# Patient Record
Sex: MALE | Race: WHITE | NOT HISPANIC OR LATINO
[De-identification: names, ages, dates, MRNs, and addresses within clinical notes are randomized per-mention and may not be internally consistent; named-entity substitution may affect disease eponyms.]

---

## 2022-05-19 ENCOUNTER — TRANSCRIPTION ENCOUNTER (OUTPATIENT)
Age: 52
End: 2022-05-19

## 2022-05-19 ENCOUNTER — INPATIENT (INPATIENT)
Facility: HOSPITAL | Age: 52
LOS: 8 days | Discharge: HOME HEALTH | DRG: 221 | End: 2022-05-28
Attending: COLON & RECTAL SURGERY | Admitting: COLON & RECTAL SURGERY
Payer: MEDICAID

## 2022-05-19 ENCOUNTER — RESULT REVIEW (OUTPATIENT)
Age: 52
End: 2022-05-19

## 2022-05-19 VITALS — WEIGHT: 160.06 LBS

## 2022-05-19 DIAGNOSIS — K56.609 UNSPECIFIED INTESTINAL OBSTRUCTION, UNSPECIFIED AS TO PARTIAL VERSUS COMPLETE OBSTRUCTION: ICD-10-CM

## 2022-05-19 LAB
ABO RH CONFIRMATION: SIGNIFICANT CHANGE UP
ALBUMIN SERPL ELPH-MCNC: 3.1 G/DL — LOW (ref 3.3–5)
ALP SERPL-CCNC: 86 U/L — SIGNIFICANT CHANGE UP (ref 40–120)
ALT FLD-CCNC: 9 U/L — LOW (ref 12–78)
ANION GAP SERPL CALC-SCNC: 8 MMOL/L — SIGNIFICANT CHANGE UP (ref 5–17)
APTT BLD: 27.1 SEC — LOW (ref 27.5–35.5)
AST SERPL-CCNC: 13 U/L — LOW (ref 15–37)
BASOPHILS # BLD AUTO: 0.02 K/UL — SIGNIFICANT CHANGE UP (ref 0–0.2)
BASOPHILS NFR BLD AUTO: 0.1 % — SIGNIFICANT CHANGE UP (ref 0–2)
BILIRUB SERPL-MCNC: 0.8 MG/DL — SIGNIFICANT CHANGE UP (ref 0.2–1.2)
BUN SERPL-MCNC: 25 MG/DL — HIGH (ref 7–23)
CALCIUM SERPL-MCNC: 9.6 MG/DL — SIGNIFICANT CHANGE UP (ref 8.5–10.1)
CHLORIDE SERPL-SCNC: 97 MMOL/L — SIGNIFICANT CHANGE UP (ref 96–108)
CO2 SERPL-SCNC: 26 MMOL/L — SIGNIFICANT CHANGE UP (ref 22–31)
CREAT SERPL-MCNC: 0.9 MG/DL — SIGNIFICANT CHANGE UP (ref 0.5–1.3)
EGFR: 105 ML/MIN/1.73M2 — SIGNIFICANT CHANGE UP
EOSINOPHIL # BLD AUTO: 0 K/UL — SIGNIFICANT CHANGE UP (ref 0–0.5)
EOSINOPHIL NFR BLD AUTO: 0 % — SIGNIFICANT CHANGE UP (ref 0–6)
GLUCOSE BLDC GLUCOMTR-MCNC: 146 MG/DL — HIGH (ref 70–99)
GLUCOSE BLDC GLUCOMTR-MCNC: 150 MG/DL — HIGH (ref 70–99)
GLUCOSE SERPL-MCNC: 139 MG/DL — HIGH (ref 70–99)
HCT VFR BLD CALC: 30 % — LOW (ref 39–50)
HGB BLD-MCNC: 8.4 G/DL — LOW (ref 13–17)
IMM GRANULOCYTES NFR BLD AUTO: 1 % — SIGNIFICANT CHANGE UP (ref 0–1.5)
INR BLD: 1.31 RATIO — HIGH (ref 0.88–1.16)
LACTATE SERPL-SCNC: 2.1 MMOL/L — HIGH (ref 0.7–2)
LYMPHOCYTES # BLD AUTO: 0.38 K/UL — LOW (ref 1–3.3)
LYMPHOCYTES # BLD AUTO: 1.7 % — LOW (ref 13–44)
MCHC RBC-ENTMCNC: 16.7 PG — LOW (ref 27–34)
MCHC RBC-ENTMCNC: 28 GM/DL — LOW (ref 32–36)
MCV RBC AUTO: 59.8 FL — LOW (ref 80–100)
MONOCYTES # BLD AUTO: 1.43 K/UL — HIGH (ref 0–0.9)
MONOCYTES NFR BLD AUTO: 6.4 % — SIGNIFICANT CHANGE UP (ref 2–14)
NEUTROPHILS # BLD AUTO: 20.17 K/UL — HIGH (ref 1.8–7.4)
NEUTROPHILS NFR BLD AUTO: 90.8 % — HIGH (ref 43–77)
PLATELET # BLD AUTO: 536 K/UL — HIGH (ref 150–400)
POTASSIUM SERPL-MCNC: 3.9 MMOL/L — SIGNIFICANT CHANGE UP (ref 3.5–5.3)
POTASSIUM SERPL-SCNC: 3.9 MMOL/L — SIGNIFICANT CHANGE UP (ref 3.5–5.3)
PROT SERPL-MCNC: 8.5 GM/DL — HIGH (ref 6–8.3)
PROTHROM AB SERPL-ACNC: 15.2 SEC — HIGH (ref 10.5–13.4)
RAPID RVP RESULT: SIGNIFICANT CHANGE UP
RBC # BLD: 5.02 M/UL — SIGNIFICANT CHANGE UP (ref 4.2–5.8)
RBC # FLD: 18.5 % — HIGH (ref 10.3–14.5)
SARS-COV-2 RNA SPEC QL NAA+PROBE: SIGNIFICANT CHANGE UP
SODIUM SERPL-SCNC: 131 MMOL/L — LOW (ref 135–145)
WBC # BLD: 22.22 K/UL — HIGH (ref 3.8–10.5)
WBC # FLD AUTO: 22.22 K/UL — HIGH (ref 3.8–10.5)

## 2022-05-19 PROCEDURE — 88342 IMHCHEM/IMCYTCHM 1ST ANTB: CPT

## 2022-05-19 PROCEDURE — 85018 HEMOGLOBIN: CPT

## 2022-05-19 PROCEDURE — G1004: CPT

## 2022-05-19 PROCEDURE — 97162 PT EVAL MOD COMPLEX 30 MIN: CPT | Mod: GP

## 2022-05-19 PROCEDURE — C9113: CPT

## 2022-05-19 PROCEDURE — C9399: CPT

## 2022-05-19 PROCEDURE — 52332 CYSTOSCOPY AND TREATMENT: CPT | Mod: 50

## 2022-05-19 PROCEDURE — 84478 ASSAY OF TRIGLYCERIDES: CPT

## 2022-05-19 PROCEDURE — 74177 CT ABD & PELVIS W/CONTRAST: CPT

## 2022-05-19 PROCEDURE — 88305 TISSUE EXAM BY PATHOLOGIST: CPT

## 2022-05-19 PROCEDURE — 80053 COMPREHEN METABOLIC PANEL: CPT

## 2022-05-19 PROCEDURE — 88309 TISSUE EXAM BY PATHOLOGIST: CPT | Mod: 26

## 2022-05-19 PROCEDURE — 86920 COMPATIBILITY TEST SPIN: CPT

## 2022-05-19 PROCEDURE — 88305 TISSUE EXAM BY PATHOLOGIST: CPT | Mod: 26

## 2022-05-19 PROCEDURE — 88307 TISSUE EXAM BY PATHOLOGIST: CPT

## 2022-05-19 PROCEDURE — 99253 IP/OBS CNSLTJ NEW/EST LOW 45: CPT | Mod: 57

## 2022-05-19 PROCEDURE — 88342 IMHCHEM/IMCYTCHM 1ST ANTB: CPT | Mod: 26

## 2022-05-19 PROCEDURE — 71045 X-RAY EXAM CHEST 1 VIEW: CPT | Mod: 26

## 2022-05-19 PROCEDURE — 83735 ASSAY OF MAGNESIUM: CPT

## 2022-05-19 PROCEDURE — 88309 TISSUE EXAM BY PATHOLOGIST: CPT

## 2022-05-19 PROCEDURE — 85014 HEMATOCRIT: CPT

## 2022-05-19 PROCEDURE — 84100 ASSAY OF PHOSPHORUS: CPT

## 2022-05-19 PROCEDURE — 36415 COLL VENOUS BLD VENIPUNCTURE: CPT

## 2022-05-19 PROCEDURE — 80202 ASSAY OF VANCOMYCIN: CPT

## 2022-05-19 PROCEDURE — 81001 URINALYSIS AUTO W/SCOPE: CPT

## 2022-05-19 PROCEDURE — 82378 CARCINOEMBRYONIC ANTIGEN: CPT

## 2022-05-19 PROCEDURE — 83605 ASSAY OF LACTIC ACID: CPT

## 2022-05-19 PROCEDURE — 44150 REMOVAL OF COLON: CPT

## 2022-05-19 PROCEDURE — 88307 TISSUE EXAM BY PATHOLOGIST: CPT | Mod: 26

## 2022-05-19 PROCEDURE — C1769: CPT

## 2022-05-19 PROCEDURE — 88341 IMHCHEM/IMCYTCHM EA ADD ANTB: CPT

## 2022-05-19 PROCEDURE — 71045 X-RAY EXAM CHEST 1 VIEW: CPT

## 2022-05-19 PROCEDURE — 86850 RBC ANTIBODY SCREEN: CPT

## 2022-05-19 PROCEDURE — P9016: CPT

## 2022-05-19 PROCEDURE — 87635 SARS-COV-2 COVID-19 AMP PRB: CPT

## 2022-05-19 PROCEDURE — 80048 BASIC METABOLIC PNL TOTAL CA: CPT

## 2022-05-19 PROCEDURE — 99285 EMERGENCY DEPT VISIT HI MDM: CPT

## 2022-05-19 PROCEDURE — C1758: CPT

## 2022-05-19 PROCEDURE — 85027 COMPLETE CBC AUTOMATED: CPT

## 2022-05-19 PROCEDURE — 82962 GLUCOSE BLOOD TEST: CPT

## 2022-05-19 PROCEDURE — 74176 CT ABD & PELVIS W/O CONTRAST: CPT | Mod: 26,ME

## 2022-05-19 PROCEDURE — 88341 IMHCHEM/IMCYTCHM EA ADD ANTB: CPT | Mod: 26

## 2022-05-19 PROCEDURE — 86901 BLOOD TYPING SEROLOGIC RH(D): CPT

## 2022-05-19 PROCEDURE — 36430 TRANSFUSION BLD/BLD COMPNT: CPT

## 2022-05-19 PROCEDURE — 97116 GAIT TRAINING THERAPY: CPT | Mod: GP

## 2022-05-19 PROCEDURE — 86900 BLOOD TYPING SEROLOGIC ABO: CPT

## 2022-05-19 RX ORDER — HYDROMORPHONE HYDROCHLORIDE 2 MG/ML
0.5 INJECTION INTRAMUSCULAR; INTRAVENOUS; SUBCUTANEOUS
Refills: 0 | Status: DISCONTINUED | OUTPATIENT
Start: 2022-05-19 | End: 2022-05-24

## 2022-05-19 RX ORDER — ENOXAPARIN SODIUM 100 MG/ML
40 INJECTION SUBCUTANEOUS EVERY 24 HOURS
Refills: 0 | Status: DISCONTINUED | OUTPATIENT
Start: 2022-05-19 | End: 2022-05-20

## 2022-05-19 RX ORDER — HYDROMORPHONE HYDROCHLORIDE 2 MG/ML
30 INJECTION INTRAMUSCULAR; INTRAVENOUS; SUBCUTANEOUS
Refills: 0 | Status: DISCONTINUED | OUTPATIENT
Start: 2022-05-19 | End: 2022-05-24

## 2022-05-19 RX ORDER — PROCHLORPERAZINE MALEATE 5 MG
10 TABLET ORAL EVERY 6 HOURS
Refills: 0 | Status: DISCONTINUED | OUTPATIENT
Start: 2022-05-19 | End: 2022-05-28

## 2022-05-19 RX ORDER — SODIUM CHLORIDE 9 MG/ML
1000 INJECTION, SOLUTION INTRAVENOUS
Refills: 0 | Status: DISCONTINUED | OUTPATIENT
Start: 2022-05-19 | End: 2022-05-20

## 2022-05-19 RX ORDER — HYDROMORPHONE HYDROCHLORIDE 2 MG/ML
0.5 INJECTION INTRAMUSCULAR; INTRAVENOUS; SUBCUTANEOUS
Refills: 0 | Status: DISCONTINUED | OUTPATIENT
Start: 2022-05-19 | End: 2022-05-20

## 2022-05-19 RX ORDER — FENTANYL CITRATE 50 UG/ML
50 INJECTION INTRAVENOUS
Refills: 0 | Status: DISCONTINUED | OUTPATIENT
Start: 2022-05-19 | End: 2022-05-20

## 2022-05-19 RX ORDER — ACETAMINOPHEN 500 MG
1000 TABLET ORAL ONCE
Refills: 0 | Status: DISCONTINUED | OUTPATIENT
Start: 2022-05-19 | End: 2022-05-28

## 2022-05-19 RX ORDER — ONDANSETRON 8 MG/1
4 TABLET, FILM COATED ORAL EVERY 6 HOURS
Refills: 0 | Status: DISCONTINUED | OUTPATIENT
Start: 2022-05-19 | End: 2022-05-28

## 2022-05-19 RX ORDER — NALOXONE HYDROCHLORIDE 4 MG/.1ML
0.1 SPRAY NASAL
Refills: 0 | Status: DISCONTINUED | OUTPATIENT
Start: 2022-05-19 | End: 2022-05-28

## 2022-05-19 RX ORDER — PIPERACILLIN AND TAZOBACTAM 4; .5 G/20ML; G/20ML
3.38 INJECTION, POWDER, LYOPHILIZED, FOR SOLUTION INTRAVENOUS EVERY 8 HOURS
Refills: 0 | Status: DISCONTINUED | OUTPATIENT
Start: 2022-05-19 | End: 2022-05-26

## 2022-05-19 RX ORDER — HALOPERIDOL DECANOATE 100 MG/ML
2.5 INJECTION INTRAMUSCULAR ONCE
Refills: 0 | Status: COMPLETED | OUTPATIENT
Start: 2022-05-19 | End: 2022-05-19

## 2022-05-19 RX ORDER — DIPHENHYDRAMINE HCL 50 MG
25 CAPSULE ORAL EVERY 4 HOURS
Refills: 0 | Status: DISCONTINUED | OUTPATIENT
Start: 2022-05-19 | End: 2022-05-28

## 2022-05-19 RX ORDER — SODIUM CHLORIDE 9 MG/ML
2000 INJECTION INTRAMUSCULAR; INTRAVENOUS; SUBCUTANEOUS ONCE
Refills: 0 | Status: COMPLETED | OUTPATIENT
Start: 2022-05-19 | End: 2022-05-19

## 2022-05-19 RX ORDER — PIPERACILLIN AND TAZOBACTAM 4; .5 G/20ML; G/20ML
3.38 INJECTION, POWDER, LYOPHILIZED, FOR SOLUTION INTRAVENOUS ONCE
Refills: 0 | Status: COMPLETED | OUTPATIENT
Start: 2022-05-19 | End: 2022-05-19

## 2022-05-19 RX ORDER — MEPERIDINE HYDROCHLORIDE 50 MG/ML
12.5 INJECTION INTRAMUSCULAR; INTRAVENOUS; SUBCUTANEOUS
Refills: 0 | Status: DISCONTINUED | OUTPATIENT
Start: 2022-05-19 | End: 2022-05-20

## 2022-05-19 RX ORDER — ONDANSETRON 8 MG/1
4 TABLET, FILM COATED ORAL ONCE
Refills: 0 | Status: DISCONTINUED | OUTPATIENT
Start: 2022-05-19 | End: 2022-05-20

## 2022-05-19 RX ORDER — FAMOTIDINE 10 MG/ML
20 INJECTION INTRAVENOUS ONCE
Refills: 0 | Status: COMPLETED | OUTPATIENT
Start: 2022-05-19 | End: 2022-05-19

## 2022-05-19 RX ORDER — MORPHINE SULFATE 50 MG/1
2 CAPSULE, EXTENDED RELEASE ORAL EVERY 4 HOURS
Refills: 0 | Status: DISCONTINUED | OUTPATIENT
Start: 2022-05-19 | End: 2022-05-26

## 2022-05-19 RX ORDER — ACETAMINOPHEN 500 MG
1000 TABLET ORAL ONCE
Refills: 0 | Status: COMPLETED | OUTPATIENT
Start: 2022-05-19 | End: 2022-05-19

## 2022-05-19 RX ORDER — OXYCODONE HYDROCHLORIDE 5 MG/1
10 TABLET ORAL ONCE
Refills: 0 | Status: DISCONTINUED | OUTPATIENT
Start: 2022-05-19 | End: 2022-05-20

## 2022-05-19 RX ADMIN — HYDROMORPHONE HYDROCHLORIDE 30 MILLILITER(S): 2 INJECTION INTRAMUSCULAR; INTRAVENOUS; SUBCUTANEOUS at 23:16

## 2022-05-19 RX ADMIN — HALOPERIDOL DECANOATE 2.5 MILLIGRAM(S): 100 INJECTION INTRAMUSCULAR at 13:30

## 2022-05-19 RX ADMIN — Medication 400 MILLIGRAM(S): at 13:31

## 2022-05-19 RX ADMIN — SODIUM CHLORIDE 2000 MILLILITER(S): 9 INJECTION INTRAMUSCULAR; INTRAVENOUS; SUBCUTANEOUS at 13:31

## 2022-05-19 RX ADMIN — FAMOTIDINE 20 MILLIGRAM(S): 10 INJECTION INTRAVENOUS at 13:31

## 2022-05-19 NOTE — ED ADULT NURSE NOTE - CHIEF COMPLAINT QUOTE
pt presents to ED due to complaints of abdominal pain x 4 years pt states hes never seen any medical professional for any tests no labs done or physical pt appears gray in color and difficulty ambulating due to weakness pt denies alcohol use will send to [facility] for further eval

## 2022-05-19 NOTE — H&P ADULT - NSHPPHYSICALEXAM_GEN_ALL_CORE
PHYSICAL EXAM:  Constitutional: NAD, GCS: 15/15  AOX3  Eyes:  WNL  ENMT:  WNL  Neck:  WNL, non tender  Back: Non tender  Respiratory: CTABL  Cardiovascular:  S1+S2+0  Gastrointestinal: Rigid, tender to palpation in left and right lower quadrant, + guarding   Genitourinary:  WNL  Extremities: NV intact  Vascular:  Intact  Neurological: No focal neurological deficit,  CN, motor and sensory system grossly intact.  Skin: WNL  Musculoskeletal: WNL  Psychiatric: Grossly WNL                          8.4    22.22 )-----------( 536      ( 19 May 2022 12:48 )             30.0     05-19    131<L>  |  97  |  25<H>  ----------------------------<  139<H>  3.9   |  26  |  0.90    Ca    9.6      19 May 2022 12:48    TPro  8.5<H>  /  Alb  3.1<L>  /  TBili  0.8  /  DBili  x   /  AST  13<L>  /  ALT  9<L>  /  AlkPhos  86  05-19

## 2022-05-19 NOTE — ED ADULT NURSE NOTE - OBJECTIVE STATEMENT
pt presents to ED due to complaints of abdominal pain x 4 days pt states he has  never seen any medical professional for any tests, no labs done or physical. Pt appears gray in color and difficulty ambulating due to weakness and pain pt denies alcohol use but states that he smokes cigarettes daily.

## 2022-05-19 NOTE — ED PROVIDER NOTE - NS ED ROS FT
Gen: No fever, normal appetite  Eyes: No eye irritation or discharge  ENT: No ear pain, congestion, sore throat  Resp: No cough or trouble breathing  Cardiovascular: No chest pain or palpitation  Gastroenteric: + abdominal pain, nausea, vomiting   :  No change in urine output; no dysuria  MS: No joint or muscle pain  Skin: No rashes  Neuro: No headache; no abnormal movements  Remainder negative, except as per the HPI

## 2022-05-19 NOTE — CHART NOTE - NSCHARTNOTEFT_GEN_A_CORE
Full Colorectal Surgery consult note to follow.  This is a 51yoM who presents with a 4-day history of abdominal pain, nausea/vomiting, inability to tolerate PO, a prolonged period of weight loss (50 lbs, all unintentional).  He also describes several months of abdominal discomfort but has not seen a physician regarding these complaints.  He has not had a colonoscopy.  On presentation he was noted to be tachycardic to the 140s, with marked leukocytosis to 22 on CBC.  On my exam, despite fluid bolus, he remains tachycardic to 117, abdomen mildly distended, guarding with palpation of the RLQ, and diffusely tender.  CT (non-contrast due to contrast shortage) read as below:    "Apparent focal mural thickening at the cecum/proximal ascending colon may represent colon cancer. The sigmoid colon appears to be tethered to the cecum and it is focally thick-walled; focal tumor invasion/extension from the cecum to the sigmoid colon cannot be excluded. Dilatation of the small bowel with an apparent transition point in the right lower quadrant abdomen, suggestive of small bowel obstruction. Apparent mild mural thickening of the a dilated right lower quadrant small bowel loop with adjacent mesenteric edema for which associated small bowel ischemia cannot be excluded.    Multiple hypodense lesions with calcifications in both lobes of the liver with the largest measuring 5.8 x 6.0 cm in hepatic segment 8. These are suspicious for metastases.    Vital Signs Last 24 Hrs  T(C): 37.7 (19 May 2022 12:06), Max: 37.7 (19 May 2022 12:06)  T(F): 99.8 (19 May 2022 12:06), Max: 99.8 (19 May 2022 12:06)  HR: 97 (19 May 2022 16:52) (97 - 140)  BP: 121/100 (19 May 2022 16:52) (121/100 - 155/101)  BP(mean): 85 (19 May 2022 16:52) (85 - 118)  RR: 20 (19 May 2022 16:52) (20 - 31)  SpO2: 97% (19 May 2022 16:52) (97% - 99%)                          8.4    22.22 )-----------( 536      ( 19 May 2022 12:48 )             30.0         05-19    131<L>  |  97  |  25<H>  ----------------------------<  139<H>  3.9   |  26  |  0.90    Ca    9.6      19 May 2022 12:48    TPro  8.5<H>  /  Alb  3.1<L>  /  TBili  0.8  /  DBili  x   /  AST  13<L>  /  ALT  9<L>  /  AlkPhos  86  05-19      A/P: 51yoM with abdominal pain, sepsis, and peritonitis, with CT findings concerning for metastatic R-sided colon cancer potentially invading the sigmoid colon.      -- Patient requires operative exploration given his sepsis and peritonitis  -- Given concerns for sigmoid invasion, the patient's young age and potential for genetic basis for his cancer, likely malnutrition (reflected by his 50-lb unintentional weight loss and hypoalbuminemia), plan for likely subtotal colectomy with end ileostomy.    -- Stoma nurse was contacted but not available.  Will plan to shelbi patient in preop area - a location just superolateral to the umbilicus on the R side appears appropriate, and patient notes that this is easily visible.  -- Urology consulted for stents given location of mass very close to R ureter  -- EKG stable since previous, CXR normal, COVID negative  -- Risks of bleeding, infection, need for further surgery, staple line leak, possibly permanent stoma, need for further treatment (known metastatic disease), cardiopulmonary risks, DVT/PE/stroke/MI/death all discussed with the patient and he is agreeable to proceed.  -- Plan for admission to stepdown following procedure

## 2022-05-19 NOTE — ED PROVIDER NOTE - PHYSICAL EXAMINATION
GEN - NAD; well appearing; A+O x3   HEAD - NC/AT     EYES - EOMI, no conjunctival pallor, no scleral icterus  ENT -   mucous membranes  moist , no discharge      NECK - Neck supple  PULM - CTA b/l,  symmetric breath sounds  COR -  RRR, S1 S2, no murmurs  ABD - , ND, soft, no guarding, no rebound, no masses ,  diffusely tender   BACK - no CVA tenderness, nontender spine     EXTREMS - no edema, no deformity, warm and well perfused    SKIN - no rash or bruising      NEUROLOGIC - alert, sensation nl, motor 5/5 RUE/LUE/RLE/LLE

## 2022-05-19 NOTE — H&P ADULT - HISTORY OF PRESENT ILLNESS
Pt is a 51 year old male with no pmed hx who presents to  ED with complaint of abd pain x4 days. Pt reports he has had intermittant mild abd pain for past 4 years however now pain is sharp 8/10 and in the left and right lower abdomen. Pt reports he has never had pain of this nature in the past. Passing small amount of gas no stool since sunday. No blood in stool. +50 lb unintential weight loss in last 6-7 months. No colonoscopy. Denies fever/cp/sob/n/v/d.c VSS denies family hx of Ca    Pmed: none  Psurg: None  Allergies: NKDA

## 2022-05-19 NOTE — ED PROVIDER NOTE - CLINICAL SUMMARY MEDICAL DECISION MAKING FREE TEXT BOX
pt complaining of  abdominal pain and vomiting. pt with daily THC use. most like hyperemesis.  however very tachycardic will hydrate. rule out infection. pt diffusely tender on exam. will obtain CT, symptom control and reassess. pt complaining of  abdominal pain and vomiting. pt with daily THC use. most like cannabis induced hyperemesis.  however pt very tachycardic, will hydrate and will rule out infection. pt diffusely tender on exam. will obtain CT, symptom control and reassess. pt complaining of  abdominal pain and vomiting. pt with daily THC use. most likely cannabis induced hyperemesis,  however pt very tachycardic, will hydrate and will rule out infection. pt diffusely tender on exam, will obtain CT, symptom control and reassess.

## 2022-05-19 NOTE — H&P ADULT - TIME BILLING
Patient seen and examined, chart including vitals, labs, and imaging all reviewed.  Please see my chart event note for full details.  Patient with metastatic colon cancer likely in cecum, possibly invading sigmoid.  Plan for OR today, exploratory laparotomy, likely subtotal colectomy, end ileostomy.  Informed consent reviewed and form to be signed.  Admit to stepdown following case.

## 2022-05-19 NOTE — ED ADULT TRIAGE NOTE - CHIEF COMPLAINT QUOTE
pt presents to ED due to complaints of abdominal pain x 4 years pt states hes never seen any medical professional for any tests no labs done or physical pt appears gray in color and difficulty ambulating due to weakness pt denies alcohol use will send to [facility] for further eval

## 2022-05-19 NOTE — ED ADULT NURSE NOTE - NSIMPLEMENTINTERV_GEN_ALL_ED
Implemented All Fall Risk Interventions:  [place] to call system. Call bell, personal items and telephone within reach. Instruct patient to call for assistance. Room bathroom lighting operational. Non-slip footwear when patient is off stretcher. Physically safe environment: no spills, clutter or unnecessary equipment. Stretcher in lowest position, wheels locked, appropriate side rails in place. Provide visual cue, wrist band, yellow gown, etc. Monitor gait and stability. Monitor for mental status changes and reorient to person, place, and time. Review medications for side effects contributing to fall risk. Reinforce activity limits and safety measures with patient and family.

## 2022-05-19 NOTE — H&P ADULT - ASSESSMENT
Pt is a 51 year old male with findings concerning for perforated right sided colon Ca with possible involvement of sigmoid colon      -Plan for OR for exploration, subtotal colectomy with ostomy formation  -Pre operative lab/imaging obtain  -Monitor vitals  -NPO  -IVF  -IV ABX - zosyn  -Anti emetic prn  -Pain control prn  -Urology consulted for pre operative ureteral stent placement   -Plan for surgical step down post op  -Covid negative    Pt was seen and examined with Dr. Mckeon, colorectal surgery attending  KENJI [id number]

## 2022-05-19 NOTE — ED PROVIDER NOTE - OBJECTIVE STATEMENT
51 M no reported past medical history here complaining abdominal pain  starting on Sunday 4 days ago. pt states the abdominal pain has been ongoing for past 4 years and has not been evaluated by a doctor yet for theses symptoms.  reports associated symptoms of nausea and vomiting.  states that after eating he will subsequently vomit.  pt endorses he smokes THC daily.

## 2022-05-20 LAB
ALBUMIN SERPL ELPH-MCNC: 1.7 G/DL — LOW (ref 3.3–5)
ALP SERPL-CCNC: 52 U/L — SIGNIFICANT CHANGE UP (ref 40–120)
ALT FLD-CCNC: 7 U/L — LOW (ref 12–78)
ANION GAP SERPL CALC-SCNC: 4 MMOL/L — LOW (ref 5–17)
ANION GAP SERPL CALC-SCNC: 5 MMOL/L — SIGNIFICANT CHANGE UP (ref 5–17)
AST SERPL-CCNC: 15 U/L — SIGNIFICANT CHANGE UP (ref 15–37)
BILIRUB SERPL-MCNC: 3.2 MG/DL — HIGH (ref 0.2–1.2)
BUN SERPL-MCNC: 14 MG/DL — SIGNIFICANT CHANGE UP (ref 7–23)
BUN SERPL-MCNC: 20 MG/DL — SIGNIFICANT CHANGE UP (ref 7–23)
CALCIUM SERPL-MCNC: 7.6 MG/DL — LOW (ref 8.5–10.1)
CALCIUM SERPL-MCNC: 7.7 MG/DL — LOW (ref 8.5–10.1)
CEA SERPL-MCNC: 731 NG/ML — HIGH (ref 0–3.8)
CHLORIDE SERPL-SCNC: 103 MMOL/L — SIGNIFICANT CHANGE UP (ref 96–108)
CHLORIDE SERPL-SCNC: 104 MMOL/L — SIGNIFICANT CHANGE UP (ref 96–108)
CO2 SERPL-SCNC: 25 MMOL/L — SIGNIFICANT CHANGE UP (ref 22–31)
CO2 SERPL-SCNC: 26 MMOL/L — SIGNIFICANT CHANGE UP (ref 22–31)
CREAT SERPL-MCNC: 0.58 MG/DL — SIGNIFICANT CHANGE UP (ref 0.5–1.3)
CREAT SERPL-MCNC: 0.66 MG/DL — SIGNIFICANT CHANGE UP (ref 0.5–1.3)
EGFR: 115 ML/MIN/1.73M2 — SIGNIFICANT CHANGE UP
EGFR: 120 ML/MIN/1.73M2 — SIGNIFICANT CHANGE UP
GLUCOSE BLDC GLUCOMTR-MCNC: 162 MG/DL — HIGH (ref 70–99)
GLUCOSE SERPL-MCNC: 138 MG/DL — HIGH (ref 70–99)
GLUCOSE SERPL-MCNC: 151 MG/DL — HIGH (ref 70–99)
HCT VFR BLD CALC: 29.5 % — LOW (ref 39–50)
HCT VFR BLD CALC: 31.2 % — LOW (ref 39–50)
HGB BLD-MCNC: 8.6 G/DL — LOW (ref 13–17)
HGB BLD-MCNC: 9 G/DL — LOW (ref 13–17)
LACTATE SERPL-SCNC: 1.3 MMOL/L — SIGNIFICANT CHANGE UP (ref 0.7–2)
LACTATE SERPL-SCNC: 1.5 MMOL/L — SIGNIFICANT CHANGE UP (ref 0.7–2)
MAGNESIUM SERPL-MCNC: 1.8 MG/DL — SIGNIFICANT CHANGE UP (ref 1.6–2.6)
MAGNESIUM SERPL-MCNC: 1.9 MG/DL — SIGNIFICANT CHANGE UP (ref 1.6–2.6)
MCHC RBC-ENTMCNC: 18.6 PG — LOW (ref 27–34)
MCHC RBC-ENTMCNC: 18.8 PG — LOW (ref 27–34)
MCHC RBC-ENTMCNC: 28.8 GM/DL — LOW (ref 32–36)
MCHC RBC-ENTMCNC: 29.2 GM/DL — LOW (ref 32–36)
MCV RBC AUTO: 64.4 FL — LOW (ref 80–100)
MCV RBC AUTO: 64.5 FL — LOW (ref 80–100)
PHOSPHATE SERPL-MCNC: 3.1 MG/DL — SIGNIFICANT CHANGE UP (ref 2.5–4.5)
PHOSPHATE SERPL-MCNC: 3.2 MG/DL — SIGNIFICANT CHANGE UP (ref 2.5–4.5)
PLATELET # BLD AUTO: 413 K/UL — HIGH (ref 150–400)
PLATELET # BLD AUTO: 420 K/UL — HIGH (ref 150–400)
POTASSIUM SERPL-MCNC: 4.3 MMOL/L — SIGNIFICANT CHANGE UP (ref 3.5–5.3)
POTASSIUM SERPL-MCNC: 4.4 MMOL/L — SIGNIFICANT CHANGE UP (ref 3.5–5.3)
POTASSIUM SERPL-SCNC: 4.3 MMOL/L — SIGNIFICANT CHANGE UP (ref 3.5–5.3)
POTASSIUM SERPL-SCNC: 4.4 MMOL/L — SIGNIFICANT CHANGE UP (ref 3.5–5.3)
PROT SERPL-MCNC: 5.1 GM/DL — LOW (ref 6–8.3)
RBC # BLD: 4.58 M/UL — SIGNIFICANT CHANGE UP (ref 4.2–5.8)
RBC # BLD: 4.84 M/UL — SIGNIFICANT CHANGE UP (ref 4.2–5.8)
RBC # FLD: 22.8 % — HIGH (ref 10.3–14.5)
RBC # FLD: 23.5 % — HIGH (ref 10.3–14.5)
SODIUM SERPL-SCNC: 133 MMOL/L — LOW (ref 135–145)
SODIUM SERPL-SCNC: 134 MMOL/L — LOW (ref 135–145)
WBC # BLD: 16.03 K/UL — HIGH (ref 3.8–10.5)
WBC # BLD: 16.54 K/UL — HIGH (ref 3.8–10.5)
WBC # FLD AUTO: 16.03 K/UL — HIGH (ref 3.8–10.5)
WBC # FLD AUTO: 16.54 K/UL — HIGH (ref 3.8–10.5)

## 2022-05-20 RX ORDER — SODIUM CHLORIDE 9 MG/ML
1000 INJECTION, SOLUTION INTRAVENOUS ONCE
Refills: 0 | Status: COMPLETED | OUTPATIENT
Start: 2022-05-20 | End: 2022-05-20

## 2022-05-20 RX ORDER — HEPARIN SODIUM 5000 [USP'U]/ML
5000 INJECTION INTRAVENOUS; SUBCUTANEOUS EVERY 8 HOURS
Refills: 0 | Status: DISCONTINUED | OUTPATIENT
Start: 2022-05-20 | End: 2022-05-28

## 2022-05-20 RX ORDER — SODIUM CHLORIDE 9 MG/ML
1000 INJECTION, SOLUTION INTRAVENOUS
Refills: 0 | Status: DISCONTINUED | OUTPATIENT
Start: 2022-05-20 | End: 2022-05-24

## 2022-05-20 RX ORDER — HEPARIN SODIUM 5000 [USP'U]/ML
5000 INJECTION INTRAVENOUS; SUBCUTANEOUS EVERY 8 HOURS
Refills: 0 | Status: DISCONTINUED | OUTPATIENT
Start: 2022-05-20 | End: 2022-05-20

## 2022-05-20 RX ORDER — ELECTROLYTE SOLUTION,INJ
1 VIAL (ML) INTRAVENOUS
Refills: 0 | Status: DISCONTINUED | OUTPATIENT
Start: 2022-05-20 | End: 2022-05-20

## 2022-05-20 RX ORDER — SODIUM CHLORIDE 9 MG/ML
1000 INJECTION, SOLUTION INTRAVENOUS
Refills: 0 | Status: DISCONTINUED | OUTPATIENT
Start: 2022-05-20 | End: 2022-05-20

## 2022-05-20 RX ORDER — PANTOPRAZOLE SODIUM 20 MG/1
40 TABLET, DELAYED RELEASE ORAL DAILY
Refills: 0 | Status: DISCONTINUED | OUTPATIENT
Start: 2022-05-20 | End: 2022-05-28

## 2022-05-20 RX ADMIN — PANTOPRAZOLE SODIUM 40 MILLIGRAM(S): 20 TABLET, DELAYED RELEASE ORAL at 14:30

## 2022-05-20 RX ADMIN — HEPARIN SODIUM 5000 UNIT(S): 5000 INJECTION INTRAVENOUS; SUBCUTANEOUS at 21:26

## 2022-05-20 RX ADMIN — ONDANSETRON 4 MILLIGRAM(S): 8 TABLET, FILM COATED ORAL at 09:21

## 2022-05-20 RX ADMIN — HEPARIN SODIUM 5000 UNIT(S): 5000 INJECTION INTRAVENOUS; SUBCUTANEOUS at 05:04

## 2022-05-20 RX ADMIN — SODIUM CHLORIDE 120 MILLILITER(S): 9 INJECTION, SOLUTION INTRAVENOUS at 15:15

## 2022-05-20 RX ADMIN — SODIUM CHLORIDE 1000 MILLILITER(S): 9 INJECTION, SOLUTION INTRAVENOUS at 01:41

## 2022-05-20 RX ADMIN — PIPERACILLIN AND TAZOBACTAM 25 GRAM(S): 4; .5 INJECTION, POWDER, LYOPHILIZED, FOR SOLUTION INTRAVENOUS at 00:49

## 2022-05-20 RX ADMIN — PIPERACILLIN AND TAZOBACTAM 25 GRAM(S): 4; .5 INJECTION, POWDER, LYOPHILIZED, FOR SOLUTION INTRAVENOUS at 15:15

## 2022-05-20 RX ADMIN — PIPERACILLIN AND TAZOBACTAM 25 GRAM(S): 4; .5 INJECTION, POWDER, LYOPHILIZED, FOR SOLUTION INTRAVENOUS at 09:53

## 2022-05-20 RX ADMIN — PIPERACILLIN AND TAZOBACTAM 25 GRAM(S): 4; .5 INJECTION, POWDER, LYOPHILIZED, FOR SOLUTION INTRAVENOUS at 21:26

## 2022-05-20 RX ADMIN — HEPARIN SODIUM 5000 UNIT(S): 5000 INJECTION INTRAVENOUS; SUBCUTANEOUS at 14:30

## 2022-05-20 RX ADMIN — Medication 1 EACH: at 22:14

## 2022-05-20 NOTE — PHYSICAL THERAPY INITIAL EVALUATION ADULT - SOCIAL CONCERNS
pt smokes cigarettes (stopped 5 days ago) and smokes marijuana ,quit drinking ETOH 4 years ago ,had drank beer daily before that

## 2022-05-20 NOTE — PATIENT PROFILE ADULT - FALL HARM RISK - RISK INTERVENTIONS
Assistance OOB with selected safe patient handling equipment/Assistance with ambulation/Communicate Fall Risk and Risk Factors to all staff, patient, and family/Monitor gait and stability/Reinforce activity limits and safety measures with patient and family/Sit up slowly, dangle for a short time, stand at bedside before walking/Use of alarms - bed, chair and/or voice tab/Visual Cue: Yellow wristband/Bed in lowest position, wheels locked, appropriate side rails in place/Call bell, personal items and telephone in reach/Instruct patient to call for assistance before getting out of bed or chair/Non-slip footwear when patient is out of bed/[place] to call system/Physically safe environment - no spills, clutter or unnecessary equipment/Purposeful Proactive Rounding/Room/bathroom lighting operational, light cord in reach

## 2022-05-20 NOTE — DIETITIAN INITIAL EVALUATION ADULT - NSFNSGIIOFT_GEN_A_CORE
05-19-22 @ 07:01  -  05-20-22 @ 07:00  --------------------------------------------------------  OUT:    Colostomy (mL): 0 mL    Nasogastric/Oral tube (mL): 0 mL    Rectal Tube (mL): 0 mL  Total OUT: 0 mL    Total NET: 0 mL

## 2022-05-20 NOTE — PHYSICAL THERAPY INITIAL EVALUATION ADULT - GENERAL OBSERVATIONS, REHAB EVAL
supine in bed awake, alert, pleasant & receptive,multiple lines,c/o crampy abdominal discomfort rated 3-4/10 supine in bed awake, alert, pleasant & receptive, multiple lines,c/o crampy abdominal discomfort rated 3-4/10

## 2022-05-20 NOTE — DIETITIAN INITIAL EVALUATION ADULT - ALTERNATE MEANS OF NUTRITION
Recommendations: via peripheral venous line  Total Volume: 1800 mL  70 g  Amino Acids  100 g Dextrose  0 g Lipids 20%  (pending triglyceride level)  106 mEq Sodium Chloride  6 mEq Sodium Acetate  20 mmol Sodium Phosphate  33 mEq Potassium Chloride  27 mEq Potassium Acetate  0 mmol Potassium Phosphate  0 mEq Calcium Gluconate (can replete 10 mEq outside of PN bag - CAPS out of solution)  8 mEq Magnesium Sulfate  100 mg Thiamine  0 ml Trace   10 ml MVI  5 mg zinc  10 mcg chromium  60 mcg selenium    Total Calories:   620   (Meets   31%  of  Estimated Energy needs  and   63%  Protein needs)  (osmolarity <900)/PPN

## 2022-05-20 NOTE — PHYSICAL THERAPY INITIAL EVALUATION ADULT - PHYSICAL ASSIST/NONPHYSICAL ASSIST, REHAB EVAL
much time to corrale lines,remove suction /clamp NGT ,/set-up required/verbal cues/nonverbal cues (demo/gestures)/1 person assist

## 2022-05-20 NOTE — PHYSICAL THERAPY INITIAL EVALUATION ADULT - PRECAUTIONS/LIMITATIONS, REHAB EVAL
has rectal tube to fecal collection bag,+gamez catheter ,NGT to LCWS, new ileostomy ,PCA/IV LUE/surgical precautions has rectal tube to fecal collection bag, +gamez catheter ,NGT to LCWS, new ileostomy ,PCA/IV LUE/surgical precautions

## 2022-05-20 NOTE — PHYSICAL THERAPY INITIAL EVALUATION ADULT - DIAGNOSIS, PT EVAL
colon CA ,+perforated cecal tumor with purulent fecal peritonitis ,s/p subtotal colectomy & end ileostomy

## 2022-05-20 NOTE — BRIEF OPERATIVE NOTE - NSICDXBRIEFPREOP_GEN_ALL_CORE_FT
PRE-OP DIAGNOSIS:  Colon cancer 19-May-2022 23:34:10  Mihai Bryant  
PRE-OP DIAGNOSIS:  Colon cancer 20-May-2022 00:06:22  CARMELLA PHILLIP

## 2022-05-20 NOTE — PHYSICAL THERAPY INITIAL EVALUATION ADULT - PERTINENT HX OF CURRENT PROBLEM, REHAB EVAL
perforated cecal tumor with fecal/purulent peritonitis ntermittant mild abd pain for past 4 years however now pain is sharp 8/10 and in the left and right lower abdomen. Pt reports he has never had pain of this nature in the past. Passing small amount of gas no stool since Sunday. No blood in stool. +50 lb unintentional weight loss in last 6-7 months.

## 2022-05-20 NOTE — DIETITIAN INITIAL EVALUATION ADULT - ADD RECOMMEND
1) Daily weights  2) Strict I & O's  3) Daily lyte checks  4) Weekly triglycerides/LFT checks  5) POCT q6 hours - maintain between 140- 180 mg/dL  RD will monitor/ adjust PPN prn.

## 2022-05-20 NOTE — DIETITIAN INITIAL EVALUATION ADULT - PERTINENT LABORATORY DATA
05-20    134<L>  |  103  |  14  ----------------------------<  138<H>  4.3   |  26  |  0.58    Ca    7.7<L>      20 May 2022 05:45  Phos  3.1     05-20  Mg     1.9     05-20    TPro  5.1<L>  /  Alb  1.7<L>  /  TBili  3.2<H>  /  DBili  x   /  AST  15  /  ALT  7<L>  /  AlkPhos  52  05-20  POCT Blood Glucose.: 146 mg/dL (05-19-22 @ 22:37)

## 2022-05-20 NOTE — BRIEF OPERATIVE NOTE - OPERATION/FINDINGS
ureteral stents placed without resistence
Purulent fluid encountered upon entry to abdomen   Large perforated colon mass in right lower abdomen involving cecum, ascending colon and terminal ileum  Subtotal colectomy performed with end ileostomy and rectal stump  5L warm irrigation   Brooked end ileostomy created

## 2022-05-20 NOTE — BRIEF OPERATIVE NOTE - NSICDXBRIEFPOSTOP_GEN_ALL_CORE_FT
POST-OP DIAGNOSIS:  Colon cancer 19-May-2022 23:34:22  Mihai Bryant  
POST-OP DIAGNOSIS:  Colon cancer 19-May-2022 23:34:22  Mihai Bryant  Colon cancer 20-May-2022 00:06:54  CARMELLA PHILLIP

## 2022-05-20 NOTE — DIETITIAN INITIAL EVALUATION ADULT - PERTINENT MEDS FT
MEDICATIONS  (STANDING):  acetaminophen   IVPB .. 1000 milliGRAM(s) IV Intermittent once  heparin SubCutaneous Injection - Peds 5000 Unit(s) SubCutaneous every 8 hours  HYDROmorphone PCA (1 mG/mL) 30 milliLiter(s) PCA Continuous PCA Continuous  lactated ringers. 1000 milliLiter(s) (120 mL/Hr) IV Continuous <Continuous>  pantoprazole  Injectable 40 milliGRAM(s) IV Push daily  piperacillin/tazobactam IVPB.. 3.375 Gram(s) IV Intermittent every 8 hours    MEDICATIONS  (PRN):  diphenhydrAMINE Injectable 25 milliGRAM(s) IV Push every 4 hours PRN Pruritus  HYDROmorphone PCA (1 mG/mL) Rescue Clinician Bolus 0.5 milliGRAM(s) IV Push every 15 minutes PRN for Pain Scale GREATER THAN 6  morphine  - Injectable 2 milliGRAM(s) IV Push every 4 hours PRN Severe Pain (7 - 10)  naloxone Injectable 0.1 milliGRAM(s) IV Push every 3 minutes PRN For ANY of the following changes in patient status:  A. RR LESS THAN 10 breaths per minute, B. Oxygen saturation LESS THAN 90%, C. Sedation score of 6  ondansetron Injectable 4 milliGRAM(s) IV Push every 6 hours PRN Nausea  prochlorperazine   Injectable 10 milliGRAM(s) IV Push every 6 hours PRN Nausea

## 2022-05-20 NOTE — DIETITIAN INITIAL EVALUATION ADULT - OTHER INFO
51 year old male with no pmhx who presents to  ED with complaint of abd pain x4 days. Pt reports he has had intermittent mild abd pain for past 4 years however now pain is sharp 8/10 and in the left and right lower abdomen. Pt reports he has never had pain of this nature in the past. Passing small amount of gas no stool since Sunday. Found to have colon cancer - Large perforated colon mass in right lower abdomen involving cecum, ascending colon and terminal ileum  Subtotal colectomy performed with end ileostomy and rectal stump on 5/20/22.    Pt states he has lost ~50# over the last 4 years. Admits to being a very drinker but says he lost the wt when he quit. Reports #; current admit wt 160# - unintentional wt loss of 20#/ 11% x unknown time frame. Has been experiencing abd pain/ hyperactive bowel movements with poor PO intake for the last 4 years. Still endorsing N/V. Pt appears thin with moderate muscle/ fat wasting. Meets criteria for PCM. Currently NPO with NGT to LWS in place, will initiate PPN as per surgery. See recommendations below.

## 2022-05-20 NOTE — PATIENT PROFILE ADULT - OVER THE PAST TWO WEEKS, HAVE YOU FELT LITTLE INTEREST OR PLEASURE IN DOING THINGS?
7yo with IBD-U dx in 1/2021 on  Avsola here with diarrhea, vomiting and fever. Symptoms are most likely due to acute viral gastroenteritis.  She is due for Avsola and she has been doing well since last infusion per mom which is at higher dose than prior.  This is not typical of IBD flare which presents with more mucous/blood stool.  She has had minimal blood in stool.  She is currently hemodynamically stable. continues to have abdominal tenderness and watery diarrhea but is not vomiting and is tolerating PO.  PE as above  - strict I/Os  -monitor stool output for blood  - continue IVF while having watery diarrhea  - will reschedule outpatient avsola infusion  -continue prednisolone  - follow up cdiff  -monitor fever curve  -supportive care per primary team  - trend labs no

## 2022-05-20 NOTE — BRIEF OPERATIVE NOTE - NSICDXBRIEFPROCEDURE_GEN_ALL_CORE_FT
PROCEDURES:  Cystoscopy with stent placement 19-May-2022 23:33:46  Mihai Bryant  
PROCEDURES:  Open liver biopsy 20-May-2022 00:07:08  CARMELLA PHILLIP

## 2022-05-20 NOTE — PHYSICAL THERAPY INITIAL EVALUATION ADULT - PERSONAL SAFETY AND JUDGMENT, REHAB EVAL
delay in seeking medical attention despite abdominal distress/bloating & gas ,1 epsiode of bloody stool after taking GAS-X over past few years/at risk behaviors demonstrated

## 2022-05-20 NOTE — DIETITIAN INITIAL EVALUATION ADULT - ETIOLOGY
r/t decreased ability to meet increased nutrient needs 2/2 altered GI function (intestinal obstruction/ new colon cancer)

## 2022-05-20 NOTE — PHYSICAL THERAPY INITIAL EVALUATION ADULT - ACTIVE RANGE OF MOTION EXAMINATION, REHAB EVAL
olena. upper extremity Active ROM was WNL (within normal limits)/bilateral  lower extremity Active ROM was WFL (within functional limits)

## 2022-05-21 LAB
ALBUMIN SERPL ELPH-MCNC: 1.7 G/DL — LOW (ref 3.3–5)
ALP SERPL-CCNC: 47 U/L — SIGNIFICANT CHANGE UP (ref 40–120)
ALT FLD-CCNC: 10 U/L — LOW (ref 12–78)
ANION GAP SERPL CALC-SCNC: 4 MMOL/L — LOW (ref 5–17)
AST SERPL-CCNC: 25 U/L — SIGNIFICANT CHANGE UP (ref 15–37)
BILIRUB SERPL-MCNC: 1.2 MG/DL — SIGNIFICANT CHANGE UP (ref 0.2–1.2)
BLD GP AB SCN SERPL QL: SIGNIFICANT CHANGE UP
BUN SERPL-MCNC: 17 MG/DL — SIGNIFICANT CHANGE UP (ref 7–23)
CALCIUM SERPL-MCNC: 8.1 MG/DL — LOW (ref 8.5–10.1)
CHLORIDE SERPL-SCNC: 104 MMOL/L — SIGNIFICANT CHANGE UP (ref 96–108)
CO2 SERPL-SCNC: 30 MMOL/L — SIGNIFICANT CHANGE UP (ref 22–31)
CREAT SERPL-MCNC: 0.44 MG/DL — LOW (ref 0.5–1.3)
EGFR: 130 ML/MIN/1.73M2 — SIGNIFICANT CHANGE UP
GLUCOSE BLDC GLUCOMTR-MCNC: 111 MG/DL — HIGH (ref 70–99)
GLUCOSE BLDC GLUCOMTR-MCNC: 115 MG/DL — HIGH (ref 70–99)
GLUCOSE BLDC GLUCOMTR-MCNC: 118 MG/DL — HIGH (ref 70–99)
GLUCOSE BLDC GLUCOMTR-MCNC: 139 MG/DL — HIGH (ref 70–99)
GLUCOSE SERPL-MCNC: 128 MG/DL — HIGH (ref 70–99)
HCT VFR BLD CALC: 25.5 % — LOW (ref 39–50)
HCT VFR BLD CALC: 26.8 % — LOW (ref 39–50)
HGB BLD-MCNC: 7.4 G/DL — LOW (ref 13–17)
HGB BLD-MCNC: 7.5 G/DL — LOW (ref 13–17)
MAGNESIUM SERPL-MCNC: 2.3 MG/DL — SIGNIFICANT CHANGE UP (ref 1.6–2.6)
MCHC RBC-ENTMCNC: 18.4 PG — LOW (ref 27–34)
MCHC RBC-ENTMCNC: 18.7 PG — LOW (ref 27–34)
MCHC RBC-ENTMCNC: 28 GM/DL — LOW (ref 32–36)
MCHC RBC-ENTMCNC: 29 GM/DL — LOW (ref 32–36)
MCV RBC AUTO: 64.4 FL — LOW (ref 80–100)
MCV RBC AUTO: 65.7 FL — LOW (ref 80–100)
PHOSPHATE SERPL-MCNC: 2.4 MG/DL — LOW (ref 2.5–4.5)
PLATELET # BLD AUTO: 392 K/UL — SIGNIFICANT CHANGE UP (ref 150–400)
PLATELET # BLD AUTO: 441 K/UL — HIGH (ref 150–400)
POTASSIUM SERPL-MCNC: 4.1 MMOL/L — SIGNIFICANT CHANGE UP (ref 3.5–5.3)
POTASSIUM SERPL-SCNC: 4.1 MMOL/L — SIGNIFICANT CHANGE UP (ref 3.5–5.3)
PROT SERPL-MCNC: 5.5 GM/DL — LOW (ref 6–8.3)
RBC # BLD: 3.96 M/UL — LOW (ref 4.2–5.8)
RBC # BLD: 4.08 M/UL — LOW (ref 4.2–5.8)
RBC # FLD: 22.9 % — HIGH (ref 10.3–14.5)
RBC # FLD: 23.4 % — HIGH (ref 10.3–14.5)
SODIUM SERPL-SCNC: 138 MMOL/L — SIGNIFICANT CHANGE UP (ref 135–145)
TRIGL SERPL-MCNC: 97 MG/DL — SIGNIFICANT CHANGE UP
WBC # BLD: 9 K/UL — SIGNIFICANT CHANGE UP (ref 3.8–10.5)
WBC # BLD: 9.04 K/UL — SIGNIFICANT CHANGE UP (ref 3.8–10.5)
WBC # FLD AUTO: 9 K/UL — SIGNIFICANT CHANGE UP (ref 3.8–10.5)
WBC # FLD AUTO: 9.04 K/UL — SIGNIFICANT CHANGE UP (ref 3.8–10.5)

## 2022-05-21 RX ORDER — ELECTROLYTE SOLUTION,INJ
1 VIAL (ML) INTRAVENOUS
Refills: 0 | Status: DISCONTINUED | OUTPATIENT
Start: 2022-05-21 | End: 2022-05-21

## 2022-05-21 RX ORDER — I.V. FAT EMULSION 20 G/100ML
0.69 EMULSION INTRAVENOUS
Qty: 50 | Refills: 0 | Status: DISCONTINUED | OUTPATIENT
Start: 2022-05-21 | End: 2022-05-21

## 2022-05-21 RX ADMIN — PANTOPRAZOLE SODIUM 40 MILLIGRAM(S): 20 TABLET, DELAYED RELEASE ORAL at 09:51

## 2022-05-21 RX ADMIN — HEPARIN SODIUM 5000 UNIT(S): 5000 INJECTION INTRAVENOUS; SUBCUTANEOUS at 21:14

## 2022-05-21 RX ADMIN — HEPARIN SODIUM 5000 UNIT(S): 5000 INJECTION INTRAVENOUS; SUBCUTANEOUS at 14:17

## 2022-05-21 RX ADMIN — PIPERACILLIN AND TAZOBACTAM 25 GRAM(S): 4; .5 INJECTION, POWDER, LYOPHILIZED, FOR SOLUTION INTRAVENOUS at 05:10

## 2022-05-21 RX ADMIN — HEPARIN SODIUM 5000 UNIT(S): 5000 INJECTION INTRAVENOUS; SUBCUTANEOUS at 05:10

## 2022-05-21 RX ADMIN — PIPERACILLIN AND TAZOBACTAM 25 GRAM(S): 4; .5 INJECTION, POWDER, LYOPHILIZED, FOR SOLUTION INTRAVENOUS at 21:14

## 2022-05-21 RX ADMIN — I.V. FAT EMULSION 20.83 GM/KG/DAY: 20 EMULSION INTRAVENOUS at 22:01

## 2022-05-21 RX ADMIN — PIPERACILLIN AND TAZOBACTAM 25 GRAM(S): 4; .5 INJECTION, POWDER, LYOPHILIZED, FOR SOLUTION INTRAVENOUS at 14:16

## 2022-05-21 RX ADMIN — Medication 1 EACH: at 22:01

## 2022-05-21 NOTE — CONSULT NOTE ADULT - ASSESSMENT
51 year old male with no pmed hx who presents to  ED with complaint of significant weight loss and abd pain x4 days, found to have CT showing mural thickening in cecum/prox ascending colon and lesions in liver suspicious for metastatic disease found to have perforated cecum with surgical procedure showing Purulent fluid encountered upon entry to abdomen, Large perforated colon mass in right lower abdomen involving cecum, ascending colon and terminal ileum now s/p Subtotal colectomy performed with end ileostomy and rectal stump w/ Brooked end ileostomy created.     # metastatic colon ca   - CT a/p - Apparent focal mural thickening at the cecum/proximal ascending colon may represent colon cancer. The sigmoid colon appears to be tethered to the cecum and it is focally thick-walled; focal tumor invasion/extension from the cecum to the sigmoid colon cannot be excluded. Dilatation of the small bowel with an apparent transition point in the right lower quadrant   abdomen, suggestive of small bowel obstruction. Apparent mild mural thickening of the a dilated right lower quadrant small bowel loop with adjacent mesenteric edema for which associated small bowel ischemia cannot be excluded.  - Multiple hypodense lesions with calcifications in both lobes of the liver with the largest measuring 5.8 x 6.0 cm in hepatic segment 8. These are suspicious for metastases.  - cea 751  - pt had never had colonoscopy in the past   - s/p subtotal colectomy with end ileostomy 5/20- showing Purulent fluid encountered upon entry to abdomen, Large perforated colon mass in right lower abdomen involving cecum, ascending colon and terminal ileum now s/p Subtotal colectomy performed with end ileostomy and rectal stump w/ Brooked end ileostomy created  - will need dedicated CT chest with contrast  - will need to send NGS panel- Kras, Nras, determination of tumor gene status including HEr2, MSI/MMR, BRAF, NTRK to determine options of treatment  - pt will need to follow up outpatient in clinic for intensive therapy due to age and PS when recovers from surgery   - will need outpatient PET scan   - dedicated MRI liver to liver mets that could be potentially resectable    Dr. Justin Navas  cell: [phone]  Weekends and nights please call [phone] for MD on call  [facility]  Hematology/Oncology  51 year old male with no pmed hx who presents to  ED with complaint of significant weight loss and abd pain x4 days, found to have CT showing mural thickening in cecum/prox ascending colon and lesions in liver suspicious for metastatic disease found to have perforated cecum with surgical procedure showing Purulent fluid encountered upon entry to abdomen, Large perforated colon mass in right lower abdomen involving cecum, ascending colon and terminal ileum now s/p Subtotal colectomy performed with end ileostomy and rectal stump w/ Brooked end ileostomy created.     # metastatic colon ca   - CT a/p - Apparent focal mural thickening at the cecum/proximal ascending colon may represent colon cancer. The sigmoid colon appears to be tethered to the cecum and it is focally thick-walled; focal tumor invasion/extension from the cecum to the sigmoid colon cannot be excluded. Dilatation of the small bowel with an apparent transition point in the right lower quadrant   abdomen, suggestive of small bowel obstruction. Apparent mild mural thickening of the a dilated right lower quadrant small bowel loop with adjacent mesenteric edema for which associated small bowel ischemia cannot be excluded.  - Multiple hypodense lesions with calcifications in both lobes of the liver with the largest measuring 5.8 x 6.0 cm in hepatic segment 8. These are suspicious for metastases.  - cea 751  - pt had never had colonoscopy in the past   - s/p subtotal colectomy with end ileostomy 5/20- showing Purulent fluid encountered upon entry to abdomen, Large perforated colon mass in right lower abdomen involving cecum, ascending colon and terminal ileum now s/p Subtotal colectomy performed with end ileostomy and rectal stump w/ Brooked end ileostomy created  - will need dedicated CT chest with contrast  - will need to send NGS panel- Kras, Nras, determination of tumor gene status including HEr2, MSI/MMR, BRAF, NTRK to determine options of treatment  - pt will need to follow up outpatient in clinic for intensive therapy due to age and PS when recovers from surgery   - will need outpatient PET scan   - reviewed imaging myself, pt with multiple liver lesions large in size - unresectable and will need systemic therapy  - will discuss with patient and family and give information for f/u in clinic    Dr. Justin Navas  cell: [phone]  Weekends and nights please call [phone] for MD on call  [facility]  Hematology/Oncology  51 year old male with no pmed hx who presents to  ED with complaint of significant weight loss and abd pain x4 days, found to have CT showing mural thickening in cecum/prox ascending colon and lesions in liver suspicious for metastatic disease found to have perforated cecum with surgical procedure showing Purulent fluid encountered upon entry to abdomen, Large perforated colon mass in right lower abdomen involving cecum, ascending colon and terminal ileum now s/p Subtotal colectomy performed with end ileostomy and rectal stump w/ Brooked end ileostomy created.     # metastatic colon ca   - CT a/p - Apparent focal mural thickening at the cecum/proximal ascending colon may represent colon cancer. The sigmoid colon appears to be tethered to the cecum and it is focally thick-walled; focal tumor invasion/extension from the cecum to the sigmoid colon cannot be excluded. Dilatation of the small bowel with an apparent transition point in the right lower quadrant   abdomen, suggestive of small bowel obstruction. Apparent mild mural thickening of the a dilated right lower quadrant small bowel loop with adjacent mesenteric edema for which associated small bowel ischemia cannot be excluded.  - Multiple hypodense lesions with calcifications in both lobes of the liver with the largest measuring 5.8 x 6.0 cm in hepatic segment 8. These are suspicious for metastases.  - cea 731  - pt had never had colonoscopy in the past   - s/p subtotal colectomy with end ileostomy 5/20- showing Purulent fluid encountered upon entry to abdomen, Large perforated colon mass in right lower abdomen involving cecum, ascending colon and terminal ileum now s/p Subtotal colectomy performed with end ileostomy and rectal stump w/ Brooked end ileostomy created  - will need dedicated CT chest with contrast  - will need to send NGS panel- Kras, Nras, determination of tumor gene status including HEr2, MSI/MMR, BRAF, NTRK to determine options of treatment  - pt will need to follow up outpatient in clinic for intensive therapy due to age and PS when recovers from surgery   - will need outpatient PET scan   - reviewed imaging myself, pt with multiple liver lesions large in size - unresectable and will need systemic therapy  - will discuss with patient and family and give information for f/u in clinic    Dr. Justin Navas  cell: [phone]  Weekends and nights please call [phone] for MD on call  [facility]  Hematology/Oncology  51 year old male with no pmed hx who presents to  ED with complaint of significant weight loss and abd pain x4 days, found to have CT showing mural thickening in cecum/prox ascending colon and lesions in liver suspicious for metastatic disease found to have perforated cecum with surgical procedure showing Purulent fluid encountered upon entry to abdomen, Large perforated colon mass in right lower abdomen involving cecum, ascending colon and terminal ileum now s/p Subtotal colectomy performed with end ileostomy and rectal stump w/ Brooked end ileostomy created.     # metastatic colon ca   - CT a/p - Apparent focal mural thickening at the cecum/proximal ascending colon may represent colon cancer. The sigmoid colon appears to be tethered to the cecum and it is focally thick-walled; focal tumor invasion/extension from the cecum to the sigmoid colon cannot be excluded. Dilatation of the small bowel with an apparent transition point in the right lower quadrant   abdomen, suggestive of small bowel obstruction. Apparent mild mural thickening of the a dilated right lower quadrant small bowel loop with adjacent mesenteric edema for which associated small bowel ischemia cannot be excluded.  - Multiple hypodense lesions with calcifications in both lobes of the liver with the largest measuring 5.8 x 6.0 cm in hepatic segment 8. These are suspicious for metastases.  - cea 731  - pt had never had colonoscopy in the past   - s/p subtotal colectomy with end ileostomy 5/20- showing Purulent fluid encountered upon entry to abdomen, Large perforated colon mass in right lower abdomen involving cecum, ascending colon and terminal ileum now s/p Subtotal colectomy performed with end ileostomy and rectal stump w/ Brooked end ileostomy created  - will need dedicated CT chest with contrast  - will need to send NGS panel- Kras, Nras, determination of tumor gene status including HEr2, MSI/MMR, BRAF, NTRK to determine options of treatment  - pt will need to follow up outpatient in clinic for intensive therapy due to age and PS when recovers from surgery   - will need outpatient PET scan   - reviewed imaging myself, pt with multiple liver lesions large in size - unresectable and will need systemic therapy  - will discuss with patient and family and give information for f/u in clinic    # anemia  - Hb 7.4 today likely from surgery/myelosuppression and inflammation   - transfuse 1u to keep Hb >8      Dr. Justin Navas  cell: [phone]  Weekends and nights please call [phone] for MD on call  [facility]  Hematology/Oncology  51 year old male with no pmed hx who presents to  ED with complaint of significant weight loss and abd pain x4 days, found to have CT showing mural thickening in cecum/prox ascending colon and lesions in liver suspicious for metastatic disease found to have perforated cecum with surgical procedure showing Purulent fluid encountered upon entry to abdomen, Large perforated colon mass in right lower abdomen involving cecum, ascending colon and terminal ileum now s/p Subtotal colectomy performed with end ileostomy and rectal stump w/ Brooked end ileostomy created.     # metastatic colon ca   - CT a/p - Apparent focal mural thickening at the cecum/proximal ascending colon may represent colon cancer. The sigmoid colon appears to be tethered to the cecum and it is focally thick-walled; focal tumor invasion/extension from the cecum to the sigmoid colon cannot be excluded. Dilatation of the small bowel with an apparent transition point in the right lower quadrant   abdomen, suggestive of small bowel obstruction. Apparent mild mural thickening of the a dilated right lower quadrant small bowel loop with adjacent mesenteric edema for which associated small bowel ischemia cannot be excluded.  - Multiple hypodense lesions with calcifications in both lobes of the liver with the largest measuring 5.8 x 6.0 cm in hepatic segment 8. These are suspicious for metastases.  - cea 731  - pt had never had colonoscopy in the past   - s/p subtotal colectomy with end ileostomy 5/20- showing Purulent fluid encountered upon entry to abdomen, Large perforated colon mass in right lower abdomen involving cecum, ascending colon and terminal ileum now s/p Subtotal colectomy performed with end ileostomy and rectal stump w/ Brooked end ileostomy created  - will need dedicated CT chest with contrast  - will need to send NGS panel- Kras, Nras, determination of tumor gene status including HEr2, MSI/MMR, BRAF, NTRK to determine options of treatment  - pt will need to follow up outpatient in clinic for intensive therapy due to age and PS when recovers from surgery   - will need outpatient PET scan   - reviewed imaging myself, pt with multiple liver lesions large in size - unresectable and will need systemic therapy  - will discuss with patient and family and give information for f/u in clinic- patient does not have insurance, lives with mother and brother, will need [employer] schafer to help with assistance     # anemia  - Hb 7.4 today likely from surgery/myelosuppression and inflammation   - transfuse 1u to keep Hb >8      Dr. Justin Navas  cell: [phone]  Weekends and nights please call [phone] for MD on call  [facility]  Hematology/Oncology

## 2022-05-21 NOTE — CONSULT NOTE ADULT - SUBJECTIVE AND OBJECTIVE BOX
HPI:  51 year old male with no pmed hx who presents to  ED with complaint of significant weight loss and abd pain x4 days, found to have CT showing mural thickening in cecum/prox ascending colon and lesions in liver suspicious for metastatic disease found to have perforated cecum with surgical procedure showing Purulent fluid encountered upon entry to abdomen, Large perforated colon mass in right lower abdomen involving cecum, ascending colon and terminal ileum now s/p Subtotal colectomy performed with end ileostomy and rectal stump w/ Brooked end ileostomy created.     Pt reports he has had intermittant mild abd pain for past 4 years however now pain is sharp 8/10 and in the left and right lower abdomen. Pt reports he has never had pain of this nature in the past. Passing small amount of gas no stool since sunday. No blood in stool. +50 lb unintential weight loss in last 6-7 months. No colonoscopy. Denies fever/cp/sob/n/v/d.c VSS denies family hx of Ca    CT a/p Apparent focal mural thickening at the cecum/proximal ascending colon may   represent colon cancer. The sigmoid colon appears to be tethered to the   cecum and it is focally thick-walled; focal tumor invasion/extension from   the cecum to the sigmoid colon cannot be excluded. Dilatation of the   small bowel with an apparent transition point in the right lower quadrant   abdomen, suggestive of small bowel obstruction. Apparent mild mural   thickening of the a dilated right lower quadrant small bowel loop with   adjacent mesenteric edema for which associated small bowel ischemia   cannot be excluded.    Multiple hypodense lesions with calcifications in both lobes of the liver   with the largest measuring 5.8 x 6.0 cm in hepatic segment 8. These are   suspicious for metastases.    Mild ascites.    Pmed: none  Psurg: None  Allergies: NKDA  (19 May 2022 17:29)      PAST MEDICAL & SURGICAL HISTORY:  No pertinent past medical history          Allergies    No Known Allergies    Intolerances        MEDICATIONS  (STANDING):  acetaminophen   IVPB .. 1000 milliGRAM(s) IV Intermittent once  heparin   Injectable 5000 Unit(s) SubCutaneous every 8 hours  HYDROmorphone PCA (1 mG/mL) 30 milliLiter(s) PCA Continuous PCA Continuous  lactated ringers. 1000 milliLiter(s) (120 mL/Hr) IV Continuous <Continuous>  pantoprazole  Injectable 40 milliGRAM(s) IV Push daily  Parenteral Nutrition - Adult 1 Each (75 mL/Hr) TPN Continuous <Continuous>  piperacillin/tazobactam IVPB.. 3.375 Gram(s) IV Intermittent every 8 hours    MEDICATIONS  (PRN):  diphenhydrAMINE Injectable 25 milliGRAM(s) IV Push every 4 hours PRN Pruritus  HYDROmorphone PCA (1 mG/mL) Rescue Clinician Bolus 0.5 milliGRAM(s) IV Push every 15 minutes PRN for Pain Scale GREATER THAN 6  morphine  - Injectable 2 milliGRAM(s) IV Push every 4 hours PRN Severe Pain (7 - 10)  naloxone Injectable 0.1 milliGRAM(s) IV Push every 3 minutes PRN For ANY of the following changes in patient status:  A. RR LESS THAN 10 breaths per minute, B. Oxygen saturation LESS THAN 90%, C. Sedation score of 6  ondansetron Injectable 4 milliGRAM(s) IV Push every 6 hours PRN Nausea  prochlorperazine   Injectable 10 milliGRAM(s) IV Push every 6 hours PRN Nausea      FAMILY HISTORY:      SOCIAL HISTORY: No EtOH, no tobacco    REVIEW OF SYSTEMS:    CONSTITUTIONAL: No weakness, fevers or chills  EYES/ENT: No visual changes;  No vertigo or throat pain   NECK: No pain or stiffness  RESPIRATORY: No cough, wheezing, hemoptysis; No shortness of breath  CARDIOVASCULAR: No chest pain or palpitations  GASTROINTESTINAL: No abdominal or epigastric pain. No nausea, vomiting, or hematemesis; No diarrhea or constipation. No melena or hematochezia.  GENITOURINARY: No dysuria, frequency or hematuria  NEUROLOGICAL: No numbness or weakness  SKIN: No itching, burning, rashes, or lesions   All other review of systems is negative unless indicated above.        T(F): 98.4 (05-21-22 @ 08:52), Max: 98.7 (05-21-22 @ 06:15)  HR: 83 (05-21-22 @ 06:00)  BP: 110/75 (05-21-22 @ 08:00)  RR: 14 (05-21-22 @ 06:00)  SpO2: 95% (05-21-22 @ 06:00)  Wt(kg): --    GENERAL: NAD, well-developed  HEAD:  Atraumatic, Normocephalic  EYES: EOMI, PERRLA, conjunctiva and sclera clear  NECK: Supple, No JVD  CHEST/LUNG: Clear to auscultation bilaterally; No wheeze  HEART: Regular rate and rhythm; No murmurs, rubs, or gallops  ABDOMEN: Soft, Nontender, Nondistended; Bowel sounds present  EXTREMITIES:  2+ Peripheral Pulses, No clubbing, cyanosis, or edema  NEUROLOGY: non-focal  SKIN: No rashes or lesions                          7.4    9.04  )-----------( 392      ( 21 May 2022 05:38 )             25.5       05-21    138  |  104  |  17  ----------------------------<  128<H>  4.1   |  30  |  0.44<L>    Ca    8.1<L>      21 May 2022 05:38  Phos  2.4     05-21  Mg     2.3     05-21    TPro  5.5<L>  /  Alb  1.7<L>  /  TBili  1.2  /  DBili  x   /  AST  25  /  ALT  10<L>  /  AlkPhos  47  05-21      Magnesium, Serum: 2.3 mg/dL (05-21 @ 05:38)  Phosphorus Level, Serum: 2.4 mg/dL (05-21 @ 05:38)      PT/INR - ( 19 May 2022 12:48 )   PT: 15.2 sec;   INR: 1.31 ratio         PTT - ( 19 May 2022 12:48 )  PTT:27.1 sec    .Blood Blood-Peripheral  05-19 @ 12:48   No growth to date.  --  --       HPI:  51 year old male with no pmed hx who presents to  ED with complaint of significant weight loss and abd pain x4 days, found to have CT showing mural thickening in cecum/prox ascending colon and lesions in liver suspicious for metastatic disease found to have perforated cecum with surgical procedure showing Purulent fluid encountered upon entry to abdomen, Large perforated colon mass in right lower abdomen involving cecum, ascending colon and terminal ileum now s/p Subtotal colectomy performed with end ileostomy and rectal stump w/ Brooked end ileostomy created.     Pt reports he has had intermittant mild abd pain for past 4 years however now pain is sharp 8/10 and in the left and right lower abdomen. Pt reports he has never had pain of this nature in the past. Passing small amount of gas no stool since sunday. No blood in stool. +50 lb unintential weight loss in last 6-7 months. No colonoscopy. Denies fever/cp/sob/n/v/d.c VSS denies family hx of Ca    CT a/p Apparent focal mural thickening at the cecum/proximal ascending colon may  represent colon cancer. The sigmoid colon appears to be tethered to the  cecum and it is focally thick-walled; focal tumor invasion/extension from the cecum to the sigmoid colon cannot be excluded. Dilatation of the small bowel with an apparent transition point in the right lower quadrant   abdomen, suggestive of small bowel obstruction. Apparent mild mural thickening of the a dilated right lower quadrant small bowel loop with adjacent mesenteric edema for which associated small bowel ischemia cannot be excluded. Multiple hypodense lesions with calcifications in both lobes of the liver with the largest measuring 5.8 x 6.0 cm in hepatic segment 8. These are suspicious for metastases.Mild ascites.        Pmed: none  Psurg: None  Allergies: NKDA  (19 May 2022 17:29)      PAST MEDICAL & SURGICAL HISTORY:  No pertinent past medical history          Allergies    No Known Allergies    Intolerances        MEDICATIONS  (STANDING):  acetaminophen   IVPB .. 1000 milliGRAM(s) IV Intermittent once  heparin   Injectable 5000 Unit(s) SubCutaneous every 8 hours  HYDROmorphone PCA (1 mG/mL) 30 milliLiter(s) PCA Continuous PCA Continuous  lactated ringers. 1000 milliLiter(s) (120 mL/Hr) IV Continuous <Continuous>  pantoprazole  Injectable 40 milliGRAM(s) IV Push daily  Parenteral Nutrition - Adult 1 Each (75 mL/Hr) TPN Continuous <Continuous>  piperacillin/tazobactam IVPB.. 3.375 Gram(s) IV Intermittent every 8 hours    MEDICATIONS  (PRN):  diphenhydrAMINE Injectable 25 milliGRAM(s) IV Push every 4 hours PRN Pruritus  HYDROmorphone PCA (1 mG/mL) Rescue Clinician Bolus 0.5 milliGRAM(s) IV Push every 15 minutes PRN for Pain Scale GREATER THAN 6  morphine  - Injectable 2 milliGRAM(s) IV Push every 4 hours PRN Severe Pain (7 - 10)  naloxone Injectable 0.1 milliGRAM(s) IV Push every 3 minutes PRN For ANY of the following changes in patient status:  A. RR LESS THAN 10 breaths per minute, B. Oxygen saturation LESS THAN 90%, C. Sedation score of 6  ondansetron Injectable 4 milliGRAM(s) IV Push every 6 hours PRN Nausea  prochlorperazine   Injectable 10 milliGRAM(s) IV Push every 6 hours PRN Nausea      FAMILY HISTORY:      SOCIAL HISTORY: No EtOH, no tobacco    REVIEW OF SYSTEMS:    CONSTITUTIONAL: No weakness, fevers or chills  EYES/ENT: No visual changes;  No vertigo or throat pain   NECK: No pain or stiffness  RESPIRATORY: No cough, wheezing, hemoptysis; No shortness of breath  CARDIOVASCULAR: No chest pain or palpitations  GASTROINTESTINAL: No abdominal or epigastric pain. No nausea, vomiting, or hematemesis; No diarrhea or constipation. No melena or hematochezia.  GENITOURINARY: No dysuria, frequency or hematuria  NEUROLOGICAL: No numbness or weakness  SKIN: No itching, burning, rashes, or lesions   All other review of systems is negative unless indicated above.        T(F): 98.4 (05-21-22 @ 08:52), Max: 98.7 (05-21-22 @ 06:15)  HR: 83 (05-21-22 @ 06:00)  BP: 110/75 (05-21-22 @ 08:00)  RR: 14 (05-21-22 @ 06:00)  SpO2: 95% (05-21-22 @ 06:00)  Wt(kg): --    GENERAL: NAD, well-developed  HEAD:  Atraumatic, Normocephalic  EYES: EOMI, PERRLA, conjunctiva and sclera clear  NECK: Supple, No JVD  CHEST/LUNG: Clear to auscultation bilaterally; No wheeze  HEART: Regular rate and rhythm; No murmurs, rubs, or gallops  ABDOMEN:   EXTREMITIES:  2+ Peripheral Pulses, No clubbing, cyanosis, or edema  NEUROLOGY: non-focal  SKIN: No rashes or lesions                          7.4    9.04  )-----------( 392      ( 21 May 2022 05:38 )             25.5       05-21    138  |  104  |  17  ----------------------------<  128<H>  4.1   |  30  |  0.44<L>    Ca    8.1<L>      21 May 2022 05:38  Phos  2.4     05-21  Mg     2.3     05-21    TPro  5.5<L>  /  Alb  1.7<L>  /  TBili  1.2  /  DBili  x   /  AST  25  /  ALT  10<L>  /  AlkPhos  47  05-21      Magnesium, Serum: 2.3 mg/dL (05-21 @ 05:38)  Phosphorus Level, Serum: 2.4 mg/dL (05-21 @ 05:38)      PT/INR - ( 19 May 2022 12:48 )   PT: 15.2 sec;   INR: 1.31 ratio         PTT - ( 19 May 2022 12:48 )  PTT:27.1 sec    .Blood Blood-Peripheral  05-19 @ 12:48   No growth to date.  --  --       HPI:  51 year old male with no pmed hx who presents to  ED with complaint of significant weight loss and abd pain x4 days, found to have CT showing mural thickening in cecum/prox ascending colon and lesions in liver suspicious for metastatic disease found to have perforated cecum with surgical procedure showing Purulent fluid encountered upon entry to abdomen, Large perforated colon mass in right lower abdomen involving cecum, ascending colon and terminal ileum now s/p Subtotal colectomy performed with end ileostomy and rectal stump w/ Brooked end ileostomy created.     Pt reports he has had intermittant mild abd pain for past 4 years however now pain is sharp 8/10 and in the left and right lower abdomen. Pt reports he has never had pain of this nature in the past. Passing small amount of gas no stool since sunday. No blood in stool. +50 lb unintential weight loss in last 6-7 months. No colonoscopy. Denies fever/cp/sob/n/v/d.c VSS denies family hx of Ca    CT a/p Apparent focal mural thickening at the cecum/proximal ascending colon may  represent colon cancer. The sigmoid colon appears to be tethered to the  cecum and it is focally thick-walled; focal tumor invasion/extension from the cecum to the sigmoid colon cannot be excluded. Dilatation of the small bowel with an apparent transition point in the right lower quadrant   abdomen, suggestive of small bowel obstruction. Apparent mild mural thickening of the a dilated right lower quadrant small bowel loop with adjacent mesenteric edema for which associated small bowel ischemia cannot be excluded. Multiple hypodense lesions with calcifications in both lobes of the liver with the largest measuring 5.8 x 6.0 cm in hepatic segment 8. These are suspicious for metastases.Mild ascites.        Pmed: none  Psurg: None  Allergies: NKDA  (19 May 2022 17:29)      PAST MEDICAL & SURGICAL HISTORY:  No pertinent past medical history          Allergies    No Known Allergies    Intolerances        MEDICATIONS  (STANDING):  acetaminophen   IVPB .. 1000 milliGRAM(s) IV Intermittent once  heparin   Injectable 5000 Unit(s) SubCutaneous every 8 hours  HYDROmorphone PCA (1 mG/mL) 30 milliLiter(s) PCA Continuous PCA Continuous  lactated ringers. 1000 milliLiter(s) (120 mL/Hr) IV Continuous <Continuous>  pantoprazole  Injectable 40 milliGRAM(s) IV Push daily  Parenteral Nutrition - Adult 1 Each (75 mL/Hr) TPN Continuous <Continuous>  piperacillin/tazobactam IVPB.. 3.375 Gram(s) IV Intermittent every 8 hours    MEDICATIONS  (PRN):  diphenhydrAMINE Injectable 25 milliGRAM(s) IV Push every 4 hours PRN Pruritus  HYDROmorphone PCA (1 mG/mL) Rescue Clinician Bolus 0.5 milliGRAM(s) IV Push every 15 minutes PRN for Pain Scale GREATER THAN 6  morphine  - Injectable 2 milliGRAM(s) IV Push every 4 hours PRN Severe Pain (7 - 10)  naloxone Injectable 0.1 milliGRAM(s) IV Push every 3 minutes PRN For ANY of the following changes in patient status:  A. RR LESS THAN 10 breaths per minute, B. Oxygen saturation LESS THAN 90%, C. Sedation score of 6  ondansetron Injectable 4 milliGRAM(s) IV Push every 6 hours PRN Nausea  prochlorperazine   Injectable 10 milliGRAM(s) IV Push every 6 hours PRN Nausea      FAMILY HISTORY:      SOCIAL HISTORY: No EtOH, no tobacco    REVIEW OF SYSTEMS:    CONSTITUTIONAL: No weakness, fevers or chills  EYES/ENT: No visual changes;  No vertigo or throat pain   NECK: No pain or stiffness, NGT in place  RESPIRATORY: No cough, wheezing, hemoptysis; No shortness of breath  CARDIOVASCULAR: No chest pain or palpitations  GASTROINTESTINAL: mild abdominal pain, colostomy draining  GENITOURINARY: No dysuria, frequency or hematuria  NEUROLOGICAL: No numbness or weakness  SKIN: No itching, burning, rashes, or lesions   All other review of systems is negative unless indicated above.        T(F): 98.4 (05-21-22 @ 08:52), Max: 98.7 (05-21-22 @ 06:15)  HR: 83 (05-21-22 @ 06:00)  BP: 110/75 (05-21-22 @ 08:00)  RR: 14 (05-21-22 @ 06:00)  SpO2: 95% (05-21-22 @ 06:00)  Wt(kg): --    GENERAL: NAD,   HEAD:  Atraumatic, Normocephalic  ENT: eomi, NGT in place draining  CHEST/LUNG: Clear to auscultation bilaterally; No wheeze  HEART: Regular rate and rhythm; No murmurs, rubs, or gallops  ABDOMEN: soft, ostomy site draining, no pain, clean  EXTREMITIES:  2+ Peripheral Pulses, No clubbing, cyanosis, or edema  NEUROLOGY: non-focal  SKIN: No rashes or lesions                          7.4    9.04  )-----------( 392      ( 21 May 2022 05:38 )             25.5       05-21    138  |  104  |  17  ----------------------------<  128<H>  4.1   |  30  |  0.44<L>    Ca    8.1<L>      21 May 2022 05:38  Phos  2.4     05-21  Mg     2.3     05-21    TPro  5.5<L>  /  Alb  1.7<L>  /  TBili  1.2  /  DBili  x   /  AST  25  /  ALT  10<L>  /  AlkPhos  47  05-21      Magnesium, Serum: 2.3 mg/dL (05-21 @ 05:38)  Phosphorus Level, Serum: 2.4 mg/dL (05-21 @ 05:38)      PT/INR - ( 19 May 2022 12:48 )   PT: 15.2 sec;   INR: 1.31 ratio         PTT - ( 19 May 2022 12:48 )  PTT:27.1 sec    .Blood Blood-Peripheral  05-19 @ 12:48   No growth to date.  --  --

## 2022-05-21 NOTE — CHART NOTE - NSCHARTNOTEFT_GEN_A_CORE
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    *current status: remains with NGT to LWS.  c/w PPN until able to tolerate full liquid diet.    *labs reviewed; hypophosphatemia.  other lytes WNL.  corrected Ca upper level norm, do not supplement outside of PN bag.  bilirubin total WNL.  triglycerides WNL for lipids.  POCT WNL.  05-21    138  |  104  |  17  ----------------------------<  128<H>  4.1   |  30  |  0.44<L>    Ca    8.1<L>      21 May 2022 05:38  Phos  2.4     05-21  Mg     2.3     05-21    TPro  5.5<L>  /  Alb  1.7<L>  /  TBili  1.2  /  DBili  x   /  AST  25  /  ALT  10<L>  /  AlkPhos  47  05-21    Lipid Panel: Date/Time: 05-21-22 @ 05:38  Triglycerides, Serum: 97    POCT Blood Glucose.: 139 mg/dL (21 May 2022 02:25)  POCT Blood Glucose.: 162 mg/dL (20 May 2022 19:02)      *pertinent meds: heparin, LR, protonix, tylneol, PCA, morphine, zofran    *I&O's Detail    20 May 2022 07:01  -  21 May 2022 07:00  --------------------------------------------------------  IN:    IV PiggyBack: 346 mL    Lactated Ringers: 2120 mL    PPN (Peripheral Parenteral Nutrition): 528 mL  Total IN: 2994 mL    OUT:    Colostomy (mL): 35 mL    Indwelling Catheter - Urethral (mL): 1475 mL    Nasogastric/Oral tube (mL): 550 mL    Rectal Tube (mL): 0 mL  Total OUT: 2060 mL    Total NET: 934 mL    *positive fluid status: hypoactive bowel sounds with small output from ostomy.  *Stewart Score of 17 ;no PU documented.  no edema documented.    *Malnutrition: severe malnutrition in acute illness r/t decreased ability to meet increased nutr needs 2/2 altered GI function AEB mod muscle/fat wasting; wt loss; poor PO intake    Estimated Needs: based on 73Kg (wt from 5/20 )  Calories: 1746-5391 Kcal (30-35Kcal/Kg)  Protein: 109-145 g protein (1.5-2g/Kg)  Fluids:  3835-9465 mL (30-35 mL/Kg)    Diet, NPO (05-19-22 @ 16:01) [Active]      Weight History:    Weight (kg): 72.6 (05-19-22 @ 11:40)    *will continue to monitor and adjust PN prn*    PPN Recommendations: via peripheral line  Total Volume: 1800mL  70 g  Amino Acids  100 g Dextrose  50 g Lipids 20%  106 mEq Sodium Chloride  20 mEq Sodium Acetate  10 mmol Sodium Phosphate  29 mEq Potassium Chloride  9 mEq Potassium Acetate  15 mmol Potassium Phosphate  0 mEq Calcium Gluconate  8 mEq Magnesium Sulfate  100 mg Thiamine  1 ml Trace Elements  10 ml MVI    Total Calories 1120Kcal   (   Meets 51 %  of  Estimated Energy needs  and   64   %  Protein needs)(osmolarity<900)    Additional Recommendations:    1) Daily weights  2) Strict I & O's  3) Daily lyte checks including magnesium and phos  4) Weekly triglycerides/LFT checks  5) POCT q6hrs; maintain 140-180mg/dL    *will monitor and adjust treatement plan prn*  Denise Wise MA, RDN, CDN, CNSC [phone] Clinical Nutrition PPN Follow Up Note    * 50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    *current status: remains with NGT to LWS.  c/w PPN until able to tolerate full liquid diet.    *labs reviewed; hypophosphatemia.  other lytes WNL.  corrected Ca upper level norm, do not supplement outside of PN bag.  bilirubin total WNL.  triglycerides WNL for lipids.  POCT WNL.  05-21    138  |  104  |  17  ----------------------------<  128<H>  4.1   |  30  |  0.44<L>    Ca    8.1<L>      21 May 2022 05:38  Phos  2.4     05-21  Mg     2.3     05-21    TPro  5.5<L>  /  Alb  1.7<L>  /  TBili  1.2  /  DBili  x   /  AST  25  /  ALT  10<L>  /  AlkPhos  47  05-21    Lipid Panel: Date/Time: 05-21-22 @ 05:38  Triglycerides, Serum: 97    POCT Blood Glucose.: 139 mg/dL (21 May 2022 02:25)  POCT Blood Glucose.: 162 mg/dL (20 May 2022 19:02)      *pertinent meds: heparin, LR, protonix, tylneol, PCA, morphine, zofran    *I&O's Detail    20 May 2022 07:01  -  21 May 2022 07:00  --------------------------------------------------------  IN:    IV PiggyBack: 346 mL    Lactated Ringers: 2120 mL    PPN (Peripheral Parenteral Nutrition): 528 mL  Total IN: 2994 mL    OUT:    Colostomy (mL): 35 mL    Indwelling Catheter - Urethral (mL): 1475 mL    Nasogastric/Oral tube (mL): 550 mL    Rectal Tube (mL): 0 mL  Total OUT: 2060 mL    Total NET: 934 mL    *positive fluid status: hypoactive bowel sounds with small output from ostomy.  *Stewart Score of 17 ;no PU documented.  no edema documented.    *Malnutrition: severe malnutrition in acute illness r/t decreased ability to meet increased nutr needs 2/2 altered GI function AEB mod muscle/fat wasting; wt loss; poor PO intake    Estimated Needs: based on 73Kg (wt from 5/20 )  Calories: 0093-9128 Kcal (30-35Kcal/Kg)  Protein: 109-145 g protein (1.5-2g/Kg)  Fluids:  9126-5873 mL (30-35 mL/Kg)    Diet, NPO (05-19-22 @ 16:01) [Active]      Weight History:    Weight (kg): 72.6 (05-19-22 @ 11:40)    *will continue to monitor and adjust PN prn*    PPN Recommendations: via peripheral line  Total Volume: 1800mL  70 g  Amino Acids  100 g Dextrose  50 g Lipids 20%  106 mEq Sodium Chloride  20 mEq Sodium Acetate  10 mmol Sodium Phosphate  29 mEq Potassium Chloride  9 mEq Potassium Acetate  10 mmol Potassium Phosphate  0 mEq Calcium Gluconate  8 mEq Magnesium Sulfate  100 mg Thiamine  1 ml Trace Elements  10 ml MVI    Total Calories 1120Kcal   (   Meets 51 %  of  Estimated Energy needs  and   64   %  Protein needs)(osmolarity<900)    Additional Recommendations:    1) Daily weights  2) Strict I & O's  3) Daily lyte checks including magnesium and phos  4) Weekly triglycerides/LFT checks  5) POCT q6hrs; maintain 140-180mg/dL    *will monitor and adjust treatement plan prn*  Denise Wise MA, RDN, CDN, CNSC [phone]

## 2022-05-22 LAB
ALBUMIN SERPL ELPH-MCNC: 1.7 G/DL — LOW (ref 3.3–5)
ALP SERPL-CCNC: 51 U/L — SIGNIFICANT CHANGE UP (ref 40–120)
ALT FLD-CCNC: 12 U/L — SIGNIFICANT CHANGE UP (ref 12–78)
ANION GAP SERPL CALC-SCNC: 4 MMOL/L — LOW (ref 5–17)
AST SERPL-CCNC: 24 U/L — SIGNIFICANT CHANGE UP (ref 15–37)
BILIRUB SERPL-MCNC: 1.2 MG/DL — SIGNIFICANT CHANGE UP (ref 0.2–1.2)
BUN SERPL-MCNC: 14 MG/DL — SIGNIFICANT CHANGE UP (ref 7–23)
CALCIUM SERPL-MCNC: 7.9 MG/DL — LOW (ref 8.5–10.1)
CHLORIDE SERPL-SCNC: 101 MMOL/L — SIGNIFICANT CHANGE UP (ref 96–108)
CO2 SERPL-SCNC: 28 MMOL/L — SIGNIFICANT CHANGE UP (ref 22–31)
CREAT SERPL-MCNC: 0.45 MG/DL — LOW (ref 0.5–1.3)
EGFR: 129 ML/MIN/1.73M2 — SIGNIFICANT CHANGE UP
GLUCOSE BLDC GLUCOMTR-MCNC: 96 MG/DL — SIGNIFICANT CHANGE UP (ref 70–99)
GLUCOSE SERPL-MCNC: 97 MG/DL — SIGNIFICANT CHANGE UP (ref 70–99)
HCT VFR BLD CALC: 28.2 % — LOW (ref 39–50)
HGB BLD-MCNC: 8.2 G/DL — LOW (ref 13–17)
MAGNESIUM SERPL-MCNC: 2.1 MG/DL — SIGNIFICANT CHANGE UP (ref 1.6–2.6)
MCHC RBC-ENTMCNC: 19.3 PG — LOW (ref 27–34)
MCHC RBC-ENTMCNC: 29.1 GM/DL — LOW (ref 32–36)
MCV RBC AUTO: 66.5 FL — LOW (ref 80–100)
PHOSPHATE SERPL-MCNC: 2.6 MG/DL — SIGNIFICANT CHANGE UP (ref 2.5–4.5)
PLATELET # BLD AUTO: 391 K/UL — SIGNIFICANT CHANGE UP (ref 150–400)
POTASSIUM SERPL-MCNC: 4 MMOL/L — SIGNIFICANT CHANGE UP (ref 3.5–5.3)
POTASSIUM SERPL-SCNC: 4 MMOL/L — SIGNIFICANT CHANGE UP (ref 3.5–5.3)
PROT SERPL-MCNC: 5.7 GM/DL — LOW (ref 6–8.3)
RBC # BLD: 4.24 M/UL — SIGNIFICANT CHANGE UP (ref 4.2–5.8)
RBC # FLD: 24.7 % — HIGH (ref 10.3–14.5)
SODIUM SERPL-SCNC: 133 MMOL/L — LOW (ref 135–145)
WBC # BLD: 8.35 K/UL — SIGNIFICANT CHANGE UP (ref 3.8–10.5)
WBC # FLD AUTO: 8.35 K/UL — SIGNIFICANT CHANGE UP (ref 3.8–10.5)

## 2022-05-22 RX ORDER — I.V. FAT EMULSION 20 G/100ML
0.69 EMULSION INTRAVENOUS
Qty: 50 | Refills: 0 | Status: DISCONTINUED | OUTPATIENT
Start: 2022-05-22 | End: 2022-05-22

## 2022-05-22 RX ORDER — SODIUM CHLORIDE 9 MG/ML
1000 INJECTION INTRAMUSCULAR; INTRAVENOUS; SUBCUTANEOUS ONCE
Refills: 0 | Status: DISCONTINUED | OUTPATIENT
Start: 2022-05-22 | End: 2022-05-22

## 2022-05-22 RX ORDER — ELECTROLYTE SOLUTION,INJ
1 VIAL (ML) INTRAVENOUS
Refills: 0 | Status: DISCONTINUED | OUTPATIENT
Start: 2022-05-22 | End: 2022-05-22

## 2022-05-22 RX ADMIN — PIPERACILLIN AND TAZOBACTAM 25 GRAM(S): 4; .5 INJECTION, POWDER, LYOPHILIZED, FOR SOLUTION INTRAVENOUS at 05:07

## 2022-05-22 RX ADMIN — PIPERACILLIN AND TAZOBACTAM 25 GRAM(S): 4; .5 INJECTION, POWDER, LYOPHILIZED, FOR SOLUTION INTRAVENOUS at 22:58

## 2022-05-22 RX ADMIN — Medication 1 EACH: at 23:02

## 2022-05-22 RX ADMIN — HEPARIN SODIUM 5000 UNIT(S): 5000 INJECTION INTRAVENOUS; SUBCUTANEOUS at 14:59

## 2022-05-22 RX ADMIN — I.V. FAT EMULSION 20.9 GM/KG/DAY: 20 EMULSION INTRAVENOUS at 23:04

## 2022-05-22 RX ADMIN — PIPERACILLIN AND TAZOBACTAM 25 GRAM(S): 4; .5 INJECTION, POWDER, LYOPHILIZED, FOR SOLUTION INTRAVENOUS at 14:59

## 2022-05-22 RX ADMIN — HEPARIN SODIUM 5000 UNIT(S): 5000 INJECTION INTRAVENOUS; SUBCUTANEOUS at 22:58

## 2022-05-22 RX ADMIN — HEPARIN SODIUM 5000 UNIT(S): 5000 INJECTION INTRAVENOUS; SUBCUTANEOUS at 05:07

## 2022-05-22 RX ADMIN — PANTOPRAZOLE SODIUM 40 MILLIGRAM(S): 20 TABLET, DELAYED RELEASE ORAL at 10:41

## 2022-05-22 NOTE — CHART NOTE - NSCHARTNOTEFT_GEN_A_CORE
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    *PPN started on 5/20 due to extended NPO after GI surgery with NGT to LWS.  patient on day @3 of PN.    *current status: remains with NGT to LWS.  c/w PPN until able to tolerate full liquid diet.    *labs reviewed; 05-22; hyponatremia and phos at lower end normal; will increase in PN bag.  rec'd stop additional IVF.  bilirubin total WNL.  triglycerides WNL.  corrected Ca at upper end norm, do not supplement.  POCT WNL.    133<L>  |  101  |  14  ----------------------------<  97  4.0   |  28  |  0.45<L>    Ca    7.9<L>      22 May 2022 05:08  Phos  2.6     05-22  Mg     2.1     05-22    TPro  5.7<L>  /  Alb  1.7<L>  /  TBili  1.2  /  DBili  x   /  AST  24  /  ALT  12  /  AlkPhos  51  05-22      Lipid Panel: Date/Time: 05-21-22 @ 05:38  Triglycerides, Serum: 97    POCT Blood Glucose.: 111 mg/dL (21 May 2022 23:15)  POCT Blood Glucose.: 115 mg/dL (21 May 2022 18:21)  POCT Blood Glucose.: 118 mg/dL (21 May 2022 13:38)        *pertinent meds: heparin, LR, protonix, tylneol, PCA, morphine, zofran, NS    *I&O's Detail    21 May 2022 07:01  -  22 May 2022 07:00  --------------------------------------------------------  IN:    Fat Emulsion (Fish Oil &amp; Plant Based) 20% Infusion: 172 mL    IV PiggyBack: 200 mL    Lactated Ringers: 818 mL    PPN (Peripheral Parenteral Nutrition): 1779 mL    PRBCs (Packed Red Blood Cells): 344 mL  Total IN: 3313 mL    OUT:    Colostomy (mL): 225 mL    Indwelling Catheter - Urethral (mL): 600 mL    Nasogastric/Oral tube (mL): 850 mL    Voided (mL): 1200 mL  Total OUT: 2875 mL    Total NET: 438 mL    *positive fluid status: hypoactive bowel sounds with small output from ostomy.  large output from NGT.  large volume output, however with additional IVF and hyponatremia no need to increase volume of PN bag at this time.  rec'd stop IVF.  *Stewart Score of 18 ;no PU documented.  no edema documented.    *Malnutrition: severe malnutrition in acute illness r/t decreased ability to meet increased nutr needs 2/2 altered GI function AEB mod muscle/fat wasting; wt loss; poor PO intake    Estimated Needs: based on 73Kg (wt from 5/20 )  Calories: 0554-3755 Kcal (30-35Kcal/Kg)  Protein: 109-145 g protein (1.5-2g/Kg)  Fluids:  8525-9369 mL (30-35 mL/Kg)    Diet, NPO (05-19-22 @ 16:01) [Active]      Weight History:    Weight (kg): 72.6 (05-19-22 @ 11:40)    *will continue to monitor and adjust PN prn*    PPN Recommendations: via peripheral line  Total Volume: 1800mL  70 g  Amino Acids  100 g Dextrose  50 g Lipids 20%  111 mEq Sodium Chloride  25 mEq Sodium Acetate  10 mmol Sodium Phosphate  29 mEq Potassium Chloride  9 mEq Potassium Acetate  15 mmol Potassium Phosphate  0 mEq Calcium Gluconate  8 mEq Magnesium Sulfate  100 mg Thiamine  1 ml Trace Elements  10 ml MVI    Total Calories 1120Kcal   (   Meets 51 %  of  Estimated Energy needs  and   64   %  Protein needs)(osmolarity<900)    Additional Recommendations:    1) Daily weights  2) Strict I & O's  3) Daily lyte checks including magnesium and phos  4) Weekly triglycerides/LFT checks  5) POCT q6hrs; maintain 140-180mg/dL  6) consider stopping iVF    *will monitor and adjust treatement plan prn*  Denise Wise MA, RDN, CDN, CNSC [phone]

## 2022-05-23 LAB
ALBUMIN SERPL ELPH-MCNC: 1.7 G/DL — LOW (ref 3.3–5)
ALP SERPL-CCNC: 58 U/L — SIGNIFICANT CHANGE UP (ref 40–120)
ALT FLD-CCNC: 11 U/L — LOW (ref 12–78)
ANION GAP SERPL CALC-SCNC: 5 MMOL/L — SIGNIFICANT CHANGE UP (ref 5–17)
AST SERPL-CCNC: 22 U/L — SIGNIFICANT CHANGE UP (ref 15–37)
BILIRUB SERPL-MCNC: 1.3 MG/DL — HIGH (ref 0.2–1.2)
BUN SERPL-MCNC: 11 MG/DL — SIGNIFICANT CHANGE UP (ref 7–23)
CALCIUM SERPL-MCNC: 8.3 MG/DL — LOW (ref 8.5–10.1)
CHLORIDE SERPL-SCNC: 100 MMOL/L — SIGNIFICANT CHANGE UP (ref 96–108)
CO2 SERPL-SCNC: 28 MMOL/L — SIGNIFICANT CHANGE UP (ref 22–31)
CREAT SERPL-MCNC: 0.4 MG/DL — LOW (ref 0.5–1.3)
EGFR: 134 ML/MIN/1.73M2 — SIGNIFICANT CHANGE UP
GLUCOSE BLDC GLUCOMTR-MCNC: 103 MG/DL — HIGH (ref 70–99)
GLUCOSE BLDC GLUCOMTR-MCNC: 105 MG/DL — HIGH (ref 70–99)
GLUCOSE BLDC GLUCOMTR-MCNC: 114 MG/DL — HIGH (ref 70–99)
GLUCOSE SERPL-MCNC: 101 MG/DL — HIGH (ref 70–99)
MAGNESIUM SERPL-MCNC: 2.1 MG/DL — SIGNIFICANT CHANGE UP (ref 1.6–2.6)
PHOSPHATE SERPL-MCNC: 3.8 MG/DL — SIGNIFICANT CHANGE UP (ref 2.5–4.5)
POTASSIUM SERPL-MCNC: 4.1 MMOL/L — SIGNIFICANT CHANGE UP (ref 3.5–5.3)
POTASSIUM SERPL-SCNC: 4.1 MMOL/L — SIGNIFICANT CHANGE UP (ref 3.5–5.3)
PROT SERPL-MCNC: 5.7 GM/DL — LOW (ref 6–8.3)
SODIUM SERPL-SCNC: 133 MMOL/L — LOW (ref 135–145)

## 2022-05-23 RX ORDER — ELECTROLYTE SOLUTION,INJ
1 VIAL (ML) INTRAVENOUS
Refills: 0 | Status: DISCONTINUED | OUTPATIENT
Start: 2022-05-23 | End: 2022-05-23

## 2022-05-23 RX ORDER — I.V. FAT EMULSION 20 G/100ML
0.68 EMULSION INTRAVENOUS
Qty: 50 | Refills: 0 | Status: DISCONTINUED | OUTPATIENT
Start: 2022-05-23 | End: 2022-05-23

## 2022-05-23 RX ADMIN — PIPERACILLIN AND TAZOBACTAM 25 GRAM(S): 4; .5 INJECTION, POWDER, LYOPHILIZED, FOR SOLUTION INTRAVENOUS at 05:30

## 2022-05-23 RX ADMIN — HEPARIN SODIUM 5000 UNIT(S): 5000 INJECTION INTRAVENOUS; SUBCUTANEOUS at 21:23

## 2022-05-23 RX ADMIN — HYDROMORPHONE HYDROCHLORIDE 30 MILLILITER(S): 2 INJECTION INTRAMUSCULAR; INTRAVENOUS; SUBCUTANEOUS at 04:12

## 2022-05-23 RX ADMIN — PANTOPRAZOLE SODIUM 40 MILLIGRAM(S): 20 TABLET, DELAYED RELEASE ORAL at 08:56

## 2022-05-23 RX ADMIN — PIPERACILLIN AND TAZOBACTAM 25 GRAM(S): 4; .5 INJECTION, POWDER, LYOPHILIZED, FOR SOLUTION INTRAVENOUS at 21:22

## 2022-05-23 RX ADMIN — I.V. FAT EMULSION 20.6 GM/KG/DAY: 20 EMULSION INTRAVENOUS at 22:49

## 2022-05-23 RX ADMIN — PIPERACILLIN AND TAZOBACTAM 25 GRAM(S): 4; .5 INJECTION, POWDER, LYOPHILIZED, FOR SOLUTION INTRAVENOUS at 13:36

## 2022-05-23 RX ADMIN — Medication 1 EACH: at 22:49

## 2022-05-23 RX ADMIN — HEPARIN SODIUM 5000 UNIT(S): 5000 INJECTION INTRAVENOUS; SUBCUTANEOUS at 05:30

## 2022-05-23 RX ADMIN — HEPARIN SODIUM 5000 UNIT(S): 5000 INJECTION INTRAVENOUS; SUBCUTANEOUS at 13:37

## 2022-05-23 NOTE — CHART NOTE - NSCHARTNOTEFT_GEN_A_CORE
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  *PPN started on 5/20 due to extended NPO after GI surgery with NGT to LWS.  patient on day @3 of PN.    *current status: remains with NGT to LWS with high output, continues NPO w/ LR - no edema noted. Spoke to surgical resident, will change to NS instead 2/2 hyponatremia.  Garcia removed. Having liquid ostomy output.  c/w PPN until able to tolerate full liquid diet.    *labs reviewed; C/w hyponatremia, will increase Na in bag.   K, Mg, Phos WNL.  Bili T WNL for trace elements.  POCT WNL.  Last TG WNL to continue lipids.   Corrected Ca elevated, do not supplement outside PN bag.    05-23    133<L>  |  100  |  11  ----------------------------<  101<H>  4.1   |  28  |  0.40<L>    Ca    8.3<L>      23 May 2022 05:17  Phos  3.8     05-23  Mg     2.1     05-23    TPro  5.7<L>  /  Alb  1.7<L>  /  TBili  1.3<H>  /  DBili  x   /  AST  22  /  ALT  11<L>  /  AlkPhos  58  05-23    Lipid Panel: Date/Time: 05-21-22 @ 05:38  Triglycerides, Serum: 97    POCT Blood Glucose.: 103 mg/dL (23 May 2022 00:36)  POCT Blood Glucose.: 96 mg/dL (22 May 2022 18:28)      *I&O's Detail    22 May 2022 07:01  -  23 May 2022 07:00  --------------------------------------------------------  IN:    Fat Emulsion (Fish Oil &amp; Plant Based) 20% Infusion: 82 mL    Fat Emulsion (Fish Oil &amp; Plant Based) 20% Infusion: 133 mL    IV PiggyBack: 120 mL    Lactated Ringers: 190 mL    PPN (Peripheral Parenteral Nutrition): 1435 mL  Total IN: 1960 mL    OUT:    Colostomy (mL): 150 mL    Nasogastric/Oral tube (mL): 875 mL    Voided (mL): 1950 mL  Total OUT: 2975 mL    Total NET: -1015 mL      23 May 2022 07:01  -  23 May 2022 09:19  --------------------------------------------------------  IN:  Total IN: 0 mL    OUT:    Voided (mL): 200 mL  Total OUT: 200 mL    Total NET: -200 mL  *negative fluid status: C/w hyponatremia and on IVF.  large output from NGT, but improving.   Large UO noted. Will keep current PPN volume at 1800 mL.  *BM: 3 watery via ostomy    *Malnutrition: severe malnutrition in acute illness r/t decreased ability to meet increased nutr needs 2/2 altered GI function AEB mod muscle/fat wasting; wt loss; poor PO intake    Estimated Needs: based on 73Kg (wt from 5/20 )  Calories: 2029-9370 Kcal (30-35Kcal/Kg)  Protein: 109-145 g protein (1.5-2g/Kg)  Fluids:  6613-2499 mL (30-35 mL/Kg)    Diet, NPO (05-19-22 @ 16:01) [Active]    Weight History:  Weight (kg): 72.6 (05-19-22 @ 11:40)      PPN Recommendations: via peripheral line  Total Volume: 1800mL  70 g  Amino Acids  100 g Dextrose  50 g Lipids 20%  111 mEq Sodium Chloride  31 mEq Sodium Acetate  10 mmol Sodium Phosphate  29 mEq Potassium Chloride  9 mEq Potassium Acetate  15 mmol Potassium Phosphate  0 mEq Calcium Gluconate  8 mEq Magnesium Sulfate  100 mg Thiamine  1 ml Trace Elements  10 ml MVI    Total Calories 1120 Kcal   (Meets 51 %  of  Estimated Energy needs  and   64   %  Protein needs)     (osmolarity<900)    Additional Recommendations:    1) Daily weights  2) Strict I & O's  3) Daily lyte checks including magnesium and phos  4) Weekly triglycerides/LFT checks  5) POCT q6hrs; maintain 140-180mg/dL  6) consider stopping IVF    *will monitor and adjust treatement plan prn*  Becky Dial MS, RDN, [phone]

## 2022-05-24 PROBLEM — Z00.00 ENCOUNTER FOR PREVENTIVE HEALTH EXAMINATION: Status: ACTIVE | Noted: 2022-05-24

## 2022-05-24 LAB
ALBUMIN SERPL ELPH-MCNC: 1.8 G/DL — LOW (ref 3.3–5)
ALP SERPL-CCNC: 65 U/L — SIGNIFICANT CHANGE UP (ref 40–120)
ALT FLD-CCNC: 13 U/L — SIGNIFICANT CHANGE UP (ref 12–78)
ANION GAP SERPL CALC-SCNC: 8 MMOL/L — SIGNIFICANT CHANGE UP (ref 5–17)
AST SERPL-CCNC: 24 U/L — SIGNIFICANT CHANGE UP (ref 15–37)
BILIRUB SERPL-MCNC: 1.3 MG/DL — HIGH (ref 0.2–1.2)
BUN SERPL-MCNC: 8 MG/DL — SIGNIFICANT CHANGE UP (ref 7–23)
CALCIUM SERPL-MCNC: 8.5 MG/DL — SIGNIFICANT CHANGE UP (ref 8.5–10.1)
CHLORIDE SERPL-SCNC: 99 MMOL/L — SIGNIFICANT CHANGE UP (ref 96–108)
CO2 SERPL-SCNC: 26 MMOL/L — SIGNIFICANT CHANGE UP (ref 22–31)
CREAT SERPL-MCNC: 0.41 MG/DL — LOW (ref 0.5–1.3)
CULTURE RESULTS: SIGNIFICANT CHANGE UP
CULTURE RESULTS: SIGNIFICANT CHANGE UP
EGFR: 133 ML/MIN/1.73M2 — SIGNIFICANT CHANGE UP
GLUCOSE BLDC GLUCOMTR-MCNC: 118 MG/DL — HIGH (ref 70–99)
GLUCOSE BLDC GLUCOMTR-MCNC: 124 MG/DL — HIGH (ref 70–99)
GLUCOSE BLDC GLUCOMTR-MCNC: 153 MG/DL — HIGH (ref 70–99)
GLUCOSE SERPL-MCNC: 117 MG/DL — HIGH (ref 70–99)
HCT VFR BLD CALC: 31.6 % — LOW (ref 39–50)
HGB BLD-MCNC: 9.2 G/DL — LOW (ref 13–17)
MAGNESIUM SERPL-MCNC: 2.2 MG/DL — SIGNIFICANT CHANGE UP (ref 1.6–2.6)
MCHC RBC-ENTMCNC: 19.5 PG — LOW (ref 27–34)
MCHC RBC-ENTMCNC: 29.1 GM/DL — LOW (ref 32–36)
MCV RBC AUTO: 66.9 FL — LOW (ref 80–100)
PHOSPHATE SERPL-MCNC: 3.6 MG/DL — SIGNIFICANT CHANGE UP (ref 2.5–4.5)
PLATELET # BLD AUTO: 457 K/UL — HIGH (ref 150–400)
POTASSIUM SERPL-MCNC: 4.4 MMOL/L — SIGNIFICANT CHANGE UP (ref 3.5–5.3)
POTASSIUM SERPL-SCNC: 4.4 MMOL/L — SIGNIFICANT CHANGE UP (ref 3.5–5.3)
PROT SERPL-MCNC: 6.2 GM/DL — SIGNIFICANT CHANGE UP (ref 6–8.3)
RBC # BLD: 4.72 M/UL — SIGNIFICANT CHANGE UP (ref 4.2–5.8)
RBC # FLD: 26.2 % — HIGH (ref 10.3–14.5)
SODIUM SERPL-SCNC: 133 MMOL/L — LOW (ref 135–145)
SPECIMEN SOURCE: SIGNIFICANT CHANGE UP
SPECIMEN SOURCE: SIGNIFICANT CHANGE UP
WBC # BLD: 13.47 K/UL — HIGH (ref 3.8–10.5)
WBC # FLD AUTO: 13.47 K/UL — HIGH (ref 3.8–10.5)

## 2022-05-24 PROCEDURE — 71045 X-RAY EXAM CHEST 1 VIEW: CPT | Mod: 26

## 2022-05-24 RX ORDER — ACETAMINOPHEN 500 MG
975 TABLET ORAL EVERY 6 HOURS
Refills: 0 | Status: DISCONTINUED | OUTPATIENT
Start: 2022-05-24 | End: 2022-05-28

## 2022-05-24 RX ORDER — SODIUM CHLORIDE 9 MG/ML
1000 INJECTION, SOLUTION INTRAVENOUS
Refills: 0 | Status: DISCONTINUED | OUTPATIENT
Start: 2022-05-24 | End: 2022-05-28

## 2022-05-24 RX ORDER — KETOROLAC TROMETHAMINE 30 MG/ML
15 SYRINGE (ML) INJECTION EVERY 6 HOURS
Refills: 0 | Status: DISCONTINUED | OUTPATIENT
Start: 2022-05-24 | End: 2022-05-28

## 2022-05-24 RX ORDER — ELECTROLYTE SOLUTION,INJ
1 VIAL (ML) INTRAVENOUS
Refills: 0 | Status: DISCONTINUED | OUTPATIENT
Start: 2022-05-24 | End: 2022-05-24

## 2022-05-24 RX ORDER — I.V. FAT EMULSION 20 G/100ML
0.69 EMULSION INTRAVENOUS
Qty: 50 | Refills: 0 | Status: DISCONTINUED | OUTPATIENT
Start: 2022-05-24 | End: 2022-05-24

## 2022-05-24 RX ADMIN — PIPERACILLIN AND TAZOBACTAM 25 GRAM(S): 4; .5 INJECTION, POWDER, LYOPHILIZED, FOR SOLUTION INTRAVENOUS at 14:04

## 2022-05-24 RX ADMIN — I.V. FAT EMULSION 20.9 GM/KG/DAY: 20 EMULSION INTRAVENOUS at 22:08

## 2022-05-24 RX ADMIN — HEPARIN SODIUM 5000 UNIT(S): 5000 INJECTION INTRAVENOUS; SUBCUTANEOUS at 21:13

## 2022-05-24 RX ADMIN — Medication 1 EACH: at 22:07

## 2022-05-24 RX ADMIN — MORPHINE SULFATE 2 MILLIGRAM(S): 50 CAPSULE, EXTENDED RELEASE ORAL at 19:36

## 2022-05-24 RX ADMIN — ONDANSETRON 4 MILLIGRAM(S): 8 TABLET, FILM COATED ORAL at 21:43

## 2022-05-24 RX ADMIN — MORPHINE SULFATE 2 MILLIGRAM(S): 50 CAPSULE, EXTENDED RELEASE ORAL at 15:50

## 2022-05-24 RX ADMIN — Medication 15 MILLIGRAM(S): at 14:03

## 2022-05-24 RX ADMIN — PANTOPRAZOLE SODIUM 40 MILLIGRAM(S): 20 TABLET, DELAYED RELEASE ORAL at 10:45

## 2022-05-24 RX ADMIN — MORPHINE SULFATE 2 MILLIGRAM(S): 50 CAPSULE, EXTENDED RELEASE ORAL at 18:42

## 2022-05-24 RX ADMIN — MORPHINE SULFATE 2 MILLIGRAM(S): 50 CAPSULE, EXTENDED RELEASE ORAL at 14:05

## 2022-05-24 RX ADMIN — HEPARIN SODIUM 5000 UNIT(S): 5000 INJECTION INTRAVENOUS; SUBCUTANEOUS at 05:21

## 2022-05-24 RX ADMIN — HEPARIN SODIUM 5000 UNIT(S): 5000 INJECTION INTRAVENOUS; SUBCUTANEOUS at 14:05

## 2022-05-24 RX ADMIN — PIPERACILLIN AND TAZOBACTAM 25 GRAM(S): 4; .5 INJECTION, POWDER, LYOPHILIZED, FOR SOLUTION INTRAVENOUS at 21:14

## 2022-05-24 RX ADMIN — Medication 15 MILLIGRAM(S): at 12:34

## 2022-05-24 RX ADMIN — PIPERACILLIN AND TAZOBACTAM 25 GRAM(S): 4; .5 INJECTION, POWDER, LYOPHILIZED, FOR SOLUTION INTRAVENOUS at 05:21

## 2022-05-24 NOTE — ADVANCED PRACTICE NURSE CONSULT - RECOMMEDATIONS
Recommendations	  Pouch change: 	  -Gently remove pouch water moistened gauze   -Cleanse stoma site with water moistened gauze, gently pat dry  -Measure stoma, currently 1 5/8"  -Trace and cut current size on [place] 2 1/4” CeraPlus flat barrier   -Stretch Silviano Adapt CeraRing (#[id number]) onto back of barrier  -Apply barrier to patient's skin  -Attach [place] 2 1/4” drainable lock and roll pouch onto barrier  Empty pouch when 1/3 to no more than 1/2 full of effluent/flatus. Change pouching system q 3 - 4 days and prn for leaking. Next pouch change-  5/27/2022

## 2022-05-24 NOTE — CHART NOTE - NSCHARTNOTEFT_GEN_A_CORE
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  *PPN started on 5/20 due to extended NPO after GI surgery with NGT to LWS.  patient on day @3 of PN.    *5/24: remains with NGT to LWS with high output, continues NPO w/ LR - no edema noted. Spoke to surgical resident, will change to NS instead 2/2 hyponatremia.  Garcia removed. Having liquid ostomy output.  c/w PPN until able to tolerate full liquid diet.    *current status: Advanced to CLD. Seen by wound care nurse for new ileostomy. RD provided verbal and written ileostomy nutrition ed. NGT now clamped. Will most likely be last day of PPN to bridge w/ PO intake and meet >80% ENN until diet advanced further with known tolerance.    *labs reviewed; C/w slight hyponatremia, remains on LR (never changed to NS); highly rec'd to d/c especially in lieu of advancement to CLD.   K, Mg, Phos WNL.  Bili T WNL for trace elements.  POCT WNL.  Last TG WNL to continue lipids.   Corrected Ca elevated, do not supplement outside PN bag.   No changes to PPN bag.    05-24    133<L>  |  99  |  8   ----------------------------<  117<H>  4.4   |  26  |  0.41<L>    Ca    8.5      24 May 2022 05:51  Phos  3.6     05-24  Mg     2.2     05-24    TPro  6.2  /  Alb  1.8<L>  /  TBili  1.3<H>  /  DBili  x   /  AST  24  /  ALT  13  /  AlkPhos  65  05-24    Lipid Panel: Date/Time: 05-21-22 @ 05:38  Triglycerides, Serum: 97    POCT Blood Glucose.: 114 mg/dL (23 May 2022 17:02)  POCT Blood Glucose.: 105 mg/dL (23 May 2022 11:43)      *I&O's Detail    23 May 2022 07:01  -  24 May 2022 07:00  --------------------------------------------------------  IN:    Fat Emulsion (Fish Oil &amp; Plant Based) 20% Infusion: 103 mL    Fat Emulsion (Fish Oil &amp; Plant Based) 20% Infusion: 121 mL    IV PiggyBack: 216 mL    Lactated Ringers: 524 mL    PPN (Peripheral Parenteral Nutrition): 1706 mL  Total IN: 2670 mL    OUT:    Colostomy (mL): 60 mL    Nasogastric/Oral tube (mL): 180 mL    Voided (mL): 2750 mL  Total OUT: 2990 mL    Total NET: -320 mL  *negative fluid status: C/w hyponatremia and on IVF.  NGT now clamped. Large UO noted. Will keep current PPN volume at 1800 mL.  *BM: 5/23 x 2 watery via ostomy    *Malnutrition: severe malnutrition in acute illness r/t decreased ability to meet increased nutr needs 2/2 altered GI function AEB mod muscle/fat wasting; wt loss; poor PO intake    Estimated Needs: based on 73Kg (wt from 5/20 )  Calories: 7617-0420 Kcal (30-35Kcal/Kg)  Protein: 109-145 g protein (1.5-2g/Kg)  Fluids:  8463-8099 mL (30-35 mL/Kg)    Weight History:  Weight (kg): 72.6 (05-19-22 @ 11:40)    **no changes to PPN bag**  PPN Recommendations: via peripheral line  Total Volume: 1800mL  70 g  Amino Acids  100 g Dextrose  50 g Lipids 20%  111 mEq Sodium Chloride  31 mEq Sodium Acetate  10 mmol Sodium Phosphate  29 mEq Potassium Chloride  9 mEq Potassium Acetate  15 mmol Potassium Phosphate  0 mEq Calcium Gluconate  8 mEq Magnesium Sulfate  100 mg Thiamine  1 ml Trace Elements  10 ml MVI    Total Calories 1120 Kcal   (Meets 51 %  of  Estimated Energy needs  and   64   %  Protein needs)     (osmolarity<900)    Additional Recommendations:    1) Daily weights  2) Strict I & O's  3) Daily lyte checks including magnesium and phos  4) Weekly triglycerides/LFT checks  5) POCT q6hrs; maintain 140-180mg/dL  6) consider stopping IVF  7) when able, provide ensure max BID to optimize PO intake    *will monitor and adjust treatement plan prn*  Becky Dial MS, RDN, [phone]

## 2022-05-24 NOTE — SBIRT NOTE ADULT - NSSBIRTDRGBRIEFINTDET_GEN_A_CORE
Screening results were reviewed with the patient and patient was provided information about healthy guidelines and potential negative consequences associated with level of risk. Motivation and readiness to reduce or stop use was discussed and goals and activities to make changes were suggested/offered    Pt uses [facility] daily

## 2022-05-25 LAB
ALBUMIN SERPL ELPH-MCNC: 1.9 G/DL — LOW (ref 3.3–5)
ALP SERPL-CCNC: 83 U/L — SIGNIFICANT CHANGE UP (ref 40–120)
ALT FLD-CCNC: 14 U/L — SIGNIFICANT CHANGE UP (ref 12–78)
ANION GAP SERPL CALC-SCNC: 7 MMOL/L — SIGNIFICANT CHANGE UP (ref 5–17)
APPEARANCE UR: CLEAR — SIGNIFICANT CHANGE UP
AST SERPL-CCNC: 21 U/L — SIGNIFICANT CHANGE UP (ref 15–37)
BILIRUB SERPL-MCNC: 1 MG/DL — SIGNIFICANT CHANGE UP (ref 0.2–1.2)
BILIRUB UR-MCNC: NEGATIVE — SIGNIFICANT CHANGE UP
BUN SERPL-MCNC: 8 MG/DL — SIGNIFICANT CHANGE UP (ref 7–23)
CALCIUM SERPL-MCNC: 8.6 MG/DL — SIGNIFICANT CHANGE UP (ref 8.5–10.1)
CHLORIDE SERPL-SCNC: 100 MMOL/L — SIGNIFICANT CHANGE UP (ref 96–108)
CO2 SERPL-SCNC: 26 MMOL/L — SIGNIFICANT CHANGE UP (ref 22–31)
COLOR SPEC: YELLOW — SIGNIFICANT CHANGE UP
CREAT SERPL-MCNC: 0.46 MG/DL — LOW (ref 0.5–1.3)
DIFF PNL FLD: NEGATIVE — SIGNIFICANT CHANGE UP
EGFR: 128 ML/MIN/1.73M2 — SIGNIFICANT CHANGE UP
GLUCOSE BLDC GLUCOMTR-MCNC: 141 MG/DL — HIGH (ref 70–99)
GLUCOSE BLDC GLUCOMTR-MCNC: 142 MG/DL — HIGH (ref 70–99)
GLUCOSE SERPL-MCNC: 129 MG/DL — HIGH (ref 70–99)
GLUCOSE UR QL: NEGATIVE — SIGNIFICANT CHANGE UP
HCT VFR BLD CALC: 32.9 % — LOW (ref 39–50)
HGB BLD-MCNC: 9.5 G/DL — LOW (ref 13–17)
KETONES UR-MCNC: NEGATIVE — SIGNIFICANT CHANGE UP
LEUKOCYTE ESTERASE UR-ACNC: ABNORMAL
MAGNESIUM SERPL-MCNC: 2.3 MG/DL — SIGNIFICANT CHANGE UP (ref 1.6–2.6)
MCHC RBC-ENTMCNC: 19.3 PG — LOW (ref 27–34)
MCHC RBC-ENTMCNC: 28.9 GM/DL — LOW (ref 32–36)
MCV RBC AUTO: 66.9 FL — LOW (ref 80–100)
NITRITE UR-MCNC: NEGATIVE — SIGNIFICANT CHANGE UP
PH UR: 8 — SIGNIFICANT CHANGE UP (ref 5–8)
PHOSPHATE SERPL-MCNC: 3.8 MG/DL — SIGNIFICANT CHANGE UP (ref 2.5–4.5)
PLATELET # BLD AUTO: 584 K/UL — HIGH (ref 150–400)
POTASSIUM SERPL-MCNC: 4.3 MMOL/L — SIGNIFICANT CHANGE UP (ref 3.5–5.3)
POTASSIUM SERPL-SCNC: 4.3 MMOL/L — SIGNIFICANT CHANGE UP (ref 3.5–5.3)
PROT SERPL-MCNC: 6.6 GM/DL — SIGNIFICANT CHANGE UP (ref 6–8.3)
PROT UR-MCNC: 30 MG/DL
RBC # BLD: 4.92 M/UL — SIGNIFICANT CHANGE UP (ref 4.2–5.8)
RBC # FLD: 27.3 % — HIGH (ref 10.3–14.5)
SODIUM SERPL-SCNC: 133 MMOL/L — LOW (ref 135–145)
SP GR SPEC: 1.01 — SIGNIFICANT CHANGE UP (ref 1.01–1.02)
UROBILINOGEN FLD QL: NEGATIVE — SIGNIFICANT CHANGE UP
WBC # BLD: 19.04 K/UL — HIGH (ref 3.8–10.5)
WBC # FLD AUTO: 19.04 K/UL — HIGH (ref 3.8–10.5)

## 2022-05-25 PROCEDURE — 74177 CT ABD & PELVIS W/CONTRAST: CPT | Mod: 26

## 2022-05-25 RX ORDER — VANCOMYCIN HCL 1 G
1000 VIAL (EA) INTRAVENOUS EVERY 12 HOURS
Refills: 0 | Status: DISCONTINUED | OUTPATIENT
Start: 2022-05-25 | End: 2022-05-27

## 2022-05-25 RX ORDER — PSYLLIUM SEED (WITH DEXTROSE)
1 POWDER (GRAM) ORAL EVERY 12 HOURS
Refills: 0 | Status: DISCONTINUED | OUTPATIENT
Start: 2022-05-25 | End: 2022-05-28

## 2022-05-25 RX ORDER — SODIUM CHLORIDE 9 MG/ML
1000 INJECTION, SOLUTION INTRAVENOUS ONCE
Refills: 0 | Status: COMPLETED | OUTPATIENT
Start: 2022-05-25 | End: 2022-05-25

## 2022-05-25 RX ADMIN — PIPERACILLIN AND TAZOBACTAM 25 GRAM(S): 4; .5 INJECTION, POWDER, LYOPHILIZED, FOR SOLUTION INTRAVENOUS at 14:26

## 2022-05-25 RX ADMIN — Medication 1 PACKET(S): at 22:44

## 2022-05-25 RX ADMIN — Medication 15 MILLIGRAM(S): at 11:25

## 2022-05-25 RX ADMIN — MORPHINE SULFATE 2 MILLIGRAM(S): 50 CAPSULE, EXTENDED RELEASE ORAL at 20:38

## 2022-05-25 RX ADMIN — HEPARIN SODIUM 5000 UNIT(S): 5000 INJECTION INTRAVENOUS; SUBCUTANEOUS at 14:26

## 2022-05-25 RX ADMIN — PANTOPRAZOLE SODIUM 40 MILLIGRAM(S): 20 TABLET, DELAYED RELEASE ORAL at 10:23

## 2022-05-25 RX ADMIN — Medication 250 MILLIGRAM(S): at 21:24

## 2022-05-25 RX ADMIN — PIPERACILLIN AND TAZOBACTAM 25 GRAM(S): 4; .5 INJECTION, POWDER, LYOPHILIZED, FOR SOLUTION INTRAVENOUS at 05:06

## 2022-05-25 RX ADMIN — SODIUM CHLORIDE 1000 MILLILITER(S): 9 INJECTION, SOLUTION INTRAVENOUS at 11:07

## 2022-05-25 RX ADMIN — HEPARIN SODIUM 5000 UNIT(S): 5000 INJECTION INTRAVENOUS; SUBCUTANEOUS at 05:06

## 2022-05-25 RX ADMIN — PIPERACILLIN AND TAZOBACTAM 25 GRAM(S): 4; .5 INJECTION, POWDER, LYOPHILIZED, FOR SOLUTION INTRAVENOUS at 22:44

## 2022-05-25 RX ADMIN — Medication 15 MILLIGRAM(S): at 11:07

## 2022-05-25 RX ADMIN — HEPARIN SODIUM 5000 UNIT(S): 5000 INJECTION INTRAVENOUS; SUBCUTANEOUS at 21:24

## 2022-05-25 NOTE — CHART NOTE - NSCHARTNOTEFT_GEN_A_CORE
Dietitian Brief Note 5/25/22    Diet advanced to low fiber today, tolerating CLD well yesterday. NGT now removed. RD gave pt extensive verbal and written education regarding ileostomy nutrition. Compliance is expected - all pt's questions/ concerns were addressed. PPN to be d/c-ed.    RD will continue to monitor PO intake, labs, hydration, and wt prn.   Becky Dial, MS, RDN, [phone]

## 2022-05-25 NOTE — CHART NOTE - NSCHARTNOTESELECT_GEN_ALL_CORE
Dietitian Brief Note
Dietitian PPN f/u note
Dietitian PPN f/u note
Dietitian F/U w/ PPN
Dietitian F/U w/ PPN
Event Note

## 2022-05-26 ENCOUNTER — TRANSCRIPTION ENCOUNTER (OUTPATIENT)
Age: 52
End: 2022-05-26

## 2022-05-26 LAB
ANION GAP SERPL CALC-SCNC: 6 MMOL/L — SIGNIFICANT CHANGE UP (ref 5–17)
BUN SERPL-MCNC: 9 MG/DL — SIGNIFICANT CHANGE UP (ref 7–23)
CALCIUM SERPL-MCNC: 8.5 MG/DL — SIGNIFICANT CHANGE UP (ref 8.5–10.1)
CHLORIDE SERPL-SCNC: 101 MMOL/L — SIGNIFICANT CHANGE UP (ref 96–108)
CO2 SERPL-SCNC: 27 MMOL/L — SIGNIFICANT CHANGE UP (ref 22–31)
CREAT SERPL-MCNC: 0.55 MG/DL — SIGNIFICANT CHANGE UP (ref 0.5–1.3)
EGFR: 121 ML/MIN/1.73M2 — SIGNIFICANT CHANGE UP
GLUCOSE SERPL-MCNC: 121 MG/DL — HIGH (ref 70–99)
HCT VFR BLD CALC: 28.7 % — LOW (ref 39–50)
HGB BLD-MCNC: 8.4 G/DL — LOW (ref 13–17)
MAGNESIUM SERPL-MCNC: 2.2 MG/DL — SIGNIFICANT CHANGE UP (ref 1.6–2.6)
MCHC RBC-ENTMCNC: 19.3 PG — LOW (ref 27–34)
MCHC RBC-ENTMCNC: 29.3 GM/DL — LOW (ref 32–36)
MCV RBC AUTO: 66 FL — LOW (ref 80–100)
PHOSPHATE SERPL-MCNC: 3.9 MG/DL — SIGNIFICANT CHANGE UP (ref 2.5–4.5)
PLATELET # BLD AUTO: 588 K/UL — HIGH (ref 150–400)
POTASSIUM SERPL-MCNC: 4 MMOL/L — SIGNIFICANT CHANGE UP (ref 3.5–5.3)
POTASSIUM SERPL-SCNC: 4 MMOL/L — SIGNIFICANT CHANGE UP (ref 3.5–5.3)
RBC # BLD: 4.35 M/UL — SIGNIFICANT CHANGE UP (ref 4.2–5.8)
RBC # FLD: 26.9 % — HIGH (ref 10.3–14.5)
SARS-COV-2 RNA SPEC QL NAA+PROBE: SIGNIFICANT CHANGE UP
SODIUM SERPL-SCNC: 134 MMOL/L — LOW (ref 135–145)
SURGICAL PATHOLOGY STUDY: SIGNIFICANT CHANGE UP
WBC # BLD: 12.83 K/UL — HIGH (ref 3.8–10.5)
WBC # FLD AUTO: 12.83 K/UL — HIGH (ref 3.8–10.5)

## 2022-05-26 RX ORDER — CEFEPIME 1 G/1
2000 INJECTION, POWDER, FOR SOLUTION INTRAMUSCULAR; INTRAVENOUS EVERY 12 HOURS
Refills: 0 | Status: DISCONTINUED | OUTPATIENT
Start: 2022-05-26 | End: 2022-05-27

## 2022-05-26 RX ORDER — CEFEPIME 1 G/1
2000 INJECTION, POWDER, FOR SOLUTION INTRAMUSCULAR; INTRAVENOUS EVERY 12 HOURS
Refills: 0 | Status: DISCONTINUED | OUTPATIENT
Start: 2022-05-26 | End: 2022-05-26

## 2022-05-26 RX ADMIN — CEFEPIME 100 MILLIGRAM(S): 1 INJECTION, POWDER, FOR SOLUTION INTRAMUSCULAR; INTRAVENOUS at 21:35

## 2022-05-26 RX ADMIN — MORPHINE SULFATE 2 MILLIGRAM(S): 50 CAPSULE, EXTENDED RELEASE ORAL at 11:05

## 2022-05-26 RX ADMIN — PIPERACILLIN AND TAZOBACTAM 25 GRAM(S): 4; .5 INJECTION, POWDER, LYOPHILIZED, FOR SOLUTION INTRAVENOUS at 06:53

## 2022-05-26 RX ADMIN — Medication 250 MILLIGRAM(S): at 18:34

## 2022-05-26 RX ADMIN — SODIUM CHLORIDE 75 MILLILITER(S): 9 INJECTION, SOLUTION INTRAVENOUS at 18:00

## 2022-05-26 RX ADMIN — PANTOPRAZOLE SODIUM 40 MILLIGRAM(S): 20 TABLET, DELAYED RELEASE ORAL at 10:11

## 2022-05-26 RX ADMIN — Medication 250 MILLIGRAM(S): at 05:11

## 2022-05-26 RX ADMIN — MORPHINE SULFATE 2 MILLIGRAM(S): 50 CAPSULE, EXTENDED RELEASE ORAL at 01:29

## 2022-05-26 RX ADMIN — CEFEPIME 100 MILLIGRAM(S): 1 INJECTION, POWDER, FOR SOLUTION INTRAMUSCULAR; INTRAVENOUS at 10:37

## 2022-05-26 RX ADMIN — MORPHINE SULFATE 2 MILLIGRAM(S): 50 CAPSULE, EXTENDED RELEASE ORAL at 10:35

## 2022-05-26 RX ADMIN — HEPARIN SODIUM 5000 UNIT(S): 5000 INJECTION INTRAVENOUS; SUBCUTANEOUS at 05:11

## 2022-05-26 RX ADMIN — HEPARIN SODIUM 5000 UNIT(S): 5000 INJECTION INTRAVENOUS; SUBCUTANEOUS at 21:35

## 2022-05-26 RX ADMIN — Medication 15 MILLIGRAM(S): at 20:23

## 2022-05-26 RX ADMIN — Medication 1 PACKET(S): at 21:35

## 2022-05-26 RX ADMIN — MORPHINE SULFATE 2 MILLIGRAM(S): 50 CAPSULE, EXTENDED RELEASE ORAL at 22:59

## 2022-05-26 RX ADMIN — Medication 1 PACKET(S): at 10:12

## 2022-05-26 RX ADMIN — MORPHINE SULFATE 2 MILLIGRAM(S): 50 CAPSULE, EXTENDED RELEASE ORAL at 23:29

## 2022-05-26 NOTE — CONSULT NOTE ADULT - SUBJECTIVE AND OBJECTIVE BOX
Patient is a 51y old  Male who presents with a chief complaint of Perforated colon cancer (24 May 2022 09:47)    HPI:  Pt is a 51 year old male with no pmed hx admitted on  for evaluation of worsening abdominal pain that has been present for a few years but then became acute, found to have perforated colonic mass, patient underwent colorectal surgery with creation of ileostomy on , found to have peritonitis; post op had fever and imaging has found new pneumonia, the patient is coughing up yellow mucous. Overall is feeling better post op and just started to eat some solid food.         PMH: as above  PSH: as above  Meds: per reconciliation sheet, noted below  MEDICATIONS  (STANDING):  acetaminophen   IVPB .. 1000 milliGRAM(s) IV Intermittent once  cefepime  Injectable. 2000 milliGRAM(s) IV Push every 12 hours  heparin   Injectable 5000 Unit(s) SubCutaneous every 8 hours  lactated ringers. 1000 milliLiter(s) (75 mL/Hr) IV Continuous <Continuous>  pantoprazole  Injectable 40 milliGRAM(s) IV Push daily  psyllium Powder 1 Packet(s) Oral every 12 hours  vancomycin  IVPB 1000 milliGRAM(s) IV Intermittent every 12 hours    MEDICATIONS  (PRN):  acetaminophen     Tablet .. 975 milliGRAM(s) Oral every 6 hours PRN Mild Pain (1 - 3)  diphenhydrAMINE Injectable 25 milliGRAM(s) IV Push every 4 hours PRN Pruritus  ketorolac   Injectable 15 milliGRAM(s) IV Push every 6 hours PRN Moderate Pain (4 - 6)  morphine  - Injectable 2 milliGRAM(s) IV Push every 4 hours PRN Severe Pain (7 - 10)  naloxone Injectable 0.1 milliGRAM(s) IV Push every 3 minutes PRN For ANY of the following changes in patient status:  A. RR LESS THAN 10 breaths per minute, B. Oxygen saturation LESS THAN 90%, C. Sedation score of 6  ondansetron Injectable 4 milliGRAM(s) IV Push every 6 hours PRN Nausea  prochlorperazine   Injectable 10 milliGRAM(s) IV Push every 6 hours PRN Nausea    Allergies    No Known Allergies    Intolerances      Social: no smoking, no alcohol, no illegal drugs; no recent travel, no exposure to TB  FAMILY HISTORY: unable to obtain secondary to patient medical condition      no history of premature cardiovascular disease in first degree relatives  ROS: the patient has no chills, no HA, no dizziness, no sore throat, no blurry vision, no CP, no palpitations, no SOB,  no abdominal pain, no diarrhea, no N/V, no dysuria, no leg pain, no claudication, no rash, no joint aches, no rectal pain or bleeding, no night sweats  All other systems reviewed and are negative    Vital Signs Last 24 Hrs  T(C): 36.8 (26 May 2022 08:30), Max: 38.3 (25 May 2022 16:53)  T(F): 98.2 (26 May 2022 08:30), Max: 100.9 (25 May 2022 16:53)  HR: 100 (26 May 2022 08:30) (73 - 103)  BP: 115/92 (26 May 2022 08:30) (115/92 - 126/74)  BP(mean): 85 (25 May 2022 18:00) (85 - 93)  RR: 18 (26 May 2022 08:30) (17 - 28)  SpO2: 99% (26 May 2022 08:30) (95% - 99%)  Daily     Daily     PE:    Constitutional: frail looking  HEENT: NC/AT, EOMI, PERRLA, conjunctivae clear; ears and nose atraumatic; pharynx clear  Neck: supple; thyroid not palpable  Back: no tenderness  Respiratory: respiratory effort normal; clear to auscultation with scattered coarse breath sounds  Cardiovascular: S1S2 regular, no murmurs  Abdomen: soft, ileostomy functioning; midline incision clean dry intact  Genitourinary: no suprapubic tenderness  Musculoskeletal: no muscle tenderness, no joint swelling or tenderness  Neurological/ Psychiatric: AxOx3, judgement and insight normal;  moving all extremities  Skin: no rashes; no palpable lesions    Labs: all available labs reviewed                        8.4    12.83 )-----------( 588      ( 26 May 2022 05:49 )             28.7         134<L>  |  101  |  9   ----------------------------<  121<H>  4.0   |  27  |  0.55    Ca    8.5      26 May 2022 05:49  Phos  3.9     05-26  Mg     2.2         TPro  6.6  /  Alb  1.9<L>  /  TBili  1.0  /  DBili  x   /  AST  21  /  ALT  14  /  AlkPhos  83       LIVER FUNCTIONS - ( 25 May 2022 04:41 )  Alb: 1.9 g/dL / Pro: 6.6 gm/dL / ALK PHOS: 83 U/L / ALT: 14 U/L / AST: 21 U/L / GGT: x           Urinalysis Basic - ( 25 May 2022 18:30 )    Color: Yellow / Appearance: Clear / S.010 / pH: x  Gluc: x / Ketone: Negative  / Bili: Negative / Urobili: Negative   Blood: x / Protein: 30 mg/dL / Nitrite: Negative   Leuk Esterase: Trace / RBC: Negative /HPF / WBC 0-2   Sq Epi: x / Non Sq Epi: Negative / Bacteria: Negative    < from: Xray Chest 1 View- PORTABLE-Routine (Xray Chest 1 View- PORTABLE-Routine .) (22 @ 15:37) >  ACC: [id number] EXAM:  XR CHEST PORTABLE ROUTINE 1V                          PROCEDURE DATE:  2022          INTERPRETATION:  Portable chest radiograph    CLINICAL INFORMATION: Leukocytosis. Status post subtotal colectomy.    TECHNIQUE:  Portable  AP chest radiograph.    COMPARISON: 2022 chest .    FINDINGS:  CATHETERS AND TUBES: None    PULMONARY: RIGHT medial basilar airspace consolidation. RIGHT upper zone   and LEFT lung parenchyma clear..   No pneumothorax.    HEART/VASCULAR: The heart and mediastinum size and configuration are   within normal limits.    BONES: Visualized osseous structures are intact.    IMPRESSION:   RIGHT medial basilar lower lobe airspace consolidation..    < end of copied text >        Radiology: all available radiological tests reviewed    Advanced directives addressed: full resuscitation

## 2022-05-26 NOTE — CONSULT NOTE ADULT - ASSESSMENT
Pt is a 51 year old male with no pmed hx admitted on 5/19 for evaluation of worsening abdominal pain that has been present for a few years but then became acute, found to have perforated colonic mass, patient underwent colorectal surgery with creation of ileostomy on 5/19, found to have peritonitis; post op had fever and imaging has found new pneumonia, the patient is coughing up yellow mucous. Overall is feeling better post op and just started to eat some solid food.    1. Patient admitted with perforated colonic mass, s/p surgery; post op pneumonia  - follow up cultures   - serial cbc and monitor temperature   - iv hydration and supportive care   - diet per surgery  - oxygen and nebs as needed   - wound care per surgery  - will optimize antibiotics to cefepime, has been on zosyn since admission; will cover both pneumonia as well as abdomen  - will add vancomycin to treat resistant bacteria   - check vancomycin trough prior to fourth dose   - ambulate as tolerated  - reviewed prior medical records to evaluate for resistant or atypical pathogens   2. other issues: per medicine Doxepin Pregnancy And Lactation Text: This medication is Pregnancy Category C and it isn't known if it is safe during pregnancy. It is also excreted in breast milk and breast feeding isn't recommended.

## 2022-05-26 NOTE — DISCHARGE NOTE NURSING/CASE MANAGEMENT/SOCIAL WORK - NSDCPEEMAIL_GEN_ALL_CORE
[facility] for Tobacco Control email [email or web address]

## 2022-05-26 NOTE — DISCHARGE NOTE NURSING/CASE MANAGEMENT/SOCIAL WORK - NSDCPEWEB_GEN_ALL_CORE
[facility] for Tobacco Control website --- [email or web address] website --- [email or web address]

## 2022-05-26 NOTE — DISCHARGE NOTE NURSING/CASE MANAGEMENT/SOCIAL WORK - NSDCFUADDAPPT_GEN_ALL_CORE_FT
Follow-up appointment  Dr. Zackery Murray [facility]  appointment made for 06/02/22 @ 10:20am

## 2022-05-26 NOTE — DISCHARGE NOTE NURSING/CASE MANAGEMENT/SOCIAL WORK - PATIENT PORTAL LINK FT
You can access the FollowMyHealth Patient Portal offered by [facility] by registering at the following website: [email or web address]. By joining [employer]’s FollowMyHealth portal, you will also be able to view your health information using other applications (apps) compatible with our system.

## 2022-05-27 ENCOUNTER — TRANSCRIPTION ENCOUNTER (OUTPATIENT)
Age: 52
End: 2022-05-27

## 2022-05-27 LAB
ANION GAP SERPL CALC-SCNC: 5 MMOL/L — SIGNIFICANT CHANGE UP (ref 5–17)
BUN SERPL-MCNC: 14 MG/DL — SIGNIFICANT CHANGE UP (ref 7–23)
CALCIUM SERPL-MCNC: 8.8 MG/DL — SIGNIFICANT CHANGE UP (ref 8.5–10.1)
CHLORIDE SERPL-SCNC: 103 MMOL/L — SIGNIFICANT CHANGE UP (ref 96–108)
CO2 SERPL-SCNC: 26 MMOL/L — SIGNIFICANT CHANGE UP (ref 22–31)
CREAT SERPL-MCNC: 0.69 MG/DL — SIGNIFICANT CHANGE UP (ref 0.5–1.3)
EGFR: 113 ML/MIN/1.73M2 — SIGNIFICANT CHANGE UP
GLUCOSE SERPL-MCNC: 99 MG/DL — SIGNIFICANT CHANGE UP (ref 70–99)
HCT VFR BLD CALC: 30.4 % — LOW (ref 39–50)
HGB BLD-MCNC: 8.9 G/DL — LOW (ref 13–17)
MAGNESIUM SERPL-MCNC: 2.2 MG/DL — SIGNIFICANT CHANGE UP (ref 1.6–2.6)
MCHC RBC-ENTMCNC: 19.3 PG — LOW (ref 27–34)
MCHC RBC-ENTMCNC: 29.3 GM/DL — LOW (ref 32–36)
MCV RBC AUTO: 66.1 FL — LOW (ref 80–100)
PHOSPHATE SERPL-MCNC: 3.9 MG/DL — SIGNIFICANT CHANGE UP (ref 2.5–4.5)
PLATELET # BLD AUTO: 690 K/UL — HIGH (ref 150–400)
POTASSIUM SERPL-MCNC: 4.2 MMOL/L — SIGNIFICANT CHANGE UP (ref 3.5–5.3)
POTASSIUM SERPL-SCNC: 4.2 MMOL/L — SIGNIFICANT CHANGE UP (ref 3.5–5.3)
RBC # BLD: 4.6 M/UL — SIGNIFICANT CHANGE UP (ref 4.2–5.8)
RBC # FLD: 27.2 % — HIGH (ref 10.3–14.5)
SODIUM SERPL-SCNC: 134 MMOL/L — LOW (ref 135–145)
VANCOMYCIN TROUGH SERPL-MCNC: 8.1 UG/ML — LOW (ref 10–20)
WBC # BLD: 13.23 K/UL — HIGH (ref 3.8–10.5)
WBC # FLD AUTO: 13.23 K/UL — HIGH (ref 3.8–10.5)

## 2022-05-27 RX ORDER — DIPHENOXYLATE HCL/ATROPINE 2.5-.025MG
1 TABLET ORAL
Qty: 60 | Refills: 2
Start: 2022-05-27

## 2022-05-27 RX ORDER — CEFUROXIME AXETIL 250 MG
1 TABLET ORAL
Qty: 10 | Refills: 0
Start: 2022-05-27 | End: 2022-06-03

## 2022-05-27 RX ORDER — PSYLLIUM SEED (WITH DEXTROSE)
1 POWDER (GRAM) ORAL
Qty: 0 | Refills: 0 | DISCHARGE
Start: 2022-05-27

## 2022-05-27 RX ORDER — CEFUROXIME AXETIL 250 MG
500 TABLET ORAL EVERY 12 HOURS
Refills: 0 | Status: DISCONTINUED | OUTPATIENT
Start: 2022-05-27 | End: 2022-05-28

## 2022-05-27 RX ORDER — CEFUROXIME AXETIL 250 MG
1 TABLET ORAL
Qty: 10 | Refills: 0
Start: 2022-05-27 | End: 2022-06-01

## 2022-05-27 RX ORDER — CEFUROXIME AXETIL 250 MG
1 TABLET ORAL
Qty: 10 | Refills: 0
Start: 2022-05-27 | End: 2022-05-31

## 2022-05-27 RX ORDER — DIPHENOXYLATE HCL/ATROPINE 2.5-.025MG
1 TABLET ORAL
Refills: 0 | Status: DISCONTINUED | OUTPATIENT
Start: 2022-05-27 | End: 2022-05-28

## 2022-05-27 RX ORDER — MORPHINE SULFATE 50 MG/1
2 CAPSULE, EXTENDED RELEASE ORAL EVERY 4 HOURS
Refills: 0 | Status: DISCONTINUED | OUTPATIENT
Start: 2022-05-27 | End: 2022-05-28

## 2022-05-27 RX ADMIN — Medication 1 PACKET(S): at 09:50

## 2022-05-27 RX ADMIN — MORPHINE SULFATE 2 MILLIGRAM(S): 50 CAPSULE, EXTENDED RELEASE ORAL at 20:45

## 2022-05-27 RX ADMIN — MORPHINE SULFATE 2 MILLIGRAM(S): 50 CAPSULE, EXTENDED RELEASE ORAL at 15:08

## 2022-05-27 RX ADMIN — Medication 1 PACKET(S): at 20:41

## 2022-05-27 RX ADMIN — HEPARIN SODIUM 5000 UNIT(S): 5000 INJECTION INTRAVENOUS; SUBCUTANEOUS at 20:31

## 2022-05-27 RX ADMIN — MORPHINE SULFATE 2 MILLIGRAM(S): 50 CAPSULE, EXTENDED RELEASE ORAL at 07:00

## 2022-05-27 RX ADMIN — Medication 975 MILLIGRAM(S): at 18:34

## 2022-05-27 RX ADMIN — Medication 500 MILLIGRAM(S): at 09:50

## 2022-05-27 RX ADMIN — PANTOPRAZOLE SODIUM 40 MILLIGRAM(S): 20 TABLET, DELAYED RELEASE ORAL at 09:50

## 2022-05-27 RX ADMIN — HEPARIN SODIUM 5000 UNIT(S): 5000 INJECTION INTRAVENOUS; SUBCUTANEOUS at 13:18

## 2022-05-27 RX ADMIN — Medication 500 MILLIGRAM(S): at 20:31

## 2022-05-27 RX ADMIN — MORPHINE SULFATE 2 MILLIGRAM(S): 50 CAPSULE, EXTENDED RELEASE ORAL at 15:22

## 2022-05-27 RX ADMIN — MORPHINE SULFATE 2 MILLIGRAM(S): 50 CAPSULE, EXTENDED RELEASE ORAL at 20:30

## 2022-05-27 RX ADMIN — Medication 250 MILLIGRAM(S): at 06:59

## 2022-05-27 RX ADMIN — Medication 1 TABLET(S): at 20:32

## 2022-05-27 RX ADMIN — Medication 15 MILLIGRAM(S): at 02:48

## 2022-05-27 RX ADMIN — HEPARIN SODIUM 5000 UNIT(S): 5000 INJECTION INTRAVENOUS; SUBCUTANEOUS at 06:18

## 2022-05-27 NOTE — DISCHARGE NOTE PROVIDER - NSDCFUADDAPPT_GEN_ALL_CORE_FT
Follow-up appointment  Dr. Zackery Murray [facility]  appointment made for 06/02/22 @ 10:20am

## 2022-05-27 NOTE — DISCHARGE NOTE PROVIDER - NSDCMRMEDTOKEN_GEN_ALL_CORE_FT
cefuroxime 500 mg oral tablet: 1 tab(s) orally every 12 hours  diphenoxylate-atropine 2.5 mg-0.025 mg oral tablet: 1 tab(s) orally 2 times a day MDD:002  oxycodone-acetaminophen 5 mg-325 mg oral tablet: 1 tab(s) orally every 6 hours, As Needed -for moderate pain MDD:004  psyllium 3.4 g/7 g oral powder for reconstitution: 1 packet(s) orally 2 times a day

## 2022-05-27 NOTE — DISCHARGE NOTE PROVIDER - HOSPITAL COURSE
Pt is a 51 year old male with no pmed hx admitted on 5/19 for evaluation of worsening abdominal pain that has been present for a few years but then became acute, found to have perforated colonic mass, cancer with mets patient underwent  subtotal colectomy with creation of ileostomy on 5/19, found to have peritonitis; post op had fever and imaging has found new pneumonia, the patient is coughing up yellow mucous. Overall is feeling better post op and just started to eat some solid food. Will go home on oral antibiotics, pain control, He tolerated diet, stoma output+, Added fiber and lomotil.     Surgical Pathology Report (05.19.22 @ 18:01)   Surgical Pathology Report:   ACCESSION No: [id number]   Patient: PALLADINO, MATTHEW   Accession: [id number]   Collected Date/Time: 5/19/2022 18:01 EDT   Received Date/Time: 5/20/2022 08:53 EDT   Surgical Pathology Report - Auth (Verified)   Specimen(s) Submitted   1 Omentum   2 Portion of terminal ileum, cecum with perforated mass, invasive into   sigmoid colon, total colon   3 Liver biopsy   4 Retroperitoneal biopsy   Final Diagnosis   1. Omentum, excision:   - Negative for malignancy   - Acutely inflamed exudate consistent with peritonitis   2. Portion of terminal ileum, cecum with adherent sigmoid, total   colectomy:   - Poorly differentiated infiltrating adenocarcinoma measuring 9.0 cm   - Adenocarcinoma infiltrates through the bowel wall and through the   visceral peritoneum and infiltrates the adherent sigmoid colon   - 3 of 17 lymph nodes are positive for metastatic carcinoma   - Lymphovascular space invasion is identified   - Perineuralinvasion is identified   - The surgical margins are negative   - Appendix with serosal tumor implants and acute inflammation   - Peritonitis   3. Liver, biopsy:   - Metastatic adenocarcinoma   4. Retroperitoneal biopsy:   - Adenocarcinoma with necrosis   Verified by: DEEPAK KIM M.D.

## 2022-05-27 NOTE — DISCHARGE NOTE PROVIDER - CARE PROVIDER_API CALL
Radha Mckeon)  Surgery  [address] [place] [address] [id number]  Phone: [phone]  Fax: [phone]  Follow Up Time: 2 weeks

## 2022-05-27 NOTE — DISCHARGE NOTE PROVIDER - NSDCCPCAREPLAN_GEN_ALL_CORE_FT
PRINCIPAL DISCHARGE DIAGNOSIS  Diagnosis: Metastatic colon cancer to liver  Assessment and Plan of Treatment:       SECONDARY DISCHARGE DIAGNOSES  Diagnosis: SBO (small bowel obstruction)  Assessment and Plan of Treatment:

## 2022-05-28 VITALS
DIASTOLIC BLOOD PRESSURE: 74 MMHG | RESPIRATION RATE: 18 BRPM | HEART RATE: 66 BPM | TEMPERATURE: 98 F | SYSTOLIC BLOOD PRESSURE: 121 MMHG | OXYGEN SATURATION: 99 %

## 2022-05-28 LAB
ALBUMIN SERPL ELPH-MCNC: 2.4 G/DL — LOW (ref 3.3–5)
ALP SERPL-CCNC: 115 U/L — SIGNIFICANT CHANGE UP (ref 40–120)
ALT FLD-CCNC: 48 U/L — SIGNIFICANT CHANGE UP (ref 12–78)
ANION GAP SERPL CALC-SCNC: 11 MMOL/L — SIGNIFICANT CHANGE UP (ref 5–17)
AST SERPL-CCNC: 32 U/L — SIGNIFICANT CHANGE UP (ref 15–37)
BILIRUB SERPL-MCNC: 0.5 MG/DL — SIGNIFICANT CHANGE UP (ref 0.2–1.2)
BUN SERPL-MCNC: 11 MG/DL — SIGNIFICANT CHANGE UP (ref 7–23)
CALCIUM SERPL-MCNC: 8.7 MG/DL — SIGNIFICANT CHANGE UP (ref 8.5–10.1)
CHLORIDE SERPL-SCNC: 104 MMOL/L — SIGNIFICANT CHANGE UP (ref 96–108)
CO2 SERPL-SCNC: 20 MMOL/L — LOW (ref 22–31)
CREAT SERPL-MCNC: 0.62 MG/DL — SIGNIFICANT CHANGE UP (ref 0.5–1.3)
EGFR: 117 ML/MIN/1.73M2 — SIGNIFICANT CHANGE UP
GLUCOSE SERPL-MCNC: 103 MG/DL — HIGH (ref 70–99)
HCT VFR BLD CALC: 31.6 % — LOW (ref 39–50)
HGB BLD-MCNC: 9 G/DL — LOW (ref 13–17)
MAGNESIUM SERPL-MCNC: 2.3 MG/DL — SIGNIFICANT CHANGE UP (ref 1.6–2.6)
MCHC RBC-ENTMCNC: 19.5 PG — LOW (ref 27–34)
MCHC RBC-ENTMCNC: 28.5 GM/DL — LOW (ref 32–36)
MCV RBC AUTO: 68.4 FL — LOW (ref 80–100)
PHOSPHATE SERPL-MCNC: 3.2 MG/DL — SIGNIFICANT CHANGE UP (ref 2.5–4.5)
PLATELET # BLD AUTO: 709 K/UL — HIGH (ref 150–400)
POTASSIUM SERPL-MCNC: 3.8 MMOL/L — SIGNIFICANT CHANGE UP (ref 3.5–5.3)
POTASSIUM SERPL-SCNC: 3.8 MMOL/L — SIGNIFICANT CHANGE UP (ref 3.5–5.3)
PROT SERPL-MCNC: 7.2 GM/DL — SIGNIFICANT CHANGE UP (ref 6–8.3)
RBC # BLD: 4.62 M/UL — SIGNIFICANT CHANGE UP (ref 4.2–5.8)
RBC # FLD: 27.7 % — HIGH (ref 10.3–14.5)
SODIUM SERPL-SCNC: 135 MMOL/L — SIGNIFICANT CHANGE UP (ref 135–145)
WBC # BLD: 12.11 K/UL — HIGH (ref 3.8–10.5)
WBC # FLD AUTO: 12.11 K/UL — HIGH (ref 3.8–10.5)

## 2022-05-28 RX ORDER — OXYCODONE AND ACETAMINOPHEN 5; 325 MG/1; MG/1
1 TABLET ORAL EVERY 6 HOURS
Refills: 0 | Status: DISCONTINUED | OUTPATIENT
Start: 2022-05-28 | End: 2022-05-28

## 2022-05-28 RX ADMIN — MORPHINE SULFATE 2 MILLIGRAM(S): 50 CAPSULE, EXTENDED RELEASE ORAL at 00:32

## 2022-05-28 RX ADMIN — Medication 500 MILLIGRAM(S): at 09:17

## 2022-05-28 RX ADMIN — Medication 1 PACKET(S): at 09:18

## 2022-05-28 RX ADMIN — HEPARIN SODIUM 5000 UNIT(S): 5000 INJECTION INTRAVENOUS; SUBCUTANEOUS at 04:57

## 2022-05-28 RX ADMIN — SODIUM CHLORIDE 75 MILLILITER(S): 9 INJECTION, SOLUTION INTRAVENOUS at 04:03

## 2022-05-28 RX ADMIN — MORPHINE SULFATE 2 MILLIGRAM(S): 50 CAPSULE, EXTENDED RELEASE ORAL at 04:56

## 2022-05-28 RX ADMIN — PANTOPRAZOLE SODIUM 40 MILLIGRAM(S): 20 TABLET, DELAYED RELEASE ORAL at 09:18

## 2022-05-28 RX ADMIN — OXYCODONE AND ACETAMINOPHEN 1 TABLET(S): 5; 325 TABLET ORAL at 09:18

## 2022-05-28 RX ADMIN — MORPHINE SULFATE 2 MILLIGRAM(S): 50 CAPSULE, EXTENDED RELEASE ORAL at 00:47

## 2022-05-28 RX ADMIN — Medication 1 TABLET(S): at 09:17

## 2022-05-28 NOTE — PROGRESS NOTE ADULT - SUBJECTIVE AND OBJECTIVE BOX
Date of service: 05-27-22 @ 08:34        Patient despondent over his cancer diagnosis, which was reviewed earlier today; mets to liver noted  He wants to go home    ROS: no fever or chills; denies dizziness, no HA, no SOB or cough, no abdominal pain, no diarrhea or constipation; no dysuria, no urinary frequency, no legs pain, no rashes    MEDICATIONS  (STANDING):  acetaminophen   IVPB .. 1000 milliGRAM(s) IV Intermittent once  cefepime   IVPB 2000 milliGRAM(s) IV Intermittent every 12 hours  heparin   Injectable 5000 Unit(s) SubCutaneous every 8 hours  lactated ringers. 1000 milliLiter(s) (75 mL/Hr) IV Continuous <Continuous>  pantoprazole  Injectable 40 milliGRAM(s) IV Push daily  psyllium Powder 1 Packet(s) Oral every 12 hours  vancomycin  IVPB 1000 milliGRAM(s) IV Intermittent every 12 hours    MEDICATIONS  (PRN):  acetaminophen     Tablet .. 975 milliGRAM(s) Oral every 6 hours PRN Mild Pain (1 - 3)  diphenhydrAMINE Injectable 25 milliGRAM(s) IV Push every 4 hours PRN Pruritus  ketorolac   Injectable 15 milliGRAM(s) IV Push every 6 hours PRN Moderate Pain (4 - 6)  morphine  - Injectable 2 milliGRAM(s) IV Push every 4 hours PRN Severe Pain (7 - 10)  naloxone Injectable 0.1 milliGRAM(s) IV Push every 3 minutes PRN For ANY of the following changes in patient status:  A. RR LESS THAN 10 breaths per minute, B. Oxygen saturation LESS THAN 90%, C. Sedation score of 6  ondansetron Injectable 4 milliGRAM(s) IV Push every 6 hours PRN Nausea  prochlorperazine   Injectable 10 milliGRAM(s) IV Push every 6 hours PRN Nausea      Vital Signs Last 24 Hrs  T(C): 36.7 (26 May 2022 22:04), Max: 36.7 (26 May 2022 22:04)  T(F): 98.1 (26 May 2022 22:04), Max: 98.1 (26 May 2022 22:04)  HR: 75 (26 May 2022 22:04) (75 - 87)  BP: 136/65 (26 May 2022 22:04) (101/85 - 136/65)  BP(mean): --  RR: 18 (26 May 2022 22:04) (18 - 18)  SpO2: 97% (26 May 2022 22:04) (97% - 97%)        Physical Exam:          Constitutional: frail looking  HEENT: NC/AT, EOMI, PERRLA, conjunctivae clear; ears and nose atraumatic; pharynx clear  Neck: supple; thyroid not palpable  Back: no tenderness  Respiratory: respiratory effort normal; clear to auscultation with scattered coarse breath sounds  Cardiovascular: S1S2 regular, no murmurs  Abdomen: soft, ileostomy functioning; midline incision clean dry intact  Genitourinary: no suprapubic tenderness  Musculoskeletal: no muscle tenderness, no joint swelling or tenderness  Neurological/ Psychiatric: AxOx3, judgement and insight normal;  moving all extremities  Skin: no rashes; no palpable lesions    Labs: all available labs reviewed                        Labs:                        8.9    13.23 )-----------( 690      ( 27 May 2022 05:22 )             30.4     05-27    134<L>  |  103  |  14  ----------------------------<  99  4.2   |  26  |  0.69    Ca    8.8      27 May 2022 05:22  Phos  3.9     05-27  Mg     2.2     05-27         Vancomycin Level, Trough: 8.1 ug/mL (05-27 @ 05:22)      Cultures:       Surgical Pathology Report (05.19.22 @ 18:01)    Surgical Pathology Report:   ACCESSION No:  [id number]  Patient:   PALLADINO, MATTHEW      Accession:                             [id number]    Collected Date/Time:                   5/19/2022 18:01 EDT  Received Date/Time:                    5/20/2022 08:53 EDT    Surgical Pathology Report - Auth (Verified)    Specimen(s) Submitted  1  Omentum  2  Portion of terminal ileum, cecum with perforated mass, invasive into  sigmoid colon, total colon  3  Liver biopsy  4  Retroperitoneal biopsy    Final Diagnosis    1. Omentum, excision:  - Negative for malignancy  - Acutely inflamed exudate consistent with peritonitis    2. Portion of terminal ileum, cecum with adherent sigmoid, total  colectomy:  - Poorly differentiated infiltrating adenocarcinoma measuring 9.0 cm  - Adenocarcinoma infiltrates through the bowel wall and through the  visceral peritoneum and infiltrates the adherent sigmoid colon  - 3 of 17 lymph nodes are positive for metastatic carcinoma  - Lymphovascular space invasion is identified  - Perineuralinvasion is identified  - The surgical margins are negative  - Appendix with serosal tumor implants and acute inflammation  - Peritonitis    3. Liver, biopsy:  - Metastatic adenocarcinoma    4. Retroperitoneal biopsy:  - Adenocarcinoma with necrosis    Verified by: DEEPAK KIM M.D.  (Electronic Signature)  Reported on: 05/26/22 14:40 EDT, [address] [place] [address]  Phone: [phone]   Fax: [phone]  _________________________________________________________________    Comment  MMR (by IHC) - Reporting Template (CAP October 1st, 2013)    Immunohistochemistry Testing (IHC) for Mismatch Repair Proteins performed  on tissue block 2D shows the following result:    MLH1:  Intact nuclear expression  MSH2:  Intact nuclear expression  MSH6:  Intact nuclear expression  PMS2:  Intact nuclear expression    Interpretation:  No loss of nuclear expression of MMR proteins (MLH1,  MSH2,  MSH6, and PMS2).   These results show low probability of microsatellite  instability-high (MSI-H).    Clinical correlation with further evaluation is suggested.    Paraffin Block Used:  2D  Specimen Fixation/Processing: Formalin fixed, paraffin embedded (FFPE) 10%  neutral buffered formalin  Detection System: Indirect, biotin free/DAB  Antibody Clone: MLH1 (M1);MSH2 ([id number]); MSH6 (SP93); PMS2 (A16-4)    Controls: External Control:  Batch positive control for MLH1, MSH2, MSH6 and PMS2 is positive  (reviewed by IHC Laboratory)  Internal Control:  Separate built-in positive control on slides for MLH1, MSH2, MSH6  and PMS2 is positive  Internal control for MLH1, MSH2, MSH6 and PMS2 is (choose one):  Absent    These immunohistochemical tests have been developed and their performance  characteristics determined by [facility], Department of  Pathology, [address] [place] [address].  It has not been  cleared or approved by the U.S. Food and Drug Administration.  The FDA    has determined that such clearance or approval is not necessary.  This  test is used for clinical purposes.The laboratory certified under the  CLIA-88 as qualified to perform high complexity clinical testing    Molecular studies for extended LUCIAN will be performed and reported  separately.    Diagnosis given to Dr. Mckeon via email on 5/26/22.    This case represents a current or recent malignant diagnosis and as such,  a copy is being forwarded to the Tumor Registrar.     Please be reminded  that all tumor registry cases must be accurately staged and tracked.    -For inpatients, please complete the tumor staging form on the patient's  chart based on the clinical data which you have compiled.    -Please remind your office to respond promptly to the    Tumor  Registrar's  request for patient follow-up.    All or portions of this case have been reviewed at intradepartmental   conference (1, 2).    Synoptic Summary  COLON AND RECTUM: Resection, Including Transanal Disk Excision of Rectal  Neoplasms  SPECIMEN  Procedure:  Total abdominal colectomy  Macroscopic Evaluation of Mesorectum:   Not applicable  TUMOR  Tumor Site:  Cecum  Histologic Type:  Adenocarcinoma  Histologic Grade:  G3, poorly differentiated  Tumor Size: Greatest dimension (Centimeters) -   9.0 cm  Multiple Primary Sites:  Not applicable  Tumor Extent: Directlyinvades or adheres to adjacent structure(s)  - Sigmoid colon  Macroscopic Tumor Perforation:   Present  Lymphovascular Invasion:  Present  Perineural Invasion:  Present  Treatment Effect:  No known presurgical therapy  MARGINS  Margin Status for Invasive Carcinoma:   All margins negative for invasive    carcinoma  Distance from Invasive Carcinoma to Radial (Circumferential)  Margin:  5.0 cm  Margin Status for Non-Invasive Tumor:   Not applicable  REGIONAL LYMPH NODES  Regional Lymph Node Status:Tumor present in regional lymph node(s)  Number of Lymph Nodes with Tumor -   3  Number of Lymph Nodes Examined:   17  Tumor Deposits:  Not identified  DISTANT METASTASIS  Distant Site(s) Involved:  Liver  PATHOLOGIC STAGE CLASSIFICATION (pTNM, AJCC 8th Edition)  Reporting of pT, pN, and (when applicable) pM categories is based  on information available to the pathologist at the time the report  is issued. As per the AJCC (Chapter 1, 8th Ed.) it is the managing  physician's responsibility to establish the final pathologic stage based  upon all pertinent information, including but potentially not limited to  this pathology report.  TNM Descriptors:  Not applicable  pT Category:  pT4b  pN Category:  pN1b  pM Category:  pM1c    Clinical Information  Metastatic colorectal cancer, peritonitis    Gross Description    1.  Received: In formalin labeled  "omentum"  Number:  One  Size:  42 x 16 x 2.5 cm membranous portion of finely lobulated yellow  fatwith focal areas of exudate  Submitted:  Representative sections in three cassettes: 1A-1C    2.  Received: Fresh labeled  "portion of terminal ileum, cecum with  perforated mass, invasive into sigmoid colon, total colon"  Integrity:  Tortuous  Orientation:  Oriented by anatomic landmarks  Proximal:  Stapled, 7.2 cm in circumference  Distal:  Stapled, 8.5 cm in circumference  Terminal Ileum  Length:  17 cm  Circumference:  7.7 cm  Wall Thickness:  0.3 cm  Mesenteric Width:  4.5 cm  Colon  Length:  85 cm  Cecum Circumference:  7 cm  MesentericWidth:  8.2 cm  Wall Thickness:  0.4 cm  Perianal Skin:   Not present  Pedicle:  Not Present  Appendix:  detached, 6.5 x 0.9-1.5 cm. serosa is tan-white, firm and  shaggy. Grossly involved by tumor  Inking  Serosa:  Black  Area of Interest -  mass  Size:  Ill-defined, 9 x 9 x 8 cm, firm tan white mass  Location:  Involves terminal ileum, cecum/ascending colon and invades  through wall into distal portion of colon  % Circumferential Involvement:   100%  Overlying Serosa:  Contracted  Cut Surface:White and firm  Extension:  Through wall and involves proximal and distal colon  Distance to Proximal Margin:   15 cm  Distance to Distal Margin:   6 cm  Distance to Additional Margins/Landmarks  Mesenteric:  5 cm  Remaining Mucosa:  Pink-tan and unremarkable  Remaining Serosa:  Pink-tan, smooth and unremarkable  Lumen:  No abnormality  Omentum:  Not present  Attached Mesentery:  Yellow, soft and intact  Mesorectal Margin:  Not Present  Possible Lymph Node(s):   Numerous , 0.1-2 cm in greatest dimension  Cut surfaces:  Tan-gray to firm tan-white  Submitted:  Representative sections in 16 cassettes:  2A: Perpendicular section of proximal and distal margins  2B: Mesenteric/radial margin, en face  2C: Representative possible appendix  2D: Mass with terminal ileum  2E-2K: Full-thickness mass with cecum and invasion into colon, including  overlying serosa, and sectioned  2L: Representative sections of two lymph nodes  2M-2N: Multiple whole lymph nodes, each    2O: Four whole lymph nodes  2P: Additional tissue submitted on 05/25/2022 to include tumor invading  adherent distal colon    3.  Received: In formalin labeled  "liver biopsy"  Size:  One fragment measuring 1 x 0.6 x 0.4 cm  Description:  Tan-gray rubbery tissue fragment  Submitted:Entirely in one cassette: 3A    4. Received in formalin labeled   "retroperitoneal biopsy"  are three,  gray white tissue fragments ranging from 0.4 cm to 0.6 cm in greatest  dimension. Entirely in one cassette: 4A.    Shona Matos 05/23/2022 10:48AM    Shona Matos 05/25/2022 11:31 AM    Disclaimer  In addition to other data that may appear on the specimen containers, all  labels have been inspected to confirm the presence of the patient's name  and date of birth.            < from: Xray Chest 1 View- PORTABLE-Routine (Xray Chest 1 View- PORTABLE-Routine .) (05.24.22 @ 15:37) >  ACC: [id number] EXAM:  XR CHEST PORTABLE ROUTINE 1V                          PROCEDURE DATE:  05/24/2022          INTERPRETATION:  Portable chest radiograph    CLINICAL INFORMATION: Leukocytosis. Status post subtotal colectomy.    TECHNIQUE:  Portable  AP chest radiograph.    COMPARISON: 5/19/2022 chest .    FINDINGS:  CATHETERS AND TUBES: None    PULMONARY: RIGHT medial basilar airspace consolidation. RIGHT upper zone   and LEFT lung parenchyma clear..   No pneumothorax.    HEART/VASCULAR: The heart and mediastinum size and configuration are   within normal limits.    BONES: Visualized osseous structures are intact.    IMPRESSION:   RIGHT medial basilar lower lobe airspace consolidation..    < end of copied text >        Radiology: all available radiological tests reviewed    Advanced directives addressed: full resuscitation
Patient was seen and examined at the bedside this morning  No acute events overnight  Pain is better controlled  No nausea or vomiting    Vital Signs Last 24 Hrs  T(C): 36.9 (21 May 2022 08:52), Max: 37.1 (21 May 2022 06:15)  T(F): 98.4 (21 May 2022 08:52), Max: 98.7 (21 May 2022 06:15)  HR: 85 (21 May 2022 10:40) (80 - 95)  BP: 119/81 (21 May 2022 10:40) (106/94 - 133/88)  BP(mean): 93 (21 May 2022 10:40) (84 - 101)  RR: 14 (21 May 2022 10:40) (8 - 20)  SpO2: 95% (21 May 2022 10:40) (93% - 96%)    Labs:                              7.5    9.00  )-----------( 441      ( 21 May 2022 10:06 )             26.8     CBC Full  -  ( 21 May 2022 10:06 )  WBC Count : 9.00 K/uL  RBC Count : 4.08 M/uL  Hemoglobin : 7.5 g/dL  Hematocrit : 26.8 %  Platelet Count - Automated : 441 K/uL  Mean Cell Volume : 65.7 fl  Mean Cell Hemoglobin : 18.4 pg  Mean Cell Hemoglobin Concentration : 28.0 gm/dL  Auto Neutrophil # : x  Auto Lymphocyte # : x  Auto Monocyte # : x  Auto Eosinophil # : x  Auto Basophil # : x  Auto Neutrophil % : x  Auto Lymphocyte % : x  Auto Monocyte % : x  Auto Eosinophil % : x  Auto Basophil % : x    05-21    138  |  104  |  17  ----------------------------<  128<H>  4.1   |  30  |  0.44<L>    Ca    8.1<L>      21 May 2022 05:38  Phos  2.4     05-21  Mg     2.3     05-21    TPro  5.5<L>  /  Alb  1.7<L>  /  TBili  1.2  /  DBili  x   /  AST  25  /  ALT  10<L>  /  AlkPhos  47  05-21    LIVER FUNCTIONS - ( 21 May 2022 05:38 )  Alb: 1.7 g/dL / Pro: 5.5 gm/dL / ALK PHOS: 47 U/L / ALT: 10 U/L / AST: 25 U/L / GGT: x           PT/INR - ( 19 May 2022 12:48 )   PT: 15.2 sec;   INR: 1.31 ratio         PTT - ( 19 May 2022 12:48 )  PTT:27.1 sec      Alert, conscious, oriented, NGT in place  No pallor, jaundice or cyanosis  Not in pain or distress  Chest clear, equal air entry bilaterally  Abdomen soft, appropriately tender, clean and dry dressing. Ostomy pink and no function yet  Extremities: No swelling or tenderness    
Patient was seen and examined at the bedside this morning. No acute events overnight. Tolerating diet and passing gas. Ostomy functional.    Vital Signs Last 24 Hrs  T(C): 36.8 (27 May 2022 08:36), Max: 36.8 (27 May 2022 08:36)  T(F): 98.2 (27 May 2022 08:36), Max: 98.2 (27 May 2022 08:36)  HR: 82 (27 May 2022 08:36) (75 - 87)  BP: 116/73 (27 May 2022 08:36) (101/85 - 136/65)  BP(mean): --  RR: 18 (27 May 2022 08:36) (18 - 18)  SpO2: 100% (27 May 2022 08:36) (97% - 100%)    Labs:                              8.9    13.23 )-----------( 690      ( 27 May 2022 05:22 )             30.4     CBC Full  -  ( 27 May 2022 05:22 )  WBC Count : 13.23 K/uL  RBC Count : 4.60 M/uL  Hemoglobin : 8.9 g/dL  Hematocrit : 30.4 %  Platelet Count - Automated : 690 K/uL  Mean Cell Volume : 66.1 fl  Mean Cell Hemoglobin : 19.3 pg  Mean Cell Hemoglobin Concentration : 29.3 gm/dL  Auto Neutrophil # : x  Auto Lymphocyte # : x  Auto Monocyte # : x  Auto Eosinophil # : x  Auto Basophil # : x  Auto Neutrophil % : x  Auto Lymphocyte % : x  Auto Monocyte % : x  Auto Eosinophil % : x  Auto Basophil % : x    05-27    134<L>  |  103  |  14  ----------------------------<  99  4.2   |  26  |  0.69    Ca    8.8      27 May 2022 05:22  Phos  3.9     05-27  Mg     2.2     05-27          Alert, conscious, oriented  No pallor, jaundice or cyanosis  Not in pain or distress  Chest clear, equal air entry bilaterally  Abdomen soft and lax, no tenderness, incisions clean and dry, ostomy is pink with large amount of liquid stool  Extremities: No swelling or tenderness  
INTERVAL HPI/OVERNIGHT EVENTS:  Patient S&E at bedside. No o/n events, ostomy draining,  Pt up and walking around   s/p R hemicolectomy      PAST MEDICAL & SURGICAL HISTORY:  No pertinent past medical history          FAMILY HISTORY:    Vital Signs Last 24 Hrs  T(C): 36.6 (28 May 2022 08:17), Max: 37.1 (27 May 2022 21:31)  T(F): 97.8 (28 May 2022 08:17), Max: 98.7 (27 May 2022 21:31)  HR: 66 (28 May 2022 08:17) (66 - 78)  BP: 121/74 (28 May 2022 08:17) (116/59 - 121/74)  BP(mean): --  RR: 18 (28 May 2022 08:17) (16 - 18)  SpO2: 99% (28 May 2022 08:17) (98% - 99%)  PHYSICAL EXAM:    GENERAL: NAD, walking with PT  HEAD:  Atraumatic, Normocephalic  ENT: eomi, NGT in place draining  CHEST/LUNG: Clear to auscultation bilaterally; No wheeze  HEART: Regular rate and rhythm;   ABDOMEN: ostomy site draining, no pain, clean  EXTREMITIES:  2+ Peripheral Pulses, No clubbing, cyanosis, or edema  NEUROLOGY: non-focal  SKIN: No rashes or lesions      MEDICATIONS  (STANDING):  acetaminophen   IVPB .. 1000 milliGRAM(s) IV Intermittent once  fat emulsion (Fish Oil and Plant Based) 20% Infusion 0.69 Gm/kG/Day (20.9 mL/Hr) IV Continuous <Continuous>  heparin   Injectable 5000 Unit(s) SubCutaneous every 8 hours  HYDROmorphone PCA (1 mG/mL) 30 milliLiter(s) PCA Continuous PCA Continuous  lactated ringers. 1000 milliLiter(s) (120 mL/Hr) IV Continuous <Continuous>  pantoprazole  Injectable 40 milliGRAM(s) IV Push daily  Parenteral Nutrition - Adult 1 Each (75 mL/Hr) TPN Continuous <Continuous>  piperacillin/tazobactam IVPB.. 3.375 Gram(s) IV Intermittent every 8 hours    MEDICATIONS  (PRN):  diphenhydrAMINE Injectable 25 milliGRAM(s) IV Push every 4 hours PRN Pruritus  HYDROmorphone PCA (1 mG/mL) Rescue Clinician Bolus 0.5 milliGRAM(s) IV Push every 15 minutes PRN for Pain Scale GREATER THAN 6  morphine  - Injectable 2 milliGRAM(s) IV Push every 4 hours PRN Severe Pain (7 - 10)  naloxone Injectable 0.1 milliGRAM(s) IV Push every 3 minutes PRN For ANY of the following changes in patient status:  A. RR LESS THAN 10 breaths per minute, B. Oxygen saturation LESS THAN 90%, C. Sedation score of 6  ondansetron Injectable 4 milliGRAM(s) IV Push every 6 hours PRN Nausea  prochlorperazine   Injectable 10 milliGRAM(s) IV Push every 6 hours PRN Nausea      Allergies    No Known Allergies    Intolerances        LABS:                                   9.0    12.11 )-----------( 709      ( 28 May 2022 07:03 )             31.6                  8.2    8.35  )-----------( 391      ( 22 May 2022 05:08 )             28.2     05-23    133<L>  |  100  |  11  ----------------------------<  101<H>  4.1   |  28  |  0.40<L>    Ca    8.3<L>      23 May 2022 05:17  Phos  3.8     05-23  Mg     2.1     05-23    TPro  5.7<L>  /  Alb  1.7<L>  /  TBili  1.3<H>  /  DBili  x   /  AST  22  /  ALT  11<L>  /  AlkPhos  58  05-23          RADIOLOGY & ADDITIONAL TESTS:  Studies reviewed.  
INTERVAL HPI/OVERNIGHT EVENTS:  Patient S&E at bedside. No o/n events, ostomy draining, NGT in place and draining. Pt up and walking around with PT this morning and has been back and forth on the floor. Pain well controlled, catheter removed.    PAST MEDICAL & SURGICAL HISTORY:  No pertinent past medical history          FAMILY HISTORY:      VITAL SIGNS:  T(F): 97.6 (05-22-22 @ 21:29)  HR: 84 (05-23-22 @ 07:00)  BP: 133/88 (05-23-22 @ 07:00)  RR: 8 (05-23-22 @ 07:00)  SpO2: 95% (05-23-22 @ 07:00)  Wt(kg): --    PHYSICAL EXAM:    GENERAL: NAD, walking with PT  HEAD:  Atraumatic, Normocephalic  ENT: eomi, NGT in place draining  CHEST/LUNG: Clear to auscultation bilaterally; No wheeze  HEART: Regular rate and rhythm;   ABDOMEN: ostomy site draining, no pain, clean  EXTREMITIES:  2+ Peripheral Pulses, No clubbing, cyanosis, or edema  NEUROLOGY: non-focal  SKIN: No rashes or lesions      MEDICATIONS  (STANDING):  acetaminophen   IVPB .. 1000 milliGRAM(s) IV Intermittent once  fat emulsion (Fish Oil and Plant Based) 20% Infusion 0.69 Gm/kG/Day (20.9 mL/Hr) IV Continuous <Continuous>  heparin   Injectable 5000 Unit(s) SubCutaneous every 8 hours  HYDROmorphone PCA (1 mG/mL) 30 milliLiter(s) PCA Continuous PCA Continuous  lactated ringers. 1000 milliLiter(s) (120 mL/Hr) IV Continuous <Continuous>  pantoprazole  Injectable 40 milliGRAM(s) IV Push daily  Parenteral Nutrition - Adult 1 Each (75 mL/Hr) TPN Continuous <Continuous>  piperacillin/tazobactam IVPB.. 3.375 Gram(s) IV Intermittent every 8 hours    MEDICATIONS  (PRN):  diphenhydrAMINE Injectable 25 milliGRAM(s) IV Push every 4 hours PRN Pruritus  HYDROmorphone PCA (1 mG/mL) Rescue Clinician Bolus 0.5 milliGRAM(s) IV Push every 15 minutes PRN for Pain Scale GREATER THAN 6  morphine  - Injectable 2 milliGRAM(s) IV Push every 4 hours PRN Severe Pain (7 - 10)  naloxone Injectable 0.1 milliGRAM(s) IV Push every 3 minutes PRN For ANY of the following changes in patient status:  A. RR LESS THAN 10 breaths per minute, B. Oxygen saturation LESS THAN 90%, C. Sedation score of 6  ondansetron Injectable 4 milliGRAM(s) IV Push every 6 hours PRN Nausea  prochlorperazine   Injectable 10 milliGRAM(s) IV Push every 6 hours PRN Nausea      Allergies    No Known Allergies    Intolerances        LABS:                        8.2    8.35  )-----------( 391      ( 22 May 2022 05:08 )             28.2     05-23    133<L>  |  100  |  11  ----------------------------<  101<H>  4.1   |  28  |  0.40<L>    Ca    8.3<L>      23 May 2022 05:17  Phos  3.8     05-23  Mg     2.1     05-23    TPro  5.7<L>  /  Alb  1.7<L>  /  TBili  1.3<H>  /  DBili  x   /  AST  22  /  ALT  11<L>  /  AlkPhos  58  05-23          RADIOLOGY & ADDITIONAL TESTS:  Studies reviewed.  
Patient was seen and examined at the bedside this morning  No acute events overnight  Pain is better controlled  No nausea or vomiting      O/E:  T(C): 37 (05-20-22 @ 05:54), Max: 37.7 (05-19-22 @ 12:06)  HR: 99 (05-20-22 @ 07:00) (97 - 140)  BP: 122/89 (05-20-22 @ 07:00) (121/100 - 155/101)  RR: 10 (05-20-22 @ 07:00) (10 - 31)  SpO2: 98% (05-20-22 @ 07:00) (90% - 100%)  Alert, conscious, oriented  No pallor, jaundice or cyanosis  Not in pain or distress  Chest clear, equal air entry bilaterally  Abdomen soft, appropriately tender, clean and dry dressing. Ostomy pink and no function yet  Extremities: No swelling or tenderness    05-19-22 @ 07:01  -  05-20-22 @ 07:00  --------------------------------------------------------  IN: 6706 mL / OUT: 1000 mL / NET: 5706 mL      
Patient was seen and examined at the bedside this morning  No acute events overnight  Pain is better controlled  No nausea or vomiting  Garcia catheter removed yesterday and was passed trial of void  Ostomy with liquid stool  NG tube still with high output    O/E:  T(C): 37.2 (05-22-22 @ 14:33), Max: 37.2 (05-22-22 @ 09:43)  HR: 97 (05-22-22 @ 12:00) (80 - 97)  BP: 125/71 (05-22-22 @ 12:00) (109/90 - 134/74)  RR: 12 (05-22-22 @ 12:00) (9 - 18)  SpO2: 93% (05-22-22 @ 12:00) (93% - 97%)  Alert, conscious, oriented  No pallor, jaundice or cyanosis  Not in pain or distress  Chest clear, equal air entry bilaterally  Abdomen soft, less tenderness, incisions clean and dry  Extremities: No swelling or tenderness    05-21-22 @ 07:01  -  05-22-22 @ 07:00  --------------------------------------------------------  IN: 3313 mL / OUT: 2875 mL / NET: 438 mL    05-22-22 @ 07:01  -  05-22-22 @ 15:50  --------------------------------------------------------  IN: 0 mL / OUT: 850 mL / NET: -850 mL                            8.2    8.35  )-----------( 391      ( 22 May 2022 05:08 )             28.2   05-22    133<L>  |  101  |  14  ----------------------------<  97  4.0   |  28  |  0.45<L>    Ca    7.9<L>      22 May 2022 05:08  Phos  2.6     05-22  Mg     2.1     05-22    TPro  5.7<L>  /  Alb  1.7<L>  /  TBili  1.2  /  DBili  x   /  AST  24  /  ALT  12  /  AlkPhos  51  05-22    MEDICATIONS  (STANDING):  acetaminophen   IVPB .. 1000 milliGRAM(s) IV Intermittent once  fat emulsion (Fish Oil and Plant Based) 20% Infusion 0.69 Gm/kG/Day (20.83 mL/Hr) IV Continuous <Continuous>  fat emulsion (Fish Oil and Plant Based) 20% Infusion 0.69 Gm/kG/Day (20.9 mL/Hr) IV Continuous <Continuous>  heparin   Injectable 5000 Unit(s) SubCutaneous every 8 hours  HYDROmorphone PCA (1 mG/mL) 30 milliLiter(s) PCA Continuous PCA Continuous  lactated ringers. 1000 milliLiter(s) (120 mL/Hr) IV Continuous <Continuous>  pantoprazole  Injectable 40 milliGRAM(s) IV Push daily  Parenteral Nutrition - Adult 1 Each (75 mL/Hr) TPN Continuous <Continuous>  Parenteral Nutrition - Adult 1 Each (75 mL/Hr) TPN Continuous <Continuous>  piperacillin/tazobactam IVPB.. 3.375 Gram(s) IV Intermittent every 8 hours    MEDICATIONS  (PRN):  diphenhydrAMINE Injectable 25 milliGRAM(s) IV Push every 4 hours PRN Pruritus  HYDROmorphone PCA (1 mG/mL) Rescue Clinician Bolus 0.5 milliGRAM(s) IV Push every 15 minutes PRN for Pain Scale GREATER THAN 6  morphine  - Injectable 2 milliGRAM(s) IV Push every 4 hours PRN Severe Pain (7 - 10)  naloxone Injectable 0.1 milliGRAM(s) IV Push every 3 minutes PRN For ANY of the following changes in patient status:  A. RR LESS THAN 10 breaths per minute, B. Oxygen saturation LESS THAN 90%, C. Sedation score of 6  ondansetron Injectable 4 milliGRAM(s) IV Push every 6 hours PRN Nausea  prochlorperazine   Injectable 10 milliGRAM(s) IV Push every 6 hours PRN Nausea  
Patient was seen and examined at the bedside this morning  No acute events overnight  Pain is better controlled  No nausea or vomiting  Tolerating diet and passing gas, no bowel movements yet    O/E:  T(C): 36.8 (05-25-22 @ 09:16), Max: 37.4 (05-25-22 @ 06:18)  HR: 85 (05-25-22 @ 10:00) (80 - 106)  BP: 128/79 (05-25-22 @ 06:00) (94/51 - 139/82)  RR: 18 (05-25-22 @ 10:00) (11 - 24)  SpO2: 96% (05-25-22 @ 10:00) (93% - 100%)  Alert, conscious, oriented  No pallor, jaundice or cyanosis  Not in pain or distress  Chest clear, equal air entry bilaterally  Abdomen soft and lax, no tenderness, incisions clean and dry, ostomy is pink with large amount of liquid stool  Extremities: No swelling or tenderness    05-24-22 @ 07:01  -  05-25-22 @ 07:00  --------------------------------------------------------  IN: 3013 mL / OUT: 2925 mL / NET: 88 mL    05-25-22 @ 07:01  -  05-25-22 @ 11:34  --------------------------------------------------------  IN: 0 mL / OUT: 1875 mL / NET: -1875 mL                            9.5    19.04 )-----------( 584      ( 25 May 2022 04:41 )             32.9   05-25    133<L>  |  100  |  8   ----------------------------<  129<H>  4.3   |  26  |  0.46<L>    Ca    8.6      25 May 2022 04:41  Phos  3.8     05-25  Mg     2.3     05-25    TPro  6.6  /  Alb  1.9<L>  /  TBili  1.0  /  DBili  x   /  AST  21  /  ALT  14  /  AlkPhos  83  05-25  
Patient was seen and examined at the bedside this morning. No acute events overnight. Tolerating diet. Ostomy functional.    Vital Signs Last 24 Hrs  T(C): 36.6 (28 May 2022 08:17), Max: 37.1 (27 May 2022 21:31)  T(F): 97.8 (28 May 2022 08:17), Max: 98.7 (27 May 2022 21:31)  HR: 66 (28 May 2022 08:17) (66 - 82)  BP: 121/74 (28 May 2022 08:17) (116/59 - 121/74)  BP(mean): --  RR: 18 (28 May 2022 08:17) (16 - 18)  SpO2: 99% (28 May 2022 08:17) (98% - 100%)    Labs:                              9.0    12.11 )-----------( 709      ( 28 May 2022 07:03 )             31.6     CBC Full  -  ( 28 May 2022 07:03 )  WBC Count : 12.11 K/uL  RBC Count : 4.62 M/uL  Hemoglobin : 9.0 g/dL  Hematocrit : 31.6 %  Platelet Count - Automated : 709 K/uL  Mean Cell Volume : 68.4 fl  Mean Cell Hemoglobin : 19.5 pg  Mean Cell Hemoglobin Concentration : 28.5 gm/dL  Auto Neutrophil # : x  Auto Lymphocyte # : x  Auto Monocyte # : x  Auto Eosinophil # : x  Auto Basophil # : x  Auto Neutrophil % : x  Auto Lymphocyte % : x  Auto Monocyte % : x  Auto Eosinophil % : x  Auto Basophil % : x    05-28    135  |  104  |  11  ----------------------------<  103<H>  3.8   |  20<L>  |  0.62    Ca    8.7      28 May 2022 07:03  Phos  3.2     05-28  Mg     2.3     05-28    TPro  7.2  /  Alb  2.4<L>  /  TBili  0.5  /  DBili  x   /  AST  32  /  ALT  48  /  AlkPhos  115  05-28    LIVER FUNCTIONS - ( 28 May 2022 07:03 )  Alb: 2.4 g/dL / Pro: 7.2 gm/dL / ALK PHOS: 115 U/L / ALT: 48 U/L / AST: 32 U/L / GGT: x                 Alert, conscious, oriented  No pallor, jaundice or cyanosis  Not in pain or distress  Chest clear, equal air entry bilaterally  Abdomen soft, no tenderness, incisions clean and dry, ostomy is pink with stool   Extremities: No swelling or tenderness  
Patient was seen and examined at the bedside this morning. No acute events overnight  Tolerating diet and passing gas. Ostomy functional.    O/E:  Vital Signs Last 24 Hrs  T(C): 36.4 (26 May 2022 05:23), Max: 38.3 (25 May 2022 16:53)  T(F): 97.5 (26 May 2022 05:23), Max: 100.9 (25 May 2022 16:53)  HR: 73 (26 May 2022 05:23) (73 - 103)  BP: 119/67 (26 May 2022 05:23) (116/71 - 126/74)  BP(mean): 85 (25 May 2022 18:00) (85 - 93)  RR: 17 (26 May 2022 05:23) (17 - 28)  SpO2: 95% (26 May 2022 05:23) (95% - 99%)  Alert, conscious, oriented  No pallor, jaundice or cyanosis  Not in pain or distress  Chest clear, equal air entry bilaterally  Abdomen soft and lax, no tenderness, incisions clean and dry, ostomy is pink with large amount of liquid stool  Extremities: No swelling or tenderness    I&O's Detail    25 May 2022 07:01  -  26 May 2022 07:00  --------------------------------------------------------  IN:    Fat Emulsion (Fish Oil &amp; Plant Based) 20% Infusion: 96 mL    IV PiggyBack: 166 mL    Lactated Ringers: 1480 mL    PPN (Peripheral Parenteral Nutrition): 946 mL  Total IN: 2688 mL    OUT:    Colostomy (mL): 3725 mL    Voided (mL): 2110 mL  Total OUT: 5835 mL    Total NET: -3147 mL      LABS:                        8.4    12.83 )-----------( 588      ( 26 May 2022 05:49 )             28.7     26 May 2022 05:49    134    |  101    |  9      ----------------------------<  121    4.0     |  27     |  0.55     Ca    8.5        26 May 2022 05:49  Phos  3.9       26 May 2022 05:49  Mg     2.2       26 May 2022 05:49      MEDICATIONS  (STANDING):  acetaminophen   IVPB .. 1000 milliGRAM(s) IV Intermittent once  heparin   Injectable 5000 Unit(s) SubCutaneous every 8 hours  lactated ringers. 1000 milliLiter(s) (75 mL/Hr) IV Continuous <Continuous>  pantoprazole  Injectable 40 milliGRAM(s) IV Push daily  piperacillin/tazobactam IVPB.. 3.375 Gram(s) IV Intermittent every 8 hours  psyllium Powder 1 Packet(s) Oral every 12 hours  vancomycin  IVPB 1000 milliGRAM(s) IV Intermittent every 12 hours    MEDICATIONS  (PRN):  acetaminophen     Tablet .. 975 milliGRAM(s) Oral every 6 hours PRN Mild Pain (1 - 3)  diphenhydrAMINE Injectable 25 milliGRAM(s) IV Push every 4 hours PRN Pruritus  ketorolac   Injectable 15 milliGRAM(s) IV Push every 6 hours PRN Moderate Pain (4 - 6)  morphine  - Injectable 2 milliGRAM(s) IV Push every 4 hours PRN Severe Pain (7 - 10)  naloxone Injectable 0.1 milliGRAM(s) IV Push every 3 minutes PRN For ANY of the following changes in patient status:  A. RR LESS THAN 10 breaths per minute, B. Oxygen saturation LESS THAN 90%, C. Sedation score of 6  ondansetron Injectable 4 milliGRAM(s) IV Push every 6 hours PRN Nausea  prochlorperazine   Injectable 10 milliGRAM(s) IV Push every 6 hours PRN Nausea    < from: CT Abdomen and Pelvis w/ Oral Cont and w/ IV Cont (05.25.22 @ 16:26) >  IMPRESSION: Status post total colectomy and ileostomy. Dilated small   bowel loops in the left abdomen with associated wall thickening. Both may   be postoperative, follow-up is recommended. Moderate amount of ascites   new from the prior study  Small amount of free intraperitoneal air likely with postoperative state  Minimal left pleural effusion. New multifocal right lower lobe infiltrate  Multiple liver metastases some of which are calcified again appreciated    < end of copied text >      
Pt was seen and examined at bedside this morning with colorectal surgery team. No acute overnight events reported by nursing staff. pt offers no new complaints at this time. Denies fever/cp/sob/n/v/d/c. VSS    PHYSICAL EXAM:  Constitutional: NAD, GCS: 15/15  AOX3  Eyes:  WNL  ENMT:  WNL  Neck:  WNL, non tender  Back: Non tender  Respiratory: CTABL  Cardiovascular:  S1+S2+0  Gastrointestinal: Soft, nt/nd, no rgr  Ostomy: pink producing stool   Genitourinary:  WNL  Extremities: NV intact  Vascular:  Intact  Neurological: No focal neurological deficit,  CN, motor and sensory system grossly intact.  Skin: WNL  Musculoskeletal: WNL  Psychiatric: Grossly WNL                          8.2    8.35  )-----------( 391      ( 22 May 2022 05:08 )             28.2     05-23    133<L>  |  100  |  11  ----------------------------<  101<H>  4.1   |  28  |  0.40<L>    Ca    8.3<L>      23 May 2022 05:17  Phos  3.8     05-23  Mg     2.1     05-23    TPro  5.7<L>  /  Alb  1.7<L>  /  TBili  1.3<H>  /  DBili  x   /  AST  22  /  ALT  11<L>  /  AlkPhos  58  05-23  
Pt was seen and examined at bedside this morning with colorectal surgery team. No acute overnight events reported by nursing staff. pt offers no new complaints at this time. NGT removed, tolerated CLD. Ileostomy functioning. Denies fever/cp/sob/n/v/d/c. VSS    Vital Signs Last 24 Hrs  T(C): 36.8 (24 May 2022 09:35), Max: 37.2 (23 May 2022 21:20)  T(F): 98.2 (24 May 2022 09:35), Max: 98.9 (23 May 2022 21:20)  HR: 84 (24 May 2022 08:00) (77 - 100)  BP: 125/87 (24 May 2022 08:00) (109/93 - 126/86)  BP(mean): 98 (24 May 2022 08:00) (82 - 105)  RR: 14 (24 May 2022 08:00) (7 - 21)  SpO2: 98% (24 May 2022 08:00) (93% - 99%)    Labs:                              9.2    13.47 )-----------( 457      ( 24 May 2022 05:51 )             31.6     CBC Full  -  ( 24 May 2022 05:51 )  WBC Count : 13.47 K/uL  RBC Count : 4.72 M/uL  Hemoglobin : 9.2 g/dL  Hematocrit : 31.6 %  Platelet Count - Automated : 457 K/uL  Mean Cell Volume : 66.9 fl  Mean Cell Hemoglobin : 19.5 pg  Mean Cell Hemoglobin Concentration : 29.1 gm/dL  Auto Neutrophil # : x  Auto Lymphocyte # : x  Auto Monocyte # : x  Auto Eosinophil # : x  Auto Basophil # : x  Auto Neutrophil % : x  Auto Lymphocyte % : x  Auto Monocyte % : x  Auto Eosinophil % : x  Auto Basophil % : x    05-24    133<L>  |  99  |  8   ----------------------------<  117<H>  4.4   |  26  |  0.41<L>    Ca    8.5      24 May 2022 05:51  Phos  3.6     05-24  Mg     2.2     05-24    TPro  6.2  /  Alb  1.8<L>  /  TBili  1.3<H>  /  DBili  x   /  AST  24  /  ALT  13  /  AlkPhos  65  05-24    LIVER FUNCTIONS - ( 24 May 2022 05:51 )  Alb: 1.8 g/dL / Pro: 6.2 gm/dL / ALK PHOS: 65 U/L / ALT: 13 U/L / AST: 24 U/L / GGT: x                     PHYSICAL EXAM:  Constitutional: NAD, GCS: 15/15  AOX3  Eyes:  WNL  ENMT:  WNL  Neck:  WNL, non tender  Back: Non tender  Respiratory: CTABL  Cardiovascular:  S1+S2+0  Gastrointestinal: Soft, nt/nd, no rgr  Ostomy: pink producing stool   Genitourinary:  WNL  Extremities: NV intact  Vascular:  Intact  Neurological: No focal neurological deficit,  CN, motor and sensory system grossly intact.  Skin: WNL  Musculoskeletal: WNL  Psychiatric: Grossly WNL

## 2022-05-28 NOTE — PROGRESS NOTE ADULT - NUTRITIONAL ASSESSMENT
This patient has been assessed with a concern for Malnutrition and has been determined to have a diagnosis/diagnoses of Severe protein-calorie malnutrition.    This patient is being managed with:   Diet Low Fiber-  Entered: May 25 2022  8:57AM    
This patient has been assessed with a concern for Malnutrition and has been determined to have a diagnosis/diagnoses of Severe protein-calorie malnutrition.    This patient is being managed with:   Diet Low Fiber-  Entered: May 25 2022  8:57AM

## 2022-05-28 NOTE — PROGRESS NOTE ADULT - ASSESSMENT
51 year old male with no pmed hx who presents to  ED with complaint of significant weight loss and abd pain x4 days, found to have CT showing mural thickening in cecum/prox ascending colon and lesions in liver suspicious for metastatic disease found to have perforated cecum with surgical procedure showing Purulent fluid encountered upon entry to abdomen, Large perforated colon mass in right lower abdomen involving cecum, ascending colon and terminal ileum now s/p Subtotal colectomy performed with end ileostomy and rectal stump w/ Brooked end ileostomy created.     # metastatic colon ca   - CT a/p - Apparent focal mural thickening at the cecum/proximal ascending colon may represent colon cancer. The sigmoid colon appears to be tethered to the cecum and it is focally thick-walled; focal tumor invasion/extension from the cecum to the sigmoid colon cannot be excluded. Dilatation of the small bowel with an apparent transition point in the right lower quadrant   abdomen, suggestive of small bowel obstruction. Apparent mild mural thickening of the a dilated right lower quadrant small bowel loop with adjacent mesenteric edema for which associated small bowel ischemia cannot be excluded.  - Multiple hypodense lesions with calcifications in both lobes of the liver with the largest measuring 5.8 x 6.0 cm in hepatic segment 8. These are suspicious for metastases.  - cea 731  - pt had never had colonoscopy in the past   - s/p subtotal colectomy with end ileostomy 5/20- showing Purulent fluid encountered upon entry to abdomen, Large perforated colon mass in right lower abdomen involving cecum, ascending colon and terminal ileum now s/p Subtotal colectomy performed with end ileostomy and rectal stump w/ Brooked end ileostomy created  - will need dedicated CT chest with contrast  - will need to send NGS panel- Kras, Nras, determination of tumor gene status including HEr2, MSI/MMR, BRAF, NTRK to determine options of treatment  - pt will need to follow up outpatient in clinic for intensive therapy due to age and PS when recovers from surgery   - will need outpatient PET scan   - reviewed imaging myself, pt with multiple liver lesions large in size - unresectable and will need systemic therapy  - will discuss with patient and family and give information for f/u in clinic- patient does not have insurance, lives with mother and brother, will need [employer] schafer to help with assistance   - We can see pt in [place] at [facility] on discharge    # anemia  - Hb 8.2 today likely from surgery/myelosuppression and inflammation       Dr. Justin Navas  cell: [phone]  Weekends and nights please call [phone] for MD on call  [facility]  Hematology/Oncology 
51 year old male with no pmed hx who presents to  ED with complaint of significant weight loss and abd pain x4 days, found to have CT showing mural thickening in cecum/prox ascending colon and lesions in liver suspicious for metastatic disease found to have perforated cecum with surgical procedure showing Purulent fluid encountered upon entry to abdomen, Large perforated colon mass in right lower abdomen involving cecum, ascending colon and terminal ileum now s/p Subtotal colectomy performed with end ileostomy and rectal stump w/ Brooked end ileostomy created.     # metastatic colon ca   - CT a/p - Apparent focal mural thickening at the cecum/proximal ascending colon may represent colon cancer. The sigmoid colon appears to be tethered to the cecum and it is focally thick-walled; focal tumor invasion/extension from the cecum to the sigmoid colon cannot be excluded. Dilatation of the small bowel with an apparent transition point in the right lower quadrant   abdomen, suggestive of small bowel obstruction. Apparent mild mural thickening of the a dilated right lower quadrant small bowel loop with adjacent mesenteric edema for which associated small bowel ischemia cannot be excluded.  - Multiple hypodense lesions with calcifications in both lobes of the liver with the largest measuring 5.8 x 6.0 cm in hepatic segment 8. These are suspicious for metastases.  - cea 731  - pt had never had colonoscopy in the past   - s/p subtotal colectomy with end ileostomy 5/20- showing Purulent fluid encountered upon entry to abdomen, Large perforated colon mass in right lower abdomen involving cecum, ascending colon and terminal ileum now s/p Subtotal colectomy performed with end ileostomy and rectal stump w/ Brooked end ileostomy created  - will need dedicated CT chest with contrast  - will need to send NGS panel- Kras, Nras, determination of tumor gene status including HEr2, MSI/MMR, BRAF, NTRK to determine options of treatment  - pt will need to follow up outpatient in clinic for intensive therapy due to age and PS when recovers from surgery   - will need outpatient PET scan   - reviewed imaging myself, pt with multiple liver lesions large in size - unresectable and will need systemic therapy  - will discuss with patient and family and give information for f/u in clinic- patient does not have insurance, lives with mother and brother, will need Z[employer] schafer to help with assistance   - We can see pt in [place] at [facility] on discharge    Reactive thrombocytosis  - no s/s of infection; afebrile  - probably sec to iron deficiency   - can monitor as outpt     pt likely d/c home today   f/u in office for further management of metastatic colon Ca / MSI Stable   f/u with Dr Eloise Pineda MD  [facility]  [phone]   
A 51 year old male with perforated cecal tumor  Day 1 s/p subtotal colectomy with end ileostomy    Plan:  Keep NPO  IV fluids  IV antibiotics  Keep NG tube in place  Observe for ostomy function  Ambulation  Incentive spirometry  Analgesia  Keep Garcia catheter in place  Keep rectal tube in place until tomorrow  Hospitalist input appreciated    Plan discussed with Colorectal surgery group    
A 51 year old male with perforated cecal tumor, s/p subtotal colectomy. High output from ileostomy    Plan:  UA[-]  cefepime and zosyn for abx regimen  tolerating LRD  will replete high ostomy output  add bulking agents, lomotil today  pain control PRN  Ambulation  f/u heme onc    Plan discussed with Colorectal surgery group
A 51 year old male with perforated cecal tumor, s/p subtotal colectomy. High output from ileostomy, now decreased with metamucil and lomotil.    Plan:  UA[-]  cefepime and zosyn for abx regimen  tolerating LRD  will replete ostomy output  bulking agents, lomotil and metamucil  pain control PRN  Ambulation  f/u heme onc  appreciate ID recs  possible D/C today    Plan discussed with Colorectal surgery group
A 51 year old male with perforated colonic mass  S/P ex lap with subtotal colectomy and end ileostomy  Ostomy functioning    Plan:  CLD, ADAT  Continue IV antibiotics  pain control prn  anti emetic prn  Continue PPN  Stoma care/education  Encourage oob/ambulation  Encourage IS use  DVT ppx  Downgrade to floor    Plan discussed with colorectal surgery
A 51 year old male with perforated colonic mass  S/P ex lap with subtotal colectomy and end ileostomy  Ostomy functioning    Plan:  Keep NPO  Clamp NGT and check residual  Continue IV antibiotics  pain control prn  anti emetic prn  Continue PPN  Stoma care/education  Encourage oob/ambulation  Encourage IS use  DVT ppx  Downgrade     Plan discussed with Dr. Mckeon, colorectal surgery attending  [facility] PGY5
A 51 year old male with perforated cecal tumor, s/p subtotal colectomy  High output from ileostomy  Increasing WBC, ?dilutional    Plan:  UA and culture  Advance to low residue diet  Analgesia  Ambulation  Replace ostomy output  Stool bulking agents  Downgrade to floor    Plan discussed with Colorectal surgery group
A 51 year old male with perforated colonic mass  S/P ex lap with subtotal colectomy and end ileostomy  Ostomy functioning but still has high NG output    Plan:  Keep NPO  Keep NG for now  Ambulation  Incentive spirometry  Continue IV antibiotics  Continue PPN  Analgesia- PCA  Incentive spirometry    Plan discussed with Colorectal surgery group
Pt is a 51 year old male with no pmed hx admitted on 5/19 for evaluation of worsening abdominal pain that has been present for a few years but then became acute, found to have perforated colonic mass, patient underwent colorectal surgery with creation of ileostomy on 5/19, found to have peritonitis; post op had fever and imaging has found new pneumonia, the patient is coughing up yellow mucous. Overall is feeling better post op and just started to eat some solid food.    1. Patient admitted with perforated colonic mass, s/p surgery; post op pneumonia  - follow up cultures   - serial cbc and monitor temperature   - iv hydration and supportive care   - diet per surgery  - oxygen and nebs as needed   - wound care per surgery  - completed a course of zosyn for peritonitis  - day #2 cefepime and vancomycin  - will change to ceftin 500 mg po q 12 hours for 5 more days for rx of pneumonia  - patient will follow up for chemotherapy on discharge  - is tolerating diet  - ambulate as tolerated  - reviewed prior medical records to evaluate for resistant or atypical pathogens   2. other issues: per medicine
A 51 year old male with perforated cecal tumor  51M with perforated colon from colon cancer s/p subtotal colectomy with end ileostomy    Plan:  Keep NPO  IV fluids  IV antibiotics  Keep NG tube in place  Observe for ostomy function  Ambulation  Incentive spirometry  Analgesia  urine output monitor  remove rectal tube  Hospitalist input appreciated    Plan discussed with Colorectal surgery group    
A 51 year old male with perforated cecal tumor, s/p subtotal colectomy. High output from ileostomy    Plan:  UA[-]  Added vanc to zosyn abx regimen  f/u ID consult  tolerating LRD  will replete high ostomy output  add bulking agents  pain control PRN  Ambulation    Plan discussed with Colorectal surgery group

## 2022-05-28 NOTE — PROGRESS NOTE ADULT - ATTENDING COMMENTS
Patient seen and examined at bedside this morning. Patient is doing well post-operatively. Patient is NPO, NGT in place 550cc/24hrs. Abdomen is soft, appropriately tender nondistended. Rectal tube discontinued, Garcia discontinued, follow up voids.    I&O's Detail    20 May 2022 07:01  -  21 May 2022 07:00  --------------------------------------------------------  IN:    IV PiggyBack: 346 mL    Lactated Ringers: 2120 mL    PPN (Peripheral Parenteral Nutrition): 528 mL  Total IN: 2994 mL    OUT:    Colostomy (mL): 35 mL    Indwelling Catheter - Urethral (mL): 1475 mL    Nasogastric/Oral tube (mL): 550 mL    Rectal Tube (mL): 0 mL  Total OUT: 2060 mL    Total NET: 934 mL      21 May 2022 07:01  -  21 May 2022 15:47  --------------------------------------------------------  IN:  Total IN: 0 mL    OUT:    Colostomy (mL): 100 mL    Indwelling Catheter - Urethral (mL): 600 mL  Total OUT: 700 mL    Total NET: -700 mL    Vital Signs Last 24 Hrs  T(C): 36.6 (21 May 2022 14:24), Max: 37.1 (21 May 2022 06:15)  T(F): 97.9 (21 May 2022 14:24), Max: 98.7 (21 May 2022 06:15)  HR: 84 (21 May 2022 14:00) (80 - 97)  BP: 118/77 (21 May 2022 14:00) (106/94 - 128/84)  BP(mean): 90 (21 May 2022 14:00) (84 - 100)  RR: 14 (21 May 2022 14:00) (8 - 22)  SpO2: 95% (21 May 2022 14:00) (92% - 96%)                          7.5    9.00  )-----------( 441      ( 21 May 2022 10:06 )             26.8       05-21    138  |  104  |  17  ----------------------------<  128<H>  4.1   |  30  |  0.44<L>    Ca    8.1<L>      21 May 2022 05:38  Phos  2.4     05-21  Mg     2.3     05-21    TPro  5.5<L>  /  Alb  1.7<L>  /  TBili  1.2  /  DBili  x   /  AST  25  /  ALT  10<L>  /  AlkPhos  47  05-21    Continue to ensure adequate pain control  Monitor hemodynamic status  NPO with NGT  Follow up voids  IS use/10hr  Continue antibiotics for 5 days total  Daily physical therapy, OOB frequently  Continue supportive care during recovery
Patient seen and examined at bedside. Reports mild nausea with clear liquids, Abdomen is softly distended, appropriately tender, ostomy is pink, patent and functional. WBC increased over the past day, plan to re-image for source of leukocytosis    Vital Signs Last 24 Hrs  T(C): 38.3 (25 May 2022 16:53), Max: 38.3 (25 May 2022 16:53)  T(F): 100.9 (25 May 2022 16:53), Max: 100.9 (25 May 2022 16:53)  HR: 85 (25 May 2022 17:00) (73 - 105)  BP: 120/75 (25 May 2022 17:00) (118/71 - 139/82)  BP(mean): 88 (25 May 2022 17:00) (84 - 100)  RR: 23 (25 May 2022 17:00) (13 - 28)  SpO2: 96% (25 May 2022 17:00) (93% - 99%)                        9.5    19.04 )-----------( 584      ( 25 May 2022 04:41 )             32.9     05-25    133<L>  |  100  |  8   ----------------------------<  129<H>  4.3   |  26  |  0.46<L>    Ca    8.6      25 May 2022 04:41  Phos  3.8     05-25  Mg     2.3     05-25    TPro  6.6  /  Alb  1.9<L>  /  TBili  1.0  /  DBili  x   /  AST  21  /  ALT  14  /  AlkPhos  83  05-25    Continue to ensure adequate pain control  CT A/P with IV and PO contrast for further evaluation  Diet pending findings on CT  OOB with frequent ambulation  Ostomy teaching.  DVT ppx  Continue close supportive care
Seen and examined.  No acute issues, recent postop.  AFVSS  NAD  dressing in place, ileostomy healthy and functional, soft, NTND  Labs reviewed  POD 1  Cont NPO, NGT, IVF.  Ostomy teaching.  OOB and ambulating.
Seen and examined.  No complaints.  Maya regular diet.  Ileostomy 575cc.  AFVSS  NAD  incision CDI, soft, NT, mild distention  Labs reviewed  A/P -  D/C home on ceftin with home care for ostomy.
Patient denies N/V/CP/SOB.  Pain controlled, currently on PCA.  NGT in place.  On exam, vitals within normal limits, abdomen soft, mildly distended, nontender.  Ostomy with green-brown output; pink, well-perfused.  NGT with clear light yellow output >800cc though patient acknowledges he has been consuming ice chips and sips of water constantly.    -- NGT clamp trial, possibly DC later today pending residual  -- Encourage ambulation  -- IS 10x per hour  -- Pain control  -- DVT prophylaxis  -- Continue IV fluids for now  -- Likely DC PCA later today if NGT removed  -- Can downgrade to regular floor
Patient seen and examined at bedside this morning. Patient is doing well post-operatively. Patient is NPO, NGT in place 750cc/24hrs. Abdomen is soft, appropriately tender nondistended, stoma functioning    I&O's Detail    21 May 2022 07:01  -  22 May 2022 07:00  --------------------------------------------------------  IN:    Fat Emulsion (Fish Oil &amp; Plant Based) 20% Infusion: 172 mL    IV PiggyBack: 200 mL    Lactated Ringers: 818 mL    PPN (Peripheral Parenteral Nutrition): 1779 mL    PRBCs (Packed Red Blood Cells): 344 mL  Total IN: 3313 mL    OUT:    Colostomy (mL): 225 mL    Indwelling Catheter - Urethral (mL): 600 mL    Nasogastric/Oral tube (mL): 850 mL    Voided (mL): 1200 mL  Total OUT: 2875 mL    Total NET: 438 mL      22 May 2022 07:01  -  22 May 2022 16:47  --------------------------------------------------------  IN:  Total IN: 0 mL    OUT:    Colostomy (mL): 150 mL    Nasogastric/Oral tube (mL): 300 mL    Voided (mL): 750 mL  Total OUT: 1200 mL    Total NET: -1200 mL                            8.2    8.35  )-----------( 391      ( 22 May 2022 05:08 )             28.2       Continue to ensure adequate pain control  Monitor hemodynamic status  NPO with NGT  Clamp trial tomorrow  Stoma with output  Follow up voids  IS use/10hr  Continue antibiotics for 5 days total  Daily physical therapy, OOB frequently  Continue supportive care during recovery
Patient seen and examined at bedside. Tolerating low residue diet, having ostomy function, denies N/V, pain is controlled with IV opioids. Abdomen is soft, mildly distended, appropriately tender, ostomy pink patent and productive.    CT A/P yesterday demonstrates: Dilated small bowel loops in the left abdomen with associated wall thickening. Moderate amount of ascites new from the prior study. Small amount of free intraperitoneal air likely with postoperative state. Minimal left pleural effusion. New multifocal right lower lobe infiltrate. Multiple liver metastases some of which are calcified again appreciated    Vital Signs Last 24 Hrs  T(C): 36.8 (26 May 2022 08:30), Max: 38.3 (25 May 2022 16:53)  T(F): 98.2 (26 May 2022 08:30), Max: 100.9 (25 May 2022 16:53)  HR: 100 (26 May 2022 08:30) (73 - 103)  BP: 115/92 (26 May 2022 08:30) (115/92 - 126/74)  BP(mean): 85 (25 May 2022 18:00) (85 - 93)  RR: 18 (26 May 2022 08:30) (17 - 28)  SpO2: 99% (26 May 2022 08:30) (95% - 99%)                          8.4    12.83 )-----------( 588      ( 26 May 2022 05:49 )             28.7     Continue to ensure adequate pain control  Continue with low residue diet as tolerated  WBC trending down, Vanc started yesterday  ID consulted, cefepime and Vancomycin added for coverage  OOB with frequent ambulation  Ostomy teaching  DVT ppx  CM for discharge planing  Continue close supportive care
Patient seen and examined at bedside. Tolerating low residue diet, having ostomy function, denies N/V, pain is controlled with IV opioids. Abdomen is soft, nondistended, appropriately tender, ostomy pink patent and productive.    I&O's Detail    26 May 2022 07:01  -  27 May 2022 07:00  --------------------------------------------------------  IN:  Total IN: 0 mL    OUT:    Colostomy (mL): 1675 mL    Voided (mL): 1025 mL  Total OUT: 2700 mL    Total NET: -2700 mL      27 May 2022 07:01  -  27 May 2022 11:58  --------------------------------------------------------  IN:  Total IN: 0 mL    OUT:    Colostomy (mL): 475 mL    Voided (mL): 250 mL  Total OUT: 725 mL    Total NET: -725 mL                            8.9    13.23 )-----------( 690      ( 27 May 2022 05:22 )             30.4       Continue to ensure adequate pain control  Continue with low residue diet as tolerated  ID consulted, was receiving cefepime and Vancomycin added for coverage, switched to Ceftin  ID input appreciated  OOB with frequent ambulation  Ostomy output normalizing, continue to augment with daily fiber, add Lomotil  Ostomy teaching  DVT ppx  CM for discharge planing  Tentative discharge in 1-2 days pending ostomy output  Continue close supportive care
Seen and examined.  No complaints.  Maya clear liquid diet.  Ileostomy with some output.  AFVSS  NAD  incision CDI, stoma healthy, soft, NT, moderate distention  Labs reviewed  A/P -   D/C PCA. Toradol and tylenol for pain control.  Keep on clear liquids for now.  Cont IVF.  OOB and ambulating.  Ostomy teaching.  Home care for ostomy.  Transfer to floor.

## 2022-05-28 NOTE — PROGRESS NOTE ADULT - PROVIDER SPECIALTY LIST ADULT
Colorectal Surgery
Heme/Onc
Heme/Onc
Colorectal Surgery
Infectious Disease
Colorectal Surgery

## 2022-06-03 DIAGNOSIS — C77.9 SECONDARY AND UNSPECIFIED MALIGNANT NEOPLASM OF LYMPH NODE, UNSPECIFIED: ICD-10-CM

## 2022-06-03 DIAGNOSIS — K63.1 PERFORATION OF INTESTINE (NONTRAUMATIC): ICD-10-CM

## 2022-06-03 DIAGNOSIS — K65.9 PERITONITIS, UNSPECIFIED: ICD-10-CM

## 2022-06-03 DIAGNOSIS — E43 UNSPECIFIED SEVERE PROTEIN-CALORIE MALNUTRITION: ICD-10-CM

## 2022-06-03 DIAGNOSIS — C78.7 SECONDARY MALIGNANT NEOPLASM OF LIVER AND INTRAHEPATIC BILE DUCT: ICD-10-CM

## 2022-06-03 DIAGNOSIS — J18.9 PNEUMONIA, UNSPECIFIED ORGANISM: ICD-10-CM

## 2022-06-03 DIAGNOSIS — C18.0 MALIGNANT NEOPLASM OF CECUM: ICD-10-CM

## 2022-06-03 DIAGNOSIS — I96 GANGRENE, NOT ELSEWHERE CLASSIFIED: ICD-10-CM

## 2022-06-06 ENCOUNTER — APPOINTMENT (OUTPATIENT)
Dept: COLORECTAL SURGERY | Facility: CLINIC | Age: 52
End: 2022-06-06
Payer: MEDICAID

## 2022-06-06 VITALS
DIASTOLIC BLOOD PRESSURE: 84 MMHG | RESPIRATION RATE: 15 BRPM | TEMPERATURE: 97 F | HEART RATE: 120 BPM | HEIGHT: 74 IN | OXYGEN SATURATION: 97 % | BODY MASS INDEX: 21.17 KG/M2 | SYSTOLIC BLOOD PRESSURE: 129 MMHG | WEIGHT: 165 LBS

## 2022-06-06 DIAGNOSIS — Z90.49 ACQUIRED ABSENCE OF OTHER SPECIFIED PARTS OF DIGESTIVE TRACT: ICD-10-CM

## 2022-06-06 PROCEDURE — 99024 POSTOP FOLLOW-UP VISIT: CPT

## 2022-06-06 NOTE — DISCHARGE NOTE NURSING/CASE MANAGEMENT/SOCIAL WORK - NSDCPEFALRISK_GEN_ALL_CORE
[FreeTextEntry1] : I, Abner Strickland, acted solely as a scribe for Dr. Parth Michele on this date 06/06/2022. 
For information on Fall & Injury Prevention, visit: [email or web address] OR  [email or web address] OR  https://www.cdc.gov/steadi/patient.html

## 2022-06-06 NOTE — HISTORY OF PRESENT ILLNESS
[FreeTextEntry1] : Mr. Palladino is a 51yoM who presented to [facility] on 5/19/2022 with findings concerning for perforated R colon cancer invasive into the sigmoid colon/retroperitoneum and metastatic to the liver.  Tumor is microsatellite stable.  He underwent exploratory laparotomy, total colectomy, end ileostomy, biopsy of liver, and biopsy of retroperitoneum.  Since discharge his appetite has improved, his ostomy has become less edematous and is productive of applesauce-consistency stool.  He takes Metamucil once daily with good effect.  No fever, chills, nausea, vomiting.  Still having intermittent pain at incision but this is improving.\par \par He is aware of pathology results showing the above and is planning to see Oncology.

## 2022-06-06 NOTE — ASSESSMENT
[FreeTextEntry1] : -- Patient needs to follow up with Oncology for chemotherapy.  Was seen in hospital by Dr. Navas's group - referral order placed and patient given a new copy of practice information.\par -- Staples removed, wound is clean.\par -- OK to begin liberalizing diet\par -- Ostomy care ongoing, continue Metamucil.\par -- Advised to remain on light activity until the end of this month, after which he can start to slowly resume his usual activities.  He specifically asked whether he could golf.\par -- Will need to monitor his progress on chemotherapy\par -- Follow up in ~8 weeks for re-evaluation, or sooner if clinically indicated.

## 2022-06-06 NOTE — PHYSICAL EXAM
[JVD] : no jugular venous distention  [Respiratory Effort] : normal respiratory effort [Murmur] : murmur was appreciated [Alert] : alert [Oriented to Person] : oriented to person [Oriented to Place] : oriented to place [Oriented to Time] : oriented to time [Calm] : calm [de-identified] : soft, nontender, nondistended.  Well-healed midline incision.  Ostomy in place to RLQ - stoma pink, perfused with good bud.  Edema has resolved.  Stoma productive of applesauce-consistency light brown output. [de-identified] : No acute distress [de-identified] : Normocephalic, atraumatic [de-identified] : moves all extremities without difficulty [de-identified] : Midline incision - staples removed.  Ostomy appliance, underlying skin clean and without erythema, appliance replaced.

## 2022-06-08 ENCOUNTER — OUTPATIENT (OUTPATIENT)
Dept: OUTPATIENT SERVICES | Facility: HOSPITAL | Age: 52
LOS: 1 days | Discharge: HOME | End: 2022-06-08

## 2022-06-08 ENCOUNTER — NON-APPOINTMENT (OUTPATIENT)
Age: 52
End: 2022-06-08

## 2022-06-08 DIAGNOSIS — C18.9 MALIGNANT NEOPLASM OF COLON, UNSPECIFIED: ICD-10-CM

## 2022-06-09 ENCOUNTER — RESULT REVIEW (OUTPATIENT)
Age: 52
End: 2022-06-09

## 2022-06-09 ENCOUNTER — APPOINTMENT (OUTPATIENT)
Dept: HEMATOLOGY ONCOLOGY | Facility: CLINIC | Age: 52
End: 2022-06-09
Payer: MEDICAID

## 2022-06-09 ENCOUNTER — NON-APPOINTMENT (OUTPATIENT)
Age: 52
End: 2022-06-09

## 2022-06-09 VITALS
TEMPERATURE: 98.2 F | SYSTOLIC BLOOD PRESSURE: 120 MMHG | BODY MASS INDEX: 21.05 KG/M2 | DIASTOLIC BLOOD PRESSURE: 81 MMHG | RESPIRATION RATE: 18 BRPM | HEIGHT: 73.23 IN | HEART RATE: 117 BPM | OXYGEN SATURATION: 97 % | WEIGHT: 160.56 LBS

## 2022-06-09 LAB
ALBUMIN SERPL ELPH-MCNC: 4.1 G/DL — SIGNIFICANT CHANGE UP (ref 3.3–5)
ALP SERPL-CCNC: 147 U/L — HIGH (ref 40–120)
ALT FLD-CCNC: 16 U/L — SIGNIFICANT CHANGE UP (ref 10–45)
ANION GAP SERPL CALC-SCNC: 14 MMOL/L — SIGNIFICANT CHANGE UP (ref 5–17)
AST SERPL-CCNC: 24 U/L — SIGNIFICANT CHANGE UP (ref 10–40)
BASOPHILS # BLD AUTO: 0.06 K/UL — SIGNIFICANT CHANGE UP (ref 0–0.2)
BASOPHILS NFR BLD AUTO: 0.6 % — SIGNIFICANT CHANGE UP (ref 0–2)
BILIRUB SERPL-MCNC: 0.4 MG/DL — SIGNIFICANT CHANGE UP (ref 0.2–1.2)
BUN SERPL-MCNC: 11 MG/DL — SIGNIFICANT CHANGE UP (ref 7–23)
CALCIUM SERPL-MCNC: 9.7 MG/DL — SIGNIFICANT CHANGE UP (ref 8.4–10.5)
CEA SERPL-MCNC: 709 NG/ML — HIGH (ref 0–3.8)
CHLORIDE SERPL-SCNC: 98 MMOL/L — SIGNIFICANT CHANGE UP (ref 96–108)
CO2 SERPL-SCNC: 23 MMOL/L — SIGNIFICANT CHANGE UP (ref 22–31)
CREAT SERPL-MCNC: 0.62 MG/DL — SIGNIFICANT CHANGE UP (ref 0.5–1.3)
EGFR: 117 ML/MIN/1.73M2 — SIGNIFICANT CHANGE UP
EOSINOPHIL # BLD AUTO: 0.67 K/UL — HIGH (ref 0–0.5)
EOSINOPHIL NFR BLD AUTO: 6.4 % — HIGH (ref 0–6)
GLUCOSE SERPL-MCNC: 111 MG/DL — HIGH (ref 70–99)
HAV IGM SER-ACNC: SIGNIFICANT CHANGE UP
HBV CORE IGM SER-ACNC: SIGNIFICANT CHANGE UP
HBV SURFACE AG SER-ACNC: SIGNIFICANT CHANGE UP
HCT VFR BLD CALC: 32.9 % — LOW (ref 39–50)
HCV AB S/CO SERPL IA: 0.22 S/CO — SIGNIFICANT CHANGE UP (ref 0–0.99)
HCV AB SERPL-IMP: SIGNIFICANT CHANGE UP
HGB BLD-MCNC: 9.6 G/DL — LOW (ref 13–17)
IMM GRANULOCYTES NFR BLD AUTO: 0.3 % — SIGNIFICANT CHANGE UP (ref 0–1.5)
INR BLD: 1.16 RATIO — SIGNIFICANT CHANGE UP (ref 0.88–1.16)
LYMPHOCYTES # BLD AUTO: 1.45 K/UL — SIGNIFICANT CHANGE UP (ref 1–3.3)
LYMPHOCYTES # BLD AUTO: 13.8 % — SIGNIFICANT CHANGE UP (ref 13–44)
MAGNESIUM SERPL-MCNC: 1.9 MG/DL — SIGNIFICANT CHANGE UP (ref 1.6–2.6)
MCHC RBC-ENTMCNC: 19.7 PG — LOW (ref 27–34)
MCHC RBC-ENTMCNC: 29.2 GM/DL — LOW (ref 32–36)
MCV RBC AUTO: 67.6 FL — LOW (ref 80–100)
MONOCYTES # BLD AUTO: 1.05 K/UL — HIGH (ref 0–0.9)
MONOCYTES NFR BLD AUTO: 10 % — SIGNIFICANT CHANGE UP (ref 2–14)
NEUTROPHILS # BLD AUTO: 7.21 K/UL — SIGNIFICANT CHANGE UP (ref 1.8–7.4)
NEUTROPHILS NFR BLD AUTO: 68.9 % — SIGNIFICANT CHANGE UP (ref 43–77)
NRBC # BLD: 0 /100 WBCS — SIGNIFICANT CHANGE UP (ref 0–0)
PLATELET # BLD AUTO: 326 K/UL — SIGNIFICANT CHANGE UP (ref 150–400)
POTASSIUM SERPL-MCNC: 4.3 MMOL/L — SIGNIFICANT CHANGE UP (ref 3.5–5.3)
POTASSIUM SERPL-SCNC: 4.3 MMOL/L — SIGNIFICANT CHANGE UP (ref 3.5–5.3)
PROT SERPL-MCNC: 8 G/DL — SIGNIFICANT CHANGE UP (ref 6–8.3)
PROTHROM AB SERPL-ACNC: 13.5 SEC — HIGH (ref 10.5–13.4)
RBC # BLD: 4.87 M/UL — SIGNIFICANT CHANGE UP (ref 4.2–5.8)
RBC # FLD: 27.1 % — HIGH (ref 10.3–14.5)
SODIUM SERPL-SCNC: 135 MMOL/L — SIGNIFICANT CHANGE UP (ref 135–145)
WBC # BLD: 10.47 K/UL — SIGNIFICANT CHANGE UP (ref 3.8–10.5)
WBC # FLD AUTO: 10.47 K/UL — SIGNIFICANT CHANGE UP (ref 3.8–10.5)

## 2022-06-09 PROCEDURE — 99205 OFFICE O/P NEW HI 60 MIN: CPT

## 2022-06-09 RX ORDER — CEFUROXIME AXETIL 500 MG/1
500 TABLET ORAL
Qty: 10 | Refills: 0 | Status: DISCONTINUED | COMMUNITY
Start: 2022-05-30 | End: 2022-06-09

## 2022-06-09 RX ORDER — DIPHENOXYLATE HYDROCHLORIDE AND ATROPINE SULFATE 2.5; .025 MG/1; MG/1
2.5-0.025 TABLET ORAL
Qty: 60 | Refills: 0 | Status: DISCONTINUED | COMMUNITY
Start: 2022-05-27 | End: 2022-06-09

## 2022-06-09 RX ORDER — OXYCODONE AND ACETAMINOPHEN 5; 325 MG/1; MG/1
5-325 TABLET ORAL
Qty: 20 | Refills: 0 | Status: DISCONTINUED | COMMUNITY
Start: 2022-05-30 | End: 2022-06-09

## 2022-06-09 NOTE — RESULTS/DATA
[FreeTextEntry1] : 5/25/22 CT A/P:  Status post total colectomy and ileostomy. Dilated small bowel loops in the left abdomen with associated wall thickening. Both may be postoperative, follow-up is recommended. Moderate amount of ascites new from the prior study. Small amount of free intraperitoneal air likely with postoperative state. Minimal left pleural effusion. New multifocal right lower lobe infiltrate. Multiple liver metastases some of which are calcified again appreciated.\par \par 5/19/22 Path: 1. Omentum, excision: Negative for malignancy. Acutely inflamed exudate consistent with peritonitis\par 2. Portion of terminal ileum, cecum with adherent sigmoid, total colectomy: Poorly differentiated infiltrating adenocarcinoma measuring 9.0 cm. Adenocarcinoma infiltrates through the bowel wall and through the visceral peritoneum and infiltrates the adherent sigmoid colon 3 of 17 lymph nodes are positive for metastatic carcinoma. Lymphovascular space invasion is identified. Perineural invasion is identified. The surgical margins are negative. Appendix with serosal tumor implants and acute inflammation\par Peritonitis\par 3. Liver, biopsy: Metastatic adenocarcinoma\par 4. Retroperitoneal biopsy: Adenocarcinoma with necrosis\par No loss of nuclear expression of MMR proteins (MLH1, MSH2, MSH6, and PMS2).   These results show low probability of microsatellite instability-high (MSI-H).\par Synoptic Summary\par COLON AND RECTUM: Resection, Including Transanal Disk Excision of Rectal Neoplasms\par Procedure: Total abdominal colectomy\par Macroscopic Evaluation of Mesorectum: Not applicable\par Tumor Site: Cecum\par Histologic Type:  Adenocarcinoma\par Histologic Grade: G3, poorly differentiated\par Tumor Size: Greatest dimension (Centimeters)  9.0 cm\par Multiple Primary Sites: Not applicable\par Tumor Extent: Directly invades or adheres to adjacent structure(s) Sigmoid colon\par Macroscopic Tumor Perforation: Present\par Lymphovascular Invasion: Present\par Perineural Invasion: Present\par Treatment Effect: No known presurgical therapy\par Margin Status for Invasive Carcinoma: All margins negative for invasive carcinoma\par Distance from Invasive Carcinoma to Radial (Circumferential)\par Margin: 5.0 cm\par Margin Status for Non-Invasive Tumor: Not applicable\par Regional Lymph Node Status: Tumor present in regional lymph node(s)\par Number of Lymph Nodes with Tumor    3\par Number of Lymph Nodes Examined: 17\par Tumor Deposits: Not identified\par Distant Site(s) Involved: Liver\par PATHOLOGIC STAGE CLASSIFICATION\par TNM Descriptors: Not applicable\par pT Category: pT4b\par pN Category: pN1b\par pM Category: pM1c\par \par 5/19/22 CT A/P: Apparent focal mural thickening at the cecum/proximal ascending colon may represent colon cancer. The sigmoid colon appears to be tethered to the cecum and it is focally thick-walled; focal tumor invasion/extension from the cecum to the sigmoid colon cannot be excluded. Dilatation of the small bowel with an apparent transition point in the right lower quadrant abdomen, suggestive of small bowel obstruction. Apparent mild mural thickening of the a dilated right lower quadrant small bowel loop with adjacent mesenteric edema for which associated small bowel ischemia cannot be excluded. Multiple hypodense lesions with calcifications in both lobes of the liver with the largest measuring 5.8 x 6.0 cm in hepatic segment 8. These are suspicious for metastases. Mild ascites.

## 2022-06-09 NOTE — HISTORY OF PRESENT ILLNESS
[de-identified] : The patient was diagnosed with colon cancer in May 2022 at the age of 51. He presented to  [facility] 5/19/22 with worsening abdominal pain for 4 days. He reports abdominal pain has been ongoing for 4 years. On 5/19/22 he had a CT A/P which showed apparent focal mural thickening at the cecum/proximal ascending colon may represent colon cancer. The sigmoid colon appears to be tethered to the cecum and it is focally thick-walled; focal tumor invasion/extension from the cecum to the sigmoid colon cannot be excluded. Dilatation of the small bowel with an apparent transition point in the right lower quadrant abdomen, suggestive of small bowel obstruction. Apparent mild mural thickening of the a dilated right lower quadrant small bowel loop with adjacent mesenteric edema for which associated small bowel ischemia cannot be excluded. Multiple hypodense lesions with calcifications in both lobes of the liver with the largest measuring 5.8 x 6.0 cm in hepatic segment 8. These are suspicious for metastases. Mild ascites. Also on 5/19/22 he underwent exploratory laparotomy, total colectomy, end ileostomy, biopsy of liver, and biopsy of retroperitoneum. Pathology showed Omentum, excision: Negative for malignancy. Acutely inflamed exudate consistent with peritonitis. Portion of terminal ileum, cecum with adherent sigmoid, total colectomy: Poorly differentiated infiltrating adenocarcinoma measuring 9.0 cm. Adenocarcinoma infiltrates through the bowel wall and through the visceral peritoneum and infiltrates the adherent sigmoid colon 3 of 17 lymph nodes are positive for metastatic carcinoma. Lymphovascular space invasion is identified. Perineural invasion is identified. The surgical margins are negative. Appendix with serosal tumor implants and acute inflammation. Peritonitis. Liver, biopsy: Metastatic adenocarcinoma. Retroperitoneal biopsy: Adenocarcinoma with necrosis. No loss of nuclear expression of MMR proteins (MLH1, MSH2, MSH6, and PMS2). Staging pT4b pN1b pM1c. On 5/25/22 he had a repeat CT A/P which showed status post total colectomy and ileostomy. Dilated small bowel loops in the left abdomen with associated wall thickening. Both may be postoperative, follow-up is recommended. Moderate amount of ascites new from the prior study. Small amount of free intraperitoneal air likely with postoperative state. Minimal left pleural effusion. New multifocal right lower lobe infiltrate. Multiple liver metastases some of which are calcified again appreciated. [de-identified] : Medical Hx: denies \par Surgical Hx: denies \par Family Hx: denies family history of cancer \par Social Hx: he smoked THC and cigarettes daily for 30 years, quit May 19th, 2022; ETOH had 6 pack a day, quit 4 years ago; was a  (off the [employer]) no longer working; lives with mom and brother.   [de-identified] : Today he denies abdominal pain, on Metamucil daily. Trying to eat a high protein diet. Denies diarrhea, constipation, nausea or vomiting.  Intermittent mild pain - 5/10 however this is only at the incision.

## 2022-06-09 NOTE — ASSESSMENT
[FreeTextEntry1] : The biological and clinical distinctions between right-sided and left-sided CRC, and their effect on outcomes, have been recognized for more than 50 years, and recently assimilated into clinical practice and trial design. Presently, tumor subsite influences how we treat patients with metastatic wild-type LUCIAN CRC in the first-line, second-line, and refractory settings.  For metastatic wild-type LUCIAN CRC in the first-line, based on ASCO 2022 ANJANA 5 data, recommendation for unresectable right sided CRC is for a triplet regimen with bevacizumab, which resulted in an increase in RR, PFS and R0/1 resections, albeit with increased toxicity. Data for OS is still immature at the time of the presentation.  I have therefore recommended FOLFOXIRI with bevacizumab.  Bevacizumab is a monoclonal antibody targeting VEGF, vascular endothelial growth factor.  \par -Will send somatic genomic testing to evaluate for alterations of KRAS, APC, BRAF, ERBB2/HER2, and other genes, some of which may be potentially actionable targets for therapy. There are currently tissue-agnostic approvals for any advanced cancer with a high tumor mutational burden or DNA mismatch repair deficiency (checkpoint inhibitor immunotherapy), or NTRK fusions (TRK inhibitors). \par -patient should see genetics for additional germline testing to evaluate for the presence of Chiang syndrome. Germline testing may also reveal potential treatment targets (eg, deficient mismatch repair as seen in Chiang syndrome). \par -For patients whose tumors are dMMR or that have high levels of TMB (=10 mutations/megabase), another option for chemotherapy-refractory disease is treatment with an immune checkpoint inhibitor that targets the programmed death receptor 1 (PD-1; ie, pembrolizumab). \par -Cancers with deficient mismatch repair (dMMR) are particularly sensitive to immune-based therapies. Objective, in some instances complete, and durable responses to immune checkpoint inhibitors targeting the programmed cell death receptor 1 (PD-1; eg, pembrolizumab) have been reported in a variety of patients with dMMR cancers, including small bowel adenocarcinomas. The characteristic genetic signature of tumors with dMMR is a high number of DNA replication errors and high levels of DNA MSI. In the [place], pembrolizumab is approved for treatment of a variety of advanced solid tumors that are MSI-H or dMMR, that have progressed following prior treatment, and for which there are no satisfactory alternative treatment options, the first such approval of a tissue-agnostic anticancer treatment. Efficacy in dMMR cancers was shown in the phase II KEYNOTE-158 study. There were eight objective responses (42 percent), three of which were complete, and the duration of response ranged from 4.3 to 13.3+ months. Tumors with a high tumor mutational burden (TMB) also appear to be sensitive to immune checkpoint inhibitors. Pembrolizumab was approved for the treatment of adult and pediatric patients with unresectable or metastatic solid tumors, that are tissue TMB-high (=10 mut/mB) and who have progressed following prior therapy and who have no satisfactory alternative treatment options.\par \par

## 2022-06-09 NOTE — PHYSICAL EXAM
[Restricted in physically strenuous activity but ambulatory and able to carry out work of a light or sedentary nature] : Status 1- Restricted in physically strenuous activity but ambulatory and able to carry out work of a light or sedentary nature, e.g., light house work, office work [Normal] : affect appropriate [de-identified] : wt loss since hospitalization  [de-identified] : ileostomy in place, dressing C/D/I [de-identified] : well healed vertical abdominal incision

## 2022-06-09 NOTE — REVIEW OF SYSTEMS
[Negative] : Allergic/Immunologic [Recent Change In Weight] : ~T recent weight change [Chest Pain] : no chest pain [Shortness Of Breath] : no shortness of breath [Abdominal Pain] : no abdominal pain [Constipation] : no constipation [Diarrhea] : no diarrhea [Joint Pain] : no joint pain [Skin Rash] : no skin rash [FreeTextEntry2] : wt loss with hospitalization 7 lbs  [FreeTextEntry7] : ileotomy in place

## 2022-06-13 ENCOUNTER — APPOINTMENT (OUTPATIENT)
Dept: PHYSICAL MEDICINE AND REHAB | Facility: CLINIC | Age: 52
End: 2022-06-13
Payer: MEDICAID

## 2022-06-13 ENCOUNTER — NON-APPOINTMENT (OUTPATIENT)
Age: 52
End: 2022-06-13

## 2022-06-13 VITALS
RESPIRATION RATE: 17 BRPM | HEART RATE: 109 BPM | SYSTOLIC BLOOD PRESSURE: 128 MMHG | BODY MASS INDEX: 20.98 KG/M2 | DIASTOLIC BLOOD PRESSURE: 81 MMHG | OXYGEN SATURATION: 100 % | WEIGHT: 160 LBS | TEMPERATURE: 98.7 F

## 2022-06-13 PROCEDURE — 99204 OFFICE O/P NEW MOD 45 MIN: CPT

## 2022-06-13 NOTE — PHYSICAL EXAM
[FreeTextEntry1] : Gen: Patient is A&O x 3, NAD\par HEENT: EOMI, hearing grossly normal\par Resp: regular, non - labored\par CV: pulses regular\par Skin: no rashes, erythema\par Lymph: no clubbing, cyanosis, edema, or palpable lymphadenopathy\par Inspection: no instability or misalignment, ostomy in place, no redness or erythema \par ROM: full throughout\par Palpation:no tenderness to palpation in RUE of abdomen, no rebound tenderness \par Sensation: intact to light touch\par Reflexes: 1+ and symmetric throughout\par Strength: 5/5 throughout\par Special tests: -Straight leg raise \par Gait: normal, non-antalgic, uses quad cane as needed \par \par

## 2022-06-13 NOTE — HISTORY OF PRESENT ILLNESS
[FreeTextEntry1] : Mr. Palladino is a 51 year old male with history of stage IV RS colon adenocarcinoma, met to liver, no MMR deficiency.  On 5/19/22 he underwent exploratory laparotomy, total colectomy, end ileostomy, biopsy of liver, and biopsy of retroperitoneum. Treatment with FOLFOXIRI with bevacizumab. \par \par He reports since his surgery he has been eating well.  He states he has good output from his ostomy bag.  He reports that he is having pain occasionally in his previous surgical site along the ostomy bag.  Denies any redness or erythema in this region.  States the pain comes and goes.  Causes sharp and shooting pain.  Previously was taking oxycodone for this.\par \par   \par \par

## 2022-06-13 NOTE — ASSESSMENT
[FreeTextEntry1] : 51 year old male presenting for evaluation.\par \par #Metastatic colon cancer/Neuropathic pain:\par -CT abdomen pelvis reviewed, degenerative changes noted in bones\par -Hematology oncology notes reviewed, plan to start FOLFOXIRI with bevacizumab. \par -Laboratory values remove, creatinine 0.62 on CMP\par -On evaluation patient's pain appears neuropathic in nature from description likely from post-surgical neuropathic pain.  He is not demonstrating any pain in his right upper quadrant, CT does not demonstrate any malignancy in the bones.\par -Start gabapentin 300 mg 3 times a day for neuropathic pain.\par \par Follow-up in 1 month.\par

## 2022-06-13 NOTE — DATA REVIEWED
[FreeTextEntry1] : 5/25/22 CT A/P: Status post total colectomy and ileostomy. Dilated small bowel loops in the left abdomen with associated wall thickening. Both may be postoperative, follow-up is recommended. Moderate amount of ascites new from the prior study. Small amount of free intraperitoneal air likely with postoperative state. Minimal left pleural effusion. New multifocal right lower lobe infiltrate. Multiple liver metastases some of which are calcified again appreciated.\par Bones: Mild degenerative changes \par \par

## 2022-06-14 ENCOUNTER — NON-APPOINTMENT (OUTPATIENT)
Age: 52
End: 2022-06-14

## 2022-06-16 NOTE — ASU PATIENT PROFILE, ADULT - FALL HARM RISK - RISK INTERVENTIONS
Assistance OOB with selected safe patient handling equipment/Assistance with ambulation/Communicate Fall Risk and Risk Factors to all staff, patient, and family/Monitor gait and stability/Reinforce activity limits and safety measures with patient and family/Sit up slowly, dangle for a short time, stand at bedside before walking/Use of alarms - bed, chair and/or voice tab/Visual Cue: Yellow wristband/Bed in lowest position, wheels locked, appropriate side rails in place/Call bell, personal items and telephone in reach/Instruct patient to call for assistance before getting out of bed or chair/Non-slip footwear when patient is out of bed/[place] to call system/Physically safe environment - no spills, clutter or unnecessary equipment/Purposeful Proactive Rounding/Room/bathroom lighting operational, light cord in reach

## 2022-06-16 NOTE — ASU PATIENT PROFILE, ADULT - SURGICAL SITE INCISION
Called to bedside by  who stated \"something's not right she's acting weird and confused. \" At bedside to assess patient- drowsy but alert and oriented. Patient stated \"i'm just so tired. \" Placed back on BIPAP per patient request while she rests. no

## 2022-06-16 NOTE — ASU PATIENT PROFILE, ADULT - FALL HARM RISK - FALLEN IN PAST
Group Topic: BH Coping Skills Education    Date: September 26  Start Time:  9:00 AM  End Time: 10:00 AM    Focus: Resilience   Number in attendance: 5    A video and presentation were given discussing resilience and the attributes found in those who recover well from setbacks. Pt's were instructed to chose 1-2 characteristics that they could work on to increase their ability to thrive.    Attendance: Present  Patient Response: Appropriate feedback, Attentive, Good eye contact, Interactive and Interested in topic     Pt. Commented that he sees where some of these traits are things he could work on.    Rose Marie Payne, LPC     No

## 2022-06-16 NOTE — ASU PATIENT PROFILE, ADULT - NSICDXPASTSURGICALHX_GEN_ALL_CORE_FT
PAST SURGICAL HISTORY:  History of ileostomy     Status post colon resection      PAST SURGICAL HISTORY:  No significant past surgical history PAST SURGICAL HISTORY:  S/P colon resection

## 2022-06-16 NOTE — ASU PATIENT PROFILE, ADULT - NSICDXPASTMEDICALHX_GEN_ALL_CORE_FT
PAST MEDICAL HISTORY:  Ascites     Colon cancer mets to liver     PAST MEDICAL HISTORY:  No pertinent past medical history PAST MEDICAL HISTORY:  Cancer, colon liver mets    S/P ileostomy

## 2022-06-17 ENCOUNTER — TRANSCRIPTION ENCOUNTER (OUTPATIENT)
Age: 52
End: 2022-06-17

## 2022-06-17 ENCOUNTER — OUTPATIENT (OUTPATIENT)
Dept: INPATIENT UNIT | Facility: HOSPITAL | Age: 52
LOS: 1 days | Discharge: HOME | End: 2022-06-17
Payer: MEDICAID

## 2022-06-17 ENCOUNTER — RESULT REVIEW (OUTPATIENT)
Age: 52
End: 2022-06-17

## 2022-06-17 VITALS
SYSTOLIC BLOOD PRESSURE: 111 MMHG | DIASTOLIC BLOOD PRESSURE: 75 MMHG | TEMPERATURE: 98 F | OXYGEN SATURATION: 100 % | HEART RATE: 94 BPM | RESPIRATION RATE: 16 BRPM

## 2022-06-17 VITALS
HEART RATE: 96 BPM | HEIGHT: 74 IN | TEMPERATURE: 98 F | OXYGEN SATURATION: 100 % | DIASTOLIC BLOOD PRESSURE: 83 MMHG | RESPIRATION RATE: 18 BRPM | WEIGHT: 160.06 LBS | SYSTOLIC BLOOD PRESSURE: 126 MMHG

## 2022-06-17 DIAGNOSIS — C18.2 MALIGNANT NEOPLASM OF ASCENDING COLON: ICD-10-CM

## 2022-06-17 DIAGNOSIS — Z90.49 ACQUIRED ABSENCE OF OTHER SPECIFIED PARTS OF DIGESTIVE TRACT: Chronic | ICD-10-CM

## 2022-06-17 LAB
ANION GAP SERPL CALC-SCNC: 4 MMOL/L — LOW (ref 5–17)
BUN SERPL-MCNC: 13 MG/DL — SIGNIFICANT CHANGE UP (ref 7–23)
CALCIUM SERPL-MCNC: 9.2 MG/DL — SIGNIFICANT CHANGE UP (ref 8.5–10.1)
CHLORIDE SERPL-SCNC: 106 MMOL/L — SIGNIFICANT CHANGE UP (ref 96–108)
CO2 SERPL-SCNC: 26 MMOL/L — SIGNIFICANT CHANGE UP (ref 22–31)
CREAT SERPL-MCNC: 0.59 MG/DL — SIGNIFICANT CHANGE UP (ref 0.5–1.3)
EGFR: 119 ML/MIN/1.73M2 — SIGNIFICANT CHANGE UP
GLUCOSE SERPL-MCNC: 97 MG/DL — SIGNIFICANT CHANGE UP (ref 70–99)
HCT VFR BLD CALC: 34.5 % — LOW (ref 39–50)
HGB BLD-MCNC: 10 G/DL — LOW (ref 13–17)
INR BLD: 1.2 RATIO — HIGH (ref 0.88–1.16)
MCHC RBC-ENTMCNC: 20.3 PG — LOW (ref 27–34)
MCHC RBC-ENTMCNC: 29 GM/DL — LOW (ref 32–36)
MCV RBC AUTO: 70 FL — LOW (ref 80–100)
PLATELET # BLD AUTO: 317 K/UL — SIGNIFICANT CHANGE UP (ref 150–400)
POTASSIUM SERPL-MCNC: 4.1 MMOL/L — SIGNIFICANT CHANGE UP (ref 3.5–5.3)
POTASSIUM SERPL-SCNC: 4.1 MMOL/L — SIGNIFICANT CHANGE UP (ref 3.5–5.3)
PROTHROM AB SERPL-ACNC: 13.9 SEC — HIGH (ref 10.5–13.4)
RBC # BLD: 4.93 M/UL — SIGNIFICANT CHANGE UP (ref 4.2–5.8)
RBC # FLD: 25.9 % — HIGH (ref 10.3–14.5)
SODIUM SERPL-SCNC: 136 MMOL/L — SIGNIFICANT CHANGE UP (ref 135–145)
WBC # BLD: 8.11 K/UL — SIGNIFICANT CHANGE UP (ref 3.8–10.5)
WBC # FLD AUTO: 8.11 K/UL — SIGNIFICANT CHANGE UP (ref 3.8–10.5)

## 2022-06-17 PROCEDURE — 93005 ELECTROCARDIOGRAM TRACING: CPT | Mod: XU

## 2022-06-17 PROCEDURE — 93010 ELECTROCARDIOGRAM REPORT: CPT

## 2022-06-17 PROCEDURE — C1769: CPT

## 2022-06-17 PROCEDURE — C1894: CPT

## 2022-06-17 PROCEDURE — 76937 US GUIDE VASCULAR ACCESS: CPT

## 2022-06-17 PROCEDURE — 36561 INSERT TUNNELED CV CATH: CPT

## 2022-06-17 PROCEDURE — 77001 FLUOROGUIDE FOR VEIN DEVICE: CPT | Mod: 26

## 2022-06-17 PROCEDURE — 77001 FLUOROGUIDE FOR VEIN DEVICE: CPT

## 2022-06-17 PROCEDURE — 85610 PROTHROMBIN TIME: CPT

## 2022-06-17 PROCEDURE — C1788: CPT

## 2022-06-17 PROCEDURE — 36415 COLL VENOUS BLD VENIPUNCTURE: CPT

## 2022-06-17 PROCEDURE — 80048 BASIC METABOLIC PNL TOTAL CA: CPT

## 2022-06-17 PROCEDURE — 85027 COMPLETE CBC AUTOMATED: CPT

## 2022-06-17 PROCEDURE — 76937 US GUIDE VASCULAR ACCESS: CPT | Mod: 26

## 2022-06-17 RX ORDER — ONDANSETRON 8 MG/1
4 TABLET, FILM COATED ORAL ONCE
Refills: 0 | Status: DISCONTINUED | OUTPATIENT
Start: 2022-06-17 | End: 2022-06-17

## 2022-06-17 RX ORDER — SODIUM CHLORIDE 9 MG/ML
1000 INJECTION, SOLUTION INTRAVENOUS
Refills: 0 | Status: DISCONTINUED | OUTPATIENT
Start: 2022-06-17 | End: 2022-06-17

## 2022-06-17 RX ORDER — FENTANYL CITRATE 50 UG/ML
50 INJECTION INTRAVENOUS
Refills: 0 | Status: DISCONTINUED | OUTPATIENT
Start: 2022-06-17 | End: 2022-06-17

## 2022-06-17 RX ORDER — OXYCODONE HYDROCHLORIDE 5 MG/1
5 TABLET ORAL ONCE
Refills: 0 | Status: DISCONTINUED | OUTPATIENT
Start: 2022-06-17 | End: 2022-06-17

## 2022-06-17 NOTE — ASU DISCHARGE PLAN (ADULT/PEDIATRIC) - NS MD DC FALL RISK RISK
For information on Fall & Injury Prevention, visit: [email or web address] OR  [email or web address] OR  https://www.cdc.gov/steadi/patient.html

## 2022-06-17 NOTE — ASU DISCHARGE PLAN (ADULT/PEDIATRIC) - ASU DC SPECIAL INSTRUCTIONSFT
Chest Port Placement    Discharge Instructions  - You have had a chest port implanted in your chest.   - The port is ready for use.  - You may shower in 72 hours. No soaking or swimming for 2 weeks or until the site is completely healed.  - Keep the area covered and dry for the next 7 days. It may be removed by a chemotherapy nurse as needed for treatment.  - Do not perform any heavy lifting or put tension on the area for the next week or until the site is healed.  - The skin glue (Dermabond) on your skin will act as a scab, allow it to fall off on its own over the next 1-3 weeks.  - You may resume your normal diet.  - You may resume your normal medications however you should wait 48 hours before restarting aspirin, plavix, or blood thinners.  - It is normal to experience some pain over the site for the next few days. You may take apply ice to the area (20 minutes on, 20 minutes off) and take Tylenol for that pain. Do not take more frequently than every 6 hours and do not exceed more than 3000mg of Tylenol in a 24 hour period.    - You were given conscious sedation which may make you drowsy, therefore you need someone to stay with you until the morning following the procedure.  - Do not drive, engage in heavy lifting or strenuous activity, or drink any alcoholic beverages for the next 24 hours.   - You may resume normal activity in 24 hours.    Notify your primary physician and/or Interventional Radiology IMMEDIATELY if you experience any of the following       - Fever of 101F or 38C       - Chills or Rigors/ Shakes       - Swelling and/or Redness in the area around the port       - Worsening Pain       - Blood soaked bandages or worsening bleeding       - Lightheadedness and/or dizziness upon standing       - Chest Pain/ Tightness       - Shortness of Breath       - Difficulty walking    If you have a problem that you believe requires IMMEDIATE attention, please go to your NEAREST Emergency Room. If you believe your problem can safely wait until you speak to a physician, please call Interventional Radiology for any concerns.    During Normal Weekday Business Hours- You can contact the Interventional Radiology department during normal business hours via telephone.  During Evenings and Weekends- If you need to contact Interventional Radiology during off hours, do so by calling the hospital and requesting to be connected to the Interventional Radiologist on call.

## 2022-06-20 DIAGNOSIS — Z45.2 ENCOUNTER FOR ADJUSTMENT AND MANAGEMENT OF VASCULAR ACCESS DEVICE: ICD-10-CM

## 2022-06-20 LAB — SURGICAL PATHOLOGY STUDY: SIGNIFICANT CHANGE UP

## 2022-06-22 ENCOUNTER — APPOINTMENT (OUTPATIENT)
Dept: INFUSION THERAPY | Facility: CLINIC | Age: 52
End: 2022-06-22

## 2022-06-22 ENCOUNTER — RESULT REVIEW (OUTPATIENT)
Age: 52
End: 2022-06-22

## 2022-06-22 VITALS
SYSTOLIC BLOOD PRESSURE: 116 MMHG | HEIGHT: 73 IN | HEART RATE: 108 BPM | TEMPERATURE: 98.4 F | OXYGEN SATURATION: 99 % | BODY MASS INDEX: 21.6 KG/M2 | DIASTOLIC BLOOD PRESSURE: 78 MMHG | WEIGHT: 163 LBS | RESPIRATION RATE: 18 BRPM

## 2022-06-22 LAB
ALBUMIN SERPL ELPH-MCNC: 4 G/DL — SIGNIFICANT CHANGE UP (ref 3.3–5)
ALP SERPL-CCNC: 153 U/L — HIGH (ref 40–120)
ALT FLD-CCNC: 17 U/L — SIGNIFICANT CHANGE UP (ref 10–45)
ANION GAP SERPL CALC-SCNC: 12 MMOL/L — SIGNIFICANT CHANGE UP (ref 5–17)
AST SERPL-CCNC: 27 U/L — SIGNIFICANT CHANGE UP (ref 10–40)
BASOPHILS # BLD AUTO: 0.03 K/UL — SIGNIFICANT CHANGE UP (ref 0–0.2)
BASOPHILS NFR BLD AUTO: 0.3 % — SIGNIFICANT CHANGE UP (ref 0–2)
BILIRUB SERPL-MCNC: 0.3 MG/DL — SIGNIFICANT CHANGE UP (ref 0.2–1.2)
BUN SERPL-MCNC: 12 MG/DL — SIGNIFICANT CHANGE UP (ref 7–23)
CALCIUM SERPL-MCNC: 9.3 MG/DL — SIGNIFICANT CHANGE UP (ref 8.4–10.5)
CHLORIDE SERPL-SCNC: 99 MMOL/L — SIGNIFICANT CHANGE UP (ref 96–108)
CO2 SERPL-SCNC: 23 MMOL/L — SIGNIFICANT CHANGE UP (ref 22–31)
CREAT SERPL-MCNC: 0.56 MG/DL — SIGNIFICANT CHANGE UP (ref 0.5–1.3)
EGFR: 121 ML/MIN/1.73M2 — SIGNIFICANT CHANGE UP
EOSINOPHIL # BLD AUTO: 0.67 K/UL — HIGH (ref 0–0.5)
EOSINOPHIL NFR BLD AUTO: 6.5 % — HIGH (ref 0–6)
GLUCOSE SERPL-MCNC: 107 MG/DL — HIGH (ref 70–99)
HCT VFR BLD CALC: 32.2 % — LOW (ref 39–50)
HGB BLD-MCNC: 9.6 G/DL — LOW (ref 13–17)
IMM GRANULOCYTES NFR BLD AUTO: 0.4 % — SIGNIFICANT CHANGE UP (ref 0–1.5)
LYMPHOCYTES # BLD AUTO: 1.27 K/UL — SIGNIFICANT CHANGE UP (ref 1–3.3)
LYMPHOCYTES # BLD AUTO: 12.3 % — LOW (ref 13–44)
MAGNESIUM SERPL-MCNC: 1.9 MG/DL — SIGNIFICANT CHANGE UP (ref 1.6–2.6)
MCHC RBC-ENTMCNC: 20.4 PG — LOW (ref 27–34)
MCHC RBC-ENTMCNC: 29.8 GM/DL — LOW (ref 32–36)
MCV RBC AUTO: 68.4 FL — LOW (ref 80–100)
MONOCYTES # BLD AUTO: 0.89 K/UL — SIGNIFICANT CHANGE UP (ref 0–0.9)
MONOCYTES NFR BLD AUTO: 8.6 % — SIGNIFICANT CHANGE UP (ref 2–14)
NEUTROPHILS # BLD AUTO: 7.4 K/UL — SIGNIFICANT CHANGE UP (ref 1.8–7.4)
NEUTROPHILS NFR BLD AUTO: 71.9 % — SIGNIFICANT CHANGE UP (ref 43–77)
NRBC # BLD: 0 /100 WBCS — SIGNIFICANT CHANGE UP (ref 0–0)
PLATELET # BLD AUTO: 256 K/UL — SIGNIFICANT CHANGE UP (ref 150–400)
POTASSIUM SERPL-MCNC: 4.3 MMOL/L — SIGNIFICANT CHANGE UP (ref 3.5–5.3)
POTASSIUM SERPL-SCNC: 4.3 MMOL/L — SIGNIFICANT CHANGE UP (ref 3.5–5.3)
PROT SERPL-MCNC: 7.8 G/DL — SIGNIFICANT CHANGE UP (ref 6–8.3)
RBC # BLD: 4.71 M/UL — SIGNIFICANT CHANGE UP (ref 4.2–5.8)
RBC # FLD: 24.5 % — HIGH (ref 10.3–14.5)
SODIUM SERPL-SCNC: 134 MMOL/L — LOW (ref 135–145)
WBC # BLD: 10.3 K/UL — SIGNIFICANT CHANGE UP (ref 3.8–10.5)
WBC # FLD AUTO: 10.3 K/UL — SIGNIFICANT CHANGE UP (ref 3.8–10.5)

## 2022-06-23 DIAGNOSIS — Z51.11 ENCOUNTER FOR ANTINEOPLASTIC CHEMOTHERAPY: ICD-10-CM

## 2022-06-23 DIAGNOSIS — R11.2 NAUSEA WITH VOMITING, UNSPECIFIED: ICD-10-CM

## 2022-06-24 ENCOUNTER — APPOINTMENT (OUTPATIENT)
Dept: INFUSION THERAPY | Facility: CLINIC | Age: 52
End: 2022-06-24

## 2022-07-01 ENCOUNTER — OUTPATIENT (OUTPATIENT)
Dept: OUTPATIENT SERVICES | Facility: HOSPITAL | Age: 52
LOS: 1 days | Discharge: HOME | End: 2022-07-01

## 2022-07-01 DIAGNOSIS — Z90.49 ACQUIRED ABSENCE OF OTHER SPECIFIED PARTS OF DIGESTIVE TRACT: Chronic | ICD-10-CM

## 2022-07-01 DIAGNOSIS — C18.9 MALIGNANT NEOPLASM OF COLON, UNSPECIFIED: ICD-10-CM

## 2022-07-01 PROBLEM — Z93.2 ILEOSTOMY STATUS: Chronic | Status: ACTIVE | Noted: 2022-06-16

## 2022-07-02 ENCOUNTER — EMERGENCY (EMERGENCY)
Facility: HOSPITAL | Age: 52
LOS: 0 days | Discharge: HOME | End: 2022-07-02
Attending: EMERGENCY MEDICINE
Payer: MEDICAID

## 2022-07-02 VITALS
HEART RATE: 63 BPM | OXYGEN SATURATION: 98 % | SYSTOLIC BLOOD PRESSURE: 129 MMHG | RESPIRATION RATE: 18 BRPM | DIASTOLIC BLOOD PRESSURE: 79 MMHG | TEMPERATURE: 98 F

## 2022-07-02 VITALS — HEIGHT: 74 IN | WEIGHT: 160.06 LBS

## 2022-07-02 DIAGNOSIS — R07.89 OTHER CHEST PAIN: ICD-10-CM

## 2022-07-02 DIAGNOSIS — Z93.2 ILEOSTOMY STATUS: ICD-10-CM

## 2022-07-02 DIAGNOSIS — Z90.49 ACQUIRED ABSENCE OF OTHER SPECIFIED PARTS OF DIGESTIVE TRACT: Chronic | ICD-10-CM

## 2022-07-02 DIAGNOSIS — C18.9 MALIGNANT NEOPLASM OF COLON, UNSPECIFIED: ICD-10-CM

## 2022-07-02 DIAGNOSIS — J93.9 PNEUMOTHORAX, UNSPECIFIED: ICD-10-CM

## 2022-07-02 DIAGNOSIS — Z93.3 COLOSTOMY STATUS: ICD-10-CM

## 2022-07-02 DIAGNOSIS — Z20.822 CONTACT WITH AND (SUSPECTED) EXPOSURE TO COVID-19: ICD-10-CM

## 2022-07-02 LAB
ALBUMIN SERPL ELPH-MCNC: 3.1 G/DL — LOW (ref 3.3–5)
ALP SERPL-CCNC: 194 U/L — HIGH (ref 40–120)
ALT FLD-CCNC: 34 U/L — SIGNIFICANT CHANGE UP (ref 12–78)
ANION GAP SERPL CALC-SCNC: 8 MMOL/L — SIGNIFICANT CHANGE UP (ref 5–17)
AST SERPL-CCNC: 28 U/L — SIGNIFICANT CHANGE UP (ref 15–37)
BASOPHILS # BLD AUTO: 0.02 K/UL — SIGNIFICANT CHANGE UP (ref 0–0.2)
BASOPHILS NFR BLD AUTO: 0.3 % — SIGNIFICANT CHANGE UP (ref 0–2)
BILIRUB SERPL-MCNC: 0.2 MG/DL — SIGNIFICANT CHANGE UP (ref 0.2–1.2)
BUN SERPL-MCNC: 9 MG/DL — SIGNIFICANT CHANGE UP (ref 7–23)
CALCIUM SERPL-MCNC: 9.1 MG/DL — SIGNIFICANT CHANGE UP (ref 8.5–10.1)
CHLORIDE SERPL-SCNC: 103 MMOL/L — SIGNIFICANT CHANGE UP (ref 96–108)
CO2 SERPL-SCNC: 24 MMOL/L — SIGNIFICANT CHANGE UP (ref 22–31)
CREAT SERPL-MCNC: 0.66 MG/DL — SIGNIFICANT CHANGE UP (ref 0.5–1.3)
EGFR: 115 ML/MIN/1.73M2 — SIGNIFICANT CHANGE UP
EOSINOPHIL # BLD AUTO: 0.17 K/UL — SIGNIFICANT CHANGE UP (ref 0–0.5)
EOSINOPHIL NFR BLD AUTO: 2.5 % — SIGNIFICANT CHANGE UP (ref 0–6)
FLUAV AG NPH QL: SIGNIFICANT CHANGE UP
FLUBV AG NPH QL: SIGNIFICANT CHANGE UP
GLUCOSE SERPL-MCNC: 109 MG/DL — HIGH (ref 70–99)
HCT VFR BLD CALC: 33.3 % — LOW (ref 39–50)
HGB BLD-MCNC: 9.9 G/DL — LOW (ref 13–17)
IMM GRANULOCYTES NFR BLD AUTO: 0.4 % — SIGNIFICANT CHANGE UP (ref 0–1.5)
LIDOCAIN IGE QN: 253 U/L — SIGNIFICANT CHANGE UP (ref 73–393)
LYMPHOCYTES # BLD AUTO: 1.09 K/UL — SIGNIFICANT CHANGE UP (ref 1–3.3)
LYMPHOCYTES # BLD AUTO: 16.2 % — SIGNIFICANT CHANGE UP (ref 13–44)
MCHC RBC-ENTMCNC: 21 PG — LOW (ref 27–34)
MCHC RBC-ENTMCNC: 29.7 GM/DL — LOW (ref 32–36)
MCV RBC AUTO: 70.6 FL — LOW (ref 80–100)
MONOCYTES # BLD AUTO: 0.66 K/UL — SIGNIFICANT CHANGE UP (ref 0–0.9)
MONOCYTES NFR BLD AUTO: 9.8 % — SIGNIFICANT CHANGE UP (ref 2–14)
NEUTROPHILS # BLD AUTO: 4.74 K/UL — SIGNIFICANT CHANGE UP (ref 1.8–7.4)
NEUTROPHILS NFR BLD AUTO: 70.8 % — SIGNIFICANT CHANGE UP (ref 43–77)
PLATELET # BLD AUTO: 272 K/UL — SIGNIFICANT CHANGE UP (ref 150–400)
POTASSIUM SERPL-MCNC: 3.9 MMOL/L — SIGNIFICANT CHANGE UP (ref 3.5–5.3)
POTASSIUM SERPL-SCNC: 3.9 MMOL/L — SIGNIFICANT CHANGE UP (ref 3.5–5.3)
PROT SERPL-MCNC: 8.1 GM/DL — SIGNIFICANT CHANGE UP (ref 6–8.3)
RBC # BLD: 4.72 M/UL — SIGNIFICANT CHANGE UP (ref 4.2–5.8)
RBC # FLD: 23.5 % — HIGH (ref 10.3–14.5)
RSV RNA NPH QL NAA+NON-PROBE: SIGNIFICANT CHANGE UP
SARS-COV-2 RNA SPEC QL NAA+PROBE: SIGNIFICANT CHANGE UP
SODIUM SERPL-SCNC: 135 MMOL/L — SIGNIFICANT CHANGE UP (ref 135–145)
TROPONIN I, HIGH SENSITIVITY RESULT: 3.78 NG/L — SIGNIFICANT CHANGE UP
WBC # BLD: 6.71 K/UL — SIGNIFICANT CHANGE UP (ref 3.8–10.5)
WBC # FLD AUTO: 6.71 K/UL — SIGNIFICANT CHANGE UP (ref 3.8–10.5)

## 2022-07-02 PROCEDURE — 71045 X-RAY EXAM CHEST 1 VIEW: CPT | Mod: 26

## 2022-07-02 PROCEDURE — 99285 EMERGENCY DEPT VISIT HI MDM: CPT

## 2022-07-02 PROCEDURE — 83690 ASSAY OF LIPASE: CPT

## 2022-07-02 PROCEDURE — 99284 EMERGENCY DEPT VISIT MOD MDM: CPT | Mod: 25

## 2022-07-02 PROCEDURE — 71275 CT ANGIOGRAPHY CHEST: CPT | Mod: MA

## 2022-07-02 PROCEDURE — 96374 THER/PROPH/DIAG INJ IV PUSH: CPT | Mod: XU

## 2022-07-02 PROCEDURE — 84484 ASSAY OF TROPONIN QUANT: CPT

## 2022-07-02 PROCEDURE — 85025 COMPLETE CBC W/AUTO DIFF WBC: CPT

## 2022-07-02 PROCEDURE — 36415 COLL VENOUS BLD VENIPUNCTURE: CPT

## 2022-07-02 PROCEDURE — 71275 CT ANGIOGRAPHY CHEST: CPT | Mod: 26,MA

## 2022-07-02 PROCEDURE — 71045 X-RAY EXAM CHEST 1 VIEW: CPT

## 2022-07-02 PROCEDURE — 80053 COMPREHEN METABOLIC PANEL: CPT

## 2022-07-02 PROCEDURE — 0241U: CPT

## 2022-07-02 RX ORDER — SODIUM CHLORIDE 9 MG/ML
1000 INJECTION INTRAMUSCULAR; INTRAVENOUS; SUBCUTANEOUS ONCE
Refills: 0 | Status: COMPLETED | OUTPATIENT
Start: 2022-07-02 | End: 2022-07-02

## 2022-07-02 RX ORDER — ONDANSETRON 8 MG/1
4 TABLET, FILM COATED ORAL ONCE
Refills: 0 | Status: COMPLETED | OUTPATIENT
Start: 2022-07-02 | End: 2022-07-02

## 2022-07-02 RX ORDER — MORPHINE SULFATE 50 MG/1
4 CAPSULE, EXTENDED RELEASE ORAL ONCE
Refills: 0 | Status: DISCONTINUED | OUTPATIENT
Start: 2022-07-02 | End: 2022-07-02

## 2022-07-02 RX ORDER — OXYCODONE HYDROCHLORIDE 5 MG/1
5 TABLET ORAL ONCE
Refills: 0 | Status: DISCONTINUED | OUTPATIENT
Start: 2022-07-02 | End: 2022-07-02

## 2022-07-02 RX ADMIN — SODIUM CHLORIDE 2000 MILLILITER(S): 9 INJECTION INTRAMUSCULAR; INTRAVENOUS; SUBCUTANEOUS at 07:09

## 2022-07-02 RX ADMIN — OXYCODONE HYDROCHLORIDE 5 MILLIGRAM(S): 5 TABLET ORAL at 14:08

## 2022-07-02 RX ADMIN — MORPHINE SULFATE 4 MILLIGRAM(S): 50 CAPSULE, EXTENDED RELEASE ORAL at 07:08

## 2022-07-02 NOTE — ED PROVIDER NOTE - NSFOLLOWUPINSTRUCTIONS_ED_ALL_ED_FT
Pneumothorax      A pneumothorax is commonly called a collapsed lung. It is a condition in which air leaks from a lung and builds up between the thin layer of tissue that covers the lungs (visceral pleura) and the interior wall of the chest cavity (parietal pleura). The air gets trapped outside the lung, between the lung and the chest wall (pleural space). The air takes up space and prevents the lung from fully expanding.    This condition sometimes occurs suddenly with no apparent cause. The buildup of air may be small or large. A small pneumothorax may go away on its own. A large pneumothorax will require treatment and hospitalization.      What are the causes?    This condition may be caused by:  •Trauma and injury to the chest wall.      •Surgery and other medical procedures.      •A complication of an underlying lung problem, especially chronic obstructive pulmonary disease (COPD) or emphysema.      Sometimes the cause of this condition is not known.      What increases the risk?    You are more likely to develop this condition if:  •You have an underlying lung problem.      •You smoke.      •You are 20-40 years old, male, tall, and underweight.      •You have a personal or family history of pneumothorax.      •You have an eating disorder (anorexia nervosa).      This condition can also happen quickly, even in people with no history of lung problems.      What are the signs or symptoms?    Sometimes a pneumothorax will have no symptoms. When symptoms are present, they can include:  •Chest pain.      •Shortness of breath.      •Increased rate of breathing.      •Bluish color to your lips or skin (cyanosis).        How is this diagnosed?    This condition may be diagnosed by:  •A medical history and physical exam.      •A chest X-ray, chest CT scan, or ultrasound.        How is this treated?     Treatment depends on how severe your condition is. The goal of treatment is to remove the extra air and allow your lung to expand back to its normal size.•For a small pneumothorax:  •No treatment may be needed.      •Extra oxygen is sometimes used to make it go away more quickly.      •For a large pneumothorax or one that is causing symptoms, a procedure is done to release the air from around your lungs. To do this, a health care provider may use:  •A needle with a syringe. This is used to suck air from a pleural space where no additional leakage is taking place.      •A chest tube. This is used to suck air where there is ongoing leakage into the pleural space. The chest tube may need to remain in place for several days until the air leak has healed.        •In more severe cases, surgery may be needed to repair the damage that is causing the leak.      •If you have multiple pneumothorax episodes or have an air leak that will not heal, a procedure called a pleurodesis may be done. A medicine is placed in the pleural space to irritate the tissues around the lung so that the lung will stick to the chest wall, seal any leaks, and stop any buildup of air in that space.      If you have an underlying lung problem, severe symptoms, or a large pneumothorax you will usually need to stay in the hospital.      Follow these instructions at home:    Lifestyle     • Do not use any products that contain nicotine or tobacco, such as cigarettes and e-cigarettes. These are major risk factors in pneumothorax. If you need help quitting, ask your health care provider.      • Do not lift anything that is heavier than 10 lb (4.5 kg), or the limit that your health care provider tells you, until he or she says that it is safe.      •Avoid activities that take a lot of effort (strenuous) for as long as told by your health care provider.      •Return to your normal activities as told by your health care provider. Ask your health care provider what activities are safe for you.      • Do not fly in an airplane or scuba dive until your health care provider says it is okay.      General instructions     •Take over-the-counter and prescription medicines only as told by your health care provider.      •If a cough or pain makes it difficult for you to sleep at night, try sleeping in a semi-upright position in a recliner or by using 2 or 3 pillows.      •If you had a chest tube and it was removed, ask your health care provider when you can remove the bandage (dressing). While the dressing is in place, do not allow it to get wet.      •Keep all follow-up visits as told by your health care provider. This is important.        Contact a health care provider if:    •You cough up thick mucus (sputum) that is yellow or green in color.      •You were treated with a chest tube, and you have redness, increasing pain, or discharge at the site where it was placed.        Get help right away if:    •You have increasing chest pain or shortness of breath.      •You have a cough that will not go away.      •You begin coughing up blood.      •You have pain that is getting worse or is not controlled with medicines.      •The site where your chest tube was located opens up.      •You feel air coming out of the site where the chest tube was placed.      •You have a fever or persistent symptoms for more than 2–3 days.      These symptoms may represent a serious problem that is an emergency. Do not wait to see if the symptoms will go away. Get medical help right away. Call your local emergency services (911 in the [place] Do not drive yourself to the hospital.       Summary    •A pneumothorax, commonly called a collapsed lung, is a condition in which air leaks from a lung and gets trapped between the lung and the chest wall (pleural space).      •The buildup of air may be small or large. A small pneumothorax may go away on its own. A large pneumothorax will require treatment and hospitalization.      •Treatment for this condition depends on how severe the pneumothorax is. The goal of treatment is to remove the extra air and allow the lung to expand back to its normal size.      This information is not intended to replace advice given to you by your health care provider. Make sure you discuss any questions you have with your health care provider.

## 2022-07-02 NOTE — ED ADULT NURSE REASSESSMENT NOTE - NS ED NURSE REASSESS COMMENT FT1
care assumed from JOSE Vaughn. medicated for pain as ordered. pt A&O x4. denies SOB. breathing even and unlabored. updated on plan of care. awaiting lab results, CT scan.

## 2022-07-02 NOTE — ED PROVIDER NOTE - OBJECTIVE STATEMENT
Pt is a 52 y/o M with a history of colon cancer with mets to the liver, has an ileostomy p/w left sided thoracic/chest pain that is worse with inspiration, he stated he has had this issue on/off for years but over the last few days has been worsening and now becoming a constant pain. His last chemo was about a week ago. He denied SOB, no pain/swelling to his legs, he denied any h/o PE/DVT.

## 2022-07-02 NOTE — ED PROVIDER NOTE - NS ED ROS FT
Constitutional: No fever or chills  Eyes: No visual changes  HEENT: No throat pain  CV: left sided chest/thoracic pain   Resp: No SOB no cough  GI: No abd pain, nausea or vomiting  : No dysuria  MSK: No musculoskeletal pain  Skin: No rash  Neuro: No headache

## 2022-07-02 NOTE — ED ADULT NURSE REASSESSMENT NOTE - NS ED NURSE REASSESS COMMENT FT1
CT positive for small L pneumothorax. pt placed on 2L NC. cardiac monitoring, SpO2 monitoring in place.

## 2022-07-02 NOTE — ED PROVIDER NOTE - PHYSICAL EXAMINATION
Constitutional: NAD AAOx3  Eyes: PERRL, EOMI  Head: Normocephalic atraumatic  Mouth: MMM  Cardiac: regular rate   Resp: Lungs CTAB  GI: Abd s/nt/nd, +ostomy present   Neuro: CN2-12 intact  Extremities: Intact distal pulses b/l, no calf tenderness, normal ROM b/l UE and LE   Skin: No rashes

## 2022-07-02 NOTE — ED ADULT NURSE NOTE - PAIN: PRESENCE, MLM
complains of pain/discomfort

## 2022-07-02 NOTE — ED ADULT TRIAGE NOTE - CHIEF COMPLAINT QUOTE
Pt c/o atraumatic chronic L rib pain. Pain worsens upon deep inspiration. Did not take pain medication PTA. Denies chest pain or SOB.

## 2022-07-02 NOTE — ED PROVIDER NOTE - PATIENT PORTAL LINK FT
You can access the FollowMyHealth Patient Portal offered by [facility] by registering at the following website: [email or web address]. By joining [facility]’s FollowMyHealth portal, you will also be able to view your health information using other applications (apps) compatible with our system.

## 2022-07-02 NOTE — CONSULT NOTE ADULT - ASSESSMENT
Pt is a 52 y/o M pmhx of colon CA w/ METS to liver, ileostomy present who presented to  ED w/ complaints of L sided thoracic/chest pain that was worse w/ inspiration. Pt found to have a small PTX on CT scan, thoracic consulted for further management.     1. Small L sided PTX     Plan:   - No need for intervention at this time.   - Satting well on 2L NC,   - Repeat CXR 2 hrs to evaluate for any expansion   - IF CXR shows no PTX does not require admission from thoracic standpoint.    Case discussed w/ Attending Dr. Felix

## 2022-07-02 NOTE — ED ADULT TRIAGE NOTE - BMI (KG/M2)
20.5 Ear Wedge Repair Text: A wedge excision was completed by carrying down an excision through the full thickness of the ear and cartilage with an inward facing Burow's triangle. The wound was then closed in a layered fashion.

## 2022-07-02 NOTE — CHART NOTE - NSCHARTNOTEFT_GEN_A_CORE
CT chest reviewed. Left pneumothorax is very insignificant. Would recommend repeat CXR at 1 pm. If no pneumothorax seen on CXR, patient stable from discharge from thoracic surgery standpoint. No follow up is needed from thoracic surgery.

## 2022-07-02 NOTE — ED ADULT NURSE NOTE - NS_SISCREENINGSR_GEN_ALL_ED
Negative Ilumya Counseling: I discussed with the patient the risks of tildrakizumab including but not limited to immunosuppression, malignancy, posterior leukoencephalopathy syndrome, and serious infections.  The patient understands that monitoring is required including a PPD at baseline and must alert us or the primary physician if symptoms of infection or other concerning signs are noted.

## 2022-07-02 NOTE — CONSULT NOTE ADULT - SUBJECTIVE AND OBJECTIVE BOX
Patient is a 51y old  Male who presents with a chief complaint of     BRIEF HOSPITAL COURSE: Pt is a 50 y/o M pmhx of colon CA w/ METS to liver, ileostomy present who presented to  ED w/ complaints of L sided thoracic/chest pain that was worse w/ inspiration. Pt found to have a small PTX on CT scan, thoracic consulted for further management.       PAST MEDICAL & SURGICAL HISTORY:  Cancer, colon  liver mets      S/P ileostomy      S/P colon resection        Allergies    No Known Allergies    Intolerances      FAMILY HISTORY:      Review of Systems:  CONSTITUTIONAL: No fever, chills, or fatigue  EYES: No eye pain, visual disturbances, or discharge  ENMT:  No difficulty hearing, tinnitus, vertigo; No sinus or throat pain  NECK: No pain or stiffness  RESPIRATORY: No cough, wheezing, chills or hemoptysis; No shortness of breath  CARDIOVASCULAR: No chest pain, palpitations, dizziness, or leg swelling  GASTROINTESTINAL: No abdominal or epigastric pain. No nausea, vomiting, or hematemesis; No diarrhea or constipation. No melena or hematochezia.  GENITOURINARY: No dysuria, frequency, hematuria, or incontinence  NEUROLOGICAL: No headaches, memory loss, loss of strength, numbness, or tremors  SKIN: No itching, burning, rashes, or lesions   MUSCULOSKELETAL: L sided muscular back pain. No joint pain or swelling; No extremity pain  PSYCHIATRIC: No depression, anxiety, mood swings, or difficulty sleeping      Medications:                                  ICU Vital Signs Last 24 Hrs  T(C): 36.8 (02 Jul 2022 06:25), Max: 36.8 (02 Jul 2022 06:25)  T(F): 98.3 (02 Jul 2022 06:25), Max: 98.3 (02 Jul 2022 06:25)  HR: 62 (02 Jul 2022 09:00) (62 - 90)  BP: 122/88 (02 Jul 2022 06:25) (122/88 - 122/88)  BP(mean): 99 (02 Jul 2022 06:25) (99 - 99)  ABP: --  ABP(mean): --  RR: 16 (02 Jul 2022 09:00) (16 - 20)  SpO2: 100% (02 Jul 2022 09:00) (100% - 100%)    Vital Signs Last 24 Hrs  T(C): 36.8 (02 Jul 2022 06:25), Max: 36.8 (02 Jul 2022 06:25)  T(F): 98.3 (02 Jul 2022 06:25), Max: 98.3 (02 Jul 2022 06:25)  HR: 62 (02 Jul 2022 09:00) (62 - 90)  BP: 122/88 (02 Jul 2022 06:25) (122/88 - 122/88)  BP(mean): 99 (02 Jul 2022 06:25) (99 - 99)  RR: 16 (02 Jul 2022 09:00) (16 - 20)  SpO2: 100% (02 Jul 2022 09:00) (100% - 100%)        I&O's Detail        LABS:                        9.9    6.71  )-----------( 272      ( 02 Jul 2022 06:47 )             33.3     07-02    135  |  103  |  9   ----------------------------<  109<H>  3.9   |  24  |  0.66    Ca    9.1      02 Jul 2022 06:47    TPro  8.1  /  Alb  3.1<L>  /  TBili  0.2  /  DBili  x   /  AST  28  /  ALT  34  /  AlkPhos  194<H>  07-02          CAPILLARY BLOOD GLUCOSE            CULTURES:      Physical Examination:    General: Pt laying in hospital bed in no acute distress.  Alert, oriented, interactive    HEENT: Pupils equal, reactive to light.  Symmetric.    PULM: Clear to auscultation bilaterally, no significant sputum production    CVS: Regular rate and rhythm, no murmurs, rubs, or gallops    ABD: Soft, nondistended, nontender, normoactive bowel sounds, no masses    EXT: No edema, nontender    SKIN: Warm and well perfused    RADIOLOGY:   < from: CT Angio Chest PE Protocol w/ IV Cont (07.02.22 @ 08:30) >  ACC: [id number] EXAM:  CT ANGIO CHEST PULM [facility]                          PROCEDURE DATE:  07/02/2022          INTERPRETATION:  Reason for Exam: Shortness of breath. chest pain.    CTA of the chest was performed from the thoracic inlet to the level of   the adrenal glands following IV contrast injection of  80 cc of Omnipaque   350. No immediate complications were reported.  MIP images were also   created and reviewed.    Comparison: CT abdomen and pelvis dated May 25, 2022    Tubes/Lines: Right Mediport catheter tip in SVC.    Mediastinum and Heart: Aorta and pulmonary arteries are normal in size.   Moderate stenosis of the proximal left subclavian artery from   atherosclerotic plaque. Thyroid gland is unremarkable. No   lymphadenopathy.No pericardial effusion.    Lungs, Pleura, and Airways: There is no pulmonary embolus. No   consolidations, edema or effusion. Small left pneumothorax noted. Right   lower lobe 4 mm nodule in series 4 image 289. Left apical 4 mm nodule on   image 57.    Visualized Abdomen: Multiple calcified masses in the liver, without   significant change since prior.    Bones and soft tissues: Unremarkable.    IMPRESSION:    No pulmonary embolus.    Small left pneumothorax without mediastinal shift.    Remaining findings as above.    --- End of Report ---            ERIBERTO SCOTT MD; Attending Radiologist  This document has been electronically signed. Jul 2 2022  8:55AM

## 2022-07-02 NOTE — ED ADULT NURSE NOTE - OBJECTIVE STATEMENT
pt c/o Left rib pain for the past couple of years. Worsens on deep inspiration. Did not take any pain medication PTA. Hx of cancer. Pt denies any chest pain or sob. Vs stable. no s/s of distress.

## 2022-07-02 NOTE — ED PROVIDER NOTE - CLINICAL SUMMARY MEDICAL DECISION MAKING FREE TEXT BOX
labs, imaging obtained, CTA revealed a small left ptx, he was placed on O2, d/w thoracic surgery who has evaluated the patient, stated to repeat cxr and re-evaluated, no ptx evident, they instructed to dc home, he is agreeable with this plan of care, he understands return precautions and dc in stable condition.

## 2022-07-06 ENCOUNTER — RESULT REVIEW (OUTPATIENT)
Age: 52
End: 2022-07-06

## 2022-07-06 ENCOUNTER — NON-APPOINTMENT (OUTPATIENT)
Age: 52
End: 2022-07-06

## 2022-07-06 ENCOUNTER — APPOINTMENT (OUTPATIENT)
Dept: INFUSION THERAPY | Facility: CLINIC | Age: 52
End: 2022-07-06

## 2022-07-06 ENCOUNTER — APPOINTMENT (OUTPATIENT)
Dept: HEMATOLOGY ONCOLOGY | Facility: CLINIC | Age: 52
End: 2022-07-06

## 2022-07-06 ENCOUNTER — RESULT CHARGE (OUTPATIENT)
Age: 52
End: 2022-07-06

## 2022-07-06 VITALS
OXYGEN SATURATION: 99 % | WEIGHT: 164.13 LBS | BODY MASS INDEX: 21.65 KG/M2 | DIASTOLIC BLOOD PRESSURE: 82 MMHG | HEART RATE: 98 BPM | TEMPERATURE: 98 F | RESPIRATION RATE: 18 BRPM | SYSTOLIC BLOOD PRESSURE: 125 MMHG

## 2022-07-06 LAB
ALBUMIN SERPL ELPH-MCNC: 3.7 G/DL — SIGNIFICANT CHANGE UP (ref 3.3–5)
ALP SERPL-CCNC: 189 U/L — HIGH (ref 40–120)
ALT FLD-CCNC: 16 U/L — SIGNIFICANT CHANGE UP (ref 10–45)
ANION GAP SERPL CALC-SCNC: 14 MMOL/L — SIGNIFICANT CHANGE UP (ref 5–17)
AST SERPL-CCNC: 21 U/L — SIGNIFICANT CHANGE UP (ref 10–40)
BASOPHILS # BLD AUTO: 0.02 K/UL — SIGNIFICANT CHANGE UP (ref 0–0.2)
BASOPHILS NFR BLD AUTO: 0.4 % — SIGNIFICANT CHANGE UP (ref 0–2)
BILIRUB SERPL-MCNC: 0.2 MG/DL — SIGNIFICANT CHANGE UP (ref 0.2–1.2)
BUN SERPL-MCNC: 9 MG/DL — SIGNIFICANT CHANGE UP (ref 7–23)
CALCIUM SERPL-MCNC: 9.2 MG/DL — SIGNIFICANT CHANGE UP (ref 8.4–10.5)
CHLORIDE SERPL-SCNC: 99 MMOL/L — SIGNIFICANT CHANGE UP (ref 96–108)
CO2 SERPL-SCNC: 23 MMOL/L — SIGNIFICANT CHANGE UP (ref 22–31)
CREAT SERPL-MCNC: 0.64 MG/DL — SIGNIFICANT CHANGE UP (ref 0.5–1.3)
EGFR: 116 ML/MIN/1.73M2 — SIGNIFICANT CHANGE UP
EOSINOPHIL # BLD AUTO: 0.11 K/UL — SIGNIFICANT CHANGE UP (ref 0–0.5)
EOSINOPHIL NFR BLD AUTO: 2.4 % — SIGNIFICANT CHANGE UP (ref 0–6)
GLUCOSE SERPL-MCNC: 116 MG/DL — HIGH (ref 70–99)
HCT VFR BLD CALC: 31.1 % — LOW (ref 39–50)
HGB BLD-MCNC: 9.1 G/DL — LOW (ref 13–17)
IMM GRANULOCYTES NFR BLD AUTO: 0.4 % — SIGNIFICANT CHANGE UP (ref 0–1.5)
LYMPHOCYTES # BLD AUTO: 1.05 K/UL — SIGNIFICANT CHANGE UP (ref 1–3.3)
LYMPHOCYTES # BLD AUTO: 22.6 % — SIGNIFICANT CHANGE UP (ref 13–44)
MAGNESIUM SERPL-MCNC: 1.7 MG/DL — SIGNIFICANT CHANGE UP (ref 1.6–2.6)
MCHC RBC-ENTMCNC: 20.5 PG — LOW (ref 27–34)
MCHC RBC-ENTMCNC: 29.3 GM/DL — LOW (ref 32–36)
MCV RBC AUTO: 70.2 FL — LOW (ref 80–100)
MONOCYTES # BLD AUTO: 0.57 K/UL — SIGNIFICANT CHANGE UP (ref 0–0.9)
MONOCYTES NFR BLD AUTO: 12.3 % — SIGNIFICANT CHANGE UP (ref 2–14)
NEUTROPHILS # BLD AUTO: 2.87 K/UL — SIGNIFICANT CHANGE UP (ref 1.8–7.4)
NEUTROPHILS NFR BLD AUTO: 61.9 % — SIGNIFICANT CHANGE UP (ref 43–77)
NRBC # BLD: 0 /100 WBCS — SIGNIFICANT CHANGE UP (ref 0–0)
PLATELET # BLD AUTO: 256 K/UL — SIGNIFICANT CHANGE UP (ref 150–400)
POTASSIUM SERPL-MCNC: 4.4 MMOL/L — SIGNIFICANT CHANGE UP (ref 3.5–5.3)
POTASSIUM SERPL-SCNC: 4.4 MMOL/L — SIGNIFICANT CHANGE UP (ref 3.5–5.3)
PROT SERPL-MCNC: 7.1 G/DL — SIGNIFICANT CHANGE UP (ref 6–8.3)
RBC # BLD: 4.43 M/UL — SIGNIFICANT CHANGE UP (ref 4.2–5.8)
RBC # FLD: 23.3 % — HIGH (ref 10.3–14.5)
SODIUM SERPL-SCNC: 136 MMOL/L — SIGNIFICANT CHANGE UP (ref 135–145)
WBC # BLD: 4.64 K/UL — SIGNIFICANT CHANGE UP (ref 3.8–10.5)
WBC # FLD AUTO: 4.64 K/UL — SIGNIFICANT CHANGE UP (ref 3.8–10.5)

## 2022-07-06 PROCEDURE — 99214 OFFICE O/P EST MOD 30 MIN: CPT

## 2022-07-06 NOTE — PHYSICAL EXAM
[Restricted in physically strenuous activity but ambulatory and able to carry out work of a light or sedentary nature] : Status 1- Restricted in physically strenuous activity but ambulatory and able to carry out work of a light or sedentary nature, e.g., light house work, office work [Normal] : affect appropriate [de-identified] : ileostomy in place, dressing C/D/I  [de-identified] : wt stable  [de-identified] : well healed vertical abdominal incision

## 2022-07-06 NOTE — HISTORY OF PRESENT ILLNESS
[de-identified] : The patient was diagnosed with colon cancer in May 2022 at the age of 51. He presented to  ER 5/19/22 with worsening abdominal pain for 4 days. He reports abdominal pain has been ongoing for 4 years. On 5/19/22 he had a CT A/P which showed apparent focal mural thickening at the cecum/proximal ascending colon may represent colon cancer. The sigmoid colon appears to be tethered to the cecum and it is focally thick-walled; focal tumor invasion/extension from the cecum to the sigmoid colon cannot be excluded. Dilatation of the small bowel with an apparent transition point in the right lower quadrant abdomen, suggestive of small bowel obstruction. Apparent mild mural thickening of the a dilated right lower quadrant small bowel loop with adjacent mesenteric edema for which associated small bowel ischemia cannot be excluded. Multiple hypodense lesions with calcifications in both lobes of the liver with the largest measuring 5.8 x 6.0 cm in hepatic segment 8. These are suspicious for metastases. Mild ascites. Also on 5/19/22 he underwent exploratory laparotomy, total colectomy, end ileostomy, biopsy of liver, and biopsy of retroperitoneum. Pathology showed Omentum, excision: Negative for malignancy. Acutely inflamed exudate consistent with peritonitis. Portion of terminal ileum, cecum with adherent sigmoid, total colectomy: Poorly differentiated infiltrating adenocarcinoma measuring 9.0 cm. Adenocarcinoma infiltrates through the bowel wall and through the visceral peritoneum and infiltrates the adherent sigmoid colon 3 of 17 lymph nodes are positive for metastatic carcinoma. Lymphovascular space invasion is identified. Perineural invasion is identified. The surgical margins are negative. Appendix with serosal tumor implants and acute inflammation. Peritonitis. Liver, biopsy: Metastatic adenocarcinoma. Retroperitoneal biopsy: Adenocarcinoma with necrosis. No loss of nuclear expression of MMR proteins (MLH1, MSH2, MSH6, and PMS2). Staging pT4b pN1b pM1c. On 5/25/22 he had a repeat CT A/P which showed status post total colectomy and ileostomy. Dilated small bowel loops in the left abdomen with associated wall thickening. Both may be postoperative, follow-up is recommended. Moderate amount of ascites new from the prior study. Small amount of free intraperitoneal air likely with postoperative state. Minimal left pleural effusion. New multifocal right lower lobe infiltrate. Multiple liver metastases some of which are calcified again appreciated. [de-identified] : Medical Hx: denies \par Surgical Hx: denies \par Family Hx: denies family history of cancer \par Social Hx: he smoked THC and cigarettes daily for 30 years, quit May 19th, 2022; ETOH had 6 pack a day, quit 4 years ago; was a  (off the [employer]) no longer working; lives with mom and brother.   [de-identified] : Today he is C2D1 FOLFOXIRI / Zirabev, planned ongoing. He was seen in the hospital for left pneumothorax, repeat chest x-ray showed no pneumothorax. Cardiothoracic sx evaluated, and he was sent home. Due to the left sided pain he could not sleep on his left side. He slept on his right side, and this "caused pain in my liver area". When asked to show where right pain was located, he pointed to his RLQ, below his liver. He has been taking 5mg oxycodone BID and its not working, stating "I'm storing them like a squirrel, as I don't want to run out". He then stated he needs four 5mg oxycodone to become comfortable. He remained standing during the entire visit. His chemotherapy side affects noted were fatigue for days 2-4, he found himself more tired than baseline. He reported cold sensitivity times one, day of treatment and resolved. He reports taste changes, a metallic taste and slowly resolved. He denies eye changes; hair loss; nail/skin changes; neuropathy; mouth sores; N/V; heart burn; D/C.

## 2022-07-06 NOTE — REVIEW OF SYSTEMS
[Negative] : Allergic/Immunologic [Recent Change In Weight] : ~T no recent weight change [Chest Pain] : no chest pain [Shortness Of Breath] : no shortness of breath [Abdominal Pain] : no abdominal pain [Constipation] : no constipation [Diarrhea] : no diarrhea [Joint Pain] : no joint pain [Skin Rash] : no skin rash [FreeTextEntry2] : wt stable [FreeTextEntry7] : ileotomy in place; right LQ pain  [FreeTextEntry9] : left rib pain

## 2022-07-07 DIAGNOSIS — Z93.2 ILEOSTOMY STATUS: ICD-10-CM

## 2022-07-08 ENCOUNTER — APPOINTMENT (OUTPATIENT)
Dept: INFUSION THERAPY | Facility: CLINIC | Age: 52
End: 2022-07-08

## 2022-07-08 VITALS
SYSTOLIC BLOOD PRESSURE: 118 MMHG | OXYGEN SATURATION: 100 % | HEART RATE: 95 BPM | RESPIRATION RATE: 17 BRPM | TEMPERATURE: 97.9 F | DIASTOLIC BLOOD PRESSURE: 75 MMHG

## 2022-07-08 DIAGNOSIS — Z51.11 ENCOUNTER FOR ANTINEOPLASTIC CHEMOTHERAPY: ICD-10-CM

## 2022-07-08 DIAGNOSIS — R11.2 NAUSEA WITH VOMITING, UNSPECIFIED: ICD-10-CM

## 2022-07-20 ENCOUNTER — RESULT REVIEW (OUTPATIENT)
Age: 52
End: 2022-07-20

## 2022-07-20 ENCOUNTER — RESULT CHARGE (OUTPATIENT)
Age: 52
End: 2022-07-20

## 2022-07-20 ENCOUNTER — APPOINTMENT (OUTPATIENT)
Dept: INFUSION THERAPY | Facility: CLINIC | Age: 52
End: 2022-07-20

## 2022-07-20 VITALS
HEIGHT: 72.44 IN | OXYGEN SATURATION: 98 % | WEIGHT: 161.06 LBS | RESPIRATION RATE: 18 BRPM | HEART RATE: 9 BPM | DIASTOLIC BLOOD PRESSURE: 68 MMHG | TEMPERATURE: 98.2 F | SYSTOLIC BLOOD PRESSURE: 117 MMHG | BODY MASS INDEX: 21.58 KG/M2

## 2022-07-20 LAB
ALBUMIN SERPL ELPH-MCNC: 4.3 G/DL — SIGNIFICANT CHANGE UP (ref 3.3–5)
ALP SERPL-CCNC: 107 U/L — SIGNIFICANT CHANGE UP (ref 40–120)
ALT FLD-CCNC: 11 U/L — SIGNIFICANT CHANGE UP (ref 10–45)
ANION GAP SERPL CALC-SCNC: 13 MMOL/L — SIGNIFICANT CHANGE UP (ref 5–17)
AST SERPL-CCNC: 22 U/L — SIGNIFICANT CHANGE UP (ref 10–40)
BASOPHILS # BLD AUTO: 0.02 K/UL — SIGNIFICANT CHANGE UP (ref 0–0.2)
BASOPHILS NFR BLD AUTO: 0.4 % — SIGNIFICANT CHANGE UP (ref 0–2)
BILIRUB SERPL-MCNC: 0.2 MG/DL — SIGNIFICANT CHANGE UP (ref 0.2–1.2)
BUN SERPL-MCNC: 14 MG/DL — SIGNIFICANT CHANGE UP (ref 7–23)
CALCIUM SERPL-MCNC: 9.2 MG/DL — SIGNIFICANT CHANGE UP (ref 8.4–10.5)
CHLORIDE SERPL-SCNC: 102 MMOL/L — SIGNIFICANT CHANGE UP (ref 96–108)
CO2 SERPL-SCNC: 24 MMOL/L — SIGNIFICANT CHANGE UP (ref 22–31)
CREAT SERPL-MCNC: 0.7 MG/DL — SIGNIFICANT CHANGE UP (ref 0.5–1.3)
EGFR: 113 ML/MIN/1.73M2 — SIGNIFICANT CHANGE UP
EOSINOPHIL # BLD AUTO: 0.14 K/UL — SIGNIFICANT CHANGE UP (ref 0–0.5)
EOSINOPHIL NFR BLD AUTO: 2.7 % — SIGNIFICANT CHANGE UP (ref 0–6)
GLUCOSE SERPL-MCNC: 96 MG/DL — SIGNIFICANT CHANGE UP (ref 70–99)
HCT VFR BLD CALC: 33.7 % — LOW (ref 39–50)
HGB BLD-MCNC: 10.1 G/DL — LOW (ref 13–17)
IMM GRANULOCYTES NFR BLD AUTO: 0.4 % — SIGNIFICANT CHANGE UP (ref 0–1.5)
LYMPHOCYTES # BLD AUTO: 1.57 K/UL — SIGNIFICANT CHANGE UP (ref 1–3.3)
LYMPHOCYTES # BLD AUTO: 30 % — SIGNIFICANT CHANGE UP (ref 13–44)
MAGNESIUM SERPL-MCNC: 1.6 MG/DL — SIGNIFICANT CHANGE UP (ref 1.6–2.6)
MCHC RBC-ENTMCNC: 21.4 PG — LOW (ref 27–34)
MCHC RBC-ENTMCNC: 30 GM/DL — LOW (ref 32–36)
MCV RBC AUTO: 71.2 FL — LOW (ref 80–100)
MONOCYTES # BLD AUTO: 0.52 K/UL — SIGNIFICANT CHANGE UP (ref 0–0.9)
MONOCYTES NFR BLD AUTO: 9.9 % — SIGNIFICANT CHANGE UP (ref 2–14)
NEUTROPHILS # BLD AUTO: 2.97 K/UL — SIGNIFICANT CHANGE UP (ref 1.8–7.4)
NEUTROPHILS NFR BLD AUTO: 56.6 % — SIGNIFICANT CHANGE UP (ref 43–77)
NRBC # BLD: 0 /100 WBCS — SIGNIFICANT CHANGE UP (ref 0–0)
PLATELET # BLD AUTO: 188 K/UL — SIGNIFICANT CHANGE UP (ref 150–400)
POTASSIUM SERPL-MCNC: 4 MMOL/L — SIGNIFICANT CHANGE UP (ref 3.5–5.3)
POTASSIUM SERPL-SCNC: 4 MMOL/L — SIGNIFICANT CHANGE UP (ref 3.5–5.3)
PROT SERPL-MCNC: 7.4 G/DL — SIGNIFICANT CHANGE UP (ref 6–8.3)
RBC # BLD: 4.73 M/UL — SIGNIFICANT CHANGE UP (ref 4.2–5.8)
RBC # FLD: 24.8 % — HIGH (ref 10.3–14.5)
SODIUM SERPL-SCNC: 139 MMOL/L — SIGNIFICANT CHANGE UP (ref 135–145)
WBC # BLD: 5.24 K/UL — SIGNIFICANT CHANGE UP (ref 3.8–10.5)
WBC # FLD AUTO: 5.24 K/UL — SIGNIFICANT CHANGE UP (ref 3.8–10.5)

## 2022-07-22 ENCOUNTER — APPOINTMENT (OUTPATIENT)
Dept: INFUSION THERAPY | Facility: CLINIC | Age: 52
End: 2022-07-22

## 2022-08-03 ENCOUNTER — RESULT REVIEW (OUTPATIENT)
Age: 52
End: 2022-08-03

## 2022-08-03 ENCOUNTER — RESULT CHARGE (OUTPATIENT)
Age: 52
End: 2022-08-03

## 2022-08-03 ENCOUNTER — APPOINTMENT (OUTPATIENT)
Dept: INFUSION THERAPY | Facility: CLINIC | Age: 52
End: 2022-08-03

## 2022-08-03 ENCOUNTER — APPOINTMENT (OUTPATIENT)
Dept: HEMATOLOGY ONCOLOGY | Facility: CLINIC | Age: 52
End: 2022-08-03

## 2022-08-03 VITALS
HEIGHT: 71.65 IN | SYSTOLIC BLOOD PRESSURE: 146 MMHG | BODY MASS INDEX: 23 KG/M2 | WEIGHT: 168 LBS | DIASTOLIC BLOOD PRESSURE: 81 MMHG | OXYGEN SATURATION: 100 % | TEMPERATURE: 98.6 F | HEART RATE: 107 BPM | RESPIRATION RATE: 18 BRPM

## 2022-08-03 LAB
ALBUMIN SERPL ELPH-MCNC: 4.2 G/DL — SIGNIFICANT CHANGE UP (ref 3.3–5)
ALP SERPL-CCNC: 85 U/L — SIGNIFICANT CHANGE UP (ref 40–120)
ALT FLD-CCNC: 15 U/L — SIGNIFICANT CHANGE UP (ref 10–45)
ANION GAP SERPL CALC-SCNC: 14 MMOL/L — SIGNIFICANT CHANGE UP (ref 5–17)
AST SERPL-CCNC: 20 U/L — SIGNIFICANT CHANGE UP (ref 10–40)
BASOPHILS # BLD AUTO: 0.04 K/UL — SIGNIFICANT CHANGE UP (ref 0–0.2)
BASOPHILS NFR BLD AUTO: 0.9 % — SIGNIFICANT CHANGE UP (ref 0–2)
BILIRUB SERPL-MCNC: 0.3 MG/DL — SIGNIFICANT CHANGE UP (ref 0.2–1.2)
BUN SERPL-MCNC: 7 MG/DL — SIGNIFICANT CHANGE UP (ref 7–23)
CALCIUM SERPL-MCNC: 9.3 MG/DL — SIGNIFICANT CHANGE UP (ref 8.4–10.5)
CEA SERPL-MCNC: 155 NG/ML — HIGH (ref 0–3.8)
CHLORIDE SERPL-SCNC: 101 MMOL/L — SIGNIFICANT CHANGE UP (ref 96–108)
CO2 SERPL-SCNC: 23 MMOL/L — SIGNIFICANT CHANGE UP (ref 22–31)
CREAT SERPL-MCNC: 0.75 MG/DL — SIGNIFICANT CHANGE UP (ref 0.5–1.3)
EGFR: 111 ML/MIN/1.73M2 — SIGNIFICANT CHANGE UP
EOSINOPHIL # BLD AUTO: 0.07 K/UL — SIGNIFICANT CHANGE UP (ref 0–0.5)
EOSINOPHIL NFR BLD AUTO: 1.5 % — SIGNIFICANT CHANGE UP (ref 0–6)
GLUCOSE SERPL-MCNC: 105 MG/DL — HIGH (ref 70–99)
HCT VFR BLD CALC: 35.9 % — LOW (ref 39–50)
HGB BLD-MCNC: 11.2 G/DL — LOW (ref 13–17)
IMM GRANULOCYTES NFR BLD AUTO: 0.4 % — SIGNIFICANT CHANGE UP (ref 0–1.5)
LYMPHOCYTES # BLD AUTO: 0.84 K/UL — LOW (ref 1–3.3)
LYMPHOCYTES # BLD AUTO: 18.3 % — SIGNIFICANT CHANGE UP (ref 13–44)
MAGNESIUM SERPL-MCNC: 1.3 MG/DL — LOW (ref 1.6–2.6)
MCHC RBC-ENTMCNC: 22.4 PG — LOW (ref 27–34)
MCHC RBC-ENTMCNC: 31.2 GM/DL — LOW (ref 32–36)
MCV RBC AUTO: 71.9 FL — LOW (ref 80–100)
MONOCYTES # BLD AUTO: 0.62 K/UL — SIGNIFICANT CHANGE UP (ref 0–0.9)
MONOCYTES NFR BLD AUTO: 13.5 % — SIGNIFICANT CHANGE UP (ref 2–14)
NEUTROPHILS # BLD AUTO: 3.01 K/UL — SIGNIFICANT CHANGE UP (ref 1.8–7.4)
NEUTROPHILS NFR BLD AUTO: 65.4 % — SIGNIFICANT CHANGE UP (ref 43–77)
NRBC # BLD: 0 /100 WBCS — SIGNIFICANT CHANGE UP (ref 0–0)
PLATELET # BLD AUTO: 139 K/UL — LOW (ref 150–400)
POTASSIUM SERPL-MCNC: 3.7 MMOL/L — SIGNIFICANT CHANGE UP (ref 3.5–5.3)
POTASSIUM SERPL-SCNC: 3.7 MMOL/L — SIGNIFICANT CHANGE UP (ref 3.5–5.3)
PROT SERPL-MCNC: 7.1 G/DL — SIGNIFICANT CHANGE UP (ref 6–8.3)
RBC # BLD: 4.99 M/UL — SIGNIFICANT CHANGE UP (ref 4.2–5.8)
RBC # FLD: 25.5 % — HIGH (ref 10.3–14.5)
SODIUM SERPL-SCNC: 138 MMOL/L — SIGNIFICANT CHANGE UP (ref 135–145)
WBC # BLD: 4.6 K/UL — SIGNIFICANT CHANGE UP (ref 3.8–10.5)
WBC # FLD AUTO: 4.6 K/UL — SIGNIFICANT CHANGE UP (ref 3.8–10.5)

## 2022-08-03 PROCEDURE — 99214 OFFICE O/P EST MOD 30 MIN: CPT

## 2022-08-05 ENCOUNTER — APPOINTMENT (OUTPATIENT)
Dept: INFUSION THERAPY | Facility: CLINIC | Age: 52
End: 2022-08-05

## 2022-08-05 VITALS
OXYGEN SATURATION: 100 % | RESPIRATION RATE: 18 BRPM | SYSTOLIC BLOOD PRESSURE: 117 MMHG | DIASTOLIC BLOOD PRESSURE: 53 MMHG | TEMPERATURE: 98.1 F | HEART RATE: 98 BPM

## 2022-08-05 NOTE — REVIEW OF SYSTEMS
[Negative] : Allergic/Immunologic [Recent Change In Weight] : ~T recent weight change [Chest Pain] : no chest pain [Shortness Of Breath] : no shortness of breath [Abdominal Pain] : no abdominal pain [Constipation] : no constipation [Diarrhea] : no diarrhea [Joint Pain] : no joint pain [Skin Rash] : no skin rash [FreeTextEntry2] : wt gain 7 lbs since last visit  [FreeTextEntry7] : ileotomy in place, blister noted on top of stoma, no bleeding [FreeTextEntry9] : left rib pain

## 2022-08-05 NOTE — PHYSICAL EXAM
[Restricted in physically strenuous activity but ambulatory and able to carry out work of a light or sedentary nature] : Status 1- Restricted in physically strenuous activity but ambulatory and able to carry out work of a light or sedentary nature, e.g., light house work, office work [Normal] : affect appropriate [de-identified] : wt gain 7 lbs since last visit  [de-identified] : ileotomy in place, blister noted on top of stoma, no bleeding, dressing C/D/I [de-identified] : well healed vertical abdominal incision

## 2022-08-05 NOTE — HISTORY OF PRESENT ILLNESS
[de-identified] : The patient was diagnosed with colon cancer in May 2022 at the age of 51. He presented to  ER 5/19/22 with worsening abdominal pain for 4 days. He reports abdominal pain has been ongoing for 4 years. On 5/19/22 he had a CT A/P which showed apparent focal mural thickening at the cecum/proximal ascending colon may represent colon cancer. The sigmoid colon appears to be tethered to the cecum and it is focally thick-walled; focal tumor invasion/extension from the cecum to the sigmoid colon cannot be excluded. Dilatation of the small bowel with an apparent transition point in the right lower quadrant abdomen, suggestive of small bowel obstruction. Apparent mild mural thickening of the a dilated right lower quadrant small bowel loop with adjacent mesenteric edema for which associated small bowel ischemia cannot be excluded. Multiple hypodense lesions with calcifications in both lobes of the liver with the largest measuring 5.8 x 6.0 cm in hepatic segment 8. These are suspicious for metastases. Mild ascites. Also on 5/19/22 he underwent exploratory laparotomy, total colectomy, end ileostomy, biopsy of liver, and biopsy of retroperitoneum. Pathology showed Omentum, excision: Negative for malignancy. Acutely inflamed exudate consistent with peritonitis. Portion of terminal ileum, cecum with adherent sigmoid, total colectomy: Poorly differentiated infiltrating adenocarcinoma measuring 9.0 cm. Adenocarcinoma infiltrates through the bowel wall and through the visceral peritoneum and infiltrates the adherent sigmoid colon 3 of 17 lymph nodes are positive for metastatic carcinoma. Lymphovascular space invasion is identified. Perineural invasion is identified. The surgical margins are negative. Appendix with serosal tumor implants and acute inflammation. Peritonitis. Liver, biopsy: Metastatic adenocarcinoma. Retroperitoneal biopsy: Adenocarcinoma with necrosis. No loss of nuclear expression of MMR proteins (MLH1, MSH2, MSH6, and PMS2). Staging pT4b pN1b pM1c. On 5/25/22 he had a repeat CT A/P which showed status post total colectomy and ileostomy. Dilated small bowel loops in the left abdomen with associated wall thickening. Both may be postoperative, follow-up is recommended. Moderate amount of ascites new from the prior study. Small amount of free intraperitoneal air likely with postoperative state. Minimal left pleural effusion. New multifocal right lower lobe infiltrate. Multiple liver metastases some of which are calcified again appreciated. [de-identified] : Medical Hx: denies \par Surgical Hx: denies \par Family Hx: denies family history of cancer \par Social Hx: he smoked THC and cigarettes daily for 30 years, quit May 19th, 2022; ETOH had 6 pack a day, quit 4 years ago; was a  (off the [employer]) no longer working; lives with mom and brother.   [de-identified] : Today he is C4D1 FOLFOXIRI / Zirabev, planned ongoing. He has a new blister on top of his stoma for the last 10 days, no improvement. States he made a larger hole for new ostomy patch today. He feels the ostomy is expanding and shrinking and this is the reason for the blister. Will call norman Holden for 8 week follow up now. \par \par Left sided abd pain since last visit is resolved. Still continues with RLQ pain and occ in his lower back. He has been taking 10 mg oxycodone early in the day and 5mg oxycodone in the afternoon. He appears a little calmer today throughout the visit. His chemotherapy side affects noted were fatigue for days 2-4, he found himself more tired than baseline, but continued daily activities. He reported cold sensitivity that starts day of treatment, and resolves closer to next treatment. He reports taste changes, a sour taste for 6 days and slowly resolved. He denies eye changes; hair loss; nail/skin changes; neuropathy; mouth sores; N/V; heart burn; D/C.

## 2022-08-17 ENCOUNTER — RESULT REVIEW (OUTPATIENT)
Age: 52
End: 2022-08-17

## 2022-08-17 ENCOUNTER — APPOINTMENT (OUTPATIENT)
Dept: INFUSION THERAPY | Facility: CLINIC | Age: 52
End: 2022-08-17

## 2022-08-17 ENCOUNTER — RESULT CHARGE (OUTPATIENT)
Age: 52
End: 2022-08-17

## 2022-08-17 VITALS
SYSTOLIC BLOOD PRESSURE: 155 MMHG | WEIGHT: 167 LBS | DIASTOLIC BLOOD PRESSURE: 89 MMHG | BODY MASS INDEX: 22.87 KG/M2 | RESPIRATION RATE: 17 BRPM | HEART RATE: 112 BPM | TEMPERATURE: 97.3 F | OXYGEN SATURATION: 99 %

## 2022-08-17 LAB
ALBUMIN SERPL ELPH-MCNC: 4.1 G/DL — SIGNIFICANT CHANGE UP (ref 3.3–5)
ALP SERPL-CCNC: 108 U/L — SIGNIFICANT CHANGE UP (ref 40–120)
ALT FLD-CCNC: 18 U/L — SIGNIFICANT CHANGE UP (ref 10–45)
ANION GAP SERPL CALC-SCNC: 13 MMOL/L — SIGNIFICANT CHANGE UP (ref 5–17)
ANISOCYTOSIS BLD QL: SLIGHT — SIGNIFICANT CHANGE UP
AST SERPL-CCNC: 26 U/L — SIGNIFICANT CHANGE UP (ref 10–40)
BASOPHILS # BLD AUTO: 0.07 K/UL — SIGNIFICANT CHANGE UP (ref 0–0.2)
BASOPHILS NFR BLD AUTO: 2 % — SIGNIFICANT CHANGE UP (ref 0–2)
BILIRUB SERPL-MCNC: 0.3 MG/DL — SIGNIFICANT CHANGE UP (ref 0.2–1.2)
BUN SERPL-MCNC: 6 MG/DL — LOW (ref 7–23)
CALCIUM SERPL-MCNC: 9.2 MG/DL — SIGNIFICANT CHANGE UP (ref 8.4–10.5)
CHLORIDE SERPL-SCNC: 100 MMOL/L — SIGNIFICANT CHANGE UP (ref 96–108)
CO2 SERPL-SCNC: 22 MMOL/L — SIGNIFICANT CHANGE UP (ref 22–31)
CREAT SERPL-MCNC: 0.65 MG/DL — SIGNIFICANT CHANGE UP (ref 0.5–1.3)
EGFR: 116 ML/MIN/1.73M2 — SIGNIFICANT CHANGE UP
ELLIPTOCYTES BLD QL SMEAR: SLIGHT — SIGNIFICANT CHANGE UP
EOSINOPHIL # BLD AUTO: 0.17 K/UL — SIGNIFICANT CHANGE UP (ref 0–0.5)
EOSINOPHIL NFR BLD AUTO: 5 % — SIGNIFICANT CHANGE UP (ref 0–6)
GIANT PLATELETS BLD QL SMEAR: PRESENT — SIGNIFICANT CHANGE UP
GLUCOSE SERPL-MCNC: 102 MG/DL — HIGH (ref 70–99)
HCT VFR BLD CALC: 34.4 % — LOW (ref 39–50)
HGB BLD-MCNC: 11.2 G/DL — LOW (ref 13–17)
LG PLATELETS BLD QL AUTO: SLIGHT — SIGNIFICANT CHANGE UP
LYMPHOCYTES # BLD AUTO: 0.78 K/UL — LOW (ref 1–3.3)
LYMPHOCYTES # BLD AUTO: 23 % — SIGNIFICANT CHANGE UP (ref 13–44)
MACROCYTES BLD QL: SLIGHT — SIGNIFICANT CHANGE UP
MAGNESIUM SERPL-MCNC: 1.5 MG/DL — LOW (ref 1.6–2.6)
MCHC RBC-ENTMCNC: 23.6 PG — LOW (ref 27–34)
MCHC RBC-ENTMCNC: 32.6 GM/DL — SIGNIFICANT CHANGE UP (ref 32–36)
MCV RBC AUTO: 72.4 FL — LOW (ref 80–100)
MICROCYTES BLD QL: SLIGHT — SIGNIFICANT CHANGE UP
MONOCYTES # BLD AUTO: 0.68 K/UL — SIGNIFICANT CHANGE UP (ref 0–0.9)
MONOCYTES NFR BLD AUTO: 20 % — HIGH (ref 2–14)
NEUTROPHILS # BLD AUTO: 1.56 K/UL — LOW (ref 1.8–7.4)
NEUTROPHILS NFR BLD AUTO: 45 % — SIGNIFICANT CHANGE UP (ref 43–77)
NEUTS BAND # BLD: 1 % — SIGNIFICANT CHANGE UP (ref 0–8)
NRBC # BLD: 0 /100 — SIGNIFICANT CHANGE UP (ref 0–0)
NRBC # BLD: SIGNIFICANT CHANGE UP /100 WBCS (ref 0–0)
OVALOCYTES BLD QL SMEAR: SLIGHT — SIGNIFICANT CHANGE UP
PLAT MORPH BLD: NORMAL — SIGNIFICANT CHANGE UP
PLATELET # BLD AUTO: 117 K/UL — LOW (ref 150–400)
POIKILOCYTOSIS BLD QL AUTO: SLIGHT — SIGNIFICANT CHANGE UP
POLYCHROMASIA BLD QL SMEAR: SLIGHT — SIGNIFICANT CHANGE UP
POTASSIUM SERPL-MCNC: 4.2 MMOL/L — SIGNIFICANT CHANGE UP (ref 3.5–5.3)
POTASSIUM SERPL-SCNC: 4.2 MMOL/L — SIGNIFICANT CHANGE UP (ref 3.5–5.3)
PROT SERPL-MCNC: 7 G/DL — SIGNIFICANT CHANGE UP (ref 6–8.3)
RBC # BLD: 4.75 M/UL — SIGNIFICANT CHANGE UP (ref 4.2–5.8)
RBC # FLD: 24.1 % — HIGH (ref 10.3–14.5)
RBC BLD AUTO: SIGNIFICANT CHANGE UP
SODIUM SERPL-SCNC: 135 MMOL/L — SIGNIFICANT CHANGE UP (ref 135–145)
VARIANT LYMPHS # BLD: 4 % — SIGNIFICANT CHANGE UP (ref 0–6)
WBC # BLD: 3.4 K/UL — LOW (ref 3.8–10.5)
WBC # FLD AUTO: 3.4 K/UL — LOW (ref 3.8–10.5)

## 2022-08-19 ENCOUNTER — APPOINTMENT (OUTPATIENT)
Dept: INFUSION THERAPY | Facility: CLINIC | Age: 52
End: 2022-08-19

## 2022-08-19 ENCOUNTER — APPOINTMENT (OUTPATIENT)
Dept: COLORECTAL SURGERY | Facility: CLINIC | Age: 52
End: 2022-08-19

## 2022-08-19 VITALS
HEART RATE: 94 BPM | OXYGEN SATURATION: 99 % | RESPIRATION RATE: 14 BRPM | SYSTOLIC BLOOD PRESSURE: 121 MMHG | TEMPERATURE: 97.8 F | HEIGHT: 74 IN | BODY MASS INDEX: 21.43 KG/M2 | WEIGHT: 167 LBS | DIASTOLIC BLOOD PRESSURE: 86 MMHG

## 2022-08-19 PROCEDURE — 99213 OFFICE O/P EST LOW 20 MIN: CPT

## 2022-08-19 NOTE — HISTORY OF PRESENT ILLNESS
[FreeTextEntry1] : 8/19/2022: Mr. Palladino remains on chemotherapy (FOLFOXIRI with bevacizumab).  Comes to clinic today for follow-up as well as evalaution of an excoriation atop his ostomy.  Stoma does prolapse throughout the course of the day, spontaneously reduces at night. It remains pink and continues to function. \par \par 6/6/2022: Mr. Palladino is a 51yoM who presented to [facility] on 5/19/2022 with findings concerning for perforated R colon cancer invasive into the sigmoid colon/retroperitoneum and metastatic to the liver.  Tumor is microsatellite stable.  He underwent exploratory laparotomy, total colectomy, end ileostomy, biopsy of liver, and biopsy of retroperitoneum.  Since discharge his appetite has improved, his ostomy has become less edematous and is productive of applesauce-consistency stool.  He takes Metamucil once daily with good effect.  No fever, chills, nausea, vomiting.  Still having intermittent pain at incision but this is improving.\par \par He is aware of pathology results showing the above and is planning to see Oncology.

## 2022-08-19 NOTE — PHYSICAL EXAM
[JVD] : no jugular venous distention  [Respiratory Effort] : normal respiratory effort [Murmur] : murmur was appreciated [Alert] : alert [Oriented to Person] : oriented to person [Oriented to Place] : oriented to place [Oriented to Time] : oriented to time [Calm] : calm [de-identified] : soft, nontender, nondistended.  Well-healed midline incision.  Ostomy in place to RLQ - stoma pink, perfused, prolapsed but reducible, functional.  Small superficial excoriation at superior aspect of stoma, no bleeding. [de-identified] : No acute distress [de-identified] : Normocephalic, atraumatic [de-identified] : moves all extremities without difficulty [de-identified] : Midline incision - staples removed.  Ostomy appliance, underlying skin clean and without erythema, appliance replaced.

## 2022-08-22 ENCOUNTER — APPOINTMENT (OUTPATIENT)
Dept: HEMATOLOGY ONCOLOGY | Facility: CLINIC | Age: 52
End: 2022-08-22

## 2022-08-22 PROCEDURE — ZZZZZ: CPT

## 2022-08-27 ENCOUNTER — OUTPATIENT (OUTPATIENT)
Dept: OUTPATIENT SERVICES | Facility: HOSPITAL | Age: 52
LOS: 1 days | Discharge: HOME | End: 2022-08-27

## 2022-08-27 DIAGNOSIS — C18.9 MALIGNANT NEOPLASM OF COLON, UNSPECIFIED: ICD-10-CM

## 2022-08-27 DIAGNOSIS — Z90.49 ACQUIRED ABSENCE OF OTHER SPECIFIED PARTS OF DIGESTIVE TRACT: Chronic | ICD-10-CM

## 2022-08-31 ENCOUNTER — RESULT REVIEW (OUTPATIENT)
Age: 52
End: 2022-08-31

## 2022-08-31 ENCOUNTER — RESULT CHARGE (OUTPATIENT)
Age: 52
End: 2022-08-31

## 2022-08-31 ENCOUNTER — APPOINTMENT (OUTPATIENT)
Dept: INFUSION THERAPY | Facility: CLINIC | Age: 52
End: 2022-08-31

## 2022-08-31 VITALS
HEIGHT: 74 IN | DIASTOLIC BLOOD PRESSURE: 89 MMHG | OXYGEN SATURATION: 98 % | RESPIRATION RATE: 16 BRPM | TEMPERATURE: 98.2 F | WEIGHT: 165.31 LBS | HEART RATE: 114 BPM | SYSTOLIC BLOOD PRESSURE: 131 MMHG | BODY MASS INDEX: 21.21 KG/M2

## 2022-08-31 LAB
ALBUMIN SERPL ELPH-MCNC: 4.5 G/DL — SIGNIFICANT CHANGE UP (ref 3.3–5)
ALP SERPL-CCNC: 102 U/L — SIGNIFICANT CHANGE UP (ref 40–120)
ALT FLD-CCNC: 13 U/L — SIGNIFICANT CHANGE UP (ref 10–45)
ANION GAP SERPL CALC-SCNC: 14 MMOL/L — SIGNIFICANT CHANGE UP (ref 5–17)
ANISOCYTOSIS BLD QL: SLIGHT — SIGNIFICANT CHANGE UP
AST SERPL-CCNC: 18 U/L — SIGNIFICANT CHANGE UP (ref 10–40)
BASOPHILS # BLD AUTO: 0 K/UL — SIGNIFICANT CHANGE UP (ref 0–0.2)
BASOPHILS NFR BLD AUTO: 0 % — SIGNIFICANT CHANGE UP (ref 0–2)
BILIRUB SERPL-MCNC: 0.2 MG/DL — SIGNIFICANT CHANGE UP (ref 0.2–1.2)
BUN SERPL-MCNC: 11 MG/DL — SIGNIFICANT CHANGE UP (ref 7–23)
CALCIUM SERPL-MCNC: 9.4 MG/DL — SIGNIFICANT CHANGE UP (ref 8.4–10.5)
CHLORIDE SERPL-SCNC: 101 MMOL/L — SIGNIFICANT CHANGE UP (ref 96–108)
CO2 SERPL-SCNC: 24 MMOL/L — SIGNIFICANT CHANGE UP (ref 22–31)
CREAT SERPL-MCNC: 0.74 MG/DL — SIGNIFICANT CHANGE UP (ref 0.5–1.3)
EGFR: 111 ML/MIN/1.73M2 — SIGNIFICANT CHANGE UP
ELLIPTOCYTES BLD QL SMEAR: SLIGHT — SIGNIFICANT CHANGE UP
EOSINOPHIL # BLD AUTO: 0.11 K/UL — SIGNIFICANT CHANGE UP (ref 0–0.5)
EOSINOPHIL NFR BLD AUTO: 5 % — SIGNIFICANT CHANGE UP (ref 0–6)
GLUCOSE SERPL-MCNC: 130 MG/DL — HIGH (ref 70–99)
HCT VFR BLD CALC: 35.7 % — LOW (ref 39–50)
HGB BLD-MCNC: 11.2 G/DL — LOW (ref 13–17)
LG PLATELETS BLD QL AUTO: SLIGHT — SIGNIFICANT CHANGE UP
LYMPHOCYTES # BLD AUTO: 0.98 K/UL — LOW (ref 1–3.3)
LYMPHOCYTES # BLD AUTO: 45 % — HIGH (ref 13–44)
MACROCYTES BLD QL: SLIGHT — SIGNIFICANT CHANGE UP
MAGNESIUM SERPL-MCNC: 1.6 MG/DL — SIGNIFICANT CHANGE UP (ref 1.6–2.6)
MCHC RBC-ENTMCNC: 23.6 PG — LOW (ref 27–34)
MCHC RBC-ENTMCNC: 31.4 GM/DL — LOW (ref 32–36)
MCV RBC AUTO: 75.2 FL — LOW (ref 80–100)
MICROCYTES BLD QL: SLIGHT — SIGNIFICANT CHANGE UP
MONOCYTES # BLD AUTO: 0.39 K/UL — SIGNIFICANT CHANGE UP (ref 0–0.9)
MONOCYTES NFR BLD AUTO: 18 % — HIGH (ref 2–14)
MYELOCYTES NFR BLD: 1 % — HIGH (ref 0–0)
NEUTROPHILS # BLD AUTO: 0.56 K/UL — LOW (ref 1.8–7.4)
NEUTROPHILS NFR BLD AUTO: 26 % — LOW (ref 43–77)
NRBC # BLD: 0 /100 — SIGNIFICANT CHANGE UP (ref 0–0)
NRBC # BLD: SIGNIFICANT CHANGE UP /100 WBCS (ref 0–0)
OVALOCYTES BLD QL SMEAR: SLIGHT — SIGNIFICANT CHANGE UP
PLAT MORPH BLD: NORMAL — SIGNIFICANT CHANGE UP
PLATELET # BLD AUTO: 147 K/UL — LOW (ref 150–400)
POIKILOCYTOSIS BLD QL AUTO: SLIGHT — SIGNIFICANT CHANGE UP
POLYCHROMASIA BLD QL SMEAR: SLIGHT — SIGNIFICANT CHANGE UP
POTASSIUM SERPL-MCNC: 4.3 MMOL/L — SIGNIFICANT CHANGE UP (ref 3.5–5.3)
POTASSIUM SERPL-SCNC: 4.3 MMOL/L — SIGNIFICANT CHANGE UP (ref 3.5–5.3)
PROT SERPL-MCNC: 7.4 G/DL — SIGNIFICANT CHANGE UP (ref 6–8.3)
RBC # BLD: 4.75 M/UL — SIGNIFICANT CHANGE UP (ref 4.2–5.8)
RBC # FLD: 23 % — HIGH (ref 10.3–14.5)
RBC BLD AUTO: SIGNIFICANT CHANGE UP
SODIUM SERPL-SCNC: 138 MMOL/L — SIGNIFICANT CHANGE UP (ref 135–145)
VARIANT LYMPHS # BLD: 5 % — SIGNIFICANT CHANGE UP (ref 0–6)
WBC # BLD: 2.17 K/UL — LOW (ref 3.8–10.5)
WBC # FLD AUTO: 2.17 K/UL — LOW (ref 3.8–10.5)

## 2022-09-01 ENCOUNTER — APPOINTMENT (OUTPATIENT)
Dept: INFUSION THERAPY | Facility: CLINIC | Age: 52
End: 2022-09-01

## 2022-09-01 ENCOUNTER — OUTPATIENT (OUTPATIENT)
Dept: OUTPATIENT SERVICES | Facility: HOSPITAL | Age: 52
LOS: 1 days | End: 2022-09-01
Payer: MEDICAID

## 2022-09-01 ENCOUNTER — APPOINTMENT (OUTPATIENT)
Dept: CT IMAGING | Facility: CLINIC | Age: 52
End: 2022-09-01

## 2022-09-01 VITALS
TEMPERATURE: 98.3 F | RESPIRATION RATE: 17 BRPM | SYSTOLIC BLOOD PRESSURE: 105 MMHG | HEART RATE: 85 BPM | OXYGEN SATURATION: 99 % | DIASTOLIC BLOOD PRESSURE: 73 MMHG

## 2022-09-01 DIAGNOSIS — C18.9 MALIGNANT NEOPLASM OF COLON, UNSPECIFIED: ICD-10-CM

## 2022-09-01 DIAGNOSIS — Z51.89 ENCOUNTER FOR OTHER SPECIFIED AFTERCARE: ICD-10-CM

## 2022-09-01 DIAGNOSIS — Z90.49 ACQUIRED ABSENCE OF OTHER SPECIFIED PARTS OF DIGESTIVE TRACT: Chronic | ICD-10-CM

## 2022-09-01 PROCEDURE — 74177 CT ABD & PELVIS W/CONTRAST: CPT

## 2022-09-01 PROCEDURE — 74177 CT ABD & PELVIS W/CONTRAST: CPT | Mod: 26

## 2022-09-01 PROCEDURE — 71260 CT THORAX DX C+: CPT | Mod: 26

## 2022-09-01 PROCEDURE — 71260 CT THORAX DX C+: CPT

## 2022-09-02 ENCOUNTER — APPOINTMENT (OUTPATIENT)
Dept: INFUSION THERAPY | Facility: CLINIC | Age: 52
End: 2022-09-02

## 2022-09-02 VITALS
HEART RATE: 105 BPM | TEMPERATURE: 98.1 F | WEIGHT: 168.19 LBS | OXYGEN SATURATION: 96 % | RESPIRATION RATE: 18 BRPM | DIASTOLIC BLOOD PRESSURE: 76 MMHG | SYSTOLIC BLOOD PRESSURE: 119 MMHG | HEIGHT: 74 IN | BODY MASS INDEX: 21.58 KG/M2

## 2022-09-03 ENCOUNTER — EMERGENCY (EMERGENCY)
Facility: HOSPITAL | Age: 52
LOS: 0 days | Discharge: HOME | End: 2022-09-03
Attending: EMERGENCY MEDICINE
Payer: MEDICAID

## 2022-09-03 VITALS
DIASTOLIC BLOOD PRESSURE: 93 MMHG | SYSTOLIC BLOOD PRESSURE: 110 MMHG | OXYGEN SATURATION: 98 % | RESPIRATION RATE: 18 BRPM | HEART RATE: 102 BPM | TEMPERATURE: 98 F

## 2022-09-03 VITALS — HEIGHT: 74 IN | WEIGHT: 160.06 LBS

## 2022-09-03 DIAGNOSIS — Z90.49 ACQUIRED ABSENCE OF OTHER SPECIFIED PARTS OF DIGESTIVE TRACT: Chronic | ICD-10-CM

## 2022-09-03 DIAGNOSIS — Z90.49 ACQUIRED ABSENCE OF OTHER SPECIFIED PARTS OF DIGESTIVE TRACT: ICD-10-CM

## 2022-09-03 DIAGNOSIS — I26.99 OTHER PULMONARY EMBOLISM WITHOUT ACUTE COR PULMONALE: ICD-10-CM

## 2022-09-03 DIAGNOSIS — Z93.2 ILEOSTOMY STATUS: ICD-10-CM

## 2022-09-03 PROCEDURE — 99284 EMERGENCY DEPT VISIT MOD MDM: CPT

## 2022-09-03 PROCEDURE — 99283 EMERGENCY DEPT VISIT LOW MDM: CPT

## 2022-09-03 PROCEDURE — 93010 ELECTROCARDIOGRAM REPORT: CPT

## 2022-09-03 PROCEDURE — 93005 ELECTROCARDIOGRAM TRACING: CPT

## 2022-09-03 NOTE — ED ADULT TRIAGE NOTE - CHIEF COMPLAINT QUOTE
patient c/o medication issue.  patient currently being treated for colon ca with chemo/radiation.  seen at [facility] on thursday 9/1, had XRs, dx with pulmonary embolism.  prescribed xarelto ? (unsure which medication) and unable to get from pharmacy d/t insurance issues.  unable to contact anyone at [facility] bc they are closed this weekend.

## 2022-09-03 NOTE — ED STATDOCS - CLINICAL SUMMARY MEDICAL DECISION MAKING FREE TEXT BOX
51 M  who states he is unable to fill rx for Eliquis, will consult pharmacy, may need social work consult and give colostomy bag until 09/06 when pt can  return to [facility] 51 M  who states he is unable to fill rx for Eliquis, will consult pharmacy, may need social work consult and give colostomy bag until 09/06 when pt can  return to [facility]          Rx sent to [facility] in [place] and is covered 100%.  Rx provided for one week and will follow with [facility] for remainder of Rx.  All questions answered.  Olga Myrick PA-C

## 2022-09-03 NOTE — ED STATDOCS - NSFOLLOWUPINSTRUCTIONS_ED_ALL_ED_FT
YOUR DOSE OF ELIQUIS (BLOOD THINNER).  THE FIRST WEEK YOU WILL BE TAKING 10MG (2 TABS) TWICE A DAY.  AFTER ONE WEEK YOU WILL BE TAKING 5MG (ONE TAB) TWICE A DAY.        Rx static image Apixaban (Eliquis) - (By mouth)   Why this medicine is used:  Treats and prevents blood clots.    Contact a nurse or doctor right away if you have:•Sudden or severe headache  •Back pain, numbness, tingling, weakness in your legs or feet  •Loss of bladder or bowel control  •Bloody vomit or vomit that looks like coffee grounds; bloody or black, tarry stools  •Bleeding that does not stop or bruises that do not heal       Common side effects:•Minor bleeding or bruising              Pulmonary Embolism    AMBULATORY CARE:    A pulmonary embolism (PE) is the sudden blockage of a blood vessel in the lungs by an embolus. An embolus is a small piece of blood clot, fat, air, or tumor cells. The embolus cuts off the blood supply to your lungs. A PE can become life-threatening.  Pulmonary Embolism         Common signs and symptoms of a PE:   •Sudden shortness of breath or fast breathing      •Sudden chest pain that is worse when you take a deep breath      •A fast or irregular heartbeat      •Fever      •Coughing up blood, or coughing that does not go away      •Sweating at rest      •Bluish nails      •Cold, pale, clammy skin      •Fainting      Call your local emergency number (911 in the [place]) if:   •You feel lightheaded, short of breath, and have chest pain.      •You cough up blood.      Seek care immediately if:   •Your arm or leg feels warm, tender, and painful. It may look swollen and red.          Call your doctor or hematologist if:   •You have questions or concerns about your condition or care.          Treatment depends on what the embolus is made of and where it is in your lung. Treatment may include any of the following:  •Clot busters are emergency medicines that work to dissolve blood clots.      •Blood thinners help prevent blood clots. Clots can cause strokes, heart attacks, and death. The following are general safety guidelines to follow while you are taking a blood thinner:?Watch for bleeding and bruising while you take blood thinners. Watch for bleeding from your gums or nose. Watch for blood in your urine and bowel movements. Use a soft washcloth on your skin, and a soft toothbrush to brush your teeth. This can keep your skin and gums from bleeding. If you shave, use an electric shaver. Do not play contact sports.       ?Tell your dentist and other healthcare providers that you take a blood thinner. Wear a bracelet or necklace that says you take this medicine.       ?Do not start or stop any other medicines unless your healthcare provider tells you to. Many medicines cannot be used with blood thinners.      ?Take your blood thinner exactly as prescribed by your healthcare provider. Do not skip does or take less than prescribed. Tell your provider right away if you forget to take your blood thinner, or if you take too much.      ?Warfarin is a blood thinner that you may need to take. The following are things you should be aware of if you take warfarin: ?Foods and medicines can affect the amount of warfarin in your blood. Do not make major changes to your diet while you take warfarin. Warfarin works best when you eat about the same amount of vitamin K every day. Vitamin K is found in green leafy vegetables and certain other foods. Ask for more information about what to eat when you are taking warfarin.      ?You will need to see your healthcare provider for follow-up visits when you are on warfarin. You will need regular blood tests. These tests are used to decide how much medicine you need.         •A vena cava filter may be placed inside your vena cava to prevent another PE. The vena cava is a large vein that brings blood from your lower body up to your heart. The filter may help trap an embolus and prevent it from going into your lungs.      •Surgery called a thrombectomy may be done to remove the PE. A procedure called thrombolysis may instead be done to inject a clot buster that helps dissolve or break the clot apart.      Prevent another PE:   •Change your body position or move around often. Move and stretch in your seat several times each hour if you travel by car or work at a desk. In an airplane, get up and walk every hour.      •Exercise regularly to help increase your blood flow. Walking is a good low-impact exercise. Talk to your healthcare provider about the best exercise plan for you.  [facility] Walking for Exercise           •Maintain a healthy weight. Ask your healthcare provider what a healthy weight is for you. Ask him or her to help you create a weight loss plan, if needed.      •Do not smoke. Nicotine and other chemicals in cigarettes and cigars can damage blood vessels and increase your risk for another PE. Ask your healthcare provider for information if you currently smoke and need help to quit. E-cigarettes or smokeless tobacco still contain nicotine. Talk to your healthcare provider before you use these products.      •Ask about birth control if you are a woman who takes the pill. A birth control pill increases the risk for blood clots in certain women. The risk is higher if you are also older than 35, smoke cigarettes, or have a blood clotting disorder. Talk to your healthcare provider about other ways to prevent pregnancy, such as a cervical cap or intrauterine device (IUD).      Follow up with your doctor or hematologist as directed: Make an appointment as soon as possible. You may also need to come in regularly for scans to check for blood clots. Your blood may checked to see how long it takes to clot. Your doctor or specialist will tell you if you need to have this test and how often to have it. Write down your questions so you remember to ask them during your visits.

## 2022-09-03 NOTE — ED STATDOCS - WET READ LAUNCH FT
Review of Systems   Constitutional: Negative  Negative for appetite change and fever  HENT: Positive for hearing loss and trouble swallowing  Negative for tinnitus and voice change  Eyes: Positive for pain and visual disturbance (can't see nearsided)  Negative for photophobia  Respiratory: Positive for shortness of breath (a smoker )  Cardiovascular: Negative  Negative for palpitations  Gastrointestinal: Positive for nausea (with migraines ) and vomiting (with migraines)  Endocrine: Negative  Negative for cold intolerance  Genitourinary: Positive for frequency and urgency  Negative for dysuria  Musculoskeletal: Positive for myalgias and neck pain  Skin: Negative  Negative for rash  Neurological: Positive for dizziness (often, a couple times a day ) and headaches (still getting migraines and headaches a couple times a week )  Negative for tremors, seizures, syncope, facial asymmetry, speech difficulty, weakness, light-headedness and numbness  Hematological: Negative  Does not bruise/bleed easily  Psychiatric/Behavioral: Positive for sleep disturbance  Negative for confusion and hallucinations  There are no Wet Read(s) to document.

## 2022-09-03 NOTE — ED STATDOCS - OBJECTIVE STATEMENT
52 y/o male with a PMHx of ileostomy, colon CA presents to the ED c/o medication issues. States he was dx with PE and was rx Eliquis. Notes he is unable to get Eliquis, went to [facility] but couldn't receive meds. Also notes he is running low on colostomy bags for colon CA. No social concerns. 50 y/o male with a PMHx of ileostomy, colon CA presents to the ED c/o medication issues. States he was dx with PE and was rx Eliquis. Notes he is unable to get Eliquis, went to [facility] but couldn't receive meds. Also notes he is running low on colostomy bags for colon CA.

## 2022-09-03 NOTE — ED ADULT NURSE NOTE - CHIEF COMPLAINT QUOTE
patient c/o medication issue.  patient currently being treated for colon ca with chemo/radiation.  seen at [facility] on thursday 9/1, had XRs, dx with pulmonary embolism.  prescribed xarelto ? (unsure which medication) and unable to get from pharmacy d/t insurance issues.  unable to contact anyone at [facility] bc they are closed this weekend.

## 2022-09-03 NOTE — ED ADULT NURSE NOTE - NSIMPLEMENTINTERV_GEN_ALL_ED
Implemented All Fall with Harm Risk Interventions:  [place] to call system. Call bell, personal items and telephone within reach. Instruct patient to call for assistance. Room bathroom lighting operational. Non-slip footwear when patient is off stretcher. Physically safe environment: no spills, clutter or unnecessary equipment. Stretcher in lowest position, wheels locked, appropriate side rails in place. Provide visual cue, wrist band, yellow gown, etc. Monitor gait and stability. Monitor for mental status changes and reorient to person, place, and time. Review medications for side effects contributing to fall risk. Reinforce activity limits and safety measures with patient and family. Provide visual clues: red socks.

## 2022-09-03 NOTE — ED STATDOCS - NS ED ATTENDING STATEMENT MOD
This was a shared visit with the MATEO. I reviewed and verified the documentation and independently performed the documented:

## 2022-09-03 NOTE — ED STATDOCS - NSFOLLOWUPCLINICS_GEN_ALL_ED_FT
[facility]  Family Medicine  [address] [place] [address]  Phone: [phone]  Fax:

## 2022-09-03 NOTE — ED ADULT NURSE NOTE - OBJECTIVE STATEMENT
see chief complaint note- "patient c/o medication issue.  patient currently being treated for colon ca with chemo/radiation.  seen at [facility] on thursday 9/1, had XRs, dx with pulmonary embolism.  prescribed xarelto ? (unsure which medication) and unable to get from pharmacy d/t insurance issues.  unable to contact anyone at [facility] bc they are closed this weekend."

## 2022-09-03 NOTE — ED STATDOCS - PROGRESS NOTE DETAILS
Pt. is a 51 year old male Hx colon CA with ileostomy, presents with medication complication.  Diagnosed with PE and Rx sent for Eliquis to [facility].  Pt. has [employer] discount card.   Pt. states pharmacy would not fill it and his payment would be $700.  I called [facility] and they do not accept his insurance and they do not have the medications until Tuesday.  His insurance is covered by [employer].  I called [employer] and they do not have in stock.  Olga Myrick PA-C Rx sent to [facility] in [place] and is covered 100%.  Rx provided for one week and will follow with [facility] for remainder of Rx.  All questions answered.  Olga Myrick PA-C

## 2022-09-03 NOTE — ED STATDOCS - PATIENT PORTAL LINK FT
You can access the FollowMyHealth Patient Portal offered by [facility] by registering at the following website: [email or web address]. By joining [facility]’s FollowMyHealth portal, you will also be able to view your health information using other applications (apps) compatible with our system.

## 2022-09-03 NOTE — CHART NOTE - NSCHARTNOTEFT_GEN_A_CORE
Pt is a 50 y/o male with a past medical hx of colon ca.  Pt was dx with a pulmonary embolism at the [facility] on 9/01/2022.  Pt reports he was unable to get his medication so came to the ER.  Sw was asked to assist with helping pt get his medication.  pt will require Eliquis 5 mg 2 Tab 2 x daily.  Sw called several places and [facility] in [place] has the meds and are able to fill pts prescription which will be covered by his insurance @ 100 percent. W discussed above with pt as well as PA.  Pt reports he can drive to [facility] to obtain meds.  Pt has been cleared for d/c.

## 2022-09-03 NOTE — ED STATDOCS - ATTENDING APP SHARED VISIT CONTRIBUTION OF CARE
I, Sherif Brown MD,  performed the initial face to face bedside interview with this patient regarding history of present illness, review of symptoms and relevant past medical, social and family history.  I completed an independent physical examination.  I was the initial provider who evaluated this patient.   I personally saw the patient and performed a substantive portion of the visit including all aspects of the medical decision making.  I have signed out the follow up of any pending tests (i.e. labs, radiological studies) to the MATEO.  I have communicated the patient’s plan of care and disposition with the MATEO.  The history, relevant review of systems, past medical and surgical history, medical decision making, and physical examination was documented by the scribe in my presence and I attest to the accuracy of the documentation.

## 2022-09-03 NOTE — ED ADULT NURSE NOTE - NS ED NURSE RECORD ANOTHER VITAL SIGN
[facility]  Orthopedics  Discharge Summary      Patient Name: Des Junior  MRN: [id number]  Admission Date: 12/7/2020  Hospital Length of Stay: 0 days  Discharge Date and Time:  12/08/2020 12:07 PM  Attending Physician: Hua Gore III, MD   Discharging Provider: Erwin Jama MD  Primary Care Provider: Christopher Cortez MD    HPI: See H&P    Procedure(s) (LRB):  ARTHROPLASTY, KNEE:RIGHT:DEPUY-SIGMA (Right)      Hospital Course: Patient presented for above procedure.  Tolerated it well and was discharged home POD1 after voiding, tolerating diet, ambulating, pain controlled.  Patient was informed about signs and symptoms of compartment syndrome and if any of these should present then he should immediately call us back and come to the ED.  Discharge instructions, follow-up appointment, and med rec are below.      Consults (From admission, onward)        Status Ordering Provider     Inpatient consult to Pain Management  Once     Provider:  (Not yet assigned)    Acknowledged MANDY BERNAL     Inpatient consult to Respiratory Care  Once     Provider:  (Not yet assigned)    Acknowledged MANDY BERNAL     Inpatient consult to Social Work  Once     Provider:  (Not yet assigned)    Acknowledged MANDY BERNAL          Significant Diagnostic Studies: Labs:   CBC   Recent Labs   Lab 12/07/20  2326   HCT 37       Pending Diagnostic Studies:     None        Final Active Diagnoses:    Diagnosis Date Noted POA    PRINCIPAL PROBLEM:  Right knee pain [M25.561] 12/07/2020 Yes      Problems Resolved During this Admission:      Discharged Condition: good    Disposition: Home or Self Care    Follow Up:  Follow-up Information     Call Christopher Cortez MD.    Specialty: Internal Medicine  Why: As needed  Contact information:  Elicia [address]TRAN  [facility] [address]  [phone]             Outpatient Therapy.    Contact information:  [facility]  [address],  [place] [address]  Phone: [phone]           Igancia Danielle NP On 12/22/2020.    Specialty: Orthopedic Surgery  Why: Post op Tuesday 12/22 @ 9:45am  Contact information:  Alexia BELTRAN  [facility] [address]  [phone]                 Patient Instructions:      Keep surgical extremity elevated     Ice to affected area     Notify your health care provider if you experience any of the following:  temperature >100.4     Notify your health care provider if you experience any of the following:  persistent nausea and vomiting or diarrhea     Notify your health care provider if you experience any of the following:  severe uncontrolled pain     Notify your health care provider if you experience any of the following:  redness, tenderness, or signs of infection (pain, swelling, redness, odor or green/yellow discharge around incision site)     Notify your health care provider if you experience any of the following:  difficulty breathing or increased cough     Notify your health care provider if you experience any of the following:  severe persistent headache     Notify your health care provider if you experience any of the following:  worsening rash     Notify your health care provider if you experience any of the following:  persistent dizziness, light-headedness, or visual disturbances     Notify your health care provider if you experience any of the following:  increased confusion or weakness     Notify your health care provider if you experience any of the following:     Leave dressing on - Keep it clean, dry, and intact until clinic visit     Activity as tolerated     Call MD for:  difficulty breathing, headache or visual disturbances     Call MD for:  extreme fatigue     Call MD for:  hives     Call MD for:  persistent dizziness or light-headedness     Call MD for:  persistent nausea and vomiting     Call MD for:  redness, tenderness, or signs of infection (pain, swelling, redness, odor or  green/yellow discharge around incision site)     Call MD for:  severe uncontrolled pain     Call MD for:  temperature >100.4     Keep surgical extremity elevated     Leave dressing on - Keep it clean, dry, and intact until clinic visit   Order Comments: Do not remove surgical dressing for 2 weeks post-op. This will be done only by MD at initial post-op visit. If dressing is completely saturated, replace with identical dressing - silver-impregnated hydrocolloid dressing.     Do not get dressings wet. Do not shower.     If dressing continues to be saturated or there are signs of infection, please call Ortho Clinic [phone] for further instructions and to make appt to be seen.     Lifting restrictions   Order Comments: No strenuous exercise or lifting of > 10 lbs     No driving, operating heavy equipment or signing legal documents while taking pain medication     Sponge bath only until clinic visit     Weight bearing as tolerated     Weight bearing restrictions (specify):     Medications:  Reconciled Home Medications:      Medication List      START taking these medications    oxyCODONE 5 MG immediate release tablet  Commonly known as: ROXICODONE  Take 1-2 tablets by mouth every 4-6 hours as needed for pain     STOOL SOFTENER 100 MG capsule  Generic drug: docusate sodium  Take 1 capsule (100 mg total) by mouth 2 (two) times daily as needed for Constipation.        CHANGE how you take these medications    insulin glargine 100 units/mL (3mL) SubQ pen  Commonly known as: LANTUS SOLOSTAR U-100 INSULIN  Inject 9 units every morning and evening.  What changed: additional instructions     repaglinide 2 MG tablet  Commonly known as: PRANDIN  Take 2 tablets with meals.  What changed: additional instructions        CONTINUE taking these medications    acetaminophen 500 MG tablet  Commonly known as: TYLENOL  Take 1,000 mg by mouth daily as needed for Pain.     albuterol 90 mcg/actuation inhaler  Commonly known as:  "PROVENTIL/VENTOLIN HFA  2 puffs every 4 hours as needed for cough, wheeze, or shortness of breath     amLODIPine 5 MG tablet  Commonly known as: NORVASC  Take 1 tablet (5 mg total) by mouth once daily.     blood sugar diagnostic Strp  1 strip by Misc.(Non-Drug; Combo Route) route 2 (two) times daily with meals.     cetirizine 10 MG tablet  Commonly known as: ZYRTEC  Take 1 tablet (10 mg total) by mouth once daily.     fluorouraciL 5 % cream  Commonly known as: EFUDEX  Use qd-bid to red or rough precancer spots until irritation occurs and then stop.  Resume as needed.     gabapentin 100 MG capsule  Commonly known as: NEURONTIN  Take one capsule nightly. Increase to two capsule if no relief. Increase to three capsules if you still have symptoms.     hydroCHLOROthiazide 25 MG tablet  Commonly known as: HYDRODIURIL  Take 1 tablet (25 mg total) by mouth once daily.     ketoconazole 2 % cream  Commonly known as: NIZORAL  Apply topically once daily.     LANCETS & BLOOD GLUCOSE STRIPS MISC  * * * Twice a day .  check glucose twice a day     losartan 50 MG tablet  Commonly known as: COZAAR  Take 1 tablet (50 mg total) by mouth once daily.     metFORMIN 500 MG ER 24hr tablet  Commonly known as: GLUCOPHAGE-XR  Take 2 tablets (1,000 mg total) by mouth 2 (two) times daily with meals.     metoprolol succinate 50 MG 24 hr tablet  Commonly known as: TOPROL-XL  Take 1 tablet (50 mg total) by mouth once daily.     olopatadine 0.7 % Drop  Commonly known as: PAZEO  Apply 1 drop to eye once daily.     OMEGA-3 FISH OIL 1,000-5 mg-unit Cap  Generic drug: omega-3 fatty acids-vitamin E  Take by mouth 2 (two) times daily. 1 Capsule Oral Every day     pen needle, diabetic 29 gauge x 1/2" Ndle  Inject insulin daily     pravastatin 40 MG tablet  Commonly known as: PRAVACHOL  Take 1 tablet (40 mg total) by mouth once daily.     PriLOSEC OTC 20 MG tablet  Generic drug: omeprazole  Take by mouth. 1 Tablet, Delayed Release (E.C.) Oral Every day   "   rivaroxaban 20 mg Tab  Commonly known as: XARELTO  TAKE 1 TABLET  MOUTH DAILY WITH  EVENING MEAL.     VITAMIN D3 25 mcg (1,000 unit) capsule  Generic drug: cholecalciferol (vitamin D3)  Take 1,000 Units by mouth once daily.            Erwin Jama MD  Orthopedics  [facility]   Yes

## 2022-09-14 ENCOUNTER — RESULT REVIEW (OUTPATIENT)
Age: 52
End: 2022-09-14

## 2022-09-14 ENCOUNTER — RESULT CHARGE (OUTPATIENT)
Age: 52
End: 2022-09-14

## 2022-09-14 ENCOUNTER — APPOINTMENT (OUTPATIENT)
Dept: HEMATOLOGY ONCOLOGY | Facility: CLINIC | Age: 52
End: 2022-09-14

## 2022-09-14 ENCOUNTER — APPOINTMENT (OUTPATIENT)
Dept: INFUSION THERAPY | Facility: CLINIC | Age: 52
End: 2022-09-14

## 2022-09-14 VITALS
SYSTOLIC BLOOD PRESSURE: 125 MMHG | DIASTOLIC BLOOD PRESSURE: 84 MMHG | HEART RATE: 116 BPM | RESPIRATION RATE: 17 BRPM | BODY MASS INDEX: 21.44 KG/M2 | OXYGEN SATURATION: 97 % | WEIGHT: 167 LBS | TEMPERATURE: 98.7 F

## 2022-09-14 LAB
ALBUMIN SERPL ELPH-MCNC: 4.5 G/DL — SIGNIFICANT CHANGE UP (ref 3.3–5)
ALP SERPL-CCNC: 105 U/L — SIGNIFICANT CHANGE UP (ref 40–120)
ALT FLD-CCNC: 15 U/L — SIGNIFICANT CHANGE UP (ref 10–45)
ANION GAP SERPL CALC-SCNC: 13 MMOL/L — SIGNIFICANT CHANGE UP (ref 5–17)
AST SERPL-CCNC: 18 U/L — SIGNIFICANT CHANGE UP (ref 10–40)
BASOPHILS # BLD AUTO: 0.06 K/UL — SIGNIFICANT CHANGE UP (ref 0–0.2)
BASOPHILS NFR BLD AUTO: 0.8 % — SIGNIFICANT CHANGE UP (ref 0–2)
BILIRUB SERPL-MCNC: 0.3 MG/DL — SIGNIFICANT CHANGE UP (ref 0.2–1.2)
BILIRUB UR QL STRIP: NEGATIVE
BUN SERPL-MCNC: 16 MG/DL — SIGNIFICANT CHANGE UP (ref 7–23)
CALCIUM SERPL-MCNC: 9.6 MG/DL — SIGNIFICANT CHANGE UP (ref 8.4–10.5)
CHLORIDE SERPL-SCNC: 99 MMOL/L — SIGNIFICANT CHANGE UP (ref 96–108)
CO2 SERPL-SCNC: 20 MMOL/L — LOW (ref 22–31)
CREAT SERPL-MCNC: 0.79 MG/DL — SIGNIFICANT CHANGE UP (ref 0.5–1.3)
EGFR: 109 ML/MIN/1.73M2 — SIGNIFICANT CHANGE UP
EOSINOPHIL # BLD AUTO: 0.08 K/UL — SIGNIFICANT CHANGE UP (ref 0–0.5)
EOSINOPHIL NFR BLD AUTO: 1 % — SIGNIFICANT CHANGE UP (ref 0–6)
GLUCOSE SERPL-MCNC: 107 MG/DL — HIGH (ref 70–99)
GLUCOSE UR-MCNC: NEGATIVE
HCG UR QL: 0.2 EU/DL
HCT VFR BLD CALC: 38 % — LOW (ref 39–50)
HGB BLD-MCNC: 12.4 G/DL — LOW (ref 13–17)
HGB UR QL STRIP.AUTO: NEGATIVE
IMM GRANULOCYTES NFR BLD AUTO: 0.9 % — SIGNIFICANT CHANGE UP (ref 0–1.5)
KETONES UR-MCNC: NEGATIVE
LEUKOCYTE ESTERASE UR QL STRIP: NEGATIVE
LYMPHOCYTES # BLD AUTO: 1.85 K/UL — SIGNIFICANT CHANGE UP (ref 1–3.3)
LYMPHOCYTES # BLD AUTO: 23.9 % — SIGNIFICANT CHANGE UP (ref 13–44)
MAGNESIUM SERPL-MCNC: 2 MG/DL — SIGNIFICANT CHANGE UP (ref 1.6–2.6)
MCHC RBC-ENTMCNC: 24.6 PG — LOW (ref 27–34)
MCHC RBC-ENTMCNC: 32.6 GM/DL — SIGNIFICANT CHANGE UP (ref 32–36)
MCV RBC AUTO: 75.2 FL — LOW (ref 80–100)
MONOCYTES # BLD AUTO: 0.82 K/UL — SIGNIFICANT CHANGE UP (ref 0–0.9)
MONOCYTES NFR BLD AUTO: 10.6 % — SIGNIFICANT CHANGE UP (ref 2–14)
NEUTROPHILS # BLD AUTO: 4.87 K/UL — SIGNIFICANT CHANGE UP (ref 1.8–7.4)
NEUTROPHILS NFR BLD AUTO: 62.8 % — SIGNIFICANT CHANGE UP (ref 43–77)
NITRITE UR QL STRIP: NEGATIVE
NRBC # BLD: 0 /100 WBCS — SIGNIFICANT CHANGE UP (ref 0–0)
PH UR STRIP: 5.5
PLATELET # BLD AUTO: 166 K/UL — SIGNIFICANT CHANGE UP (ref 150–400)
POTASSIUM SERPL-MCNC: 5.1 MMOL/L — SIGNIFICANT CHANGE UP (ref 3.5–5.3)
POTASSIUM SERPL-SCNC: 5.1 MMOL/L — SIGNIFICANT CHANGE UP (ref 3.5–5.3)
PROT SERPL-MCNC: 7.7 G/DL — SIGNIFICANT CHANGE UP (ref 6–8.3)
PROT UR STRIP-MCNC: ABNORMAL
RBC # BLD: 5.05 M/UL — SIGNIFICANT CHANGE UP (ref 4.2–5.8)
RBC # FLD: 23.3 % — HIGH (ref 10.3–14.5)
SODIUM SERPL-SCNC: 132 MMOL/L — LOW (ref 135–145)
SP GR UR STRIP: 1.02
WBC # BLD: 7.75 K/UL — SIGNIFICANT CHANGE UP (ref 3.8–10.5)
WBC # FLD AUTO: 7.75 K/UL — SIGNIFICANT CHANGE UP (ref 3.8–10.5)

## 2022-09-15 DIAGNOSIS — Z51.11 ENCOUNTER FOR ANTINEOPLASTIC CHEMOTHERAPY: ICD-10-CM

## 2022-09-15 DIAGNOSIS — R11.2 NAUSEA WITH VOMITING, UNSPECIFIED: ICD-10-CM

## 2022-09-16 ENCOUNTER — APPOINTMENT (OUTPATIENT)
Dept: INFUSION THERAPY | Facility: CLINIC | Age: 52
End: 2022-09-16

## 2022-09-16 VITALS
OXYGEN SATURATION: 99 % | SYSTOLIC BLOOD PRESSURE: 124 MMHG | TEMPERATURE: 98.7 F | RESPIRATION RATE: 18 BRPM | HEART RATE: 115 BPM | DIASTOLIC BLOOD PRESSURE: 86 MMHG

## 2022-09-19 ENCOUNTER — APPOINTMENT (OUTPATIENT)
Dept: MRI IMAGING | Facility: CLINIC | Age: 52
End: 2022-09-19

## 2022-09-19 ENCOUNTER — APPOINTMENT (OUTPATIENT)
Dept: CT IMAGING | Facility: CLINIC | Age: 52
End: 2022-09-19

## 2022-09-28 ENCOUNTER — RESULT REVIEW (OUTPATIENT)
Age: 52
End: 2022-09-28

## 2022-09-28 ENCOUNTER — APPOINTMENT (OUTPATIENT)
Dept: INFUSION THERAPY | Facility: CLINIC | Age: 52
End: 2022-09-28

## 2022-09-28 ENCOUNTER — APPOINTMENT (OUTPATIENT)
Dept: HEMATOLOGY ONCOLOGY | Facility: CLINIC | Age: 52
End: 2022-09-28

## 2022-09-28 ENCOUNTER — RESULT CHARGE (OUTPATIENT)
Age: 52
End: 2022-09-28

## 2022-09-28 VITALS
SYSTOLIC BLOOD PRESSURE: 126 MMHG | DIASTOLIC BLOOD PRESSURE: 81 MMHG | WEIGHT: 171 LBS | HEART RATE: 88 BPM | OXYGEN SATURATION: 98 % | RESPIRATION RATE: 17 BRPM | BODY MASS INDEX: 21.96 KG/M2 | TEMPERATURE: 98.3 F

## 2022-09-28 LAB
ALBUMIN SERPL ELPH-MCNC: 4.3 G/DL — SIGNIFICANT CHANGE UP (ref 3.3–5)
ALP SERPL-CCNC: 131 U/L — HIGH (ref 40–120)
ALT FLD-CCNC: 16 U/L — SIGNIFICANT CHANGE UP (ref 10–45)
ANION GAP SERPL CALC-SCNC: 14 MMOL/L — SIGNIFICANT CHANGE UP (ref 5–17)
AST SERPL-CCNC: 21 U/L — SIGNIFICANT CHANGE UP (ref 10–40)
BASOPHILS # BLD AUTO: 0.04 K/UL — SIGNIFICANT CHANGE UP (ref 0–0.2)
BASOPHILS NFR BLD AUTO: 0.5 % — SIGNIFICANT CHANGE UP (ref 0–2)
BILIRUB SERPL-MCNC: 0.2 MG/DL — SIGNIFICANT CHANGE UP (ref 0.2–1.2)
BUN SERPL-MCNC: 13 MG/DL — SIGNIFICANT CHANGE UP (ref 7–23)
CALCIUM SERPL-MCNC: 9.1 MG/DL — SIGNIFICANT CHANGE UP (ref 8.4–10.5)
CHLORIDE SERPL-SCNC: 103 MMOL/L — SIGNIFICANT CHANGE UP (ref 96–108)
CO2 SERPL-SCNC: 21 MMOL/L — LOW (ref 22–31)
CREAT SERPL-MCNC: 0.65 MG/DL — SIGNIFICANT CHANGE UP (ref 0.5–1.3)
EGFR: 116 ML/MIN/1.73M2 — SIGNIFICANT CHANGE UP
EOSINOPHIL # BLD AUTO: 0.41 K/UL — SIGNIFICANT CHANGE UP (ref 0–0.5)
EOSINOPHIL NFR BLD AUTO: 4.6 % — SIGNIFICANT CHANGE UP (ref 0–6)
GLUCOSE SERPL-MCNC: 97 MG/DL — SIGNIFICANT CHANGE UP (ref 70–99)
HCT VFR BLD CALC: 36.3 % — LOW (ref 39–50)
HGB BLD-MCNC: 11.7 G/DL — LOW (ref 13–17)
IMM GRANULOCYTES NFR BLD AUTO: 1.2 % — HIGH (ref 0–0.9)
LYMPHOCYTES # BLD AUTO: 1.9 K/UL — SIGNIFICANT CHANGE UP (ref 1–3.3)
LYMPHOCYTES # BLD AUTO: 21.4 % — SIGNIFICANT CHANGE UP (ref 13–44)
MAGNESIUM SERPL-MCNC: 1.7 MG/DL — SIGNIFICANT CHANGE UP (ref 1.6–2.6)
MCHC RBC-ENTMCNC: 25.3 PG — LOW (ref 27–34)
MCHC RBC-ENTMCNC: 32.2 GM/DL — SIGNIFICANT CHANGE UP (ref 32–36)
MCV RBC AUTO: 78.4 FL — LOW (ref 80–100)
MONOCYTES # BLD AUTO: 0.81 K/UL — SIGNIFICANT CHANGE UP (ref 0–0.9)
MONOCYTES NFR BLD AUTO: 9.1 % — SIGNIFICANT CHANGE UP (ref 2–14)
NEUTROPHILS # BLD AUTO: 5.6 K/UL — SIGNIFICANT CHANGE UP (ref 1.8–7.4)
NEUTROPHILS NFR BLD AUTO: 63.2 % — SIGNIFICANT CHANGE UP (ref 43–77)
NRBC # BLD: 0 /100 WBCS — SIGNIFICANT CHANGE UP (ref 0–0)
PLATELET # BLD AUTO: 129 K/UL — LOW (ref 150–400)
POTASSIUM SERPL-MCNC: 4 MMOL/L — SIGNIFICANT CHANGE UP (ref 3.5–5.3)
POTASSIUM SERPL-SCNC: 4 MMOL/L — SIGNIFICANT CHANGE UP (ref 3.5–5.3)
PROT SERPL-MCNC: 7.2 G/DL — SIGNIFICANT CHANGE UP (ref 6–8.3)
RBC # BLD: 4.63 M/UL — SIGNIFICANT CHANGE UP (ref 4.2–5.8)
RBC # FLD: 21.6 % — HIGH (ref 10.3–14.5)
SODIUM SERPL-SCNC: 137 MMOL/L — SIGNIFICANT CHANGE UP (ref 135–145)
WBC # BLD: 8.87 K/UL — SIGNIFICANT CHANGE UP (ref 3.8–10.5)
WBC # FLD AUTO: 8.87 K/UL — SIGNIFICANT CHANGE UP (ref 3.8–10.5)

## 2022-09-28 PROCEDURE — 99215 OFFICE O/P EST HI 40 MIN: CPT

## 2022-09-30 ENCOUNTER — APPOINTMENT (OUTPATIENT)
Dept: INFUSION THERAPY | Facility: CLINIC | Age: 52
End: 2022-09-30

## 2022-10-03 DIAGNOSIS — Z87.891 PERSONAL HISTORY OF NICOTINE DEPENDENCE: ICD-10-CM

## 2022-10-04 ENCOUNTER — NON-APPOINTMENT (OUTPATIENT)
Age: 52
End: 2022-10-04

## 2022-10-04 ENCOUNTER — APPOINTMENT (OUTPATIENT)
Dept: CARDIOLOGY | Facility: CLINIC | Age: 52
End: 2022-10-04

## 2022-10-04 VITALS
WEIGHT: 177 LBS | TEMPERATURE: 98.3 F | BODY MASS INDEX: 22.72 KG/M2 | HEIGHT: 74 IN | RESPIRATION RATE: 17 BRPM | SYSTOLIC BLOOD PRESSURE: 96 MMHG | OXYGEN SATURATION: 99 % | DIASTOLIC BLOOD PRESSURE: 60 MMHG | HEART RATE: 130 BPM

## 2022-10-04 VITALS
HEIGHT: 74 IN | HEART RATE: 130 BPM | DIASTOLIC BLOOD PRESSURE: 92 MMHG | WEIGHT: 177.91 LBS | BODY MASS INDEX: 22.83 KG/M2 | OXYGEN SATURATION: 99 % | SYSTOLIC BLOOD PRESSURE: 116 MMHG

## 2022-10-04 DIAGNOSIS — F12.90 CANNABIS USE, UNSPECIFIED, UNCOMPLICATED: ICD-10-CM

## 2022-10-04 PROCEDURE — 99204 OFFICE O/P NEW MOD 45 MIN: CPT | Mod: 25

## 2022-10-04 PROCEDURE — 93000 ELECTROCARDIOGRAM COMPLETE: CPT

## 2022-10-04 NOTE — PHYSICAL EXAM
[Well Developed] : well developed [Well Nourished] : well nourished [No Acute Distress] : no acute distress [Normal Conjunctiva] : normal conjunctiva [Normal Venous Pressure] : normal venous pressure [No Carotid Bruit] : no carotid bruit [Tachycardia] : tachycardic [Heart Rate ___] : [unfilled] bpm [Rhythm Regular] : regular [Normal S1] : normal S1 [Normal S2] : normal S2 [No Murmur] : no murmurs heard [No Pitting Edema] : no pitting edema present [2+] : left 2+ [No Abnormalities] : the abdominal aorta was not enlarged and no bruit was heard [Clear Lung Fields] : clear lung fields [Good Air Entry] : good air entry [No Respiratory Distress] : no respiratory distress  [Soft] : abdomen soft [Non Tender] : non-tender [Normal Bowel Sounds] : normal bowel sounds [Normal Gait] : normal gait [No Edema] : no edema [No Cyanosis] : no cyanosis [No Clubbing] : no clubbing [No Varicosities] : no varicosities [Normal Radial B/L] : normal radial B/L [No Rash] : no rash [No Skin Lesions] : no skin lesions [Moves all extremities] : moves all extremities [No Focal Deficits] : no focal deficits [Normal Speech] : normal speech [Alert and Oriented] : alert and oriented [Normal memory] : normal memory [S3] : no S3 [S4] : no S4 [Rt] : no varicose veins of the right leg [Right Carotid Bruit] : no bruit heard over the right carotid [Left Carotid Bruit] : no bruit heard over the left carotid [Right Femoral Bruit] : no bruit heard over the right femoral artery [Left Femoral Bruit] : no bruit heard over the left femoral artery [de-identified] : colostomy bag in place

## 2022-10-04 NOTE — REVIEW OF SYSTEMS
[Fever] : no fever [Blurry Vision] : no blurred vision [SOB] : no shortness of breath [Leg Claudication] : no intermittent leg claudication [Syncope] : no syncope [Abdominal Pain] : no abdominal pain [Nausea] : no nausea [Change in Appetite] : no change in appetite [Urinary Frequency] : no change in urinary frequency [Joint Pain] : no joint pain [FreeTextEntry7] : discomfort around colostomy

## 2022-10-04 NOTE — DISCUSSION/SUMMARY
[FreeTextEntry1] : 51 year old male\par \par abnormal CXR   cardiomegaly : tachycardic ? anxiety ,will obtain echocardiogram to assess ventricular function \par \par Sinus tachycardia : no symptoms , anxiety   recent blood work , mild anemia , low normal BP without dizziness ,? dehydration , encourage patient to increase fluid intake , echo , TSH level  , holter monitor to assess heart rate variability \par \par Pulmonary embolism : no SOB , will obtain echo to rule out pulmonary hypertension , continue anticoagulation \par \par Metastatic colon carcinoma:  further plan as per oncology \par \par follow up after 3 weeks \par \par

## 2022-10-04 NOTE — HISTORY OF PRESENT ILLNESS
[FreeTextEntry1] : 51 year old male with hx metastatic colon carcinoma, on chemotherapy , recent incidental finding pulmonary embolism on CT angio September 2022 , chronic back pain  who was noted to have cardiomegaly on CXR done couple of weeks ago . Patient says he is tolerating chemotherapy , patient denies any chest pain or shortness of breath , \par \par patient is tachycardic on ekg , patient just walked in , is anxious , denies any chest pain or shortness of breath \par \par Patient lost significant weight over the time   Patient blood pressure is low normal range  denies any dizziness , or fatigue , \par \par his recent blood work looks fine \par \par

## 2022-10-04 NOTE — CARDIOLOGY SUMMARY
[de-identified] : 10/4/22  sinus tachycardic  low qrs voltage  anteroseptal infarct ( no prior ekg to compare ) ,

## 2022-10-05 ENCOUNTER — APPOINTMENT (OUTPATIENT)
Dept: CARDIOLOGY | Facility: CLINIC | Age: 52
End: 2022-10-05

## 2022-10-05 PROCEDURE — 93306 TTE W/DOPPLER COMPLETE: CPT

## 2022-10-06 ENCOUNTER — NON-APPOINTMENT (OUTPATIENT)
Age: 52
End: 2022-10-06

## 2022-10-10 ENCOUNTER — NON-APPOINTMENT (OUTPATIENT)
Age: 52
End: 2022-10-10

## 2022-10-10 DIAGNOSIS — Z93.2 ILEOSTOMY STATUS: ICD-10-CM

## 2022-10-10 DIAGNOSIS — I26.99 OTHER PULMONARY EMBOLISM WITHOUT ACUTE COR PULMONALE: ICD-10-CM

## 2022-10-10 NOTE — PHYSICAL EXAM
[Restricted in physically strenuous activity but ambulatory and able to carry out work of a light or sedentary nature] : Status 1- Restricted in physically strenuous activity but ambulatory and able to carry out work of a light or sedentary nature, e.g., light house work, office work [Normal] : affect appropriate [de-identified] : wt stable since last visit  [de-identified] : ileotomy in place, dressing C/D/I [de-identified] : well healed vertical abdominal incision

## 2022-10-10 NOTE — HISTORY OF PRESENT ILLNESS
[de-identified] : The patient was diagnosed with colon cancer in May 2022 at the age of 51. He presented to  ER 5/19/22 with worsening abdominal pain for 4 days. He reports abdominal pain has been ongoing for 4 years. On 5/19/22 he had a CT A/P which showed apparent focal mural thickening at the cecum/proximal ascending colon may represent colon cancer. The sigmoid colon appears to be tethered to the cecum and it is focally thick-walled; focal tumor invasion/extension from the cecum to the sigmoid colon cannot be excluded. Dilatation of the small bowel with an apparent transition point in the right lower quadrant abdomen, suggestive of small bowel obstruction. Apparent mild mural thickening of the a dilated right lower quadrant small bowel loop with adjacent mesenteric edema for which associated small bowel ischemia cannot be excluded. Multiple hypodense lesions with calcifications in both lobes of the liver with the largest measuring 5.8 x 6.0 cm in hepatic segment 8. These are suspicious for metastases. Mild ascites. Also on 5/19/22 he underwent exploratory laparotomy, total colectomy, end ileostomy, biopsy of liver, and biopsy of retroperitoneum. Pathology showed Omentum, excision: Negative for malignancy. Acutely inflamed exudate consistent with peritonitis. Portion of terminal ileum, cecum with adherent sigmoid, total colectomy: Poorly differentiated infiltrating adenocarcinoma measuring 9.0 cm. Adenocarcinoma infiltrates through the bowel wall and through the visceral peritoneum and infiltrates the adherent sigmoid colon 3 of 17 lymph nodes are positive for metastatic carcinoma. Lymphovascular space invasion is identified. Perineural invasion is identified. The surgical margins are negative. Appendix with serosal tumor implants and acute inflammation. Peritonitis. Liver, biopsy: Metastatic adenocarcinoma. Retroperitoneal biopsy: Adenocarcinoma with necrosis. No loss of nuclear expression of MMR proteins (MLH1, MSH2, MSH6, and PMS2). Staging pT4b pN1b pM1c. On 5/25/22 he had a repeat CT A/P which showed status post total colectomy and ileostomy. Dilated small bowel loops in the left abdomen with associated wall thickening. Both may be postoperative, follow-up is recommended. Moderate amount of ascites new from the prior study. Small amount of free intraperitoneal air likely with postoperative state. Minimal left pleural effusion. New multifocal right lower lobe infiltrate. Multiple liver metastases some of which are calcified again appreciated. [de-identified] : Medical Hx: denies \par Surgical Hx: denies \par Family Hx: denies family history of cancer \par Social Hx: he smoked THC and cigarettes daily for 30 years, quit May 19th, 2022; ETOH had 6 pack a day, quit 4 years ago; was a  (off the [employer]) no longer working; lives with mom and brother.   [de-identified] : Today he is C7D1 FOLFOXIRI / Zirabev, planned ongoing. He saw Dr. Mckeon, for change in stoma, was cleared. \par \par Left sided abd pain since last visit is resolved. Still continues with RLQ pain and occ in his lower back. He has been taking 10 mg oxycodone early in the day and 5mg oxycodone in the afternoon. His chemotherapy side affects noted were fatigue for days 2-4, he found himself more tired than baseline, but continued daily activities. He reported cold sensitivity that starts day of treatment, lasting 7-8 days, and resolves closer to next treatment. He is not following cold sensitivity protocol, and aware hes not. He reports taste changes, a sour taste for 6 days and slowly resolved. He denies eye changes; hair loss; nail/skin changes; neuropathy; mouth sores; N/V; heart burn; D/C.  \par \par New PE found on Sept 1, now on Eliquis BID, andrea well.

## 2022-10-10 NOTE — RESULTS/DATA
[FreeTextEntry1] : 9/1/22 9/1/22 CT C/A/P: Segmental left lower lobe pulmonary embolus and probable smaller pulmonary emboli within the right lung. Interval decrease in size of bilateral pulmonary nodules and hepatic metastases compared to 5/25/2022. No new sites of disease.\par \par 5/25/22 CT A/P:  Status post total colectomy and ileostomy. Dilated small bowel loops in the left abdomen with associated wall thickening. Both may be postoperative, follow-up is recommended. Moderate amount of ascites new from the prior study. Small amount of free intraperitoneal air likely with postoperative state. Minimal left pleural effusion. New multifocal right lower lobe infiltrate. Multiple liver metastases some of which are calcified again appreciated.\par \par 5/19/22 Path: 1. Omentum, excision: Negative for malignancy. Acutely inflamed exudate consistent with peritonitis\par 2. Portion of terminal ileum, cecum with adherent sigmoid, total colectomy: Poorly differentiated infiltrating adenocarcinoma measuring 9.0 cm. Adenocarcinoma infiltrates through the bowel wall and through the visceral peritoneum and infiltrates the adherent sigmoid colon 3 of 17 lymph nodes are positive for metastatic carcinoma. Lymphovascular space invasion is identified. Perineural invasion is identified. The surgical margins are negative. Appendix with serosal tumor implants and acute inflammation\par Peritonitis\par 3. Liver, biopsy: Metastatic adenocarcinoma\par 4. Retroperitoneal biopsy: Adenocarcinoma with necrosis\par No loss of nuclear expression of MMR proteins (MLH1, MSH2, MSH6, and PMS2).   These results show low probability of microsatellite instability-high (MSI-H).\par Synoptic Summary\par COLON AND RECTUM: Resection, Including Transanal Disk Excision of Rectal Neoplasms\par Procedure: Total abdominal colectomy\par Macroscopic Evaluation of Mesorectum: Not applicable\par Tumor Site: Cecum\par Histologic Type:  Adenocarcinoma\par Histologic Grade: G3, poorly differentiated\par Tumor Size: Greatest dimension (Centimeters)  9.0 cm\par Multiple Primary Sites: Not applicable\par Tumor Extent: Directly invades or adheres to adjacent structure(s) Sigmoid colon\par Macroscopic Tumor Perforation: Present\par Lymphovascular Invasion: Present\par Perineural Invasion: Present\par Treatment Effect: No known presurgical therapy\par Margin Status for Invasive Carcinoma: All margins negative for invasive carcinoma\par Distance from Invasive Carcinoma to Radial (Circumferential)\par Margin: 5.0 cm\par Margin Status for Non-Invasive Tumor: Not applicable\par Regional Lymph Node Status: Tumor present in regional lymph node(s)\par Number of Lymph Nodes with Tumor    3\par Number of Lymph Nodes Examined: 17\par Tumor Deposits: Not identified\par Distant Site(s) Involved: Liver\par PATHOLOGIC STAGE CLASSIFICATION\par TNM Descriptors: Not applicable\par pT Category: pT4b\par pN Category: pN1b\par pM Category: pM1c\par \par 5/19/22 CT A/P: Apparent focal mural thickening at the cecum/proximal ascending colon may represent colon cancer. The sigmoid colon appears to be tethered to the cecum and it is focally thick-walled; focal tumor invasion/extension from the cecum to the sigmoid colon cannot be excluded. Dilatation of the small bowel with an apparent transition point in the right lower quadrant abdomen, suggestive of small bowel obstruction. Apparent mild mural thickening of the a dilated right lower quadrant small bowel loop with adjacent mesenteric edema for which associated small bowel ischemia cannot be excluded. Multiple hypodense lesions with calcifications in both lobes of the liver with the largest measuring 5.8 x 6.0 cm in hepatic segment 8. These are suspicious for metastases. Mild ascites.

## 2022-10-10 NOTE — REVIEW OF SYSTEMS
[Recent Change In Weight] : ~T recent weight change [Negative] : Allergic/Immunologic [Chest Pain] : no chest pain [Shortness Of Breath] : no shortness of breath [Abdominal Pain] : no abdominal pain [Constipation] : no constipation [Diarrhea] : no diarrhea [Joint Pain] : no joint pain [Skin Rash] : no skin rash [FreeTextEntry2] : wt stable since last visit  [FreeTextEntry7] : ileotomy in place [FreeTextEntry9] : left rib pain

## 2022-10-12 ENCOUNTER — RESULT REVIEW (OUTPATIENT)
Age: 52
End: 2022-10-12

## 2022-10-12 ENCOUNTER — APPOINTMENT (OUTPATIENT)
Dept: INFUSION THERAPY | Facility: CLINIC | Age: 52
End: 2022-10-12

## 2022-10-12 ENCOUNTER — RESULT CHARGE (OUTPATIENT)
Age: 52
End: 2022-10-12

## 2022-10-12 VITALS
TEMPERATURE: 97.9 F | BODY MASS INDEX: 22.84 KG/M2 | SYSTOLIC BLOOD PRESSURE: 124 MMHG | WEIGHT: 178 LBS | HEART RATE: 116 BPM | DIASTOLIC BLOOD PRESSURE: 86 MMHG | HEIGHT: 74 IN | RESPIRATION RATE: 18 BRPM | OXYGEN SATURATION: 99 %

## 2022-10-12 LAB
ALBUMIN SERPL ELPH-MCNC: 4.2 G/DL — SIGNIFICANT CHANGE UP (ref 3.3–5)
ALP SERPL-CCNC: 119 U/L — SIGNIFICANT CHANGE UP (ref 40–120)
ALT FLD-CCNC: 16 U/L — SIGNIFICANT CHANGE UP (ref 10–45)
ANION GAP SERPL CALC-SCNC: 13 MMOL/L — SIGNIFICANT CHANGE UP (ref 5–17)
AST SERPL-CCNC: 18 U/L — SIGNIFICANT CHANGE UP (ref 10–40)
BASOPHILS # BLD AUTO: 0.03 K/UL — SIGNIFICANT CHANGE UP (ref 0–0.2)
BASOPHILS NFR BLD AUTO: 0.7 % — SIGNIFICANT CHANGE UP (ref 0–2)
BILIRUB SERPL-MCNC: 0.3 MG/DL — SIGNIFICANT CHANGE UP (ref 0.2–1.2)
BUN SERPL-MCNC: 5 MG/DL — LOW (ref 7–23)
CALCIUM SERPL-MCNC: 9.4 MG/DL — SIGNIFICANT CHANGE UP (ref 8.4–10.5)
CEA SERPL-MCNC: 14.9 NG/ML — HIGH (ref 0–3.8)
CHLORIDE SERPL-SCNC: 102 MMOL/L — SIGNIFICANT CHANGE UP (ref 96–108)
CO2 SERPL-SCNC: 24 MMOL/L — SIGNIFICANT CHANGE UP (ref 22–31)
CREAT SERPL-MCNC: 0.77 MG/DL — SIGNIFICANT CHANGE UP (ref 0.5–1.3)
EGFR: 110 ML/MIN/1.73M2 — SIGNIFICANT CHANGE UP
EOSINOPHIL # BLD AUTO: 0.08 K/UL — SIGNIFICANT CHANGE UP (ref 0–0.5)
EOSINOPHIL NFR BLD AUTO: 1.8 % — SIGNIFICANT CHANGE UP (ref 0–6)
GLUCOSE SERPL-MCNC: 109 MG/DL — HIGH (ref 70–99)
HCT VFR BLD CALC: 36.3 % — LOW (ref 39–50)
HGB BLD-MCNC: 11.7 G/DL — LOW (ref 13–17)
IMM GRANULOCYTES NFR BLD AUTO: 0.2 % — SIGNIFICANT CHANGE UP (ref 0–0.9)
LYMPHOCYTES # BLD AUTO: 1.13 K/UL — SIGNIFICANT CHANGE UP (ref 1–3.3)
LYMPHOCYTES # BLD AUTO: 25.6 % — SIGNIFICANT CHANGE UP (ref 13–44)
MAGNESIUM SERPL-MCNC: 1.7 MG/DL — SIGNIFICANT CHANGE UP (ref 1.6–2.6)
MCHC RBC-ENTMCNC: 25.7 PG — LOW (ref 27–34)
MCHC RBC-ENTMCNC: 32.2 GM/DL — SIGNIFICANT CHANGE UP (ref 32–36)
MCV RBC AUTO: 79.8 FL — LOW (ref 80–100)
MONOCYTES # BLD AUTO: 0.92 K/UL — HIGH (ref 0–0.9)
MONOCYTES NFR BLD AUTO: 20.9 % — HIGH (ref 2–14)
NEUTROPHILS # BLD AUTO: 2.24 K/UL — SIGNIFICANT CHANGE UP (ref 1.8–7.4)
NEUTROPHILS NFR BLD AUTO: 50.8 % — SIGNIFICANT CHANGE UP (ref 43–77)
NRBC # BLD: 0 /100 WBCS — SIGNIFICANT CHANGE UP (ref 0–0)
PLATELET # BLD AUTO: 184 K/UL — SIGNIFICANT CHANGE UP (ref 150–400)
POTASSIUM SERPL-MCNC: 3.5 MMOL/L — SIGNIFICANT CHANGE UP (ref 3.5–5.3)
POTASSIUM SERPL-SCNC: 3.5 MMOL/L — SIGNIFICANT CHANGE UP (ref 3.5–5.3)
PROT SERPL-MCNC: 7.1 G/DL — SIGNIFICANT CHANGE UP (ref 6–8.3)
RBC # BLD: 4.55 M/UL — SIGNIFICANT CHANGE UP (ref 4.2–5.8)
RBC # FLD: 19.4 % — HIGH (ref 10.3–14.5)
SODIUM SERPL-SCNC: 139 MMOL/L — SIGNIFICANT CHANGE UP (ref 135–145)
WBC # BLD: 4.41 K/UL — SIGNIFICANT CHANGE UP (ref 3.8–10.5)
WBC # FLD AUTO: 4.41 K/UL — SIGNIFICANT CHANGE UP (ref 3.8–10.5)

## 2022-10-14 ENCOUNTER — APPOINTMENT (OUTPATIENT)
Dept: INFUSION THERAPY | Facility: CLINIC | Age: 52
End: 2022-10-14

## 2022-10-25 ENCOUNTER — APPOINTMENT (OUTPATIENT)
Dept: CARDIOLOGY | Facility: CLINIC | Age: 52
End: 2022-10-25

## 2022-10-25 VITALS
HEIGHT: 74 IN | WEIGHT: 178 LBS | HEART RATE: 110 BPM | OXYGEN SATURATION: 100 % | BODY MASS INDEX: 22.84 KG/M2 | SYSTOLIC BLOOD PRESSURE: 126 MMHG | DIASTOLIC BLOOD PRESSURE: 86 MMHG

## 2022-10-25 PROCEDURE — 99214 OFFICE O/P EST MOD 30 MIN: CPT

## 2022-10-25 NOTE — CARDIOLOGY SUMMARY
[de-identified] : 10/4/22  sinus tachycardic  low qrs voltage  anteroseptal infarct ( no prior ekg to compare ) ,  [de-identified] :  sinus rhythm rare PVC  average rare 92 BPM ,   tachycardia 8 hours  highest for 12 minutes time  [de-identified] : 10/5/22  EF 59% Mild DD , Mild MR

## 2022-10-25 NOTE — PHYSICAL EXAM
[Well Developed] : well developed [Well Nourished] : well nourished [No Acute Distress] : no acute distress [Normal Conjunctiva] : normal conjunctiva [Normal Venous Pressure] : normal venous pressure [No Carotid Bruit] : no carotid bruit [Tachycardia] : tachycardic [Heart Rate ___] : [unfilled] bpm [Rhythm Regular] : regular [Normal S1] : normal S1 [Normal S2] : normal S2 [No Pitting Edema] : no pitting edema present [2+] : left 2+ [No Abnormalities] : the abdominal aorta was not enlarged and no bruit was heard [Clear Lung Fields] : clear lung fields [Good Air Entry] : good air entry [No Respiratory Distress] : no respiratory distress  [Soft] : abdomen soft [Non Tender] : non-tender [Normal Bowel Sounds] : normal bowel sounds [Normal Gait] : normal gait [No Edema] : no edema [No Cyanosis] : no cyanosis [No Clubbing] : no clubbing [No Varicosities] : no varicosities [Normal Radial B/L] : normal radial B/L [No Rash] : no rash [No Skin Lesions] : no skin lesions [Moves all extremities] : moves all extremities [No Focal Deficits] : no focal deficits [Normal Speech] : normal speech [Alert and Oriented] : alert and oriented [Normal memory] : normal memory [Normal S1, S2] : normal S1, S2 [No Murmur] : no murmur [S3] : no S3 [S4] : no S4 [Rt] : no varicose veins of the right leg [Right Carotid Bruit] : no bruit heard over the right carotid [Left Carotid Bruit] : no bruit heard over the left carotid [Right Femoral Bruit] : no bruit heard over the right femoral artery [Left Femoral Bruit] : no bruit heard over the left femoral artery [de-identified] : colostomy bag in place

## 2022-10-25 NOTE — DISCUSSION/SUMMARY
[FreeTextEntry1] : 51 year old male\par \par abnormal CXR   cardiomegaly : tachycardic ? anxiety ,Echo revealed normal LVF, mild MR; holter monitor revealed SR-ST \par \par Sinus tachycardia : no symptoms , ? anxiety  Pt will have bw ordered at last visit including TSH , BW from hematology revealed mild anemia , ? dehydration , encourage patient to increase fluid intake \par \par Pulmonary embolism : no SOB , Echo results as above; continue anticoagulation \par \par Metastatic colon carcinoma:  further plan as per oncology \par \par follow up after 4 months \par \par

## 2022-10-25 NOTE — HISTORY OF PRESENT ILLNESS
[FreeTextEntry1] : 51 year old male with hx metastatic colon carcinoma, on chemotherapy , recent incidental finding pulmonary embolism on CT angio September 2022 , chronic back pain  who was noted to have cardiomegaly on CXR done couple of weeks ago . Patient says he is tolerating chemotherapy , patient denies any chest pain or shortness of breath , \par \par Patient has been tachycardic on EKG.  Holter monitor revealed RSR-ST average HR 92 BPM, rare PVC's\par \par Patient lost significant weight over the time   Patient blood pressure is controlled  denies any dizziness , or fatigue , \par \par his recent blood work from hematology reviewed.  He did not have blood work ordered at last visit\par \par

## 2022-10-26 ENCOUNTER — RESULT REVIEW (OUTPATIENT)
Age: 52
End: 2022-10-26

## 2022-10-26 ENCOUNTER — APPOINTMENT (OUTPATIENT)
Dept: INFUSION THERAPY | Facility: CLINIC | Age: 52
End: 2022-10-26

## 2022-10-26 ENCOUNTER — APPOINTMENT (OUTPATIENT)
Dept: HEMATOLOGY ONCOLOGY | Facility: CLINIC | Age: 52
End: 2022-10-26

## 2022-10-26 ENCOUNTER — RESULT CHARGE (OUTPATIENT)
Age: 52
End: 2022-10-26

## 2022-10-26 ENCOUNTER — OUTPATIENT (OUTPATIENT)
Dept: OUTPATIENT SERVICES | Facility: HOSPITAL | Age: 52
LOS: 1 days | Discharge: HOME | End: 2022-10-26

## 2022-10-26 VITALS
RESPIRATION RATE: 18 BRPM | HEART RATE: 97 BPM | SYSTOLIC BLOOD PRESSURE: 133 MMHG | TEMPERATURE: 97.9 F | HEIGHT: 74 IN | DIASTOLIC BLOOD PRESSURE: 87 MMHG | BODY MASS INDEX: 23.04 KG/M2 | OXYGEN SATURATION: 99 % | WEIGHT: 179.56 LBS

## 2022-10-26 DIAGNOSIS — C18.9 MALIGNANT NEOPLASM OF COLON, UNSPECIFIED: ICD-10-CM

## 2022-10-26 DIAGNOSIS — Z90.49 ACQUIRED ABSENCE OF OTHER SPECIFIED PARTS OF DIGESTIVE TRACT: Chronic | ICD-10-CM

## 2022-10-26 LAB
ALBUMIN SERPL ELPH-MCNC: 4.1 G/DL — SIGNIFICANT CHANGE UP (ref 3.3–5)
ALP SERPL-CCNC: 143 U/L — HIGH (ref 40–120)
ALT FLD-CCNC: 13 U/L — SIGNIFICANT CHANGE UP (ref 10–45)
ANION GAP SERPL CALC-SCNC: 15 MMOL/L — SIGNIFICANT CHANGE UP (ref 5–17)
AST SERPL-CCNC: 20 U/L — SIGNIFICANT CHANGE UP (ref 10–40)
BASOPHILS # BLD AUTO: 0.09 K/UL — SIGNIFICANT CHANGE UP (ref 0–0.2)
BASOPHILS NFR BLD AUTO: 0.7 % — SIGNIFICANT CHANGE UP (ref 0–2)
BILIRUB SERPL-MCNC: 0.2 MG/DL — SIGNIFICANT CHANGE UP (ref 0.2–1.2)
BUN SERPL-MCNC: 6 MG/DL — LOW (ref 7–23)
CALCIUM SERPL-MCNC: 8.9 MG/DL — SIGNIFICANT CHANGE UP (ref 8.4–10.5)
CHLORIDE SERPL-SCNC: 101 MMOL/L — SIGNIFICANT CHANGE UP (ref 96–108)
CO2 SERPL-SCNC: 23 MMOL/L — SIGNIFICANT CHANGE UP (ref 22–31)
CREAT SERPL-MCNC: 0.71 MG/DL — SIGNIFICANT CHANGE UP (ref 0.5–1.3)
EGFR: 112 ML/MIN/1.73M2 — SIGNIFICANT CHANGE UP
EOSINOPHIL # BLD AUTO: 0.14 K/UL — SIGNIFICANT CHANGE UP (ref 0–0.5)
EOSINOPHIL NFR BLD AUTO: 1.1 % — SIGNIFICANT CHANGE UP (ref 0–6)
GLUCOSE SERPL-MCNC: 108 MG/DL — HIGH (ref 70–99)
HCT VFR BLD CALC: 37.3 % — LOW (ref 39–50)
HGB BLD-MCNC: 11.9 G/DL — LOW (ref 13–17)
IMM GRANULOCYTES NFR BLD AUTO: 2.3 % — HIGH (ref 0–0.9)
LYMPHOCYTES # BLD AUTO: 1.56 K/UL — SIGNIFICANT CHANGE UP (ref 1–3.3)
LYMPHOCYTES # BLD AUTO: 12.1 % — LOW (ref 13–44)
MAGNESIUM SERPL-MCNC: 1.4 MG/DL — LOW (ref 1.6–2.6)
MCHC RBC-ENTMCNC: 26.2 PG — LOW (ref 27–34)
MCHC RBC-ENTMCNC: 31.9 GM/DL — LOW (ref 32–36)
MCV RBC AUTO: 82 FL — SIGNIFICANT CHANGE UP (ref 80–100)
MONOCYTES # BLD AUTO: 0.95 K/UL — HIGH (ref 0–0.9)
MONOCYTES NFR BLD AUTO: 7.3 % — SIGNIFICANT CHANGE UP (ref 2–14)
NEUTROPHILS # BLD AUTO: 9.9 K/UL — HIGH (ref 1.8–7.4)
NEUTROPHILS NFR BLD AUTO: 76.5 % — SIGNIFICANT CHANGE UP (ref 43–77)
NRBC # BLD: 0 /100 WBCS — SIGNIFICANT CHANGE UP (ref 0–0)
PLATELET # BLD AUTO: 159 K/UL — SIGNIFICANT CHANGE UP (ref 150–400)
POTASSIUM SERPL-MCNC: 3.9 MMOL/L — SIGNIFICANT CHANGE UP (ref 3.5–5.3)
POTASSIUM SERPL-SCNC: 3.9 MMOL/L — SIGNIFICANT CHANGE UP (ref 3.5–5.3)
PROT SERPL-MCNC: 7 G/DL — SIGNIFICANT CHANGE UP (ref 6–8.3)
RBC # BLD: 4.55 M/UL — SIGNIFICANT CHANGE UP (ref 4.2–5.8)
RBC # FLD: 19.1 % — HIGH (ref 10.3–14.5)
SODIUM SERPL-SCNC: 138 MMOL/L — SIGNIFICANT CHANGE UP (ref 135–145)
WBC # BLD: 12.94 K/UL — HIGH (ref 3.8–10.5)
WBC # FLD AUTO: 12.94 K/UL — HIGH (ref 3.8–10.5)

## 2022-10-26 PROCEDURE — 99215 OFFICE O/P EST HI 40 MIN: CPT

## 2022-10-26 NOTE — HISTORY OF PRESENT ILLNESS
[de-identified] : The patient was diagnosed with colon cancer in May 2022 at the age of 51. He presented to  ER 5/19/22 with worsening abdominal pain for 4 days. He reports abdominal pain has been ongoing for 4 years. On 5/19/22 he had a CT A/P which showed apparent focal mural thickening at the cecum/proximal ascending colon may represent colon cancer. The sigmoid colon appears to be tethered to the cecum and it is focally thick-walled; focal tumor invasion/extension from the cecum to the sigmoid colon cannot be excluded. Dilatation of the small bowel with an apparent transition point in the right lower quadrant abdomen, suggestive of small bowel obstruction. Apparent mild mural thickening of the a dilated right lower quadrant small bowel loop with adjacent mesenteric edema for which associated small bowel ischemia cannot be excluded. Multiple hypodense lesions with calcifications in both lobes of the liver with the largest measuring 5.8 x 6.0 cm in hepatic segment 8. These are suspicious for metastases. Mild ascites. Also on 5/19/22 he underwent exploratory laparotomy, total colectomy, end ileostomy, biopsy of liver, and biopsy of retroperitoneum. Pathology showed Omentum, excision: Negative for malignancy. Acutely inflamed exudate consistent with peritonitis. Portion of terminal ileum, cecum with adherent sigmoid, total colectomy: Poorly differentiated infiltrating adenocarcinoma measuring 9.0 cm. Adenocarcinoma infiltrates through the bowel wall and through the visceral peritoneum and infiltrates the adherent sigmoid colon 3 of 17 lymph nodes are positive for metastatic carcinoma. Lymphovascular space invasion is identified. Perineural invasion is identified. The surgical margins are negative. Appendix with serosal tumor implants and acute inflammation. Peritonitis. Liver, biopsy: Metastatic adenocarcinoma. Retroperitoneal biopsy: Adenocarcinoma with necrosis. No loss of nuclear expression of MMR proteins (MLH1, MSH2, MSH6, and PMS2). Staging pT4b pN1b pM1c. On 5/25/22 he had a repeat CT A/P which showed status post total colectomy and ileostomy. Dilated small bowel loops in the left abdomen with associated wall thickening. Both may be postoperative, follow-up is recommended. Moderate amount of ascites new from the prior study. Small amount of free intraperitoneal air likely with postoperative state. Minimal left pleural effusion. New multifocal right lower lobe infiltrate. Multiple liver metastases some of which are calcified again appreciated. [de-identified] : Medical Hx: denies \par Surgical Hx: denies \par Family Hx: denies family history of cancer \par Social Hx: he smoked THC and cigarettes daily for 30 years, quit May 19th, 2022; ETOH had 6 pack a day, quit 4 years ago; was a  (off the [employer]) no longer working; lives with mom and brother.   [de-identified] : Today he is C9D1 FOLFOXIRI / Zirabev, planned ongoing. \par \par Left sided abd pain since last visit is resolved. Still continues with RLQ pain and occ in his lower back. He has been taking 10 mg oxycodone early in the day and 5mg oxycodone in the afternoon. His chemotherapy side affects noted were fatigue for days 2-4, he found himself more tired than baseline, but continued daily activities. He reported cold sensitivity that starts day of treatment, lasting 7-8 days, and resolves closer to next treatment. He is not following cold sensitivity protocol, and aware hes not. He reports taste changes, a sour taste for 6 days and slowly resolved. He denies eye changes; hair loss; nail/skin changes; neuropathy; mouth sores; N/V; heart burn; D/C.  \par \par New PE found on Sept 1, now on Eliquis BID, andrea well.

## 2022-10-26 NOTE — PHYSICAL EXAM
[Restricted in physically strenuous activity but ambulatory and able to carry out work of a light or sedentary nature] : Status 1- Restricted in physically strenuous activity but ambulatory and able to carry out work of a light or sedentary nature, e.g., light house work, office work [Normal] : affect appropriate [de-identified] : wt stable since last visit  [de-identified] : ileotomy in place, dressing C/D/I [de-identified] : well healed vertical abdominal incision

## 2022-10-27 DIAGNOSIS — Z51.11 ENCOUNTER FOR ANTINEOPLASTIC CHEMOTHERAPY: ICD-10-CM

## 2022-10-27 DIAGNOSIS — M79.2 NEURALGIA AND NEURITIS, UNSPECIFIED: ICD-10-CM

## 2022-10-27 DIAGNOSIS — I26.99 OTHER PULMONARY EMBOLISM WITHOUT ACUTE COR PULMONALE: ICD-10-CM

## 2022-10-27 DIAGNOSIS — R11.2 NAUSEA WITH VOMITING, UNSPECIFIED: ICD-10-CM

## 2022-10-27 DIAGNOSIS — Z93.2 ILEOSTOMY STATUS: ICD-10-CM

## 2022-10-28 ENCOUNTER — APPOINTMENT (OUTPATIENT)
Dept: INFUSION THERAPY | Facility: CLINIC | Age: 52
End: 2022-10-28

## 2022-10-31 ENCOUNTER — APPOINTMENT (OUTPATIENT)
Dept: PHYSICAL MEDICINE AND REHAB | Facility: CLINIC | Age: 52
End: 2022-10-31

## 2022-10-31 VITALS
RESPIRATION RATE: 17 BRPM | SYSTOLIC BLOOD PRESSURE: 122 MMHG | DIASTOLIC BLOOD PRESSURE: 74 MMHG | OXYGEN SATURATION: 99 % | BODY MASS INDEX: 22.73 KG/M2 | HEART RATE: 104 BPM | TEMPERATURE: 98.3 F | WEIGHT: 177 LBS

## 2022-10-31 DIAGNOSIS — M79.2 NEURALGIA AND NEURITIS, UNSPECIFIED: ICD-10-CM

## 2022-10-31 DIAGNOSIS — Z51.89 ENCOUNTER FOR OTHER SPECIFIED AFTERCARE: ICD-10-CM

## 2022-10-31 PROCEDURE — 99214 OFFICE O/P EST MOD 30 MIN: CPT

## 2022-10-31 RX ORDER — GABAPENTIN 300 MG/1
300 CAPSULE ORAL 3 TIMES DAILY
Qty: 90 | Refills: 1 | Status: ACTIVE | COMMUNITY
Start: 2022-10-31 | End: 1900-01-01

## 2022-10-31 NOTE — DATA REVIEWED
[FreeTextEntry1] : CT abdomen pelvis September 2022: Segmental left lower lobe pulmonary embolus.  Bones within normal limits.

## 2022-10-31 NOTE — ASSESSMENT
[FreeTextEntry1] : 51 year old male presenting for evaluation.\par \par #Metastatic colon cancer/Neuropathic pain/Neuropathy:\par -Presenting with distal symmetric sensory neuropathy\par -CT Chest reviewed, patient had PE, on eliquis\par -Avoid SNRI given anti-coagulation\par -Heme-onc notes reviewed, on treatment with FOLFOXIRI with bevacizumab \par -Surgical oncology notes reviewed, stoma functional \par -Cardiology notes reviewed, continue on eliquis \par -Start gabapentin 300mg TID for neuropathic pain\par -Continue to monitor \par \par Follow-up in 4-6 weeks.  \par

## 2022-10-31 NOTE — HISTORY OF PRESENT ILLNESS
[FreeTextEntry1] : Mr. Palladino is a 51 year old male with history of stage IV RS colon adenocarcinoma, met to liver, no MMR deficiency.  On 5/19/22 he underwent exploratory laparotomy, total colectomy, end ileostomy, biopsy of liver, and biopsy of retroperitoneum. Treatment with FOLFOXIRI with bevacizumab.  Found to have PE, on eliquis.  \par \par He reports since being on treatment he is beginning to develop numbness and tingling especially in his fingers.  Denies any overt weakness.  Currently ambulating with a cane.  Denies any recent falls.  Reports sometimes having a burning sensation.  Denies any constipation.  Reports he never tried gabapentin as previously prescribed for his post-surgical pain.  \par   \par \par

## 2022-10-31 NOTE — PHYSICAL EXAM
[FreeTextEntry1] : Gen: Patient is A&O x 3, NAD\par HEENT: EOMI, hearing grossly normal\par Resp: regular, non - labored\par CV: pulses regular\par Skin: no rashes, erythema\par Lymph: no clubbing, cyanosis, edema, or palpable lymphadenopathy\par Inspection: no instability or misalignment, ostomy in place, no redness or erythema \par ROM: full throughout\par Palpation:No TTP\par Sensation: decreased to light touch in fingers \par Reflexes: 1+ and symmetric throughout\par Strength: 5/5 throughout\par Special tests: -Straight leg raise \par Gait: normal, non-antalgic, uses quad cane as needed, able to stand tandem  \par \par

## 2022-11-09 ENCOUNTER — RESULT REVIEW (OUTPATIENT)
Age: 52
End: 2022-11-09

## 2022-11-09 ENCOUNTER — RESULT CHARGE (OUTPATIENT)
Age: 52
End: 2022-11-09

## 2022-11-09 ENCOUNTER — APPOINTMENT (OUTPATIENT)
Dept: INFUSION THERAPY | Facility: CLINIC | Age: 52
End: 2022-11-09

## 2022-11-09 VITALS
SYSTOLIC BLOOD PRESSURE: 124 MMHG | WEIGHT: 179.5 LBS | RESPIRATION RATE: 18 BRPM | DIASTOLIC BLOOD PRESSURE: 83 MMHG | OXYGEN SATURATION: 100 % | BODY MASS INDEX: 23.05 KG/M2 | HEART RATE: 97 BPM | TEMPERATURE: 98.3 F

## 2022-11-09 LAB
ALBUMIN SERPL ELPH-MCNC: 4.1 G/DL — SIGNIFICANT CHANGE UP (ref 3.3–5)
ALP SERPL-CCNC: 186 U/L — HIGH (ref 40–120)
ALT FLD-CCNC: 16 U/L — SIGNIFICANT CHANGE UP (ref 10–45)
ANION GAP SERPL CALC-SCNC: 11 MMOL/L — SIGNIFICANT CHANGE UP (ref 5–17)
AST SERPL-CCNC: 24 U/L — SIGNIFICANT CHANGE UP (ref 10–40)
BASOPHILS # BLD AUTO: 0.08 K/UL — SIGNIFICANT CHANGE UP (ref 0–0.2)
BASOPHILS NFR BLD AUTO: 0.7 % — SIGNIFICANT CHANGE UP (ref 0–2)
BILIRUB SERPL-MCNC: <0.2 MG/DL — SIGNIFICANT CHANGE UP (ref 0.2–1.2)
BUN SERPL-MCNC: 8 MG/DL — SIGNIFICANT CHANGE UP (ref 7–23)
CALCIUM SERPL-MCNC: 9.3 MG/DL — SIGNIFICANT CHANGE UP (ref 8.4–10.5)
CHLORIDE SERPL-SCNC: 105 MMOL/L — SIGNIFICANT CHANGE UP (ref 96–108)
CO2 SERPL-SCNC: 24 MMOL/L — SIGNIFICANT CHANGE UP (ref 22–31)
CREAT SERPL-MCNC: 0.73 MG/DL — SIGNIFICANT CHANGE UP (ref 0.5–1.3)
EGFR: 112 ML/MIN/1.73M2 — SIGNIFICANT CHANGE UP
EOSINOPHIL # BLD AUTO: 0.31 K/UL — SIGNIFICANT CHANGE UP (ref 0–0.5)
EOSINOPHIL NFR BLD AUTO: 2.7 % — SIGNIFICANT CHANGE UP (ref 0–6)
GLUCOSE SERPL-MCNC: 99 MG/DL — SIGNIFICANT CHANGE UP (ref 70–99)
HCT VFR BLD CALC: 38.2 % — LOW (ref 39–50)
HGB BLD-MCNC: 12 G/DL — LOW (ref 13–17)
IMM GRANULOCYTES NFR BLD AUTO: 4 % — HIGH (ref 0–0.9)
LYMPHOCYTES # BLD AUTO: 1.95 K/UL — SIGNIFICANT CHANGE UP (ref 1–3.3)
LYMPHOCYTES # BLD AUTO: 17.1 % — SIGNIFICANT CHANGE UP (ref 13–44)
MAGNESIUM SERPL-MCNC: 1.7 MG/DL — SIGNIFICANT CHANGE UP (ref 1.6–2.6)
MCHC RBC-ENTMCNC: 26.3 PG — LOW (ref 27–34)
MCHC RBC-ENTMCNC: 31.4 GM/DL — LOW (ref 32–36)
MCV RBC AUTO: 83.6 FL — SIGNIFICANT CHANGE UP (ref 80–100)
MONOCYTES # BLD AUTO: 1.22 K/UL — HIGH (ref 0–0.9)
MONOCYTES NFR BLD AUTO: 10.7 % — SIGNIFICANT CHANGE UP (ref 2–14)
NEUTROPHILS # BLD AUTO: 7.4 K/UL — SIGNIFICANT CHANGE UP (ref 1.8–7.4)
NEUTROPHILS NFR BLD AUTO: 64.8 % — SIGNIFICANT CHANGE UP (ref 43–77)
NRBC # BLD: 0 /100 WBCS — SIGNIFICANT CHANGE UP (ref 0–0)
PLATELET # BLD AUTO: 225 K/UL — SIGNIFICANT CHANGE UP (ref 150–400)
POTASSIUM SERPL-MCNC: 4.4 MMOL/L — SIGNIFICANT CHANGE UP (ref 3.5–5.3)
POTASSIUM SERPL-SCNC: 4.4 MMOL/L — SIGNIFICANT CHANGE UP (ref 3.5–5.3)
PROT SERPL-MCNC: 7.1 G/DL — SIGNIFICANT CHANGE UP (ref 6–8.3)
RBC # BLD: 4.57 M/UL — SIGNIFICANT CHANGE UP (ref 4.2–5.8)
RBC # FLD: 18.3 % — HIGH (ref 10.3–14.5)
SODIUM SERPL-SCNC: 140 MMOL/L — SIGNIFICANT CHANGE UP (ref 135–145)
WBC # BLD: 11.42 K/UL — HIGH (ref 3.8–10.5)
WBC # FLD AUTO: 11.42 K/UL — HIGH (ref 3.8–10.5)

## 2022-11-11 ENCOUNTER — APPOINTMENT (OUTPATIENT)
Dept: INFUSION THERAPY | Facility: CLINIC | Age: 52
End: 2022-11-11

## 2022-11-23 ENCOUNTER — APPOINTMENT (OUTPATIENT)
Dept: INFUSION THERAPY | Facility: CLINIC | Age: 52
End: 2022-11-23

## 2022-11-23 ENCOUNTER — RESULT CHARGE (OUTPATIENT)
Age: 52
End: 2022-11-23

## 2022-11-23 ENCOUNTER — APPOINTMENT (OUTPATIENT)
Dept: HEMATOLOGY ONCOLOGY | Facility: CLINIC | Age: 52
End: 2022-11-23

## 2022-11-23 ENCOUNTER — RESULT REVIEW (OUTPATIENT)
Age: 52
End: 2022-11-23

## 2022-11-23 VITALS
OXYGEN SATURATION: 99 % | WEIGHT: 185.56 LBS | DIASTOLIC BLOOD PRESSURE: 64 MMHG | RESPIRATION RATE: 17 BRPM | SYSTOLIC BLOOD PRESSURE: 109 MMHG | HEART RATE: 101 BPM | BODY MASS INDEX: 23.82 KG/M2 | TEMPERATURE: 98.4 F

## 2022-11-23 LAB
ALBUMIN SERPL ELPH-MCNC: 3.9 G/DL — SIGNIFICANT CHANGE UP (ref 3.3–5)
ALBUMIN SERPL ELPH-MCNC: 3.9 G/DL — SIGNIFICANT CHANGE UP (ref 3.3–5)
ALP SERPL-CCNC: 133 U/L — HIGH (ref 40–120)
ALP SERPL-CCNC: 133 U/L — HIGH (ref 40–120)
ALT FLD-CCNC: 14 U/L — SIGNIFICANT CHANGE UP (ref 10–45)
ALT FLD-CCNC: 15 U/L — SIGNIFICANT CHANGE UP (ref 10–45)
ANION GAP SERPL CALC-SCNC: 12 MMOL/L — SIGNIFICANT CHANGE UP (ref 5–17)
ANION GAP SERPL CALC-SCNC: 12 MMOL/L — SIGNIFICANT CHANGE UP (ref 5–17)
AST SERPL-CCNC: 18 U/L — SIGNIFICANT CHANGE UP (ref 10–40)
AST SERPL-CCNC: 19 U/L — SIGNIFICANT CHANGE UP (ref 10–40)
BASOPHILS # BLD AUTO: 0.04 K/UL — SIGNIFICANT CHANGE UP (ref 0–0.2)
BASOPHILS NFR BLD AUTO: 0.6 % — SIGNIFICANT CHANGE UP (ref 0–2)
BILIRUB SERPL-MCNC: 0.2 MG/DL — SIGNIFICANT CHANGE UP (ref 0.2–1.2)
BILIRUB SERPL-MCNC: <0.2 MG/DL — SIGNIFICANT CHANGE UP (ref 0.2–1.2)
BUN SERPL-MCNC: 9 MG/DL — SIGNIFICANT CHANGE UP (ref 7–23)
BUN SERPL-MCNC: 9 MG/DL — SIGNIFICANT CHANGE UP (ref 7–23)
CALCIUM SERPL-MCNC: 8.7 MG/DL — SIGNIFICANT CHANGE UP (ref 8.4–10.5)
CALCIUM SERPL-MCNC: 8.9 MG/DL — SIGNIFICANT CHANGE UP (ref 8.4–10.5)
CHLORIDE SERPL-SCNC: 100 MMOL/L — SIGNIFICANT CHANGE UP (ref 96–108)
CHLORIDE SERPL-SCNC: 100 MMOL/L — SIGNIFICANT CHANGE UP (ref 96–108)
CO2 SERPL-SCNC: 25 MMOL/L — SIGNIFICANT CHANGE UP (ref 22–31)
CO2 SERPL-SCNC: 25 MMOL/L — SIGNIFICANT CHANGE UP (ref 22–31)
CREAT SERPL-MCNC: 0.72 MG/DL — SIGNIFICANT CHANGE UP (ref 0.5–1.3)
CREAT SERPL-MCNC: 0.75 MG/DL — SIGNIFICANT CHANGE UP (ref 0.5–1.3)
EGFR: 111 ML/MIN/1.73M2 — SIGNIFICANT CHANGE UP
EGFR: 112 ML/MIN/1.73M2 — SIGNIFICANT CHANGE UP
EOSINOPHIL # BLD AUTO: 0.14 K/UL — SIGNIFICANT CHANGE UP (ref 0–0.5)
EOSINOPHIL NFR BLD AUTO: 2.2 % — SIGNIFICANT CHANGE UP (ref 0–6)
GLUCOSE SERPL-MCNC: 100 MG/DL — HIGH (ref 70–99)
GLUCOSE SERPL-MCNC: 96 MG/DL — SIGNIFICANT CHANGE UP (ref 70–99)
HCT VFR BLD CALC: 35.9 % — LOW (ref 39–50)
HGB BLD-MCNC: 11.3 G/DL — LOW (ref 13–17)
IMM GRANULOCYTES NFR BLD AUTO: 0.8 % — SIGNIFICANT CHANGE UP (ref 0–0.9)
LYMPHOCYTES # BLD AUTO: 1.19 K/UL — SIGNIFICANT CHANGE UP (ref 1–3.3)
LYMPHOCYTES # BLD AUTO: 19 % — SIGNIFICANT CHANGE UP (ref 13–44)
MAGNESIUM SERPL-MCNC: 1.5 MG/DL — LOW (ref 1.6–2.6)
MCHC RBC-ENTMCNC: 26 PG — LOW (ref 27–34)
MCHC RBC-ENTMCNC: 31.5 GM/DL — LOW (ref 32–36)
MCV RBC AUTO: 82.7 FL — SIGNIFICANT CHANGE UP (ref 80–100)
MONOCYTES # BLD AUTO: 1.19 K/UL — HIGH (ref 0–0.9)
MONOCYTES NFR BLD AUTO: 19 % — HIGH (ref 2–14)
NEUTROPHILS # BLD AUTO: 3.65 K/UL — SIGNIFICANT CHANGE UP (ref 1.8–7.4)
NEUTROPHILS NFR BLD AUTO: 58.4 % — SIGNIFICANT CHANGE UP (ref 43–77)
NRBC # BLD: 0 /100 WBCS — SIGNIFICANT CHANGE UP (ref 0–0)
PLATELET # BLD AUTO: 179 K/UL — SIGNIFICANT CHANGE UP (ref 150–400)
POTASSIUM SERPL-MCNC: 4.3 MMOL/L — SIGNIFICANT CHANGE UP (ref 3.5–5.3)
POTASSIUM SERPL-MCNC: 4.5 MMOL/L — SIGNIFICANT CHANGE UP (ref 3.5–5.3)
POTASSIUM SERPL-SCNC: 4.3 MMOL/L — SIGNIFICANT CHANGE UP (ref 3.5–5.3)
POTASSIUM SERPL-SCNC: 4.5 MMOL/L — SIGNIFICANT CHANGE UP (ref 3.5–5.3)
PROT SERPL-MCNC: 6.6 G/DL — SIGNIFICANT CHANGE UP (ref 6–8.3)
PROT SERPL-MCNC: 6.7 G/DL — SIGNIFICANT CHANGE UP (ref 6–8.3)
RBC # BLD: 4.34 M/UL — SIGNIFICANT CHANGE UP (ref 4.2–5.8)
RBC # FLD: 18.3 % — HIGH (ref 10.3–14.5)
SODIUM SERPL-SCNC: 136 MMOL/L — SIGNIFICANT CHANGE UP (ref 135–145)
SODIUM SERPL-SCNC: 137 MMOL/L — SIGNIFICANT CHANGE UP (ref 135–145)
WBC # BLD: 6.26 K/UL — SIGNIFICANT CHANGE UP (ref 3.8–10.5)
WBC # FLD AUTO: 6.26 K/UL — SIGNIFICANT CHANGE UP (ref 3.8–10.5)

## 2022-11-23 PROCEDURE — 99214 OFFICE O/P EST MOD 30 MIN: CPT

## 2022-11-23 NOTE — HISTORY OF PRESENT ILLNESS
[de-identified] : The patient was diagnosed with colon cancer in May 2022 at the age of 51. He presented to  ER 5/19/22 with worsening abdominal pain for 4 days. He reports abdominal pain has been ongoing for 4 years. On 5/19/22 he had a CT A/P which showed apparent focal mural thickening at the cecum/proximal ascending colon may represent colon cancer. The sigmoid colon appears to be tethered to the cecum and it is focally thick-walled; focal tumor invasion/extension from the cecum to the sigmoid colon cannot be excluded. Dilatation of the small bowel with an apparent transition point in the right lower quadrant abdomen, suggestive of small bowel obstruction. Apparent mild mural thickening of the a dilated right lower quadrant small bowel loop with adjacent mesenteric edema for which associated small bowel ischemia cannot be excluded. Multiple hypodense lesions with calcifications in both lobes of the liver with the largest measuring 5.8 x 6.0 cm in hepatic segment 8. These are suspicious for metastases. Mild ascites. Also on 5/19/22 he underwent exploratory laparotomy, total colectomy, end ileostomy, biopsy of liver, and biopsy of retroperitoneum. Pathology showed Omentum, excision: Negative for malignancy. Acutely inflamed exudate consistent with peritonitis. Portion of terminal ileum, cecum with adherent sigmoid, total colectomy: Poorly differentiated infiltrating adenocarcinoma measuring 9.0 cm. Adenocarcinoma infiltrates through the bowel wall and through the visceral peritoneum and infiltrates the adherent sigmoid colon 3 of 17 lymph nodes are positive for metastatic carcinoma. Lymphovascular space invasion is identified. Perineural invasion is identified. The surgical margins are negative. Appendix with serosal tumor implants and acute inflammation. Peritonitis. Liver, biopsy: Metastatic adenocarcinoma. Retroperitoneal biopsy: Adenocarcinoma with necrosis. No loss of nuclear expression of MMR proteins (MLH1, MSH2, MSH6, and PMS2). Staging pT4b pN1b pM1c. On 5/25/22 he had a repeat CT A/P which showed status post total colectomy and ileostomy. Dilated small bowel loops in the left abdomen with associated wall thickening. Both may be postoperative, follow-up is recommended. Moderate amount of ascites new from the prior study. Small amount of free intraperitoneal air likely with postoperative state. Minimal left pleural effusion. New multifocal right lower lobe infiltrate. Multiple liver metastases some of which are calcified again appreciated. [de-identified] : Medical Hx: denies \par Surgical Hx: denies \par Family Hx: denies family history of cancer \par Social Hx: he smoked THC and cigarettes daily for 30 years, quit May 19th, 2022; ETOH had 6 pack a day, quit 4 years ago; was a  (off the [employer]) no longer working; lives with mom and brother.   [de-identified] : Today he is C11D1 FOLFOXIRI / Zirabev, planned ongoing. Oxali stopped after cycle 10.  \par \par Left sided abd pain since last visit is resolved. Pupils pinpoint today. States hard to make pain medicine last the 30 days. Still continues with RLQ pain, reports 7/10. Appears to be comfortable during visit today. He has been taking 10 mg oxycodone early in the day and 5mg oxycodone in the afternoon. Will refer to pain management for further refills. His chemotherapy side affects noted were fatigue for days 2-4, he found himself more tired than baseline, but continued daily activities. He reported cold sensitivity, slowly resolving, now that Oxali has stopped. He reports taste changes, a sour taste for 6 days and slowly resolved. Wt gain 6 lbs since last visit. He denies eye changes; hair loss; nail/skin changes; neuropathy; mouth sores; N/V; heart burn; D/C.  \par \par New PE found on Sept 1, now on Eliquis BID, andrea well.

## 2022-11-23 NOTE — REVIEW OF SYSTEMS
[Recent Change In Weight] : ~T recent weight change [Negative] : Allergic/Immunologic [Chest Pain] : no chest pain [Shortness Of Breath] : no shortness of breath [Abdominal Pain] : no abdominal pain [Constipation] : no constipation [Diarrhea] : no diarrhea [Joint Pain] : no joint pain [Skin Rash] : no skin rash [FreeTextEntry2] : wt gain 6 lbs  [FreeTextEntry7] : ileotomy in place, functioning normally  [FreeTextEntry9] : left rib pain  [de-identified] : rt chest wall port accessed

## 2022-11-23 NOTE — PHYSICAL EXAM
[Restricted in physically strenuous activity but ambulatory and able to carry out work of a light or sedentary nature] : Status 1- Restricted in physically strenuous activity but ambulatory and able to carry out work of a light or sedentary nature, e.g., light house work, office work [Normal] : affect appropriate [de-identified] : wt gain 6 lbs since last visit [de-identified] : ileotomy in place, dressing C/D/I [de-identified] : well healed vertical abdominal incision; rt pt accessed and dressing C/D/I

## 2022-11-25 ENCOUNTER — APPOINTMENT (OUTPATIENT)
Dept: INFUSION THERAPY | Facility: CLINIC | Age: 52
End: 2022-11-25

## 2022-12-05 ENCOUNTER — APPOINTMENT (OUTPATIENT)
Dept: CT IMAGING | Facility: CLINIC | Age: 52
End: 2022-12-05

## 2022-12-07 ENCOUNTER — RESULT REVIEW (OUTPATIENT)
Age: 52
End: 2022-12-07

## 2022-12-07 ENCOUNTER — RESULT CHARGE (OUTPATIENT)
Age: 52
End: 2022-12-07

## 2022-12-07 ENCOUNTER — APPOINTMENT (OUTPATIENT)
Dept: INFUSION THERAPY | Facility: CLINIC | Age: 52
End: 2022-12-07

## 2022-12-07 LAB
ALBUMIN SERPL ELPH-MCNC: 4.1 G/DL — SIGNIFICANT CHANGE UP (ref 3.3–5)
ALP SERPL-CCNC: 178 U/L — HIGH (ref 40–120)
ALT FLD-CCNC: 13 U/L — SIGNIFICANT CHANGE UP (ref 10–45)
ANION GAP SERPL CALC-SCNC: 13 MMOL/L — SIGNIFICANT CHANGE UP (ref 5–17)
AST SERPL-CCNC: 22 U/L — SIGNIFICANT CHANGE UP (ref 10–40)
BASOPHILS # BLD AUTO: 0.12 K/UL — SIGNIFICANT CHANGE UP (ref 0–0.2)
BASOPHILS NFR BLD AUTO: 0.7 % — SIGNIFICANT CHANGE UP (ref 0–2)
BILIRUB SERPL-MCNC: 0.2 MG/DL — SIGNIFICANT CHANGE UP (ref 0.2–1.2)
BUN SERPL-MCNC: 12 MG/DL — SIGNIFICANT CHANGE UP (ref 7–23)
CALCIUM SERPL-MCNC: 9 MG/DL — SIGNIFICANT CHANGE UP (ref 8.4–10.5)
CEA SERPL-MCNC: 8.2 NG/ML — HIGH (ref 0–3.8)
CHLORIDE SERPL-SCNC: 100 MMOL/L — SIGNIFICANT CHANGE UP (ref 96–108)
CO2 SERPL-SCNC: 22 MMOL/L — SIGNIFICANT CHANGE UP (ref 22–31)
CREAT SERPL-MCNC: 0.84 MG/DL — SIGNIFICANT CHANGE UP (ref 0.5–1.3)
EGFR: 106 ML/MIN/1.73M2 — SIGNIFICANT CHANGE UP
EOSINOPHIL # BLD AUTO: 0.21 K/UL — SIGNIFICANT CHANGE UP (ref 0–0.5)
EOSINOPHIL NFR BLD AUTO: 1.3 % — SIGNIFICANT CHANGE UP (ref 0–6)
GLUCOSE SERPL-MCNC: 93 MG/DL — SIGNIFICANT CHANGE UP (ref 70–99)
HCT VFR BLD CALC: 39.4 % — SIGNIFICANT CHANGE UP (ref 39–50)
HGB BLD-MCNC: 12.5 G/DL — LOW (ref 13–17)
IMM GRANULOCYTES NFR BLD AUTO: 1.4 % — HIGH (ref 0–0.9)
LYMPHOCYTES # BLD AUTO: 13.8 % — SIGNIFICANT CHANGE UP (ref 13–44)
LYMPHOCYTES # BLD AUTO: 2.3 K/UL — SIGNIFICANT CHANGE UP (ref 1–3.3)
MAGNESIUM SERPL-MCNC: 1.7 MG/DL — SIGNIFICANT CHANGE UP (ref 1.6–2.6)
MCHC RBC-ENTMCNC: 26.7 PG — LOW (ref 27–34)
MCHC RBC-ENTMCNC: 31.7 GM/DL — LOW (ref 32–36)
MCV RBC AUTO: 84 FL — SIGNIFICANT CHANGE UP (ref 80–100)
MONOCYTES # BLD AUTO: 1.44 K/UL — HIGH (ref 0–0.9)
MONOCYTES NFR BLD AUTO: 8.6 % — SIGNIFICANT CHANGE UP (ref 2–14)
NEUTROPHILS # BLD AUTO: 12.41 K/UL — HIGH (ref 1.8–7.4)
NEUTROPHILS NFR BLD AUTO: 74.2 % — SIGNIFICANT CHANGE UP (ref 43–77)
NRBC # BLD: 0 /100 WBCS — SIGNIFICANT CHANGE UP (ref 0–0)
PLATELET # BLD AUTO: 185 K/UL — SIGNIFICANT CHANGE UP (ref 150–400)
POTASSIUM SERPL-MCNC: 4.5 MMOL/L — SIGNIFICANT CHANGE UP (ref 3.5–5.3)
POTASSIUM SERPL-SCNC: 4.5 MMOL/L — SIGNIFICANT CHANGE UP (ref 3.5–5.3)
PROT SERPL-MCNC: 7.4 G/DL — SIGNIFICANT CHANGE UP (ref 6–8.3)
RBC # BLD: 4.69 M/UL — SIGNIFICANT CHANGE UP (ref 4.2–5.8)
RBC # FLD: 19.7 % — HIGH (ref 10.3–14.5)
SODIUM SERPL-SCNC: 135 MMOL/L — SIGNIFICANT CHANGE UP (ref 135–145)
WBC # BLD: 16.71 K/UL — HIGH (ref 3.8–10.5)
WBC # FLD AUTO: 16.71 K/UL — HIGH (ref 3.8–10.5)

## 2022-12-09 ENCOUNTER — APPOINTMENT (OUTPATIENT)
Dept: INFUSION THERAPY | Facility: CLINIC | Age: 52
End: 2022-12-09

## 2022-12-13 ENCOUNTER — RX RENEWAL (OUTPATIENT)
Age: 52
End: 2022-12-13

## 2022-12-19 ENCOUNTER — APPOINTMENT (OUTPATIENT)
Dept: HEMATOLOGY ONCOLOGY | Facility: CLINIC | Age: 52
End: 2022-12-19

## 2022-12-21 ENCOUNTER — RESULT REVIEW (OUTPATIENT)
Age: 52
End: 2022-12-21

## 2022-12-21 ENCOUNTER — APPOINTMENT (OUTPATIENT)
Dept: INFUSION THERAPY | Facility: CLINIC | Age: 52
End: 2022-12-21

## 2022-12-21 ENCOUNTER — RESULT CHARGE (OUTPATIENT)
Age: 52
End: 2022-12-21

## 2022-12-21 LAB
ALBUMIN SERPL ELPH-MCNC: 4.2 G/DL — SIGNIFICANT CHANGE UP (ref 3.3–5)
ALP SERPL-CCNC: 179 U/L — HIGH (ref 40–120)
ALT FLD-CCNC: 12 U/L — SIGNIFICANT CHANGE UP (ref 10–45)
ANION GAP SERPL CALC-SCNC: 10 MMOL/L — SIGNIFICANT CHANGE UP (ref 5–17)
AST SERPL-CCNC: 19 U/L — SIGNIFICANT CHANGE UP (ref 10–40)
BASOPHILS # BLD AUTO: 0.06 K/UL — SIGNIFICANT CHANGE UP (ref 0–0.2)
BASOPHILS NFR BLD AUTO: 0.5 % — SIGNIFICANT CHANGE UP (ref 0–2)
BILIRUB SERPL-MCNC: 0.2 MG/DL — SIGNIFICANT CHANGE UP (ref 0.2–1.2)
BUN SERPL-MCNC: 9 MG/DL — SIGNIFICANT CHANGE UP (ref 7–23)
CALCIUM SERPL-MCNC: 9.2 MG/DL — SIGNIFICANT CHANGE UP (ref 8.4–10.5)
CHLORIDE SERPL-SCNC: 100 MMOL/L — SIGNIFICANT CHANGE UP (ref 96–108)
CO2 SERPL-SCNC: 23 MMOL/L — SIGNIFICANT CHANGE UP (ref 22–31)
CREAT SERPL-MCNC: 0.99 MG/DL — SIGNIFICANT CHANGE UP (ref 0.5–1.3)
EGFR: 93 ML/MIN/1.73M2 — SIGNIFICANT CHANGE UP
EOSINOPHIL # BLD AUTO: 0.3 K/UL — SIGNIFICANT CHANGE UP (ref 0–0.5)
EOSINOPHIL NFR BLD AUTO: 2.3 % — SIGNIFICANT CHANGE UP (ref 0–6)
GLUCOSE SERPL-MCNC: 121 MG/DL — HIGH (ref 70–99)
HCT VFR BLD CALC: 40.7 % — SIGNIFICANT CHANGE UP (ref 39–50)
HGB BLD-MCNC: 13.2 G/DL — SIGNIFICANT CHANGE UP (ref 13–17)
IMM GRANULOCYTES NFR BLD AUTO: 1.6 % — HIGH (ref 0–0.9)
LYMPHOCYTES # BLD AUTO: 1.55 K/UL — SIGNIFICANT CHANGE UP (ref 1–3.3)
LYMPHOCYTES # BLD AUTO: 12.1 % — LOW (ref 13–44)
MAGNESIUM SERPL-MCNC: 1.9 MG/DL — SIGNIFICANT CHANGE UP (ref 1.6–2.6)
MCHC RBC-ENTMCNC: 27.3 PG — SIGNIFICANT CHANGE UP (ref 27–34)
MCHC RBC-ENTMCNC: 32.4 GM/DL — SIGNIFICANT CHANGE UP (ref 32–36)
MCV RBC AUTO: 84.1 FL — SIGNIFICANT CHANGE UP (ref 80–100)
MONOCYTES # BLD AUTO: 0.81 K/UL — SIGNIFICANT CHANGE UP (ref 0–0.9)
MONOCYTES NFR BLD AUTO: 6.3 % — SIGNIFICANT CHANGE UP (ref 2–14)
NEUTROPHILS # BLD AUTO: 9.93 K/UL — HIGH (ref 1.8–7.4)
NEUTROPHILS NFR BLD AUTO: 77.2 % — HIGH (ref 43–77)
NRBC # BLD: 0 /100 WBCS — SIGNIFICANT CHANGE UP (ref 0–0)
PLATELET # BLD AUTO: 155 K/UL — SIGNIFICANT CHANGE UP (ref 150–400)
POTASSIUM SERPL-MCNC: 4.6 MMOL/L — SIGNIFICANT CHANGE UP (ref 3.5–5.3)
POTASSIUM SERPL-SCNC: 4.6 MMOL/L — SIGNIFICANT CHANGE UP (ref 3.5–5.3)
PROT SERPL-MCNC: 7.2 G/DL — SIGNIFICANT CHANGE UP (ref 6–8.3)
RBC # BLD: 4.84 M/UL — SIGNIFICANT CHANGE UP (ref 4.2–5.8)
RBC # FLD: 19.3 % — HIGH (ref 10.3–14.5)
SODIUM SERPL-SCNC: 133 MMOL/L — LOW (ref 135–145)
WBC # BLD: 12.86 K/UL — HIGH (ref 3.8–10.5)
WBC # FLD AUTO: 12.86 K/UL — HIGH (ref 3.8–10.5)

## 2022-12-23 ENCOUNTER — APPOINTMENT (OUTPATIENT)
Dept: INFUSION THERAPY | Facility: CLINIC | Age: 52
End: 2022-12-23

## 2022-12-28 ENCOUNTER — OUTPATIENT (OUTPATIENT)
Dept: OUTPATIENT SERVICES | Facility: HOSPITAL | Age: 52
LOS: 1 days | Discharge: HOME | End: 2022-12-28

## 2022-12-28 DIAGNOSIS — Z90.49 ACQUIRED ABSENCE OF OTHER SPECIFIED PARTS OF DIGESTIVE TRACT: Chronic | ICD-10-CM

## 2022-12-28 DIAGNOSIS — C18.9 MALIGNANT NEOPLASM OF COLON, UNSPECIFIED: ICD-10-CM

## 2023-01-04 ENCOUNTER — RESULT REVIEW (OUTPATIENT)
Age: 53
End: 2023-01-04

## 2023-01-04 ENCOUNTER — APPOINTMENT (OUTPATIENT)
Dept: INFUSION THERAPY | Facility: CLINIC | Age: 53
End: 2023-01-04

## 2023-01-04 ENCOUNTER — RESULT CHARGE (OUTPATIENT)
Age: 53
End: 2023-01-04

## 2023-01-04 VITALS
SYSTOLIC BLOOD PRESSURE: 117 MMHG | HEIGHT: 74 IN | OXYGEN SATURATION: 97 % | DIASTOLIC BLOOD PRESSURE: 83 MMHG | BODY MASS INDEX: 22.79 KG/M2 | HEART RATE: 136 BPM | RESPIRATION RATE: 18 BRPM | TEMPERATURE: 97.3 F | WEIGHT: 177.56 LBS

## 2023-01-04 LAB
ALBUMIN SERPL ELPH-MCNC: 4.7 G/DL — SIGNIFICANT CHANGE UP (ref 3.3–5)
ALP SERPL-CCNC: 185 U/L — HIGH (ref 40–120)
ALT FLD-CCNC: 13 U/L — SIGNIFICANT CHANGE UP (ref 10–45)
ANION GAP SERPL CALC-SCNC: 18 MMOL/L — HIGH (ref 5–17)
AST SERPL-CCNC: 20 U/L — SIGNIFICANT CHANGE UP (ref 10–40)
BASOPHILS # BLD AUTO: 0.06 K/UL — SIGNIFICANT CHANGE UP (ref 0–0.2)
BASOPHILS NFR BLD AUTO: 0.8 % — SIGNIFICANT CHANGE UP (ref 0–2)
BILIRUB SERPL-MCNC: 0.4 MG/DL — SIGNIFICANT CHANGE UP (ref 0.2–1.2)
BUN SERPL-MCNC: 7 MG/DL — SIGNIFICANT CHANGE UP (ref 7–23)
CALCIUM SERPL-MCNC: 10.1 MG/DL — SIGNIFICANT CHANGE UP (ref 8.4–10.5)
CHLORIDE SERPL-SCNC: 97 MMOL/L — SIGNIFICANT CHANGE UP (ref 96–108)
CO2 SERPL-SCNC: 20 MMOL/L — LOW (ref 22–31)
CREAT SERPL-MCNC: 1.01 MG/DL — SIGNIFICANT CHANGE UP (ref 0.5–1.3)
EGFR: 91 ML/MIN/1.73M2 — SIGNIFICANT CHANGE UP
EOSINOPHIL # BLD AUTO: 0.16 K/UL — SIGNIFICANT CHANGE UP (ref 0–0.5)
EOSINOPHIL NFR BLD AUTO: 2 % — SIGNIFICANT CHANGE UP (ref 0–6)
GLUCOSE SERPL-MCNC: 105 MG/DL — HIGH (ref 70–99)
HCT VFR BLD CALC: 43 % — SIGNIFICANT CHANGE UP (ref 39–50)
HGB BLD-MCNC: 13.9 G/DL — SIGNIFICANT CHANGE UP (ref 13–17)
IMM GRANULOCYTES NFR BLD AUTO: 0.8 % — SIGNIFICANT CHANGE UP (ref 0–0.9)
LYMPHOCYTES # BLD AUTO: 1.64 K/UL — SIGNIFICANT CHANGE UP (ref 1–3.3)
LYMPHOCYTES # BLD AUTO: 20.8 % — SIGNIFICANT CHANGE UP (ref 13–44)
MAGNESIUM SERPL-MCNC: 1.9 MG/DL — SIGNIFICANT CHANGE UP (ref 1.6–2.6)
MCHC RBC-ENTMCNC: 26.8 PG — LOW (ref 27–34)
MCHC RBC-ENTMCNC: 32.3 GM/DL — SIGNIFICANT CHANGE UP (ref 32–36)
MCV RBC AUTO: 83 FL — SIGNIFICANT CHANGE UP (ref 80–100)
MONOCYTES # BLD AUTO: 1.21 K/UL — HIGH (ref 0–0.9)
MONOCYTES NFR BLD AUTO: 15.3 % — HIGH (ref 2–14)
NEUTROPHILS # BLD AUTO: 4.76 K/UL — SIGNIFICANT CHANGE UP (ref 1.8–7.4)
NEUTROPHILS NFR BLD AUTO: 60.3 % — SIGNIFICANT CHANGE UP (ref 43–77)
NRBC # BLD: 0 /100 WBCS — SIGNIFICANT CHANGE UP (ref 0–0)
PLATELET # BLD AUTO: 244 K/UL — SIGNIFICANT CHANGE UP (ref 150–400)
POTASSIUM SERPL-MCNC: 4.3 MMOL/L — SIGNIFICANT CHANGE UP (ref 3.5–5.3)
POTASSIUM SERPL-SCNC: 4.3 MMOL/L — SIGNIFICANT CHANGE UP (ref 3.5–5.3)
PROT SERPL-MCNC: 8.3 G/DL — SIGNIFICANT CHANGE UP (ref 6–8.3)
RBC # BLD: 5.18 M/UL — SIGNIFICANT CHANGE UP (ref 4.2–5.8)
RBC # FLD: 18.8 % — HIGH (ref 10.3–14.5)
SODIUM SERPL-SCNC: 134 MMOL/L — LOW (ref 135–145)
WBC # BLD: 7.89 K/UL — SIGNIFICANT CHANGE UP (ref 3.8–10.5)
WBC # FLD AUTO: 7.89 K/UL — SIGNIFICANT CHANGE UP (ref 3.8–10.5)

## 2023-01-05 DIAGNOSIS — R11.2 NAUSEA WITH VOMITING, UNSPECIFIED: ICD-10-CM

## 2023-01-05 DIAGNOSIS — Z51.11 ENCOUNTER FOR ANTINEOPLASTIC CHEMOTHERAPY: ICD-10-CM

## 2023-01-06 ENCOUNTER — APPOINTMENT (OUTPATIENT)
Dept: INFUSION THERAPY | Facility: CLINIC | Age: 53
End: 2023-01-06

## 2023-01-09 DIAGNOSIS — Z51.89 ENCOUNTER FOR OTHER SPECIFIED AFTERCARE: ICD-10-CM

## 2023-01-15 ENCOUNTER — INPATIENT (INPATIENT)
Facility: HOSPITAL | Age: 53
LOS: 0 days | Discharge: LEFT BEFORE DISCHARGE | DRG: 469 | End: 2023-01-16
Attending: EMERGENCY MEDICINE | Admitting: EMERGENCY MEDICINE
Payer: MEDICAID

## 2023-01-15 VITALS
HEIGHT: 73.62 IN | DIASTOLIC BLOOD PRESSURE: 94 MMHG | OXYGEN SATURATION: 100 % | TEMPERATURE: 98 F | HEART RATE: 135 BPM | RESPIRATION RATE: 20 BRPM | SYSTOLIC BLOOD PRESSURE: 125 MMHG

## 2023-01-15 DIAGNOSIS — E86.0 DEHYDRATION: ICD-10-CM

## 2023-01-15 DIAGNOSIS — Z90.49 ACQUIRED ABSENCE OF OTHER SPECIFIED PARTS OF DIGESTIVE TRACT: Chronic | ICD-10-CM

## 2023-01-15 LAB
ALBUMIN SERPL ELPH-MCNC: 4.6 G/DL — SIGNIFICANT CHANGE UP (ref 3.3–5)
ALP SERPL-CCNC: 328 U/L — HIGH (ref 40–120)
ALT FLD-CCNC: 35 U/L — SIGNIFICANT CHANGE UP (ref 12–78)
ANION GAP SERPL CALC-SCNC: 17 MMOL/L — SIGNIFICANT CHANGE UP (ref 5–17)
ANION GAP SERPL CALC-SCNC: 9 MMOL/L — SIGNIFICANT CHANGE UP (ref 5–17)
ANISOCYTOSIS BLD QL: SIGNIFICANT CHANGE UP
APPEARANCE UR: ABNORMAL
APTT BLD: 34.8 SEC — SIGNIFICANT CHANGE UP (ref 27.5–35.5)
AST SERPL-CCNC: 27 U/L — SIGNIFICANT CHANGE UP (ref 15–37)
BACTERIA # UR AUTO: ABNORMAL
BASOPHILS # BLD AUTO: 0 K/UL — SIGNIFICANT CHANGE UP (ref 0–0.2)
BASOPHILS NFR BLD AUTO: 0 % — SIGNIFICANT CHANGE UP (ref 0–2)
BILIRUB SERPL-MCNC: 0.6 MG/DL — SIGNIFICANT CHANGE UP (ref 0.2–1.2)
BILIRUB UR-MCNC: NEGATIVE — SIGNIFICANT CHANGE UP
BUN SERPL-MCNC: 44 MG/DL — HIGH (ref 7–23)
BUN SERPL-MCNC: 45 MG/DL — HIGH (ref 7–23)
CALCIUM SERPL-MCNC: 10.3 MG/DL — HIGH (ref 8.5–10.1)
CALCIUM SERPL-MCNC: 8.9 MG/DL — SIGNIFICANT CHANGE UP (ref 8.5–10.1)
CHLORIDE SERPL-SCNC: 91 MMOL/L — LOW (ref 96–108)
CHLORIDE SERPL-SCNC: 98 MMOL/L — SIGNIFICANT CHANGE UP (ref 96–108)
CO2 SERPL-SCNC: 15 MMOL/L — LOW (ref 22–31)
CO2 SERPL-SCNC: 22 MMOL/L — SIGNIFICANT CHANGE UP (ref 22–31)
COLOR SPEC: YELLOW — SIGNIFICANT CHANGE UP
CREAT SERPL-MCNC: 3.03 MG/DL — HIGH (ref 0.5–1.3)
CREAT SERPL-MCNC: 4.04 MG/DL — HIGH (ref 0.5–1.3)
DIFF PNL FLD: ABNORMAL
EGFR: 17 ML/MIN/1.73M2 — LOW
EGFR: 24 ML/MIN/1.73M2 — LOW
ELLIPTOCYTES BLD QL SMEAR: SLIGHT — SIGNIFICANT CHANGE UP
EOSINOPHIL # BLD AUTO: 0 K/UL — SIGNIFICANT CHANGE UP (ref 0–0.5)
EOSINOPHIL NFR BLD AUTO: 0 % — SIGNIFICANT CHANGE UP (ref 0–6)
EPI CELLS # UR: SIGNIFICANT CHANGE UP
GLUCOSE SERPL-MCNC: 133 MG/DL — HIGH (ref 70–99)
GLUCOSE SERPL-MCNC: 176 MG/DL — HIGH (ref 70–99)
GLUCOSE UR QL: NEGATIVE — SIGNIFICANT CHANGE UP
GRAN CASTS # UR COMP ASSIST: ABNORMAL /LPF
HCT VFR BLD CALC: 51.9 % — HIGH (ref 39–50)
HGB BLD-MCNC: 17.3 G/DL — HIGH (ref 13–17)
INR BLD: 1.14 RATIO — SIGNIFICANT CHANGE UP (ref 0.88–1.16)
KETONES UR-MCNC: NEGATIVE — SIGNIFICANT CHANGE UP
LACTATE SERPL-SCNC: 2 MMOL/L — SIGNIFICANT CHANGE UP (ref 0.7–2)
LACTATE SERPL-SCNC: 5.5 MMOL/L — CRITICAL HIGH (ref 0.7–2)
LEUKOCYTE ESTERASE UR-ACNC: ABNORMAL
LIDOCAIN IGE QN: 197 U/L — SIGNIFICANT CHANGE UP (ref 73–393)
LYMPHOCYTES # BLD AUTO: 1.88 K/UL — SIGNIFICANT CHANGE UP (ref 1–3.3)
LYMPHOCYTES # BLD AUTO: 4 % — LOW (ref 13–44)
MAGNESIUM SERPL-MCNC: 2.3 MG/DL — SIGNIFICANT CHANGE UP (ref 1.6–2.6)
MANUAL SMEAR VERIFICATION: SIGNIFICANT CHANGE UP
MCHC RBC-ENTMCNC: 27.2 PG — SIGNIFICANT CHANGE UP (ref 27–34)
MCHC RBC-ENTMCNC: 33.3 GM/DL — SIGNIFICANT CHANGE UP (ref 32–36)
MCV RBC AUTO: 81.6 FL — SIGNIFICANT CHANGE UP (ref 80–100)
MONOCYTES # BLD AUTO: 6.58 K/UL — HIGH (ref 0–0.9)
MONOCYTES NFR BLD AUTO: 14 % — SIGNIFICANT CHANGE UP (ref 2–14)
NEUTROPHILS # BLD AUTO: 38.55 K/UL — HIGH (ref 1.8–7.4)
NEUTROPHILS NFR BLD AUTO: 82 % — HIGH (ref 43–77)
NITRITE UR-MCNC: NEGATIVE — SIGNIFICANT CHANGE UP
NRBC # BLD: 0 /100 — SIGNIFICANT CHANGE UP (ref 0–0)
NRBC # BLD: SIGNIFICANT CHANGE UP /100 WBCS (ref 0–0)
PH UR: 5 — SIGNIFICANT CHANGE UP (ref 5–8)
PLAT MORPH BLD: NORMAL — SIGNIFICANT CHANGE UP
PLATELET # BLD AUTO: 328 K/UL — SIGNIFICANT CHANGE UP (ref 150–400)
POTASSIUM SERPL-MCNC: 4 MMOL/L — SIGNIFICANT CHANGE UP (ref 3.5–5.3)
POTASSIUM SERPL-MCNC: 5.1 MMOL/L — SIGNIFICANT CHANGE UP (ref 3.5–5.3)
POTASSIUM SERPL-SCNC: 4 MMOL/L — SIGNIFICANT CHANGE UP (ref 3.5–5.3)
POTASSIUM SERPL-SCNC: 5.1 MMOL/L — SIGNIFICANT CHANGE UP (ref 3.5–5.3)
PROT SERPL-MCNC: 10 GM/DL — HIGH (ref 6–8.3)
PROT UR-MCNC: 100
PROTHROM AB SERPL-ACNC: 13.2 SEC — SIGNIFICANT CHANGE UP (ref 10.5–13.4)
RAPID RVP RESULT: SIGNIFICANT CHANGE UP
RBC # BLD: 6.36 M/UL — HIGH (ref 4.2–5.8)
RBC # FLD: 21 % — HIGH (ref 10.3–14.5)
RBC BLD AUTO: ABNORMAL
RBC CASTS # UR COMP ASSIST: ABNORMAL /HPF (ref 0–4)
SARS-COV-2 RNA SPEC QL NAA+PROBE: SIGNIFICANT CHANGE UP
SODIUM SERPL-SCNC: 123 MMOL/L — LOW (ref 135–145)
SODIUM SERPL-SCNC: 129 MMOL/L — LOW (ref 135–145)
SP GR SPEC: 1.02 — SIGNIFICANT CHANGE UP (ref 1.01–1.02)
TOXIC GRANULES BLD QL SMEAR: PRESENT — SIGNIFICANT CHANGE UP
TROPONIN I, HIGH SENSITIVITY RESULT: 27.92 NG/L — SIGNIFICANT CHANGE UP
UROBILINOGEN FLD QL: NEGATIVE — SIGNIFICANT CHANGE UP
WBC # BLD: 47.01 K/UL — CRITICAL HIGH (ref 3.8–10.5)
WBC # FLD AUTO: 47.01 K/UL — CRITICAL HIGH (ref 3.8–10.5)
WBC UR QL: ABNORMAL /HPF (ref 0–5)

## 2023-01-15 PROCEDURE — 71045 X-RAY EXAM CHEST 1 VIEW: CPT | Mod: 26

## 2023-01-15 PROCEDURE — 85025 COMPLETE CBC W/AUTO DIFF WBC: CPT

## 2023-01-15 PROCEDURE — 0225U NFCT DS DNA&RNA 21 SARSCOV2: CPT

## 2023-01-15 PROCEDURE — 74177 CT ABD & PELVIS W/CONTRAST: CPT | Mod: 26,MA

## 2023-01-15 PROCEDURE — 84155 ASSAY OF PROTEIN SERUM: CPT

## 2023-01-15 PROCEDURE — 85730 THROMBOPLASTIN TIME PARTIAL: CPT

## 2023-01-15 PROCEDURE — 80061 LIPID PANEL: CPT

## 2023-01-15 PROCEDURE — 87086 URINE CULTURE/COLONY COUNT: CPT

## 2023-01-15 PROCEDURE — 84165 PROTEIN E-PHORESIS SERUM: CPT

## 2023-01-15 PROCEDURE — 36415 COLL VENOUS BLD VENIPUNCTURE: CPT

## 2023-01-15 PROCEDURE — 93010 ELECTROCARDIOGRAM REPORT: CPT

## 2023-01-15 PROCEDURE — 99223 1ST HOSP IP/OBS HIGH 75: CPT

## 2023-01-15 PROCEDURE — 83605 ASSAY OF LACTIC ACID: CPT

## 2023-01-15 PROCEDURE — 83735 ASSAY OF MAGNESIUM: CPT

## 2023-01-15 PROCEDURE — 99285 EMERGENCY DEPT VISIT HI MDM: CPT

## 2023-01-15 PROCEDURE — 80048 BASIC METABOLIC PNL TOTAL CA: CPT

## 2023-01-15 PROCEDURE — 84100 ASSAY OF PHOSPHORUS: CPT

## 2023-01-15 PROCEDURE — 81001 URINALYSIS AUTO W/SCOPE: CPT

## 2023-01-15 PROCEDURE — 86334 IMMUNOFIX E-PHORESIS SERUM: CPT

## 2023-01-15 RX ORDER — HEPARIN SODIUM 5000 [USP'U]/ML
INJECTION INTRAVENOUS; SUBCUTANEOUS
Qty: 25000 | Refills: 0 | Status: DISCONTINUED | OUTPATIENT
Start: 2023-01-15 | End: 2023-01-16

## 2023-01-15 RX ORDER — HEPARIN SODIUM 5000 [USP'U]/ML
6500 INJECTION INTRAVENOUS; SUBCUTANEOUS EVERY 6 HOURS
Refills: 0 | Status: DISCONTINUED | OUTPATIENT
Start: 2023-01-15 | End: 2023-01-16

## 2023-01-15 RX ORDER — CEFEPIME 1 G/1
1000 INJECTION, POWDER, FOR SOLUTION INTRAMUSCULAR; INTRAVENOUS ONCE
Refills: 0 | Status: DISCONTINUED | OUTPATIENT
Start: 2023-01-15 | End: 2023-01-15

## 2023-01-15 RX ORDER — HEPARIN SODIUM 5000 [USP'U]/ML
3000 INJECTION INTRAVENOUS; SUBCUTANEOUS EVERY 6 HOURS
Refills: 0 | Status: DISCONTINUED | OUTPATIENT
Start: 2023-01-15 | End: 2023-01-16

## 2023-01-15 RX ORDER — CEFEPIME 1 G/1
500 INJECTION, POWDER, FOR SOLUTION INTRAMUSCULAR; INTRAVENOUS EVERY 12 HOURS
Refills: 0 | Status: DISCONTINUED | OUTPATIENT
Start: 2023-01-15 | End: 2023-01-16

## 2023-01-15 RX ORDER — LANOLIN ALCOHOL/MO/W.PET/CERES
3 CREAM (GRAM) TOPICAL AT BEDTIME
Refills: 0 | Status: DISCONTINUED | OUTPATIENT
Start: 2023-01-15 | End: 2023-01-16

## 2023-01-15 RX ORDER — SODIUM CHLORIDE 9 MG/ML
1000 INJECTION INTRAMUSCULAR; INTRAVENOUS; SUBCUTANEOUS
Refills: 0 | Status: DISCONTINUED | OUTPATIENT
Start: 2023-01-15 | End: 2023-01-16

## 2023-01-15 RX ORDER — APIXABAN 2.5 MG/1
2 TABLET, FILM COATED ORAL
Qty: 28 | Refills: 0

## 2023-01-15 RX ORDER — ONDANSETRON 8 MG/1
4 TABLET, FILM COATED ORAL ONCE
Refills: 0 | Status: COMPLETED | OUTPATIENT
Start: 2023-01-15 | End: 2023-01-15

## 2023-01-15 RX ORDER — CEFEPIME 1 G/1
1000 INJECTION, POWDER, FOR SOLUTION INTRAMUSCULAR; INTRAVENOUS ONCE
Refills: 0 | Status: COMPLETED | OUTPATIENT
Start: 2023-01-15 | End: 2023-01-15

## 2023-01-15 RX ORDER — PSYLLIUM SEED (WITH DEXTROSE)
0 POWDER (GRAM) ORAL
Qty: 0 | Refills: 0 | DISCHARGE

## 2023-01-15 RX ORDER — ACETAMINOPHEN 500 MG
650 TABLET ORAL EVERY 6 HOURS
Refills: 0 | Status: DISCONTINUED | OUTPATIENT
Start: 2023-01-15 | End: 2023-01-16

## 2023-01-15 RX ORDER — MORPHINE SULFATE 50 MG/1
4 CAPSULE, EXTENDED RELEASE ORAL ONCE
Refills: 0 | Status: DISCONTINUED | OUTPATIENT
Start: 2023-01-15 | End: 2023-01-15

## 2023-01-15 RX ORDER — ONDANSETRON 8 MG/1
4 TABLET, FILM COATED ORAL EVERY 8 HOURS
Refills: 0 | Status: DISCONTINUED | OUTPATIENT
Start: 2023-01-15 | End: 2023-01-16

## 2023-01-15 RX ORDER — SODIUM CHLORIDE 9 MG/ML
2000 INJECTION INTRAMUSCULAR; INTRAVENOUS; SUBCUTANEOUS ONCE
Refills: 0 | Status: COMPLETED | OUTPATIENT
Start: 2023-01-15 | End: 2023-01-15

## 2023-01-15 RX ORDER — HEPARIN SODIUM 5000 [USP'U]/ML
6500 INJECTION INTRAVENOUS; SUBCUTANEOUS ONCE
Refills: 0 | Status: COMPLETED | OUTPATIENT
Start: 2023-01-15 | End: 2023-01-16

## 2023-01-15 RX ORDER — FAMOTIDINE 10 MG/ML
20 INJECTION INTRAVENOUS ONCE
Refills: 0 | Status: COMPLETED | OUTPATIENT
Start: 2023-01-15 | End: 2023-01-15

## 2023-01-15 RX ADMIN — CEFEPIME 1000 MILLIGRAM(S): 1 INJECTION, POWDER, FOR SOLUTION INTRAMUSCULAR; INTRAVENOUS at 20:56

## 2023-01-15 RX ADMIN — FAMOTIDINE 20 MILLIGRAM(S): 10 INJECTION INTRAVENOUS at 17:07

## 2023-01-15 RX ADMIN — SODIUM CHLORIDE 1000 MILLILITER(S): 9 INJECTION INTRAMUSCULAR; INTRAVENOUS; SUBCUTANEOUS at 17:04

## 2023-01-15 RX ADMIN — ONDANSETRON 4 MILLIGRAM(S): 8 TABLET, FILM COATED ORAL at 17:05

## 2023-01-15 RX ADMIN — MORPHINE SULFATE 4 MILLIGRAM(S): 50 CAPSULE, EXTENDED RELEASE ORAL at 17:05

## 2023-01-15 NOTE — ED PROVIDER NOTE - PROGRESS NOTE DETAILS
CT NEG. +Elevated lactate. Patient appears severely dehydrated. Spoke with hospitalist dr. Hodges, will admit patient. ~German Smith PA-C PA: Patient is a 53 y/o male with PMHx of colon CA with mets to the liver on chemotherapy s/p ileostomy bag who presents to [facility] c/o nausea and vomiting since yesterday. No fever or chills. Patient reports large amount of output from ileostomy. ~German Smith PA-C

## 2023-01-15 NOTE — ED PROVIDER NOTE - OBJECTIVE STATEMENT
51 y/o male with PMHx of colon CA with mets to the liver on chemotherapy presents to the ED for nausea and vomiting since yesterday. No fever or chills. States that he always has large amount of output from ileostomy. Denies any other symptoms currently.

## 2023-01-15 NOTE — H&P ADULT - NSHPLABSRESULTS_GEN_ALL_CORE
COMPARISON: CT abdomen and pelvis 9/1/2022    CONTRAST/COMPLICATIONS:  IV Contrast: Omnipaque 350  90 cc administered   10 cc discarded  Oral Contrast: NONE  Complications: None reported at time of study completion    PROCEDURE:  CT of the Abdomen and Pelvis was performed.  Sagittal and coronal reformats were performed.    FINDINGS:  LOWER CHEST: Clear.    LIVER: Overall stability of mucinous liver metastases  BILE DUCTS: Nondilated.  GALLBLADDER: Normal.  SPLEEN: Normal.  PANCREAS: Normal.  ADRENALS: Normal.  KIDNEYS/URETERS: No calculi, hydronephrosis, or renal mass.    BLADDER: Normal.  REPRODUCTIVE ORGANS: Nonenlarged.    BOWEL: Unremarkable rectal stump status post colectomy and right lower   quadrant ileostomy. Increased prolapse of the terminal ileum and   accompanying mesentery through the ostomy. No bowel obstruction.  PERITONEUM: No free air or ascites.  VESSELS: Aortoiliac atherosclerosis without aneurysm.  RETROPERITONEUM/LYMPH NODES: No adenopathy.  ABDOMINAL WALL: Postsurgical changes.  BONES: No acute bony abnormality.    IMPRESSION:  *  No acute pathology. No bowel obstruction.  *  Increased prolapse of the terminal ileum and accompanying mesentery   through the ostomy.  *  Overall stability of mucinous liver metastases        CXR no infiltrates by my review

## 2023-01-15 NOTE — ED PROVIDER NOTE - ENMT, MLM
Airway patent, Nasal mucosa clear. Dry mucus membranes. Throat has no vesicles, no oropharyngeal exudates and uvula is midline.

## 2023-01-15 NOTE — H&P ADULT - NSHPOUTPATIENTPROVIDERS_GEN_ALL_CORE
PCP Dr. Frey at [facility]  CARD Dr. Palla  [facility] onc [facility] PCP Dr. Erickson at [facility]  CARD Dr. Palla  [facility]eme onc Dr. Carter PCP Dr. Erickson at [facility]  CARD Dr. Palla  [facility]eme onc Dr. Carter

## 2023-01-15 NOTE — ED PROVIDER NOTE - CARE PLAN
Principal Discharge DX:	Acute dehydration  Secondary Diagnosis:	Vomiting  Secondary Diagnosis:	Acute renal failure (ARF)   1

## 2023-01-15 NOTE — H&P ADULT - NSTOBACCOSCREENHP_GEN_A_CS
Lm to offer 12/14 in the WO with Dr Lucy Pedraza for  hfu @ 2:30   Pt would need labs prior to appt No

## 2023-01-15 NOTE — ED ADULT NURSE REASSESSMENT NOTE - NS ED NURSE REASSESS COMMENT FT1
Pt. received in stable condition, able to make all needs known.  Pt. requesting to have regular diet, will start with clears then advance diet.  No s/sx of pain/resp. distress.  VSS, safety maintained.  Call bell within reach, will continue to monitor.

## 2023-01-15 NOTE — ED PROVIDER NOTE - CLINICAL SUMMARY MEDICAL DECISION MAKING FREE TEXT BOX
CT NEG. +Elevated lactate. Patient appears severely dehydrated. Spoke with hospitalist dr. Hodges, will admit patient. ~German Smith PA-C CT NEG. +Elevated lactate. Patient appears severely dehydrated. Spoke with hospitalist dr. Hodges, will admit patient. ~German Smith PA-C    Agree with above.  Nausea and vomiting likely chemo induced, patient with dehydration.  Covered with cefepime given immunocompromise and +SIRS.  Given IVF, antiemetics.  Admit to medicine for further evaluation and management. Ramesh Vitale D.O.

## 2023-01-15 NOTE — H&P ADULT - ASSESSMENT
52 year old male w colon CA s/p ileostomy w mets to liver came to ED w      #Intractable nausea and vomiting  after recent chemo  #Dehydration and hypovolemia  Hb 17 today ns 13 01-04  #Acute renal failure  nl Cr 1 today 4 and then 3 after 2 L  no evidence of obstruction  1. admit to med  2. daily weight  3. I/O q 8 hrs  4. s/p NS 2000 cc in ED  5. continue w  cc/hr  6. clears po  7. renal consult  8. zofran prn      #Leukocytosis  reactive vs infectious  48K  not clear if pt received neulasta  1. continue cefepime until oncology input  2. Heme Onc      #Colon CA w mets to liver  1. s/p recent chemo      #Hx PE   has been off AC for past 2 days since unable to take anything po  1. resume AC once repeat Cr is obtained in AM      60 minutes    I spent a total of 60 minutes on the date of this encounter coordinating the patient's care. This includes reviewing prior documentation, results and imaging in addition to completing a history and physical examination on the patient. Further tests, medications, and procedures have been ordered as indicated. Laboratory results and the plan of care were communicated to the patient and their family member. Supporting documentation was completed and added to the patient's chart.         52 year old male w colon CA s/p ileostomy w mets to liver came to ED w      #Intractable nausea and vomiting  after recent chemo  #Dehydration and hypovolemia  Hb 17 today ns 13 01-04  #Acute renal failure  nl Cr 1 today 4 and then 3 after 2 L  no evidence of obstruction  1. admit to med  2. daily weight  3. I/O q 8 hrs  4. s/p NS 2000 cc in ED  5. continue w  cc/hr  6. clears po  7. renal consult  8. zofran prn      #Leukocytosis  reactive vs infectious  48K  CXR no infiltrate  not clear if pt received neulasta  1. continue cefepime until oncology input  2. Heme Onc  3. trend      #Hyperproteinemia 10 g  1. IV hydration  2. SPEP in AM      #Colon CA w mets to liver  1. s/p recent chemo      #Hx PE   has been off AC for past 2 days since unable to take anything po  1. resume AC once repeat Cr is obtained in AM      60 minutes    I spent a total of 60 minutes on the date of this encounter coordinating the patient's care. This includes reviewing prior documentation, results and imaging in addition to completing a history and physical examination on the patient. Further tests, medications, and procedures have been ordered as indicated. Laboratory results and the plan of care were communicated to the patient and their family member. Supporting documentation was completed and added to the patient's chart.         52 year old male w colon CA s/p ileostomy w mets to liver came to ED w      #Intractable nausea and vomiting  after recent chemo  #Dehydration and hypovolemia  Hb 17 today ns 13 01-04  #Acute renal failure  nl Cr 1 today 4 and then 3 after 2 L  no evidence of obstruction  1. admit to med  2. daily weight  3. I/O q 8 hrs  4. s/p NS 2000 cc in ED  5. continue w  cc/hr  6. clears po  7. renal consult  8. zofran prn      #Leukocytosis  reactive vs infectious  48K  CXR no infiltrate  not clear if pt received neulasta  1. continue cefepime until oncology input  2. Heme Onc  3. trend      #Hyperproteinemia 10 g  1. IV hydration  2. SPEP in AM      #Colon CA w mets to liver  1. s/p recent chemo      #Hx PE   has been off AC for past 2 days since unable to take anything po  1. heparin gtt   2. consider resuming oral AC once repeat Cr is obtained in AM      60 minutes    I spent a total of 60 minutes on the date of this encounter coordinating the patient's care. This includes reviewing prior documentation, results and imaging in addition to completing a history and physical examination on the patient. Further tests, medications, and procedures have been ordered as indicated. Laboratory results and the plan of care were communicated to the patient and their family member. Supporting documentation was completed and added to the patient's chart.         52 year old male w colon CA s/p ileostomy w mets to liver came to ED w      #Intractable nausea and vomiting  after recent chemo  #Dehydration and hypovolemia  Hb 17 today ns 13 01-04  #Acute renal failure  nl Cr 1 today 4 and then 3 after 2 L  no evidence of obstruction  1. admit to med  2. daily weight  3. I/O q 8 hrs  4. s/p NS 2000 cc in ED  5. continue w  cc/hr  6. clears po  7. renal consult  8. zofran prn      #Elevated lactate - resolved  5.5 then 2.o after IV fluids 2L      #Leukocytosis  reactive vs infectious  48K  CXR no infiltrate  not clear if pt received neulasta  1. continue cefepime until oncology input  2. Heme Onc  3. trend      #Hyperproteinemia 10 g  1. IV hydration  2. SPEP in AM      #Colon CA w mets to liver  1. s/p recent chemo      #Hx PE   has been off AC for past 2 days since unable to take anything po  1. heparin gtt   2. consider resuming oral AC once repeat Cr is obtained in AM      60 minutes    I spent a total of 60 minutes on the date of this encounter coordinating the patient's care. This includes reviewing prior documentation, results and imaging in addition to completing a history and physical examination on the patient. Further tests, medications, and procedures have been ordered as indicated. Laboratory results and the plan of care were communicated to the patient and their family member. Supporting documentation was completed and added to the patient's chart.         52 year old male w colon CA s/p ileostomy w mets to liver came to ED w      #Intractable nausea and vomiting  after recent chemo  #Dehydration and hypovolemia w hyponatremia  Hb 17 today ns 13 01-04  #Acute renal failure  nl Cr 1 today 4 and then 3 after 2 L  no evidence of obstruction  1. admit to med  2. daily weight  3. I/O q 8 hrs  4. s/p NS 2000 cc in ED  5. continue w  cc/hr  6. clears po  7. renal consult  8. zofran prn  9. BMP, P in AM w Mg      #Elevated lactate - resolved  5.5 then 2.o after IV fluids 2L      #Leukocytosis  reactive vs infectious  48K  CXR no infiltrate  not clear if pt received neulasta  1. continue cefepime until oncology input  2. Heme Onc  3. trend      #Hyperproteinemia 10 g  1. IV hydration  2. SPEP in AM      #Colon CA w mets to liver  1. s/p recent chemo      #Hx PE   has been off AC for past 2 days since unable to take anything po  1. heparin gtt   2. consider resuming oral AC once repeat Cr is obtained in AM      60 minutes    I spent a total of 60 minutes on the date of this encounter coordinating the patient's care. This includes reviewing prior documentation, results and imaging in addition to completing a history and physical examination on the patient. Further tests, medications, and procedures have been ordered as indicated. Laboratory results and the plan of care were communicated to the patient and their family member. Supporting documentation was completed and added to the patient's chart.         52 year old male w colon CA s/p ileostomy w mets to liver came to ED w      #Intractable nausea and vomiting  after recent chemo  #Dehydration and hypovolemia w hyponatremia  Hb 17 today ns 13 01-04  #Acute renal failure  nl Cr 1 today 4 and then 3 after 2 L  no evidence of obstruction  HAD IV CONTRAST CT IN ED TODAY  1. admit to med  2. daily weight  3. I/O q 8 hrs  4. s/p NS 2000 cc in ED  5. continue w  cc/hr  6. clears po  7. renal consult  8. zofran prn  9. BMP, P in AM w Mg      #Elevated lactate - resolved  5.5 then 2.o after IV fluids 2L      #Leukocytosis  reactive vs infectious  48K  CXR no infiltrate  not clear if pt received neulasta  1. continue cefepime until oncology input  2. Heme Onc  3. trend      #Hyperproteinemia 10 g  1. IV hydration  2. SPEP in AM      #Colon CA w mets to liver  1. s/p recent chemo      #Hx PE   has been off AC for past 2 days since unable to take anything po  1. heparin gtt   2. consider resuming oral AC once repeat Cr is obtained in AM      60 minutes    I spent a total of 60 minutes on the date of this encounter coordinating the patient's care. This includes reviewing prior documentation, results and imaging in addition to completing a history and physical examination on the patient. Further tests, medications, and procedures have been ordered as indicated. Laboratory results and the plan of care were communicated to the patient and their family member. Supporting documentation was completed and added to the patient's chart.

## 2023-01-15 NOTE — ED PROVIDER NOTE - PHYSICAL EXAMINATION
PA NOTE: GEN: ill appearing, AOX3. HEENT: Throat clear. Airway is patent. EYES: PERRLA. EOMI. Head: NC/AT. NECK: Supple, No JVD. FROM. C-spine non-tender. CV:S1S2, RRR, LUNGS: Non-labored breathing, no tachypnea. O2sat 100% RA. CTA b/l. No w/r/r. CHEST: Equal chest expansion and rise. No deformity. ABD: +ileostomy bag with prolapsed ileum in bag noted, brown colored stool. ABD in soft, mild diffuse tenderness. No rebound, no guarding. No CVAT. EXT: No e/c/c. 2+ distal pulses. SKIN: No rashes. NEURO: No focal deficits. CN II-XII intact. FROM. 5/5 motor and sensory. ~German Smith PA-C

## 2023-01-15 NOTE — H&P ADULT - HISTORY OF PRESENT ILLNESS
weakness, nausea, vomiting. Hx colon, liver cancer on chemo.          In ED /94     RR 20   T 98   100% sat 52 year old male w colon CA w ileostomy  w mets to  liver on chemo presents to ED for nausea and vomiting since yesterday.     No fever or chills.   weakness, nausea, vomiting. Hx colon, liver cancer on chemo.      In ED /94     RR 20   T 98   100% sat  NS 2000 cc  cefepime      PAST MEDICAL HX  Cardiomegaly       10/5/22 EF 59% Mild DD , Mild MR   Cancer, colon liver mets hx w CA   Malignant neoplasm of ascending colon   Metastatic colon cancer to liver  Neuropathic pain   PE pulmonary emboli   S/P ileostomy.  Sinus tachycardia     PAST SURGICAL HISTORY:  S/P colon resection.        FAMILY HX  denies family history of cancer      SOCIAL HX   Former smoker ; hx  THC and cigarettes daily for 30 years, quit May 19th, 2022;   ETOH had 6 pack a day, quit 4 years ago       52 year old male w colon CA w ileostomy  w mets to  liver on chemo presents to ED for nausea and vomiting since yesterday.     No fever or chills.   +weakness,   +nausea,   +bilious vomiting  has not taken meds x 2 days since cannot keep anything down  Had chemo 01-11 outpt but records are not available for my review  Does not know if he had any WBC factors administered    In ED /94     RR 20   T 98   100% sat  NS 2000 cc  cefepime  CXR no acute infiltrate  CT scan no obstruction      PAST MEDICAL HX  Cardiomegaly       10/5/22 EF 59% Mild DD , Mild MR   Cancer, colon liver mets hx w CA   Malignant neoplasm of ascending colon   Metastatic colon cancer to liver  Neuropathic pain   PE pulmonary emboli   S/P ileostomy.  Sinus tachycardia     PAST SURGICAL HISTORY:  S/P colon resection.        FAMILY HX  denies family history of cancer      SOCIAL HX   Former smoker ; hx  THC and cigarettes daily for 30 years, quit May 19th, 2022;   ETOH had 6 pack a day, quit 4 years ago       52 year old male w colon CA w ileostomy  w mets to  liver on chemo presents to ED for nausea and vomiting since yesterday.     No fever or chills.  +myalgia + arthralgia   +weakness   +nausea   +bilious vomiting  has not taken meds x 2 days since cannot keep anything down  Had chemo 01-11 outpt but records are not available for my review  Does not know if he had any WBC factors/neulasta administered      In ED /94     RR 20   T 98   100% sat  NS 2000 cc  cefepime  zofran  morphine  CXR no acute infiltrate  CT with IV contrast no obstruction in abd, pelvis      PAST MEDICAL HX  Cardiomegaly       10/5/22 EF 59% Mild DD , Mild MR   Cancer, colon liver mets hx w CA   Malignant neoplasm of ascending colon   Metastatic colon cancer to liver  Neuropathic pain   PE pulmonary emboli   S/P ileostomy.  Sinus tachycardia     PAST SURGICAL HISTORY:  S/P colon resection.        FAMILY HX  denies family history of cancer      SOCIAL HX   Former smoker ; hx  THC and cigarettes daily for 30 years, quit May 19th, 2022;   ETOH had 6 pack a day, quit 4 years ago       52 year old male w colon CA w ileostomy  w mets to  liver on chemo presents to ED for nausea and vomiting since yesterday.     No fever or chills.  +myalgia + arthralgia   +weakness   +nausea   +bilious vomiting  has not taken meds x 2 days since cannot keep anything down  Had chemo 01-11 outpt but records are not available for my review  Does not know if he had any WBC factors/neulasta administered      In ED /94     RR 20   T 98   100% sat  NS 2000 cc  cefepime  zofran  morphine  CXR no acute infiltrate  CT with IV contrast no obstruction in abd, pelvis  lactate 5.5 w repeat pending      PAST MEDICAL HX  Cardiomegaly       10/5/22 EF 59% Mild DD , Mild MR   Cancer, colon liver mets hx w CA   Malignant neoplasm of ascending colon   Metastatic colon cancer to liver  Neuropathic pain   PE pulmonary emboli   S/P ileostomy.  Sinus tachycardia     PAST SURGICAL HISTORY:  S/P colon resection.        FAMILY HX  denies family history of cancer      SOCIAL HX   Former smoker ; hx  THC and cigarettes daily for 30 years, quit May 19th, 2022;   ETOH had 6 pack a day, quit 4 years ago

## 2023-01-15 NOTE — H&P ADULT - NSHPPHYSICALEXAM_GEN_ALL_CORE
Vital Signs Last 24 Hrs  T(C): 36.7 (15 Abhinav 2023 15:55), Max: 36.7 (15 Abhinav 2023 15:55)  T(F): 98 (15 Abhinav 2023 15:55), Max: 98 (15 Abhinav 2023 15:55)  HR: 110 (15 Abhinav 2023 18:55) (110 - 135)  BP: 125/94 (15 Bahinav 2023 15:55) (125/94 - 125/94)  BP(mean): --  RR: 18 (15 Abhinav 2023 18:55) (18 - 20)  SpO2: 100% (15 Abhinav 2023 18:55) (100% - 100%)    Parameters below as of 15 Abhinav 2023 18:55  Patient On (Oxygen Delivery Method): room air

## 2023-01-16 ENCOUNTER — APPOINTMENT (OUTPATIENT)
Dept: CT IMAGING | Facility: CLINIC | Age: 53
End: 2023-01-16

## 2023-01-16 VITALS
OXYGEN SATURATION: 100 % | HEART RATE: 85 BPM | RESPIRATION RATE: 17 BRPM | DIASTOLIC BLOOD PRESSURE: 77 MMHG | TEMPERATURE: 98 F | SYSTOLIC BLOOD PRESSURE: 112 MMHG

## 2023-01-16 LAB
ANION GAP SERPL CALC-SCNC: 8 MMOL/L — SIGNIFICANT CHANGE UP (ref 5–17)
APTT BLD: 154.8 SEC — CRITICAL HIGH (ref 27.5–35.5)
BASOPHILS # BLD AUTO: 0.19 K/UL — SIGNIFICANT CHANGE UP (ref 0–0.2)
BASOPHILS NFR BLD AUTO: 0.7 % — SIGNIFICANT CHANGE UP (ref 0–2)
BUN SERPL-MCNC: 37 MG/DL — HIGH (ref 7–23)
CALCIUM SERPL-MCNC: 8.8 MG/DL — SIGNIFICANT CHANGE UP (ref 8.5–10.1)
CHLORIDE SERPL-SCNC: 100 MMOL/L — SIGNIFICANT CHANGE UP (ref 96–108)
CHOLEST SERPL-MCNC: 130 MG/DL — SIGNIFICANT CHANGE UP
CO2 SERPL-SCNC: 21 MMOL/L — LOW (ref 22–31)
CREAT SERPL-MCNC: 1.75 MG/DL — HIGH (ref 0.5–1.3)
CULTURE RESULTS: SIGNIFICANT CHANGE UP
EGFR: 47 ML/MIN/1.73M2 — LOW
EOSINOPHIL # BLD AUTO: 0.09 K/UL — SIGNIFICANT CHANGE UP (ref 0–0.5)
EOSINOPHIL NFR BLD AUTO: 0.3 % — SIGNIFICANT CHANGE UP (ref 0–6)
GLUCOSE SERPL-MCNC: 115 MG/DL — HIGH (ref 70–99)
HCT VFR BLD CALC: 41.4 % — SIGNIFICANT CHANGE UP (ref 39–50)
HDLC SERPL-MCNC: 35 MG/DL — LOW
HGB BLD-MCNC: 13.7 G/DL — SIGNIFICANT CHANGE UP (ref 13–17)
IMM GRANULOCYTES NFR BLD AUTO: 2.1 % — HIGH (ref 0–0.9)
LIPID PNL WITH DIRECT LDL SERPL: 74 MG/DL — SIGNIFICANT CHANGE UP
LYMPHOCYTES # BLD AUTO: 10.4 % — LOW (ref 13–44)
LYMPHOCYTES # BLD AUTO: 2.96 K/UL — SIGNIFICANT CHANGE UP (ref 1–3.3)
MAGNESIUM SERPL-MCNC: 2 MG/DL — SIGNIFICANT CHANGE UP (ref 1.6–2.6)
MCHC RBC-ENTMCNC: 27 PG — SIGNIFICANT CHANGE UP (ref 27–34)
MCHC RBC-ENTMCNC: 33.1 GM/DL — SIGNIFICANT CHANGE UP (ref 32–36)
MCV RBC AUTO: 81.5 FL — SIGNIFICANT CHANGE UP (ref 80–100)
MONOCYTES # BLD AUTO: 2.53 K/UL — HIGH (ref 0–0.9)
MONOCYTES NFR BLD AUTO: 8.9 % — SIGNIFICANT CHANGE UP (ref 2–14)
NEUTROPHILS # BLD AUTO: 22.11 K/UL — HIGH (ref 1.8–7.4)
NEUTROPHILS NFR BLD AUTO: 77.6 % — HIGH (ref 43–77)
NON HDL CHOLESTEROL: 94 MG/DL — SIGNIFICANT CHANGE UP
PHOSPHATE SERPL-MCNC: 4 MG/DL — SIGNIFICANT CHANGE UP (ref 2.5–4.5)
PLATELET # BLD AUTO: 175 K/UL — SIGNIFICANT CHANGE UP (ref 150–400)
POTASSIUM SERPL-MCNC: 3.9 MMOL/L — SIGNIFICANT CHANGE UP (ref 3.5–5.3)
POTASSIUM SERPL-SCNC: 3.9 MMOL/L — SIGNIFICANT CHANGE UP (ref 3.5–5.3)
PROT SERPL-MCNC: 6.7 G/DL — SIGNIFICANT CHANGE UP (ref 6–8.3)
PROT SERPL-MCNC: 6.7 G/DL — SIGNIFICANT CHANGE UP (ref 6–8.3)
RBC # BLD: 5.08 M/UL — SIGNIFICANT CHANGE UP (ref 4.2–5.8)
RBC # FLD: 19.9 % — HIGH (ref 10.3–14.5)
SODIUM SERPL-SCNC: 129 MMOL/L — LOW (ref 135–145)
SPECIMEN SOURCE: SIGNIFICANT CHANGE UP
TRIGL SERPL-MCNC: 99 MG/DL — SIGNIFICANT CHANGE UP
WBC # BLD: 28.47 K/UL — HIGH (ref 3.8–10.5)
WBC # FLD AUTO: 28.47 K/UL — HIGH (ref 3.8–10.5)

## 2023-01-16 PROCEDURE — 99232 SBSQ HOSP IP/OBS MODERATE 35: CPT

## 2023-01-16 PROCEDURE — 99223 1ST HOSP IP/OBS HIGH 75: CPT

## 2023-01-16 RX ORDER — INFLUENZA VIRUS VACCINE 15; 15; 15; 15 UG/.5ML; UG/.5ML; UG/.5ML; UG/.5ML
0.5 SUSPENSION INTRAMUSCULAR ONCE
Refills: 0 | Status: DISCONTINUED | OUTPATIENT
Start: 2023-01-16 | End: 2023-01-16

## 2023-01-16 RX ORDER — CEFEPIME 1 G/1
1000 INJECTION, POWDER, FOR SOLUTION INTRAMUSCULAR; INTRAVENOUS EVERY 12 HOURS
Refills: 0 | Status: DISCONTINUED | OUTPATIENT
Start: 2023-01-16 | End: 2023-01-16

## 2023-01-16 RX ADMIN — HEPARIN SODIUM 6500 UNIT(S): 5000 INJECTION INTRAVENOUS; SUBCUTANEOUS at 00:17

## 2023-01-16 RX ADMIN — SODIUM CHLORIDE 100 MILLILITER(S): 9 INJECTION INTRAMUSCULAR; INTRAVENOUS; SUBCUTANEOUS at 00:49

## 2023-01-16 RX ADMIN — HEPARIN SODIUM 1400 UNIT(S)/HR: 5000 INJECTION INTRAVENOUS; SUBCUTANEOUS at 00:17

## 2023-01-16 RX ADMIN — HEPARIN SODIUM 0 UNIT(S)/HR: 5000 INJECTION INTRAVENOUS; SUBCUTANEOUS at 07:17

## 2023-01-16 RX ADMIN — HEPARIN SODIUM 1100 UNIT(S)/HR: 5000 INJECTION INTRAVENOUS; SUBCUTANEOUS at 08:04

## 2023-01-16 RX ADMIN — HEPARIN SODIUM 0 UNIT(S)/HR: 5000 INJECTION INTRAVENOUS; SUBCUTANEOUS at 07:01

## 2023-01-16 RX ADMIN — HEPARIN SODIUM 1400 UNIT(S)/HR: 5000 INJECTION INTRAVENOUS; SUBCUTANEOUS at 02:29

## 2023-01-16 NOTE — PATIENT PROFILE ADULT - FALL HARM RISK - RISK INTERVENTIONS
Assistance OOB with selected safe patient handling equipment/Assistance with ambulation/Communicate Fall Risk and Risk Factors to all staff, patient, and family/Monitor gait and stability/Reinforce activity limits and safety measures with patient and family/Sit up slowly, dangle for a short time, stand at bedside before walking/Use of alarms - bed, chair and/or voice tab/Visual Cue: Yellow wristband/Bed in lowest position, wheels locked, appropriate side rails in place/Call bell, personal items and telephone in reach/Instruct patient to call for assistance before getting out of bed or chair/Non-slip footwear when patient is out of bed/[place] to call system/Physically safe environment - no spills, clutter or unnecessary equipment/Purposeful Proactive Rounding/Room/bathroom lighting operational, light cord in reach

## 2023-01-16 NOTE — CONSULT NOTE ADULT - SUBJECTIVE AND OBJECTIVE BOX
HPI:  52 year old male w colon CA w ileostomy  w mets to  liver on chemo presents to ED for nausea and vomiting x 1 day.    In ED /94     RR 20   T 98   100% sat  NS 2000 cc  cefepime  zofran  morphine  CXR no acute infiltrate  CT with IV contrast no obstruction in abd, pelvis  lactate 5.5 w repeat pending      PAST MEDICAL HX  Cardiomegaly  10/5/22 EF 59% Mild DD , Mild MR   Cancer, colon liver mets hx w CA   Malignant neoplasm of ascending colon   Metastatic colon cancer to liver  Neuropathic pain   PE pulmonary emboli   S/P ileostomy.  Sinus tachycardia     PAST SURGICAL HISTORY:  S/P colon resection.    FAMILY HX  denies family history of cancer    SOCIAL HX   Former smoker ; hx  THC and cigarettes daily for 30 years, quit May 19th, 2022;   ETOH had 6 pack a day, quit 4 years ago    MEDICATIONS  (STANDING):  cefepime  Injectable. 1000 milliGRAM(s) IV Push every 12 hours  heparin  Infusion.  Unit(s)/Hr (14 mL/Hr) IV Continuous <Continuous>  influenza   Vaccine 0.5 milliLiter(s) IntraMuscular once  sodium chloride 0.9%. 1000 milliLiter(s) (100 mL/Hr) IV Continuous <Continuous>    MEDICATIONS  (PRN):  acetaminophen     Tablet .. 650 milliGRAM(s) Oral every 6 hours PRN Temp greater or equal to 38C (100.4F), Mild Pain (1 - 3)  aluminum hydroxide/magnesium hydroxide/simethicone Suspension 30 milliLiter(s) Oral every 4 hours PRN Dyspepsia  heparin   Injectable 6500 Unit(s) IV Push every 6 hours PRN For aPTT less than 40  heparin   Injectable 3000 Unit(s) IV Push every 6 hours PRN For aPTT between 40 - 57  melatonin 3 milliGRAM(s) Oral at bedtime PRN Insomnia  ondansetron Injectable 4 milliGRAM(s) IV Push every 8 hours PRN Nausea and/or Vomiting    Allergies  No Known Allergies    Review of Systems  Constitutional, Eyes, ENT, Cardiovascular, Respiratory, Gastrointestinal, Genitourinary, Musculoskeletal, Integumentary, Neurological, Psychiatric, Endocrine, Heme/Lymph and Allergic/Immunologic review of systems are otherwise negative except as noted in HPI.     Vital Signs:  T(C): 36.4 (2023 08:08), Max: 36.7 (15 Abhinav 2023 15:55)  T(F): 97.5 (2023 08:08), Max: 98 (15 Abhinav 2023 15:55)  HR: 85 (2023 08:08) (85 - 135)  BP: 112/77 (2023 08:08) (112/77 - 125/94)  BP(mean): 96 (15 Abhinav 2023 22:56) (96 - 96)  RR: 17 (2023 08:08) (16 - 20)  SpO2: 100% (2023 08:08) (99% - 100%)    Parameters below as of 2023 08:08  Patient On (Oxygen Delivery Method): room air    Physical Exam         LABS:  CBC Full  -  ( 2023 06:01 )  WBC Count : 28.47 K/uL  RBC Count : 5.08 M/uL  Hemoglobin : 13.7 g/dL  Hematocrit : 41.4 %  Platelet Count - Automated : 175 K/uL  Mean Cell Volume : 81.5 fl  Mean Cell Hemoglobin : 27.0 pg  Mean Cell Hemoglobin Concentration : 33.1 gm/dL  Auto Neutrophil # : 22.11 K/uL  Auto Lymphocyte # : 2.96 K/uL  Auto Monocyte # : 2.53 K/uL  Auto Eosinophil # : 0.09 K/uL  Auto Basophil # : 0.19 K/uL  Auto Neutrophil % : 77.6 %  Auto Lymphocyte % : 10.4 %  Auto Monocyte % : 8.9 %  Auto Eosinophil % : 0.3 %  Auto Basophil % : 0.7 %    01-16    129<L>  |  100  |  37<H>  ----------------------------<  115<H>  3.9   |  21<L>  |  1.75<H>    Ca    8.8      2023 06:01  Phos  4.0     01-16  Mg     2.0     01-16    TPro  10.0<H>  /  Alb  4.6  /  TBili  0.6  /  DBili  x   /  AST  27  /  ALT  35  /  AlkPhos  328<H>  01-15    PT/INR - ( 15 Abhinav 2023 16:54 )   PT: 13.2 sec;   INR: 1.14 ratio    PTT - ( 2023 06:01 )  PTT:154.8 sec    Urinalysis Basic - ( 15 Abhinav 2023 19:12 )    Color: Yellow / Appearance: Slightly Turbid / S.025 / pH: x  Gluc: x / Ketone: Negative  / Bili: Negative / Urobili: Negative   Blood: x / Protein: 100 / Nitrite: Negative   Leuk Esterase: Small / RBC: 3-5 /HPF / WBC 11-25 /HPF   Sq Epi: x / Non Sq Epi: Few / Bacteria: Moderate    RADIOLOGY & ADDITIONAL STUDIES:     CT ABDOMEN AND PELVIS IC                          PROCEDURE DATE:  01/15/2023    INTERPRETATION:  CLINICAL INFORMATION: Generalized abdominal pain with   nausea and vomiting    COMPARISON: CT abdomen and pelvis 2022    CONTRAST/COMPLICATIONS:  IV Contrast: Omnipaque 350  90 cc administered   10 cc discarded  Oral Contrast: NONE  Complications: None reported at time of study completion    PROCEDURE:  CT of the Abdomen and Pelvis was performed.  Sagittal and coronalreformats were performed.    FINDINGS:  LOWER CHEST: Clear.    LIVER: Overall stability of mucinous liver metastases  BILE DUCTS: Nondilated.  GALLBLADDER: Normal.  SPLEEN: Normal.  PANCREAS: Normal.  ADRENALS: Normal.  KIDNEYS/URETERS: No calculi, hydronephrosis, or renal mass.    BLADDER: Normal.  REPRODUCTIVE ORGANS: Nonenlarged.    BOWEL: Unremarkable rectal stump status post colectomy and right lower   quadrant ileostomy. Increased prolapse of the terminal ileum and   accompanying mesentery through the ostomy. No bowel obstruction.  PERITONEUM: No free air or ascites.  VESSELS: Aortoiliac atherosclerosis without aneurysm.  RETROPERITONEUM/LYMPH NODES: No adenopathy.  ABDOMINAL WALL: Postsurgical changes.  BONES: No acute bony abnormality.    IMPRESSION:  *  No acute pathology. No bowel obstruction.  *  Increased prolapse of the terminal ileum and accompanying mesentery   through the ostomy.  *  Overall stability of mucinous liver metastases       HPI:  52 year old male w colon CA w ileostomy  w mets to  liver on chemo presents to ED for nausea and vomiting x 1 day.    In ED /94     RR 20   T 98   100% sat  NS 2000 cc  cefepime  zofran  morphine  CXR no acute infiltrate  CT with IV contrast no obstruction in abd, pelvis  lactate 5.5 w repeat pending      PAST MEDICAL HX  Cardiomegaly  10/5/22 EF 59% Mild DD , Mild MR   Cancer, colon liver mets hx w CA   Malignant neoplasm of ascending colon   Metastatic colon cancer to liver  Neuropathic pain   PE pulmonary emboli   S/P ileostomy.  Sinus tachycardia     PAST SURGICAL HISTORY:  S/P colon resection.    FAMILY HX  denies family history of cancer    SOCIAL HX   Former smoker ; hx  THC and cigarettes daily for 30 years, quit May 19th, 2022;   ETOH had 6 pack a day, quit 4 years ago    MEDICATIONS  (STANDING):  cefepime  Injectable. 1000 milliGRAM(s) IV Push every 12 hours  heparin  Infusion.  Unit(s)/Hr (14 mL/Hr) IV Continuous <Continuous>  influenza   Vaccine 0.5 milliLiter(s) IntraMuscular once  sodium chloride 0.9%. 1000 milliLiter(s) (100 mL/Hr) IV Continuous <Continuous>    MEDICATIONS  (PRN):  acetaminophen     Tablet .. 650 milliGRAM(s) Oral every 6 hours PRN Temp greater or equal to 38C (100.4F), Mild Pain (1 - 3)  aluminum hydroxide/magnesium hydroxide/simethicone Suspension 30 milliLiter(s) Oral every 4 hours PRN Dyspepsia  heparin   Injectable 6500 Unit(s) IV Push every 6 hours PRN For aPTT less than 40  heparin   Injectable 3000 Unit(s) IV Push every 6 hours PRN For aPTT between 40 - 57  melatonin 3 milliGRAM(s) Oral at bedtime PRN Insomnia  ondansetron Injectable 4 milliGRAM(s) IV Push every 8 hours PRN Nausea and/or Vomiting    Allergies  No Known Allergies    Review of Systems  Constitutional, Eyes, ENT, Cardiovascular, Respiratory, Gastrointestinal, Genitourinary, Musculoskeletal, Integumentary, Neurological, Psychiatric, Endocrine, Heme/Lymph and Allergic/Immunologic review of systems are otherwise negative except as noted in HPI.     Vital Signs:  T(C): 36.4 (2023 08:08), Max: 36.7 (15 Abhinav 2023 15:55)  T(F): 97.5 (2023 08:08), Max: 98 (15 Abhinav 2023 15:55)  HR: 85 (2023 08:08) (85 - 135)  BP: 112/77 (2023 08:08) (112/77 - 125/94)  BP(mean): 96 (15 Abhinav 2023 22:56) (96 - 96)  RR: 17 (2023 08:08) (16 - 20)  SpO2: 100% (2023 08:08) (99% - 100%)    Parameters below as of 2023 08:08  Patient On (Oxygen Delivery Method): room air    Physical Exam  GENERAL: no acute distress  HEENT: EOMI, neck supple  RESPIRATORY: LCTAB/L, no rhonchi, rales, or wheezing  CARDIOVASCULAR: RRR, no murmurs, gallops, rubs  ABDOMINAL: soft, non-tender, non-distended, positive bowel sounds   EXTREMITIES: no clubbing, cyanosis, or edema  NEUROLOGICAL: alert and oriented x 3, non-focal  SKIN: no rashes or lesions   MUSCULOSKELETAL: no gross joint deformity    LABS:  CBC Full  -  ( 2023 06:01 )  WBC Count : 28.47 K/uL  RBC Count : 5.08 M/uL  Hemoglobin : 13.7 g/dL  Hematocrit : 41.4 %  Platelet Count - Automated : 175 K/uL  Mean Cell Volume : 81.5 fl  Mean Cell Hemoglobin : 27.0 pg  Mean Cell Hemoglobin Concentration : 33.1 gm/dL  Auto Neutrophil # : 22.11 K/uL  Auto Lymphocyte # : 2.96 K/uL  Auto Monocyte # : 2.53 K/uL  Auto Eosinophil # : 0.09 K/uL  Auto Basophil # : 0.19 K/uL  Auto Neutrophil % : 77.6 %  Auto Lymphocyte % : 10.4 %  Auto Monocyte % : 8.9 %  Auto Eosinophil % : 0.3 %  Auto Basophil % : 0.7 %    -16    129<L>  |  100  |  37<H>  ----------------------------<  115<H>  3.9   |  21<L>  |  1.75<H>    Ca    8.8      2023 06:01  Phos  4.0     01-16  Mg     2.0     -    TPro  10.0<H>  /  Alb  4.6  /  TBili  0.6  /  DBili  x   /  AST  27  /  ALT  35  /  AlkPhos  328<H>  -15    PT/INR - ( 15 Abhinav 2023 16:54 )   PT: 13.2 sec;   INR: 1.14 ratio    PTT - ( 2023 06:01 )  PTT:154.8 sec    Urinalysis Basic - ( 15 Abhinav 2023 19:12 )    Color: Yellow / Appearance: Slightly Turbid / S.025 / pH: x  Gluc: x / Ketone: Negative  / Bili: Negative / Urobili: Negative   Blood: x / Protein: 100 / Nitrite: Negative   Leuk Esterase: Small / RBC: 3-5 /HPF / WBC 11-25 /HPF   Sq Epi: x / Non Sq Epi: Few / Bacteria: Moderate    RADIOLOGY & ADDITIONAL STUDIES:     CT ABDOMEN AND PELVIS IC                          PROCEDURE DATE:  01/15/2023    INTERPRETATION:  CLINICAL INFORMATION: Generalized abdominal pain with   nausea and vomiting    COMPARISON: CT abdomen and pelvis 2022    CONTRAST/COMPLICATIONS:  IV Contrast: Omnipaque 350  90 cc administered   10 cc discarded  Oral Contrast: NONE  Complications: None reported at time of study completion    PROCEDURE:  CT of the Abdomen and Pelvis was performed.  Sagittal and coronalreformats were performed.    FINDINGS:  LOWER CHEST: Clear.    LIVER: Overall stability of mucinous liver metastases  BILE DUCTS: Nondilated.  GALLBLADDER: Normal.  SPLEEN: Normal.  PANCREAS: Normal.  ADRENALS: Normal.  KIDNEYS/URETERS: No calculi, hydronephrosis, or renal mass.    BLADDER: Normal.  REPRODUCTIVE ORGANS: Nonenlarged.    BOWEL: Unremarkable rectal stump status post colectomy and right lower   quadrant ileostomy. Increased prolapse of the terminal ileum and   accompanying mesentery through the ostomy. No bowel obstruction.  PERITONEUM: No free air or ascites.  VESSELS: Aortoiliac atherosclerosis without aneurysm.  RETROPERITONEUM/LYMPH NODES: No adenopathy.  ABDOMINAL WALL: Postsurgical changes.  BONES: No acute bony abnormality.    IMPRESSION:  *  No acute pathology. No bowel obstruction.  *  Increased prolapse of the terminal ileum and accompanying mesentery   through the ostomy.  *  Overall stability of mucinous liver metastases

## 2023-01-16 NOTE — PROGRESS NOTE ADULT - SUBJECTIVE AND OBJECTIVE BOX
CC: 52 year old male w colon CA w ileostomy  w mets to  liver on chemo presents to ED for nausea and vomiting since yesterday.   No fever or chills.+myalgia + arthralgia +weakness +nausea +bilious vomiting  has not taken meds x 2 days since cannot keep anything down  Had chemo 01-11 outpt but records are not available for my review  Does not know if he had any WBC factors/neulasta administered    1/16 - pt seen this morning with his mom at bedside; his creatinine has improved. pt wants to leave AMA - he had something to drink this morning   advised him that his numbers are improving but I rec monitoring overnight - leaving now puts him at risk of decompensation and readmission.     Vital Signs Last 24 Hrs  T(F): 97.5 (16 Jan 2023 08:08), Max: 98 (15 Abhinav 2023 22:56)  HR: 85 (16 Jan 2023 08:08) (85 - 110)  BP: 112/77 (16 Jan 2023 08:08) (112/77 - 118/84)  RR: 17 (16 Jan 2023 08:08) (16 - 18)  SpO2: 100% (16 Jan 2023 08:08) (99% - 100%)/RA   Constitutional: NAD, awake and alert  HEENT: PERR, EOMI  Neck: Soft and supple,  No JVD  Respiratory: breathing comfortably, did not use acc muscles, No audible wheezing at bedside   Cardiovascular: did not auscultate  Gastrointestinal: stoma with a small amt of greenish stool   Neurological: A/O x 3, no focal deficits  Musculoskeletal: 5/5 strength b/l upper and lower extremities      MEDICATIONS:  MEDICATIONS  (STANDING):  cefepime  Injectable. 1000 milliGRAM(s) IV Push every 12 hours  heparin  Infusion.  Unit(s)/Hr (14 mL/Hr) IV Continuous <Continuous>  influenza   Vaccine 0.5 milliLiter(s) IntraMuscular once  sodium chloride 0.9%. 1000 milliLiter(s) (100 mL/Hr) IV Continuous <Continuous>    LABS: All Labs Reviewed:                      13.7   28.47 )-----------( 175      ( 16 Jan 2023 06:01 )             41.4   129<L>  |  100  |  37<H>  ----------------------------<  115<H>  3.9   |  21<L>  |  1.75<H>  Ca    8.8      16 Jan 2023 06:01  Phos  4.0     01-16  Mg     2.0     01-16  TPro  6.7  /  Alb  x   /  TBili  x   /  DBili  x   /  AST  x   /  ALT  x   /  AlkPhos  x   01-16  PT/INR - ( 15 Abhinav 2023 16:54 )   PT: 13.2 sec;   INR: 1.14 ratio     PTT - ( 16 Jan 2023 06:01 )  PTT:154.8 sec    RADIOLOGY/EKG:  < from: CT Abdomen and Pelvis w/ IV Cont (01.15.23 @ 17:33) >  IMPRESSION:  *  No acute pathology. No bowel obstruction.  *  Increased prolapse of the terminal ileum and accompanying mesentery   through the ostomy.  *  Overall stability of mucinous liver metastases

## 2023-01-16 NOTE — CONSULT NOTE ADULT - ASSESSMENT
52 year old male with Stage IV colon CA w ileostomy w mets to liver on chemo p/w to nausea and vomiting x 1 day. Hem/ Onc consulted for continuity of care.    Oncology Hx   Oncologist-Dr. Serrano  Diagnosed with colon cancer in May 2022 at the age of 51. He presented to  ER 5/19/22 with worsening abdominal pain for 4 days, reportedly abdominal pain had been ongoing for 4 years. On 5/19/22 he had a CT A/P showed apparent focal mural thickening at the cecum/proximal ascending colon. The sigmoid colon appears to be tethered to the cecum and it is focally thick-walled; focal tumor invasion/extension from the cecum to the sigmoid colon. Mural thickening of the a dilated right lower quadrant small bowel loop with adjacent mesenteric edema for which associated small bowel ischemia cannot be excluded. Multiple hypodense lesions with calcifications in both lobes of the liver with the largest measuring 5.8 x 6.0 cm in hepatic segment.  5/19/22 he underwent exploratory laparotomy, total colectomy, end ileostomy, biopsy of liver, and biopsy of retroperitoneum. Pathology showed Omentum, excision: Negative for malignancy. Portion of terminal ileum, cecum with adherent sigmoid, total colectomy: Poorly differentiated infiltrating adenocarcinoma measuring 9.0 cm. Adenocarcinoma infiltrates through the bowel wall and through the visceral peritoneum and infiltrates the adherent sigmoid colon 3 of 17 lymph nodes are positive for metastatic carcinoma. Lymphovascular space & Perineural invasion is identified. The surgical margins negative. Appendix with serosal tumor implants and acute inflammation. Peritonitis. Liver, biopsy: Metastatic adenocarcinoma. Retroperitoneal biopsy: Adenocarcinoma with necrosis. No loss of nuclear expression of MMR proteins (MLH1, MSH2, MSH6, and PMS2). Staging pT4b pN1b pM1c.  Started FOLFOXIRI with bevacizumab 6/22/22, last dose of Folfiri and Bevacizumab received on 1/4/2023. S/P  oxaliplatin until cycle 10.  # Metastatic Colon CA metastasis to liver- dx' d in 5/2022, s/p ileostomy   -On Chemo Folfiri & Bevacizumab  -Chemo on hold while in patient.  #Intractable nausea and vomiting  -CT abd/Plevis-1/15/23- No bowel obstruction overall stable  - Likely chemo induced  -IVF & supportive care  #Leukocytosis (Reactive vs Infectious)  -WBC- trending down 47K/ul---> 28K/ul  -On Cefepime  -Serial CBC

## 2023-01-16 NOTE — PROGRESS NOTE ADULT - ASSESSMENT
52 year old male w colon CA s/p ileostomy w mets to liver came to ED w      #Intractable nausea and vomiting  after recent chemo  #Dehydration and hypovolemia w hyponatremia  Hb 17 today ns 13 01-04  #Acute renal failure  nl Cr 1 today 4 and then 3 after 2 L  no evidence of obstruction  HAD IV CONTRAST CT IN ED   1. admitted to med  2. daily weight  3. I/O q 8 hrs  4. s/p NS 2000 cc in ED  5. continue w  cc/hr; mild hyponatremia but no encephalopathy and can make decisions   6. clears po, zofran prn   7. renal consult      #Leukocytosis  #Lactic acidosis   reactive vs infectious  48K, now improved   CXR no infiltrate  not clear if pt received neulasta  LA improved    #Hyperproteinemia 10 g  1. IV hydration  2. SPEP in AM    #Colon CA w mets to liver  1. s/p recent chemo      #Hx PE   has been off AC for past 2 days since unable to take anything po  1. heparin gtt   2. consider resuming oral AC once repeat Cr is obtained in AM    **POC discussed with patient - he is leaving AMA - he is lucid and able to make his own decisions; states that he will return if he feels unwell at home.   AMA form signed

## 2023-01-16 NOTE — CONSULT NOTE ADULT - ASSESSMENT
52 year old male with Stage IV colon CA w ileostomy w mets to liver on chemo p/w to nausea and vomiting x 1 day. Hem/ Onc consulted for continuity of care.    Oncology Hx   Oncologist-Dr. Serrano  Diagnosed with colon cancer in May 2022 at the age of 51. He presented to  ER 5/19/22 with worsening abdominal pain for 4 days, reportedly abdominal pain had been ongoing for 4 years. On 5/19/22 he had a CT A/P showed apparent focal mural thickening at the cecum/proximal ascending colon. The sigmoid colon appears to be tethered to the cecum and it is focally thick-walled; focal tumor invasion/extension from the cecum to the sigmoid colon. Mural thickening of the a dilated right lower quadrant small bowel loop with adjacent mesenteric edema for which associated small bowel ischemia cannot be excluded. Multiple hypodense lesions with calcifications in both lobes of the liver with the largest measuring 5.8 x 6.0 cm in hepatic segment.  5/19/22 he underwent exploratory laparotomy, total colectomy, end ileostomy, biopsy of liver, and biopsy of retroperitoneum. Pathology showed Omentum, excision: Negative for malignancy. Portion of terminal ileum, cecum with adherent sigmoid, total colectomy: Poorly differentiated infiltrating adenocarcinoma measuring 9.0 cm. Adenocarcinoma infiltrates through the bowel wall and through the visceral peritoneum and infiltrates the adherent sigmoid colon 3 of 17 lymph nodes are positive for metastatic carcinoma. Lymphovascular space & Perineural invasion is identified. The surgical margins negative. Appendix with serosal tumor implants and acute inflammation. Peritonitis. Liver, biopsy: Metastatic adenocarcinoma. Retroperitoneal biopsy: Adenocarcinoma with necrosis. No loss of nuclear expression of MMR proteins (MLH1, MSH2, MSH6, and PMS2). Staging pT4b pN1b pM1c.  Started FOLFOXIRI with bevacizumab 6/22/22, last dose of Folfiri and Bevacizumab received on 1/4/2023. S/P  oxaliplatin until cycle 10.  # Metastatic Colon CA metastasis to liver- dx' d in 5/2022, s/p ileostomy   -On Chemo Folfiri & Bevacizumab  #Intractable nausea and vomiting  -CT abd/Plevis-1/15/23- No bowel obstruction overall stable  - Likely chemo induced  -IVF & supportive care  #Leukocytosis (Reactive vs Infectious)  -WBC- trending down 47K/ul---> 28K/ul  -On cefepime  -Serial CBC         52 year old male with Stage IV colon CA w ileostomy w mets to liver on chemo p/w to nausea and vomiting x 1 day. Hem/ Onc consulted for continuity of care.    Oncology Hx   Oncologist-Dr. Serrano  Diagnosed with colon cancer in May 2022 at the age of 51. He presented to  ER 5/19/22 with worsening abdominal pain for 4 days, reportedly abdominal pain had been ongoing for 4 years. On 5/19/22 he had a CT A/P showed apparent focal mural thickening at the cecum/proximal ascending colon. The sigmoid colon appears to be tethered to the cecum and it is focally thick-walled; focal tumor invasion/extension from the cecum to the sigmoid colon. Mural thickening of the a dilated right lower quadrant small bowel loop with adjacent mesenteric edema for which associated small bowel ischemia cannot be excluded. Multiple hypodense lesions with calcifications in both lobes of the liver with the largest measuring 5.8 x 6.0 cm in hepatic segment.  5/19/22 he underwent exploratory laparotomy, total colectomy, end ileostomy, biopsy of liver, and biopsy of retroperitoneum. Pathology showed Omentum, excision: Negative for malignancy. Portion of terminal ileum, cecum with adherent sigmoid, total colectomy: Poorly differentiated infiltrating adenocarcinoma measuring 9.0 cm. Adenocarcinoma infiltrates through the bowel wall and through the visceral peritoneum and infiltrates the adherent sigmoid colon 3 of 17 lymph nodes are positive for metastatic carcinoma. Lymphovascular space & Perineural invasion is identified. The surgical margins negative. Appendix with serosal tumor implants and acute inflammation. Peritonitis. Liver, biopsy: Metastatic adenocarcinoma. Retroperitoneal biopsy: Adenocarcinoma with necrosis. No loss of nuclear expression of MMR proteins (MLH1, MSH2, MSH6, and PMS2). Staging pT4b pN1b pM1c.  Started FOLFOXIRI with bevacizumab 6/22/22, last dose of Folfiri and Bevacizumab received on 1/4/2023. S/P  oxaliplatin until cycle 10.  # Metastatic Colon CA metastasis to liver- dx' d in 5/2022, s/p ileostomy   -On Chemo Folfiri & Bevacizumab  -Chemo on hold while in patient.  #Intractable nausea and vomiting  -CT abd/Plevis-1/15/23- No bowel obstruction overall stable  - Likely chemo induced  -IVF & supportive care  #Leukocytosis (Reactive vs Infectious)  -WBC- trending down 47K/ul---> 28K/ul  -On cefepime  -Serial CBC         52 year old male with Stage IV colon CA w ileostomy w mets to liver on chemo p/w to nausea and vomiting x 1 day. Hem/ Onc consulted for continuity of care.    Oncology Hx   Oncologist-Dr. Serrano  Diagnosed with colon cancer in May 2022 at the age of 51. He presented to  ER 5/19/22 with worsening abdominal pain for 4 days, reportedly abdominal pain had been ongoing for 4 years. On 5/19/22 he had a CT A/P showed apparent focal mural thickening at the cecum/proximal ascending colon. The sigmoid colon appears to be tethered to the cecum and it is focally thick-walled; focal tumor invasion/extension from the cecum to the sigmoid colon. Mural thickening of the a dilated right lower quadrant small bowel loop with adjacent mesenteric edema for which associated small bowel ischemia cannot be excluded. Multiple hypodense lesions with calcifications in both lobes of the liver with the largest measuring 5.8 x 6.0 cm in hepatic segment.  5/19/22 he underwent exploratory laparotomy, total colectomy, end ileostomy, biopsy of liver, and biopsy of retroperitoneum. Pathology showed Omentum, excision: Negative for malignancy. Portion of terminal ileum, cecum with adherent sigmoid, total colectomy: Poorly differentiated infiltrating adenocarcinoma measuring 9.0 cm. Adenocarcinoma infiltrates through the bowel wall and through the visceral peritoneum and infiltrates the adherent sigmoid colon 3 of 17 lymph nodes are positive for metastatic carcinoma. Lymphovascular space & Perineural invasion is identified. The surgical margins negative. Appendix with serosal tumor implants and acute inflammation. Peritonitis. Liver, biopsy: Metastatic adenocarcinoma. Retroperitoneal biopsy: Adenocarcinoma with necrosis. No loss of nuclear expression of MMR proteins (MLH1, MSH2, MSH6, and PMS2). Staging pT4b pN1b pM1c.  Started FOLFOXIRI with bevacizumab 6/22/22, last dose of Folfiri and Bevacizumab received on 1/4/2023. S/P  oxaliplatin until cycle 10.  # Metastatic Colon CA metastasis to liver- dx' d in 5/2022, s/p ileostomy   -On Chemo Folfiri & Bevacizumab  -Chemo on hold while in patient.  #Intractable nausea and vomiting  -CT abd/Plevis-1/15/23- No bowel obstruction overall stable  - Likely chemo induced  -IVF & supportive care  #Leukocytosis (Reactive vs Infectious)  -WBC- trending down 47K/ul---> 28K/ul  -On Cefepime  -Serial CBC

## 2023-01-16 NOTE — CONSULT NOTE ADULT - SUBJECTIVE AND OBJECTIVE BOX
HPI:  52 year old male w colon CA w ileostomy  w mets to  liver on chemo presents to ED for nausea and vomiting x 1 day.    In ED /94     RR 20   T 98   100% sat  NS 2000 cc  cefepime  zofran  morphine  CXR no acute infiltrate  CT with IV contrast no obstruction in abd, pelvis  lactate 5.5 w repeat pending      PAST MEDICAL HX  Cardiomegaly  10/5/22 EF 59% Mild DD , Mild MR   Cancer, colon liver mets hx w CA   Malignant neoplasm of ascending colon   Metastatic colon cancer to liver  Neuropathic pain   PE pulmonary emboli   S/P ileostomy.  Sinus tachycardia     PAST SURGICAL HISTORY:  S/P colon resection.    FAMILY HX  denies family history of cancer    SOCIAL HX   Former smoker ; hx  THC and cigarettes daily for 30 years, quit May 19th, 2022;   ETOH had 6 pack a day, quit 4 years ago    MEDICATIONS  (STANDING):  cefepime  Injectable. 1000 milliGRAM(s) IV Push every 12 hours  heparin  Infusion.  Unit(s)/Hr (14 mL/Hr) IV Continuous <Continuous>  influenza   Vaccine 0.5 milliLiter(s) IntraMuscular once  sodium chloride 0.9%. 1000 milliLiter(s) (100 mL/Hr) IV Continuous <Continuous>    MEDICATIONS  (PRN):  acetaminophen     Tablet .. 650 milliGRAM(s) Oral every 6 hours PRN Temp greater or equal to 38C (100.4F), Mild Pain (1 - 3)  aluminum hydroxide/magnesium hydroxide/simethicone Suspension 30 milliLiter(s) Oral every 4 hours PRN Dyspepsia  heparin   Injectable 6500 Unit(s) IV Push every 6 hours PRN For aPTT less than 40  heparin   Injectable 3000 Unit(s) IV Push every 6 hours PRN For aPTT between 40 - 57  melatonin 3 milliGRAM(s) Oral at bedtime PRN Insomnia  ondansetron Injectable 4 milliGRAM(s) IV Push every 8 hours PRN Nausea and/or Vomiting    Allergies  No Known Allergies    Review of Systems  Constitutional, Eyes, ENT, Cardiovascular, Respiratory, Gastrointestinal, Genitourinary, Musculoskeletal, Integumentary, Neurological, Psychiatric, Endocrine, Heme/Lymph and Allergic/Immunologic review of systems are otherwise negative except as noted in HPI.     Vital Signs:  T(C): 36.4 (2023 08:08), Max: 36.7 (15 Abhinav 2023 15:55)  T(F): 97.5 (2023 08:08), Max: 98 (15 Abhinav 2023 15:55)  HR: 85 (2023 08:08) (85 - 135)  BP: 112/77 (2023 08:08) (112/77 - 125/94)  BP(mean): 96 (15 Abhinav 2023 22:56) (96 - 96)  RR: 17 (2023 08:08) (16 - 20)  SpO2: 100% (2023 08:08) (99% - 100%)    Parameters below as of 2023 08:08  Patient On (Oxygen Delivery Method): room air    Physical Exam  GENERAL: no acute distress  HEENT: EOMI, neck supple  RESPIRATORY: LCTAB/L, no rhonchi, rales, or wheezing  CARDIOVASCULAR: RRR, no murmurs, gallops, rubs  ABDOMINAL: soft, non-tender, non-distended, positive bowel sounds   EXTREMITIES: no clubbing, cyanosis, or edema  NEUROLOGICAL: alert and oriented x 3, non-focal  SKIN: no rashes or lesions   MUSCULOSKELETAL: no gross joint deformity    LABS:  CBC Full  -  ( 2023 06:01 )  WBC Count : 28.47 K/uL  RBC Count : 5.08 M/uL  Hemoglobin : 13.7 g/dL  Hematocrit : 41.4 %  Platelet Count - Automated : 175 K/uL  Mean Cell Volume : 81.5 fl  Mean Cell Hemoglobin : 27.0 pg  Mean Cell Hemoglobin Concentration : 33.1 gm/dL  Auto Neutrophil # : 22.11 K/uL  Auto Lymphocyte # : 2.96 K/uL  Auto Monocyte # : 2.53 K/uL  Auto Eosinophil # : 0.09 K/uL  Auto Basophil # : 0.19 K/uL  Auto Neutrophil % : 77.6 %  Auto Lymphocyte % : 10.4 %  Auto Monocyte % : 8.9 %  Auto Eosinophil % : 0.3 %  Auto Basophil % : 0.7 %    -16    129<L>  |  100  |  37<H>  ----------------------------<  115<H>  3.9   |  21<L>  |  1.75<H>    Ca    8.8      2023 06:01  Phos  4.0     01-16  Mg     2.0     -    TPro  10.0<H>  /  Alb  4.6  /  TBili  0.6  /  DBili  x   /  AST  27  /  ALT  35  /  AlkPhos  328<H>  -15    PT/INR - ( 15 Abhinav 2023 16:54 )   PT: 13.2 sec;   INR: 1.14 ratio    PTT - ( 2023 06:01 )  PTT:154.8 sec    Urinalysis Basic - ( 15 Abhinav 2023 19:12 )    Color: Yellow / Appearance: Slightly Turbid / S.025 / pH: x  Gluc: x / Ketone: Negative  / Bili: Negative / Urobili: Negative   Blood: x / Protein: 100 / Nitrite: Negative   Leuk Esterase: Small / RBC: 3-5 /HPF / WBC 11-25 /HPF   Sq Epi: x / Non Sq Epi: Few / Bacteria: Moderate    RADIOLOGY & ADDITIONAL STUDIES:     CT ABDOMEN AND PELVIS IC                          PROCEDURE DATE:  01/15/2023    INTERPRETATION:  CLINICAL INFORMATION: Generalized abdominal pain with   nausea and vomiting    COMPARISON: CT abdomen and pelvis 2022    CONTRAST/COMPLICATIONS:  IV Contrast: Omnipaque 350  90 cc administered   10 cc discarded  Oral Contrast: NONE  Complications: None reported at time of study completion    PROCEDURE:  CT of the Abdomen and Pelvis was performed.  Sagittal and coronalreformats were performed.    FINDINGS:  LOWER CHEST: Clear.    LIVER: Overall stability of mucinous liver metastases  BILE DUCTS: Nondilated.  GALLBLADDER: Normal.  SPLEEN: Normal.  PANCREAS: Normal.  ADRENALS: Normal.  KIDNEYS/URETERS: No calculi, hydronephrosis, or renal mass.    BLADDER: Normal.  REPRODUCTIVE ORGANS: Nonenlarged.    BOWEL: Unremarkable rectal stump status post colectomy and right lower   quadrant ileostomy. Increased prolapse of the terminal ileum and   accompanying mesentery through the ostomy. No bowel obstruction.  PERITONEUM: No free air or ascites.  VESSELS: Aortoiliac atherosclerosis without aneurysm.  RETROPERITONEUM/LYMPH NODES: No adenopathy.  ABDOMINAL WALL: Postsurgical changes.  BONES: No acute bony abnormality.    IMPRESSION:  *  No acute pathology. No bowel obstruction.  *  Increased prolapse of the terminal ileum and accompanying mesentery   through the ostomy.  *  Overall stability of mucinous liver metastases

## 2023-01-18 ENCOUNTER — RESULT REVIEW (OUTPATIENT)
Age: 53
End: 2023-01-18

## 2023-01-18 ENCOUNTER — RESULT CHARGE (OUTPATIENT)
Age: 53
End: 2023-01-18

## 2023-01-18 ENCOUNTER — APPOINTMENT (OUTPATIENT)
Dept: INFUSION THERAPY | Facility: CLINIC | Age: 53
End: 2023-01-18

## 2023-01-18 VITALS
WEIGHT: 180 LBS | BODY MASS INDEX: 23.1 KG/M2 | HEART RATE: 116 BPM | HEIGHT: 74 IN | OXYGEN SATURATION: 98 % | RESPIRATION RATE: 18 BRPM | SYSTOLIC BLOOD PRESSURE: 113 MMHG | TEMPERATURE: 97.3 F | DIASTOLIC BLOOD PRESSURE: 79 MMHG

## 2023-01-18 LAB
ALBUMIN SERPL ELPH-MCNC: 4.3 G/DL — SIGNIFICANT CHANGE UP (ref 3.3–5)
ALP SERPL-CCNC: 194 U/L — HIGH (ref 40–120)
ALT FLD-CCNC: 17 U/L — SIGNIFICANT CHANGE UP (ref 10–45)
ANION GAP SERPL CALC-SCNC: 13 MMOL/L — SIGNIFICANT CHANGE UP (ref 5–17)
AST SERPL-CCNC: 21 U/L — SIGNIFICANT CHANGE UP (ref 10–40)
BASOPHILS # BLD AUTO: 0.09 K/UL — SIGNIFICANT CHANGE UP (ref 0–0.2)
BASOPHILS NFR BLD AUTO: 0.5 % — SIGNIFICANT CHANGE UP (ref 0–2)
BILIRUB SERPL-MCNC: 0.2 MG/DL — SIGNIFICANT CHANGE UP (ref 0.2–1.2)
BUN SERPL-MCNC: 12 MG/DL — SIGNIFICANT CHANGE UP (ref 7–23)
CALCIUM SERPL-MCNC: 9 MG/DL — SIGNIFICANT CHANGE UP (ref 8.4–10.5)
CHLORIDE SERPL-SCNC: 100 MMOL/L — SIGNIFICANT CHANGE UP (ref 96–108)
CO2 SERPL-SCNC: 22 MMOL/L — SIGNIFICANT CHANGE UP (ref 22–31)
CREAT SERPL-MCNC: 0.87 MG/DL — SIGNIFICANT CHANGE UP (ref 0.5–1.3)
EGFR: 105 ML/MIN/1.73M2 — SIGNIFICANT CHANGE UP
EOSINOPHIL # BLD AUTO: 0.17 K/UL — SIGNIFICANT CHANGE UP (ref 0–0.5)
EOSINOPHIL NFR BLD AUTO: 0.9 % — SIGNIFICANT CHANGE UP (ref 0–6)
GLUCOSE SERPL-MCNC: 118 MG/DL — HIGH (ref 70–99)
HCT VFR BLD CALC: 37.9 % — LOW (ref 39–50)
HGB BLD-MCNC: 12.2 G/DL — LOW (ref 13–17)
IMM GRANULOCYTES NFR BLD AUTO: 0.8 % — SIGNIFICANT CHANGE UP (ref 0–0.9)
LYMPHOCYTES # BLD AUTO: 1.59 K/UL — SIGNIFICANT CHANGE UP (ref 1–3.3)
LYMPHOCYTES # BLD AUTO: 8.7 % — LOW (ref 13–44)
MAGNESIUM SERPL-MCNC: 2.1 MG/DL — SIGNIFICANT CHANGE UP (ref 1.6–2.6)
MCHC RBC-ENTMCNC: 26.6 PG — LOW (ref 27–34)
MCHC RBC-ENTMCNC: 32.2 GM/DL — SIGNIFICANT CHANGE UP (ref 32–36)
MCV RBC AUTO: 82.8 FL — SIGNIFICANT CHANGE UP (ref 80–100)
MONOCYTES # BLD AUTO: 1.26 K/UL — HIGH (ref 0–0.9)
MONOCYTES NFR BLD AUTO: 6.9 % — SIGNIFICANT CHANGE UP (ref 2–14)
NEUTROPHILS # BLD AUTO: 15 K/UL — HIGH (ref 1.8–7.4)
NEUTROPHILS NFR BLD AUTO: 82.2 % — HIGH (ref 43–77)
NRBC # BLD: 0 /100 WBCS — SIGNIFICANT CHANGE UP (ref 0–0)
PLATELET # BLD AUTO: 150 K/UL — SIGNIFICANT CHANGE UP (ref 150–400)
POTASSIUM SERPL-MCNC: 4.6 MMOL/L — SIGNIFICANT CHANGE UP (ref 3.5–5.3)
POTASSIUM SERPL-SCNC: 4.6 MMOL/L — SIGNIFICANT CHANGE UP (ref 3.5–5.3)
PROT SERPL-MCNC: 7.1 G/DL — SIGNIFICANT CHANGE UP (ref 6–8.3)
RBC # BLD: 4.58 M/UL — SIGNIFICANT CHANGE UP (ref 4.2–5.8)
RBC # FLD: 19.8 % — HIGH (ref 10.3–14.5)
SODIUM SERPL-SCNC: 135 MMOL/L — SIGNIFICANT CHANGE UP (ref 135–145)
WBC # BLD: 18.26 K/UL — HIGH (ref 3.8–10.5)
WBC # FLD AUTO: 18.26 K/UL — HIGH (ref 3.8–10.5)

## 2023-01-19 DIAGNOSIS — E86.0 DEHYDRATION: ICD-10-CM

## 2023-01-20 ENCOUNTER — APPOINTMENT (OUTPATIENT)
Dept: INFUSION THERAPY | Facility: CLINIC | Age: 53
End: 2023-01-20

## 2023-01-20 LAB
% ALBUMIN: 51 % — SIGNIFICANT CHANGE UP
% ALPHA 1: 5.2 % — SIGNIFICANT CHANGE UP
% ALPHA 2: 12.8 % — SIGNIFICANT CHANGE UP
% BETA: 15.7 % — SIGNIFICANT CHANGE UP
% GAMMA: 15.3 % — SIGNIFICANT CHANGE UP
ALBUMIN SERPL ELPH-MCNC: 3.4 G/DL — LOW (ref 3.6–5.5)
ALBUMIN/GLOB SERPL ELPH: 1 RATIO — SIGNIFICANT CHANGE UP
ALPHA1 GLOB SERPL ELPH-MCNC: 0.3 G/DL — SIGNIFICANT CHANGE UP (ref 0.1–0.4)
ALPHA2 GLOB SERPL ELPH-MCNC: 0.9 G/DL — SIGNIFICANT CHANGE UP (ref 0.5–1)
B-GLOBULIN SERPL ELPH-MCNC: 1.1 G/DL — HIGH (ref 0.5–1)
GAMMA GLOBULIN: 1 G/DL — SIGNIFICANT CHANGE UP (ref 0.6–1.6)
INTERPRETATION SERPL IFE-IMP: SIGNIFICANT CHANGE UP
PROT PATTERN SERPL ELPH-IMP: SIGNIFICANT CHANGE UP

## 2023-01-23 ENCOUNTER — RESULT REVIEW (OUTPATIENT)
Age: 53
End: 2023-01-23

## 2023-01-23 ENCOUNTER — APPOINTMENT (OUTPATIENT)
Dept: INFUSION THERAPY | Facility: CLINIC | Age: 53
End: 2023-01-23

## 2023-01-23 ENCOUNTER — RESULT CHARGE (OUTPATIENT)
Age: 53
End: 2023-01-23

## 2023-01-23 VITALS
RESPIRATION RATE: 17 BRPM | HEART RATE: 113 BPM | DIASTOLIC BLOOD PRESSURE: 71 MMHG | BODY MASS INDEX: 23.17 KG/M2 | WEIGHT: 180.44 LBS | OXYGEN SATURATION: 100 % | TEMPERATURE: 98 F | SYSTOLIC BLOOD PRESSURE: 104 MMHG

## 2023-01-23 LAB
BASOPHILS # BLD AUTO: 0.07 K/UL — SIGNIFICANT CHANGE UP (ref 0–0.2)
BASOPHILS NFR BLD AUTO: 0.4 % — SIGNIFICANT CHANGE UP (ref 0–2)
EOSINOPHIL # BLD AUTO: 0.26 K/UL — SIGNIFICANT CHANGE UP (ref 0–0.5)
EOSINOPHIL NFR BLD AUTO: 1.5 % — SIGNIFICANT CHANGE UP (ref 0–6)
HCT VFR BLD CALC: 39 % — SIGNIFICANT CHANGE UP (ref 39–50)
HGB BLD-MCNC: 12.4 G/DL — LOW (ref 13–17)
IMM GRANULOCYTES NFR BLD AUTO: 0.8 % — SIGNIFICANT CHANGE UP (ref 0–0.9)
LYMPHOCYTES # BLD AUTO: 12.8 % — LOW (ref 13–44)
LYMPHOCYTES # BLD AUTO: 2.17 K/UL — SIGNIFICANT CHANGE UP (ref 1–3.3)
MCHC RBC-ENTMCNC: 27.1 PG — SIGNIFICANT CHANGE UP (ref 27–34)
MCHC RBC-ENTMCNC: 31.8 GM/DL — LOW (ref 32–36)
MCV RBC AUTO: 85.3 FL — SIGNIFICANT CHANGE UP (ref 80–100)
MONOCYTES # BLD AUTO: 1.02 K/UL — HIGH (ref 0–0.9)
MONOCYTES NFR BLD AUTO: 6 % — SIGNIFICANT CHANGE UP (ref 2–14)
NEUTROPHILS # BLD AUTO: 13.28 K/UL — HIGH (ref 1.8–7.4)
NEUTROPHILS NFR BLD AUTO: 78.5 % — HIGH (ref 43–77)
NRBC # BLD: 0 /100 WBCS — SIGNIFICANT CHANGE UP (ref 0–0)
PLATELET # BLD AUTO: 198 K/UL — SIGNIFICANT CHANGE UP (ref 150–400)
RBC # BLD: 4.57 M/UL — SIGNIFICANT CHANGE UP (ref 4.2–5.8)
RBC # FLD: 19.7 % — HIGH (ref 10.3–14.5)
WBC # BLD: 16.94 K/UL — HIGH (ref 3.8–10.5)
WBC # FLD AUTO: 16.94 K/UL — HIGH (ref 3.8–10.5)

## 2023-01-24 LAB
ALBUMIN SERPL ELPH-MCNC: 4 G/DL — SIGNIFICANT CHANGE UP (ref 3.3–5)
ALP SERPL-CCNC: 147 U/L — HIGH (ref 40–120)
ALT FLD-CCNC: 15 U/L — SIGNIFICANT CHANGE UP (ref 10–45)
ANION GAP SERPL CALC-SCNC: 12 MMOL/L — SIGNIFICANT CHANGE UP (ref 5–17)
AST SERPL-CCNC: 18 U/L — SIGNIFICANT CHANGE UP (ref 10–40)
BILIRUB SERPL-MCNC: 0.2 MG/DL — SIGNIFICANT CHANGE UP (ref 0.2–1.2)
BUN SERPL-MCNC: 11 MG/DL — SIGNIFICANT CHANGE UP (ref 7–23)
CALCIUM SERPL-MCNC: 9.4 MG/DL — SIGNIFICANT CHANGE UP (ref 8.4–10.5)
CHLORIDE SERPL-SCNC: 103 MMOL/L — SIGNIFICANT CHANGE UP (ref 96–108)
CO2 SERPL-SCNC: 23 MMOL/L — SIGNIFICANT CHANGE UP (ref 22–31)
CREAT SERPL-MCNC: 0.84 MG/DL — SIGNIFICANT CHANGE UP (ref 0.5–1.3)
EGFR: 106 ML/MIN/1.73M2 — SIGNIFICANT CHANGE UP
GLUCOSE SERPL-MCNC: 84 MG/DL — SIGNIFICANT CHANGE UP (ref 70–99)
MAGNESIUM SERPL-MCNC: 1.7 MG/DL — SIGNIFICANT CHANGE UP (ref 1.6–2.6)
POTASSIUM SERPL-MCNC: 4.2 MMOL/L — SIGNIFICANT CHANGE UP (ref 3.5–5.3)
POTASSIUM SERPL-SCNC: 4.2 MMOL/L — SIGNIFICANT CHANGE UP (ref 3.5–5.3)
PROT SERPL-MCNC: 6.6 G/DL — SIGNIFICANT CHANGE UP (ref 6–8.3)
SODIUM SERPL-SCNC: 138 MMOL/L — SIGNIFICANT CHANGE UP (ref 135–145)

## 2023-01-25 ENCOUNTER — APPOINTMENT (OUTPATIENT)
Dept: INFUSION THERAPY | Facility: CLINIC | Age: 53
End: 2023-01-25

## 2023-01-26 DIAGNOSIS — R11.2 NAUSEA WITH VOMITING, UNSPECIFIED: ICD-10-CM

## 2023-01-26 DIAGNOSIS — N17.9 ACUTE KIDNEY FAILURE, UNSPECIFIED: ICD-10-CM

## 2023-01-26 DIAGNOSIS — Z86.711 PERSONAL HISTORY OF PULMONARY EMBOLISM: ICD-10-CM

## 2023-01-26 DIAGNOSIS — E87.20 ACIDOSIS, UNSPECIFIED: ICD-10-CM

## 2023-01-26 DIAGNOSIS — Z85.038 PERSONAL HISTORY OF OTHER MALIGNANT NEOPLASM OF LARGE INTESTINE: ICD-10-CM

## 2023-01-26 DIAGNOSIS — D72.829 ELEVATED WHITE BLOOD CELL COUNT, UNSPECIFIED: ICD-10-CM

## 2023-01-26 DIAGNOSIS — E87.1 HYPO-OSMOLALITY AND HYPONATREMIA: ICD-10-CM

## 2023-01-26 DIAGNOSIS — R65.10 SYSTEMIC INFLAMMATORY RESPONSE SYNDROME (SIRS) OF NON-INFECTIOUS ORIGIN WITHOUT ACUTE ORGAN DYSFUNCTION: ICD-10-CM

## 2023-01-26 DIAGNOSIS — C78.7 SECONDARY MALIGNANT NEOPLASM OF LIVER AND INTRAHEPATIC BILE DUCT: ICD-10-CM

## 2023-01-26 DIAGNOSIS — I51.7 CARDIOMEGALY: ICD-10-CM

## 2023-01-26 DIAGNOSIS — Z93.2 ILEOSTOMY STATUS: ICD-10-CM

## 2023-01-26 DIAGNOSIS — Z92.21 PERSONAL HISTORY OF ANTINEOPLASTIC CHEMOTHERAPY: ICD-10-CM

## 2023-01-26 DIAGNOSIS — Z87.891 PERSONAL HISTORY OF NICOTINE DEPENDENCE: ICD-10-CM

## 2023-01-26 DIAGNOSIS — Z79.01 LONG TERM (CURRENT) USE OF ANTICOAGULANTS: ICD-10-CM

## 2023-01-26 DIAGNOSIS — E86.0 DEHYDRATION: ICD-10-CM

## 2023-01-26 DIAGNOSIS — E86.1 HYPOVOLEMIA: ICD-10-CM

## 2023-01-26 DIAGNOSIS — E77.8 OTHER DISORDERS OF GLYCOPROTEIN METABOLISM: ICD-10-CM

## 2023-01-30 ENCOUNTER — APPOINTMENT (OUTPATIENT)
Dept: HEMATOLOGY ONCOLOGY | Facility: CLINIC | Age: 53
End: 2023-01-30
Payer: MEDICAID

## 2023-01-30 VITALS
HEART RATE: 107 BPM | RESPIRATION RATE: 18 BRPM | WEIGHT: 176.06 LBS | OXYGEN SATURATION: 98 % | DIASTOLIC BLOOD PRESSURE: 76 MMHG | TEMPERATURE: 98 F | SYSTOLIC BLOOD PRESSURE: 112 MMHG | HEIGHT: 74 IN | BODY MASS INDEX: 22.6 KG/M2

## 2023-01-30 DIAGNOSIS — Z53.29 PROCEDURE AND TREATMENT NOT CARRIED OUT BECAUSE OF PATIENT'S DECISION FOR OTHER REASONS: ICD-10-CM

## 2023-01-30 PROCEDURE — 99215 OFFICE O/P EST HI 40 MIN: CPT

## 2023-01-30 NOTE — HISTORY OF PRESENT ILLNESS
[de-identified] : The patient was diagnosed with colon cancer in May 2022 at the age of 51. He presented to  ER 5/19/22 with worsening abdominal pain for 4 days. He reports abdominal pain has been ongoing for 4 years. On 5/19/22 he had a CT A/P which showed apparent focal mural thickening at the cecum/proximal ascending colon may represent colon cancer. The sigmoid colon appears to be tethered to the cecum and it is focally thick-walled; focal tumor invasion/extension from the cecum to the sigmoid colon cannot be excluded. Dilatation of the small bowel with an apparent transition point in the right lower quadrant abdomen, suggestive of small bowel obstruction. Apparent mild mural thickening of the a dilated right lower quadrant small bowel loop with adjacent mesenteric edema for which associated small bowel ischemia cannot be excluded. Multiple hypodense lesions with calcifications in both lobes of the liver with the largest measuring 5.8 x 6.0 cm in hepatic segment 8. These are suspicious for metastases. Mild ascites. Also on 5/19/22 he underwent exploratory laparotomy, total colectomy, end ileostomy, biopsy of liver, and biopsy of retroperitoneum. Pathology showed Omentum, excision: Negative for malignancy. Acutely inflamed exudate consistent with peritonitis. Portion of terminal ileum, cecum with adherent sigmoid, total colectomy: Poorly differentiated infiltrating adenocarcinoma measuring 9.0 cm. Adenocarcinoma infiltrates through the bowel wall and through the visceral peritoneum and infiltrates the adherent sigmoid colon 3 of 17 lymph nodes are positive for metastatic carcinoma. Lymphovascular space invasion is identified. Perineural invasion is identified. The surgical margins are negative. Appendix with serosal tumor implants and acute inflammation. Peritonitis. Liver, biopsy: Metastatic adenocarcinoma. Retroperitoneal biopsy: Adenocarcinoma with necrosis. No loss of nuclear expression of MMR proteins (MLH1, MSH2, MSH6, and PMS2). Staging pT4b pN1b pM1c. On 5/25/22 he had a repeat CT A/P which showed status post total colectomy and ileostomy. Dilated small bowel loops in the left abdomen with associated wall thickening. Both may be postoperative, follow-up is recommended. Moderate amount of ascites new from the prior study. Small amount of free intraperitoneal air likely with postoperative state. Minimal left pleural effusion. New multifocal right lower lobe infiltrate. Multiple liver metastases some of which are calcified again appreciated. [de-identified] : Medical Hx: denies \par Surgical Hx: denies \par Family Hx: denies family history of cancer \par Social Hx: he smoked THC and cigarettes daily for 30 years, quit May 19th, 2022; ETOH had 6 pack a day, quit 4 years ago; was a  (off the [employer]) no longer working; lives with mom and brother.   [de-identified] : Today he is C11D1 FOLFOXIRI / Zirabev, planned ongoing. Oxali stopped after cycle 10.  \par \par Left sided abd pain since last visit is resolved. Pupils pinpoint today. States hard to make pain medicine last the 30 days. Still continues with RLQ pain, reports 7/10. Appears to be comfortable during visit today. He has been taking 10 mg oxycodone early in the day and 5mg oxycodone in the afternoon. Will refer to pain management for further refills. His chemotherapy side affects noted were fatigue for days 2-4, he found himself more tired than baseline, but continued daily activities. He reported cold sensitivity, slowly resolving, now that Oxali has stopped. He reports taste changes, a sour taste for 6 days and slowly resolved. Wt gain 6 lbs since last visit. He denies eye changes; hair loss; nail/skin changes; neuropathy; mouth sores; N/V; heart burn; D/C.  \par \par New PE found on Sept 1, now on Eliquis BID, andrea well.

## 2023-01-30 NOTE — PHYSICAL EXAM
[Restricted in physically strenuous activity but ambulatory and able to carry out work of a light or sedentary nature] : Status 1- Restricted in physically strenuous activity but ambulatory and able to carry out work of a light or sedentary nature, e.g., light house work, office work [Normal] : affect appropriate [de-identified] : wt gain 6 lbs since last visit [de-identified] : ileotomy in place, dressing C/D/I [de-identified] : well healed vertical abdominal incision; rt pt accessed and dressing C/D/I

## 2023-01-30 NOTE — REVIEW OF SYSTEMS
[Recent Change In Weight] : ~T recent weight change [Negative] : Allergic/Immunologic [Chest Pain] : no chest pain [Shortness Of Breath] : no shortness of breath [Abdominal Pain] : no abdominal pain [Constipation] : no constipation [Diarrhea] : no diarrhea [Joint Pain] : no joint pain [Skin Rash] : no skin rash [FreeTextEntry2] : wt gain 6 lbs  [FreeTextEntry7] : ileotomy in place, functioning normally  [FreeTextEntry9] : left rib pain  [de-identified] : rt chest wall port accessed
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
## 2023-01-31 DIAGNOSIS — I26.99 OTHER PULMONARY EMBOLISM WITHOUT ACUTE COR PULMONALE: ICD-10-CM

## 2023-01-31 DIAGNOSIS — G89.29 OTHER CHRONIC PAIN: ICD-10-CM

## 2023-01-31 NOTE — ED ADULT NURSE NOTE - NSFALLRSKINDICATORS_ED_ALL_ED
Jordon Cary  1/31/2023  [id number]    Chief Complaint: Multiple fractures    Subjective:   No overnight events. No current complaints. Participating well in therapy. ROS: No CP, SOB, dyspnea    Objective:  Patient Vitals for the past 24 hrs:   BP Temp Temp src Pulse Resp SpO2 Weight   01/31/23 0759 -- -- -- -- 18 -- --   01/31/23 0645 -- -- -- -- -- -- 158 lb 1.6 oz (71.7 kg)   01/31/23 0013 -- -- -- 64 17 98 % --   01/30/23 2227 -- -- -- -- 18 -- --   01/30/23 2145 108/66 98.1 °F (36.7 °C) Oral 77 18 97 % --   01/30/23 0845 (!) 116/57 98 °F (36.7 °C) Oral -- 18 97 % --       Gen: No distress, pleasant. Resting in bedside chair  HEENT: Normocephalic, atraumatic. CV: Regular rate and rhythm. No MRG   Resp: No respiratory distress. CTAB   Abd: Soft, nontender nondistended  Ext: No edema. RLE in immobilizer, RUE in splint  Neuro: Alert, oriented, appropriately interactive. Laboratory data: Available via EMR. Therapy progress:    PT    Supine to Sit: Independent  Sit to Supine: Independent   Sit to Stand: Supervision or touching assistance  Chair/Bed to Chair Transfer: Supervision or touching assistance  Car Transfer:    Ambulation 10 ft: Supervision or touching assistance  Ambulation 50 ft:    Ambulation 150 ft:    Stairs - 1 Step:    Stairs - 4 Step:    Stairs - 12 Step:      OT    Eating: Setup or clean-up assistance  Oral Hygiene: Setup or clean-up assistance  Bathing: Partial/moderate assistance  Upper Body Dressing: Setup or clean-up assistance  Lower Body Dressing: Partial/moderate assistance  Toilet Transfer: Supervision or touching assistance  Toilet Hygiene: Partial/moderate assistance    Speech Therapy         Body mass index is 26.31 kg/m².     Assessment:  Patient Active Problem List   Diagnosis    Clot retention of urine    Multiple fractures    Closed fracture of right distal radius    Closed fracture of right tibial plateau    S/P ORIF (open reduction internal fixation) fracture Fracture       Plan:   Multiple fractures: s/p ORIF of R radius and tibia 1/19. Nonweightbearing RUE/RLE. Pain control. PT/OT. 2 week FU 2/2     Ecoli UTI: Completed Cipro, Cx sensitive     HLD: Lipitor 40     Depression: Lexapro 10 HS, Wellbutrin 300     Hx bladder cancer: Flomax     MSK chest pain: lidoderm patch to chest     Iron deficiency anemia: fergon       Bowels: Per protocol  Bladder: Per protocol   Sleep: Trazodone 100 scheduled. Pain: resume robaxin scheduled, resume tasia PRN   DVT PPx: heparin   LEE: 2/7    Team conference was held today on the patient and discussed directly with the patient utilizing their entire treatment team. Please see separate team note for details. Total treatment time for today's care >35 min. Tona Newman MD 1/31/2023, 8:38 AM    * This document was created using dictation software. While all precautions were taken to ensure accuracy, errors may have occurred. Please disregard any typographical errors. no

## 2023-02-02 ENCOUNTER — APPOINTMENT (OUTPATIENT)
Dept: COLORECTAL SURGERY | Facility: CLINIC | Age: 53
End: 2023-02-02
Payer: MEDICAID

## 2023-02-02 VITALS
OXYGEN SATURATION: 98 % | HEIGHT: 74 IN | HEART RATE: 90 BPM | TEMPERATURE: 97.7 F | WEIGHT: 175 LBS | SYSTOLIC BLOOD PRESSURE: 118 MMHG | RESPIRATION RATE: 14 BRPM | DIASTOLIC BLOOD PRESSURE: 80 MMHG | BODY MASS INDEX: 22.46 KG/M2

## 2023-02-02 DIAGNOSIS — C18.2 MALIGNANT NEOPLASM OF ASCENDING COLON: ICD-10-CM

## 2023-02-02 PROCEDURE — 99213 OFFICE O/P EST LOW 20 MIN: CPT

## 2023-02-02 NOTE — HISTORY OF PRESENT ILLNESS
[FreeTextEntry1] : 2/2/2023: Presents for follow up and to discuss further management of his prolapsed ileostomy.  Stoma remains functional, pink, but does weigh quite heavily in appliance and cause leaks because of this.  The stoma does still reduce spontaneously at night when he is supine.\par \par 8/19/2022: Mr. Palladino remains on chemotherapy (FOLFOXIRI with bevacizumab).  Comes to clinic today for follow-up as well as evalaution of an excoriation atop his ostomy.  Stoma does prolapse throughout the course of the day, spontaneously reduces at night. It remains pink and continues to function. \par \par 6/6/2022: Mr. Palladino is a 51yoM who presented to [facility] on 5/19/2022 with findings concerning for perforated R colon cancer invasive into the sigmoid colon/retroperitoneum and metastatic to the liver.  Tumor is microsatellite stable.  He underwent exploratory laparotomy, total colectomy, end ileostomy, biopsy of liver, and biopsy of retroperitoneum.  Since discharge his appetite has improved, his ostomy has become less edematous and is productive of applesauce-consistency stool.  He takes Metamucil once daily with good effect.  No fever, chills, nausea, vomiting.  Still having intermittent pain at incision but this is improving.\par \par He is aware of pathology results showing the above and is planning to see Oncology.

## 2023-02-02 NOTE — PHYSICAL EXAM
[JVD] : no jugular venous distention  [Respiratory Effort] : normal respiratory effort [Murmur] : murmur was appreciated [Alert] : alert [Oriented to Person] : oriented to person [Oriented to Place] : oriented to place [Oriented to Time] : oriented to time [Calm] : calm [de-identified] : soft, nontender, nondistended.  Well-healed midline incision.  Ostomy in place to RLQ - stoma pink, perfused, prolapsed but reducible, functional.  Small superficial excoriations at superior aspect of stoma, no bleeding. [de-identified] : No acute distress [de-identified] : Normocephalic, atraumatic [de-identified] : moves all extremities without difficulty [de-identified] : Midline incision - staples removed.  Ostomy appliance, underlying skin clean and without erythema, appliance replaced.

## 2023-02-06 ENCOUNTER — RESULT REVIEW (OUTPATIENT)
Age: 53
End: 2023-02-06

## 2023-02-06 ENCOUNTER — RESULT CHARGE (OUTPATIENT)
Age: 53
End: 2023-02-06

## 2023-02-06 ENCOUNTER — APPOINTMENT (OUTPATIENT)
Dept: INFUSION THERAPY | Facility: CLINIC | Age: 53
End: 2023-02-06

## 2023-02-06 VITALS
WEIGHT: 178 LBS | SYSTOLIC BLOOD PRESSURE: 104 MMHG | RESPIRATION RATE: 16 BRPM | DIASTOLIC BLOOD PRESSURE: 68 MMHG | HEIGHT: 74 IN | BODY MASS INDEX: 22.84 KG/M2 | HEART RATE: 114 BPM | TEMPERATURE: 97.3 F | OXYGEN SATURATION: 100 %

## 2023-02-06 LAB
ALBUMIN SERPL ELPH-MCNC: 4.3 G/DL — SIGNIFICANT CHANGE UP (ref 3.3–5)
ALP SERPL-CCNC: 221 U/L — HIGH (ref 40–120)
ALT FLD-CCNC: 16 U/L — SIGNIFICANT CHANGE UP (ref 10–45)
ANION GAP SERPL CALC-SCNC: 14 MMOL/L — SIGNIFICANT CHANGE UP (ref 5–17)
AST SERPL-CCNC: 19 U/L — SIGNIFICANT CHANGE UP (ref 10–40)
BASOPHILS # BLD AUTO: 0.06 K/UL — SIGNIFICANT CHANGE UP (ref 0–0.2)
BASOPHILS NFR BLD AUTO: 0.4 % — SIGNIFICANT CHANGE UP (ref 0–2)
BILIRUB SERPL-MCNC: 0.2 MG/DL — SIGNIFICANT CHANGE UP (ref 0.2–1.2)
BUN SERPL-MCNC: 8 MG/DL — SIGNIFICANT CHANGE UP (ref 7–23)
CALCIUM SERPL-MCNC: 9.5 MG/DL — SIGNIFICANT CHANGE UP (ref 8.4–10.5)
CEA SERPL-MCNC: 8 NG/ML — HIGH (ref 0–3.8)
CHLORIDE SERPL-SCNC: 102 MMOL/L — SIGNIFICANT CHANGE UP (ref 96–108)
CO2 SERPL-SCNC: 21 MMOL/L — LOW (ref 22–31)
CREAT SERPL-MCNC: 1.02 MG/DL — SIGNIFICANT CHANGE UP (ref 0.5–1.3)
EGFR: 90 ML/MIN/1.73M2 — SIGNIFICANT CHANGE UP
EOSINOPHIL # BLD AUTO: 0.3 K/UL — SIGNIFICANT CHANGE UP (ref 0–0.5)
EOSINOPHIL NFR BLD AUTO: 2.2 % — SIGNIFICANT CHANGE UP (ref 0–6)
GLUCOSE SERPL-MCNC: 111 MG/DL — HIGH (ref 70–99)
HCT VFR BLD CALC: 39.7 % — SIGNIFICANT CHANGE UP (ref 39–50)
HGB BLD-MCNC: 12.6 G/DL — LOW (ref 13–17)
IMM GRANULOCYTES NFR BLD AUTO: 0.9 % — SIGNIFICANT CHANGE UP (ref 0–0.9)
LYMPHOCYTES # BLD AUTO: 1.94 K/UL — SIGNIFICANT CHANGE UP (ref 1–3.3)
LYMPHOCYTES # BLD AUTO: 14.2 % — SIGNIFICANT CHANGE UP (ref 13–44)
MAGNESIUM SERPL-MCNC: 2.1 MG/DL — SIGNIFICANT CHANGE UP (ref 1.6–2.6)
MCHC RBC-ENTMCNC: 26.9 PG — LOW (ref 27–34)
MCHC RBC-ENTMCNC: 31.7 GM/DL — LOW (ref 32–36)
MCV RBC AUTO: 84.6 FL — SIGNIFICANT CHANGE UP (ref 80–100)
MONOCYTES # BLD AUTO: 0.99 K/UL — HIGH (ref 0–0.9)
MONOCYTES NFR BLD AUTO: 7.2 % — SIGNIFICANT CHANGE UP (ref 2–14)
NEUTROPHILS # BLD AUTO: 10.29 K/UL — HIGH (ref 1.8–7.4)
NEUTROPHILS NFR BLD AUTO: 75.1 % — SIGNIFICANT CHANGE UP (ref 43–77)
NRBC # BLD: 0 /100 WBCS — SIGNIFICANT CHANGE UP (ref 0–0)
PLATELET # BLD AUTO: 186 K/UL — SIGNIFICANT CHANGE UP (ref 150–400)
POTASSIUM SERPL-MCNC: 4.7 MMOL/L — SIGNIFICANT CHANGE UP (ref 3.5–5.3)
POTASSIUM SERPL-SCNC: 4.7 MMOL/L — SIGNIFICANT CHANGE UP (ref 3.5–5.3)
PROT SERPL-MCNC: 7.4 G/DL — SIGNIFICANT CHANGE UP (ref 6–8.3)
RBC # BLD: 4.69 M/UL — SIGNIFICANT CHANGE UP (ref 4.2–5.8)
RBC # FLD: 19.3 % — HIGH (ref 10.3–14.5)
SODIUM SERPL-SCNC: 138 MMOL/L — SIGNIFICANT CHANGE UP (ref 135–145)
WBC # BLD: 13.7 K/UL — HIGH (ref 3.8–10.5)
WBC # FLD AUTO: 13.7 K/UL — HIGH (ref 3.8–10.5)

## 2023-02-08 ENCOUNTER — APPOINTMENT (OUTPATIENT)
Dept: INFUSION THERAPY | Facility: CLINIC | Age: 53
End: 2023-02-08

## 2023-02-21 ENCOUNTER — RESULT REVIEW (OUTPATIENT)
Age: 53
End: 2023-02-21

## 2023-02-21 ENCOUNTER — APPOINTMENT (OUTPATIENT)
Dept: INFUSION THERAPY | Facility: CLINIC | Age: 53
End: 2023-02-21

## 2023-02-21 ENCOUNTER — RESULT CHARGE (OUTPATIENT)
Age: 53
End: 2023-02-21

## 2023-02-21 LAB
ALBUMIN SERPL ELPH-MCNC: 4.1 G/DL — SIGNIFICANT CHANGE UP (ref 3.3–5)
ALP SERPL-CCNC: 209 U/L — HIGH (ref 40–120)
ALT FLD-CCNC: 14 U/L — SIGNIFICANT CHANGE UP (ref 10–45)
ANION GAP SERPL CALC-SCNC: 13 MMOL/L — SIGNIFICANT CHANGE UP (ref 5–17)
AST SERPL-CCNC: 22 U/L — SIGNIFICANT CHANGE UP (ref 10–40)
BASOPHILS # BLD AUTO: 0.03 K/UL — SIGNIFICANT CHANGE UP (ref 0–0.2)
BASOPHILS NFR BLD AUTO: 0.3 % — SIGNIFICANT CHANGE UP (ref 0–2)
BILIRUB SERPL-MCNC: 0.2 MG/DL — SIGNIFICANT CHANGE UP (ref 0.2–1.2)
BUN SERPL-MCNC: 6 MG/DL — LOW (ref 7–23)
CALCIUM SERPL-MCNC: 9.4 MG/DL — SIGNIFICANT CHANGE UP (ref 8.4–10.5)
CHLORIDE SERPL-SCNC: 102 MMOL/L — SIGNIFICANT CHANGE UP (ref 96–108)
CO2 SERPL-SCNC: 22 MMOL/L — SIGNIFICANT CHANGE UP (ref 22–31)
CREAT SERPL-MCNC: 0.85 MG/DL — SIGNIFICANT CHANGE UP (ref 0.5–1.3)
EGFR: 106 ML/MIN/1.73M2 — SIGNIFICANT CHANGE UP
EOSINOPHIL # BLD AUTO: 0.16 K/UL — SIGNIFICANT CHANGE UP (ref 0–0.5)
EOSINOPHIL NFR BLD AUTO: 1.4 % — SIGNIFICANT CHANGE UP (ref 0–6)
GLUCOSE SERPL-MCNC: 93 MG/DL — SIGNIFICANT CHANGE UP (ref 70–99)
HCT VFR BLD CALC: 39.3 % — SIGNIFICANT CHANGE UP (ref 39–50)
HGB BLD-MCNC: 12.3 G/DL — LOW (ref 13–17)
IMM GRANULOCYTES NFR BLD AUTO: 0.9 % — SIGNIFICANT CHANGE UP (ref 0–0.9)
LYMPHOCYTES # BLD AUTO: 1.41 K/UL — SIGNIFICANT CHANGE UP (ref 1–3.3)
LYMPHOCYTES # BLD AUTO: 12.7 % — LOW (ref 13–44)
MAGNESIUM SERPL-MCNC: 2 MG/DL — SIGNIFICANT CHANGE UP (ref 1.6–2.6)
MCHC RBC-ENTMCNC: 26.9 PG — LOW (ref 27–34)
MCHC RBC-ENTMCNC: 31.3 GM/DL — LOW (ref 32–36)
MCV RBC AUTO: 86 FL — SIGNIFICANT CHANGE UP (ref 80–100)
MONOCYTES # BLD AUTO: 0.71 K/UL — SIGNIFICANT CHANGE UP (ref 0–0.9)
MONOCYTES NFR BLD AUTO: 6.4 % — SIGNIFICANT CHANGE UP (ref 2–14)
NEUTROPHILS # BLD AUTO: 8.65 K/UL — HIGH (ref 1.8–7.4)
NEUTROPHILS NFR BLD AUTO: 78.3 % — HIGH (ref 43–77)
NRBC # BLD: 0 /100 WBCS — SIGNIFICANT CHANGE UP (ref 0–0)
PLATELET # BLD AUTO: 198 K/UL — SIGNIFICANT CHANGE UP (ref 150–400)
POTASSIUM SERPL-MCNC: 4.4 MMOL/L — SIGNIFICANT CHANGE UP (ref 3.5–5.3)
POTASSIUM SERPL-SCNC: 4.4 MMOL/L — SIGNIFICANT CHANGE UP (ref 3.5–5.3)
PROT SERPL-MCNC: 7.4 G/DL — SIGNIFICANT CHANGE UP (ref 6–8.3)
RBC # BLD: 4.57 M/UL — SIGNIFICANT CHANGE UP (ref 4.2–5.8)
RBC # FLD: 18.7 % — HIGH (ref 10.3–14.5)
SODIUM SERPL-SCNC: 137 MMOL/L — SIGNIFICANT CHANGE UP (ref 135–145)
WBC # BLD: 11.06 K/UL — HIGH (ref 3.8–10.5)
WBC # FLD AUTO: 11.06 K/UL — HIGH (ref 3.8–10.5)

## 2023-02-23 ENCOUNTER — APPOINTMENT (OUTPATIENT)
Dept: INFUSION THERAPY | Facility: CLINIC | Age: 53
End: 2023-02-23

## 2023-02-25 ENCOUNTER — OUTPATIENT (OUTPATIENT)
Dept: OUTPATIENT SERVICES | Facility: HOSPITAL | Age: 53
LOS: 1 days | Discharge: HOME | End: 2023-02-25

## 2023-02-25 DIAGNOSIS — Z90.49 ACQUIRED ABSENCE OF OTHER SPECIFIED PARTS OF DIGESTIVE TRACT: Chronic | ICD-10-CM

## 2023-02-25 DIAGNOSIS — C18.9 MALIGNANT NEOPLASM OF COLON, UNSPECIFIED: ICD-10-CM

## 2023-02-27 ENCOUNTER — NON-APPOINTMENT (OUTPATIENT)
Age: 53
End: 2023-02-27

## 2023-02-27 ENCOUNTER — APPOINTMENT (OUTPATIENT)
Dept: CARDIOLOGY | Facility: CLINIC | Age: 53
End: 2023-02-27
Payer: MEDICAID

## 2023-02-27 VITALS
DIASTOLIC BLOOD PRESSURE: 64 MMHG | BODY MASS INDEX: 23.46 KG/M2 | HEIGHT: 74 IN | WEIGHT: 182.76 LBS | HEART RATE: 87 BPM | SYSTOLIC BLOOD PRESSURE: 90 MMHG | OXYGEN SATURATION: 100 %

## 2023-02-27 DIAGNOSIS — R03.1 NONSPECIFIC LOW BLOOD-PRESSURE READING: ICD-10-CM

## 2023-02-27 DIAGNOSIS — R11.2 NAUSEA WITH VOMITING, UNSPECIFIED: ICD-10-CM

## 2023-02-27 DIAGNOSIS — Z51.11 ENCOUNTER FOR ANTINEOPLASTIC CHEMOTHERAPY: ICD-10-CM

## 2023-02-27 PROCEDURE — 93000 ELECTROCARDIOGRAM COMPLETE: CPT

## 2023-02-27 PROCEDURE — 99214 OFFICE O/P EST MOD 30 MIN: CPT | Mod: 25

## 2023-02-27 NOTE — DISCUSSION/SUMMARY
[FreeTextEntry1] : 51 year old male\par \par abnormal CXR   cardiomegaly : tachycardic ? anxiety ,Echo revealed normal LVF, mild MR; holter monitor revealed SR-ST \par \par Sinus tachycardia : no symptoms , ? anxiety  now better  Pt will have bw ordered at last visit including TSH , BW from hematology revealed mild anemia , ? dehydration , encourage patient to increase fluid intake \par \par Pulmonary embolism : no SOB , Echo results as above; continue anticoagulation \par \par Metastatic colon carcinoma:  further plan as per oncology \par \par follow up after 4 months \par \par

## 2023-02-27 NOTE — HISTORY OF PRESENT ILLNESS
[FreeTextEntry1] : 52 year old male with hx metastatic colon carcinoma, on chemotherapy , recent incidental finding pulmonary embolism on CT angio September 2022 , chronic back pain  who was noted to have cardiomegaly on CXR done couple of weeks ago . Patient says he is tolerating chemotherapy , patient denies any chest pain or shortness of breath ,   patient was  in [place] [facility] , with dehydration , Recieved IV fluid was sent home , patient blood looked fine , patient blood pressure is low normal range  denied any dizziness or fatigue ,\par \par Patient has been tachycardic on EKG.  Holter monitor revealed RSR-ST average HR 92 BPM, rare PVC's\par \par Patient lost significant weight over the time  \par \par his recent blood work from hematology reviewed.\par \par

## 2023-02-27 NOTE — CARDIOLOGY SUMMARY
[de-identified] : 2/27/23   sinus rhythm   low qrs voltage  anteroseptal infarct ( no prior ekg to compare ) ,  [de-identified] :  sinus rhythm rare PVC  average rare 92 BPM ,   tachycardia 8 hours  highest for 12 minutes time  [de-identified] : 10/5/22  EF 59% Mild DD , Mild MR

## 2023-02-27 NOTE — PHYSICAL EXAM
[Well Developed] : well developed [Well Nourished] : well nourished [No Acute Distress] : no acute distress [Normal Conjunctiva] : normal conjunctiva [Normal Venous Pressure] : normal venous pressure [No Carotid Bruit] : no carotid bruit [Normal S1, S2] : normal S1, S2 [Tachycardia] : tachycardic [Heart Rate ___] : [unfilled] bpm [Rhythm Regular] : regular [Normal S1] : normal S1 [Normal S2] : normal S2 [No Murmur] : no murmurs heard [No Pitting Edema] : no pitting edema present [2+] : left 2+ [No Abnormalities] : the abdominal aorta was not enlarged and no bruit was heard [Clear Lung Fields] : clear lung fields [Good Air Entry] : good air entry [No Respiratory Distress] : no respiratory distress  [Soft] : abdomen soft [Non Tender] : non-tender [Normal Bowel Sounds] : normal bowel sounds [Normal Gait] : normal gait [No Edema] : no edema [No Cyanosis] : no cyanosis [No Clubbing] : no clubbing [No Varicosities] : no varicosities [Normal Radial B/L] : normal radial B/L [No Rash] : no rash [No Skin Lesions] : no skin lesions [Moves all extremities] : moves all extremities [No Focal Deficits] : no focal deficits [Normal Speech] : normal speech [Alert and Oriented] : alert and oriented [Normal memory] : normal memory [S3] : no S3 [S4] : no S4 [Rt] : no varicose veins of the right leg [Right Carotid Bruit] : no bruit heard over the right carotid [Left Carotid Bruit] : no bruit heard over the left carotid [Right Femoral Bruit] : no bruit heard over the right femoral artery [Left Femoral Bruit] : no bruit heard over the left femoral artery [de-identified] : colostomy bag in place

## 2023-02-27 NOTE — REVIEW OF SYSTEMS
[Fever] : no fever [Blurry Vision] : no blurred vision [Earache] : no earache [SOB] : no shortness of breath [Leg Claudication] : no intermittent leg claudication [Syncope] : no syncope [Cough] : no cough [Wheezing] : no wheezing [Abdominal Pain] : no abdominal pain [Nausea] : no nausea [Change in Appetite] : no change in appetite [Urinary Frequency] : no change in urinary frequency [Joint Pain] : no joint pain [Dizziness] : no dizziness [Confusion] : no confusion was observed [FreeTextEntry7] : discomfort around colostomy

## 2023-03-01 ENCOUNTER — APPOINTMENT (OUTPATIENT)
Dept: COLORECTAL SURGERY | Facility: CLINIC | Age: 53
End: 2023-03-01
Payer: MEDICAID

## 2023-03-01 VITALS
WEIGHT: 175 LBS | SYSTOLIC BLOOD PRESSURE: 123 MMHG | DIASTOLIC BLOOD PRESSURE: 81 MMHG | HEART RATE: 120 BPM | TEMPERATURE: 96.6 F | RESPIRATION RATE: 16 BRPM | BODY MASS INDEX: 22.46 KG/M2 | HEIGHT: 74 IN

## 2023-03-01 PROCEDURE — 99215 OFFICE O/P EST HI 40 MIN: CPT

## 2023-03-01 NOTE — PHYSICAL EXAM
[JVD] : no jugular venous distention  [Respiratory Effort] : normal respiratory effort [Murmur] : murmur was appreciated [Alert] : alert [Oriented to Person] : oriented to person [Oriented to Place] : oriented to place [Oriented to Time] : oriented to time [Calm] : calm [de-identified] : ileostomy with large amount of SB prolapse, at least 1 foot, posterior aspect of SB with traumatic ulcerations, soft, NTND [de-identified] : No acute distress [de-identified] : Normocephalic, atraumatic [de-identified] : moves all extremities without difficulty [de-identified] : Midline incision - staples removed.  Ostomy appliance, underlying skin clean and without erythema, appliance replaced.

## 2023-03-01 NOTE — HISTORY OF PRESENT ILLNESS
[FreeTextEntry1] : 3/1/23 Mr. Palladino returns to office for followup. Since his last appt, he reports continued and worsening ileostomy prolapse. He notes that when he is supine, the prolapse returns intra-abdominally, but the constant motion of the SB rubbing against the rim of the appliance is causing mucosal ulcerations. He also complaints of a heaviness as the prolapsed stoma is filling up the entirety of his appliance. \par \par 2/2/2023: Presents for follow up and to discuss further management of his prolapsed ileostomy.  Stoma remains functional, pink, but does weigh quite heavily in appliance and cause leaks because of this.  The stoma does still reduce spontaneously at night when he is supine.\par \par 8/19/2022: Mr. Palladino remains on chemotherapy (FOLFOXIRI with bevacizumab).  Comes to clinic today for follow-up as well as evalaution of an excoriation atop his ostomy.  Stoma does prolapse throughout the course of the day, spontaneously reduces at night. It remains pink and continues to function. \par \par 6/6/2022: Mr. Palladino is a 51yoM who presented to [facility] on 5/19/2022 with findings concerning for perforated R colon cancer invasive into the sigmoid colon/retroperitoneum and metastatic to the liver.  Tumor is microsatellite stable.  He underwent exploratory laparotomy, total colectomy, end ileostomy, biopsy of liver, and biopsy of retroperitoneum.  Since discharge his appetite has improved, his ostomy has become less edematous and is productive of applesauce-consistency stool.  He takes Metamucil once daily with good effect.  No fever, chills, nausea, vomiting.  Still having intermittent pain at incision but this is improving.\par \par He is aware of pathology results showing the above and is planning to see Oncology.

## 2023-03-01 NOTE — ASSESSMENT
[FreeTextEntry1] : 3/1/23 Mr. Palladino presents to the office for discussion of his worsening prolapsed ileostomy.  The prolapse is purportedly reducible, but is of at least 1 foot in length.  When he is in a recumbent position, the prolapse slides back into his abdomen, but not before dramatically rubbing itself against the edge of his appliance resulting in mucosal ulcers that are painful for him.  He receives FOLFOXIRI and Avastin every 2 weeks, the most recent infusion was 2 days earlier.  In addition, he is on Eliquis twice daily for PE.  He is certainly a candidate for local revision of this ostomy with amputation of the prolapsed ileum.  The main issue would be timing as he would need to suspend his maintenance chemotherapy for stage IV cancer.  I discussed the above with Dr. Mckeon, his primary surgeon who will then further coordinate with patient's oncologist for surgical planning.  Patient understands, and is agreeable.\par \par \par \par -- Discussed with patient that while his stoma is prolapsed, it remains functional, well-perfused, and is not incarcerated.  It spontaneously reduces on a regular basis.\par -- While he is on chemotherapy options for repair/reversal are limited.  Will speak with Dr. Serrano regarding future plans for patient's chemotherapy.\par -- If reversal would be considered in the future, discussed with patient that he would have 6-8 BMs daily due to nature of ileorectal anastomosis.  He understands.  Unfortunately this is not currently an option given high risks with his chemotherapy.\par -- Recommend patient look into other supportive devices e.g. Stealth Belt to help support some of the weight of the stoma, to \par prevent breakage of seal at skin\par -- Follow up as needed -

## 2023-03-06 ENCOUNTER — RESULT REVIEW (OUTPATIENT)
Age: 53
End: 2023-03-06

## 2023-03-06 ENCOUNTER — APPOINTMENT (OUTPATIENT)
Dept: HEMATOLOGY ONCOLOGY | Facility: CLINIC | Age: 53
End: 2023-03-06
Payer: MEDICAID

## 2023-03-06 ENCOUNTER — APPOINTMENT (OUTPATIENT)
Dept: INFUSION THERAPY | Facility: CLINIC | Age: 53
End: 2023-03-06

## 2023-03-06 VITALS
HEART RATE: 98 BPM | WEIGHT: 181.31 LBS | TEMPERATURE: 98 F | OXYGEN SATURATION: 98 % | RESPIRATION RATE: 18 BRPM | SYSTOLIC BLOOD PRESSURE: 123 MMHG | BODY MASS INDEX: 23.28 KG/M2 | DIASTOLIC BLOOD PRESSURE: 80 MMHG

## 2023-03-06 LAB
BASOPHILS # BLD AUTO: 0.04 K/UL — SIGNIFICANT CHANGE UP (ref 0–0.2)
BASOPHILS NFR BLD AUTO: 0.3 % — SIGNIFICANT CHANGE UP (ref 0–2)
EOSINOPHIL # BLD AUTO: 0.2 K/UL — SIGNIFICANT CHANGE UP (ref 0–0.5)
EOSINOPHIL NFR BLD AUTO: 1.5 % — SIGNIFICANT CHANGE UP (ref 0–6)
HCT VFR BLD CALC: 37.6 % — LOW (ref 39–50)
HGB BLD-MCNC: 12 G/DL — LOW (ref 13–17)
IMM GRANULOCYTES NFR BLD AUTO: 0.5 % — SIGNIFICANT CHANGE UP (ref 0–0.9)
LYMPHOCYTES # BLD AUTO: 1.41 K/UL — SIGNIFICANT CHANGE UP (ref 1–3.3)
LYMPHOCYTES # BLD AUTO: 10.6 % — LOW (ref 13–44)
MCHC RBC-ENTMCNC: 26.8 PG — LOW (ref 27–34)
MCHC RBC-ENTMCNC: 31.9 GM/DL — LOW (ref 32–36)
MCV RBC AUTO: 84.1 FL — SIGNIFICANT CHANGE UP (ref 80–100)
MONOCYTES # BLD AUTO: 1.07 K/UL — HIGH (ref 0–0.9)
MONOCYTES NFR BLD AUTO: 8 % — SIGNIFICANT CHANGE UP (ref 2–14)
NEUTROPHILS # BLD AUTO: 10.51 K/UL — HIGH (ref 1.8–7.4)
NEUTROPHILS NFR BLD AUTO: 79.1 % — HIGH (ref 43–77)
NRBC # BLD: 0 /100 WBCS — SIGNIFICANT CHANGE UP (ref 0–0)
PLATELET # BLD AUTO: 155 K/UL — SIGNIFICANT CHANGE UP (ref 150–400)
RBC # BLD: 4.47 M/UL — SIGNIFICANT CHANGE UP (ref 4.2–5.8)
RBC # FLD: 19 % — HIGH (ref 10.3–14.5)
WBC # BLD: 13.3 K/UL — HIGH (ref 3.8–10.5)
WBC # FLD AUTO: 13.3 K/UL — HIGH (ref 3.8–10.5)

## 2023-03-06 PROCEDURE — 99214 OFFICE O/P EST MOD 30 MIN: CPT

## 2023-03-07 LAB
ALBUMIN SERPL ELPH-MCNC: 4.4 G/DL — SIGNIFICANT CHANGE UP (ref 3.3–5)
ALP SERPL-CCNC: 172 U/L — HIGH (ref 40–120)
ALT FLD-CCNC: 17 U/L — SIGNIFICANT CHANGE UP (ref 10–45)
ANION GAP SERPL CALC-SCNC: 13 MMOL/L — SIGNIFICANT CHANGE UP (ref 5–17)
AST SERPL-CCNC: 22 U/L — SIGNIFICANT CHANGE UP (ref 10–40)
BILIRUB SERPL-MCNC: 0.3 MG/DL — SIGNIFICANT CHANGE UP (ref 0.2–1.2)
BUN SERPL-MCNC: 11 MG/DL — SIGNIFICANT CHANGE UP (ref 7–23)
CALCIUM SERPL-MCNC: 9.4 MG/DL — SIGNIFICANT CHANGE UP (ref 8.4–10.5)
CHLORIDE SERPL-SCNC: 101 MMOL/L — SIGNIFICANT CHANGE UP (ref 96–108)
CO2 SERPL-SCNC: 20 MMOL/L — LOW (ref 22–31)
CREAT SERPL-MCNC: 0.83 MG/DL — SIGNIFICANT CHANGE UP (ref 0.5–1.3)
EGFR: 107 ML/MIN/1.73M2 — SIGNIFICANT CHANGE UP
GLUCOSE SERPL-MCNC: 99 MG/DL — SIGNIFICANT CHANGE UP (ref 70–99)
MAGNESIUM SERPL-MCNC: 2 MG/DL — SIGNIFICANT CHANGE UP (ref 1.6–2.6)
POTASSIUM SERPL-MCNC: 4.4 MMOL/L — SIGNIFICANT CHANGE UP (ref 3.5–5.3)
POTASSIUM SERPL-SCNC: 4.4 MMOL/L — SIGNIFICANT CHANGE UP (ref 3.5–5.3)
PROT SERPL-MCNC: 7.1 G/DL — SIGNIFICANT CHANGE UP (ref 6–8.3)
SODIUM SERPL-SCNC: 135 MMOL/L — SIGNIFICANT CHANGE UP (ref 135–145)

## 2023-03-07 NOTE — REVIEW OF SYSTEMS
[Recent Change In Weight] : ~T recent weight change [Negative] : Allergic/Immunologic [Chest Pain] : no chest pain [Shortness Of Breath] : no shortness of breath [Abdominal Pain] : no abdominal pain [Constipation] : no constipation [Diarrhea] : no diarrhea [Joint Pain] : no joint pain [Skin Rash] : no skin rash [FreeTextEntry2] : wt gain 6 lbs  [FreeTextEntry7] : ileotomy in place, prolapsed 1 foot, functioning normally  [FreeTextEntry9] : left rib pain  [de-identified] : rt chest wall port accessed, dressing C/D/I

## 2023-03-07 NOTE — PHYSICAL EXAM
[Restricted in physically strenuous activity but ambulatory and able to carry out work of a light or sedentary nature] : Status 1- Restricted in physically strenuous activity but ambulatory and able to carry out work of a light or sedentary nature, e.g., light house work, office work [Normal] : affect appropriate [de-identified] : wt gain 6 lbs since last visit [de-identified] : ileotomy in place, prolapsed 1 foot, mucosal ulcers present; dressing C/D/I [de-identified] : well healed vertical abdominal incision; rt pt accessed and dressing C/D/I

## 2023-03-07 NOTE — HISTORY OF PRESENT ILLNESS
[de-identified] : The patient was diagnosed with colon cancer in May 2022 at the age of 51. He presented to  [facility] 5/19/22 with worsening abdominal pain for 4 days. He reports abdominal pain has been ongoing for 4 years. On 5/19/22 he had a CT A/P which showed apparent focal mural thickening at the cecum/proximal ascending colon may represent colon cancer. The sigmoid colon appears to be tethered to the cecum and it is focally thick-walled; focal tumor invasion/extension from the cecum to the sigmoid colon cannot be excluded. Dilatation of the small bowel with an apparent transition point in the right lower quadrant abdomen, suggestive of small bowel obstruction. Apparent mild mural thickening of the a dilated right lower quadrant small bowel loop with adjacent mesenteric edema for which associated small bowel ischemia cannot be excluded. Multiple hypodense lesions with calcifications in both lobes of the liver with the largest measuring 5.8 x 6.0 cm in hepatic segment 8. These are suspicious for metastases. Mild ascites. Also on 5/19/22 he underwent exploratory laparotomy, total colectomy, end ileostomy, biopsy of liver, and biopsy of retroperitoneum. Pathology showed Omentum, excision: Negative for malignancy. Acutely inflamed exudate consistent with peritonitis. Portion of terminal ileum, cecum with adherent sigmoid, total colectomy: Poorly differentiated infiltrating adenocarcinoma measuring 9.0 cm. Adenocarcinoma infiltrates through the bowel wall and through the visceral peritoneum and infiltrates the adherent sigmoid colon 3 of 17 lymph nodes are positive for metastatic carcinoma. Lymphovascular space invasion is identified. Perineural invasion is identified. The surgical margins are negative. Appendix with serosal tumor implants and acute inflammation. Peritonitis. Liver, biopsy: Metastatic adenocarcinoma. Retroperitoneal biopsy: Adenocarcinoma with necrosis. No loss of nuclear expression of MMR proteins (MLH1, MSH2, MSH6, and PMS2). Staging pT4b pN1b pM1c. On 5/25/22 he had a repeat CT A/P which showed status post total colectomy and ileostomy. Dilated small bowel loops in the left abdomen with associated wall thickening. Both may be postoperative, follow-up is recommended. Moderate amount of ascites new from the prior study. Small amount of free intraperitoneal air likely with postoperative state. Minimal left pleural effusion. New multifocal right lower lobe infiltrate. Multiple liver metastases some of which are calcified again appreciated. [de-identified] : Medical Hx: denies \par Surgical Hx: denies \par Family Hx: denies family history of cancer \par Social Hx: he smoked THC and cigarettes daily for 30 years, quit May 19th, 2022; ETOH had 6 pack a day, quit 4 years ago; was a  (off the [employer]) no longer working; lives with mom and brother.   [de-identified] : Today he is C17D1 FOLFOXIRI / Zirabev, planned ongoing. Oxali stopped after cycle 10.  \par \par Left sided abd pain since last visit is resolved. States hard to make pain medicine last the 30 days. Still continues with RLQ pain around ostomy, reports 7/10 stabbing pain that comes and goes. His chemotherapy side affects noted were fatigue for days 2-4, he found himself more tired than baseline, but continues his daily activities. He reported cold sensitivity, resolved, now that Oxali has stopped. He reports taste changes, a sour taste is slowly resolving. Wt stable since last visit, avg 175 - 182 lbs. He denies eye changes; hair loss; nail/skin changes; neuropathy; mouth sores; N/V; heart burn; D/C.  \par \par New PE found on Sept 1, remains on Eliquis BID, andrea well. \par \par He met with Dr Zarate for prolapsed ileostomy 1 foot, mucosal ulcers present from rubbing, as per Dr. Zarate she can reduce it, pt is waiting for surgery to be scheduled.

## 2023-03-08 ENCOUNTER — APPOINTMENT (OUTPATIENT)
Dept: INFUSION THERAPY | Facility: CLINIC | Age: 53
End: 2023-03-08

## 2023-03-08 DIAGNOSIS — K94.19 OTHER COMPLICATIONS OF ENTEROSTOMY: ICD-10-CM

## 2023-03-08 DIAGNOSIS — G89.29 OTHER CHRONIC PAIN: ICD-10-CM

## 2023-03-08 DIAGNOSIS — I26.99 OTHER PULMONARY EMBOLISM WITHOUT ACUTE COR PULMONALE: ICD-10-CM

## 2023-03-09 DIAGNOSIS — Z51.89 ENCOUNTER FOR OTHER SPECIFIED AFTERCARE: ICD-10-CM

## 2023-03-15 ENCOUNTER — INPATIENT (INPATIENT)
Facility: HOSPITAL | Age: 53
LOS: 2 days | Discharge: HOME | DRG: 252 | End: 2023-03-18
Attending: COLON & RECTAL SURGERY | Admitting: FAMILY MEDICINE
Payer: MEDICAID

## 2023-03-15 VITALS
DIASTOLIC BLOOD PRESSURE: 88 MMHG | RESPIRATION RATE: 20 BRPM | TEMPERATURE: 98 F | OXYGEN SATURATION: 100 % | HEIGHT: 74 IN | HEART RATE: 100 BPM | SYSTOLIC BLOOD PRESSURE: 113 MMHG | WEIGHT: 175.05 LBS

## 2023-03-15 DIAGNOSIS — R10.9 UNSPECIFIED ABDOMINAL PAIN: ICD-10-CM

## 2023-03-15 DIAGNOSIS — Z90.49 ACQUIRED ABSENCE OF OTHER SPECIFIED PARTS OF DIGESTIVE TRACT: Chronic | ICD-10-CM

## 2023-03-15 LAB
ALBUMIN SERPL ELPH-MCNC: 4.2 G/DL — SIGNIFICANT CHANGE UP (ref 3.3–5)
ALP SERPL-CCNC: 266 U/L — HIGH (ref 40–120)
ALT FLD-CCNC: 29 U/L — SIGNIFICANT CHANGE UP (ref 12–78)
ANION GAP SERPL CALC-SCNC: 6 MMOL/L — SIGNIFICANT CHANGE UP (ref 5–17)
ANISOCYTOSIS BLD QL: SLIGHT — SIGNIFICANT CHANGE UP
APPEARANCE UR: CLEAR — SIGNIFICANT CHANGE UP
AST SERPL-CCNC: 32 U/L — SIGNIFICANT CHANGE UP (ref 15–37)
BASOPHILS # BLD AUTO: 0 K/UL — SIGNIFICANT CHANGE UP (ref 0–0.2)
BASOPHILS NFR BLD AUTO: 0 % — SIGNIFICANT CHANGE UP (ref 0–2)
BILIRUB SERPL-MCNC: 0.7 MG/DL — SIGNIFICANT CHANGE UP (ref 0.2–1.2)
BILIRUB UR-MCNC: NEGATIVE — SIGNIFICANT CHANGE UP
BUN SERPL-MCNC: 19 MG/DL — SIGNIFICANT CHANGE UP (ref 7–23)
CALCIUM SERPL-MCNC: 9.2 MG/DL — SIGNIFICANT CHANGE UP (ref 8.5–10.1)
CHLORIDE SERPL-SCNC: 94 MMOL/L — LOW (ref 96–108)
CO2 SERPL-SCNC: 26 MMOL/L — SIGNIFICANT CHANGE UP (ref 22–31)
COLOR SPEC: YELLOW — SIGNIFICANT CHANGE UP
CREAT SERPL-MCNC: 1.46 MG/DL — HIGH (ref 0.5–1.3)
DIFF PNL FLD: NEGATIVE — SIGNIFICANT CHANGE UP
EGFR: 58 ML/MIN/1.73M2 — LOW
EOSINOPHIL # BLD AUTO: 0 K/UL — SIGNIFICANT CHANGE UP (ref 0–0.5)
EOSINOPHIL NFR BLD AUTO: 0 % — SIGNIFICANT CHANGE UP (ref 0–6)
FLUAV AG NPH QL: SIGNIFICANT CHANGE UP
FLUBV AG NPH QL: SIGNIFICANT CHANGE UP
GLUCOSE SERPL-MCNC: 109 MG/DL — HIGH (ref 70–99)
GLUCOSE UR QL: NEGATIVE — SIGNIFICANT CHANGE UP
HCT VFR BLD CALC: 47.6 % — SIGNIFICANT CHANGE UP (ref 39–50)
HGB BLD-MCNC: 15.4 G/DL — SIGNIFICANT CHANGE UP (ref 13–17)
KETONES UR-MCNC: NEGATIVE — SIGNIFICANT CHANGE UP
LACTATE SERPL-SCNC: 2 MMOL/L — SIGNIFICANT CHANGE UP (ref 0.7–2)
LACTATE SERPL-SCNC: 2.7 MMOL/L — HIGH (ref 0.7–2)
LEUKOCYTE ESTERASE UR-ACNC: NEGATIVE — SIGNIFICANT CHANGE UP
LIDOCAIN IGE QN: 129 U/L — SIGNIFICANT CHANGE UP (ref 73–393)
LYMPHOCYTES # BLD AUTO: 2.12 K/UL — SIGNIFICANT CHANGE UP (ref 1–3.3)
LYMPHOCYTES # BLD AUTO: 8 % — LOW (ref 13–44)
MACROCYTES BLD QL: SLIGHT — SIGNIFICANT CHANGE UP
MAGNESIUM SERPL-MCNC: 1.9 MG/DL — SIGNIFICANT CHANGE UP (ref 1.6–2.6)
MANUAL SMEAR VERIFICATION: SIGNIFICANT CHANGE UP
MCHC RBC-ENTMCNC: 27 PG — SIGNIFICANT CHANGE UP (ref 27–34)
MCHC RBC-ENTMCNC: 32.4 GM/DL — SIGNIFICANT CHANGE UP (ref 32–36)
MCV RBC AUTO: 83.4 FL — SIGNIFICANT CHANGE UP (ref 80–100)
MICROCYTES BLD QL: SLIGHT — SIGNIFICANT CHANGE UP
MONOCYTES # BLD AUTO: 2.64 K/UL — HIGH (ref 0–0.9)
MONOCYTES NFR BLD AUTO: 10 % — SIGNIFICANT CHANGE UP (ref 2–14)
NEUTROPHILS # BLD AUTO: 21.68 K/UL — HIGH (ref 1.8–7.4)
NEUTROPHILS NFR BLD AUTO: 81 % — HIGH (ref 43–77)
NEUTS BAND # BLD: 1 % — SIGNIFICANT CHANGE UP (ref 0–8)
NITRITE UR-MCNC: NEGATIVE — SIGNIFICANT CHANGE UP
NRBC # BLD: 0 /100 — SIGNIFICANT CHANGE UP (ref 0–0)
NRBC # BLD: SIGNIFICANT CHANGE UP /100 WBCS (ref 0–0)
OVALOCYTES BLD QL SMEAR: SLIGHT — SIGNIFICANT CHANGE UP
PH UR: 5 — SIGNIFICANT CHANGE UP (ref 5–8)
PLAT MORPH BLD: NORMAL — SIGNIFICANT CHANGE UP
PLATELET # BLD AUTO: 225 K/UL — SIGNIFICANT CHANGE UP (ref 150–400)
POIKILOCYTOSIS BLD QL AUTO: SLIGHT — SIGNIFICANT CHANGE UP
POTASSIUM SERPL-MCNC: 3.9 MMOL/L — SIGNIFICANT CHANGE UP (ref 3.5–5.3)
POTASSIUM SERPL-SCNC: 3.9 MMOL/L — SIGNIFICANT CHANGE UP (ref 3.5–5.3)
PROT SERPL-MCNC: 9 GM/DL — HIGH (ref 6–8.3)
PROT UR-MCNC: NEGATIVE — SIGNIFICANT CHANGE UP
RBC # BLD: 5.71 M/UL — SIGNIFICANT CHANGE UP (ref 4.2–5.8)
RBC # FLD: 20.2 % — HIGH (ref 10.3–14.5)
RBC BLD AUTO: ABNORMAL
RSV RNA NPH QL NAA+NON-PROBE: SIGNIFICANT CHANGE UP
SARS-COV-2 RNA SPEC QL NAA+PROBE: SIGNIFICANT CHANGE UP
SODIUM SERPL-SCNC: 126 MMOL/L — LOW (ref 135–145)
SP GR SPEC: 1 — LOW (ref 1.01–1.02)
TROPONIN I, HIGH SENSITIVITY RESULT: 11.51 NG/L — SIGNIFICANT CHANGE UP
UROBILINOGEN FLD QL: NEGATIVE — SIGNIFICANT CHANGE UP
WBC # BLD: 26.44 K/UL — HIGH (ref 3.8–10.5)
WBC # FLD AUTO: 26.44 K/UL — HIGH (ref 3.8–10.5)

## 2023-03-15 PROCEDURE — 93970 EXTREMITY STUDY: CPT

## 2023-03-15 PROCEDURE — 87086 URINE CULTURE/COLONY COUNT: CPT

## 2023-03-15 PROCEDURE — 99285 EMERGENCY DEPT VISIT HI MDM: CPT

## 2023-03-15 PROCEDURE — 36415 COLL VENOUS BLD VENIPUNCTURE: CPT

## 2023-03-15 PROCEDURE — 93010 ELECTROCARDIOGRAM REPORT: CPT

## 2023-03-15 PROCEDURE — 74177 CT ABD & PELVIS W/CONTRAST: CPT | Mod: 26,MA

## 2023-03-15 PROCEDURE — 84100 ASSAY OF PHOSPHORUS: CPT

## 2023-03-15 PROCEDURE — 85025 COMPLETE CBC W/AUTO DIFF WBC: CPT

## 2023-03-15 PROCEDURE — 83735 ASSAY OF MAGNESIUM: CPT

## 2023-03-15 PROCEDURE — 71045 X-RAY EXAM CHEST 1 VIEW: CPT | Mod: 26

## 2023-03-15 PROCEDURE — 83605 ASSAY OF LACTIC ACID: CPT

## 2023-03-15 PROCEDURE — 82272 OCCULT BLD FECES 1-3 TESTS: CPT

## 2023-03-15 PROCEDURE — 81003 URINALYSIS AUTO W/O SCOPE: CPT

## 2023-03-15 PROCEDURE — 80053 COMPREHEN METABOLIC PANEL: CPT

## 2023-03-15 PROCEDURE — 87040 BLOOD CULTURE FOR BACTERIA: CPT

## 2023-03-15 PROCEDURE — 99222 1ST HOSP IP/OBS MODERATE 55: CPT

## 2023-03-15 PROCEDURE — 85027 COMPLETE CBC AUTOMATED: CPT

## 2023-03-15 RX ORDER — ACETAMINOPHEN 500 MG
650 TABLET ORAL EVERY 6 HOURS
Refills: 0 | Status: DISCONTINUED | OUTPATIENT
Start: 2023-03-15 | End: 2023-03-18

## 2023-03-15 RX ORDER — OXYCODONE HYDROCHLORIDE 5 MG/1
5 TABLET ORAL EVERY 6 HOURS
Refills: 0 | Status: DISCONTINUED | OUTPATIENT
Start: 2023-03-15 | End: 2023-03-18

## 2023-03-15 RX ORDER — SODIUM CHLORIDE 9 MG/ML
1000 INJECTION INTRAMUSCULAR; INTRAVENOUS; SUBCUTANEOUS ONCE
Refills: 0 | Status: COMPLETED | OUTPATIENT
Start: 2023-03-15 | End: 2023-03-15

## 2023-03-15 RX ORDER — MORPHINE SULFATE 50 MG/1
4 CAPSULE, EXTENDED RELEASE ORAL EVERY 6 HOURS
Refills: 0 | Status: DISCONTINUED | OUTPATIENT
Start: 2023-03-15 | End: 2023-03-18

## 2023-03-15 RX ORDER — APIXABAN 2.5 MG/1
5 TABLET, FILM COATED ORAL EVERY 12 HOURS
Refills: 0 | Status: DISCONTINUED | OUTPATIENT
Start: 2023-03-15 | End: 2023-03-18

## 2023-03-15 RX ORDER — LANOLIN ALCOHOL/MO/W.PET/CERES
3 CREAM (GRAM) TOPICAL AT BEDTIME
Refills: 0 | Status: DISCONTINUED | OUTPATIENT
Start: 2023-03-15 | End: 2023-03-18

## 2023-03-15 RX ORDER — ONDANSETRON 8 MG/1
4 TABLET, FILM COATED ORAL ONCE
Refills: 0 | Status: COMPLETED | OUTPATIENT
Start: 2023-03-15 | End: 2023-03-15

## 2023-03-15 RX ORDER — PIPERACILLIN AND TAZOBACTAM 4; .5 G/20ML; G/20ML
3.38 INJECTION, POWDER, LYOPHILIZED, FOR SOLUTION INTRAVENOUS ONCE
Refills: 0 | Status: COMPLETED | OUTPATIENT
Start: 2023-03-15 | End: 2023-03-15

## 2023-03-15 RX ORDER — ONDANSETRON 8 MG/1
4 TABLET, FILM COATED ORAL EVERY 8 HOURS
Refills: 0 | Status: DISCONTINUED | OUTPATIENT
Start: 2023-03-15 | End: 2023-03-18

## 2023-03-15 RX ORDER — MORPHINE SULFATE 50 MG/1
4 CAPSULE, EXTENDED RELEASE ORAL ONCE
Refills: 0 | Status: DISCONTINUED | OUTPATIENT
Start: 2023-03-15 | End: 2023-03-15

## 2023-03-15 RX ORDER — SODIUM CHLORIDE 9 MG/ML
1000 INJECTION INTRAMUSCULAR; INTRAVENOUS; SUBCUTANEOUS
Refills: 0 | Status: DISCONTINUED | OUTPATIENT
Start: 2023-03-15 | End: 2023-03-16

## 2023-03-15 RX ADMIN — APIXABAN 5 MILLIGRAM(S): 2.5 TABLET, FILM COATED ORAL at 22:43

## 2023-03-15 RX ADMIN — SODIUM CHLORIDE 100 MILLILITER(S): 9 INJECTION INTRAMUSCULAR; INTRAVENOUS; SUBCUTANEOUS at 23:18

## 2023-03-15 RX ADMIN — PIPERACILLIN AND TAZOBACTAM 200 GRAM(S): 4; .5 INJECTION, POWDER, LYOPHILIZED, FOR SOLUTION INTRAVENOUS at 16:19

## 2023-03-15 RX ADMIN — OXYCODONE HYDROCHLORIDE 5 MILLIGRAM(S): 5 TABLET ORAL at 23:43

## 2023-03-15 RX ADMIN — OXYCODONE HYDROCHLORIDE 5 MILLIGRAM(S): 5 TABLET ORAL at 22:43

## 2023-03-15 RX ADMIN — SODIUM CHLORIDE 2000 MILLILITER(S): 9 INJECTION INTRAMUSCULAR; INTRAVENOUS; SUBCUTANEOUS at 13:09

## 2023-03-15 RX ADMIN — SODIUM CHLORIDE 2000 MILLILITER(S): 9 INJECTION INTRAMUSCULAR; INTRAVENOUS; SUBCUTANEOUS at 16:22

## 2023-03-15 RX ADMIN — MORPHINE SULFATE 4 MILLIGRAM(S): 50 CAPSULE, EXTENDED RELEASE ORAL at 13:05

## 2023-03-15 RX ADMIN — ONDANSETRON 4 MILLIGRAM(S): 8 TABLET, FILM COATED ORAL at 13:04

## 2023-03-15 RX ADMIN — MORPHINE SULFATE 4 MILLIGRAM(S): 50 CAPSULE, EXTENDED RELEASE ORAL at 13:35

## 2023-03-15 NOTE — H&P ADULT - NSHPREVIEWOFSYSTEMS_GEN_ALL_CORE
Gen: No fever, chills, weakness  ENT: No visual changes or throat pain  Neck: No pain or stiffness  Respiratory: No cough or wheezing  Cardiovascular: No chest pain or palpitations  Gastrointestinal: ++ abdominal pain, no nausea, no vomiting, no constipation, no diarrhea  Hematologic: No easy bleeding or bruising  Neurologic: No numbness or focal weakness  Psych: No depression or insomnia  Skin: No rash or itching

## 2023-03-15 NOTE — H&P ADULT - HISTORY OF PRESENT ILLNESS
53 y/o Male currently undergoing chemotherapy for colon and liver CA presented to ED from home worsening stoma pain. Patient with stoma for current treatment of colon cancer. Denies vomiting, Chest pain, or SOB. On Eliquis due to Hx of left lung blood clot. Last chemotherapy treatment on Monday with Dr. Serrano at [facility]. His pain is not controlled with current home medications.

## 2023-03-15 NOTE — ED ADULT TRIAGE NOTE - CHIEF COMPLAINT QUOTE
pt presents to ED from home for colon and liver CA undergoing chemotherapy. stoma in place. reports bloody output noted this morning. c/o abdominal pain, nausea. no vomiting. takes eliquis. hx blood clot in L lung.

## 2023-03-15 NOTE — ED ADULT NURSE NOTE - OBJECTIVE STATEMENT
Pt A&Ox4, presents to the ED c/o abdominal pain, nausea, and bloody output from stoma x1 day. Pt on Eliquis. Denies vomiting, diarrhea, or fevers. PMH of colon and liver CA undergoing chemotherapy at [facility].

## 2023-03-15 NOTE — H&P ADULT - NSHPPHYSICALEXAM_GEN_ALL_CORE
T(C): 36.8 (03-15-23 @ 20:47), Max: 36.9 (03-15-23 @ 15:26)  HR: 96 (03-15-23 @ 20:47) (96 - 103)  BP: 136/78 (03-15-23 @ 20:47) (113/88 - 138/86)  RR: 18 (03-15-23 @ 20:47) (18 - 20)  SpO2: 100% (03-15-23 @ 20:47) (100% - 100%)    CONSTITUTIONAL: Well groomed, no apparent distress  EYES: PERRLA and symmetric, EOMI, No conjunctival or scleral injection, non-icteric  ENMT: Oral mucosa with moist membranes. Normal dentition; no pharyngeal injection or exudates             NECK: Supple, symmetric and without tracheal deviation   RESP: No respiratory distress, no use of accessory muscles; CTA b/l, no WRR  CV: RRR, +S1S2, no MRG; no JVD; no peripheral edema  GI: Soft, +stoma. +tenderness around stoma, no rebound, no guarding; no palpable masses; no hepatosplenomegaly; no hernia palpated  LYMPH: No cervical LAD or tenderness; no axillary LAD or tenderness; no inguinal LAD or tenderness  MSK: Normal ROM without pain, no spinal tenderness, normal muscle strength/tone, normal gait.   SKIN: No rashes or ulcers noted; no subcutaneous nodules or induration palpable  NEURO: CN II-XII intact; normal reflexes in upper and lower extremities, sensation intact in upper and lower extremities b/l to light touch   PSYCH: Appropriate insight/judgment; A+O x 3, mood and affect appropriate, recent/remote memory intact

## 2023-03-15 NOTE — H&P ADULT - ASSESSMENT
A/P:    1.  Abdominal pain  h/o Colon cancer s/p stoma placement  -appreciate Colorectal surgeon's consult  -give pain meds as needed to control the pain    2.  Hyponatremia  -on IVF  -follow na level    Elevated lactate-resolved with IVF    3.  Chronic Leucocytosis  -no definite source of any infection  -hold any antibiotics for now  -follow cultures     4.  h/o Pulmonary Emboli  -on Eliquis    5.  Code status: Full code.  A/P:    1.  Abdominal pain  h/o Colon cancer s/p stoma placement  -appreciate Colorectal surgeon's consult  -give pain meds as needed to control the pain    2.  Hyponatremia  Acute Kidney Injury  -on IVF  -follow Na level and Cr level     Elevated lactate-resolved with IVF    3.  Chronic Leucocytosis  -no definite source of any infection  -hold any antibiotics for now  -follow cultures     4.  h/o Pulmonary Emboli  -on Eliquis    5.  Code status: Full code.

## 2023-03-15 NOTE — ED ADULT NURSE NOTE - DRUG PRE-SCREENING (DAST -1)
"Visit #:  9 of 12    Subjective:  Jaki Gómez is a 57 year old female who is seen in f/u up for:        Segmental dysfunction of cervical region  Segmental dysfunction of lumbar region  Brachial neuritis or radiculitis  Cervicalgia  Segmental dysfunction of thoracic region.     Since last visit on 1/19/2018,  Jaki Gómez reports: that she is not doing well today because she \"has had every illness in the book the last few weeks.\" Her back has suffered from not getting treatment. She is ready to get surgery, and states that she is going to go with the [facility] and is needs to get updated MRIs.     ADL's : ADLs have been difficult with exacerbation of pain.          Objective:  The following was observed:    P: pain elicited on palpation, across lower back  A: static palpation demonstrates intersegmental asymmetry, as noted  R: motion palpation notes restricted motion  T: hypertonicity at:  Lumbar Paraspinals bilaterally    Segmental spinal dysfunction/restrictions found at:  C2 RR, LRR  C5 LR, RRR  T1 LR, RRR  T5 E, FR   T10 E, FR  L4 LR, RRR  Right SI joint posterior      Assessment:    Diagnoses:      1. Segmental dysfunction of cervical region    2. Segmental dysfunction of lumbar region    3. Brachial neuritis or radiculitis    4. Cervicalgia    5. Segmental dysfunction of thoracic region        Patient's condition:  Patient had restrictions pre-manipulation and Patient had decreased motion prior to manipulation    Treatment effectiveness:  Post manipulation there is better intersegmental movement and Patient claims to feel looser post manipulation      Procedures:  CMT:  [id number] Chiropractic manipulative treatment 3-4 regions performed   Cervical: Diversified, C2, C5, Supine  Thoracic: Diversified, T1, T5, T10, Prone   Lumbar: Drop assist, Flexion Distraction, L4, Prone  Pelvis: Drop Assist, Right SI joint, Prone      Modalities:  MSTM to affected musculature    Therapeutic procedures:  Gave ice " instructions post-adjustment, as needed.      Prognosis: Good    Progress towards Goals: Patient has been improving with care but had a recent flare-up of lower back pain.      Recommendations:    Instructions:ice 20 minutes every other hour as needed and stretch as instructed at visit    Follow-up:  Return to care as needed. Patient requested that we order MRIs of her cervical and lumbar spines. I suggested that since we have ordered them recently, that she would need to have the surgeon that will be performing surgery order the updated images before surgery. I did not feel that I could justify ordering them for the purposes of treating her, although patient states that she has worsened since they were obtained in 2016. Patient will proceed with laser institute in the [facility].           Statement Selected

## 2023-03-15 NOTE — ED PROVIDER NOTE - CLINICAL SUMMARY MEDICAL DECISION MAKING FREE TEXT BOX
EKG, imaging, and labs to be ordered. Zofran, IV fluids, and pain control to be administered. EKG, imaging, and labs to be ordered. Zofran, IV fluids, and pain control to be administered. Labs and imaging obtained, remarkable for leucocytosis, ct abd EKG, imaging, and labs to be ordered. Zofran, IV fluids, and pain control to be administered. Labs and imaging obtained, remarkable for leucocytosis, ct abd and pelvis did not any acute process, I d/w surgery and medicine for admission.

## 2023-03-15 NOTE — ED PROVIDER NOTE - NS ED ROS FT
Constitutional: + Dizziness, no fever or chills  Eyes: No visual changes  HEENT: No throat pain  CV: No chest pain  Resp: No SOB no cough  GI: + stoma and abdominal pain, no nausea or vomiting  : No dysuria  MSK: No musculoskeletal pain  Skin: No rash  Neuro: No headache

## 2023-03-15 NOTE — CONSULT NOTE ADULT - SUBJECTIVE AND OBJECTIVE BOX
53 yo M with hx of colon cancer s/p subtotal colectomy with end ileostomy in May 2022. Patient also has liver metastasis. Pt has been undergoing chemotherapy with Dr. Serrano and has been placed on Eliquis for PE. Patient was recently seen in the office (2 weeks ago) with complaints of prolapsed stoma. Per pt, stoma is reducible when he lies down. Patient presents to the ED with complaints of dizziness and fatigue, reports not eating or drinking anything for the past 2 days. Ileostomy output has been normal. Does report bloodier output earlier today, but does state he did clean and rub the area while showering. Bloody output has stopped. States the abdominal pain he has been having has been there since October.     Objective:    MEDICATIONS  (STANDING):  piperacillin/tazobactam IVPB... 3.375 Gram(s) IV Intermittent once  sodium chloride 0.9% Bolus 1000 milliLiter(s) IV Bolus once    MEDICATIONS  (PRN):      Vital Signs Last 24 Hrs  T(C): 36.9 (15 Mar 2023 15:26), Max: 36.9 (15 Mar 2023 15:26)  T(F): 98.5 (15 Mar 2023 15:26), Max: 98.5 (15 Mar 2023 15:26)  HR: 103 (15 Mar 2023 15:26) (100 - 103)  BP: 120/87 (15 Mar 2023 15:26) (113/88 - 120/87)  BP(mean): 98 (15 Mar 2023 15:26) (98 - 98)  RR: 18 (15 Mar 2023 15:26) (18 - 20)  SpO2: 100% (15 Mar 2023 15:26) (100% - 100%)    Parameters below as of 15 Mar 2023 15:26  Patient On (Oxygen Delivery Method): room air          PHYSICAL EXAM   Gen: well-appearing, in no acute distress  CV: pulse regularly present   Resp: airway patent, non-labored breathing  Abd: soft, non tender, ileostomy with approximately 20cm prolapse with ulcerations at the bottom, no active bleeding      I&O's Detail      Daily Height in cm: 187.96 (15 Mar 2023 12:29)    Daily     LABS:                        15.4   26.44 )-----------( 225      ( 15 Mar 2023 12:53 )             47.6     03-15    126<L>  |  94<L>  |  19  ----------------------------<  109<H>  3.9   |  26  |  1.46<H>    Ca    9.2      15 Mar 2023 12:53  Mg     1.9     03-15    TPro  9.0<H>  /  Alb  4.2  /  TBili  0.7  /  DBili  x   /  AST  32  /  ALT  29  /  AlkPhos  266<H>  03-15          RADIOLOGY & ADDITIONAL STUDIES:    ASSESSMENT/PLAN:

## 2023-03-15 NOTE — ED PROVIDER NOTE - OBJECTIVE STATEMENT
53 y/o male currently undergoing chemotherapy for colon and liver CA presents to ED from home c/o dizziness and worsening stoma pain. Pt with stoma for current treatment of colon cancer, notes blood output from it today. C/o dizziness and feelings of generalized weakness. Denies vomiting, CP, or SOB. On Eliquis due to Hx of left lung blood clot. Last chemotherapy treatment on Monday with Dr. Serrano at [facility]. Takes oxycodone 5mg at home with minimal relief of pain.

## 2023-03-15 NOTE — ED PROVIDER NOTE - PHYSICAL EXAMINATION
Constitutional: NAD AAOx3  Eyes: PERRL, EOMI  Head: Normocephalic atraumatic  Mouth: MMM  Cardiac: regular rate   Resp: Lungs CTAB  GI: Abd s/stoma with protruding bowel which pt states is normal, diffuse abdominal TTP/nd  Neuro: CN2-12 intact  Extremities: Intact distal pulses b/l, no calf tenderness, normal ROM b/l UE and LE   Skin: No rashes

## 2023-03-15 NOTE — ED ADULT NURSE NOTE - NS_SISCREENINGSR_GEN_ALL_ED
Thalidomide Counseling: I discussed with the patient the risks of thalidomide including but not limited to birth defects, anxiety, weakness, chest pain, dizziness, cough and severe allergy. Albendazole Counseling:  I discussed with the patient the risks of albendazole including but not limited to cytopenia, kidney damage, nausea/vomiting and severe allergy.  The patient understands that this medication is being used in an off-label manner. Otezla Counseling: The side effects of Otezla were discussed with the patient, including but not limited to worsening or new depression, weight loss, diarrhea, nausea, upper respiratory tract infection, and headache. Patient instructed to call the office should any adverse effect occur.  The patient verbalized understanding of the proper use and possible adverse effects of Otezla.  All the patient's questions and concerns were addressed. Include Pregnancy/Lactation Warning?: No Rifampin Pregnancy And Lactation Text: This medication is Pregnancy Category C and it isn't know if it is safe during pregnancy. It is also excreted in breast milk and should not be used if you are breast feeding. Cephalexin Pregnancy And Lactation Text: This medication is Pregnancy Category B and considered safe during pregnancy.  It is also excreted in breast milk but can be used safely for shorter doses. Clindamycin Pregnancy And Lactation Text: This medication can be used in pregnancy if certain situations. Clindamycin is also present in breast milk. Cyclophosphamide Counseling:  I discussed with the patient the risks of cyclophosphamide including but not limited to hair loss, hormonal abnormalities, decreased fertility, abdominal pain, diarrhea, nausea and vomiting, bone marrow suppression and infection. The patient understands that monitoring is required while taking this medication. Acitretin Counseling:  I discussed with the patient the risks of acitretin including but not limited to hair loss, dry lips/skin/eyes, liver damage, hyperlipidemia, depression/suicidal ideation, photosensitivity.  Serious rare side effects can include but are not limited to pancreatitis, pseudotumor cerebri, bony changes, clot formation/stroke/heart attack.  Patient understands that alcohol is contraindicated since it can result in liver toxicity and significantly prolong the elimination of the drug by many years. Nsaids Counseling: NSAID Counseling: I discussed with the patient that NSAIDs should be taken with food. Prolonged use of NSAIDs can result in the development of stomach ulcers.  Patient advised to stop taking NSAIDs if abdominal pain occurs.  The patient verbalized understanding of the proper use and possible adverse effects of NSAIDs.  All of the patient's questions and concerns were addressed. Azithromycin Counseling:  I discussed with the patient the risks of azithromycin including but not limited to GI upset, allergic reaction, drug rash, diarrhea, and yeast infections. Colchicine Pregnancy And Lactation Text: This medication is Pregnancy Category C and isn't considered safe during pregnancy. It is excreted in breast milk. Griseofulvin Pregnancy And Lactation Text: This medication is Pregnancy Category X and is known to cause serious birth defects. It is unknown if this medication is excreted in breast milk but breast feeding should be avoided. Terbinafine Pregnancy And Lactation Text: This medication is Pregnancy Category B and is considered safe during pregnancy. It is also excreted in breast milk and breast feeding isn't recommended. Eucrisa Counseling: Patient may experience a mild burning sensation during topical application. Eucrisa is not approved in children less than 2 years of age. Negative Bexarotene Pregnancy And Lactation Text: This medication is Pregnancy Category X and should not be given to women who are pregnant or may become pregnant. This medication should not be used if you are breast feeding. Benzoyl Peroxide Pregnancy And Lactation Text: This medication is Pregnancy Category C. It is unknown if benzoyl peroxide is excreted in breast milk. Imiquimod Pregnancy And Lactation Text: This medication is Pregnancy Category C. It is unknown if this medication is excreted in breast milk. Metronidazole Pregnancy And Lactation Text: This medication is Pregnancy Category B and considered safe during pregnancy.  It is also excreted in breast milk. Fluconazole Pregnancy And Lactation Text: This medication is Pregnancy Category C and it isn't know if it is safe during pregnancy. It is also excreted in breast milk. Cimzia Pregnancy And Lactation Text: This medication crosses the placenta but can be considered safe in certain situations. Cimzia may be excreted in breast milk. Tetracycline Pregnancy And Lactation Text: This medication is Pregnancy Category D and not consider safe during pregnancy. It is also excreted in breast milk. Rituxan Counseling:  I discussed with the patient the risks of Rituxan infusions. Side effects can include infusion reactions, severe drug rashes including mucocutaneous reactions, reactivation of latent hepatitis and other infections and rarely progressive multifocal leukoencephalopathy.  All of the patient's questions and concerns were addressed. Tetracycline Counseling: Patient counseled regarding possible photosensitivity and increased risk for sunburn.  Patient instructed to avoid sunlight, if possible.  When exposed to sunlight, patients should wear protective clothing, sunglasses, and sunscreen.  The patient was instructed to call the office immediately if the following severe adverse effects occur:  hearing changes, easy bruising/bleeding, severe headache, or vision changes.  The patient verbalized understanding of the proper use and possible adverse effects of tetracycline.  All of the patient's questions and concerns were addressed. Patient understands to avoid pregnancy while on therapy due to potential birth defects. Doxepin Counseling:  Patient advised that the medication is sedating and not to drive a car after taking this medication. Patient informed of potential adverse effects including but not limited to dry mouth, urinary retention, and blurry vision.  The patient verbalized understanding of the proper use and possible adverse effects of doxepin.  All of the patient's questions and concerns were addressed. Azithromycin Pregnancy And Lactation Text: This medication is considered safe during pregnancy and is also secreted in breast milk. Stelara Pregnancy And Lactation Text: This medication is Pregnancy Category B and is considered safe during pregnancy. It is unknown if this medication is excreted in breast milk. Xeljanz Counseling: I discussed with the patient the risks of Xeljanz therapy including increased risk of infection, liver issues, headache, diarrhea, or cold symptoms. Live vaccines should be avoided. They were instructed to call if they have any problems. Gabapentin Counseling: I discussed with the patient the risks of gabapentin including but not limited to dizziness, somnolence, fatigue and ataxia. Drysol Counseling:  I discussed with the patient the risks of drysol/aluminum chloride including but not limited to skin rash, itching, irritation, burning. Azathioprine Pregnancy And Lactation Text: This medication is Pregnancy Category D and isn't considered safe during pregnancy. It is unknown if this medication is excreted in breast milk. Azathioprine Counseling:  I discussed with the patient the risks of azathioprine including but not limited to myelosuppression, immunosuppression, hepatotoxicity, lymphoma, and infections.  The patient understands that monitoring is required including baseline LFTs, Creatinine, possible TPMP genotyping and weekly CBCs for the first month and then every 2 weeks thereafter.  The patient verbalized understanding of the proper use and possible adverse effects of azathioprine.  All of the patient's questions and concerns were addressed. Isotretinoin Counseling: Patient should get monthly blood tests, not donate blood, not drive at night if vision affected, not share medication, and not undergo elective surgery for 6 months after tx completed. Side effects reviewed, pt to contact office should one occur. Ketoconazole Pregnancy And Lactation Text: This medication is Pregnancy Category C and it isn't know if it is safe during pregnancy. It is also excreted in breast milk and breast feeding isn't recommended. Glycopyrrolate Counseling:  I discussed with the patient the risks of glycopyrrolate including but not limited to skin rash, drowsiness, dry mouth, difficulty urinating, and blurred vision. Hydroquinone Pregnancy And Lactation Text: This medication has not been assigned a Pregnancy Risk Category but animal studies failed to show danger with the topical medication. It is unknown if the medication is excreted in breast milk. Infliximab Counseling:  I discussed with the patient the risks of infliximab including but not limited to myelosuppression, immunosuppression, autoimmune hepatitis, demyelinating diseases, lymphoma, and serious infections.  The patient understands that monitoring is required including a PPD at baseline and must alert us or the primary physician if symptoms of infection or other concerning signs are noted. Zyclara Counseling:  I discussed with the patient the risks of imiquimod including but not limited to erythema, scaling, itching, weeping, crusting, and pain.  Patient understands that the inflammatory response to imiquimod is variable from person to person and was educated regarded proper titration schedule.  If flu-like symptoms develop, patient knows to discontinue the medication and contact us. Xolair Pregnancy And Lactation Text: This medication is Pregnancy Category B and is considered safe during pregnancy. This medication is excreted in breast milk. Tazorac Counseling:  Patient advised that medication is irritating and drying.  Patient may need to apply sparingly and wash off after an hour before eventually leaving it on overnight.  The patient verbalized understanding of the proper use and possible adverse effects of tazorac.  All of the patient's questions and concerns were addressed. Bactrim Pregnancy And Lactation Text: This medication is Pregnancy Category D and is known to cause fetal risk.  It is also excreted in breast milk. Oxybutynin Pregnancy And Lactation Text: This medication is Pregnancy Category B and is considered safe during pregnancy. It is unknown if it is excreted in breast milk. Enbrel Counseling:  I discussed with the patient the risks of etanercept including but not limited to myelosuppression, immunosuppression, autoimmune hepatitis, demyelinating diseases, lymphoma, and infections.  The patient understands that monitoring is required including a PPD at baseline and must alert us or the primary physician if symptoms of infection or other concerning signs are noted. Protopic Counseling: Patient may experience a mild burning sensation during topical application. Protopic is not approved in children less than 2 years of age. There have been case reports of hematologic and skin malignancies in patients using topical calcineurin inhibitors although causality is questionable. Metronidazole Counseling:  I discussed with the patient the risks of metronidazole including but not limited to seizures, nausea/vomiting, a metallic taste in the mouth, nausea/vomiting and severe allergy. Arava Counseling:  Patient counseled regarding adverse effects of Arava including but not limited to nausea, vomiting, abnormalities in liver function tests. Patients may develop mouth sores, rash, diarrhea, and abnormalities in blood counts. The patient understands that monitoring is required including LFTs and blood counts.  There is a rare possibility of scarring of the liver and lung problems that can occur when taking methotrexate. Persistent nausea, loss of appetite, pale stools, dark urine, cough, and shortness of breath should be reported immediately. Patient advised to discontinue Arava treatment and consult with a physician prior to attempting conception. The patient will have to undergo a treatment to eliminate Arava from the body prior to conception. Topical Clindamycin Counseling: Patient counseled that this medication may cause skin irritation or allergic reactions.  In the event of skin irritation, the patient was advised to reduce the amount of the drug applied or use it less frequently.   The patient verbalized understanding of the proper use and possible adverse effects of clindamycin.  All of the patient's questions and concerns were addressed. Cosentyx Counseling:  I discussed with the patient the risks of Cosentyx including but not limited to worsening of Crohn's disease, immunosuppression, allergic reactions and infections.  The patient understands that monitoring is required including a PPD at baseline and must alert us or the primary physician if symptoms of infection or other concerning signs are noted. Odomzo Counseling- I discussed with the patient the risks of Odomzo including but not limited to nausea, vomiting, diarrhea, constipation, weight loss, changes in the sense of taste, decreased appetite, muscle spasms, and hair loss.  The patient verbalized understanding of the proper use and possible adverse effects of Odomzo.  All of the patient's questions and concerns were addressed. Elidel Counseling: Patient may experience a mild burning sensation during topical application. Elidel is not approved in children less than 2 years of age. There have been case reports of hematologic and skin malignancies in patients using topical calcineurin inhibitors although causality is questionable. Cyclosporine Counseling:  I discussed with the patient the risks of cyclosporine including but not limited to hypertension, gingival hyperplasia,myelosuppression, immunosuppression, liver damage, kidney damage, neurotoxicity, lymphoma, and serious infections. The patient understands that monitoring is required including baseline blood pressure, CBC, CMP, lipid panel and uric acid, and then 1-2 times monthly CMP and blood pressure. Valtrex Pregnancy And Lactation Text: this medication is Pregnancy Category B and is considered safe during pregnancy. This medication is not directly found in breast milk but it's metabolite acyclovir is present. Fluconazole Counseling:  Patient counseled regarding adverse effects of fluconazole including but not limited to headache, diarrhea, nausea, upset stomach, liver function test abnormalities, taste disturbance, and stomach pain.  There is a rare possibility of liver failure that can occur when taking fluconazole.  The patient understands that monitoring of LFTs and kidney function test may be required, especially at baseline. The patient verbalized understanding of the proper use and possible adverse effects of fluconazole.  All of the patient's questions and concerns were addressed. Imiquimod Counseling:  I discussed with the patient the risks of imiquimod including but not limited to erythema, scaling, itching, weeping, crusting, and pain.  Patient understands that the inflammatory response to imiquimod is variable from person to person and was educated regarded proper titration schedule.  If flu-like symptoms develop, patient knows to discontinue the medication and contact us. Topical Sulfur Applications Pregnancy And Lactation Text: This medication is Pregnancy Category C and has an unknown safety profile during pregnancy. It is unknown if this topical medication is excreted in breast milk. Xelportiaz Pregnancy And Lactation Text: This medication is Pregnancy Category D and is not considered safe during pregnancy.  The risk during breast feeding is also uncertain. Topical Retinoid counseling:  Patient advised to apply a pea-sized amount only at bedtime and wait 30 minutes after washing their face before applying.  If too drying, patient may add a non-comedogenic moisturizer. The patient verbalized understanding of the proper use and possible adverse effects of retinoids.  All of the patient's questions and concerns were addressed. Erivedge Counseling- I discussed with the patient the risks of Erivedge including but not limited to nausea, vomiting, diarrhea, constipation, weight loss, changes in the sense of taste, decreased appetite, muscle spasms, and hair loss.  The patient verbalized understanding of the proper use and possible adverse effects of Erivedge.  All of the patient's questions and concerns were addressed. Cephalexin Counseling: I counseled the patient regarding use of cephalexin as an antibiotic for prophylactic and/or therapeutic purposes. Cephalexin (commonly prescribed under brand name Keflex) is a cephalosporin antibiotic which is active against numerous classes of bacteria, including most skin bacteria. Side effects may include nausea, diarrhea, gastrointestinal upset, rash, hives, yeast infections, and in rare cases, hepatitis, kidney disease, seizures, fever, confusion, neurologic symptoms, and others. Patients with severe allergies to penicillin medications are cautioned that there is about a 10% incidence of cross-reactivity with cephalosporins. When possible, patients with penicillin allergies should use alternatives to cephalosporins for antibiotic therapy. Solaraze Pregnancy And Lactation Text: This medication is Pregnancy Category B and is considered safe. There is some data to suggest avoiding during the third trimester. It is unknown if this medication is excreted in breast milk. Topical Sulfur Applications Counseling: Topical Sulfur Counseling: Patient counseled that this medication may cause skin irritation or allergic reactions.  In the event of skin irritation, the patient was advised to reduce the amount of the drug applied or use it less frequently.   The patient verbalized understanding of the proper use and possible adverse effects of topical sulfur application.  All of the patient's questions and concerns were addressed. Tremfya Counseling: I discussed with the patient the risks of guselkumab including but not limited to immunosuppression, serious infections, worsening of inflammatory bowel disease and drug reactions.  The patient understands that monitoring is required including a PPD at baseline and must alert us or the primary physician if symptoms of infection or other concerning signs are noted. High Dose Vitamin A Pregnancy And Lactation Text: High dose vitamin A therapy is contraindicated during pregnancy and breast feeding. Hydroxyzine Pregnancy And Lactation Text: This medication is not safe during pregnancy and should not be taken. It is also excreted in breast milk and breast feeding isn't recommended. Protopic Pregnancy And Lactation Text: This medication is Pregnancy Category C. It is unknown if this medication is excreted in breast milk when applied topically. Hydroquinone Counseling:  Patient advised that medication may result in skin irritation, lightening (hypopigmentation), dryness, and burning.  In the event of skin irritation, the patient was advised to reduce the amount of the drug applied or use it less frequently.  Rarely, spots that are treated with hydroquinone can become darker (pseudoochronosis).  Should this occur, patient instructed to stop medication and call the office. The patient verbalized understanding of the proper use and possible adverse effects of hydroquinone.  All of the patient's questions and concerns were addressed. Ivermectin Pregnancy And Lactation Text: This medication is Pregnancy Category C and it isn't known if it is safe during pregnancy. It is also excreted in breast milk. Hydroxychloroquine Counseling:  I discussed with the patient that a baseline ophthalmologic exam is needed at the start of therapy and every year thereafter while on therapy. A CBC may also be warranted for monitoring.  The side effects of this medication were discussed with the patient, including but not limited to agranulocytosis, aplastic anemia, seizures, rashes, retinopathy, and liver toxicity. Patient instructed to call the office should any adverse effect occur.  The patient verbalized understanding of the proper use and possible adverse effects of Plaquenil.  All the patient's questions and concerns were addressed. High Dose Vitamin A Counseling: Side effects reviewed, pt to contact office should one occur. Siliq Counseling:  I discussed with the patient the risks of Siliq including but not limited to new or worsening depression, suicidal thoughts and behavior, immunosuppression, malignancy, posterior leukoencephalopathy syndrome, and serious infections.  The patient understands that monitoring is required including a PPD at baseline and must alert us or the primary physician if symptoms of infection or other concerning signs are noted. There is also a special program designed to monitor depression which is required with Siliq. Cyclophosphamide Pregnancy And Lactation Text: This medication is Pregnancy Category D and it isn't considered safe during pregnancy. This medication is excreted in breast milk. Doxycycline Counseling:  Patient counseled regarding possible photosensitivity and increased risk for sunburn.  Patient instructed to avoid sunlight, if possible.  When exposed to sunlight, patients should wear protective clothing, sunglasses, and sunscreen.  The patient was instructed to call the office immediately if the following severe adverse effects occur:  hearing changes, easy bruising/bleeding, severe headache, or vision changes.  The patient verbalized understanding of the proper use and possible adverse effects of doxycycline.  All of the patient's questions and concerns were addressed. Terbinafine Counseling: Patient counseling regarding adverse effects of terbinafine including but not limited to headache, diarrhea, rash, upset stomach, liver function test abnormalities, itching, taste/smell disturbance, nausea, abdominal pain, and flatulence.  There is a rare possibility of liver failure that can occur when taking terbinafine.  The patient understands that a baseline LFT and kidney function test may be required. The patient verbalized understanding of the proper use and possible adverse effects of terbinafine.  All of the patient's questions and concerns were addressed. Benzoyl Peroxide Counseling: Patient counseled that medicine may cause skin irritation and bleach clothing.  In the event of skin irritation, the patient was advised to reduce the amount of the drug applied or use it less frequently.   The patient verbalized understanding of the proper use and possible adverse effects of benzoyl peroxide.  All of the patient's questions and concerns were addressed. Cimetidine Counseling:  I discussed with the patient the risks of Cimetidine including but not limited to gynecomastia, headache, diarrhea, nausea, drowsiness, arrhythmias, pancreatitis, skin rashes, psychosis, bone marrow suppression and kidney toxicity. Itraconazole Counseling:  I discussed with the patient the risks of itraconazole including but not limited to liver damage, nausea/vomiting, neuropathy, and severe allergy.  The patient understands that this medication is best absorbed when taken with acidic beverages such as non-diet cola or ginger ale.  The patient understands that monitoring is required including baseline LFTs and repeat LFTs at intervals.  The patient understands that they are to contact us or the primary physician if concerning signs are noted. Spironolactone Pregnancy And Lactation Text: This medication can cause feminization of the male fetus and should be avoided during pregnancy. The active metabolite is also found in breast milk. Thalidomide Pregnancy And Lactation Text: This medication is Pregnancy Category X and is absolutely contraindicated during pregnancy. It is unknown if it is excreted in breast milk. Hydroxyzine Counseling: Patient advised that the medication is sedating and not to drive a car after taking this medication.  Patient informed of potential adverse effects including but not limited to dry mouth, urinary retention, and blurry vision.  The patient verbalized understanding of the proper use and possible adverse effects of hydroxyzine.  All of the patient's questions and concerns were addressed. Colchicine Counseling:  Patient counseled regarding adverse effects including but not limited to stomach upset (nausea, vomiting, stomach pain, or diarrhea).  Patient instructed to limit alcohol consumption while taking this medication.  Colchicine may reduce blood counts especially with prolonged use.  The patient understands that monitoring of kidney function and blood counts may be required, especially at baseline. The patient verbalized understanding of the proper use and possible adverse effects of colchicine.  All of the patient's questions and concerns were addressed. Cellcept Counseling:  I discussed with the patient the risks of mycophenolate mofetil including but not limited to infection/immunosuppression, GI upset, hypokalemia, hypercholesterolemia, bone marrow suppression, lymphoproliferative disorders, malignancy, GI ulceration/bleed/perforation, colitis, interstitial lung disease, kidney failure, progressive multifocal leukoencephalopathy, and birth defects.  The patient understands that monitoring is required including a baseline creatinine and regular CBC testing. In addition, patient must alert us immediately if symptoms of infection or other concerning signs are noted. Stelara Counseling:  I discussed with the patient the risks of ustekinumab including but not limited to immunosuppression, malignancy, posterior leukoencephalopathy syndrome, and serious infections.  The patient understands that monitoring is required including a PPD at baseline and must alert us or the primary physician if symptoms of infection or other concerning signs are noted. Dapsone Pregnancy And Lactation Text: This medication is Pregnancy Category C and is not considered safe during pregnancy or breast feeding. Simponi Pregnancy And Lactation Text: The risk during pregnancy and breastfeeding is uncertain with this medication. Drysol Pregnancy And Lactation Text: This medication is considered safe during pregnancy and breast feeding. Doxycycline Pregnancy And Lactation Text: This medication is Pregnancy Category D and not consider safe during pregnancy. It is also excreted in breast milk but is considered safe for shorter treatment courses. Picato Counseling:  I discussed with the patient the risks of Picato including but not limited to erythema, scaling, itching, weeping, crusting, and pain. Spironolactone Counseling: Patient advised regarding risks of diarrhea, abdominal pain, hyperkalemia, birth defects (for female patients), liver toxicity and renal toxicity. The patient may need blood work to monitor liver and kidney function and potassium levels while on therapy. The patient verbalized understanding of the proper use and possible adverse effects of spironolactone.  All of the patient's questions and concerns were addressed. Otezla Pregnancy And Lactation Text: This medication is Pregnancy Category C and it isn't known if it is safe during pregnancy. It is unknown if it is excreted in breast milk. Humira Counseling:  I discussed with the patient the risks of adalimumab including but not limited to myelosuppression, immunosuppression, autoimmune hepatitis, demyelinating diseases, lymphoma, and serious infections.  The patient understands that monitoring is required including a PPD at baseline and must alert us or the primary physician if symptoms of infection or other concerning signs are noted. Doxepin Pregnancy And Lactation Text: This medication is Pregnancy Category C and it isn't known if it is safe during pregnancy. It is also excreted in breast milk and breast feeding isn't recommended. Prednisone Counseling:  I discussed with the patient the risks of prolonged use of prednisone including but not limited to weight gain, insomnia, osteoporosis, mood changes, diabetes, susceptibility to infection, glaucoma and high blood pressure.  In cases where prednisone use is prolonged, patients should be monitored with blood pressure checks, serum glucose levels and an eye exam.  Additionally, the patient may need to be placed on GI prophylaxis, PCP prophylaxis, and calcium and vitamin D supplementation and/or a bisphosphonate.  The patient verbalized understanding of the proper use and the possible adverse effects of prednisone.  All of the patient's questions and concerns were addressed. Methotrexate Pregnancy And Lactation Text: This medication is Pregnancy Category X and is known to cause fetal harm. This medication is excreted in breast milk. Erythromycin Counseling:  I discussed with the patient the risks of erythromycin including but not limited to GI upset, allergic reaction, drug rash, diarrhea, increase in liver enzymes, and yeast infections. Rifampin Counseling: I discussed with the patient the risks of rifampin including but not limited to liver damage, kidney damage, red-orange body fluids, nausea/vomiting and severe allergy. Ilumya Counseling: I discussed with the patient the risks of tildrakizumab including but not limited to immunosuppression, malignancy, posterior leukoencephalopathy syndrome, and serious infections.  The patient understands that monitoring is required including a PPD at baseline and must alert us or the primary physician if symptoms of infection or other concerning signs are noted. Minocycline Counseling: Patient advised regarding possible photosensitivity and discoloration of the teeth, skin, lips, tongue and gums.  Patient instructed to avoid sunlight, if possible.  When exposed to sunlight, patients should wear protective clothing, sunglasses, and sunscreen.  The patient was instructed to call the office immediately if the following severe adverse effects occur:  hearing changes, easy bruising/bleeding, severe headache, or vision changes.  The patient verbalized understanding of the proper use and possible adverse effects of minocycline.  All of the patient's questions and concerns were addressed. Griseofulvin Counseling:  I discussed with the patient the risks of griseofulvin including but not limited to photosensitivity, cytopenia, liver damage, nausea/vomiting and severe allergy.  The patient understands that this medication is best absorbed when taken with a fatty meal (e.g., ice cream or french fries). Oxybutynin Counseling:  I discussed with the patient the risks of oxybutynin including but not limited to skin rash, drowsiness, dry mouth, difficulty urinating, and blurred vision. Birth Control Pills Counseling: Birth Control Pill Counseling: I discussed with the patient the potential side effects of OCPs including but not limited to increased risk of stroke, heart attack, thrombophlebitis, deep venous thrombosis, hepatic adenomas, breast changes, GI upset, headaches, and depression.  The patient verbalized understanding of the proper use and possible adverse effects of OCPs. All of the patient's questions and concerns were addressed. Hydroxychloroquine Pregnancy And Lactation Text: This medication has been shown to cause fetal harm but it isn't assigned a Pregnancy Risk Category. There are small amounts excreted in breast milk. Clofazimine Counseling:  I discussed with the patient the risks of clofazimine including but not limited to skin and eye pigmentation, liver damage, nausea/vomiting, gastrointestinal bleeding and allergy. Ketoconazole Counseling:   Patient counseled regarding improving absorption with orange juice.  Adverse effects include but are not limited to breast enlargement, headache, diarrhea, nausea, upset stomach, liver function test abnormalities, taste disturbance, and stomach pain.  There is a rare possibility of liver failure that can occur when taking ketoconazole. The patient understands that monitoring of LFTs may be required, especially at baseline. The patient verbalized understanding of the proper use and possible adverse effects of ketoconazole.  All of the patient's questions and concerns were addressed. Acitretin Pregnancy And Lactation Text: This medication is Pregnancy Category X and should not be given to women who are pregnant or may become pregnant in the future. This medication is excreted in breast milk. Dupixent Counseling: I discussed with the patient the risks of dupilumab including but not limited to eye infection and irritation, cold sores, injection site reactions, worsening of asthma, allergic reactions and increased risk of parasitic infection.  Live vaccines should be avoided while taking dupilumab. Dupilumab will also interact with certain medications such as warfarin and cyclosporine. The patient understands that monitoring is required and they must alert us or the primary physician if symptoms of infection or other concerning signs are noted. Tazorac Pregnancy And Lactation Text: This medication is not safe during pregnancy. It is unknown if this medication is excreted in breast milk. Bactrim Counseling:  I discussed with the patient the risks of sulfa antibiotics including but not limited to GI upset, allergic reaction, drug rash, diarrhea, dizziness, photosensitivity, and yeast infections.  Rarely, more serious reactions can occur including but not limited to aplastic anemia, agranulocytosis, methemoglobinemia, blood dyscrasias, liver or kidney failure, lung infiltrates or desquamative/blistering drug rashes. Quinolones Counseling:  I discussed with the patient the risks of fluoroquinolones including but not limited to GI upset, allergic reaction, drug rash, diarrhea, dizziness, photosensitivity, yeast infections, liver function test abnormalities, tendonitis/tendon rupture. Rituxan Pregnancy And Lactation Text: This medication is Pregnancy Category C and it isn't know if it is safe during pregnancy. It is unknown if this medication is excreted in breast milk but similar antibodies are known to be excreted. Carac Pregnancy And Lactation Text: This medication is Pregnancy Category X and contraindicated in pregnancy and in women who may become pregnant. It is unknown if this medication is excreted in breast milk. Solaraze Counseling:  I discussed with the patient the risks of Solaraze including but not limited to erythema, scaling, itching, weeping, crusting, and pain. Taltz Counseling: I discussed with the patient the risks of ixekizumab including but not limited to immunosuppression, serious infections, worsening of inflammatory bowel disease and drug reactions.  The patient understands that monitoring is required including a PPD at baseline and must alert us or the primary physician if symptoms of infection or other concerning signs are noted. Sski Pregnancy And Lactation Text: This medication is Pregnancy Category D and isn't considered safe during pregnancy. It is excreted in breast milk. Nsaids Pregnancy And Lactation Text: These medications are considered safe up to 30 weeks gestation. It is excreted in breast milk. Minoxidil Counseling: Minoxidil is a topical medication which can increase blood flow where it is applied. It is uncertain how this medication increases hair growth. Side effects are uncommon and include stinging and allergic reactions. Dupixent Pregnancy And Lactation Text: This medication likely crosses the placenta but the risk for the fetus is uncertain. This medication is excreted in breast milk. Clindamycin Counseling: I counseled the patient regarding use of clindamycin as an antibiotic for prophylactic and/or therapeutic purposes. Clindamycin is active against numerous classes of bacteria, including skin bacteria. Side effects may include nausea, diarrhea, gastrointestinal upset, rash, hives, yeast infections, and in rare cases, colitis. Bexarotene Counseling:  I discussed with the patient the risks of bexarotene including but not limited to hair loss, dry lips/skin/eyes, liver abnormalities, hyperlipidemia, pancreatitis, depression/suicidal ideation, photosensitivity, drug rash/allergic reactions, hypothyroidism, anemia, leukopenia, infection, cataracts, and teratogenicity.  Patient understands that they will need regular blood tests to check lipid profile, liver function tests, white blood cell count, thyroid function tests and pregnancy test if applicable. Valtrex Counseling: I discussed with the patient the risks of valacyclovir including but not limited to kidney damage, nausea, vomiting and severe allergy.  The patient understands that if the infection seems to be worsening or is not improving, they are to call. Cimzia Counseling:  I discussed with the patient the risks of Cimzia including but not limited to immunosuppression, allergic reactions and infections.  The patient understands that monitoring is required including a PPD at baseline and must alert us or the primary physician if symptoms of infection or other concerning signs are noted. Detail Level: Simple Cyclosporine Pregnancy And Lactation Text: This medication is Pregnancy Category C and it isn't know if it is safe during pregnancy. This medication is excreted in breast milk. Xolair Counseling:  Patient informed of potential adverse effects including but not limited to fever, muscle aches, rash and allergic reactions.  The patient verbalized understanding of the proper use and possible adverse effects of Xolair.  All of the patient's questions and concerns were addressed. Methotrexate Counseling:  Patient counseled regarding adverse effects of methotrexate including but not limited to nausea, vomiting, abnormalities in liver function tests. Patients may develop mouth sores, rash, diarrhea, and abnormalities in blood counts. The patient understands that monitoring is required including LFT's and blood counts.  There is a rare possibility of scarring of the liver and lung problems that can occur when taking methotrexate. Persistent nausea, loss of appetite, pale stools, dark urine, cough, and shortness of breath should be reported immediately. Patient advised to discontinue methotrexate treatment at least three months before attempting to become pregnant.  I discussed the need for folate supplements while taking methotrexate.  These supplements can decrease side effects during methotrexate treatment. The patient verbalized understanding of the proper use and possible adverse effects of methotrexate.  All of the patient's questions and concerns were addressed. Simponi Counseling:  I discussed with the patient the risks of golimumab including but not limited to myelosuppression, immunosuppression, autoimmune hepatitis, demyelinating diseases, lymphoma, and serious infections.  The patient understands that monitoring is required including a PPD at baseline and must alert us or the primary physician if symptoms of infection or other concerning signs are noted. Birth Control Pills Pregnancy And Lactation Text: This medication should be avoided if pregnant and for the first 30 days post-partum. Dapsone Counseling: I discussed with the patient the risks of dapsone including but not limited to hemolytic anemia, agranulocytosis, rashes, methemoglobinemia, kidney failure, peripheral neuropathy, headaches, GI upset, and liver toxicity.  Patients who start dapsone require monitoring including baseline LFTs and weekly CBCs for the first month, then every month thereafter.  The patient verbalized understanding of the proper use and possible adverse effects of dapsone.  All of the patient's questions and concerns were addressed. Carac Counseling:  I discussed with the patient the risks of Carac including but not limited to erythema, scaling, itching, weeping, crusting, and pain. Isotretinoin Pregnancy And Lactation Text: This medication is Pregnancy Category X and is considered extremely dangerous during pregnancy. It is unknown if it is excreted in breast milk. SSKI Counseling:  I discussed with the patient the risks of SSKI including but not limited to thyroid abnormalities, metallic taste, GI upset, fever, headache, acne, arthralgias, paraesthesias, lymphadenopathy, easy bleeding, arrhythmias, and allergic reaction. Glycopyrrolate Pregnancy And Lactation Text: This medication is Pregnancy Category B and is considered safe during pregnancy. It is unknown if it is excreted breast milk. Ivermectin Counseling:  Patient instructed to take medication on an empty stomach with a full glass of water.  Patient informed of potential adverse effects including but not limited to nausea, diarrhea, dizziness, itching, and swelling of the extremities or lymph nodes.  The patient verbalized understanding of the proper use and possible adverse effects of ivermectin.  All of the patient's questions and concerns were addressed. Erythromycin Pregnancy And Lactation Text: This medication is Pregnancy Category B and is considered safe during pregnancy. It is also excreted in breast milk. 5-Fu Counseling: 5-Fluorouracil Counseling:  I discussed with the patient the risks of 5-fluorouracil including but not limited to erythema, scaling, itching, weeping, crusting, and pain. Skyrizi Counseling: I discussed with the patient the risks of risankizumab-rzaa including but not limited to immunosuppression, and serious infections.  The patient understands that monitoring is required including a PPD at baseline and must alert us or the primary physician if symptoms of infection or other concerning signs are noted.

## 2023-03-16 LAB
ADD ON TEST-SPECIMEN IN LAB: SIGNIFICANT CHANGE UP
ALBUMIN SERPL ELPH-MCNC: 3.2 G/DL — LOW (ref 3.3–5)
ALP SERPL-CCNC: 169 U/L — HIGH (ref 40–120)
ALT FLD-CCNC: 19 U/L — SIGNIFICANT CHANGE UP (ref 12–78)
ANION GAP SERPL CALC-SCNC: 6 MMOL/L — SIGNIFICANT CHANGE UP (ref 5–17)
AST SERPL-CCNC: 20 U/L — SIGNIFICANT CHANGE UP (ref 15–37)
BILIRUB SERPL-MCNC: 0.2 MG/DL — SIGNIFICANT CHANGE UP (ref 0.2–1.2)
BUN SERPL-MCNC: 15 MG/DL — SIGNIFICANT CHANGE UP (ref 7–23)
CALCIUM SERPL-MCNC: 8.2 MG/DL — LOW (ref 8.5–10.1)
CHLORIDE SERPL-SCNC: 101 MMOL/L — SIGNIFICANT CHANGE UP (ref 96–108)
CO2 SERPL-SCNC: 25 MMOL/L — SIGNIFICANT CHANGE UP (ref 22–31)
CREAT SERPL-MCNC: 0.82 MG/DL — SIGNIFICANT CHANGE UP (ref 0.5–1.3)
EGFR: 107 ML/MIN/1.73M2 — SIGNIFICANT CHANGE UP
GLUCOSE SERPL-MCNC: 108 MG/DL — HIGH (ref 70–99)
HCT VFR BLD CALC: 35.5 % — LOW (ref 39–50)
HGB BLD-MCNC: 11.6 G/DL — LOW (ref 13–17)
MAGNESIUM SERPL-MCNC: 2 MG/DL — SIGNIFICANT CHANGE UP (ref 1.6–2.6)
MCHC RBC-ENTMCNC: 27.2 PG — SIGNIFICANT CHANGE UP (ref 27–34)
MCHC RBC-ENTMCNC: 32.7 GM/DL — SIGNIFICANT CHANGE UP (ref 32–36)
MCV RBC AUTO: 83.1 FL — SIGNIFICANT CHANGE UP (ref 80–100)
OB PNL STL: NEGATIVE — SIGNIFICANT CHANGE UP
PHOSPHATE SERPL-MCNC: 3.3 MG/DL — SIGNIFICANT CHANGE UP (ref 2.5–4.5)
PLATELET # BLD AUTO: 145 K/UL — LOW (ref 150–400)
POTASSIUM SERPL-MCNC: 3.2 MMOL/L — LOW (ref 3.5–5.3)
POTASSIUM SERPL-SCNC: 3.2 MMOL/L — LOW (ref 3.5–5.3)
PROT SERPL-MCNC: 6.7 GM/DL — SIGNIFICANT CHANGE UP (ref 6–8.3)
RBC # BLD: 4.27 M/UL — SIGNIFICANT CHANGE UP (ref 4.2–5.8)
RBC # FLD: 19.3 % — HIGH (ref 10.3–14.5)
SODIUM SERPL-SCNC: 132 MMOL/L — LOW (ref 135–145)
WBC # BLD: 17.31 K/UL — HIGH (ref 3.8–10.5)
WBC # FLD AUTO: 17.31 K/UL — HIGH (ref 3.8–10.5)

## 2023-03-16 PROCEDURE — 93970 EXTREMITY STUDY: CPT | Mod: 26

## 2023-03-16 PROCEDURE — 99232 SBSQ HOSP IP/OBS MODERATE 35: CPT

## 2023-03-16 RX ORDER — POTASSIUM CHLORIDE 20 MEQ
20 PACKET (EA) ORAL
Refills: 0 | Status: COMPLETED | OUTPATIENT
Start: 2023-03-16 | End: 2023-03-16

## 2023-03-16 RX ORDER — DEXTROSE MONOHYDRATE, SODIUM CHLORIDE, AND POTASSIUM CHLORIDE 50; .745; 4.5 G/1000ML; G/1000ML; G/1000ML
1000 INJECTION, SOLUTION INTRAVENOUS
Refills: 0 | Status: DISCONTINUED | OUTPATIENT
Start: 2023-03-16 | End: 2023-03-17

## 2023-03-16 RX ADMIN — ONDANSETRON 4 MILLIGRAM(S): 8 TABLET, FILM COATED ORAL at 14:36

## 2023-03-16 RX ADMIN — MORPHINE SULFATE 4 MILLIGRAM(S): 50 CAPSULE, EXTENDED RELEASE ORAL at 10:12

## 2023-03-16 RX ADMIN — APIXABAN 5 MILLIGRAM(S): 2.5 TABLET, FILM COATED ORAL at 21:32

## 2023-03-16 RX ADMIN — OXYCODONE HYDROCHLORIDE 5 MILLIGRAM(S): 5 TABLET ORAL at 15:36

## 2023-03-16 RX ADMIN — APIXABAN 5 MILLIGRAM(S): 2.5 TABLET, FILM COATED ORAL at 10:10

## 2023-03-16 RX ADMIN — Medication 20 MILLIEQUIVALENT(S): at 11:41

## 2023-03-16 RX ADMIN — OXYCODONE HYDROCHLORIDE 5 MILLIGRAM(S): 5 TABLET ORAL at 14:36

## 2023-03-16 RX ADMIN — Medication 20 MILLIEQUIVALENT(S): at 14:31

## 2023-03-16 RX ADMIN — Medication 20 MILLIEQUIVALENT(S): at 10:10

## 2023-03-16 RX ADMIN — MORPHINE SULFATE 4 MILLIGRAM(S): 50 CAPSULE, EXTENDED RELEASE ORAL at 10:30

## 2023-03-16 RX ADMIN — DEXTROSE MONOHYDRATE, SODIUM CHLORIDE, AND POTASSIUM CHLORIDE 85 MILLILITER(S): 50; .745; 4.5 INJECTION, SOLUTION INTRAVENOUS at 11:41

## 2023-03-16 NOTE — PROGRESS NOTE ADULT - SUBJECTIVE AND OBJECTIVE BOX
Chief complain: Abdominal pain    HPI: 52M currently undergoing chemotherapy for colon and liver CA presented to ED from home with worsening stoma pain. Patient with stoma for current treatment of colon cancer. Denies vomiting, CP, or SOB. On Eliquis due to Hx of left lung blood clot. Last chemotherapy treatment on Monday with Dr. Serrano at [facility]. His pain is not controlled with current home medications.    Interval history:  3/16 - Patient seen and examined at bedside earlier today, pt. c/o pain at stoma site from rubbing against dressing ring. Denies CP, SOB, cough, constipation, urinary symptoms or other abdominal pain.    Review of systems - Rest of the review of system are negative except mentioned in HPI    Vital Signs Last 24 Hrs  T(C): 36.4 (16 Mar 2023 08:42), Max: 36.9 (15 Mar 2023 15:26)  T(F): 97.6 (16 Mar 2023 08:42), Max: 98.5 (15 Mar 2023 15:26)  HR: 84 (16 Mar 2023 08:42) (69 - 103)  BP: 122/82 (16 Mar 2023 08:42) (101/63 - 138/86)  BP(mean): 98 (15 Mar 2023 15:26) (98 - 98)  RR: 18 (16 Mar 2023 08:42) (18 - 18)  SpO2: 100% (16 Mar 2023 08:42) (99% - 100%)  Parameters below as of 16 Mar 2023 08:42  Patient On (Oxygen Delivery Method): room air    Physical exam:  General : NAD, appear to be of stated age, well-groomed, well-fed  NERVOUS SYSTEM: A&Ox3, non-focal exam: motor strength 5/5 of bilateral upper and lower extremities; DTRs 2+, intact and symmetric  HEAD:  Atraumatic, normocephalic  EYES: EOMI, PERRLA, conjunctiva and sclera clear  HEENT: Moist mucous membranes, supple neck , no JVD  CHEST: Clear to auscultation bilaterally; no adventitious lung sounds noted  HEART: S1S2, regular rate and rhythm; no murmurs, no rubs or gallops  ABDOMEN: Soft, non-tender, non-distended; bowel sounds present; +RLQ ostomy noted, severely prolapsed, beefy and red with green output  GENITOURINARY: Voiding, no suprapubic tenderness  EXTREMITIES: 2+ pulses; no clubbing, cyanosis, or edema  MUSCULOSKELETAL: No muscle tenderness, muscle tone normal, no joint tenderness, no joint swelling; FROM in all extremities  SKIN: No rash, no lesion    Labs radiologic and other test: all reviewed and interpret              11.6   17.31 )-----------( 145      ( 16 Mar 2023 06:13 )             35.5     03-16    132<L>  |  101  |  15  ----------------------------<  108<H>  3.2<L>   |  25  |  0.82    Ca    8.2<L>      16 Mar 2023 06:13  Mg     1.9     03-15    TPro  6.7  /  Alb  3.2<L>  /  TBili  0.2  /  DBili  x   /  AST  20  /  ALT  19  /  AlkPhos  169<H>      Urinalysis Basic - ( 15 Mar 2023 16:15 )  Color: Yellow / Appearance: Clear / S.005 / pH: x  Gluc: x / Ketone: Negative  / Bili: Negative / Urobili: Negative   Blood: x / Protein: Negative / Nitrite: Negative   Leuk Esterase: Negative / RBC: x / WBC x   Sq Epi: x / Non Sq Epi: x / Bacteria: x      Cardiac testing: reviewed  12 Lead ECG (03.15.23 @ 14:33)  Ventricular Rate 105 BPM  Atrial Rate 105 BPM  P-R Interval 158 ms  QRS Duration 82 ms  Q-T Interval 342 ms  QTC Calculation(Bazett) 452 ms  P Axis 76 degrees  R Axis 117 degrees  T Axis 71 degrees  Diagnosis Line Sinus tachycardia  Left posterior fascicular block  Septal infarct (cited on or before 2022)  Abnormal ECG  When compared with ECG of 15-YOUSUF-2023 15:59,  No significant change was found  Confirmed by Palla MD, Lamont ([id number]) on 3/16/2023 7:57:09 AM  < end of copied text >    Radiology: all reviewed  Xray Chest 1 View- PORTABLE-Urgent (03.15.23 @ 13:23)  ACC: [id number] EXAM:  XR CHEST PORTABLE URGENT 1V   ORDERED BY: DEBBIE COY   PROCEDURE DATE:  03/15/2023    INTERPRETATION:  HISTORY: Weakness  TECHNIQUE: A single AP view of the chest was obtained.  COMPARISON: 1/15/2023  FINDINGS:  The cardiac silhouette is normal in size. There is a   right-sided Mediport with tip overlying the superior vena cava. There are   no focal consolidations or pleural effusions. The hilar and mediastinal   structures appear unremarkable. The osseousstructures are intact.  IMPRESSION: Clear lungs.  --- End of Report ---    CT Abdomen and Pelvis w/ IV Cont (03.15.23 @ 13:43)  ACC: [id number] EXAM:  CT ABDOMEN AND PELVIS IC   ORDERED BY: DEBBIE COY   PROCEDURE DATE:  03/15/2023    INTERPRETATION:  CLINICAL INFORMATION: Abdominal pain  COMPARISON: January 15, 2023  CONTRAST/COMPLICATIONS:  IV Contrast: Omnipaque 350  90 cc administered   10 cc discarded  Oral Contrast: NONE  Complications: None reported at time of study completion  PROCEDURE:  CT of the Abdomen and Pelvis was performed.  Sagittal and coronal reformats were performed.  FINDINGS:  LOWER CHEST: Within normal limits.  LIVER: Multiple bilobar calcified masses, overall not significantly   changed, for example:  *  Segment 2 mass measuring 3.9 cm (2; 18), previously 3.6 cm  *  Segment 8 mass measuring 3.3 cm (2; 28), previously 3.1 cm  *  Faintly calcified segment 4 mass measuring 1.7 cm (2; 27), unchanged.  *  Additionally, a 1.4 cm hypodense mass in segment 8 is unchanged (2; 27)  BILE DUCTS: Normal caliber.  GALLBLADDER: Within normal limits.  SPLEEN: Within normal limits.  PANCREAS: Within normal limits.  ADRENALS: Within normal limits.  KIDNEYS/URETERS: Subcentimeter hypodense left renal lesion which is too   small to characterize.  BLADDER: Within normal limits.  REPRODUCTIVE ORGANS: Prostate within normal limits.  BOWEL: Status post subtotal colectomy with rectosigmoid stump and right   lower quadrant ileostomy with marked prolapse. No bowel obstruction or   wall thickening.  PERITONEUM: No ascites or peritoneal tumor.  VESSELS: Atherosclerotic changes.  RETROPERITONEUM/LYMPH NODES: No lymphadenopathy.  ABDOMINAL WALL: Postsurgical changes.  BONES: No lytic or blastic bony lesion.  IMPRESSION:  No acute findings or cause for pain identified.  Marked prolapse of the patient's right lower quadrant ileostomy.  Bilobar hepatic metastases, not significantly changed.  --- End of Report ---  < end of copied text >    Home Medications:  Eliquis 5 mg oral tablet: 1 tab(s) orally 2 times a day (15 Mar 2023 15:56)  oxyCODONE 5 mg oral tablet: 1 tab(s) orally every 6 hours, As Needed (15 Mar 2023 15:56)    MEDICATIONS  (STANDING):  apixaban 5 milliGRAM(s) Oral every 12 hours  potassium chloride    Tablet ER 20 milliEquivalent(s) Oral every 2 hours  sodium chloride 0.9% with potassium chloride 20 mEq/L 1000 milliLiter(s) (85 mL/Hr) IV Continuous <Continuous>    MEDICATIONS  (PRN):  acetaminophen     Tablet .. 650 milliGRAM(s) Oral every 6 hours PRN Temp greater or equal to 38C (100.4F), Mild Pain (1 - 3)  aluminum hydroxide/magnesium hydroxide/simethicone Suspension 30 milliLiter(s) Oral every 4 hours PRN Dyspepsia  melatonin 3 milliGRAM(s) Oral at bedtime PRN Insomnia  morphine  - Injectable 4 milliGRAM(s) IV Push every 6 hours PRN Severe Pain (7 - 10)  ondansetron Injectable 4 milliGRAM(s) IV Push every 8 hours PRN Nausea and/or Vomiting  oxyCODONE    IR 5 milliGRAM(s) Oral every 6 hours PRN Moderate Pain (4 - 6) Chief complain: Abdominal pain    HPI: 52M currently undergoing chemotherapy for colon Ca with liver mets admitted on 3/15/23 for  worsening stoma pain.  Patient with stoma for current treatment of colon cancer. Denies vomiting, CP, or SOB. On Eliquis due to Hx of left lung blood clot. Last chemotherapy treatment on Monday with Dr. Serrano at [facility]. His pain is not controlled with current home medications.    Interval history:  3/16 - Patient seen and examined at bedside earlier today, pt. c/o pain at stoma site from rubbing against dressing ring. Denies CP, SOB, cough, constipation, urinary symptoms or other abdominal pain.    Review of systems - Rest of the review of system are negative except mentioned in HPI    Vital Signs Last 24 Hrs  T(C): 36.4 (16 Mar 2023 08:42), Max: 36.9 (15 Mar 2023 15:26)  T(F): 97.6 (16 Mar 2023 08:42), Max: 98.5 (15 Mar 2023 15:26)  HR: 84 (16 Mar 2023 08:42) (69 - 103)  BP: 122/82 (16 Mar 2023 08:42) (101/63 - 138/86)  BP(mean): 98 (15 Mar 2023 15:26) (98 - 98)  RR: 18 (16 Mar 2023 08:42) (18 - 18)  SpO2: 100% (16 Mar 2023 08:42) (99% - 100%)  Parameters below as of 16 Mar 2023 08:42  Patient On (Oxygen Delivery Method): room air    Physical exam:  General : NAD, appear to be of stated age, well-groomed, well-fed  NERVOUS SYSTEM: A&Ox3, non-focal exam: motor strength 5/5 of bilateral upper and lower extremities; DTRs 2+, intact and symmetric  HEAD:  Atraumatic, normocephalic  EYES: EOMI, PERRLA, conjunctiva and sclera clear  HEENT: Moist mucous membranes, supple neck , no JVD  CHEST: Clear to auscultation bilaterally; no adventitious lung sounds noted  HEART: S1S2, regular rate and rhythm; no murmurs, no rubs or gallops  ABDOMEN: Soft, non-tender, non-distended; bowel sounds present; +RLQ ostomy noted, severely prolapsed, beefy and red with green output  GENITOURINARY: Voiding, no suprapubic tenderness  EXTREMITIES: 2+ pulses; no clubbing, cyanosis, or edema  MUSCULOSKELETAL: No muscle tenderness, muscle tone normal, no joint tenderness, no joint swelling; FROM in all extremities  SKIN: No rash, no lesion    Labs radiologic and other test: all reviewed and interpret              11.6   17.31 )-----------( 145      ( 16 Mar 2023 06:13 )             35.5     03-16    132<L>  |  101  |  15  ----------------------------<  108<H>  3.2<L>   |  25  |  0.82    Ca    8.2<L>      16 Mar 2023 06:13  Mg     1.9     03-15    TPro  6.7  /  Alb  3.2<L>  /  TBili  0.2  /  DBili  x   /  AST  20  /  ALT  19  /  AlkPhos  169<H>      Urinalysis Basic - ( 15 Mar 2023 16:15 )  Color: Yellow / Appearance: Clear / S.005 / pH: x  Gluc: x / Ketone: Negative  / Bili: Negative / Urobili: Negative   Blood: x / Protein: Negative / Nitrite: Negative   Leuk Esterase: Negative / RBC: x / WBC x   Sq Epi: x / Non Sq Epi: x / Bacteria: x      Cardiac testing: reviewed  12 Lead ECG (03.15.23 @ 14:33)  Ventricular Rate 105 BPM  Atrial Rate 105 BPM  P-R Interval 158 ms  QRS Duration 82 ms  Q-T Interval 342 ms  QTC Calculation(Bazett) 452 ms  P Axis 76 degrees  R Axis 117 degrees  T Axis 71 degrees  Diagnosis Line Sinus tachycardia  Left posterior fascicular block  Septal infarct (cited on or before 2022)  Abnormal ECG  When compared with ECG of 15-YOUSUF-2023 15:59,  No significant change was found  Confirmed by Palla MD, Lamont ([id number]) on 3/16/2023 7:57:09 AM  < end of copied text >    Radiology: all reviewed  Xray Chest 1 View- PORTABLE-Urgent (03.15.23 @ 13:23)  ACC: [id number] EXAM:  XR CHEST PORTABLE URGENT 1V   ORDERED BY: DEBBIE   BILGEN   PROCEDURE DATE:  03/15/2023    INTERPRETATION:  HISTORY: Weakness  TECHNIQUE: A single AP view of the chest was obtained.  COMPARISON: 1/15/2023  FINDINGS:  The cardiac silhouette is normal in size. There is a   right-sided Mediport with tip overlying the superior vena cava. There are   no focal consolidations or pleural effusions. The hilar and mediastinal   structures appear unremarkable. The osseousstructures are intact.  IMPRESSION: Clear lungs.  --- End of Report ---    CT Abdomen and Pelvis w/ IV Cont (03.15.23 @ 13:43)  ACC: [id number] EXAM:  CT ABDOMEN AND PELVIS IC   ORDERED BY: DEBBIE COY   PROCEDURE DATE:  03/15/2023    INTERPRETATION:  CLINICAL INFORMATION: Abdominal pain  COMPARISON: January 15, 2023  CONTRAST/COMPLICATIONS:  IV Contrast: Omnipaque 350  90 cc administered   10 cc discarded  Oral Contrast: NONE  Complications: None reported at time of study completion  PROCEDURE:  CT of the Abdomen and Pelvis was performed.  Sagittal and coronal reformats were performed.  FINDINGS:  LOWER CHEST: Within normal limits.  LIVER: Multiple bilobar calcified masses, overall not significantly   changed, for example:  *  Segment 2 mass measuring 3.9 cm (2; 18), previously 3.6 cm  *  Segment 8 mass measuring 3.3 cm (2; 28), previously 3.1 cm  *  Faintly calcified segment 4 mass measuring 1.7 cm (2; 27), unchanged.  *  Additionally, a 1.4 cm hypodense mass in segment 8 is unchanged (2; 27)  BILE DUCTS: Normal caliber.  GALLBLADDER: Within normal limits.  SPLEEN: Within normal limits.  PANCREAS: Within normal limits.  ADRENALS: Within normal limits.  KIDNEYS/URETERS: Subcentimeter hypodense left renal lesion which is too   small to characterize.  BLADDER: Within normal limits.  REPRODUCTIVE ORGANS: Prostate within normal limits.  BOWEL: Status post subtotal colectomy with rectosigmoid stump and right   lower quadrant ileostomy with marked prolapse. No bowel obstruction or   wall thickening.  PERITONEUM: No ascites or peritoneal tumor.  VESSELS: Atherosclerotic changes.  RETROPERITONEUM/LYMPH NODES: No lymphadenopathy.  ABDOMINAL WALL: Postsurgical changes.  BONES: No lytic or blastic bony lesion.  IMPRESSION:  No acute findings or cause for pain identified.  Marked prolapse of the patient's right lower quadrant ileostomy.  Bilobar hepatic metastases, not significantly changed.  --- End of Report ---  < end of copied text >    Home Medications:  Eliquis 5 mg oral tablet: 1 tab(s) orally 2 times a day (15 Mar 2023 15:56)  oxyCODONE 5 mg oral tablet: 1 tab(s) orally every 6 hours, As Needed (15 Mar 2023 15:56)    MEDICATIONS  (STANDING):  apixaban 5 milliGRAM(s) Oral every 12 hours  potassium chloride    Tablet ER 20 milliEquivalent(s) Oral every 2 hours  sodium chloride 0.9% with potassium chloride 20 mEq/L 1000 milliLiter(s) (85 mL/Hr) IV Continuous <Continuous>    MEDICATIONS  (PRN):  acetaminophen     Tablet .. 650 milliGRAM(s) Oral every 6 hours PRN Temp greater or equal to 38C (100.4F), Mild Pain (1 - 3)  aluminum hydroxide/magnesium hydroxide/simethicone Suspension 30 milliLiter(s) Oral every 4 hours PRN Dyspepsia  melatonin 3 milliGRAM(s) Oral at bedtime PRN Insomnia  morphine  - Injectable 4 milliGRAM(s) IV Push every 6 hours PRN Severe Pain (7 - 10)  ondansetron Injectable 4 milliGRAM(s) IV Push every 8 hours PRN Nausea and/or Vomiting  oxyCODONE    IR 5 milliGRAM(s) Oral every 6 hours PRN Moderate Pain (4 - 6)

## 2023-03-16 NOTE — PROGRESS NOTE ADULT - ATTENDING COMMENTS
52M  with PMH of  colon Ca with liver mets on chemotherapy , h/o PE on Eliquis  admitted on 3/15/23 for  worsening stoma pain. Patient with stoma for current treatment of colon cancer. Denies vomiting, CP, or SOB.  Last chemotherapy treatment on Monday with Dr. Serrano at [facility]. His pain is not controlled with current home medications.    Vital Signs Last 24 Hrs  T(C): 36.4 (16 Mar 2023 08:42), Max: 36.9 (15 Mar 2023 15:26)  T(F): 97.6 (16 Mar 2023 08:42), Max: 98.5 (15 Mar 2023 15:26)  HR: 84 (16 Mar 2023 08:42) (69 - 103)  BP: 122/82 (16 Mar 2023 08:42) (101/63 - 138/86)  BP(mean): 98 (15 Mar 2023 15:26) (98 - 98)  RR: 18 (16 Mar 2023 08:42) (18 - 18)  SpO2: 100% (16 Mar 2023 08:42) (99% - 100%)  Parameters below as of 16 Mar 2023 08:42  Patient On (Oxygen Delivery Method): room air    Physical exam:  General : NAD, appear to be of stated age, well-groomed, well-fed  NERVOUS SYSTEM: A&Ox3, non-focal exam: motor strength 5/5 of bilateral upper and lower extremities; DTRs 2+, intact and symmetric  HEAD:  Atraumatic, normocephalic  EYES: EOMI, PERRLA, conjunctiva and sclera clear  HEENT: Moist mucous membranes, supple neck , no JVD  CHEST: Clear to auscultation bilaterally; no adventitious lung sounds noted  HEART: S1S2, regular rate and rhythm; no murmurs, no rubs or gallops  ABDOMEN: Soft, non-tender, non-distended; bowel sounds present; +RLQ ostomy noted, severely prolapsed, beefy and red with green output  GENITOURINARY: Voiding, no suprapubic tenderness  EXTREMITIES: 2+ pulses; no clubbing, cyanosis, or edema  MUSCULOSKELETAL: No muscle tenderness, muscle tone normal, no joint tenderness, no joint swelling; FROM in all extremities  SKIN: No rash, no lesion    A/P     1. Abdominal pain, stoma pain due to stomal prolapse, reducible without active bleeding   h/o Colon cancer with liver mets S/P stoma placement   - As per colorectal consult note, no surgical intervention at this time , c/w pain management  ,  Antiemetics for nausea     2. Hyponatremia, acute kidney injury due nausea and poor PO intake   - C/W IVF, IVF changed today to NS20K @ 85cc/hr,  Trend Na 126 -->  132 , Cr 1.46 --> 0.82     3. Hypokalemia -  3.2 this AM,  Replace, add K to IV fluids    4. Sepsis POA, chronic leukocytosis, lactic acidosis -  lactate 2.7 --> 2.0 , resolved S/P IVF,  No definite source of any infection,  WBC 26.44 --> 17 ,    Follow-up blood and urine cultures    5. H/O pulmonary emboli, leg pain -  On Eliquis, doppler LE - neg      Code status: Full code

## 2023-03-16 NOTE — PROGRESS NOTE ADULT - ASSESSMENT
HPI: 52M currently undergoing chemotherapy for colon and liver CA presented to ED from home with worsening stoma pain. Patient with stoma for current treatment of colon cancer. Denies vomiting, CP, or SOB. On Eliquis due to Hx of left lung blood clot. Last chemotherapy treatment on Monday with Dr. Serrano at [facility]. His pain is not controlled with current home medications.    Abdominal pain, stoma pain due to stomal prolapse, reducible without active bleeding  PMHx Colon cancer with liver mets S/P stoma placement  - As per colorectal consult note, no surgical intervention at this time  - Morphine IVP q6h and oxycodone PO q6h for pain management  - Antiemetics for nausea    Hyponatremia, acute kidney injury due nausea and poor PO intake  - C/W IVF, IVF changed today to NS20K @ 85cc/hr  - Trend sodium - 126 on admission, 132 today  - Trend creatinine - 1.46 on admission, 0.82 today    Hypokalemia  - 3.2 this AM  - Replace  - Trend potassium daily  - Will check magnesium and phosphorous    Sepsis POA, chronic leukocytosis, lactic acidosis  - 2.7 on admission, resolved S/P IVF  - No definite source of any infection  - WBC 26.44 on admission, 17.31 today  - Follow-up blood and urine cultures    H/O pulmonary emboli, leg pain  - On Eliquis  - Will order doppler of lower extremities    Code status: Full code HPI: 52M currently undergoing chemotherapy for colon Ca with liver mets admitted on 3/15/23 for  worsening stoma pain. Patient with stoma for current treatment of colon cancer. Denies vomiting, CP, or SOB. On Eliquis due to Hx of left lung blood clot. Last chemotherapy treatment on Monday with Dr. Serrano at [facility]. His pain is not controlled with current home medications.    Abdominal pain, stoma pain due to stomal prolapse, reducible without active bleeding  PMHx Colon cancer with liver mets S/P stoma placement  - As per colorectal consult note, no surgical intervention at this time  - Morphine IVP q6h and oxycodone PO q6h for pain management  - Antiemetics for nausea    Hyponatremia, acute kidney injury due nausea and poor PO intake  - C/W IVF, IVF changed today to NS20K @ 85cc/hr  - Trend sodium - 126 on admission, 132 today  - Trend creatinine - 1.46 on admission, 0.82 today    Hypokalemia  - 3.2 this AM  - Replace, add K to IV fluids  - Trend potassium daily  - Will check magnesium and phosphorous    Sepsis POA, chronic leukocytosis, lactic acidosis  - 2.7 on admission, resolved S/P IVF  - No definite source of any infection  - WBC 26.44 on admission, 17.31 today  - Follow-up blood and urine cultures    H/O pulmonary emboli, leg pain  - On Eliquis  - doppler of lower extremities - neg for DVT    Code status: Full code Rinvoq Counseling: I discussed with the patient the risks of Rinvoq therapy including but not limited to upper respiratory tract infections, shingles, cold sores, bronchitis, nausea, cough, fever, acne, and headache. Live vaccines should be avoided.  This medication has been linked to serious infections; higher rate of mortality; malignancy and lymphoproliferative disorders; major adverse cardiovascular events; thrombosis; thrombocytopenia, anemia, and neutropenia; lipid elevations; liver enzyme elevations; and gastrointestinal perforations.

## 2023-03-17 ENCOUNTER — NON-APPOINTMENT (OUTPATIENT)
Age: 53
End: 2023-03-17

## 2023-03-17 LAB
ALBUMIN SERPL ELPH-MCNC: 3.2 G/DL — LOW (ref 3.3–5)
ALP SERPL-CCNC: 171 U/L — HIGH (ref 40–120)
ALT FLD-CCNC: 19 U/L — SIGNIFICANT CHANGE UP (ref 12–78)
ANION GAP SERPL CALC-SCNC: 4 MMOL/L — LOW (ref 5–17)
AST SERPL-CCNC: 19 U/L — SIGNIFICANT CHANGE UP (ref 15–37)
BILIRUB SERPL-MCNC: 0.2 MG/DL — SIGNIFICANT CHANGE UP (ref 0.2–1.2)
BUN SERPL-MCNC: 10 MG/DL — SIGNIFICANT CHANGE UP (ref 7–23)
CALCIUM SERPL-MCNC: 8.5 MG/DL — SIGNIFICANT CHANGE UP (ref 8.5–10.1)
CHLORIDE SERPL-SCNC: 107 MMOL/L — SIGNIFICANT CHANGE UP (ref 96–108)
CO2 SERPL-SCNC: 23 MMOL/L — SIGNIFICANT CHANGE UP (ref 22–31)
CREAT SERPL-MCNC: 0.67 MG/DL — SIGNIFICANT CHANGE UP (ref 0.5–1.3)
CULTURE RESULTS: SIGNIFICANT CHANGE UP
EGFR: 114 ML/MIN/1.73M2 — SIGNIFICANT CHANGE UP
GLUCOSE SERPL-MCNC: 101 MG/DL — HIGH (ref 70–99)
HCT VFR BLD CALC: 35.4 % — LOW (ref 39–50)
HGB BLD-MCNC: 11.1 G/DL — LOW (ref 13–17)
MCHC RBC-ENTMCNC: 27.3 PG — SIGNIFICANT CHANGE UP (ref 27–34)
MCHC RBC-ENTMCNC: 31.4 GM/DL — LOW (ref 32–36)
MCV RBC AUTO: 87 FL — SIGNIFICANT CHANGE UP (ref 80–100)
PLATELET # BLD AUTO: 135 K/UL — LOW (ref 150–400)
POTASSIUM SERPL-MCNC: 4.2 MMOL/L — SIGNIFICANT CHANGE UP (ref 3.5–5.3)
POTASSIUM SERPL-SCNC: 4.2 MMOL/L — SIGNIFICANT CHANGE UP (ref 3.5–5.3)
PROT SERPL-MCNC: 6.6 GM/DL — SIGNIFICANT CHANGE UP (ref 6–8.3)
RBC # BLD: 4.07 M/UL — LOW (ref 4.2–5.8)
RBC # FLD: 19.7 % — HIGH (ref 10.3–14.5)
SODIUM SERPL-SCNC: 134 MMOL/L — LOW (ref 135–145)
SPECIMEN SOURCE: SIGNIFICANT CHANGE UP
WBC # BLD: 18.99 K/UL — HIGH (ref 3.8–10.5)
WBC # FLD AUTO: 18.99 K/UL — HIGH (ref 3.8–10.5)

## 2023-03-17 PROCEDURE — 99232 SBSQ HOSP IP/OBS MODERATE 35: CPT

## 2023-03-17 RX ORDER — SODIUM CHLORIDE 9 MG/ML
1000 INJECTION INTRAMUSCULAR; INTRAVENOUS; SUBCUTANEOUS
Refills: 0 | Status: DISCONTINUED | OUTPATIENT
Start: 2023-03-17 | End: 2023-03-18

## 2023-03-17 RX ADMIN — MORPHINE SULFATE 4 MILLIGRAM(S): 50 CAPSULE, EXTENDED RELEASE ORAL at 07:09

## 2023-03-17 RX ADMIN — MORPHINE SULFATE 4 MILLIGRAM(S): 50 CAPSULE, EXTENDED RELEASE ORAL at 13:14

## 2023-03-17 RX ADMIN — MORPHINE SULFATE 4 MILLIGRAM(S): 50 CAPSULE, EXTENDED RELEASE ORAL at 21:51

## 2023-03-17 RX ADMIN — MORPHINE SULFATE 4 MILLIGRAM(S): 50 CAPSULE, EXTENDED RELEASE ORAL at 07:20

## 2023-03-17 RX ADMIN — APIXABAN 5 MILLIGRAM(S): 2.5 TABLET, FILM COATED ORAL at 10:18

## 2023-03-17 RX ADMIN — MORPHINE SULFATE 4 MILLIGRAM(S): 50 CAPSULE, EXTENDED RELEASE ORAL at 15:14

## 2023-03-17 RX ADMIN — DEXTROSE MONOHYDRATE, SODIUM CHLORIDE, AND POTASSIUM CHLORIDE 85 MILLILITER(S): 50; .745; 4.5 INJECTION, SOLUTION INTRAVENOUS at 00:47

## 2023-03-17 RX ADMIN — APIXABAN 5 MILLIGRAM(S): 2.5 TABLET, FILM COATED ORAL at 21:48

## 2023-03-17 RX ADMIN — DEXTROSE MONOHYDRATE, SODIUM CHLORIDE, AND POTASSIUM CHLORIDE 85 MILLILITER(S): 50; .745; 4.5 INJECTION, SOLUTION INTRAVENOUS at 13:13

## 2023-03-17 RX ADMIN — MORPHINE SULFATE 4 MILLIGRAM(S): 50 CAPSULE, EXTENDED RELEASE ORAL at 22:14

## 2023-03-17 RX ADMIN — SODIUM CHLORIDE 75 MILLILITER(S): 9 INJECTION INTRAMUSCULAR; INTRAVENOUS; SUBCUTANEOUS at 18:43

## 2023-03-17 NOTE — CHART NOTE - NSCHARTNOTEFT_GEN_A_CORE
51 y/o Male currently undergoing chemotherapy for colon and liver CA presented to ED from home worsening stoma pain. Patient with stoma for current treatment of colon cancer. Denies vomiting, Chest pain, or SOB. On Eliquis due to Hx of left lung blood clot. Last chemotherapy treatment on Monday with Dr. Serrano at [facility]. His pain is not controlled with current home medications.    Colorectal surgery evaluated patient 2 days ago. As patient cannot have ostomy revision until April due to anticoagulation and chemotherapy treatment, colorectal surgery initially cleared patient for discharge. Today, colorectal surgery was called again for re-evaluation as ostomy prolapse is causing pain and irritation at the stoma site.    Patient is transferred to colorectal surgery service under Dr. Mckeon. Medicine will follow along, appreciate for recommendation

## 2023-03-17 NOTE — PROGRESS NOTE ADULT - ASSESSMENT
HPI: 52M currently undergoing chemotherapy for colon Ca with liver mets admitted on 3/15/23 for  worsening stoma pain. Patient with stoma for current treatment of colon cancer. Denies vomiting, CP, or SOB. On Eliquis due to Hx of left lung blood clot. Last chemotherapy treatment on Monday with Dr. Serrano at [facility]. His pain is not controlled with current home medications.    Abdominal pain, stoma pain due to stomal prolapse, reducible without active bleeding  Metastatic  Colon cancer to the  liver  s/p subtotal colectomy with end ileostomy in May 2022   - As per colorectal consult note, no surgical intervention at this time  - Morphine IVP q6h and oxycodone PO q6h for pain management   - Antiemetics for nausea  - 3/17 - pt taking IV morphine  x2 dose in last 24 h, will discuss with surgery possible surgical intervention  - oncology consult Dr. Burleson primary oncologist currently on chemo     Hyponatremia, acute kidney injury due nausea and poor PO intake, improving   - C/W IVF, IVF changed today to NS20K @ 85cc/hr  - Trend sodium - 126--> 132 --> 136   - Trend creatinine - Creatinine Trend: 0.67<--, 0.82<--, 1.46    Hypokalemia, resolved   - 3.2 this AM--> 4.2   - Trend potassium daily  - Mg and PH wnl     Sepsis POA,  lactic acidosis, sepsis ruled out   chronic leukocytosis due to malignancy   Streptococcus agalactiae (Group B) isolated in Urine culture without evidence of UTI   - lactate 2.7--> 2.0  on admission, resolved S/P IVF  - No definite source of any infection  - WBC 26.44 on admission, 17.31 --> 18   - Follow-up blood and urine cultures    H/O pulmonary emboli, leg pain ruled out DVT   - On Eliquis  - doppler of lower extremities - neg for DVT    Code status: Full code    Dispo - IV fluids, inpatient monitoring, oncology evaluation

## 2023-03-17 NOTE — PROGRESS NOTE ADULT - SUBJECTIVE AND OBJECTIVE BOX
Chief complain: Abdominal pain    HPI: 52M currently undergoing chemotherapy for colon Ca with liver mets admitted on 3/15/23 for  worsening stoma pain.  Patient with stoma for current treatment of colon cancer. Denies vomiting, CP, or SOB. On Eliquis due to Hx of left lung blood clot. Last chemotherapy treatment on Monday with Dr. Serrano at [facility]. His pain is not controlled with current home medications.    Interval history:  3/16 - Patient seen and examined at bedside earlier today, pt. c/o pain at stoma site from rubbing against dressing ring. Denies CP, SOB, cough, constipation, urinary symptoms or other abdominal pain.  3/17 - pt seen and examined, pain at lower part of prolapsed bowel where its rubbing colostomy ring , denies cp, dyspnea, afebrile, tolerating IV fluids    Review of systems - Rest of the review of system are negative except mentioned in HPI    Vital Signs Last 24 Hrs  Vital sings reviewed for last 24 h  T(C): 36.6 (-23 @ 15:59), Max: 36.9 (23 @ 21:04)  T(F): 97.8 (-23 @ 15:59), Max: 98.5 (-16-23 @ 21:04)  HR: 68 (--23 @ 15:59) (68 - 89)  BP: 113/63 (--23 @ 15:59) (101/60 - 113/63)  RR: 18 (-23 @ 15:59) (18 - 18)  SpO2: 100% (23 @ 15:59) (99% - 100%)  Wt(kg): --  Daily     Daily   CAPILLARY BLOOD GLUCOSE          Physical exam:  General : NAD, appear to be of stated age, well-groomed, well-fed  NERVOUS SYSTEM: A&Ox3, non-focal exam: motor strength 5/5 of bilateral upper and lower extremities; DTRs 2+, intact and symmetric  HEAD:  Atraumatic, normocephalic  EYES: EOMI, PERRLA, conjunctiva and sclera clear  HEENT: Moist mucous membranes, supple neck , no JVD  CHEST: Clear to auscultation bilaterally; no adventitious lung sounds noted  HEART: S1S2, regular rate and rhythm; no murmurs, no rubs or gallops  ABDOMEN: Soft, non-tender, non-distended; bowel sounds present; +RLQ ostomy noted, severely prolapsed, beefy and red with green output  GENITOURINARY: Voiding, no suprapubic tenderness  EXTREMITIES: 2+ pulses; no clubbing, cyanosis, or edema  MUSCULOSKELETAL: No muscle tenderness, muscle tone normal, no joint tenderness, no joint swelling; FROM in all extremities  SKIN: No rash, no lesion    Labs radiologic and other test: all reviewed and interpret       134<L>  |  107  |  10  ----------------------------<  101<H>  4.2   |  23  |  0.67    Ca    8.5      17 Mar 2023 06:45  Phos  3.3     03-16  Mg     2.0     -    TPro  6.6  /  Alb  3.2<L>  /  TBili  0.2  /  DBili  x   /  AST  19  /  ALT  19  /  AlkPhos  171<H>                           11.1   18.99 )-----------( 135      ( 17 Mar 2023 06:45 )             35.4     LIVER FUNCTIONS - ( 17 Mar 2023 06:45 )  Alb: 3.2 g/dL / Pro: 6.6 gm/dL / ALK PHOS: 171 U/L / ALT: 19 U/L / AST: 19 U/L / GGT: x                          11.6   17.31 )-----------( 145      ( 16 Mar 2023 06:13 )             35.5     03-16    132<L>  |  101  |  15  ----------------------------<  108<H>  3.2<L>   |  25  |  0.82    Ca    8.2<L>      16 Mar 2023 06:13  Mg     1.9     -15    TPro  6.7  /  Alb  3.2<L>  /  TBili  0.2  /  DBili  x   /  AST  20  /  ALT  19  /  AlkPhos  169<H>  03-16    Urinalysis Basic - ( 15 Mar 2023 16:15 )  Color: Yellow / Appearance: Clear / S.005 / pH: x  Gluc: x / Ketone: Negative  / Bili: Negative / Urobili: Negative   Blood: x / Protein: Negative / Nitrite: Negative   Leuk Esterase: Negative / RBC: x / WBC x   Sq Epi: x / Non Sq Epi: x / Bacteria: x    Culture - Urine (03.15.23 @ 16:15)    Specimen Source: Clean Catch None    Culture Results:   50,000 - 99,000 CFU/mL Streptococcus agalactiae (Group B) isolated  Group B streptococci are susceptible to ampicillin,  penicillin and cefazolin, but may be resistant to  erythromycin and clindamycin.  Recommendations for intrapartum prophylaxis for Group B  streptococci are penicillin or ampicillin.    Culture - Blood (03.15.23 @ 15:37)    Specimen Source: .Blood None    Culture Results:   No growth to date.              Cardiac testing: reviewed  12 Lead ECG (03.15.23 @ 14:33)  Ventricular Rate 105 BPM  Atrial Rate 105 BPM  P-R Interval 158 ms  QRS Duration 82 ms  Q-T Interval 342 ms  QTC Calculation(Bazett) 452 ms  P Axis 76 degrees  R Axis 117 degrees  T Axis 71 degrees  Diagnosis Line Sinus tachycardia  Left posterior fascicular block  Septal infarct (cited on or before 2022)  Abnormal ECG  When compared with ECG of 15-YOUSUF-2023 15:59,  No significant change was found  Confirmed by Palla MD, Lamont ([id number]) on 3/16/2023 7:57:09 AM  < end of copied text >    Radiology: all reviewed  Xray Chest 1 View- PORTABLE-Urgent (03.15.23 @ 13:23)  ACC: [id number] EXAM:  XR CHEST PORTABLE URGENT 1V   ORDERED BY: DEBBIE COY   PROCEDURE DATE:  03/15/2023    INTERPRETATION:  HISTORY: Weakness  TECHNIQUE: A single AP view of the chest was obtained.  COMPARISON: 1/15/2023  FINDINGS:  The cardiac silhouette is normal in size. There is a   right-sided Mediport with tip overlying the superior vena cava. There are   no focal consolidations or pleural effusions. The hilar and mediastinal   structures appear unremarkable. The osseousstructures are intact.  IMPRESSION: Clear lungs.  --- End of Report ---    CT Abdomen and Pelvis w/ IV Cont (03.15.23 @ 13:43)  ACC: [id number] EXAM:  CT ABDOMEN AND PELVIS IC   ORDERED BY: DEBBIE COY   PROCEDURE DATE:  03/15/2023    INTERPRETATION:  CLINICAL INFORMATION: Abdominal pain  COMPARISON: January 15, 2023  CONTRAST/COMPLICATIONS:  IV Contrast: Omnipaque 350  90 cc administered   10 cc discarded  Oral Contrast: NONE  Complications: None reported at time of study completion  PROCEDURE:  CT of the Abdomen and Pelvis was performed.  Sagittal and coronal reformats were performed.  FINDINGS:  LOWER CHEST: Within normal limits.  LIVER: Multiple bilobar calcified masses, overall not significantly   changed, for example:  *  Segment 2 mass measuring 3.9 cm (2; 18), previously 3.6 cm  *  Segment 8 mass measuring 3.3 cm (2; 28), previously 3.1 cm  *  Faintly calcified segment 4 mass measuring 1.7 cm (2; 27), unchanged.  *  Additionally, a 1.4 cm hypodense mass in segment 8 is unchanged (2; 27)  BILE DUCTS: Normal caliber.  GALLBLADDER: Within normal limits.  SPLEEN: Within normal limits.  PANCREAS: Within normal limits.  ADRENALS: Within normal limits.  KIDNEYS/URETERS: Subcentimeter hypodense left renal lesion which is too   small to characterize.  BLADDER: Within normal limits.  REPRODUCTIVE ORGANS: Prostate within normal limits.  BOWEL: Status post subtotal colectomy with rectosigmoid stump and right   lower quadrant ileostomy with marked prolapse. No bowel obstruction or   wall thickening.  PERITONEUM: No ascites or peritoneal tumor.  VESSELS: Atherosclerotic changes.  RETROPERITONEUM/LYMPH NODES: No lymphadenopathy.  ABDOMINAL WALL: Postsurgical changes.  BONES: No lytic or blastic bony lesion.  IMPRESSION:  No acute findings or cause for pain identified.  Marked prolapse of the patient's right lower quadrant ileostomy.  Bilobar hepatic metastases, not significantly changed.      Home Medications:  Eliquis 5 mg oral tablet: 1 tab(s) orally 2 times a day (15 Mar 2023 15:56)  oxyCODONE 5 mg oral tablet: 1 tab(s) orally every 6 hours, As Needed (15 Mar 2023 15:56)    MEDICATIONS  (STANDING):  apixaban 5 milliGRAM(s) Oral every 12 hours  potassium chloride    Tablet ER 20 milliEquivalent(s) Oral every 2 hours  sodium chloride 0.9% with potassium chloride 20 mEq/L 1000 milliLiter(s) (85 mL/Hr) IV Continuous <Continuous>    MEDICATIONS  (PRN):  acetaminophen     Tablet .. 650 milliGRAM(s) Oral every 6 hours PRN Temp greater or equal to 38C (100.4F), Mild Pain (1 - 3)  aluminum hydroxide/magnesium hydroxide/simethicone Suspension 30 milliLiter(s) Oral every 4 hours PRN Dyspepsia  melatonin 3 milliGRAM(s) Oral at bedtime PRN Insomnia  morphine  - Injectable 4 milliGRAM(s) IV Push every 6 hours PRN Severe Pain (7 - 10)  ondansetron Injectable 4 milliGRAM(s) IV Push every 8 hours PRN Nausea and/or Vomiting  oxyCODONE    IR 5 milliGRAM(s) Oral every 6 hours PRN Moderate Pain (4 - 6)

## 2023-03-17 NOTE — CONSULT NOTE ADULT - SUBJECTIVE AND OBJECTIVE BOX
Patient is a 52y old  Male who presents with a chief complaint of Abdominal pain (16 Mar 2023 13:17)    HPI:  51 y/o Male currently undergoing chemotherapy for colon and liver CA presented to ED from home worsening stoma pain. Patient with stoma for current treatment of colon cancer. Denies vomiting, Chest pain, or SOB. On Eliquis due to Hx of left lung blood clot. Last chemotherapy treatment on Monday with Dr. Serrano at [facility]. His pain is not controlled with current home medications.       PMH: as above  PSH: as above  Meds: per reconciliation sheet, noted below  MEDICATIONS  (STANDING):  apixaban 5 milliGRAM(s) Oral every 12 hours  sodium chloride 0.9% with potassium chloride 20 mEq/L 1000 milliLiter(s) (85 mL/Hr) IV Continuous <Continuous>    Allergies    [This allergen will not trigger allergy alert] No Known Drug Allergies (Unknown)    Intolerances      Social: no smoking, no alcohol, no illegal drugs; no recent travel, no exposure to TB  FAMILY HISTORY:  No pertinent family history in first degree relatives       no history of premature cardiovascular disease in first degree relatives    ROS: the patient denies fever, no chills, no HA, no dizziness, no sore throat, no blurry vision, no CP, no palpitations, no SOB, no cough, no abdominal pain, no diarrhea, no N/V, no dysuria, no leg pain, no claudication, no rash, no joint aches, no rectal pain or bleeding, no night sweats    All other systems reviewed and are negative    Vital Signs Last 24 Hrs  T(C): 36.3 (17 Mar 2023 09:15), Max: 36.9 (16 Mar 2023 21:04)  T(F): 97.3 (17 Mar 2023 09:15), Max: 98.5 (16 Mar 2023 21:04)  HR: 89 (17 Mar 2023 09:15) (69 - 92)  BP: 111/67 (17 Mar 2023 09:15) (101/60 - 138/72)  BP(mean): --  RR: 18 (17 Mar 2023 09:15) (18 - 18)  SpO2: 100% (17 Mar 2023 09:15) (99% - 100%)    Parameters below as of 17 Mar 2023 09:15  Patient On (Oxygen Delivery Method): room air      Daily     Daily     PE:  Constitutional: frail looking  HEENT: NC/AT, EOMI, PERRLA, conjunctivae clear; ears and nose atraumatic; pharynx benign  Neck: supple; thyroid not palpable  Back: no tenderness  Respiratory: respiratory effort normal; clear to auscultation  Cardiovascular: S1S2 regular, no murmurs  Abdomen: soft, not tender, not distended, positive BS; liver and spleen WNL  Genitourinary: no suprapubic tenderness  Lymphatic: no LN palpable  Musculoskeletal: no muscle tenderness, no joint swelling or tenderness  Extremities: no pedal edema  Neurological/ Psychiatric: AxOx3, Judgement and insight normal;  moving all extremities  Skin: no rashes; no palpable lesions    Labs: all available labs reviewed                         )-----------( 135      ( 17 Mar 2023 06:45 )             35.4     03-17    134<L>  |  107  |  10  ----------------------------<  101<H>  4.2   |  23  |  0.67    Ca    8.5      17 Mar 2023 06:45  Phos  3.3     03-16  Mg     2.0     03-16    TPro  6.6  /  Alb  3.2<L>  /  TBili  0.2  /  DBili  x   /  AST  19  /  ALT  19  /  AlkPhos  171<H>  03-17     LIVER FUNCTIONS - ( 17 Mar 2023 06:45 )  Alb: 3.2 g/dL / Pro: 6.6 gm/dL / ALK PHOS: 171 U/L / ALT: 19 U/L / AST: 19 U/L / GGT: x           Urinalysis Basic - ( 15 Mar 2023 16:15 )    Color: Yellow / Appearance: Clear / S.005 / pH: x  Gluc: x / Ketone: Negative  / Bili: Negative / Urobili: Negative   Blood: x / Protein: Negative / Nitrite: Negative   Leuk Esterase: Negative / RBC: x / WBC x   Sq Epi: x / Non Sq Epi: x / Bacteria: x          Radiology: all available radiological tests reviewed  < from: US Duplex Venous Lower Ext Complete, Bilateral (23 @ 16:11) >  ACC: [id number] EXAM:  US DPLX LWR EXT VEINS COMPL BI   ORDERED BY: OLIVIER RIDDLE     PROCEDURE DATE:  2023          INTERPRETATION:  CLINICAL INFORMATION: Leg pain, rule out DVT    COMPARISON: None available.    TECHNIQUE: Duplex sonography of the BILATERAL LOWER extremity veins with   color and spectral Doppler, with and without compression.    FINDINGS:    RIGHT:  Normal compressibility of the RIGHT common femoral, femoral and popliteal   veins.  Doppler examination shows normal spontaneous and phasic flow.  No RIGHT calf vein thrombosis is detected.    LEFT:  Normal compressibility of the LEFT common femoral, femoral and popliteal   veins.  Doppler examination shows normal spontaneous and phasic flow.  No LEFT calf vein thrombosis is detected.    IMPRESSION:  No evidence of deep venous thrombosis in either lower extremity.          < end of copied text >  < from: CT Abdomen and Pelvis w/ IV Cont (03.15.23 @ 13:43) >    ACC: [id number] EXAM:  CT ABDOMEN AND PELVIS IC   ORDERED BY: DEBBIE COY     PROCEDURE DATE:  03/15/2023          INTERPRETATION:  CLINICAL INFORMATION: Abdominal pain    COMPARISON: January 15, 2023    CONTRAST/COMPLICATIONS:  IV Contrast: Omnipaque 350  90 cc administered   10 cc discarded  Oral Contrast: NONE  Complications: None reported at time of study completion    PROCEDURE:  CT of the Abdomen and Pelvis was performed.  Sagittal and coronal reformats were performed.    FINDINGS:  LOWER CHEST: Within normal limits.    LIVER: Multiple bilobar calcified masses, overall not significantly   changed, for example:  *  Segment 2 mass measuring 3.9 cm (2; 18), previously 3.6 cm  *  Segment 8 mass measuring 3.3 cm (2; 28), previously 3.1 cm  *  Faintly calcified segment 4 mass measuring 1.7 cm (2; 27), unchanged.  *  Additionally, a 1.4 cm hypodense mass in segment 8 is unchanged (2; 27)    BILE DUCTS: Normal caliber.  GALLBLADDER: Within normal limits.  SPLEEN: Within normal limits.  PANCREAS: Within normal limits.  ADRENALS: Within normal limits.  KIDNEYS/URETERS: Subcentimeter hypodense left renal lesion which is too   small to characterize.    BLADDER: Within normal limits.  REPRODUCTIVE ORGANS: Prostate within normal limits.    BOWEL: Status post subtotal colectomy with rectosigmoid stump and right   lower quadrant ileostomy with marked prolapse. No bowel obstruction or   wall thickening.  PERITONEUM: No ascites or peritoneal tumor.  VESSELS: Atherosclerotic changes.  RETROPERITONEUM/LYMPH NODES: No lymphadenopathy.  ABDOMINAL WALL: Postsurgical changes.  BONES: No lytic or blastic bony lesion.    IMPRESSION:  No acute findings or cause for pain identified.  Marked prolapse of the patient's right lower quadrant ileostomy.  Bilobar hepatic metastases, not significantly changed.        Advanced directives addressed: full resuscitation Patient is a 52y old  Male who presents with a chief complaint of Abdominal pain (16 Mar 2023 13:17)    HPI:  51 y/o Male currently undergoing chemotherapy for colon and liver CA presented to ED from home worsening stoma pain. Patient with stoma for current treatment of colon cancer. Denies vomiting, Chest pain, or SOB. On Eliquis due to Hx of left lung blood clot. Last chemotherapy treatment on Monday with Dr. Serrano at [facility]. His pain is not controlled with current home medications. UA (-), Urine cx growing GBS, has no dysuria or urinary sxs.       PMH: as above  PSH: as above  Meds: per reconciliation sheet, noted below  MEDICATIONS  (STANDING):  apixaban 5 milliGRAM(s) Oral every 12 hours  sodium chloride 0.9% with potassium chloride 20 mEq/L 1000 milliLiter(s) (85 mL/Hr) IV Continuous <Continuous>    Allergies    [This allergen will not trigger allergy alert] No Known Drug Allergies (Unknown)    Intolerances      Social: no smoking, no alcohol, no illegal drugs; no recent travel, no exposure to TB  FAMILY HISTORY:  No pertinent family history in first degree relatives       no history of premature cardiovascular disease in first degree relatives    ROS: the patient denies fever, no chills, no HA, no dizziness, no sore throat, no blurry vision, no CP, no palpitations, no SOB, no cough, no abdominal pain, no diarrhea, no N/V, no dysuria, no leg pain, no claudication, no rash, no joint aches, no rectal pain or bleeding, no night sweats    All other systems reviewed and are negative    Vital Signs Last 24 Hrs  T(C): 36.3 (17 Mar 2023 09:15), Max: 36.9 (16 Mar 2023 21:04)  T(F): 97.3 (17 Mar 2023 09:15), Max: 98.5 (16 Mar 2023 21:04)  HR: 89 (17 Mar 2023 09:15) (69 - 92)  BP: 111/67 (17 Mar 2023 09:15) (101/60 - 138/72)  BP(mean): --  RR: 18 (17 Mar 2023 09:15) (18 - 18)  SpO2: 100% (17 Mar 2023 09:15) (99% - 100%)    Parameters below as of 17 Mar 2023 09:15  Patient On (Oxygen Delivery Method): room air      Daily     Daily     PE:  Constitutional: NAD  HEENT: NC/AT, EOMI, PERRLA, conjunctivae clear; ears and nose atraumatic; pharynx benign  Neck: supple; thyroid not palpable  Back: no tenderness  Respiratory: respiratory effort normal; clear to auscultation  Cardiovascular: S1S2 regular, no murmurs  Abdomen: soft, tender, not distended, positive BS; liver and spleen WNL + ostomy  Genitourinary: no suprapubic tenderness  Lymphatic: no LN palpable  Musculoskeletal: no muscle tenderness, no joint swelling or tenderness  Extremities: no pedal edema  Neurological/ Psychiatric: AxOx3, Judgement and insight normal;  moving all extremities  Skin: no rashes; no palpable lesions    Labs: all available labs reviewed                        . )-----------( 135      ( 17 Mar 2023 06:45 )             35.4     03-17    134<L>  |  107  |  10  ----------------------------<  101<H>  4.2   |  23  |  0.67    Ca    8.5      17 Mar 2023 06:45  Phos  3.3     03-16  Mg     2.0     03-16    TPro  6.6  /  Alb  3.2<L>  /  TBili  0.2  /  DBili  x   /  AST  19  /  ALT  19  /  AlkPhos  171<H>  03-17     LIVER FUNCTIONS - ( 17 Mar 2023 06:45 )  Alb: 3.2 g/dL / Pro: 6.6 gm/dL / ALK PHOS: 171 U/L / ALT: 19 U/L / AST: 19 U/L / GGT: x           Urinalysis Basic - ( 15 Mar 2023 16:15 )    Color: Yellow / Appearance: Clear / S.005 / pH: x  Gluc: x / Ketone: Negative  / Bili: Negative / Urobili: Negative   Blood: x / Protein: Negative / Nitrite: Negative   Leuk Esterase: Negative / RBC: x / WBC x   Sq Epi: x / Non Sq Epi: x / Bacteria: x    Culture - Urine (03.15.23 @ 16:15)   Specimen Source: Clean Catch None   Culture Results:   50,000 - 99,000 CFU/mL Streptococcus agalactiae (Group B) isolated  Culture - Blood (03.15.23 @ 15:37)   Specimen Source: .Blood None   Culture Results:   No growth to date.      Radiology: all available radiological tests reviewed  < from: US Duplex Venous Lower Ext Complete, Bilateral (23 @ 16:11) >  ACC: [id number] EXAM:  US DPLX LWR EXT VEINS COMPL BI   ORDERED BY: OLIVIER RIDDLE     PROCEDURE DATE:  2023          INTERPRETATION:  CLINICAL INFORMATION: Leg pain, rule out DVT    COMPARISON: None available.    TECHNIQUE: Duplex sonography of the BILATERAL LOWER extremity veins with   color and spectral Doppler, with and without compression.    FINDINGS:    RIGHT:  Normal compressibility of the RIGHT common femoral, femoral and popliteal   veins.  Doppler examination shows normal spontaneous and phasic flow.  No RIGHT calf vein thrombosis is detected.    LEFT:  Normal compressibility of the LEFT common femoral, femoral and popliteal   veins.  Doppler examination shows normal spontaneous and phasic flow.  No LEFT calf vein thrombosis is detected.    IMPRESSION:  No evidence of deep venous thrombosis in either lower extremity.          < end of copied text >  < from: CT Abdomen and Pelvis w/ IV Cont (03.15.23 @ 13:43) >    ACC: [id number] EXAM:  CT ABDOMEN AND PELVIS IC   ORDERED BY: DEBBIE COY     PROCEDURE DATE:  03/15/2023          INTERPRETATION:  CLINICAL INFORMATION: Abdominal pain    COMPARISON: January 15, 2023    CONTRAST/COMPLICATIONS:  IV Contrast: Omnipaque 350  90 cc administered   10 cc discarded  Oral Contrast: NONE  Complications: None reported at time of study completion    PROCEDURE:  CT of the Abdomen and Pelvis was performed.  Sagittal and coronal reformats were performed.    FINDINGS:  LOWER CHEST: Within normal limits.    LIVER: Multiple bilobar calcified masses, overall not significantly   changed, for example:  *  Segment 2 mass measuring 3.9 cm (2; 18), previously 3.6 cm  *  Segment 8 mass measuring 3.3 cm (2; 28), previously 3.1 cm  *  Faintly calcified segment 4 mass measuring 1.7 cm (2; 27), unchanged.  *  Additionally, a 1.4 cm hypodense mass in segment 8 is unchanged (2; 27)    BILE DUCTS: Normal caliber.  GALLBLADDER: Within normal limits.  SPLEEN: Within normal limits.  PANCREAS: Within normal limits.  ADRENALS: Within normal limits.  KIDNEYS/URETERS: Subcentimeter hypodense left renal lesion which is too   small to characterize.    BLADDER: Within normal limits.  REPRODUCTIVE ORGANS: Prostate within normal limits.    BOWEL: Status post subtotal colectomy with rectosigmoid stump and right   lower quadrant ileostomy with marked prolapse. No bowel obstruction or   wall thickening.  PERITONEUM: No ascites or peritoneal tumor.  VESSELS: Atherosclerotic changes.  RETROPERITONEUM/LYMPH NODES: No lymphadenopathy.  ABDOMINAL WALL: Postsurgical changes.  BONES: No lytic or blastic bony lesion.    IMPRESSION:  No acute findings or cause for pain identified.  Marked prolapse of the patient's right lower quadrant ileostomy.  Bilobar hepatic metastases, not significantly changed.        Advanced directives addressed: full resuscitation

## 2023-03-18 ENCOUNTER — TRANSCRIPTION ENCOUNTER (OUTPATIENT)
Age: 53
End: 2023-03-18

## 2023-03-18 VITALS
RESPIRATION RATE: 18 BRPM | HEART RATE: 74 BPM | OXYGEN SATURATION: 100 % | TEMPERATURE: 97 F | SYSTOLIC BLOOD PRESSURE: 135 MMHG | DIASTOLIC BLOOD PRESSURE: 71 MMHG

## 2023-03-18 LAB
ALBUMIN SERPL ELPH-MCNC: 3.1 G/DL — LOW (ref 3.3–5)
ALP SERPL-CCNC: 176 U/L — HIGH (ref 40–120)
ALT FLD-CCNC: 20 U/L — SIGNIFICANT CHANGE UP (ref 12–78)
ANION GAP SERPL CALC-SCNC: 5 MMOL/L — SIGNIFICANT CHANGE UP (ref 5–17)
AST SERPL-CCNC: 19 U/L — SIGNIFICANT CHANGE UP (ref 15–37)
BASOPHILS # BLD AUTO: 0.09 K/UL — SIGNIFICANT CHANGE UP (ref 0–0.2)
BASOPHILS NFR BLD AUTO: 0.5 % — SIGNIFICANT CHANGE UP (ref 0–2)
BILIRUB SERPL-MCNC: 0.2 MG/DL — SIGNIFICANT CHANGE UP (ref 0.2–1.2)
BUN SERPL-MCNC: 8 MG/DL — SIGNIFICANT CHANGE UP (ref 7–23)
CALCIUM SERPL-MCNC: 8.7 MG/DL — SIGNIFICANT CHANGE UP (ref 8.5–10.1)
CHLORIDE SERPL-SCNC: 111 MMOL/L — HIGH (ref 96–108)
CO2 SERPL-SCNC: 22 MMOL/L — SIGNIFICANT CHANGE UP (ref 22–31)
CREAT SERPL-MCNC: 0.66 MG/DL — SIGNIFICANT CHANGE UP (ref 0.5–1.3)
EGFR: 114 ML/MIN/1.73M2 — SIGNIFICANT CHANGE UP
EOSINOPHIL # BLD AUTO: 0.33 K/UL — SIGNIFICANT CHANGE UP (ref 0–0.5)
EOSINOPHIL NFR BLD AUTO: 1.7 % — SIGNIFICANT CHANGE UP (ref 0–6)
GLUCOSE SERPL-MCNC: 95 MG/DL — SIGNIFICANT CHANGE UP (ref 70–99)
HCT VFR BLD CALC: 39.4 % — SIGNIFICANT CHANGE UP (ref 39–50)
HGB BLD-MCNC: 12.1 G/DL — LOW (ref 13–17)
IMM GRANULOCYTES NFR BLD AUTO: 1.2 % — HIGH (ref 0–0.9)
LYMPHOCYTES # BLD AUTO: 1.9 K/UL — SIGNIFICANT CHANGE UP (ref 1–3.3)
LYMPHOCYTES # BLD AUTO: 9.9 % — LOW (ref 13–44)
MAGNESIUM SERPL-MCNC: 1.9 MG/DL — SIGNIFICANT CHANGE UP (ref 1.6–2.6)
MCHC RBC-ENTMCNC: 27.1 PG — SIGNIFICANT CHANGE UP (ref 27–34)
MCHC RBC-ENTMCNC: 30.7 GM/DL — LOW (ref 32–36)
MCV RBC AUTO: 88.1 FL — SIGNIFICANT CHANGE UP (ref 80–100)
MONOCYTES # BLD AUTO: 1.03 K/UL — HIGH (ref 0–0.9)
MONOCYTES NFR BLD AUTO: 5.3 % — SIGNIFICANT CHANGE UP (ref 2–14)
NEUTROPHILS # BLD AUTO: 15.68 K/UL — HIGH (ref 1.8–7.4)
NEUTROPHILS NFR BLD AUTO: 81.4 % — HIGH (ref 43–77)
PHOSPHATE SERPL-MCNC: 2.9 MG/DL — SIGNIFICANT CHANGE UP (ref 2.5–4.5)
PLATELET # BLD AUTO: 151 K/UL — SIGNIFICANT CHANGE UP (ref 150–400)
POTASSIUM SERPL-MCNC: 4.4 MMOL/L — SIGNIFICANT CHANGE UP (ref 3.5–5.3)
POTASSIUM SERPL-SCNC: 4.4 MMOL/L — SIGNIFICANT CHANGE UP (ref 3.5–5.3)
PROT SERPL-MCNC: 6.8 GM/DL — SIGNIFICANT CHANGE UP (ref 6–8.3)
RBC # BLD: 4.47 M/UL — SIGNIFICANT CHANGE UP (ref 4.2–5.8)
RBC # FLD: 19.9 % — HIGH (ref 10.3–14.5)
SODIUM SERPL-SCNC: 138 MMOL/L — SIGNIFICANT CHANGE UP (ref 135–145)
WBC # BLD: 19.26 K/UL — HIGH (ref 3.8–10.5)
WBC # FLD AUTO: 19.26 K/UL — HIGH (ref 3.8–10.5)

## 2023-03-18 PROCEDURE — 99232 SBSQ HOSP IP/OBS MODERATE 35: CPT

## 2023-03-18 RX ADMIN — MORPHINE SULFATE 4 MILLIGRAM(S): 50 CAPSULE, EXTENDED RELEASE ORAL at 08:18

## 2023-03-18 RX ADMIN — MORPHINE SULFATE 4 MILLIGRAM(S): 50 CAPSULE, EXTENDED RELEASE ORAL at 08:48

## 2023-03-18 RX ADMIN — SODIUM CHLORIDE 75 MILLILITER(S): 9 INJECTION INTRAMUSCULAR; INTRAVENOUS; SUBCUTANEOUS at 05:36

## 2023-03-18 RX ADMIN — APIXABAN 5 MILLIGRAM(S): 2.5 TABLET, FILM COATED ORAL at 09:33

## 2023-03-18 NOTE — PROGRESS NOTE ADULT - SUBJECTIVE AND OBJECTIVE BOX
Chief complain: Abdominal pain    HPI: 52M currently undergoing chemotherapy for colon Ca with liver mets admitted on 3/15/23 for  worsening stoma pain.  Patient with stoma for current treatment of colon cancer. Denies vomiting, CP, or SOB. On Eliquis due to Hx of left lung blood clot. Last chemotherapy treatment on Monday with Dr. Serrano at [facility] in [place]. His pain is not controlled with current home medications.    Interval history:  3/16 - Patient seen and examined at bedside earlier today, pt. c/o pain at stoma site from rubbing against dressing ring. Denies CP, SOB, cough, constipation, urinary symptoms or other abdominal pain.  3/17 - pt seen and examined, pain at lower part of prolapsed bowel where its rubbing colostomy ring , denies cp, dyspnea, afebrile, tolerating IV fluids  3/18 - pt seen and examined, pain in the stoma persist, afebrile, denies cp, + occasional cough, denies nausea, vomiting, tolerating po intake    Review of systems - Rest of the review of system are negative except mentioned in HPI    Vital Signs Last 24 Hrs  Vital sings reviewed for last 24 h  T(C): 36.3 (23 @ 09:05), Max: 36.3 (23 @ 21:19)  T(F): 97.4 (23 @ 09:05), Max: 97.4 (23 @ 21:19)  HR: 74 (23 @ 09:05) (74 - 75)  BP: 135/71 (23 @ 09:05) (111/72 - 135/71)  RR: 18 (23 @ 09:05) (18 - 18)  SpO2: 100% (23 @ 09:05) (97% - 100%)  Wt(kg): --  Daily     Daily   CAPILLARY BLOOD GLUCOSE            Physical exam:  General : NAD, appear to be of stated age, well-groomed, well-fed  NERVOUS SYSTEM: A&Ox3, non-focal exam: motor strength 5/5 of bilateral upper and lower extremities; DTRs 2+, intact and symmetric  HEAD:  Atraumatic, normocephalic  EYES: EOMI, PERRLA, conjunctiva and sclera clear  HEENT: Moist mucous membranes, supple neck , no JVD  CHEST: Clear to auscultation bilaterally; no adventitious lung sounds noted  HEART: S1S2, regular rate and rhythm; no murmurs, no rubs or gallops  ABDOMEN: Soft, non-tender, non-distended; bowel sounds present; +RLQ ostomy noted, severely prolapsed, beefy and red with green output  GENITOURINARY: Voiding, no suprapubic tenderness  EXTREMITIES: 2+ pulses; no clubbing, cyanosis, or edema  MUSCULOSKELETAL: No muscle tenderness, muscle tone normal, no joint tenderness, no joint swelling; FROM in all extremities  SKIN: No rash, no lesion    Labs radiologic and other test: all reviewed and interpret       138  |  111<H>  |  8   ----------------------------<  95  4.4   |  22  |  0.66    Ca    8.7      18 Mar 2023 07:26  Phos  2.9     03-18  Mg     1.9     -18    TPro  6.8  /  Alb  3.1<L>  /  TBili  0.2  /  DBili  x   /  AST  19  /  ALT  20  /  AlkPhos  176<H>                           12.1   19.26 )-----------( 151      ( 18 Mar 2023 07:26 )             39.4     LIVER FUNCTIONS - ( 18 Mar 2023 07:26 )  Alb: 3.1 g/dL / Pro: 6.8 gm/dL / ALK PHOS: 176 U/L / ALT: 20 U/L / AST: 19 U/L / GGT: x                   134<L>  |  107  |  10  ----------------------------<  101<H>  4.2   |  23  |  0.67    Ca    8.5      17 Mar 2023 06:45  Phos  3.3     03-16  Mg     2.0     03-16    TPro  6.6  /  Alb  3.2<L>  /  TBili  0.2  /  DBili  x   /  AST  19  /  ALT  19  /  AlkPhos  171<H>  03-17                         11.1   18.99 )-----------( 135      ( 17 Mar 2023 06:45 )             35.4     LIVER FUNCTIONS - ( 17 Mar 2023 06:45 )  Alb: 3.2 g/dL / Pro: 6.6 gm/dL / ALK PHOS: 171 U/L / ALT: 19 U/L / AST: 19 U/L / GGT: x                          11.6   17.31 )-----------( 145      ( 16 Mar 2023 06:13 )             35.5     03-16    132<L>  |  101  |  15  ----------------------------<  108<H>  3.2<L>   |  25  |  0.82    Ca    8.2<L>      16 Mar 2023 06:13  Mg     1.9     03-15    TPro  6.7  /  Alb  3.2<L>  /  TBili  0.2  /  DBili  x   /  AST  20  /  ALT  19  /  AlkPhos  169<H>  -    Urinalysis Basic - ( 15 Mar 2023 16:15 )  Color: Yellow / Appearance: Clear / S.005 / pH: x  Gluc: x / Ketone: Negative  / Bili: Negative / Urobili: Negative   Blood: x / Protein: Negative / Nitrite: Negative   Leuk Esterase: Negative / RBC: x / WBC x   Sq Epi: x / Non Sq Epi: x / Bacteria: x    Culture - Urine (03.15.23 @ 16:15)    Specimen Source: Clean Catch None    Culture Results:   50,000 - 99,000 CFU/mL Streptococcus agalactiae (Group B) isolated  Group B streptococci are susceptible to ampicillin,  penicillin and cefazolin, but may be resistant to  erythromycin and clindamycin.  Recommendations for intrapartum prophylaxis for Group B  streptococci are penicillin or ampicillin.    Culture - Blood (03.15.23 @ 15:37)    Specimen Source: .Blood None    Culture Results:   No growth to date.              Cardiac testing: reviewed  12 Lead ECG (03.15.23 @ 14:33)  Ventricular Rate 105 BPM  Atrial Rate 105 BPM  P-R Interval 158 ms  QRS Duration 82 ms  Q-T Interval 342 ms  QTC Calculation(Bazett) 452 ms  P Axis 76 degrees  R Axis 117 degrees  T Axis 71 degrees  Diagnosis Line Sinus tachycardia  Left posterior fascicular block  Septal infarct (cited on or before 2022)  Abnormal ECG  When compared with ECG of 15-YOUSUF-2023 15:59,  No significant change was found  Confirmed by Palla MD, Lamont ([id number]) on 3/16/2023 7:57:09 AM  < end of copied text >    Radiology: all reviewed  Xray Chest 1 View- PORTABLE-Urgent (03.15.23 @ 13:23)  ACC: [id number] EXAM:  XR CHEST PORTABLE URGENT 1V   ORDERED BY: DEBBIE COY   PROCEDURE DATE:  03/15/2023    INTERPRETATION:  HISTORY: Weakness  TECHNIQUE: A single AP view of the chest was obtained.  COMPARISON: 1/15/2023  FINDINGS:  The cardiac silhouette is normal in size. There is a   right-sided Mediport with tip overlying the superior vena cava. There are   no focal consolidations or pleural effusions. The hilar and mediastinal   structures appear unremarkable. The osseousstructures are intact.  IMPRESSION: Clear lungs.  --- End of Report ---    CT Abdomen and Pelvis w/ IV Cont (03.15.23 @ 13:43)  ACC: [id number] EXAM:  CT ABDOMEN AND PELVIS IC   ORDERED BY: DEBBIE COY   PROCEDURE DATE:  03/15/2023    INTERPRETATION:  CLINICAL INFORMATION: Abdominal pain  COMPARISON: January 15, 2023  CONTRAST/COMPLICATIONS:  IV Contrast: Omnipaque 350  90 cc administered   10 cc discarded  Oral Contrast: NONE  Complications: None reported at time of study completion  PROCEDURE:  CT of the Abdomen and Pelvis was performed.  Sagittal and coronal reformats were performed.  FINDINGS:  LOWER CHEST: Within normal limits.  LIVER: Multiple bilobar calcified masses, overall not significantly   changed, for example:  *  Segment 2 mass measuring 3.9 cm (2; 18), previously 3.6 cm  *  Segment 8 mass measuring 3.3 cm (2; 28), previously 3.1 cm  *  Faintly calcified segment 4 mass measuring 1.7 cm (2; 27), unchanged.  *  Additionally, a 1.4 cm hypodense mass in segment 8 is unchanged (2; 27)  BILE DUCTS: Normal caliber.  GALLBLADDER: Within normal limits.  SPLEEN: Within normal limits.  PANCREAS: Within normal limits.  ADRENALS: Within normal limits.  KIDNEYS/URETERS: Subcentimeter hypodense left renal lesion which is too   small to characterize.  BLADDER: Within normal limits.  REPRODUCTIVE ORGANS: Prostate within normal limits.  BOWEL: Status post subtotal colectomy with rectosigmoid stump and right   lower quadrant ileostomy with marked prolapse. No bowel obstruction or   wall thickening.  PERITONEUM: No ascites or peritoneal tumor.  VESSELS: Atherosclerotic changes.  RETROPERITONEUM/LYMPH NODES: No lymphadenopathy.  ABDOMINAL WALL: Postsurgical changes.  BONES: No lytic or blastic bony lesion.  IMPRESSION:  No acute findings or cause for pain identified.  Marked prolapse of the patient's right lower quadrant ileostomy.  Bilobar hepatic metastases, not significantly changed.      Home Medications:  Eliquis 5 mg oral tablet: 1 tab(s) orally 2 times a day (15 Mar 2023 15:56)  oxyCODONE 5 mg oral tablet: 1 tab(s) orally every 6 hours, As Needed (15 Mar 2023 15:56)    MEDICATIONS  (STANDING):  apixaban 5 milliGRAM(s) Oral every 12 hours  potassium chloride    Tablet ER 20 milliEquivalent(s) Oral every 2 hours  sodium chloride 0.9% with potassium chloride 20 mEq/L 1000 milliLiter(s) (85 mL/Hr) IV Continuous <Continuous>    MEDICATIONS  (PRN):  acetaminophen     Tablet .. 650 milliGRAM(s) Oral every 6 hours PRN Temp greater or equal to 38C (100.4F), Mild Pain (1 - 3)  aluminum hydroxide/magnesium hydroxide/simethicone Suspension 30 milliLiter(s) Oral every 4 hours PRN Dyspepsia  melatonin 3 milliGRAM(s) Oral at bedtime PRN Insomnia  morphine  - Injectable 4 milliGRAM(s) IV Push every 6 hours PRN Severe Pain (7 - 10)  ondansetron Injectable 4 milliGRAM(s) IV Push every 8 hours PRN Nausea and/or Vomiting  oxyCODONE    IR 5 milliGRAM(s) Oral every 6 hours PRN Moderate Pain (4 - 6)

## 2023-03-18 NOTE — PROGRESS NOTE ADULT - ATTENDING COMMENTS
Patient seen and examined at bedside. Patient is tolerating a diet and having ostomy function. Abdomen is soft, NT, ND, stoma prolapse easily reducible and also reduces spontaneously when lying flat. Plan for Surgical revision 1 month from last Avastin dose which will be another 3 weeks.    Vital Signs Last 24 Hrs  T(C): 36.3 (18 Mar 2023 09:05), Max: 36.6 (17 Mar 2023 15:59)  T(F): 97.4 (18 Mar 2023 09:05), Max: 97.8 (17 Mar 2023 15:59)  HR: 74 (18 Mar 2023 09:05) (68 - 75)  BP: 135/71 (18 Mar 2023 09:05) (111/72 - 135/71)  BP(mean): --  RR: 18 (18 Mar 2023 09:05) (18 - 18)  SpO2: 100% (18 Mar 2023 09:05) (97% - 100%)    Parameters below as of 18 Mar 2023 09:05  Patient On (Oxygen Delivery Method): room air                          12.1   19.26 )-----------( 151      ( 18 Mar 2023 07:26 )             39.4     Leukocytosis likely related to tumor burden  Local stoma care  Binder for support and to prevent prolapse while up during the day  Close follow up as an outpatient with plans for surgical revision in April  Patient knows to call the office with any questions or concerns

## 2023-03-18 NOTE — DISCHARGE NOTE NURSING/CASE MANAGEMENT/SOCIAL WORK - NSDCPEFALRISK_GEN_ALL_CORE
For information on Fall & Injury Prevention, visit: [email or web address] OR  [email or web address] OR  https://www.cdc.gov/steadi/patient.html

## 2023-03-18 NOTE — PROGRESS NOTE ADULT - ASSESSMENT
52M currently undergoing chemotherapy for colon with liver mets, now with ileostomy prolapse, ulceration and bleeding. On Eliquis for left pulmonary embolus. On Avastin which he last received on 3/6.  Initially admitted to hospitalist but now transferred to colorectal surgery/  ID saw him yesterday for leukocytosis and + UA, but Dr. Reid doesnt think it’s a UTI and Zosyn was discontinued. This morning he feels well with stable prolapse, slightly worse during ambulation. Output: 1L. VSS. WBC 19, H/H 12/39, chem normal.    Plan:  trend WBC  pain control  will be scheduled either April 11 or April 18 for stoma revision    Discussed with Dr. Hubbard.

## 2023-03-18 NOTE — DISCHARGE NOTE NURSING/CASE MANAGEMENT/SOCIAL WORK - PATIENT PORTAL LINK FT
You can access the FollowMyHealth Patient Portal offered by [facility] by registering at the following website: [email or web address]. By joining [employer]’s FollowMyHealth portal, you will also be able to view your health information using other applications (apps) compatible with our system.

## 2023-03-18 NOTE — PROGRESS NOTE ADULT - SUBJECTIVE AND OBJECTIVE BOX
SURGERY DAILY PROGRESS NOTE:     Subjective:  Patient seen and examined at bedside during am rounds. This morning he feels well with stable prolapse, slightly worse during ambulation. Output: 1L. VSS. WBC 19, H/H 12/39, chem normal.  Denies any fevers, chills, n/v/d, chest pain or shortness of breath    Objective:  Vital Signs Last 24 Hrs  T(C): 36.3 (18 Mar 2023 09:05), Max: 36.6 (17 Mar 2023 15:59)  T(F): 97.4 (18 Mar 2023 09:05), Max: 97.8 (17 Mar 2023 15:59)  HR: 74 (18 Mar 2023 09:05) (68 - 75)  BP: 135/71 (18 Mar 2023 09:05) (111/72 - 135/71)  BP(mean): --  RR: 18 (18 Mar 2023 09:05) (18 - 18)  SpO2: 100% (18 Mar 2023 09:05) (97% - 100%)    Parameters below as of 18 Mar 2023 09:05  Patient On (Oxygen Delivery Method): room air        I&O's Detail    17 Mar 2023 07:01  -  18 Mar 2023 07:00  --------------------------------------------------------  IN:  Total IN: 0 mL    OUT:    Ileostomy (mL): 975 mL    Voided (mL): 290 mL  Total OUT: 1265 mL    Total NET: -1265 mL        PHYSICAL EXAM   Gen: well-appearing, in no acute distress  CV: pulse regularly present   Resp: airway patent, non-labored breathing  Abd: soft, NTND; no rebound or guarding. ileostomy with stable prolapse, slightly worse during ambulation, with medial ulceration, no active bleeding      MEDICATIONS  (STANDING):  apixaban 5 milliGRAM(s) Oral every 12 hours  sodium chloride 0.9%. 1000 milliLiter(s) (75 mL/Hr) IV Continuous <Continuous>    MEDICATIONS  (PRN):  acetaminophen     Tablet .. 650 milliGRAM(s) Oral every 6 hours PRN Temp greater or equal to 38C (100.4F), Mild Pain (1 - 3)  aluminum hydroxide/magnesium hydroxide/simethicone Suspension 30 milliLiter(s) Oral every 4 hours PRN Dyspepsia  melatonin 3 milliGRAM(s) Oral at bedtime PRN Insomnia  morphine  - Injectable 4 milliGRAM(s) IV Push every 6 hours PRN Severe Pain (7 - 10)  ondansetron Injectable 4 milliGRAM(s) IV Push every 8 hours PRN Nausea and/or Vomiting  oxyCODONE    IR 5 milliGRAM(s) Oral every 6 hours PRN Moderate Pain (4 - 6)        LABS:                        12.1   19.26 )-----------( 151      ( 18 Mar 2023 07:26 )             39.4     03-18    138  |  111<H>  |  8   ----------------------------<  95  4.4   |  22  |  0.66    Ca    8.7      18 Mar 2023 07:26  Phos  2.9     03-18  Mg     1.9     03-18    TPro  6.8  /  Alb  3.1<L>  /  TBili  0.2  /  DBili  x   /  AST  19  /  ALT  20  /  AlkPhos  176<H>  03-18

## 2023-03-18 NOTE — DISCHARGE NOTE PROVIDER - NSDCMRMEDTOKEN_GEN_ALL_CORE_FT
Eliquis 5 mg oral tablet: 1 tab(s) orally 2 times a day  oxyCODONE 5 mg oral tablet: 1 tab(s) orally every 6 hours, As Needed

## 2023-03-18 NOTE — DISCHARGE NOTE PROVIDER - CARE PROVIDER_API CALL
Radha Mckeon)  ColonRectal Surgery; Surgery  [address] [place] [address]  Phone: [phone]  Fax: [phone]  Follow Up Time: 2 weeks

## 2023-03-18 NOTE — DISCHARGE NOTE PROVIDER - NSDCFUSCHEDAPPT_GEN_ALL_CORE_FT
[facility]  [place] [facility]  Scheduled Appointment: 04/03/2023    Wanda Serrano  [facility]  [place] [facility]  Scheduled Appointment: 04/03/2023    [facility]  Scheduled Appointment: 04/05/2023    Palla, Venugopal R  [facility]  CARDIOLOGY [address]  Scheduled Appointment: 06/12/2023

## 2023-03-18 NOTE — PROGRESS NOTE ADULT - ASSESSMENT
HPI: 52M currently undergoing chemotherapy for colon Ca with liver mets admitted on 3/15/23 for  worsening stoma pain. Patient with stoma for current treatment of colon cancer. Denies vomiting, CP, or SOB. On Eliquis due to Hx of left lung blood clot. Last chemotherapy treatment on Monday with Dr. Serrano at [facility]. His pain is not controlled with current home medications.    Abdominal pain, stoma pain due to stomal prolapse, reducible without active bleeding  Metastatic  Colon cancer to the  liver  s/p subtotal colectomy with end ileostomy in May 2022   - As per colorectal consult note, no surgical intervention at this time  - Morphine IVP q6h and oxycodone PO q6h for pain management   - Antiemetics for nausea  - 3/17 - pt taking IV morphine  x2 dose in last 24 h, will discuss with surgery possible surgical intervention  - 3/18 - pain improved , s/p IV morphine in am today, plan as per surgical team  - oncology consult Dr. Burleson primary oncologist currently on chemo     Hyponatremia, acute kidney injury due nausea and poor PO intake, improving   - C/W IVF, IVF changed today to NS20K @ 85cc/hr  - Trend sodium - 126--> 132 --> 136 --> 138  - Trend creatinine - Creatinine Trend: 0.67<--, 0.82<--, 1.46    Hypokalemia, resolved   - 3.2 this AM--> 4.2   - Trend potassium daily  - Mg and PH wnl     Sepsis POA,  lactic acidosis, sepsis ruled out   chronic leukocytosis due to malignancy   Streptococcus agalactiae (Group B) isolated in Urine culture without evidence of UTI   - lactate 2.7--> 2.0  on admission, resolved S/P IVF  - No definite source of any infection  - WBC 26.44 on admission, 17.31 --> 18   - Follow-up blood and urine cultures    H/O pulmonary emboli, leg pain ruled out DVT   - On Eliquis  - doppler of lower extremities - neg for DVT    Code status: Full code    Dispo - as per primary team , surgery

## 2023-03-18 NOTE — CONSULT NOTE ADULT - ASSESSMENT
51 yo M with history of end ileostomy and subtotal colectomy. Known stomal prolapse, reducible without active bleeding.    Plan:  -ileostomy is currently functional and given that the patient is on Eliquis and on chemotherapy, surgery for revision needs to be postponed as was explained in the office visit  -no acute surgical intervention is warranted at this time  -continue to monitor ileostomy output    Plan discussed with Dr. Zarate
52 year old male with Stage IV colon CA w ileostomy w mets to liver on chemo p/w stoma pain, ileostomy prolapse, ulceration and bleeding. On Eliquis for left pulmonary embolus.       Oncology Hx   Oncologist-Dr. Serrano  Diagnosed with colon cancer in May 2022 at the age of 51. He presented to  ER 5/19/22 with worsening abdominal pain for 4 days, reportedly abdominal pain had been ongoing for 4 years. On 5/19/22 he had a CT A/P showed apparent focal mural thickening at the cecum/proximal ascending colon. The sigmoid colon appears to be tethered to the cecum and it is focally thick-walled; focal tumor invasion/extension from the cecum to the sigmoid colon. Mural thickening of the a dilated right lower quadrant small bowel loop with adjacent mesenteric edema for which associated small bowel ischemia cannot be excluded. Multiple hypodense lesions with calcifications in both lobes of the liver with the largest measuring 5.8 x 6.0 cm in hepatic segment.  5/19/22 he underwent exploratory laparotomy, total colectomy, end ileostomy, biopsy of liver, and biopsy of retroperitoneum. Pathology showed Omentum, excision: Negative for malignancy. Portion of terminal ileum, cecum with adherent sigmoid, total colectomy: Poorly differentiated infiltrating adenocarcinoma measuring 9.0 cm. Adenocarcinoma infiltrates through the bowel wall and through the visceral peritoneum and infiltrates the adherent sigmoid colon 3 of 17 lymph nodes are positive for metastatic carcinoma. Lymphovascular space & Perineural invasion is identified. The surgical margins negative. Appendix with serosal tumor implants and acute inflammation. Peritonitis. Liver, biopsy: Metastatic adenocarcinoma. Retroperitoneal biopsy: Adenocarcinoma with necrosis. No loss of nuclear expression of MMR proteins (MLH1, MSH2, MSH6, and PMS2). Staging pT4b pN1b pM1c.  Started FOLFOXIRI with bevacizumab 6/22/22, last dose of Folfiri and Bevacizumab received on 1/4/2023. S/P  oxaliplatin until cycle 10.    # Metastatic Colon CA metastasis to liver- dx' d in 5/2022, s/p ileostomy     -On Chemo Folfiri & Bevacizumab, most recent dose- 3/6/2023  -Follow up as outpatient upon d/c    # Stoma Pain    -Surgery following-->schedule for stoma revision early April  - Output: 1L.  - WBC 19, likely from tumor burden  - Abdominal binder for support      
51 y/o Male currently undergoing chemotherapy for colon and liver CA presented to ED from home worsening stoma pain. Patient with stoma for current treatment of colon cancer. Denies vomiting, Chest pain, or SOB. On Eliquis due to Hx of left lung blood clot. Last chemotherapy treatment on Monday with Dr. Serrano at [facility]. His pain is not controlled with current home medications. UA (-), Urine cx growing GBS, has no dysuria or urinary sxs.     1. Asymptomatic bacteruria  - urine cx growing 50-99K GBS  - has no urinary sxs  - does not have UTI  - no treatment warranted at this time  - observe off antibiotics  - fu cbc    2. other issues - care per medicine

## 2023-03-18 NOTE — CONSULT NOTE ADULT - SUBJECTIVE AND OBJECTIVE BOX
HPI:  51 y/o Male currently undergoing chemotherapy for colon and liver CA presented to ED from home worsening stoma pain. Patient with stoma for current treatment of colon cancer. Denies vomiting, Chest pain, or SOB. On Eliquis due to Hx of left lung blood clot. Last chemotherapy treatment on Monday with Dr. Serrano at [facility]. His pain is not controlled with current home medications. (15 Mar 2023 22:38)    PAST MEDICAL & SURGICAL HISTORY:  Cancer, colon  liver mets  S/P ileostomy  S/P colon resection    FAMILY HISTORY:  No pertinent family history in first degree relatives      MEDICATIONS  (STANDING):  apixaban 5 milliGRAM(s) Oral every 12 hours  sodium chloride 0.9%. 1000 milliLiter(s) (75 mL/Hr) IV Continuous <Continuous>    MEDICATIONS  (PRN):  acetaminophen     Tablet .. 650 milliGRAM(s) Oral every 6 hours PRN Temp greater or equal to 38C (100.4F), Mild Pain (1 - 3)  aluminum hydroxide/magnesium hydroxide/simethicone Suspension 30 milliLiter(s) Oral every 4 hours PRN Dyspepsia  melatonin 3 milliGRAM(s) Oral at bedtime PRN Insomnia  morphine  - Injectable 4 milliGRAM(s) IV Push every 6 hours PRN Severe Pain (7 - 10)  ondansetron Injectable 4 milliGRAM(s) IV Push every 8 hours PRN Nausea and/or Vomiting  oxyCODONE    IR 5 milliGRAM(s) Oral every 6 hours PRN Moderate Pain (4 - 6)    Allergies    NKDA    Review of Systems    Constitutional, Eyes, ENT, Cardiovascular, Respiratory, Gastrointestinal, Genitourinary, Musculoskeletal, Integumentary, Neurological, Psychiatric, Endocrine, Heme/Lymph and Allergic/Immunologic review of systems are otherwise negative except as noted in HPI.     Vital Signs Last 24 Hrs    T(C): 36.3 (18 Mar 2023 09:05), Max: 36.6 (17 Mar 2023 15:59)  T(F): 97.4 (18 Mar 2023 09:05), Max: 97.8 (17 Mar 2023 15:59)  HR: 74 (18 Mar 2023 09:05) (68 - 75)  BP: 135/71 (18 Mar 2023 09:05) (111/72 - 135/71)  BP(mean): --  RR: 18 (18 Mar 2023 09:05) (18 - 18)  SpO2: 100% (18 Mar 2023 09:05) (97% - 100%)    Parameters below as of 18 Mar 2023 09:05  Patient On (Oxygen Delivery Method): room air    Physical Exam  Eyes: no conjunctival infection, anicteric.   ENT: pharynx is unremarkable, moist mucus membrane, no oral lesions.   Neck: supple without JVD, no thyromegaly or masses appreciated.   Pulmonary: clear to auscultation bilaterally, no dullness, no wheezing.   Cardiac: RRR, normal S1S2, no murmurs, rubs, gallops.   Vascular: no JVD, no calf tenderness, venous stasis changes, varices.   Abdomen: normoactive bowel sounds, soft and nontender, no hepatosplenomegaly or masses appreciated.   Lymphatic: no peripheral adenopathy appreciated.   Musculoskeletal: full range of motion and no deformities appreciated.   Skin: normal appearance, no rash, nodules, vesicles, ulcers, erythema.   Neurology: grossly intact.   Psychiatric: affect appropriate.     LABS:    CBC Full  -  ( 18 Mar 2023 07:26 )  WBC Count : 19.26 K/uL  RBC Count : 4.47 M/uL  Hemoglobin : 12.1 g/dL  Hematocrit : 39.4 %  Platelet Count - Automated : 151 K/uL  Mean Cell Volume : 88.1 fl  Mean Cell Hemoglobin : 27.1 pg  Mean Cell Hemoglobin Concentration : 30.7 gm/dL  Auto Neutrophil # : 15.68 K/uL  Auto Lymphocyte # : 1.90 K/uL  Auto Monocyte # : 1.03 K/uL  Auto Eosinophil # : 0.33 K/uL  Auto Basophil # : 0.09 K/uL  Auto Neutrophil % : 81.4 %  Auto Lymphocyte % : 9.9 %  Auto Monocyte % : 5.3 %  Auto Eosinophil % : 1.7 %  Auto Basophil % : 0.5 %    03-18    138  |  111<H>  |  8   ----------------------------<  95  4.4   |  22  |  0.66    Ca    8.7      18 Mar 2023 07:26  Phos  2.9     03-18  Mg     1.9     03-18    TPro  6.8  /  Alb  3.1<L>  /  TBili  0.2  /  DBili  x   /  AST  19  /  ALT  20  /  AlkPhos  176<H>  03-18    RADIOLOGY & ADDITIONAL STUDIES:    EXAM:  CT ABDOMEN AND PELVIS IC      PROCEDURE DATE:  03/15/2023      INTERPRETATION:  CLINICAL INFORMATION: Abdominal pain    COMPARISON: January 15, 2023    CONTRAST/COMPLICATIONS:  IV Contrast: Omnipaque 350  90 cc administered   10 cc discarded  Oral Contrast: NONE  Complications: None reported at time of study completion    PROCEDURE:  CT of the Abdomen and Pelvis was performed.  Sagittal and coronal reformats were performed.    FINDINGS:  LOWER CHEST: Within normal limits.    LIVER: Multiple bilobar calcified masses, overall not significantly   changed, for example:  *  Segment 2 mass measuring 3.9 cm (2; 18), previously 3.6 cm  *  Segment 8 mass measuring 3.3 cm (2; 28), previously 3.1 cm  *  Faintly calcified segment 4 mass measuring 1.7 cm (2; 27), unchanged.  *  Additionally, a 1.4 cm hypodense mass in segment 8 is unchanged (2; 27)    BILE DUCTS: Normal caliber.  GALLBLADDER: Within normal limits.  SPLEEN: Within normal limits.  PANCREAS: Within normal limits.  ADRENALS: Within normal limits.  KIDNEYS/URETERS: Subcentimeter hypodense left renal lesion which is too   small to characterize.    BLADDER: Within normal limits.  REPRODUCTIVE ORGANS: Prostate within normal limits.    BOWEL: Status post subtotal colectomy with rectosigmoid stump and right   lower quadrant ileostomy with marked prolapse. No bowel obstruction or   wall thickening.  PERITONEUM: No ascites or peritoneal tumor.  VESSELS: Atherosclerotic changes.  RETROPERITONEUM/LYMPH NODES: No lymphadenopathy.  ABDOMINAL WALL: Postsurgical changes.  BONES: No lytic or blastic bony lesion.    IMPRESSION:  No acute findings or cause for pain identified.  Marked prolapse of the patient's right lower quadrant ileostomy.  Bilobar hepatic metastases, not significantly changed.      EXAM:  US DPLX LWR EXT VEINS COMPL BI       PROCEDURE DATE:  03/16/2023    INTERPRETATION:  CLINICAL INFORMATION: Leg pain, rule out DVT    COMPARISON: None available.    TECHNIQUE: Duplex sonography of the BILATERAL LOWER extremity veins with   color and spectral Doppler, with and without compression.    FINDINGS:    RIGHT:  Normal compressibility of the RIGHT common femoral, femoral and popliteal   veins.  Doppler examination shows normal spontaneous and phasic flow.  No RIGHT calf vein thrombosis is detected.    LEFT:  Normal compressibility of the LEFT common femoral, femoral and popliteal   veins.  Doppler examination shows normal spontaneous and phasic flow.  No LEFT calf vein thrombosis is detected.    IMPRESSION:  No evidence of deep venous thrombosis in either lower extremity.                     HPI:  53 y/o Male currently undergoing chemotherapy for colon and liver CA presented to ED from home worsening stoma pain. Patient with stoma for current treatment of colon cancer. Denies vomiting, Chest pain, or SOB. On Eliquis due to Hx of left lung blood clot. Last chemotherapy treatment on Monday with Dr. Serrano at [facility]. His pain is not controlled with current home medications. At the time of my initial assessment, the patient states that his pain is relatively well controlled. He denies fevers, chills, and bleeding.     PAST MEDICAL & SURGICAL HISTORY:  Cancer, colon  liver mets  S/P ileostomy  S/P colon resection    FAMILY HISTORY:  No pertinent family history in first degree relatives      MEDICATIONS  (STANDING):  apixaban 5 milliGRAM(s) Oral every 12 hours  sodium chloride 0.9%. 1000 milliLiter(s) (75 mL/Hr) IV Continuous <Continuous>    MEDICATIONS  (PRN):  acetaminophen     Tablet .. 650 milliGRAM(s) Oral every 6 hours PRN Temp greater or equal to 38C (100.4F), Mild Pain (1 - 3)  aluminum hydroxide/magnesium hydroxide/simethicone Suspension 30 milliLiter(s) Oral every 4 hours PRN Dyspepsia  melatonin 3 milliGRAM(s) Oral at bedtime PRN Insomnia  morphine  - Injectable 4 milliGRAM(s) IV Push every 6 hours PRN Severe Pain (7 - 10)  ondansetron Injectable 4 milliGRAM(s) IV Push every 8 hours PRN Nausea and/or Vomiting  oxyCODONE    IR 5 milliGRAM(s) Oral every 6 hours PRN Moderate Pain (4 - 6)    Allergies    NKDA    Review of Systems    Constitutional, Eyes, ENT, Cardiovascular, Respiratory, Gastrointestinal, Genitourinary, Musculoskeletal, Integumentary, Neurological, Psychiatric, Endocrine, Heme/Lymph and Allergic/Immunologic review of systems are otherwise negative except as noted in HPI.     Vital Signs Last 24 Hrs    T(C): 36.3 (18 Mar 2023 09:05), Max: 36.6 (17 Mar 2023 15:59)  T(F): 97.4 (18 Mar 2023 09:05), Max: 97.8 (17 Mar 2023 15:59)  HR: 74 (18 Mar 2023 09:05) (68 - 75)  BP: 135/71 (18 Mar 2023 09:05) (111/72 - 135/71)  BP(mean): --  RR: 18 (18 Mar 2023 09:05) (18 - 18)  SpO2: 100% (18 Mar 2023 09:05) (97% - 100%)    Parameters below as of 18 Mar 2023 09:05  Patient On (Oxygen Delivery Method): room air    Physical Exam  Eyes: no conjunctival infection, anicteric.   ENT: pharynx is unremarkable, moist mucus membrane, no oral lesions.   Neck: supple without JVD, no thyromegaly or masses appreciated.   Pulmonary: clear to auscultation bilaterally, no dullness, no wheezing.   Cardiac: RRR, normal S1S2, no murmurs, rubs, gallops.   Vascular: no JVD, no calf tenderness, venous stasis changes, varices.   Abdomen: normoactive bowel sounds, soft and nontender, no hepatosplenomegaly or masses appreciated. The patient has a prolapsed colon within the stoma wihtout bleeding.   Lymphatic: no peripheral adenopathy appreciated.   Musculoskeletal: full range of motion and no deformities appreciated.   Skin: normal appearance, no rash, nodules, vesicles, ulcers, erythema.   Neurology: grossly intact.   Psychiatric: affect appropriate.     LABS:    CBC Full  -  ( 18 Mar 2023 07:26 )  WBC Count : 19.26 K/uL  RBC Count : 4.47 M/uL  Hemoglobin : 12.1 g/dL  Hematocrit : 39.4 %  Platelet Count - Automated : 151 K/uL  Mean Cell Volume : 88.1 fl  Mean Cell Hemoglobin : 27.1 pg  Mean Cell Hemoglobin Concentration : 30.7 gm/dL  Auto Neutrophil # : 15.68 K/uL  Auto Lymphocyte # : 1.90 K/uL  Auto Monocyte # : 1.03 K/uL  Auto Eosinophil # : 0.33 K/uL  Auto Basophil # : 0.09 K/uL  Auto Neutrophil % : 81.4 %  Auto Lymphocyte % : 9.9 %  Auto Monocyte % : 5.3 %  Auto Eosinophil % : 1.7 %  Auto Basophil % : 0.5 %    03-18    138  |  111<H>  |  8   ----------------------------<  95  4.4   |  22  |  0.66    Ca    8.7      18 Mar 2023 07:26  Phos  2.9     03-18  Mg     1.9     03-18    TPro  6.8  /  Alb  3.1<L>  /  TBili  0.2  /  DBili  x   /  AST  19  /  ALT  20  /  AlkPhos  176<H>  03-18    RADIOLOGY & ADDITIONAL STUDIES:    EXAM:  CT ABDOMEN AND PELVIS IC      PROCEDURE DATE:  03/15/2023      INTERPRETATION:  CLINICAL INFORMATION: Abdominal pain    COMPARISON: January 15, 2023    CONTRAST/COMPLICATIONS:  IV Contrast: Omnipaque 350  90 cc administered   10 cc discarded  Oral Contrast: NONE  Complications: None reported at time of study completion    PROCEDURE:  CT of the Abdomen and Pelvis was performed.  Sagittal and coronal reformats were performed.    FINDINGS:  LOWER CHEST: Within normal limits.    LIVER: Multiple bilobar calcified masses, overall not significantly   changed, for example:  *  Segment 2 mass measuring 3.9 cm (2; 18), previously 3.6 cm  *  Segment 8 mass measuring 3.3 cm (2; 28), previously 3.1 cm  *  Faintly calcified segment 4 mass measuring 1.7 cm (2; 27), unchanged.  *  Additionally, a 1.4 cm hypodense mass in segment 8 is unchanged (2; 27)    BILE DUCTS: Normal caliber.  GALLBLADDER: Within normal limits.  SPLEEN: Within normal limits.  PANCREAS: Within normal limits.  ADRENALS: Within normal limits.  KIDNEYS/URETERS: Subcentimeter hypodense left renal lesion which is too   small to characterize.    BLADDER: Within normal limits.  REPRODUCTIVE ORGANS: Prostate within normal limits.    BOWEL: Status post subtotal colectomy with rectosigmoid stump and right   lower quadrant ileostomy with marked prolapse. No bowel obstruction or   wall thickening.  PERITONEUM: No ascites or peritoneal tumor.  VESSELS: Atherosclerotic changes.  RETROPERITONEUM/LYMPH NODES: No lymphadenopathy.  ABDOMINAL WALL: Postsurgical changes.  BONES: No lytic or blastic bony lesion.    IMPRESSION:  No acute findings or cause for pain identified.  Marked prolapse of the patient's right lower quadrant ileostomy.  Bilobar hepatic metastases, not significantly changed.      EXAM:  US DPLX LWR EXT VEINS COMPL BI       PROCEDURE DATE:  03/16/2023    INTERPRETATION:  CLINICAL INFORMATION: Leg pain, rule out DVT    COMPARISON: None available.    TECHNIQUE: Duplex sonography of the BILATERAL LOWER extremity veins with   color and spectral Doppler, with and without compression.    FINDINGS:    RIGHT:  Normal compressibility of the RIGHT common femoral, femoral and popliteal   veins.  Doppler examination shows normal spontaneous and phasic flow.  No RIGHT calf vein thrombosis is detected.    LEFT:  Normal compressibility of the LEFT common femoral, femoral and popliteal   veins.  Doppler examination shows normal spontaneous and phasic flow.  No LEFT calf vein thrombosis is detected.    IMPRESSION:  No evidence of deep venous thrombosis in either lower extremity.

## 2023-03-19 RX ORDER — OXYCODONE HYDROCHLORIDE 5 MG/1
1 TABLET ORAL
Qty: 20 | Refills: 0
Start: 2023-03-19 | End: 2023-03-23

## 2023-03-20 ENCOUNTER — APPOINTMENT (OUTPATIENT)
Dept: INFUSION THERAPY | Facility: CLINIC | Age: 53
End: 2023-03-20

## 2023-03-20 LAB
CULTURE RESULTS: SIGNIFICANT CHANGE UP
CULTURE RESULTS: SIGNIFICANT CHANGE UP
SPECIMEN SOURCE: SIGNIFICANT CHANGE UP
SPECIMEN SOURCE: SIGNIFICANT CHANGE UP

## 2023-03-21 ENCOUNTER — RESULT REVIEW (OUTPATIENT)
Age: 53
End: 2023-03-21

## 2023-03-21 ENCOUNTER — APPOINTMENT (OUTPATIENT)
Dept: INFUSION THERAPY | Facility: CLINIC | Age: 53
End: 2023-03-21

## 2023-03-21 LAB
ALBUMIN SERPL ELPH-MCNC: 4.2 G/DL — SIGNIFICANT CHANGE UP (ref 3.3–5)
ALP SERPL-CCNC: 197 U/L — HIGH (ref 40–120)
ALT FLD-CCNC: 16 U/L — SIGNIFICANT CHANGE UP (ref 10–45)
ANION GAP SERPL CALC-SCNC: 13 MMOL/L — SIGNIFICANT CHANGE UP (ref 5–17)
APPEARANCE UR: CLEAR — SIGNIFICANT CHANGE UP
AST SERPL-CCNC: 22 U/L — SIGNIFICANT CHANGE UP (ref 10–40)
BACTERIA # UR AUTO: NEGATIVE — SIGNIFICANT CHANGE UP
BASOPHILS # BLD AUTO: 0.07 K/UL — SIGNIFICANT CHANGE UP (ref 0–0.2)
BASOPHILS NFR BLD AUTO: 0.4 % — SIGNIFICANT CHANGE UP (ref 0–2)
BILIRUB SERPL-MCNC: 0.2 MG/DL — SIGNIFICANT CHANGE UP (ref 0.2–1.2)
BILIRUB UR-MCNC: NEGATIVE — SIGNIFICANT CHANGE UP
BUN SERPL-MCNC: 8 MG/DL — SIGNIFICANT CHANGE UP (ref 7–23)
CALCIUM SERPL-MCNC: 9.4 MG/DL — SIGNIFICANT CHANGE UP (ref 8.4–10.5)
CHLORIDE SERPL-SCNC: 105 MMOL/L — SIGNIFICANT CHANGE UP (ref 96–108)
CO2 SERPL-SCNC: 20 MMOL/L — LOW (ref 22–31)
COLOR SPEC: SIGNIFICANT CHANGE UP
CREAT SERPL-MCNC: 0.77 MG/DL — SIGNIFICANT CHANGE UP (ref 0.5–1.3)
DIFF PNL FLD: NEGATIVE — SIGNIFICANT CHANGE UP
EGFR: 109 ML/MIN/1.73M2 — SIGNIFICANT CHANGE UP
EOSINOPHIL # BLD AUTO: 0.21 K/UL — SIGNIFICANT CHANGE UP (ref 0–0.5)
EOSINOPHIL NFR BLD AUTO: 1.1 % — SIGNIFICANT CHANGE UP (ref 0–6)
EPI CELLS # UR: 2 /HPF — SIGNIFICANT CHANGE UP (ref 0–5)
GLUCOSE SERPL-MCNC: 108 MG/DL — HIGH (ref 70–99)
GLUCOSE UR QL: NEGATIVE — SIGNIFICANT CHANGE UP
HCT VFR BLD CALC: 40.3 % — SIGNIFICANT CHANGE UP (ref 39–50)
HGB BLD-MCNC: 12.7 G/DL — LOW (ref 13–17)
HYALINE CASTS # UR AUTO: 4 /LPF — SIGNIFICANT CHANGE UP (ref 0–7)
IMM GRANULOCYTES NFR BLD AUTO: 1.3 % — HIGH (ref 0–0.9)
KETONES UR-MCNC: NEGATIVE — SIGNIFICANT CHANGE UP
LEUKOCYTE ESTERASE UR-ACNC: ABNORMAL
LYMPHOCYTES # BLD AUTO: 1.58 K/UL — SIGNIFICANT CHANGE UP (ref 1–3.3)
LYMPHOCYTES # BLD AUTO: 8.6 % — LOW (ref 13–44)
MAGNESIUM SERPL-MCNC: 1.6 MG/DL — SIGNIFICANT CHANGE UP (ref 1.6–2.6)
MCHC RBC-ENTMCNC: 27.4 PG — SIGNIFICANT CHANGE UP (ref 27–34)
MCHC RBC-ENTMCNC: 31.5 GM/DL — LOW (ref 32–36)
MCV RBC AUTO: 86.9 FL — SIGNIFICANT CHANGE UP (ref 80–100)
MONOCYTES # BLD AUTO: 0.98 K/UL — HIGH (ref 0–0.9)
MONOCYTES NFR BLD AUTO: 5.4 % — SIGNIFICANT CHANGE UP (ref 2–14)
NEUTROPHILS # BLD AUTO: 15.21 K/UL — HIGH (ref 1.8–7.4)
NEUTROPHILS NFR BLD AUTO: 83.2 % — HIGH (ref 43–77)
NITRITE UR-MCNC: NEGATIVE — SIGNIFICANT CHANGE UP
NRBC # BLD: 0 /100 WBCS — SIGNIFICANT CHANGE UP (ref 0–0)
PH UR: 6.5 — SIGNIFICANT CHANGE UP (ref 5–8)
PLATELET # BLD AUTO: 171 K/UL — SIGNIFICANT CHANGE UP (ref 150–400)
POTASSIUM SERPL-MCNC: 4.8 MMOL/L — SIGNIFICANT CHANGE UP (ref 3.5–5.3)
POTASSIUM SERPL-SCNC: 4.8 MMOL/L — SIGNIFICANT CHANGE UP (ref 3.5–5.3)
PROT SERPL-MCNC: 6.7 G/DL — SIGNIFICANT CHANGE UP (ref 6–8.3)
PROT UR-MCNC: SIGNIFICANT CHANGE UP
RBC # BLD: 4.64 M/UL — SIGNIFICANT CHANGE UP (ref 4.2–5.8)
RBC # FLD: 19.8 % — HIGH (ref 10.3–14.5)
RBC CASTS # UR COMP ASSIST: 1 /HPF — SIGNIFICANT CHANGE UP (ref 0–4)
SODIUM SERPL-SCNC: 138 MMOL/L — SIGNIFICANT CHANGE UP (ref 135–145)
SP GR SPEC: 1.02 — SIGNIFICANT CHANGE UP (ref 1.01–1.02)
UROBILINOGEN FLD QL: SIGNIFICANT CHANGE UP
WBC # BLD: 18.28 K/UL — HIGH (ref 3.8–10.5)
WBC # FLD AUTO: 18.28 K/UL — HIGH (ref 3.8–10.5)
WBC UR QL: 20 /HPF — HIGH (ref 0–5)

## 2023-03-22 ENCOUNTER — APPOINTMENT (OUTPATIENT)
Dept: INFUSION THERAPY | Facility: CLINIC | Age: 53
End: 2023-03-22

## 2023-03-22 DIAGNOSIS — Z86.711 PERSONAL HISTORY OF PULMONARY EMBOLISM: ICD-10-CM

## 2023-03-22 DIAGNOSIS — K94.19 OTHER COMPLICATIONS OF ENTEROSTOMY: ICD-10-CM

## 2023-03-22 DIAGNOSIS — C77.2 SECONDARY AND UNSPECIFIED MALIGNANT NEOPLASM OF INTRA-ABDOMINAL LYMPH NODES: ICD-10-CM

## 2023-03-22 DIAGNOSIS — D72.829 ELEVATED WHITE BLOOD CELL COUNT, UNSPECIFIED: ICD-10-CM

## 2023-03-22 DIAGNOSIS — E87.20 ACIDOSIS, UNSPECIFIED: ICD-10-CM

## 2023-03-22 DIAGNOSIS — K94.11 ENTEROSTOMY HEMORRHAGE: ICD-10-CM

## 2023-03-22 DIAGNOSIS — C18.7 MALIGNANT NEOPLASM OF SIGMOID COLON: ICD-10-CM

## 2023-03-22 DIAGNOSIS — Z53.09 PROCEDURE AND TREATMENT NOT CARRIED OUT BECAUSE OF OTHER CONTRAINDICATION: ICD-10-CM

## 2023-03-22 DIAGNOSIS — E87.1 HYPO-OSMOLALITY AND HYPONATREMIA: ICD-10-CM

## 2023-03-22 DIAGNOSIS — E87.6 HYPOKALEMIA: ICD-10-CM

## 2023-03-22 DIAGNOSIS — R82.71 BACTERIURIA: ICD-10-CM

## 2023-03-22 DIAGNOSIS — C78.7 SECONDARY MALIGNANT NEOPLASM OF LIVER AND INTRAHEPATIC BILE DUCT: ICD-10-CM

## 2023-03-22 DIAGNOSIS — Z79.01 LONG TERM (CURRENT) USE OF ANTICOAGULANTS: ICD-10-CM

## 2023-03-22 DIAGNOSIS — Z79.891 LONG TERM (CURRENT) USE OF OPIATE ANALGESIC: ICD-10-CM

## 2023-03-22 DIAGNOSIS — N17.9 ACUTE KIDNEY FAILURE, UNSPECIFIED: ICD-10-CM

## 2023-03-22 DIAGNOSIS — Z90.49 ACQUIRED ABSENCE OF OTHER SPECIFIED PARTS OF DIGESTIVE TRACT: ICD-10-CM

## 2023-03-23 ENCOUNTER — APPOINTMENT (OUTPATIENT)
Dept: INFUSION THERAPY | Facility: CLINIC | Age: 53
End: 2023-03-23

## 2023-03-29 ENCOUNTER — APPOINTMENT (OUTPATIENT)
Dept: COLORECTAL SURGERY | Facility: CLINIC | Age: 53
End: 2023-03-29
Payer: MEDICAID

## 2023-03-29 DIAGNOSIS — K94.19 OTHER COMPLICATIONS OF ENTEROSTOMY: ICD-10-CM

## 2023-03-29 PROCEDURE — 99442: CPT

## 2023-03-29 NOTE — REASON FOR VISIT
[Home] : at home, [unfilled] , at the time of the visit. [Medical Office: ([facility])___] : at the medical office located in  [Verbal consent obtained from patient] : the patient, [unfilled] [Follow-Up: _____] : a [unfilled] follow-up visit

## 2023-03-29 NOTE — HISTORY OF PRESENT ILLNESS
[FreeTextEntry1] : 3/29/2023: Patient was recently admitted to [facility] for dehydration.  He continues to have prolapse of ileostomy.  He last received Avastin on 3/6/2023, and is on the schedule for ileostomy revision on 4/11/2023.\par \par 2/2/2023: Presents for follow up and to discuss further management of his prolapsed ileostomy.  Stoma remains functional, pink, but does weigh quite heavily in appliance and cause leaks because of this.  The stoma does still reduce spontaneously at night when he is supine.\par \par 8/19/2022: Mr. Palladino remains on chemotherapy (FOLFOXIRI with bevacizumab).  Comes to clinic today for follow-up as well as evalaution of an excoriation atop his ostomy.  Stoma does prolapse throughout the course of the day, spontaneously reduces at night. It remains pink and continues to function. \par \par 6/6/2022: Mr. Palladino is a 51yoM who presented to [facility] on 5/19/2022 with findings concerning for perforated R colon cancer invasive into the sigmoid colon/retroperitoneum and metastatic to the liver.  Tumor is microsatellite stable.  He underwent exploratory laparotomy, total colectomy, end ileostomy, biopsy of liver, and biopsy of retroperitoneum.  Since discharge his appetite has improved, his ostomy has become less edematous and is productive of applesauce-consistency stool.  He takes Metamucil once daily with good effect.  No fever, chills, nausea, vomiting.  Still having intermittent pain at incision but this is improving.\par \par He is aware of pathology results showing the above and is planning to see Oncology.

## 2023-04-04 ENCOUNTER — RESULT REVIEW (OUTPATIENT)
Age: 53
End: 2023-04-04

## 2023-04-04 ENCOUNTER — APPOINTMENT (OUTPATIENT)
Dept: INFUSION THERAPY | Facility: CLINIC | Age: 53
End: 2023-04-04

## 2023-04-04 LAB
ALBUMIN SERPL ELPH-MCNC: 4.4 G/DL — SIGNIFICANT CHANGE UP (ref 3.3–5)
ALP SERPL-CCNC: 198 U/L — HIGH (ref 40–120)
ALT FLD-CCNC: 18 U/L — SIGNIFICANT CHANGE UP (ref 10–45)
ANION GAP SERPL CALC-SCNC: 15 MMOL/L — SIGNIFICANT CHANGE UP (ref 5–17)
AST SERPL-CCNC: 23 U/L — SIGNIFICANT CHANGE UP (ref 10–40)
BASOPHILS # BLD AUTO: 0.04 K/UL — SIGNIFICANT CHANGE UP (ref 0–0.2)
BASOPHILS NFR BLD AUTO: 0.4 % — SIGNIFICANT CHANGE UP (ref 0–2)
BILIRUB SERPL-MCNC: 0.2 MG/DL — SIGNIFICANT CHANGE UP (ref 0.2–1.2)
BUN SERPL-MCNC: 14 MG/DL — SIGNIFICANT CHANGE UP (ref 7–23)
CALCIUM SERPL-MCNC: 9.5 MG/DL — SIGNIFICANT CHANGE UP (ref 8.4–10.5)
CEA SERPL-MCNC: 8.4 NG/ML — HIGH (ref 0–3.8)
CHLORIDE SERPL-SCNC: 100 MMOL/L — SIGNIFICANT CHANGE UP (ref 96–108)
CO2 SERPL-SCNC: 20 MMOL/L — LOW (ref 22–31)
CREAT SERPL-MCNC: 0.73 MG/DL — SIGNIFICANT CHANGE UP (ref 0.5–1.3)
EGFR: 111 ML/MIN/1.73M2 — SIGNIFICANT CHANGE UP
EOSINOPHIL # BLD AUTO: 0.2 K/UL — SIGNIFICANT CHANGE UP (ref 0–0.5)
EOSINOPHIL NFR BLD AUTO: 1.9 % — SIGNIFICANT CHANGE UP (ref 0–6)
GLUCOSE SERPL-MCNC: 122 MG/DL — HIGH (ref 70–99)
HCT VFR BLD CALC: 42.5 % — SIGNIFICANT CHANGE UP (ref 39–50)
HGB BLD-MCNC: 13.4 G/DL — SIGNIFICANT CHANGE UP (ref 13–17)
IMM GRANULOCYTES NFR BLD AUTO: 1.8 % — HIGH (ref 0–0.9)
LYMPHOCYTES # BLD AUTO: 1.66 K/UL — SIGNIFICANT CHANGE UP (ref 1–3.3)
LYMPHOCYTES # BLD AUTO: 15.6 % — SIGNIFICANT CHANGE UP (ref 13–44)
MAGNESIUM SERPL-MCNC: 1.7 MG/DL — SIGNIFICANT CHANGE UP (ref 1.6–2.6)
MCHC RBC-ENTMCNC: 27.4 PG — SIGNIFICANT CHANGE UP (ref 27–34)
MCHC RBC-ENTMCNC: 31.5 GM/DL — LOW (ref 32–36)
MCV RBC AUTO: 86.9 FL — SIGNIFICANT CHANGE UP (ref 80–100)
MONOCYTES # BLD AUTO: 0.83 K/UL — SIGNIFICANT CHANGE UP (ref 0–0.9)
MONOCYTES NFR BLD AUTO: 7.8 % — SIGNIFICANT CHANGE UP (ref 2–14)
NEUTROPHILS # BLD AUTO: 7.74 K/UL — HIGH (ref 1.8–7.4)
NEUTROPHILS NFR BLD AUTO: 72.5 % — SIGNIFICANT CHANGE UP (ref 43–77)
NRBC # BLD: 0 /100 WBCS — SIGNIFICANT CHANGE UP (ref 0–0)
PLATELET # BLD AUTO: 184 K/UL — SIGNIFICANT CHANGE UP (ref 150–400)
POTASSIUM SERPL-MCNC: 4.1 MMOL/L — SIGNIFICANT CHANGE UP (ref 3.5–5.3)
POTASSIUM SERPL-SCNC: 4.1 MMOL/L — SIGNIFICANT CHANGE UP (ref 3.5–5.3)
PROT SERPL-MCNC: 7.8 G/DL — SIGNIFICANT CHANGE UP (ref 6–8.3)
RBC # BLD: 4.89 M/UL — SIGNIFICANT CHANGE UP (ref 4.2–5.8)
RBC # FLD: 19.3 % — HIGH (ref 10.3–14.5)
SODIUM SERPL-SCNC: 135 MMOL/L — SIGNIFICANT CHANGE UP (ref 135–145)
WBC # BLD: 10.66 K/UL — HIGH (ref 3.8–10.5)
WBC # FLD AUTO: 10.66 K/UL — HIGH (ref 3.8–10.5)

## 2023-04-06 ENCOUNTER — APPOINTMENT (OUTPATIENT)
Dept: HEMATOLOGY ONCOLOGY | Facility: CLINIC | Age: 53
End: 2023-04-06
Payer: MEDICAID

## 2023-04-06 ENCOUNTER — APPOINTMENT (OUTPATIENT)
Dept: INFUSION THERAPY | Facility: CLINIC | Age: 53
End: 2023-04-06

## 2023-04-06 PROCEDURE — ZZZZZ: CPT

## 2023-04-06 NOTE — PHYSICAL EXAM
[Restricted in physically strenuous activity but ambulatory and able to carry out work of a light or sedentary nature] : Status 1- Restricted in physically strenuous activity but ambulatory and able to carry out work of a light or sedentary nature, e.g., light house work, office work [Normal] : affect appropriate [de-identified] : wt gain 6 lbs since last visit [de-identified] : ileotomy in place, prolapsed 1 foot, mucosal ulcers present; dressing C/D/I [de-identified] : well healed vertical abdominal incision; rt pt accessed and dressing C/D/I

## 2023-04-06 NOTE — HISTORY OF PRESENT ILLNESS
[Home] : at home, [unfilled] , at the time of the visit. [Medical Office: ([facility])___] : at the medical office located in  [Verbal consent obtained from patient] : the patient, [unfilled] [de-identified] : The patient was diagnosed with colon cancer in May 2022 at the age of 51. He presented to  ER 5/19/22 with worsening abdominal pain for 4 days. He reports abdominal pain has been ongoing for 4 years. On 5/19/22 he had a CT A/P which showed apparent focal mural thickening at the cecum/proximal ascending colon may represent colon cancer. The sigmoid colon appears to be tethered to the cecum and it is focally thick-walled; focal tumor invasion/extension from the cecum to the sigmoid colon cannot be excluded. Dilatation of the small bowel with an apparent transition point in the right lower quadrant abdomen, suggestive of small bowel obstruction. Apparent mild mural thickening of the a dilated right lower quadrant small bowel loop with adjacent mesenteric edema for which associated small bowel ischemia cannot be excluded. Multiple hypodense lesions with calcifications in both lobes of the liver with the largest measuring 5.8 x 6.0 cm in hepatic segment 8. These are suspicious for metastases. Mild ascites. Also on 5/19/22 he underwent exploratory laparotomy, total colectomy, end ileostomy, biopsy of liver, and biopsy of retroperitoneum. Pathology showed Omentum, excision: Negative for malignancy. Acutely inflamed exudate consistent with peritonitis. Portion of terminal ileum, cecum with adherent sigmoid, total colectomy: Poorly differentiated infiltrating adenocarcinoma measuring 9.0 cm. Adenocarcinoma infiltrates through the bowel wall and through the visceral peritoneum and infiltrates the adherent sigmoid colon 3 of 17 lymph nodes are positive for metastatic carcinoma. Lymphovascular space invasion is identified. Perineural invasion is identified. The surgical margins are negative. Appendix with serosal tumor implants and acute inflammation. Peritonitis. Liver, biopsy: Metastatic adenocarcinoma. Retroperitoneal biopsy: Adenocarcinoma with necrosis. No loss of nuclear expression of MMR proteins (MLH1, MSH2, MSH6, and PMS2). Staging pT4b pN1b pM1c. On 5/25/22 he had a repeat CT A/P which showed status post total colectomy and ileostomy. Dilated small bowel loops in the left abdomen with associated wall thickening. Both may be postoperative, follow-up is recommended. Moderate amount of ascites new from the prior study. Small amount of free intraperitoneal air likely with postoperative state. Minimal left pleural effusion. New multifocal right lower lobe infiltrate. Multiple liver metastases some of which are calcified again appreciated. [de-identified] : Medical Hx: denies \par Surgical Hx: denies \par Family Hx: denies family history of cancer \par Social Hx: he smoked THC and cigarettes daily for 30 years, quit May 19th, 2022; ETOH had 6 pack a day, quit 4 years ago; was a  ([email or web address] the [email or web address]) no longer working; lives with mom and brother.   [de-identified] : Today he is C19D1 FOLFOXIRI / Zirabev, planned ongoing. Oxali stopped after cycle 10.  \par \par Left sided abd pain since last visit is resolved. States hard to make pain medicine last the 30 days. Still continues with RLQ pain around ostomy, reports 7/10 stabbing pain that comes and goes. His chemotherapy side affects noted were fatigue for days 2-4, he found himself more tired than baseline, but continues his daily activities. He reported cold sensitivity, resolved, now that Oxali has stopped. He reports taste changes, a sour taste is slowly resolving. Wt stable since last visit, avg 175 - 182 lbs. He denies eye changes; hair loss; nail/skin changes; neuropathy; mouth sores; N/V; heart burn; D/C.  \par \par New PE found on Sept 1, remains on Eliquis BID, andrea well. \par \par He met with Dr Zarate for prolapsed ileostomy 1 foot, mucosal ulcers present from rubbing, as per Dr. Zarate she can reduce it, pt is waiting for surgery to be scheduled.

## 2023-04-06 NOTE — REVIEW OF SYSTEMS
[Recent Change In Weight] : ~T recent weight change [Negative] : Allergic/Immunologic [Chest Pain] : no chest pain [Shortness Of Breath] : no shortness of breath [Abdominal Pain] : no abdominal pain [Constipation] : no constipation [Diarrhea] : no diarrhea [Joint Pain] : no joint pain [Skin Rash] : no skin rash [FreeTextEntry2] : wt gain 6 lbs  [FreeTextEntry7] : ileotomy in place, prolapsed 1 foot, functioning normally  [FreeTextEntry9] : left rib pain  [de-identified] : rt chest wall port accessed, dressing C/D/I

## 2023-04-06 NOTE — RESULTS/DATA
[FreeTextEntry1] : 9/1/22 CT C/A/P: Segmental left lower lobe pulmonary embolus and probable smaller pulmonary emboli within the right lung. Interval decrease in size of bilateral pulmonary nodules and hepatic metastases compared to 5/25/2022. No new sites of disease.\par \par 5/25/22 CT A/P:  Status post total colectomy and ileostomy. Dilated small bowel loops in the left abdomen with associated wall thickening. Both may be postoperative, follow-up is recommended. Moderate amount of ascites new from the prior study. Small amount of free intraperitoneal air likely with postoperative state. Minimal left pleural effusion. New multifocal right lower lobe infiltrate. Multiple liver metastases some of which are calcified again appreciated.\par \par 5/19/22 Path: 1. Omentum, excision: Negative for malignancy. Acutely inflamed exudate consistent with peritonitis\par 2. Portion of terminal ileum, cecum with adherent sigmoid, total colectomy: Poorly differentiated infiltrating adenocarcinoma measuring 9.0 cm. Adenocarcinoma infiltrates through the bowel wall and through the visceral peritoneum and infiltrates the adherent sigmoid colon 3 of 17 lymph nodes are positive for metastatic carcinoma. Lymphovascular space invasion is identified. Perineural invasion is identified. The surgical margins are negative. Appendix with serosal tumor implants and acute inflammation\par Peritonitis\par 3. Liver, biopsy: Metastatic adenocarcinoma\par 4. Retroperitoneal biopsy: Adenocarcinoma with necrosis\par No loss of nuclear expression of MMR proteins (MLH1, MSH2, MSH6, and PMS2).   These results show low probability of microsatellite instability-high (MSI-H).\par Synoptic Summary\par COLON AND RECTUM: Resection, Including Transanal Disk Excision of Rectal Neoplasms\par Procedure: Total abdominal colectomy\par Macroscopic Evaluation of Mesorectum: Not applicable\par Tumor Site: Cecum\par Histologic Type:  Adenocarcinoma\par Histologic Grade: G3, poorly differentiated\par Tumor Size: Greatest dimension (Centimeters)  9.0 cm\par Multiple Primary Sites: Not applicable\par Tumor Extent: Directly invades or adheres to adjacent structure(s) Sigmoid colon\par Macroscopic Tumor Perforation: Present\par Lymphovascular Invasion: Present\par Perineural Invasion: Present\par Treatment Effect: No known presurgical therapy\par Margin Status for Invasive Carcinoma: All margins negative for invasive carcinoma\par Distance from Invasive Carcinoma to Radial (Circumferential)\par Margin: 5.0 cm\par Margin Status for Non-Invasive Tumor: Not applicable\par Regional Lymph Node Status: Tumor present in regional lymph node(s)\par Number of Lymph Nodes with Tumor    3\par Number of Lymph Nodes Examined: 17\par Tumor Deposits: Not identified\par Distant Site(s) Involved: Liver\par PATHOLOGIC STAGE CLASSIFICATION\par TNM Descriptors: Not applicable\par pT Category: pT4b\par pN Category: pN1b\par pM Category: pM1c\par \par 5/19/22 CT A/P: Apparent focal mural thickening at the cecum/proximal ascending colon may represent colon cancer. The sigmoid colon appears to be tethered to the cecum and it is focally thick-walled; focal tumor invasion/extension from the cecum to the sigmoid colon cannot be excluded. Dilatation of the small bowel with an apparent transition point in the right lower quadrant abdomen, suggestive of small bowel obstruction. Apparent mild mural thickening of the a dilated right lower quadrant small bowel loop with adjacent mesenteric edema for which associated small bowel ischemia cannot be excluded. Multiple hypodense lesions with calcifications in both lobes of the liver with the largest measuring 5.8 x 6.0 cm in hepatic segment 8. These are suspicious for metastases. Mild ascites.

## 2023-04-10 ENCOUNTER — APPOINTMENT (OUTPATIENT)
Dept: HEMATOLOGY ONCOLOGY | Facility: CLINIC | Age: 53
End: 2023-04-10

## 2023-04-15 RX ORDER — SODIUM CHLORIDE 9 MG/ML
3 INJECTION INTRAMUSCULAR; INTRAVENOUS; SUBCUTANEOUS EVERY 8 HOURS
Refills: 0 | Status: DISCONTINUED | OUTPATIENT
Start: 2023-04-18 | End: 2023-04-19

## 2023-04-18 ENCOUNTER — TRANSCRIPTION ENCOUNTER (OUTPATIENT)
Age: 53
End: 2023-04-18

## 2023-04-18 ENCOUNTER — RESULT REVIEW (OUTPATIENT)
Age: 53
End: 2023-04-18

## 2023-04-18 ENCOUNTER — APPOINTMENT (OUTPATIENT)
Dept: COLORECTAL SURGERY | Facility: HOSPITAL | Age: 53
End: 2023-04-18

## 2023-04-18 ENCOUNTER — INPATIENT (INPATIENT)
Facility: HOSPITAL | Age: 53
LOS: 0 days | Discharge: HOME | DRG: 221 | End: 2023-04-19
Attending: COLON & RECTAL SURGERY | Admitting: COLON & RECTAL SURGERY
Payer: MEDICAID

## 2023-04-18 VITALS
SYSTOLIC BLOOD PRESSURE: 125 MMHG | HEART RATE: 82 BPM | RESPIRATION RATE: 16 BRPM | OXYGEN SATURATION: 100 % | HEIGHT: 74 IN | DIASTOLIC BLOOD PRESSURE: 80 MMHG | TEMPERATURE: 97 F | WEIGHT: 179.9 LBS

## 2023-04-18 DIAGNOSIS — Z90.49 ACQUIRED ABSENCE OF OTHER SPECIFIED PARTS OF DIGESTIVE TRACT: Chronic | ICD-10-CM

## 2023-04-18 DIAGNOSIS — Z93.2 ILEOSTOMY STATUS: ICD-10-CM

## 2023-04-18 DIAGNOSIS — K94.19 OTHER COMPLICATIONS OF ENTEROSTOMY: ICD-10-CM

## 2023-04-18 LAB
APTT BLD: 32.5 SEC — SIGNIFICANT CHANGE UP (ref 27.5–35.5)
BLD GP AB SCN SERPL QL: SIGNIFICANT CHANGE UP
INR BLD: 0.97 RATIO — SIGNIFICANT CHANGE UP (ref 0.88–1.16)
PROTHROM AB SERPL-ACNC: 11.2 SEC — SIGNIFICANT CHANGE UP (ref 10.5–13.4)

## 2023-04-18 PROCEDURE — 86900 BLOOD TYPING SEROLOGIC ABO: CPT

## 2023-04-18 PROCEDURE — 88304 TISSUE EXAM BY PATHOLOGIST: CPT | Mod: 26

## 2023-04-18 PROCEDURE — 86901 BLOOD TYPING SEROLOGIC RH(D): CPT

## 2023-04-18 PROCEDURE — 85610 PROTHROMBIN TIME: CPT

## 2023-04-18 PROCEDURE — 83735 ASSAY OF MAGNESIUM: CPT

## 2023-04-18 PROCEDURE — C9399: CPT

## 2023-04-18 PROCEDURE — 44314 REVISION OF ILEOSTOMY: CPT

## 2023-04-18 PROCEDURE — 86850 RBC ANTIBODY SCREEN: CPT

## 2023-04-18 PROCEDURE — 85730 THROMBOPLASTIN TIME PARTIAL: CPT

## 2023-04-18 PROCEDURE — 85027 COMPLETE CBC AUTOMATED: CPT

## 2023-04-18 PROCEDURE — 36415 COLL VENOUS BLD VENIPUNCTURE: CPT

## 2023-04-18 PROCEDURE — 84100 ASSAY OF PHOSPHORUS: CPT

## 2023-04-18 PROCEDURE — 88304 TISSUE EXAM BY PATHOLOGIST: CPT

## 2023-04-18 PROCEDURE — 80048 BASIC METABOLIC PNL TOTAL CA: CPT

## 2023-04-18 PROCEDURE — C1889: CPT

## 2023-04-18 RX ORDER — SODIUM CHLORIDE 9 MG/ML
1000 INJECTION, SOLUTION INTRAVENOUS
Refills: 0 | Status: DISCONTINUED | OUTPATIENT
Start: 2023-04-18 | End: 2023-04-18

## 2023-04-18 RX ORDER — FENTANYL CITRATE 50 UG/ML
50 INJECTION INTRAVENOUS
Refills: 0 | Status: DISCONTINUED | OUTPATIENT
Start: 2023-04-18 | End: 2023-04-18

## 2023-04-18 RX ORDER — APIXABAN 2.5 MG/1
5 TABLET, FILM COATED ORAL EVERY 12 HOURS
Refills: 0 | Status: DISCONTINUED | OUTPATIENT
Start: 2023-04-18 | End: 2023-04-19

## 2023-04-18 RX ORDER — CEFOTETAN DISODIUM 1 G
1 VIAL (EA) INJECTION EVERY 12 HOURS
Refills: 0 | Status: COMPLETED | OUTPATIENT
Start: 2023-04-18 | End: 2023-04-19

## 2023-04-18 RX ORDER — ACETAMINOPHEN 500 MG
650 TABLET ORAL EVERY 6 HOURS
Refills: 0 | Status: DISCONTINUED | OUTPATIENT
Start: 2023-04-18 | End: 2023-04-19

## 2023-04-18 RX ORDER — OXYCODONE HYDROCHLORIDE 5 MG/1
10 TABLET ORAL ONCE
Refills: 0 | Status: DISCONTINUED | OUTPATIENT
Start: 2023-04-18 | End: 2023-04-18

## 2023-04-18 RX ORDER — HYDROMORPHONE HYDROCHLORIDE 2 MG/ML
0.5 INJECTION INTRAMUSCULAR; INTRAVENOUS; SUBCUTANEOUS
Refills: 0 | Status: DISCONTINUED | OUTPATIENT
Start: 2023-04-18 | End: 2023-04-18

## 2023-04-18 RX ORDER — ONDANSETRON 8 MG/1
4 TABLET, FILM COATED ORAL ONCE
Refills: 0 | Status: COMPLETED | OUTPATIENT
Start: 2023-04-18 | End: 2023-04-18

## 2023-04-18 RX ORDER — KETOROLAC TROMETHAMINE 30 MG/ML
15 SYRINGE (ML) INJECTION EVERY 6 HOURS
Refills: 0 | Status: DISCONTINUED | OUTPATIENT
Start: 2023-04-18 | End: 2023-04-19

## 2023-04-18 RX ORDER — OXYCODONE HYDROCHLORIDE 5 MG/1
5 TABLET ORAL EVERY 6 HOURS
Refills: 0 | Status: DISCONTINUED | OUTPATIENT
Start: 2023-04-18 | End: 2023-04-19

## 2023-04-18 RX ORDER — IBUPROFEN 200 MG
400 TABLET ORAL EVERY 6 HOURS
Refills: 0 | Status: DISCONTINUED | OUTPATIENT
Start: 2023-04-18 | End: 2023-04-19

## 2023-04-18 RX ORDER — HEPARIN SODIUM 5000 [USP'U]/ML
5000 INJECTION INTRAVENOUS; SUBCUTANEOUS ONCE
Refills: 0 | Status: COMPLETED | OUTPATIENT
Start: 2023-04-18 | End: 2023-04-18

## 2023-04-18 RX ORDER — HYDROMORPHONE HYDROCHLORIDE 2 MG/ML
0.5 INJECTION INTRAMUSCULAR; INTRAVENOUS; SUBCUTANEOUS
Refills: 0 | Status: DISCONTINUED | OUTPATIENT
Start: 2023-04-18 | End: 2023-04-19

## 2023-04-18 RX ADMIN — HYDROMORPHONE HYDROCHLORIDE 0.5 MILLIGRAM(S): 2 INJECTION INTRAMUSCULAR; INTRAVENOUS; SUBCUTANEOUS at 14:23

## 2023-04-18 RX ADMIN — Medication 15 MILLIGRAM(S): at 23:10

## 2023-04-18 RX ADMIN — SODIUM CHLORIDE 75 MILLILITER(S): 9 INJECTION, SOLUTION INTRAVENOUS at 13:11

## 2023-04-18 RX ADMIN — HYDROMORPHONE HYDROCHLORIDE 0.5 MILLIGRAM(S): 2 INJECTION INTRAMUSCULAR; INTRAVENOUS; SUBCUTANEOUS at 13:47

## 2023-04-18 RX ADMIN — HYDROMORPHONE HYDROCHLORIDE 0.5 MILLIGRAM(S): 2 INJECTION INTRAMUSCULAR; INTRAVENOUS; SUBCUTANEOUS at 13:28

## 2023-04-18 RX ADMIN — APIXABAN 5 MILLIGRAM(S): 2.5 TABLET, FILM COATED ORAL at 23:06

## 2023-04-18 RX ADMIN — ONDANSETRON 4 MILLIGRAM(S): 8 TABLET, FILM COATED ORAL at 18:30

## 2023-04-18 RX ADMIN — OXYCODONE HYDROCHLORIDE 10 MILLIGRAM(S): 5 TABLET ORAL at 13:47

## 2023-04-18 RX ADMIN — Medication 15 MILLIGRAM(S): at 22:42

## 2023-04-18 RX ADMIN — HYDROMORPHONE HYDROCHLORIDE 0.5 MILLIGRAM(S): 2 INJECTION INTRAMUSCULAR; INTRAVENOUS; SUBCUTANEOUS at 13:11

## 2023-04-18 RX ADMIN — OXYCODONE HYDROCHLORIDE 5 MILLIGRAM(S): 5 TABLET ORAL at 23:47

## 2023-04-18 RX ADMIN — HYDROMORPHONE HYDROCHLORIDE 0.5 MILLIGRAM(S): 2 INJECTION INTRAMUSCULAR; INTRAVENOUS; SUBCUTANEOUS at 17:15

## 2023-04-18 RX ADMIN — HYDROMORPHONE HYDROCHLORIDE 0.5 MILLIGRAM(S): 2 INJECTION INTRAMUSCULAR; INTRAVENOUS; SUBCUTANEOUS at 14:52

## 2023-04-18 RX ADMIN — Medication 100 GRAM(S): at 23:06

## 2023-04-18 RX ADMIN — HEPARIN SODIUM 5000 UNIT(S): 5000 INJECTION INTRAVENOUS; SUBCUTANEOUS at 09:42

## 2023-04-18 RX ADMIN — OXYCODONE HYDROCHLORIDE 5 MILLIGRAM(S): 5 TABLET ORAL at 23:17

## 2023-04-18 NOTE — BRIEF OPERATIVE NOTE - OPERATION/FINDINGS
Large prolapse of end ileostomy with large fascial opening   ~15cm end ileostomy resected, fascial revision, w/ creation of new end ileostomy

## 2023-04-18 NOTE — ASU PREOP CHECKLIST - NOTHING BY MOUTH SINCE
HPV #2 today, and #3 in next 4-12 months.    Set up fasting labs.     Camilo Turner MD  Internal Medicine and Pediatrics       Preventive Health Recommendations  Male Ages 21 - 25     Yearly exam:             See your health care provider every year in order to  o   Review health changes.   o   Discuss preventive care.    o   Review your medicines if your doctor has prescribed any.    You should be tested each year for STDs (sexually transmitted diseases).     Talk to your provider about cholesterol testing.      If you are at risk for diabetes, you should have a diabetes test (fasting glucose).    Shots: Get a flu shot each year. Get a tetanus shot every 10 years.     Nutrition:    Eat at least 5 servings of fruits and vegetables daily.     Eat whole-grain bread, whole-wheat pasta and brown rice instead of white grains and rice.     Get adequate calcium and Vitamin D.     Lifestyle    Exercise for at least 150 minutes a week (30 minutes a day, 5 days a week). This will help you control your weight and prevent disease.     Limit alcohol to one drink per day.     No smoking.     Wear sunscreen to prevent skin cancer.     See your dentist every six months for an exam and cleaning.     
17-Apr-2023 23:00

## 2023-04-18 NOTE — PATIENT PROFILE ADULT - FALL HARM RISK - RISK INTERVENTIONS

## 2023-04-19 ENCOUNTER — TRANSCRIPTION ENCOUNTER (OUTPATIENT)
Age: 53
End: 2023-04-19

## 2023-04-19 VITALS
SYSTOLIC BLOOD PRESSURE: 112 MMHG | TEMPERATURE: 98 F | DIASTOLIC BLOOD PRESSURE: 67 MMHG | RESPIRATION RATE: 18 BRPM | HEART RATE: 71 BPM | OXYGEN SATURATION: 100 %

## 2023-04-19 LAB
ANION GAP SERPL CALC-SCNC: 4 MMOL/L — LOW (ref 5–17)
BUN SERPL-MCNC: 13 MG/DL — SIGNIFICANT CHANGE UP (ref 7–23)
CALCIUM SERPL-MCNC: 8.9 MG/DL — SIGNIFICANT CHANGE UP (ref 8.5–10.1)
CHLORIDE SERPL-SCNC: 103 MMOL/L — SIGNIFICANT CHANGE UP (ref 96–108)
CO2 SERPL-SCNC: 29 MMOL/L — SIGNIFICANT CHANGE UP (ref 22–31)
CREAT SERPL-MCNC: 0.94 MG/DL — SIGNIFICANT CHANGE UP (ref 0.5–1.3)
EGFR: 99 ML/MIN/1.73M2 — SIGNIFICANT CHANGE UP
GLUCOSE SERPL-MCNC: 111 MG/DL — HIGH (ref 70–99)
HCT VFR BLD CALC: 40 % — SIGNIFICANT CHANGE UP (ref 39–50)
HGB BLD-MCNC: 12.4 G/DL — LOW (ref 13–17)
MAGNESIUM SERPL-MCNC: 2.1 MG/DL — SIGNIFICANT CHANGE UP (ref 1.6–2.6)
MCHC RBC-ENTMCNC: 27.1 PG — SIGNIFICANT CHANGE UP (ref 27–34)
MCHC RBC-ENTMCNC: 31 GM/DL — LOW (ref 32–36)
MCV RBC AUTO: 87.5 FL — SIGNIFICANT CHANGE UP (ref 80–100)
PHOSPHATE SERPL-MCNC: 2.7 MG/DL — SIGNIFICANT CHANGE UP (ref 2.5–4.5)
PLATELET # BLD AUTO: 192 K/UL — SIGNIFICANT CHANGE UP (ref 150–400)
POTASSIUM SERPL-MCNC: 3.3 MMOL/L — LOW (ref 3.5–5.3)
POTASSIUM SERPL-SCNC: 3.3 MMOL/L — LOW (ref 3.5–5.3)
RBC # BLD: 4.57 M/UL — SIGNIFICANT CHANGE UP (ref 4.2–5.8)
RBC # FLD: 19.2 % — HIGH (ref 10.3–14.5)
SODIUM SERPL-SCNC: 136 MMOL/L — SIGNIFICANT CHANGE UP (ref 135–145)
WBC # BLD: 18.59 K/UL — HIGH (ref 3.8–10.5)
WBC # FLD AUTO: 18.59 K/UL — HIGH (ref 3.8–10.5)

## 2023-04-19 RX ORDER — OXYCODONE AND ACETAMINOPHEN 5; 325 MG/1; MG/1
1 TABLET ORAL
Qty: 20 | Refills: 0
Start: 2023-04-19

## 2023-04-19 RX ORDER — APIXABAN 2.5 MG/1
1 TABLET, FILM COATED ORAL
Qty: 0 | Refills: 0 | DISCHARGE

## 2023-04-19 RX ORDER — OXYCODONE HYDROCHLORIDE 5 MG/1
5 TABLET ORAL ONCE
Refills: 0 | Status: DISCONTINUED | OUTPATIENT
Start: 2023-04-19 | End: 2023-04-19

## 2023-04-19 RX ORDER — APIXABAN 2.5 MG/1
1 TABLET, FILM COATED ORAL
Qty: 60 | Refills: 0
Start: 2023-04-19 | End: 2023-05-18

## 2023-04-19 RX ADMIN — OXYCODONE HYDROCHLORIDE 5 MILLIGRAM(S): 5 TABLET ORAL at 16:18

## 2023-04-19 RX ADMIN — SODIUM CHLORIDE 3 MILLILITER(S): 9 INJECTION INTRAMUSCULAR; INTRAVENOUS; SUBCUTANEOUS at 04:24

## 2023-04-19 RX ADMIN — Medication 100 GRAM(S): at 09:51

## 2023-04-19 RX ADMIN — APIXABAN 5 MILLIGRAM(S): 2.5 TABLET, FILM COATED ORAL at 09:51

## 2023-04-19 RX ADMIN — Medication 15 MILLIGRAM(S): at 09:54

## 2023-04-19 RX ADMIN — OXYCODONE HYDROCHLORIDE 5 MILLIGRAM(S): 5 TABLET ORAL at 05:23

## 2023-04-19 RX ADMIN — Medication 15 MILLIGRAM(S): at 04:35

## 2023-04-19 RX ADMIN — OXYCODONE HYDROCHLORIDE 5 MILLIGRAM(S): 5 TABLET ORAL at 06:40

## 2023-04-19 RX ADMIN — Medication 400 MILLIGRAM(S): at 07:48

## 2023-04-19 RX ADMIN — SODIUM CHLORIDE 3 MILLILITER(S): 9 INJECTION INTRAMUSCULAR; INTRAVENOUS; SUBCUTANEOUS at 09:55

## 2023-04-19 RX ADMIN — Medication 15 MILLIGRAM(S): at 05:00

## 2023-04-19 NOTE — PROGRESS NOTE ADULT - ATTENDING COMMENTS
Patient denies any acute issues overnight.  No N/V, stoma is functioning.  Pain present but controlled.  Ambulating and voiding.  Tolerated diet.  On exam stoma is mildly edematous but pink, perfused, output in appliance appropriate in consistency.  Reactive leukocytosis noted.  Afebrile and VSS.  Regular diet, multimodal pain control, ambulation.  WOCN to see prior to discharge to see if any adjustments needed in terms of ostomy supplies.  Follow up in clinic in 2 weeks.  Discharge today.

## 2023-04-19 NOTE — PROGRESS NOTE ADULT - ASSESSMENT
53 yo male with hx of colon cancer s/p STAC with large prolapse of end ileostomy with large fascial opening , s/p 15cm end ileostomy resected, fascial revision, w/ creation of new end ileostomy POD1    Plan:    CW reg diet  Pain control  ambulation  Dispo home today    Case will be discussed with colorectal surgery team

## 2023-04-19 NOTE — DISCHARGE NOTE PROVIDER - NSDCMRMEDTOKEN_GEN_ALL_CORE_FT
oxyCODONE 5 mg oral tablet: 1 tab(s) orally every 6 hours, As Needed MDD:4 tabs  oxycodone-acetaminophen 5 mg-325 mg oral tablet: 1 tab(s) orally every 6 hours as needed for  moderate pain MDD: 004

## 2023-04-19 NOTE — DISCHARGE NOTE PROVIDER - CARE PROVIDER_API CALL
Cecy Todd)  ColonRectal Surgery; Surgery  [address] [address]  Phone: [phone]  Fax: [phone]  Follow Up Time: 2 weeks

## 2023-04-19 NOTE — DISCHARGE NOTE PROVIDER - NSDCFUSCHEDAPPT_GEN_ALL_CORE_FT
Christine Au  [place] [facility]  [facility]  Scheduled Appointment: 04/26/2023    Palla, Venugopal R  [facility]  CARDIOLOGY [address]  Scheduled Appointment: 06/12/2023

## 2023-04-19 NOTE — DISCHARGE NOTE NURSING/CASE MANAGEMENT/SOCIAL WORK - PATIENT PORTAL LINK FT
You can access the FollowMyHealth Patient Portal offered by [facility] by registering at the following website: [email or web address]. By joining [facility]’s FollowMyHealth portal, you will also be able to view your health information using other applications (apps) compatible with our system.

## 2023-04-19 NOTE — PROGRESS NOTE ADULT - SUBJECTIVE AND OBJECTIVE BOX
Pt seen at the bedside, no new complains  No overnight events  Patient tolerated reg diet, ostomy functional, pain controlled  No nausea, vomiting, fever or chills    Vitals:  T(C): 36.4 (04-19 @ 04:17), Max: 36.4 (04-18 @ 21:00)  HR: 65 (04-19 @ 04:17) (54 - 82)  BP: 106/69 (04-19 @ 04:17) (91/55 - 125/80)  RR: 17 (04-19 @ 04:17) (10 - 20)  SpO2: 99% (04-19 @ 04:17) (94% - 100%)    04-18 @ 07:01  -  04-19 @ 07:00  --------------------------------------------------------  IN:    Lactated Ringers: 323 mL    Other (mL): 900 mL  Total IN: 1223 mL    OUT:    Ileostomy (mL): 100 mL    Voided (mL): 500 mL  Total OUT: 600 mL    Total NET: 623 mL        Physical Exam:  General: AAOx3, Well developed, NAD  Chest: Normal respiratory effort  Heart: RRR  Abdomen: Soft, ND, appropriately tender, ostomy patent and functional  Neuro/Psych: No localized deficits. Normal speech, normal tone  Skin: Normal, no rashes, no lesions noted.   Extremities: Warm, well perfused, no edema, Pulses intact        Current Inpatient Medications:  acetaminophen     Tablet .. 650 milliGRAM(s) Oral every 6 hours PRN  apixaban 5 milliGRAM(s) Oral every 12 hours  cefoTEtan  IVPB 1 Gram(s) IV Intermittent every 12 hours  HYDROmorphone  Injectable 0.5 milliGRAM(s) IV Push every 10 minutes PRN  ibuprofen  Tablet. 400 milliGRAM(s) Oral every 6 hours PRN  ketorolac   Injectable 15 milliGRAM(s) IV Push every 6 hours PRN  oxyCODONE    IR 5 milliGRAM(s) Oral every 6 hours PRN  sodium chloride 0.9% lock flush 3 milliLiter(s) IV Push every 8 hours

## 2023-04-19 NOTE — DISCHARGE NOTE PROVIDER - CARE PROVIDERS DIRECT ADDRESSES
[email or web address]

## 2023-04-19 NOTE — DISCHARGE NOTE PROVIDER - NSDCCPCAREPLAN_GEN_ALL_CORE_FT
PRINCIPAL DISCHARGE DIAGNOSIS  Diagnosis: Intestinal stoma prolapse  Assessment and Plan of Treatment:

## 2023-04-20 ENCOUNTER — TRANSCRIPTION ENCOUNTER (OUTPATIENT)
Age: 53
End: 2023-04-20

## 2023-04-23 DIAGNOSIS — Z90.49 ACQUIRED ABSENCE OF OTHER SPECIFIED PARTS OF DIGESTIVE TRACT: ICD-10-CM

## 2023-04-23 DIAGNOSIS — D72.829 ELEVATED WHITE BLOOD CELL COUNT, UNSPECIFIED: ICD-10-CM

## 2023-04-23 DIAGNOSIS — Z85.038 PERSONAL HISTORY OF OTHER MALIGNANT NEOPLASM OF LARGE INTESTINE: ICD-10-CM

## 2023-04-23 DIAGNOSIS — K94.19 OTHER COMPLICATIONS OF ENTEROSTOMY: ICD-10-CM

## 2023-04-24 LAB — SURGICAL PATHOLOGY STUDY: SIGNIFICANT CHANGE UP

## 2023-04-26 ENCOUNTER — APPOINTMENT (OUTPATIENT)
Dept: HEMATOLOGY ONCOLOGY | Facility: CLINIC | Age: 53
End: 2023-04-26
Payer: MEDICAID

## 2023-04-26 VITALS
BODY MASS INDEX: 24.34 KG/M2 | HEART RATE: 118 BPM | SYSTOLIC BLOOD PRESSURE: 150 MMHG | TEMPERATURE: 98.7 F | RESPIRATION RATE: 18 BRPM | DIASTOLIC BLOOD PRESSURE: 87 MMHG | OXYGEN SATURATION: 96 % | WEIGHT: 189.56 LBS

## 2023-04-26 PROCEDURE — 99214 OFFICE O/P EST MOD 30 MIN: CPT

## 2023-04-28 NOTE — PHARMACOTHERAPY INTERVENTION NOTE - COMMENTS
Med history complete, reviewed medications and allergies with patient and confirmed medication list with doctor first med profile, all medication related questions answered  
hard copy, drawn during this pregnancy

## 2023-05-02 NOTE — PHYSICAL EXAM
[Restricted in physically strenuous activity but ambulatory and able to carry out work of a light or sedentary nature] : Status 1- Restricted in physically strenuous activity but ambulatory and able to carry out work of a light or sedentary nature, e.g., light house work, office work [Normal] : affect appropriate [de-identified] : wt gain 8 lbs since last visit [de-identified] : ileotomy in place, ostomy dressing C/D/I [de-identified] : well healed vertical abdominal incision; rt pt accessed and dressing C/D/I

## 2023-05-02 NOTE — HISTORY OF PRESENT ILLNESS
[de-identified] : The patient was diagnosed with colon cancer in May 2022 at the age of 51. He presented to  ER 5/19/22 with worsening abdominal pain for 4 days. He reports abdominal pain has been ongoing for 4 years. On 5/19/22 he had a CT A/P which showed apparent focal mural thickening at the cecum/proximal ascending colon may represent colon cancer. The sigmoid colon appears to be tethered to the cecum and it is focally thick-walled; focal tumor invasion/extension from the cecum to the sigmoid colon cannot be excluded. Dilatation of the small bowel with an apparent transition point in the right lower quadrant abdomen, suggestive of small bowel obstruction. Apparent mild mural thickening of the a dilated right lower quadrant small bowel loop with adjacent mesenteric edema for which associated small bowel ischemia cannot be excluded. Multiple hypodense lesions with calcifications in both lobes of the liver with the largest measuring 5.8 x 6.0 cm in hepatic segment 8. These are suspicious for metastases. Mild ascites. Also on 5/19/22 he underwent exploratory laparotomy, total colectomy, end ileostomy, biopsy of liver, and biopsy of retroperitoneum. Pathology showed Omentum, excision: Negative for malignancy. Acutely inflamed exudate consistent with peritonitis. Portion of terminal ileum, cecum with adherent sigmoid, total colectomy: Poorly differentiated infiltrating adenocarcinoma measuring 9.0 cm. Adenocarcinoma infiltrates through the bowel wall and through the visceral peritoneum and infiltrates the adherent sigmoid colon 3 of 17 lymph nodes are positive for metastatic carcinoma. Lymphovascular space invasion is identified. Perineural invasion is identified. The surgical margins are negative. Appendix with serosal tumor implants and acute inflammation. Peritonitis. Liver, biopsy: Metastatic adenocarcinoma. Retroperitoneal biopsy: Adenocarcinoma with necrosis. No loss of nuclear expression of MMR proteins (MLH1, MSH2, MSH6, and PMS2). Staging pT4b pN1b pM1c. On 5/25/22 he had a repeat CT A/P which showed status post total colectomy and ileostomy. Dilated small bowel loops in the left abdomen with associated wall thickening. Both may be postoperative, follow-up is recommended. Moderate amount of ascites new from the prior study. Small amount of free intraperitoneal air likely with postoperative state. Minimal left pleural effusion. New multifocal right lower lobe infiltrate. Multiple liver metastases some of which are calcified again appreciated. [de-identified] : Medical Hx: denies \par Surgical Hx: denies \par Family Hx: denies family history of cancer \par Social Hx: he smoked THC and cigarettes daily for 30 years, quit May 19th, 2022; ETOH had 6 pack a day, quit 4 years ago; was a  (off the [employer]) no longer working; lives with mom and brother.   [de-identified] : He completed C19 on 4/4/23 FOLFOXIRI / Zirabev, planned ongoing. Oxali stopped after cycle 10.  \par \par Today he presents for post op surgical follow up. On 4/18/23 he had a ileostomy revision and is healing well, stoma pink and healthy. He c/o 7/10 pain since diagnosis. He takes 2 tabs oxycodone 4 times a day. Requesting refill today. \par \par His chemotherapy side affects are mostly resolved due to hold for surgery. Today his is showing a wt gain, he is eating well. \par \par New PE found on Sept 1, remains on Eliquis BID, andrea well.

## 2023-05-02 NOTE — REVIEW OF SYSTEMS
[Recent Change In Weight] : ~T recent weight change [Negative] : Allergic/Immunologic [Abdominal Pain] : abdominal pain [Chest Pain] : no chest pain [Shortness Of Breath] : no shortness of breath [Constipation] : no constipation [Diarrhea] : no diarrhea [Joint Pain] : no joint pain [Skin Rash] : no skin rash [FreeTextEntry2] : wt gain 8 lbs  [FreeTextEntry7] : ileotomy in place, revision noted, functioning normally; pain at surgical site

## 2023-05-05 ENCOUNTER — OUTPATIENT (OUTPATIENT)
Dept: OUTPATIENT SERVICES | Facility: HOSPITAL | Age: 53
LOS: 1 days | Discharge: HOME | End: 2023-05-05

## 2023-05-05 DIAGNOSIS — Z90.49 ACQUIRED ABSENCE OF OTHER SPECIFIED PARTS OF DIGESTIVE TRACT: Chronic | ICD-10-CM

## 2023-05-05 DIAGNOSIS — C18.9 MALIGNANT NEOPLASM OF COLON, UNSPECIFIED: ICD-10-CM

## 2023-05-10 ENCOUNTER — RESULT REVIEW (OUTPATIENT)
Age: 53
End: 2023-05-10

## 2023-05-10 ENCOUNTER — APPOINTMENT (OUTPATIENT)
Dept: INFUSION THERAPY | Facility: CLINIC | Age: 53
End: 2023-05-10

## 2023-05-10 VITALS
BODY MASS INDEX: 24.78 KG/M2 | SYSTOLIC BLOOD PRESSURE: 131 MMHG | OXYGEN SATURATION: 97 % | TEMPERATURE: 98.7 F | HEART RATE: 105 BPM | DIASTOLIC BLOOD PRESSURE: 90 MMHG | RESPIRATION RATE: 18 BRPM | WEIGHT: 193 LBS

## 2023-05-10 LAB
ALBUMIN SERPL ELPH-MCNC: 4.6 G/DL — SIGNIFICANT CHANGE UP (ref 3.3–5)
ALP SERPL-CCNC: 143 U/L — HIGH (ref 40–120)
ALT FLD-CCNC: 24 U/L — SIGNIFICANT CHANGE UP (ref 10–45)
ANION GAP SERPL CALC-SCNC: 11 MMOL/L — SIGNIFICANT CHANGE UP (ref 5–17)
AST SERPL-CCNC: 24 U/L — SIGNIFICANT CHANGE UP (ref 10–40)
BASOPHILS # BLD AUTO: 0.03 K/UL — SIGNIFICANT CHANGE UP (ref 0–0.2)
BASOPHILS NFR BLD AUTO: 0.5 % — SIGNIFICANT CHANGE UP (ref 0–2)
BILIRUB SERPL-MCNC: 0.3 MG/DL — SIGNIFICANT CHANGE UP (ref 0.2–1.2)
BUN SERPL-MCNC: 10 MG/DL — SIGNIFICANT CHANGE UP (ref 7–23)
CALCIUM SERPL-MCNC: 9.1 MG/DL — SIGNIFICANT CHANGE UP (ref 8.4–10.5)
CHLORIDE SERPL-SCNC: 106 MMOL/L — SIGNIFICANT CHANGE UP (ref 96–108)
CO2 SERPL-SCNC: 22 MMOL/L — SIGNIFICANT CHANGE UP (ref 22–31)
CREAT SERPL-MCNC: 0.7 MG/DL — SIGNIFICANT CHANGE UP (ref 0.5–1.3)
EGFR: 112 ML/MIN/1.73M2 — SIGNIFICANT CHANGE UP
EOSINOPHIL # BLD AUTO: 0.3 K/UL — SIGNIFICANT CHANGE UP (ref 0–0.5)
EOSINOPHIL NFR BLD AUTO: 4.6 % — SIGNIFICANT CHANGE UP (ref 0–6)
GLUCOSE SERPL-MCNC: 100 MG/DL — HIGH (ref 70–99)
HCT VFR BLD CALC: 39.8 % — SIGNIFICANT CHANGE UP (ref 39–50)
HGB BLD-MCNC: 12.8 G/DL — LOW (ref 13–17)
IMM GRANULOCYTES NFR BLD AUTO: 0.3 % — SIGNIFICANT CHANGE UP (ref 0–0.9)
LYMPHOCYTES # BLD AUTO: 1.22 K/UL — SIGNIFICANT CHANGE UP (ref 1–3.3)
LYMPHOCYTES # BLD AUTO: 18.7 % — SIGNIFICANT CHANGE UP (ref 13–44)
MAGNESIUM SERPL-MCNC: 1.9 MG/DL — SIGNIFICANT CHANGE UP (ref 1.6–2.6)
MCHC RBC-ENTMCNC: 27.5 PG — SIGNIFICANT CHANGE UP (ref 27–34)
MCHC RBC-ENTMCNC: 32.2 GM/DL — SIGNIFICANT CHANGE UP (ref 32–36)
MCV RBC AUTO: 85.6 FL — SIGNIFICANT CHANGE UP (ref 80–100)
MONOCYTES # BLD AUTO: 0.66 K/UL — SIGNIFICANT CHANGE UP (ref 0–0.9)
MONOCYTES NFR BLD AUTO: 10.1 % — SIGNIFICANT CHANGE UP (ref 2–14)
NEUTROPHILS # BLD AUTO: 4.3 K/UL — SIGNIFICANT CHANGE UP (ref 1.8–7.4)
NEUTROPHILS NFR BLD AUTO: 65.8 % — SIGNIFICANT CHANGE UP (ref 43–77)
NRBC # BLD: 0 /100 WBCS — SIGNIFICANT CHANGE UP (ref 0–0)
PLATELET # BLD AUTO: 140 K/UL — LOW (ref 150–400)
POTASSIUM SERPL-MCNC: 4.3 MMOL/L — SIGNIFICANT CHANGE UP (ref 3.5–5.3)
POTASSIUM SERPL-SCNC: 4.3 MMOL/L — SIGNIFICANT CHANGE UP (ref 3.5–5.3)
PROT SERPL-MCNC: 7.4 G/DL — SIGNIFICANT CHANGE UP (ref 6–8.3)
RBC # BLD: 4.65 M/UL — SIGNIFICANT CHANGE UP (ref 4.2–5.8)
RBC # FLD: 16.9 % — HIGH (ref 10.3–14.5)
SODIUM SERPL-SCNC: 139 MMOL/L — SIGNIFICANT CHANGE UP (ref 135–145)
WBC # BLD: 6.53 K/UL — SIGNIFICANT CHANGE UP (ref 3.8–10.5)
WBC # FLD AUTO: 6.53 K/UL — SIGNIFICANT CHANGE UP (ref 3.8–10.5)

## 2023-05-11 DIAGNOSIS — Z51.11 ENCOUNTER FOR ANTINEOPLASTIC CHEMOTHERAPY: ICD-10-CM

## 2023-05-11 DIAGNOSIS — R11.2 NAUSEA WITH VOMITING, UNSPECIFIED: ICD-10-CM

## 2023-05-12 ENCOUNTER — APPOINTMENT (OUTPATIENT)
Dept: INFUSION THERAPY | Facility: CLINIC | Age: 53
End: 2023-05-12

## 2023-05-15 DIAGNOSIS — Z51.89 ENCOUNTER FOR OTHER SPECIFIED AFTERCARE: ICD-10-CM

## 2023-05-24 ENCOUNTER — RESULT REVIEW (OUTPATIENT)
Age: 53
End: 2023-05-24

## 2023-05-24 ENCOUNTER — APPOINTMENT (OUTPATIENT)
Dept: INFUSION THERAPY | Facility: CLINIC | Age: 53
End: 2023-05-24

## 2023-05-24 LAB
ALBUMIN SERPL ELPH-MCNC: 4.4 G/DL — SIGNIFICANT CHANGE UP (ref 3.3–5)
ALP SERPL-CCNC: 178 U/L — HIGH (ref 40–120)
ALT FLD-CCNC: 18 U/L — SIGNIFICANT CHANGE UP (ref 10–45)
ANION GAP SERPL CALC-SCNC: 15 MMOL/L — SIGNIFICANT CHANGE UP (ref 5–17)
ANISOCYTOSIS BLD QL: SLIGHT — SIGNIFICANT CHANGE UP
AST SERPL-CCNC: 18 U/L — SIGNIFICANT CHANGE UP (ref 10–40)
BASOPHILS # BLD AUTO: 0.05 K/UL — SIGNIFICANT CHANGE UP (ref 0–0.2)
BASOPHILS NFR BLD AUTO: 0.3 % — SIGNIFICANT CHANGE UP (ref 0–2)
BILIRUB SERPL-MCNC: 0.2 MG/DL — SIGNIFICANT CHANGE UP (ref 0.2–1.2)
BUN SERPL-MCNC: 15 MG/DL — SIGNIFICANT CHANGE UP (ref 7–23)
CALCIUM SERPL-MCNC: 9.3 MG/DL — SIGNIFICANT CHANGE UP (ref 8.4–10.5)
CHLORIDE SERPL-SCNC: 103 MMOL/L — SIGNIFICANT CHANGE UP (ref 96–108)
CO2 SERPL-SCNC: 20 MMOL/L — LOW (ref 22–31)
CREAT SERPL-MCNC: 0.85 MG/DL — SIGNIFICANT CHANGE UP (ref 0.5–1.3)
DACRYOCYTES BLD QL SMEAR: SLIGHT — SIGNIFICANT CHANGE UP
EGFR: 106 ML/MIN/1.73M2 — SIGNIFICANT CHANGE UP
ELLIPTOCYTES BLD QL SMEAR: SLIGHT — SIGNIFICANT CHANGE UP
EOSINOPHIL # BLD AUTO: 0.29 K/UL — SIGNIFICANT CHANGE UP (ref 0–0.5)
EOSINOPHIL NFR BLD AUTO: 2 % — SIGNIFICANT CHANGE UP (ref 0–6)
GLUCOSE SERPL-MCNC: 108 MG/DL — HIGH (ref 70–99)
HCT VFR BLD CALC: 41.3 % — SIGNIFICANT CHANGE UP (ref 39–50)
HGB BLD-MCNC: 13.2 G/DL — SIGNIFICANT CHANGE UP (ref 13–17)
IMM GRANULOCYTES NFR BLD AUTO: 0.9 % — SIGNIFICANT CHANGE UP (ref 0–0.9)
LG PLATELETS BLD QL AUTO: SLIGHT — SIGNIFICANT CHANGE UP
LYMPHOCYTES # BLD AUTO: 1.65 K/UL — SIGNIFICANT CHANGE UP (ref 1–3.3)
LYMPHOCYTES # BLD AUTO: 11.2 % — LOW (ref 13–44)
MACROCYTES BLD QL: SLIGHT — SIGNIFICANT CHANGE UP
MAGNESIUM SERPL-MCNC: 1.9 MG/DL — SIGNIFICANT CHANGE UP (ref 1.6–2.6)
MCHC RBC-ENTMCNC: 27.8 PG — SIGNIFICANT CHANGE UP (ref 27–34)
MCHC RBC-ENTMCNC: 32 GM/DL — SIGNIFICANT CHANGE UP (ref 32–36)
MCV RBC AUTO: 87.1 FL — SIGNIFICANT CHANGE UP (ref 80–100)
MICROCYTES BLD QL: SLIGHT — SIGNIFICANT CHANGE UP
MONOCYTES # BLD AUTO: 0.66 K/UL — SIGNIFICANT CHANGE UP (ref 0–0.9)
MONOCYTES NFR BLD AUTO: 4.5 % — SIGNIFICANT CHANGE UP (ref 2–14)
NEUTROPHILS # BLD AUTO: 11.96 K/UL — HIGH (ref 1.8–7.4)
NEUTROPHILS NFR BLD AUTO: 81.1 % — HIGH (ref 43–77)
NRBC # BLD: 0 /100 WBCS — SIGNIFICANT CHANGE UP (ref 0–0)
OVALOCYTES BLD QL SMEAR: SLIGHT — SIGNIFICANT CHANGE UP
PLAT MORPH BLD: NORMAL — SIGNIFICANT CHANGE UP
PLATELET # BLD AUTO: 115 K/UL — LOW (ref 150–400)
POIKILOCYTOSIS BLD QL AUTO: SLIGHT — SIGNIFICANT CHANGE UP
POTASSIUM SERPL-MCNC: 4.5 MMOL/L — SIGNIFICANT CHANGE UP (ref 3.5–5.3)
POTASSIUM SERPL-SCNC: 4.5 MMOL/L — SIGNIFICANT CHANGE UP (ref 3.5–5.3)
PROT SERPL-MCNC: 7.5 G/DL — SIGNIFICANT CHANGE UP (ref 6–8.3)
RBC # BLD: 4.74 M/UL — SIGNIFICANT CHANGE UP (ref 4.2–5.8)
RBC # FLD: 16.7 % — HIGH (ref 10.3–14.5)
RBC BLD AUTO: SIGNIFICANT CHANGE UP
SODIUM SERPL-SCNC: 138 MMOL/L — SIGNIFICANT CHANGE UP (ref 135–145)
WBC # BLD: 14.75 K/UL — HIGH (ref 3.8–10.5)
WBC # FLD AUTO: 14.75 K/UL — HIGH (ref 3.8–10.5)

## 2023-05-26 ENCOUNTER — APPOINTMENT (OUTPATIENT)
Dept: INFUSION THERAPY | Facility: CLINIC | Age: 53
End: 2023-05-26

## 2023-06-07 ENCOUNTER — RESULT REVIEW (OUTPATIENT)
Age: 53
End: 2023-06-07

## 2023-06-07 ENCOUNTER — APPOINTMENT (OUTPATIENT)
Dept: INFUSION THERAPY | Facility: CLINIC | Age: 53
End: 2023-06-07

## 2023-06-07 ENCOUNTER — RESULT CHARGE (OUTPATIENT)
Age: 53
End: 2023-06-07

## 2023-06-07 LAB
ALBUMIN SERPL ELPH-MCNC: 4.7 G/DL — SIGNIFICANT CHANGE UP (ref 3.3–5)
ALP SERPL-CCNC: 199 U/L — HIGH (ref 40–120)
ALT FLD-CCNC: 21 U/L — SIGNIFICANT CHANGE UP (ref 10–45)
ANION GAP SERPL CALC-SCNC: 13 MMOL/L — SIGNIFICANT CHANGE UP (ref 5–17)
AST SERPL-CCNC: 17 U/L — SIGNIFICANT CHANGE UP (ref 10–40)
BASOPHILS # BLD AUTO: 0.05 K/UL — SIGNIFICANT CHANGE UP (ref 0–0.2)
BASOPHILS NFR BLD AUTO: 0.3 % — SIGNIFICANT CHANGE UP (ref 0–2)
BILIRUB SERPL-MCNC: 0.2 MG/DL — SIGNIFICANT CHANGE UP (ref 0.2–1.2)
BUN SERPL-MCNC: 11 MG/DL — SIGNIFICANT CHANGE UP (ref 7–23)
CALCIUM SERPL-MCNC: 9.4 MG/DL — SIGNIFICANT CHANGE UP (ref 8.4–10.5)
CEA SERPL-MCNC: 18.8 NG/ML — HIGH (ref 0–3.8)
CHLORIDE SERPL-SCNC: 103 MMOL/L — SIGNIFICANT CHANGE UP (ref 96–108)
CO2 SERPL-SCNC: 22 MMOL/L — SIGNIFICANT CHANGE UP (ref 22–31)
CREAT SERPL-MCNC: 0.89 MG/DL — SIGNIFICANT CHANGE UP (ref 0.5–1.3)
EGFR: 104 ML/MIN/1.73M2 — SIGNIFICANT CHANGE UP
EOSINOPHIL # BLD AUTO: 0.24 K/UL — SIGNIFICANT CHANGE UP (ref 0–0.5)
EOSINOPHIL NFR BLD AUTO: 1.5 % — SIGNIFICANT CHANGE UP (ref 0–6)
GLUCOSE SERPL-MCNC: 126 MG/DL — HIGH (ref 70–99)
HCT VFR BLD CALC: 43.9 % — SIGNIFICANT CHANGE UP (ref 39–50)
HGB BLD-MCNC: 14.2 G/DL — SIGNIFICANT CHANGE UP (ref 13–17)
IMM GRANULOCYTES NFR BLD AUTO: 3.8 % — HIGH (ref 0–0.9)
LYMPHOCYTES # BLD AUTO: 1.62 K/UL — SIGNIFICANT CHANGE UP (ref 1–3.3)
LYMPHOCYTES # BLD AUTO: 10 % — LOW (ref 13–44)
MAGNESIUM SERPL-MCNC: 1.9 MG/DL — SIGNIFICANT CHANGE UP (ref 1.6–2.6)
MCHC RBC-ENTMCNC: 27.8 PG — SIGNIFICANT CHANGE UP (ref 27–34)
MCHC RBC-ENTMCNC: 32.3 GM/DL — SIGNIFICANT CHANGE UP (ref 32–36)
MCV RBC AUTO: 85.9 FL — SIGNIFICANT CHANGE UP (ref 80–100)
MONOCYTES # BLD AUTO: 1.08 K/UL — HIGH (ref 0–0.9)
MONOCYTES NFR BLD AUTO: 6.7 % — SIGNIFICANT CHANGE UP (ref 2–14)
NEUTROPHILS # BLD AUTO: 12.58 K/UL — HIGH (ref 1.8–7.4)
NEUTROPHILS NFR BLD AUTO: 77.7 % — HIGH (ref 43–77)
NRBC # BLD: 0 /100 WBCS — SIGNIFICANT CHANGE UP (ref 0–0)
PLATELET # BLD AUTO: 178 K/UL — SIGNIFICANT CHANGE UP (ref 150–400)
POTASSIUM SERPL-MCNC: 4 MMOL/L — SIGNIFICANT CHANGE UP (ref 3.5–5.3)
POTASSIUM SERPL-SCNC: 4 MMOL/L — SIGNIFICANT CHANGE UP (ref 3.5–5.3)
PROT SERPL-MCNC: 7.6 G/DL — SIGNIFICANT CHANGE UP (ref 6–8.3)
RBC # BLD: 5.11 M/UL — SIGNIFICANT CHANGE UP (ref 4.2–5.8)
RBC # FLD: 16.8 % — HIGH (ref 10.3–14.5)
SODIUM SERPL-SCNC: 138 MMOL/L — SIGNIFICANT CHANGE UP (ref 135–145)
WBC # BLD: 16.18 K/UL — HIGH (ref 3.8–10.5)
WBC # FLD AUTO: 16.18 K/UL — HIGH (ref 3.8–10.5)

## 2023-06-09 ENCOUNTER — APPOINTMENT (OUTPATIENT)
Dept: INFUSION THERAPY | Facility: CLINIC | Age: 53
End: 2023-06-09

## 2023-06-12 ENCOUNTER — APPOINTMENT (OUTPATIENT)
Dept: CARDIOLOGY | Facility: CLINIC | Age: 53
End: 2023-06-12
Payer: MEDICAID

## 2023-06-12 ENCOUNTER — NON-APPOINTMENT (OUTPATIENT)
Age: 53
End: 2023-06-12

## 2023-06-12 VITALS
WEIGHT: 194 LBS | BODY MASS INDEX: 24.9 KG/M2 | HEART RATE: 100 BPM | DIASTOLIC BLOOD PRESSURE: 72 MMHG | HEIGHT: 74 IN | TEMPERATURE: 98.7 F | RESPIRATION RATE: 18 BRPM | SYSTOLIC BLOOD PRESSURE: 110 MMHG | OXYGEN SATURATION: 96 %

## 2023-06-12 VITALS
DIASTOLIC BLOOD PRESSURE: 72 MMHG | BODY MASS INDEX: 24.9 KG/M2 | HEIGHT: 74 IN | HEART RATE: 104 BPM | WEIGHT: 194 LBS | SYSTOLIC BLOOD PRESSURE: 100 MMHG | OXYGEN SATURATION: 96 %

## 2023-06-12 DIAGNOSIS — I51.7 CARDIOMEGALY: ICD-10-CM

## 2023-06-12 DIAGNOSIS — R00.0 TACHYCARDIA, UNSPECIFIED: ICD-10-CM

## 2023-06-12 PROCEDURE — 93000 ELECTROCARDIOGRAM COMPLETE: CPT

## 2023-06-12 PROCEDURE — 99214 OFFICE O/P EST MOD 30 MIN: CPT | Mod: 25

## 2023-06-12 NOTE — CARDIOLOGY SUMMARY
[de-identified] : 6/12/23 sinus tachycardia , right axis  low qrs in limb leads ,  [de-identified] :  sinus rhythm rare PVC  average rare 92 BPM ,   tachycardia 8 hours  highest for 12 minutes time  [de-identified] : 10/5/22  EF 59% Mild DD , Mild MR

## 2023-06-12 NOTE — HISTORY OF PRESENT ILLNESS
[FreeTextEntry1] : 52 year old male with hx metastatic colon carcinoma, on chemotherapy ,pulmonary embolism on CT angio September 2022 , chronic back pain  who was noted to have cardiomegaly on CXR done couple of weeks ago . Patient receiving  chemotherapy , patient denies any chest pain or shortness of breath ,Patient had revised ileostomy in april  tolerated well , Patient is feeling fine ,but occasionally dizziness when he gets up quickly or turn quickly ,  his blood pressure  normal range ,  \par \par Patient has been tachycardic on EKG. right axis   Holter monitor revealed RSR-ST average HR 92 BPM, rare PVC's\par \par Patient is gaining weight slowly , \par \par his recent blood work from hematology reviewed.\par \par

## 2023-06-12 NOTE — DISCUSSION/SUMMARY
[FreeTextEntry1] : 51 year old male\par \par abnormal CXR   cardiomegaly : tachycardic ? anxiety ,Echo revealed normal LVF, mild MR; holter monitor revealed SR-ST \par \par Sinus tachycardia : right axis ? prior PE  no symptoms , ? anxiety  now better  Pt will have bw ordered at last visit including TSH , encourage patient to increase fluid intake \par \par Pulmonary embolism : no SOB , Echo results as above; continue anticoagulation \par \par Metastatic colon carcinoma:  further plan as per oncology \par \par follow up after 4 months \par \par  [EKG obtained to assist in diagnosis and management of assessed problem(s)] : EKG obtained to assist in diagnosis and management of assessed problem(s)

## 2023-06-12 NOTE — PHYSICAL EXAM
[Well Developed] : well developed [Well Nourished] : well nourished [No Acute Distress] : no acute distress [Normal Conjunctiva] : normal conjunctiva [Normal Venous Pressure] : normal venous pressure [No Carotid Bruit] : no carotid bruit [Normal S1, S2] : normal S1, S2 [Tachycardia] : tachycardic [Heart Rate ___] : [unfilled] bpm [Normal S1] : normal S1 [Rhythm Regular] : regular [Normal S2] : normal S2 [No Murmur] : no murmurs heard [No Pitting Edema] : no pitting edema present [2+] : right 2+ [No Abnormalities] : the abdominal aorta was not enlarged and no bruit was heard [Clear Lung Fields] : clear lung fields [Good Air Entry] : good air entry [No Respiratory Distress] : no respiratory distress  [Soft] : abdomen soft [Non Tender] : non-tender [Normal Bowel Sounds] : normal bowel sounds [Normal Gait] : normal gait [No Edema] : no edema [No Cyanosis] : no cyanosis [No Clubbing] : no clubbing [No Varicosities] : no varicosities [Normal Radial B/L] : normal radial B/L [No Rash] : no rash [No Skin Lesions] : no skin lesions [Moves all extremities] : moves all extremities [No Focal Deficits] : no focal deficits [Normal Speech] : normal speech [Alert and Oriented] : alert and oriented [Normal memory] : normal memory [S3] : no S3 [S4] : no S4 [Rt] : no varicose veins of the right leg [Right Carotid Bruit] : no bruit heard over the right carotid [Left Carotid Bruit] : no bruit heard over the left carotid [Right Femoral Bruit] : no bruit heard over the right femoral artery [Left Femoral Bruit] : no bruit heard over the left femoral artery [de-identified] : colostomy bag in place

## 2023-06-13 ENCOUNTER — OUTPATIENT (OUTPATIENT)
Dept: OUTPATIENT SERVICES | Facility: HOSPITAL | Age: 53
LOS: 1 days | End: 2023-06-13
Payer: MEDICAID

## 2023-06-13 ENCOUNTER — APPOINTMENT (OUTPATIENT)
Dept: CT IMAGING | Facility: CLINIC | Age: 53
End: 2023-06-13
Payer: MEDICAID

## 2023-06-13 DIAGNOSIS — Z90.49 ACQUIRED ABSENCE OF OTHER SPECIFIED PARTS OF DIGESTIVE TRACT: Chronic | ICD-10-CM

## 2023-06-13 DIAGNOSIS — C18.9 MALIGNANT NEOPLASM OF COLON, UNSPECIFIED: ICD-10-CM

## 2023-06-13 PROCEDURE — 71260 CT THORAX DX C+: CPT

## 2023-06-13 PROCEDURE — 74177 CT ABD & PELVIS W/CONTRAST: CPT | Mod: 26

## 2023-06-13 PROCEDURE — 71260 CT THORAX DX C+: CPT | Mod: 26

## 2023-06-13 PROCEDURE — 74177 CT ABD & PELVIS W/CONTRAST: CPT

## 2023-06-21 ENCOUNTER — RESULT REVIEW (OUTPATIENT)
Age: 53
End: 2023-06-21

## 2023-06-21 ENCOUNTER — APPOINTMENT (OUTPATIENT)
Dept: INFUSION THERAPY | Facility: CLINIC | Age: 53
End: 2023-06-21

## 2023-06-21 VITALS
RESPIRATION RATE: 18 BRPM | OXYGEN SATURATION: 98 % | DIASTOLIC BLOOD PRESSURE: 71 MMHG | TEMPERATURE: 98.7 F | SYSTOLIC BLOOD PRESSURE: 116 MMHG | HEART RATE: 99 BPM | BODY MASS INDEX: 24.78 KG/M2 | WEIGHT: 193 LBS

## 2023-06-21 LAB
ALBUMIN SERPL ELPH-MCNC: 4.5 G/DL — SIGNIFICANT CHANGE UP (ref 3.3–5)
ALP SERPL-CCNC: 221 U/L — HIGH (ref 40–120)
ALT FLD-CCNC: 19 U/L — SIGNIFICANT CHANGE UP (ref 10–45)
ANION GAP SERPL CALC-SCNC: 14 MMOL/L — SIGNIFICANT CHANGE UP (ref 5–17)
AST SERPL-CCNC: 20 U/L — SIGNIFICANT CHANGE UP (ref 10–40)
BASOPHILS # BLD AUTO: 0.1 K/UL — SIGNIFICANT CHANGE UP (ref 0–0.2)
BASOPHILS NFR BLD AUTO: 0.5 % — SIGNIFICANT CHANGE UP (ref 0–2)
BILIRUB SERPL-MCNC: 0.2 MG/DL — SIGNIFICANT CHANGE UP (ref 0.2–1.2)
BUN SERPL-MCNC: 14 MG/DL — SIGNIFICANT CHANGE UP (ref 7–23)
CALCIUM SERPL-MCNC: 9.4 MG/DL — SIGNIFICANT CHANGE UP (ref 8.4–10.5)
CHLORIDE SERPL-SCNC: 104 MMOL/L — SIGNIFICANT CHANGE UP (ref 96–108)
CO2 SERPL-SCNC: 20 MMOL/L — LOW (ref 22–31)
CREAT SERPL-MCNC: 0.9 MG/DL — SIGNIFICANT CHANGE UP (ref 0.5–1.3)
EGFR: 104 ML/MIN/1.73M2 — SIGNIFICANT CHANGE UP
EOSINOPHIL # BLD AUTO: 0.2 K/UL — SIGNIFICANT CHANGE UP (ref 0–0.5)
EOSINOPHIL NFR BLD AUTO: 1 % — SIGNIFICANT CHANGE UP (ref 0–6)
GLUCOSE SERPL-MCNC: 100 MG/DL — HIGH (ref 70–99)
HCT VFR BLD CALC: 43 % — SIGNIFICANT CHANGE UP (ref 39–50)
HGB BLD-MCNC: 13.9 G/DL — SIGNIFICANT CHANGE UP (ref 13–17)
IMM GRANULOCYTES NFR BLD AUTO: 2 % — HIGH (ref 0–0.9)
LYMPHOCYTES # BLD AUTO: 10.2 % — LOW (ref 13–44)
LYMPHOCYTES # BLD AUTO: 2.02 K/UL — SIGNIFICANT CHANGE UP (ref 1–3.3)
MAGNESIUM SERPL-MCNC: 2 MG/DL — SIGNIFICANT CHANGE UP (ref 1.6–2.6)
MCHC RBC-ENTMCNC: 27.7 PG — SIGNIFICANT CHANGE UP (ref 27–34)
MCHC RBC-ENTMCNC: 32.3 GM/DL — SIGNIFICANT CHANGE UP (ref 32–36)
MCV RBC AUTO: 85.8 FL — SIGNIFICANT CHANGE UP (ref 80–100)
MONOCYTES # BLD AUTO: 1.05 K/UL — HIGH (ref 0–0.9)
MONOCYTES NFR BLD AUTO: 5.3 % — SIGNIFICANT CHANGE UP (ref 2–14)
NEUTROPHILS # BLD AUTO: 16.05 K/UL — HIGH (ref 1.8–7.4)
NEUTROPHILS NFR BLD AUTO: 81 % — HIGH (ref 43–77)
NRBC # BLD: 0 /100 WBCS — SIGNIFICANT CHANGE UP (ref 0–0)
PLATELET # BLD AUTO: 212 K/UL — SIGNIFICANT CHANGE UP (ref 150–400)
POTASSIUM SERPL-MCNC: 4.2 MMOL/L — SIGNIFICANT CHANGE UP (ref 3.5–5.3)
POTASSIUM SERPL-SCNC: 4.2 MMOL/L — SIGNIFICANT CHANGE UP (ref 3.5–5.3)
PROT SERPL-MCNC: 7.6 G/DL — SIGNIFICANT CHANGE UP (ref 6–8.3)
RBC # BLD: 5.01 M/UL — SIGNIFICANT CHANGE UP (ref 4.2–5.8)
RBC # FLD: 16.6 % — HIGH (ref 10.3–14.5)
SODIUM SERPL-SCNC: 138 MMOL/L — SIGNIFICANT CHANGE UP (ref 135–145)
WBC # BLD: 19.82 K/UL — HIGH (ref 3.8–10.5)
WBC # FLD AUTO: 19.82 K/UL — HIGH (ref 3.8–10.5)

## 2023-06-23 ENCOUNTER — APPOINTMENT (OUTPATIENT)
Dept: INFUSION THERAPY | Facility: CLINIC | Age: 53
End: 2023-06-23

## 2023-06-26 NOTE — ASU PREOP CHECKLIST - NS PREOP CHK CHLOROHEX WASH
RASHAD CHEEMA    Patient Age: 41 year old   Interpreting service used: No    Insurance on file confirmed with caller: Yes    Patient seen within 1 year by a provider in primary care? Yes-      Refill to be: ePrescribed    Medication requested to be refilled: See Pended Medication    Addition Information:  Patient has enough for about 2 days    Does patient have enough to medication for 72 business hours? No- Route message to provider clinical pool- HIGH PRIORITY    Caller informed to check with the pharmacy later for their refill.  If problems arise, we will contact patient.    Message read back to caller for accuracy: Yes     WEIGHT AND HEIGHT:   Wt Readings from Last 1 Encounters:   03/21/23 100.7 kg (222 lb)     Ht Readings from Last 1 Encounters:   03/21/23 5' 5\" (1.651 m)     BMI Readings from Last 1 Encounters:   03/21/23 36.94 kg/m²       ALLERGIES:  Benzocaine and Prednisone  Current Outpatient Medications   Medication Sig Dispense Refill   • azelastine (ASTELIN) 0.1 % nasal spray Spray 1 spray in each nostril in the morning and 1 spray in the evening. Use in each nostril as directed 30 mL 1   • atenolol (TENORMIN) 50 MG tablet TAKE 1 TABLET BY MOUTH DAILY 90 tablet 0   • triamcinolone (KENALOG) 0.025 % cream APPLY SPARINGLY TO THE AFFECTED AREA DAILY FOR 2 WEEKS, THEN TAKE A 2 WEEK BREAK AND REPEAT AS NEEDED. 454 g 1   • Blaire-BE 0.35 MG tablet TAKE 1 TABLET BY MOUTH DAILY 90 tablet 4   • fluticasone (Flonase) 50 MCG/ACT nasal spray Spray 2 sprays in each nostril daily. 1 each 0   • turmeric 500 MG capsule      • COLLAGEN PO      • Ascorbic Acid (vitamin C) 500 MG tablet Take 500 mg by mouth daily.     • Cholecalciferol (VITAMIN D-3) 25 mcg (1,000 units) capsule      • Magnesium 100 MG Cap Take 100 mg by mouth daily.     • Multiple Vitamins-Minerals (MULTIVITAMIN ADULT PO) Take 1 tablet by mouth daily.     • ASHWAGANDHA PO Take 800 mg by mouth.       No current facility-administered medications for this  visit.         CALL BACK INFO: Ok to leave response (including medical information) on answering machine        PCP: Anna Marie Luna DO         INS: Payor: BLUE CROSS [place]UE [place]ELD [place] / Plan: BLUE CHOICE PREFERRED / Product Type: PPO MISC   PATIENT ADDRESS:  [id number] [id number]   N/A

## 2023-07-03 ENCOUNTER — APPOINTMENT (OUTPATIENT)
Dept: HEMATOLOGY ONCOLOGY | Facility: CLINIC | Age: 53
End: 2023-07-03
Payer: MEDICAID

## 2023-07-03 ENCOUNTER — OUTPATIENT (OUTPATIENT)
Dept: OUTPATIENT SERVICES | Facility: HOSPITAL | Age: 53
LOS: 1 days | Discharge: HOME | End: 2023-07-03

## 2023-07-03 VITALS
WEIGHT: 192.06 LBS | HEIGHT: 74 IN | DIASTOLIC BLOOD PRESSURE: 77 MMHG | SYSTOLIC BLOOD PRESSURE: 114 MMHG | BODY MASS INDEX: 24.65 KG/M2 | TEMPERATURE: 98.1 F | RESPIRATION RATE: 18 BRPM | HEART RATE: 99 BPM | OXYGEN SATURATION: 98 %

## 2023-07-03 DIAGNOSIS — C18.9 MALIGNANT NEOPLASM OF COLON, UNSPECIFIED: ICD-10-CM

## 2023-07-03 DIAGNOSIS — Z90.49 ACQUIRED ABSENCE OF OTHER SPECIFIED PARTS OF DIGESTIVE TRACT: Chronic | ICD-10-CM

## 2023-07-03 PROCEDURE — 99215 OFFICE O/P EST HI 40 MIN: CPT

## 2023-07-03 NOTE — PHYSICAL EXAM
[Restricted in physically strenuous activity but ambulatory and able to carry out work of a light or sedentary nature] : Status 1- Restricted in physically strenuous activity but ambulatory and able to carry out work of a light or sedentary nature, e.g., light house work, office work [Normal] : affect appropriate [de-identified] : wt gain 8 lbs since last visit [de-identified] : ileotomy in place, ostomy dressing C/D/I [de-identified] : well healed vertical abdominal incision; rt pt accessed and dressing C/D/I

## 2023-07-03 NOTE — HISTORY OF PRESENT ILLNESS
[de-identified] : The patient was diagnosed with colon cancer in May 2022 at the age of 51. He presented to  ER 5/19/22 with worsening abdominal pain for 4 days. He reports abdominal pain has been ongoing for 4 years. On 5/19/22 he had a CT A/P which showed apparent focal mural thickening at the cecum/proximal ascending colon may represent colon cancer. The sigmoid colon appears to be tethered to the cecum and it is focally thick-walled; focal tumor invasion/extension from the cecum to the sigmoid colon cannot be excluded. Dilatation of the small bowel with an apparent transition point in the right lower quadrant abdomen, suggestive of small bowel obstruction. Apparent mild mural thickening of the a dilated right lower quadrant small bowel loop with adjacent mesenteric edema for which associated small bowel ischemia cannot be excluded. Multiple hypodense lesions with calcifications in both lobes of the liver with the largest measuring 5.8 x 6.0 cm in hepatic segment 8. These are suspicious for metastases. Mild ascites. Also on 5/19/22 he underwent exploratory laparotomy, total colectomy, end ileostomy, biopsy of liver, and biopsy of retroperitoneum. Pathology showed Omentum, excision: Negative for malignancy. Acutely inflamed exudate consistent with peritonitis. Portion of terminal ileum, cecum with adherent sigmoid, total colectomy: Poorly differentiated infiltrating adenocarcinoma measuring 9.0 cm. Adenocarcinoma infiltrates through the bowel wall and through the visceral peritoneum and infiltrates the adherent sigmoid colon 3 of 17 lymph nodes are positive for metastatic carcinoma. Lymphovascular space invasion is identified. Perineural invasion is identified. The surgical margins are negative. Appendix with serosal tumor implants and acute inflammation. Peritonitis. Liver, biopsy: Metastatic adenocarcinoma. Retroperitoneal biopsy: Adenocarcinoma with necrosis. No loss of nuclear expression of MMR proteins (MLH1, MSH2, MSH6, and PMS2). Staging pT4b pN1b pM1c. On 5/25/22 he had a repeat CT A/P which showed status post total colectomy and ileostomy. Dilated small bowel loops in the left abdomen with associated wall thickening. Both may be postoperative, follow-up is recommended. Moderate amount of ascites new from the prior study. Small amount of free intraperitoneal air likely with postoperative state. Minimal left pleural effusion. New multifocal right lower lobe infiltrate. Multiple liver metastases some of which are calcified again appreciated. [de-identified] : Medical Hx: denies \par Surgical Hx: denies \par Family Hx: denies family history of cancer \par Social Hx: he smoked THC and cigarettes daily for 30 years, quit May 19th, 2022; ETOH had 6 pack a day, quit 4 years ago; was a  (off the [employer]) no longer working; lives with mom and brother.   [de-identified] : He completed C23 was 6/21/23 FOLFOXIRI / Zirabev, planned ongoing. Oxali stopped after cycle 10.  \par \par New PE found on Sept 1, remains on Eliquis BID, andrea well.

## 2023-07-03 NOTE — RESULTS/DATA
[FreeTextEntry1] : 6/13/23 CT C/A/P: New, 8 mm right lower lobe nodule and enlarging pulmonary nodules, 7 mm left apical nodule previously measured 6 mm. Bilobar hepatic metastases, the majority of which are calcified and not significantly changed. A noncalcified lesion in the right hepatic lobe demonstrates mild interval growth, 1.7 x 1.4 cm previously 1.4 x 1.3 cm.\par \par 9/1/22 CT C/A/P: Segmental left lower lobe pulmonary embolus and probable smaller pulmonary emboli within the right lung. Interval decrease in size of bilateral pulmonary nodules and hepatic metastases compared to 5/25/2022. No new sites of disease.\par \par 5/25/22 CT A/P:  Status post total colectomy and ileostomy. Dilated small bowel loops in the left abdomen with associated wall thickening. Both may be postoperative, follow-up is recommended. Moderate amount of ascites new from the prior study. Small amount of free intraperitoneal air likely with postoperative state. Minimal left pleural effusion. New multifocal right lower lobe infiltrate. Multiple liver metastases some of which are calcified again appreciated.\par \par 5/19/22 Path: 1. Omentum, excision: Negative for malignancy. Acutely inflamed exudate consistent with peritonitis\par 2. Portion of terminal ileum, cecum with adherent sigmoid, total colectomy: Poorly differentiated infiltrating adenocarcinoma measuring 9.0 cm. Adenocarcinoma infiltrates through the bowel wall and through the visceral peritoneum and infiltrates the adherent sigmoid colon 3 of 17 lymph nodes are positive for metastatic carcinoma. Lymphovascular space invasion is identified. Perineural invasion is identified. The surgical margins are negative. Appendix with serosal tumor implants and acute inflammation\par Peritonitis\par 3. Liver, biopsy: Metastatic adenocarcinoma\par 4. Retroperitoneal biopsy: Adenocarcinoma with necrosis\par No loss of nuclear expression of MMR proteins (MLH1, MSH2, MSH6, and PMS2).   These results show low probability of microsatellite instability-high (MSI-H).\par Synoptic Summary\par COLON AND RECTUM: Resection, Including Transanal Disk Excision of Rectal Neoplasms\par Procedure: Total abdominal colectomy\par Macroscopic Evaluation of Mesorectum: Not applicable\par Tumor Site: Cecum\par Histologic Type:  Adenocarcinoma\par Histologic Grade: G3, poorly differentiated\par Tumor Size: Greatest dimension (Centimeters)  9.0 cm\par Multiple Primary Sites: Not applicable\par Tumor Extent: Directly invades or adheres to adjacent structure(s) Sigmoid colon\par Macroscopic Tumor Perforation: Present\par Lymphovascular Invasion: Present\par Perineural Invasion: Present\par Treatment Effect: No known presurgical therapy\par Margin Status for Invasive Carcinoma: All margins negative for invasive carcinoma\par Distance from Invasive Carcinoma to Radial (Circumferential)\par Margin: 5.0 cm\par Margin Status for Non-Invasive Tumor: Not applicable\par Regional Lymph Node Status: Tumor present in regional lymph node(s)\par Number of Lymph Nodes with Tumor    3\par Number of Lymph Nodes Examined: 17\par Tumor Deposits: Not identified\par Distant Site(s) Involved: Liver\par PATHOLOGIC STAGE CLASSIFICATION\par TNM Descriptors: Not applicable\par pT Category: pT4b\par pN Category: pN1b\par pM Category: pM1c\par \par 5/19/22 CT A/P: Apparent focal mural thickening at the cecum/proximal ascending colon may represent colon cancer. The sigmoid colon appears to be tethered to the cecum and it is focally thick-walled; focal tumor invasion/extension from the cecum to the sigmoid colon cannot be excluded. Dilatation of the small bowel with an apparent transition point in the right lower quadrant abdomen, suggestive of small bowel obstruction. Apparent mild mural thickening of the a dilated right lower quadrant small bowel loop with adjacent mesenteric edema for which associated small bowel ischemia cannot be excluded. Multiple hypodense lesions with calcifications in both lobes of the liver with the largest measuring 5.8 x 6.0 cm in hepatic segment 8. These are suspicious for metastases. Mild ascites.

## 2023-07-03 NOTE — REVIEW OF SYSTEMS
[Recent Change In Weight] : ~T recent weight change [Abdominal Pain] : abdominal pain [Negative] : Allergic/Immunologic [Chest Pain] : no chest pain [Shortness Of Breath] : no shortness of breath [Constipation] : no constipation [Joint Pain] : no joint pain [Skin Rash] : no skin rash [FreeTextEntry2] : wt gain 8 lbs  [FreeTextEntry7] : ileotomy in place, revision noted, functioning normally; pain at surgical site

## 2023-07-05 ENCOUNTER — RESULT REVIEW (OUTPATIENT)
Age: 53
End: 2023-07-05

## 2023-07-05 ENCOUNTER — APPOINTMENT (OUTPATIENT)
Dept: INFUSION THERAPY | Facility: CLINIC | Age: 53
End: 2023-07-05

## 2023-07-05 DIAGNOSIS — G89.29 OTHER CHRONIC PAIN: ICD-10-CM

## 2023-07-05 DIAGNOSIS — I26.99 OTHER PULMONARY EMBOLISM WITHOUT ACUTE COR PULMONALE: ICD-10-CM

## 2023-07-05 LAB
ALBUMIN SERPL ELPH-MCNC: 4.2 G/DL — SIGNIFICANT CHANGE UP (ref 3.3–5)
ALP SERPL-CCNC: 175 U/L — HIGH (ref 40–120)
ALT FLD-CCNC: 14 U/L — SIGNIFICANT CHANGE UP (ref 10–45)
ANION GAP SERPL CALC-SCNC: 13 MMOL/L — SIGNIFICANT CHANGE UP (ref 5–17)
APPEARANCE UR: CLEAR — SIGNIFICANT CHANGE UP
AST SERPL-CCNC: 19 U/L — SIGNIFICANT CHANGE UP (ref 10–40)
BASOPHILS # BLD AUTO: 0.1 K/UL — SIGNIFICANT CHANGE UP (ref 0–0.2)
BASOPHILS NFR BLD AUTO: 0.6 % — SIGNIFICANT CHANGE UP (ref 0–2)
BILIRUB SERPL-MCNC: <0.2 MG/DL — SIGNIFICANT CHANGE UP (ref 0.2–1.2)
BILIRUB UR-MCNC: NEGATIVE — SIGNIFICANT CHANGE UP
BUN SERPL-MCNC: 15 MG/DL — SIGNIFICANT CHANGE UP (ref 7–23)
CALCIUM SERPL-MCNC: 9.1 MG/DL — SIGNIFICANT CHANGE UP (ref 8.4–10.5)
CHLORIDE SERPL-SCNC: 105 MMOL/L — SIGNIFICANT CHANGE UP (ref 96–108)
CO2 SERPL-SCNC: 20 MMOL/L — LOW (ref 22–31)
COLOR SPEC: YELLOW — SIGNIFICANT CHANGE UP
CREAT SERPL-MCNC: 0.84 MG/DL — SIGNIFICANT CHANGE UP (ref 0.5–1.3)
DIFF PNL FLD: NEGATIVE — SIGNIFICANT CHANGE UP
EGFR: 106 ML/MIN/1.73M2 — SIGNIFICANT CHANGE UP
EOSINOPHIL # BLD AUTO: 0.3 K/UL — SIGNIFICANT CHANGE UP (ref 0–0.5)
EOSINOPHIL NFR BLD AUTO: 1.9 % — SIGNIFICANT CHANGE UP (ref 0–6)
GLUCOSE SERPL-MCNC: 110 MG/DL — HIGH (ref 70–99)
GLUCOSE UR QL: NEGATIVE MG/DL — SIGNIFICANT CHANGE UP
HCT VFR BLD CALC: 41.7 % — SIGNIFICANT CHANGE UP (ref 39–50)
HGB BLD-MCNC: 13.5 G/DL — SIGNIFICANT CHANGE UP (ref 13–17)
IMM GRANULOCYTES NFR BLD AUTO: 4.8 % — HIGH (ref 0–0.9)
KETONES UR-MCNC: NEGATIVE MG/DL — SIGNIFICANT CHANGE UP
LEUKOCYTE ESTERASE UR-ACNC: NEGATIVE — SIGNIFICANT CHANGE UP
LYMPHOCYTES # BLD AUTO: 1.82 K/UL — SIGNIFICANT CHANGE UP (ref 1–3.3)
LYMPHOCYTES # BLD AUTO: 11.7 % — LOW (ref 13–44)
MAGNESIUM SERPL-MCNC: 2.1 MG/DL — SIGNIFICANT CHANGE UP (ref 1.6–2.6)
MCHC RBC-ENTMCNC: 28.1 PG — SIGNIFICANT CHANGE UP (ref 27–34)
MCHC RBC-ENTMCNC: 32.4 GM/DL — SIGNIFICANT CHANGE UP (ref 32–36)
MCV RBC AUTO: 86.9 FL — SIGNIFICANT CHANGE UP (ref 80–100)
MONOCYTES # BLD AUTO: 0.99 K/UL — HIGH (ref 0–0.9)
MONOCYTES NFR BLD AUTO: 6.4 % — SIGNIFICANT CHANGE UP (ref 2–14)
NEUTROPHILS # BLD AUTO: 11.58 K/UL — HIGH (ref 1.8–7.4)
NEUTROPHILS NFR BLD AUTO: 74.6 % — SIGNIFICANT CHANGE UP (ref 43–77)
NITRITE UR-MCNC: NEGATIVE — SIGNIFICANT CHANGE UP
NRBC # BLD: 0 /100 WBCS — SIGNIFICANT CHANGE UP (ref 0–0)
PH UR: 5.5 — SIGNIFICANT CHANGE UP (ref 5–8)
PLATELET # BLD AUTO: 217 K/UL — SIGNIFICANT CHANGE UP (ref 150–400)
POTASSIUM SERPL-MCNC: 4.5 MMOL/L — SIGNIFICANT CHANGE UP (ref 3.5–5.3)
POTASSIUM SERPL-SCNC: 4.5 MMOL/L — SIGNIFICANT CHANGE UP (ref 3.5–5.3)
PROT SERPL-MCNC: 7 G/DL — SIGNIFICANT CHANGE UP (ref 6–8.3)
PROT UR-MCNC: SIGNIFICANT CHANGE UP MG/DL
RBC # BLD: 4.8 M/UL — SIGNIFICANT CHANGE UP (ref 4.2–5.8)
RBC # FLD: 16.9 % — HIGH (ref 10.3–14.5)
SODIUM SERPL-SCNC: 138 MMOL/L — SIGNIFICANT CHANGE UP (ref 135–145)
SP GR SPEC: 1.02 — SIGNIFICANT CHANGE UP (ref 1–1.03)
UROBILINOGEN FLD QL: 0.2 MG/DL — SIGNIFICANT CHANGE UP (ref 0.2–1)
WBC # BLD: 15.53 K/UL — HIGH (ref 3.8–10.5)
WBC # FLD AUTO: 15.53 K/UL — HIGH (ref 3.8–10.5)

## 2023-07-07 ENCOUNTER — APPOINTMENT (OUTPATIENT)
Dept: PHYSICAL MEDICINE AND REHAB | Facility: CLINIC | Age: 53
End: 2023-07-07
Payer: MEDICAID

## 2023-07-07 ENCOUNTER — APPOINTMENT (OUTPATIENT)
Dept: INFUSION THERAPY | Facility: CLINIC | Age: 53
End: 2023-07-07

## 2023-07-07 VITALS
HEART RATE: 73 BPM | WEIGHT: 192 LBS | SYSTOLIC BLOOD PRESSURE: 121 MMHG | DIASTOLIC BLOOD PRESSURE: 78 MMHG | HEIGHT: 74 IN | BODY MASS INDEX: 24.64 KG/M2

## 2023-07-07 DIAGNOSIS — Z51.11 ENCOUNTER FOR ANTINEOPLASTIC CHEMOTHERAPY: ICD-10-CM

## 2023-07-07 DIAGNOSIS — R11.2 NAUSEA WITH VOMITING, UNSPECIFIED: ICD-10-CM

## 2023-07-07 PROCEDURE — 99214 OFFICE O/P EST MOD 30 MIN: CPT

## 2023-07-07 RX ORDER — NALOXONE HYDROCHLORIDE 4 MG/.1ML
4 SPRAY NASAL
Qty: 1 | Refills: 0 | Status: ACTIVE | COMMUNITY
Start: 2023-07-07 | End: 1900-01-01

## 2023-07-07 NOTE — HISTORY OF PRESENT ILLNESS
[FreeTextEntry1] : Mr. Palladino is a 52 year old male with Metastatic colon cancer to liver, Stage IV RS colon adenocarcinoma, met to liver, no MMR deficiency.  Began FOLFOXIRI with bevacizumab 6/22/22; oxali held after cycle 10).  S/p revision surgery 4/18/2023.  on Eliquis for PE.   \par \par He reports he has persistent pain in his abdominal and pelvic region.  He states it feels like burning shooting and stabbing.  Reports it comes and goes.  Sometimes 10 out of 10.  Has been taking oxycodone 5 mg every 6 hours as needed.  He states when he does take it his pain reduces almost more than 50%.  Reports he is having output from his ostomy.  Denies any excessive sedation.  Ambulating with quad cane.  Previously tried gabapentin and ineffective.

## 2023-07-07 NOTE — ASSESSMENT
[FreeTextEntry1] : 52 year old male presenting for evaluation/\par \par #Metastatic colon cancer:\par -Previously tried Tylenol and gabapentin which were ineffective \par -Educated on bowel program\par -Continue oxycodone 5mg Q 6 hours prn pain-rx sent\par -Narcan sent and education provided\par -Discussed with patient and contacted Christine [facility], discussed that if requirements increase, would strongly consider long acting medication\par -Pain contract signed \par -I Stop # [id number]\par \par Follow up in 1 month.

## 2023-07-07 NOTE — PHYSICAL EXAM
[FreeTextEntry1] : Gen: Patient is A&O x 3, NAD\par HEENT: EOMI, hearing grossly normal\par Resp: regular, non - labored\par CV: pulses regular\par Skin: no rashes, erythema\par Lymph: no clubbing, cyanosis, edema\par Inspection: no instability \par ROM: full throughout\par Palpation: TTP abdomen, no rebound tenderness \par Sensation: intact to light touch\par Reflexes: 1+ and symmetric throughout\par Strength: 4+/5 throughout\par Special tests: -Hoffmans sign \par Gait: Cane \par \par

## 2023-07-07 NOTE — DATA REVIEWED
[FreeTextEntry1] :     -6/13/23 CT C/A/P: New, 8 mm right lower lobe nodule and enlarging pulmonary\par     nodules, 7 mm left apical nodule previously measured 6 mm. Bilobar hepatic\par     metastases, the majority of which are calcified and not significantly changed. A\par     noncalcified lesion in the right hepatic lobe demonstrates mild interval growth, 1.7 x 1.4\par     cm previously 1.4 x 1.3 cm.

## 2023-07-09 ENCOUNTER — INPATIENT (INPATIENT)
Facility: HOSPITAL | Age: 53
LOS: 11 days | Discharge: HOME | DRG: 281 | End: 2023-07-21
Attending: FAMILY MEDICINE | Admitting: STUDENT IN AN ORGANIZED HEALTH CARE EDUCATION/TRAINING PROGRAM
Payer: MEDICAID

## 2023-07-09 VITALS
DIASTOLIC BLOOD PRESSURE: 78 MMHG | WEIGHT: 220.02 LBS | OXYGEN SATURATION: 100 % | TEMPERATURE: 98 F | HEART RATE: 106 BPM | SYSTOLIC BLOOD PRESSURE: 112 MMHG | HEIGHT: 72 IN | RESPIRATION RATE: 18 BRPM

## 2023-07-09 DIAGNOSIS — Z90.49 ACQUIRED ABSENCE OF OTHER SPECIFIED PARTS OF DIGESTIVE TRACT: Chronic | ICD-10-CM

## 2023-07-09 LAB
ALBUMIN SERPL ELPH-MCNC: 4 G/DL — SIGNIFICANT CHANGE UP (ref 3.3–5)
ALP SERPL-CCNC: 191 U/L — HIGH (ref 40–120)
ALT FLD-CCNC: 39 U/L — SIGNIFICANT CHANGE UP (ref 12–78)
ANION GAP SERPL CALC-SCNC: 7 MMOL/L — SIGNIFICANT CHANGE UP (ref 5–17)
ANISOCYTOSIS BLD QL: SLIGHT — SIGNIFICANT CHANGE UP
ANISOCYTOSIS BLD QL: SLIGHT — SIGNIFICANT CHANGE UP
APPEARANCE UR: CLEAR — SIGNIFICANT CHANGE UP
AST SERPL-CCNC: 20 U/L — SIGNIFICANT CHANGE UP (ref 15–37)
BASOPHILS # BLD AUTO: 0.03 K/UL — SIGNIFICANT CHANGE UP (ref 0–0.2)
BASOPHILS NFR BLD AUTO: 0 % — SIGNIFICANT CHANGE UP (ref 0–2)
BASOPHILS NFR BLD AUTO: 0 % — SIGNIFICANT CHANGE UP (ref 0–2)
BILIRUB SERPL-MCNC: 0.8 MG/DL — SIGNIFICANT CHANGE UP (ref 0.2–1.2)
BILIRUB UR-MCNC: NEGATIVE — SIGNIFICANT CHANGE UP
BUN SERPL-MCNC: 12 MG/DL — SIGNIFICANT CHANGE UP (ref 7–23)
CALCIUM SERPL-MCNC: 9.7 MG/DL — SIGNIFICANT CHANGE UP (ref 8.5–10.1)
CHLORIDE SERPL-SCNC: 101 MMOL/L — SIGNIFICANT CHANGE UP (ref 96–108)
CO2 SERPL-SCNC: 26 MMOL/L — SIGNIFICANT CHANGE UP (ref 22–31)
COLOR SPEC: YELLOW — SIGNIFICANT CHANGE UP
CREAT SERPL-MCNC: 1.07 MG/DL — SIGNIFICANT CHANGE UP (ref 0.5–1.3)
DACRYOCYTES BLD QL SMEAR: SLIGHT — SIGNIFICANT CHANGE UP
DACRYOCYTES BLD QL SMEAR: SLIGHT — SIGNIFICANT CHANGE UP
DIFF PNL FLD: NEGATIVE — SIGNIFICANT CHANGE UP
EGFR: 85 ML/MIN/1.73M2 — SIGNIFICANT CHANGE UP
ELLIPTOCYTES BLD QL SMEAR: SLIGHT — SIGNIFICANT CHANGE UP
ELLIPTOCYTES BLD QL SMEAR: SLIGHT — SIGNIFICANT CHANGE UP
EOSINOPHIL # BLD AUTO: 0.03 K/UL — SIGNIFICANT CHANGE UP (ref 0–0.5)
EOSINOPHIL NFR BLD AUTO: 0 % — SIGNIFICANT CHANGE UP (ref 0–6)
EOSINOPHIL NFR BLD AUTO: 0 — SIGNIFICANT CHANGE UP
GIANT PLATELETS BLD QL SMEAR: PRESENT — SIGNIFICANT CHANGE UP
GIANT PLATELETS BLD QL SMEAR: PRESENT — SIGNIFICANT CHANGE UP
GLUCOSE SERPL-MCNC: 113 MG/DL — HIGH (ref 70–99)
GLUCOSE UR QL: NEGATIVE — SIGNIFICANT CHANGE UP
HCT VFR BLD CALC: 44 % — SIGNIFICANT CHANGE UP (ref 39–50)
HCT VFR BLD CALC: 44.3 % — SIGNIFICANT CHANGE UP (ref 39–50)
HGB BLD-MCNC: 14.5 G/DL — SIGNIFICANT CHANGE UP (ref 13–17)
HGB BLD-MCNC: 14.7 G/DL — SIGNIFICANT CHANGE UP (ref 13–17)
HYPOCHROMIA BLD QL: SLIGHT — SIGNIFICANT CHANGE UP
IMM GRANULOCYTES NFR BLD AUTO: 12.8 % — HIGH (ref 0–0.9)
KETONES UR-MCNC: NEGATIVE — SIGNIFICANT CHANGE UP
LACTATE SERPL-SCNC: 2.4 MMOL/L — HIGH (ref 0.7–2)
LACTATE SERPL-SCNC: 2.4 MMOL/L — HIGH (ref 0.7–2)
LEUKOCYTE ESTERASE UR-ACNC: NEGATIVE — SIGNIFICANT CHANGE UP
LIDOCAIN IGE QN: 81 U/L — SIGNIFICANT CHANGE UP (ref 73–393)
LYMPHOCYTES # BLD AUTO: 1.3 % — LOW (ref 13–44)
LYMPHOCYTES # BLD AUTO: 1.66 K/UL — SIGNIFICANT CHANGE UP (ref 1–3.3)
LYMPHOCYTES # BLD AUTO: 5 % — LOW (ref 13–44)
MACROCYTES BLD QL: SLIGHT — SIGNIFICANT CHANGE UP
MACROCYTES BLD QL: SLIGHT — SIGNIFICANT CHANGE UP
MANUAL SMEAR VERIFICATION: YES — SIGNIFICANT CHANGE UP
MCHC RBC-ENTMCNC: 28.9 PG — SIGNIFICANT CHANGE UP (ref 27–34)
MCHC RBC-ENTMCNC: 29.1 PG — SIGNIFICANT CHANGE UP (ref 27–34)
MCHC RBC-ENTMCNC: 32.7 GM/DL — SIGNIFICANT CHANGE UP (ref 32–36)
MCHC RBC-ENTMCNC: 33.4 GM/DL — SIGNIFICANT CHANGE UP (ref 32–36)
MCV RBC AUTO: 87 FL — SIGNIFICANT CHANGE UP (ref 80–100)
MCV RBC AUTO: 88.4 FL — SIGNIFICANT CHANGE UP (ref 80–100)
MICROCYTES BLD QL: SLIGHT — SIGNIFICANT CHANGE UP
MICROCYTES BLD QL: SLIGHT — SIGNIFICANT CHANGE UP
MONOCYTES # BLD AUTO: 0.51 K/UL — SIGNIFICANT CHANGE UP (ref 0–0.9)
MONOCYTES NFR BLD AUTO: 0.4 % — LOW (ref 2–14)
MONOCYTES NFR BLD AUTO: 3 % — SIGNIFICANT CHANGE UP (ref 2–14)
NEUTROPHILS # BLD AUTO: 105.37 K/UL — HIGH (ref 1.8–7.4)
NEUTROPHILS NFR BLD AUTO: 85.5 % — HIGH (ref 43–77)
NEUTROPHILS NFR BLD AUTO: 92 % — HIGH (ref 43–77)
NITRITE UR-MCNC: NEGATIVE — SIGNIFICANT CHANGE UP
OVALOCYTES BLD QL SMEAR: SLIGHT — SIGNIFICANT CHANGE UP
OVALOCYTES BLD QL SMEAR: SLIGHT — SIGNIFICANT CHANGE UP
PH UR: 5 — SIGNIFICANT CHANGE UP (ref 5–8)
PLAT MORPH BLD: NORMAL — SIGNIFICANT CHANGE UP
PLAT MORPH BLD: NORMAL — SIGNIFICANT CHANGE UP
PLATELET # BLD AUTO: 176 K/UL — SIGNIFICANT CHANGE UP (ref 150–400)
PLATELET # BLD AUTO: 200 K/UL — SIGNIFICANT CHANGE UP (ref 150–400)
POIKILOCYTOSIS BLD QL AUTO: SLIGHT — SIGNIFICANT CHANGE UP
POIKILOCYTOSIS BLD QL AUTO: SLIGHT — SIGNIFICANT CHANGE UP
POLYCHROMASIA BLD QL SMEAR: SLIGHT — SIGNIFICANT CHANGE UP
POLYCHROMASIA BLD QL SMEAR: SLIGHT — SIGNIFICANT CHANGE UP
POTASSIUM SERPL-MCNC: 3.8 MMOL/L — SIGNIFICANT CHANGE UP (ref 3.5–5.3)
POTASSIUM SERPL-SCNC: 3.8 MMOL/L — SIGNIFICANT CHANGE UP (ref 3.5–5.3)
PROT SERPL-MCNC: 8.3 GM/DL — SIGNIFICANT CHANGE UP (ref 6–8.3)
PROT UR-MCNC: NEGATIVE — SIGNIFICANT CHANGE UP
RBC # BLD: 5.01 M/UL — SIGNIFICANT CHANGE UP (ref 4.2–5.8)
RBC # BLD: 5.06 M/UL — SIGNIFICANT CHANGE UP (ref 4.2–5.8)
RBC # FLD: 17.5 % — HIGH (ref 10.3–14.5)
RBC # FLD: 17.7 % — HIGH (ref 10.3–14.5)
RBC BLD AUTO: ABNORMAL
RBC BLD AUTO: ABNORMAL
SODIUM SERPL-SCNC: 134 MMOL/L — LOW (ref 135–145)
SP GR SPEC: 1 — LOW (ref 1.01–1.02)
TOXIC GRANULES BLD QL SMEAR: PRESENT — SIGNIFICANT CHANGE UP
TOXIC GRANULES BLD QL SMEAR: PRESENT — SIGNIFICANT CHANGE UP
UROBILINOGEN FLD QL: NEGATIVE — SIGNIFICANT CHANGE UP
WBC # BLD: 112.01 K/UL — CRITICAL HIGH (ref 3.8–10.5)
WBC # BLD: 123.35 K/UL — CRITICAL HIGH (ref 3.8–10.5)
WBC # FLD AUTO: 112.01 K/UL — CRITICAL HIGH (ref 3.8–10.5)
WBC # FLD AUTO: 123.35 K/UL — CRITICAL HIGH (ref 3.8–10.5)

## 2023-07-09 PROCEDURE — 74177 CT ABD & PELVIS W/CONTRAST: CPT | Mod: 26,MA

## 2023-07-09 PROCEDURE — 99285 EMERGENCY DEPT VISIT HI MDM: CPT

## 2023-07-09 PROCEDURE — 71045 X-RAY EXAM CHEST 1 VIEW: CPT | Mod: 26

## 2023-07-09 RX ORDER — CIPROFLOXACIN LACTATE 400MG/40ML
400 VIAL (ML) INTRAVENOUS ONCE
Refills: 0 | Status: COMPLETED | OUTPATIENT
Start: 2023-07-09 | End: 2023-07-09

## 2023-07-09 RX ORDER — METRONIDAZOLE 500 MG
500 TABLET ORAL ONCE
Refills: 0 | Status: COMPLETED | OUTPATIENT
Start: 2023-07-09 | End: 2023-07-09

## 2023-07-09 RX ORDER — FAMOTIDINE 10 MG/ML
20 INJECTION INTRAVENOUS ONCE
Refills: 0 | Status: COMPLETED | OUTPATIENT
Start: 2023-07-09 | End: 2023-07-09

## 2023-07-09 RX ORDER — SODIUM CHLORIDE 9 MG/ML
1000 INJECTION INTRAMUSCULAR; INTRAVENOUS; SUBCUTANEOUS ONCE
Refills: 0 | Status: COMPLETED | OUTPATIENT
Start: 2023-07-09 | End: 2023-07-09

## 2023-07-09 RX ORDER — MORPHINE SULFATE 50 MG/1
4 CAPSULE, EXTENDED RELEASE ORAL ONCE
Refills: 0 | Status: DISCONTINUED | OUTPATIENT
Start: 2023-07-09 | End: 2023-07-09

## 2023-07-09 RX ORDER — SODIUM CHLORIDE 9 MG/ML
2000 INJECTION, SOLUTION INTRAVENOUS ONCE
Refills: 0 | Status: COMPLETED | OUTPATIENT
Start: 2023-07-09 | End: 2023-07-09

## 2023-07-09 RX ORDER — ONDANSETRON 8 MG/1
4 TABLET, FILM COATED ORAL ONCE
Refills: 0 | Status: COMPLETED | OUTPATIENT
Start: 2023-07-09 | End: 2023-07-09

## 2023-07-09 RX ADMIN — MORPHINE SULFATE 4 MILLIGRAM(S): 50 CAPSULE, EXTENDED RELEASE ORAL at 16:30

## 2023-07-09 RX ADMIN — MORPHINE SULFATE 4 MILLIGRAM(S): 50 CAPSULE, EXTENDED RELEASE ORAL at 19:15

## 2023-07-09 RX ADMIN — SODIUM CHLORIDE 2000 MILLILITER(S): 9 INJECTION, SOLUTION INTRAVENOUS at 18:15

## 2023-07-09 RX ADMIN — Medication 100 MILLIGRAM(S): at 20:49

## 2023-07-09 RX ADMIN — SODIUM CHLORIDE 2000 MILLILITER(S): 9 INJECTION, SOLUTION INTRAVENOUS at 20:49

## 2023-07-09 RX ADMIN — MORPHINE SULFATE 4 MILLIGRAM(S): 50 CAPSULE, EXTENDED RELEASE ORAL at 23:35

## 2023-07-09 RX ADMIN — SODIUM CHLORIDE 1000 MILLILITER(S): 9 INJECTION INTRAMUSCULAR; INTRAVENOUS; SUBCUTANEOUS at 16:32

## 2023-07-09 RX ADMIN — Medication 400 MILLIGRAM(S): at 20:48

## 2023-07-09 RX ADMIN — ONDANSETRON 4 MILLIGRAM(S): 8 TABLET, FILM COATED ORAL at 16:30

## 2023-07-09 RX ADMIN — ONDANSETRON 4 MILLIGRAM(S): 8 TABLET, FILM COATED ORAL at 23:35

## 2023-07-09 RX ADMIN — Medication 200 MILLIGRAM(S): at 19:49

## 2023-07-09 RX ADMIN — FAMOTIDINE 20 MILLIGRAM(S): 10 INJECTION INTRAVENOUS at 16:30

## 2023-07-09 NOTE — ED PROVIDER NOTE - PHYSICAL EXAMINATION
General: Patient in no acute distress, AAOX3.   HENMT: NC/AT, no nasal congestion.  moist mucous membranes, no lesions in oral mucosa.  Neck: supple, no LAD  CVS:  tachycardic 106 regular rhythm, normal S1 and S2, no murmur.  Resp: Good air entry bilaterally, lungs clear to auscultation,  No wheeze.  Abd: Soft +ileostomy w/ fecal contents in the back, +generalized tenderness, non distended, +bowel sounds, No guarding,  Ext: Full range of motion in all ext, 2+ pulses throughout, cap refill<2 sec.  NEURO: no focal deficit, gross motor and sensory intact throughout, power 5/5, gait stable General: Patient in no acute distress, AAOX3.   HENMT: NC/AT, no nasal congestion.  moist mucous membranes, no lesions in oral mucosa.  Neck: supple, no LAD  CVS:  tachycardic 106 regular rhythm, normal S1 and S2, no murmur.  Resp: Good air entry bilaterally, lungs clear to auscultation,  No wheeze.  Abd: Soft +ileostomy w/ fecal contents in the back, no erythema, +generalized tenderness, non distended, +bowel sounds, No guarding,  Ext: Full range of motion in all ext, 2+ pulses throughout, cap refill<2 sec.  NEURO: no focal deficit, gross motor and sensory intact throughout

## 2023-07-09 NOTE — ED PROVIDER NOTE - NS_ ATTENDINGSCRIBEDETAILS _ED_A_ED_FT
I, Radha Gamboa DO,  performed the initial face to face bedside interview with this patient regarding history of present illness, review of symptoms and relevant past medical, social and family history.  I completed an independent physical examination.  I was the initial provider who evaluated this patient.   I personally saw the patient and performed a substantive portion of the visit including all aspects of the medical decision making.  The history, relevant review of systems, past medical and surgical history, medical decision making, and physical examination was documented by the scribe in my presence and I attest to the accuracy of the documentation.

## 2023-07-09 NOTE — ED ADULT NURSE NOTE - CHIEF COMPLAINT QUOTE
Pt with c/o abdominal pain and nausea.  PMHx liver, colon and lung cancer, ileostomy.  Oncologist MD Carter.  Chemo [id number] - last chemo on Wednesday 7/5.  On eliquis and oxy.  Right sided port.

## 2023-07-09 NOTE — ED ADULT NURSE NOTE - NSFALLUNIVINTERV_ED_ALL_ED
Bed/Stretcher in lowest position, wheels locked, appropriate side rails in place/Call bell, personal items and telephone in reach/Instruct patient to call for assistance before getting out of bed/chair/stretcher/Non-slip footwear applied when patient is off stretcher/[place] to call system/Physically safe environment - no spills, clutter or unnecessary equipment/Purposeful proactive rounding/Room/bathroom lighting operational, light cord in reach

## 2023-07-09 NOTE — ED ADULT TRIAGE NOTE - CHIEF COMPLAINT QUOTE
Pt with c/o abdominal pain and nausea.  PMHx liver, colon and lung cancer, ileostomy.  Oncologist MD Carter.  Chemo [id number] - last chemo on Wednesday 7/5.  On eliquis and oxy.  Right sided port.

## 2023-07-09 NOTE — ED ADULT NURSE NOTE - OBJECTIVE STATEMENT
pt presents to ED with complaints of abdominal pain. pt endorses hx of colon CA, ileostomy, and lung CA. pt had last chemo 7/5. pt endorses going to [facility] for chemo. right chest wall port present and not accessed upon arrival. 20 g placed to left AC. labs sent. pt placed on cardiac and VS monitoring.

## 2023-07-09 NOTE — ED PROVIDER NOTE - NS ED ROS FT
General: No fevers, no chills,  HEENT: No loc, no ha, no dizziness  Respiratory: -dyspnea, -CP, -SOB  Cardiovascular: No chest pain  GI: +abdominal pain, +nausea, +vomiting  : No urinary complaints  Endocrine: no generalized weakness or malaise.  Neurological: No weakness, numbness or tingling  Psych: no SI, HI, AVH.  MSK: no recent trauma, no muscle pain

## 2023-07-09 NOTE — ED PROVIDER NOTE - CLINICAL SUMMARY MEDICAL DECISION MAKING FREE TEXT BOX
53 y/o male w/PMHx colon CA and lung CA c/o abdominal pain and nausea Chemo every 2weeks - last chemo on Wednesday 7/5.  On Eliquis and oxy.  Right sided port. has been experiencing nausea, vomiting and abdominal pain around ileostomy site since yesterday. Similar to pain after chemo sessions and is most likely related to chemotherapy, will rule out any complications of ileostomy vs. gastritis vs. colitis. Plan labs, medications, IV fluids and CT a/p. Reassess. 51 y/o male w/PMHx colon CA and lung CA c/o abdominal pain and nausea Chemo every 2weeks - last chemo on Wednesday 7/5.  On Eliquis and oxy.  Right sided port. has been experiencing nausea, vomiting and abdominal pain around ileostomy site since yesterday. Similar to pain after chemo sessions and is most likely related to chemotherapy, will rule out any complications of ileostomy vs. gastritis vs. colitis vs other infectious pathology. Plan labs, medications, IV fluids and CT a/p. Reassess. 53 y/o male w/PMHx colon CA and lung CA c/o abdominal pain and nausea Chemo every 2weeks - last chemo on Wednesday 7/5.  On Eliquis and oxy.  Right sided port. has been experiencing nausea, vomiting and abdominal pain around ileostomy site since yesterday. Similar to pain after chemo sessions and is most likely related to chemotherapy, will rule out any complications of ileostomy vs. gastritis vs. colitis vs UTI vs other infectious pathology. Plan labs, medications, IV fluids and CT a/p. Reassess.

## 2023-07-09 NOTE — ED ADULT NURSE REASSESSMENT NOTE - NS ED NURSE REASSESS COMMENT FT1
Received report from outgoing RN. Pt A&O x3 and ambulates. NS bolus running after which LR will be administered. Pt stated he just changed his ileostomy bag, pain rated 7/10 after morphine 2 hrs ago. Pt asked for something to drink which he received ginger ale after DM approved. Will check orders for next pain meds. Will continue to monitor.

## 2023-07-09 NOTE — ED PROVIDER NOTE - PROGRESS NOTE DETAILS
elevated leukocytosis, s/p abx.  UAneg/for any infection.  Xray neg on wet read  CT a/p with no acute pathology  pt still c/o abd pain and nausea. will admit for intractable pain and infectious work up.

## 2023-07-09 NOTE — ED PROVIDER NOTE - OBJECTIVE STATEMENT
51 y/o male w/PMHx colon CA and lung CA c/o abdominal pain and nausea Chemo every 2weeks - last chemo on Wednesday 7/5.  On Eliquis and oxy.  Right sided port. has been experiencing n/v and ABD pain around ileostomy site x yesterday. no dizziness, CP, SOB, fever, or dysuria. non bloody non bilious vomiting.  Pt appreciates chronic abd pain for years. Oncologist MD Carter. 53 y/o male w/PMHx colon CA and lung CA c/o abdominal pain and  & vomiting since yesterday, gets Chemo every 2weeks - last chemo on Wednesday 7/5.  On Eliquis and oxy.  Right sided port. ABD pain around ileostomy site, continuous pain, non radiating, non bloody non bilious vomiting.  denies any dizziness, CP, SOB, fever, or dysuria. Pt appreciates chronic abd pain but worse since yesterday. Oncologist MD Carter.

## 2023-07-10 ENCOUNTER — APPOINTMENT (OUTPATIENT)
Dept: VASCULAR SURGERY | Facility: HOSPITAL | Age: 53
End: 2023-07-10

## 2023-07-10 DIAGNOSIS — Z51.89 ENCOUNTER FOR OTHER SPECIFIED AFTERCARE: ICD-10-CM

## 2023-07-10 DIAGNOSIS — Z98.890 OTHER SPECIFIED POSTPROCEDURAL STATES: Chronic | ICD-10-CM

## 2023-07-10 DIAGNOSIS — R10.9 UNSPECIFIED ABDOMINAL PAIN: ICD-10-CM

## 2023-07-10 LAB
ALBUMIN SERPL ELPH-MCNC: 3 G/DL — LOW (ref 3.3–5)
ALP SERPL-CCNC: 149 U/L — HIGH (ref 40–120)
ALT FLD-CCNC: 29 U/L — SIGNIFICANT CHANGE UP (ref 12–78)
ANION GAP SERPL CALC-SCNC: 3 MMOL/L — LOW (ref 5–17)
ANISOCYTOSIS BLD QL: SLIGHT — SIGNIFICANT CHANGE UP
APTT BLD: 29.1 SEC — SIGNIFICANT CHANGE UP (ref 27.5–35.5)
APTT BLD: 63.8 SEC — HIGH (ref 27.5–35.5)
AST SERPL-CCNC: 17 U/L — SIGNIFICANT CHANGE UP (ref 15–37)
BASOPHILS # BLD AUTO: 0 K/UL — SIGNIFICANT CHANGE UP (ref 0–0.2)
BASOPHILS NFR BLD AUTO: 0 % — SIGNIFICANT CHANGE UP (ref 0–2)
BILIRUB SERPL-MCNC: 0.5 MG/DL — SIGNIFICANT CHANGE UP (ref 0.2–1.2)
BUN SERPL-MCNC: 10 MG/DL — SIGNIFICANT CHANGE UP (ref 7–23)
C DIFF BY PCR RESULT: SIGNIFICANT CHANGE UP
CALCIUM SERPL-MCNC: 8.3 MG/DL — LOW (ref 8.5–10.1)
CHLORIDE SERPL-SCNC: 108 MMOL/L — SIGNIFICANT CHANGE UP (ref 96–108)
CO2 SERPL-SCNC: 28 MMOL/L — SIGNIFICANT CHANGE UP (ref 22–31)
CREAT SERPL-MCNC: 0.87 MG/DL — SIGNIFICANT CHANGE UP (ref 0.5–1.3)
EGFR: 105 ML/MIN/1.73M2 — SIGNIFICANT CHANGE UP
ELLIPTOCYTES BLD QL SMEAR: SLIGHT — SIGNIFICANT CHANGE UP
EOSINOPHIL # BLD AUTO: 0 K/UL — SIGNIFICANT CHANGE UP (ref 0–0.5)
EOSINOPHIL NFR BLD AUTO: 0 % — SIGNIFICANT CHANGE UP (ref 0–6)
GI PCR PANEL: SIGNIFICANT CHANGE UP
GLUCOSE SERPL-MCNC: 104 MG/DL — HIGH (ref 70–99)
HCT VFR BLD CALC: 36.4 % — LOW (ref 39–50)
HCT VFR BLD CALC: 38 % — LOW (ref 39–50)
HGB BLD-MCNC: 11.7 G/DL — LOW (ref 13–17)
HGB BLD-MCNC: 12.5 G/DL — LOW (ref 13–17)
INR BLD: 1.12 RATIO — SIGNIFICANT CHANGE UP (ref 0.88–1.16)
LACTATE SERPL-SCNC: 1.4 MMOL/L — SIGNIFICANT CHANGE UP (ref 0.7–2)
LYMPHOCYTES # BLD AUTO: 2.74 K/UL — SIGNIFICANT CHANGE UP (ref 1–3.3)
LYMPHOCYTES # BLD AUTO: 4 % — LOW (ref 13–44)
MANUAL SMEAR VERIFICATION: SIGNIFICANT CHANGE UP
MCHC RBC-ENTMCNC: 28.9 PG — SIGNIFICANT CHANGE UP (ref 27–34)
MCHC RBC-ENTMCNC: 29 PG — SIGNIFICANT CHANGE UP (ref 27–34)
MCHC RBC-ENTMCNC: 32.1 GM/DL — SIGNIFICANT CHANGE UP (ref 32–36)
MCHC RBC-ENTMCNC: 32.9 GM/DL — SIGNIFICANT CHANGE UP (ref 32–36)
MCV RBC AUTO: 88.2 FL — SIGNIFICANT CHANGE UP (ref 80–100)
MCV RBC AUTO: 89.9 FL — SIGNIFICANT CHANGE UP (ref 80–100)
MICROCYTES BLD QL: SLIGHT — SIGNIFICANT CHANGE UP
MONOCYTES # BLD AUTO: 0.69 K/UL — SIGNIFICANT CHANGE UP (ref 0–0.9)
MONOCYTES NFR BLD AUTO: 1 % — LOW (ref 2–14)
NEUTROPHILS # BLD AUTO: 65.17 K/UL — HIGH (ref 1.8–7.4)
NEUTROPHILS NFR BLD AUTO: 95 % — HIGH (ref 43–77)
NRBC # BLD: 0 /100 — SIGNIFICANT CHANGE UP (ref 0–0)
NRBC # BLD: SIGNIFICANT CHANGE UP /100 WBCS (ref 0–0)
PLAT MORPH BLD: NORMAL — SIGNIFICANT CHANGE UP
PLATELET # BLD AUTO: 150 K/UL — SIGNIFICANT CHANGE UP (ref 150–400)
PLATELET # BLD AUTO: 167 K/UL — SIGNIFICANT CHANGE UP (ref 150–400)
POIKILOCYTOSIS BLD QL AUTO: SLIGHT — SIGNIFICANT CHANGE UP
POTASSIUM SERPL-MCNC: 3.9 MMOL/L — SIGNIFICANT CHANGE UP (ref 3.5–5.3)
POTASSIUM SERPL-SCNC: 3.9 MMOL/L — SIGNIFICANT CHANGE UP (ref 3.5–5.3)
PROT SERPL-MCNC: 6.3 GM/DL — SIGNIFICANT CHANGE UP (ref 6–8.3)
PROTHROM AB SERPL-ACNC: 13 SEC — SIGNIFICANT CHANGE UP (ref 10.5–13.4)
RAPID RVP RESULT: SIGNIFICANT CHANGE UP
RBC # BLD: 4.05 M/UL — LOW (ref 4.2–5.8)
RBC # BLD: 4.31 M/UL — SIGNIFICANT CHANGE UP (ref 4.2–5.8)
RBC # FLD: 17.4 % — HIGH (ref 10.3–14.5)
RBC # FLD: 17.4 % — HIGH (ref 10.3–14.5)
RBC BLD AUTO: ABNORMAL
SARS-COV-2 RNA SPEC QL NAA+PROBE: SIGNIFICANT CHANGE UP
SODIUM SERPL-SCNC: 139 MMOL/L — SIGNIFICANT CHANGE UP (ref 135–145)
WBC # BLD: 68.6 K/UL — CRITICAL HIGH (ref 3.8–10.5)
WBC # BLD: 83.26 K/UL — CRITICAL HIGH (ref 3.8–10.5)
WBC # FLD AUTO: 68.6 K/UL — CRITICAL HIGH (ref 3.8–10.5)
WBC # FLD AUTO: 83.26 K/UL — CRITICAL HIGH (ref 3.8–10.5)

## 2023-07-10 PROCEDURE — 0225U NFCT DS DNA&RNA 21 SARSCOV2: CPT

## 2023-07-10 PROCEDURE — 87507 IADNA-DNA/RNA PROBE TQ 12-25: CPT

## 2023-07-10 PROCEDURE — 93306 TTE W/DOPPLER COMPLETE: CPT

## 2023-07-10 PROCEDURE — 76937 US GUIDE VASCULAR ACCESS: CPT | Mod: 26

## 2023-07-10 PROCEDURE — 85732 THROMBOPLASTIN TIME PARTIAL: CPT

## 2023-07-10 PROCEDURE — 74018 RADEX ABDOMEN 1 VIEW: CPT

## 2023-07-10 PROCEDURE — 96374 THER/PROPH/DIAG INJ IV PUSH: CPT

## 2023-07-10 PROCEDURE — 99223 1ST HOSP IP/OBS HIGH 75: CPT

## 2023-07-10 PROCEDURE — 86900 BLOOD TYPING SEROLOGIC ABO: CPT

## 2023-07-10 PROCEDURE — 86146 BETA-2 GLYCOPROTEIN ANTIBODY: CPT

## 2023-07-10 PROCEDURE — 86850 RBC ANTIBODY SCREEN: CPT

## 2023-07-10 PROCEDURE — 93306 TTE W/DOPPLER COMPLETE: CPT | Mod: 26

## 2023-07-10 PROCEDURE — 86147 CARDIOLIPIN ANTIBODY EA IG: CPT

## 2023-07-10 PROCEDURE — C1769: CPT

## 2023-07-10 PROCEDURE — 97530 THERAPEUTIC ACTIVITIES: CPT | Mod: GP

## 2023-07-10 PROCEDURE — 97162 PT EVAL MOD COMPLEX 30 MIN: CPT | Mod: GP

## 2023-07-10 PROCEDURE — C1760: CPT

## 2023-07-10 PROCEDURE — 93923 UPR/LXTR ART STDY 3+ LVLS: CPT

## 2023-07-10 PROCEDURE — 85613 RUSSELL VIPER VENOM DILUTED: CPT

## 2023-07-10 PROCEDURE — 75635 CT ANGIO ABDOMINAL ARTERIES: CPT | Mod: 26

## 2023-07-10 PROCEDURE — 75710 ARTERY X-RAYS ARM/LEG: CPT | Mod: 26

## 2023-07-10 PROCEDURE — 83735 ASSAY OF MAGNESIUM: CPT

## 2023-07-10 PROCEDURE — 93926 LOWER EXTREMITY STUDY: CPT | Mod: RT

## 2023-07-10 PROCEDURE — 85025 COMPLETE CBC W/AUTO DIFF WBC: CPT

## 2023-07-10 PROCEDURE — C1894: CPT

## 2023-07-10 PROCEDURE — 76000 FLUOROSCOPY <1 HR PHYS/QHP: CPT

## 2023-07-10 PROCEDURE — C1880: CPT

## 2023-07-10 PROCEDURE — 93010 ELECTROCARDIOGRAM REPORT: CPT

## 2023-07-10 PROCEDURE — 97116 GAIT TRAINING THERAPY: CPT | Mod: GP

## 2023-07-10 PROCEDURE — 80048 BASIC METABOLIC PNL TOTAL CA: CPT

## 2023-07-10 PROCEDURE — 83605 ASSAY OF LACTIC ACID: CPT

## 2023-07-10 PROCEDURE — 99497 ADVNCD CARE PLAN 30 MIN: CPT | Mod: 25

## 2023-07-10 PROCEDURE — 85027 COMPLETE CBC AUTOMATED: CPT

## 2023-07-10 PROCEDURE — 85730 THROMBOPLASTIN TIME PARTIAL: CPT

## 2023-07-10 PROCEDURE — 93308 TTE F-UP OR LMTD: CPT

## 2023-07-10 PROCEDURE — 96375 TX/PRO/DX INJ NEW DRUG ADDON: CPT

## 2023-07-10 PROCEDURE — 87493 C DIFF AMPLIFIED PROBE: CPT

## 2023-07-10 PROCEDURE — 93975 VASCULAR STUDY: CPT

## 2023-07-10 PROCEDURE — 80053 COMPREHEN METABOLIC PANEL: CPT

## 2023-07-10 PROCEDURE — 84100 ASSAY OF PHOSPHORUS: CPT

## 2023-07-10 PROCEDURE — 36415 COLL VENOUS BLD VENIPUNCTURE: CPT

## 2023-07-10 PROCEDURE — 99285 EMERGENCY DEPT VISIT HI MDM: CPT | Mod: 25

## 2023-07-10 PROCEDURE — 75635 CT ANGIO ABDOMINAL ARTERIES: CPT

## 2023-07-10 PROCEDURE — 76870 US EXAM SCROTUM: CPT

## 2023-07-10 PROCEDURE — 93971 EXTREMITY STUDY: CPT | Mod: RT

## 2023-07-10 PROCEDURE — 37191 INS ENDOVAS VENA CAVA FILTR: CPT

## 2023-07-10 PROCEDURE — 93005 ELECTROCARDIOGRAM TRACING: CPT

## 2023-07-10 PROCEDURE — 85610 PROTHROMBIN TIME: CPT

## 2023-07-10 PROCEDURE — 86901 BLOOD TYPING SEROLOGIC RH(D): CPT

## 2023-07-10 PROCEDURE — 87086 URINE CULTURE/COLONY COUNT: CPT

## 2023-07-10 PROCEDURE — C1887: CPT

## 2023-07-10 RX ORDER — LOPERAMIDE HCL 2 MG
2 TABLET ORAL ONCE
Refills: 0 | Status: COMPLETED | OUTPATIENT
Start: 2023-07-10 | End: 2023-07-10

## 2023-07-10 RX ORDER — FENTANYL CITRATE 50 UG/ML
25 INJECTION INTRAVENOUS
Refills: 0 | Status: DISCONTINUED | OUTPATIENT
Start: 2023-07-10 | End: 2023-07-10

## 2023-07-10 RX ORDER — ONDANSETRON 8 MG/1
4 TABLET, FILM COATED ORAL ONCE
Refills: 0 | Status: DISCONTINUED | OUTPATIENT
Start: 2023-07-10 | End: 2023-07-10

## 2023-07-10 RX ORDER — GABAPENTIN 400 MG/1
100 CAPSULE ORAL EVERY 8 HOURS
Refills: 0 | Status: DISCONTINUED | OUTPATIENT
Start: 2023-07-10 | End: 2023-07-21

## 2023-07-10 RX ORDER — HYDROMORPHONE HYDROCHLORIDE 2 MG/ML
0.5 INJECTION INTRAMUSCULAR; INTRAVENOUS; SUBCUTANEOUS
Refills: 0 | Status: DISCONTINUED | OUTPATIENT
Start: 2023-07-10 | End: 2023-07-10

## 2023-07-10 RX ORDER — PSYLLIUM SEED (WITH DEXTROSE)
1 POWDER (GRAM) ORAL EVERY 12 HOURS
Refills: 0 | Status: DISCONTINUED | OUTPATIENT
Start: 2023-07-10 | End: 2023-07-21

## 2023-07-10 RX ORDER — LOPERAMIDE HCL 2 MG
2 TABLET ORAL EVERY 6 HOURS
Refills: 0 | Status: DISCONTINUED | OUTPATIENT
Start: 2023-07-10 | End: 2023-07-12

## 2023-07-10 RX ORDER — APIXABAN 5 MG/1
5 TABLET, FILM COATED ORAL
Qty: 60 | Refills: 2 | Status: ACTIVE | COMMUNITY
Start: 2022-09-14 | End: 1900-01-01

## 2023-07-10 RX ORDER — HEPARIN SODIUM 5000 [USP'U]/ML
7000 INJECTION INTRAVENOUS; SUBCUTANEOUS EVERY 6 HOURS
Refills: 0 | Status: DISCONTINUED | OUTPATIENT
Start: 2023-07-10 | End: 2023-07-10

## 2023-07-10 RX ORDER — DEXTROSE MONOHYDRATE, SODIUM CHLORIDE, AND POTASSIUM CHLORIDE 50; .745; 4.5 G/1000ML; G/1000ML; G/1000ML
1000 INJECTION, SOLUTION INTRAVENOUS
Refills: 0 | Status: DISCONTINUED | OUTPATIENT
Start: 2023-07-10 | End: 2023-07-21

## 2023-07-10 RX ORDER — HEPARIN SODIUM 5000 [USP'U]/ML
3500 INJECTION INTRAVENOUS; SUBCUTANEOUS EVERY 6 HOURS
Refills: 0 | Status: DISCONTINUED | OUTPATIENT
Start: 2023-07-10 | End: 2023-07-14

## 2023-07-10 RX ORDER — SODIUM CHLORIDE 9 MG/ML
1000 INJECTION, SOLUTION INTRAVENOUS
Refills: 0 | Status: DISCONTINUED | OUTPATIENT
Start: 2023-07-10 | End: 2023-07-10

## 2023-07-10 RX ORDER — ONDANSETRON 8 MG/1
4 TABLET, FILM COATED ORAL EVERY 8 HOURS
Refills: 0 | Status: DISCONTINUED | OUTPATIENT
Start: 2023-07-10 | End: 2023-07-21

## 2023-07-10 RX ORDER — NALOXONE HYDROCHLORIDE 4 MG/.1ML
0.4 SPRAY NASAL ONCE
Refills: 0 | Status: DISCONTINUED | OUTPATIENT
Start: 2023-07-10 | End: 2023-07-21

## 2023-07-10 RX ORDER — PSYLLIUM SEED (WITH DEXTROSE)
1 POWDER (GRAM) ORAL ONCE
Refills: 0 | Status: COMPLETED | OUTPATIENT
Start: 2023-07-10 | End: 2023-07-10

## 2023-07-10 RX ORDER — SODIUM CHLORIDE 9 MG/ML
1000 INJECTION INTRAMUSCULAR; INTRAVENOUS; SUBCUTANEOUS
Refills: 0 | Status: DISCONTINUED | OUTPATIENT
Start: 2023-07-10 | End: 2023-07-21

## 2023-07-10 RX ORDER — LIDOCAINE 4 G/100G
1 CREAM TOPICAL DAILY
Refills: 0 | Status: DISCONTINUED | OUTPATIENT
Start: 2023-07-10 | End: 2023-07-21

## 2023-07-10 RX ORDER — SODIUM CHLORIDE 9 MG/ML
500 INJECTION INTRAMUSCULAR; INTRAVENOUS; SUBCUTANEOUS
Refills: 0 | Status: DISCONTINUED | OUTPATIENT
Start: 2023-07-10 | End: 2023-07-10

## 2023-07-10 RX ORDER — LOPERAMIDE HCL 2 MG
TABLET ORAL
Refills: 0 | Status: DISCONTINUED | OUTPATIENT
Start: 2023-07-10 | End: 2023-07-12

## 2023-07-10 RX ORDER — MORPHINE SULFATE 50 MG/1
4 CAPSULE, EXTENDED RELEASE ORAL EVERY 4 HOURS
Refills: 0 | Status: DISCONTINUED | OUTPATIENT
Start: 2023-07-10 | End: 2023-07-13

## 2023-07-10 RX ORDER — MORPHINE SULFATE 50 MG/1
2 CAPSULE, EXTENDED RELEASE ORAL EVERY 4 HOURS
Refills: 0 | Status: DISCONTINUED | OUTPATIENT
Start: 2023-07-10 | End: 2023-07-13

## 2023-07-10 RX ORDER — APIXABAN 2.5 MG/1
5 TABLET, FILM COATED ORAL EVERY 12 HOURS
Refills: 0 | Status: DISCONTINUED | OUTPATIENT
Start: 2023-07-10 | End: 2023-07-10

## 2023-07-10 RX ORDER — LANOLIN ALCOHOL/MO/W.PET/CERES
3 CREAM (GRAM) TOPICAL AT BEDTIME
Refills: 0 | Status: DISCONTINUED | OUTPATIENT
Start: 2023-07-10 | End: 2023-07-21

## 2023-07-10 RX ORDER — ACETAMINOPHEN 500 MG
1000 TABLET ORAL ONCE
Refills: 0 | Status: COMPLETED | OUTPATIENT
Start: 2023-07-10 | End: 2023-07-10

## 2023-07-10 RX ORDER — VANCOMYCIN HCL 1 G
1500 VIAL (EA) INTRAVENOUS EVERY 8 HOURS
Refills: 0 | Status: DISCONTINUED | OUTPATIENT
Start: 2023-07-10 | End: 2023-07-10

## 2023-07-10 RX ORDER — HEPARIN SODIUM 5000 [USP'U]/ML
3500 INJECTION INTRAVENOUS; SUBCUTANEOUS EVERY 6 HOURS
Refills: 0 | Status: DISCONTINUED | OUTPATIENT
Start: 2023-07-10 | End: 2023-07-10

## 2023-07-10 RX ORDER — SENNA PLUS 8.6 MG/1
2 TABLET ORAL AT BEDTIME
Refills: 0 | Status: DISCONTINUED | OUTPATIENT
Start: 2023-07-10 | End: 2023-07-11

## 2023-07-10 RX ORDER — PSYLLIUM SEED (WITH DEXTROSE)
POWDER (GRAM) ORAL
Refills: 0 | Status: DISCONTINUED | OUTPATIENT
Start: 2023-07-10 | End: 2023-07-21

## 2023-07-10 RX ORDER — LACTOBACILLUS ACIDOPHILUS 100MM CELL
1 CAPSULE ORAL
Refills: 0 | Status: DISCONTINUED | OUTPATIENT
Start: 2023-07-10 | End: 2023-07-11

## 2023-07-10 RX ORDER — FENTANYL CITRATE 50 UG/ML
50 INJECTION INTRAVENOUS ONCE
Refills: 0 | Status: DISCONTINUED | OUTPATIENT
Start: 2023-07-10 | End: 2023-07-10

## 2023-07-10 RX ORDER — HEPARIN SODIUM 5000 [USP'U]/ML
INJECTION INTRAVENOUS; SUBCUTANEOUS
Qty: 25000 | Refills: 0 | Status: DISCONTINUED | OUTPATIENT
Start: 2023-07-10 | End: 2023-07-14

## 2023-07-10 RX ORDER — HEPARIN SODIUM 5000 [USP'U]/ML
7000 INJECTION INTRAVENOUS; SUBCUTANEOUS EVERY 6 HOURS
Refills: 0 | Status: DISCONTINUED | OUTPATIENT
Start: 2023-07-10 | End: 2023-07-14

## 2023-07-10 RX ORDER — HEPARIN SODIUM 5000 [USP'U]/ML
INJECTION INTRAVENOUS; SUBCUTANEOUS
Qty: 25000 | Refills: 0 | Status: DISCONTINUED | OUTPATIENT
Start: 2023-07-10 | End: 2023-07-10

## 2023-07-10 RX ORDER — POLYETHYLENE GLYCOL 3350 17 G/17G
17 POWDER, FOR SOLUTION ORAL DAILY
Refills: 0 | Status: DISCONTINUED | OUTPATIENT
Start: 2023-07-10 | End: 2023-07-11

## 2023-07-10 RX ORDER — ACETAMINOPHEN 500 MG
650 TABLET ORAL EVERY 6 HOURS
Refills: 0 | Status: DISCONTINUED | OUTPATIENT
Start: 2023-07-10 | End: 2023-07-21

## 2023-07-10 RX ORDER — PIPERACILLIN AND TAZOBACTAM 4; .5 G/20ML; G/20ML
3.38 INJECTION, POWDER, LYOPHILIZED, FOR SOLUTION INTRAVENOUS EVERY 8 HOURS
Refills: 0 | Status: DISCONTINUED | OUTPATIENT
Start: 2023-07-10 | End: 2023-07-12

## 2023-07-10 RX ADMIN — Medication 1000 MILLIGRAM(S): at 10:25

## 2023-07-10 RX ADMIN — Medication 2 MILLIGRAM(S): at 08:34

## 2023-07-10 RX ADMIN — MORPHINE SULFATE 4 MILLIGRAM(S): 50 CAPSULE, EXTENDED RELEASE ORAL at 00:00

## 2023-07-10 RX ADMIN — FENTANYL CITRATE 50 MICROGRAM(S): 50 INJECTION INTRAVENOUS at 20:54

## 2023-07-10 RX ADMIN — SODIUM CHLORIDE 500 MILLILITER(S): 9 INJECTION INTRAMUSCULAR; INTRAVENOUS; SUBCUTANEOUS at 03:09

## 2023-07-10 RX ADMIN — Medication 400 MILLIGRAM(S): at 08:34

## 2023-07-10 RX ADMIN — SODIUM CHLORIDE 100 MILLILITER(S): 9 INJECTION, SOLUTION INTRAVENOUS at 21:20

## 2023-07-10 RX ADMIN — APIXABAN 5 MILLIGRAM(S): 2.5 TABLET, FILM COATED ORAL at 09:08

## 2023-07-10 RX ADMIN — Medication 1 PACKET(S): at 10:37

## 2023-07-10 RX ADMIN — Medication 1 TABLET(S): at 09:08

## 2023-07-10 RX ADMIN — MORPHINE SULFATE 4 MILLIGRAM(S): 50 CAPSULE, EXTENDED RELEASE ORAL at 23:45

## 2023-07-10 RX ADMIN — HEPARIN SODIUM 1600 UNIT(S)/HR: 5000 INJECTION INTRAVENOUS; SUBCUTANEOUS at 10:37

## 2023-07-10 RX ADMIN — Medication 300 MILLIGRAM(S): at 07:15

## 2023-07-10 RX ADMIN — PIPERACILLIN AND TAZOBACTAM 25 GRAM(S): 4; .5 INJECTION, POWDER, LYOPHILIZED, FOR SOLUTION INTRAVENOUS at 09:49

## 2023-07-10 RX ADMIN — Medication 500 MILLIGRAM(S): at 00:54

## 2023-07-10 RX ADMIN — HYDROMORPHONE HYDROCHLORIDE 0.5 MILLIGRAM(S): 2 INJECTION INTRAMUSCULAR; INTRAVENOUS; SUBCUTANEOUS at 21:03

## 2023-07-10 RX ADMIN — HYDROMORPHONE HYDROCHLORIDE 0.5 MILLIGRAM(S): 2 INJECTION INTRAMUSCULAR; INTRAVENOUS; SUBCUTANEOUS at 21:18

## 2023-07-10 RX ADMIN — SODIUM CHLORIDE 150 MILLILITER(S): 9 INJECTION INTRAMUSCULAR; INTRAVENOUS; SUBCUTANEOUS at 04:59

## 2023-07-10 RX ADMIN — SODIUM CHLORIDE 500 MILLILITER(S): 9 INJECTION INTRAMUSCULAR; INTRAVENOUS; SUBCUTANEOUS at 02:13

## 2023-07-10 RX ADMIN — FENTANYL CITRATE 50 MICROGRAM(S): 50 INJECTION INTRAVENOUS at 21:00

## 2023-07-10 RX ADMIN — MORPHINE SULFATE 4 MILLIGRAM(S): 50 CAPSULE, EXTENDED RELEASE ORAL at 23:28

## 2023-07-10 RX ADMIN — LIDOCAINE 1 PATCH: 4 CREAM TOPICAL at 09:08

## 2023-07-10 RX ADMIN — FENTANYL CITRATE 50 MICROGRAM(S): 50 INJECTION INTRAVENOUS at 20:37

## 2023-07-10 NOTE — CONSULT NOTE ADULT - ASSESSMENT
A/P:  51 y/o M with PMH Lung CA, colon cancer with metatastes to the liver  on chemotherapy, PE on eliquis, h/o End ileostomy and revision 04/2023 and subtotal colectomy for perforated cecal mass 2022    with high ileostomy output with 2100 cc since ED visit     wbc 68 from 123  LA 1.4     P:  start imodium 2 q6  metamucil q12  IV fluid hydration  monitor electrolytes and replete  monitor ileostomy output  rest of management as per primary team     Plan d/w Dr. Todd

## 2023-07-10 NOTE — H&P ADULT - HISTORY OF PRESENT ILLNESS
53 y/o M with PMH colon cancer with metatastes to the liver and lung on chemotherapy, PE presented with abdominal pain. Pt reports loose/watery stools, dehydration, decreased appetite over the last 3 days. His last chemotherapy was on Wednesday. No recent antibiotic use. Reports chills, cramping abdominal pain (sharp abdominal pain has been chronic for years), nausea, dry heaving, vomiting (6 episodes of watery outpt). Denies fevers, chills, chest pain, SOB, abdominal pain, N/V, diarrhea/constipation.     Prior admission:   - 4/19/23: Intestinal stoma prolpase     ER course: HR . Labs: .35 -> 112.01, lactate 2.4 -> 1.4, Na 134, glucose 113, alkaline phosphatase 191. UA: negative. COVID and RVP negative.     Imaging:  - CXR: port right chest wall, no consolidation, no effusion, no pneumothorax (personally reviewed.    - CT abd/pelvis: No significant change when compared to prior study 6/13/2023. No acute findings to suggest inflammatory or infectious process in the abdomen or pelvis, no bowel obstruction. Unchanged hepatic metastasis and peritoneal thickening with serosal implant in the right lower pelvis and postsurgical changes from colectomy and right abdominal ileostomy. No morphologic abnormality at the ileostomy site. Occlusion of left external iliac artery.     Pt was given Ciprofloxacin and Metronidazole, 3L of NS, pepcid, morphine, zofran. He is being admitted to med/surg (1N) for further management.

## 2023-07-10 NOTE — CONSULT NOTE ADULT - SUBJECTIVE AND OBJECTIVE BOX
Patient is a 52y old  Male who presents with a chief complaint of Abdominal pain     HPI:  51 y/o Male with h/o colon cancer with metatastes to the liver and lung on chemotherapy, PE was admitted on 7/10 for abdominal pain. Pt reports loose/watery stools, dehydration, decreased appetite over the last 3 days. His last chemotherapy was on Wednesday. No recent antibiotic use. Reports chills, cramping abdominal pain (sharp abdominal pain has been chronic for years), nausea, dry heaving, vomiting (6 episodes of watery outpt). On ER he received ciprofloxacin and metronidazole, then vancomycin IV and zosyn.      Prior admission:   - 4/19/23: Intestinal stoma prolpase     PMH: as above  PSH: as above  Meds: per reconciliation sheet, noted below  MEDICATIONS  (STANDING):  heparin  Infusion.  Unit(s)/Hr (16 mL/Hr) IV Continuous <Continuous>  lactobacillus acidophilus 1 Tablet(s) Oral two times a day with meals  lidocaine   4% Patch 1 Patch Transdermal daily  loperamide 2 milliGRAM(s) Oral every 6 hours  loperamide      naloxone Injectable 0.4 milliGRAM(s) IV Push once  piperacillin/tazobactam IVPB.. 3.375 Gram(s) IV Intermittent every 8 hours  polyethylene glycol 3350 17 Gram(s) Oral daily  psyllium Powder      psyllium Powder 1 Packet(s) Oral every 12 hours  senna 2 Tablet(s) Oral at bedtime  sodium chloride 0.9%. 1000 milliLiter(s) (150 mL/Hr) IV Continuous <Continuous>  vancomycin  IVPB 1500 milliGRAM(s) IV Intermittent every 8 hours    MEDICATIONS  (PRN):  acetaminophen     Tablet .. 650 milliGRAM(s) Oral every 6 hours PRN Temp greater or equal to 38C (100.4F), Mild Pain (1 - 3)  aluminum hydroxide/magnesium hydroxide/simethicone Suspension 30 milliLiter(s) Oral every 4 hours PRN Dyspepsia  bisacodyl 5 milliGRAM(s) Oral daily PRN Constipation  heparin   Injectable 3500 Unit(s) IV Push every 6 hours PRN For aPTT between 40 - 57  heparin   Injectable 7000 Unit(s) IV Push every 6 hours PRN For aPTT less than 40  melatonin 3 milliGRAM(s) Oral at bedtime PRN Insomnia  morphine  - Injectable 2 milliGRAM(s) IV Push every 4 hours PRN Moderate Pain (4 - 6)  morphine  - Injectable 4 milliGRAM(s) IV Push every 4 hours PRN Severe Pain (7 - 10)  ondansetron Injectable 4 milliGRAM(s) IV Push every 8 hours PRN Nausea and/or Vomiting    Allergies    [This allergen will not trigger allergy alert] No Known Drug Allergies (Unknown)    Intolerances      Social: no smoking, no alcohol, no illegal drugs; no recent travel, no exposure to TB  FAMILY HISTORY:  FH: dementia (Mother)      no history of premature cardiovascular disease in first degree relatives    ROS: the patient denies fever, had chills, no HA, no seizures, no dizziness, no sore throat, no nasal congestion, no blurry vision, no CP, no palpitations, no SOB, no cough, had abdominal pain, no diarrhea, no N/V, no dysuria, no leg pain, no claudication, no rash, no joint aches, no rectal pain or bleeding, no night sweats  All other systems reviewed and are negative    Vital Signs Last 24 Hrs  T(C): 36.7 (10 Jul 2023 15:46), Max: 36.8 (10 Jul 2023 07:00)  T(F): 98 (10 Jul 2023 15:46), Max: 98.2 (10 Jul 2023 07:00)  HR: 66 (10 Jul 2023 15:46) (62 - 118)  BP: 120/61 (10 Jul 2023 15:46) (89/55 - 120/61)  BP(mean): 75 (10 Jul 2023 08:10) (73 - 75)  RR: 18 (10 Jul 2023 15:46) (12 - 18)  SpO2: 100% (10 Jul 2023 15:46) (98% - 100%)    Parameters below as of 10 Jul 2023 15:46  Patient On (Oxygen Delivery Method): room air    PE:    Constitutional:  No acute distress  HEENT: NC/AT, EOMI, PERRLA, conjunctivae clear; ears and nose atraumatic; pharynx benign  Neck: supple; thyroid not palpable  Back: no tenderness  Respiratory: respiratory effort normal; clear to auscultation  Cardiovascular: S1S2 regular, no murmurs  Abdomen: soft, mild epigastric tender, not distended, positive BS; no liver or spleen organomegaly  Genitourinary: no suprapubic tenderness  Lymphatic: no LN palpable  Musculoskeletal: no muscle tenderness, no joint swelling or tenderness  Extremities: no pedal edema  Neurological/ Psychiatric: AxOx3, judgement and insight normal; moving all extremities  Skin: no rashes; no palpable lesions    Labs: all available labs reviewed                        11.7   68.60 )-----------( 150      ( 10 Jul 2023 06:04 )             36.4     07-10    139  |  108  |  10  ----------------------------<  104<H>  3.9   |  28  |  0.87    Ca    8.3<L>      10 Jul 2023 06:04    TPro  6.3  /  Alb  3.0<L>  /  TBili  0.5  /  DBili  x   /  AST  17  /  ALT  29  /  AlkPhos  149<H>  07-10     LIVER FUNCTIONS - ( 10 Jul 2023 06:04 )  Alb: 3.0 g/dL / Pro: 6.3 gm/dL / ALK PHOS: 149 U/L / ALT: 29 U/L / AST: 17 U/L / GGT: x           Urinalysis Basic - ( 10 Jul 2023 06:04 )    Color: x / Appearance: x / SG: x / pH: x  Gluc: 104 mg/dL / Ketone: x  / Bili: x / Urobili: x   Blood: x / Protein: x / Nitrite: x   Leuk Esterase: x / RBC: x / WBC x   Sq Epi: x / Non Sq Epi: x / Bacteria: x  (07-10 @ 01:53)  [facility]      Radiology: all available radiological tests reviewed    - CXR: port right chest wall, no consolidation, no effusion, no pneumothorax (personally reviewed.    - CT abd/pelvis: No significant change when compared to prior study 6/13/2023. No acute findings to suggest inflammatory or infectious process in the abdomen or pelvis, no bowel obstruction. Unchanged hepatic metastasis and peritoneal thickening with serosal implant in the right lower pelvis and postsurgical changes from colectomy and right abdominal ileostomy. No morphologic abnormality at the ileostomy site. Occlusion of left external iliac artery.     Advanced directives addressed: full resuscitation Patient is a 52y old  Male who presents with a chief complaint of Abdominal pain     HPI:  51 y/o Male with h/o colon cancer with metastases to the liver and lung s/p surgery and colostomy, on chemotherapy, PE was admitted on 7/10 for abdominal pain. Pt reports loose/watery stools, dehydration, decreased appetite over the last 3 days. His last chemotherapy was on Wednesday. No recent antibiotic use. Reports chills, cramping abdominal pain (sharp abdominal pain has been chronic for years), nausea, dry heaving, vomiting (6 episodes of watery outpt). On ER he received ciprofloxacin and metronidazole, then vancomycin IV and zosyn.      Prior admission:   - 4/19/23: Intestinal stoma prolpase     PMH: as above  PSH: as above  Meds: per reconciliation sheet, noted below  MEDICATIONS  (STANDING):  heparin  Infusion.  Unit(s)/Hr (16 mL/Hr) IV Continuous <Continuous>  lactobacillus acidophilus 1 Tablet(s) Oral two times a day with meals  lidocaine   4% Patch 1 Patch Transdermal daily  loperamide 2 milliGRAM(s) Oral every 6 hours  loperamide      naloxone Injectable 0.4 milliGRAM(s) IV Push once  piperacillin/tazobactam IVPB.. 3.375 Gram(s) IV Intermittent every 8 hours  polyethylene glycol 3350 17 Gram(s) Oral daily  psyllium Powder      psyllium Powder 1 Packet(s) Oral every 12 hours  senna 2 Tablet(s) Oral at bedtime  sodium chloride 0.9%. 1000 milliLiter(s) (150 mL/Hr) IV Continuous <Continuous>  vancomycin  IVPB 1500 milliGRAM(s) IV Intermittent every 8 hours    MEDICATIONS  (PRN):  acetaminophen     Tablet .. 650 milliGRAM(s) Oral every 6 hours PRN Temp greater or equal to 38C (100.4F), Mild Pain (1 - 3)  aluminum hydroxide/magnesium hydroxide/simethicone Suspension 30 milliLiter(s) Oral every 4 hours PRN Dyspepsia  bisacodyl 5 milliGRAM(s) Oral daily PRN Constipation  heparin   Injectable 3500 Unit(s) IV Push every 6 hours PRN For aPTT between 40 - 57  heparin   Injectable 7000 Unit(s) IV Push every 6 hours PRN For aPTT less than 40  melatonin 3 milliGRAM(s) Oral at bedtime PRN Insomnia  morphine  - Injectable 2 milliGRAM(s) IV Push every 4 hours PRN Moderate Pain (4 - 6)  morphine  - Injectable 4 milliGRAM(s) IV Push every 4 hours PRN Severe Pain (7 - 10)  ondansetron Injectable 4 milliGRAM(s) IV Push every 8 hours PRN Nausea and/or Vomiting    Allergies    [This allergen will not trigger allergy alert] No Known Drug Allergies (Unknown)    Intolerances      Social: no smoking, no alcohol, no illegal drugs; no recent travel, no exposure to TB  FAMILY HISTORY:  FH: dementia (Mother)      no history of premature cardiovascular disease in first degree relatives    ROS: the patient denies fever, had chills, no HA, no seizures, no dizziness, no sore throat, no nasal congestion, no blurry vision, no CP, no palpitations, no SOB, no cough, had abdominal pain, no diarrhea, no N/V, no dysuria, no leg pain, no claudication, no rash, no joint aches, no rectal pain or bleeding, no night sweats  All other systems reviewed and are negative    Vital Signs Last 24 Hrs  T(C): 36.7 (10 Jul 2023 15:46), Max: 36.8 (10 Jul 2023 07:00)  T(F): 98 (10 Jul 2023 15:46), Max: 98.2 (10 Jul 2023 07:00)  HR: 66 (10 Jul 2023 15:46) (62 - 118)  BP: 120/61 (10 Jul 2023 15:46) (89/55 - 120/61)  BP(mean): 75 (10 Jul 2023 08:10) (73 - 75)  RR: 18 (10 Jul 2023 15:46) (12 - 18)  SpO2: 100% (10 Jul 2023 15:46) (98% - 100%)    Parameters below as of 10 Jul 2023 15:46  Patient On (Oxygen Delivery Method): room air    PE:    Constitutional:  No acute distress  HEENT: NC/AT, EOMI, PERRLA, conjunctivae clear; ears and nose atraumatic; pharynx benign  Neck: supple; thyroid not palpable  Back: no tenderness  Respiratory: respiratory effort normal; clear to auscultation  Cardiovascular: S1S2 regular, no murmurs  Abdomen: soft, mild epigastric tender, distended, positive BS; no liver or spleen organomegaly  colostomy  Genitourinary: no suprapubic tenderness  Lymphatic: no LN palpable  Musculoskeletal: no muscle tenderness, no joint swelling or tenderness  Extremities: no pedal edema  Neurological/ Psychiatric: AxOx3, judgement and insight normal; moving all extremities  Skin: no rashes; no palpable lesions    Labs: all available labs reviewed                        11.7   68.60 )-----------( 150      ( 10 Jul 2023 06:04 )             36.4     07-10    139  |  108  |  10  ----------------------------<  104<H>  3.9   |  28  |  0.87    Ca    8.3<L>      10 Jul 2023 06:04    TPro  6.3  /  Alb  3.0<L>  /  TBili  0.5  /  DBili  x   /  AST  17  /  ALT  29  /  AlkPhos  149<H>  07-10     LIVER FUNCTIONS - ( 10 Jul 2023 06:04 )  Alb: 3.0 g/dL / Pro: 6.3 gm/dL / ALK PHOS: 149 U/L / ALT: 29 U/L / AST: 17 U/L / GGT: x           Urinalysis Basic - ( 10 Jul 2023 06:04 )    Color: x / Appearance: x / SG: x / pH: x  Gluc: 104 mg/dL / Ketone: x  / Bili: x / Urobili: x   Blood: x / Protein: x / Nitrite: x   Leuk Esterase: x / RBC: x / WBC x   Sq Epi: x / Non Sq Epi: x / Bacteria: x  (07-10 @ 01:53)  [facility]      Radiology: all available radiological tests reviewed    - CXR: port right chest wall, no consolidation, no effusion, no pneumothorax (personally reviewed.    - CT abd/pelvis: No significant change when compared to prior study 6/13/2023. No acute findings to suggest inflammatory or infectious process in the abdomen or pelvis, no bowel obstruction. Unchanged hepatic metastasis and peritoneal thickening with serosal implant in the right lower pelvis and postsurgical changes from colectomy and right abdominal ileostomy. No morphologic abnormality at the ileostomy site. Occlusion of left external iliac artery.     Advanced directives addressed: full resuscitation

## 2023-07-10 NOTE — CONSULT NOTE ADULT - SUBJECTIVE AND OBJECTIVE BOX
Surgery Consultation    53 y/o M with PMH Lung CA, colon cancer with metatastes to the liver  on chemotherapy, PE on eliquis, h/o End ileostomy and revision 04/2023 and subtotal colectomy for perforated cecal mass 2022 presented with abdominal pain and high output from the ileostomy. Pt reports he is having loose/watery stools, dehydration, decreased appetite over the last 3 days. His last chemotherapy was on Wednesday and gets it every 2 weeks with Dr. Serrano. Vascular surgery consulted for CT a/p w/IV finding of left external iliac artery occlusion. Pt denies any vascular issues. Pt does reports left great toe numbness for last few weeks. Denies any foot or leg wounds. Denies fever or chills, n/v/d/c.    PAST MEDICAL & SURGICAL HISTORY:  Cancer, colon  liver mets      S/P ileostomy      Metastasis to liver      Pulmonary embolism      S/P colon resection      H/O ileostomy        Allergies:  [This allergen will not trigger allergy alert] No Known Drug Allergies (Unknown)    Home Medications:  Eliquis 5 mg oral tablet: 1 tab(s) orally 2 times a day      Family History:  FAMILY HISTORY:  FH: dementia (Mother)        ROS:  Constitutional: Denies fever, fatigue or weight loss.  Skin: Denies rash.  Eyes: Denies recent vision problems or eye pain.  ENT: Denies congestion, ear pain, or sore throat.  Endocrine: Denies thyroid problems.  Cardiovascular: Denies chest pain or palpation.  Respiratory: Denies cough, shortness of breath, congestion, or wheezing.  Gastrointestinal: + RLQ abdominal pain, denies nausea, vomiting,   Genitourinary: Denies dysuria.  Musculoskeletal: left great toe numbness  Neurologic: Denies headache.      Physical Examination:  AO x3, NAD  GENERAL: No acute distress, well-developed  HEAD:  Atraumatic, Normocephalic  CHEST/LUNG: CTAB; No wheezes, rales, or rhonchi  HEART: Regular rate and rhythm; No murmurs, rubs, or gallops  ABDOMEN: Soft, mild tenderness RLQ,  non-distended, Right side ileostomy with clear/yellow stool minimal prolapse.   Ext. no cyanosis or edema, warm, RLE: femoral, DP/PT pulses palpable, LLE: DP palpable/Dopplerable, PT dopplerable, femoral 1+palpable, dopplerable  NEUROLOGY: responding appropriately, no focal deficits    Data:                        11.7   68.60 )-----------( 150      ( 10 Jul 2023 06:04 )             36.4     07-10    139  |  108  |  10  ----------------------------<  104<H>  3.9   |  28  |  0.87    Ca    8.3<L>      10 Jul 2023 06:04    TPro  6.3  /  Alb  3.0<L>  /  TBili  0.5  /  DBili  x   /  AST  17  /  ALT  29  /  AlkPhos  149<H>  07-10      LIVER FUNCTIONS - ( 10 Jul 2023 06:04 )  Alb: 3.0 g/dL / Pro: 6.3 gm/dL / ALK PHOS: 149 U/L / ALT: 29 U/L / AST: 17 U/L / GGT: x           Urinalysis Basic - ( 10 Jul 2023 06:04 )    Color: x / Appearance: x / SG: x / pH: x  Gluc: 104 mg/dL / Ketone: x  / Bili: x / Urobili: x   Blood: x / Protein: x / Nitrite: x   Leuk Esterase: x / RBC: x / WBC x   Sq Epi: x / Non Sq Epi: x / Bacteria: x        Radiology:    CT a/p:  No ascites. Peritoneal thickening and nodularity in the right   lower pelvis alongside a small bowel loop with a serosal implant   measuring approximately 1.7 x 2.7 cm, image [id number], similar to prior 1.6   x 2.5 cm. No associated bowel obstruction.  VESSELS: Mild atherosclerotic changes of the abdominal aorta. Celiac axis   and SMA patent. Portal vein, SMV and splenic vein patent. Left   retroaortic renal vein. 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  RETROPERITONEUM/LYMPH NODES: No lymphadenopathy.  ABDOMINAL WALL: Midline postsurgical changes. Unremarkable appearance of   the right mid abdomen ileostomy without inflammatory changes.  BONES: Mild degenerative changes. Lucency in T11 unchanged since prior   study, may represent a osseous hemangioma.    IMPRESSION:  No significant change when compared to prior study 6/13/2023. No acute   findings to suggest inflammatory or infectious process in the abdomen or   pelvis, no bowel obstruction.    Unchanged hepatic metastasis and peritoneal thickening with serosal   implant in the right lower pelvis and postsurgical changes from colectomy   and right abdominal ileostomy. No morphologic abnormality at the   ileostomy site.

## 2023-07-10 NOTE — PATIENT PROFILE ADULT - FALL HARM RISK - RISK INTERVENTIONS

## 2023-07-10 NOTE — H&P ADULT - NSHPSOCIALHISTORY_GEN_ALL_CORE
Lives with his mother and brother. Quit smoking 4-5 years ago, smoked for 30 years. Denies alcohol and drug use.

## 2023-07-10 NOTE — H&P ADULT - NSHPPHYSICALEXAM_GEN_ALL_CORE
ICU Vital Signs Last 24 Hrs  T(C): 36.7 (10 Jul 2023 03:35), Max: 36.8 (09 Jul 2023 15:24)  T(F): 98 (10 Jul 2023 03:35), Max: 98.3 (09 Jul 2023 15:24)  HR: 62 (10 Jul 2023 03:35) (62 - 118)  BP: 117/68 (10 Jul 2023 05:56) (89/55 - 119/85)  BP(mean): 73 (09 Jul 2023 23:10) (73 - 73)  RR: 18 (10 Jul 2023 03:35) (12 - 18)  SpO2: 100% (10 Jul 2023 03:35) (98% - 100%)    O2 Parameters below as of 10 Jul 2023 03:35  Patient On (Oxygen Delivery Method): room air    General: Awake and alert, cooperative with exam. No acute distress.   Skin: Warm, dry, and pink.   Eyes: Pupils equal and reactive to light. Extraocular eye movements intact. No conjunctival injection, discharge, or scleral icterus.   HEENT: Atraumatic, normocephalic. Moist mucus membranes. No oral lesions. No pharyngeal erythema.   Neck: Supple, non-tender. No lymphadenopathy.   Cardiology: Normal S1, S2. No murmurs, rubs, or gallops. Regular rate and rhythm.   Respiratory: Lungs clear to ascultation bilaterally. Good air exchange. No wheezes, rales, or rhonchi. Normal chest expansion.   Gastrointestinal: Positive bowel sounds. Soft, non-tender, non-distended. No guarding, rigidity, or rebound tenderness. No hepatosplenomegaly. Ileostomy with significant outpt.   Musculoskeletal: 5/5 motor strength in all extremities. Normal range of motion.   Extremities: No peripheral edema bilaterally. Dorsalis pedis pulses 2+ bilaterally.   Neurological: A+Ox3 (person, place, and time). Cranial nerves 2-12 intact. Normal speech. No facial droop. No focal neurological deficits. 5/5 motor strength in all extremities.   Psychiatric: Normal affect. Normal mood.

## 2023-07-10 NOTE — PATIENT PROFILE ADULT - FUNCTIONAL ASSESSMENT - BASIC MOBILITY 6.
3-calculated by average/Not able to assess (calculate score using [facility] averaging method)

## 2023-07-10 NOTE — CONSULT NOTE ADULT - ASSESSMENT
51 y/o M with  significant for Stage IV colon cancer with ileostomy with metastases to the liver and lung, currently on chemotherapy, presented with abdominal pain, loose/watery stools, dehydration, decreased appetite over the last 3 days.    # Metastatic Colon CA metastasis to liver, lung, s/p ileostomy     -Diagnosed with colon cancer in May 2022 at the age of 51. He presented to  ER 5/19/22 with worsening abdominal pain for 4 days, reportedly abdominal pain had been ongoing for 4 years.  -5/19/22 he had a CT A/P showed apparent focal mural thickening at the cecum/proximal ascending colon. The sigmoid colon appears to be tethered to the cecum and it is focally thick-walled; focal tumor invasion/extension from the cecum to the sigmoid colon. Mural thickening of the a dilated right lower quadrant small bowel loop with adjacent mesenteric edema for which associated small bowel ischemia cannot be excluded. Multiple hypodense lesions with calcifications in both lobes of the liver with the largest measuring 5.8 x 6.0 cm in hepatic segment.  -5/19/22 he underwent exploratory laparotomy, total colectomy, end ileostomy, biopsy of liver, and biopsy of retroperitoneum. Pathology showed Omentum, excision: Negative for malignancy. Portion of terminal ileum, cecum with adherent sigmoid, total colectomy: Poorly differentiated infiltrating adenocarcinoma measuring 9.0 cm. Adenocarcinoma infiltrates through the bowel wall and through the visceral peritoneum and infiltrates the adherent sigmoid colon 3 of 17 lymph nodes are positive for metastatic carcinoma. Lymphovascular space & Perineural invasion is identified. The surgical margins negative. Appendix with serosal tumor implants and acute inflammation. Peritonitis. Liver, biopsy: Metastatic adenocarcinoma. Retroperitoneal biopsy: Adenocarcinoma with necrosis. No loss of nuclear expression of MMR proteins (MLH1, MSH2, MSH6, and PMS2). Staging pT4b pN1b pM1c.  -6/22/2022- Started FOLFOXIRI with bevacizumab last dose of Folfiri and Bevacizumab received on 1/4/2023. S/P oxaliplatin until cycle 10.  -9/1/22 CT C/A/P: Segmental left lower lobe pulmonary embolus and probable smaller pulmonary emboli within the right lung. Interval decrease in size of bilateral pulmonary nodules and hepatic metastases compared to 5/25/2022. No new sites of disease.  -6/13/23 CT C/A/P: New, 8 mm right lower lobe nodule and enlarging pulmonary nodules, 7 mm left apical nodule previously measured 6 mm. Bilobar hepatic metastases, the majority of which are calcified and not significantly changed. A noncalcified lesion in the right hepatic lobe demonstrates mild interval growth, 1.7 x 1.4 cm previously 1.4 x 1.3 cm  -On Chemo FOLFIRI most recent dose- 7/5/2023  -Follow up Med/ Onc as outpatient upon d/c    # SIRS questionable due to intra-abdominal etiology    -High output ileostomy   - r/o C. diff   - s/p Ciprofloxacin and Metronidazole in ER; c/w Zosyn and Vancomycin   - CT abd/pelvis - No acute findings to suggest inflammatory or infectious process in the abdomen or pelvis, no bowel obstruction.  - Trend WBC, monitor for temperatures   - ID consult        # Occlusion of left external iliac artery     - Pt on Eliquis for prior PE           53 y/o M with  significant for Stage IV colon cancer with metastases to the liver and lung, currently on chemotherapy, s/p Eend ileostomy and revision 04/2023 and subtotal colectomy for perforated cecal mass 2022 presented with abdominal pain, loose/watery stools, dehydration, decreased appetite over the last 3 days.    # Metastatic Colon CA metastasis to liver, lung, s/p ileostomy     -Diagnosed with colon cancer in May 2022 at the age of 51. He presented to  ER 5/19/22 with worsening abdominal pain for 4 days, reportedly abdominal pain had been ongoing for 4 years.  -5/19/22 he had a CT A/P showed apparent focal mural thickening at the cecum/proximal ascending colon. The sigmoid colon appears to be tethered to the cecum and it is focally thick-walled; focal tumor invasion/extension from the cecum to the sigmoid colon. Mural thickening of the a dilated right lower quadrant small bowel loop with adjacent mesenteric edema for which associated small bowel ischemia cannot be excluded. Multiple hypodense lesions with calcifications in both lobes of the liver with the largest measuring 5.8 x 6.0 cm in hepatic segment.  -5/19/22 he underwent exploratory laparotomy, total colectomy, end ileostomy, biopsy of liver, and biopsy of retroperitoneum. Pathology showed Omentum, excision: Negative for malignancy. Portion of terminal ileum, cecum with adherent sigmoid, total colectomy: Poorly differentiated infiltrating adenocarcinoma measuring 9.0 cm. Adenocarcinoma infiltrates through the bowel wall and through the visceral peritoneum and infiltrates the adherent sigmoid colon 3 of 17 lymph nodes are positive for metastatic carcinoma. Lymphovascular space & Perineural invasion is identified. The surgical margins negative. Appendix with serosal tumor implants and acute inflammation. Peritonitis. Liver, biopsy: Metastatic adenocarcinoma. Retroperitoneal biopsy: Adenocarcinoma with necrosis. No loss of nuclear expression of MMR proteins (MLH1, MSH2, MSH6, and PMS2). Staging pT4b pN1b pM1c.  -6/22/2022- Started FOLFOXIRI with bevacizumab last dose of Folfiri and Bevacizumab received on 1/4/2023. S/P oxaliplatin until cycle 10.  -9/1/22 CT C/A/P: Segmental left lower lobe pulmonary embolus and probable smaller pulmonary emboli within the right lung. Interval decrease in size of bilateral pulmonary nodules and hepatic metastases compared to 5/25/2022. No new sites of disease.  -6/13/23 CT C/A/P: New, 8 mm right lower lobe nodule and enlarging pulmonary nodules, 7 mm left apical nodule previously measured 6 mm. Bilobar hepatic metastases, the majority of which are calcified and not significantly changed. A noncalcified lesion in the right hepatic lobe demonstrates mild interval growth, 1.7 x 1.4 cm previously 1.4 x 1.3 cm  -On Chemo FOLFIRI most recent dose- 7/5/2023  -Follow up Med/ Onc as outpatient upon d/c    # SIRS questionable due to intra-abdominal etiology    -High output ileostomy   - r/o C. diff   - s/p Ciprofloxacin and Metronidazole in ER; c/w Zosyn and Vancomycin   - CT abd/pelvis - No acute findings to suggest inflammatory or infectious process in the abdomen or pelvis, no bowel obstruction.  - Trend WBC, monitor for temperatures   - ID consult        # Occlusion of left external iliac artery     - Pt on Eliquis for prior PE           53 y/o M with  significant for Stage IV colon cancer with metastases to the liver and lung, currently on chemotherapy, s/p Eend ileostomy and revision 04/2023 and subtotal colectomy for perforated cecal mass 2022 presented with abdominal pain, loose/watery stools, dehydration, decreased appetite over the last 3 days.    # Metastatic Colon CA metastasis to liver, lung, s/p ileostomy     -Diagnosed with colon cancer in May 2022 at the age of 51. He presented to  ER 5/19/22 with worsening abdominal pain for 4 days, reportedly abdominal pain had been ongoing for 4 years.  -5/19/22 he had a CT A/P showed apparent focal mural thickening at the cecum/proximal ascending colon. The sigmoid colon appears to be tethered to the cecum and it is focally thick-walled; focal tumor invasion/extension from the cecum to the sigmoid colon. Mural thickening of the a dilated right lower quadrant small bowel loop with adjacent mesenteric edema for which associated small bowel ischemia cannot be excluded. Multiple hypodense lesions with calcifications in both lobes of the liver with the largest measuring 5.8 x 6.0 cm in hepatic segment.  -5/19/22 he underwent exploratory laparotomy, total colectomy, end ileostomy, biopsy of liver, and biopsy of retroperitoneum. Pathology showed Omentum, excision: Negative for malignancy. Portion of terminal ileum, cecum with adherent sigmoid, total colectomy: Poorly differentiated infiltrating adenocarcinoma measuring 9.0 cm. Adenocarcinoma infiltrates through the bowel wall and through the visceral peritoneum and infiltrates the adherent sigmoid colon 3 of 17 lymph nodes are positive for metastatic carcinoma. Lymphovascular space & Perineural invasion is identified. The surgical margins negative. Appendix with serosal tumor implants and acute inflammation. Peritonitis. Liver, biopsy: Metastatic adenocarcinoma. Retroperitoneal biopsy: Adenocarcinoma with necrosis. No loss of nuclear expression of MMR proteins (MLH1, MSH2, MSH6, and PMS2). Staging pT4b pN1b pM1c.  -6/22/2022- Started FOLFOXIRI with bevacizumab last dose of Folfiri and Bevacizumab received on 1/4/2023. S/P oxaliplatin until cycle 10.  -9/1/22 CT C/A/P: Segmental left lower lobe pulmonary embolus and probable smaller pulmonary emboli within the right lung. Interval decrease in size of bilateral pulmonary nodules and hepatic metastases compared to 5/25/2022. No new sites of disease.  -6/13/23 CT C/A/P: New, 8 mm right lower lobe nodule and enlarging pulmonary nodules, 7 mm left apical nodule previously measured 6 mm. Bilobar hepatic metastases, the majority of which are calcified and not significantly changed. A noncalcified lesion in the right hepatic lobe demonstrates mild interval growth, 1.7 x 1.4 cm previously 1.4 x 1.3 cm  -On Chemo FOLFIRI most recent dose- 7/5/2023  -Follow up Med/ Onc as outpatient upon d/c    # Leukocytosis 2/2 recent Onpro post chemo/ SIRS questionable 2/2 intra-abdominal etiology    - WBC-68K/ul <--112K/ul  - s/p Onpro (Neulasta)- 7/7/23  - High output ileostomy   - r/o C. diff   - s/p Ciprofloxacin and Metronidazole in ER; c/w Zosyn and Vancomycin   - CT abd/pelvis - No acute findings to suggest inflammatory or infectious process in the abdomen or pelvis, no bowel obstruction.  - Trend WBC, monitor for temperatures   - ID consult        # Occlusion of left external iliac artery     - Pt on Eliquis for prior PE  -Vascular Sx following           51 y/o M with  significant for Stage IV colon cancer with metastases to the liver and lung, currently on chemotherapy, s/p Eend ileostomy and revision 04/2023 and subtotal colectomy for perforated cecal mass 2022 presented with abdominal pain, loose/watery stools, dehydration, decreased appetite over the last 3 days.    # Metastatic Colon CA metastasis to liver, lung, s/p ileostomy     -Diagnosed with colon cancer in May 2022 at the age of 51. He presented to  ER 5/19/22 with worsening abdominal pain for 4 days, reportedly abdominal pain had been ongoing for 4 years.  -5/19/22 he had a CT A/P showed apparent focal mural thickening at the cecum/proximal ascending colon. The sigmoid colon appears to be tethered to the cecum and it is focally thick-walled; focal tumor invasion/extension from the cecum to the sigmoid colon. Mural thickening of the a dilated right lower quadrant small bowel loop with adjacent mesenteric edema for which associated small bowel ischemia cannot be excluded. Multiple hypodense lesions with calcifications in both lobes of the liver with the largest measuring 5.8 x 6.0 cm in hepatic segment.  -5/19/22 he underwent exploratory laparotomy, total colectomy, end ileostomy, biopsy of liver, and biopsy of retroperitoneum. Pathology showed Omentum, excision: Negative for malignancy. Portion of terminal ileum, cecum with adherent sigmoid, total colectomy: Poorly differentiated infiltrating adenocarcinoma measuring 9.0 cm. Adenocarcinoma infiltrates through the bowel wall and through the visceral peritoneum and infiltrates the adherent sigmoid colon 3 of 17 lymph nodes are positive for metastatic carcinoma. Lymphovascular space & Perineural invasion is identified. The surgical margins negative. Appendix with serosal tumor implants and acute inflammation. Peritonitis. Liver, biopsy: Metastatic adenocarcinoma. Retroperitoneal biopsy: Adenocarcinoma with necrosis. No loss of nuclear expression of MMR proteins (MLH1, MSH2, MSH6, and PMS2). Staging pT4b pN1b pM1c.  -6/22/2022- Started FOLFOXIRI with bevacizumab last dose of Folfiri and Bevacizumab received on 1/4/2023. S/P oxaliplatin until cycle 10.  -9/1/22 CT C/A/P: Segmental left lower lobe pulmonary embolus and probable smaller pulmonary emboli within the right lung. Interval decrease in size of bilateral pulmonary nodules and hepatic metastases compared to 5/25/2022. No new sites of disease.  -6/13/23 CT C/A/P: New, 8 mm right lower lobe nodule and enlarging pulmonary nodules, 7 mm left apical nodule previously measured 6 mm. Bilobar hepatic metastases, the majority of which are calcified and not significantly changed. A noncalcified lesion in the right hepatic lobe demonstrates mild interval growth, 1.7 x 1.4 cm previously 1.4 x 1.3 cm  -On Chemo FOLFIRI most recent dose- 7/5/2023  -Follow up Med/ Onc as outpatient upon d/c    # Leukocytosis 2/2 recent Onpro post chemo/ SIRS questionable 2/2 intra-abdominal etiology    - WBC-68K/ul <--112K/ul  - s/p Onpro (Neulasta)- 7/7/23  - High output ileostomy   - r/o C. diff   - s/p Ciprofloxacin and Metronidazole in ER; c/w Zosyn and Vancomycin   - CT abd/pelvis - No acute findings to suggest inflammatory or infectious process in the abdomen or pelvis, no bowel obstruction.  - Trend WBC, monitor for temperatures   - ID consult        # Occlusion of left external iliac artery     - Pt on Eliquis for prior PE  -Vascular Sx following     Addendum Hem Onc Attending:  Patient seen today on morning rounds together with Oncology NP.  51 y/o male with metastatic colon cancer diagnosed over one year ago, on palliative chemotherapy -  initially FOLFOXIRI / sunny , after 10 cycles changed to FOLFIRI/ Bevacizumab .  last chemotherapy 7/5/23 Had OnPro 7/8/23   Admitted with weakness dehydration/ high output from ileostomy. Feels better after IV hydration.  Recent chemotherapy contributing to his high ostomy output.   Seen by colorectal- Imodium, Metamucil, iv hydration  Leukocytosis - due to recent Neulasta ( OnPro) Acute infectious process less likely - ID evaluation Given empiric antibiotics.  On DOAC for Hx of PE . Occlusion of external iliac artery seen on CT. Clinically LE seems to be perfused OK. Vascular to see patient.

## 2023-07-10 NOTE — H&P ADULT - ASSESSMENT
53 y/o M presented with abdominal pain     1. SIRS positive likely intra-abdominal etiology (r/o C. diff)   - Admit to med/surg   - HR , .35 -> 112.01, lactate 2.4 -> 1.4, trend   - s/p Ciprofloxacin and Metronidazole in ER; c/w Zosyn and Vancomycin    UA negative, pt without urinary symptoms   - f/u BCx x 2, GI PCR, C. diff ; adjust abx based on sensitivities  - CT abd/pelvis - no infectious/inflammatory processs   - Trend WBC, monitor for temperatures   - Tylenol for temperatures PRN   - s/p L of NS, c/w 125 ml/hr (monitor for fluid overload)   - MAPS have been low in the ER, given an additional 1L of IVF with improvement, if BP begins to decline recommend ICU evaluation   - Monitor BP closely   - Infectious disease - Dr. Antonio     2. High output ileostomy   - Would avoid anti-diarrheals until stool studies result   - Colorectal consult - Dr. Montiel     3. Occlusion of left external iliac artery   - Pt on Eliquis for prior PE   - Vascular consult - Dr. Hand     4. History of colon cancer with metatastes to the liver and lung on chemotherapy, PE   - Na 134, glucose 113, alkaline phosphatase 191, trned   - c/w home medications; verified with pt at the bedside  - Heme/Onc consult - Dr. Serrano     DVT ppx: Eliquis   Code status: Full code   Emergency contact: Kathleen Palladino (mother) [phone]     I spent a total of 80 minutes on the date of this encounter coordinating the patient's care. This includes reviewing prior documentation, results and imaging in addition to completing a full history and physical examination on the patient. Further tests, medications, and procedures have been ordered as indicated. Laboratory results and the plan of care were communicated to the patient and/or their family member. Supporting documentation was completed and added to the patient's chart.

## 2023-07-10 NOTE — H&P ADULT - NSICDXPASTMEDICALHX_GEN_ALL_CORE_FT
PAST MEDICAL HISTORY:  Cancer, colon liver mets    Metastasis to liver     Pulmonary embolism     S/P ileostomy

## 2023-07-10 NOTE — CONSULT NOTE ADULT - SUBJECTIVE AND OBJECTIVE BOX
Surgery Consultation    53 y/o M with [facility], colon cancer with metatastes to the liver  on chemotherapy, PE on eliquis, h/o End ileostomy and revision 04/2023 and subtotal colectomy for perforated cecal mass 2022.  presented with abdominal pain and high output from the ileostomy. Pt reports he is having loose/watery stools, dehydration, decreased appetite over the last 3 days. His last chemotherapy was on Wednesday and gets it every 2 weeks with Dr. Serrano. Pt reports chills, cramping abdominal pain (sharp abdominal pain has been chronic for years), nausea, dry heaving.  Denies fevers, chills, chest pain, SOB, N/V.       PAST MEDICAL & SURGICAL HISTORY:  Cancer, colon  liver mets      S/P ileostomy      Metastasis to liver      Pulmonary embolism      S/P colon resection      H/O ileostomy        Allergies:  [This allergen will not trigger allergy alert] No Known Drug Allergies (Unknown)    Home Medications:  Eliquis 5 mg oral tablet: 1 tab(s) orally 2 times a day      Family History:  FAMILY HISTORY:  FH: dementia (Mother)        ROS:  Constitutional: Denies fever, fatigue or weight loss.  Skin: Denies rash.  Eyes: Denies recent vision problems or eye pain.  ENT: Denies congestion, ear pain, or sore throat.  Endocrine: Denies thyroid problems.  Cardiovascular: Denies chest pain or palpation.  Respiratory: Denies cough, shortness of breath, congestion, or wheezing.  Gastrointestinal: +abdominal pain RLQ denies nausea, vomiting  Genitourinary: Denies dysuria.  Musculoskeletal: Denies joint swelling.  Neurologic: Denies headache.      Physical Examination:  AO x3, NAD  GENERAL: No acute distress, well-developed  HEAD:  Atraumatic, Normocephalic  CHEST/LUNG: CTAB; No wheezes, rales, or rhonchi  HEART: Regular rate and rhythm; No murmurs, rubs, or gallops  ABDOMEN: Soft, mild tenderness RLQ,  non-distended, Right side ileostomy with clear/yellow stool  NEUROLOGY: responding appropriately, no focal deficits    Data:                        11.7   68.60 )-----------( 150      ( 10 Jul 2023 06:04 )             36.4     07-10    139  |  108  |  10  ----------------------------<  104<H>  3.9   |  28  |  0.87    Ca    8.3<L>      10 Jul 2023 06:04    TPro  6.3  /  Alb  3.0<L>  /  TBili  0.5  /  DBili  x   /  AST  17  /  ALT  29  /  AlkPhos  149<H>  07-10      LIVER FUNCTIONS - ( 10 Jul 2023 06:04 )  Alb: 3.0 g/dL / Pro: 6.3 gm/dL / ALK PHOS: 149 U/L / ALT: 29 U/L / AST: 17 U/L / GGT: x           Urinalysis Basic - ( 10 Jul 2023 06:04 )    Color: x / Appearance: x / SG: x / pH: x  Gluc: 104 mg/dL / Ketone: x  / Bili: x / Urobili: x   Blood: x / Protein: x / Nitrite: x   Leuk Esterase: x / RBC: x / WBC x   Sq Epi: x / Non Sq Epi: x / Bacteria: x        Radiology:    CT a/p: No significant change when compared to prior study 6/13/2023. No acute   findings to suggest inflammatory or infectious process in the abdomen or   pelvis, no bowel obstruction.    Unchanged hepatic metastasis and peritoneal thickening with serosal   implant in the right lower pelvis and postsurgical changes from colectomy   and right abdominal ileostomy. No morphologic abnormality at the   ileostomy site.

## 2023-07-10 NOTE — H&P ADULT - NSHPREVIEWOFSYSTEMS_GEN_ALL_CORE
Constitutional: negative for fatigue, negative for fever, negative for chills, negative for decreased appetite.  Skin: negative for rashes, negative for open wounds, negative for jaundice.   Eyes: negative for blurry vision, negative for double vision.   Ears, nose, throat: negative for ear pain, negative for nasal congestion, negative for sore throat, negative for lymph node swelling.   Cardiovascular: negative for chest pain, negative for palpitations, negative for lower extremity swelling.   Respiratory: negative for shortness of breath, negative for wheezing, negative for cough.   Gastrointestinal: positive for abdominal pain, positive for nausea, positive for vomiting, positive for diarrhea, negative for constipation, negative for blood in the stool, negative for black tarry stools.   Genitourinary: negative for burning on urination, negative for urinary urgency or frequency, negative for blood in the urine.   Endocrine: negative for cold intolerance, negative for heat intolerance, negative for increased thirst.   Hematologic: negative for easy bruising or bleeding.   Musculoskeletal: negative for muscle/joint pain, negative for decreased range of motion.   Neurological: negative for dizziness, negative for headaches, negative for loss of consciousness, negative for motor weakness, negative for sensory deficits.   Psychiatric: negative for depression, negative for anxiety.

## 2023-07-10 NOTE — CHART NOTE - NSCHARTNOTEFT_GEN_A_CORE
Two preop consult orders entered.  When came to evaluate pt, pt was taken to OR emergently for procedure.  D/w surgery team (residents) - no need for cardio consult at this point.  ordered cancelled.  Echo ordered by surgery team - they will followup on results & contact us  if they need us.

## 2023-07-10 NOTE — CONSULT NOTE ADULT - ASSESSMENT
53 y/o Male with h/o colon cancer with metatastes to the liver and lung on chemotherapy, PE was admitted on 7/10 for abdominal pain. Pt reports loose/watery stools, dehydration, decreased appetite over the last 3 days. His last chemotherapy was on Wednesday. No recent antibiotic use. Reports chills, cramping abdominal pain (sharp abdominal pain has been chronic for years), nausea, dry heaving, vomiting (6 episodes of watery outpt). On ER he received ciprofloxacin and metronidazole, then vancomycin IV and zosyn.     1. Abdominal pain with chills as outpatient. ?underlying infection. Metastatic colon CA on chemotherapy. Immunocompromised host.  -severe leukocytosis ?cause ?neulasta ?infection  -obtain BC x 2, urine c/s  -CT abdomen shows no significant change when compared to study from 6/13/23  -agree with zosyn 3.375 gm IV q8h pending further diagnostic evaluation  -monitor abdomen  -if loose stools - obtain stool studies  -old chart reviewed to assess prior cultures  -monitor temps  -f/u CBC  -supportive care  2. Other issues:   -care per medicine     53 y/o Male with h/o colon cancer with metastases to the liver and lung s/p surgery and colostomy, on chemotherapy, PE was admitted on 7/10 for abdominal pain. Pt reports loose/watery stools, dehydration, decreased appetite over the last 3 days. His last chemotherapy was on Wednesday. No recent antibiotic use. Reports chills, cramping abdominal pain (sharp abdominal pain has been chronic for years), nausea, dry heaving, vomiting (6 episodes of watery outpt). On ER he received ciprofloxacin and metronidazole, then vancomycin IV and zosyn.     1. Abdominal pain with chills as outpatient. ?underlying infection. Metastatic colon CA on chemotherapy. Immunocompromised host.  -severe leukocytosis ?cause ?neulasta ?infection  -obtain BC x 2, urine c/s  -CT abdomen shows no significant change when compared to study from 6/13/23  -agree with zosyn 3.375 gm IV q8h pending further diagnostic evaluation  -monitor abdomen  -if loose stools - obtain stool studies  -old chart reviewed to assess prior cultures  -monitor temps  -f/u CBC  -supportive care  2. Other issues:   -care per medicine

## 2023-07-10 NOTE — CONSULT NOTE ADULT - SUBJECTIVE AND OBJECTIVE BOX
HPI:    51 y/o M with MH significant for colon cancer with metastases to the liver and lung on chemotherapy, presented with abdominal pain, loose/watery stools, dehydration, decreased appetite over the last 3 days. Last chemotherapy was on 7/5/23. No recent antibiotic use.     In ED Reported chills, cramping abdominal pain (sharp abdominal pain has been chronic for years), nausea, dry heaving, vomiting (6 episodes of watery outpt). Denies fevers, chills, chest pain, SOB, abdominal pain, N/V, diarrhea/constipation.     Prior admission:   - 4/19/23: Intestinal stoma prolapse     ER course: HR . Labs: .35 -> 112.01, lactate 2.4 -> 1.4, Na 134, glucose 113, alkaline phosphatase 191. UA: negative. COVID-19 and RVP negative.     Imaging:  - CXR: port right chest wall, no consolidation, no effusion, no pneumothorax (personally reviewed.    - CT abd/pelvis: No significant change when compared to prior study 6/13/2023. No acute findings to suggest inflammatory or infectious process in the abdomen or pelvis, no bowel obstruction. Unchanged hepatic metastasis and peritoneal thickening with serosal implant in the right lower pelvis and postsurgical changes from colectomy and right abdominal ileostomy. No morphologic abnormality at the ileostomy site. Occlusion of left external iliac artery.     Pt was given Ciprofloxacin and Metronidazole, 3L of NS, pepcid, morphine, zofran. He is being admitted to med/surg (1N) for further management.     PAST MEDICAL & SURGICAL HISTORY:    Cancer, colon  liver mets  S/P ileostomy  Metastasis to liver  Pulmonary embolism  S/P colon resection  H/O ileostomy    FAMILY HISTORY:    dementia (Mother)    MEDICATIONS  (STANDING):    apixaban 5 milliGRAM(s) Oral every 12 hours  lactobacillus acidophilus 1 Tablet(s) Oral two times a day with meals  lidocaine   4% Patch 1 Patch Transdermal daily  naloxone Injectable 0.4 milliGRAM(s) IV Push once  piperacillin/tazobactam IVPB.. 3.375 Gram(s) IV Intermittent every 8 hours  polyethylene glycol 3350 17 Gram(s) Oral daily  senna 2 Tablet(s) Oral at bedtime  sodium chloride 0.9%. 1000 milliLiter(s) (150 mL/Hr) IV Continuous <Continuous>  vancomycin  IVPB 1500 milliGRAM(s) IV Intermittent every 8 hours    MEDICATIONS  (PRN):    acetaminophen     Tablet .. 650 milliGRAM(s) Oral every 6 hours PRN Temp greater or equal to 38C (100.4F), Mild Pain (1 - 3)  aluminum hydroxide/magnesium hydroxide/simethicone Suspension 30 milliLiter(s) Oral every 4 hours PRN Dyspepsia  bisacodyl 5 milliGRAM(s) Oral daily PRN Constipation  melatonin 3 milliGRAM(s) Oral at bedtime PRN Insomnia  morphine  - Injectable 4 milliGRAM(s) IV Push every 4 hours PRN Severe Pain (7 - 10)  morphine  - Injectable 2 milliGRAM(s) IV Push every 4 hours PRN Moderate Pain (4 - 6)  ondansetron Injectable 4 milliGRAM(s) IV Push every 8 hours PRN Nausea and/or Vomiting      Allergies    No Known Drug Allergies     Review of Systems    Constitutional, Eyes, ENT, Cardiovascular, Respiratory, Gastrointestinal, Genitourinary, Musculoskeletal, Integumentary, Neurological, Psychiatric, Endocrine, Heme/Lymph and Allergic/Immunologic review of systems are otherwise negative except as noted in HPI.     Vital Signs Last 24 Hrs    T(C): 36.7 (10 Jul 2023 03:35), Max: 36.8 (09 Jul 2023 15:24)  T(F): 98 (10 Jul 2023 03:35), Max: 98.3 (09 Jul 2023 15:24)  HR: 62 (10 Jul 2023 03:35) (62 - 118)  BP: 117/68 (10 Jul 2023 05:56) (89/55 - 119/85)  BP(mean): 73 (09 Jul 2023 23:10) (73 - 73)  RR: 18 (10 Jul 2023 03:35) (12 - 18)  SpO2: 100% (10 Jul 2023 03:35) (98% - 100%)    Parameters below as of 10 Jul 2023 03:35  Patient On (Oxygen Delivery Method): room air    Physical Exam    Eyes: no conjunctival infection, anicteric.   ENT: pharynx is unremarkable, moist mucus membrane, no oral lesions.   Neck: supple without JVD, no thyromegaly or masses appreciated.   Pulmonary: clear to auscultation bilaterally, no dullness, no wheezing.   Cardiac: RRR, normal S1S2, no murmurs, rubs, gallops.   Vascular: no JVD, no calf tenderness, venous stasis changes, varices.   Abdomen: normoactive bowel sounds, soft and nontender, no hepatosplenomegaly or masses appreciated.   Lymphatic: no peripheral adenopathy appreciated.   Musculoskeletal: full range of motion and no deformities appreciated.   Skin: normal appearance, no rash, nodules, vesicles, ulcers, erythema.   Neurology: grossly intact.   Psychiatric: affect appropriate.     LABS:    CBC Full  -  ( 10 Jul 2023 06:04 )  WBC Count : 68.60 K/uL  RBC Count : 4.05 M/uL  Hemoglobin : 11.7 g/dL  Hematocrit : 36.4 %  Platelet Count - Automated : 150 K/uL  Mean Cell Volume : 89.9 fl  Mean Cell Hemoglobin : 28.9 pg  Mean Cell Hemoglobin Concentration : 32.1 gm/dL  Auto Neutrophil # : 65.17 K/uL  Auto Lymphocyte # : 2.74 K/uL  Auto Monocyte # : 0.69 K/uL  Auto Eosinophil # : 0.00 K/uL  Auto Basophil # : 0.00 K/uL  Auto Neutrophil % : 95.0 %  Auto Lymphocyte % : 4.0 %  Auto Monocyte % : 1.0 %  Auto Eosinophil % : 0.0 %  Auto Basophil % : 0.0 %    07-10    139  |  108  |  10  ----------------------------<  104<H>  3.9   |  28  |  0.87    Ca    8.3<L>      10 Jul 2023 06:04    TPro  6.3  /  Alb  3.0<L>  /  TBili  0.5  /  DBili  x   /  AST  17  /  ALT  29  /  AlkPhos  149<H>  07-10      Urinalysis Basic - ( 10 Jul 2023 06:04 )    Color: x / Appearance: x / SG: x / pH: x  Gluc: 104 mg/dL / Ketone: x  / Bili: x / Urobili: x   Blood: x / Protein: x / Nitrite: x   Leuk Esterase: x / RBC: x / WBC x   Sq Epi: x / Non Sq Epi: x / Bacteria: x    RADIOLOGY & ADDITIONAL STUDIES:    EXAM:  CT ABDOMEN AND PELVIS IC       PROCEDURE DATE:  07/09/2023      INTERPRETATION:  CLINICAL INFORMATION: Abdominal pain    COMPARISON: CT chest abdomen pelvis 6/13/2023.    CONTRAST/COMPLICATIONS:  IV Contrast: Omnipaque 350  90 cc administered   10 cc discarded  Oral Contrast: NONE  Complications: None reported at time of study completion    PROCEDURE:  CT of the Abdomen and Pelvis was performed.  Sagittal and coronal reformats were performed.    FINDINGS:  LOWER CHEST: No pulmonary nodules visualized in the lung bases. No   consolidation..    LIVER: Stable known hepatic metastasis with calcifications, index lesions   in the left lobe 2.9 x 3.7 cm, previously 2.5 x 3.8 cm; in the right lobe   3.3 cm, unchanged. Unchanged 1.8 cm additional hypodensity in segment 8.  BILE DUCTS: Normal caliber.  GALLBLADDER: Within normal limits.  SPLEEN: Mild splenomegaly at 14.9 cm.  PANCREAS: Within normal limits.  ADRENALS: Within normal limits.  KIDNEYS/URETERS: Symmetric, homogeneous renal parenchymal enhancement.   Left mid pole renal scarring. No hydronephrosis.    BLADDER: Within normal limits.  REPRODUCTIVE ORGANS: Prostate within normal limits.    BOWEL: Postsurgical changes from partial colectomy, Gloria stump   creation and right midabdomen ileostomy. No bowel obstruction. Appendix   is surgically absent. No abdominal bowel wall thickening or mesenteric   edema to suggest inflammation.  PERITONEUM: No ascites. Peritoneal thickening and nodularity in the right   lower pelvis alongside a small bowel loop with a serosal implant   measuring approximately 1.7 x 2.7 cm, image [id number], similar to prior 1.6   x 2.5 cm. No associated bowel obstruction.  VESSELS: Mild atherosclerotic changes of the abdominal aorta. Celiac axis   and SMA patent. Portal vein, SMV and splenic vein patent. Left   retroaortic renal vein. 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  RETROPERITONEUM/LYMPH NODES: No lymphadenopathy.  ABDOMINAL WALL: Midline postsurgical changes. Unremarkable appearance of   the right mid abdomen ileostomy without inflammatory changes.  BONES: Mild degenerative changes. Lucency in T11 unchanged since prior   study, may represent a osseous hemangioma.    IMPRESSION:  No significant change when compared to prior study 6/13/2023. No acute   findings to suggest inflammatory or infectious process in the abdomen or   pelvis, no bowel obstruction.    Unchanged hepatic metastasis and peritoneal thickening with serosal   implantin the right lower pelvis and postsurgical changes from colectomy   and right abdominal ileostomy. No morphologic abnormality at the   ileostomy site.       HPI:    51 y/o M with MH significant for colon cancer with metastases to the liver and lung on chemotherapy, presented with abdominal pain, loose/watery stools, dehydration, decreased appetite over the last 3 days. Last chemotherapy was on 7/5/23. No recent antibiotic use.     In ED Reported chills, cramping abdominal pain (sharp abdominal pain has been chronic for years), nausea, dry heaving, vomiting (6 episodes of watery outpt). Denies fevers, chills, chest pain, SOB, abdominal pain, N/V, diarrhea/constipation.     Prior admission:   - 4/19/23: Intestinal stoma prolapse     ER course: HR . Labs: .35 -> 112.01, lactate 2.4 -> 1.4, Na 134, glucose 113, alkaline phosphatase 191. UA: negative. COVID-19 and RVP negative.     Imaging:  - CXR: port right chest wall, no consolidation, no effusion, no pneumothorax (personally reviewed.    - CT abd/pelvis: No significant change when compared to prior study 6/13/2023. No acute findings to suggest inflammatory or infectious process in the abdomen or pelvis, no bowel obstruction. Unchanged hepatic metastasis and peritoneal thickening with serosal implant in the right lower pelvis and postsurgical changes from colectomy and right abdominal ileostomy. No morphologic abnormality at the ileostomy site. Occlusion of left external iliac artery.     Pt was given Ciprofloxacin and Metronidazole, 3L of NS, Pepcid morphine, Zofran He is being admitted to med /surg (1N) for further management.     7/10/2023- Seen in ED with Dr. Starks, alert, oriented, reports feeling much better. Reports the liquid and increased output from stoma especially 2-3 days after chemo, and if does not eat right. Denies any N/V, now, output still noted liquid but moderate amount now.     PAST MEDICAL & SURGICAL HISTORY:    Cancer, colon  liver mets  S/P ileostomy  Metastasis to liver  Pulmonary embolism  S/P colon resection  H/O ileostomy    FAMILY HISTORY:    dementia (Mother)    MEDICATIONS  (STANDING):    heparin  Infusion.  Unit(s)/Hr (16 mL/Hr) IV Continuous <Continuous>  lactobacillus acidophilus 1 Tablet(s) Oral two times a day with meals  lidocaine   4% Patch 1 Patch Transdermal daily  loperamide      loperamide 2 milliGRAM(s) Oral every 6 hours  naloxone Injectable 0.4 milliGRAM(s) IV Push once  piperacillin/tazobactam IVPB.. 3.375 Gram(s) IV Intermittent every 8 hours  polyethylene glycol 3350 17 Gram(s) Oral daily  psyllium Powder      psyllium Powder 1 Packet(s) Oral every 12 hours  senna 2 Tablet(s) Oral at bedtime  sodium chloride 0.9%. 1000 milliLiter(s) (150 mL/Hr) IV Continuous <Continuous>  vancomycin  IVPB 1500 milliGRAM(s) IV Intermittent every 8 hours    MEDICATIONS  (PRN):    acetaminophen     Tablet .. 650 milliGRAM(s) Oral every 6 hours PRN Temp greater or equal to 38C (100.4F), Mild Pain (1 - 3)  aluminum hydroxide/magnesium hydroxide/simethicone Suspension 30 milliLiter(s) Oral every 4 hours PRN Dyspepsia  bisacodyl 5 milliGRAM(s) Oral daily PRN Constipation  heparin   Injectable 7000 Unit(s) IV Push every 6 hours PRN For aPTT less than 40  heparin   Injectable 3500 Unit(s) IV Push every 6 hours PRN For aPTT between 40 - 57  melatonin 3 milliGRAM(s) Oral at bedtime PRN Insomnia  morphine  - Injectable 2 milliGRAM(s) IV Push every 4 hours PRN Moderate Pain (4 - 6)  morphine  - Injectable 4 milliGRAM(s) IV Push every 4 hours PRN Severe Pain (7 - 10)  ondansetron Injectable 4 milliGRAM(s) IV Push every 8 hours PRN Nausea and/or Vomiting      Allergies    No Known Drug Allergies     Review of Systems    Constitutional, Eyes, ENT, Cardiovascular, Respiratory, Gastrointestinal, Genitourinary, Musculoskeletal, Integumentary, Neurological, Psychiatric, Endocrine, Heme/Lymph and Allergic/Immunologic review of systems are otherwise negative except as noted in HPI.     Vital Signs Last 24 Hrs    T(C): 36.7 (10 Jul 2023 03:35), Max: 36.8 (09 Jul 2023 15:24)  T(F): 98 (10 Jul 2023 03:35), Max: 98.3 (09 Jul 2023 15:24)  HR: 62 (10 Jul 2023 03:35) (62 - 118)  BP: 117/68 (10 Jul 2023 05:56) (89/55 - 119/85)  BP(mean): 73 (09 Jul 2023 23:10) (73 - 73)  RR: 18 (10 Jul 2023 03:35) (12 - 18)  SpO2: 100% (10 Jul 2023 03:35) (98% - 100%)    Parameters below as of 10 Jul 2023 03:35  Patient On (Oxygen Delivery Method): room air    Physical Exam    Eyes: no conjunctival infection, anicteric.   ENT: pharynx is unremarkable, moist mucus membrane, no oral lesions.   Neck: supple without JVD, no thyromegaly or masses appreciated.   Pulmonary: clear to auscultation bilaterally, no dullness, no wheezing.   Cardiac: RRR, normal S1S2, no murmurs, rubs, gallops.   Vascular: no JVD, no calf tenderness, venous stasis changes, varices.   Abdomen: Right ileostomy bag with liquid output.  Lymphatic: no peripheral adenopathy appreciated.   Musculoskeletal: full range of motion and no deformities appreciated.   Skin: normal appearance, no rash, nodules, vesicles, ulcers, erythema.   Neurology: grossly intact.   Psychiatric: affect appropriate.     LABS:    CBC Full  -  ( 10 Jul 2023 06:04 )  WBC Count : 68.60 K/uL  RBC Count : 4.05 M/uL  Hemoglobin : 11.7 g/dL  Hematocrit : 36.4 %  Platelet Count - Automated : 150 K/uL  Mean Cell Volume : 89.9 fl  Mean Cell Hemoglobin : 28.9 pg  Mean Cell Hemoglobin Concentration : 32.1 gm/dL  Auto Neutrophil # : 65.17 K/uL  Auto Lymphocyte # : 2.74 K/uL  Auto Monocyte # : 0.69 K/uL  Auto Eosinophil # : 0.00 K/uL  Auto Basophil # : 0.00 K/uL  Auto Neutrophil % : 95.0 %  Auto Lymphocyte % : 4.0 %  Auto Monocyte % : 1.0 %  Auto Eosinophil % : 0.0 %  Auto Basophil % : 0.0 %    07-10    139  |  108  |  10  ----------------------------<  104<H>  3.9   |  28  |  0.87    Ca    8.3<L>      10 Jul 2023 06:04    TPro  6.3  /  Alb  3.0<L>  /  TBili  0.5  /  DBili  x   /  AST  17  /  ALT  29  /  AlkPhos  149<H>  07-10      Urinalysis Basic - ( 10 Jul 2023 06:04 )    Color: x / Appearance: x / SG: x / pH: x  Gluc: 104 mg/dL / Ketone: x  / Bili: x / Urobili: x   Blood: x / Protein: x / Nitrite: x   Leuk Esterase: x / RBC: x / WBC x   Sq Epi: x / Non Sq Epi: x / Bacteria: x    RADIOLOGY & ADDITIONAL STUDIES:    EXAM:  CT ABDOMEN AND PELVIS IC       PROCEDURE DATE:  07/09/2023      INTERPRETATION:  CLINICAL INFORMATION: Abdominal pain    COMPARISON: CT chest abdomen pelvis 6/13/2023.    CONTRAST/COMPLICATIONS:  IV Contrast: Omnipaque 350  90 cc administered   10 cc discarded  Oral Contrast: NONE  Complications: None reported at time of study completion    PROCEDURE:  CT of the Abdomen and Pelvis was performed.  Sagittal and coronal reformats were performed.    FINDINGS:  LOWER CHEST: No pulmonary nodules visualized in the lung bases. No   consolidation..    LIVER: Stable known hepatic metastasis with calcifications, index lesions   in the left lobe 2.9 x 3.7 cm, previously 2.5 x 3.8 cm; in the right lobe   3.3 cm, unchanged. Unchanged 1.8 cm additional hypodensity in segment 8.  BILE DUCTS: Normal caliber.  GALLBLADDER: Within normal limits.  SPLEEN: Mild splenomegaly at 14.9 cm.  PANCREAS: Within normal limits.  ADRENALS: Within normal limits.  KIDNEYS/URETERS: Symmetric, homogeneous renal parenchymal enhancement.   Left mid pole renal scarring. No hydronephrosis.    BLADDER: Within normal limits.  REPRODUCTIVE ORGANS: Prostate within normal limits.    BOWEL: Postsurgical changes from partial colectomy, Gloria stump   creation and right midabdomen ileostomy. No bowel obstruction. Appendix   is surgically absent. No abdominal bowel wall thickening or mesenteric   edema to suggest inflammation.  PERITONEUM: No ascites. Peritoneal thickening and nodularity in the right   lower pelvis alongside a small bowel loop with a serosal implant   measuring approximately 1.7 x 2.7 cm, image [id number], similar to prior 1.6   x 2.5 cm. No associated bowel obstruction.  VESSELS: Mild atherosclerotic changes of the abdominal aorta. Celiac axis   and SMA patent. Portal vein, SMV and splenic vein patent. Left   retroaortic renal vein. 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  RETROPERITONEUM/LYMPH NODES: No lymphadenopathy.  ABDOMINAL WALL: Midline postsurgical changes. Unremarkable appearance of   the right mid abdomen ileostomy without inflammatory changes.  BONES: Mild degenerative changes. Lucency in T11 unchanged since prior   study, may represent a osseous hemangioma.    IMPRESSION:  No significant change when compared to prior study 6/13/2023. No acute   findings to suggest inflammatory or infectious process in the abdomen or   pelvis, no bowel obstruction.    Unchanged hepatic metastasis and peritoneal thickening with serosal   implant in the right lower pelvis and postsurgical changes from colectomy   and right abdominal ileostomy. No morphologic abnormality at the   ileostomy site.

## 2023-07-10 NOTE — CONSULT NOTE ADULT - ASSESSMENT
A/P:  51 yo M with CT a/p finding of Left external iliac artery occlusion,   with palpable femoral and DP pulses    P:  CT angio w/ b/l runnoff  start heparin drip, w/o bolus  f/u colorectal surgery reccs  rest as per primary team    plan d/w Dr. Taylor     A/P:  51 yo M with CT a/p finding of Left external iliac artery occlusion,   with palpable femoral and DP pulses    P:  CT angio w/ b/l runnoff  start heparin drip, w/o bolus  f/u colorectal surgery reccs  rest as per primary team    plan d/w Dr. Taylor      CT angio reviewed 1045 am    P:  OR today for thrombectomy  NPO   IV fluids  Med/Cardio risk assessment  stat T&S  cont Heparin drip, titrate q6 PTT to goal 60-90  CT chest w/contrast tomorrow  STAT ECHO   Hematology consult     plan d/w Dr. Taylor

## 2023-07-11 LAB
ANION GAP SERPL CALC-SCNC: 7 MMOL/L — SIGNIFICANT CHANGE UP (ref 5–17)
APTT BLD: 66.5 SEC — HIGH (ref 27.5–35.5)
APTT BLD: 68.9 SEC — HIGH (ref 27.5–35.5)
APTT BLD: 80 SEC — HIGH (ref 27.5–35.5)
BUN SERPL-MCNC: 6 MG/DL — LOW (ref 7–23)
CALCIUM SERPL-MCNC: 8.6 MG/DL — SIGNIFICANT CHANGE UP (ref 8.5–10.1)
CHLORIDE SERPL-SCNC: 110 MMOL/L — HIGH (ref 96–108)
CO2 SERPL-SCNC: 23 MMOL/L — SIGNIFICANT CHANGE UP (ref 22–31)
CREAT SERPL-MCNC: 0.81 MG/DL — SIGNIFICANT CHANGE UP (ref 0.5–1.3)
CULTURE RESULTS: SIGNIFICANT CHANGE UP
EGFR: 107 ML/MIN/1.73M2 — SIGNIFICANT CHANGE UP
GLUCOSE SERPL-MCNC: 117 MG/DL — HIGH (ref 70–99)
HCT VFR BLD CALC: 37 % — LOW (ref 39–50)
HGB BLD-MCNC: 12 G/DL — LOW (ref 13–17)
INR BLD: 1.09 RATIO — SIGNIFICANT CHANGE UP (ref 0.88–1.16)
MAGNESIUM SERPL-MCNC: 2.3 MG/DL — SIGNIFICANT CHANGE UP (ref 1.6–2.6)
MCHC RBC-ENTMCNC: 28.6 PG — SIGNIFICANT CHANGE UP (ref 27–34)
MCHC RBC-ENTMCNC: 32.4 GM/DL — SIGNIFICANT CHANGE UP (ref 32–36)
MCV RBC AUTO: 88.1 FL — SIGNIFICANT CHANGE UP (ref 80–100)
PHOSPHATE SERPL-MCNC: 3.3 MG/DL — SIGNIFICANT CHANGE UP (ref 2.5–4.5)
PLATELET # BLD AUTO: 157 K/UL — SIGNIFICANT CHANGE UP (ref 150–400)
POTASSIUM SERPL-MCNC: 4.1 MMOL/L — SIGNIFICANT CHANGE UP (ref 3.5–5.3)
POTASSIUM SERPL-SCNC: 4.1 MMOL/L — SIGNIFICANT CHANGE UP (ref 3.5–5.3)
PROTHROM AB SERPL-ACNC: 12.6 SEC — SIGNIFICANT CHANGE UP (ref 10.5–13.4)
RBC # BLD: 4.2 M/UL — SIGNIFICANT CHANGE UP (ref 4.2–5.8)
RBC # FLD: 17.5 % — HIGH (ref 10.3–14.5)
SODIUM SERPL-SCNC: 140 MMOL/L — SIGNIFICANT CHANGE UP (ref 135–145)
SPECIMEN SOURCE: SIGNIFICANT CHANGE UP
WBC # BLD: 62.75 K/UL — CRITICAL HIGH (ref 3.8–10.5)
WBC # FLD AUTO: 62.75 K/UL — CRITICAL HIGH (ref 3.8–10.5)

## 2023-07-11 PROCEDURE — 93923 UPR/LXTR ART STDY 3+ LVLS: CPT | Mod: 26

## 2023-07-11 PROCEDURE — 99232 SBSQ HOSP IP/OBS MODERATE 35: CPT

## 2023-07-11 PROCEDURE — 99231 SBSQ HOSP IP/OBS SF/LOW 25: CPT | Mod: GC

## 2023-07-11 RX ADMIN — Medication 1 PACKET(S): at 06:07

## 2023-07-11 RX ADMIN — DEXTROSE MONOHYDRATE, SODIUM CHLORIDE, AND POTASSIUM CHLORIDE 125 MILLILITER(S): 50; .745; 4.5 INJECTION, SOLUTION INTRAVENOUS at 11:46

## 2023-07-11 RX ADMIN — MORPHINE SULFATE 4 MILLIGRAM(S): 50 CAPSULE, EXTENDED RELEASE ORAL at 23:00

## 2023-07-11 RX ADMIN — MORPHINE SULFATE 4 MILLIGRAM(S): 50 CAPSULE, EXTENDED RELEASE ORAL at 10:55

## 2023-07-11 RX ADMIN — Medication 2 MILLIGRAM(S): at 01:41

## 2023-07-11 RX ADMIN — HEPARIN SODIUM 1600 UNIT(S)/HR: 5000 INJECTION INTRAVENOUS; SUBCUTANEOUS at 01:33

## 2023-07-11 RX ADMIN — MORPHINE SULFATE 2 MILLIGRAM(S): 50 CAPSULE, EXTENDED RELEASE ORAL at 18:59

## 2023-07-11 RX ADMIN — HEPARIN SODIUM 1600 UNIT(S)/HR: 5000 INJECTION INTRAVENOUS; SUBCUTANEOUS at 14:31

## 2023-07-11 RX ADMIN — Medication 1 PACKET(S): at 18:19

## 2023-07-11 RX ADMIN — LIDOCAINE 1 PATCH: 4 CREAM TOPICAL at 09:19

## 2023-07-11 RX ADMIN — HEPARIN SODIUM 1600 UNIT(S)/HR: 5000 INJECTION INTRAVENOUS; SUBCUTANEOUS at 07:43

## 2023-07-11 RX ADMIN — DEXTROSE MONOHYDRATE, SODIUM CHLORIDE, AND POTASSIUM CHLORIDE 125 MILLILITER(S): 50; .745; 4.5 INJECTION, SOLUTION INTRAVENOUS at 00:39

## 2023-07-11 RX ADMIN — MORPHINE SULFATE 4 MILLIGRAM(S): 50 CAPSULE, EXTENDED RELEASE ORAL at 22:34

## 2023-07-11 RX ADMIN — MORPHINE SULFATE 4 MILLIGRAM(S): 50 CAPSULE, EXTENDED RELEASE ORAL at 16:36

## 2023-07-11 RX ADMIN — HEPARIN SODIUM 1600 UNIT(S)/HR: 5000 INJECTION INTRAVENOUS; SUBCUTANEOUS at 20:44

## 2023-07-11 RX ADMIN — PIPERACILLIN AND TAZOBACTAM 25 GRAM(S): 4; .5 INJECTION, POWDER, LYOPHILIZED, FOR SOLUTION INTRAVENOUS at 09:17

## 2023-07-11 RX ADMIN — Medication 2 MILLIGRAM(S): at 14:35

## 2023-07-11 RX ADMIN — HEPARIN SODIUM 1600 UNIT(S)/HR: 5000 INJECTION INTRAVENOUS; SUBCUTANEOUS at 19:27

## 2023-07-11 RX ADMIN — PIPERACILLIN AND TAZOBACTAM 25 GRAM(S): 4; .5 INJECTION, POWDER, LYOPHILIZED, FOR SOLUTION INTRAVENOUS at 01:41

## 2023-07-11 RX ADMIN — MORPHINE SULFATE 2 MILLIGRAM(S): 50 CAPSULE, EXTENDED RELEASE ORAL at 18:29

## 2023-07-11 RX ADMIN — HEPARIN SODIUM 1600 UNIT(S)/HR: 5000 INJECTION INTRAVENOUS; SUBCUTANEOUS at 08:11

## 2023-07-11 RX ADMIN — HEPARIN SODIUM 1600 UNIT(S)/HR: 5000 INJECTION INTRAVENOUS; SUBCUTANEOUS at 08:17

## 2023-07-11 RX ADMIN — PIPERACILLIN AND TAZOBACTAM 25 GRAM(S): 4; .5 INJECTION, POWDER, LYOPHILIZED, FOR SOLUTION INTRAVENOUS at 18:19

## 2023-07-11 RX ADMIN — MORPHINE SULFATE 4 MILLIGRAM(S): 50 CAPSULE, EXTENDED RELEASE ORAL at 06:05

## 2023-07-11 RX ADMIN — MORPHINE SULFATE 4 MILLIGRAM(S): 50 CAPSULE, EXTENDED RELEASE ORAL at 06:20

## 2023-07-11 RX ADMIN — Medication 2 MILLIGRAM(S): at 21:04

## 2023-07-11 RX ADMIN — HEPARIN SODIUM 1600 UNIT(S)/HR: 5000 INJECTION INTRAVENOUS; SUBCUTANEOUS at 17:50

## 2023-07-11 RX ADMIN — Medication 2 MILLIGRAM(S): at 09:17

## 2023-07-11 NOTE — PHYSICAL THERAPY INITIAL EVALUATION ADULT - PERTINENT HX OF CURRENT PROBLEM, REHAB EVAL
Pt is a 52 year old male presenting with abdominal pain and inc ileostomy output. Admitted for further management. PMH listed below.

## 2023-07-11 NOTE — PROGRESS NOTE ADULT - SUBJECTIVE AND OBJECTIVE BOX
Patient seen and examined  s/p LLE angiogram yesterday  vitals stable afebrile  GI PCR and Cdiff  on empiric Iv zosyn  white count down to 62 today  patient underwent CT chest yesterday :pending result    Review of Systems:  General:denies fever chills, headache, weakness  HEENT: denies blurry vision,diffculty swallowing, difficulty hearing, tinnitus  Cardiovascular: denies chest pain  ,palpitations  Pulmonary:denies shortness of breath, cough, wheezing, hemoptysis  Gastrointestinal: denies abdominal pain, constipation, diarrhea,nausea , vomiting, hematochezia  : denies hematuria, dysuria, or incontinence  Neurological: denies weakness, numbness , tingling, dizziness, tremors  MSK: denies muscle pain, difficulty ambulating, swelling, back pain  skin: denies skin rash, itching, burning, or  skin lesions  Psychiatrical: denies mood disturbances, anxierty, feeling depressed, depression , or difficulty sleeping    Objective:  Vitals  T(C): 36.7 (07-11-23 @ 08:01), Max: 36.8 (07-10-23 @ 09:05)  HR: 79 (07-11-23 @ 08:01) (66 - 100)  BP: 123/74 (07-11-23 @ 08:01) (97/53 - 134/80)  RR: 18 (07-11-23 @ 08:01) (10 - 18)  SpO2: 98% (07-11-23 @ 08:01) (96% - 100%)    Physical Exam:  General: comfortable, no acute distress  HEENT: Atraumatic, no LAD, trachea midline, PERRLA  Cardiovascular: normal s1s2, no murmurs, gallops or fricition rubs  Pulmonary: clear to ausculation Bilaterally, no wheezing , rhonchi  Gastrointestinal: soft non tender non distended, no masses felt, no organomegally  Muscloskeletal: no lower extremity edema, intact bilateral lower extremity pulses  Neurological: CN II-12 intact. No focal weakness  Psychiatrical: normal mood, cooperative  SKIN: no rash, lesions or ulcers    Labs:                          12.0   62.75 )-----------( 157      ( 11 Jul 2023 07:20 )             37.0     07-11    140  |  110<H>  |  6<L>  ----------------------------<  117<H>  4.1   |  23  |  0.81    Ca    8.6      11 Jul 2023 07:20  Phos  3.3     07-11  Mg     2.3     07-11    TPro  6.3  /  Alb  3.0<L>  /  TBili  0.5  /  DBili  x   /  AST  17  /  ALT  29  /  AlkPhos  149<H>  07-10    LIVER FUNCTIONS - ( 10 Jul 2023 06:04 )  Alb: 3.0 g/dL / Pro: 6.3 gm/dL / ALK PHOS: 149 U/L / ALT: 29 U/L / AST: 17 U/L / GGT: x           PT/INR - ( 11 Jul 2023 07:20 )   PT: 12.6 sec;   INR: 1.09 ratio         PTT - ( 11 Jul 2023 07:20 )  PTT:68.9 sec      Active Medications  MEDICATIONS  (STANDING):  dextrose 5% + sodium chloride 0.45% with potassium chloride 20 mEq/L 1000 milliLiter(s) (125 mL/Hr) IV Continuous <Continuous>  gabapentin 100 milliGRAM(s) Oral every 8 hours  heparin  Infusion.  Unit(s)/Hr (16 mL/Hr) IV Continuous <Continuous>  lidocaine   4% Patch 1 Patch Transdermal daily  loperamide      loperamide 2 milliGRAM(s) Oral every 6 hours  naloxone Injectable 0.4 milliGRAM(s) IV Push once  piperacillin/tazobactam IVPB.. 3.375 Gram(s) IV Intermittent every 8 hours  psyllium Powder      psyllium Powder 1 Packet(s) Oral every 12 hours  sodium chloride 0.9%. 1000 milliLiter(s) (150 mL/Hr) IV Continuous <Continuous>    MEDICATIONS  (PRN):  acetaminophen     Tablet .. 650 milliGRAM(s) Oral every 6 hours PRN Temp greater or equal to 38C (100.4F), Mild Pain (1 - 3)  aluminum hydroxide/magnesium hydroxide/simethicone Suspension 30 milliLiter(s) Oral every 4 hours PRN Dyspepsia  heparin   Injectable 7000 Unit(s) IV Push every 6 hours PRN For aPTT less than 40  heparin   Injectable 3500 Unit(s) IV Push every 6 hours PRN For aPTT between 40 - 57  melatonin 3 milliGRAM(s) Oral at bedtime PRN Insomnia  morphine  - Injectable 2 milliGRAM(s) IV Push every 4 hours PRN Moderate Pain (4 - 6)  morphine  - Injectable 4 milliGRAM(s) IV Push every 4 hours PRN Severe Pain (7 - 10)  ondansetron Injectable 4 milliGRAM(s) IV Push every 8 hours PRN Nausea and/or Vomiting

## 2023-07-11 NOTE — PROGRESS NOTE ADULT - ASSESSMENT
53 y/o M with MH significant for Stage IV colon cancer with metastases to the liver and lung, currently on chemotherapy, s/p Eend ileostomy and revision 04/2023 and subtotal colectomy for perforated cecal mass 2022 presented with abdominal pain, loose/watery stools, dehydration, decreased appetite over the last 3 days.      # Metastatic Colon CA with Liver & Lung Metastases, S/p Ileostomy     - Dx 05/2022 after going to ED for worsening abdominal pain  - 05/19/22 CT AP showed apparent focal mural thickening at the cecum/proximal ascending colon. The sigmoid colon appears to be tethered to the cecum and it is focally thick-walled; focal tumor invasion/extension from the cecum to the sigmoid colon. Mural thickening of the a dilated right lower quadrant small bowel loop with adjacent mesenteric edema for which associated small bowel ischemia cannot be excluded. Multiple hypodense lesions with calcifications in both lobes of the liver with the largest measuring 5.8 x 6.0 cm in hepatic segment.  - 05/19/22: underwent exploratory laparotomy, total colectomy, end ileostomy, biopsy of liver, and biopsy of retroperitoneum.    Pathology:  Omentum, excision: Negative for malignancy.   Portion of terminal ileum, cecum with adherent sigmoid, total colectomy: Poorly differentiated infiltrating adenocarcinoma measuring 9.0 cm. Adenocarcinoma infiltrates through the bowel wall and through the visceral peritoneum and infiltrates the adherent sigmoid colon 3 of 17 lymph nodes are positive for metastatic carcinoma. Lymphovascular space & Perineural invasion is identified. The surgical margins negative.   Appendix with serosal tumor implants and acute inflammation. Peritonitis. Liver, biopsy: Metastatic adenocarcinoma.   Retroperitoneal biopsy: Adenocarcinoma with necrosis. No loss of nuclear expression of MMR proteins (MLH1, MSH2, MSH6, and PMS2). Staging pT4b pN1b pM1c.    - 06/22/2022: Started FOLFOXIRI with Bevacizumab; last dose of FOLFIRI and Bevacizumab received on 01/04/2023; S/p oxaliplatin until C10  - 09/01/22 CT CAP: Segmental left lower lobe pulmonary embolus and probable smaller pulmonary emboli within the right lung. Interval decrease in size of bilateral pulmonary nodules and hepatic metastases compared to 05/25/2022. No new sites of disease.  - 06/13/23 CT CAP: New, 8 mm right lower lobe nodule and enlarging pulmonary nodules, 7 mm left apical nodule previously measured 6 mm. Bilobar hepatic metastases, the majority of which are calcified and not significantly changed. A noncalcified lesion in the right hepatic lobe demonstrates mild interval growth, 1.7 x 1.4 cm previously 1.4 x 1.3 cm  - On chemo FOLFIRI, most recent dose 07/05/2023  - Inpatient plan no chemo, only supportive measures as necessary and to follow up outpatient with Primary Onc Dr Serrano as scheduled upon d/c      # Leukocytosis    - WBC count trending down  - S/p Onpro (Neulasta) 07/07/23 which is most likely the cause of spike in count  - High output ileostomy   - ID following  - R/o Cdiff   - Currently receiving abx, but not likely necessary in absence of infectious process   - CT imaging with no acute findings to suggest inflammatory or infectious process in the abdomen or pelvis, no bowel obstruction.  - Continue to trend serial CBCs  - Monitor temp  - Continue supportive measures       # LLE Ext Iliac Artery Occlusion    - Patient has been taking DOAC (Eliquis) for prior VTE in setting of metastatic disease  - Patient did admit to missing doses here and there in the past, but has been more compliant recently  - LE physical exam appropriate with no obvious acute signs of decreased perfusion to distal aspects warranting urgent intervention   - Vascular following  - Currently receiving Heparin gtt  - Will speak with Primary Onc Dr Serrano regarding AC change with d/c, likely to Lovenox injections, patient aware of this  - Etiology most likely 2/2 known metastatic malignancy with hx of missed doses/non compliance  - Can check Lupus/APLS profile, but will defer complete hypercoagulable workup to outpatient setting  - Continue supportive measures             51 y/o M with MH significant for Stage IV colon cancer with metastases to the liver and lung, currently on chemotherapy, s/p Eend ileostomy and revision 04/2023 and subtotal colectomy for perforated cecal mass 2022 presented with abdominal pain, loose/watery stools, dehydration, decreased appetite over the last 3 days.      # Metastatic Colon CA with Liver & Lung Metastases, S/p Ileostomy     - Dx 05/2022 after going to ED for worsening abdominal pain  - 05/19/22 CT AP showed apparent focal mural thickening at the cecum/proximal ascending colon. The sigmoid colon appears to be tethered to the cecum and it is focally thick-walled; focal tumor invasion/extension from the cecum to the sigmoid colon. Mural thickening of the a dilated right lower quadrant small bowel loop with adjacent mesenteric edema for which associated small bowel ischemia cannot be excluded. Multiple hypodense lesions with calcifications in both lobes of the liver with the largest measuring 5.8 x 6.0 cm in hepatic segment.  - 05/19/22: underwent exploratory laparotomy, total colectomy, end ileostomy, biopsy of liver, and biopsy of retroperitoneum.    Pathology:  Omentum, excision: Negative for malignancy.   Portion of terminal ileum, cecum with adherent sigmoid, total colectomy: Poorly differentiated infiltrating adenocarcinoma measuring 9.0 cm. Adenocarcinoma infiltrates through the bowel wall and through the visceral peritoneum and infiltrates the adherent sigmoid colon 3 of 17 lymph nodes are positive for metastatic carcinoma. Lymphovascular space & Perineural invasion is identified. The surgical margins negative.   Appendix with serosal tumor implants and acute inflammation. Peritonitis. Liver, biopsy: Metastatic adenocarcinoma.   Retroperitoneal biopsy: Adenocarcinoma with necrosis. No loss of nuclear expression of MMR proteins (MLH1, MSH2, MSH6, and PMS2). Staging pT4b pN1b pM1c.    - 06/22/2022: Started FOLFOXIRI with Bevacizumab; last dose of FOLFIRI and Bevacizumab received on 01/04/2023; S/p oxaliplatin until C10  - 09/01/22 CT CAP: Segmental left lower lobe pulmonary embolus and probable smaller pulmonary emboli within the right lung. Interval decrease in size of bilateral pulmonary nodules and hepatic metastases compared to 05/25/2022. No new sites of disease.  - 06/13/23 CT CAP: New, 8 mm right lower lobe nodule and enlarging pulmonary nodules, 7 mm left apical nodule previously measured 6 mm. Bilobar hepatic metastases, the majority of which are calcified and not significantly changed. A noncalcified lesion in the right hepatic lobe demonstrates mild interval growth, 1.7 x 1.4 cm previously 1.4 x 1.3 cm  - On chemo FOLFIRI, and avastin  most recent dose 07/05/2023  -  # Leukocytosis    - WBC count trending down  - S/p Onpro (Neulasta) 07/07/23 which is most likely the cause of spike in count  - High output ileostomy   - ID following  - R/o Cdiff   - Currently receiving abx, but not likely necessary in absence of infectious process   - CT imaging with no acute findings to suggest inflammatory or infectious process in the abdomen or pelvis, no bowel obstruction.  - Continue to trend serial CBCs  - Monitor temp  - Continue supportive measures       # LLE Ext Iliac Artery Occlusion    - Patient has been taking DOAC (Eliquis) for prior VTE in setting of metastatic disease  - Patient did admit to missing doses here and there in the past, but has been more compliant recently  - LE physical exam appropriate with no obvious acute signs of decreased perfusion to distal aspects warranting urgent intervention   - Vascular following  - Currently receiving Heparin gtt  - Will speak with Primary Onc Dr Serrano regarding AC change with d/c, likely to Lovenox injections, patient aware of this  - Etiology most likely 2/2 known metastatic malignancy with hx of missed doses/non compliance  - Can check Lupus/APLS profile, but will defer complete hypercoagulable workup to outpatient setting  - Continue supportive measures             51 y/o M with MH significant for Stage IV colon cancer with metastases to the liver and lung, currently on chemotherapy, s/p Eend ileostomy and revision 04/2023 and subtotal colectomy for perforated cecal mass 2022 presented with abdominal pain, loose/watery stools, dehydration, decreased appetite over the last 3 days.      # Metastatic Colon CA with Liver & Lung Metastases, S/p Ileostomy     - Dx 05/2022 after going to ED for worsening abdominal pain  - 05/19/22 CT AP showed apparent focal mural thickening at the cecum/proximal ascending colon. The sigmoid colon appears to be tethered to the cecum and it is focally thick-walled; focal tumor invasion/extension from the cecum to the sigmoid colon. Mural thickening of the a dilated right lower quadrant small bowel loop with adjacent mesenteric edema for which associated small bowel ischemia cannot be excluded. Multiple hypodense lesions with calcifications in both lobes of the liver with the largest measuring 5.8 x 6.0 cm in hepatic segment.  - 05/19/22: underwent exploratory laparotomy, total colectomy, end ileostomy, biopsy of liver, and biopsy of retroperitoneum.    Pathology:  Omentum, excision: Negative for malignancy.   Portion of terminal ileum, cecum with adherent sigmoid, total colectomy: Poorly differentiated infiltrating adenocarcinoma measuring 9.0 cm. Adenocarcinoma infiltrates through the bowel wall and through the visceral peritoneum and infiltrates the adherent sigmoid colon 3 of 17 lymph nodes are positive for metastatic carcinoma. Lymphovascular space & Perineural invasion is identified. The surgical margins negative.   Appendix with serosal tumor implants and acute inflammation. Peritonitis. Liver, biopsy: Metastatic adenocarcinoma.   Retroperitoneal biopsy: Adenocarcinoma with necrosis. No loss of nuclear expression of MMR proteins (MLH1, MSH2, MSH6, and PMS2). Staging pT4b pN1b pM1c.    - 06/22/2022: Started FOLFOXIRI with Bevacizumab; last dose of FOLFIRI and Bevacizumab received on 01/04/2023; S/p oxaliplatin until C10  - 09/01/22 CT CAP: Segmental left lower lobe pulmonary embolus and probable smaller pulmonary emboli within the right lung. Interval decrease in size of bilateral pulmonary nodules and hepatic metastases compared to 05/25/2022. No new sites of disease.  - 06/13/23 CT CAP: New, 8 mm right lower lobe nodule and enlarging pulmonary nodules, 7 mm left apical nodule previously measured 6 mm. Bilobar hepatic metastases, the majority of which are calcified and not significantly changed. A noncalcified lesion in the right hepatic lobe demonstrates mild interval growth, 1.7 x 1.4 cm previously 1.4 x 1.3 cm  - On chemo FOLFIRI, and avastin  most recent dose 07/05/2023  -  # Leukocytosis    - WBC count trending down  - S/p Onpro (Neulasta) 07/07/23 which is most likely the cause of spike in count  - High output ileostomy   - ID following  - R/o Cdiff   - Currently receiving abx, but not likely necessary in absence of infectious process   - CT imaging with no acute findings to suggest inflammatory or infectious process in the abdomen or pelvis, no bowel obstruction.  - Continue to trend serial CBCs  - Monitor temp  - Continue supportive measures       # LLE Ext Iliac Artery Occlusion    - Patient has been taking DOAC (Eliquis) for prior VTE in setting of metastatic disease  - Patient did admit to missing doses here and there in the past, but has been more compliant recently  - LE physical exam appropriate with no obvious acute signs of decreased perfusion to distal aspects warranting urgent intervention   - Vascular following  - Currently receiving Heparin gtt  - Will speak with Primary Onc Dr Serrano regarding AC change with d/c, likely to Lovenox injections, patient aware of this  - Etiology most likely 2/2 known metastatic malignancy with hx of missed doses/non compliance  - Can check Lupus/APLS profile, but will defer complete hypercoagulable workup to outpatient setting  - Continue supportive measures    Addendum Hem On attending.   PAtient seen on morning rounds.  Ileostomy output improving.   No symptoms of LLE ischemia. On IV heparin.  Developed arterial thrombosis on Eliquis.  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 R/o APS ( sent)  Bevacizumab also rarely can contribute to thrombotic risk .  Recommend  to switch to Lovenox  for long term AC.  D/w Dr Serrano.

## 2023-07-11 NOTE — PROGRESS NOTE ADULT - ASSESSMENT
51 y/o male with metastatic colon cancer diagnosed over one year ago, on palliative chemotherapy -  initially FOLFOXIRI / sunny , after 10 cycles changed to FOLFIRI/ Bevacizumab . last chemotherapy 7/5/23 Had OnPro 7/8/23  Admitted with weakness dehydration/ high output from ileostomy.     1. SIRS positive although thus far: patients infectious workup negative:   leukocytosis likely due to neulasta and dehydration   s/p Ciprofloxacin and Metronidazole in ER; c/w Zosyn  UA negative, pt without urinary symptoms   stool and GI PCR negative  blood culture in process  CT abd/pelvis - no infectious/inflammatory process   WBC downtrending well now at 62  Infectious disease -  [place]aco : recommend empiric IV zosyn  plan:  followup urine culture and blood culture  trend WBC ; average counts range from 5 to 15    2. High output ileostomy : dehydration and recent chemo  - Colorectal consult - Dr. Montiel   plan:  metamucil / loperamide    3. Occlusion of left external iliac artery   - Pt on Eliquis for prior PE   - Vascular consult - Dr. Hand : s/p LLE angio:unable to traverse lesion  plan;  heparin drip for now  check Ct chest IV contrast    4. History of colon cancer with metastases to the liver and lung on chemotherapy, PE   - Heme/Onc consulted - Dr. Serrano     DVT ppx: heparin drip  Code status: Full code   Emergency contact: Kathleen Palladino (mother) [phone]

## 2023-07-11 NOTE — PHYSICAL THERAPY INITIAL EVALUATION ADULT - PATIENT PROFILE REVIEW, REHAB EVAL
Chart reviewed and contents noted. Please refer to plan of care and A&I for ongoing treatment notes./yes

## 2023-07-11 NOTE — PROVIDER CONTACT NOTE (CRITICAL VALUE NOTIFICATION) - SITUATION
Patient post-op Left Lower Extremity Angiogram. Patient vital signs stable deny any new pain. Notified provider WBC 62.75.

## 2023-07-11 NOTE — PROGRESS NOTE ADULT - ASSESSMENT
53 yo M presented with high output ileostomy. On bulk forming agents.    P:  Pain control  Continue Metamucil, Loperamide  I/Os  Oral hydration  Rest of management as per primary team    To be discussed with Dr. Hubbard.

## 2023-07-11 NOTE — PHYSICAL THERAPY INITIAL EVALUATION ADULT - ADDITIONAL COMMENTS
Pt lives in an apartment with ~24 steps to negotiate. Reports difficulty finding new housing, interested in assisted living facility. Pt ambulates with a quad cane in the community.

## 2023-07-11 NOTE — PHYSICAL THERAPY INITIAL EVALUATION ADULT - GENERAL OBSERVATIONS, REHAB EVAL
Pt received seated in chair, +PIVs, +ileostomy, in NAD. RN Mackenzie cleared pt for PT eval and pt agreeable. Pt received seated in chair, +PIVs, +ileostomy, in NAD. RN cleared pt for PT eval and pt agreeable.

## 2023-07-11 NOTE — PROGRESS NOTE ADULT - SUBJECTIVE AND OBJECTIVE BOX
Pt. seen and examined at bedside this morning. Patient reports right calf pain and groin. He denies fever, chest pain, SOB, nausea, vomiting. No acute events overnight.    PE:  AO x3, NAD  GENERAL: No acute distress, well-developed  HEAD:  Atraumatic, Normocephalic  CHEST/LUNG: CTAB; No wheezes, rales, or rhonchi  ABDOMEN: Soft, mild tenderness RLQ, non-distended, right side ileostomy with clear/yellow stool minimal prolapse. B/l groin with dressing clean and dry, no hematoma or ecchymosis  Ext. no cyanosis or edema, warm, RLE: femoral, DP/PT pulses palpable, LLE: DP palpable/Dopplerable, PT dopplerable, femoral 1+palpable, dopplerable  NEUROLOGY: responding appropriately, no focal deficits

## 2023-07-11 NOTE — PROGRESS NOTE ADULT - NSPROGADDITIONALINFOA_GEN_ALL_CORE
medically active  1. followup CT chest  2. diet  3. will need to change to PO eliquis when optimized from vascular prospective

## 2023-07-11 NOTE — PROGRESS NOTE ADULT - SUBJECTIVE AND OBJECTIVE BOX
HPI:    53 y/o M with a PMhx of colon cancer with metastases to the liver and lung on chemotherapy, presented with abdominal pain, loose/watery stools, dehydration, decreased appetite over the last 3 days. Last chemotherapy was on 7/5/23. No recent antibiotic use. In ED Reported chills, cramping abdominal pain (sharp abdominal pain has been chronic for years), nausea, dry heaving, vomiting (6 episodes of watery outpt). Denies fevers, chills, chest pain, SOB, abdominal pain, N/V, diarrhea/constipation.     7/10/2023- Seen in ED with Dr. Starks, alert, oriented, reports feeling much better. Reports the liquid and increased output from stoma especially 2-3 days after chemo, and if does not eat right. Denies any N/V, now, output still noted liquid but moderate amount now.     07/11/2023: Seen at bedside with attending Dr Starks, no acute distress, noted improved ostomy output, concerned about anticoagulation d/c plan if he should still be on Eliquis       PAST MEDICAL & SURGICAL HISTORY:    Cancer, colon  liver mets  S/P ileostomy  Metastasis to liver  Pulmonary embolism  S/P colon resection  H/O ileostomy    FAMILY HISTORY:    dementia (Mother)      MEDICATIONS  (STANDING):    dextrose 5% + sodium chloride 0.45% with potassium chloride 20 mEq/L 1000 milliLiter(s) (125 mL/Hr) IV Continuous <Continuous>  gabapentin 100 milliGRAM(s) Oral every 8 hours  heparin  Infusion.  Unit(s)/Hr (16 mL/Hr) IV Continuous <Continuous>  lidocaine   4% Patch 1 Patch Transdermal daily  loperamide 2 milliGRAM(s) Oral every 6 hours  loperamide      naloxone Injectable 0.4 milliGRAM(s) IV Push once  piperacillin/tazobactam IVPB.. 3.375 Gram(s) IV Intermittent every 8 hours  psyllium Powder      psyllium Powder 1 Packet(s) Oral every 12 hours  sodium chloride 0.9%. 1000 milliLiter(s) (150 mL/Hr) IV Continuous <Continuous>      MEDICATIONS  (PRN):    acetaminophen     Tablet .. 650 milliGRAM(s) Oral every 6 hours PRN Temp greater or equal to 38C (100.4F), Mild Pain (1 - 3)  aluminum hydroxide/magnesium hydroxide/simethicone Suspension 30 milliLiter(s) Oral every 4 hours PRN Dyspepsia  heparin   Injectable 7000 Unit(s) IV Push every 6 hours PRN For aPTT less than 40  heparin   Injectable 3500 Unit(s) IV Push every 6 hours PRN For aPTT between 40 - 57  melatonin 3 milliGRAM(s) Oral at bedtime PRN Insomnia  morphine  - Injectable 2 milliGRAM(s) IV Push every 4 hours PRN Moderate Pain (4 - 6)  morphine  - Injectable 4 milliGRAM(s) IV Push every 4 hours PRN Severe Pain (7 - 10)  ondansetron Injectable 4 milliGRAM(s) IV Push every 8 hours PRN Nausea and/or Vomiting      ALLERGIES:    No Known Drug Allergies       REVIEW OF SYSTEMS:    Constitutional, Eyes, ENT, Cardiovascular, Respiratory, Gastrointestinal, Genitourinary, Musculoskeletal, Integumentary, Neurological, Psychiatric, Endocrine, Heme/Lymph and Allergic/Immunologic review of systems are otherwise negative except as noted in HPI.       VITALS:    ICU Vital Signs Last 24 Hrs  T(C): 36.7 (11 Jul 2023 08:01), Max: 36.8 (10 Jul 2023 11:56)  T(F): 98 (11 Jul 2023 08:01), Max: 98.2 (10 Jul 2023 11:56)  HR: 79 (11 Jul 2023 08:01) (66 - 100)  BP: 123/74 (11 Jul 2023 08:01) (97/53 - 134/80)  BP(mean): --  ABP: --  ABP(mean): --  RR: 18 (11 Jul 2023 08:01) (10 - 18)  SpO2: 98% (11 Jul 2023 08:01) (96% - 100%)    O2 Parameters below as of 11 Jul 2023 08:01  Patient On (Oxygen Delivery Method): room air      PHYSICAL:    Constitutional: no acute distress  Eyes: no conjunctival infection, anicteric  ENT: pharynx is unremarkable  Neck: supple without JVD  Pulmonary: clear to auscultation bilaterally, no dullness, no wheezing  Cardiac: RRR, normal S1S2   Vascular: no calf tenderness, venous stasis changes, varices; bilateral DP/PT pulses no signs of inadequate circulation to distal aspects   Abdomen: Right ileostomy bag with liquid output  Lymphatic: no peripheral adenopathy appreciated  Musculoskeletal: full range of motion and no deformities appreciated  Skin: normal appearance, no rash/rythema  Neurology: grossly intact.   Psychiatric: affect appropriate.       LABS:                          12.0   62.75 )-----------( 157      ( 11 Jul 2023 07:20 )             37.0     07-11    140  |  110<H>  |  6<L>  ----------------------------<  117<H>  4.1   |  23  |  0.81    Ca    8.6      11 Jul 2023 07:20  Phos  3.3     07-11  Mg     2.3     07-11    TPro  6.3  /  Alb  3.0<L>  /  TBili  0.5  /  DBili  x   /  AST  17  /  ALT  29  /  AlkPhos  149<H>  07-10          RADIOLOGY & ADDITIONAL STUDIES:    EXAM:  CT ABDOMEN AND PELVIS IC       PROCEDURE DATE:  07/09/2023      INTERPRETATION:  CLINICAL INFORMATION: Abdominal pain    COMPARISON: CT chest abdomen pelvis 6/13/2023.    CONTRAST/COMPLICATIONS:  IV Contrast: Omnipaque 350  90 cc administered   10 cc discarded  Oral Contrast: NONE  Complications: None reported at time of study completion    PROCEDURE:  CT of the Abdomen and Pelvis was performed.  Sagittal and coronal reformats were performed.    FINDINGS:  LOWER CHEST: No pulmonary nodules visualized in the lung bases. No   consolidation..    LIVER: Stable known hepatic metastasis with calcifications, index lesions   in the left lobe 2.9 x 3.7 cm, previously 2.5 x 3.8 cm; in the right lobe   3.3 cm, unchanged. Unchanged 1.8 cm additional hypodensity in segment 8.  BILE DUCTS: Normal caliber.  GALLBLADDER: Within normal limits.  SPLEEN: Mild splenomegaly at 14.9 cm.  PANCREAS: Within normal limits.  ADRENALS: Within normal limits.  KIDNEYS/URETERS: Symmetric, homogeneous renal parenchymal enhancement.   Left mid pole renal scarring. No hydronephrosis.    BLADDER: Within normal limits.  REPRODUCTIVE ORGANS: Prostate within normal limits.    BOWEL: Postsurgical changes from partial colectomy, Gloria stump   creation and right midabdomen ileostomy. No bowel obstruction. Appendix   is surgically absent. No abdominal bowel wall thickening or mesenteric   edema to suggest inflammation.  PERITONEUM: No ascites. Peritoneal thickening and nodularity in the right   lower pelvis alongside a small bowel loop with a serosal implant   measuring approximately 1.7 x 2.7 cm, image [id number], similar to prior 1.6   x 2.5 cm. No associated bowel obstruction.  VESSELS: Mild atherosclerotic changes of the abdominal aorta. Celiac axis   and SMA patent. Portal vein, SMV and splenic vein patent. Left   retroaortic renal vein. 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  RETROPERITONEUM/LYMPH NODES: No lymphadenopathy.  ABDOMINAL WALL: Midline postsurgical changes. Unremarkable appearance of   the right mid abdomen ileostomy without inflammatory changes.  BONES: Mild degenerative changes. Lucency in T11 unchanged since prior   study, may represent a osseous hemangioma.    IMPRESSION:  No significant change when compared to prior study 6/13/2023. No acute   findings to suggest inflammatory or infectious process in the abdomen or   pelvis, no bowel obstruction.    Unchanged hepatic metastasis and peritoneal thickening with serosal   implant in the right lower pelvis and postsurgical changes from colectomy   and right abdominal ileostomy. No morphologic abnormality at the   ileostomy site.

## 2023-07-11 NOTE — PROGRESS NOTE ADULT - ASSESSMENT
51 yo M with CT a/p finding of Left external iliac artery occlusion with palpable femoral and DP pulses. 7/10 underwent LLE angiogram, unable to traverse lesion.    P:  Regular diet  Heparin drip, PTT q 6 hrs, goal 60-90  Echo EF 55-60%  CT chest w/contrast  Hematology consult    To be discussed with Dr. Taylor.        51 yo M with CT a/p finding of Left external iliac artery occlusion with palpable femoral and DP pulses. 7/10 underwent LLE angiogram, unable to traverse lesion.    P:  Regular diet  Heparin drip, PTT q 6 hrs, goal 60-90  Echo EF 55-60%  CT chest w/contrast  Hematology consult    Discussed with Dr. Taylor

## 2023-07-12 LAB
APTT 50/50 2HOUR INCUB: 43 SEC — HIGH (ref 27.5–36.5)
APTT BLD: 40.6 SEC — HIGH (ref 27.5–36.5)
APTT BLD: 53.5 SEC — HIGH (ref 27.5–35.5)
APTT BLD: 89.7 SEC — HIGH (ref 27.5–35.5)
B2 GLYCOPROT1 AB SER QL: NEGATIVE — SIGNIFICANT CHANGE UP
CARDIOLIPIN AB SER-ACNC: NEGATIVE — SIGNIFICANT CHANGE UP
DRVVT RATIO: 0.94 RATIO — SIGNIFICANT CHANGE UP (ref 0–1.21)
DRVVT SCREEN TO CONFIRM RATIO: SIGNIFICANT CHANGE UP
HCT VFR BLD CALC: 38 % — LOW (ref 39–50)
HGB BLD-MCNC: 12.3 G/DL — LOW (ref 13–17)
MCHC RBC-ENTMCNC: 28.6 PG — SIGNIFICANT CHANGE UP (ref 27–34)
MCHC RBC-ENTMCNC: 32.4 GM/DL — SIGNIFICANT CHANGE UP (ref 32–36)
MCV RBC AUTO: 88.4 FL — SIGNIFICANT CHANGE UP (ref 80–100)
NORMALIZED SCT PPP-RTO: 0.97 RATIO — SIGNIFICANT CHANGE UP (ref 0–1.16)
NORMALIZED SCT PPP-RTO: SIGNIFICANT CHANGE UP
PAT CTL 2H: 43.2 SEC — HIGH (ref 27.5–36.5)
PLATELET # BLD AUTO: 143 K/UL — LOW (ref 150–400)
RBC # BLD: 4.3 M/UL — SIGNIFICANT CHANGE UP (ref 4.2–5.8)
RBC # FLD: 17.5 % — HIGH (ref 10.3–14.5)
WBC # BLD: 38.38 K/UL — HIGH (ref 3.8–10.5)
WBC # FLD AUTO: 38.38 K/UL — HIGH (ref 3.8–10.5)

## 2023-07-12 PROCEDURE — 99232 SBSQ HOSP IP/OBS MODERATE 35: CPT | Mod: GC

## 2023-07-12 PROCEDURE — 99233 SBSQ HOSP IP/OBS HIGH 50: CPT

## 2023-07-12 RX ORDER — LOPERAMIDE HCL 2 MG
2 TABLET ORAL THREE TIMES A DAY
Refills: 0 | Status: DISCONTINUED | OUTPATIENT
Start: 2023-07-12 | End: 2023-07-14

## 2023-07-12 RX ORDER — LOPERAMIDE HCL 2 MG
2 TABLET ORAL AT BEDTIME
Refills: 0 | Status: DISCONTINUED | OUTPATIENT
Start: 2023-07-12 | End: 2023-07-14

## 2023-07-12 RX ADMIN — MORPHINE SULFATE 4 MILLIGRAM(S): 50 CAPSULE, EXTENDED RELEASE ORAL at 06:20

## 2023-07-12 RX ADMIN — HEPARIN SODIUM 1600 UNIT(S)/HR: 5000 INJECTION INTRAVENOUS; SUBCUTANEOUS at 07:30

## 2023-07-12 RX ADMIN — LIDOCAINE 1 PATCH: 4 CREAM TOPICAL at 11:04

## 2023-07-12 RX ADMIN — MORPHINE SULFATE 4 MILLIGRAM(S): 50 CAPSULE, EXTENDED RELEASE ORAL at 21:46

## 2023-07-12 RX ADMIN — Medication 2 MILLIGRAM(S): at 08:57

## 2023-07-12 RX ADMIN — Medication 2 MILLIGRAM(S): at 21:54

## 2023-07-12 RX ADMIN — HEPARIN SODIUM 1600 UNIT(S)/HR: 5000 INJECTION INTRAVENOUS; SUBCUTANEOUS at 09:36

## 2023-07-12 RX ADMIN — Medication 2 MILLIGRAM(S): at 14:14

## 2023-07-12 RX ADMIN — PIPERACILLIN AND TAZOBACTAM 25 GRAM(S): 4; .5 INJECTION, POWDER, LYOPHILIZED, FOR SOLUTION INTRAVENOUS at 01:28

## 2023-07-12 RX ADMIN — MORPHINE SULFATE 4 MILLIGRAM(S): 50 CAPSULE, EXTENDED RELEASE ORAL at 15:20

## 2023-07-12 RX ADMIN — MORPHINE SULFATE 4 MILLIGRAM(S): 50 CAPSULE, EXTENDED RELEASE ORAL at 17:05

## 2023-07-12 RX ADMIN — HEPARIN SODIUM 1600 UNIT(S)/HR: 5000 INJECTION INTRAVENOUS; SUBCUTANEOUS at 09:32

## 2023-07-12 RX ADMIN — MORPHINE SULFATE 4 MILLIGRAM(S): 50 CAPSULE, EXTENDED RELEASE ORAL at 11:00

## 2023-07-12 RX ADMIN — Medication 2 MILLIGRAM(S): at 21:46

## 2023-07-12 RX ADMIN — Medication 2 MILLIGRAM(S): at 01:28

## 2023-07-12 RX ADMIN — Medication 1 PACKET(S): at 06:03

## 2023-07-12 RX ADMIN — Medication 1 PACKET(S): at 17:07

## 2023-07-12 RX ADMIN — HEPARIN SODIUM 1600 UNIT(S)/HR: 5000 INJECTION INTRAVENOUS; SUBCUTANEOUS at 19:58

## 2023-07-12 RX ADMIN — MORPHINE SULFATE 4 MILLIGRAM(S): 50 CAPSULE, EXTENDED RELEASE ORAL at 10:43

## 2023-07-12 RX ADMIN — MORPHINE SULFATE 4 MILLIGRAM(S): 50 CAPSULE, EXTENDED RELEASE ORAL at 06:05

## 2023-07-12 NOTE — PROGRESS NOTE ADULT - ASSESSMENT
53 y/o male with metastatic colon cancer on chemo, PE  admitted for:      1. Leukocytosis SIRS but  although thus far infectious workup negative  leukocytosis likely due to neulasta and dehydration   s/p Ciprofloxacin and Metronidazole in ER;   S/p  Zosyn  UA negative, pt without urinary symptoms   stool and GI PCR negative  blood culture NGTD   CT abd/pelvis - no infectious/inflammatory process   CTA; no PNA   WBC downtrending well now at 38K   ID f/u appreciated, abxs stopped, will monitor off     2. High output ileostomy : dehydration s/p  recent chemo  - Colorectal consult - Dr. Montiel appreciated  - Started on Loperamide and metamucil   - GI PCR and Cdiff neg   Monitor Output     3. Occlusion of left external iliac artery   - Pt on Eliquis for prior PE, non complaince?   - s/p LLE angio: unable to traverse lesion  - C/w heparin drip for now  - check Ct chest IV contrast: no PE   -heme/onc recs appreciated, Possible switch to Lovenox at DC  - F/u arterial dopplers as per vascular     4. Metastatic colon cancer  to the liver and lung on chemotherapy  on palliative chemotherapy:  initially FOLFOXIRI / sunny , after 10 cycles changed to FOLFIRI/ Bevacizumab.  Last chemotherapy 7/5/23 Had OnPro 7/8/23    - Heme/Onc recs appreciated     DVT ppx: heparin drip  Code status: Full code   Emergency contact: Kathleen Palladino (mother) [phone]

## 2023-07-12 NOTE — PROGRESS NOTE ADULT - ASSESSMENT
53 yo M presented with high output ileostomy. On bulk forming agents.    P:  Pain control  Continue Metamucil, Loperamide, may increase  I/Os  Oral hydration  Rest of management as per primary team    To be discussed with CRS attending.

## 2023-07-12 NOTE — PROGRESS NOTE ADULT - SUBJECTIVE AND OBJECTIVE BOX
Pt. seen and examined at bedside this morning. Patient reports right calf and groin pain improved. He denies fever, chest pain, SOB, nausea, vomiting. No acute events overnight.    PE:  AO x3, NAD  GENERAL: No acute distress, well-developed  HEAD:  Atraumatic, Normocephalic  CHEST/LUNG: normal effort, equal chest rise  ABDOMEN: Soft, mild tenderness RLQ, non-distended, ileostomy with clear/yellow stool with prolapse. B/l groin no hematoma or ecchymosis  Ext. no cyanosis or edema, warm, RLE: femoral, DP/PT pulses palpable, LLE: DP palpable/Dopplerable, PT dopplerable, femoral 1+palpable, dopplerable  NEUROLOGY: responding appropriately, no focal deficits

## 2023-07-12 NOTE — PROGRESS NOTE ADULT - SUBJECTIVE AND OBJECTIVE BOX
CC: Abdominal pain (12 Jul 2023 08:59)    HPI:  51 y/o M with PMH colon cancer with metatastes to the liver and lung on chemotherapy, PE presented with abdominal pain. Pt reports loose/watery stools, dehydration, decreased appetite over the last 3 days. His last chemotherapy was on Wednesday. No recent antibiotic use. Reports chills, cramping abdominal pain (sharp abdominal pain has been chronic for years), nausea, dry heaving, vomiting (6 episodes of watery outpt). Denies fevers, chills, chest pain, SOB, abdominal pain, N/V, diarrhea/constipation.     Prior admission:   - 4/19/23: Intestinal stoma prolpase     ER course: HR . Labs: .35 -> 112.01, lactate 2.4 -> 1.4, Na 134, glucose 113, alkaline phosphatase 191. UA: negative. COVID and RVP negative.     Imaging:  - CXR: port right chest wall, no consolidation, no effusion, no pneumothorax (personally reviewed.    - CT abd/pelvis: No significant change when compared to prior study 6/13/2023. No acute findings to suggest inflammatory or infectious process in the abdomen or pelvis, no bowel obstruction. Unchanged hepatic metastasis and peritoneal thickening with serosal implant in the right lower pelvis and postsurgical changes from colectomy and right abdominal ileostomy. No morphologic abnormality at the ileostomy site. Occlusion of left external iliac artery.     Pt was given Ciprofloxacin and Metronidazole, 3L of NS, pepcid, morphine, zofran. He is being admitted to med/surg (1N) for further management.    (10 Jul 2023 06:59)    INTERVAL HPI/OVERNIGHT EVENTS:    Vital Signs Last 24 Hrs  T(C): 36.3 (12 Jul 2023 08:43), Max: 36.9 (11 Jul 2023 22:30)  T(F): 97.3 (12 Jul 2023 08:43), Max: 98.4 (11 Jul 2023 22:30)  HR: 93 (12 Jul 2023 08:43) (71 - 93)  BP: 135/79 (12 Jul 2023 08:43) (109/65 - 135/79)  BP(mean): --  RR: 17 (12 Jul 2023 08:43) (17 - 18)  SpO2: 99% (12 Jul 2023 08:43) (97% - 99%)    Parameters below as of 12 Jul 2023 08:43  Patient On (Oxygen Delivery Method): room air      I&O's Detail    11 Jul 2023 07:01  -  12 Jul 2023 07:00  --------------------------------------------------------  IN:  Total IN: 0 mL    OUT:    Other (mL): 900 mL  Total OUT: 900 mL    Total NET: -900 mL      12 Jul 2023 07:01  -  12 Jul 2023 11:09  --------------------------------------------------------  IN:  Total IN: 0 mL    OUT:    Other (mL): 250 mL  Total OUT: 250 mL    Total NET: -250 mL        REVIEW OF SYSTEMS:    CONSTITUTIONAL: No weakness, fevers or chills  EYES/ENT: No visual changes;  No vertigo or throat pain   NECK: No pain or stiffness  RESPIRATORY: No cough, wheezing, hemoptysis; No shortness of breath  CARDIOVASCULAR: No chest pain or palpitations  GASTROINTESTINAL: No abdominal or epigastric pain. No nausea, vomiting, or hematemesis; No diarrhea or constipation. No melena or hematochezia.  GENITOURINARY: No dysuria, frequency or hematuria  NEUROLOGICAL: No numbness or weakness  SKIN: No itching, burning, rashes, or lesions   All other review of systems is negative unless indicated above.  PHYSICAL EXAM:    General: Well developed; in no acute distress  Eyes: PERRLA, EOMI; conjunctiva and sclera clear  Head: Normocephalic; atraumatic  ENMT: No nasal discharge; airway clear  Neck: Supple; non tender; no masses  Respiratory: No wheezes, rales or rhonchi  Cardiovascular: Regular rate and rhythm. S1 and S2 Normal; No murmurs, gallops or rubs  Gastrointestinal: Soft non-tender non-distended; Normal bowel sounds  Genitourinary: No  suprapubic  tenderness  Extremities: Normal range of motion, No clubbing, cyanosis or edema  Vascular: Peripheral pulses palpable 2+ bilaterally  Neurological: Alert and oriented x4  Skin: Warm and dry. No acute rash  Lymph Nodes: No acute cervical adenopathy  Musculoskeletal: Normal muscle tone, without deformities  Psychiatric: Cooperative and appropriate                            12.3   38.38 )-----------( 143      ( 12 Jul 2023 08:23 )             38.0     11 Jul 2023 07:20    140    |  110    |  6      ----------------------------<  117    4.1     |  23     |  0.81     Ca    8.6        11 Jul 2023 07:20  Phos  3.3       11 Jul 2023 07:20  Mg     2.3       11 Jul 2023 07:20      PT/INR - ( 11 Jul 2023 07:20 )   PT: 12.6 sec;   INR: 1.09 ratio         PTT - ( 12 Jul 2023 08:23 )  PTT:89.7 sec  CAPILLARY BLOOD GLUCOSE          Urinalysis Basic - ( 11 Jul 2023 07:20 )    Color: x / Appearance: x / SG: x / pH: x  Gluc: 117 mg/dL / Ketone: x  / Bili: x / Urobili: x   Blood: x / Protein: x / Nitrite: x   Leuk Esterase: x / RBC: x / WBC x   Sq Epi: x / Non Sq Epi: x / Bacteria: x        MEDICATIONS  (STANDING):  dextrose 5% + sodium chloride 0.45% with potassium chloride 20 mEq/L 1000 milliLiter(s) (125 mL/Hr) IV Continuous <Continuous>  gabapentin 100 milliGRAM(s) Oral every 8 hours  heparin  Infusion.  Unit(s)/Hr (16 mL/Hr) IV Continuous <Continuous>  lidocaine   4% Patch 1 Patch Transdermal daily  loperamide 2 milliGRAM(s) Oral every 6 hours  loperamide      naloxone Injectable 0.4 milliGRAM(s) IV Push once  psyllium Powder      psyllium Powder 1 Packet(s) Oral every 12 hours  sodium chloride 0.9%. 1000 milliLiter(s) (150 mL/Hr) IV Continuous <Continuous>    MEDICATIONS  (PRN):  acetaminophen     Tablet .. 650 milliGRAM(s) Oral every 6 hours PRN Temp greater or equal to 38C (100.4F), Mild Pain (1 - 3)  aluminum hydroxide/magnesium hydroxide/simethicone Suspension 30 milliLiter(s) Oral every 4 hours PRN Dyspepsia  heparin   Injectable 7000 Unit(s) IV Push every 6 hours PRN For aPTT less than 40  heparin   Injectable 3500 Unit(s) IV Push every 6 hours PRN For aPTT between 40 - 57  melatonin 3 milliGRAM(s) Oral at bedtime PRN Insomnia  morphine  - Injectable 2 milliGRAM(s) IV Push every 4 hours PRN Moderate Pain (4 - 6)  morphine  - Injectable 4 milliGRAM(s) IV Push every 4 hours PRN Severe Pain (7 - 10)  ondansetron Injectable 4 milliGRAM(s) IV Push every 8 hours PRN Nausea and/or Vomiting      RADIOLOGY & ADDITIONAL TESTS: CC: Abdominal pain (12 Jul 2023 08:59)    HPI:  53 y/o M with PMH colon cancer with metastases to the liver and lung on chemotherapy, PE presented with abdominal pain. Pt reports loose/watery stools, dehydration, decreased appetite over the last 3 days. His last chemotherapy was on Wednesday. No recent antibiotic use. Reports chills, cramping abdominal pain (sharp abdominal pain has been chronic for years), nausea, dry heaving, vomiting (6 episodes of watery outpt). Denies fevers, chills, chest pain, SOB, abdominal pain, N/V, diarrhea/constipation.     Prior admission:   - 4/19/23: Intestinal stoma prolapse     ER course: HR . Labs: .35 -> 112.01, lactate 2.4 -> 1.4, Na 134, glucose 113, alkaline phosphatase 191. UA: negative. COVID and RVP negative.     Imaging:  - CXR: port right chest wall, no consolidation, no effusion, no pneumothorax (personally reviewed.    - CT abd/pelvis: No significant change when compared to prior study 6/13/2023. No acute findings to suggest inflammatory or infectious process in the abdomen or pelvis, no bowel obstruction. Unchanged hepatic metastasis and peritoneal thickening with serosal implant in the right lower pelvis and postsurgical changes from colectomy and right abdominal ileostomy. No morphologic abnormality at the ileostomy site. Occlusion of left external iliac artery.     Pt was given Ciprofloxacin and Metronidazole, 3L of NS, pepcid, morphine, zofran. He is being admitted to med/surg (1N) for further management.       INTERVAL HPI/ OVERNIGHT EVENTS: chart, labs and imaging reviewed, Pt was seen and examined, Reports some RLE pain at rest says its cramping, not new, LLE has great toe numbness, also has abd discomfort, not changed, on pain meds, states Oxy 5 mg improves pain to 4/10, feels better with Morphine. No fevers or chills. Reports oral intake is better.  Results, meds and further plan discussed     Vital Signs Last 24 Hrs  T(C): 36.3 (12 Jul 2023 08:43), Max: 36.9 (11 Jul 2023 22:30)  T(F): 97.3 (12 Jul 2023 08:43), Max: 98.4 (11 Jul 2023 22:30)  HR: 93 (12 Jul 2023 08:43) (71 - 93)  BP: 135/79 (12 Jul 2023 08:43) (109/65 - 135/79)  BP(mean): --  RR: 17 (12 Jul 2023 08:43) (17 - 18)  SpO2: 99% (12 Jul 2023 08:43) (97% - 99%)    Parameters below as of 12 Jul 2023 08:43  Patient On (Oxygen Delivery Method): room air      REVIEW OF SYSTEMS:  All other review of systems is negative unless indicated above.      PHYSICAL EXAM:  General: Well developed; in no acute distress  Eyes: EOMI; conjunctiva and sclera clear  Head: Normocephalic; atraumatic  ENMT: No nasal discharge; airway clear  Neck: Supple; non tender; no masses  Respiratory: No wheezes, rales or rhonchi  Cardiovascular: Regular rate and rhythm. S1 and S2 Normal;  Gastrointestinal: Soft non-tender non-distended; Normal bowel sounds, ileostomy with liquid stool in place   Genitourinary: No  suprapubic  tenderness  Extremities: Normal range of motion, No edema, some R calf tenderness   Vascular: Peripheral DP pulses palpable   Neurological: Alert and oriented x 3, non focal   Skin: Warm and dry. No acute rash  Musculoskeletal: Normal muscle tone, without deformities  Psychiatric: Cooperative and appropriate      LABS:                           12.3   38.38 )-----------( 143      ( 12 Jul 2023 08:23 )             38.0     11 Jul 2023 07:20    140    |  110    |  6      ----------------------------<  117    4.1     |  23     |  0.81     Ca    8.6        11 Jul 2023 07:20  Phos  3.3       11 Jul 2023 07:20  Mg     2.3       11 Jul 2023 07:20      PT/INR - ( 11 Jul 2023 07:20 )   PT: 12.6 sec;   INR: 1.09 ratio    PTT - ( 12 Jul 2023 08:23 )  PTT:89.7 sec        MEDICATIONS  (STANDING):  dextrose 5% + sodium chloride 0.45% with potassium chloride 20 mEq/L 1000 milliLiter(s) (125 mL/Hr) IV Continuous <Continuous>  gabapentin 100 milliGRAM(s) Oral every 8 hours  heparin  Infusion.  Unit(s)/Hr (16 mL/Hr) IV Continuous <Continuous>  lidocaine   4% Patch 1 Patch Transdermal daily  loperamide 2 milliGRAM(s) Oral every 6 hours  loperamide      naloxone Injectable 0.4 milliGRAM(s) IV Push once  psyllium Powder      psyllium Powder 1 Packet(s) Oral every 12 hours  sodium chloride 0.9%. 1000 milliLiter(s) (150 mL/Hr) IV Continuous <Continuous>    MEDICATIONS  (PRN):  acetaminophen     Tablet .. 650 milliGRAM(s) Oral every 6 hours PRN Temp greater or equal to 38C (100.4F), Mild Pain (1 - 3)  aluminum hydroxide/magnesium hydroxide/simethicone Suspension 30 milliLiter(s) Oral every 4 hours PRN Dyspepsia  heparin   Injectable 7000 Unit(s) IV Push every 6 hours PRN For aPTT less than 40  heparin   Injectable 3500 Unit(s) IV Push every 6 hours PRN For aPTT between 40 - 57  melatonin 3 milliGRAM(s) Oral at bedtime PRN Insomnia  morphine  - Injectable 2 milliGRAM(s) IV Push every 4 hours PRN Moderate Pain (4 - 6)  morphine  - Injectable 4 milliGRAM(s) IV Push every 4 hours PRN Severe Pain (7 - 10)  ondansetron Injectable 4 milliGRAM(s) IV Push every 8 hours PRN Nausea and/or Vomiting      RADIOLOGY & ADDITIONAL TESTS:          ACC: [id number] EXAM:  CT ANGIO ABD AOR W RUN(W)AW IC   ORDERED BY: TINO SANDOVAL     PROCEDURE DATE:  07/10/2023          INTERPRETATION:  CT ANGIOGRAM ABDOMEN, PELVIS, AND LOWER EXTREMITIES:    CT ANGIO ABDOMINAL AORTA RUNOFF    CLINICAL INFORMATION:  r/o LLE ischemia    TECHNIQUE:    CONTRAST/COMPLICATIONS:  IV Contrast: Omnipaque 350  125 cc administered   25 cc discarded  Oral Contrast: NONE  Complications: None reported at time of study completion    PROCEDURE:  Initially, nonenhanced CT was obtained through the calves. Then,   following the rapid administration of intravenous contrast, CT   angiography was performed through the abdomen, pelvis, and lower   extremities down to the toes.  Delayed images through the calves were   also obtained. Sagittal and coronal reformats as well as 3D   reconstructions were performed.    Volume rendered and MIP images for CT angiographic display are created   from source data on an independent 3-D workstation and archived into   PACS. This study was performed using automatic exposure control   (radiation dose reduction software) to obtain a diagnostic image quality   scan with patient dose as low as reasonably achievable.    COMPARISON: CT abdomen pelvis 7/9/2023.    FINDINGS:    ABDOMINAL AORTA: Normal caliber of the abdominal aorta, 1.9 cm   suprarenal, 1.7 cm infrarenal, 1.6 cm at the bifurcation.    Celiac: Patent, no stenosis.  SMA: Patent, no stenosis.  ANYA: Patent, no stenosis  Renal Arteries: Duplicated left renal artery, no stenosis. Single right   renal artery, no stenosis    RIGHT LEG:    Common Iliac artery: Mild atherosclerosis. No high-grade stenosis or   occlusion.  External Iliac: Mild atherosclerotic disease, no high-grade stenosis or   occlusion.  Internal Iliac: Moderate atherosclerotic disease mild stenosis at the   origin. Patent.    Common femoral: Patent, no stenosis or  SFA: Patent, mild atherosclerotic plaques, no stenosis.  Profunda: Patent, normal    Popliteal: Patent, no stenosis or occlusion  Anterior Tibial (AT): Patent.  Tibioperoneal trunk (TPT): Patent, no stenosis or occlusion.  Posterior Tibial (PT): Patent, no stenosis.  Peroneal: Patent to the level of the ankle  Dorsalis Pedis (DP): Patent  Plantar: Patent    LEFT LEG:    Common Iliac artery: Patent, moderate atherosclerotic changes  External Iliac: Occluded  Internal Iliac: Patent, high-grade stenosis at the origin, moderate   atherosclerotic changes.    Common femoral: Recanalized and reconstituted common femoral artery   through collaterals from the left inferior epigastric artery.  SFA: Patent, mild atherosclerotic changes, no stenosis or occlusion.  Profunda: Normal    Popliteal: Patent, mild atherosclerotic plaques, no significant stenosis.  AT: Patent, no stenosis or occlusion  TPT: Patent, no stenosis  PT: Patent in the proximal two thirds.  Peroneal: Patent, no significant stenosis.  DP: Patent  Plantar: Not well opacified      ADDITIONAL FINDINGS:  LOWER CHEST: Within normal limits.    LIVER: Bilateral metastatic lesions in the liver with calcification,   unchanged compared to prior study 7/9/2023.  BILE DUCTS: Normal caliber.  GALLBLADDER: Vicarious excretion of contrast.  SPLEEN: Mild splenomegaly  PANCREAS: Within normal limits.  ADRENALS: Within normal limits.  KIDNEYS/URETERS: No renal stones or hydronephrosis.    BLADDER: Distended, filled with contrast, within normal limits.  REPRODUCTIVE ORGANS: Prostate within normal limits.    BOWEL: Postsurgical changes from partial colectomy, Gloria stump   creation and right end ileostomy. No bowel obstruction. Appendix is   surgically absent.  PERITONEUM: No ascites. Unchanged peritoneal nodular thickening and   serosal implant within the right pelvis as described from prior study,   unchanged.  VESSELS: Aorta and pelvic arteries and described in detail above. Patent   portal vein. Patent renal veins. Left retroaortic renal vein.  RETROPERITONEUM/LYMPH NODES: No lymphadenopathy.  ABDOMINAL WALL: Within normal limits.  BONES: Within normal limits.    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 Two-vessel runoff in the left calf.  High grade stenosis at the origin of the left internal iliac artery.  Three-vessel arterial runoff of the right lower extremity.  Atherosclerotic disease in the abdomen as detailed above.      ACC: [id number] EXAM:  CT ANGIO CHEST PULM ART [facility]                          PROCEDURE DATE:  07/02/2022          INTERPRETATION:  Reason for Exam: Shortness of breath. chest pain.    CTA of the chest was performed from the thoracic inlet to the level of   the adrenal glands following IV contrast injection of  80 cc of Omnipaque   350. No immediate complications were reported.  MIP images were also   created and reviewed.    Comparison: CT abdomen and pelvis dated May 25, 2022    Tubes/Lines: Right Mediport catheter tip in SVC.    Mediastinum and Heart: Aorta and pulmonary arteries are normal in size.   Moderate stenosis of the proximal left subclavian artery from   atherosclerotic plaque. Thyroid gland is unremarkable. No   lymphadenopathy.No pericardial effusion.    Lungs, Pleura, and Airways: There is no pulmonary embolus. No   consolidations, edema or effusion. Small left pneumothorax noted. Right   lower lobe 4 mm nodule in series 4 image 289. Left apical 4 mm nodule on   image 57.    Visualized Abdomen: Multiple calcified masses in the liver, without   significant change since prior.    Bones and soft tissues: Unremarkable.    IMPRESSION:    No pulmonary embolus.    Small left pneumothorax without mediastinal shift.    Remaining findings as above.

## 2023-07-12 NOTE — PROGRESS NOTE ADULT - SUBJECTIVE AND OBJECTIVE BOX
Date of service: 07-12-23 @ 09:00    Lying in bed in NAD  No fever since admission  Denies abdominal pain    ROS: no fever or chills; denies dizziness, no HA, no SOB or cough, no diarrhea or constipation; no dysuria, no legs pain, no rashes    MEDICATIONS  (STANDING):  dextrose 5% + sodium chloride 0.45% with potassium chloride 20 mEq/L 1000 milliLiter(s) (125 mL/Hr) IV Continuous <Continuous>  gabapentin 100 milliGRAM(s) Oral every 8 hours  heparin  Infusion.  Unit(s)/Hr (16 mL/Hr) IV Continuous <Continuous>  lidocaine   4% Patch 1 Patch Transdermal daily  loperamide      loperamide 2 milliGRAM(s) Oral every 6 hours  naloxone Injectable 0.4 milliGRAM(s) IV Push once  psyllium Powder      psyllium Powder 1 Packet(s) Oral every 12 hours  sodium chloride 0.9%. 1000 milliLiter(s) (150 mL/Hr) IV Continuous <Continuous>    Vital Signs Last 24 Hrs  T(C): 36.3 (12 Jul 2023 08:43), Max: 36.9 (11 Jul 2023 22:30)  T(F): 97.3 (12 Jul 2023 08:43), Max: 98.4 (11 Jul 2023 22:30)  HR: 93 (12 Jul 2023 08:43) (71 - 93)  BP: 135/79 (12 Jul 2023 08:43) (109/65 - 135/79)  BP(mean): --  RR: 17 (12 Jul 2023 08:43) (17 - 18)  SpO2: 99% (12 Jul 2023 08:43) (97% - 99%)    Parameters below as of 12 Jul 2023 08:43  Patient On (Oxygen Delivery Method): room air     Physical exam:    Constitutional:  No acute distress  HEENT: NC/AT, EOMI, PERRLA, conjunctivae clear; ears and nose atraumatic  Neck: supple; thyroid not palpable  Back: no tenderness  Respiratory: respiratory effort normal; clear to auscultation  Cardiovascular: S1S2 regular, no murmurs  Abdomen: soft, not tender, distended, positive BS  colostomy  Genitourinary: no suprapubic tenderness  Lymphatic: no LN palpable  Musculoskeletal: no muscle tenderness, no joint swelling or tenderness  Extremities: no pedal edema  Neurological/ Psychiatric: AxOx3, judgement and insight normal; moving all extremities  Skin: no rashes; no palpable lesions    Labs: reviewed                        12.3   38.38 )-----------( 143      ( 12 Jul 2023 08:23 )             38.0     07-11    140  |  110<H>  |  6<L>  ----------------------------<  117<H>  4.1   |  23  |  0.81    Ca    8.6      11 Jul 2023 07:20  Phos  3.3     07-11  Mg     2.3     07-11                        11.7   68.60 )-----------( 150      ( 10 Jul 2023 06:04 )             36.4     07-10    139  |  108  |  10  ----------------------------<  104<H>  3.9   |  28  |  0.87    Ca    8.3<L>      10 Jul 2023 06:04    TPro  6.3  /  Alb  3.0<L>  /  TBili  0.5  /  DBili  x   /  AST  17  /  ALT  29  /  AlkPhos  149<H>  07-10     LIVER FUNCTIONS - ( 10 Jul 2023 06:04 )  Alb: 3.0 g/dL / Pro: 6.3 gm/dL / ALK PHOS: 149 U/L / ALT: 29 U/L / AST: 17 U/L / GGT: x           Urinalysis Basic - ( 10 Jul 2023 06:04 )    Color: x / Appearance: x / SG: x / pH: x  Gluc: 104 mg/dL / Ketone: x  / Bili: x / Urobili: x   Blood: x / Protein: x / Nitrite: x   Leuk Esterase: x / RBC: x / WBC x   Sq Epi: x / Non Sq Epi: x / Bacteria: x  (07-10 @ 01:53)  [facility]    Culture - Urine (collected 10 Jul 2023 07:38)  Source: Clean Catch None  Final Report (11 Jul 2023 22:57):    10,000 - 49,000 CFU/mL Streptococcus agalactiae (Group B) isolated    Group B streptococci are susceptible to ampicillin,    penicillin and cefazolin, but may be resistant to    erythromycin and clindamycin.    Recommendations for intrapartum prophylaxis for Group B    streptococci are penicillin or ampicillin.    <10,000 CFU/ml Normal Urogenital candie present    Culture - Blood (collected 09 Jul 2023 17:18)  Source: .Blood None  Preliminary Report (12 Jul 2023 01:02):    No growth at 48 Hours    Culture - Blood (collected 09 Jul 2023 17:18)  Source: .Blood None  Preliminary Report (12 Jul 2023 01:02):    No growth at 48 Hours    Radiology: all available radiological tests reviewed    - CXR: port right chest wall, no consolidation, no effusion, no pneumothorax (personally reviewed.    - CT abd/pelvis: No significant change when compared to prior study 6/13/2023. No acute findings to suggest inflammatory or infectious process in the abdomen or pelvis, no bowel obstruction. Unchanged hepatic metastasis and peritoneal thickening with serosal implant in the right lower pelvis and postsurgical changes from colectomy and right abdominal ileostomy. No morphologic abnormality at the ileostomy site. Occlusion of left external iliac artery.     Advanced directives addressed: full resuscitation

## 2023-07-12 NOTE — PROGRESS NOTE ADULT - ASSESSMENT
51 yo M with CT a/p finding of Left external iliac artery occlusion with palpable femoral and DP pulses. 7/10 underwent LLE angiogram, unable to traverse lesion.    P:  Pain control PRN  Regular diet  Heparin drip, PTT q 6 hrs, goal 60-90, therapeutic  Echo EF 55-60%  CT chest w/contrast  Hematology recs  Follow read of VIDAL/PVR  Rest of management as per primary team    To be discussed with Dr. Taylor.  53 yo M with CT a/p finding of Left external iliac artery occlusion with palpable femoral and DP pulses. 7/10 underwent LLE angiogram, unable to traverse lesion.    P:  Pain control PRN  Regular diet  Heparin drip, PTT q 6 hrs, goal 60-90, therapeutic  Echo EF 55-60%  CT chest w/contrast  Hematology recs  Follow read of VIDAL/PVR  Rest of management as per primary team    Discussed with Dr. Taylor.

## 2023-07-12 NOTE — PROGRESS NOTE ADULT - ASSESSMENT
51 y/o Male with h/o colon cancer with metastases to the liver and lung s/p surgery and colostomy, on chemotherapy, PE was admitted on 7/10 for abdominal pain. Pt reports loose/watery stools, dehydration, decreased appetite over the last 3 days. His last chemotherapy was on Wednesday. No recent antibiotic use. Reports chills, cramping abdominal pain (sharp abdominal pain has been chronic for years), nausea, dry heaving, vomiting (6 episodes of watery outpt). On ER he received ciprofloxacin and metronidazole, then vancomycin IV and zosyn.     1. Abdominal pain with chills as outpatient. ?underlying infection. Metastatic colon CA on chemotherapy. Immunocompromised host.  -severe leukocytosis improving likely related neulasta   -BC x 2, urine c/s noted  -CT abdomen shows no significant change when compared to study from 6/13/23  -on zosyn 3.375 gm IV q8h # 3  -tolerating abx well so far; no side effects noted  -monitor abdomen  -stool studies are negative  -complete abx therapy  -monitor temps  -f/u CBC  -supportive care  2. Other issues:   -care per medicine

## 2023-07-12 NOTE — PROGRESS NOTE ADULT - SUBJECTIVE AND OBJECTIVE BOX
Pt. seen and examined at bedside this morning. Patient reports right calf and groin pain improved. He denies fever, chest pain, SOB, nausea, vomiting. No acute events overnight.    PE:  AO x3, NAD  GENERAL: No acute distress, well-developed  HEAD:  Atraumatic, Normocephalic  CHEST/LUNG: noemal effort, equal chest rise  ABDOMEN: Soft, mild tenderness RLQ, non-distended, right side ileostomy with clear/yellow stool minimal prolapse. B/l groin no hematoma or ecchymosis  Ext. no cyanosis or edema, warm, RLE: femoral, DP/PT pulses palpable, LLE: DP palpable/Dopplerable, PT dopplerable, femoral 1+palpable, dopplerable  NEUROLOGY: responding appropriately, no focal deficits Pt. seen and examined at bedside this morning. Patient reports right calf and groin pain improved. He denies fever, chest pain, SOB, nausea, vomiting. No acute events overnight.    PE:  AO x3, NAD  GENERAL: No acute distress, well-developed  HEAD:  Atraumatic, Normocephalic  CHEST/LUNG: normal effort, equal chest rise  ABDOMEN: Soft, mild tenderness RLQ, non-distended, ileostomy with clear/yellow stool with prolapse. B/l groin no hematoma or ecchymosis  Ext. no cyanosis or edema, warm, RLE: femoral, DP/PT pulses palpable, LLE: DP palpable/Dopplerable, PT dopplerable, femoral 1+palpable, dopplerable  NEUROLOGY: responding appropriately, no focal deficits

## 2023-07-13 LAB
ANION GAP SERPL CALC-SCNC: 7 MMOL/L — SIGNIFICANT CHANGE UP (ref 5–17)
APTT BLD: 82.1 SEC — HIGH (ref 27.5–35.5)
BUN SERPL-MCNC: 7 MG/DL — SIGNIFICANT CHANGE UP (ref 7–23)
CALCIUM SERPL-MCNC: 8.8 MG/DL — SIGNIFICANT CHANGE UP (ref 8.5–10.1)
CHLORIDE SERPL-SCNC: 105 MMOL/L — SIGNIFICANT CHANGE UP (ref 96–108)
CO2 SERPL-SCNC: 26 MMOL/L — SIGNIFICANT CHANGE UP (ref 22–31)
CREAT SERPL-MCNC: 0.76 MG/DL — SIGNIFICANT CHANGE UP (ref 0.5–1.3)
EGFR: 110 ML/MIN/1.73M2 — SIGNIFICANT CHANGE UP
GLUCOSE SERPL-MCNC: 98 MG/DL — SIGNIFICANT CHANGE UP (ref 70–99)
HCT VFR BLD CALC: 37.2 % — LOW (ref 39–50)
HGB BLD-MCNC: 12 G/DL — LOW (ref 13–17)
MAGNESIUM SERPL-MCNC: 2 MG/DL — SIGNIFICANT CHANGE UP (ref 1.6–2.6)
MCHC RBC-ENTMCNC: 28 PG — SIGNIFICANT CHANGE UP (ref 27–34)
MCHC RBC-ENTMCNC: 32.3 GM/DL — SIGNIFICANT CHANGE UP (ref 32–36)
MCV RBC AUTO: 86.9 FL — SIGNIFICANT CHANGE UP (ref 80–100)
PHOSPHATE SERPL-MCNC: 3 MG/DL — SIGNIFICANT CHANGE UP (ref 2.5–4.5)
PLATELET # BLD AUTO: 152 K/UL — SIGNIFICANT CHANGE UP (ref 150–400)
POTASSIUM SERPL-MCNC: 3.4 MMOL/L — LOW (ref 3.5–5.3)
POTASSIUM SERPL-SCNC: 3.4 MMOL/L — LOW (ref 3.5–5.3)
RBC # BLD: 4.28 M/UL — SIGNIFICANT CHANGE UP (ref 4.2–5.8)
RBC # FLD: 17.3 % — HIGH (ref 10.3–14.5)
SODIUM SERPL-SCNC: 138 MMOL/L — SIGNIFICANT CHANGE UP (ref 135–145)
WBC # BLD: 21.52 K/UL — HIGH (ref 3.8–10.5)
WBC # FLD AUTO: 21.52 K/UL — HIGH (ref 3.8–10.5)

## 2023-07-13 PROCEDURE — 99232 SBSQ HOSP IP/OBS MODERATE 35: CPT | Mod: GC

## 2023-07-13 PROCEDURE — 99232 SBSQ HOSP IP/OBS MODERATE 35: CPT

## 2023-07-13 PROCEDURE — 99231 SBSQ HOSP IP/OBS SF/LOW 25: CPT | Mod: 24,GC

## 2023-07-13 PROCEDURE — 90791 PSYCH DIAGNOSTIC EVALUATION: CPT

## 2023-07-13 RX ORDER — POTASSIUM CHLORIDE 20 MEQ
40 PACKET (EA) ORAL EVERY 4 HOURS
Refills: 0 | Status: COMPLETED | OUTPATIENT
Start: 2023-07-13 | End: 2023-07-13

## 2023-07-13 RX ORDER — OXYCODONE HYDROCHLORIDE 5 MG/1
10 TABLET ORAL EVERY 6 HOURS
Refills: 0 | Status: DISCONTINUED | OUTPATIENT
Start: 2023-07-13 | End: 2023-07-14

## 2023-07-13 RX ORDER — OXYCODONE HYDROCHLORIDE 5 MG/1
5 TABLET ORAL EVERY 6 HOURS
Refills: 0 | Status: DISCONTINUED | OUTPATIENT
Start: 2023-07-13 | End: 2023-07-14

## 2023-07-13 RX ORDER — HYDROMORPHONE HYDROCHLORIDE 2 MG/ML
1 INJECTION INTRAMUSCULAR; INTRAVENOUS; SUBCUTANEOUS THREE TIMES A DAY
Refills: 0 | Status: DISCONTINUED | OUTPATIENT
Start: 2023-07-13 | End: 2023-07-17

## 2023-07-13 RX ORDER — ENOXAPARIN SODIUM 100 MG/ML
90 INJECTION SUBCUTANEOUS EVERY 12 HOURS
Refills: 0 | Status: DISCONTINUED | OUTPATIENT
Start: 2023-07-13 | End: 2023-07-21

## 2023-07-13 RX ORDER — SODIUM CHLORIDE 9 MG/ML
1000 INJECTION, SOLUTION INTRAVENOUS ONCE
Refills: 0 | Status: COMPLETED | OUTPATIENT
Start: 2023-07-13 | End: 2023-07-13

## 2023-07-13 RX ORDER — DIPHENOXYLATE HCL/ATROPINE 2.5-.025MG
1 TABLET ORAL EVERY 6 HOURS
Refills: 0 | Status: DISCONTINUED | OUTPATIENT
Start: 2023-07-13 | End: 2023-07-20

## 2023-07-13 RX ADMIN — MORPHINE SULFATE 4 MILLIGRAM(S): 50 CAPSULE, EXTENDED RELEASE ORAL at 06:27

## 2023-07-13 RX ADMIN — MORPHINE SULFATE 4 MILLIGRAM(S): 50 CAPSULE, EXTENDED RELEASE ORAL at 11:17

## 2023-07-13 RX ADMIN — Medication 1 TABLET(S): at 12:47

## 2023-07-13 RX ADMIN — Medication 2 MILLIGRAM(S): at 21:52

## 2023-07-13 RX ADMIN — MORPHINE SULFATE 4 MILLIGRAM(S): 50 CAPSULE, EXTENDED RELEASE ORAL at 09:47

## 2023-07-13 RX ADMIN — HEPARIN SODIUM 1600 UNIT(S)/HR: 5000 INJECTION INTRAVENOUS; SUBCUTANEOUS at 07:32

## 2023-07-13 RX ADMIN — HEPARIN SODIUM 1600 UNIT(S)/HR: 5000 INJECTION INTRAVENOUS; SUBCUTANEOUS at 19:32

## 2023-07-13 RX ADMIN — Medication 1 PACKET(S): at 05:43

## 2023-07-13 RX ADMIN — SODIUM CHLORIDE 1000 MILLILITER(S): 9 INJECTION, SOLUTION INTRAVENOUS at 10:01

## 2023-07-13 RX ADMIN — Medication 1 TABLET(S): at 17:07

## 2023-07-13 RX ADMIN — LIDOCAINE 1 PATCH: 4 CREAM TOPICAL at 10:01

## 2023-07-13 RX ADMIN — HEPARIN SODIUM 1600 UNIT(S)/HR: 5000 INJECTION INTRAVENOUS; SUBCUTANEOUS at 09:46

## 2023-07-13 RX ADMIN — Medication 40 MILLIEQUIVALENT(S): at 18:20

## 2023-07-13 RX ADMIN — ENOXAPARIN SODIUM 90 MILLIGRAM(S): 100 INJECTION SUBCUTANEOUS at 21:54

## 2023-07-13 RX ADMIN — Medication 2 MILLIGRAM(S): at 14:03

## 2023-07-13 RX ADMIN — OXYCODONE HYDROCHLORIDE 10 MILLIGRAM(S): 5 TABLET ORAL at 21:53

## 2023-07-13 RX ADMIN — MORPHINE SULFATE 4 MILLIGRAM(S): 50 CAPSULE, EXTENDED RELEASE ORAL at 01:03

## 2023-07-13 RX ADMIN — MORPHINE SULFATE 4 MILLIGRAM(S): 50 CAPSULE, EXTENDED RELEASE ORAL at 05:43

## 2023-07-13 RX ADMIN — OXYCODONE HYDROCHLORIDE 10 MILLIGRAM(S): 5 TABLET ORAL at 15:47

## 2023-07-13 RX ADMIN — HEPARIN SODIUM 1600 UNIT(S)/HR: 5000 INJECTION INTRAVENOUS; SUBCUTANEOUS at 17:20

## 2023-07-13 RX ADMIN — HEPARIN SODIUM 1600 UNIT(S)/HR: 5000 INJECTION INTRAVENOUS; SUBCUTANEOUS at 01:22

## 2023-07-13 RX ADMIN — Medication 40 MILLIEQUIVALENT(S): at 21:53

## 2023-07-13 RX ADMIN — Medication 2 MILLIGRAM(S): at 05:42

## 2023-07-13 RX ADMIN — Medication 1 PACKET(S): at 17:08

## 2023-07-13 NOTE — PROGRESS NOTE ADULT - SUBJECTIVE AND OBJECTIVE BOX
HPI:    51 y/o M with a PMhx of colon cancer with metastases to the liver and lung on chemotherapy, presented with abdominal pain, loose/watery stools, dehydration, decreased appetite over the last 3 days. Last chemotherapy was on 7/5/23. No recent antibiotic use. In ED Reported chills, cramping abdominal pain (sharp abdominal pain has been chronic for years), nausea, dry heaving, vomiting (6 episodes of watery outpt). Denies fevers, chills, chest pain, SOB, abdominal pain, N/V, diarrhea/constipation.     7/10/2023- Seen in ED with Dr. Starks, alert, oriented, reports feeling much better. Reports the liquid and increased output from stoma especially 2-3 days after chemo, and if does not eat right. Denies any N/V, now, output still noted liquid but moderate amount now.     07/11/2023: Seen at bedside with attending Dr Starks, no acute distress, noted improved ostomy output, concerned about anticoagulation d/c plan if he should still be on Eliquis     07/13/2023- Seen at bedside, in no acute distress, reports normal stomal output. c/o right groin with some discomfort 2/2 to the angiogram, noted with mild area of ecchymosis and swelling. Right groin with no signs of bleeding or swelling.    PAST MEDICAL & SURGICAL HISTORY:    Cancer, colon  liver mets  S/P ileostomy  Metastasis to liver  Pulmonary embolism  S/P colon resection  H/O ileostomy    FAMILY HISTORY:    dementia (Mother)      MEDICATIONS  (STANDING):    dextrose 5% + sodium chloride 0.45% with potassium chloride 20 mEq/L 1000 milliLiter(s) (125 mL/Hr) IV Continuous <Continuous>  diphenoxylate/atropine 1 Tablet(s) Oral every 6 hours  gabapentin 100 milliGRAM(s) Oral every 8 hours  heparin  Infusion.  Unit(s)/Hr (16 mL/Hr) IV Continuous <Continuous>  lidocaine   4% Patch 1 Patch Transdermal daily  loperamide 2 milliGRAM(s) Oral at bedtime  loperamide 2 milliGRAM(s) Oral three times a day  naloxone Injectable 0.4 milliGRAM(s) IV Push once  psyllium Powder      psyllium Powder 1 Packet(s) Oral every 12 hours  sodium chloride 0.9%. 1000 milliLiter(s) (150 mL/Hr) IV Continuous <Continuous>    MEDICATIONS  (PRN):    acetaminophen     Tablet .. 650 milliGRAM(s) Oral every 6 hours PRN Temp greater or equal to 38C (100.4F), Mild Pain (1 - 3)  aluminum hydroxide/magnesium hydroxide/simethicone Suspension 30 milliLiter(s) Oral every 4 hours PRN Dyspepsia  heparin   Injectable 7000 Unit(s) IV Push every 6 hours PRN For aPTT less than 40  heparin   Injectable 3500 Unit(s) IV Push every 6 hours PRN For aPTT between 40 - 57  melatonin 3 milliGRAM(s) Oral at bedtime PRN Insomnia  morphine  - Injectable 2 milliGRAM(s) IV Push every 4 hours PRN Moderate Pain (4 - 6)  morphine  - Injectable 4 milliGRAM(s) IV Push every 4 hours PRN Severe Pain (7 - 10)  ondansetron Injectable 4 milliGRAM(s) IV Push every 8 hours PRN Nausea and/or Vomiting      ALLERGIES:    No Known Drug Allergies       REVIEW OF SYSTEMS:    Constitutional, Eyes, ENT, Cardiovascular, Respiratory, Gastrointestinal, Genitourinary, Musculoskeletal, Integumentary, Neurological, Psychiatric, Endocrine, Heme/Lymph and Allergic/Immunologic review of systems are otherwise negative except as noted in HPI.       VITALS:                          12.0   21.52 )-----------( 152      ( 13 Jul 2023 08:15 )             37.2   07-13    138  |  105  |  7   ----------------------------<  98  3.4<L>   |  26  |  0.76    Ca    8.8      13 Jul 2023 08:15  Phos  3.0     07-13  Mg     2.0     07-13          PHYSICAL:    Constitutional: no acute distress  Eyes: no conjunctival infection, anicteric  ENT: pharynx is unremarkable  Neck: supple without JVD  Pulmonary: clear to auscultation bilaterally, no dullness, no wheezing  Cardiac: RRR, normal S1S2   Vascular: no calf tenderness, venous stasis changes, varices; bilateral DP/PT pulses no signs of inadequate circulation to distal aspects   Abdomen: Right ileostomy bag with moisture noted.  Lymphatic: no peripheral adenopathy appreciated  Musculoskeletal: full range of motion and no deformities appreciated  Skin: normal appearance, no rash / erythema  Neurology: grossly intact.   Psychiatric: affect appropriate.       LABS:                                     12.0   21.52 )-----------( 152      ( 13 Jul 2023 08:15 )             37.2   07-13    138  |  105  |  7   ----------------------------<  98  3.4<L>   |  26  |  0.76    Ca    8.8      13 Jul 2023 08:15  Phos  3.0     07-13  Mg     2.0     07-13              RADIOLOGY & ADDITIONAL STUDIES:    EXAM:  CT ABDOMEN AND PELVIS IC       PROCEDURE DATE:  07/09/2023      INTERPRETATION:  CLINICAL INFORMATION: Abdominal pain    COMPARISON: CT chest abdomen pelvis 6/13/2023.    CONTRAST/COMPLICATIONS:  IV Contrast: Omnipaque 350  90 cc administered   10 cc discarded  Oral Contrast: NONE  Complications: None reported at time of study completion    PROCEDURE:  CT of the Abdomen and Pelvis was performed.  Sagittal and coronal reformats were performed.    FINDINGS:  LOWER CHEST: No pulmonary nodules visualized in the lung bases. No   consolidation..    LIVER: Stable known hepatic metastasis with calcifications, index lesions   in the left lobe 2.9 x 3.7 cm, previously 2.5 x 3.8 cm; in the right lobe   3.3 cm, unchanged. Unchanged 1.8 cm additional hypodensity in segment 8.  BILE DUCTS: Normal caliber.  GALLBLADDER: Within normal limits.  SPLEEN: Mild splenomegaly at 14.9 cm.  PANCREAS: Within normal limits.  ADRENALS: Within normal limits.  KIDNEYS/URETERS: Symmetric, homogeneous renal parenchymal enhancement.   Left mid pole renal scarring. No hydronephrosis.    BLADDER: Within normal limits.  REPRODUCTIVE ORGANS: Prostate within normal limits.    BOWEL: Postsurgical changes from partial colectomy, Gloria stump   creation and right midabdomen ileostomy. No bowel obstruction. Appendix   is surgically absent. No abdominal bowel wall thickening or mesenteric   edema to suggest inflammation.  PERITONEUM: No ascites. Peritoneal thickening and nodularity in the right   lower pelvis alongside a small bowel loop with a serosal implant   measuring approximately 1.7 x 2.7 cm, image [id number], similar to prior 1.6   x 2.5 cm. No associated bowel obstruction.  VESSELS: Mild atherosclerotic changes of the abdominal aorta. Celiac axis   and SMA patent. Portal vein, SMV and splenic vein patent. Left   retroaortic renal vein. 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  RETROPERITONEUM/LYMPH NODES: No lymphadenopathy.  ABDOMINAL WALL: Midline postsurgical changes. Unremarkable appearance of   the right mid abdomen ileostomy without inflammatory changes.  BONES: Mild degenerative changes. Lucency in T11 unchanged since prior   study, may represent a osseous hemangioma.    IMPRESSION:  No significant change when compared to prior study 6/13/2023. No acute   findings to suggest inflammatory or infectious process in the abdomen or   pelvis, no bowel obstruction.    Unchanged hepatic metastasis and peritoneal thickening with serosal   implant in the right lower pelvis and postsurgical changes from colectomy   and right abdominal ileostomy. No morphologic abnormality at the   ileostomy site.       HPI:    51 y/o M with a PMhx of colon cancer with metastases to the liver and lung on chemotherapy, presented with abdominal pain, loose/watery stools, dehydration, decreased appetite over the last 3 days. Last chemotherapy was on 7/5/23. No recent antibiotic use. In ED Reported chills, cramping abdominal pain (sharp abdominal pain has been chronic for years), nausea, dry heaving, vomiting (6 episodes of watery outpt). Denies fevers, chills, chest pain, SOB, abdominal pain, N/V, diarrhea/constipation.     7/10/2023- Seen in ED with Dr. Starks, alert, oriented, reports feeling much better. Reports the liquid and increased output from stoma especially 2-3 days after chemo, and if does not eat right. Denies any N/V, now, output still noted liquid but moderate amount now.     07/11/2023: Seen at bedside with attending Dr Starks, no acute distress, noted improved ostomy output, concerned about anticoagulation d/c plan if he should still be on Eliquis     07/13/2023- Seen at bedside, in no acute distress, reports normal stomal output. c/o right groin with some discomfort 2/2 to the angiogram, noted with mild area of ecchymosis and swelling. Left groin with no signs of bleeding or swelling.    PAST MEDICAL & SURGICAL HISTORY:    Cancer, colon  liver mets  S/P ileostomy  Metastasis to liver  Pulmonary embolism  S/P colon resection  H/O ileostomy    FAMILY HISTORY:    dementia (Mother)      MEDICATIONS  (STANDING):    dextrose 5% + sodium chloride 0.45% with potassium chloride 20 mEq/L 1000 milliLiter(s) (125 mL/Hr) IV Continuous <Continuous>  diphenoxylate/atropine 1 Tablet(s) Oral every 6 hours  gabapentin 100 milliGRAM(s) Oral every 8 hours  heparin  Infusion.  Unit(s)/Hr (16 mL/Hr) IV Continuous <Continuous>  lidocaine   4% Patch 1 Patch Transdermal daily  loperamide 2 milliGRAM(s) Oral at bedtime  loperamide 2 milliGRAM(s) Oral three times a day  naloxone Injectable 0.4 milliGRAM(s) IV Push once  psyllium Powder      psyllium Powder 1 Packet(s) Oral every 12 hours  sodium chloride 0.9%. 1000 milliLiter(s) (150 mL/Hr) IV Continuous <Continuous>    MEDICATIONS  (PRN):    acetaminophen     Tablet .. 650 milliGRAM(s) Oral every 6 hours PRN Temp greater or equal to 38C (100.4F), Mild Pain (1 - 3)  aluminum hydroxide/magnesium hydroxide/simethicone Suspension 30 milliLiter(s) Oral every 4 hours PRN Dyspepsia  heparin   Injectable 7000 Unit(s) IV Push every 6 hours PRN For aPTT less than 40  heparin   Injectable 3500 Unit(s) IV Push every 6 hours PRN For aPTT between 40 - 57  melatonin 3 milliGRAM(s) Oral at bedtime PRN Insomnia  morphine  - Injectable 2 milliGRAM(s) IV Push every 4 hours PRN Moderate Pain (4 - 6)  morphine  - Injectable 4 milliGRAM(s) IV Push every 4 hours PRN Severe Pain (7 - 10)  ondansetron Injectable 4 milliGRAM(s) IV Push every 8 hours PRN Nausea and/or Vomiting      ALLERGIES:    No Known Drug Allergies       REVIEW OF SYSTEMS:    Constitutional, Eyes, ENT, Cardiovascular, Respiratory, Gastrointestinal, Genitourinary, Musculoskeletal, Integumentary, Neurological, Psychiatric, Endocrine, Heme/Lymph and Allergic/Immunologic review of systems are otherwise negative except as noted in HPI.       VITALS:                          12.0   21.52 )-----------( 152      ( 13 Jul 2023 08:15 )             37.2   07-13    138  |  105  |  7   ----------------------------<  98  3.4<L>   |  26  |  0.76    Ca    8.8      13 Jul 2023 08:15  Phos  3.0     07-13  Mg     2.0     07-13          PHYSICAL:    Constitutional: no acute distress  Eyes: no conjunctival infection, anicteric  ENT: pharynx is unremarkable  Neck: supple without JVD  Pulmonary: clear to auscultation bilaterally, no dullness, no wheezing  Cardiac: RRR, normal S1S2   Vascular: no calf tenderness, venous stasis changes, varices; bilateral DP/PT pulses no signs of inadequate circulation to distal aspects   Abdomen: Right ileostomy bag with moisture noted.  Lymphatic: no peripheral adenopathy appreciated  Musculoskeletal: full range of motion and no deformities appreciated  Skin: normal appearance, no rash / erythema  Neurology: grossly intact.   Psychiatric: affect appropriate.       LABS:                                     12.0   21.52 )-----------( 152      ( 13 Jul 2023 08:15 )             37.2   07-13    138  |  105  |  7   ----------------------------<  98  3.4<L>   |  26  |  0.76    Ca    8.8      13 Jul 2023 08:15  Phos  3.0     07-13  Mg     2.0     07-13              RADIOLOGY & ADDITIONAL STUDIES:    EXAM:  CT ABDOMEN AND PELVIS IC       PROCEDURE DATE:  07/09/2023      INTERPRETATION:  CLINICAL INFORMATION: Abdominal pain    COMPARISON: CT chest abdomen pelvis 6/13/2023.    CONTRAST/COMPLICATIONS:  IV Contrast: Omnipaque 350  90 cc administered   10 cc discarded  Oral Contrast: NONE  Complications: None reported at time of study completion    PROCEDURE:  CT of the Abdomen and Pelvis was performed.  Sagittal and coronal reformats were performed.    FINDINGS:  LOWER CHEST: No pulmonary nodules visualized in the lung bases. No   consolidation..    LIVER: Stable known hepatic metastasis with calcifications, index lesions   in the left lobe 2.9 x 3.7 cm, previously 2.5 x 3.8 cm; in the right lobe   3.3 cm, unchanged. Unchanged 1.8 cm additional hypodensity in segment 8.  BILE DUCTS: Normal caliber.  GALLBLADDER: Within normal limits.  SPLEEN: Mild splenomegaly at 14.9 cm.  PANCREAS: Within normal limits.  ADRENALS: Within normal limits.  KIDNEYS/URETERS: Symmetric, homogeneous renal parenchymal enhancement.   Left mid pole renal scarring. No hydronephrosis.    BLADDER: Within normal limits.  REPRODUCTIVE ORGANS: Prostate within normal limits.    BOWEL: Postsurgical changes from partial colectomy, Gloria stump   creation and right midabdomen ileostomy. No bowel obstruction. Appendix   is surgically absent. No abdominal bowel wall thickening or mesenteric   edema to suggest inflammation.  PERITONEUM: No ascites. Peritoneal thickening and nodularity in the right   lower pelvis alongside a small bowel loop with a serosal implant   measuring approximately 1.7 x 2.7 cm, image [id number], similar to prior 1.6   x 2.5 cm. No associated bowel obstruction.  VESSELS: Mild atherosclerotic changes of the abdominal aorta. Celiac axis   and SMA patent. Portal vein, SMV and splenic vein patent. Left   retroaortic renal vein. 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  RETROPERITONEUM/LYMPH NODES: No lymphadenopathy.  ABDOMINAL WALL: Midline postsurgical changes. Unremarkable appearance of   the right mid abdomen ileostomy without inflammatory changes.  BONES: Mild degenerative changes. Lucency in T11 unchanged since prior   study, may represent a osseous hemangioma.    IMPRESSION:  No significant change when compared to prior study 6/13/2023. No acute   findings to suggest inflammatory or infectious process in the abdomen or   pelvis, no bowel obstruction.    Unchanged hepatic metastasis and peritoneal thickening with serosal   implant in the right lower pelvis and postsurgical changes from colectomy   and right abdominal ileostomy. No morphologic abnormality at the   ileostomy site.

## 2023-07-13 NOTE — PROGRESS NOTE ADULT - SUBJECTIVE AND OBJECTIVE BOX
Pt. seen and examined at bedside this morning. TOlerating diet, voiding freely. He denies fever, chest pain, SOB, nausea, vomiting. No acute events overnight.    PE:  AO x3, NAD  GENERAL: No acute distress, well-developed  HEAD:  Atraumatic, Normocephalic  CHEST/LUNG: normal effort, equal chest rise  ABDOMEN: Soft, mild tenderness RLQ, non-distended, ileostomy with clear/yellow stool with prolapse. B/l groin no hematoma or ecchymosis  Ext. no cyanosis or edema, warm, RLE: femoral, DP/PT pulses palpable, LLE: DP palpable/Dopplerable, PT dopplerable, femoral 1+palpable, dopplerable  NEUROLOGY: responding appropriately, no focal deficits

## 2023-07-13 NOTE — BH CONSULTATION LIAISON ASSESSMENT NOTE - SUMMARY
Patient is a 52 year old male, single, no dependents, domiciled with 79 y/o mother and brother in private residence, unemployed-on disability was previously a . No PPHx. No SHIIP. No inpatient hospitalizations. No NSSIB. PMHx of colon cancer with mets to the liver and lung on chemotherapy, PE presented with abdominal pain. Psychiatry consulted for suicidal/homicidal ideation.    Patient is calm and cooperative, mildly irritable; linear, organized. Reports that he is not suicidal, does not want to die, but is just "existing" has no intent or plan to kill himself because his "body is doing that for him." Reports his suicidal statement to SW was a bad joke, stating, "I made bad jokes and these people in the hospital take everything so seriously. I'm not going to kill myself or anyone else. My body is doing that for me." He reports excessive frustration with his life circumstances, not being able to work, not being  or have a family, and "walking around with a shit bag" (has colostomy.) He also reports his mother as his number one stressor as she is 79 y/o with dementia, drives her car around and gets lost all the time, he worries she will get into an accident and kill someone or herself, as well as her chronic alcohol use, he feels constantly stressed by her and think she needs to reside in an assisted living. However he reports his sister the HCP, she lives in [place] and is not invested because she "has her own perfect life, family and house." He reports a good relationship with his brother, but doesn't see him much because he works and comes home and goes to sleep. He reports statements about throwing mother down the stairs was said as bad joke, stating, "me and my brother say that to my sister when she is driving us up the wall, then my sister will come for a week or two to give us a break from her and then she leaves again. I would never hurt my mother but she needs to live somewhere else." Patient reports he epifanio with stressors by walking around his neighborhood and sitting in the park all day, stating, "I go to the park to escape during the day, they have 24 hour public bathrooms where I can empty this bag. When I go home if my sister doesn't put my mother in assisted living I am going to pack up my stuff and live in my car by the park, it's not like I haven't done it before." Discussed with patient starting antidepressant medications, he declined at present and reported the only medication adjustment he needed was "stronger pain meds, I only get 5 mg of morphine which doesn't last and I need more oxy that's it." Reports good sleep and appetite. Denies depressed mood or anhedonia, hopelessness or suicidal ideation. Denies manic / psychotic symptoms. Patient has no presence of thought disorder. No delusions elicited. Tolerating medications with no side effects; none observed.  Patient is a 52 year old male, single, no dependents, domiciled with 79 y/o mother and brother in private residence, unemployed-on disability was previously a . No PPHx. No SHIIP. No inpatient hospitalizations. No NSSIB. PMHx of colon cancer with mets to the liver and lung on chemotherapy, PE presented with abdominal pain. Psychiatry consulted for suicidal/homicidal ideation.    Patient is calm and cooperative, mildly irritable; linear, organized. Reports that he is not suicidal, does not want to die, but is just "existing" has no intent or plan to kill himself because his "body is doing that for him." Reports his suicidal statement to SW was a bad joke, stating, "I made bad jokes and these people in the hospital take everything so seriously. I'm not going to kill myself or anyone else. My body is doing that for me." He reports excessive frustration with his life circumstances, not being able to work, not being  or have a family, and "walking around with a shit bag" (has colostomy.) He also reports his mother as his number one stressor as she is 79 y/o with dementia, drives her car around and gets lost all the time, he worries she will get into an accident and kill someone or herself, as well as her chronic alcohol use, he feels constantly stressed by her and think she needs to reside in an assisted living. However he reports his sister the HCP, she lives in [place] and is not invested because she "has her own perfect life, family and house." He reports a good relationship with his brother, but doesn't see him much because he works and comes home and goes to sleep. He reports statements about throwing mother down the stairs was said as bad joke, stating, "me and my brother say that to my sister when she is driving us up the wall, then my sister will come for a week or two to give us a break from her and then she leaves again. I would never hurt my mother but she needs to live somewhere else." Patient reports he epifanio with stressors by walking around his neighborhood and sitting in the park all day, stating, "I go to the park to escape during the day, they have 24 hour public bathrooms where I can empty this bag. When I go home if my sister doesn't put my mother in assisted living I am going to pack up my stuff and live in my car by the park, it's not like I haven't done it before." Discussed with patient starting antidepressant medications, he declined at present and reported the only medication adjustment he needed was "stronger pain meds, I only get 5 mg of morphine which doesn't last and I need more oxy that's it." Reports good sleep and appetite. Denies depressed mood or anhedonia, hopelessness or suicidal ideation. Denies manic / psychotic symptoms. Patient has no presence of thought disorder. No delusions elicited. Tolerating medications with no side effects; none observed.     Plan:    1) Would recommend low dose SSRI, Lexapro 5 mg if patient agrees  2) Psychiatry signing off

## 2023-07-13 NOTE — BH CONSULTATION LIAISON ASSESSMENT NOTE - NSBHCHARTREVIEWVS_PSY_A_CORE FT
Vital Signs Last 24 Hrs  T(C): 36.8 (13 Jul 2023 08:10), Max: 37.4 (12 Jul 2023 23:15)  T(F): 98.2 (13 Jul 2023 08:10), Max: 99.3 (12 Jul 2023 23:15)  HR: 85 (13 Jul 2023 08:10) (74 - 85)  BP: 126/88 (13 Jul 2023 08:10) (116/69 - 128/85)  BP(mean): --  RR: 18 (13 Jul 2023 08:10) (18 - 18)  SpO2: 98% (13 Jul 2023 08:10) (98% - 100%)    Parameters below as of 13 Jul 2023 08:10  Patient On (Oxygen Delivery Method): room air

## 2023-07-13 NOTE — PROGRESS NOTE ADULT - ASSESSMENT
53 yo M with CT a/p finding of Left external iliac artery occlusion with palpable femoral and DP pulses. 7/10 underwent LLE angiogram, unable to traverse lesion.    P:  Pain control PRN  Regular diet  Heparin drip, PTT q 6 hrs, goal 60-90, therapeutic  Echo EF 55-60%  Hematology recs  Follow read of VIDAL/PVR  Rest of management as per primary team    To be discussed with Dr. Taylor.  51 yo M with CT a/p finding of Left external iliac artery occlusion with palpable femoral and DP pulses. 7/10 underwent LLE angiogram, unable to traverse lesion.    P:  Pain control PRN  Regular diet  Heparin drip, PTT q 6 hrs, goal 60-90, therapeutic  Echo EF 55-60%  Hematology recs  Follow read of VIDAL/PVR  Rest of management as per primary team    Discussed with Dr. Taylor.  53 yo M with CT a/p finding of Left external iliac artery occlusion with palpable femoral and DP pulses. 7/10 underwent LLE angiogram, unable to traverse lesion.    P:  Pain control PRN  Regular diet  Heparin drip, PTT q 6 hrs, goal 60-90, therapeutic  Echo EF 55-60%  Hematology recs  VIDAL/PVR reviewed, given the patient's life expentancy there is no urgent surgical intervention indicated at this time  Rest of management as per primary team  Vascular surgery Will sign off, please call with any questions    Discussed with Dr. Taylor.

## 2023-07-13 NOTE — BH CONSULTATION LIAISON ASSESSMENT NOTE - RISK ASSESSMENT
LOW RISK     ACUTE RISK FACTORS: Acute medical condition     CHRONIC RISK FACTORS: Familial stressors, financial instability    PROTECTIVE FACTORS: No suicide attempts, no violence history, medication compliance, no access to guns, no global insomnia, no substance abuse, supportive family, willingness to seek help, no suicidal ideation or homicidal ideation, hopefulness for future.

## 2023-07-13 NOTE — BH CONSULTATION LIAISON ASSESSMENT NOTE - NSBHCHARTREVIEWINVESTIGATE_PSY_A_CORE FT
Ventricular Rate 63 BPM    Atrial Rate 63 BPM    P-R Interval 162 ms    QRS Duration 86 ms    Q-T Interval 410 ms    QTC Calculation(Bazett) 419 ms    P Axis 43 degrees    R Axis 73 degrees    T Axis 45 degrees    Diagnosis Line Normal sinus rhythm  Low voltage QRS  Septal infarct (cited on or before 17-JUN-2022)  Abnormal ECG  When compared with ECG of 15-MAR-2023 14:33,  Vent. rate has decreased BY  42 BPM  Left posterior fascicular block is no longer Present

## 2023-07-13 NOTE — BH CONSULTATION LIAISON ASSESSMENT NOTE - PATIENT'S CHIEF COMPLAINT
"I made bad jokes and these people in the hospital take everything so seriously. I'm not going to kill myself or anyone else. My body is doing that for me."

## 2023-07-13 NOTE — PROGRESS NOTE ADULT - ASSESSMENT
53 yo M presented with high output ileostomy. On bulk forming agents.    P:  Pain control  Continue Metamucil, Loperamide  Please record ileostomy output  I/Os  Oral hydration  Rest of management as per primary team    To be discussed with CRS attending.

## 2023-07-13 NOTE — BH CONSULTATION LIAISON ASSESSMENT NOTE - HPI (INCLUDE ILLNESS QUALITY, SEVERITY, DURATION, TIMING, CONTEXT, MODIFYING FACTORS, ASSOCIATED SIGNS AND SYMPTOMS)
Patient is a 52 year old male, single, no dependents, domiciled with 79 y/o mother and brother in private residence, unemployed-on disability was previously a . No PPHx. No SHIIP. No inpatient hospitalizations. No NSSIB. PMHx of colon cancer with mets to the liver and lung on chemotherapy, PE presented with abdominal pain. Psychiatry consulted for suicidal/homicidal ideation.    Patient is calm and cooperative, mildly irritable; linear, organized. Reports that he is not suicidal, does not want to die, but is just "existing" has no intent or plan to kill himself because his "body is doing that for him." Reports his suicidal statement to SW was a bad joke, stating, "I made bad jokes and these people in the hospital take everything so seriously. I'm not going to kill myself or anyone else. My body is doing that for me." He reports excessive frustration with his life circumstances, not being able to work, not being  or have a family, and "walking around with a shit bag" (has colostomy.) He also reports his mother as his number one stressor as she is 79 y/o with dementia, drives her car around and gets lost all the time, he worries she will get into an accident and kill someone or herself, as well as her chronic alcohol use, he feels constantly stressed by her and think she needs to reside in an assisted living. However he reports his sister the HCP, she lives in [place] and is not invested because she "has her own perfect life, family and house." He reports a good relationship with his brother, but doesn't see him much because he works and comes home and goes to sleep. He reports statements about throwing mother down the stairs was said as bad joke, stating, "me and my brother say that to my sister when she is driving us up the wall, then my sister will come for a week or two to give us a break from her and then she leaves again. I would never hurt my mother but she needs to live somewhere else." Patient reports he epifanio with stressors by walking around his neighborhood and sitting in the park all day, stating, "I go to the park to escape during the day, they have 24 hour public bathrooms where I can empty this bag. When I go home if my sister doesn't put my mother in assisted living I am going to pack up my stuff and live in my car by the park, it's not like I haven't done it before." Discussed with patient starting antidepressant medications, he declined at present and reported the only medication adjustment he needed was "stronger pain meds, I only get 5 mg of morphine which doesn't last and I need more oxy that's it." Reports good sleep and appetite. Denies depressed mood or anhedonia, hopelessness or suicidal ideation. Denies manic / psychotic symptoms. Patient has no presence of thought disorder. No delusions elicited. Tolerating medications with no side effects; none observed.

## 2023-07-13 NOTE — BH CONSULTATION LIAISON ASSESSMENT NOTE - NSBHATTESTAPPBILLTIME_PSY_A_CORE
I attest my time as MATEO is greater than 50% of the total combined time spent on qualifying patient care activities. I have reviewed and verified the documentation.

## 2023-07-13 NOTE — BH CONSULTATION LIAISON ASSESSMENT NOTE - NSBHCHARTREVIEWLAB_PSY_A_CORE FT
07-13    138  |  105  |  7   ----------------------------<  98  3.4<L>   |  26  |  0.76    Ca    8.8      13 Jul 2023 08:15  Phos  3.0     07-13  Mg     2.0     07-13                          12.0   21.52 )-----------( 152      ( 13 Jul 2023 08:15 )             37.2

## 2023-07-13 NOTE — PROGRESS NOTE ADULT - ASSESSMENT
51 y/o male with metastatic colon cancer on chemo, PE  admitted for:      1. Leukocytosis SIRS but  although thus far infectious workup negative  leukocytosis likely due to neulasta and dehydration   s/p Ciprofloxacin and Metronidazole in ER;   S/p  Zosyn  UA negative, pt without urinary symptoms   stool and GI PCR negative  blood culture NGTD   CT abd/pelvis - no infectious/inflammatory process   CTA; no PNA   WBC downtrending well now at 38K   ID f/u appreciated, abxs stopped, will monitor off     2. High output ileostomy : dehydration s/p  recent chemo  - Colorectal consult - Dr. Montiel appreciated  - Started on Loperamide and metamucil   - GI PCR and Cdiff neg   Monitor Output     3. Occlusion of left external iliac artery   - Pt on Eliquis for prior PE, non complaince?   - s/p LLE angio: unable to traverse lesion  - C/w heparin drip for now  - check Ct chest IV contrast: no PE   -heme/onc recs appreciated, Possible switch to Lovenox at DC  - F/u arterial dopplers as per vascular     4. Metastatic colon cancer  to the liver and lung on chemotherapy  on palliative chemotherapy:  initially FOLFOXIRI / sunny , after 10 cycles changed to FOLFIRI/ Bevacizumab.  Last chemotherapy 7/5/23 Had OnPro 7/8/23    - Heme/Onc recs appreciated     DVT ppx: heparin drip  Code status: Full code   Emergency contact: Kathleen Palladino (mother) [phone]  53 y/o male with metastatic colon cancer on chemo, PE  admitted for:      1. Leukocytosis SIRS, no source of infection identified,   workup negative  leukocytosis likely due to neulasta and dehydration   s/p Ciprofloxacin and Metronidazole in ER;   S/p  Zosyn, now off   UA negative, pt without urinary symptoms   stool and GI PCR negative  blood culture NGTD   CT abd/pelvis - no infectious/inflammatory process   CTA; no PNA   WBC downtrending   ID f/u appreciated, abxs stopped, will monitor off     2. High output ileostomy : dehydration s/p  recent chemo  - Colorectal consult - Dr. Montiel appreciated  - Started on Loperamide and metamucil, lomotil was added   - GI PCR and Cdiff neg   Monitor Output     3. Occlusion of left external iliac artery   - Pt on Eliquis for prior PE, non compliance vs failure   - s/p LLE angio: unable to traverse lesion  - d/c  heparin drip start Lovenox SQ Tx dose as d/w heme onc   - check Ct chest IV contrast: no PE   - arterial dopplers reviewed, no intervention as per vascular team   -Pain meds adjusted,  monitor     4. Metastatic colon cancer  to the liver and lung on chemotherapy  on palliative chemotherapy:  initially FOLFOXIRI / sunny , after 10 cycles changed to FOLFIRI/ Bevacizumab.  Last chemotherapy 7/5/23 Had OnPro 7/8/23    - Heme/Onc recs appreciated     5. Anxiety  Reported frustration and suicidal thoughts to SW  Was evaluated by psych, I d/w NP, Pt is not currently suicidal/ homicidal does not need 1:1   Pt was offered Anti depressant to be declined  but he declined     DVT ppx: heparin drip, switch to Lovenox      Code status: Full code       Dispo;  RN to educate Pt to self inject lovenox. Pain control, LAbs in am. D/c planning

## 2023-07-13 NOTE — BH CONSULTATION LIAISON ASSESSMENT NOTE - CURRENT MEDICATION
MEDICATIONS  (STANDING):  dextrose 5% + sodium chloride 0.45% with potassium chloride 20 mEq/L 1000 milliLiter(s) (125 mL/Hr) IV Continuous <Continuous>  diphenoxylate/atropine 1 Tablet(s) Oral every 6 hours  gabapentin 100 milliGRAM(s) Oral every 8 hours  heparin  Infusion.  Unit(s)/Hr (16 mL/Hr) IV Continuous <Continuous>  lidocaine   4% Patch 1 Patch Transdermal daily  loperamide 2 milliGRAM(s) Oral at bedtime  loperamide 2 milliGRAM(s) Oral three times a day  naloxone Injectable 0.4 milliGRAM(s) IV Push once  psyllium Powder      psyllium Powder 1 Packet(s) Oral every 12 hours  sodium chloride 0.9%. 1000 milliLiter(s) (150 mL/Hr) IV Continuous <Continuous>    MEDICATIONS  (PRN):  acetaminophen     Tablet .. 650 milliGRAM(s) Oral every 6 hours PRN Temp greater or equal to 38C (100.4F), Mild Pain (1 - 3)  aluminum hydroxide/magnesium hydroxide/simethicone Suspension 30 milliLiter(s) Oral every 4 hours PRN Dyspepsia  heparin   Injectable 7000 Unit(s) IV Push every 6 hours PRN For aPTT less than 40  heparin   Injectable 3500 Unit(s) IV Push every 6 hours PRN For aPTT between 40 - 57  melatonin 3 milliGRAM(s) Oral at bedtime PRN Insomnia  morphine  - Injectable 2 milliGRAM(s) IV Push every 4 hours PRN Moderate Pain (4 - 6)  morphine  - Injectable 4 milliGRAM(s) IV Push every 4 hours PRN Severe Pain (7 - 10)  ondansetron Injectable 4 milliGRAM(s) IV Push every 8 hours PRN Nausea and/or Vomiting

## 2023-07-13 NOTE — PROGRESS NOTE ADULT - SUBJECTIVE AND OBJECTIVE BOX
CC: Abdominal pain (12 Jul 2023 08:59)    HPI:  53 y/o M with PMH colon cancer with metastases to the liver and lung on chemotherapy, PE presented with abdominal pain. Pt reports loose/watery stools, dehydration, decreased appetite over the last 3 days. His last chemotherapy was on Wednesday. No recent antibiotic use. Reports chills, cramping abdominal pain (sharp abdominal pain has been chronic for years), nausea, dry heaving, vomiting (6 episodes of watery outpt). Denies fevers, chills, chest pain, SOB, abdominal pain, N/V, diarrhea/constipation.     Prior admission:   - 4/19/23: Intestinal stoma prolapse     ER course: HR . Labs: .35 -> 112.01, lactate 2.4 -> 1.4, Na 134, glucose 113, alkaline phosphatase 191. UA: negative. COVID and RVP negative.     Imaging:  - CXR: port right chest wall, no consolidation, no effusion, no pneumothorax (personally reviewed.    - CT abd/pelvis: No significant change when compared to prior study 6/13/2023. No acute findings to suggest inflammatory or infectious process in the abdomen or pelvis, no bowel obstruction. Unchanged hepatic metastasis and peritoneal thickening with serosal implant in the right lower pelvis and postsurgical changes from colectomy and right abdominal ileostomy. No morphologic abnormality at the ileostomy site. Occlusion of left external iliac artery.     Pt was given Ciprofloxacin and Metronidazole, 3L of NS, pepcid, morphine, zofran. He is being admitted to med/surg (1N) for further management.       INTERVAL HPI/ OVERNIGHT EVENTS: Pt was seen and examined     Vital Signs Last 24 Hrs  T(C): 37.1 (13 Jul 2023 15:47), Max: 37.4 (12 Jul 2023 23:15)  T(F): 98.8 (13 Jul 2023 15:47), Max: 99.3 (12 Jul 2023 23:15)  HR: 73 (13 Jul 2023 15:47) (73 - 85)  BP: 139/78 (13 Jul 2023 15:47) (116/69 - 139/78)  RR: 18 (13 Jul 2023 15:47) (18 - 18)  SpO2: 100% (13 Jul 2023 15:47) (98% - 100%)    Parameters below as of 13 Jul 2023 15:47  Patient On (Oxygen Delivery Method): room air        REVIEW OF SYSTEMS:  All other review of systems is negative unless indicated above.      PHYSICAL EXAM:  General: Well developed; in no acute distress  Eyes: EOMI; conjunctiva and sclera clear  Head: Normocephalic; atraumatic  ENMT: No nasal discharge; airway clear  Neck: Supple; non tender; no masses  Respiratory: No wheezes, rales or rhonchi  Cardiovascular: Regular rate and rhythm. S1 and S2 Normal;  Gastrointestinal: Soft non-tender non-distended; Normal bowel sounds, ileostomy with liquid stool in place   Genitourinary: No  suprapubic  tenderness  Extremities: Normal range of motion, No edema, some R calf tenderness   Vascular: Peripheral DP pulses palpable   Neurological: Alert and oriented x 3, non focal   Skin: Warm and dry. No acute rash  Musculoskeletal: Normal muscle tone, without deformities  Psychiatric: Cooperative and appropriate      LABS:                           12.3   38.38 )-----------( 143      ( 12 Jul 2023 08:23 )             38.0     11 Jul 2023 07:20    140    |  110    |  6      ----------------------------<  117    4.1     |  23     |  0.81     Ca    8.6        11 Jul 2023 07:20  Phos  3.3       11 Jul 2023 07:20  Mg     2.3       11 Jul 2023 07:20      PT/INR - ( 11 Jul 2023 07:20 )   PT: 12.6 sec;   INR: 1.09 ratio    PTT - ( 12 Jul 2023 08:23 )  PTT:89.7 sec        MEDICATIONS  (STANDING):  dextrose 5% + sodium chloride 0.45% with potassium chloride 20 mEq/L 1000 milliLiter(s) (125 mL/Hr) IV Continuous <Continuous>  gabapentin 100 milliGRAM(s) Oral every 8 hours  heparin  Infusion.  Unit(s)/Hr (16 mL/Hr) IV Continuous <Continuous>  lidocaine   4% Patch 1 Patch Transdermal daily  loperamide 2 milliGRAM(s) Oral every 6 hours  loperamide      naloxone Injectable 0.4 milliGRAM(s) IV Push once  psyllium Powder      psyllium Powder 1 Packet(s) Oral every 12 hours  sodium chloride 0.9%. 1000 milliLiter(s) (150 mL/Hr) IV Continuous <Continuous>    MEDICATIONS  (PRN):  acetaminophen     Tablet .. 650 milliGRAM(s) Oral every 6 hours PRN Temp greater or equal to 38C (100.4F), Mild Pain (1 - 3)  aluminum hydroxide/magnesium hydroxide/simethicone Suspension 30 milliLiter(s) Oral every 4 hours PRN Dyspepsia  heparin   Injectable 7000 Unit(s) IV Push every 6 hours PRN For aPTT less than 40  heparin   Injectable 3500 Unit(s) IV Push every 6 hours PRN For aPTT between 40 - 57  melatonin 3 milliGRAM(s) Oral at bedtime PRN Insomnia  morphine  - Injectable 2 milliGRAM(s) IV Push every 4 hours PRN Moderate Pain (4 - 6)  morphine  - Injectable 4 milliGRAM(s) IV Push every 4 hours PRN Severe Pain (7 - 10)  ondansetron Injectable 4 milliGRAM(s) IV Push every 8 hours PRN Nausea and/or Vomiting      RADIOLOGY & ADDITIONAL TESTS:          ACC: [id number] EXAM:  CT ANGIO ABD AOR W RUN(W)AW IC   ORDERED BY: TINO SANDOVAL     PROCEDURE DATE:  07/10/2023          INTERPRETATION:  CT ANGIOGRAM ABDOMEN, PELVIS, AND LOWER EXTREMITIES:    CT ANGIO ABDOMINAL AORTA RUNOFF    CLINICAL INFORMATION:  r/o LLE ischemia    TECHNIQUE:    CONTRAST/COMPLICATIONS:  IV Contrast: Omnipaque 350  125 cc administered   25 cc discarded  Oral Contrast: NONE  Complications: None reported at time of study completion    PROCEDURE:  Initially, nonenhanced CT was obtained through the calves. Then,   following the rapid administration of intravenous contrast, CT   angiography was performed through the abdomen, pelvis, and lower   extremities down to the toes.  Delayed images through the calves were   also obtained. Sagittal and coronal reformats as well as 3D   reconstructions were performed.    Volume rendered and MIP images for CT angiographic display are created   from source data on an independent 3-D workstation and archived into   PACS. This study was performed using automatic exposure control   (radiation dose reduction software) to obtain a diagnostic image quality   scan with patient dose as low as reasonably achievable.    COMPARISON: CT abdomen pelvis 7/9/2023.    FINDINGS:    ABDOMINAL AORTA: Normal caliber of the abdominal aorta, 1.9 cm   suprarenal, 1.7 cm infrarenal, 1.6 cm at the bifurcation.    Celiac: Patent, no stenosis.  SMA: Patent, no stenosis.  ANYA: Patent, no stenosis  Renal Arteries: Duplicated left renal artery, no stenosis. Single right   renal artery, no stenosis    RIGHT LEG:    Common Iliac artery: Mild atherosclerosis. No high-grade stenosis or   occlusion.  External Iliac: Mild atherosclerotic disease, no high-grade stenosis or   occlusion.  Internal Iliac: Moderate atherosclerotic disease mild stenosis at the   origin. Patent.    Common femoral: Patent, no stenosis or  SFA: Patent, mild atherosclerotic plaques, no stenosis.  Profunda: Patent, normal    Popliteal: Patent, no stenosis or occlusion  Anterior Tibial (AT): Patent.  Tibioperoneal trunk (TPT): Patent, no stenosis or occlusion.  Posterior Tibial (PT): Patent, no stenosis.  Peroneal: Patent to the level of the ankle  Dorsalis Pedis (DP): Patent  Plantar: Patent    LEFT LEG:    Common Iliac artery: Patent, moderate atherosclerotic changes  External Iliac: Occluded  Internal Iliac: Patent, high-grade stenosis at the origin, moderate   atherosclerotic changes.    Common femoral: Recanalized and reconstituted common femoral artery   through collaterals from the left inferior epigastric artery.  SFA: Patent, mild atherosclerotic changes, no stenosis or occlusion.  Profunda: Normal    Popliteal: Patent, mild atherosclerotic plaques, no significant stenosis.  AT: Patent, no stenosis or occlusion  TPT: Patent, no stenosis  PT: Patent in the proximal two thirds.  Peroneal: Patent, no significant stenosis.  DP: Patent  Plantar: Not well opacified      ADDITIONAL FINDINGS:  LOWER CHEST: Within normal limits.    LIVER: Bilateral metastatic lesions in the liver with calcification,   unchanged compared to prior study 7/9/2023.  BILE DUCTS: Normal caliber.  GALLBLADDER: Vicarious excretion of contrast.  SPLEEN: Mild splenomegaly  PANCREAS: Within normal limits.  ADRENALS: Within normal limits.  KIDNEYS/URETERS: No renal stones or hydronephrosis.    BLADDER: Distended, filled with contrast, within normal limits.  REPRODUCTIVE ORGANS: Prostate within normal limits.    BOWEL: Postsurgical changes from partial colectomy, Gloria stump   creation and right end ileostomy. No bowel obstruction. Appendix is   surgically absent.  PERITONEUM: No ascites. Unchanged peritoneal nodular thickening and   serosal implant within the right pelvis as described from prior study,   unchanged.  VESSELS: Aorta and pelvic arteries and described in detail above. Patent   portal vein. Patent renal veins. Left retroaortic renal vein.  RETROPERITONEUM/LYMPH NODES: No lymphadenopathy.  ABDOMINAL WALL: Within normal limits.  BONES: Within normal limits.    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 Two-vessel runoff in the left calf.  High grade stenosis at the origin of the left internal iliac artery.  Three-vessel arterial runoff of the right lower extremity.  Atherosclerotic disease in the abdomen as detailed above.      ACC: [id number] EXAM:  CT ANGIO CHEST PULM ART WAWI                          PROCEDURE DATE:  07/02/2022          INTERPRETATION:  Reason for Exam: Shortness of breath. chest pain.    CTA of the chest was performed from the thoracic inlet to the level of   the adrenal glands following IV contrast injection of  80 cc of Omnipaque   350. No immediate complications were reported.  MIP images were also   created and reviewed.    Comparison: CT abdomen and pelvis dated May 25, 2022    Tubes/Lines: Right Mediport catheter tip in SVC.    Mediastinum and Heart: Aorta and pulmonary arteries are normal in size.   Moderate stenosis of the proximal left subclavian artery from   atherosclerotic plaque. Thyroid gland is unremarkable. No   lymphadenopathy.No pericardial effusion.    Lungs, Pleura, and Airways: There is no pulmonary embolus. No   consolidations, edema or effusion. Small left pneumothorax noted. Right   lower lobe 4 mm nodule in series 4 image 289. Left apical 4 mm nodule on   image 57.    Visualized Abdomen: Multiple calcified masses in the liver, without   significant change since prior.    Bones and soft tissues: Unremarkable.    IMPRESSION:    No pulmonary embolus.    Small left pneumothorax without mediastinal shift.    Remaining findings as above.       CC: Abdominal pain     HPI:  51 y/o M with PMH colon cancer with metastases to the liver and lung on chemotherapy, PE presented with abdominal pain. Pt reports loose/watery stools, dehydration, decreased appetite over the last 3 days. His last chemotherapy was on Wednesday. No recent antibiotic use. Reports chills, cramping abdominal pain (sharp abdominal pain has been chronic for years), nausea, dry heaving, vomiting (6 episodes of watery outpt). Denies fevers, chills, chest pain, SOB, abdominal pain, N/V, diarrhea/constipation.     Prior admission:   - 4/19/23: Intestinal stoma prolapse     ER course: HR . Labs: .35 -> 112.01, lactate 2.4 -> 1.4, Na 134, glucose 113, alkaline phosphatase 191. UA: negative. COVID and RVP negative.     Imaging:  - CXR: port right chest wall, no consolidation, no effusion, no pneumothorax (personally reviewed.    - CT abd/pelvis: No significant change when compared to prior study 6/13/2023. No acute findings to suggest inflammatory or infectious process in the abdomen or pelvis, no bowel obstruction. Unchanged hepatic metastasis and peritoneal thickening with serosal implant in the right lower pelvis and postsurgical changes from colectomy and right abdominal ileostomy. No morphologic abnormality at the ileostomy site. Occlusion of left external iliac artery.     Pt was given Ciprofloxacin and Metronidazole, 3L of NS, pepcid, morphine, zofran. He is being admitted to med/surg (1N) for further management.       INTERVAL HPI/ OVERNIGHT EVENTS: Pt was seen and examined, reports pan is little better with Po Oxy, Had R groin pain which is better today.  States that R groin was evaluated by vascular team, has small hematoma related to procedure. Reports Ok oral intake, ostomy output improved.  Results and further plan discussed     Vital Signs Last 24 Hrs  T(C): 37.1 (13 Jul 2023 15:47), Max: 37.4 (12 Jul 2023 23:15)  T(F): 98.8 (13 Jul 2023 15:47), Max: 99.3 (12 Jul 2023 23:15)  HR: 73 (13 Jul 2023 15:47) (73 - 85)  BP: 139/78 (13 Jul 2023 15:47) (116/69 - 139/78)  RR: 18 (13 Jul 2023 15:47) (18 - 18)  SpO2: 100% (13 Jul 2023 15:47) (98% - 100%)    Parameters below as of 13 Jul 2023 15:47  Patient On (Oxygen Delivery Method): room air        REVIEW OF SYSTEMS:  All other review of systems is negative unless indicated above.      PHYSICAL EXAM:  General: Well developed; in no acute distress  Eyes: EOMI; conjunctiva and sclera clear  Head: Normocephalic; atraumatic  ENMT: No nasal discharge; airway clear  Neck: Supple; non tender; no masses  Respiratory: No wheezes, rales or rhonchi  Cardiovascular: Regular rate and rhythm. S1 and S2 Normal;  Gastrointestinal: Soft non-tender non-distended; Normal bowel sounds, ileostomy  in place   Genitourinary: No  suprapubic  tenderness  Extremities: Normal range of motion, No edema, some R calf tenderness   Vascular: Peripheral DP pulses not  palpable, R groin small hematoma, tenderness to palpation    Neurological: Alert and oriented x 3, non focal   Skin: Warm and dry. No acute rash  Musculoskeletal: Normal muscle tone, without deformities  Psychiatric: Cooperative and appropriate      LABS:                         12.0   21.52 )-----------( 152      ( 13 Jul 2023 08:15 )             37.2     07-13    138  |  105  |  7   ----------------------------<  98  3.4<L>   |  26  |  0.76    Ca    8.8      13 Jul 2023 08:15  Phos  3.0     07-13  Mg     2.0     07-13    PTT - ( 13 Jul 2023 08:15 )  PTT:82.1 sec  Urinalysis Basic - ( 13 Jul 2023 08:15 )                            12.3   38.38 )-----------( 143      ( 12 Jul 2023 08:23 )             38.0     11 Jul 2023 07:20    140    |  110    |  6      ----------------------------<  117    4.1     |  23     |  0.81     Ca    8.6        11 Jul 2023 07:20  Phos  3.3       11 Jul 2023 07:20  Mg     2.3       11 Jul 2023 07:20      PT/INR - ( 11 Jul 2023 07:20 )   PT: 12.6 sec;   INR: 1.09 ratio    PTT - ( 12 Jul 2023 08:23 )  PTT:89.7 sec        MEDICATIONS  (STANDING):  dextrose 5% + sodium chloride 0.45% with potassium chloride 20 mEq/L 1000 milliLiter(s) (125 mL/Hr) IV Continuous <Continuous>  gabapentin 100 milliGRAM(s) Oral every 8 hours  heparin  Infusion.  Unit(s)/Hr (16 mL/Hr) IV Continuous <Continuous>  lidocaine   4% Patch 1 Patch Transdermal daily  loperamide 2 milliGRAM(s) Oral every 6 hours  loperamide      naloxone Injectable 0.4 milliGRAM(s) IV Push once  psyllium Powder      psyllium Powder 1 Packet(s) Oral every 12 hours  sodium chloride 0.9%. 1000 milliLiter(s) (150 mL/Hr) IV Continuous <Continuous>    MEDICATIONS  (PRN):  acetaminophen     Tablet .. 650 milliGRAM(s) Oral every 6 hours PRN Temp greater or equal to 38C (100.4F), Mild Pain (1 - 3)  aluminum hydroxide/magnesium hydroxide/simethicone Suspension 30 milliLiter(s) Oral every 4 hours PRN Dyspepsia  heparin   Injectable 7000 Unit(s) IV Push every 6 hours PRN For aPTT less than 40  heparin   Injectable 3500 Unit(s) IV Push every 6 hours PRN For aPTT between 40 - 57  melatonin 3 milliGRAM(s) Oral at bedtime PRN Insomnia  morphine  - Injectable 2 milliGRAM(s) IV Push every 4 hours PRN Moderate Pain (4 - 6)  morphine  - Injectable 4 milliGRAM(s) IV Push every 4 hours PRN Severe Pain (7 - 10)  ondansetron Injectable 4 milliGRAM(s) IV Push every 8 hours PRN Nausea and/or Vomiting      RADIOLOGY & ADDITIONAL TESTS:          ACC: [id number] EXAM:  CT ANGIO ABD AOR W RUN(W)AW IC   ORDERED BY: TINO SANDOVAL     PROCEDURE DATE:  07/10/2023          INTERPRETATION:  CT ANGIOGRAM ABDOMEN, PELVIS, AND LOWER EXTREMITIES:    CT ANGIO ABDOMINAL AORTA RUNOFF    CLINICAL INFORMATION:  r/o LLE ischemia    TECHNIQUE:    CONTRAST/COMPLICATIONS:  IV Contrast: Omnipaque 350  125 cc administered   25 cc discarded  Oral Contrast: NONE  Complications: None reported at time of study completion    PROCEDURE:  Initially, nonenhanced CT was obtained through the calves. Then,   following the rapid administration of intravenous contrast, CT   angiography was performed through the abdomen, pelvis, and lower   extremities down to the toes.  Delayed images through the calves were   also obtained. Sagittal and coronal reformats as well as 3D   reconstructions were performed.    Volume rendered and MIP images for CT angiographic display are created   from source data on an independent 3-D workstation and archived into   PACS. This study was performed using automatic exposure control   (radiation dose reduction software) to obtain a diagnostic image quality   scan with patient dose as low as reasonably achievable.    COMPARISON: CT abdomen pelvis 7/9/2023.    FINDINGS:    ABDOMINAL AORTA: Normal caliber of the abdominal aorta, 1.9 cm   suprarenal, 1.7 cm infrarenal, 1.6 cm at the bifurcation.    Celiac: Patent, no stenosis.  SMA: Patent, no stenosis.  ANYA: Patent, no stenosis  Renal Arteries: Duplicated left renal artery, no stenosis. Single right   renal artery, no stenosis    RIGHT LEG:    Common Iliac artery: Mild atherosclerosis. No high-grade stenosis or   occlusion.  External Iliac: Mild atherosclerotic disease, no high-grade stenosis or   occlusion.  Internal Iliac: Moderate atherosclerotic disease mild stenosis at the   origin. Patent.    Common femoral: Patent, no stenosis or  SFA: Patent, mild atherosclerotic plaques, no stenosis.  Profunda: Patent, normal    Popliteal: Patent, no stenosis or occlusion  Anterior Tibial (AT): Patent.  Tibioperoneal trunk (TPT): Patent, no stenosis or occlusion.  Posterior Tibial (PT): Patent, no stenosis.  Peroneal: Patent to the level of the ankle  Dorsalis Pedis (DP): Patent  Plantar: Patent    LEFT LEG:    Common Iliac artery: Patent, moderate atherosclerotic changes  External Iliac: Occluded  Internal Iliac: Patent, high-grade stenosis at the origin, moderate   atherosclerotic changes.    Common femoral: Recanalized and reconstituted common femoral artery   through collaterals from the left inferior epigastric artery.  SFA: Patent, mild atherosclerotic changes, no stenosis or occlusion.  Profunda: Normal    Popliteal: Patent, mild atherosclerotic plaques, no significant stenosis.  AT: Patent, no stenosis or occlusion  TPT: Patent, no stenosis  PT: Patent in the proximal two thirds.  Peroneal: Patent, no significant stenosis.  DP: Patent  Plantar: Not well opacified      ADDITIONAL FINDINGS:  LOWER CHEST: Within normal limits.    LIVER: Bilateral metastatic lesions in the liver with calcification,   unchanged compared to prior study 7/9/2023.  BILE DUCTS: Normal caliber.  GALLBLADDER: Vicarious excretion of contrast.  SPLEEN: Mild splenomegaly  PANCREAS: Within normal limits.  ADRENALS: Within normal limits.  KIDNEYS/URETERS: No renal stones or hydronephrosis.    BLADDER: Distended, filled with contrast, within normal limits.  REPRODUCTIVE ORGANS: Prostate within normal limits.    BOWEL: Postsurgical changes from partial colectomy, Gloria stump   creation and right end ileostomy. No bowel obstruction. Appendix is   surgically absent.  PERITONEUM: No ascites. Unchanged peritoneal nodular thickening and   serosal implant within the right pelvis as described from prior study,   unchanged.  VESSELS: Aorta and pelvic arteries and described in detail above. Patent   portal vein. Patent renal veins. Left retroaortic renal vein.  RETROPERITONEUM/LYMPH NODES: No lymphadenopathy.  ABDOMINAL WALL: Within normal limits.  BONES: Within normal limits.    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 Two-vessel runoff in the left calf.  High grade stenosis at the origin of the left internal iliac artery.  Three-vessel arterial runoff of the right lower extremity.  Atherosclerotic disease in the abdomen as detailed above.      ACC: [id number] [facility]  [facility]                          PROCEDURE DATE:  07/02/2022          INTERPRETATION:  Reason for Exam: Shortness of breath. chest pain.    CTA of the chest was performed from the thoracic inlet to the level of   the adrenal glands following IV contrast injection of  80 cc of Omnipaque   350. No immediate complications were reported.  MIP images were also   created and reviewed.    Comparison: CT abdomen and pelvis dated May 25, 2022    Tubes/Lines: Right Mediport catheter tip in SVC.    Mediastinum and Heart: Aorta and pulmonary arteries are normal in size.   Moderate stenosis of the proximal left subclavian artery from   atherosclerotic plaque. Thyroid gland is unremarkable. No   lymphadenopathy.No pericardial effusion.    Lungs, Pleura, and Airways: There is no pulmonary embolus. No   consolidations, edema or effusion. Small left pneumothorax noted. Right   lower lobe 4 mm nodule in series 4 image 289. Left apical 4 mm nodule on   image 57.    Visualized Abdomen: Multiple calcified masses in the liver, without   significant change since prior.    Bones and soft tissues: Unremarkable.    IMPRESSION:    No pulmonary embolus.    Small left pneumothorax without mediastinal shift.    Remaining findings as above.

## 2023-07-13 NOTE — PROGRESS NOTE ADULT - ASSESSMENT
53 y/o M with MH significant for Stage IV colon cancer with metastases to the liver and lung, currently on chemotherapy, s/p Eend ileostomy and revision 04/2023 and subtotal colectomy for perforated cecal mass 2022 presented with abdominal pain, loose/watery stools, dehydration, decreased appetite over the last 3 days.      # Metastatic Colon CA with Liver & Lung Metastases, S/p Ileostomy     - Dx 05/2022 after going to ED for worsening abdominal pain  - 05/19/22 CT AP showed apparent focal mural thickening at the cecum/proximal ascending colon. The sigmoid colon appears to be tethered to the cecum and it is focally thick-walled; focal tumor invasion/extension from the cecum to the sigmoid colon. Mural thickening of the a dilated right lower quadrant small bowel loop with adjacent mesenteric edema for which associated small bowel ischemia cannot be excluded. Multiple hypodense lesions with calcifications in both lobes of the liver with the largest measuring 5.8 x 6.0 cm in hepatic segment.  - 05/19/22: underwent exploratory laparotomy, total colectomy, end ileostomy, biopsy of liver, and biopsy of retroperitoneum.    Pathology:  Omentum, excision: Negative for malignancy.   Portion of terminal ileum, cecum with adherent sigmoid, total colectomy: Poorly differentiated infiltrating adenocarcinoma measuring 9.0 cm. Adenocarcinoma infiltrates through the bowel wall and through the visceral peritoneum and infiltrates the adherent sigmoid colon 3 of 17 lymph nodes are positive for metastatic carcinoma. Lymphovascular space & Perineural invasion is identified. The surgical margins negative.   Appendix with serosal tumor implants and acute inflammation. Peritonitis. Liver, biopsy: Metastatic adenocarcinoma.   Retroperitoneal biopsy: Adenocarcinoma with necrosis. No loss of nuclear expression of MMR proteins (MLH1, MSH2, MSH6, and PMS2). Staging pT4b pN1b pM1c.    - 06/22/2022: Started FOLFOXIRI with Bevacizumab; last dose of FOLFIRI and Bevacizumab received on 01/04/2023; S/p oxaliplatin until C10  - 09/01/22 CT CAP: Segmental left lower lobe pulmonary embolus and probable smaller pulmonary emboli within the right lung. Interval decrease in size of bilateral pulmonary nodules and hepatic metastases compared to 05/25/2022. No new sites of disease.  - 06/13/23 CT CAP: New, 8 mm right lower lobe nodule and enlarging pulmonary nodules, 7 mm left apical nodule previously measured 6 mm. Bilobar hepatic metastases, the majority of which are calcified and not significantly changed. A noncalcified lesion in the right hepatic lobe demonstrates mild interval growth, 1.7 x 1.4 cm previously 1.4 x 1.3 cm  - On chemo FOLFIRI, and avastin  most recent dose 07/05/2023  -  # Leukocytosis    - WBC count trending down  - S/p Onpro (Neulasta) 07/07/23 which is most likely the cause of spike in count  - High output ileostomy   - ID following  - R/o Cdiff   - Currently receiving abx, but not likely necessary in absence of infectious process   - CT imaging with no acute findings to suggest inflammatory or infectious process in the abdomen or pelvis, no bowel obstruction.  - Continue to trend serial CBCs  - Monitor temp  - Continue supportive measures       # LLE Ext Iliac Artery Occlusion    - Patient has been taking DOAC (Eliquis) for prior VTE in setting of metastatic disease--Developed arterial thrombosis on Eliquis. 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 R/o APS ( sent)  Bevacizumab also rarely can contribute to thrombotic risk .Recommend  to switch to Lovenox  for long term AC. D/w Dr Serrano.   -Etiology most likely 2/2 known metastatic malignancy with hx of missed doses/non compliance  - Can check Lupus/APLS profile, but will defer complete hypercoagulable workup to outpatient setting  - Patient did admit to missing doses here and there in the past, but has been more compliant recently.  - LE physical exam appropriate with no obvious acute signs of decreased perfusion to distal aspects warranting urgent intervention   -Vascular following--s/p 7/10 LLE angiogram, unable to traverse lesion->no urgent surgical intervention indicated at this time  - Per discussion with primary Onc Dr Serrano regarding AC change--rec to switch to Lovenox injections, patient aware of this, discussed in length, also made the RN aware for teaching needs.  - Currently receiving Heparin gtt- to be transitioned to Lovenox, d/w Dr. Yates.  - Continue supportive measures                53 y/o M with MH significant for Stage IV colon cancer with metastases to the liver and lung, currently on chemotherapy, s/p Eend ileostomy and revision 04/2023 and subtotal colectomy for perforated cecal mass 2022 presented with abdominal pain, loose/watery stools, dehydration, decreased appetite over the last 3 days.      # Metastatic Colon CA with Liver & Lung Metastases, S/p Ileostomy     - Dx 05/2022 after going to ED for worsening abdominal pain  - 05/19/22 CT AP showed apparent focal mural thickening at the cecum/proximal ascending colon. The sigmoid colon appears to be tethered to the cecum and it is focally thick-walled; focal tumor invasion/extension from the cecum to the sigmoid colon. Mural thickening of the a dilated right lower quadrant small bowel loop with adjacent mesenteric edema for which associated small bowel ischemia cannot be excluded. Multiple hypodense lesions with calcifications in both lobes of the liver with the largest measuring 5.8 x 6.0 cm in hepatic segment.  - 05/19/22: underwent exploratory laparotomy, total colectomy, end ileostomy, biopsy of liver, and biopsy of retroperitoneum.    Pathology:  Omentum, excision: Negative for malignancy.   Portion of terminal ileum, cecum with adherent sigmoid, total colectomy: Poorly differentiated infiltrating adenocarcinoma measuring 9.0 cm. Adenocarcinoma infiltrates through the bowel wall and through the visceral peritoneum and infiltrates the adherent sigmoid colon 3 of 17 lymph nodes are positive for metastatic carcinoma. Lymphovascular space & Perineural invasion is identified. The surgical margins negative.   Appendix with serosal tumor implants and acute inflammation. Peritonitis. Liver, biopsy: Metastatic adenocarcinoma.   Retroperitoneal biopsy: Adenocarcinoma with necrosis. No loss of nuclear expression of MMR proteins (MLH1, MSH2, MSH6, and PMS2). Staging pT4b pN1b pM1c.    - 06/22/2022: Started FOLFOXIRI with Bevacizumab; last dose of FOLFIRI and Bevacizumab received on 01/04/2023; S/p oxaliplatin until C10  - 09/01/22 CT CAP: Segmental left lower lobe pulmonary embolus and probable smaller pulmonary emboli within the right lung. Interval decrease in size of bilateral pulmonary nodules and hepatic metastases compared to 05/25/2022. No new sites of disease.  - 06/13/23 CT CAP: New, 8 mm right lower lobe nodule and enlarging pulmonary nodules, 7 mm left apical nodule previously measured 6 mm. Bilobar hepatic metastases, the majority of which are calcified and not significantly changed. A noncalcified lesion in the right hepatic lobe demonstrates mild interval growth, 1.7 x 1.4 cm previously 1.4 x 1.3 cm  - On chemo FOLFIRI, and avastin  most recent dose 07/05/2023  -  # Leukocytosis    - WBC count trending down  - S/p Onpro (Neulasta) 07/07/23 which is most likely the cause of spike in count  - High output ileostomy   - Cdiff negative , GI PCR panel negative    CT imaging with no acute findings to suggest inflammatory or infectious process in the abdomen or pelvis, no bowel obstruction.  - -    # LLE Ext Iliac Artery Occlusion    - Patient has been taking DOAC (Eliquis) for prior VTE in setting of metastatic disease--Developed arterial thrombosis on Eliquis. 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 R/o APS ( sent)  Bevacizumab also rarely can contribute to thrombotic risk .Recommend  to switch to Lovenox  for long term AC. D/w Dr Serrano.   -Etiology most likely 2/2 known metastatic malignancy with hx of missed doses/non compliance  - LAC negative  cardiolipin abs negative  - Patient did admit to missing doses here and there in the past, but has been more compliant recently.  - LE physical exam appropriate with no obvious acute signs of decreased perfusion to distal aspects warranting urgent intervention   -Vascular following--s/p 7/10 LLE angiogram, unable to traverse lesion->no urgent surgical intervention indicated at this time  - Per discussion with primary Onc Dr Serrano regarding AC change--rec to switch to Lovenox injections, patient aware of this, discussed in length, also made the RN aware for teaching needs.  - Currently receiving Heparin gtt- to be transitioned to Lovenox, d/w Dr. Ruiz.  - Continue supportive measures

## 2023-07-14 DIAGNOSIS — C18.9 MALIGNANT NEOPLASM OF COLON, UNSPECIFIED: ICD-10-CM

## 2023-07-14 LAB
ANION GAP SERPL CALC-SCNC: 4 MMOL/L — LOW (ref 5–17)
APTT BLD: 34.2 SEC — SIGNIFICANT CHANGE UP (ref 27.5–35.5)
BUN SERPL-MCNC: 10 MG/DL — SIGNIFICANT CHANGE UP (ref 7–23)
CALCIUM SERPL-MCNC: 8.9 MG/DL — SIGNIFICANT CHANGE UP (ref 8.5–10.1)
CHLORIDE SERPL-SCNC: 107 MMOL/L — SIGNIFICANT CHANGE UP (ref 96–108)
CO2 SERPL-SCNC: 24 MMOL/L — SIGNIFICANT CHANGE UP (ref 22–31)
CREAT SERPL-MCNC: 0.7 MG/DL — SIGNIFICANT CHANGE UP (ref 0.5–1.3)
EGFR: 112 ML/MIN/1.73M2 — SIGNIFICANT CHANGE UP
GLUCOSE SERPL-MCNC: 105 MG/DL — HIGH (ref 70–99)
HCT VFR BLD CALC: 35 % — LOW (ref 39–50)
HGB BLD-MCNC: 11.4 G/DL — LOW (ref 13–17)
MAGNESIUM SERPL-MCNC: 2.1 MG/DL — SIGNIFICANT CHANGE UP (ref 1.6–2.6)
MCHC RBC-ENTMCNC: 28.8 PG — SIGNIFICANT CHANGE UP (ref 27–34)
MCHC RBC-ENTMCNC: 32.6 GM/DL — SIGNIFICANT CHANGE UP (ref 32–36)
MCV RBC AUTO: 88.4 FL — SIGNIFICANT CHANGE UP (ref 80–100)
PLATELET # BLD AUTO: 163 K/UL — SIGNIFICANT CHANGE UP (ref 150–400)
POTASSIUM SERPL-MCNC: 4.5 MMOL/L — SIGNIFICANT CHANGE UP (ref 3.5–5.3)
POTASSIUM SERPL-SCNC: 4.5 MMOL/L — SIGNIFICANT CHANGE UP (ref 3.5–5.3)
RBC # BLD: 3.96 M/UL — LOW (ref 4.2–5.8)
RBC # FLD: 17.4 % — HIGH (ref 10.3–14.5)
SODIUM SERPL-SCNC: 135 MMOL/L — SIGNIFICANT CHANGE UP (ref 135–145)
WBC # BLD: 24.27 K/UL — HIGH (ref 3.8–10.5)
WBC # FLD AUTO: 24.27 K/UL — HIGH (ref 3.8–10.5)

## 2023-07-14 PROCEDURE — 93971 EXTREMITY STUDY: CPT | Mod: 26,RT

## 2023-07-14 PROCEDURE — 93926 LOWER EXTREMITY STUDY: CPT | Mod: 26,RT

## 2023-07-14 PROCEDURE — 99498 ADVNCD CARE PLAN ADDL 30 MIN: CPT

## 2023-07-14 PROCEDURE — 99223 1ST HOSP IP/OBS HIGH 75: CPT

## 2023-07-14 PROCEDURE — 99232 SBSQ HOSP IP/OBS MODERATE 35: CPT | Mod: GC

## 2023-07-14 PROCEDURE — 99233 SBSQ HOSP IP/OBS HIGH 50: CPT

## 2023-07-14 PROCEDURE — 93308 TTE F-UP OR LMTD: CPT | Mod: 26

## 2023-07-14 PROCEDURE — 99497 ADVNCD CARE PLAN 30 MIN: CPT | Mod: 25

## 2023-07-14 RX ORDER — LOPERAMIDE HCL 2 MG
4 TABLET ORAL THREE TIMES A DAY
Refills: 0 | Status: DISCONTINUED | OUTPATIENT
Start: 2023-07-14 | End: 2023-07-21

## 2023-07-14 RX ORDER — HYDROMORPHONE HYDROCHLORIDE 2 MG/ML
2 INJECTION INTRAMUSCULAR; INTRAVENOUS; SUBCUTANEOUS EVERY 4 HOURS
Refills: 0 | Status: DISCONTINUED | OUTPATIENT
Start: 2023-07-14 | End: 2023-07-17

## 2023-07-14 RX ORDER — FENTANYL CITRATE 50 UG/ML
1 INJECTION INTRAVENOUS
Refills: 0 | Status: DISCONTINUED | OUTPATIENT
Start: 2023-07-14 | End: 2023-07-17

## 2023-07-14 RX ORDER — LOPERAMIDE HCL 2 MG
4 TABLET ORAL AT BEDTIME
Refills: 0 | Status: DISCONTINUED | OUTPATIENT
Start: 2023-07-14 | End: 2023-07-14

## 2023-07-14 RX ORDER — HYDROMORPHONE HYDROCHLORIDE 2 MG/ML
4 INJECTION INTRAMUSCULAR; INTRAVENOUS; SUBCUTANEOUS EVERY 4 HOURS
Refills: 0 | Status: DISCONTINUED | OUTPATIENT
Start: 2023-07-14 | End: 2023-07-17

## 2023-07-14 RX ORDER — LOPERAMIDE HCL 2 MG
4 TABLET ORAL
Refills: 0 | Status: DISCONTINUED | OUTPATIENT
Start: 2023-07-14 | End: 2023-07-14

## 2023-07-14 RX ADMIN — HYDROMORPHONE HYDROCHLORIDE 4 MILLIGRAM(S): 2 INJECTION INTRAMUSCULAR; INTRAVENOUS; SUBCUTANEOUS at 14:38

## 2023-07-14 RX ADMIN — HYDROMORPHONE HYDROCHLORIDE 4 MILLIGRAM(S): 2 INJECTION INTRAMUSCULAR; INTRAVENOUS; SUBCUTANEOUS at 21:38

## 2023-07-14 RX ADMIN — Medication 4 MILLIGRAM(S): at 21:38

## 2023-07-14 RX ADMIN — HYDROMORPHONE HYDROCHLORIDE 1 MILLIGRAM(S): 2 INJECTION INTRAMUSCULAR; INTRAVENOUS; SUBCUTANEOUS at 09:25

## 2023-07-14 RX ADMIN — OXYCODONE HYDROCHLORIDE 10 MILLIGRAM(S): 5 TABLET ORAL at 05:08

## 2023-07-14 RX ADMIN — FENTANYL CITRATE 1 PATCH: 50 INJECTION INTRAVENOUS at 13:38

## 2023-07-14 RX ADMIN — Medication 1 PACKET(S): at 17:26

## 2023-07-14 RX ADMIN — Medication 1 TABLET(S): at 01:02

## 2023-07-14 RX ADMIN — Medication 2 MILLIGRAM(S): at 06:10

## 2023-07-14 RX ADMIN — ENOXAPARIN SODIUM 90 MILLIGRAM(S): 100 INJECTION SUBCUTANEOUS at 10:48

## 2023-07-14 RX ADMIN — HYDROMORPHONE HYDROCHLORIDE 4 MILLIGRAM(S): 2 INJECTION INTRAMUSCULAR; INTRAVENOUS; SUBCUTANEOUS at 11:52

## 2023-07-14 RX ADMIN — Medication 1 TABLET(S): at 17:27

## 2023-07-14 RX ADMIN — OXYCODONE HYDROCHLORIDE 10 MILLIGRAM(S): 5 TABLET ORAL at 07:29

## 2023-07-14 RX ADMIN — Medication 1 TABLET(S): at 06:10

## 2023-07-14 RX ADMIN — Medication 4 MILLIGRAM(S): at 11:48

## 2023-07-14 RX ADMIN — Medication 1 PACKET(S): at 06:10

## 2023-07-14 RX ADMIN — ENOXAPARIN SODIUM 90 MILLIGRAM(S): 100 INJECTION SUBCUTANEOUS at 21:39

## 2023-07-14 RX ADMIN — Medication 1 TABLET(S): at 11:48

## 2023-07-14 RX ADMIN — Medication 4 MILLIGRAM(S): at 17:26

## 2023-07-14 RX ADMIN — HYDROMORPHONE HYDROCHLORIDE 4 MILLIGRAM(S): 2 INJECTION INTRAMUSCULAR; INTRAVENOUS; SUBCUTANEOUS at 17:27

## 2023-07-14 RX ADMIN — HYDROMORPHONE HYDROCHLORIDE 1 MILLIGRAM(S): 2 INJECTION INTRAMUSCULAR; INTRAVENOUS; SUBCUTANEOUS at 07:41

## 2023-07-14 NOTE — PROGRESS NOTE ADULT - ASSESSMENT
51 yo M with CT a/p finding of Left external iliac artery occlusion with palpable femoral and DP pulses. 7/10 underwent LLE angiogram, unable to traverse lesion.  Adup: no pseudoaneurysm  Vdup: R common femoral vein thrombosis    P:  IR consult for IVC filter placement   Pain control PRN  Physical therapy   Rest of management as per primary team      Discussed with Dr. Taylor

## 2023-07-14 NOTE — CONSULT NOTE ADULT - ASSESSMENT
HPI: Pt is a 52y old Male with hx significant for colon cancer with metastases to the liver and lung on chemotherapy, presented with abdominal pain, loose/watery stools, dehydration, decreased appetite over the last 3 days. Last chemotherapy was on 7/5/23. No recent antibiotic use. Palliative medicine Consult  for symptom management.  7/14/23 Seen and examined at bedside with no family present. Pt C/O persistent RLQ abd pain with minimal relief from Oxycodone    Assessment and Plan:    1) Pain  -Persistent chronic abd pain  -R/T cancer  -Cont Dilaudid 2 mg PO PRN mod pain  -Cont Dilaudid 4 mg PO Q4H severe pain  -Change Dilaudid to 0.5 mg IVP Q4H PRN severe breakthrough pain  -Add Fentanyl 12 mcg/hr transderm Q 72 hrs    2) Colon Ca  -Metastatic disease to liver/lung  -Follows at [facility] with Dr Serrano  -Currently receiving chemo  -Imaging noted  -Onc eval    3) Debility  -Weakness  -Pain  -Cancer  -Supportive care    4) Advanced Directives  -Pt with capacity  -HCP naming his mother Karin on chart  -GOC at bedside   HPI: Pt is a 52y old Male with hx significant for colon cancer with metastases to the liver and lung on chemotherapy, presented with abdominal pain, loose/watery stools, dehydration, decreased appetite over the last 3 days. Last chemotherapy was on 7/5/23. No recent antibiotic use. Palliative medicine Consult  for symptom management.  7/14/23 Seen and examined at bedside with no family present. Pt C/O persistent RLQ abd pain with minimal relief from Oxycodone    Assessment and Plan:    1) Pain  -Persistent chronic abd pain  -R/T cancer  -Cont Dilaudid 2 mg PO PRN mod pain  -Cont Dilaudid 4 mg PO Q4H severe pain  -Change Dilaudid to 0.5 mg IVP Q4H PRN severe breakthrough pain  -Add Fentanyl 12 mcg/hr transderm Q 72 hrs    2) Colon Ca  -Metastatic disease to liver/lung  -Follows at [facility] with Dr Serrano  -Currently receiving chemo  -Imaging noted  -Onc eval      3) L fem artery occlusion  CT a/p finding of Left external iliac artery occlusion  -Palpable femoral and DP pulses  -underwent LLE angiogram, unable to traverse lesion.  -AC as per medicine  -Vascular eval noted      4) Debility  -Weakness  -Pain  -Cancer  -Supportive care    5) Advanced Directives  -Pt with capacity  -HCP naming his mother Karin on chart  -GOC at bedside

## 2023-07-14 NOTE — CONSULT NOTE ADULT - SUBJECTIVE AND OBJECTIVE BOX
HPI: Pt is a 52y old Male with hx significant for colon cancer with metastases to the liver and lung on chemotherapy, presented with abdominal pain, loose/watery stools, dehydration, decreased appetite over the last 3 days. Last chemotherapy was on 7/5/23. No recent antibiotic use. Palliative medicine Consult  for symptom management.  7/14/23 Seen and examined at bedside with no family present. Pt C/O persistent RLQ abd pain with minimal relief from Oxycodone    PAIN: ( X)Yes   ( )No  Level: Mod-severe  Location: RLQabd  Intensity:    5/10  Quality: sharp  Alleviating Factors:  Radiation: abd/groin  Impact on ADLs: mod-severe    DYSPNEA: ( ) Yes  (X ) No      PAST MEDICAL & SURGICAL HISTORY:  Cancer, colon  liver mets  S/P ileostomy  Metastasis to liver  Pulmonary embolism  S/P colon resection  H/O ileostomy      SOCIAL HX:  Lives with mother who has dementia and brother    Hx opiate tolerance ( )YES  ( )NO  On Oxycodone PTA    Baseline ADLs  (Prior to Admission)  (X ) Independent   ( )Dependent    FAMILY HISTORY:  FH: dementia (Mother)    Review of Systems:    Anxiety-mild-mod  Depression-yes  Physical Discomfort-mod-severe  Dyspnea-denies  Constipation-no  Diarrhea-yes  Anorexia-mod  Cough-denies    All other systems reviewed and negative      PHYSICAL EXAM:    Vital Signs Last 24 Hrs  T(C): 36.6 (14 Jul 2023 07:24), Max: 37.1 (13 Jul 2023 15:47)  T(F): 97.9 (14 Jul 2023 07:24), Max: 98.8 (13 Jul 2023 15:47)  HR: 68 (14 Jul 2023 07:24) (68 - 83)  BP: 132/82 (14 Jul 2023 07:24) (100/57 - 139/78)  RR: 18 (14 Jul 2023 07:24) (18 - 18)  SpO2: 100% (14 Jul 2023 07:24) (100% - 100%)    Parameters below as of 14 Jul 2023 07:24  Patient On (Oxygen Delivery Method): room air    PPSV2: 50 %    General: Middle aged male in NAD  Mental Status: A&O X3  HEENT: oral mucosa moist  Lungs: clear to auscultation olena  Cardiac: S1S2+  GI: abd soft NT ND + BS/ostomy with appliance  : voids  Ext:  CARL strength   Neuro: no focal def      LABS:                        11.4   24.27 )-----------( 163      ( 14 Jul 2023 07:05 )             35.0     07-14    135  |  107  |  10  ----------------------------<  105<H>  4.5   |  24  |  0.70    Ca    8.9      14 Jul 2023 07:05  Phos  3.0     07-13  Mg     2.1     07-14      PTT - ( 14 Jul 2023 07:05 )  PTT:34.2 sec  Albumin: Albumin: 3.0 g/dL (07-10 @ 06:04)      Allergies  No Known Drug Allergies (Unknown)    Intolerances      MEDICATIONS  (STANDING):  dextrose 5% + sodium chloride 0.45% with potassium chloride 20 mEq/L 1000 milliLiter(s) (125 mL/Hr) IV Continuous <Continuous>  diphenoxylate/atropine 1 Tablet(s) Oral every 6 hours  enoxaparin Injectable 90 milliGRAM(s) SubCutaneous every 12 hours  fentaNYL   Patch  12 MICROgram(s)/Hr 1 Patch Transdermal every 72 hours  gabapentin 100 milliGRAM(s) Oral every 8 hours  lidocaine   4% Patch 1 Patch Transdermal daily  loperamide 4 milliGRAM(s) Oral <User Schedule>  loperamide 4 milliGRAM(s) Oral at bedtime  naloxone Injectable 0.4 milliGRAM(s) IV Push once  psyllium Powder 1 Packet(s) Oral every 12 hours  psyllium Powder      sodium chloride 0.9%. 1000 milliLiter(s) (150 mL/Hr) IV Continuous <Continuous>    MEDICATIONS  (PRN):  acetaminophen     Tablet .. 650 milliGRAM(s) Oral every 6 hours PRN Temp greater or equal to 38C (100.4F), Mild Pain (1 - 3)  aluminum hydroxide/magnesium hydroxide/simethicone Suspension 30 milliLiter(s) Oral every 4 hours PRN Dyspepsia  HYDROmorphone   Tablet 2 milliGRAM(s) Oral every 4 hours PRN Moderate Pain (4 - 6)  HYDROmorphone   Tablet 4 milliGRAM(s) Oral every 4 hours PRN Severe Pain (7 - 10)  HYDROmorphone  Injectable 1 milliGRAM(s) IV Push three times a day PRN break through pain  melatonin 3 milliGRAM(s) Oral at bedtime PRN Insomnia  ondansetron Injectable 4 milliGRAM(s) IV Push every 8 hours PRN Nausea and/or Vomiting      RADIOLOGY/ADDITIONAL STUDIES:  < from: [facility] Physiol Extremity Lower 3+ Level, BI (07.11.23 @ 13:44) >    ACC: [id number] EXAM:  US PHYSIOL LWR EXT 3+ LEV BI   ORDERED BY: GRANT GUADARRAMA     PROCEDURE DATE:  07/11/2023          INTERPRETATION:  Clinical Information: Peripheral vascular disease. With   rest pain    Technique: Bilateral lower extremity ABIs/PVR    Comparison: CT angiography runoff 7/10/2023    Findings/  Impression:  Right lower extremity: The ankle brachial index is unobtainable. The   pulse waveforms are diminished in amplitude at the ankle and foot,   nonpulsatile in the digit. No segmental pressure gradient.    Left lower extremity: The ankle brachial index is unobtainable. The pulse   waveforms are diminished in amplitude at the ankle and foot, nonpulsatile   in the digit. Diffusely decreased pressures compared to the right side   suggestive of iliac inflow disease as seen on CT angiogram..    Bilateral brachial pulses could not be obtained.  Left posterior tibial pulses could not be obtained.      < end of copied text >  < from: CT Angio Abd Aorta w/run-off w/ IV Cont (07.10.23 @ 09:09) >  ACC: [id number] EXAM:  CT ANGIO ABD AOR W RUN(W)AW IC   ORDERED BY: TINO SANDOVAL     PROCEDURE DATE:  07/10/2023          INTERPRETATION:  CT ANGIOGRAM ABDOMEN, PELVIS, AND LOWER EXTREMITIES:    CT ANGIO ABDOMINAL AORTA RUNOFF    CLINICAL INFORMATION:  r/o LLE ischemia    TECHNIQUE:    CONTRAST/COMPLICATIONS:  IV Contrast: Omnipaque 350  125 cc administered   25 cc discarded  Oral Contrast: NONE  Complications: None reported at time of study completion    PROCEDURE:  Initially, nonenhanced CT was obtained through the calves. Then,   following the rapid administration of intravenous contrast, CT   angiography was performed through the abdomen, pelvis, and lower   extremities down to the toes.  Delayed images through the calves were   also obtained. Sagittal and coronal reformats as well as 3D   reconstructions were performed.    Volume rendered and MIP images for CT angiographic display are created   from source data on an independent 3-D workstation and archived into   PACS. This study was performed using automatic exposure control   (radiation dose reduction software) to obtain a diagnostic image quality   scan with patient dose as low as reasonably achievable.    COMPARISON: CT abdomen pelvis 7/9/2023.    FINDINGS:    ABDOMINAL AORTA: Normal caliber of the abdominal aorta, 1.9 cm   suprarenal, 1.7 cm infrarenal, 1.6 cm at the bifurcation.    Celiac: Patent, no stenosis.  SMA: Patent, no stenosis.  ANYA: Patent, no stenosis  Renal Arteries: Duplicated left renal artery, no stenosis. Single right   renal artery, no stenosis    RIGHT LEG:    Common Iliac artery: Mild atherosclerosis. No high-grade stenosis or   occlusion.  External Iliac: Mild atherosclerotic disease, no high-grade stenosis or   occlusion.  Internal Iliac: Moderate atherosclerotic disease mild stenosis at the   origin. Patent.    Common femoral: Patent, no stenosis or  SFA: Patent, mild atherosclerotic plaques, no stenosis.  Profunda: Patent, normal    Popliteal: Patent, no stenosis or occlusion  Anterior Tibial (AT): Patent.  Tibioperoneal trunk (TPT): Patent, no stenosis or occlusion.  Posterior Tibial (PT): Patent, no stenosis.  Peroneal: Patent to the level of the ankle  Dorsalis Pedis (DP): Patent  Plantar: Patent    LEFT LEG:    Common Iliac artery: Patent, moderate atherosclerotic changes  External Iliac: Occluded  Internal Iliac: Patent, high-grade stenosis at the origin, moderate   atherosclerotic changes.    Common femoral: Recanalized and reconstituted common femoral artery   through collaterals from the left inferior epigastric artery.  SFA: Patent, mild atherosclerotic changes, no stenosis or occlusion.  Profunda: Normal    Popliteal: Patent, mild atherosclerotic plaques, no significant stenosis.  AT: Patent, no stenosis or occlusion  TPT: Patent, no stenosis  PT: Patent in the proximal two thirds.  Peroneal: Patent, no significant stenosis.  DP: Patent  Plantar: Not well opacified      ADDITIONAL FINDINGS:  LOWER CHEST: Within normal limits.    LIVER: Bilateral metastatic lesions in the liver with calcification,   unchanged compared to prior study 7/9/2023.  BILE DUCTS: Normal caliber.  GALLBLADDER: Vicarious excretion of contrast.  SPLEEN: Mild splenomegaly  PANCREAS: Within normal limits.  ADRENALS: Within normal limits.  KIDNEYS/URETERS: No renal stones or hydronephrosis.    BLADDER: Distended, filled with contrast, within normal limits.  REPRODUCTIVE ORGANS: Prostate within normal limits.    BOWEL: Postsurgical changes from partial colectomy, Gloria stump   creation and right end ileostomy. No bowel obstruction. Appendix is   surgically absent.  PERITONEUM: No ascites. Unchanged peritoneal nodular thickening and   serosal implant within the right pelvis as described from prior study,   unchanged.  VESSELS: Aorta and pelvic arteries and described in detail above. Patent   portal vein. Patent renal veins. Left retroaortic renal vein.  RETROPERITONEUM/LYMPH NODES: No lymphadenopathy.  ABDOMINAL WALL: Within normal limits.  BONES: Within normal limits.    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 Two-vessel runoff in the left calf.  High grade stenosis at the origin of the left internal iliac artery.    Three-vessel arterial runoff of the right lower extremity.    Atherosclerotic disease in the abdomen as detailed above.    Otherwise unchanged examination compared to most recent prior imaging   7/9/2023.    3D image post processing was helpful in evaluating the vascular anatomy,   supporting the findings stated above.    < from: Xray Chest 1 View- PORTABLE-Urgent (Xray Chest 1 View- PORTABLE-Urgent .) (07.09.23 @ 21:56) >    ACC: [id number] EXAM:  XR CHEST PORTABLE URGENT 1V   ORDERED BY: TITO KEMP     PROCEDURE DATE:  07/09/2023          INTERPRETATION:  TIME OF EXAM: July 9, 2023 at 9:39 PM and 9:40 PM.    CLINICAL INFORMATION: Abdominal pain.    COMPARISON:  CT scan of the chest from June 13, 2023.    TECHNIQUE:   AP Portable chest x-ray.    INTERPRETATION:    The heart is not enlarged. The trachea is midline.  The mediastinum and oneyda are unremarkable.  There is a CT injectable right IJ approach port with tip at the SVC/right   atrial junction.  There is left basilar linear atelectasis versus scar.  A 7 mm left apical nodule is again seen. Other pulmonary nodules reported   on the CT scan are not able to be seen.  No pleural effusion or pneumothorax is seen.  No acute bony abnormality is noted.  There is no evidence of pneumoperitoneum.      IMPRESSION:  7 mm left apical nodule corresponding to a finding on the CT   scan. Other pulmonary nodules reported on that study are not able to be   seen.    Left basilar linear atelectasis versus scar.    No evidence of pneumoperitoneum.    < from: CT Abdomen and Pelvis w/ IV Cont (07.09.23 @ 16:56) >    ACC: [id number] EXAM:  CT ABDOMEN AND PELVIS IC   ORDERED BY: TITO KEMP     PROCEDURE DATE:  07/09/2023          INTERPRETATION:  CLINICAL INFORMATION: Abdominal pain    COMPARISON: CT chest abdomen pelvis 6/13/2023.    CONTRAST/COMPLICATIONS:  IV Contrast: Omnipaque 350  90 cc administered   10 cc discarded  Oral Contrast: NONE  Complications: None reported at time of study completion    PROCEDURE:  CT of the Abdomen and Pelvis was performed.  Sagittal and coronal reformats were performed.    FINDINGS:  LOWER CHEST: No pulmonary nodules visualized in the lung bases. No   consolidation..    LIVER: Stable known hepatic metastasis with calcifications, index lesions   in the left lobe 2.9 x 3.7 cm, previously 2.5 x 3.8 cm; in the right lobe   3.3 cm, unchanged. Unchanged 1.8 cm additional hypodensity in segment 8.  BILE DUCTS: Normal caliber.  GALLBLADDER: Within normal limits.  SPLEEN: Mild splenomegaly at 14.9 cm.  PANCREAS: Within normal limits.  ADRENALS: Within normal limits.  KIDNEYS/URETERS: Symmetric, homogeneous renal parenchymal enhancement.   Left mid pole renal scarring. No hydronephrosis.    BLADDER: Within normal limits.  REPRODUCTIVE ORGANS: Prostate within normal limits.    BOWEL: Postsurgical changes from partial colectomy, Gloria stump   creation and right midabdomen ileostomy. No bowel obstruction. Appendix   is surgically absent. No abdominal bowel wall thickening or mesenteric   edema to suggest inflammation.  PERITONEUM: No ascites. Peritoneal thickening and nodularity in the right   lower pelvis alongside a small bowel loop with a serosal implant   measuring approximately 1.7 x 2.7 cm, image [id number], similar to prior 1.6   x 2.5 cm. No associated bowel obstruction.  VESSELS: Mild atherosclerotic changes of the abdominal aorta. Celiac axis   and SMA patent. Portal vein, SMV and splenic vein patent. Left   retroaortic renal vein. 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  RETROPERITONEUM/LYMPH NODES: No lymphadenopathy.  ABDOMINAL WALL: Midline postsurgical changes. Unremarkable appearance of   the right mid abdomen ileostomy without inflammatory changes.  BONES: Mild degenerative changes. Lucency in T11 unchanged since prior   study, may represent a osseous hemangioma.    IMPRESSION:  No significant change when compared to prior study 6/13/2023. No acute   findings to suggest inflammatory or infectious process in the abdomen or   pelvis, no bowel obstruction.    Unchanged hepatic metastasis and peritoneal thickening with serosal   implantin the right lower pelvis and postsurgical changes from colectomy   and right abdominal ileostomy. No morphologic abnormality at the   ileostomy site.        < end of copied text >

## 2023-07-14 NOTE — PROGRESS NOTE ADULT - ASSESSMENT
51 y/o male with metastatic colon cancer on chemo, PE  admitted for:      1. Leukocytosis SIRS, no source of infection identified,   workup negative  leukocytosis likely due to neulasta and dehydration   s/p Ciprofloxacin and Metronidazole in ER;   S/p  Zosyn, now off   UA negative, pt without urinary symptoms   stool and GI PCR negative  blood culture NGTD   CT abd/pelvis - no infectious/inflammatory process   CTA; no PNA   WBC downtrending   ID f/u appreciated, abxs stopped, will monitor off     2. High output ileostomy : dehydration s/p  recent chemo  - Colorectal consult - Dr. Montiel appreciated  - Started on Loperamide and metamucil, lomotil was added   - GI PCR and Cdiff neg   Monitor Output     3. Occlusion of left external iliac artery   - Pt on Eliquis for prior PE, non compliance vs failure   - s/p LLE angio: unable to traverse lesion  - d/c  heparin drip start Lovenox SQ Tx dose as d/w heme onc   - check Ct chest IV contrast: no PE   - arterial dopplers reviewed, no intervention as per vascular team   -Pain meds adjusted,  monitor     4. Metastatic colon cancer  to the liver and lung on chemotherapy  on palliative chemotherapy:  initially FOLFOXIRI / sunny , after 10 cycles changed to FOLFIRI/ Bevacizumab.  Last chemotherapy 7/5/23 Had OnPro 7/8/23    - Heme/Onc recs appreciated     5. Anxiety  Reported frustration and suicidal thoughts to SW  Was evaluated by psych, I d/w NP, Pt is not currently suicidal/ homicidal does not need 1:1   Pt was offered Anti depressant to be declined  but he declined     DVT ppx: heparin drip, switch to Lovenox      Code status: Full code       Dispo;  RN to educate Pt to self inject lovenox. Pain control, LAbs in am. D/c planning  51 y/o male with metastatic colon cancer on chemo, PE  admitted for:      1. Leukocytosis SIRS, no source of infection identified,   workup negative  leukocytosis likely due to Neulasta and dehydration   HAd bump im WBCs this am possibly reactive? has hematoma No fevers  s/p Ciprofloxacin and Metronidazole in ER;   S/p  Zosyn, now off   UA negative, pt without urinary symptoms   stool and GI PCR negative  blood culture NGTD   CT abd/pelvis - no infectious/inflammatory process   CTA; no PNA   ID f/u appreciated, abxs stopped, will monitor off     2. High output ileostomy : dehydration s/p  recent chemo  - C/w  Loperamide,  metamucil, lomotil   - Output is better   - GI PCR and Cdiff neg   Monitor Output   - CR Sx recs appreciated     3. Occlusion of left external iliac artery   - Pt on Eliquis for prior PE, non compliance vs failure   - s/p LLE angio: unable to traverse lesion  - d/c  heparin drip start Lovenox SQ Tx dose as d/w heme onc   - check Ct chest IV contrast: no PE   - arterial dopplers reviewed, no intervention as per vascular team   -Pain meds changed to PO Dilaudid with IV Dilaudid for break through pain   - Groin pain discussed with Vascular Sx team, will order doppler to evaluate hematoma, r/o aneurysm     4. Metastatic colon cancer  to the liver and lung on chemotherapy  on palliative chemotherapy:  initially FOLFOXIRI / sunny , after 10 cycles changed to FOLFIRI/ Bevacizumab.  Last chemotherapy 7/5/23 Had OnPro 7/8/23    - Heme/Onc recs appreciated     5. Anxiety  Reported frustration and suicidal thoughts to SW yesterday   Was evaluated by psych, I d/w NP, Pt is not currently suicidal/ homicidal does not need 1:1   Pt was offered Anti depressant to be declined  but he declined     DVT ppx: heparin drip, switch to Lovenox      Code status: Full code       Dispo;  D/w Palliative to evaluate for pain control and GOC. monitor wbcs and for fevers

## 2023-07-14 NOTE — CHART NOTE - NSCHARTNOTEFT_GEN_A_CORE
HPI:  53 y/o M with PMH colon cancer with metatastes to the liver and lung on chemotherapy, PE presented with abdominal pain. Pt reports loose/watery stools, dehydration, decreased appetite over the last 3 days.    (10 Jul 2023 06:59)      PERTINENT PMH REVIEWED:  [ X ] YES [ ] NO           Primary Contact:       Pt. has his mother as HCP in [place] however, she has dementia and is going to think over who will be new HCP, possibly his sister     Mental Status: [ X ] Alert  [ X ] Oriented [  ] Confused [  ] Lethargic [  ]  Concerns of Depression [  ]- not identified   Anxiety [   ]- not identified   Baseline ADLs (prior to admission):  Independent [ ] moderately [ X ] fully   Dependent   [ ] moderately [ ] fully    Anticipated Grief: Patient [ X ] Family [  ]    Caregiver [place] Assessed: Yes [ X ] No [  ]    Baptism: Unknown     Spiritual Concerns: Not identified     Goals of Care: Comfort [  ] Rehabilitation [  ] Curative [  ] Life Prolonging [ X ]    Previous Services: None    ADVANCE DIRECTIVES:  Full Code    Anticipated D/C Plan: to be determined                      Summary:    Deirdre Yin NP and this SW met with pt. at bedside. Pt. has been residing home with his mother and brother. He shared that his Mother has dementia and it has been a difficult living situation. He reports SW team is helping to see if he can get placement in a [facility]. He shared how he has been in a significant amount of pain and pain management is the main goal right now. Feelings explored and support provided.     Pts current medical condition and goals of care discussed. Pt. reports he has been getting chemotherapy at [facility] and is scheduled to go for his next session in about two weeks. Pt. reports his Oncologist is Dr. Serrano.     Advanced directives discussed.  Pt. has his mother as HCP in [place] however, she has dementia. He reports that he is going to think over who will be new HCP, possibly his sister, and we can complete HCP paperwork next week. Pts wishes regarding resuscitation and intubation were discussed. Pt. shared that for now he thinks he would want the medical team to try and is not ready to set limits currently. Pt. remains full code.     Plan at this time to be further determined. Pain management is current main goal. Emotional support provided. No limits set. Our team will continue to follow.

## 2023-07-14 NOTE — PROGRESS NOTE ADULT - SUBJECTIVE AND OBJECTIVE BOX
CC: Abdominal pain     HPI:  51 y/o M with PMH colon cancer with metastases to the liver and lung on chemotherapy, PE presented with abdominal pain. Pt reports loose/watery stools, dehydration, decreased appetite over the last 3 days. His last chemotherapy was on Wednesday. No recent antibiotic use. Reports chills, cramping abdominal pain (sharp abdominal pain has been chronic for years), nausea, dry heaving, vomiting (6 episodes of watery outpt). Denies fevers, chills, chest pain, SOB, abdominal pain, N/V, diarrhea/constipation.     Prior admission:   - 4/19/23: Intestinal stoma prolapse     ER course: HR . Labs: .35 -> 112.01, lactate 2.4 -> 1.4, Na 134, glucose 113, alkaline phosphatase 191. UA: negative. COVID and RVP negative.     Imaging:  - CXR: port right chest wall, no consolidation, no effusion, no pneumothorax (personally reviewed.    - CT abd/pelvis: No significant change when compared to prior study 6/13/2023. No acute findings to suggest inflammatory or infectious process in the abdomen or pelvis, no bowel obstruction. Unchanged hepatic metastasis and peritoneal thickening with serosal implant in the right lower pelvis and postsurgical changes from colectomy and right abdominal ileostomy. No morphologic abnormality at the ileostomy site. Occlusion of left external iliac artery.     Pt was given Ciprofloxacin and Metronidazole, 3L of NS, pepcid, morphine, zofran. He is being admitted to med/surg (1N) for further management.       INTERVAL HPI/ OVERNIGHT EVENTS: Pt was seen and examined,       Vital Signs Last 24 Hrs  T(C): 36.6 (14 Jul 2023 07:24), Max: 37.1 (13 Jul 2023 15:47)  T(F): 97.9 (14 Jul 2023 07:24), Max: 98.8 (13 Jul 2023 15:47)  HR: 68 (14 Jul 2023 07:24) (68 - 83)  BP: 132/82 (14 Jul 2023 07:24) (100/57 - 139/78)  RR: 18 (14 Jul 2023 07:24) (18 - 18)  SpO2: 100% (14 Jul 2023 07:24) (100% - 100%)    Parameters below as of 14 Jul 2023 07:24  Patient On (Oxygen Delivery Method): room air            REVIEW OF SYSTEMS:  All other review of systems is negative unless indicated above.      PHYSICAL EXAM:  General: Well developed; in no acute distress  Eyes: EOMI; conjunctiva and sclera clear  Head: Normocephalic; atraumatic  ENMT: No nasal discharge; airway clear  Neck: Supple; non tender; no masses  Respiratory: No wheezes, rales or rhonchi  Cardiovascular: Regular rate and rhythm. S1 and S2 Normal;  Gastrointestinal: Soft non-tender non-distended; Normal bowel sounds, ileostomy  in place   Genitourinary: No  suprapubic  tenderness  Extremities: Normal range of motion, No edema, some R calf tenderness   Vascular: Peripheral DP pulses not  palpable, R groin small hematoma, tenderness to palpation    Neurological: Alert and oriented x 3, non focal   Skin: Warm and dry. No acute rash  Musculoskeletal: Normal muscle tone, without deformities  Psychiatric: Cooperative and appropriate      LABS:                         12.0   21.52 )-----------( 152      ( 13 Jul 2023 08:15 )             37.2     07-13    138  |  105  |  7   ----------------------------<  98  3.4<L>   |  26  |  0.76    Ca    8.8      13 Jul 2023 08:15  Phos  3.0     07-13  Mg     2.0     07-13    PTT - ( 13 Jul 2023 08:15 )  PTT:82.1 sec  Urinalysis Basic - ( 13 Jul 2023 08:15 )                            12.3   38.38 )-----------( 143      ( 12 Jul 2023 08:23 )             38.0     11 Jul 2023 07:20    140    |  110    |  6      ----------------------------<  117    4.1     |  23     |  0.81     Ca    8.6        11 Jul 2023 07:20  Phos  3.3       11 Jul 2023 07:20  Mg     2.3       11 Jul 2023 07:20      PT/INR - ( 11 Jul 2023 07:20 )   PT: 12.6 sec;   INR: 1.09 ratio    PTT - ( 12 Jul 2023 08:23 )  PTT:89.7 sec        MEDICATIONS  (STANDING):  dextrose 5% + sodium chloride 0.45% with potassium chloride 20 mEq/L 1000 milliLiter(s) (125 mL/Hr) IV Continuous <Continuous>  gabapentin 100 milliGRAM(s) Oral every 8 hours  heparin  Infusion.  Unit(s)/Hr (16 mL/Hr) IV Continuous <Continuous>  lidocaine   4% Patch 1 Patch Transdermal daily  loperamide 2 milliGRAM(s) Oral every 6 hours  loperamide      naloxone Injectable 0.4 milliGRAM(s) IV Push once  psyllium Powder      psyllium Powder 1 Packet(s) Oral every 12 hours  sodium chloride 0.9%. 1000 milliLiter(s) (150 mL/Hr) IV Continuous <Continuous>    MEDICATIONS  (PRN):  acetaminophen     Tablet .. 650 milliGRAM(s) Oral every 6 hours PRN Temp greater or equal to 38C (100.4F), Mild Pain (1 - 3)  aluminum hydroxide/magnesium hydroxide/simethicone Suspension 30 milliLiter(s) Oral every 4 hours PRN Dyspepsia  heparin   Injectable 7000 Unit(s) IV Push every 6 hours PRN For aPTT less than 40  heparin   Injectable 3500 Unit(s) IV Push every 6 hours PRN For aPTT between 40 - 57  melatonin 3 milliGRAM(s) Oral at bedtime PRN Insomnia  morphine  - Injectable 2 milliGRAM(s) IV Push every 4 hours PRN Moderate Pain (4 - 6)  morphine  - Injectable 4 milliGRAM(s) IV Push every 4 hours PRN Severe Pain (7 - 10)  ondansetron Injectable 4 milliGRAM(s) IV Push every 8 hours PRN Nausea and/or Vomiting      RADIOLOGY & ADDITIONAL TESTS:          ACC: [id number] EXAM:  CT ANGIO ABD AOR W RUN(W)AW IC   ORDERED BY: TINO SANDOVAL     PROCEDURE DATE:  07/10/2023          INTERPRETATION:  CT ANGIOGRAM ABDOMEN, PELVIS, AND LOWER EXTREMITIES:    CT ANGIO ABDOMINAL AORTA RUNOFF    CLINICAL INFORMATION:  r/o LLE ischemia    TECHNIQUE:    CONTRAST/COMPLICATIONS:  IV Contrast: Omnipaque 350  125 cc administered   25 cc discarded  Oral Contrast: NONE  Complications: None reported at time of study completion    PROCEDURE:  Initially, nonenhanced CT was obtained through the calves. Then,   following the rapid administration of intravenous contrast, CT   angiography was performed through the abdomen, pelvis, and lower   extremities down to the toes.  Delayed images through the calves were   also obtained. Sagittal and coronal reformats as well as 3D   reconstructions were performed.    Volume rendered and MIP images for CT angiographic display are created   from source data on an independent 3-D workstation and archived into   PACS. This study was performed using automatic exposure control   (radiation dose reduction software) to obtain a diagnostic image quality   scan with patient dose as low as reasonably achievable.    COMPARISON: CT abdomen pelvis 7/9/2023.    FINDINGS:    ABDOMINAL AORTA: Normal caliber of the abdominal aorta, 1.9 cm   suprarenal, 1.7 cm infrarenal, 1.6 cm at the bifurcation.    Celiac: Patent, no stenosis.  SMA: Patent, no stenosis.  ANYA: Patent, no stenosis  Renal Arteries: Duplicated left renal artery, no stenosis. Single right   renal artery, no stenosis    RIGHT LEG:    Common Iliac artery: Mild atherosclerosis. No high-grade stenosis or   occlusion.  External Iliac: Mild atherosclerotic disease, no high-grade stenosis or   occlusion.  Internal Iliac: Moderate atherosclerotic disease mild stenosis at the   origin. Patent.    Common femoral: Patent, no stenosis or  SFA: Patent, mild atherosclerotic plaques, no stenosis.  Profunda: Patent, normal    Popliteal: Patent, no stenosis or occlusion  Anterior Tibial (AT): Patent.  Tibioperoneal trunk (TPT): Patent, no stenosis or occlusion.  Posterior Tibial (PT): Patent, no stenosis.  Peroneal: Patent to the level of the ankle  Dorsalis Pedis (DP): Patent  Plantar: Patent    LEFT LEG:    Common Iliac artery: Patent, moderate atherosclerotic changes  External Iliac: Occluded  Internal Iliac: Patent, high-grade stenosis at the origin, moderate   atherosclerotic changes.    Common femoral: Recanalized and reconstituted common femoral artery   through collaterals from the left inferior epigastric artery.  SFA: Patent, mild atherosclerotic changes, no stenosis or occlusion.  Profunda: Normal    Popliteal: Patent, mild atherosclerotic plaques, no significant stenosis.  AT: Patent, no stenosis or occlusion  TPT: Patent, no stenosis  PT: Patent in the proximal two thirds.  Peroneal: Patent, no significant stenosis.  DP: Patent  Plantar: Not well opacified      ADDITIONAL FINDINGS:  LOWER CHEST: Within normal limits.    LIVER: Bilateral metastatic lesions in the liver with calcification,   unchanged compared to prior study 7/9/2023.  BILE DUCTS: Normal caliber.  GALLBLADDER: Vicarious excretion of contrast.  SPLEEN: Mild splenomegaly  PANCREAS: Within normal limits.  ADRENALS: Within normal limits.  KIDNEYS/URETERS: No renal stones or hydronephrosis.    BLADDER: Distended, filled with contrast, within normal limits.  REPRODUCTIVE ORGANS: Prostate within normal limits.    BOWEL: Postsurgical changes from partial colectomy, Gloria stump   creation and right end ileostomy. No bowel obstruction. Appendix is   surgically absent.  PERITONEUM: No ascites. Unchanged peritoneal nodular thickening and   serosal implant within the right pelvis as described from prior study,   unchanged.  VESSELS: Aorta and pelvic arteries and described in detail above. Patent   portal vein. Patent renal veins. Left retroaortic renal vein.  RETROPERITONEUM/LYMPH NODES: No lymphadenopathy.  ABDOMINAL WALL: Within normal limits.  BONES: Within normal limits.    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 Two-vessel runoff in the left calf.  High grade stenosis at the origin of the left internal iliac artery.  Three-vessel arterial runoff of the right lower extremity.  Atherosclerotic disease in the abdomen as detailed above.      ACC: [id number] EXAM:  CT ANGIO CHEST PULM ART WAWIC                          PROCEDURE DATE:  07/02/2022          INTERPRETATION:  Reason for Exam: Shortness of breath. chest pain.    CTA of the chest was performed from the thoracic inlet to the level of   the adrenal glands following IV contrast injection of  80 cc of Omnipaque   350. No immediate complications were reported.  MIP images were also   created and reviewed.    Comparison: CT abdomen and pelvis dated May 25, 2022    Tubes/Lines: Right Mediport catheter tip in SVC.    Mediastinum and Heart: Aorta and pulmonary arteries are normal in size.   Moderate stenosis of the proximal left subclavian artery from   atherosclerotic plaque. Thyroid gland is unremarkable. No   lymphadenopathy.No pericardial effusion.    Lungs, Pleura, and Airways: There is no pulmonary embolus. No   consolidations, edema or effusion. Small left pneumothorax noted. Right   lower lobe 4 mm nodule in series 4 image 289. Left apical 4 mm nodule on   image 57.    Visualized Abdomen: Multiple calcified masses in the liver, without   significant change since prior.    Bones and soft tissues: Unremarkable.    IMPRESSION:    No pulmonary embolus.    Small left pneumothorax without mediastinal shift.    Remaining findings as above.       CC: Abdominal pain     HPI:  51 y/o M with PMH colon cancer with metastases to the liver and lung on chemotherapy, PE presented with abdominal pain. Pt reports loose/watery stools, dehydration, decreased appetite over the last 3 days. His last chemotherapy was on Wednesday. No recent antibiotic use. Reports chills, cramping abdominal pain (sharp abdominal pain has been chronic for years), nausea, dry heaving, vomiting (6 episodes of watery outpt). Denies fevers, chills, chest pain, SOB, abdominal pain, N/V, diarrhea/constipation.   Prior admission:   - 4/19/23: Intestinal stoma prolapse   ER course: HR . Labs: .35 -> 112.01, lactate 2.4 -> 1.4, Na 134, glucose 113, alkaline phosphatase 191. UA: negative. COVID and RVP negative.   Imaging:  - CXR: port right chest wall, no consolidation, no effusion, no pneumothorax (personally reviewed.    - CT abd/pelvis: No significant change when compared to prior study 6/13/2023. No acute findings to suggest inflammatory or infectious process in the abdomen or pelvis, no bowel obstruction. Unchanged hepatic metastasis and peritoneal thickening with serosal implant in the right lower pelvis and postsurgical changes from colectomy and right abdominal ileostomy. No morphologic abnormality at the ileostomy site. Occlusion of left external iliac artery.   Pt was given Ciprofloxacin and Metronidazole, 3L of NS, Pepcid morphine, Zofran He is being admitted to med/surg (1N) for further management.       INTERVAL HPI/ OVERNIGHT EVENTS: Pt was seen and examined,   reports   still in pain,  mostly r groin, oxy does get pain down to certain degree but works only about 2 hours. Ostomy output start getting thicker. Denies Fevers or chills. Plan discussed      Vital Signs Last 24 Hrs  T(C): 36.6 (14 Jul 2023 07:24), Max: 37.1 (13 Jul 2023 15:47)  T(F): 97.9 (14 Jul 2023 07:24), Max: 98.8 (13 Jul 2023 15:47)  HR: 68 (14 Jul 2023 07:24) (68 - 83)  BP: 132/82 (14 Jul 2023 07:24) (100/57 - 139/78)  RR: 18 (14 Jul 2023 07:24) (18 - 18)  SpO2: 100% (14 Jul 2023 07:24) (100% - 100%)    Parameters below as of 14 Jul 2023 07:24  Patient On (Oxygen Delivery Method): room air      REVIEW OF SYSTEMS:  All other review of systems is negative unless indicated above.      PHYSICAL EXAM:  General: Well developed; in no acute distress  Eyes: EOMI; conjunctiva and sclera clear  Head: Normocephalic; atraumatic  ENMT: No nasal discharge; airway clear  Neck: Supple; non tender; no masses  Respiratory: No wheezes, rales or rhonchi  Cardiovascular: Regular rate and rhythm. S1 and S2 Normal;  Gastrointestinal: Soft non-tender non-distended; Normal bowel sounds, ileostomy  in place   Genitourinary: No  suprapubic  tenderness  Extremities: Normal range of motion, No edema, some R calf tenderness   Vascular: Peripheral DP pulses not  palpable, R groin small hematoma, tenderness to palpation    Neurological: Alert and oriented x 3, non focal   Skin: Warm and dry. No acute rash  Musculoskeletal: Normal muscle tone, without deformities  Psychiatric: Cooperative and appropriate      LABS:                         11.4   24.27 )-----------( 163      ( 14 Jul 2023 07:05 )             35.0     07-14    135  |  107  |  10  ----------------------------<  105<H>  4.5   |  24  |  0.70    Ca    8.9      14 Jul 2023 07:05  Phos  3.0     07-13  Mg     2.1     07-14                            12.0   21.52 )-----------( 152      ( 13 Jul 2023 08:15 )             37.2     07-13    138  |  105  |  7   ----------------------------<  98  3.4<L>   |  26  |  0.76    Ca    8.8      13 Jul 2023 08:15  Phos  3.0     07-13  Mg     2.0     07-13    PTT - ( 13 Jul 2023 08:15 )  PTT:82.1 sec  Urinalysis Basic - ( 13 Jul 2023 08:15 )                            12.3   38.38 )-----------( 143      ( 12 Jul 2023 08:23 )             38.0     11 Jul 2023 07:20    140    |  110    |  6      ----------------------------<  117    4.1     |  23     |  0.81     Ca    8.6        11 Jul 2023 07:20  Phos  3.3       11 Jul 2023 07:20  Mg     2.3       11 Jul 2023 07:20      PT/INR - ( 11 Jul 2023 07:20 )   PT: 12.6 sec;   INR: 1.09 ratio    PTT - ( 12 Jul 2023 08:23 )  PTT:89.7 sec      MEDICATIONS  (STANDING):  dextrose 5% + sodium chloride 0.45% with potassium chloride 20 mEq/L 1000 milliLiter(s) (125 mL/Hr) IV Continuous <Continuous>  diphenoxylate/atropine 1 Tablet(s) Oral every 6 hours  enoxaparin Injectable 90 milliGRAM(s) SubCutaneous every 12 hours  gabapentin 100 milliGRAM(s) Oral every 8 hours  lidocaine   4% Patch 1 Patch Transdermal daily  loperamide 4 milliGRAM(s) Oral <User Schedule>  loperamide 4 milliGRAM(s) Oral at bedtime  naloxone Injectable 0.4 milliGRAM(s) IV Push once  psyllium Powder 1 Packet(s) Oral every 12 hours  psyllium Powder      sodium chloride 0.9%. 1000 milliLiter(s) (150 mL/Hr) IV Continuous <Continuous>    MEDICATIONS  (PRN):  acetaminophen     Tablet .. 650 milliGRAM(s) Oral every 6 hours PRN Temp greater or equal to 38C (100.4F), Mild Pain (1 - 3)  aluminum hydroxide/magnesium hydroxide/simethicone Suspension 30 milliLiter(s) Oral every 4 hours PRN Dyspepsia  HYDROmorphone   Tablet 2 milliGRAM(s) Oral every 4 hours PRN Moderate Pain (4 - 6)  HYDROmorphone   Tablet 4 milliGRAM(s) Oral every 4 hours PRN Severe Pain (7 - 10)  HYDROmorphone  Injectable 1 milliGRAM(s) IV Push three times a day PRN break through pain  melatonin 3 milliGRAM(s) Oral at bedtime PRN Insomnia  ondansetron Injectable 4 milliGRAM(s) IV Push every 8 hours PRN Nausea and/or Vomiting        RADIOLOGY & ADDITIONAL TESTS:    ACC: [id number] EXAM:  CT ANGIO ABD AOR W RUN(W)AW IC   ORDERED BY: TINO SANDOVAL     PROCEDURE DATE:  07/10/2023          INTERPRETATION:  CT ANGIOGRAM ABDOMEN, PELVIS, AND LOWER EXTREMITIES:    CT ANGIO ABDOMINAL AORTA RUNOFF    CLINICAL INFORMATION:  r/o LLE ischemia    TECHNIQUE:    CONTRAST/COMPLICATIONS:  IV Contrast: Omnipaque 350  125 cc administered   25 cc discarded  Oral Contrast: NONE  Complications: None reported at time of study completion    PROCEDURE:  Initially, nonenhanced CT was obtained through the calves. Then,   following the rapid administration of intravenous contrast, CT   angiography was performed through the abdomen, pelvis, and lower   extremities down to the toes.  Delayed images through the calves were   also obtained. Sagittal and coronal reformats as well as 3D   reconstructions were performed.    Volume rendered and MIP images for CT angiographic display are created   from source data on an independent 3-D workstation and archived into   PACS. This study was performed using automatic exposure control   (radiation dose reduction software) to obtain a diagnostic image quality   scan with patient dose as low as reasonably achievable.    COMPARISON: CT abdomen pelvis 7/9/2023.    FINDINGS:    ABDOMINAL AORTA: Normal caliber of the abdominal aorta, 1.9 cm   suprarenal, 1.7 cm infrarenal, 1.6 cm at the bifurcation.    Celiac: Patent, no stenosis.  SMA: Patent, no stenosis.  ANYA: Patent, no stenosis  Renal Arteries: Duplicated left renal artery, no stenosis. Single right   renal artery, no stenosis    RIGHT LEG:    Common Iliac artery: Mild atherosclerosis. No high-grade stenosis or   occlusion.  External Iliac: Mild atherosclerotic disease, no high-grade stenosis or   occlusion.  Internal Iliac: Moderate atherosclerotic disease mild stenosis at the   origin. Patent.    Common femoral: Patent, no stenosis or  SFA: Patent, mild atherosclerotic plaques, no stenosis.  Profunda: Patent, normal    Popliteal: Patent, no stenosis or occlusion  Anterior Tibial (AT): Patent.  Tibioperoneal trunk (TPT): Patent, no stenosis or occlusion.  Posterior Tibial (PT): Patent, no stenosis.  Peroneal: Patent to the level of the ankle  Dorsalis Pedis (DP): Patent  Plantar: Patent    LEFT LEG:    Common Iliac artery: Patent, moderate atherosclerotic changes  External Iliac: Occluded  Internal Iliac: Patent, high-grade stenosis at the origin, moderate   atherosclerotic changes.    Common femoral: Recanalized and reconstituted common femoral artery   through collaterals from the left inferior epigastric artery.  SFA: Patent, mild atherosclerotic changes, no stenosis or occlusion.  Profunda: Normal    Popliteal: Patent, mild atherosclerotic plaques, no significant stenosis.  AT: Patent, no stenosis or occlusion  TPT: Patent, no stenosis  PT: Patent in the proximal two thirds.  Peroneal: Patent, no significant stenosis.  DP: Patent  Plantar: Not well opacified      ADDITIONAL FINDINGS:  LOWER CHEST: Within normal limits.    LIVER: Bilateral metastatic lesions in the liver with calcification,   unchanged compared to prior study 7/9/2023.  BILE DUCTS: Normal caliber.  GALLBLADDER: Vicarious excretion of contrast.  SPLEEN: Mild splenomegaly  PANCREAS: Within normal limits.  ADRENALS: Within normal limits.  KIDNEYS/URETERS: No renal stones or hydronephrosis.    BLADDER: Distended, filled with contrast, within normal limits.  REPRODUCTIVE ORGANS: Prostate within normal limits.    BOWEL: Postsurgical changes from partial colectomy, Gloria stump   creation and right end ileostomy. No bowel obstruction. Appendix is   surgically absent.  PERITONEUM: No ascites. Unchanged peritoneal nodular thickening and   serosal implant within the right pelvis as described from prior study,   unchanged.  VESSELS: Aorta and pelvic arteries and described in detail above. Patent   portal vein. Patent renal veins. Left retroaortic renal vein.  RETROPERITONEUM/LYMPH NODES: No lymphadenopathy.  ABDOMINAL WALL: Within normal limits.  BONES: Within normal limits.    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 Two-vessel runoff in the left calf.  High grade stenosis at the origin of the left internal iliac artery.  Three-vessel arterial runoff of the right lower extremity.  Atherosclerotic disease in the abdomen as detailed above.      ACC: [id number] EXAM:  [facility]ST [facility]                          PROCEDURE DATE:  07/02/2022          INTERPRETATION:  Reason for Exam: Shortness of breath. chest pain.    CTA of the chest was performed from the thoracic inlet to the level of   the adrenal glands following IV contrast injection of  80 cc of Omnipaque   350. No immediate complications were reported.  MIP images were also   created and reviewed.    Comparison: CT abdomen and pelvis dated May 25, 2022    Tubes/Lines: Right Mediport catheter tip in SVC.    Mediastinum and Heart: Aorta and pulmonary arteries are normal in size.   Moderate stenosis of the proximal left subclavian artery from   atherosclerotic plaque. Thyroid gland is unremarkable. No   lymphadenopathy.No pericardial effusion.    Lungs, Pleura, and Airways: There is no pulmonary embolus. No   consolidations, edema or effusion. Small left pneumothorax noted. Right   lower lobe 4 mm nodule in series 4 image 289. Left apical 4 mm nodule on   image 57.    Visualized Abdomen: Multiple calcified masses in the liver, without   significant change since prior.    Bones and soft tissues: Unremarkable.    IMPRESSION:    No pulmonary embolus.    Small left pneumothorax without mediastinal shift.    Remaining findings as above.

## 2023-07-14 NOTE — CONSULT NOTE ADULT - CONVERSATION DETAILS
The role of Palliative medicine was reviewed with the pt. We discussed his chronic pain and overall quality of life. Will adjust meds and add long acting pain coverage in addition to current Dilaudid which he just started today. We discussed HCP and he is considering completion of a new one as his mother is named and now has dementia. Also discussed MOLST and he declines to set any limits at this time. Will follow

## 2023-07-14 NOTE — PROGRESS NOTE ADULT - ASSESSMENT
51 yo M presented with high output ileostomy. On bulk forming agents.    P:  Pain control  Continue Metamucil, Loperamide  Please record ileostomy output  I/Os  Oral hydration  Rest of management as per primary team    To be discussed with CRS attending.

## 2023-07-14 NOTE — PROGRESS NOTE ADULT - SUBJECTIVE AND OBJECTIVE BOX
SURGERY DAILY PROGRESS NOTE:     Subjective:  Patient seen and examined at bedside during am rounds. Patient reports continuing R groin and calf pain.   AVSS. Denies any fevers, chills, n/v/d, chest pain or shortness of breath    Objective:    MEDICATIONS  (STANDING):  dextrose 5% + sodium chloride 0.45% with potassium chloride 20 mEq/L 1000 milliLiter(s) (125 mL/Hr) IV Continuous <Continuous>  diphenoxylate/atropine 1 Tablet(s) Oral every 6 hours  enoxaparin Injectable 90 milliGRAM(s) SubCutaneous every 12 hours  fentaNYL   Patch  12 MICROgram(s)/Hr 1 Patch Transdermal every 72 hours  gabapentin 100 milliGRAM(s) Oral every 8 hours  lidocaine   4% Patch 1 Patch Transdermal daily  loperamide 4 milliGRAM(s) Oral at bedtime  loperamide 4 milliGRAM(s) Oral <User Schedule>  naloxone Injectable 0.4 milliGRAM(s) IV Push once  psyllium Powder      psyllium Powder 1 Packet(s) Oral every 12 hours  sodium chloride 0.9%. 1000 milliLiter(s) (150 mL/Hr) IV Continuous <Continuous>    MEDICATIONS  (PRN):  acetaminophen     Tablet .. 650 milliGRAM(s) Oral every 6 hours PRN Temp greater or equal to 38C (100.4F), Mild Pain (1 - 3)  aluminum hydroxide/magnesium hydroxide/simethicone Suspension 30 milliLiter(s) Oral every 4 hours PRN Dyspepsia  HYDROmorphone   Tablet 2 milliGRAM(s) Oral every 4 hours PRN Moderate Pain (4 - 6)  HYDROmorphone   Tablet 4 milliGRAM(s) Oral every 4 hours PRN Severe Pain (7 - 10)  HYDROmorphone  Injectable 1 milliGRAM(s) IV Push three times a day PRN break through pain  melatonin 3 milliGRAM(s) Oral at bedtime PRN Insomnia  ondansetron Injectable 4 milliGRAM(s) IV Push every 8 hours PRN Nausea and/or Vomiting      Vital Signs Last 24 Hrs  T(C): 36.6 (14 Jul 2023 07:24), Max: 37.1 (13 Jul 2023 15:47)  T(F): 97.9 (14 Jul 2023 07:24), Max: 98.8 (13 Jul 2023 15:47)  HR: 68 (14 Jul 2023 07:24) (68 - 83)  BP: 132/82 (14 Jul 2023 07:24) (100/57 - 139/78)  BP(mean): --  RR: 18 (14 Jul 2023 07:24) (18 - 18)  SpO2: 100% (14 Jul 2023 07:24) (100% - 100%)    Parameters below as of 14 Jul 2023 07:24  Patient On (Oxygen Delivery Method): room air          PHYSICAL EXAM   AO x3, NAD  GENERAL: No acute distress, well-developed  HEAD:  Atraumatic, Normocephalic  CHEST/LUNG: normal effort, equal chest rise  ABDOMEN: Soft, mild tenderness RLQ, non-distended, ileostomy with clear/yellow stool with prolapse. B/l groin no hematoma or ecchymosis  Ext. no cyanosis or edema, warm, RLE: femoral, DP/PT pulses palpable, LLE: DP palpable/Dopplerable, PT dopplerable, femoral 1+palpable, dopplerable  NEUROLOGY: responding appropriately, no focal deficits      I&O's Detail    13 Jul 2023 07:01  -  14 Jul 2023 07:00  --------------------------------------------------------  IN:  Total IN: 0 mL    OUT:    Ileostomy (mL): 1200 mL  Total OUT: 1200 mL    Total NET: -1200 mL      14 Jul 2023 07:01  -  14 Jul 2023 14:51  --------------------------------------------------------  IN:  Total IN: 0 mL    OUT:    Ileostomy (mL): 200 mL  Total OUT: 200 mL    Total NET: -200 mL        LABS:                        11.4   24.27 )-----------( 163      ( 14 Jul 2023 07:05 )             35.0     07-14    135  |  107  |  10  ----------------------------<  105<H>  4.5   |  24  |  0.70    Ca    8.9      14 Jul 2023 07:05  Phos  3.0     07-13  Mg     2.1     07-14      PTT - ( 14 Jul 2023 07:05 )  PTT:34.2 sec  Urinalysis Basic - ( 14 Jul 2023 07:05 )    Color: x / Appearance: x / SG: x / pH: x  Gluc: 105 mg/dL / Ketone: x  / Bili: x / Urobili: x   Blood: x / Protein: x / Nitrite: x   Leuk Esterase: x / RBC: x / WBC x   Sq Epi: x / Non Sq Epi: x / Bacteria: x

## 2023-07-14 NOTE — CHART NOTE - NSCHARTNOTEFT_GEN_A_CORE
51yo M with right common femoral vein DVT despite eliquis/lovenox, IR consulted for IVC filter placement. As per documentation, high WBC possibly 2/2 neulasta, no infectious source found and blood cultures are NGTD. Unfortunately no IR schedule availability today and Monday. Recommend reaching out to cardiology to see if they can accommodate more timely placement. 51yo M with right common femoral vein DVT despite eliquis/lovenox, IR consulted for IVC filter placement. As per documentation, high WBC possibly 2/2 neulasta, no infectious source found and blood cultures are NGTD. Dr. Lemus in long case. Recommend reaching out to cardiology now to see if they can accommodate more timely placement. 53yo M with right common femoral vein DVT despite eliquis/hepariun/lovenox, IR consulted for IVC filter placement. As per documentation, high WBC possibly 2/2 neulasta, no infectious source found and blood cultures are NGTD.     Unclear at this time if DVT truly happened while patient on therapeutic AC given multiple AC transitions this admission from eliquis-> heparin -> lovenox, and possible AC interruption for angiogram 7/10.     Recommend serial US to eval for growth vs resolution throughout this admission. If DVT appears to propagate while therapeutically anticoagulated, would consider IVC filter at that time. 53yo M with right common femoral vein DVT despite eliquis/heparin/lovenox, IR consulted for IVC filter placement.     Unclear at this time if DVT truly happened while patient on therapeutic AC given multiple AC transitions this admission from eliquis-> heparin -> lovenox, and possible AC interruption for angiogram 7/10.     Recommend serial US to eval for growth vs resolution throughout this admission. If DVT appears to propagate while therapeutically anticoagulated, would consider IVC filter at that time.

## 2023-07-15 LAB
ANION GAP SERPL CALC-SCNC: 5 MMOL/L — SIGNIFICANT CHANGE UP (ref 5–17)
BUN SERPL-MCNC: 10 MG/DL — SIGNIFICANT CHANGE UP (ref 7–23)
CALCIUM SERPL-MCNC: 9.1 MG/DL — SIGNIFICANT CHANGE UP (ref 8.5–10.1)
CHLORIDE SERPL-SCNC: 105 MMOL/L — SIGNIFICANT CHANGE UP (ref 96–108)
CO2 SERPL-SCNC: 27 MMOL/L — SIGNIFICANT CHANGE UP (ref 22–31)
CREAT SERPL-MCNC: 0.72 MG/DL — SIGNIFICANT CHANGE UP (ref 0.5–1.3)
CULTURE RESULTS: SIGNIFICANT CHANGE UP
CULTURE RESULTS: SIGNIFICANT CHANGE UP
EGFR: 111 ML/MIN/1.73M2 — SIGNIFICANT CHANGE UP
GLUCOSE SERPL-MCNC: 106 MG/DL — HIGH (ref 70–99)
HCT VFR BLD CALC: 38.1 % — LOW (ref 39–50)
HGB BLD-MCNC: 12.2 G/DL — LOW (ref 13–17)
MCHC RBC-ENTMCNC: 28.1 PG — SIGNIFICANT CHANGE UP (ref 27–34)
MCHC RBC-ENTMCNC: 32 GM/DL — SIGNIFICANT CHANGE UP (ref 32–36)
MCV RBC AUTO: 87.8 FL — SIGNIFICANT CHANGE UP (ref 80–100)
PLATELET # BLD AUTO: 221 K/UL — SIGNIFICANT CHANGE UP (ref 150–400)
POTASSIUM SERPL-MCNC: 4.1 MMOL/L — SIGNIFICANT CHANGE UP (ref 3.5–5.3)
POTASSIUM SERPL-SCNC: 4.1 MMOL/L — SIGNIFICANT CHANGE UP (ref 3.5–5.3)
RBC # BLD: 4.34 M/UL — SIGNIFICANT CHANGE UP (ref 4.2–5.8)
RBC # FLD: 17.4 % — HIGH (ref 10.3–14.5)
SODIUM SERPL-SCNC: 137 MMOL/L — SIGNIFICANT CHANGE UP (ref 135–145)
SPECIMEN SOURCE: SIGNIFICANT CHANGE UP
SPECIMEN SOURCE: SIGNIFICANT CHANGE UP
WBC # BLD: 15.69 K/UL — HIGH (ref 3.8–10.5)
WBC # FLD AUTO: 15.69 K/UL — HIGH (ref 3.8–10.5)

## 2023-07-15 PROCEDURE — 99232 SBSQ HOSP IP/OBS MODERATE 35: CPT

## 2023-07-15 PROCEDURE — 99231 SBSQ HOSP IP/OBS SF/LOW 25: CPT | Mod: 24,GC

## 2023-07-15 RX ADMIN — Medication 4 MILLIGRAM(S): at 13:37

## 2023-07-15 RX ADMIN — ENOXAPARIN SODIUM 90 MILLIGRAM(S): 100 INJECTION SUBCUTANEOUS at 21:42

## 2023-07-15 RX ADMIN — Medication 4 MILLIGRAM(S): at 21:41

## 2023-07-15 RX ADMIN — Medication 1 TABLET(S): at 23:21

## 2023-07-15 RX ADMIN — Medication 1 TABLET(S): at 05:25

## 2023-07-15 RX ADMIN — Medication 1 PACKET(S): at 05:25

## 2023-07-15 RX ADMIN — Medication 4 MILLIGRAM(S): at 05:25

## 2023-07-15 RX ADMIN — Medication 1 TABLET(S): at 12:16

## 2023-07-15 RX ADMIN — HYDROMORPHONE HYDROCHLORIDE 4 MILLIGRAM(S): 2 INJECTION INTRAMUSCULAR; INTRAVENOUS; SUBCUTANEOUS at 10:13

## 2023-07-15 RX ADMIN — ENOXAPARIN SODIUM 90 MILLIGRAM(S): 100 INJECTION SUBCUTANEOUS at 10:12

## 2023-07-15 RX ADMIN — Medication 1 TABLET(S): at 01:15

## 2023-07-15 RX ADMIN — HYDROMORPHONE HYDROCHLORIDE 4 MILLIGRAM(S): 2 INJECTION INTRAMUSCULAR; INTRAVENOUS; SUBCUTANEOUS at 19:45

## 2023-07-15 RX ADMIN — HYDROMORPHONE HYDROCHLORIDE 4 MILLIGRAM(S): 2 INJECTION INTRAMUSCULAR; INTRAVENOUS; SUBCUTANEOUS at 21:15

## 2023-07-15 RX ADMIN — HYDROMORPHONE HYDROCHLORIDE 4 MILLIGRAM(S): 2 INJECTION INTRAMUSCULAR; INTRAVENOUS; SUBCUTANEOUS at 11:44

## 2023-07-15 RX ADMIN — Medication 1 TABLET(S): at 17:45

## 2023-07-15 RX ADMIN — HYDROMORPHONE HYDROCHLORIDE 4 MILLIGRAM(S): 2 INJECTION INTRAMUSCULAR; INTRAVENOUS; SUBCUTANEOUS at 16:00

## 2023-07-15 RX ADMIN — HYDROMORPHONE HYDROCHLORIDE 4 MILLIGRAM(S): 2 INJECTION INTRAMUSCULAR; INTRAVENOUS; SUBCUTANEOUS at 20:37

## 2023-07-15 RX ADMIN — HYDROMORPHONE HYDROCHLORIDE 4 MILLIGRAM(S): 2 INJECTION INTRAMUSCULAR; INTRAVENOUS; SUBCUTANEOUS at 06:35

## 2023-07-15 RX ADMIN — Medication 1 PACKET(S): at 17:45

## 2023-07-15 NOTE — PROGRESS NOTE ADULT - SUBJECTIVE AND OBJECTIVE BOX
Pt. seen and examined at bedside this morning. Tolerating diet, voiding freely. He denies fever, chest pain, SOB, nausea, vomiting.     PE:  AO x3, NAD  GENERAL: No acute distress, well-developed  HEAD:  Atraumatic, Normocephalic  CHEST/LUNG: normal effort, equal chest rise  ABDOMEN: Soft, mild tenderness RLQ, non-distended, ileostomy with clear/yellow stool with prolapse. B/l groin no hematoma or ecchymosis  Ext. no cyanosis or edema, warm, RLE: femoral, DP/PT pulses palpable, LLE: DP palpable/Dopplerable, PT dopplerable, femoral 1+palpable, dopplerable  NEUROLOGY: responding appropriately, no focal deficits

## 2023-07-15 NOTE — PROGRESS NOTE ADULT - ASSESSMENT
53 yo M presented with high output ileostomy. On bulk forming agents.    P:  Pain control  Continue Metamucil, Loperamide, Lomotil  I/Os  Oral hydration  Rest of management as per primary team    To be discussed with CRS attending.

## 2023-07-15 NOTE — PROGRESS NOTE ADULT - ASSESSMENT
51 yo M with CT a/p finding of Left external iliac artery occlusion with palpable femoral and DP pulses. 7/10 underwent LLE angiogram, unable to traverse lesion.  Adup: no pseudoaneurysm  Vdup: R common femoral vein thrombosis    P:  IR for IVC filter recommended serial US, if clot propagation then IVC filter  Pain control PRN  Physical therapy   Rest of management as per primary team    To be discussed with Dr. Taylor 51 yo M with CT a/p finding of Left external iliac artery occlusion with palpable femoral and DP pulses. 7/10 underwent LLE angiogram, unable to traverse lesion.  Adup: no pseudoaneurysm  Vdup: R common femoral vein thrombosis    P:  IR for IVC filter recommended serial US, if clot propagation then IVC filter  Pain control PRN  Physical therapy   Rest of management as per primary team    plan d/w Dr. Davis

## 2023-07-15 NOTE — PROGRESS NOTE ADULT - ASSESSMENT
51 y/o male with metastatic colon cancer on chemo, PE  admitted for:      1. Leukocytosis SIRS, no source of infection identified,   workup negative  leukocytosis likely due to Neulasta and dehydration   Had bump im WBCs this am possibly reactive? has hematoma and DVT, No fevers  s/p Ciprofloxacin and Metronidazole in ER;   S/p  Zosyn, now off   UA negative, pt without urinary symptoms   stool and GI PCR negative  blood culture NGTD   CT abd/pelvis - no infectious/inflammatory process   CTA; no PNA   ID f/u appreciated, abxs stopped, will monitor off     2. 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  Groin pain  likely 2/2 RLE DVT  vs referred pain related to cancer   RLE Doppler neg for  hematoma or  aneurysm, + common femoral vein DVT   F/u ECHO:  bubble study neg for shunts   D/w IR and Vascular, new thrombus?  will plan for IVC filter     4. Metastatic colon cancer  to the liver and lung on chemotherapy  on palliative chemotherapy:  initially FOLFOXIRI / sunny , after 10 cycles changed to FOLFIRI/ Bevacizumab.  Last chemotherapy 7/5/23 Had OnPro 7/8/23    - Heme/Onc recs appreciated     5. Anxiety  Reported frustration and suicidal thoughts to SW on 7/13  Was evaluated by psych, I d/w NP, Pt is not currently suicidal/ homicidal does not need 1:1   Pt was offered Anti depressant to be declined  but he declined     DVT ppx: heparin drip, switch to Lovenox      Code status: Full code       Dispo;  C/w pain management, monitor ostomy output,  tentatively scheduled for IVC filter om Monday with Dr Mauricio

## 2023-07-15 NOTE — PROGRESS NOTE ADULT - SUBJECTIVE AND OBJECTIVE BOX
CC: Abdominal pain     HPI:  51 y/o M with PMH colon cancer with metastases to the liver and lung on chemotherapy, PE presented with abdominal pain. Pt reports loose/watery stools, dehydration, decreased appetite over the last 3 days. His last chemotherapy was on Wednesday. No recent antibiotic use. Reports chills, cramping abdominal pain (sharp abdominal pain has been chronic for years), nausea, dry heaving, vomiting (6 episodes of watery outpt). Denies fevers, chills, chest pain, SOB, abdominal pain, N/V, diarrhea/constipation.   Prior admission:   - 4/19/23: Intestinal stoma prolapse   ER course: HR . Labs: .35 -> 112.01, lactate 2.4 -> 1.4, Na 134, glucose 113, alkaline phosphatase 191. UA: negative. COVID and RVP negative.   Imaging:  - CXR: port right chest wall, no consolidation, no effusion, no pneumothorax (personally reviewed.    - CT abd/pelvis: No significant change when compared to prior study 6/13/2023. No acute findings to suggest inflammatory or infectious process in the abdomen or pelvis, no bowel obstruction. Unchanged hepatic metastasis and peritoneal thickening with serosal implant in the right lower pelvis and postsurgical changes from colectomy and right abdominal ileostomy. No morphologic abnormality at the ileostomy site. Occlusion of left external iliac artery.   Pt was given Ciprofloxacin and Metronidazole, 3L of NS, Pepcid morphine, Zofran He is being admitted to med/surg (1N) for further management.       INTERVAL HPI/ OVERNIGHT EVENTS: Pt was seen and examined,    looks more comfortable, reports still has pain. Results and further plan for IVC filter discussed. Pt reports that understands why needs procedure and agrees to have it       REVIEW OF SYSTEMS:  All other review of systems is negative unless indicated above.      PHYSICAL EXAM:  General: Well developed; in no acute distress  Eyes: EOMI; conjunctiva and sclera clear  Head: Normocephalic; atraumatic  ENMT: No nasal discharge; airway clear  Neck: Supple; non tender; no masses  Respiratory: No wheezes, rales or rhonchi  Cardiovascular: Regular rate and rhythm. S1 and S2 Normal;  Gastrointestinal: Soft non-tender non-distended; Normal bowel sounds, ileostomy  in place   Genitourinary: No  suprapubic  tenderness  Extremities: Normal range of motion, No edema, some R calf tenderness   Vascular: Peripheral DP pulses not  palpable, R groin  tenderness to palpation    Neurological: Alert and oriented x 3, non focal   Skin: Warm and dry. No acute rash  Musculoskeletal: Normal muscle tone, without deformities  Psychiatric: Cooperative and appropriate      LABS:                                    12.2   15.69 )-----------( 221      ( 15 Jul 2023 08:28 )             38.1     07-15    137  |  105  |  10  ----------------------------<  106<H>  4.1   |  27  |  0.72    Ca    9.1      15 Jul 2023 08:28  Mg     2.1     07-14                 11.4   24.27 )-----------( 163      ( 14 Jul 2023 07:05 )             35.0     07-14    135  |  107  |  10  ----------------------------<  105<H>  4.5   |  24  |  0.70    Ca    8.9      14 Jul 2023 07:05  Phos  3.0     07-13  Mg     2.1     07-14                            12.0   21.52 )-----------( 152      ( 13 Jul 2023 08:15 )             37.2     07-13    138  |  105  |  7   ----------------------------<  98  3.4<L>   |  26  |  0.76    Ca    8.8      13 Jul 2023 08:15  Phos  3.0     07-13  Mg     2.0     07-13    PTT - ( 13 Jul 2023 08:15 )  PTT:82.1 sec  Urinalysis Basic - ( 13 Jul 2023 08:15 )                            12.3   38.38 )-----------( 143      ( 12 Jul 2023 08:23 )             38.0     11 Jul 2023 07:20    140    |  110    |  6      ----------------------------<  117    4.1     |  23     |  0.81     Ca    8.6        11 Jul 2023 07:20  Phos  3.3       11 Jul 2023 07:20  Mg     2.3       11 Jul 2023 07:20      PT/INR - ( 11 Jul 2023 07:20 )   PT: 12.6 sec;   INR: 1.09 ratio    PTT - ( 12 Jul 2023 08:23 )  PTT:89.7 sec    MEDICATIONS  (STANDING):  dextrose 5% + sodium chloride 0.45% with potassium chloride 20 mEq/L 1000 milliLiter(s) (125 mL/Hr) IV Continuous <Continuous>  diphenoxylate/atropine 1 Tablet(s) Oral every 6 hours  enoxaparin Injectable 90 milliGRAM(s) SubCutaneous every 12 hours  fentaNYL   Patch  12 MICROgram(s)/Hr 1 Patch Transdermal every 72 hours  gabapentin 100 milliGRAM(s) Oral every 8 hours  lidocaine   4% Patch 1 Patch Transdermal daily  loperamide 4 milliGRAM(s) Oral three times a day  naloxone Injectable 0.4 milliGRAM(s) IV Push once  psyllium Powder      psyllium Powder 1 Packet(s) Oral every 12 hours  sodium chloride 0.9%. 1000 milliLiter(s) (150 mL/Hr) IV Continuous <Continuous>    MEDICATIONS  (PRN):  acetaminophen     Tablet .. 650 milliGRAM(s) Oral every 6 hours PRN Temp greater or equal to 38C (100.4F), Mild Pain (1 - 3)  aluminum hydroxide/magnesium hydroxide/simethicone Suspension 30 milliLiter(s) Oral every 4 hours PRN Dyspepsia  HYDROmorphone   Tablet 2 milliGRAM(s) Oral every 4 hours PRN Moderate Pain (4 - 6)  HYDROmorphone   Tablet 4 milliGRAM(s) Oral every 4 hours PRN Severe Pain (7 - 10)  HYDROmorphone  Injectable 1 milliGRAM(s) IV Push three times a day PRN break through pain  melatonin 3 milliGRAM(s) Oral at bedtime PRN Insomnia  ondansetron Injectable 4 milliGRAM(s) IV Push every 8 hours PRN Nausea and/or Vomiting        RADIOLOGY & ADDITIONAL TESTS:    ACC: [id number] EXAM:  CT ANGIO ABD AOR W RUN(W)AW IC   ORDERED BY: TINO SANDOVAL     PROCEDURE DATE:  07/10/2023          INTERPRETATION:  CT ANGIOGRAM ABDOMEN, PELVIS, AND LOWER EXTREMITIES:    CT ANGIO ABDOMINAL AORTA RUNOFF    CLINICAL INFORMATION:  r/o LLE ischemia    TECHNIQUE:    CONTRAST/COMPLICATIONS:  IV Contrast: Omnipaque 350  125 cc administered   25 cc discarded  Oral Contrast: NONE  Complications: None reported at time of study completion    PROCEDURE:  Initially, nonenhanced CT was obtained through the calves. Then,   following the rapid administration of intravenous contrast, CT   angiography was performed through the abdomen, pelvis, and lower   extremities down to the toes.  Delayed images through the calves were   also obtained. Sagittal and coronal reformats as well as 3D   reconstructions were performed.    Volume rendered and MIP images for CT angiographic display are created   from source data on an independent 3-D workstation and archived into   PACS. This study was performed using automatic exposure control   (radiation dose reduction software) to obtain a diagnostic image quality   scan with patient dose as low as reasonably achievable.    COMPARISON: CT abdomen pelvis 7/9/2023.    FINDINGS:    ABDOMINAL AORTA: Normal caliber of the abdominal aorta, 1.9 cm   suprarenal, 1.7 cm infrarenal, 1.6 cm at the bifurcation.    Celiac: Patent, no stenosis.  SMA: Patent, no stenosis.  ANYA: Patent, no stenosis  Renal Arteries: Duplicated left renal artery, no stenosis. Single right   renal artery, no stenosis    RIGHT LEG:    Common Iliac artery: Mild atherosclerosis. No high-grade stenosis or   occlusion.  External Iliac: Mild atherosclerotic disease, no high-grade stenosis or   occlusion.  Internal Iliac: Moderate atherosclerotic disease mild stenosis at the   origin. Patent.    Common femoral: Patent, no stenosis or  SFA: Patent, mild atherosclerotic plaques, no stenosis.  Profunda: Patent, normal    Popliteal: Patent, no stenosis or occlusion  Anterior Tibial (AT): Patent.  Tibioperoneal trunk (TPT): Patent, no stenosis or occlusion.  Posterior Tibial (PT): Patent, no stenosis.  Peroneal: Patent to the level of the ankle  Dorsalis Pedis (DP): Patent  Plantar: Patent    LEFT LEG:    Common Iliac artery: Patent, moderate atherosclerotic changes  External Iliac: Occluded  Internal Iliac: Patent, high-grade stenosis at the origin, moderate   atherosclerotic changes.    Common femoral: Recanalized and reconstituted common femoral artery   through collaterals from the left inferior epigastric artery.  SFA: Patent, mild atherosclerotic changes, no stenosis or occlusion.  Profunda: Normal    Popliteal: Patent, mild atherosclerotic plaques, no significant stenosis.  AT: Patent, no stenosis or occlusion  TPT: Patent, no stenosis  PT: Patent in the proximal two thirds.  Peroneal: Patent, no significant stenosis.  DP: Patent  Plantar: Not well opacified      ADDITIONAL FINDINGS:  LOWER CHEST: Within normal limits.    LIVER: Bilateral metastatic lesions in the liver with calcification,   unchanged compared to prior study 7/9/2023.  BILE DUCTS: Normal caliber.  GALLBLADDER: Vicarious excretion of contrast.  SPLEEN: Mild splenomegaly  PANCREAS: Within normal limits.  ADRENALS: Within normal limits.  KIDNEYS/URETERS: No renal stones or hydronephrosis.    BLADDER: Distended, filled with contrast, within normal limits.  REPRODUCTIVE ORGANS: Prostate within normal limits.    BOWEL: Postsurgical changes from partial colectomy, Gloria stump   creation and right end ileostomy. No bowel obstruction. Appendix is   surgically absent.  PERITONEUM: No ascites. Unchanged peritoneal nodular thickening and   serosal implant within the right pelvis as described from prior study,   unchanged.  VESSELS: Aorta and pelvic arteries and described in detail above. Patent   portal vein. Patent renal veins. Left retroaortic renal vein.  RETROPERITONEUM/LYMPH NODES: No lymphadenopathy.  ABDOMINAL WALL: Within normal limits.  BONES: Within normal limits.    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 Two-vessel runoff in the left calf.  High grade stenosis at the origin of the left internal iliac artery.  Three-vessel arterial runoff of the right lower extremity.  Atherosclerotic disease in the abdomen as detailed above.      ACC: [id number] [facility]                          PROCEDURE DATE:  07/02/2022          INTERPRETATION:  Reason for Exam: Shortness of breath. chest pain.    CTA of the chest was performed from the thoracic inlet to the level of   the adrenal glands following IV contrast injection of  80 cc of Omnipaque   350. No immediate complications were reported.  MIP images were also   created and reviewed.    Comparison: CT abdomen and pelvis dated May 25, 2022    Tubes/Lines: Right Mediport catheter tip in SVC.    Mediastinum and Heart: Aorta and pulmonary arteries are normal in size.   Moderate stenosis of the proximal left subclavian artery from   atherosclerotic plaque. Thyroid gland is unremarkable. No   lymphadenopathy.No pericardial effusion.    Lungs, Pleura, and Airways: There is no pulmonary embolus. No   consolidations, edema or effusion. Small left pneumothorax noted. Right   lower lobe 4 mm nodule in series 4 image 289. Left apical 4 mm nodule on   image 57.    Visualized Abdomen: Multiple calcified masses in the liver, without   significant change since prior.    Bones and soft tissues: Unremarkable.    IMPRESSION:    No pulmonary embolus.    Small left pneumothorax without mediastinal shift.    Remaining findings as above.      < from: US Duplex Venous Lower Ext Ltd, Right (07.14.23 @ 13:33) >    ACC: [id number] EXAM:  US DPLX LWR EXT ARTS LTD RT   ORDERED BY: IMTIAZ LISA     ACC: [id number] EXAM:  US DPLX LWR EXT VEINS LTD RT   ORDERED BY: IMTIAZ LISA     PROCEDURE DATE:  07/14/2023          INTERPRETATION:  CLINICAL INFORMATION: Right leg pain status post right   groin catheterization. Evaluate for DVT, hematoma/pseudoaneurysm.    COMPARISON: None available.    TECHNIQUE: Duplex sonography of the RIGHT LOWER extremity veins with   color and spectral Doppler, with and without compression.    FINDINGS:    There is normal compressibility of the right femoral and popliteal veins.   Evidence of thrombosis is noted involving the right common femoral vein.  The contralateral common femoral vein is patent.  Doppler examinationshows normal spontaneous and phasic flow.    No calf vein thrombosis is detected.    Normal flow is identified in the right common femoral and proximal   femoral arteries. There is no abnormal flow to suggest a pseudoaneurysm.   No complex collectionis seen.    IMPRESSION:  Acute deep venous thrombosis: above the knee.  Acute thrombosis involving the right common femoral vein.  No evidence of a hematoma or pseudoaneurysm.

## 2023-07-15 NOTE — PROGRESS NOTE ADULT - SUBJECTIVE AND OBJECTIVE BOX
SURGERY DAILY PROGRESS NOTE:     Subjective:  Patient seen and examined at bedside during am rounds. Patient reports continuing R groin and calf pain. Denies any fevers, chills, n/v/d, chest pain or shortness of breath    Objective:  PHYSICAL EXAM   AO x3, NAD  GENERAL: No acute distress, well-developed  HEAD:  Atraumatic, Normocephalic  CHEST/LUNG: normal effort, equal chest rise  ABDOMEN: Soft, mild tenderness RLQ, non-distended, ileostomy with clear/yellow stool with prolapse. B/l groin no hematoma or ecchymosis  Ext. no cyanosis or edema, warm, RLE: femoral, DP/PT pulses palpable, LLE: DP palpable/Dopplerable, PT dopplerable, femoral 1+palpable, dopplerable  NEUROLOGY: responding appropriately, no focal deficits

## 2023-07-16 LAB
HCT VFR BLD CALC: 38.6 % — LOW (ref 39–50)
HGB BLD-MCNC: 12.2 G/DL — LOW (ref 13–17)
MCHC RBC-ENTMCNC: 28.1 PG — SIGNIFICANT CHANGE UP (ref 27–34)
MCHC RBC-ENTMCNC: 31.6 GM/DL — LOW (ref 32–36)
MCV RBC AUTO: 88.9 FL — SIGNIFICANT CHANGE UP (ref 80–100)
PLATELET # BLD AUTO: 245 K/UL — SIGNIFICANT CHANGE UP (ref 150–400)
RBC # BLD: 4.34 M/UL — SIGNIFICANT CHANGE UP (ref 4.2–5.8)
RBC # FLD: 17.5 % — HIGH (ref 10.3–14.5)
WBC # BLD: 14.56 K/UL — HIGH (ref 3.8–10.5)
WBC # FLD AUTO: 14.56 K/UL — HIGH (ref 3.8–10.5)

## 2023-07-16 PROCEDURE — 99231 SBSQ HOSP IP/OBS SF/LOW 25: CPT | Mod: 24,GC

## 2023-07-16 PROCEDURE — 93971 EXTREMITY STUDY: CPT | Mod: 26,RT

## 2023-07-16 PROCEDURE — 99233 SBSQ HOSP IP/OBS HIGH 50: CPT

## 2023-07-16 RX ORDER — DIPHENOXYLATE HCL/ATROPINE 2.5-.025MG
2 TABLET ORAL
Qty: 240 | Refills: 0
Start: 2023-07-16 | End: 2023-08-14

## 2023-07-16 RX ORDER — LOPERAMIDE HCL 2 MG
2 TABLET ORAL
Qty: 240 | Refills: 0 | DISCHARGE
Start: 2023-07-16 | End: 2023-08-14

## 2023-07-16 RX ORDER — PSYLLIUM SEED (WITH DEXTROSE)
3.4 POWDER (GRAM) ORAL
Qty: 1 | Refills: 0
Start: 2023-07-16 | End: 2023-08-14

## 2023-07-16 RX ORDER — LOPERAMIDE HCL 2 MG
2 TABLET ORAL
Qty: 240 | Refills: 0
Start: 2023-07-16 | End: 2023-08-14

## 2023-07-16 RX ADMIN — Medication 4 MILLIGRAM(S): at 05:56

## 2023-07-16 RX ADMIN — HYDROMORPHONE HYDROCHLORIDE 4 MILLIGRAM(S): 2 INJECTION INTRAMUSCULAR; INTRAVENOUS; SUBCUTANEOUS at 05:55

## 2023-07-16 RX ADMIN — Medication 4 MILLIGRAM(S): at 21:47

## 2023-07-16 RX ADMIN — Medication 1 TABLET(S): at 17:43

## 2023-07-16 RX ADMIN — HYDROMORPHONE HYDROCHLORIDE 4 MILLIGRAM(S): 2 INJECTION INTRAMUSCULAR; INTRAVENOUS; SUBCUTANEOUS at 09:27

## 2023-07-16 RX ADMIN — HYDROMORPHONE HYDROCHLORIDE 4 MILLIGRAM(S): 2 INJECTION INTRAMUSCULAR; INTRAVENOUS; SUBCUTANEOUS at 21:00

## 2023-07-16 RX ADMIN — ENOXAPARIN SODIUM 90 MILLIGRAM(S): 100 INJECTION SUBCUTANEOUS at 21:48

## 2023-07-16 RX ADMIN — Medication 1 TABLET(S): at 11:36

## 2023-07-16 RX ADMIN — Medication 1 TABLET(S): at 23:17

## 2023-07-16 RX ADMIN — Medication 1 PACKET(S): at 05:55

## 2023-07-16 RX ADMIN — Medication 1 TABLET(S): at 05:56

## 2023-07-16 RX ADMIN — HYDROMORPHONE HYDROCHLORIDE 4 MILLIGRAM(S): 2 INJECTION INTRAMUSCULAR; INTRAVENOUS; SUBCUTANEOUS at 14:22

## 2023-07-16 RX ADMIN — Medication 1 PACKET(S): at 17:43

## 2023-07-16 RX ADMIN — HYDROMORPHONE HYDROCHLORIDE 4 MILLIGRAM(S): 2 INJECTION INTRAMUSCULAR; INTRAVENOUS; SUBCUTANEOUS at 00:18

## 2023-07-16 RX ADMIN — Medication 4 MILLIGRAM(S): at 14:20

## 2023-07-16 RX ADMIN — HYDROMORPHONE HYDROCHLORIDE 4 MILLIGRAM(S): 2 INJECTION INTRAMUSCULAR; INTRAVENOUS; SUBCUTANEOUS at 11:40

## 2023-07-16 RX ADMIN — ENOXAPARIN SODIUM 90 MILLIGRAM(S): 100 INJECTION SUBCUTANEOUS at 09:32

## 2023-07-16 RX ADMIN — HYDROMORPHONE HYDROCHLORIDE 4 MILLIGRAM(S): 2 INJECTION INTRAMUSCULAR; INTRAVENOUS; SUBCUTANEOUS at 05:46

## 2023-07-16 RX ADMIN — HYDROMORPHONE HYDROCHLORIDE 4 MILLIGRAM(S): 2 INJECTION INTRAMUSCULAR; INTRAVENOUS; SUBCUTANEOUS at 20:25

## 2023-07-16 NOTE — PROGRESS NOTE ADULT - ASSESSMENT
51 yo M presented with high output ileostomy and RLE DVT  doing well  labs pending     P:  Pain control  Continue Metamucil, Loperamide, Lomotil  I/Os  Oral hydration  monitor ostomy output  pain control  f/u repeat RLE duplex  cont AC lov 90 q12  Rest of management as per primary team    To be discussed with CRS attending.

## 2023-07-16 NOTE — PROGRESS NOTE ADULT - ASSESSMENT
51 y/o male with metastatic colon cancer on chemo, PE  admitted for:      1. Leukocytosis SIRS, no source of infection identified,   workup negative  leukocytosis likely due to Neulasta and dehydration   Had bump im WBCs this am possibly reactive? has hematoma and DVT, No fevers. Trending down now   s/p Ciprofloxacin and Metronidazole in ER;   S/p  Zosyn, now off   UA negative, pt without urinary symptoms   stool and GI PCR negative  blood culture NGTD   CT abd/pelvis - no infectious/inflammatory process   CTA; no PNA   Off abxs now as per ID     2. 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  Groin pain. Acute RLE DVT   likely 2/2 RLE DVT  vs referred pain related to cancer   RLE Doppler neg for  hematoma or  aneurysm, + common femoral vein DVT   F/u ECHO:  bubble study neg for shunts   D/w IR and Vascular, new thrombus?  vs pre admission, no venous  imaging on admission. Recommended to repear RLE doppler  which was done and no change   D/w hematology  team agree with IR recs  I d/w vascular resident, will need to further discuss indications for IVC filer       4. Metastatic colon cancer  to the liver and lung on chemotherapy  on palliative chemotherapy:  initially FOLFOXIRI / sunny , after 10 cycles changed to FOLFIRI/ Bevacizumab.  Last chemotherapy 7/5/23 Had OnPro 7/8/23    - Heme/Onc recs appreciated     5. Anxiety  Reported frustration and suicidal thoughts to SW on 7/13  Was evaluated by psych, I d/w NP, Pt is not currently suicidal/ homicidal does not need 1:1   Pt was offered Anti depressant to be declined  but he declined     DVT ppx: heparin drip, switch to Lovenox      Code status: Full code       Dispo;  C/w pain management, monitor ostomy output,  tentatively scheduled for IVC filter on  Monday with Dr Mauricio   51 y/o male with metastatic colon cancer on chemo, PE  admitted for:      1. Leukocytosis SIRS, no source of infection identified,   workup negative  leukocytosis likely due to Neulasta and dehydration   Had bump im WBCs this am possibly reactive? has hematoma and DVT, No fevers. Trending down now   s/p Ciprofloxacin and Metronidazole in ER;   S/p  Zosyn, now off   UA negative, pt without urinary symptoms   stool and GI PCR negative  blood culture NGTD   CT abd/pelvis - no infectious/inflammatory process   CTA; no PNA   Off abxs now as per ID     2. 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  Groin pain. Acute RLE DVT   likely 2/2 RLE DVT  vs referred pain related to cancer   RLE Doppler neg for  hematoma or  aneurysm, + common femoral vein DVT   F/u ECHO:  bubble study neg for shunts   D/w IR and Vascular, new thrombus?  vs pre admission, no venous  imaging on admission. Recommended to repear RLE doppler  which was done and no change   D/w hematology  team agree with IR recs  I d/w vascular resident, will need to further discuss indications for IVC filer       4. Metastatic colon cancer  to the liver and lung on chemotherapy  on palliative chemotherapy:  initially FOLFOXIRI / sunny , after 10 cycles changed to FOLFIRI/ Bevacizumab.  Last chemotherapy 7/5/23 Had OnPro 7/8/23    - Heme/Onc recs appreciated     5. Anxiety  Reported frustration and suicidal thoughts to SW on 7/13  Was evaluated by psych, I d/w NP, Pt is not currently suicidal/ homicidal does not need 1:1   Pt was offered Anti depressant to be declined  but he declined     DVT ppx: heparin drip, switch to Lovenox      Code status: Full code       Dispo;  C/w pain management, monitor ostomy output,  tentatively scheduled for IVC filter on  Monday with Dr Mauricio. Do not  hold A/c

## 2023-07-16 NOTE — PROGRESS NOTE ADULT - SUBJECTIVE AND OBJECTIVE BOX
CC: Abdominal pain     HPI:  53 y/o M with PMH colon cancer with metastases to the liver and lung on chemotherapy, PE presented with abdominal pain. Pt reports loose/watery stools, dehydration, decreased appetite over the last 3 days. His last chemotherapy was on Wednesday. No recent antibiotic use. Reports chills, cramping abdominal pain (sharp abdominal pain has been chronic for years), nausea, dry heaving, vomiting (6 episodes of watery outpt). Denies fevers, chills, chest pain, SOB, abdominal pain, N/V, diarrhea/constipation.   Prior admission:   - 4/19/23: Intestinal stoma prolapse   ER course: HR . Labs: .35 -> 112.01, lactate 2.4 -> 1.4, Na 134, glucose 113, alkaline phosphatase 191. UA: negative. COVID and RVP negative.   Imaging:  - CXR: port right chest wall, no consolidation, no effusion, no pneumothorax (personally reviewed.    - CT abd/pelvis: No significant change when compared to prior study 6/13/2023. No acute findings to suggest inflammatory or infectious process in the abdomen or pelvis, no bowel obstruction. Unchanged hepatic metastasis and peritoneal thickening with serosal implant in the right lower pelvis and postsurgical changes from colectomy and right abdominal ileostomy. No morphologic abnormality at the ileostomy site. Occlusion of left external iliac artery.   Pt was given Ciprofloxacin and Metronidazole, 3L of NS, Pepcid morphine, Zofran He is being admitted to med/surg (1N) for further management.       INTERVAL HPI/ OVERNIGHT EVENTS: Pt was seen and examined, reports some improvement of pain but still needs PRNs all the time. Otherwise is doing pk, has better oral intake, ostomy output is decreased. No fevers. Plan for possible procedure tomorrow discussed       REVIEW OF SYSTEMS:  All other review of systems is negative unless indicated above.      PHYSICAL EXAM:  General: Well developed; in no acute distress  Eyes: EOMI; conjunctiva and sclera clear  Head: Normocephalic; atraumatic  ENMT: No nasal discharge; airway clear  Neck: Supple; non tender; no masses  Respiratory: No wheezes, rales or rhonchi  Cardiovascular: Regular rate and rhythm. S1 and S2 Normal;  Gastrointestinal: Soft non-tender non-distended; Normal bowel sounds, ileostomy  in place   Genitourinary: No  suprapubic  tenderness  Extremities: Normal range of motion, No edema, some R calf tenderness   Vascular: Peripheral DP pulses not  palpable, R groin  tenderness to palpation    Neurological: Alert and oriented x 3, non focal   Skin: Warm and dry. No acute rash  Musculoskeletal: Normal muscle tone, without deformities  Psychiatric: Cooperative and appropriate      LABS:                         12.2   14.56 )-----------( 245      ( 16 Jul 2023 08:29 )             38.6     07-15    137  |  105  |  10  ----------------------------<  106<H>  4.1   |  27  |  0.72    Ca    9.1      15 Jul 2023 08:28                     12.2   15.69 )-----------( 221      ( 15 Jul 2023 08:28 )             38.1     07-15    137  |  105  |  10  ----------------------------<  106<H>  4.1   |  27  |  0.72    Ca    9.1      15 Jul 2023 08:28  Mg     2.1     07-14                 11.4   24.27 )-----------( 163      ( 14 Jul 2023 07:05 )             35.0     07-14    135  |  107  |  10  ----------------------------<  105<H>  4.5   |  24  |  0.70    Ca    8.9      14 Jul 2023 07:05  Phos  3.0     07-13  Mg     2.1     07-14                            12.0   21.52 )-----------( 152      ( 13 Jul 2023 08:15 )             37.2     07-13    138  |  105  |  7   ----------------------------<  98  3.4<L>   |  26  |  0.76    Ca    8.8      13 Jul 2023 08:15  Phos  3.0     07-13  Mg     2.0     07-13    PTT - ( 13 Jul 2023 08:15 )  PTT:82.1 sec  Urinalysis Basic - ( 13 Jul 2023 08:15 )                            12.3   38.38 )-----------( 143      ( 12 Jul 2023 08:23 )             38.0     11 Jul 2023 07:20    140    |  110    |  6      ----------------------------<  117    4.1     |  23     |  0.81     Ca    8.6        11 Jul 2023 07:20  Phos  3.3       11 Jul 2023 07:20  Mg     2.3       11 Jul 2023 07:20      PT/INR - ( 11 Jul 2023 07:20 )   PT: 12.6 sec;   INR: 1.09 ratio    PTT - ( 12 Jul 2023 08:23 )  PTT:89.7 sec    MEDICATIONS  (STANDING):  dextrose 5% + sodium chloride 0.45% with potassium chloride 20 mEq/L 1000 milliLiter(s) (125 mL/Hr) IV Continuous <Continuous>  diphenoxylate/atropine 1 Tablet(s) Oral every 6 hours  enoxaparin Injectable 90 milliGRAM(s) SubCutaneous every 12 hours  fentaNYL   Patch  12 MICROgram(s)/Hr 1 Patch Transdermal every 72 hours  gabapentin 100 milliGRAM(s) Oral every 8 hours  lidocaine   4% Patch 1 Patch Transdermal daily  loperamide 4 milliGRAM(s) Oral three times a day  naloxone Injectable 0.4 milliGRAM(s) IV Push once  psyllium Powder      psyllium Powder 1 Packet(s) Oral every 12 hours  sodium chloride 0.9%. 1000 milliLiter(s) (150 mL/Hr) IV Continuous <Continuous>    MEDICATIONS  (PRN):  acetaminophen     Tablet .. 650 milliGRAM(s) Oral every 6 hours PRN Temp greater or equal to 38C (100.4F), Mild Pain (1 - 3)  aluminum hydroxide/magnesium hydroxide/simethicone Suspension 30 milliLiter(s) Oral every 4 hours PRN Dyspepsia  HYDROmorphone   Tablet 2 milliGRAM(s) Oral every 4 hours PRN Moderate Pain (4 - 6)  HYDROmorphone   Tablet 4 milliGRAM(s) Oral every 4 hours PRN Severe Pain (7 - 10)  HYDROmorphone  Injectable 1 milliGRAM(s) IV Push three times a day PRN break through pain  melatonin 3 milliGRAM(s) Oral at bedtime PRN Insomnia  ondansetron Injectable 4 milliGRAM(s) IV Push every 8 hours PRN Nausea and/or Vomiting        RADIOLOGY & ADDITIONAL TESTS:    ACC: [id number] EXAM:  CT ANGIO ABD AOR W RUN(W)AW IC   ORDERED BY: TINO SANDOVAL     PROCEDURE DATE:  07/10/2023          INTERPRETATION:  CT ANGIOGRAM ABDOMEN, PELVIS, AND LOWER EXTREMITIES:    CT ANGIO ABDOMINAL AORTA RUNOFF    CLINICAL INFORMATION:  r/o LLE ischemia    TECHNIQUE:    CONTRAST/COMPLICATIONS:  IV Contrast: Omnipaque 350  125 cc administered   25 cc discarded  Oral Contrast: NONE  Complications: None reported at time of study completion    PROCEDURE:  Initially, nonenhanced CT was obtained through the calves. Then,   following the rapid administration of intravenous contrast, CT   angiography was performed through the abdomen, pelvis, and lower   extremities down to the toes.  Delayed images through the calves were   also obtained. Sagittal and coronal reformats as well as 3D   reconstructions were performed.    Volume rendered and MIP images for CT angiographic display are created   from source data on an independent 3-D workstation and archived into   PACS. This study was performed using automatic exposure control   (radiation dose reduction software) to obtain a diagnostic image quality   scan with patient dose as low as reasonably achievable.    COMPARISON: CT abdomen pelvis 7/9/2023.    FINDINGS:    ABDOMINAL AORTA: Normal caliber of the abdominal aorta, 1.9 cm   suprarenal, 1.7 cm infrarenal, 1.6 cm at the bifurcation.    Celiac: Patent, no stenosis.  SMA: Patent, no stenosis.  ANYA: Patent, no stenosis  Renal Arteries: Duplicated left renal artery, no stenosis. Single right   renal artery, no stenosis    RIGHT LEG:    Common Iliac artery: Mild atherosclerosis. No high-grade stenosis or   occlusion.  External Iliac: Mild atherosclerotic disease, no high-grade stenosis or   occlusion.  Internal Iliac: Moderate atherosclerotic disease mild stenosis at the   origin. Patent.    Common femoral: Patent, no stenosis or  SFA: Patent, mild atherosclerotic plaques, no stenosis.  Profunda: Patent, normal    Popliteal: Patent, no stenosis or occlusion  Anterior Tibial (AT): Patent.  Tibioperoneal trunk (TPT): Patent, no stenosis or occlusion.  Posterior Tibial (PT): Patent, no stenosis.  Peroneal: Patent to the level of the ankle  Dorsalis Pedis (DP): Patent  Plantar: Patent    LEFT LEG:    Common Iliac artery: Patent, moderate atherosclerotic changes  External Iliac: Occluded  Internal Iliac: Patent, high-grade stenosis at the origin, moderate   atherosclerotic changes.    Common femoral: Recanalized and reconstituted common femoral artery   through collaterals from the left inferior epigastric artery.  SFA: Patent, mild atherosclerotic changes, no stenosis or occlusion.  Profunda: Normal    Popliteal: Patent, mild atherosclerotic plaques, no significant stenosis.  AT: Patent, no stenosis or occlusion  TPT: Patent, no stenosis  PT: Patent in the proximal two thirds.  Peroneal: Patent, no significant stenosis.  DP: Patent  Plantar: Not well opacified      ADDITIONAL FINDINGS:  LOWER CHEST: Within normal limits.    LIVER: Bilateral metastatic lesions in the liver with calcification,   unchanged compared to prior study 7/9/2023.  BILE DUCTS: Normal caliber.  GALLBLADDER: Vicarious excretion of contrast.  SPLEEN: Mild splenomegaly  PANCREAS: Within normal limits.  ADRENALS: Within normal limits.  KIDNEYS/URETERS: No renal stones or hydronephrosis.    BLADDER: Distended, filled with contrast, within normal limits.  REPRODUCTIVE ORGANS: Prostate within normal limits.    BOWEL: Postsurgical changes from partial colectomy, Gloria stump   creation and right end ileostomy. No bowel obstruction. Appendix is   surgically absent.  PERITONEUM: No ascites. Unchanged peritoneal nodular thickening and   serosal implant within the right pelvis as described from prior study,   unchanged.  VESSELS: Aorta and pelvic arteries and described in detail above. Patent   portal vein. Patent renal veins. Left retroaortic renal vein.  RETROPERITONEUM/LYMPH NODES: No lymphadenopathy.  ABDOMINAL WALL: Within normal limits.  BONES: Within normal limits.    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 Two-vessel runoff in the left calf.  High grade stenosis at the origin of the left internal iliac artery.  Three-vessel arterial runoff of the right lower extremity.  Atherosclerotic disease in the abdomen as detailed above.      ACC: [id number] EXAM:  CT ANGIO CHEST PULM ART [facility]                          PROCEDURE DATE:  07/02/2022          INTERPRETATION:  Reason for Exam: Shortness of breath. chest pain.    CTA of the chest was performed from the thoracic inlet to the level of   the adrenal glands following IV contrast injection of  80 cc of Omnipaque   350. No immediate complications were reported.  MIP images were also   created and reviewed.    Comparison: CT abdomen and pelvis dated May 25, 2022    Tubes/Lines: Right Mediport catheter tip in SVC.    Mediastinum and Heart: Aorta and pulmonary arteries are normal in size.   Moderate stenosis of the proximal left subclavian artery from   atherosclerotic plaque. Thyroid gland is unremarkable. No   lymphadenopathy.No pericardial effusion.    Lungs, Pleura, and Airways: There is no pulmonary embolus. No   consolidations, edema or effusion. Small left pneumothorax noted. Right   lower lobe 4 mm nodule in series 4 image 289. Left apical 4 mm nodule on   image 57.    Visualized Abdomen: Multiple calcified masses in the liver, without   significant change since prior.    Bones and soft tissues: Unremarkable.    IMPRESSION:    No pulmonary embolus.    Small left pneumothorax without mediastinal shift.    Remaining findings as above.      < from: US Duplex Venous Lower Ext Ltd, Right (07.14.23 @ 13:33) >    ACC: [id number] EXAM:  US DPLX LWR EXT ARTS LTD RT   ORDERED BY: IMTIAZ LISA     ACC: [id number] EXAM:  US DPLX LWR EXT VEINS LTD RT   ORDERED BY: IMTIAZ LISA     PROCEDURE DATE:  07/14/2023          INTERPRETATION:  CLINICAL INFORMATION: Right leg pain status post right   groin catheterization. Evaluate for DVT, hematoma/pseudoaneurysm.    COMPARISON: None available.    TECHNIQUE: Duplex sonography of the RIGHT LOWER extremity veins with   color and spectral Doppler, with and without compression.    FINDINGS:    There is normal compressibility of the right femoral and popliteal veins.   Evidence of thrombosis is noted involving the right common femoral vein.  The contralateral common femoral vein is patent.  Doppler examinationshows normal spontaneous and phasic flow.    No calf vein thrombosis is detected.    Normal flow is identified in the right common femoral and proximal   femoral arteries. There is no abnormal flow to suggest a pseudoaneurysm.   No complex collectionis seen.    IMPRESSION:  Acute deep venous thrombosis: above the knee.  Acute thrombosis involving the right common femoral vein.  No evidence of a hematoma or pseudoaneurysm.

## 2023-07-16 NOTE — PROGRESS NOTE ADULT - ASSESSMENT
51 yo M presented with high output ileostomy and RLE DVT  doing well  labs pending     P:  Pain control  Continue Metamucil, Loperamide, Lomotil  I/Os  Oral hydration  monitor ostomy output  pain control  f/u repeat RLE duplex  f/u IR reccs  cont AC lov 90 q12  Rest of management as per primary team    To be discussed with attending

## 2023-07-16 NOTE — PROGRESS NOTE ADULT - SUBJECTIVE AND OBJECTIVE BOX
SURGERY DAILY PROGRESS NOTE:     Subjective:  Patient seen and examined at bedside. Pt is doing well, complaining of RLE pain. Pt is tolerating diet and reports ileostomy function has slowed down. Pt is pending repeat RLE duplex.  AVSS. Denies any fevers, chills, n/v/d, chest pain or shortness of breath    Objective:    MEDICATIONS  (STANDING):  dextrose 5% + sodium chloride 0.45% with potassium chloride 20 mEq/L 1000 milliLiter(s) (125 mL/Hr) IV Continuous <Continuous>  diphenoxylate/atropine 1 Tablet(s) Oral every 6 hours  enoxaparin Injectable 90 milliGRAM(s) SubCutaneous every 12 hours  fentaNYL   Patch  12 MICROgram(s)/Hr 1 Patch Transdermal every 72 hours  gabapentin 100 milliGRAM(s) Oral every 8 hours  lidocaine   4% Patch 1 Patch Transdermal daily  loperamide 4 milliGRAM(s) Oral three times a day  naloxone Injectable 0.4 milliGRAM(s) IV Push once  psyllium Powder      psyllium Powder 1 Packet(s) Oral every 12 hours  sodium chloride 0.9%. 1000 milliLiter(s) (150 mL/Hr) IV Continuous <Continuous>    MEDICATIONS  (PRN):  acetaminophen     Tablet .. 650 milliGRAM(s) Oral every 6 hours PRN Temp greater or equal to 38C (100.4F), Mild Pain (1 - 3)  aluminum hydroxide/magnesium hydroxide/simethicone Suspension 30 milliLiter(s) Oral every 4 hours PRN Dyspepsia  HYDROmorphone   Tablet 2 milliGRAM(s) Oral every 4 hours PRN Moderate Pain (4 - 6)  HYDROmorphone   Tablet 4 milliGRAM(s) Oral every 4 hours PRN Severe Pain (7 - 10)  HYDROmorphone  Injectable 1 milliGRAM(s) IV Push three times a day PRN break through pain  melatonin 3 milliGRAM(s) Oral at bedtime PRN Insomnia  ondansetron Injectable 4 milliGRAM(s) IV Push every 8 hours PRN Nausea and/or Vomiting      Vital Signs Last 24 Hrs  T(C): 37 (15 Jul 2023 23:06), Max: 37.2 (15 Jul 2023 15:21)  T(F): 98.6 (15 Jul 2023 23:06), Max: 98.9 (15 Jul 2023 15:21)  HR: 79 (15 Jul 2023 23:06) (79 - 94)  BP: 114/65 (15 Jul 2023 23:06) (113/84 - 139/91)  BP(mean): --  RR: 18 (15 Jul 2023 15:21) (17 - 18)  SpO2: 99% (15 Jul 2023 23:06) (99% - 100%)    Parameters below as of 15 Jul 2023 15:21  Patient On (Oxygen Delivery Method): room air          PHYSICAL EXAM   Gen: well-appearing, in no acute distress  CV: pulse regularly present   Resp: airway patent, non-labored breathing  Abd: soft, NTND; no rebound or guarding. ileostomy in place with thick stool, R groin soft no sign of hematoma   ext. no cyanosis or edema, RLE DP and PT pulse palpable, LLE DP pulse dopplerable        I&O's Detail    14 Jul 2023 07:01  -  15 Jul 2023 07:00  --------------------------------------------------------  IN:  Total IN: 0 mL    OUT:    Ileostomy (mL): 900 mL  Total OUT: 900 mL    Total NET: -900 mL      15 Jul 2023 07:01  -  16 Jul 2023 03:23  --------------------------------------------------------  IN:  Total IN: 0 mL    OUT:    Ileostomy (mL): 850 mL  Total OUT: 850 mL    Total NET: -850 mL          Daily     Daily     LABS:                        12.2   15.69 )-----------( 221      ( 15 Jul 2023 08:28 )             38.1     07-15    137  |  105  |  10  ----------------------------<  106<H>  4.1   |  27  |  0.72    Ca    9.1      15 Jul 2023 08:28  Mg     2.1     07-14      PTT - ( 14 Jul 2023 07:05 )  PTT:34.2 sec  Urinalysis Basic - ( 15 Jul 2023 08:28 )    Color: x / Appearance: x / SG: x / pH: x  Gluc: 106 mg/dL / Ketone: x  / Bili: x / Urobili: x   Blood: x / Protein: x / Nitrite: x   Leuk Esterase: x / RBC: x / WBC x   Sq Epi: x / Non Sq Epi: x / Bacteria: x

## 2023-07-17 LAB
HCT VFR BLD CALC: 36.8 % — LOW (ref 39–50)
HGB BLD-MCNC: 11.7 G/DL — LOW (ref 13–17)
INR BLD: 1.09 RATIO — SIGNIFICANT CHANGE UP (ref 0.88–1.16)
MCHC RBC-ENTMCNC: 27.7 PG — SIGNIFICANT CHANGE UP (ref 27–34)
MCHC RBC-ENTMCNC: 31.8 GM/DL — LOW (ref 32–36)
MCV RBC AUTO: 87.2 FL — SIGNIFICANT CHANGE UP (ref 80–100)
PLATELET # BLD AUTO: 226 K/UL — SIGNIFICANT CHANGE UP (ref 150–400)
PROTHROM AB SERPL-ACNC: 12.6 SEC — SIGNIFICANT CHANGE UP (ref 10.5–13.4)
RBC # BLD: 4.22 M/UL — SIGNIFICANT CHANGE UP (ref 4.2–5.8)
RBC # FLD: 17.5 % — HIGH (ref 10.3–14.5)
WBC # BLD: 16.52 K/UL — HIGH (ref 3.8–10.5)
WBC # FLD AUTO: 16.52 K/UL — HIGH (ref 3.8–10.5)

## 2023-07-17 PROCEDURE — 99232 SBSQ HOSP IP/OBS MODERATE 35: CPT

## 2023-07-17 PROCEDURE — 99231 SBSQ HOSP IP/OBS SF/LOW 25: CPT | Mod: 24,GC

## 2023-07-17 PROCEDURE — 99233 SBSQ HOSP IP/OBS HIGH 50: CPT

## 2023-07-17 RX ORDER — SODIUM CHLORIDE 9 MG/ML
1000 INJECTION INTRAMUSCULAR; INTRAVENOUS; SUBCUTANEOUS
Refills: 0 | Status: DISCONTINUED | OUTPATIENT
Start: 2023-07-17 | End: 2023-07-18

## 2023-07-17 RX ORDER — DIPHENOXYLATE HCL/ATROPINE 2.5-.025MG
1 TABLET ORAL
Qty: 120 | Refills: 0
Start: 2023-07-17

## 2023-07-17 RX ORDER — OXYCODONE HYDROCHLORIDE 5 MG/1
10 TABLET ORAL EVERY 4 HOURS
Refills: 0 | Status: DISCONTINUED | OUTPATIENT
Start: 2023-07-17 | End: 2023-07-21

## 2023-07-17 RX ADMIN — HYDROMORPHONE HYDROCHLORIDE 4 MILLIGRAM(S): 2 INJECTION INTRAMUSCULAR; INTRAVENOUS; SUBCUTANEOUS at 10:22

## 2023-07-17 RX ADMIN — OXYCODONE HYDROCHLORIDE 10 MILLIGRAM(S): 5 TABLET ORAL at 18:40

## 2023-07-17 RX ADMIN — FENTANYL CITRATE 1 PATCH: 50 INJECTION INTRAVENOUS at 10:45

## 2023-07-17 RX ADMIN — Medication 1 TABLET(S): at 05:36

## 2023-07-17 RX ADMIN — Medication 4 MILLIGRAM(S): at 21:41

## 2023-07-17 RX ADMIN — OXYCODONE HYDROCHLORIDE 10 MILLIGRAM(S): 5 TABLET ORAL at 23:51

## 2023-07-17 RX ADMIN — Medication 4 MILLIGRAM(S): at 05:36

## 2023-07-17 RX ADMIN — GABAPENTIN 100 MILLIGRAM(S): 400 CAPSULE ORAL at 21:41

## 2023-07-17 RX ADMIN — Medication 1 TABLET(S): at 11:08

## 2023-07-17 RX ADMIN — OXYCODONE HYDROCHLORIDE 10 MILLIGRAM(S): 5 TABLET ORAL at 14:39

## 2023-07-17 RX ADMIN — HYDROMORPHONE HYDROCHLORIDE 4 MILLIGRAM(S): 2 INJECTION INTRAMUSCULAR; INTRAVENOUS; SUBCUTANEOUS at 09:22

## 2023-07-17 RX ADMIN — ENOXAPARIN SODIUM 90 MILLIGRAM(S): 100 INJECTION SUBCUTANEOUS at 21:42

## 2023-07-17 RX ADMIN — Medication 1 TABLET(S): at 23:52

## 2023-07-17 RX ADMIN — Medication 4 MILLIGRAM(S): at 14:40

## 2023-07-17 RX ADMIN — OXYCODONE HYDROCHLORIDE 10 MILLIGRAM(S): 5 TABLET ORAL at 15:39

## 2023-07-17 RX ADMIN — Medication 1 TABLET(S): at 17:17

## 2023-07-17 RX ADMIN — Medication 1 PACKET(S): at 17:17

## 2023-07-17 NOTE — PROGRESS NOTE ADULT - SUBJECTIVE AND OBJECTIVE BOX
HPI: Pt is a 52y old Male with hx significant for colon cancer with metastases to the liver and lung on chemotherapy, presented with abdominal pain, loose/watery stools, dehydration, decreased appetite over the last 3 days. Last chemotherapy was on 7/5/23. No recent antibiotic use. Palliative medicine Consult  for symptom management.  7/14/23 Seen and examined at bedside with no family present. Pt C/O persistent RLQ abd pain with minimal relief from Oxycodone  7/17/23 Seen and examined at bedside. Cont to C/O mod severe right groin pain. He states some relief with Fentanyl patch however does not feel Dilaudid for breakthrough is providing relief. Prefers Oxycodone.     PAIN: ( X)Yes   ( )No  Level: Mod-severe  Location: RLQ abd  Intensity:    5/10  Quality: sharp  Alleviating Factors: rest  Radiation: abd/groin  Impact on ADLs: mod-severe    DYSPNEA: ( ) Yes  (X ) No      PAST MEDICAL & SURGICAL HISTORY:  Cancer, colon  liver mets  S/P ileostomy  Metastasis to liver  Pulmonary embolism  S/P colon resection  H/O ileostomy      SOCIAL HX:  Lives with mother who has dementia and brother    Hx opiate tolerance (X )YES  ( )NO  On Oxycodone PTA    Baseline ADLs  (Prior to Admission)  (X ) Independent   ( )Dependent    FAMILY HISTORY:  FH: dementia (Mother)    Review of Systems:    Anxiety-mild-mod  Depression-yes  Physical Discomfort-mod-severe  Dyspnea-denies  Constipation-no  Diarrhea-yes  Anorexia-mod  Cough-denies    All other systems reviewed and negative      PHYSICAL EXAM:  ICU Vital Signs Last 24 Hrs  T(C): 36.4 (17 Jul 2023 08:03), Max: 37 (16 Jul 2023 23:21)  T(F): 97.5 (17 Jul 2023 08:03), Max: 98.6 (16 Jul 2023 23:21)  HR: 82 (17 Jul 2023 08:03) (82 - 88)  BP: 108/60 (17 Jul 2023 08:03) (99/60 - 120/69)  RR: 17 (17 Jul 2023 08:03) (17 - 18)  SpO2: 100% (17 Jul 2023 08:03) (97% - 100%)    PPSV2: 50 %    General: Middle aged male in NAD  Mental Status: A&O X3  HEENT: oral mucosa moist  Lungs: clear to auscultation olena  Cardiac: S1S2+  GI: abd soft NT ND + BS/ostomy with appliance  : voids  Ext:  CARL strength   Neuro: no focal def      LABS:                            11.7   16.52 )-----------( 226      ( 17 Jul 2023 05:12 )             36.8                  Ca    8.9      14 Jul 2023 07:05  Phos  3.0     07-13  Mg     2.1     07-14      PTT - ( 14 Jul 2023 07:05 )  PTT:34.2 sec  Albumin: Albumin: 3.0 g/dL (07-10 @ 06:04)    Allergies  No Known Drug Allergies (Unknown)    Intolerances  MEDICATIONS  (STANDING):  dextrose 5% + sodium chloride 0.45% with potassium chloride 20 mEq/L 1000 milliLiter(s) (125 mL/Hr) IV Continuous <Continuous>  diphenoxylate/atropine 1 Tablet(s) Oral every 6 hours  enoxaparin Injectable 90 milliGRAM(s) SubCutaneous every 12 hours  fentaNYL   Patch  25 MICROgram(s)/Hr 1 Patch Transdermal every 72 hours  gabapentin 100 milliGRAM(s) Oral every 8 hours  lidocaine   4% Patch 1 Patch Transdermal daily  loperamide 4 milliGRAM(s) Oral three times a day  naloxone Injectable 0.4 milliGRAM(s) IV Push once  psyllium Powder      psyllium Powder 1 Packet(s) Oral every 12 hours  sodium chloride 0.9%. 1000 milliLiter(s) (80 mL/Hr) IV Continuous <Continuous>  sodium chloride 0.9%. 1000 milliLiter(s) (150 mL/Hr) IV Continuous <Continuous>    MEDICATIONS  (PRN):  acetaminophen     Tablet .. 650 milliGRAM(s) Oral every 6 hours PRN Temp greater or equal to 38C (100.4F), Mild Pain (1 - 3)  aluminum hydroxide/magnesium hydroxide/simethicone Suspension 30 milliLiter(s) Oral every 4 hours PRN Dyspepsia  HYDROmorphone  Injectable 1 milliGRAM(s) IV Push three times a day PRN break through pain  melatonin 3 milliGRAM(s) Oral at bedtime PRN Insomnia  ondansetron Injectable 4 milliGRAM(s) IV Push every 8 hours PRN Nausea and/or Vomiting  oxyCODONE    IR 10 milliGRAM(s) Oral every 4 hours PRN Moderate Pain (4 - 6)        RADIOLOGY/ADDITIONAL STUDIES:

## 2023-07-17 NOTE — PROGRESS NOTE ADULT - SUBJECTIVE AND OBJECTIVE BOX
SURGERY DAILY PROGRESS NOTE:     Subjective:  Patient seen and examined at bedside. Pt is doing well, complaining of Right groin pain. Pt is tolerating diet and reports ileostomy function has slowed down. Pt is pending right IVC filter placement today.  AVSS. Denies any fevers, chills, n/v/d, chest pain or shortness of breath    Objective:    MEDICATIONS  (STANDING):  dextrose 5% + sodium chloride 0.45% with potassium chloride 20 mEq/L 1000 milliLiter(s) (125 mL/Hr) IV Continuous <Continuous>  diphenoxylate/atropine 1 Tablet(s) Oral every 6 hours  enoxaparin Injectable 90 milliGRAM(s) SubCutaneous every 12 hours  fentaNYL   Patch  12 MICROgram(s)/Hr 1 Patch Transdermal every 72 hours  gabapentin 100 milliGRAM(s) Oral every 8 hours  lidocaine   4% Patch 1 Patch Transdermal daily  loperamide 4 milliGRAM(s) Oral three times a day  naloxone Injectable 0.4 milliGRAM(s) IV Push once  psyllium Powder      psyllium Powder 1 Packet(s) Oral every 12 hours  sodium chloride 0.9%. 1000 milliLiter(s) (150 mL/Hr) IV Continuous <Continuous>    MEDICATIONS  (PRN):  acetaminophen     Tablet .. 650 milliGRAM(s) Oral every 6 hours PRN Temp greater or equal to 38C (100.4F), Mild Pain (1 - 3)  aluminum hydroxide/magnesium hydroxide/simethicone Suspension 30 milliLiter(s) Oral every 4 hours PRN Dyspepsia  HYDROmorphone   Tablet 2 milliGRAM(s) Oral every 4 hours PRN Moderate Pain (4 - 6)  HYDROmorphone   Tablet 4 milliGRAM(s) Oral every 4 hours PRN Severe Pain (7 - 10)  HYDROmorphone  Injectable 1 milliGRAM(s) IV Push three times a day PRN break through pain  melatonin 3 milliGRAM(s) Oral at bedtime PRN Insomnia  ondansetron Injectable 4 milliGRAM(s) IV Push every 8 hours PRN Nausea and/or Vomiting      Vital Signs Last 24 Hrs  T(C): 37 (15 Jul 2023 23:06), Max: 37.2 (15 Jul 2023 15:21)  T(F): 98.6 (15 Jul 2023 23:06), Max: 98.9 (15 Jul 2023 15:21)  HR: 79 (15 Jul 2023 23:06) (79 - 94)  BP: 114/65 (15 Jul 2023 23:06) (113/84 - 139/91)  BP(mean): --  RR: 18 (15 Jul 2023 15:21) (17 - 18)  SpO2: 99% (15 Jul 2023 23:06) (99% - 100%)    Parameters below as of 15 Jul 2023 15:21  Patient On (Oxygen Delivery Method): room air          PHYSICAL EXAM   Gen: well-appearing, in no acute distress  CV: pulse regularly present   Resp: airway patent, non-labored breathing  Abd: soft, NTND; no rebound or guarding. ileostomy in place with thick stool, R groin soft, resolving hematoma   ext. no cyanosis or edema, RLE DP and PT pulse palpable, LLE DP pulse dopplerable, no tenderness of b/l LE        I&O's Detail    14 Jul 2023 07:01  -  15 Jul 2023 07:00  --------------------------------------------------------  IN:  Total IN: 0 mL    OUT:    Ileostomy (mL): 900 mL  Total OUT: 900 mL    Total NET: -900 mL      15 Jul 2023 07:01  -  16 Jul 2023 03:23  --------------------------------------------------------  IN:  Total IN: 0 mL    OUT:    Ileostomy (mL): 850 mL  Total OUT: 850 mL    Total NET: -850 mL          Daily     Daily     LABS:                        12.2   15.69 )-----------( 221      ( 15 Jul 2023 08:28 )             38.1     07-15    137  |  105  |  10  ----------------------------<  106<H>  4.1   |  27  |  0.72    Ca    9.1      15 Jul 2023 08:28  Mg     2.1     07-14      PTT - ( 14 Jul 2023 07:05 )  PTT:34.2 sec  Urinalysis Basic - ( 15 Jul 2023 08:28 )    Color: x / Appearance: x / SG: x / pH: x  Gluc: 106 mg/dL / Ketone: x  / Bili: x / Urobili: x   Blood: x / Protein: x / Nitrite: x   Leuk Esterase: x / RBC: x / WBC x   Sq Epi: x / Non Sq Epi: x / Bacteria: x

## 2023-07-17 NOTE — PROGRESS NOTE ADULT - ASSESSMENT
53 y/o male with metastatic colon cancer on chemo, PE  admitted for:      1. Leukocytosis SIRS, no source of infection identified,   workup negative  leukocytosis likely due to Neulasta and dehydration   Had bump im WBCs this am possibly reactive? has hematoma and DVT, No fevers. Trending down now   s/p Ciprofloxacin and Metronidazole in ER;   S/p  Zosyn, now off   UA negative, pt without urinary symptoms   stool and GI PCR negative  blood culture NGTD   CT abd/pelvis - no infectious/inflammatory process   CTA; no PNA   Off abxs now as per ID     2. 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  Groin pain. Acute RLE DVT   likely 2/2 RLE DVT  vs referred pain related to cancer   RLE Doppler neg for  hematoma or  aneurysm, + common femoral vein DVT   F/u ECHO:  bubble study neg for shunts   D/w IR and Vascular, new thrombus?  vs pre admission, no venous  imaging on admission. Recommended to repear RLE doppler  which was done and no change   D/w hematology  team agree with IR recs  I d/w vascular resident, will need to further discuss indications for IVC filer       4. Metastatic colon cancer  to the liver and lung on chemotherapy  on palliative chemotherapy:  initially FOLFOXIRI / sunny , after 10 cycles changed to FOLFIRI/ Bevacizumab.  Last chemotherapy 7/5/23 Had OnPro 7/8/23    - Heme/Onc recs appreciated     5. Anxiety  Reported frustration and suicidal thoughts to SW on 7/13  Was evaluated by psych, I d/w NP, Pt is not currently suicidal/ homicidal does not need 1:1   Pt was offered Anti depressant to be declined  but he declined     DVT ppx: heparin drip, switch to Lovenox      Code status: Full code       Dispo;  C/w pain management, monitor ostomy output,  tentatively scheduled for IVC filter on  Monday with Dr Mauricio. Do not  hold A/c

## 2023-07-17 NOTE — PROGRESS NOTE ADULT - SUBJECTIVE AND OBJECTIVE BOX
Nurse Practitioner Progress note:       Pt is a 52y old Male with hx significant for colon cancer with metastases to the liver and lung on chemotherapy, presented with abdominal pain, loose/watery stools, dehydration, decreased appetite over the last 3 days. Last chemotherapy was on 7/5/23. No recent antibiotic use.    Found to have new DVT on oral a/c   for IVC filter placement today       Vital Signs  T(C): 36.4 (07-17-23 @ 08:03), Max: 37 (07-16-23 @ 23:21)  HR: 82 (07-17-23 @ 08:03) (82 - 88)  BP: 108/60 (07-17-23 @ 08:03) (99/60 - 120/69)  RR: 17 (07-17-23 @ 08:03) (17 - 18)  SpO2: 100% (07-17-23 @ 08:03) (97% - 100%)  Wt(kg): --        PHYSICAL EXAM:  NEURO: Non-focal, AxOx3.  No neuro deficits   CHEST/LUNG: Clear to auscultation bilaterally; No wheeze  HEART: s1 s2 Regular rate and rhythm; No murmurs, rubs, or gallops  ABDOMEN: Soft, Nontender, Nondistended; Bowel sounds present X 4 quadrants   EXTREMITIES:  2+ Peripheral Pulses, No clubbing, cyanosis, or edema   VASCULAR: Peripheral pulses palpable 2+ bilaterally  PROCEDURE SITE: Left femoral venous accessed, sheath removed at end of procedure Site is without hematoma or bleeding. Sensation and SAWYER intact. Distal pulses palpable 2+, capillary refill < 2 seconds. Patient denies pain, numbness, tingling, CP or SOB. Clean dry dressing applied     PROCEDURE RESULTS: full report to follow       PLAN:  -VS, diet, activity as per post cath orders  -Encourage PO fluids  -Continue current medications  -Post cath instructions reviewed, patient verbalizes and understands instructions  -Plan of care discussed with patient, RN and Dr. Mauricio

## 2023-07-17 NOTE — CHART NOTE - NSCHARTNOTEFT_GEN_A_CORE
Cardiac Cath Lab Nurse Practitioner :  HPI:  Pt is a 52y old Male with hx significant for colon cancer with metastases to the liver and lung on chemotherapy, presented with abdominal pain, loose/watery stools, dehydration, decreased appetite over the last 3 days. Last chemotherapy was on 7/5/23. No recent antibiotic use.    Found to have new DVT on oral a/c   for IVC filter placement today       MEDICATIONS  (STANDING):  dextrose 5% + sodium chloride 0.45% with potassium chloride 20 mEq/L 1000 milliLiter(s) (125 mL/Hr) IV Continuous <Continuous>  diphenoxylate/atropine 1 Tablet(s) Oral every 6 hours  enoxaparin Injectable 90 milliGRAM(s) SubCutaneous every 12 hours  fentaNYL   Patch  25 MICROgram(s)/Hr 1 Patch Transdermal every 72 hours  gabapentin 100 milliGRAM(s) Oral every 8 hours  lidocaine   4% Patch 1 Patch Transdermal daily  loperamide 4 milliGRAM(s) Oral three times a day  naloxone Injectable 0.4 milliGRAM(s) IV Push once  psyllium Powder      psyllium Powder 1 Packet(s) Oral every 12 hours  sodium chloride 0.9%. 1000 milliLiter(s) (80 mL/Hr) IV Continuous <Continuous>  sodium chloride 0.9%. 1000 milliLiter(s) (150 mL/Hr) IV Continuous <Continuous>      Vital Signs Last 24 Hrs  T(C): 36.4 (17 Jul 2023 08:03), Max: 37 (16 Jul 2023 23:21)  T(F): 97.5 (17 Jul 2023 08:03), Max: 98.6 (16 Jul 2023 23:21)  HR: 82 (17 Jul 2023 08:03) (82 - 88)  BP: 108/60 (17 Jul 2023 08:03) (99/60 - 120/69)  BP(mean): --  RR: 17 (17 Jul 2023 08:03) (17 - 18)  SpO2: 100% (17 Jul 2023 08:03) (97% - 100%)    Parameters below as of 17 Jul 2023 08:03  Patient On (Oxygen Delivery Method): room air        LABS:                        11.7   16.52 )-----------( 226      ( 17 Jul 2023 05:12 )             36.8               PT/INR - ( 17 Jul 2023 05:12 )   PT: 12.6 sec;   INR: 1.09 ratio             PHYSICAL EXAM  GENERAL: NAD, AAOx3  CHEST/LUNG: Clear to auscultation bilaterally; No wheeze  HEART: s1 s2 Regular rate and rhythm; No murmurs, rubs, or gallops  ABDOMEN: Soft, Nontender, Nondistended; ostomy present   EXTREMITIES:  2+ Peripheral Pulses, No clubbing, cyanosis, or edema  PYSCH: normal affect and behavior, calm and cooperative     PLAN:  -plan for cardiac cath for ischemic work up  -Consent obtained for IVF filter placements.  Pt is competent, has capacity, and understands risks and benefits of procedure. Risks and benefits discussed. . All questions answered

## 2023-07-17 NOTE — PROGRESS NOTE ADULT - ATTENDING COMMENTS
I saw and examined the patient; he complains of new right groin pain for last 48hrs pain is constant and localized only to the right groin; he denies RLE swelling aching heaviness and pain. In regard to the left side there have been no changes persistent left first toe numbness  Exam:   LLE: 0femoral 0pop 0PT/DP pulses; no hematoma no tendernss 5/5 motor <2s cap refill and foot is warm  RLE: +2 fem/pop/DP/PT pulses; no hematoma, no ecchymosis in groin very tender to palpation; no edema no calf tenderness  Duplex: RLE: no hematoma no PSA; acute right CFV DVT  REreviewed CT on admission though not read by radiology I do not see any right sided DVT including the right CFV     #Left EIA thrombosis: I believe this is likely a paradoxical event given DVT; can consider TTE with bubble study to assess for PFO  #acute right CFV DVT: as this is an acute event since admission this is likely a failure of AC  -consider IR consult for IVC filter placement  -can consider reimaging abd/pelvis with contrast to assess proximal extent of DVT
Ileostomy 900cc out.  Cont current anti diarrheal regimen.
Ostomy output improved on current regimen.  For IVC filter tomorrow.
Patient seen and evaluated at bedside. Tolerating a diet, having ostomy function, pain is controlled, abdomen soft, ND, NT. No active CRS issues, will follow peripherally. Please call with any questions or concerns.
Patient seen and examined this morning.  Ileostomy output was about 1400 over 24 hours.  He states that it is improved with current antidiarrheal medications.  On exam, his abdomen is soft, nontender.  Ileostomy is prolapsed and healthy.  There is no output noted in the bag.  He complains of right groin pain at the access site and there is no evidence of hematoma or swelling.  He does take narcotics at home at baseline and resuming this may help him overall.  Continue current antidiarrheal regimen.
Patient denies any abdominal pain.  Output from ileostomy appears to have been recorded as 900cc.  Tolerating regular diet.  On exam abdomen is soft, nondistended, nontender; ileostomy with very short prolapse but no edema, functioning well, liquid yellow output in appliance.    -- Strictly record ileostomy output and other I/O so that antidiarrheals can be titrated appropriately  -- Patient on Imodium - should be ordered for with meals and at bedtime.  Continue at current 2mg dosing.  He will need prescription for Imodium upon discharge.  Suspect this is potentially related to recent dose of chemotherapy and this may need to be further adjusted as outpatient.  -- Regular diet  -- Will continue to follow and adjust antidiarrheals as appropriate.  Remainder of management as per primary.
Subacute left external iliac artery thrombosis unable to be treated with endovascular means; given patient currently undergoing treatment of malignancy and is very high risk for thrombosing any bypass graft also given that symptoms are mild (only toe numbness no claudication, rest pain or wounds) would hold off any surgical revascularization at this time; he may ultimately require it but for now can manage with AC and surveillance; discussed this with patient all his questions were answered  -continue AC  -VIDAL/PVR reviewed diminished flow to LLE toe pressure <40 high risk for wound
Subacute left external iliac artery thrombosis unable to be treated with endovascular means  -would recommend a full hypercoagulability work up  -would also obtain proximal imaging (CTA chest and TTE with bubble) to look for source as arterial thromobembolic events more uncommon even in setting of hypercoagulability   -will also obtain VIDAL/PVR to understand LLE perfusion  -continue AC  will follow
Subacute left external iliac artery thrombosis unable to be treated with endovascular means; given patient currently undergoing treatment of malignancy and is very high risk for thrombosing any bypass graft also given that symptoms are mild (only toe numbness no claudication, rest pain or wounds) would hold off any surgical revascularization at this time; he may ultimiately require it but for now can manage with AC and surveillance; discussed this with patient all his questions were answered  -continue AC  -VIDAL/PVR reviewed diminished flow to LLE toe pressure <40 high risk for wound

## 2023-07-17 NOTE — PROGRESS NOTE ADULT - SUBJECTIVE AND OBJECTIVE BOX
SURGERY DAILY PROGRESS NOTE:     Subjective:  Patient seen and examined at bedside. Pt is doing well, complaining of Right groin pain. Pt is tolerating diet and reports ileostomy function has slowed down significantly, stool also more formed. Pt will go for IVC filter placement tomorrow. Pt wondering about possible discharge in the next few days. AVSS. Denies any fevers, chills, n/v/d, chest pain or shortness of breath.    Objective:    MEDICATIONS  (STANDING):  dextrose 5% + sodium chloride 0.45% with potassium chloride 20 mEq/L 1000 milliLiter(s) (125 mL/Hr) IV Continuous <Continuous>  diphenoxylate/atropine 1 Tablet(s) Oral every 6 hours  enoxaparin Injectable 90 milliGRAM(s) SubCutaneous every 12 hours  fentaNYL   Patch  12 MICROgram(s)/Hr 1 Patch Transdermal every 72 hours  gabapentin 100 milliGRAM(s) Oral every 8 hours  lidocaine   4% Patch 1 Patch Transdermal daily  loperamide 4 milliGRAM(s) Oral three times a day  naloxone Injectable 0.4 milliGRAM(s) IV Push once  psyllium Powder      psyllium Powder 1 Packet(s) Oral every 12 hours  sodium chloride 0.9%. 1000 milliLiter(s) (150 mL/Hr) IV Continuous <Continuous>    MEDICATIONS  (PRN):  acetaminophen     Tablet .. 650 milliGRAM(s) Oral every 6 hours PRN Temp greater or equal to 38C (100.4F), Mild Pain (1 - 3)  aluminum hydroxide/magnesium hydroxide/simethicone Suspension 30 milliLiter(s) Oral every 4 hours PRN Dyspepsia  HYDROmorphone   Tablet 2 milliGRAM(s) Oral every 4 hours PRN Moderate Pain (4 - 6)  HYDROmorphone   Tablet 4 milliGRAM(s) Oral every 4 hours PRN Severe Pain (7 - 10)  HYDROmorphone  Injectable 1 milliGRAM(s) IV Push three times a day PRN break through pain  melatonin 3 milliGRAM(s) Oral at bedtime PRN Insomnia  ondansetron Injectable 4 milliGRAM(s) IV Push every 8 hours PRN Nausea and/or Vomiting      Vital Signs Last 24 Hrs  T(C): 37 (15 Jul 2023 23:06), Max: 37.2 (15 Jul 2023 15:21)  T(F): 98.6 (15 Jul 2023 23:06), Max: 98.9 (15 Jul 2023 15:21)  HR: 79 (15 Jul 2023 23:06) (79 - 94)  BP: 114/65 (15 Jul 2023 23:06) (113/84 - 139/91)  BP(mean): --  RR: 18 (15 Jul 2023 15:21) (17 - 18)  SpO2: 99% (15 Jul 2023 23:06) (99% - 100%)    Parameters below as of 15 Jul 2023 15:21  Patient On (Oxygen Delivery Method): room air          PHYSICAL EXAM   Gen: well-appearing, in no acute distress  CV: pulse regularly present   Resp: airway patent, non-labored breathing  Abd: soft, NTND; no rebound or guarding. ileostomy in place with thick stool, R groin soft with resolving hematoma   ext. no cyanosis or edema, RLE DP and PT pulse palpable, LLE DP pulses dopplerable        I&O's Detail    14 Jul 2023 07:01  -  15 Jul 2023 07:00  --------------------------------------------------------  IN:  Total IN: 0 mL    OUT:    Ileostomy (mL): 900 mL  Total OUT: 900 mL    Total NET: -900 mL      15 Jul 2023 07:01  -  16 Jul 2023 03:23  --------------------------------------------------------  IN:  Total IN: 0 mL    OUT:    Ileostomy (mL): 850 mL  Total OUT: 850 mL    Total NET: -850 mL          Daily     Daily     LABS:                        12.2   15.69 )-----------( 221      ( 15 Jul 2023 08:28 )             38.1     07-15    137  |  105  |  10  ----------------------------<  106<H>  4.1   |  27  |  0.72    Ca    9.1      15 Jul 2023 08:28  Mg     2.1     07-14      PTT - ( 14 Jul 2023 07:05 )  PTT:34.2 sec  Urinalysis Basic - ( 15 Jul 2023 08:28 )    Color: x / Appearance: x / SG: x / pH: x  Gluc: 106 mg/dL / Ketone: x  / Bili: x / Urobili: x   Blood: x / Protein: x / Nitrite: x   Leuk Esterase: x / RBC: x / WBC x   Sq Epi: x / Non Sq Epi: x / Bacteria: x

## 2023-07-17 NOTE — PROGRESS NOTE ADULT - SUBJECTIVE AND OBJECTIVE BOX
HPI:    51 y/o M with a PMhx of colon cancer with metastases to the liver and lung on chemotherapy, presented with abdominal pain, loose/watery stools, dehydration, decreased appetite over the last 3 days. Last chemotherapy was on 7/5/23. No recent antibiotic use. In ED Reported chills, cramping abdominal pain (sharp abdominal pain has been chronic for years), nausea, dry heaving, vomiting (6 episodes of watery outpt). Denies fevers, chills, chest pain, SOB, abdominal pain, N/V, diarrhea/constipation.     7/10/2023- Seen in ED with Dr. Starks, alert, oriented, reports feeling much better. Reports the liquid and increased output from stoma especially 2-3 days after chemo, and if does not eat right. Denies any N/V, now, output still noted liquid but moderate amount now.     07/11/2023: Seen at bedside with attending Dr Starks, no acute distress, noted improved ostomy output, concerned about anticoagulation d/c plan if he should still be on Eliquis     07/13/2023- Seen at bedside, in no acute distress, reports normal stomal output. c/o right groin with some discomfort 2/2 to the angiogram, noted with mild area of ecchymosis and swelling. Left groin with no signs of bleeding or swelling.    07/17/2023- Seen at bedside in no acute distress, awaiting IVC filter placement today.    PAST MEDICAL & SURGICAL HISTORY:    Cancer, colon  liver mets  S/P ileostomy  Metastasis to liver  Pulmonary embolism  S/P colon resection  H/O ileostomy    FAMILY HISTORY:    dementia (Mother)      MEDICATIONS  (STANDING):    dextrose 5% + sodium chloride 0.45% with potassium chloride 20 mEq/L 1000 milliLiter(s) (125 mL/Hr) IV Continuous <Continuous>  diphenoxylate/atropine 1 Tablet(s) Oral every 6 hours  enoxaparin Injectable 90 milliGRAM(s) SubCutaneous every 12 hours  fentaNYL   Patch  25 MICROgram(s)/Hr 1 Patch Transdermal every 72 hours  gabapentin 100 milliGRAM(s) Oral every 8 hours  lidocaine   4% Patch 1 Patch Transdermal daily  loperamide 4 milliGRAM(s) Oral three times a day  naloxone Injectable 0.4 milliGRAM(s) IV Push once  psyllium Powder 1 Packet(s) Oral every 12 hours  psyllium Powder      sodium chloride 0.9%. 1000 milliLiter(s) (150 mL/Hr) IV Continuous <Continuous>  sodium chloride 0.9%. 1000 milliLiter(s) (80 mL/Hr) IV Continuous <Continuous>    MEDICATIONS  (PRN):    acetaminophen     Tablet .. 650 milliGRAM(s) Oral every 6 hours PRN Temp greater or equal to 38C (100.4F), Mild Pain (1 - 3)  aluminum hydroxide/magnesium hydroxide/simethicone Suspension 30 milliLiter(s) Oral every 4 hours PRN Dyspepsia  melatonin 3 milliGRAM(s) Oral at bedtime PRN Insomnia  ondansetron Injectable 4 milliGRAM(s) IV Push every 8 hours PRN Nausea and/or Vomiting  oxyCODONE    IR 10 milliGRAM(s) Oral every 4 hours PRN Moderate Pain (4 - 6)        ALLERGIES:    No Known Drug Allergies       REVIEW OF SYSTEMS:    Constitutional, Eyes, ENT, Cardiovascular, Respiratory, Gastrointestinal, Genitourinary, Musculoskeletal, Integumentary, Neurological, Psychiatric, Endocrine, Heme/Lymph and Allergic/Immunologic review of systems are otherwise negative except as noted in HPI.       VITALS:               Vital Signs Last 24 Hrs  T(C): 36.4 (17 Jul 2023 08:03), Max: 37 (16 Jul 2023 23:21)  T(F): 97.5 (17 Jul 2023 08:03), Max: 98.6 (16 Jul 2023 23:21)  HR: 82 (17 Jul 2023 08:03) (82 - 88)  BP: 108/60 (17 Jul 2023 08:03) (99/60 - 120/69)  BP(mean): --  RR: 17 (17 Jul 2023 08:03) (17 - 18)  SpO2: 100% (17 Jul 2023 08:03) (97% - 100%)    Parameters below as of 17 Jul 2023 08:03  Patient On (Oxygen Delivery Method): room air        PHYSICAL:    Constitutional: no acute distress  Eyes: no conjunctival infection, anicteric  ENT: pharynx is unremarkable  Neck: supple without JVD  Pulmonary: clear to auscultation bilaterally, no dullness, no wheezing  Cardiac: RRR, normal S1S2   Vascular: no calf tenderness, venous stasis changes, varices; bilateral DP/PT pulses no signs of inadequate circulation to distal aspects   Abdomen: Right ileostomy bag with moisture noted.  Lymphatic: no peripheral adenopathy appreciated  Musculoskeletal: full range of motion and no deformities appreciated  Skin: normal appearance, no rash / erythema  Neurology: grossly intact.   Psychiatric: affect appropriate.       LABS:                                     11.7   16.52 )-----------( 226      ( 17 Jul 2023 05:12 )             36.8     07-13    138  |  105  |  7   ----------------------------<  98  3.4<L>   |  26  |  0.76    Ca    8.8      13 Jul 2023 08:15  Phos  3.0     07-13  Mg     2.0     07-13        RADIOLOGY & ADDITIONAL STUDIES:    EXAM:  CT ABDOMEN AND PELVIS IC       PROCEDURE DATE:  07/09/2023      INTERPRETATION:  CLINICAL INFORMATION: Abdominal pain    COMPARISON: CT chest abdomen pelvis 6/13/2023.    CONTRAST/COMPLICATIONS:  IV Contrast: Omnipaque 350  90 cc administered   10 cc discarded  Oral Contrast: NONE  Complications: None reported at time of study completion    PROCEDURE:  CT of the Abdomen and Pelvis was performed.  Sagittal and coronal reformats were performed.    FINDINGS:  LOWER CHEST: No pulmonary nodules visualized in the lung bases. No   consolidation..    LIVER: Stable known hepatic metastasis with calcifications, index lesions   in the left lobe 2.9 x 3.7 cm, previously 2.5 x 3.8 cm; in the right lobe   3.3 cm, unchanged. Unchanged 1.8 cm additional hypodensity in segment 8.  BILE DUCTS: Normal caliber.  GALLBLADDER: Within normal limits.  SPLEEN: Mild splenomegaly at 14.9 cm.  PANCREAS: Within normal limits.  ADRENALS: Within normal limits.  KIDNEYS/URETERS: Symmetric, homogeneous renal parenchymal enhancement.   Left mid pole renal scarring. No hydronephrosis.    BLADDER: Within normal limits.  REPRODUCTIVE ORGANS: Prostate within normal limits.    BOWEL: Postsurgical changes from partial colectomy, Gloria stump   creation and right midabdomen ileostomy. No bowel obstruction. Appendix   is surgically absent. No abdominal bowel wall thickening or mesenteric   edema to suggest inflammation.  PERITONEUM: No ascites. Peritoneal thickening and nodularity in the right   lower pelvis alongside a small bowel loop with a serosal implant   measuring approximately 1.7 x 2.7 cm, image [id number], similar to prior 1.6   x 2.5 cm. No associated bowel obstruction.  VESSELS: Mild atherosclerotic changes of the abdominal aorta. Celiac axis   and SMA patent. Portal vein, SMV and splenic vein patent. Left   retroaortic renal vein. 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  RETROPERITONEUM/LYMPH NODES: No lymphadenopathy.  ABDOMINAL WALL: Midline postsurgical changes. Unremarkable appearance of   the right mid abdomen ileostomy without inflammatory changes.  BONES: Mild degenerative changes. Lucency in T11 unchanged since prior   study, may represent a osseous hemangioma.    IMPRESSION:  No significant change when compared to prior study 6/13/2023. No acute   findings to suggest inflammatory or infectious process in the abdomen or   pelvis, no bowel obstruction.    Unchanged hepatic metastasis and peritoneal thickening with serosal   implant in the right lower pelvis and postsurgical changes from colectomy   and right abdominal ileostomy. No morphologic abnormality at the   ileostomy site.        EXAM:  US DPLX LWR EXT VEINS LTD RT   ORDERED BY: TINO SANDOVAL     PROCEDURE DATE:  07/16/2023      INTERPRETATION:  CLINICAL INFORMATION: Right groin pain.  Follow-up right   common femoral vein deep venous thrombosis.    COMPARISON: 07/14/2023.    TECHNIQUE: Duplex sonography of the RIGHT LOWER extremity veins with   color and spectral Doppler, with and without compression.    FINDINGS:  Nonocclusive deep venous thrombosis in the right common femoral vein   appears unchanged from 07/14/2023.  There is normal compressibility of the right femoral and popliteal veins.  The contralateral common femoral vein is patent.  Doppler examination shows normal spontaneous and phasic flow.    No calf vein thrombosis is detected.    IMPRESSION:  Acute nonocclusive deep venous thrombosis in the right common femoral   vein, above the knee only, appears unchanged from 07/14/2023.  No   evidence of propagation.

## 2023-07-17 NOTE — PROGRESS NOTE ADULT - ASSESSMENT
53 yo M presented with high output ileostomy and RLE DVT. Radiology confirmed yesterday the presence of acute DVT in the settings of full oral anticoagulation. Pt will undergo IVC filter placement today in cardiac cath lab.   Still complaining or right groin pain due to the constant palpation, doing well otherwise.  preop labs pending     P:  NPO for IVC filter today, restrat diet after procedure  Preop labs  Pain control  Continue Metamucil, Loperamide, Lomotil  Strict I/Os  monitor ostomy output  cont AC lov 90 q12  Rest of management as per primary team    To be discussed with attending

## 2023-07-17 NOTE — PROGRESS NOTE ADULT - ASSESSMENT
HPI: Pt is a 52y old Male with hx significant for colon cancer with metastases to the liver and lung on chemotherapy, presented with abdominal pain, loose/watery stools, dehydration, decreased appetite over the last 3 days. Last chemotherapy was on 7/5/23. No recent antibiotic use. Palliative medicine Consult  for symptom management.  7/14/23 Seen and examined at bedside with no family present. Pt C/O persistent RLQ abd pain with minimal relief from Oxycodone    Assessment and Plan:    1) Pain  -Persistent chronic abd pain  -R/T cancer  -D/C Dilaudid 2 mg PO PRN mod pain  -D/C Dilaudid 4 mg PO Q4H severe pain  -D/C Dilaudid to 0.5 mg IVP Q4H PRN severe breakthrough pain  -Increase Fentanyl to 25 mcg/hr transderm Q 72 hrs  -Add Oxycodone 10 mg PO Q4H PRN breakthrough pain    2) Colon Ca  -Metastatic disease to liver/lung  -Follows at [facility] with Dr Serrano  -Currently receiving chemo  -Imaging noted  -Onc eval      3) L fem artery occlusion  CT a/p finding of Left external iliac artery occlusion  -Palpable femoral and DP pulses  -underwent LLE angiogram, unable to traverse lesion.  -AC as per medicine  -Vascular eval noted  -IVC filter placement today      4) Debility  -Weakness  -Pain  -Cancer  -Supportive care    5) Advanced Directives  -Pt with capacity  -HCP naming his mother Karin on chart  -GOC at bedside

## 2023-07-17 NOTE — PROGRESS NOTE ADULT - SUBJECTIVE AND OBJECTIVE BOX
53 y/o M with PMH colon cancer with metastases to the liver and lung on chemotherapy, PE presented with abdominal pain. Pt reports loose/watery stools, dehydration, decreased appetite over the last 3 days. His last chemotherapy was on Wednesday. No recent antibiotic use. Reports chills, cramping abdominal pain (sharp abdominal pain has been chronic for years), nausea, dry heaving, vomiting (6 episodes of watery outpt). Denies fevers, chills, chest pain, SOB, abdominal pain, N/V, diarrhea/constipation.   Prior admission:   - 4/19/23: Intestinal stoma prolapse   ER course: HR . Labs: .35 -> 112.01, lactate 2.4 -> 1.4, Na 134, glucose 113, alkaline phosphatase 191. UA: negative. COVID and RVP negative.   Imaging:  - CXR: port right chest wall, no consolidation, no effusion, no pneumothorax (personally reviewed.    - CT abd/pelvis: No significant change when compared to prior study 6/13/2023. No acute findings to suggest inflammatory or infectious process in the abdomen or pelvis, no bowel obstruction. Unchanged hepatic metastasis and peritoneal thickening with serosal implant in the right lower pelvis and postsurgical changes from colectomy and right abdominal ileostomy. No morphologic abnormality at the ileostomy site. Occlusion of left external iliac artery.   Pt was given Ciprofloxacin and Metronidazole, 3L of NS, Pepcid morphine, Zofran He is being admitted to med/surg (1N) for further management.       INTERVAL HPI/ OVERNIGHT EVENTS: Pt was seen and examined, reports some improvement of pain but still needs PRNs all the time. Otherwise is doing pk, has better oral intake, ostomy output is decreased. No fevers. Plan for possible procedure today- pt seen this AM prior to IVC, note typed later   REVIEW OF SYSTEMS:  All other review of systems is negative unless indicated above.      PHYSICAL EXAM:  General: Well developed; in no acute distress  Eyes: EOMI; conjunctiva and sclera clear  Head: Normocephalic; atraumatic  ENMT: No nasal discharge; airway clear  Neck: Supple; non tender; no masses  Respiratory: No wheezes, rales or rhonchi  Cardiovascular: Regular rate and rhythm. S1 and S2 Normal;  Gastrointestinal: Soft non-tender non-distended; Normal bowel sounds, ileostomy  in place   Genitourinary: No  suprapubic  tenderness  Extremities: Normal range of motion, No edema, some R calf tenderness   Vascular: Peripheral DP pulses not  palpable, R groin  tenderness to palpation    Neurological: Alert and oriented x 3, non focal   Skin: Warm and dry. No acute rash  Musculoskeletal: Normal muscle tone, without deformities  Psychiatric: Cooperative and appropriate    labs reviewed  RADIOLOGY & ADDITIONAL TESTS:    ACC: [id number] EXAM:  CT ANGIO ABD AOR W RUN(W)AW IC   ORDERED BY: TINO SANDOVAL     PROCEDURE DATE:  07/10/2023          INTERPRETATION:  CT ANGIOGRAM ABDOMEN, PELVIS, AND LOWER EXTREMITIES:    CT ANGIO ABDOMINAL AORTA RUNOFF    CLINICAL INFORMATION:  r/o LLE ischemia    TECHNIQUE:    CONTRAST/COMPLICATIONS:  IV Contrast: Omnipaque 350  125 cc administered   25 cc discarded  Oral Contrast: NONE  Complications: None reported at time of study completion    PROCEDURE:  Initially, nonenhanced CT was obtained through the calves. Then,   following the rapid administration of intravenous contrast, CT   angiography was performed through the abdomen, pelvis, and lower   extremities down to the toes.  Delayed images through the calves were   also obtained. Sagittal and coronal reformats as well as 3D   reconstructions were performed.    Volume rendered and MIP images for CT angiographic display are created   from source data on an independent 3-D workstation and archived into   PACS. This study was performed using automatic exposure control   (radiation dose reduction software) to obtain a diagnostic image quality   scan with patient dose as low as reasonably achievable.    COMPARISON: CT abdomen pelvis 7/9/2023.    FINDINGS:    ABDOMINAL AORTA: Normal caliber of the abdominal aorta, 1.9 cm   suprarenal, 1.7 cm infrarenal, 1.6 cm at the bifurcation.    Celiac: Patent, no stenosis.  SMA: Patent, no stenosis.  ANYA: Patent, no stenosis  Renal Arteries: Duplicated left renal artery, no stenosis. Single right   renal artery, no stenosis    RIGHT LEG:    Common Iliac artery: Mild atherosclerosis. No high-grade stenosis or   occlusion.  External Iliac: Mild atherosclerotic disease, no high-grade stenosis or   occlusion.  Internal Iliac: Moderate atherosclerotic disease mild stenosis at the   origin. Patent.    Common femoral: Patent, no stenosis or  SFA: Patent, mild atherosclerotic plaques, no stenosis.  Profunda: Patent, normal    Popliteal: Patent, no stenosis or occlusion  Anterior Tibial (AT): Patent.  Tibioperoneal trunk (TPT): Patent, no stenosis or occlusion.  Posterior Tibial (PT): Patent, no stenosis.  Peroneal: Patent to the level of the ankle  Dorsalis Pedis (DP): Patent  Plantar: Patent    LEFT LEG:    Common Iliac artery: Patent, moderate atherosclerotic changes  External Iliac: Occluded  Internal Iliac: Patent, high-grade stenosis at the origin, moderate   atherosclerotic changes.    Common femoral: Recanalized and reconstituted common femoral artery   through collaterals from the left inferior epigastric artery.  SFA: Patent, mild atherosclerotic changes, no stenosis or occlusion.  Profunda: Normal    Popliteal: Patent, mild atherosclerotic plaques, no significant stenosis.  AT: Patent, no stenosis or occlusion  TPT: Patent, no stenosis  PT: Patent in the proximal two thirds.  Peroneal: Patent, no significant stenosis.  DP: Patent  Plantar: Not well opacified      ADDITIONAL FINDINGS:  LOWER CHEST: Within normal limits.    LIVER: Bilateral metastatic lesions in the liver with calcification,   unchanged compared to prior study 7/9/2023.  BILE DUCTS: Normal caliber.  GALLBLADDER: Vicarious excretion of contrast.  SPLEEN: Mild splenomegaly  PANCREAS: Within normal limits.  ADRENALS: Within normal limits.  KIDNEYS/URETERS: No renal stones or hydronephrosis.    BLADDER: Distended, filled with contrast, within normal limits.  REPRODUCTIVE ORGANS: Prostate within normal limits.    BOWEL: Postsurgical changes from partial colectomy, Gloria stump   creation and right end ileostomy. No bowel obstruction. Appendix is   surgically absent.  PERITONEUM: No ascites. Unchanged peritoneal nodular thickening and   serosal implant within the right pelvis as described from prior study,   unchanged.  VESSELS: Aorta and pelvic arteries and described in detail above. Patent   portal vein. Patent renal veins. Left retroaortic renal vein.  RETROPERITONEUM/LYMPH NODES: No lymphadenopathy.  ABDOMINAL WALL: Within normal limits.  BONES: Within normal limits.    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 Two-vessel runoff in the left calf.  High grade stenosis at the origin of the left internal iliac artery.  Three-vessel arterial runoff of the right lower extremity.  Atherosclerotic disease in the abdomen as detailed above.      ACC: [id number] EXAM:  CT ANGIO CHEST PULM [facility]                          PROCEDURE DATE:  07/02/2022          INTERPRETATION:  Reason for Exam: Shortness of breath. chest pain.    CTA of the chest was performed from the thoracic inlet to the level of   the adrenal glands following IV contrast injection of  80 cc of Omnipaque   350. No immediate complications were reported.  MIP images were also   created and reviewed.    Comparison: CT abdomen and pelvis dated May 25, 2022    Tubes/Lines: Right Mediport catheter tip in SVC.    Mediastinum and Heart: Aorta and pulmonary arteries are normal in size.   Moderate stenosis of the proximal left subclavian artery from   atherosclerotic plaque. Thyroid gland is unremarkable. No   lymphadenopathy.No pericardial effusion.    Lungs, Pleura, and Airways: There is no pulmonary embolus. No   consolidations, edema or effusion. Small left pneumothorax noted. Right   lower lobe 4 mm nodule in series 4 image 289. Left apical 4 mm nodule on   image 57.    Visualized Abdomen: Multiple calcified masses in the liver, without   significant change since prior.    Bones and soft tissues: Unremarkable.    IMPRESSION:    No pulmonary embolus.    Small left pneumothorax without mediastinal shift.    Remaining findings as above.      < from: US Duplex Venous Lower Ext Ltd, Right (07.14.23 @ 13:33) >    ACC: [id number] EXAM:  US DPLX LWR EXT ARTS LTD RT   ORDERED BY: IMTIAZ LISA     ACC: [id number] EXAM:  US DPLX LWR EXT VEINS LTD RT   ORDERED BY: IMTIAZ LISA     PROCEDURE DATE:  07/14/2023          INTERPRETATION:  CLINICAL INFORMATION: Right leg pain status post right   groin catheterization. Evaluate for DVT, hematoma/pseudoaneurysm.    COMPARISON: None available.    TECHNIQUE: Duplex sonography of the RIGHT LOWER extremity veins with   color and spectral Doppler, with and without compression.    FINDINGS:    There is normal compressibility of the right femoral and popliteal veins.   Evidence of thrombosis is noted involving the right common femoral vein.  The contralateral common femoral vein is patent.  Doppler examinationshows normal spontaneous and phasic flow.    No calf vein thrombosis is detected.    Normal flow is identified in the right common femoral and proximal   femoral arteries. There is no abnormal flow to suggest a pseudoaneurysm.   No complex collectionis seen.    IMPRESSION:  Acute deep venous thrombosis: above the knee.  Acute thrombosis involving the right common femoral vein.  No evidence of a hematoma or pseudoaneurysm.

## 2023-07-17 NOTE — PROGRESS NOTE ADULT - ASSESSMENT
51 yo M presented with high output ileostomy and RLE DVT. Talked to radiologist today who thinks the DVT is acute.  doing well except for right groin pain  labs pending     P:  Pain control  Continue Metamucil, Loperamide, Lomotil  Strict I/Os  NPO for IVC filter procedure  monitor ostomy output  pain control  cont AC lov 90 q12  Rest of management as per primary team    To be discussed with CRS attending.

## 2023-07-17 NOTE — PROGRESS NOTE ADULT - ASSESSMENT
51 y/o M with MH significant for Stage IV colon cancer with metastases to the liver and lung, currently on chemotherapy, s/p ileostomy and revision 04/2023 and subtotal colectomy for perforated cecal mass 2022 presented with abdominal pain, loose/watery stools, dehydration, decreased appetite over the last 3 days.      # Metastatic Colon CA with Liver & Lung Metastases, S/p Ileostomy     - Dx 05/2022 after going to ED for worsening abdominal pain  - 05/19/22 CT AP showed apparent focal mural thickening at the cecum/proximal ascending colon. The sigmoid colon appears to be tethered to the cecum and it is focally thick-walled; focal tumor invasion/extension from the cecum to the sigmoid colon. Mural thickening of the a dilated right lower quadrant small bowel loop with adjacent mesenteric edema for which associated small bowel ischemia cannot be excluded. Multiple hypodense lesions with calcifications in both lobes of the liver with the largest measuring 5.8 x 6.0 cm in hepatic segment.  - 05/19/22: underwent exploratory laparotomy, total colectomy, end ileostomy, biopsy of liver, and biopsy of retroperitoneum.    Pathology:  Omentum, excision: Negative for malignancy.   Portion of terminal ileum, cecum with adherent sigmoid, total colectomy: Poorly differentiated infiltrating adenocarcinoma measuring 9.0 cm. Adenocarcinoma infiltrates through the bowel wall and through the visceral peritoneum and infiltrates the adherent sigmoid colon 3 of 17 lymph nodes are positive for metastatic carcinoma. Lymphovascular space & Perineural invasion is identified. The surgical margins negative.   Appendix with serosal tumor implants and acute inflammation. Peritonitis. Liver, biopsy: Metastatic adenocarcinoma.   Retroperitoneal biopsy: Adenocarcinoma with necrosis. No loss of nuclear expression of MMR proteins (MLH1, MSH2, MSH6, and PMS2). Staging pT4b pN1b pM1c.    - 06/22/2022: Started FOLFOXIRI with Bevacizumab; last dose of FOLFIRI and Bevacizumab received on 01/04/2023; S/p oxaliplatin until C10  - 09/01/22 CT CAP: Segmental left lower lobe pulmonary embolus and probable smaller pulmonary emboli within the right lung. Interval decrease in size of bilateral pulmonary nodules and hepatic metastases compared to 05/25/2022. No new sites of disease.  - 06/13/23 CT CAP: New, 8 mm right lower lobe nodule and enlarging pulmonary nodules, 7 mm left apical nodule previously measured 6 mm. Bilobar hepatic metastases, the majority of which are calcified and not significantly changed. A noncalcified lesion in the right hepatic lobe demonstrates mild interval growth, 1.7 x 1.4 cm previously 1.4 x 1.3 cm  - On chemo FOLFIRI, and Avastin  most recent dose 07/05/2023    # Leukocytosis    - WBC- remains elevated, however trended down since admission.  - S/p On- pro (Neulasta) 07/07/23 which is most likely the cause of spike in count  - Came with high output ileostomy, now normal.   - C- diff negative , GI PCR panel negative    -CT imaging with no acute findings to suggest inflammatory or infectious process in the abdomen or pelvis, no bowel obstruction.      # LLE Ext Iliac Artery Occlusion    - Patient has been taking DOAC (Eliquis) for prior VTE in setting of metastatic disease--Developed arterial thrombosis on Eliquis. 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 R/o APS ( sent)  Bevacizumab also rarely can contribute to thrombotic risk .Recommend  to switch to Lovenox  for long term AC. D/w Dr Serrano.   - Etiology most likely 2/2 known metastatic malignancy with hx of missed doses/non compliance  - Hypercoagulable work up--> LAC negative  cardiolipin abs negative  - Patient did admit to missing doses here and there in the past, but has been more compliant recently.  - LE physical exam appropriate with no obvious acute signs of decreased perfusion to distal aspects warranting urgent intervention   -Vascular following--s/p 7/10 LLE angiogram, unable to traverse lesion.  - Per discussion with primary Onc Dr Serrano regarding AC change--rec to switch to Lovenox injections, patient aware of this, discussed in length, also made the RN aware for teaching needs.  -s/p Heparin gtt- transitioned to Lovenox on 713  -c/o right groin since thrombectomy attempt by Vascular Sx-US -lower extremities-7/4 & 7/16- noted acute nonocclusive deep venous thrombosis in the right common femoral   vein, above the knee only, appears unchanged from 07/14/2023.  No evidence of propagation.  -Scheduled for IVC filter today-7/17  -Continue Lovenox SQ 90 mg BID, will discuss alternatives when outpatient.  -Continue supportive measures                51 y/o M with MH significant for Stage IV colon cancer with metastases to the liver and lung, currently on chemotherapy, s/p ileostomy and revision 04/2023 and subtotal colectomy for perforated cecal mass 2022 presented with abdominal pain, loose/watery stools, dehydration, decreased appetite over the last 3 days.      # Metastatic Colon CA with Liver & Lung Metastases, S/p Ileostomy     - Dx 05/2022 after going to ED for worsening abdominal pain  - 05/19/22 CT AP showed apparent focal mural thickening at the cecum/proximal ascending colon. The sigmoid colon appears to be tethered to the cecum and it is focally thick-walled; focal tumor invasion/extension from the cecum to the sigmoid colon. Mural thickening of the a dilated right lower quadrant small bowel loop with adjacent mesenteric edema for which associated small bowel ischemia cannot be excluded. Multiple hypodense lesions with calcifications in both lobes of the liver with the largest measuring 5.8 x 6.0 cm in hepatic segment.  - 05/19/22: underwent exploratory laparotomy, total colectomy, end ileostomy, biopsy of liver, and biopsy of retroperitoneum.    Pathology:  Omentum, excision: Negative for malignancy.   Portion of terminal ileum, cecum with adherent sigmoid, total colectomy: Poorly differentiated infiltrating adenocarcinoma measuring 9.0 cm. Adenocarcinoma infiltrates through the bowel wall and through the visceral peritoneum and infiltrates the adherent sigmoid colon 3 of 17 lymph nodes are positive for metastatic carcinoma. Lymphovascular space & Perineural invasion is identified. The surgical margins negative.   Appendix with serosal tumor implants and acute inflammation. Peritonitis. Liver, biopsy: Metastatic adenocarcinoma.   Retroperitoneal biopsy: Adenocarcinoma with necrosis. No loss of nuclear expression of MMR proteins (MLH1, MSH2, MSH6, and PMS2). Staging pT4b pN1b pM1c.    - 06/22/2022: Started FOLFOXIRI with Bevacizumab; last dose of FOLFIRI and Bevacizumab received on 01/04/2023; S/p oxaliplatin until C10  - 09/01/22 CT CAP: Segmental left lower lobe pulmonary embolus and probable smaller pulmonary emboli within the right lung. Interval decrease in size of bilateral pulmonary nodules and hepatic metastases compared to 05/25/2022. No new sites of disease.  - 06/13/23 CT CAP: New, 8 mm right lower lobe nodule and enlarging pulmonary nodules, 7 mm left apical nodule previously measured 6 mm. Bilobar hepatic metastases, the majority of which are calcified and not significantly changed. A noncalcified lesion in the right hepatic lobe demonstrates mild interval growth, 1.7 x 1.4 cm previously 1.4 x 1.3 cm  - On chemo FOLFIRI, and Avastin  most recent dose 07/05/2023    # Leukocytosis    - WBC- remains elevated, however trended down since admission.  - S/p On- pro (Neulasta) 07/07/23 which is most likely the cause of spike in count  - Came with high output ileostomy, now normal.   - C- diff negative , GI PCR panel negative    -CT imaging with no acute findings to suggest inflammatory or infectious process in the abdomen or pelvis, no bowel obstruction.      # LLE Ext Iliac Artery Occlusion    - Patient has been taking DOAC (Eliquis) for prior VTE in setting of metastatic disease--Developed arterial thrombosis on Eliquis. 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 R/o APS- negative.  Bevacizumab also rarely can contribute to thrombotic risk . Recommend  to switch to Lovenox  for long term AC. D/w Dr Serrano.   - Etiology most likely 2/2 known metastatic malignancy with hx of missed doses/non compliance  - Hypercoagulable work up--> LAC negative  cardiolipin abs negative  - Patient did admit to missing doses here and there in the past, but has been more compliant recently.  - LE physical exam appropriate with no obvious acute signs of decreased perfusion to distal aspects warranting urgent intervention   -Vascular following--s/p 7/10 LLE angiogram, unable to traverse lesion.  - Per discussion with primary Onc Dr Serrano regarding AC change--rec to switch to Lovenox injections, patient aware of this, discussed in length, also made the RN aware for teaching needs.  -s/p Heparin gtt- transitioned to Lovenox on 713  -c/o right groin since thrombectomy attempt by Vascular Sx-US -lower extremities-7/4 & 7/16- noted acute nonocclusive deep venous thrombosis in the right common femoral   vein, above the knee only, appears unchanged from 07/14/2023.  No evidence of propagation.  -Scheduled for IVC filter today-7/17  -Continue Lovenox SQ 90 mg BID, will discuss alternatives when outpatient.  -Continue supportive measures                51 y/o M with MH significant for Stage IV colon cancer with metastases to the liver and lung, currently on chemotherapy, s/p ileostomy and revision 04/2023 and subtotal colectomy for perforated cecal mass 2022, presented with abdominal pain, loose/watery stools, dehydration, decreased appetite over the last 3 days.      # Metastatic Colon CA with Liver & Lung Metastases, S/p Ileostomy     - Dx 05/2022 after going to ED for worsening abdominal pain  - 05/19/22 CT AP showed apparent focal mural thickening at the cecum/proximal ascending colon. The sigmoid colon appears to be tethered to the cecum and it is focally thick-walled; focal tumor invasion/extension from the cecum to the sigmoid colon. Mural thickening of the a dilated right lower quadrant small bowel loop with adjacent mesenteric edema for which associated small bowel ischemia cannot be excluded. Multiple hypodense lesions with calcifications in both lobes of the liver with the largest measuring 5.8 x 6.0 cm in hepatic segment.  - 05/19/22: underwent exploratory laparotomy, total colectomy, end ileostomy, biopsy of liver, and biopsy of retroperitoneum.    Pathology:  Omentum, excision: Negative for malignancy.   Portion of terminal ileum, cecum with adherent sigmoid, total colectomy: Poorly differentiated infiltrating adenocarcinoma measuring 9.0 cm. Adenocarcinoma infiltrates through the bowel wall and through the visceral peritoneum and infiltrates the adherent sigmoid colon 3 of 17 lymph nodes are positive for metastatic carcinoma. Lymphovascular space & Perineural invasion is identified. The surgical margins negative.   Appendix with serosal tumor implants and acute inflammation. Peritonitis. Liver, biopsy: Metastatic adenocarcinoma.   Retroperitoneal biopsy: Adenocarcinoma with necrosis. No loss of nuclear expression of MMR proteins (MLH1, MSH2, MSH6, and PMS2). Staging pT4b pN1b pM1c.    - 06/22/2022: Started FOLFOXIRI with Bevacizumab; last dose of FOLFIRI and Bevacizumab received on 01/04/2023; S/p oxaliplatin until C10  - 09/01/22 CT CAP: Segmental left lower lobe pulmonary embolus and probable smaller pulmonary emboli within the right lung. Interval decrease in size of bilateral pulmonary nodules and hepatic metastases compared to 05/25/2022. No new sites of disease.  - 06/13/23 CT CAP: New, 8 mm right lower lobe nodule and enlarging pulmonary nodules, 7 mm left apical nodule previously measured 6 mm. Bilobar hepatic metastases, the majority of which are calcified and not significantly changed. A noncalcified lesion in the right hepatic lobe demonstrates mild interval growth, 1.7 x 1.4 cm previously 1.4 x 1.3 cm  - On chemo FOLFIRI, and Avastin, most recent dose 07/05/2023; had been held preoperatively prior to June scan.    # Leukocytosis    - WBC- remains elevated, however trended down since admission.  - S/p On- pro (Neulasta) 07/07/23 which is most likely the cause of spike in count  - Came with high output ileostomy, now normal.   - C- diff negative , GI PCR panel negative    -CT imaging with no acute findings to suggest inflammatory or infectious process in the abdomen or pelvis, no bowel obstruction.      # LLE Ext Iliac Artery Occlusion    - Patient has been taking DOAC (Eliquis) for prior VTE in setting of metastatic disease--Developed arterial thrombosis on Eliquis. 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 R/o APS- negative.  Bevacizumab also rarely can contribute to thrombotic risk . Recommend  to switch to Lovenox  for long term AC. D/w Dr Serrano.   - Etiology most likely 2/2 known metastatic malignancy with hx of missed doses/non compliance  - Hypercoagulable work up--> LAC negative  cardiolipin abs negative  - Patient did admit to missing doses here and there in the past, but has been more compliant recently.  - LE physical exam appropriate with no obvious acute signs of decreased perfusion to distal aspects warranting urgent intervention   -Vascular following--s/p 7/10 LLE angiogram, unable to traverse lesion.  - Per discussion with primary Onc Dr Serrano regarding AC change--rec to switch to Lovenox injections, patient aware of this, discussed in length, also made the RN aware for teaching needs.  -s/p Heparin gtt- transitioned to Lovenox on 713  -c/o right groin since thrombectomy attempt by Vascular Sx-US -lower extremities-7/4 & 7/16- noted acute nonocclusive deep venous thrombosis in the right common femoral   vein, above the knee only, appears unchanged from 07/14/2023.  No evidence of propagation.  -Scheduled for IVC filter 7/1723  -Continue Lovenox SQ 90 mg BID, will discuss alternatives when outpatient.  -Continue supportive measures

## 2023-07-18 LAB
ANION GAP SERPL CALC-SCNC: 2 MMOL/L — LOW (ref 5–17)
BASOPHILS # BLD AUTO: 0.08 K/UL — SIGNIFICANT CHANGE UP (ref 0–0.2)
BASOPHILS NFR BLD AUTO: 0.6 % — SIGNIFICANT CHANGE UP (ref 0–2)
BUN SERPL-MCNC: 12 MG/DL — SIGNIFICANT CHANGE UP (ref 7–23)
CALCIUM SERPL-MCNC: 9 MG/DL — SIGNIFICANT CHANGE UP (ref 8.5–10.1)
CHLORIDE SERPL-SCNC: 102 MMOL/L — SIGNIFICANT CHANGE UP (ref 96–108)
CO2 SERPL-SCNC: 31 MMOL/L — SIGNIFICANT CHANGE UP (ref 22–31)
CREAT SERPL-MCNC: 0.86 MG/DL — SIGNIFICANT CHANGE UP (ref 0.5–1.3)
EGFR: 105 ML/MIN/1.73M2 — SIGNIFICANT CHANGE UP
EOSINOPHIL # BLD AUTO: 0.28 K/UL — SIGNIFICANT CHANGE UP (ref 0–0.5)
EOSINOPHIL NFR BLD AUTO: 2.1 % — SIGNIFICANT CHANGE UP (ref 0–6)
GLUCOSE SERPL-MCNC: 114 MG/DL — HIGH (ref 70–99)
HCT VFR BLD CALC: 33.2 % — LOW (ref 39–50)
HGB BLD-MCNC: 10.8 G/DL — LOW (ref 13–17)
IMM GRANULOCYTES NFR BLD AUTO: 1 % — HIGH (ref 0–0.9)
LYMPHOCYTES # BLD AUTO: 14.8 % — SIGNIFICANT CHANGE UP (ref 13–44)
LYMPHOCYTES # BLD AUTO: 2.02 K/UL — SIGNIFICANT CHANGE UP (ref 1–3.3)
MCHC RBC-ENTMCNC: 28.5 PG — SIGNIFICANT CHANGE UP (ref 27–34)
MCHC RBC-ENTMCNC: 32.5 GM/DL — SIGNIFICANT CHANGE UP (ref 32–36)
MCV RBC AUTO: 87.6 FL — SIGNIFICANT CHANGE UP (ref 80–100)
MONOCYTES # BLD AUTO: 1.45 K/UL — HIGH (ref 0–0.9)
MONOCYTES NFR BLD AUTO: 10.6 % — SIGNIFICANT CHANGE UP (ref 2–14)
NEUTROPHILS # BLD AUTO: 9.65 K/UL — HIGH (ref 1.8–7.4)
NEUTROPHILS NFR BLD AUTO: 70.9 % — SIGNIFICANT CHANGE UP (ref 43–77)
PLATELET # BLD AUTO: 203 K/UL — SIGNIFICANT CHANGE UP (ref 150–400)
POTASSIUM SERPL-MCNC: 3.7 MMOL/L — SIGNIFICANT CHANGE UP (ref 3.5–5.3)
POTASSIUM SERPL-SCNC: 3.7 MMOL/L — SIGNIFICANT CHANGE UP (ref 3.5–5.3)
RBC # BLD: 3.79 M/UL — LOW (ref 4.2–5.8)
RBC # FLD: 17.1 % — HIGH (ref 10.3–14.5)
SODIUM SERPL-SCNC: 135 MMOL/L — SIGNIFICANT CHANGE UP (ref 135–145)
WBC # BLD: 13.62 K/UL — HIGH (ref 3.8–10.5)
WBC # FLD AUTO: 13.62 K/UL — HIGH (ref 3.8–10.5)

## 2023-07-18 PROCEDURE — 99232 SBSQ HOSP IP/OBS MODERATE 35: CPT

## 2023-07-18 RX ADMIN — Medication 1 TABLET(S): at 17:27

## 2023-07-18 RX ADMIN — OXYCODONE HYDROCHLORIDE 10 MILLIGRAM(S): 5 TABLET ORAL at 17:32

## 2023-07-18 RX ADMIN — Medication 4 MILLIGRAM(S): at 13:18

## 2023-07-18 RX ADMIN — ENOXAPARIN SODIUM 90 MILLIGRAM(S): 100 INJECTION SUBCUTANEOUS at 09:37

## 2023-07-18 RX ADMIN — OXYCODONE HYDROCHLORIDE 10 MILLIGRAM(S): 5 TABLET ORAL at 03:52

## 2023-07-18 RX ADMIN — Medication 4 MILLIGRAM(S): at 22:25

## 2023-07-18 RX ADMIN — GABAPENTIN 100 MILLIGRAM(S): 400 CAPSULE ORAL at 22:25

## 2023-07-18 RX ADMIN — GABAPENTIN 100 MILLIGRAM(S): 400 CAPSULE ORAL at 06:06

## 2023-07-18 RX ADMIN — OXYCODONE HYDROCHLORIDE 10 MILLIGRAM(S): 5 TABLET ORAL at 20:33

## 2023-07-18 RX ADMIN — OXYCODONE HYDROCHLORIDE 10 MILLIGRAM(S): 5 TABLET ORAL at 09:26

## 2023-07-18 RX ADMIN — OXYCODONE HYDROCHLORIDE 10 MILLIGRAM(S): 5 TABLET ORAL at 16:32

## 2023-07-18 RX ADMIN — OXYCODONE HYDROCHLORIDE 10 MILLIGRAM(S): 5 TABLET ORAL at 08:26

## 2023-07-18 RX ADMIN — ENOXAPARIN SODIUM 90 MILLIGRAM(S): 100 INJECTION SUBCUTANEOUS at 22:25

## 2023-07-18 RX ADMIN — Medication 4 MILLIGRAM(S): at 06:01

## 2023-07-18 RX ADMIN — Medication 1 TABLET(S): at 06:01

## 2023-07-18 RX ADMIN — Medication 1 PACKET(S): at 17:27

## 2023-07-18 RX ADMIN — OXYCODONE HYDROCHLORIDE 10 MILLIGRAM(S): 5 TABLET ORAL at 12:26

## 2023-07-18 RX ADMIN — Medication 1 PACKET(S): at 06:01

## 2023-07-18 RX ADMIN — Medication 1 TABLET(S): at 11:03

## 2023-07-18 NOTE — PROGRESS NOTE ADULT - ASSESSMENT
53 y/o male with metastatic colon cancer on chemo, PE  admitted for:      1. Leukocytosis SIRS, no source of infection identified,   workup negative  leukocytosis likely due to Neulasta and dehydration   Had bump im WBCs this am possibly reactive? has hematoma and DVT, No fevers. Trending down now   s/p Ciprofloxacin and Metronidazole in ER;   S/p  Zosyn, now off   UA negative, pt without urinary symptoms   stool and GI PCR negative  blood culture NGTD   CT abd/pelvis - no infectious/inflammatory process   CTA; no PNA   Off abxs now as per ID     2. 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  Groin pain. Acute RLE DVT   likely 2/2 RLE DVT  vs referred pain related to cancer   RLE Doppler neg for  hematoma or  aneurysm, + common femoral vein DVT   F/u ECHO:  bubble study neg for shunts   D/w IR and Vascular, new thrombus?  vs pre admission, no venous  imaging on admission. Recommended to repear RLE doppler  which was done and no change   D/w hematology  team agree with IR recs  I d/w vascular resident, will need to further discuss indications for IVC filer       4. Metastatic colon cancer  to the liver and lung on chemotherapy  on palliative chemotherapy:  initially FOLFOXIRI / sunny , after 10 cycles changed to FOLFIRI/ Bevacizumab.  Last chemotherapy 7/5/23 Had OnPro 7/8/23    - Heme/Onc recs appreciated     5. Anxiety  Reported frustration and suicidal thoughts to SW on 7/13  Was evaluated by psych, I d/w NP, Pt is not currently suicidal/ homicidal does not need 1:1   Pt was offered Anti depressant to be declined  but he declined     DVT ppx: heparin drip, switch to Lovenox      Code status: Full code       Dispo;   discharge planning

## 2023-07-18 NOTE — PROGRESS NOTE ADULT - ASSESSMENT
53 yo M presented with high output ileostomy and RLE DVT. Talked to radiologist today who thinks the DVT is acute. Underwent IVC filter placement yesterday, groin soft. Doing well except for right groin pain still present  labs pending     P:  Reg diet  Pain control  Strict I/Os  pain control  cont AC lov 90 q12  Rest of management as per primary team    To be discussed with Dr Taylor       53 yo M presented with high output ileostomy and RLE DVT. Talked to radiologist today who thinks the DVT is acute. Underwent IVC filter placement yesterday, groin soft. Doing well except for right groin pain still present  labs pending     P:  Reg diet  Pain control  Strict I/Os  pain control  cont AC lov 90 q12  Rest of management as per primary team  No acute vascular intervention at this time    To be discussed with Dr Taylor

## 2023-07-18 NOTE — PROGRESS NOTE ADULT - SUBJECTIVE AND OBJECTIVE BOX
SURGERY DAILY PROGRESS NOTE:     Subjective:  Patient seen and examined at bedside. Pt is doing well, complaining of Right groin pain. Pt is tolerating diet and reports ileostomy function has slowed down significantly, stool also more formed. Pt is POD#1 from IVC filter placement. Pt denies any pain in left groin where entry site was. Pt wondering about possible discharge in the next few days. AVSS. Denies any fevers, chills, n/v/d, chest pain or shortness of breath.    Objective:    MEDICATIONS  (STANDING):  dextrose 5% + sodium chloride 0.45% with potassium chloride 20 mEq/L 1000 milliLiter(s) (125 mL/Hr) IV Continuous <Continuous>  diphenoxylate/atropine 1 Tablet(s) Oral every 6 hours  enoxaparin Injectable 90 milliGRAM(s) SubCutaneous every 12 hours  fentaNYL   Patch  12 MICROgram(s)/Hr 1 Patch Transdermal every 72 hours  gabapentin 100 milliGRAM(s) Oral every 8 hours  lidocaine   4% Patch 1 Patch Transdermal daily  loperamide 4 milliGRAM(s) Oral three times a day  naloxone Injectable 0.4 milliGRAM(s) IV Push once  psyllium Powder      psyllium Powder 1 Packet(s) Oral every 12 hours  sodium chloride 0.9%. 1000 milliLiter(s) (150 mL/Hr) IV Continuous <Continuous>    MEDICATIONS  (PRN):  acetaminophen     Tablet .. 650 milliGRAM(s) Oral every 6 hours PRN Temp greater or equal to 38C (100.4F), Mild Pain (1 - 3)  aluminum hydroxide/magnesium hydroxide/simethicone Suspension 30 milliLiter(s) Oral every 4 hours PRN Dyspepsia  HYDROmorphone   Tablet 2 milliGRAM(s) Oral every 4 hours PRN Moderate Pain (4 - 6)  HYDROmorphone   Tablet 4 milliGRAM(s) Oral every 4 hours PRN Severe Pain (7 - 10)  HYDROmorphone  Injectable 1 milliGRAM(s) IV Push three times a day PRN break through pain  melatonin 3 milliGRAM(s) Oral at bedtime PRN Insomnia  ondansetron Injectable 4 milliGRAM(s) IV Push every 8 hours PRN Nausea and/or Vomiting    ICU Vital Signs Last 24 Hrs  T(C): 36.9 (18 Jul 2023 00:02), Max: 36.9 (18 Jul 2023 00:02)  T(F): 98.4 (18 Jul 2023 00:02), Max: 98.4 (18 Jul 2023 00:02)  HR: 85 (18 Jul 2023 00:02) (82 - 97)  BP: 111/75 (18 Jul 2023 00:02) (108/60 - 126/79)  BP(mean): --  ABP: --  ABP(mean): --  RR: 17 (17 Jul 2023 15:17) (16 - 17)  SpO2: 98% (18 Jul 2023 00:02) (98% - 100%)    O2 Parameters below as of 17 Jul 2023 15:17  Patient On (Oxygen Delivery Method): room air          PHYSICAL EXAM   Gen: well-appearing, in no acute distress  CV: pulse regularly present   Resp: airway patent, non-labored breathing  Abd: soft, NTND; no rebound or guarding. ileostomy in place with thick stool, R groin soft with resolving hematoma. Left groin soft, non tender, no hemtoma. Dressing in place c/d/i  ext. no cyanosis or edema, RLE DP and PT pulse palpable, LLE DP pulses dopplerable

## 2023-07-18 NOTE — PROGRESS NOTE ADULT - SUBJECTIVE AND OBJECTIVE BOX
51 y/o M with PMH colon cancer with metastases to the liver and lung on chemotherapy, PE presented with abdominal pain. Pt reports loose/watery stools, dehydration, decreased appetite over the last 3 days. His last chemotherapy was on Wednesday. No recent antibiotic use. Reports chills, cramping abdominal pain (sharp abdominal pain has been chronic for years), nausea, dry heaving, vomiting (6 episodes of watery outpt). Denies fevers, chills, chest pain, SOB, abdominal pain, N/V, diarrhea/constipation.   Prior admission:   - 4/19/23: Intestinal stoma prolapse   ER course: HR . Labs: .35 -> 112.01, lactate 2.4 -> 1.4, Na 134, glucose 113, alkaline phosphatase 191. UA: negative. COVID and RVP negative.   Imaging:  - CXR: port right chest wall, no consolidation, no effusion, no pneumothorax (personally reviewed.    - CT abd/pelvis: No significant change when compared to prior study 6/13/2023. No acute findings to suggest inflammatory or infectious process in the abdomen or pelvis, no bowel obstruction. Unchanged hepatic metastasis and peritoneal thickening with serosal implant in the right lower pelvis and postsurgical changes from colectomy and right abdominal ileostomy. No morphologic abnormality at the ileostomy site. Occlusion of left external iliac artery.   Pt was given Ciprofloxacin and Metronidazole, 3L of NS, Pepcid morphine, Zofran He is being admitted to med/surg (1N) for further management.       INTERVAL HPI/ OVERNIGHT EVENTS:no acute events over night , still with R groin pain , no new symptoms , oxyodone helping   REVIEW OF SYSTEMS:  All other review of systems is negative unless indicated above.      PHYSICAL EXAM:  General: Well developed; in no acute distress  Eyes: EOMI; conjunctiva and sclera clear  Head: Normocephalic; atraumatic  ENMT: No nasal discharge; airway clear  Neck: Supple; non tender; no masses  Respiratory: No wheezes, rales or rhonchi  Cardiovascular: Regular rate and rhythm. S1 and S2 Normal;  Gastrointestinal: Soft non-tender non-distended; Normal bowel sounds, ileostomy  in place   Genitourinary: No  suprapubic  tenderness  Extremities: Normal range of motion, No edema, some R calf tenderness   Vascular: Peripheral DP pulses not  palpable, R groin  tenderness to palpation    Neurological: Alert and oriented x 3, non focal   Skin: Warm and dry. No acute rash  Musculoskeletal: Normal muscle tone, without deformities  Psychiatric: Cooperative and appropriate    labs reviewed  RADIOLOGY & ADDITIONAL TESTS:    ACC: [id number] EXAM:  CT ANGIO ABD AOR W RUN(W)AW IC   ORDERED BY: TINO SANDOVAL     PROCEDURE DATE:  07/10/2023          INTERPRETATION:  CT ANGIOGRAM ABDOMEN, PELVIS, AND LOWER EXTREMITIES:    CT ANGIO ABDOMINAL AORTA RUNOFF    CLINICAL INFORMATION:  r/o LLE ischemia    TECHNIQUE:    CONTRAST/COMPLICATIONS:  IV Contrast: Omnipaque 350  125 cc administered   25 cc discarded  Oral Contrast: NONE  Complications: None reported at time of study completion    PROCEDURE:  Initially, nonenhanced CT was obtained through the calves. Then,   following the rapid administration of intravenous contrast, CT   angiography was performed through the abdomen, pelvis, and lower   extremities down to the toes.  Delayed images through the calves were   also obtained. Sagittal and coronal reformats as well as 3D   reconstructions were performed.    Volume rendered and MIP images for CT angiographic display are created   from source data on an independent 3-D workstation and archived into   PACS. This study was performed using automatic exposure control   (radiation dose reduction software) to obtain a diagnostic image quality   scan with patient dose as low as reasonably achievable.    COMPARISON: CT abdomen pelvis 7/9/2023.    FINDINGS:    ABDOMINAL AORTA: Normal caliber of the abdominal aorta, 1.9 cm   suprarenal, 1.7 cm infrarenal, 1.6 cm at the bifurcation.    Celiac: Patent, no stenosis.  SMA: Patent, no stenosis.  ANYA: Patent, no stenosis  Renal Arteries: Duplicated left renal artery, no stenosis. Single right   renal artery, no stenosis    RIGHT LEG:    Common Iliac artery: Mild atherosclerosis. No high-grade stenosis or   occlusion.  External Iliac: Mild atherosclerotic disease, no high-grade stenosis or   occlusion.  Internal Iliac: Moderate atherosclerotic disease mild stenosis at the   origin. Patent.    Common femoral: Patent, no stenosis or  SFA: Patent, mild atherosclerotic plaques, no stenosis.  Profunda: Patent, normal    Popliteal: Patent, no stenosis or occlusion  Anterior Tibial (AT): Patent.  Tibioperoneal trunk (TPT): Patent, no stenosis or occlusion.  Posterior Tibial (PT): Patent, no stenosis.  Peroneal: Patent to the level of the ankle  Dorsalis Pedis (DP): Patent  Plantar: Patent    LEFT LEG:    Common Iliac artery: Patent, moderate atherosclerotic changes  External Iliac: Occluded  Internal Iliac: Patent, high-grade stenosis at the origin, moderate   atherosclerotic changes.    Common femoral: Recanalized and reconstituted common femoral artery   through collaterals from the left inferior epigastric artery.  SFA: Patent, mild atherosclerotic changes, no stenosis or occlusion.  Profunda: Normal    Popliteal: Patent, mild atherosclerotic plaques, no significant stenosis.  AT: Patent, no stenosis or occlusion  TPT: Patent, no stenosis  PT: Patent in the proximal two thirds.  Peroneal: Patent, no significant stenosis.  DP: Patent  Plantar: Not well opacified      ADDITIONAL FINDINGS:  LOWER CHEST: Within normal limits.    LIVER: Bilateral metastatic lesions in the liver with calcification,   unchanged compared to prior study 7/9/2023.  BILE DUCTS: Normal caliber.  GALLBLADDER: Vicarious excretion of contrast.  SPLEEN: Mild splenomegaly  PANCREAS: Within normal limits.  ADRENALS: Within normal limits.  KIDNEYS/URETERS: No renal stones or hydronephrosis.    BLADDER: Distended, filled with contrast, within normal limits.  REPRODUCTIVE ORGANS: Prostate within normal limits.    BOWEL: Postsurgical changes from partial colectomy, Glorai stump   creation and right end ileostomy. No bowel obstruction. Appendix is   surgically absent.  PERITONEUM: No ascites. Unchanged peritoneal nodular thickening and   serosal implant within the right pelvis as described from prior study,   unchanged.  VESSELS: Aorta and pelvic arteries and described in detail above. Patent   portal vein. Patent renal veins. Left retroaortic renal vein.  RETROPERITONEUM/LYMPH NODES: No lymphadenopathy.  ABDOMINAL WALL: Within normal limits.  BONES: Within normal limits.    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 Two-vessel runoff in the left calf.  High grade stenosis at the origin of the left internal iliac artery.  Three-vessel arterial runoff of the right lower extremity.  Atherosclerotic disease in the abdomen as detailed above.      ACC: [id number] EXAM:  [facility] CHEST [facility]                          PROCEDURE DATE:  07/02/2022          INTERPRETATION:  Reason for Exam: Shortness of breath. chest pain.    CTA of the chest was performed from the thoracic inlet to the level of   the adrenal glands following IV contrast injection of  80 cc of Omnipaque   350. No immediate complications were reported.  MIP images were also   created and reviewed.    Comparison: CT abdomen and pelvis dated May 25, 2022    Tubes/Lines: Right Mediport catheter tip in SVC.    Mediastinum and Heart: Aorta and pulmonary arteries are normal in size.   Moderate stenosis of the proximal left subclavian artery from   atherosclerotic plaque. Thyroid gland is unremarkable. No   lymphadenopathy.No pericardial effusion.    Lungs, Pleura, and Airways: There is no pulmonary embolus. No   consolidations, edema or effusion. Small left pneumothorax noted. Right   lower lobe 4 mm nodule in series 4 image 289. Left apical 4 mm nodule on   image 57.    Visualized Abdomen: Multiple calcified masses in the liver, without   significant change since prior.    Bones and soft tissues: Unremarkable.    IMPRESSION:    No pulmonary embolus.    Small left pneumothorax without mediastinal shift.    Remaining findings as above.      < from: US Duplex Venous Lower Ext Ltd, Right (07.14.23 @ 13:33) >    ACC: [id number] EXAM:  US DPLX LWR EXT ARTS LTD RT   ORDERED BY: IMTIAZ LISA     ACC: [id number] EXAM:  US DPLX LWR EXT VEINS LTD RT   ORDERED BY: IMTIAZ LISA     PROCEDURE DATE:  07/14/2023          INTERPRETATION:  CLINICAL INFORMATION: Right leg pain status post right   groin catheterization. Evaluate for DVT, hematoma/pseudoaneurysm.    COMPARISON: None available.    TECHNIQUE: Duplex sonography of the RIGHT LOWER extremity veins with   color and spectral Doppler, with and without compression.    FINDINGS:    There is normal compressibility of the right femoral and popliteal veins.   Evidence of thrombosis is noted involving the right common femoral vein.  The contralateral common femoral vein is patent.  Doppler examinationshows normal spontaneous and phasic flow.    No calf vein thrombosis is detected.    Normal flow is identified in the right common femoral and proximal   femoral arteries. There is no abnormal flow to suggest a pseudoaneurysm.   No complex collectionis seen.    IMPRESSION:  Acute deep venous thrombosis: above the knee.  Acute thrombosis involving the right common femoral vein.  No evidence of a hematoma or pseudoaneurysm.

## 2023-07-18 NOTE — PROGRESS NOTE ADULT - ASSESSMENT
51 y/o M with MH significant for Stage IV colon cancer with metastases to the liver and lung, currently on chemotherapy, s/p ileostomy and revision 04/2023 and subtotal colectomy for perforated cecal mass 2022, presented with abdominal pain, loose/watery stools, dehydration, decreased appetite over the last 3 days.      # Metastatic Colon CA with Liver & Lung Metastases, S/p Ileostomy     - Dx 05/2022 after going to ED for worsening abdominal pain  - 05/19/22 CT AP showed apparent focal mural thickening at the cecum/proximal ascending colon. The sigmoid colon appears to be tethered to the cecum and it is focally thick-walled; focal tumor invasion/extension from the cecum to the sigmoid colon. Mural thickening of the a dilated right lower quadrant small bowel loop with adjacent mesenteric edema for which associated small bowel ischemia cannot be excluded. Multiple hypodense lesions with calcifications in both lobes of the liver with the largest measuring 5.8 x 6.0 cm in hepatic segment.  - 05/19/22: underwent exploratory laparotomy, total colectomy, end ileostomy, biopsy of liver, and biopsy of retroperitoneum.    Pathology:  Omentum, excision: Negative for malignancy.   Portion of terminal ileum, cecum with adherent sigmoid, total colectomy: Poorly differentiated infiltrating adenocarcinoma measuring 9.0 cm. Adenocarcinoma infiltrates through the bowel wall and through the visceral peritoneum and infiltrates the adherent sigmoid colon 3 of 17 lymph nodes are positive for metastatic carcinoma. Lymphovascular space & Perineural invasion is identified. The surgical margins negative.   Appendix with serosal tumor implants and acute inflammation. Peritonitis. Liver, biopsy: Metastatic adenocarcinoma.   Retroperitoneal biopsy: Adenocarcinoma with necrosis. No loss of nuclear expression of MMR proteins (MLH1, MSH2, MSH6, and PMS2). Staging pT4b pN1b pM1c.    - 06/22/2022: Started FOLFOXIRI with Bevacizumab; last dose of FOLFIRI and Bevacizumab received on 01/04/2023; S/p oxaliplatin until C10  - 09/01/22 CT CAP: Segmental left lower lobe pulmonary embolus and probable smaller pulmonary emboli within the right lung. Interval decrease in size of bilateral pulmonary nodules and hepatic metastases compared to 05/25/2022. No new sites of disease.  - 06/13/23 CT CAP: New, 8 mm right lower lobe nodule and enlarging pulmonary nodules, 7 mm left apical nodule previously measured 6 mm. Bilobar hepatic metastases, the majority of which are calcified and not significantly changed. A noncalcified lesion in the right hepatic lobe demonstrates mild interval growth, 1.7 x 1.4 cm previously 1.4 x 1.3 cm  - On chemo FOLFIRI, and Avastin, most recent dose 07/05/2023; had been held preoperatively prior to June scan.    # Leukocytosis    - WBC- remains elevated, however trended down since admission.  - S/p On- pro (Neulasta) 07/07/23 which is most likely the cause of spike in count  - Came with high output ileostomy, now normal.   - C- diff negative , GI PCR panel negative    -CT imaging with no acute findings to suggest inflammatory or infectious process in the abdomen or pelvis, no bowel obstruction.      # LLE Ext Iliac Artery Occlusion    - Patient has been taking DOAC (Eliquis) for prior VTE in setting of metastatic disease--Developed arterial thrombosis on Eliquis. 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 R/o APS- negative.  Bevacizumab also rarely can contribute to thrombotic risk . Recommend  to switch to Lovenox  for long term AC. D/w Dr Serrano.   - Etiology most likely 2/2 known metastatic malignancy with hx of missed doses/non compliance  - Hypercoagulable work up--> LAC negative  cardiolipin abs negative  - Patient did admit to missing doses here and there in the past, but has been more compliant recently.  - LE physical exam appropriate with no obvious acute signs of decreased perfusion to distal aspects warranting urgent intervention   -Vascular following--s/p 7/10 LLE angiogram, unable to traverse lesion.  - Per discussion with primary Onc Dr Serrano regarding AC change--rec to switch to Lovenox injections, patient aware of this, discussed in length, also made the RN aware for teaching needs.  -s/p Heparin gtt- transitioned to Lovenox on 713  -c/o right groin since thrombectomy attempt by Vascular Sx-US -lower extremities-7/4 & 7/16- noted acute nonocclusive deep venous thrombosis in the right common femoral   vein, above the knee only, appears unchanged from 07/14/2023.  No evidence of propagation.  -s/p IVC filter 7/17/23- doing well  -Continue Lovenox SQ 90 mg BID, will discuss alternatives when outpatient.  -Continue supportive measures                53 y/o M with MH significant for Stage IV colon cancer with metastases to the liver and lung, currently on chemotherapy, s/p ileostomy and revision 04/2023 and subtotal colectomy for perforated cecal mass 2022, presented with abdominal pain, loose/watery stools, dehydration, decreased appetite over the last 3 days.      # Metastatic Colon CA with Liver & Lung Metastases, S/p Ileostomy     - Dx 05/2022 after going to ED for worsening abdominal pain  - 05/19/22 CT AP showed apparent focal mural thickening at the cecum/proximal ascending colon. The sigmoid colon appears to be tethered to the cecum and it is focally thick-walled; focal tumor invasion/extension from the cecum to the sigmoid colon. Mural thickening of the a dilated right lower quadrant small bowel loop with adjacent mesenteric edema for which associated small bowel ischemia cannot be excluded. Multiple hypodense lesions with calcifications in both lobes of the liver with the largest measuring 5.8 x 6.0 cm in hepatic segment.  - 05/19/22: underwent exploratory laparotomy, total colectomy, end ileostomy, biopsy of liver, and biopsy of retroperitoneum.    Pathology:  Omentum, excision: Negative for malignancy.   Portion of terminal ileum, cecum with adherent sigmoid, total colectomy: Poorly differentiated infiltrating adenocarcinoma measuring 9.0 cm. Adenocarcinoma infiltrates through the bowel wall and through the visceral peritoneum and infiltrates the adherent sigmoid colon 3 of 17 lymph nodes are positive for metastatic carcinoma. Lymphovascular space & Perineural invasion is identified. The surgical margins negative.   Appendix with serosal tumor implants and acute inflammation. Peritonitis. Liver, biopsy: Metastatic adenocarcinoma.   Retroperitoneal biopsy: Adenocarcinoma with necrosis. No loss of nuclear expression of MMR proteins (MLH1, MSH2, MSH6, and PMS2). Staging pT4b pN1b pM1c.    - 06/22/2022: Started FOLFOXIRI with Bevacizumab; last dose of FOLFIRI and Bevacizumab received on 01/04/2023; S/p oxaliplatin until C10  - 09/01/22 CT CAP: Segmental left lower lobe pulmonary embolus and probable smaller pulmonary emboli within the right lung. Interval decrease in size of bilateral pulmonary nodules and hepatic metastases compared to 05/25/2022. No new sites of disease.  - 06/13/23 CT CAP: New, 8 mm right lower lobe nodule and enlarging pulmonary nodules, 7 mm left apical nodule previously measured 6 mm. Bilobar hepatic metastases, the majority of which are calcified and not significantly changed. A noncalcified lesion in the right hepatic lobe demonstrates mild interval growth, 1.7 x 1.4 cm previously 1.4 x 1.3 cm  - On chemo FOLFIRI, and Avastin, most recent dose 07/05/2023; had been held preoperatively prior to June scan.    # Leukocytosis    - WBC- remains elevated, however trended down since admission.  - S/p On- pro (Neulasta) 07/07/23 which is most likely the cause of spike in count  - Came with high output ileostomy, now normal.   - C- diff negative , GI PCR panel negative    -CT imaging with no acute findings to suggest inflammatory or infectious process in the abdomen or pelvis, no bowel obstruction.      # LLE Ext Iliac Artery Occlusion    - Patient has been taking DOAC (Eliquis) for prior VTE in setting of metastatic disease--Developed arterial thrombosis on Eliquis. 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 R/o APS- negative.  Bevacizumab also rarely can contribute to thrombotic risk . Recommend  to switch to Lovenox  for long term AC. D/w Dr Serrano.   - Etiology most likely 2/2 known metastatic malignancy with hx of missed doses/non compliance  - Hypercoagulable work up--> LAC negative  cardiolipin abs negative  - Patient did admit to missing doses here and there in the past, but has been more compliant recently.  - LE physical exam appropriate with no obvious acute signs of decreased perfusion to distal aspects warranting urgent intervention   -Vascular following--s/p 7/10 LLE angiogram, unable to traverse lesion.  - Per discussion with primary Onc Dr Serrano regarding AC change--rec to switch to Lovenox injections, patient aware of this, discussed in length, also made the RN aware for teaching needs.  -s/p Heparin gtt- transitioned to Lovenox on 7/13/23  -c/o right groin since thrombectomy attempt by Vascular Sx-US -lower extremities-7/4/23 & 7/16/23- noted acute nonocclusive deep venous thrombosis in the right common femoral   vein, above the knee only, appears unchanged from 07/14/2023.  No evidence of propagation.  -s/p IVC filter 7/17/23- doing well  -Continue Lovenox SQ 90 mg BID, will discuss alternatives when outpatient.  -Continue supportive measures

## 2023-07-18 NOTE — PROGRESS NOTE ADULT - ASSESSMENT
51 yo M presented with high output ileostomy and RLE DVT. Talked to radiologist today who thinks the DVT is acute. Underwent IVC filter placement yesterday.  doing well except for right groin pain  labs pending     P:  Reg diet  Pain control  Continue Metamucil, Loperamide, Lomotil  Strict I/Os  monitor ostomy output  pain control  cont AC lov 90 q12  Rest of management as per primary team    To be discussed with CRS attending.        51 yo M presented with high output ileostomy and RLE DVT. Talked to radiologist today who thinks the DVT is acute. Underwent IVC filter placement 7/17  doing well except for right groin pain      P:  Reg diet  Pain control  Continue Metamucil, Loperamide, Lomotil  Strict I/Os  monitor ostomy output  pain control  cont AC lov 90 q12  f/u Vascular reccs  Rest of management as per primary team  colorectal surgery will sign off, pt can follow up in the office after discharge    plan d/w Dr. Hubbard

## 2023-07-18 NOTE — PROGRESS NOTE ADULT - SUBJECTIVE AND OBJECTIVE BOX
HPI:    53 y/o M with a PMhx of colon cancer with metastases to the liver and lung on chemotherapy, presented with abdominal pain, loose/watery stools, dehydration, decreased appetite over the last 3 days. Last chemotherapy was on 7/5/23. No recent antibiotic use. In ED Reported chills, cramping abdominal pain (sharp abdominal pain has been chronic for years), nausea, dry heaving, vomiting (6 episodes of watery outpt). Denies fevers, chills, chest pain, SOB, abdominal pain, N/V, diarrhea/constipation.     7/10/2023- Seen in ED with Dr. Starks, alert, oriented, reports feeling much better. Reports the liquid and increased output from stoma especially 2-3 days after chemo, and if does not eat right. Denies any N/V, now, output still noted liquid but moderate amount now.     07/11/2023: Seen at bedside with attending Dr Starks, no acute distress, noted improved ostomy output, concerned about anticoagulation d/c plan if he should still be on Eliquis     07/13/2023- Seen at bedside, in no acute distress, reports normal stomal output. c/o right groin with some discomfort 2/2 to the angiogram, noted with mild area of ecchymosis and swelling. Left groin with no signs of bleeding or swelling.    07/17/2023- Seen at bedside in no acute distress, awaiting IVC filter placement today.    07/18/2023- doing well, in no acute distress, c/o right groin mild discomfort, no hematoma. Ostomy with now formed stool.    PAST MEDICAL & SURGICAL HISTORY:    Cancer, colon  liver mets  S/P ileostomy  Metastasis to liver  Pulmonary embolism  S/P colon resection  H/O ileostomy    FAMILY HISTORY:    dementia (Mother)      MEDICATIONS  (STANDING):    dextrose 5% + sodium chloride 0.45% with potassium chloride 20 mEq/L 1000 milliLiter(s) (125 mL/Hr) IV Continuous <Continuous>  diphenoxylate/atropine 1 Tablet(s) Oral every 6 hours  enoxaparin Injectable 90 milliGRAM(s) SubCutaneous every 12 hours  fentaNYL   Patch  25 MICROgram(s)/Hr 1 Patch Transdermal every 72 hours  gabapentin 100 milliGRAM(s) Oral every 8 hours  lidocaine   4% Patch 1 Patch Transdermal daily  loperamide 4 milliGRAM(s) Oral three times a day  naloxone Injectable 0.4 milliGRAM(s) IV Push once  psyllium Powder 1 Packet(s) Oral every 12 hours  psyllium Powder      sodium chloride 0.9%. 1000 milliLiter(s) (80 mL/Hr) IV Continuous <Continuous>  sodium chloride 0.9%. 1000 milliLiter(s) (150 mL/Hr) IV Continuous <Continuous>    MEDICATIONS  (PRN):    acetaminophen     Tablet .. 650 milliGRAM(s) Oral every 6 hours PRN Temp greater or equal to 38C (100.4F), Mild Pain (1 - 3)  aluminum hydroxide/magnesium hydroxide/simethicone Suspension 30 milliLiter(s) Oral every 4 hours PRN Dyspepsia  melatonin 3 milliGRAM(s) Oral at bedtime PRN Insomnia  ondansetron Injectable 4 milliGRAM(s) IV Push every 8 hours PRN Nausea and/or Vomiting  oxyCODONE    IR 10 milliGRAM(s) Oral every 4 hours PRN Moderate Pain (4 - 6)      ALLERGIES:    No Known Drug Allergies       REVIEW OF SYSTEMS:    Constitutional, Eyes, ENT, Cardiovascular, Respiratory, Gastrointestinal, Genitourinary, Musculoskeletal, Integumentary, Neurological, Psychiatric, Endocrine, Heme/Lymph and Allergic/Immunologic review of systems are otherwise negative except as noted in HPI.       VITALS:               Vital Signs Last 24 Hrs  T(C): 36.7 (18 Jul 2023 15:07), Max: 36.9 (18 Jul 2023 00:02)  T(F): 98 (18 Jul 2023 15:07), Max: 98.4 (18 Jul 2023 00:02)  HR: 87 (18 Jul 2023 15:07) (85 - 92)  BP: 129/82 (18 Jul 2023 15:07) (111/75 - 129/82)  BP(mean): --  RR: 18 (18 Jul 2023 15:07) (18 - 18)  SpO2: 100% (18 Jul 2023 15:07) (98% - 100%)    Parameters below as of 18 Jul 2023 15:07  Patient On (Oxygen Delivery Method): room air          PHYSICAL:    Constitutional: no acute distress  Eyes: no conjunctival infection, anicteric  ENT: pharynx is unremarkable  Neck: supple without JVD  Pulmonary: clear to auscultation bilaterally, no dullness, no wheezing  Cardiac: RRR, normal S1S2   Vascular: no calf tenderness, venous stasis changes, varices; bilateral DP/PT pulses no signs of inadequate circulation to distal aspects   Abdomen: Right ileostomy bag with moisture noted.  Lymphatic: no peripheral adenopathy appreciated  Musculoskeletal: full range of motion and no deformities appreciated  Skin: normal appearance, no rash / erythema  Neurology: grossly intact.   Psychiatric: affect appropriate.       LABS:                                     10.8   13.62 )-----------( 203      ( 18 Jul 2023 06:47 )             33.2   07-18    135  |  102  |  12  ----------------------------<  114<H>  3.7   |  31  |  0.86    Ca    9.0      18 Jul 2023 06:47            RADIOLOGY & ADDITIONAL STUDIES:    EXAM:  CT ABDOMEN AND PELVIS IC       PROCEDURE DATE:  07/09/2023      INTERPRETATION:  CLINICAL INFORMATION: Abdominal pain    COMPARISON: CT chest abdomen pelvis 6/13/2023.    CONTRAST/COMPLICATIONS:  IV Contrast: Omnipaque 350  90 cc administered   10 cc discarded  Oral Contrast: NONE  Complications: None reported at time of study completion    PROCEDURE:  CT of the Abdomen and Pelvis was performed.  Sagittal and coronal reformats were performed.    FINDINGS:  LOWER CHEST: No pulmonary nodules visualized in the lung bases. No   consolidation..    LIVER: Stable known hepatic metastasis with calcifications, index lesions   in the left lobe 2.9 x 3.7 cm, previously 2.5 x 3.8 cm; in the right lobe   3.3 cm, unchanged. Unchanged 1.8 cm additional hypodensity in segment 8.  BILE DUCTS: Normal caliber.  GALLBLADDER: Within normal limits.  SPLEEN: Mild splenomegaly at 14.9 cm.  PANCREAS: Within normal limits.  ADRENALS: Within normal limits.  KIDNEYS/URETERS: Symmetric, homogeneous renal parenchymal enhancement.   Left mid pole renal scarring. No hydronephrosis.    BLADDER: Within normal limits.  REPRODUCTIVE ORGANS: Prostate within normal limits.    BOWEL: Postsurgical changes from partial colectomy, Gloria stump   creation and right midabdomen ileostomy. No bowel obstruction. Appendix   is surgically absent. No abdominal bowel wall thickening or mesenteric   edema to suggest inflammation.  PERITONEUM: No ascites. Peritoneal thickening and nodularity in the right   lower pelvis alongside a small bowel loop with a serosal implant   measuring approximately 1.7 x 2.7 cm, image [id number], similar to prior 1.6   x 2.5 cm. No associated bowel obstruction.  VESSELS: Mild atherosclerotic changes of the abdominal aorta. Celiac axis   and SMA patent. Portal vein, SMV and splenic vein patent. Left   retroaortic renal vein. 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  RETROPERITONEUM/LYMPH NODES: No lymphadenopathy.  ABDOMINAL WALL: Midline postsurgical changes. Unremarkable appearance of   the right mid abdomen ileostomy without inflammatory changes.  BONES: Mild degenerative changes. Lucency in T11 unchanged since prior   study, may represent a osseous hemangioma.    IMPRESSION:  No significant change when compared to prior study 6/13/2023. No acute   findings to suggest inflammatory or infectious process in the abdomen or   pelvis, no bowel obstruction.    Unchanged hepatic metastasis and peritoneal thickening with serosal   implant in the right lower pelvis and postsurgical changes from colectomy   and right abdominal ileostomy. No morphologic abnormality at the   ileostomy site.        EXAM:  US DPLX LWR EXT VEINS LTD RT   ORDERED BY: TINO SANDOVAL     PROCEDURE DATE:  07/16/2023      INTERPRETATION:  CLINICAL INFORMATION: Right groin pain.  Follow-up right   common femoral vein deep venous thrombosis.    COMPARISON: 07/14/2023.    TECHNIQUE: Duplex sonography of the RIGHT LOWER extremity veins with   color and spectral Doppler, with and without compression.    FINDINGS:  Nonocclusive deep venous thrombosis in the right common femoral vein   appears unchanged from 07/14/2023.  There is normal compressibility of the right femoral and popliteal veins.  The contralateral common femoral vein is patent.  Doppler examination shows normal spontaneous and phasic flow.    No calf vein thrombosis is detected.    IMPRESSION:  Acute nonocclusive deep venous thrombosis in the right common femoral   vein, above the knee only, appears unchanged from 07/14/2023.  No   evidence of propagation.

## 2023-07-19 ENCOUNTER — APPOINTMENT (OUTPATIENT)
Dept: INFUSION THERAPY | Facility: CLINIC | Age: 53
End: 2023-07-19

## 2023-07-19 LAB
ANION GAP SERPL CALC-SCNC: 4 MMOL/L — LOW (ref 5–17)
BASOPHILS # BLD AUTO: 0.09 K/UL — SIGNIFICANT CHANGE UP (ref 0–0.2)
BASOPHILS NFR BLD AUTO: 0.7 % — SIGNIFICANT CHANGE UP (ref 0–2)
BUN SERPL-MCNC: 14 MG/DL — SIGNIFICANT CHANGE UP (ref 7–23)
CALCIUM SERPL-MCNC: 8.9 MG/DL — SIGNIFICANT CHANGE UP (ref 8.5–10.1)
CHLORIDE SERPL-SCNC: 103 MMOL/L — SIGNIFICANT CHANGE UP (ref 96–108)
CO2 SERPL-SCNC: 28 MMOL/L — SIGNIFICANT CHANGE UP (ref 22–31)
CREAT SERPL-MCNC: 0.8 MG/DL — SIGNIFICANT CHANGE UP (ref 0.5–1.3)
EGFR: 108 ML/MIN/1.73M2 — SIGNIFICANT CHANGE UP
EOSINOPHIL # BLD AUTO: 0.3 K/UL — SIGNIFICANT CHANGE UP (ref 0–0.5)
EOSINOPHIL NFR BLD AUTO: 2.2 % — SIGNIFICANT CHANGE UP (ref 0–6)
GLUCOSE SERPL-MCNC: 110 MG/DL — HIGH (ref 70–99)
HCT VFR BLD CALC: 35.2 % — LOW (ref 39–50)
HGB BLD-MCNC: 11.5 G/DL — LOW (ref 13–17)
IMM GRANULOCYTES NFR BLD AUTO: 0.9 % — SIGNIFICANT CHANGE UP (ref 0–0.9)
LYMPHOCYTES # BLD AUTO: 1.79 K/UL — SIGNIFICANT CHANGE UP (ref 1–3.3)
LYMPHOCYTES # BLD AUTO: 13 % — SIGNIFICANT CHANGE UP (ref 13–44)
MCHC RBC-ENTMCNC: 28.7 PG — SIGNIFICANT CHANGE UP (ref 27–34)
MCHC RBC-ENTMCNC: 32.7 GM/DL — SIGNIFICANT CHANGE UP (ref 32–36)
MCV RBC AUTO: 87.8 FL — SIGNIFICANT CHANGE UP (ref 80–100)
MONOCYTES # BLD AUTO: 1.14 K/UL — HIGH (ref 0–0.9)
MONOCYTES NFR BLD AUTO: 8.3 % — SIGNIFICANT CHANGE UP (ref 2–14)
NEUTROPHILS # BLD AUTO: 10.27 K/UL — HIGH (ref 1.8–7.4)
NEUTROPHILS NFR BLD AUTO: 74.9 % — SIGNIFICANT CHANGE UP (ref 43–77)
PLATELET # BLD AUTO: 219 K/UL — SIGNIFICANT CHANGE UP (ref 150–400)
POTASSIUM SERPL-MCNC: 4.1 MMOL/L — SIGNIFICANT CHANGE UP (ref 3.5–5.3)
POTASSIUM SERPL-SCNC: 4.1 MMOL/L — SIGNIFICANT CHANGE UP (ref 3.5–5.3)
RBC # BLD: 4.01 M/UL — LOW (ref 4.2–5.8)
RBC # FLD: 17 % — HIGH (ref 10.3–14.5)
SODIUM SERPL-SCNC: 135 MMOL/L — SIGNIFICANT CHANGE UP (ref 135–145)
WBC # BLD: 13.72 K/UL — HIGH (ref 3.8–10.5)
WBC # FLD AUTO: 13.72 K/UL — HIGH (ref 3.8–10.5)

## 2023-07-19 PROCEDURE — 93975 VASCULAR STUDY: CPT | Mod: 26

## 2023-07-19 PROCEDURE — 99232 SBSQ HOSP IP/OBS MODERATE 35: CPT

## 2023-07-19 PROCEDURE — 76870 US EXAM SCROTUM: CPT | Mod: 26

## 2023-07-19 PROCEDURE — 74018 RADEX ABDOMEN 1 VIEW: CPT | Mod: 26

## 2023-07-19 RX ADMIN — OXYCODONE HYDROCHLORIDE 10 MILLIGRAM(S): 5 TABLET ORAL at 23:02

## 2023-07-19 RX ADMIN — ENOXAPARIN SODIUM 90 MILLIGRAM(S): 100 INJECTION SUBCUTANEOUS at 21:01

## 2023-07-19 RX ADMIN — Medication 4 MILLIGRAM(S): at 14:40

## 2023-07-19 RX ADMIN — Medication 4 MILLIGRAM(S): at 21:05

## 2023-07-19 RX ADMIN — Medication 1 TABLET(S): at 23:01

## 2023-07-19 RX ADMIN — OXYCODONE HYDROCHLORIDE 10 MILLIGRAM(S): 5 TABLET ORAL at 18:43

## 2023-07-19 RX ADMIN — OXYCODONE HYDROCHLORIDE 10 MILLIGRAM(S): 5 TABLET ORAL at 11:46

## 2023-07-19 RX ADMIN — OXYCODONE HYDROCHLORIDE 10 MILLIGRAM(S): 5 TABLET ORAL at 14:43

## 2023-07-19 RX ADMIN — Medication 1 TABLET(S): at 17:34

## 2023-07-19 RX ADMIN — OXYCODONE HYDROCHLORIDE 10 MILLIGRAM(S): 5 TABLET ORAL at 10:49

## 2023-07-19 RX ADMIN — Medication 4 MILLIGRAM(S): at 09:38

## 2023-07-19 RX ADMIN — Medication 1 PACKET(S): at 09:38

## 2023-07-19 RX ADMIN — Medication 1 TABLET(S): at 00:58

## 2023-07-19 RX ADMIN — OXYCODONE HYDROCHLORIDE 10 MILLIGRAM(S): 5 TABLET ORAL at 00:57

## 2023-07-19 RX ADMIN — ENOXAPARIN SODIUM 90 MILLIGRAM(S): 100 INJECTION SUBCUTANEOUS at 09:38

## 2023-07-19 RX ADMIN — Medication 1 PACKET(S): at 17:34

## 2023-07-19 RX ADMIN — OXYCODONE HYDROCHLORIDE 10 MILLIGRAM(S): 5 TABLET ORAL at 15:47

## 2023-07-19 RX ADMIN — OXYCODONE HYDROCHLORIDE 10 MILLIGRAM(S): 5 TABLET ORAL at 06:26

## 2023-07-19 RX ADMIN — GABAPENTIN 100 MILLIGRAM(S): 400 CAPSULE ORAL at 06:26

## 2023-07-19 RX ADMIN — Medication 1 TABLET(S): at 06:26

## 2023-07-19 RX ADMIN — Medication 1 TABLET(S): at 12:16

## 2023-07-19 NOTE — PROGRESS NOTE ADULT - SUBJECTIVE AND OBJECTIVE BOX
53 y/o M with PMH colon cancer with metastases to the liver and lung on chemotherapy, PE presented with abdominal pain. Pt reports loose/watery stools, dehydration, decreased appetite over the last 3 days. His last chemotherapy was on Wednesday. No recent antibiotic use. Reports chills, cramping abdominal pain (sharp abdominal pain has been chronic for years), nausea, dry heaving, vomiting (6 episodes of watery outpt). Denies fevers, chills, chest pain, SOB, abdominal pain, N/V, diarrhea/constipation.   Prior admission:   - 4/19/23: Intestinal stoma prolapse   ER course: HR . Labs: .35 -> 112.01, lactate 2.4 -> 1.4, Na 134, glucose 113, alkaline phosphatase 191. UA: negative. COVID and RVP negative.   Imaging:  - CXR: port right chest wall, no consolidation, no effusion, no pneumothorax (personally reviewed.    - CT abd/pelvis: No significant change when compared to prior study 6/13/2023. No acute findings to suggest inflammatory or infectious process in the abdomen or pelvis, no bowel obstruction. Unchanged hepatic metastasis and peritoneal thickening with serosal implant in the right lower pelvis and postsurgical changes from colectomy and right abdominal ileostomy. No morphologic abnormality at the ileostomy site. Occlusion of left external iliac artery.   Pt was given Ciprofloxacin and Metronidazole, 3L of NS, Pepcid morphine, Zofran He is being admitted to med/surg (1N) for further management.       7/19 INTERVAL HPI/ OVERNIGHT EVENTS:no acute events over night , still with R groin pain but says its better , no testicular pain  , no new symptoms , oxyodone helping   REVIEW OF SYSTEMS:  All other review of systems is negative unless indicated above.  PHYSICAL EXAM:  General: Well developed; in no acute distress  Eyes: EOMI; conjunctiva and sclera clear  Head: Normocephalic; atraumatic  ENMT: No nasal discharge; airway clear  Neck: Supple; non tender; no masses  Respiratory: No wheezes, rales or rhonchi  Cardiovascular: Regular rate and rhythm. S1 and S2 Normal;  Gastrointestinal: Soft non-tender non-distended; Normal bowel sounds, ileostomy  in place   Genitourinary: No  suprapubic  tenderness  Extremities: Normal range of motion, No edema, some R calf tenderness   Vascular: Peripheral DP pulses not  palpable, R groin  tenderness to palpation    Neurological: Alert and oriented x 3, non focal   Skin: Warm and dry. No acute rash  Musculoskeletal: Normal muscle tone, without deformities  Psychiatric: Cooperative and appropriate  labs reviewed  RADIOLOGY & ADDITIONAL TESTS:    ACC: [id number] EXAM:  CT ANGIO ABD AOR W RUN(W)AW IC   ORDERED BY: TINO SANDOVAL     PROCEDURE DATE:  07/10/2023          INTERPRETATION:  CT ANGIOGRAM ABDOMEN, PELVIS, AND LOWER EXTREMITIES:    CT ANGIO ABDOMINAL AORTA RUNOFF    CLINICAL INFORMATION:  r/o LLE ischemia    TECHNIQUE:    CONTRAST/COMPLICATIONS:  IV Contrast: Omnipaque 350  125 cc administered   25 cc discarded  Oral Contrast: NONE  Complications: None reported at time of study completion    PROCEDURE:  Initially, nonenhanced CT was obtained through the calves. Then,   following the rapid administration of intravenous contrast, CT   angiography was performed through the abdomen, pelvis, and lower   extremities down to the toes.  Delayed images through the calves were   also obtained. Sagittal and coronal reformats as well as 3D   reconstructions were performed.    Volume rendered and MIP images for CT angiographic display are created   from source data on an independent 3-D workstation and archived into   PACS. This study was performed using automatic exposure control   (radiation dose reduction software) to obtain a diagnostic image quality   scan with patient dose as low as reasonably achievable.    COMPARISON: CT abdomen pelvis 7/9/2023.    FINDINGS:    ABDOMINAL AORTA: Normal caliber of the abdominal aorta, 1.9 cm   suprarenal, 1.7 cm infrarenal, 1.6 cm at the bifurcation.    Celiac: Patent, no stenosis.  SMA: Patent, no stenosis.  ANYA: Patent, no stenosis  Renal Arteries: Duplicated left renal artery, no stenosis. Single right   renal artery, no stenosis    RIGHT LEG:    Common Iliac artery: Mild atherosclerosis. No high-grade stenosis or   occlusion.  External Iliac: Mild atherosclerotic disease, no high-grade stenosis or   occlusion.  Internal Iliac: Moderate atherosclerotic disease mild stenosis at the   origin. Patent.    Common femoral: Patent, no stenosis or  SFA: Patent, mild atherosclerotic plaques, no stenosis.  Profunda: Patent, normal    Popliteal: Patent, no stenosis or occlusion  Anterior Tibial (AT): Patent.  Tibioperoneal trunk (TPT): Patent, no stenosis or occlusion.  Posterior Tibial (PT): Patent, no stenosis.  Peroneal: Patent to the level of the ankle  Dorsalis Pedis (DP): Patent  Plantar: Patent    LEFT LEG:    Common Iliac artery: Patent, moderate atherosclerotic changes  External Iliac: Occluded  Internal Iliac: Patent, high-grade stenosis at the origin, moderate   atherosclerotic changes.    Common femoral: Recanalized and reconstituted common femoral artery   through collaterals from the left inferior epigastric artery.  SFA: Patent, mild atherosclerotic changes, no stenosis or occlusion.  Profunda: Normal    Popliteal: Patent, mild atherosclerotic plaques, no significant stenosis.  AT: Patent, no stenosis or occlusion  TPT: Patent, no stenosis  PT: Patent in the proximal two thirds.  Peroneal: Patent, no significant stenosis.  DP: Patent  Plantar: Not well opacified      ADDITIONAL FINDINGS:  LOWER CHEST: Within normal limits.    LIVER: Bilateral metastatic lesions in the liver with calcification,   unchanged compared to prior study 7/9/2023.  BILE DUCTS: Normal caliber.  GALLBLADDER: Vicarious excretion of contrast.  SPLEEN: Mild splenomegaly  PANCREAS: Within normal limits.  ADRENALS: Within normal limits.  KIDNEYS/URETERS: No renal stones or hydronephrosis.    BLADDER: Distended, filled with contrast, within normal limits.  REPRODUCTIVE ORGANS: Prostate within normal limits.    BOWEL: Postsurgical changes from partial colectomy, Gloria stump   creation and right end ileostomy. No bowel obstruction. Appendix is   surgically absent.  PERITONEUM: No ascites. Unchanged peritoneal nodular thickening and   serosal implant within the right pelvis as described from prior study,   unchanged.  VESSELS: Aorta and pelvic arteries and described in detail above. Patent   portal vein. Patent renal veins. Left retroaortic renal vein.  RETROPERITONEUM/LYMPH NODES: No lymphadenopathy.  ABDOMINAL WALL: Within normal limits.  BONES: Within normal limits.    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 Two-vessel runoff in the left calf.  High grade stenosis at the origin of the left internal iliac artery.  Three-vessel arterial runoff of the right lower extremity.  Atherosclerotic disease in the abdomen as detailed above.      ACC: [id number] EXAM:  CT ANGIO CHEST PULM [facility]                          PROCEDURE DATE:  07/02/2022          INTERPRETATION:  Reason for Exam: Shortness of breath. chest pain.    CTA of the chest was performed from the thoracic inlet to the level of   the adrenal glands following IV contrast injection of  80 cc of Omnipaque   350. No immediate complications were reported.  MIP images were also   created and reviewed.    Comparison: CT abdomen and pelvis dated May 25, 2022    Tubes/Lines: Right Mediport catheter tip in SVC.    Mediastinum and Heart: Aorta and pulmonary arteries are normal in size.   Moderate stenosis of the proximal left subclavian artery from   atherosclerotic plaque. Thyroid gland is unremarkable. No   lymphadenopathy.No pericardial effusion.    Lungs, Pleura, and Airways: There is no pulmonary embolus. No   consolidations, edema or effusion. Small left pneumothorax noted. Right   lower lobe 4 mm nodule in series 4 image 289. Left apical 4 mm nodule on   image 57.    Visualized Abdomen: Multiple calcified masses in the liver, without   significant change since prior.    Bones and soft tissues: Unremarkable.    IMPRESSION:    No pulmonary embolus.    Small left pneumothorax without mediastinal shift.    Remaining findings as above.      < from: US Duplex Venous Lower Ext Ltd, Right (07.14.23 @ 13:33) >    ACC: [id number] EXAM:  US DPLX LWR EXT ARTS LTD RT   ORDERED BY: IMTIAZ LISA     ACC: [id number] EXAM:  US DPLX LWR EXT VEINS LTD RT   ORDERED BY: IMTIAZ LISA     PROCEDURE DATE:  07/14/2023          INTERPRETATION:  CLINICAL INFORMATION: Right leg pain status post right   groin catheterization. Evaluate for DVT, hematoma/pseudoaneurysm.    COMPARISON: None available.    TECHNIQUE: Duplex sonography of the RIGHT LOWER extremity veins with   color and spectral Doppler, with and without compression.    FINDINGS:    There is normal compressibility of the right femoral and popliteal veins.   Evidence of thrombosis is noted involving the right common femoral vein.  The contralateral common femoral vein is patent.  Doppler examinationshows normal spontaneous and phasic flow.    No calf vein thrombosis is detected.    Normal flow is identified in the right common femoral and proximal   femoral arteries. There is no abnormal flow to suggest a pseudoaneurysm.   No complex collectionis seen.    IMPRESSION:  Acute deep venous thrombosis: above the knee.  Acute thrombosis involving the right common femoral vein.  No evidence of a hematoma or pseudoaneurysm.

## 2023-07-19 NOTE — PROGRESS NOTE ADULT - ASSESSMENT
HPI: Pt is a 52y old Male with hx significant for colon cancer with metastases to the liver and lung on chemotherapy, presented with abdominal pain, loose/watery stools, dehydration, decreased appetite over the last 3 days. Last chemotherapy was on 7/5/23. No recent antibiotic use. Palliative medicine Consult  for symptom management.  7/14/23 Seen and examined at bedside with no family present. Pt C/O persistent RLQ abd pain with minimal relief from Oxycodone    Assessment and Plan:    1) Pain  -Persistent chronic abd pain  -R/T cancer  -D/C Dilaudid on this admission as patient does not feel it was helping him felt he got more relief from Oxycodone  -Increase Fentanyl to 25 mcg/hr transderm Q 72 hrs  -c/w Oxycodone 10 mg PO Q4H PRN breakthrough pain  - patient feels pain is better managed on regimen he is on now      2) Colon Ca  -Metastatic disease to liver/lung  -Follows at [facility] with Dr Serrano  -Currently receiving chemo  -Imaging noted  -Onc eval      3) L fem artery occlusion  CT a/p finding of Left external iliac artery occlusion  -Palpable femoral and DP pulses  -underwent LLE angiogram, unable to traverse lesion.  -AC as per medicine  -Vascular eval noted  -IVC filter placement today      4) Debility  -Weakness  -Pain  -Cancer  -Supportive care    5) Advanced Directives  -Pt with capacity  -HCP naming his mother Karin on chart  - plan for patient to go home when medically stable with possible transition to intermediate

## 2023-07-19 NOTE — PROGRESS NOTE ADULT - SUBJECTIVE AND OBJECTIVE BOX
HPI: pt seen and examined with no family ay bedside, patient was sitting in the chair states that since having the Fentanyl patch and increase in oxycodone patient has been much more comfortable stated that he will be leaving the hospital to go home but is hoping to continue to look to get into an assisted living, states that SW has been helping him.        PAIN: ( X)Yes   ( )No  Level: MOd - better controlled at this time, patient is acceptable of pain that he has at this time as he states it is better and manageable with medication changes   Location: RLQ abd  Intensity:    5/10  Quality: sharp  Alleviating Factors: rest  Radiation: abd/groin  Impact on ADLs: mod-severe    DYSPNEA: ( ) Yes  (X ) No    Review of Systems:    Anxiety-mild-mod  Depression-yes  Physical Discomfort-mod  Dyspnea-denies  Constipation-no  Diarrhea-yes  Anorexia-mod  Cough-denies    All other systems reviewed and negative        PHYSICAL EXAM:    Vital Signs Last 24 Hrs  T(C): 36.3 (19 Jul 2023 08:11), Max: 36.7 (18 Jul 2023 15:07)  T(F): 97.3 (19 Jul 2023 08:11), Max: 98 (18 Jul 2023 15:07)  HR: 76 (19 Jul 2023 08:11) (76 - 87)  BP: 124/79 (19 Jul 2023 08:11) (115/67 - 129/82)  RR: 17 (19 Jul 2023 08:11) (17 - 18)  SpO2: 100% (19 Jul 2023 08:11) (100% - 100%)    Parameters below as of 19 Jul 2023 08:11  Patient On (Oxygen Delivery Method): room air    PPSV2: 50 %    General: Middle aged male in NAD  Mental Status: A&O X3  HEENT: oral mucosa moist  Lungs: clear to auscultation olena  Cardiac: S1S2+  GI: abd soft NT ND + BS/ostomy with appliance  : voids  Ext:  CARL strength   Neuro: no focal def          LABS:                        11.5   13.72 )-----------( 219      ( 19 Jul 2023 06:32 )             35.2     07-19    135  |  103  |  14  ----------------------------<  110<H>  4.1   |  28  |  0.80    Ca    8.9      19 Jul 2023 06:32        Albumin:     Allergies    [This allergen will not trigger allergy alert] No Known Drug Allergies (Unknown)    Intolerances      MEDICATIONS  (STANDING):  dextrose 5% + sodium chloride 0.45% with potassium chloride 20 mEq/L 1000 milliLiter(s) (125 mL/Hr) IV Continuous <Continuous>  diphenoxylate/atropine 1 Tablet(s) Oral every 6 hours  enoxaparin Injectable 90 milliGRAM(s) SubCutaneous every 12 hours  fentaNYL   Patch  25 MICROgram(s)/Hr 1 Patch Transdermal every 72 hours  gabapentin 100 milliGRAM(s) Oral every 8 hours  lidocaine   4% Patch 1 Patch Transdermal daily  loperamide 4 milliGRAM(s) Oral three times a day  naloxone Injectable 0.4 milliGRAM(s) IV Push once  psyllium Powder 1 Packet(s) Oral every 12 hours  psyllium Powder      sodium chloride 0.9%. 1000 milliLiter(s) (150 mL/Hr) IV Continuous <Continuous>    MEDICATIONS  (PRN):  acetaminophen     Tablet .. 650 milliGRAM(s) Oral every 6 hours PRN Temp greater or equal to 38C (100.4F), Mild Pain (1 - 3)  aluminum hydroxide/magnesium hydroxide/simethicone Suspension 30 milliLiter(s) Oral every 4 hours PRN Dyspepsia  melatonin 3 milliGRAM(s) Oral at bedtime PRN Insomnia  ondansetron Injectable 4 milliGRAM(s) IV Push every 8 hours PRN Nausea and/or Vomiting  oxyCODONE    IR 10 milliGRAM(s) Oral every 4 hours PRN Moderate Pain (4 - 6)      RADIOLOGY:

## 2023-07-19 NOTE — PROGRESS NOTE ADULT - ASSESSMENT
Dispo- abd cramps , feels colon is protruding slightly more into colostomy bag, has stable   colostomy output- Xray abd r/o intestinal obstruction  dw with colorectal resident- for follow up today, if cleared by colorectal then discharge tmrw home on lovenox SC  will also do testicular US for R groin pain to complete work up      53 y/o male with metastatic colon cancer on chemo, PE  admitted for:      1. Leukocytosis SIRS, no source of infection identified,   workup negative  leukocytosis likely due to Neulasta and dehydration   Had bump im WBCs this am possibly reactive? has hematoma and DVT, No fevers. Trending down now   s/p Ciprofloxacin and Metronidazole in ER;   S/p  Zosyn, now off   UA negative, pt without urinary symptoms   stool and GI PCR negative  blood culture NGTD   CT abd/pelvis - no infectious/inflammatory process   CTA; no PNA   Off abxs now as per ID     2. 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  Groin pain. Acute RLE DVT   likely 2/2 RLE DVT  vs referred pain related to cancer   RLE Doppler neg for  hematoma or  aneurysm, + common femoral vein DVT   F/u ECHO:  bubble study neg for shunts   D/w IR and Vascular, new thrombus?  vs pre admission, no venous  imaging on admission. Recommended to repear RLE doppler  which was done and no change   D/w hematology  team agree with IR recs  I d/w vascular resident, will need to further discuss indications for IVC filer       4. Metastatic colon cancer  to the liver and lung on chemotherapy  on palliative chemotherapy:  initially FOLFOXIRI / sunny , after 10 cycles changed to FOLFIRI/ Bevacizumab.  Last chemotherapy 7/5/23 Had OnPro 7/8/23    - Heme/Onc recs appreciated     5. Anxiety  Reported frustration and suicidal thoughts to SW on 7/13  Was evaluated by psych, I d/w NP, Pt is not currently suicidal/ homicidal does not need 1:1   Pt was offered Anti depressant to be declined  but he declined     DVT ppx: heparin drip, switch to Lovenox      Code status: Full code       Dispo;   discharge planning

## 2023-07-20 ENCOUNTER — TRANSCRIPTION ENCOUNTER (OUTPATIENT)
Age: 53
End: 2023-07-20

## 2023-07-20 LAB
HCT VFR BLD CALC: 35 % — LOW (ref 39–50)
HGB BLD-MCNC: 11.2 G/DL — LOW (ref 13–17)
MCHC RBC-ENTMCNC: 28 PG — SIGNIFICANT CHANGE UP (ref 27–34)
MCHC RBC-ENTMCNC: 32 GM/DL — SIGNIFICANT CHANGE UP (ref 32–36)
MCV RBC AUTO: 87.5 FL — SIGNIFICANT CHANGE UP (ref 80–100)
PLATELET # BLD AUTO: 224 K/UL — SIGNIFICANT CHANGE UP (ref 150–400)
RBC # BLD: 4 M/UL — LOW (ref 4.2–5.8)
RBC # FLD: 17 % — HIGH (ref 10.3–14.5)
WBC # BLD: 14.46 K/UL — HIGH (ref 3.8–10.5)
WBC # FLD AUTO: 14.46 K/UL — HIGH (ref 3.8–10.5)

## 2023-07-20 PROCEDURE — 99232 SBSQ HOSP IP/OBS MODERATE 35: CPT

## 2023-07-20 PROCEDURE — 99233 SBSQ HOSP IP/OBS HIGH 50: CPT

## 2023-07-20 RX ORDER — FENTANYL CITRATE 50 UG/ML
1 INJECTION INTRAVENOUS
Qty: 3 | Refills: 0
Start: 2023-07-20 | End: 2023-07-29

## 2023-07-20 RX ORDER — NALOXONE HYDROCHLORIDE 4 MG/.1ML
4 SPRAY NASAL
Qty: 1 | Refills: 0
Start: 2023-07-20 | End: 2023-07-20

## 2023-07-20 RX ORDER — APIXABAN 2.5 MG/1
1 TABLET, FILM COATED ORAL
Refills: 0 | DISCHARGE

## 2023-07-20 RX ORDER — LIDOCAINE 4 G/100G
1 CREAM TOPICAL
Qty: 15 | Refills: 0
Start: 2023-07-20 | End: 2023-08-03

## 2023-07-20 RX ORDER — GABAPENTIN 400 MG/1
1 CAPSULE ORAL
Qty: 90 | Refills: 0
Start: 2023-07-20 | End: 2023-08-18

## 2023-07-20 RX ORDER — FENTANYL CITRATE 50 UG/ML
1 INJECTION INTRAVENOUS
Qty: 0 | Refills: 0 | DISCHARGE
Start: 2023-07-20

## 2023-07-20 RX ORDER — ENOXAPARIN SODIUM 100 MG/ML
90 INJECTION SUBCUTANEOUS
Qty: 54 | Refills: 0
Start: 2023-07-20 | End: 2023-08-18

## 2023-07-20 RX ORDER — DIPHENHYDRAMINE HCL 50 MG
25 CAPSULE ORAL ONCE
Refills: 0 | Status: COMPLETED | OUTPATIENT
Start: 2023-07-20 | End: 2023-07-20

## 2023-07-20 RX ORDER — OXYCODONE HYDROCHLORIDE 5 MG/1
1 TABLET ORAL
Qty: 0 | Refills: 0 | DISCHARGE
Start: 2023-07-20

## 2023-07-20 RX ADMIN — OXYCODONE HYDROCHLORIDE 10 MILLIGRAM(S): 5 TABLET ORAL at 15:45

## 2023-07-20 RX ADMIN — LIDOCAINE 1 PATCH: 4 CREAM TOPICAL at 10:23

## 2023-07-20 RX ADMIN — ENOXAPARIN SODIUM 90 MILLIGRAM(S): 100 INJECTION SUBCUTANEOUS at 10:23

## 2023-07-20 RX ADMIN — ENOXAPARIN SODIUM 90 MILLIGRAM(S): 100 INJECTION SUBCUTANEOUS at 21:39

## 2023-07-20 RX ADMIN — Medication 1 TABLET(S): at 05:52

## 2023-07-20 RX ADMIN — OXYCODONE HYDROCHLORIDE 10 MILLIGRAM(S): 5 TABLET ORAL at 00:00

## 2023-07-20 RX ADMIN — Medication 1 TABLET(S): at 12:49

## 2023-07-20 RX ADMIN — OXYCODONE HYDROCHLORIDE 10 MILLIGRAM(S): 5 TABLET ORAL at 05:52

## 2023-07-20 RX ADMIN — Medication 4 MILLIGRAM(S): at 05:52

## 2023-07-20 RX ADMIN — Medication 1 PACKET(S): at 05:50

## 2023-07-20 RX ADMIN — OXYCODONE HYDROCHLORIDE 10 MILLIGRAM(S): 5 TABLET ORAL at 05:30

## 2023-07-20 RX ADMIN — OXYCODONE HYDROCHLORIDE 10 MILLIGRAM(S): 5 TABLET ORAL at 20:45

## 2023-07-20 RX ADMIN — Medication 1 TABLET(S): at 17:19

## 2023-07-20 RX ADMIN — Medication 1 PACKET(S): at 17:19

## 2023-07-20 RX ADMIN — Medication 4 MILLIGRAM(S): at 21:39

## 2023-07-20 RX ADMIN — OXYCODONE HYDROCHLORIDE 10 MILLIGRAM(S): 5 TABLET ORAL at 19:55

## 2023-07-20 RX ADMIN — Medication 25 MILLIGRAM(S): at 14:01

## 2023-07-20 RX ADMIN — Medication 4 MILLIGRAM(S): at 14:00

## 2023-07-20 RX ADMIN — OXYCODONE HYDROCHLORIDE 10 MILLIGRAM(S): 5 TABLET ORAL at 10:17

## 2023-07-20 RX ADMIN — OXYCODONE HYDROCHLORIDE 10 MILLIGRAM(S): 5 TABLET ORAL at 10:47

## 2023-07-20 NOTE — PROGRESS NOTE ADULT - ASSESSMENT
53 yo M presented with high output ileostomy and RLE DVT. Talked to radiologist today who thinks the DVT is acute. Underwent IVC filter placement 7/17      Plan:  Continue Reg diet as tolerated  Pain control  Continue Metamucil, Loperamide, Lomotil  Strict I/Os  monitor ostomy output  pain control  Cont AC   Rest of management as per primary team  Stable for DC from colorectal surgery standpoint     Plan d/w Dr. Mckeon

## 2023-07-20 NOTE — PROGRESS NOTE ADULT - SUBJECTIVE AND OBJECTIVE BOX
51 y/o M with PMH colon cancer with metastases to the liver and lung on chemotherapy, PE presented with abdominal pain. Pt reports loose/watery stools, dehydration, decreased appetite over the last 3 days. His last chemotherapy was on Wednesday. No recent antibiotic use. Reports chills, cramping abdominal pain (sharp abdominal pain has been chronic for years), nausea, dry heaving, vomiting (6 episodes of watery outpt). Denies fevers, chills, chest pain, SOB, abdominal pain, N/V, diarrhea/constipation.   Prior admission:   - 4/19/23: Intestinal stoma prolapse   ER course: HR . Labs: .35 -> 112.01, lactate 2.4 -> 1.4, Na 134, glucose 113, alkaline phosphatase 191. UA: negative. COVID and RVP negative.   Imaging:  - CXR: port right chest wall, no consolidation, no effusion, no pneumothorax (personally reviewed.    - CT abd/pelvis: No significant change when compared to prior study 6/13/2023. No acute findings to suggest inflammatory or infectious process in the abdomen or pelvis, no bowel obstruction. Unchanged hepatic metastasis and peritoneal thickening with serosal implant in the right lower pelvis and postsurgical changes from colectomy and right abdominal ileostomy. No morphologic abnormality at the ileostomy site. Occlusion of left external iliac artery.   Pt was given Ciprofloxacin and Metronidazole, 3L of NS, Pepcid morphine, Zofran He was  admitted to med/surg (1N) for further management.     7/20 :no acute events over night , still with R groin pain but says its better , no testicular pain  , no new symptoms , oxyodone helping , worried about intestional protrusion in ileostomy bag which he says is worse , no abd pain, has stable ileostomy output, colorectal evaluated and cleared pt for discharge   REVIEW OF SYSTEMS:  All other review of systems is negative unless indicated above.      PHYSICAL EXAM:    Daily     Daily     ICU Vital Signs Last 24 Hrs  T(C): 36.7 (20 Jul 2023 15:05), Max: 36.7 (20 Jul 2023 15:05)  T(F): 98.1 (20 Jul 2023 15:05), Max: 98.1 (20 Jul 2023 15:05)  HR: 89 (20 Jul 2023 15:05) (73 - 89)  BP: 130/82 (20 Jul 2023 15:05) (114/73 - 130/82)  BP(mean): --  ABP: --  ABP(mean): --  RR: 18 (20 Jul 2023 15:05) (17 - 18)  SpO2: 100% (20 Jul 2023 15:05) (98% - 100%)    O2 Parameters below as of 20 Jul 2023 15:05  Patient On (Oxygen Delivery Method): room air  PHYSICAL EXAM:  General: Well developed; in no acute distress  Eyes: EOMI; conjunctiva and sclera clear  Head: Normocephalic; atraumatic  ENMT: No nasal discharge; airway clear  Neck: Supple; non tender; no masses  Respiratory: No wheezes, rales or rhonchi  Cardiovascular: Regular rate and rhythm. S1 and S2 Normal;  Gastrointestinal: Soft non-tender non-distended; Normal bowel sounds, ileostomy  in place with fecal output  with intestinal protrusion into the bag    Genitourinary: No  suprapubic  tenderness  Extremities: Normal range of motion, No edema, some R calf tenderness   Vascular: Peripheral DP pulses not  palpable, R groin  tenderness to palpation    Neurological: Alert and oriented x 3, non focal   Skin: Warm and dry. No acute rash  Musculoskeletal: Normal muscle tone, without deformities  Psychiatric: Cooperative and appropriate      labs reviewed                                11.2   14.46 )-----------( 224      ( 20 Jul 2023 06:15 )             35.0       CBC Full  -  ( 20 Jul 2023 06:15 )  WBC Count : 14.46 K/uL  RBC Count : 4.00 M/uL  Hemoglobin : 11.2 g/dL  Hematocrit : 35.0 %  Platelet Count - Automated : 224 K/uL  Mean Cell Volume : 87.5 fl  Mean Cell Hemoglobin : 28.0 pg  Mean Cell Hemoglobin Concentration : 32.0 gm/dL  Auto Neutrophil # : x  Auto Lymphocyte # : x  Auto Monocyte # : x  Auto Eosinophil # : x  Auto Basophil # : x  Auto Neutrophil % : x  Auto Lymphocyte % : x  Auto Monocyte % : x  Auto Eosinophil % : x  Auto Basophil % : x      07-19    135  |  103  |  14  ----------------------------<  110<H>  4.1   |  28  |  0.80    Ca    8.9      19 Jul 2023 06:32                      Urinalysis Basic - ( 19 Jul 2023 06:32 )    Color: x / Appearance: x / SG: x / pH: x  Gluc: 110 mg/dL / Ketone: x  / Bili: x / Urobili: x   Blood: x / Protein: x / Nitrite: x   Leuk Esterase: x / RBC: x / WBC x   Sq Epi: x / Non Sq Epi: x / Bacteria: x            MEDICATIONS  (STANDING):  dextrose 5% + sodium chloride 0.45% with potassium chloride 20 mEq/L 1000 milliLiter(s) (125 mL/Hr) IV Continuous <Continuous>  enoxaparin Injectable 90 milliGRAM(s) SubCutaneous every 12 hours  fentaNYL   Patch  25 MICROgram(s)/Hr 1 Patch Transdermal every 72 hours  gabapentin 100 milliGRAM(s) Oral every 8 hours  lidocaine   4% Patch 1 Patch Transdermal daily  loperamide 4 milliGRAM(s) Oral three times a day  naloxone Injectable 0.4 milliGRAM(s) IV Push once  psyllium Powder 1 Packet(s) Oral every 12 hours  psyllium Powder      sodium chloride 0.9%. 1000 milliLiter(s) (150 mL/Hr) IV Continuous <Continuous>        RADIOLOGY & ADDITIONAL TESTS:    ACC: [id number] EXAM:  CT ANGIO ABD AOR W RUN(W)AW IC   ORDERED BY: TINO SANDOVAL     PROCEDURE DATE:  07/10/2023          INTERPRETATION:  CT ANGIOGRAM ABDOMEN, PELVIS, AND LOWER EXTREMITIES:    CT ANGIO ABDOMINAL AORTA RUNOFF    CLINICAL INFORMATION:  r/o LLE ischemia    TECHNIQUE:    CONTRAST/COMPLICATIONS:  IV Contrast: Omnipaque 350  125 cc administered   25 cc discarded  Oral Contrast: NONE  Complications: None reported at time of study completion    PROCEDURE:  Initially, nonenhanced CT was obtained through the calves. Then,   following the rapid administration of intravenous contrast, CT   angiography was performed through the abdomen, pelvis, and lower   extremities down to the toes.  Delayed images through the calves were   also obtained. Sagittal and coronal reformats as well as 3D   reconstructions were performed.    Volume rendered and MIP images for CT angiographic display are created   from source data on an independent 3-D workstation and archived into   PACS. This study was performed using automatic exposure control   (radiation dose reduction software) to obtain a diagnostic image quality   scan with patient dose as low as reasonably achievable.    COMPARISON: CT abdomen pelvis 7/9/2023.    FINDINGS:    ABDOMINAL AORTA: Normal caliber of the abdominal aorta, 1.9 cm   suprarenal, 1.7 cm infrarenal, 1.6 cm at the bifurcation.    Celiac: Patent, no stenosis.  SMA: Patent, no stenosis.  ANYA: Patent, no stenosis  Renal Arteries: Duplicated left renal artery, no stenosis. Single right   renal artery, no stenosis    RIGHT LEG:    Common Iliac artery: Mild atherosclerosis. No high-grade stenosis or   occlusion.  External Iliac: Mild atherosclerotic disease, no high-grade stenosis or   occlusion.  Internal Iliac: Moderate atherosclerotic disease mild stenosis at the   origin. Patent.    Common femoral: Patent, no stenosis or  SFA: Patent, mild atherosclerotic plaques, no stenosis.  Profunda: Patent, normal    Popliteal: Patent, no stenosis or occlusion  Anterior Tibial (AT): Patent.  Tibioperoneal trunk (TPT): Patent, no stenosis or occlusion.  Posterior Tibial (PT): Patent, no stenosis.  Peroneal: Patent to the level of the ankle  Dorsalis Pedis (DP): Patent  Plantar: Patent    LEFT LEG:    Common Iliac artery: Patent, moderate atherosclerotic changes  External Iliac: Occluded  Internal Iliac: Patent, high-grade stenosis at the origin, moderate   atherosclerotic changes.    Common femoral: Recanalized and reconstituted common femoral artery   through collaterals from the left inferior epigastric artery.  SFA: Patent, mild atherosclerotic changes, no stenosis or occlusion.  Profunda: Normal    Popliteal: Patent, mild atherosclerotic plaques, no significant stenosis.  AT: Patent, no stenosis or occlusion  TPT: Patent, no stenosis  PT: Patent in the proximal two thirds.  Peroneal: Patent, no significant stenosis.  DP: Patent  Plantar: Not well opacified      ADDITIONAL FINDINGS:  LOWER CHEST: Within normal limits.    LIVER: Bilateral metastatic lesions in the liver with calcification,   unchanged compared to prior study 7/9/2023.  BILE DUCTS: Normal caliber.  GALLBLADDER: Vicarious excretion of contrast.  SPLEEN: Mild splenomegaly  PANCREAS: Within normal limits.  ADRENALS: Within normal limits.  KIDNEYS/URETERS: No renal stones or hydronephrosis.    BLADDER: Distended, filled with contrast, within normal limits.  REPRODUCTIVE ORGANS: Prostate within normal limits.    BOWEL: Postsurgical changes from partial colectomy, Gloria stump   creation and right end ileostomy. No bowel obstruction. Appendix is   surgically absent.  PERITONEUM: No ascites. Unchanged peritoneal nodular thickening and   serosal implant within the right pelvis as described from prior study,   unchanged.  VESSELS: Aorta and pelvic arteries and described in detail above. Patent   portal vein. Patent renal veins. Left retroaortic renal vein.  RETROPERITONEUM/LYMPH NODES: No lymphadenopathy.  ABDOMINAL WALL: Within normal limits.  BONES: Within normal limits.    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 Two-vessel runoff in the left calf.  High grade stenosis at the origin of the left internal iliac artery.  Three-vessel arterial runoff of the right lower extremity.  Atherosclerotic disease in the abdomen as detailed above.      ACC: [id number] EXAM:  [facility]ST [facility]                          PROCEDURE DATE:  07/02/2022          INTERPRETATION:  Reason for Exam: Shortness of breath. chest pain.    CTA of the chest was performed from the thoracic inlet to the level of   the adrenal glands following IV contrast injection of  80 cc of Omnipaque   350. No immediate complications were reported.  MIP images were also   created and reviewed.    Comparison: CT abdomen and pelvis dated May 25, 2022    Tubes/Lines: Right Mediport catheter tip in SVC.    Mediastinum and Heart: Aorta and pulmonary arteries are normal in size.   Moderate stenosis of the proximal left subclavian artery from   atherosclerotic plaque. Thyroid gland is unremarkable. No   lymphadenopathy.No pericardial effusion.    Lungs, Pleura, and Airways: There is no pulmonary embolus. No   consolidations, edema or effusion. Small left pneumothorax noted. Right   lower lobe 4 mm nodule in series 4 image 289. Left apical 4 mm nodule on   image 57.    Visualized Abdomen: Multiple calcified masses in the liver, without   significant change since prior.    Bones and soft tissues: Unremarkable.    IMPRESSION:    No pulmonary embolus.    Small left pneumothorax without mediastinal shift.    Remaining findings as above.      < from: US Duplex Venous Lower Ext Ltd, Right (07.14.23 @ 13:33) >    ACC: [id number] EXAM:  US DPLX LWR EXT ARTS LTD RT   ORDERED BY: IMTIAZ LISA     ACC: [id number] EXAM:  US DPLX LWR EXT VEINS LTD RT   ORDERED BY: IMTIAZ LISA     PROCEDURE DATE:  07/14/2023          INTERPRETATION:  CLINICAL INFORMATION: Right leg pain status post right   groin catheterization. Evaluate for DVT, hematoma/pseudoaneurysm.    COMPARISON: None available.    TECHNIQUE: Duplex sonography of the RIGHT LOWER extremity veins with   color and spectral Doppler, with and without compression.    FINDINGS:    There is normal compressibility of the right femoral and popliteal veins.   Evidence of thrombosis is noted involving the right common femoral vein.  The contralateral common femoral vein is patent.  Doppler examinationshows normal spontaneous and phasic flow.    No calf vein thrombosis is detected.    Normal flow is identified in the right common femoral and proximal   femoral arteries. There is no abnormal flow to suggest a pseudoaneurysm.   No complex collectionis seen.    IMPRESSION:  Acute deep venous thrombosis: above the knee.  Acute thrombosis involving the right common femoral vein.  No evidence of a hematoma or pseudoaneurysm.

## 2023-07-20 NOTE — DISCHARGE NOTE PROVIDER - NSDCCPCAREPLAN_GEN_ALL_CORE_FT
PRINCIPAL DISCHARGE DIAGNOSIS  Diagnosis: Intractable abdominal pain  Assessment and Plan of Treatment: please follow up with  oncology, colorectal surgery , vascular  and primary doctor as soon as possible      SECONDARY DISCHARGE DIAGNOSES  Diagnosis: Leukocytosis  Assessment and Plan of Treatment:      PRINCIPAL DISCHARGE DIAGNOSIS  Diagnosis: Intractable abdominal pain  Assessment and Plan of Treatment: please follow up with  oncology, colorectal surgery , vascular  and primary doctor as soon as possible      SECONDARY DISCHARGE DIAGNOSES  Diagnosis: Leukocytosis  Assessment and Plan of Treatment:     Diagnosis: Testicular atrophy  Assessment and Plan of Treatment: Need to see urologist outpatient, yearly  US     PRINCIPAL DISCHARGE DIAGNOSIS  Diagnosis: Intractable abdominal pain  Assessment and Plan of Treatment: please follow up with  oncology,   and primary doctor as soon as possible  Pain meds as Rx         SECONDARY DISCHARGE DIAGNOSES  Diagnosis: Leukocytosis  Assessment and Plan of Treatment: f/u with PCP/Oncology to trend labs    Diagnosis: Testicular atrophy  Assessment and Plan of Treatment: Need to see urologist outpatient, yearly  US

## 2023-07-20 NOTE — DISCHARGE NOTE PROVIDER - PROVIDER TOKENS
PROVIDER:[TOKEN:[[id number]]],PROVIDER:[TOKEN:[[id number]]],PROVIDER:[TOKEN:[[id number]]] PROVIDER:[TOKEN:[[id number]]],PROVIDER:[TOKEN:[[id number]]],PROVIDER:[TOKEN:[[id number]]],PROVIDER:[TOKEN:[[id number]],FOLLOWUP:[1 week]]

## 2023-07-20 NOTE — DISCHARGE NOTE PROVIDER - NSDCFUSCHEDAPPT_GEN_ALL_CORE_FT
[facility]  [facility]  Scheduled Appointment: 08/02/2023    [facility]  [facility]  Scheduled Appointment: 08/04/2023    [facility]  [facility]  Scheduled Appointment: 08/16/2023    Christine Au  [facility]  [facility]  Scheduled Appointment: 08/16/2023    [facility]  [facility]  Scheduled Appointment: 08/18/2023    [facility]  [facility]  Scheduled Appointment: 08/30/2023    [facility]  [facility]  Scheduled Appointment: 09/01/2023    Palla, Venugopal R  [facility]  CARDIOLOGY [address]  Scheduled Appointment: 10/09/2023

## 2023-07-20 NOTE — PROGRESS NOTE ADULT - ASSESSMENT
HPI: Pt is a 52y old Male with hx significant for colon cancer with metastases to the liver and lung on chemotherapy, presented with abdominal pain, loose/watery stools, dehydration, decreased appetite over the last 3 days. Last chemotherapy was on 7/5/23. No recent antibiotic use. Palliative medicine Consult  for symptom management.  7/14/23 Seen and examined at bedside with no family present. Pt C/O persistent RLQ abd pain with minimal relief from Oxycodone    Assessment and Plan:    1) Pain  -Persistent chronic abd pain  -R/T cancer  -D/C Dilaudid on this admission as patient does not feel it was helping him felt he got more relief from Oxycodone  -Increase Fentanyl to 25 mcg/hr transderm Q 72 hrs  -c/w Oxycodone 10 mg PO Q4H PRN breakthrough pain  - patient feels pain is better managed on regimen he is on now      2) Colon Ca  -Metastatic disease to liver/lung  -Follows at [facility] with Dr Serrano  -Currently receiving chemo  -Imaging noted  -Onc eval      3) L fem artery occlusion  CT a/p finding of Left external iliac artery occlusion  -Palpable femoral and DP pulses  -underwent LLE angiogram, unable to traverse lesion.  -AC as per medicine  -Vascular eval noted  -S/P IVC filter placement      4) Debility  -Weakness  -Pain  -Cancer  -Supportive care    5) Advanced Directives  -Pt with capacity  -HCP naming his mother Karin on chart  - plan for patient to go home when medically stable with possible transition to [facility]

## 2023-07-20 NOTE — PROGRESS NOTE ADULT - SUBJECTIVE AND OBJECTIVE BOX
HPI: pt seen and examined with no family ay bedside, patient states pain is much improved with Fentanyl patch and Oxycodone 10 mg PO for breakthrough.,     PAIN: ( X)Yes   ( )No  Level: Better controlled at this time, patient is acceptable of pain that he has at this time as he states it is better and manageable with medication changes   Location: RLQ abd  Intensity:    5/10  Quality: sharp  Alleviating Factors: rest  Radiation: abd/groin  Impact on ADLs: mod-severe    DYSPNEA: ( ) Yes  (X ) No    Review of Systems:    Anxiety-mild-mod  Depression-yes  Physical Discomfort-mod  Dyspnea-denies  Constipation-no  Diarrhea-yes  Anorexia-mod  Cough-denies    All other systems reviewed and negative        PHYSICAL EXAM:  ICU Vital Signs Last 24 Hrs  T(C): 36.2 (20 Jul 2023 07:59), Max: 36.6 (19 Jul 2023 22:59)  T(F): 97.2 (20 Jul 2023 07:59), Max: 97.8 (19 Jul 2023 22:59)  HR: 85 (20 Jul 2023 07:59) (73 - 85)  BP: 123/80 (20 Jul 2023 07:59) (114/73 - 123/80)  RR: 17 (20 Jul 2023 07:59) (17 - 18)  SpO2: 100% (20 Jul 2023 07:59) (98% - 100%)    O2 Parameters below as of 20 Jul 2023 07:59  Patient On (Oxygen Delivery Method): room air  PPSV2: 50 %    General: Middle aged male in NAD  Mental Status: A&O X3  HEENT: oral mucosa moist  Lungs: clear to auscultation olena  Cardiac: S1S2+  GI: abd soft NT ND + BS/ostomy with appliance  : voids  Ext:  CARL strength   Neuro: no focal def          LABS:                              11.2   14.46 )-----------( 224      ( 20 Jul 2023 06:15 )             35.0              07-19    135  |  103  |  14  ----------------------------<  110<H>  4.1   |  28  |  0.80    Ca    8.9      19 Jul 2023 06:32    Albumin:     Allergies    [This allergen will not trigger allergy alert] No Known Drug Allergies (Unknown)    Intolerances    MEDICATIONS  (STANDING):  dextrose 5% + sodium chloride 0.45% with potassium chloride 20 mEq/L 1000 milliLiter(s) (125 mL/Hr) IV Continuous <Continuous>  diphenoxylate/atropine 1 Tablet(s) Oral every 6 hours  enoxaparin Injectable 90 milliGRAM(s) SubCutaneous every 12 hours  fentaNYL   Patch  25 MICROgram(s)/Hr 1 Patch Transdermal every 72 hours  gabapentin 100 milliGRAM(s) Oral every 8 hours  lidocaine   4% Patch 1 Patch Transdermal daily  loperamide 4 milliGRAM(s) Oral three times a day  naloxone Injectable 0.4 milliGRAM(s) IV Push once  psyllium Powder 1 Packet(s) Oral every 12 hours  psyllium Powder      sodium chloride 0.9%. 1000 milliLiter(s) (150 mL/Hr) IV Continuous <Continuous>    MEDICATIONS  (PRN):  acetaminophen     Tablet .. 650 milliGRAM(s) Oral every 6 hours PRN Temp greater or equal to 38C (100.4F), Mild Pain (1 - 3)  aluminum hydroxide/magnesium hydroxide/simethicone Suspension 30 milliLiter(s) Oral every 4 hours PRN Dyspepsia  melatonin 3 milliGRAM(s) Oral at bedtime PRN Insomnia  ondansetron Injectable 4 milliGRAM(s) IV Push every 8 hours PRN Nausea and/or Vomiting  oxyCODONE    IR 10 milliGRAM(s) Oral every 4 hours PRN Moderate Pain (4 - 6)      RADIOLOGY:  < from: Xray Abdomen 1 View Portable, IMMEDIATE (Xray Abdomen 1 View Portable, IMMEDIATE .) (07.19.23 @ 16:12) >  MARBALLI     PROCEDURE DATE:  07/19/2023          INTERPRETATION:  Clinical data: Abdominal cramps    Supine abdomen    FINDINGS: The entire left-side of the patient is notin field-of-view.   Multiple calcified hepatic lesions are noted. IVC filter to the right of   L2 and L3. There is right lower quadrant ostomy. Dense material is seen   in this region and may represent excrement in the bag. There is a   relative paucity of bowel gas. No gross free air. Bony structures are   intact.    IMPRESSION: Limited study    Relative paucity of bowel gas and additional findings as above    If warranted clinically obtain CT.    < end of copied text >  < from: US Testicles (07.19.23 @ 17:31) >    ACC: [id number] EXAM:  US DPLX SCROTUM   ORDERED BY: LYNDSEY MORRISON     ACC: [id number] EXAM:  US SCROTUM AND CONTENTS   ORDERED BY: LYNDSEY MORRISON     PROCEDURE DATE:  07/19/2023          INTERPRETATION:  CLINICAL INFORMATION: Right groin pain. Assess for   testicular torsion.    COMPARISON: None available.    TECHNIQUE: Testicular ultrasound utilizing color and spectral Doppler.    FINDINGS:    RIGHT:  Right testis: 4.2 cm x 2.4 cm x 3.6 cm. Multiple punctate echogenic foci,   suggesting microlithiasis. No masses or areas of architectural   distortion. Normal arterial and venous blood flow pattern.  Right epididymis: Within normal limits.  Right hydrocele: None.  Right varicocele: None.    LEFT:  Left testis: 2.5 cm x 1.7 cm x 2.1 cm. Multiple punctate echogenic foci,   suggesting microlithiasis. No masses or areas of architectural   distortion. Normal arterial and venous blood flow pattern.  Left epididymis: A 0.5 cm epididymal cyst.  Left hydrocele: None.  Left varicocele: None.    IMPRESSION:    Punctate echogenic foci bilaterally, suggesting testicular   microlithiasis. Additionally, the left testicle is smaller than the right   with a volume of 4.5 cc, concerning for testicular atrophy. In the   setting of testicular atrophy, consider annual scrotal ultrasound   follow-up. Consider urology consult.    No sonographic evidence of testicular torsion.

## 2023-07-20 NOTE — PHARMACOTHERAPY INTERVENTION NOTE - COMMENTS
Spoke to [facility] - co-pay for one month supply $0, confirmed medication is in-stock and will be ready for  today

## 2023-07-20 NOTE — DISCHARGE NOTE NURSING/CASE MANAGEMENT/SOCIAL WORK - PATIENT PORTAL LINK FT
You can access the FollowMyHealth Patient Portal offered by [facility] by registering at the following website: [email or web address]. By joining [facility]’s FollowMyHealth portal, you will also be able to view your health information using other applications (apps) compatible with our system.

## 2023-07-20 NOTE — DISCHARGE NOTE NURSING/CASE MANAGEMENT/SOCIAL WORK - NSDCPEFALRISK_GEN_ALL_CORE
For information on Fall & Injury Prevention, visit: [email or web address] OR  [email or web address] OR  https://www.cdc.gov/steadi/patient.html

## 2023-07-20 NOTE — DISCHARGE NOTE PROVIDER - HOSPITAL COURSE
51 y/o M with PMH colon cancer with metastases to the liver and lung on chemotherapy, PE presented with abdominal pain. Pt reports loose/watery stools, dehydration, decreased appetite over the last 3 days. His last chemotherapy was on Wednesday. No recent antibiotic use. Reports chills, cramping abdominal pain (sharp abdominal pain has been chronic for years), nausea, dry heaving, vomiting (6 episodes of watery outpt). Denies fevers, chills, chest pain, SOB, abdominal pain, N/V, diarrhea/constipation.   Prior admission:   - 4/19/23: Intestinal stoma prolapse   ER course: HR . Labs: .35 -> 112.01, lactate 2.4 -> 1.4, Na 134, glucose 113, alkaline phosphatase 191. UA: negative. COVID and RVP negative.   Imaging:  - CXR: port right chest wall, no consolidation, no effusion, no pneumothorax (personally reviewed.    - CT abd/pelvis: No significant change when compared to prior study 6/13/2023. No acute findings to suggest inflammatory or infectious process in the abdomen or pelvis, no bowel obstruction. Unchanged hepatic metastasis and peritoneal thickening with serosal implant in the right lower pelvis and postsurgical changes from colectomy and right abdominal ileostomy. No morphologic abnormality at the ileostomy site. Occlusion of left external iliac artery.   Pt was given Ciprofloxacin and Metronidazole, 3L of NS, Pepcid morphine, Zofran He was  admitted to med/surg (1N) for further management.     51 y/o male with metastatic colon cancer on chemo, PE  admitted for:  Abdominal pain      Leukocytosis SIRS, no source of infection identified,   workup negative  leukocytosis likely due to Neulasta and dehydration   Had bump im WBCs this am possibly reactive? has hematoma and DVT, No fevers. Trending down now   s/p Ciprofloxacin and Metronidazole in ER;   S/p  Zosyn, now off   UA negative, pt without urinary symptoms   stool and GI PCR negative  blood culture NGTD   CT abd/pelvis - no infectious/inflammatory process   CTA; no PNA   Off abxs now as per ID     High output ileostomy : dehydration s/p  recent chemo  7/19-abd cramps , feeling slightly more instestinal protrusion in ileostomy bag  - but tolerating po, benign abd exam , has stable   colostomy output- Xray abd - no evidence of acute instestinal obstruction -reevaluated by colorectal cleared by colorectal for discharge  dw with colorectal resident- for follow up today, if cleared by colorectal then discharge tmrw home on  - C/w  Loperamide,  metamucil, lomotil   - Output decreased   - GI PCR and Cdiff neg       3. Occlusion of left external iliac artery -metastatic malignancy with hx of missed doses/non compliance  - s/p LLE angio: unable to traverse lesion- d/c  heparin drip  will need long term   Lovenox SQ Tx dose as d/w heme onc - pt received self administration education  - check Ct chest IV contrast: no PE   - arterial dopplers reviewed, no intervention as per vascular team    cardiolipin abs negative -s/p 7/10 LLE angiogram, unable to traverse lesion    .#c/o right groin since thrombectomy attempt   US -lower extremities-7/4/23 & 7/16/23- noted acute nonocclusive deep venous thrombosis in the right common femoral vein, above the knee only, appears unchanged from 07/14/2023.  No evidence of propagation.s/p IVC filter 7/17/23- doing well  ECHO:  bubble study neg for shunts   testicular US no torsion- shows testicular atrophy- f/u urology as outpatient and annually    # Metastatic colon cancer  to the liver and lung on chemotherapy  on palliative chemotherapy:  initially FOLFOXIRI / sunny , after 10 cycles changed to FOLFIRI/ Bevacizumab.  Last chemotherapy 7/5/23 Had OnPro 7/8/23    - Heme/Onc recs appreciated , f/u heme after discharge  per palliative  cw fentanyl 25mh/h every 72 hrs, and oxycodone 10mg q4hrs prn    5. Anxiety  Reported frustration and suicidal thoughts to SW on 7/13  Was evaluated by psych, I d/w NP, Pt is not currently suicidal/ homicidal does not need 1:1   Pt was odeclined  Anti depressant which was offered by psych    DVT ppx: heparin drip, switch to Lovenox  Code status: Full code 53 y/o M with PMH colon cancer with metastases to the liver and lung on chemotherapy, PE presented with abdominal pain. Pt reports loose/watery stools, dehydration, decreased appetite over the last 3 days. His last chemotherapy was on Wednesday. No recent antibiotic use. Reports chills, cramping abdominal pain (sharp abdominal pain has been chronic for years), nausea, dry heaving, vomiting (6 episodes of watery outpt). Denies fevers, chills, chest pain, SOB, abdominal pain, N/V, diarrhea/constipation.   Prior admission:   - 4/19/23: Intestinal stoma prolapse   ER course: HR . Labs: .35 -> 112.01, lactate 2.4 -> 1.4, Na 134, glucose 113, alkaline phosphatase 191. UA: negative. COVID and RVP negative.   Imaging:  - CXR: port right chest wall, no consolidation, no effusion, no pneumothorax (personally reviewed.    - CT abd/pelvis: No significant change when compared to prior study 6/13/2023. No acute findings to suggest inflammatory or infectious process in the abdomen or pelvis, no bowel obstruction. Unchanged hepatic metastasis and peritoneal thickening with serosal implant in the right lower pelvis and postsurgical changes from colectomy and right abdominal ileostomy. No morphologic abnormality at the ileostomy site. Occlusion of left external iliac artery.   Pt was given Ciprofloxacin and Metronidazole, 3L of NS, Pepcid morphine, Zofran He was  admitted to med/surg (1N) for further management.     53 y/o male with metastatic colon cancer on chemo, PE  admitted for:  Abdominal pain      Leukocytosis SIRS, no source of infection identified,   workup negative  leukocytosis likely due to Neulasta and dehydration   Had bump im WBCs this am possibly reactive? has hematoma and DVT, No fevers. Trending down now   s/p Ciprofloxacin and Metronidazole in ER;   S/p  Zosyn, now off   UA negative, pt without urinary symptoms   stool and GI PCR negative  blood culture NGTD   CT abd/pelvis - no infectious/inflammatory process   CTA; no PNA   Off abxs now as per ID     High output ileostomy : dehydration s/p  recent chemo  7/19-abd cramps , feeling slightly more instestinal protrusion in ileostomy bag  - but tolerating po, benign abd exam , has stable   colostomy output- Xray abd - no evidence of acute instestinal obstruction -reevaluated by colorectal cleared by colorectal for discharge  dw with colorectal resident- for follow up today, if cleared by colorectal then discharge tmrw home on  - C/w  Loperamide,  metamucil, lomotil   - Output decreased   - GI PCR and Cdiff neg        Occlusion of left external iliac artery -metastatic malignancy with hx of missed doses/non compliance  - s/p LLE angio: unable to traverse lesion- d/c  heparin drip  will need long term   Lovenox SQ Tx dose as d/w heme onc - pt received self administration education- eprescribed   - check Ct chest IV contrast: no PE   - arterial dopplers reviewed, no intervention as per vascular team    cardiolipin abs negative -s/p 7/10 LLE angiogram, unable to traverse lesion    .#c/o right groin since thrombectomy attempt   US -lower extremities-7/4/23 & 7/16/23- noted acute nonocclusive deep venous thrombosis in the right common femoral vein, above the knee only, appears unchanged from 07/14/2023.  No evidence of propagation.s/p IVC filter 7/17/23- doing well  ECHO:  bubble study neg for shunts   testicular US no torsion- shows testicular atrophy- f/u urology as outpatient and annually    # Metastatic colon cancer  to the liver and lung on chemotherapy  on palliative chemotherapy:  initially FOLFOXIRI / sunny , after 10 cycles changed to FOLFIRI/ Bevacizumab.  Last chemotherapy 7/5/23 Had OnPro 7/8/23    - Heme/Onc recs appreciated , f/u heme after discharge  per palliative  cw fentanyl 25mh/h every 72 hrs, and oxycodone 10mg q4hrs prn    5. Anxiety  Reported frustration and suicidal thoughts to SW on 7/13  Was evaluated by psych, I d/w NP, Pt is not currently suicidal/ homicidal does not need 1:1   Pt was odeclined  Anti depressant which was offered by psych    DVT ppx: heparin drip, switch to Lovenox  Code status: Full code 51 y/o M with PMH colon cancer with metastases to the liver and lung on chemotherapy, PE presented with abdominal pain. Pt reports loose/watery stools, dehydration, decreased appetite over the last 3 days. His last chemotherapy was on Wednesday. No recent antibiotic use. Reports chills, cramping abdominal pain (sharp abdominal pain has been chronic for years), nausea, dry heaving, vomiting (6 episodes of watery outpt). Denies fevers, chills, chest pain, SOB, abdominal pain, N/V, diarrhea/constipation.   ER course: HR . Labs: .35 -> 112.01, lactate 2.4 -> 1.4, Na 134, glucose 113, alkaline phosphatase 191. UA: negative. COVID and RVP negative.   Pt has CTA done and found to have Occlusion of left external iliac artery. Was followed by vascular, s/p Angiogram, unable to traverse lesion. Hematology followed recommended change heparin drip  to Lovenox Tx dose, lifelong. Hospital course complicated by R Groin pain, Doppler showed RLE DVT. Pt was evaluated by IR and vascular IVC filter was placed. Pt with pain palliative consulted for pain management, Now on fentanyl patch and PRN Oxy with better pain control. Pt to follow up with PCP, Hem/onc for further management      A/P: 51 y/o male with metastatic colon cancer on chemo, PE  admitted for:      1. Leukocytosis SIRS, no source of infection identified,   workup negative  leukocytosis likely due to Neulasta and dehydration   Had bump im WBCs likely  reactive, has hematoma and DVT, No fevers.   s/p Ciprofloxacin and Metronidazole in ER;   S/p  Zosyn, now off   UA negative, pt without urinary symptoms   stool and GI PCR negative  blood culture NGTD   CT abd/pelvis - no infectious/inflammatory process   CTA; no PNA   Off abxs now as per ID     2. High output ileostomy : dehydration s/p  recent chemo  7/19-abd cramps , feeling slightly more intestinal protrusion in ileostomy bag  - but tolerating po, benign abd exam , has stable   colostomy output- Xray abd - no evidence of acute instestinal obstruction -reevaluated by colorectal cleared by colorectal for discharge  - C/w  Loperamide,  metamucil, lomotil   - Output decreased   - GI PCR and Cdiff neg     3. Occlusion of left external iliac artery  - likely due to metastatic malignancy with hx of missed doses/non compliance  - s/p LLE angio: unable to traverse lesion- d/c  heparin drip  will need long term   Lovenox SQ Tx dose as d/w heme onc - pt received self administration education- eprescribed   -  Ct chest IV contrast: no PE   - arterial dopplers reviewed, no intervention as per vascular team   -  cardiolipin abs negative -    4. R groin pain, Acute RLE DVT   US -lower extremities-7/14/23 & 7/16/23- noted acute nonocclusive deep venous thrombosis in the right common femoral vein, above the knee only, appears unchanged from 07/14/2023.  No evidence of propagation.s/p IVC filter 7/17/23- doing well  ECHO:  bubble study neg for shunts   testicular US no torsion- shows testicular atrophy- f/u urology as outpatient and annually    5. Metastatic colon cancer  to the liver and lung on chemotherapy  on palliative chemotherapy:  initially FOLFOXIRI / sunny , after 10 cycles changed to FOLFIRI/ Bevacizumab.  Last chemotherapy 7/5/23 Had OnPro 7/8/23    - Heme/Onc recs appreciated , f/u heme after discharge  per palliative  cw fentanyl 25mh/h every 72 hrs, and oxycodone 10mg q4hrs prn    6. Anxiety  Reported frustration and suicidal thoughts to SW on 7/13  Was evaluated by psych, I d/w NP, Pt is not currently suicidal/ homicidal does not need 1:1   Pt was declined  Anti depressant which was offered by psych    DVT ppx:  Lovenox  Code status: Full code     Dispo:  stable for d/c

## 2023-07-20 NOTE — DISCHARGE NOTE PROVIDER - NSDCMRMEDTOKEN_GEN_ALL_CORE_FT
diphenoxylate-atropine 2.5 mg-0.025 mg oral tablet: 1 tab(s) orally every 6 hours MDD: 004  fentaNYL 25 mcg/hr transdermal film, extended release: 1 patch transdermal every 72 hours  gabapentin 100 mg oral capsule: 1 cap(s) orally every 8 hours  loperamide 2 mg oral tablet, chewable: 2 tab(s) chewed 4 times a day with meals and at bedtime  Lovenox 100 mg/mL injectable solution: 90 milligram(s) subcutaneously every 12 hours  Metamucil 3.4 g/5.2 g oral powder for reconstitution: 3.4 gram(s) orally every 12 hours  oxyCODONE 10 mg oral tablet: 1 tab(s) orally every 4 hours As needed Moderate Pain (4 - 6)   diphenoxylate-atropine 2.5 mg-0.025 mg oral tablet: 1 tab(s) orally every 6 hours MDD: 004  gabapentin 100 mg oral capsule: 1 cap(s) orally every 8 hours  loperamide 2 mg oral tablet, chewable: 2 tab(s) chewed 4 times a day with meals and at bedtime  Lovenox 100 mg/mL injectable solution: 90 milligram(s) subcutaneously every 12 hours  Metamucil 3.4 g/5.2 g oral powder for reconstitution: 3.4 gram(s) orally every 12 hours  oxyCODONE 10 mg oral tablet: 1 tab(s) orally every 4 hours As needed Moderate Pain (4 - 6)   diphenoxylate-atropine 2.5 mg-0.025 mg oral tablet: 1 tab(s) orally every 6 hours MDD: 004  fentaNYL 25 mcg/hr transdermal film, extended release: 1 patch transdermal every 72 hours MDD: 1 patch every 72 hrs  gabapentin 100 mg oral capsule: 1 cap(s) orally every 8 hours  loperamide 2 mg oral tablet, chewable: 2 tab(s) chewed 4 times a day with meals and at bedtime  Lovenox 100 mg/mL injectable solution: 90 milligram(s) subcutaneously every 12 hours  Metamucil 3.4 g/5.2 g oral powder for reconstitution: 3.4 gram(s) orally every 12 hours  naloxone 4 mg/0.1 mL nasal spray: 4 milligram(s) intranasally once a day as needed for  opiate overdose MDD: 4mg  oxyCODONE 10 mg oral tablet: 1 tab(s) orally every 4 hours as needed for moderate pain MDD: 6 tabs   diphenoxylate-atropine 2.5 mg-0.025 mg oral tablet: 1 tab(s) orally every 6 hours MDD: 004  fentaNYL 25 mcg/hr transdermal film, extended release: 1 patch transdermal every 72 hours MDD: 1 patch every 72 hrs  lidocaine 4% topical film: Apply topically to affected area once a day as needed for pain  loperamide 2 mg oral tablet, chewable: 2 tab(s) chewed 4 times a day with meals and at bedtime  Lovenox 100 mg/mL injectable solution: 90 milligram(s) subcutaneously every 12 hours  Metamucil 3.4 g/5.2 g oral powder for reconstitution: 3.4 gram(s) orally every 12 hours  naloxone 4 mg/0.1 mL nasal spray: 4 milligram(s) intranasally once a day as needed for  opiate overdose MDD: 4mg  oxyCODONE 10 mg oral tablet: 1 tab(s) orally every 4 hours as needed for moderate pain MDD: 6 tabs

## 2023-07-20 NOTE — DISCHARGE NOTE PROVIDER - CARE PROVIDER_API CALL
Tutu Hubbard.  Colon/Rectal Surgery  [address], [place] [address]  Phone: [phone]  Fax: [phone]  Follow Up Time:     Wanda Serrano  Medical Oncology  [address], [place] [address]  Phone: [phone]  Fax: [phone]  Follow Up Time:     Lia Taylor  Vascular Surgery  [address], [address]  [place] [address]  Phone: [phone]  Fax: [phone]  Follow Up Time:    Tutu Hubbard.  Colon/Rectal Surgery  [address], [place] [address]  Phone: [phone]  Fax: [phone]  Follow Up Time:     Wanda Serrano  Medical Oncology  [address], [place] [address]  Phone: [phone]  Fax: [phone]  Follow Up Time:     Lia Taylor  Vascular Surgery  [address]  [place] [address]  Phone: [phone]  Fax: [phone]  Follow Up Time:     Blaire Erickson  Family Medicine  [address], [address]  [place] [address]  Phone: [phone]  Fax: [phone]  Follow Up Time: 1 week

## 2023-07-20 NOTE — PROGRESS NOTE ADULT - ASSESSMENT
53 y/o male with metastatic colon cancer on chemo, PE  admitted for:  Abdominal pain      Leukocytosis SIRS, no source of infection identified,   workup negative  leukocytosis likely due to Neulasta and dehydration   Had bump im WBCs this am possibly reactive? has hematoma and DVT, No fevers. Trending down now   s/p Ciprofloxacin and Metronidazole in ER;   S/p  Zosyn, now off   UA negative, pt without urinary symptoms   stool and GI PCR negative  blood culture NGTD   CT abd/pelvis - no infectious/inflammatory process   CTA; no PNA   Off abxs now as per ID     High output ileostomy : dehydration s/p  recent chemo  7/19-abd cramps , feeling slightly more instestinal protrusion in ileostomy bag  - but tolerating po, benign abd exam , has stable   colostomy output- Xray abd - no evidence of acute instestinal obstruction -reevaluated by colorectal cleared by colorectal for discharge  dw with colorectal resident- for follow up today, if cleared by colorectal then discharge tmrw home on  - C/w  Loperamide,  metamucil, lomotil   - Output decreased   - GI PCR and Cdiff neg        Occlusion of left external iliac artery -metastatic malignancy with hx of missed doses/non compliance  - s/p LLE angio: unable to traverse lesion- d/c  heparin drip  will need long term   Lovenox SQ Tx dose as d/w heme onc - pt received self administration education- eprescribed   - check Ct chest IV contrast: no PE   - arterial dopplers reviewed, no intervention as per vascular team    cardiolipin abs negative -s/p 7/10 LLE angiogram, unable to traverse lesion    .#c/o right groin since thrombectomy attempt   US -lower extremities-7/4/23 & 7/16/23- noted acute nonocclusive deep venous thrombosis in the right common femoral vein, above the knee only, appears unchanged from 07/14/2023.  No evidence of propagation.s/p IVC filter 7/17/23- doing well  ECHO:  bubble study neg for shunts   testicular US no torsion- shows testicular atrophy- f/u urology as outpatient and annually    # Metastatic colon cancer  to the liver and lung on chemotherapy  on palliative chemotherapy:  initially FOLFOXIRI / sunny , after 10 cycles changed to FOLFIRI/ Bevacizumab.  Last chemotherapy 7/5/23 Had OnPro 7/8/23    - Heme/Onc recs appreciated , f/u heme after discharge  per palliative  cw fentanyl 25mh/h every 72 hrs, and oxycodone 10mg q4hrs prn    5. Anxiety  Reported frustration and suicidal thoughts to  on 7/13  Was evaluated by psych, I d/w NP, Pt is not currently suicidal/ homicidal does not need 1:1   Pt was odeclined  Anti depressant which was offered by psych    DVT ppx: heparin drip, switch to Lovenox  Code status: Full code     medically stable for discharge home , needs home care but now refusing , pt was educated of lovenox self admisnitration  needs to have fentanyl and narcan eprescribed  Istopped    was prescribed oxycodone 5mg QID for 15 days on 7/7 , should have atleast  40 pills s of oxycodone 5mg  remaining at home  , however now Oxycodone dose increased to 10mg , so would need 5 more days of oxycodone to be prescribed to last hime 7 days, pt eventually to follow with his doctor for further pain meds

## 2023-07-20 NOTE — DISCHARGE NOTE PROVIDER - CARE PROVIDERS DIRECT ADDRESSES
,DirectAddress_Unknown,[email or web address],DirectAddress_Unknown ,DirectAddress_Unknown,[email or web address],DirectAddress_Unknown,[email or web address]

## 2023-07-20 NOTE — PROGRESS NOTE ADULT - SUBJECTIVE AND OBJECTIVE BOX
Patient seen and examined at bedside. Patient has no complaints and reports pain is under control.  Patient is tolerating diet without nausea or vomiting and is passing flatus. Ostomy output has thickened and was 400cc in the past 24 hours. Ostomy remains prolapsed with minimal edema. Nurse denies any acute events. Vitals reviewed and WNL.    Vitals:  T(C): 36.2 (07-20 @ 07:59), Max: 36.6 (07-19 @ 22:59)  HR: 85 (07-20 @ 07:59) (73 - 85)  BP: 123/80 (07-20 @ 07:59) (114/73 - 123/80)  RR: 17 (07-20 @ 07:59) (17 - 18)  SpO2: 100% (07-20 @ 07:59) (98% - 100%)    07-19 @ 07:01  -  07-20 @ 07:00  --------------------------------------------------------  IN:  Total IN: 0 mL    OUT:    Ileostomy (mL): 400 mL  Total OUT: 400 mL    Total NET: -400 mL          Physical Exam:  General: AAOx3, Well developed, NAD  Chest: Normal respiratory effort  Heart: RRR  Abdomen: Soft, NTND, no masses, minimal tenderness around ostomy, ostomy right side of abdomen prolapsed but stable with mild edema  Neuro/Psych: No localized deficits. Normal speech, normal tone  Skin: Normal, no rashes, no lesions noted.   Extremities: Warm, well perfused, no edema, Pulses intact    07-20 @ 06:15                    11.2  CBC: 14.46>)-------(<224                     35.0                 - | - | -    CMP:  ----------------------< -               - | - | -                      Ca:-  Phos:-  Mg:-               -|      |-        LFTs:  ------|-|-----             -|      |-  07-19 @ 06:32                    11.5  CBC: 13.72>)-------(<219                     35.2                 135 | 103 | 14    CMP:  ----------------------< 110               4.1 | 28 | 0.80                      Ca:8.9  Phos:-  Mg:-               -|      |-        LFTs:  ------|-|-----             -|      |-      Current Inpatient Medications:  acetaminophen     Tablet .. 650 milliGRAM(s) Oral every 6 hours PRN  aluminum hydroxide/magnesium hydroxide/simethicone Suspension 30 milliLiter(s) Oral every 4 hours PRN  dextrose 5% + sodium chloride 0.45% with potassium chloride 20 mEq/L 1000 milliLiter(s) (125 mL/Hr) IV Continuous <Continuous>  diphenoxylate/atropine 1 Tablet(s) Oral every 6 hours  enoxaparin Injectable 90 milliGRAM(s) SubCutaneous every 12 hours  fentaNYL   Patch  25 MICROgram(s)/Hr 1 Patch Transdermal every 72 hours  gabapentin 100 milliGRAM(s) Oral every 8 hours  lidocaine   4% Patch 1 Patch Transdermal daily  loperamide 4 milliGRAM(s) Oral three times a day  melatonin 3 milliGRAM(s) Oral at bedtime PRN  naloxone Injectable 0.4 milliGRAM(s) IV Push once  ondansetron Injectable 4 milliGRAM(s) IV Push every 8 hours PRN  oxyCODONE    IR 10 milliGRAM(s) Oral every 4 hours PRN  psyllium Powder 1 Packet(s) Oral every 12 hours  psyllium Powder      sodium chloride 0.9%. 1000 milliLiter(s) (150 mL/Hr) IV Continuous <Continuous>

## 2023-07-21 ENCOUNTER — APPOINTMENT (OUTPATIENT)
Dept: INFUSION THERAPY | Facility: CLINIC | Age: 53
End: 2023-07-21

## 2023-07-21 VITALS
HEART RATE: 87 BPM | TEMPERATURE: 98 F | RESPIRATION RATE: 17 BRPM | DIASTOLIC BLOOD PRESSURE: 84 MMHG | SYSTOLIC BLOOD PRESSURE: 134 MMHG | OXYGEN SATURATION: 98 %

## 2023-07-21 LAB
HCT VFR BLD CALC: 38.2 % — LOW (ref 39–50)
HGB BLD-MCNC: 12.3 G/DL — LOW (ref 13–17)
MCHC RBC-ENTMCNC: 28.3 PG — SIGNIFICANT CHANGE UP (ref 27–34)
MCHC RBC-ENTMCNC: 32.2 GM/DL — SIGNIFICANT CHANGE UP (ref 32–36)
MCV RBC AUTO: 87.8 FL — SIGNIFICANT CHANGE UP (ref 80–100)
PLATELET # BLD AUTO: 239 K/UL — SIGNIFICANT CHANGE UP (ref 150–400)
RBC # BLD: 4.35 M/UL — SIGNIFICANT CHANGE UP (ref 4.2–5.8)
RBC # FLD: 17 % — HIGH (ref 10.3–14.5)
WBC # BLD: 17.14 K/UL — HIGH (ref 3.8–10.5)
WBC # FLD AUTO: 17.14 K/UL — HIGH (ref 3.8–10.5)

## 2023-07-21 PROCEDURE — 99239 HOSP IP/OBS DSCHRG MGMT >30: CPT

## 2023-07-21 RX ORDER — FENTANYL CITRATE 50 UG/ML
1 INJECTION INTRAVENOUS
Qty: 5 | Refills: 0
Start: 2023-07-21 | End: 2023-08-04

## 2023-07-21 RX ORDER — FENTANYL CITRATE 50 UG/ML
1 INJECTION INTRAVENOUS
Qty: 3 | Refills: 0
Start: 2023-07-21 | End: 2023-08-04

## 2023-07-21 RX ORDER — LIDOCAINE 4 G/100G
1 CREAM TOPICAL
Qty: 2 | Refills: 0
Start: 2023-07-21 | End: 2023-07-30

## 2023-07-21 RX ORDER — LOPERAMIDE HCL 2 MG
2 TABLET ORAL
Qty: 240 | Refills: 0
Start: 2023-07-21

## 2023-07-21 RX ORDER — OXYCODONE HYDROCHLORIDE 5 MG/1
1 TABLET ORAL
Qty: 30 | Refills: 0
Start: 2023-07-21 | End: 2023-07-25

## 2023-07-21 RX ORDER — NALOXONE HYDROCHLORIDE 4 MG/.1ML
4 SPRAY NASAL
Qty: 1 | Refills: 0
Start: 2023-07-21 | End: 2023-07-21

## 2023-07-21 RX ADMIN — Medication 1 PACKET(S): at 06:01

## 2023-07-21 RX ADMIN — OXYCODONE HYDROCHLORIDE 10 MILLIGRAM(S): 5 TABLET ORAL at 02:37

## 2023-07-21 RX ADMIN — Medication 4 MILLIGRAM(S): at 06:01

## 2023-07-21 RX ADMIN — Medication 4 MILLIGRAM(S): at 12:50

## 2023-07-21 RX ADMIN — OXYCODONE HYDROCHLORIDE 10 MILLIGRAM(S): 5 TABLET ORAL at 03:30

## 2023-07-21 RX ADMIN — ENOXAPARIN SODIUM 90 MILLIGRAM(S): 100 INJECTION SUBCUTANEOUS at 11:14

## 2023-07-21 RX ADMIN — OXYCODONE HYDROCHLORIDE 10 MILLIGRAM(S): 5 TABLET ORAL at 07:47

## 2023-07-21 RX ADMIN — OXYCODONE HYDROCHLORIDE 10 MILLIGRAM(S): 5 TABLET ORAL at 12:50

## 2023-07-21 NOTE — PROGRESS NOTE ADULT - ASSESSMENT
53 y/o male with metastatic colon cancer on chemo, PE  admitted for:      1. Leukocytosis SIRS, no source of infection identified,   workup negative  leukocytosis likely due to Neulasta and dehydration   Had bump im WBCs this am possibly reactive? has hematoma and DVT, No fevers. Trending down now   s/p Ciprofloxacin and Metronidazole in ER;   S/p  Zosyn, now off   UA negative, pt without urinary symptoms   stool and GI PCR negative  blood culture NGTD   CT abd/pelvis - no infectious/inflammatory process   CTA; no PNA   Off abxs now as per ID     2. 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  Groin pain. Acute RLE DVT   likely 2/2 RLE DVT  vs referred pain related to cancer   RLE Doppler neg for  hematoma or  aneurysm, + common femoral vein DVT   F/u ECHO:  bubble study neg for shunts   D/w IR and Vascular, new thrombus?  vs pre admission, no venous  imaging on admission. Recommended to repear RLE doppler  which was done and no change   D/w hematology  team agree with IR recs  I d/w vascular resident, will need to further discuss indications for IVC filer       4. Metastatic colon cancer  to the liver and lung on chemotherapy  on palliative chemotherapy:  initially FOLFOXIRI / sunny , after 10 cycles changed to FOLFIRI/ Bevacizumab.  Last chemotherapy 7/5/23 Had OnPro 7/8/23    - Heme/Onc recs appreciated     5. Anxiety  Reported frustration and suicidal thoughts to SW on 7/13  Was evaluated by psych, I d/w NP, Pt is not currently suicidal/ homicidal does not need 1:1   Pt was offered Anti depressant to be declined  but he declined     DVT ppx: heparin drip, switch to Lovenox      Code status: Full code       Dispo;  C/w pain management, monitor ostomy output,  tentatively scheduled for IVC filter on  Monday with Dr Mauricio. Do not  hold A/c  53 y/o male with metastatic colon cancer on chemo, PE  admitted for:        1. Leukocytosis SIRS, no source of infection identified,   workup negative  leukocytosis likely due to Neulasta and dehydration   Had bump im WBCs likely  reactive, has hematoma and DVT, No fevers.   s/p Ciprofloxacin and Metronidazole in ER;   S/p  Zosyn, now off   UA negative, pt without urinary symptoms   stool and GI PCR negative  blood culture NGTD   CT abd/pelvis - no infectious/inflammatory process   CTA; no PNA   Off abxs now as per ID     2. High output ileostomy : dehydration s/p  recent chemo  7/19-abd cramps , feeling slightly more intestinal protrusion in ileostomy bag  - but tolerating po, benign abd exam , has stable   colostomy output- Xray abd - no evidence of acute instestinal obstruction -reevaluated by colorectal cleared by colorectal for discharge  - C/w  Loperamide,  metamucil, lomotil   - Output decreased   - GI PCR and Cdiff neg     3. Occlusion of left external iliac artery  - likely due to metastatic malignancy with hx of missed doses/non compliance  - s/p LLE angio: unable to traverse lesion- d/c  heparin drip  will need long term   Lovenox SQ Tx dose as d/w heme onc - pt received self administration education- eprescribed   -  Ct chest IV contrast: no PE   - arterial dopplers reviewed, no intervention as per vascular team   -  cardiolipin abs negative -    4. R groin pain, Acute RLE DVT   US -lower extremities-7/14/23 & 7/16/23- noted acute nonocclusive deep venous thrombosis in the right common femoral vein, above the knee only, appears unchanged from 07/14/2023.  No evidence of propagation.s/p IVC filter 7/17/23- doing well  ECHO:  bubble study neg for shunts   testicular US no torsion- shows testicular atrophy- f/u urology as outpatient and annually    5. Metastatic colon cancer  to the liver and lung on chemotherapy  on palliative chemotherapy:  initially FOLFOXIRI / sunny , after 10 cycles changed to FOLFIRI/ Bevacizumab.  Last chemotherapy 7/5/23 Had OnPro 7/8/23    - Heme/Onc recs appreciated , f/u heme after discharge  per palliative  cw fentanyl 25mh/h every 72 hrs, and oxycodone 10mg q4hrs prn    6. Anxiety  Reported frustration and suicidal thoughts to SW on 7/13  Was evaluated by psych, I d/w NP, Pt is not currently suicidal/ homicidal does not need 1:1   Pt was declined  Anti depressant which was offered by psych    DVT ppx:  Lovenox  Code status: Full code     Dispo:  stable for d/c.  declined HC

## 2023-07-21 NOTE — PROGRESS NOTE ADULT - SUBJECTIVE AND OBJECTIVE BOX
CC: Abdominal pain     HPI:  51 y/o M with PMH colon cancer with metastases to the liver and lung on chemotherapy, PE presented with abdominal pain. Pt reports loose/watery stools, dehydration, decreased appetite over the last 3 days. His last chemotherapy was on Wednesday. No recent antibiotic use. Reports chills, cramping abdominal pain (sharp abdominal pain has been chronic for years), nausea, dry heaving, vomiting (6 episodes of watery outpt). Denies fevers, chills, chest pain, SOB, abdominal pain, N/V, diarrhea/constipation.   Prior admission:   - 4/19/23: Intestinal stoma prolapse   ER course: HR . Labs: .35 -> 112.01, lactate 2.4 -> 1.4, Na 134, glucose 113, alkaline phosphatase 191. UA: negative. COVID and RVP negative.   Imaging:  - CXR: port right chest wall, no consolidation, no effusion, no pneumothorax (personally reviewed.    - CT abd/pelvis: No significant change when compared to prior study 6/13/2023. No acute findings to suggest inflammatory or infectious process in the abdomen or pelvis, no bowel obstruction. Unchanged hepatic metastasis and peritoneal thickening with serosal implant in the right lower pelvis and postsurgical changes from colectomy and right abdominal ileostomy. No morphologic abnormality at the ileostomy site. Occlusion of left external iliac artery.   Pt was given Ciprofloxacin and Metronidazole, 3L of NS, Pepcid morphine, Zofran He is being admitted to med/surg (1N) for further management.       INTERVAL HPI/ OVERNIGHT EVENTS: Chart reviewed. Pt was seen and examined,     Vital Signs Last 24 Hrs  T(C): 36.6 (21 Jul 2023 08:44), Max: 36.7 (20 Jul 2023 15:05)  T(F): 97.9 (21 Jul 2023 08:44), Max: 98.1 (20 Jul 2023 15:05)  HR: 87 (21 Jul 2023 08:44) (76 - 89)  BP: 134/84 (21 Jul 2023 08:44) (117/76 - 134/84)  RR: 17 (21 Jul 2023 08:44) (17 - 18)  SpO2: 98% (21 Jul 2023 08:44) (98% - 100%)    Parameters below as of 21 Jul 2023 08:44  Patient On (Oxygen Delivery Method): room air        REVIEW OF SYSTEMS:  All other review of systems is negative unless indicated above.      PHYSICAL EXAM:  General: Well developed; in no acute distress  Eyes: EOMI; conjunctiva and sclera clear  Head: Normocephalic; atraumatic  ENMT: No nasal discharge; airway clear  Neck: Supple; non tender; no masses  Respiratory: No wheezes, rales or rhonchi  Cardiovascular: Regular rate and rhythm. S1 and S2 Normal;  Gastrointestinal: Soft non-tender non-distended; Normal bowel sounds, ileostomy  in place   Genitourinary: No  suprapubic  tenderness  Extremities: Normal range of motion, No edema, some R calf tenderness   Vascular: Peripheral DP pulses not  palpable, R groin  tenderness to palpation    Neurological: Alert and oriented x 3, non focal   Skin: Warm and dry. No acute rash  Musculoskeletal: Normal muscle tone, without deformities  Psychiatric: Cooperative and appropriate      LABS:                         12.3   17.14 )-----------( 239      ( 21 Jul 2023 07:11 )             38.2       MEDICATIONS  (STANDING):  dextrose 5% + sodium chloride 0.45% with potassium chloride 20 mEq/L 1000 milliLiter(s) (125 mL/Hr) IV Continuous <Continuous>  enoxaparin Injectable 90 milliGRAM(s) SubCutaneous every 12 hours  fentaNYL   Patch  25 MICROgram(s)/Hr 1 Patch Transdermal every 72 hours  gabapentin 100 milliGRAM(s) Oral every 8 hours  lidocaine   4% Patch 1 Patch Transdermal daily  loperamide 4 milliGRAM(s) Oral three times a day  naloxone Injectable 0.4 milliGRAM(s) IV Push once  psyllium Powder 1 Packet(s) Oral every 12 hours  psyllium Powder      sodium chloride 0.9%. 1000 milliLiter(s) (150 mL/Hr) IV Continuous <Continuous>    MEDICATIONS  (PRN):  acetaminophen     Tablet .. 650 milliGRAM(s) Oral every 6 hours PRN Temp greater or equal to 38C (100.4F), Mild Pain (1 - 3)  aluminum hydroxide/magnesium hydroxide/simethicone Suspension 30 milliLiter(s) Oral every 4 hours PRN Dyspepsia  melatonin 3 milliGRAM(s) Oral at bedtime PRN Insomnia  ondansetron Injectable 4 milliGRAM(s) IV Push every 8 hours PRN Nausea and/or Vomiting  oxyCODONE    IR 10 milliGRAM(s) Oral every 4 hours PRN Moderate Pain (4 - 6)      RADIOLOGY & ADDITIONAL TESTS:    ACC: [id number] EXAM:  CT ANGIO ABD AOR W RUN(W)AW IC   ORDERED BY: TINO SANDOVAL     PROCEDURE DATE:  07/10/2023          INTERPRETATION:  CT ANGIOGRAM ABDOMEN, PELVIS, AND LOWER EXTREMITIES:    CT ANGIO ABDOMINAL AORTA RUNOFF    CLINICAL INFORMATION:  r/o LLE ischemia    TECHNIQUE:    CONTRAST/COMPLICATIONS:  IV Contrast: Omnipaque 350  125 cc administered   25 cc discarded  Oral Contrast: NONE  Complications: None reported at time of study completion    PROCEDURE:  Initially, nonenhanced CT was obtained through the calves. Then,   following the rapid administration of intravenous contrast, CT   angiography was performed through the abdomen, pelvis, and lower   extremities down to the toes.  Delayed images through the calves were   also obtained. Sagittal and coronal reformats as well as 3D   reconstructions were performed.    Volume rendered and MIP images for CT angiographic display are created   from source data on an independent 3-D workstation and archived into   PACS. This study was performed using automatic exposure control   (radiation dose reduction software) to obtain a diagnostic image quality   scan with patient dose as low as reasonably achievable.    COMPARISON: CT abdomen pelvis 7/9/2023.    FINDINGS:    ABDOMINAL AORTA: Normal caliber of the abdominal aorta, 1.9 cm   suprarenal, 1.7 cm infrarenal, 1.6 cm at the bifurcation.    Celiac: Patent, no stenosis.  SMA: Patent, no stenosis.  ANYA: Patent, no stenosis  Renal Arteries: Duplicated left renal artery, no stenosis. Single right   renal artery, no stenosis    RIGHT LEG:    Common Iliac artery: Mild atherosclerosis. No high-grade stenosis or   occlusion.  External Iliac: Mild atherosclerotic disease, no high-grade stenosis or   occlusion.  Internal Iliac: Moderate atherosclerotic disease mild stenosis at the   origin. Patent.    Common femoral: Patent, no stenosis or  SFA: Patent, mild atherosclerotic plaques, no stenosis.  Profunda: Patent, normal    Popliteal: Patent, no stenosis or occlusion  Anterior Tibial (AT): Patent.  Tibioperoneal trunk (TPT): Patent, no stenosis or occlusion.  Posterior Tibial (PT): Patent, no stenosis.  Peroneal: Patent to the level of the ankle  Dorsalis Pedis (DP): Patent  Plantar: Patent    LEFT LEG:    Common Iliac artery: Patent, moderate atherosclerotic changes  External Iliac: Occluded  Internal Iliac: Patent, high-grade stenosis at the origin, moderate   atherosclerotic changes.    Common femoral: Recanalized and reconstituted common femoral artery   through collaterals from the left inferior epigastric artery.  SFA: Patent, mild atherosclerotic changes, no stenosis or occlusion.  Profunda: Normal    Popliteal: Patent, mild atherosclerotic plaques, no significant stenosis.  AT: Patent, no stenosis or occlusion  TPT: Patent, no stenosis  PT: Patent in the proximal two thirds.  Peroneal: Patent, no significant stenosis.  DP: Patent  Plantar: Not well opacified      ADDITIONAL FINDINGS:  LOWER CHEST: Within normal limits.    LIVER: Bilateral metastatic lesions in the liver with calcification,   unchanged compared to prior study 7/9/2023.  BILE DUCTS: Normal caliber.  GALLBLADDER: Vicarious excretion of contrast.  SPLEEN: Mild splenomegaly  PANCREAS: Within normal limits.  ADRENALS: Within normal limits.  KIDNEYS/URETERS: No renal stones or hydronephrosis.    BLADDER: Distended, filled with contrast, within normal limits.  REPRODUCTIVE ORGANS: Prostate within normal limits.    BOWEL: Postsurgical changes from partial colectomy, Gloria stump   creation and right end ileostomy. No bowel obstruction. Appendix is   surgically absent.  PERITONEUM: No ascites. Unchanged peritoneal nodular thickening and   serosal implant within the right pelvis as described from prior study,   unchanged.  VESSELS: Aorta and pelvic arteries and described in detail above. Patent   portal vein. Patent renal veins. Left retroaortic renal vein.  RETROPERITONEUM/LYMPH NODES: No lymphadenopathy.  ABDOMINAL WALL: Within normal limits.  BONES: Within normal limits.    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 Two-vessel runoff in the left calf.  High grade stenosis at the origin of the left internal iliac artery.  Three-vessel arterial runoff of the right lower extremity.  Atherosclerotic disease in the abdomen as detailed above.      ACC: [id number] EXAM:  CT ANGIO CHEST PULM ART [facility]                          PROCEDURE DATE:  07/02/2022          INTERPRETATION:  Reason for Exam: Shortness of breath. chest pain.    CTA of the chest was performed from the thoracic inlet to the level of   the adrenal glands following IV contrast injection of  80 cc of Omnipaque   350. No immediate complications were reported.  MIP images were also   created and reviewed.    Comparison: CT abdomen and pelvis dated May 25, 2022    Tubes/Lines: Right Mediport catheter tip in SVC.    Mediastinum and Heart: Aorta and pulmonary arteries are normal in size.   Moderate stenosis of the proximal left subclavian artery from   atherosclerotic plaque. Thyroid gland is unremarkable. No   lymphadenopathy.No pericardial effusion.    Lungs, Pleura, and Airways: There is no pulmonary embolus. No   consolidations, edema or effusion. Small left pneumothorax noted. Right   lower lobe 4 mm nodule in series 4 image 289. Left apical 4 mm nodule on   image 57.    Visualized Abdomen: Multiple calcified masses in the liver, without   significant change since prior.    Bones and soft tissues: Unremarkable.    IMPRESSION:    No pulmonary embolus.    Small left pneumothorax without mediastinal shift.    Remaining findings as above.      < from: US Duplex Venous Lower Ext Ltd, Right (07.14.23 @ 13:33) >    ACC: [id number] EXAM:  US DPLX LWR EXT ARTS LTD RT   ORDERED BY: IMTIAZ LISA     ACC: [id number] EXAM:  US DPLX LWR EXT VEINS LTD RT   ORDERED BY: IMTIAZ LISA     PROCEDURE DATE:  07/14/2023          INTERPRETATION:  CLINICAL INFORMATION: Right leg pain status post right   groin catheterization. Evaluate for DVT, hematoma/pseudoaneurysm.    COMPARISON: None available.    TECHNIQUE: Duplex sonography of the RIGHT LOWER extremity veins with   color and spectral Doppler, with and without compression.    FINDINGS:    There is normal compressibility of the right femoral and popliteal veins.   Evidence of thrombosis is noted involving the right common femoral vein.  The contralateral common femoral vein is patent.  Doppler examination shows normal spontaneous and phasic flow.    No calf vein thrombosis is detected.    Normal flow is identified in the right common femoral and proximal   femoral arteries. There is no abnormal flow to suggest a pseudoaneurysm.   No complex collectionis  seen.    IMPRESSION:  Acute deep venous thrombosis: above the knee.  Acute thrombosis involving the right common femoral vein.  No evidence of a hematoma or pseudoaneurysm.   CC: Abdominal pain     HPI:  53 y/o M with PMH colon cancer with metastases to the liver and lung on chemotherapy, PE presented with abdominal pain. Pt reports loose/watery stools, dehydration, decreased appetite over the last 3 days. His last chemotherapy was on Wednesday. No recent antibiotic use. Reports chills, cramping abdominal pain (sharp abdominal pain has been chronic for years), nausea, dry heaving, vomiting (6 episodes of watery outpt). Denies fevers, chills, chest pain, SOB, abdominal pain, N/V, diarrhea/constipation.   ER course: HR . Labs: .35 -> 112.01, lactate 2.4 -> 1.4, Na 134, glucose 113, alkaline phosphatase 191. UA: negative. COVID and RVP negative.   Pt has CTA done and found to have Occlusion of left external iliac artery. Was followed by vascular, s/p Angiogram, unable to traverse lesion. Hematology followed recommended change heparin drip  to Lovenox Tx dose, lifelong. Hospital course complicated by R Groin pain, Doppler showed RLE DVT. Pt was evaluated by IR and vascular IVC filter was placed. Pt with pain palliative consulted for pain management, Now on fentanyl patch and PRN Oxy with better pain control. Pt to follow up with PCP, Hem/onc for further management      INTERVAL HPI/ OVERNIGHT EVENTS: Chart reviewed. Pt was seen and examined, reports pain is better on this regiment, d/c planning meds and f/u discussed     Vital Signs Last 24 Hrs  T(C): 36.6 (21 Jul 2023 08:44), Max: 36.7 (20 Jul 2023 15:05)  T(F): 97.9 (21 Jul 2023 08:44), Max: 98.1 (20 Jul 2023 15:05)  HR: 87 (21 Jul 2023 08:44) (76 - 89)  BP: 134/84 (21 Jul 2023 08:44) (117/76 - 134/84)  RR: 17 (21 Jul 2023 08:44) (17 - 18)  SpO2: 98% (21 Jul 2023 08:44) (98% - 100%)    Parameters below as of 21 Jul 2023 08:44  Patient On (Oxygen Delivery Method): room air        REVIEW OF SYSTEMS:  All other review of systems is negative unless indicated above.      PHYSICAL EXAM:  General: Well developed; in no acute distress  Eyes: EOMI; conjunctiva and sclera clear  Head: Normocephalic; atraumatic  ENMT: No nasal discharge; airway clear  Neck: Supple; non tender; no masses  Respiratory: No wheezes, rales or rhonchi  Cardiovascular: Regular rate and rhythm. S1 and S2 Normal;  Gastrointestinal: Soft non-tender non-distended; Normal bowel sounds, ileostomy  in place   Genitourinary: No  suprapubic  tenderness  Extremities: Normal range of motion, No edema, some R calf tenderness   Vascular: Peripheral DP pulses not  palpable, R groin  tenderness to palpation    Neurological: Alert and oriented x 3, non focal   Skin: Warm and dry. No acute rash  Musculoskeletal: Normal muscle tone, without deformities  Psychiatric: Cooperative and appropriate      LABS:                         12.3   17.14 )-----------( 239      ( 21 Jul 2023 07:11 )             38.2       MEDICATIONS  (STANDING):  dextrose 5% + sodium chloride 0.45% with potassium chloride 20 mEq/L 1000 milliLiter(s) (125 mL/Hr) IV Continuous <Continuous>  enoxaparin Injectable 90 milliGRAM(s) SubCutaneous every 12 hours  fentaNYL   Patch  25 MICROgram(s)/Hr 1 Patch Transdermal every 72 hours  gabapentin 100 milliGRAM(s) Oral every 8 hours  lidocaine   4% Patch 1 Patch Transdermal daily  loperamide 4 milliGRAM(s) Oral three times a day  naloxone Injectable 0.4 milliGRAM(s) IV Push once  psyllium Powder 1 Packet(s) Oral every 12 hours  psyllium Powder      sodium chloride 0.9%. 1000 milliLiter(s) (150 mL/Hr) IV Continuous <Continuous>    MEDICATIONS  (PRN):  acetaminophen     Tablet .. 650 milliGRAM(s) Oral every 6 hours PRN Temp greater or equal to 38C (100.4F), Mild Pain (1 - 3)  aluminum hydroxide/magnesium hydroxide/simethicone Suspension 30 milliLiter(s) Oral every 4 hours PRN Dyspepsia  melatonin 3 milliGRAM(s) Oral at bedtime PRN Insomnia  ondansetron Injectable 4 milliGRAM(s) IV Push every 8 hours PRN Nausea and/or Vomiting  oxyCODONE    IR 10 milliGRAM(s) Oral every 4 hours PRN Moderate Pain (4 - 6)      RADIOLOGY & ADDITIONAL TESTS:    ACC: [id number] EXAM:  CT ANGIO ABD AOR W RUN(W)AW IC   ORDERED BY: TINO SANDOVAL     PROCEDURE DATE:  07/10/2023          INTERPRETATION:  CT ANGIOGRAM ABDOMEN, PELVIS, AND LOWER EXTREMITIES:    CT ANGIO ABDOMINAL AORTA RUNOFF    CLINICAL INFORMATION:  r/o LLE ischemia    TECHNIQUE:    CONTRAST/COMPLICATIONS:  IV Contrast: Omnipaque 350  125 cc administered   25 cc discarded  Oral Contrast: NONE  Complications: None reported at time of study completion    PROCEDURE:  Initially, nonenhanced CT was obtained through the calves. Then,   following the rapid administration of intravenous contrast, CT   angiography was performed through the abdomen, pelvis, and lower   extremities down to the toes.  Delayed images through the calves were   also obtained. Sagittal and coronal reformats as well as 3D   reconstructions were performed.    Volume rendered and MIP images for CT angiographic display are created   from source data on an independent 3-D workstation and archived into   PACS. This study was performed using automatic exposure control   (radiation dose reduction software) to obtain a diagnostic image quality   scan with patient dose as low as reasonably achievable.    COMPARISON: CT abdomen pelvis 7/9/2023.    FINDINGS:    ABDOMINAL AORTA: Normal caliber of the abdominal aorta, 1.9 cm   suprarenal, 1.7 cm infrarenal, 1.6 cm at the bifurcation.    Celiac: Patent, no stenosis.  SMA: Patent, no stenosis.  ANYA: Patent, no stenosis  Renal Arteries: Duplicated left renal artery, no stenosis. Single right   renal artery, no stenosis    RIGHT LEG:    Common Iliac artery: Mild atherosclerosis. No high-grade stenosis or   occlusion.  External Iliac: Mild atherosclerotic disease, no high-grade stenosis or   occlusion.  Internal Iliac: Moderate atherosclerotic disease mild stenosis at the   origin. Patent.    Common femoral: Patent, no stenosis or  SFA: Patent, mild atherosclerotic plaques, no stenosis.  Profunda: Patent, normal    Popliteal: Patent, no stenosis or occlusion  Anterior Tibial (AT): Patent.  Tibioperoneal trunk (TPT): Patent, no stenosis or occlusion.  Posterior Tibial (PT): Patent, no stenosis.  Peroneal: Patent to the level of the ankle  Dorsalis Pedis (DP): Patent  Plantar: Patent    LEFT LEG:    Common Iliac artery: Patent, moderate atherosclerotic changes  External Iliac: Occluded  Internal Iliac: Patent, high-grade stenosis at the origin, moderate   atherosclerotic changes.    Common femoral: Recanalized and reconstituted common femoral artery   through collaterals from the left inferior epigastric artery.  SFA: Patent, mild atherosclerotic changes, no stenosis or occlusion.  Profunda: Normal    Popliteal: Patent, mild atherosclerotic plaques, no significant stenosis.  AT: Patent, no stenosis or occlusion  TPT: Patent, no stenosis  PT: Patent in the proximal two thirds.  Peroneal: Patent, no significant stenosis.  DP: Patent  Plantar: Not well opacified      ADDITIONAL FINDINGS:  LOWER CHEST: Within normal limits.    LIVER: Bilateral metastatic lesions in the liver with calcification,   unchanged compared to prior study 7/9/2023.  BILE DUCTS: Normal caliber.  GALLBLADDER: Vicarious excretion of contrast.  SPLEEN: Mild splenomegaly  PANCREAS: Within normal limits.  ADRENALS: Within normal limits.  KIDNEYS/URETERS: No renal stones or hydronephrosis.    BLADDER: Distended, filled with contrast, within normal limits.  REPRODUCTIVE ORGANS: Prostate within normal limits.    BOWEL: Postsurgical changes from partial colectomy, Gloria stump   creation and right end ileostomy. No bowel obstruction. Appendix is   surgically absent.  PERITONEUM: No ascites. Unchanged peritoneal nodular thickening and   serosal implant within the right pelvis as described from prior study,   unchanged.  VESSELS: Aorta and pelvic arteries and described in detail above. Patent   portal vein. Patent renal veins. Left retroaortic renal vein.  RETROPERITONEUM/LYMPH NODES: No lymphadenopathy.  ABDOMINAL WALL: Within normal limits.  BONES: Within normal limits.    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 Two-vessel runoff in the left calf.  High grade stenosis at the origin of the left internal iliac artery.  Three-vessel arterial runoff of the right lower extremity.  Atherosclerotic disease in the abdomen as detailed above.      ACC: [id number] EXAM:  [facility]                          PROCEDURE DATE:  07/02/2022          INTERPRETATION:  Reason for Exam: Shortness of breath. chest pain.    CTA of the chest was performed from the thoracic inlet to the level of   the adrenal glands following IV contrast injection of  80 cc of Omnipaque   350. No immediate complications were reported.  MIP images were also   created and reviewed.    Comparison: CT abdomen and pelvis dated May 25, 2022    Tubes/Lines: Right Mediport catheter tip in SVC.    Mediastinum and Heart: Aorta and pulmonary arteries are normal in size.   Moderate stenosis of the proximal left subclavian artery from   atherosclerotic plaque. Thyroid gland is unremarkable. No   lymphadenopathy.No pericardial effusion.    Lungs, Pleura, and Airways: There is no pulmonary embolus. No   consolidations, edema or effusion. Small left pneumothorax noted. Right   lower lobe 4 mm nodule in series 4 image 289. Left apical 4 mm nodule on   image 57.    Visualized Abdomen: Multiple calcified masses in the liver, without   significant change since prior.    Bones and soft tissues: Unremarkable.    IMPRESSION:    No pulmonary embolus.    Small left pneumothorax without mediastinal shift.    Remaining findings as above.      < from: US Duplex Venous Lower Ext Ltd, Right (07.14.23 @ 13:33) >    ACC: [id number] EXAM:  US DPLX LWR EXT ARTS LTD RT   ORDERED BY: IMTIAZ LISA     ACC: [id number] EXAM:  US DPLX LWR EXT VEINS LTD RT   ORDERED BY: IMTIAZ LISA     PROCEDURE DATE:  07/14/2023          INTERPRETATION:  CLINICAL INFORMATION: Right leg pain status post right   groin catheterization. Evaluate for DVT, hematoma/pseudoaneurysm.    COMPARISON: None available.    TECHNIQUE: Duplex sonography of the RIGHT LOWER extremity veins with   color and spectral Doppler, with and without compression.    FINDINGS:    There is normal compressibility of the right femoral and popliteal veins.   Evidence of thrombosis is noted involving the right common femoral vein.  The contralateral common femoral vein is patent.  Doppler examination shows normal spontaneous and phasic flow.    No calf vein thrombosis is detected.    Normal flow is identified in the right common femoral and proximal   femoral arteries. There is no abnormal flow to suggest a pseudoaneurysm.   No complex collectionis  seen.    IMPRESSION:  Acute deep venous thrombosis: above the knee.  Acute thrombosis involving the right common femoral vein.  No evidence of a hematoma or pseudoaneurysm.

## 2023-07-21 NOTE — PROGRESS NOTE ADULT - PROVIDER SPECIALTY LIST ADULT
Heme/Onc
Heme/Onc
Hospitalist
Hospitalist
Vascular Surgery
Colorectal Surgery
Colorectal Surgery
Hospitalist
Hospitalist
Infectious Disease
Palliative Care
Palliative Care
Vascular Surgery
Colorectal Surgery
Heme/Onc
Hospitalist
Intervent Cardiology
Vascular Surgery
Colorectal Surgery
Colorectal Surgery
Heme/Onc
Hospitalist
Hospitalist
Internal Medicine
Vascular Surgery
Vascular Surgery
Palliative Care

## 2023-07-28 DIAGNOSIS — R10.0 ACUTE ABDOMEN: ICD-10-CM

## 2023-07-28 DIAGNOSIS — C79.89 SECONDARY MALIGNANT NEOPLASM OF OTHER SPECIFIED SITES: ICD-10-CM

## 2023-07-28 DIAGNOSIS — Z87.891 PERSONAL HISTORY OF NICOTINE DEPENDENCE: ICD-10-CM

## 2023-07-28 DIAGNOSIS — I82.411 ACUTE EMBOLISM AND THROMBOSIS OF RIGHT FEMORAL VEIN: ICD-10-CM

## 2023-07-28 DIAGNOSIS — Z86.711 PERSONAL HISTORY OF PULMONARY EMBOLISM: ICD-10-CM

## 2023-07-28 DIAGNOSIS — G89.29 OTHER CHRONIC PAIN: ICD-10-CM

## 2023-07-28 DIAGNOSIS — D68.69 OTHER THROMBOPHILIA: ICD-10-CM

## 2023-07-28 DIAGNOSIS — C78.7 SECONDARY MALIGNANT NEOPLASM OF LIVER AND INTRAHEPATIC BILE DUCT: ICD-10-CM

## 2023-07-28 DIAGNOSIS — I74.5 EMBOLISM AND THROMBOSIS OF ILIAC ARTERY: ICD-10-CM

## 2023-07-28 DIAGNOSIS — C78.00 SECONDARY MALIGNANT NEOPLASM OF UNSPECIFIED LUNG: ICD-10-CM

## 2023-07-28 DIAGNOSIS — C18.9 MALIGNANT NEOPLASM OF COLON, UNSPECIFIED: ICD-10-CM

## 2023-07-28 DIAGNOSIS — F43.20 ADJUSTMENT DISORDER, UNSPECIFIED: ICD-10-CM

## 2023-07-28 DIAGNOSIS — N50.0 ATROPHY OF TESTIS: ICD-10-CM

## 2023-07-28 DIAGNOSIS — R65.10 SYSTEMIC INFLAMMATORY RESPONSE SYNDROME (SIRS) OF NON-INFECTIOUS ORIGIN WITHOUT ACUTE ORGAN DYSFUNCTION: ICD-10-CM

## 2023-07-28 DIAGNOSIS — Z93.2 ILEOSTOMY STATUS: ICD-10-CM

## 2023-07-28 DIAGNOSIS — Z79.01 LONG TERM (CURRENT) USE OF ANTICOAGULANTS: ICD-10-CM

## 2023-07-28 DIAGNOSIS — E86.0 DEHYDRATION: ICD-10-CM

## 2023-08-02 ENCOUNTER — RESULT REVIEW (OUTPATIENT)
Age: 53
End: 2023-08-02

## 2023-08-02 ENCOUNTER — APPOINTMENT (OUTPATIENT)
Dept: INFUSION THERAPY | Facility: CLINIC | Age: 53
End: 2023-08-02

## 2023-08-02 LAB
ALBUMIN SERPL ELPH-MCNC: 4.8 G/DL — SIGNIFICANT CHANGE UP (ref 3.3–5)
ALP SERPL-CCNC: 123 U/L — HIGH (ref 40–120)
ALT FLD-CCNC: 20 U/L — SIGNIFICANT CHANGE UP (ref 10–45)
ANION GAP SERPL CALC-SCNC: 13 MMOL/L — SIGNIFICANT CHANGE UP (ref 5–17)
AST SERPL-CCNC: 15 U/L — SIGNIFICANT CHANGE UP (ref 10–40)
BASOPHILS # BLD AUTO: 0.03 K/UL — SIGNIFICANT CHANGE UP (ref 0–0.2)
BASOPHILS NFR BLD AUTO: 0.4 % — SIGNIFICANT CHANGE UP (ref 0–2)
BILIRUB SERPL-MCNC: 0.4 MG/DL — SIGNIFICANT CHANGE UP (ref 0.2–1.2)
BUN SERPL-MCNC: 10 MG/DL — SIGNIFICANT CHANGE UP (ref 7–23)
CALCIUM SERPL-MCNC: 9.8 MG/DL — SIGNIFICANT CHANGE UP (ref 8.4–10.5)
CEA SERPL-MCNC: 16.6 NG/ML — HIGH (ref 0–3.8)
CHLORIDE SERPL-SCNC: 101 MMOL/L — SIGNIFICANT CHANGE UP (ref 96–108)
CO2 SERPL-SCNC: 22 MMOL/L — SIGNIFICANT CHANGE UP (ref 22–31)
CREAT SERPL-MCNC: 0.78 MG/DL — SIGNIFICANT CHANGE UP (ref 0.5–1.3)
EGFR: 109 ML/MIN/1.73M2 — SIGNIFICANT CHANGE UP
EOSINOPHIL # BLD AUTO: 0.13 K/UL — SIGNIFICANT CHANGE UP (ref 0–0.5)
EOSINOPHIL NFR BLD AUTO: 1.6 % — SIGNIFICANT CHANGE UP (ref 0–6)
GLUCOSE SERPL-MCNC: 132 MG/DL — HIGH (ref 70–99)
HCT VFR BLD CALC: 39.9 % — SIGNIFICANT CHANGE UP (ref 39–50)
HGB BLD-MCNC: 12.8 G/DL — LOW (ref 13–17)
IMM GRANULOCYTES NFR BLD AUTO: 0.4 % — SIGNIFICANT CHANGE UP (ref 0–0.9)
LYMPHOCYTES # BLD AUTO: 1.16 K/UL — SIGNIFICANT CHANGE UP (ref 1–3.3)
LYMPHOCYTES # BLD AUTO: 14.4 % — SIGNIFICANT CHANGE UP (ref 13–44)
MAGNESIUM SERPL-MCNC: 2 MG/DL — SIGNIFICANT CHANGE UP (ref 1.6–2.6)
MCHC RBC-ENTMCNC: 27.5 PG — SIGNIFICANT CHANGE UP (ref 27–34)
MCHC RBC-ENTMCNC: 32.1 GM/DL — SIGNIFICANT CHANGE UP (ref 32–36)
MCV RBC AUTO: 85.6 FL — SIGNIFICANT CHANGE UP (ref 80–100)
MONOCYTES # BLD AUTO: 0.74 K/UL — SIGNIFICANT CHANGE UP (ref 0–0.9)
MONOCYTES NFR BLD AUTO: 9.2 % — SIGNIFICANT CHANGE UP (ref 2–14)
NEUTROPHILS # BLD AUTO: 5.97 K/UL — SIGNIFICANT CHANGE UP (ref 1.8–7.4)
NEUTROPHILS NFR BLD AUTO: 74 % — SIGNIFICANT CHANGE UP (ref 43–77)
NRBC # BLD: 0 /100 WBCS — SIGNIFICANT CHANGE UP (ref 0–0)
PLATELET # BLD AUTO: 193 K/UL — SIGNIFICANT CHANGE UP (ref 150–400)
POTASSIUM SERPL-MCNC: 4.4 MMOL/L — SIGNIFICANT CHANGE UP (ref 3.5–5.3)
POTASSIUM SERPL-SCNC: 4.4 MMOL/L — SIGNIFICANT CHANGE UP (ref 3.5–5.3)
PROT SERPL-MCNC: 8.1 G/DL — SIGNIFICANT CHANGE UP (ref 6–8.3)
RBC # BLD: 4.66 M/UL — SIGNIFICANT CHANGE UP (ref 4.2–5.8)
RBC # FLD: 16.4 % — HIGH (ref 10.3–14.5)
SODIUM SERPL-SCNC: 137 MMOL/L — SIGNIFICANT CHANGE UP (ref 135–145)
WBC # BLD: 8.06 K/UL — SIGNIFICANT CHANGE UP (ref 3.8–10.5)
WBC # FLD AUTO: 8.06 K/UL — SIGNIFICANT CHANGE UP (ref 3.8–10.5)

## 2023-08-04 ENCOUNTER — TRANSCRIPTION ENCOUNTER (OUTPATIENT)
Age: 53
End: 2023-08-04

## 2023-08-04 ENCOUNTER — APPOINTMENT (OUTPATIENT)
Dept: INFUSION THERAPY | Facility: CLINIC | Age: 53
End: 2023-08-04

## 2023-08-16 ENCOUNTER — RESULT CHARGE (OUTPATIENT)
Age: 53
End: 2023-08-16

## 2023-08-16 ENCOUNTER — NON-APPOINTMENT (OUTPATIENT)
Age: 53
End: 2023-08-16

## 2023-08-16 ENCOUNTER — APPOINTMENT (OUTPATIENT)
Dept: HEMATOLOGY ONCOLOGY | Facility: CLINIC | Age: 53
End: 2023-08-16
Payer: MEDICAID

## 2023-08-16 ENCOUNTER — RESULT REVIEW (OUTPATIENT)
Age: 53
End: 2023-08-16

## 2023-08-16 ENCOUNTER — APPOINTMENT (OUTPATIENT)
Dept: INFUSION THERAPY | Facility: CLINIC | Age: 53
End: 2023-08-16
Payer: MEDICAID

## 2023-08-16 VITALS
DIASTOLIC BLOOD PRESSURE: 88 MMHG | HEART RATE: 89 BPM | RESPIRATION RATE: 18 BRPM | OXYGEN SATURATION: 98 % | TEMPERATURE: 98.2 F | BODY MASS INDEX: 24.07 KG/M2 | SYSTOLIC BLOOD PRESSURE: 154 MMHG | WEIGHT: 187.44 LBS

## 2023-08-16 LAB
ALBUMIN SERPL ELPH-MCNC: 4.8 G/DL — SIGNIFICANT CHANGE UP (ref 3.3–5)
ALP SERPL-CCNC: 174 U/L — HIGH (ref 40–120)
ALT FLD-CCNC: 26 U/L — SIGNIFICANT CHANGE UP (ref 10–45)
ANION GAP SERPL CALC-SCNC: 14 MMOL/L — SIGNIFICANT CHANGE UP (ref 5–17)
AST SERPL-CCNC: 22 U/L — SIGNIFICANT CHANGE UP (ref 10–40)
BASOPHILS # BLD AUTO: 0.04 K/UL — SIGNIFICANT CHANGE UP (ref 0–0.2)
BASOPHILS NFR BLD AUTO: 0.3 % — SIGNIFICANT CHANGE UP (ref 0–2)
BILIRUB SERPL-MCNC: 0.2 MG/DL — SIGNIFICANT CHANGE UP (ref 0.2–1.2)
BUN SERPL-MCNC: 12 MG/DL — SIGNIFICANT CHANGE UP (ref 7–23)
CALCIUM SERPL-MCNC: 9.6 MG/DL — SIGNIFICANT CHANGE UP (ref 8.4–10.5)
CHLORIDE SERPL-SCNC: 101 MMOL/L — SIGNIFICANT CHANGE UP (ref 96–108)
CO2 SERPL-SCNC: 20 MMOL/L — LOW (ref 22–31)
CREAT SERPL-MCNC: 0.85 MG/DL — SIGNIFICANT CHANGE UP (ref 0.5–1.3)
EGFR: 106 ML/MIN/1.73M2 — SIGNIFICANT CHANGE UP
EOSINOPHIL # BLD AUTO: 0.27 K/UL — SIGNIFICANT CHANGE UP (ref 0–0.5)
EOSINOPHIL NFR BLD AUTO: 2.1 % — SIGNIFICANT CHANGE UP (ref 0–6)
GLUCOSE SERPL-MCNC: 106 MG/DL — HIGH (ref 70–99)
HCT VFR BLD CALC: 40.8 % — SIGNIFICANT CHANGE UP (ref 39–50)
HGB BLD-MCNC: 13.6 G/DL — SIGNIFICANT CHANGE UP (ref 13–17)
IMM GRANULOCYTES NFR BLD AUTO: 0.6 % — SIGNIFICANT CHANGE UP (ref 0–0.9)
LYMPHOCYTES # BLD AUTO: 1.49 K/UL — SIGNIFICANT CHANGE UP (ref 1–3.3)
LYMPHOCYTES # BLD AUTO: 11.5 % — LOW (ref 13–44)
MAGNESIUM SERPL-MCNC: 2 MG/DL — SIGNIFICANT CHANGE UP (ref 1.6–2.6)
MCHC RBC-ENTMCNC: 28.2 PG — SIGNIFICANT CHANGE UP (ref 27–34)
MCHC RBC-ENTMCNC: 33.3 GM/DL — SIGNIFICANT CHANGE UP (ref 32–36)
MCV RBC AUTO: 84.6 FL — SIGNIFICANT CHANGE UP (ref 80–100)
MONOCYTES # BLD AUTO: 0.73 K/UL — SIGNIFICANT CHANGE UP (ref 0–0.9)
MONOCYTES NFR BLD AUTO: 5.6 % — SIGNIFICANT CHANGE UP (ref 2–14)
NEUTROPHILS # BLD AUTO: 10.38 K/UL — HIGH (ref 1.8–7.4)
NEUTROPHILS NFR BLD AUTO: 79.9 % — HIGH (ref 43–77)
NRBC # BLD: 0 /100 WBCS — SIGNIFICANT CHANGE UP (ref 0–0)
PLATELET # BLD AUTO: 142 K/UL — LOW (ref 150–400)
POTASSIUM SERPL-MCNC: 4.7 MMOL/L — SIGNIFICANT CHANGE UP (ref 3.5–5.3)
POTASSIUM SERPL-SCNC: 4.7 MMOL/L — SIGNIFICANT CHANGE UP (ref 3.5–5.3)
PROT SERPL-MCNC: 8 G/DL — SIGNIFICANT CHANGE UP (ref 6–8.3)
RBC # BLD: 4.82 M/UL — SIGNIFICANT CHANGE UP (ref 4.2–5.8)
RBC # FLD: 16.7 % — HIGH (ref 10.3–14.5)
SODIUM SERPL-SCNC: 135 MMOL/L — SIGNIFICANT CHANGE UP (ref 135–145)
WBC # BLD: 12.99 K/UL — HIGH (ref 3.8–10.5)
WBC # FLD AUTO: 12.99 K/UL — HIGH (ref 3.8–10.5)

## 2023-08-16 PROCEDURE — 99214 OFFICE O/P EST MOD 30 MIN: CPT

## 2023-08-16 NOTE — RESULTS/DATA
[FreeTextEntry1] : 7/9/23 CT A/P: No significant change when compared to prior study 6/13/2023. No acute findings to suggest inflammatory or infectious process in the abdomen or pelvis, no bowel obstruction. Unchanged hepatic metastasis and peritoneal thickening with serosal implant in the right lower pelvis and postsurgical changes from colectomy and right abdominal ileostomy. No morphologic abnormality at the ileostomy site.  6/13/23 CT C/A/P: New, 8 mm right lower lobe nodule and enlarging pulmonary nodules, 7 mm left apical nodule previously measured 6 mm. Bilobar hepatic metastases, the majority of which are calcified and not significantly changed. A noncalcified lesion in the right hepatic lobe demonstrates mild interval growth, 1.7 x 1.4 cm previously 1.4 x 1.3 cm.  9/1/22 CT C/A/P: Segmental left lower lobe pulmonary embolus and probable smaller pulmonary emboli within the right lung. Interval decrease in size of bilateral pulmonary nodules and hepatic metastases compared to 5/25/2022. No new sites of disease.  5/25/22 CT A/P:  Status post total colectomy and ileostomy. Dilated small bowel loops in the left abdomen with associated wall thickening. Both may be postoperative, follow-up is recommended. Moderate amount of ascites new from the prior study. Small amount of free intraperitoneal air likely with postoperative state. Minimal left pleural effusion. New multifocal right lower lobe infiltrate. Multiple liver metastases some of which are calcified again appreciated.  5/19/22 Path: 1. Omentum, excision: Negative for malignancy. Acutely inflamed exudate consistent with peritonitis 2. Portion of terminal ileum, cecum with adherent sigmoid, total colectomy: Poorly differentiated infiltrating adenocarcinoma measuring 9.0 cm. Adenocarcinoma infiltrates through the bowel wall and through the visceral peritoneum and infiltrates the adherent sigmoid colon 3 of 17 lymph nodes are positive for metastatic carcinoma. Lymphovascular space invasion is identified. Perineural invasion is identified. The surgical margins are negative. Appendix with serosal tumor implants and acute inflammation Peritonitis 3. Liver, biopsy: Metastatic adenocarcinoma 4. Retroperitoneal biopsy: Adenocarcinoma with necrosis No loss of nuclear expression of MMR proteins (MLH1, MSH2, MSH6, and PMS2).   These results show low probability of microsatellite instability-high (MSI-H). Synoptic Summary COLON AND RECTUM: Resection, Including Transanal Disk Excision of Rectal Neoplasms Procedure: Total abdominal colectomy Macroscopic Evaluation of Mesorectum: Not applicable Tumor Site: Cecum Histologic Type:  Adenocarcinoma Histologic Grade: G3, poorly differentiated Tumor Size: Greatest dimension (Centimeters)  9.0 cm Multiple Primary Sites: Not applicable Tumor Extent: Directly invades or adheres to adjacent structure(s) Sigmoid colon Macroscopic Tumor Perforation: Present Lymphovascular Invasion: Present Perineural Invasion: Present Treatment Effect: No known presurgical therapy Margin Status for Invasive Carcinoma: All margins negative for invasive carcinoma Distance from Invasive Carcinoma to Radial (Circumferential) Margin: 5.0 cm Margin Status for Non-Invasive Tumor: Not applicable Regional Lymph Node Status: Tumor present in regional lymph node(s) Number of Lymph Nodes with Tumor    3 Number of Lymph Nodes Examined: 17 Tumor Deposits: Not identified Distant Site(s) Involved: Liver PATHOLOGIC STAGE CLASSIFICATION TNM Descriptors: Not applicable pT Category: pT4b pN Category: pN1b pM Category: pM1c  5/19/22 CT A/P: Apparent focal mural thickening at the cecum/proximal ascending colon may represent colon cancer. The sigmoid colon appears to be tethered to the cecum and it is focally thick-walled; focal tumor invasion/extension from the cecum to the sigmoid colon cannot be excluded. Dilatation of the small bowel with an apparent transition point in the right lower quadrant abdomen, suggestive of small bowel obstruction. Apparent mild mural thickening of the a dilated right lower quadrant small bowel loop with adjacent mesenteric edema for which associated small bowel ischemia cannot be excluded. Multiple hypodense lesions with calcifications in both lobes of the liver with the largest measuring 5.8 x 6.0 cm in hepatic segment 8. These are suspicious for metastases. Mild ascites.

## 2023-08-16 NOTE — HISTORY OF PRESENT ILLNESS
[de-identified] : The patient was diagnosed with colon cancer in May 2022 at the age of 51. He presented to  ER 5/19/22 with worsening abdominal pain for 4 days. He reports abdominal pain has been ongoing for 4 years. On 5/19/22 he had a CT A/P which showed apparent focal mural thickening at the cecum/proximal ascending colon may represent colon cancer. The sigmoid colon appears to be tethered to the cecum and it is focally thick-walled; focal tumor invasion/extension from the cecum to the sigmoid colon cannot be excluded. Dilatation of the small bowel with an apparent transition point in the right lower quadrant abdomen, suggestive of small bowel obstruction. Apparent mild mural thickening of the a dilated right lower quadrant small bowel loop with adjacent mesenteric edema for which associated small bowel ischemia cannot be excluded. Multiple hypodense lesions with calcifications in both lobes of the liver with the largest measuring 5.8 x 6.0 cm in hepatic segment 8. These are suspicious for metastases. Mild ascites. Also on 5/19/22 he underwent exploratory laparotomy, total colectomy, end ileostomy, biopsy of liver, and biopsy of retroperitoneum. Pathology showed Omentum, excision: Negative for malignancy. Acutely inflamed exudate consistent with peritonitis. Portion of terminal ileum, cecum with adherent sigmoid, total colectomy: Poorly differentiated infiltrating adenocarcinoma measuring 9.0 cm. Adenocarcinoma infiltrates through the bowel wall and through the visceral peritoneum and infiltrates the adherent sigmoid colon 3 of 17 lymph nodes are positive for metastatic carcinoma. Lymphovascular space invasion is identified. Perineural invasion is identified. The surgical margins are negative. Appendix with serosal tumor implants and acute inflammation. Peritonitis. Liver, biopsy: Metastatic adenocarcinoma. Retroperitoneal biopsy: Adenocarcinoma with necrosis. No loss of nuclear expression of MMR proteins (MLH1, MSH2, MSH6, and PMS2). Staging pT4b pN1b pM1c. On 5/25/22 he had a repeat CT A/P which showed status post total colectomy and ileostomy. Dilated small bowel loops in the left abdomen with associated wall thickening. Both may be postoperative, follow-up is recommended. Moderate amount of ascites new from the prior study. Small amount of free intraperitoneal air likely with postoperative state. Minimal left pleural effusion. New multifocal right lower lobe infiltrate. Multiple liver metastases some of which are calcified again appreciated. [de-identified] : Medical Hx: denies \par  Surgical Hx: denies \par  Family Hx: denies family history of cancer \par  Social Hx: he smoked THC and cigarettes daily for 30 years, quit May 19th, 2022; ETOH had 6 pack a day, quit 4 years ago; was a  (off the [employer]) no longer working; lives with mom and brother.   [de-identified] : Today he is C26 of FOLFOXIRI / Zirabev, planned ongoing. Oxali stopped after cycle 10.  Pt was admitted July 10 - 21, 2023 for abd pain and dehydration. He was found to have a new DVT, an IVF filter was placed. 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 He will remain on lovenox lifelong, 90mg SQ BID. He continues with loose stools from his ostomy, he tries to eat binding foods. Since his ostomy revision, his stoma continues to protrude at times, about 3" today, mostly with walking around. Reports as much as 8", no pain. He weight is down 5 lbs since hospital admission. His fatigue remains mild, able to perform household chores and drive his brother to work. His pain is well controlled on fentanyl, oxy and lidocaine patches. He continues to smoke AMA, he also smokes marijuana that is not medically prescribed.

## 2023-08-16 NOTE — PHYSICAL EXAM
[Restricted in physically strenuous activity but ambulatory and able to carry out work of a light or sedentary nature] : Status 1- Restricted in physically strenuous activity but ambulatory and able to carry out work of a light or sedentary nature, e.g., light house work, office work [Normal] : affect appropriate [de-identified] : wt loss 5 lbs since last visit [de-identified] : ileotomy in place, ostomy dressing C/D/I [de-identified] : well healed vertical abdominal incision; rt pt accessed and dressing C/D/I

## 2023-08-16 NOTE — REVIEW OF SYSTEMS
[Recent Change In Weight] : ~T recent weight change [Abdominal Pain] : abdominal pain [Negative] : Allergic/Immunologic [Chest Pain] : no chest pain [Shortness Of Breath] : no shortness of breath [Constipation] : no constipation [Diarrhea: Grade 2 - Increase of 4 - 6 stools per day over baseline; moderate increase in ostomy output compared to baseline] : Diarrhea: Grade 2 - Increase of 4 - 6 stools per day over baseline; moderate increase in ostomy output compared to baseline [Joint Pain] : no joint pain [Skin Rash] : no skin rash [FreeTextEntry2] : wt loss 5 lbs  [FreeTextEntry7] : ileotomy in place, revision noted, functioning normally; pain at surgical site [de-identified] : ostomy

## 2023-08-18 ENCOUNTER — APPOINTMENT (OUTPATIENT)
Dept: INFUSION THERAPY | Facility: CLINIC | Age: 53
End: 2023-08-18

## 2023-08-18 PROBLEM — C78.7 SECONDARY MALIGNANT NEOPLASM OF LIVER AND INTRAHEPATIC BILE DUCT: Chronic | Status: ACTIVE | Noted: 2023-07-10

## 2023-08-18 PROBLEM — I26.99 OTHER PULMONARY EMBOLISM WITHOUT ACUTE COR PULMONALE: Chronic | Status: ACTIVE | Noted: 2023-07-10

## 2023-08-23 NOTE — ASU DISCHARGE PLAN (ADULT/PEDIATRIC) - ASU DC REMOVE DRESSINGFT
Also see below message.    Fwd to leah Taveras PA-C for review. Please advise.     Cannot refill gabapentin NOT on protocol.     Last refill was 6/26/23 for 90 x 1.      Last office visit was 6/26/23 with Dr. Moore    and next is n/a.    Has canceled/no showed 0 times since last seen.    74

## 2023-08-24 ENCOUNTER — EMERGENCY (EMERGENCY)
Facility: HOSPITAL | Age: 53
LOS: 0 days | Discharge: HOME | End: 2023-08-25
Attending: STUDENT IN AN ORGANIZED HEALTH CARE EDUCATION/TRAINING PROGRAM
Payer: MEDICAID

## 2023-08-24 VITALS
OXYGEN SATURATION: 98 % | DIASTOLIC BLOOD PRESSURE: 87 MMHG | RESPIRATION RATE: 18 BRPM | HEART RATE: 108 BPM | SYSTOLIC BLOOD PRESSURE: 129 MMHG

## 2023-08-24 VITALS
DIASTOLIC BLOOD PRESSURE: 69 MMHG | OXYGEN SATURATION: 98 % | RESPIRATION RATE: 18 BRPM | WEIGHT: 184.97 LBS | HEIGHT: 72 IN | TEMPERATURE: 98 F | SYSTOLIC BLOOD PRESSURE: 124 MMHG | HEART RATE: 117 BPM

## 2023-08-24 DIAGNOSIS — R10.9 UNSPECIFIED ABDOMINAL PAIN: ICD-10-CM

## 2023-08-24 DIAGNOSIS — Z86.711 PERSONAL HISTORY OF PULMONARY EMBOLISM: ICD-10-CM

## 2023-08-24 DIAGNOSIS — Z91.148 PATIENT'S OTHER NONCOMPLIANCE WITH MEDICATION REGIMEN FOR OTHER REASON: ICD-10-CM

## 2023-08-24 DIAGNOSIS — Z90.49 ACQUIRED ABSENCE OF OTHER SPECIFIED PARTS OF DIGESTIVE TRACT: Chronic | ICD-10-CM

## 2023-08-24 DIAGNOSIS — T40.2X6A UNDERDOSING OF OTHER OPIOIDS, INITIAL ENCOUNTER: ICD-10-CM

## 2023-08-24 DIAGNOSIS — Z85.05 PERSONAL HISTORY OF MALIGNANT NEOPLASM OF LIVER: ICD-10-CM

## 2023-08-24 DIAGNOSIS — Z93.2 ILEOSTOMY STATUS: ICD-10-CM

## 2023-08-24 DIAGNOSIS — I82.411 ACUTE EMBOLISM AND THROMBOSIS OF RIGHT FEMORAL VEIN: ICD-10-CM

## 2023-08-24 DIAGNOSIS — Z85.038 PERSONAL HISTORY OF OTHER MALIGNANT NEOPLASM OF LARGE INTESTINE: ICD-10-CM

## 2023-08-24 DIAGNOSIS — Z98.890 OTHER SPECIFIED POSTPROCEDURAL STATES: Chronic | ICD-10-CM

## 2023-08-24 LAB
ACANTHOCYTES BLD QL SMEAR: SLIGHT — SIGNIFICANT CHANGE UP
ALBUMIN SERPL ELPH-MCNC: 5.1 G/DL — HIGH (ref 3.3–5)
ALP SERPL-CCNC: 284 U/L — HIGH (ref 40–120)
ALT FLD-CCNC: 63 U/L — SIGNIFICANT CHANGE UP (ref 12–78)
ANION GAP SERPL CALC-SCNC: 8 MMOL/L — SIGNIFICANT CHANGE UP (ref 5–17)
ANISOCYTOSIS BLD QL: SLIGHT — SIGNIFICANT CHANGE UP
APPEARANCE UR: CLEAR — SIGNIFICANT CHANGE UP
AST SERPL-CCNC: 25 U/L — SIGNIFICANT CHANGE UP (ref 15–37)
BACTERIA # UR AUTO: ABNORMAL
BASOPHILS # BLD AUTO: 0 K/UL — SIGNIFICANT CHANGE UP (ref 0–0.2)
BASOPHILS NFR BLD AUTO: 0 % — SIGNIFICANT CHANGE UP (ref 0–2)
BILIRUB SERPL-MCNC: 0.5 MG/DL — SIGNIFICANT CHANGE UP (ref 0.2–1.2)
BILIRUB UR-MCNC: NEGATIVE — SIGNIFICANT CHANGE UP
BUN SERPL-MCNC: 26 MG/DL — HIGH (ref 7–23)
CALCIUM SERPL-MCNC: 10.5 MG/DL — HIGH (ref 8.5–10.1)
CHLORIDE SERPL-SCNC: 98 MMOL/L — SIGNIFICANT CHANGE UP (ref 96–108)
CO2 SERPL-SCNC: 25 MMOL/L — SIGNIFICANT CHANGE UP (ref 22–31)
COLOR SPEC: YELLOW — SIGNIFICANT CHANGE UP
CREAT SERPL-MCNC: 1.42 MG/DL — HIGH (ref 0.5–1.3)
DACRYOCYTES BLD QL SMEAR: SLIGHT — SIGNIFICANT CHANGE UP
DIFF PNL FLD: ABNORMAL
EGFR: 60 ML/MIN/1.73M2 — SIGNIFICANT CHANGE UP
EOSINOPHIL # BLD AUTO: 0.39 K/UL — SIGNIFICANT CHANGE UP (ref 0–0.5)
EOSINOPHIL NFR BLD AUTO: 2 % — SIGNIFICANT CHANGE UP (ref 0–6)
EPI CELLS # UR: SIGNIFICANT CHANGE UP
GLUCOSE SERPL-MCNC: 111 MG/DL — HIGH (ref 70–99)
GLUCOSE UR QL: NEGATIVE — SIGNIFICANT CHANGE UP
HCT VFR BLD CALC: 51.9 % — HIGH (ref 39–50)
HGB BLD-MCNC: 17.3 G/DL — HIGH (ref 13–17)
HYPOCHROMIA BLD QL: SLIGHT — SIGNIFICANT CHANGE UP
KETONES UR-MCNC: NEGATIVE — SIGNIFICANT CHANGE UP
LEUKOCYTE ESTERASE UR-ACNC: ABNORMAL
LIDOCAIN IGE QN: 49 U/L — SIGNIFICANT CHANGE UP (ref 13–75)
LYMPHOCYTES # BLD AUTO: 14 % — SIGNIFICANT CHANGE UP (ref 13–44)
LYMPHOCYTES # BLD AUTO: 2.74 K/UL — SIGNIFICANT CHANGE UP (ref 1–3.3)
MANUAL SMEAR VERIFICATION: SIGNIFICANT CHANGE UP
MCHC RBC-ENTMCNC: 28.7 PG — SIGNIFICANT CHANGE UP (ref 27–34)
MCHC RBC-ENTMCNC: 33.3 GM/DL — SIGNIFICANT CHANGE UP (ref 32–36)
MCV RBC AUTO: 86.2 FL — SIGNIFICANT CHANGE UP (ref 80–100)
MONOCYTES # BLD AUTO: 1.37 K/UL — HIGH (ref 0–0.9)
MONOCYTES NFR BLD AUTO: 7 % — SIGNIFICANT CHANGE UP (ref 2–14)
NEUTROPHILS # BLD AUTO: 14.69 K/UL — HIGH (ref 1.8–7.4)
NEUTROPHILS NFR BLD AUTO: 75 % — SIGNIFICANT CHANGE UP (ref 43–77)
NITRITE UR-MCNC: NEGATIVE — SIGNIFICANT CHANGE UP
NRBC # BLD: 0 /100 — SIGNIFICANT CHANGE UP (ref 0–0)
NRBC # BLD: SIGNIFICANT CHANGE UP /100 WBCS (ref 0–0)
OVALOCYTES BLD QL SMEAR: SLIGHT — SIGNIFICANT CHANGE UP
PH UR: 5 — SIGNIFICANT CHANGE UP (ref 5–8)
PLAT MORPH BLD: NORMAL — SIGNIFICANT CHANGE UP
PLATELET # BLD AUTO: 233 K/UL — SIGNIFICANT CHANGE UP (ref 150–400)
POIKILOCYTOSIS BLD QL AUTO: SLIGHT — SIGNIFICANT CHANGE UP
POTASSIUM SERPL-MCNC: 4.3 MMOL/L — SIGNIFICANT CHANGE UP (ref 3.5–5.3)
POTASSIUM SERPL-SCNC: 4.3 MMOL/L — SIGNIFICANT CHANGE UP (ref 3.5–5.3)
PROT SERPL-MCNC: 10.8 GM/DL — HIGH (ref 6–8.3)
PROT UR-MCNC: 30 MG/DL
RBC # BLD: 6.02 M/UL — HIGH (ref 4.2–5.8)
RBC # FLD: 17.9 % — HIGH (ref 10.3–14.5)
RBC BLD AUTO: ABNORMAL
RBC CASTS # UR COMP ASSIST: ABNORMAL /HPF (ref 0–4)
SODIUM SERPL-SCNC: 131 MMOL/L — LOW (ref 135–145)
SP GR SPEC: 1.01 — SIGNIFICANT CHANGE UP (ref 1.01–1.02)
UROBILINOGEN FLD QL: NEGATIVE — SIGNIFICANT CHANGE UP
VARIANT LYMPHS # BLD: 2 % — SIGNIFICANT CHANGE UP (ref 0–6)
WBC # BLD: 19.59 K/UL — HIGH (ref 3.8–10.5)
WBC # FLD AUTO: 19.59 K/UL — HIGH (ref 3.8–10.5)
WBC UR QL: ABNORMAL /HPF (ref 0–5)

## 2023-08-24 PROCEDURE — 80053 COMPREHEN METABOLIC PANEL: CPT

## 2023-08-24 PROCEDURE — 96374 THER/PROPH/DIAG INJ IV PUSH: CPT

## 2023-08-24 PROCEDURE — 87086 URINE CULTURE/COLONY COUNT: CPT

## 2023-08-24 PROCEDURE — 83690 ASSAY OF LIPASE: CPT

## 2023-08-24 PROCEDURE — 93970 EXTREMITY STUDY: CPT | Mod: 26

## 2023-08-24 PROCEDURE — 87077 CULTURE AEROBIC IDENTIFY: CPT

## 2023-08-24 PROCEDURE — 93970 EXTREMITY STUDY: CPT

## 2023-08-24 PROCEDURE — 99285 EMERGENCY DEPT VISIT HI MDM: CPT

## 2023-08-24 PROCEDURE — 85025 COMPLETE CBC W/AUTO DIFF WBC: CPT

## 2023-08-24 PROCEDURE — 99285 EMERGENCY DEPT VISIT HI MDM: CPT | Mod: 25

## 2023-08-24 PROCEDURE — 36415 COLL VENOUS BLD VENIPUNCTURE: CPT

## 2023-08-24 PROCEDURE — 74177 CT ABD & PELVIS W/CONTRAST: CPT | Mod: MA

## 2023-08-24 PROCEDURE — 81001 URINALYSIS AUTO W/SCOPE: CPT

## 2023-08-24 PROCEDURE — 74177 CT ABD & PELVIS W/CONTRAST: CPT | Mod: 26,MA

## 2023-08-24 RX ORDER — OXYCODONE AND ACETAMINOPHEN 5; 325 MG/1; MG/1
1 TABLET ORAL
Qty: 3 | Refills: 0
Start: 2023-08-24 | End: 2023-08-24

## 2023-08-24 RX ORDER — MORPHINE SULFATE 50 MG/1
4 CAPSULE, EXTENDED RELEASE ORAL ONCE
Refills: 0 | Status: DISCONTINUED | OUTPATIENT
Start: 2023-08-24 | End: 2023-08-24

## 2023-08-24 RX ORDER — SODIUM CHLORIDE 9 MG/ML
1000 INJECTION INTRAMUSCULAR; INTRAVENOUS; SUBCUTANEOUS ONCE
Refills: 0 | Status: COMPLETED | OUTPATIENT
Start: 2023-08-24 | End: 2023-08-24

## 2023-08-24 RX ADMIN — SODIUM CHLORIDE 1000 MILLILITER(S): 9 INJECTION INTRAMUSCULAR; INTRAVENOUS; SUBCUTANEOUS at 18:11

## 2023-08-24 RX ADMIN — MORPHINE SULFATE 4 MILLIGRAM(S): 50 CAPSULE, EXTENDED RELEASE ORAL at 18:57

## 2023-08-24 NOTE — ED PROVIDER NOTE - CLINICAL SUMMARY MEDICAL DECISION MAKING FREE TEXT BOX
51 yo male with a PMHx of PE, metastasis to liver, s/p ileostomy, and colon CA presents to the ED c/o abdominal pain and cramping throughout the whole body. Will r/o ischemic colitis vs diverticulitis vs chronic pain syndrome vs UTI.   Plan to do labs, ekg, imaging. meds and reassess.

## 2023-08-24 NOTE — ED PROVIDER NOTE - NSFOLLOWUPINSTRUCTIONS_ED_ALL_ED_FT
Patient advised to take meds as prescribed and follow up with PMD/GI for follow up in 3-4 days.  patient understands and agrees with plan.  Return to ER if symptoms worsens or develop new symptoms.       Abdominal Pain, Adult  Pain in the abdomen (abdominal pain) can be caused by many things. Often, abdominal pain is not serious and it gets better with no treatment or by being treated at home. However, sometimes abdominal pain is serious.    Your health care provider will ask questions about your medical history and do a physical exam to try to determine the cause of your abdominal pain.    Follow these instructions at home:  Medicines    Take over-the-counter and prescription medicines only as told by your health care provider.  Do not take a laxative unless told by your health care provider.  General instructions      Watch your condition for any changes.  Drink enough fluid to keep your urine pale yellow.  Keep all follow-up visits as told by your health care provider. This is important.  Contact a health care provider if:  Your abdominal pain changes or gets worse.  You are not hungry or you lose weight without trying.  You are constipated or have diarrhea for more than 2–3 days.  You have pain when you urinate or have a bowel movement.  Your abdominal pain wakes you up at night.  Your pain gets worse with meals, after eating, or with certain foods.  You are vomiting and cannot keep anything down.  You have a fever.  You have blood in your urine.  Get help right away if:  Your pain does not go away as soon as your health care provider told you to expect.  You cannot stop vomiting.  Your pain is only in areas of the abdomen, such as the right side or the left lower portion of the abdomen. Pain on the right side could be caused by appendicitis.  You have bloody or black stools, or stools that look like tar.  You have severe pain, cramping, or bloating in your abdomen.  You have signs of dehydration, such as:  Dark urine, very little urine, or no urine.  Cracked lips.  Dry mouth.  Sunken eyes.  Sleepiness.  Weakness.  You have trouble breathing or chest pain.  Summary  Often, abdominal pain is not serious and it gets better with no treatment or by being treated at home. However, sometimes abdominal pain is serious.  Watch your condition for any changes.  Take over-the-counter and prescription medicines only as told by your health care provider.  Contact a health care provider if your abdominal pain changes or gets worse.  Get help right away if you have severe pain, cramping, or bloating in your abdomen.  This information is not intended to replace advice given to you by your health care provider. Make sure you discuss any questions you have with your health care provider.

## 2023-08-24 NOTE — ED PROVIDER NOTE - NS ED ROS FT
General: No fever, no nausea, no vomiting  HEENT: No loc, no ha, no dizziness  Respiratory: no trouble breathing  Cardiovascular: No chest pain  GI: +abdominal pain, no diarrhea  : No urinary complaints  Endocrine: no generalized weakness  Neurological: No weakness, numbness  MSK: no recent trauma

## 2023-08-24 NOTE — ED ADULT NURSE NOTE - NSFALLHARMRISKINTERV_ED_ALL_ED
Assistance OOB with selected safe patient handling equipment if applicable/Assistance with ambulation/Communicate risk of Fall with Harm to all staff, patient, and family/Encourage patient to sit up slowly, dangle for a short time, stand at bedside before walking/Monitor gait and stability/Orthostatic vital signs/Provide patient with walking aids/Provide visual cue: red socks, yellow wristband, yellow gown, etc/Reinforce activity limits and safety measures with patient and family/Bed in lowest position, wheels locked, appropriate side rails in place/Call bell, personal items and telephone in reach/Instruct patient to call for assistance before getting out of bed/chair/stretcher/Non-slip footwear applied when patient is off stretcher/Yolyn to call system/Physically safe environment - no spills, clutter or unnecessary equipment/Purposeful Proactive Rounding/Room/bathroom lighting operational, light cord in reach

## 2023-08-24 NOTE — ED PROVIDER NOTE - PATIENT PORTAL LINK FT
You can access the FollowMyHealth Patient Portal offered by [facility] by registering at the following website: [email or web address]. By joining [facility]’s FollowMyHealth portal, you will also be able to view your health information using other applications (apps) compatible with our system.

## 2023-08-24 NOTE — ED ADULT TRIAGE NOTE - CHIEF COMPLAINT QUOTE
patient presents to ED with abdominal pain and dehydration, HX colon cancer, ostomy bag right abdomin. recently seen in  for same issue

## 2023-08-24 NOTE — ED PROVIDER NOTE - PROGRESS NOTE DETAILS
Patient's ultrasound with DVT in the right femoral vein unchanged from the prior.  Patient is on Lovenox.  States he takes it every day.  CT with no acute abdominal pathology.  Pain well controlled after pain medication.  Patient tolerating p.o. well.  Will discharge with PMD follow-up.

## 2023-08-24 NOTE — ED PROVIDER NOTE - PHYSICAL EXAMINATION
General: Patient in no acute distress, AAOX3.   HENMT: NC/AT, no nasal congestion, MMM  Neck: supple  CVS: regular rate and rhythm, no murmur  Resp: Good air entry bilaterally, No wheeze/rhonchi.  Abd: Soft, non distended, +bowel sounds, No guarding, rebound tenderness, watery stool in colostomy bag. +diffuse tenderness  Ext: FROM in all ext, 2+ pulses throughout, cap refill<2 sec.  BACK: no midline tenderness, no stepoffs, no CVA ttp  NEURO: no focal deficit, gross motor and sensory intact throughout, gait stable.

## 2023-08-24 NOTE — ED ADULT NURSE NOTE - OBJECTIVE STATEMENT
Pt BIBEMS from home with abdominal pain, pt also c/o dizzuniness, cramping since esterday, pt rpoerts that he feels dehudtrated. Pt last ate scrambled eggs at 7 AM, pt treprts thathe tolerated it ok. RUQ colostomy bag noted, liquid brown stool noted, hx of colon ca, Denies nausea and vomioting. Pt BIBEMS from home with abdominal pain, pt also c/o dizziness and cramping since yesterday, pt reports that he feels dehydrated. Pt last ate scrambled eggs at 7 AM, pt reprts that he tolerated the food ok. RUQ colostomy bag present, liquid brown stool noted, hx of colon ca, Denies nausea and vomiting.

## 2023-08-24 NOTE — ED ADULT NURSE NOTE - NS PRO AD PATIENT TYPE
Health Care Proxy (HCP) Closure 3 Information: This tab is for additional flaps and grafts above and beyond our usual structured repairs.  Please note if you enter information here it will not currently bill and you will need to add the billing information manually.

## 2023-08-24 NOTE — ED PROVIDER NOTE - OBJECTIVE STATEMENT
53 yo male with a PMHx of PE, metastasis to liver, s/p ileostomy, and colon CA presents to the ED c/o abdominal pain and cramping throughout the whole body. Pt has an ostomy bag in the right abdomen. Pt was recently seen in  a few days ago for the same symptoms. Pt reports running out of oxycodone 2 days ago. Pt reports taking 40mg of oxycodone daily to manage pain.  is pain management MD. Pt reports the pain started when he ran out of his medication. Pt also reports using Fentanyl patches to manage his pain. Patches last 3 days and pt gets 10 patches per month. Pt denies nausea, vomiting, fever, and chills.

## 2023-08-24 NOTE — ED ADULT TRIAGE NOTE - ARRIVAL FROM
Do not drive or operate machinery/Showering allowed/Walking-Outdoors allowed/Walking-Indoors allowed/Do not make important decisions/Stairs allowed/Return to Work/School allowed/Sex allowed/No Heavy lifting/straining Home

## 2023-08-25 ENCOUNTER — INPATIENT (INPATIENT)
Facility: HOSPITAL | Age: 53
LOS: 4 days | Discharge: HOME | DRG: 249 | End: 2023-08-30
Attending: FAMILY MEDICINE | Admitting: HOSPITALIST
Payer: MEDICAID

## 2023-08-25 ENCOUNTER — EMERGENCY (EMERGENCY)
Facility: HOSPITAL | Age: 53
LOS: 0 days | Discharge: HOME | End: 2023-08-25
Attending: EMERGENCY MEDICINE
Payer: MEDICAID

## 2023-08-25 VITALS
HEART RATE: 117 BPM | SYSTOLIC BLOOD PRESSURE: 114 MMHG | RESPIRATION RATE: 22 BRPM | TEMPERATURE: 98 F | WEIGHT: 184.97 LBS | HEIGHT: 72 IN | OXYGEN SATURATION: 96 % | DIASTOLIC BLOOD PRESSURE: 91 MMHG

## 2023-08-25 VITALS
TEMPERATURE: 98 F | SYSTOLIC BLOOD PRESSURE: 128 MMHG | OXYGEN SATURATION: 99 % | RESPIRATION RATE: 18 BRPM | HEART RATE: 92 BPM | DIASTOLIC BLOOD PRESSURE: 96 MMHG

## 2023-08-25 VITALS — HEIGHT: 72 IN | WEIGHT: 184.97 LBS

## 2023-08-25 DIAGNOSIS — R11.2 NAUSEA WITH VOMITING, UNSPECIFIED: ICD-10-CM

## 2023-08-25 DIAGNOSIS — Z90.49 ACQUIRED ABSENCE OF OTHER SPECIFIED PARTS OF DIGESTIVE TRACT: Chronic | ICD-10-CM

## 2023-08-25 DIAGNOSIS — Z85.05 PERSONAL HISTORY OF MALIGNANT NEOPLASM OF LIVER: ICD-10-CM

## 2023-08-25 DIAGNOSIS — Z86.711 PERSONAL HISTORY OF PULMONARY EMBOLISM: ICD-10-CM

## 2023-08-25 DIAGNOSIS — Z98.890 OTHER SPECIFIED POSTPROCEDURAL STATES: Chronic | ICD-10-CM

## 2023-08-25 DIAGNOSIS — R10.9 UNSPECIFIED ABDOMINAL PAIN: ICD-10-CM

## 2023-08-25 DIAGNOSIS — Z85.038 PERSONAL HISTORY OF OTHER MALIGNANT NEOPLASM OF LARGE INTESTINE: ICD-10-CM

## 2023-08-25 LAB
ALBUMIN SERPL ELPH-MCNC: 5 G/DL — SIGNIFICANT CHANGE UP (ref 3.3–5)
ALP SERPL-CCNC: 308 U/L — HIGH (ref 40–120)
ALT FLD-CCNC: 109 U/L — HIGH (ref 12–78)
ANION GAP SERPL CALC-SCNC: 9 MMOL/L — SIGNIFICANT CHANGE UP (ref 5–17)
AST SERPL-CCNC: 52 U/L — HIGH (ref 15–37)
BASOPHILS # BLD AUTO: 0 K/UL — SIGNIFICANT CHANGE UP (ref 0–0.2)
BASOPHILS NFR BLD AUTO: 0 % — SIGNIFICANT CHANGE UP (ref 0–2)
BILIRUB SERPL-MCNC: 0.8 MG/DL — SIGNIFICANT CHANGE UP (ref 0.2–1.2)
BUN SERPL-MCNC: 29 MG/DL — HIGH (ref 7–23)
CALCIUM SERPL-MCNC: 10 MG/DL — SIGNIFICANT CHANGE UP (ref 8.5–10.1)
CHLORIDE SERPL-SCNC: 97 MMOL/L — SIGNIFICANT CHANGE UP (ref 96–108)
CO2 SERPL-SCNC: 22 MMOL/L — SIGNIFICANT CHANGE UP (ref 22–31)
CREAT SERPL-MCNC: 1.56 MG/DL — HIGH (ref 0.5–1.3)
EGFR: 54 ML/MIN/1.73M2 — LOW
EOSINOPHIL # BLD AUTO: 0.22 K/UL — SIGNIFICANT CHANGE UP (ref 0–0.5)
EOSINOPHIL NFR BLD AUTO: 1 % — SIGNIFICANT CHANGE UP (ref 0–6)
GLUCOSE SERPL-MCNC: 100 MG/DL — HIGH (ref 70–99)
HCT VFR BLD CALC: 51.8 % — HIGH (ref 39–50)
HGB BLD-MCNC: 17 G/DL — SIGNIFICANT CHANGE UP (ref 13–17)
LACTATE SERPL-SCNC: 2.2 MMOL/L — HIGH (ref 0.7–2)
LACTATE SERPL-SCNC: 3.5 MMOL/L — HIGH (ref 0.7–2)
LIDOCAIN IGE QN: 40 U/L — SIGNIFICANT CHANGE UP (ref 13–75)
LYMPHOCYTES # BLD AUTO: 1.29 K/UL — SIGNIFICANT CHANGE UP (ref 1–3.3)
LYMPHOCYTES # BLD AUTO: 6 % — LOW (ref 13–44)
MANUAL SMEAR VERIFICATION: SIGNIFICANT CHANGE UP
MCHC RBC-ENTMCNC: 28.2 PG — SIGNIFICANT CHANGE UP (ref 27–34)
MCHC RBC-ENTMCNC: 32.8 GM/DL — SIGNIFICANT CHANGE UP (ref 32–36)
MCV RBC AUTO: 85.9 FL — SIGNIFICANT CHANGE UP (ref 80–100)
MONOCYTES # BLD AUTO: 2.16 K/UL — HIGH (ref 0–0.9)
MONOCYTES NFR BLD AUTO: 10 % — SIGNIFICANT CHANGE UP (ref 2–14)
NEUTROPHILS # BLD AUTO: 17.89 K/UL — HIGH (ref 1.8–7.4)
NEUTROPHILS NFR BLD AUTO: 82 % — HIGH (ref 43–77)
NEUTS BAND # BLD: 1 % — SIGNIFICANT CHANGE UP (ref 0–8)
NRBC # BLD: 0 /100 — SIGNIFICANT CHANGE UP (ref 0–0)
NRBC # BLD: SIGNIFICANT CHANGE UP /100 WBCS (ref 0–0)
PLAT MORPH BLD: NORMAL — SIGNIFICANT CHANGE UP
PLATELET # BLD AUTO: 228 K/UL — SIGNIFICANT CHANGE UP (ref 150–400)
POTASSIUM SERPL-MCNC: 4.7 MMOL/L — SIGNIFICANT CHANGE UP (ref 3.5–5.3)
POTASSIUM SERPL-SCNC: 4.7 MMOL/L — SIGNIFICANT CHANGE UP (ref 3.5–5.3)
PROT SERPL-MCNC: 10.4 GM/DL — HIGH (ref 6–8.3)
RBC # BLD: 6.03 M/UL — HIGH (ref 4.2–5.8)
RBC # FLD: 17.9 % — HIGH (ref 10.3–14.5)
RBC BLD AUTO: NORMAL — SIGNIFICANT CHANGE UP
SODIUM SERPL-SCNC: 128 MMOL/L — LOW (ref 135–145)
WBC # BLD: 21.55 K/UL — HIGH (ref 3.8–10.5)
WBC # FLD AUTO: 21.55 K/UL — HIGH (ref 3.8–10.5)

## 2023-08-25 PROCEDURE — 99221 1ST HOSP IP/OBS SF/LOW 40: CPT

## 2023-08-25 PROCEDURE — 74177 CT ABD & PELVIS W/CONTRAST: CPT | Mod: 26,MA

## 2023-08-25 PROCEDURE — 87086 URINE CULTURE/COLONY COUNT: CPT

## 2023-08-25 PROCEDURE — 93926 LOWER EXTREMITY STUDY: CPT | Mod: 26,RT

## 2023-08-25 PROCEDURE — 99284 EMERGENCY DEPT VISIT MOD MDM: CPT | Mod: 25

## 2023-08-25 PROCEDURE — 85027 COMPLETE CBC AUTOMATED: CPT

## 2023-08-25 PROCEDURE — 85025 COMPLETE CBC W/AUTO DIFF WBC: CPT

## 2023-08-25 PROCEDURE — 80048 BASIC METABOLIC PNL TOTAL CA: CPT

## 2023-08-25 PROCEDURE — 83605 ASSAY OF LACTIC ACID: CPT

## 2023-08-25 PROCEDURE — 99223 1ST HOSP IP/OBS HIGH 75: CPT

## 2023-08-25 PROCEDURE — 83735 ASSAY OF MAGNESIUM: CPT

## 2023-08-25 PROCEDURE — 93926 LOWER EXTREMITY STUDY: CPT | Mod: RT

## 2023-08-25 PROCEDURE — 87040 BLOOD CULTURE FOR BACTERIA: CPT

## 2023-08-25 PROCEDURE — 81001 URINALYSIS AUTO W/SCOPE: CPT

## 2023-08-25 PROCEDURE — 93971 EXTREMITY STUDY: CPT | Mod: RT

## 2023-08-25 PROCEDURE — 87507 IADNA-DNA/RNA PROBE TQ 12-25: CPT

## 2023-08-25 PROCEDURE — 84100 ASSAY OF PHOSPHORUS: CPT

## 2023-08-25 PROCEDURE — 36415 COLL VENOUS BLD VENIPUNCTURE: CPT

## 2023-08-25 PROCEDURE — 87493 C DIFF AMPLIFIED PROBE: CPT

## 2023-08-25 PROCEDURE — 99285 EMERGENCY DEPT VISIT HI MDM: CPT

## 2023-08-25 PROCEDURE — 80053 COMPREHEN METABOLIC PANEL: CPT

## 2023-08-25 RX ORDER — ONDANSETRON 8 MG/1
4 TABLET, FILM COATED ORAL ONCE
Refills: 0 | Status: COMPLETED | OUTPATIENT
Start: 2023-08-25 | End: 2023-08-25

## 2023-08-25 RX ORDER — OXYCODONE HYDROCHLORIDE 5 MG/1
10 TABLET ORAL EVERY 6 HOURS
Refills: 0 | Status: DISCONTINUED | OUTPATIENT
Start: 2023-08-25 | End: 2023-08-29

## 2023-08-25 RX ORDER — MORPHINE SULFATE 50 MG/1
4 CAPSULE, EXTENDED RELEASE ORAL ONCE
Refills: 0 | Status: DISCONTINUED | OUTPATIENT
Start: 2023-08-25 | End: 2023-08-25

## 2023-08-25 RX ORDER — SODIUM CHLORIDE 9 MG/ML
1000 INJECTION INTRAMUSCULAR; INTRAVENOUS; SUBCUTANEOUS ONCE
Refills: 0 | Status: COMPLETED | OUTPATIENT
Start: 2023-08-25 | End: 2023-08-25

## 2023-08-25 RX ORDER — LANOLIN ALCOHOL/MO/W.PET/CERES
3 CREAM (GRAM) TOPICAL AT BEDTIME
Refills: 0 | Status: DISCONTINUED | OUTPATIENT
Start: 2023-08-25 | End: 2023-08-30

## 2023-08-25 RX ORDER — SODIUM CHLORIDE 9 MG/ML
1000 INJECTION INTRAMUSCULAR; INTRAVENOUS; SUBCUTANEOUS
Refills: 0 | Status: COMPLETED | OUTPATIENT
Start: 2023-08-25 | End: 2023-08-25

## 2023-08-25 RX ORDER — ACETAMINOPHEN 500 MG
650 TABLET ORAL EVERY 6 HOURS
Refills: 0 | Status: DISCONTINUED | OUTPATIENT
Start: 2023-08-25 | End: 2023-08-30

## 2023-08-25 RX ORDER — LOPERAMIDE HCL 2 MG
4 TABLET ORAL
Refills: 0 | Status: DISCONTINUED | OUTPATIENT
Start: 2023-08-25 | End: 2023-08-25

## 2023-08-25 RX ORDER — FENTANYL 25 UG/H
25 PATCH, EXTENDED RELEASE TRANSDERMAL
Qty: 10 | Refills: 0 | Status: ACTIVE | COMMUNITY
Start: 2023-08-25 | End: 1900-01-01

## 2023-08-25 RX ORDER — ONDANSETRON 8 MG/1
4 TABLET, FILM COATED ORAL EVERY 8 HOURS
Refills: 0 | Status: DISCONTINUED | OUTPATIENT
Start: 2023-08-25 | End: 2023-08-30

## 2023-08-25 RX ORDER — ONDANSETRON 8 MG/1
1 TABLET, FILM COATED ORAL
Qty: 15 | Refills: 0
Start: 2023-08-25 | End: 2023-08-29

## 2023-08-25 RX ORDER — OXYCODONE 10 MG/1
10 TABLET ORAL EVERY 6 HOURS
Qty: 120 | Refills: 0 | Status: ACTIVE | COMMUNITY
Start: 2022-06-06 | End: 1900-01-01

## 2023-08-25 RX ORDER — ENOXAPARIN SODIUM 100 MG/ML
90 INJECTION SUBCUTANEOUS EVERY 24 HOURS
Refills: 0 | Status: DISCONTINUED | OUTPATIENT
Start: 2023-08-25 | End: 2023-08-26

## 2023-08-25 RX ADMIN — ONDANSETRON 4 MILLIGRAM(S): 8 TABLET, FILM COATED ORAL at 01:02

## 2023-08-25 RX ADMIN — SODIUM CHLORIDE 100 MILLILITER(S): 9 INJECTION INTRAMUSCULAR; INTRAVENOUS; SUBCUTANEOUS at 20:28

## 2023-08-25 RX ADMIN — SODIUM CHLORIDE 1000 MILLILITER(S): 9 INJECTION INTRAMUSCULAR; INTRAVENOUS; SUBCUTANEOUS at 14:56

## 2023-08-25 RX ADMIN — ONDANSETRON 4 MILLIGRAM(S): 8 TABLET, FILM COATED ORAL at 14:56

## 2023-08-25 RX ADMIN — Medication 650 MILLIGRAM(S): at 20:29

## 2023-08-25 RX ADMIN — OXYCODONE HYDROCHLORIDE 10 MILLIGRAM(S): 5 TABLET ORAL at 23:11

## 2023-08-25 RX ADMIN — MORPHINE SULFATE 4 MILLIGRAM(S): 50 CAPSULE, EXTENDED RELEASE ORAL at 15:26

## 2023-08-25 RX ADMIN — Medication 650 MILLIGRAM(S): at 21:30

## 2023-08-25 RX ADMIN — MORPHINE SULFATE 4 MILLIGRAM(S): 50 CAPSULE, EXTENDED RELEASE ORAL at 14:56

## 2023-08-25 NOTE — ED PROVIDER NOTE - CLINICAL SUMMARY MEDICAL DECISION MAKING FREE TEXT BOX
Ddx: Cancer pain/ ro obstruction, although recent CT makes unlikely/ dehydration  Plan: Cbc, cmp, lipase, lactate, CT abd, likely admit.

## 2023-08-25 NOTE — ED ADULT NURSE NOTE - NSFALLHARMRISKINTERV_ED_ALL_ED

## 2023-08-25 NOTE — ED PROVIDER NOTE - RESPIRATORY NEGATIVE STATEMENT, MLM
Photo Preface (Leave Blank If You Do Not Want): Photographs were obtained today Detail Level: Zone no chest pain, no cough, and no shortness of breath.

## 2023-08-25 NOTE — ED ADULT NURSE REASSESSMENT NOTE - NS ED NURSE REASSESS COMMENT FT1
Pt. received from previous RN in stable condition, no s/sx of resp. distress/pain.  Able to make all needs known,  VSS, safety maintained, and emotional support provided.  Colostomy functioning properly, self care observed, call bell within reach, will continue to monitor.

## 2023-08-25 NOTE — ED PROVIDER NOTE - OBJECTIVE STATEMENT
Patient presents to ED after being recently discharged walked outside had 1 episode of vomiting denies hematemesis.  Denies melena or hematochezia recently had an essentially negative CAT scan patient was just concerned because he vomited once after his discharge 1 hour prior.  No current fever.  No chest pain or shortness of breath

## 2023-08-25 NOTE — ED ADULT NURSE NOTE - OBJECTIVE STATEMENT
Pt. was just D/c'd from the ED. Cam back c/o abdominal pain and vomiting x1. Per pt., he threw up while waiting for the cab outside the ED.

## 2023-08-25 NOTE — H&P ADULT - NSHPPHYSICALEXAM_GEN_ALL_CORE
Vital Signs Last 24 Hrs  T(C): 36.9 (25 Aug 2023 15:58), Max: 36.9 (25 Aug 2023 15:58)  T(F): 98.4 (25 Aug 2023 15:58), Max: 98.4 (25 Aug 2023 15:58)  HR: 120 (25 Aug 2023 15:58) (92 - 123)  BP: 117/87 (25 Aug 2023 15:58) (111/83 - 129/87)  BP(mean): 108 (25 Aug 2023 00:15) (101 - 108)  RR: 20 (25 Aug 2023 15:58) (18 - 22)  SpO2: 95% (25 Aug 2023 15:58) (95% - 99%)    Parameters below as of 25 Aug 2023 15:58  Patient On (Oxygen Delivery Method): room air

## 2023-08-25 NOTE — ED PROVIDER NOTE - PATIENT PORTAL LINK FT
You can access the FollowMyHealth Patient Portal offered by [facility] by registering at the following website: [email or web address]. By joining [facility]’s FollowMyHealth portal, you will also be able to view your health information using other applications (apps) compatible with our system.

## 2023-08-25 NOTE — ED PROVIDER NOTE - CLINICAL SUMMARY MEDICAL DECISION MAKING FREE TEXT BOX
I reviewed all of patient's recent work-up including his CAT scan recent metabolic work-up I do not feel needs another work-up for 1 episode of vomiting given oral antiemetics no vomiting ED return to ED for intractable abdominal pain fevers or any overall worsening vomiting likely secondary to chemo patient agrees to plan of care

## 2023-08-25 NOTE — PATIENT PROFILE ADULT - FALL HARM RISK - RISK INTERVENTIONS

## 2023-08-25 NOTE — ED CLERICAL - NS ED CARE COORDINATION INFORMATION
This patient is eligible for (or currently enrolled in) an outpatient care management program available through [employer]. This program can coordinate outpatient follow up and assist the patient in accessing a variety of outpatient resources.  If discharged from the ED, the patient will be contacted to see if any additional resources are needed.                                                                                    Please call the Nurse Clinical Call Center at [phone] with any questions or for assistance in discharge planning.

## 2023-08-25 NOTE — ED PROVIDER NOTE - OBJECTIVE STATEMENT
Patient is a 52-year-old gentleman with a past medical history of metastatic colon cancer on chemo status post ostomy who presents to the ED because of abdominal pain.  Patient has been having the same abdominal pain constantly for the past several days.  Was seen twice in the ED, CT was negative, and was discharged.  Patient however is unable to tolerate any food or drink, and has been having the chronic pain.  No fever, no cough, no dysuria.  Ostomy is still having normal output.

## 2023-08-25 NOTE — ED PROVIDER NOTE - NSFOLLOWUPINSTRUCTIONS_ED_ALL_ED_FT
Impression: S/P Cataract Extraction by phacoemulsification with IOL placement OS - 1 Day. Encounter for surgical aftercare following surgery on a sense organ  [id number]. Plan: Reviewed post op medications and instructions given to the patient. Do not rub operated eye. Wear eye shield for 1 week when sleeping. No lifting over 20lbs for 1 week. Advised patient to use artificial tears for comfort as needed. Call if any problems develop.
Please return to the emergency department immediately should you feel worse in any way or have any of the following symptoms:    •	especially increased or different pain  •	 fevers  •	persistent vomiting  •	shaking chills    Please return to the emergency department for a recheck in 12 hours so we can re-evaluate you and ensure that you are not developing a problem that would require surgery or hospitalization.      Please follow up with the Doctor listed within the time frame specified. Thank you for coming to the emergency department. We hope you are feeling improved and continue to get better. Have a nice day.

## 2023-08-25 NOTE — PATIENT PROFILE ADULT - NSPROMEDSDISPOSITION_GEN_A_NUR
Pt had x4 25mcg Fentanyl patches in bag. Medication taken away and stored in med room./sent home w/ family/friend

## 2023-08-25 NOTE — ED ADULT NURSE NOTE - CHIEF COMPLAINT QUOTE
Pt came back into the ED and stated "my stomach hurts and I just threw up outside". Pt was just discharged from [facility]. Pt also reports feeling more fatigue. Pt denies any SOB or CP in triage.
no

## 2023-08-25 NOTE — ED ADULT TRIAGE NOTE - CHIEF COMPLAINT QUOTE
Pt arrives to ED complaining of abdominal pain starting yesterday. Pt seen d/c from in ED early this morning. Pt states he has worsening "cramping, pain." Hx colon, liver cancer, colostomy.

## 2023-08-25 NOTE — PHARMACOTHERAPY INTERVENTION NOTE - COMMENTS
Medication history complete, reviewed medications with patient and confirmed with doctor first med hx

## 2023-08-25 NOTE — H&P ADULT - ASSESSMENT
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 The patient states he has been having generalized abdominal pain constantly for the past several days unable to tolerate PO (both solids and liquids), and was evaluated twice in the ED for his symptoms. The patient states his "workup" was reportedly negative and discharged. The patient denies any associated subjective fevers, chills, or recent sick contacts. The patient reports running out of Oxycodone 3 days ago and states is taking 40mg of Oxycodone daily to manage his pain. Dr. Donis is his pain management MD and also reports using Fentanyl patches to manage his pain. 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 The patient states he has been having generalized abdominal pain constantly for the past several days unable to tolerate PO (both solids and liquids), and was evaluated twice in the ED for his symptoms. The patient states his "workup" was reportedly negative and discharged. The patient denies any associated subjective fevers, chills, or recent sick contacts. The patient reports running out of Oxycodone 3 days ago and states is taking 40mg of Oxycodone daily to manage his pain. Dr. Donis is his pain management MD and also reports using Fentanyl patches to manage his pain.     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 The patient states he has been having generalized abdominal pain constantly for the past several days unable to tolerate PO (both solids and liquids), and was evaluated twice in the ED for his symptoms. The patient states his "workup" was reportedly negative and discharged. The patient denies any associated subjective fevers, chills, or recent sick contacts. The patient reports running out of Oxycodone 3 days ago and states is taking 40mg of Oxycodone daily to manage his pain. Dr. Donis is his pain management MD and also reports using Fentanyl patches to manage his pain.    #Abdominal pain  #Leukocytosis  ~admit to <Medicine  ~ileostomy output <1000 cc  ~Labs reviewed WBC 21.55  ~LA 3.5>2.2  ~f/u PAN C+S   ~f/u c. diff PCR  ~f/u w/ Colorectal Surgery  ~f/u ID consultation    #WARREN  ~Cr 1.56  ~s/p 2 liter bolus in ED  ~strict I/Os  ~IV hydration    #Hx PE/DVT  ~cont. LMWH sq q12h

## 2023-08-25 NOTE — ED ADULT TRIAGE NOTE - CHIEF COMPLAINT QUOTE
Pt came back into the ED and stated "my stomach hurts and I just threw up outside". Pt was just discharged from [facility]. Pt also reports feeling more fatigue. Pt denies any SOB or CP in triage.

## 2023-08-25 NOTE — H&P ADULT - HISTORY OF PRESENT ILLNESS
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 The patient states he has been having generalized abdominal pain constantly for the past several days unable to tolerate PO (both solids and liquids), and was evaluated twice in the ED for his symptoms. The patient states his "workup" was reportedly negative and discharged. The patient denies any associated subjective fevers, chills, or recent sick contacts. The patient reports running out of Oxycodone 3 days ago and states is taking 40mg of Oxycodone daily to manage his pain. Dr. Donis is his pain management MD and also reports using Fentanyl patches to manage his pain. 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 The patient states he has been having generalized abdominal pain constantly for the past several days unable to tolerate PO (both solids and liquids), and was evaluated twice in the ED for his symptoms. The patient states his "workup" was reportedly negative and discharged. The patient denies any associated subjective fevers, chills, or recent sick contacts. The patient reports running out of Oxycodone 3 days ago and states is taking 40mg of Oxycodone daily to manage his pain.  is his pain management MD and also reports using Fentanyl patches to manage his pain.  Vital signs in triage => /91, /min, RR 22/min, TMax 97.9'F, SpO2 96%. Labs => WBC 21.55, Hgb/Hct 17/51.8, Na 128, BUN/Cr 29/1.56, TPro 10.4, , AST/ALT 52/109, LA 3.5 -> 2.2. CT ABD/Pel w/ IV Contrast => Both of contrast between the right common femoral artery and vein which is enhancing similar to the artery. A pseudoaneurysm cannot be excluded. This was present on the prior study. Additionally, I cannot assess the patency of the right common femoral vein. Right lower extremity ultrasound of the inguinal region is recommended. Grossly stable liver metastases. Postsurgical changes. US Venous Duplec B/L Lower Extremities => Nonocclusive thrombus involving the right common femoral vein, similar to prior. In the ED the patient was given Ondansetron 4mg IVP x 1, Morphine 4mg IVP x 1, and NS x 1L.

## 2023-08-25 NOTE — H&P ADULT - NSHPSOCIALHISTORY_GEN_ALL_CORE
Smoked THC and cigarettes daily for 30 years, quit May 19th, 2022;   ETOH had 6 pack a day, quit 4 years ago;   Was a  (off the [employer]) no longer working;   Lives with mom and brother.

## 2023-08-26 LAB
ALBUMIN SERPL ELPH-MCNC: 3.9 G/DL — SIGNIFICANT CHANGE UP (ref 3.3–5)
ALP SERPL-CCNC: 218 U/L — HIGH (ref 40–120)
ALT FLD-CCNC: 101 U/L — HIGH (ref 12–78)
ANION GAP SERPL CALC-SCNC: 3 MMOL/L — LOW (ref 5–17)
APPEARANCE UR: CLEAR — SIGNIFICANT CHANGE UP
AST SERPL-CCNC: 36 U/L — SIGNIFICANT CHANGE UP (ref 15–37)
BACTERIA # UR AUTO: ABNORMAL
BASOPHILS # BLD AUTO: 0.06 K/UL — SIGNIFICANT CHANGE UP (ref 0–0.2)
BASOPHILS NFR BLD AUTO: 0.4 % — SIGNIFICANT CHANGE UP (ref 0–2)
BILIRUB SERPL-MCNC: 0.5 MG/DL — SIGNIFICANT CHANGE UP (ref 0.2–1.2)
BILIRUB UR-MCNC: NEGATIVE — SIGNIFICANT CHANGE UP
BUN SERPL-MCNC: 25 MG/DL — HIGH (ref 7–23)
C DIFF BY PCR RESULT: SIGNIFICANT CHANGE UP
CALCIUM SERPL-MCNC: 8.9 MG/DL — SIGNIFICANT CHANGE UP (ref 8.5–10.1)
CHLORIDE SERPL-SCNC: 102 MMOL/L — SIGNIFICANT CHANGE UP (ref 96–108)
CO2 SERPL-SCNC: 25 MMOL/L — SIGNIFICANT CHANGE UP (ref 22–31)
COLOR SPEC: YELLOW — SIGNIFICANT CHANGE UP
COMMENT - URINE: SIGNIFICANT CHANGE UP
CREAT SERPL-MCNC: 1.2 MG/DL — SIGNIFICANT CHANGE UP (ref 0.5–1.3)
CULTURE RESULTS: SIGNIFICANT CHANGE UP
DIFF PNL FLD: ABNORMAL
EGFR: 74 ML/MIN/1.73M2 — SIGNIFICANT CHANGE UP
EOSINOPHIL # BLD AUTO: 0.29 K/UL — SIGNIFICANT CHANGE UP (ref 0–0.5)
EOSINOPHIL NFR BLD AUTO: 2 % — SIGNIFICANT CHANGE UP (ref 0–6)
EPI CELLS # UR: SIGNIFICANT CHANGE UP
GI PCR PANEL: DETECTED
GLUCOSE SERPL-MCNC: 112 MG/DL — HIGH (ref 70–99)
GLUCOSE UR QL: NEGATIVE — SIGNIFICANT CHANGE UP
GRAN CASTS # UR COMP ASSIST: NEGATIVE /LPF — SIGNIFICANT CHANGE UP
HCT VFR BLD CALC: 44.2 % — SIGNIFICANT CHANGE UP (ref 39–50)
HGB BLD-MCNC: 14.5 G/DL — SIGNIFICANT CHANGE UP (ref 13–17)
HYALINE CASTS # UR AUTO: NEGATIVE /LPF — SIGNIFICANT CHANGE UP
IMM GRANULOCYTES NFR BLD AUTO: 0.7 % — SIGNIFICANT CHANGE UP (ref 0–0.9)
KETONES UR-MCNC: ABNORMAL
LEUKOCYTE ESTERASE UR-ACNC: ABNORMAL
LYMPHOCYTES # BLD AUTO: 15.8 % — SIGNIFICANT CHANGE UP (ref 13–44)
LYMPHOCYTES # BLD AUTO: 2.33 K/UL — SIGNIFICANT CHANGE UP (ref 1–3.3)
MCHC RBC-ENTMCNC: 28.4 PG — SIGNIFICANT CHANGE UP (ref 27–34)
MCHC RBC-ENTMCNC: 32.8 GM/DL — SIGNIFICANT CHANGE UP (ref 32–36)
MCV RBC AUTO: 86.7 FL — SIGNIFICANT CHANGE UP (ref 80–100)
MONOCYTES # BLD AUTO: 1.66 K/UL — HIGH (ref 0–0.9)
MONOCYTES NFR BLD AUTO: 11.2 % — SIGNIFICANT CHANGE UP (ref 2–14)
NEUTROPHILS # BLD AUTO: 10.33 K/UL — HIGH (ref 1.8–7.4)
NEUTROPHILS NFR BLD AUTO: 69.9 % — SIGNIFICANT CHANGE UP (ref 43–77)
NITRITE UR-MCNC: NEGATIVE — SIGNIFICANT CHANGE UP
NOROVIRUS GI+II RNA STL QL NAA+NON-PROBE: DETECTED
PH UR: 5 — SIGNIFICANT CHANGE UP (ref 5–8)
PLATELET # BLD AUTO: 171 K/UL — SIGNIFICANT CHANGE UP (ref 150–400)
POTASSIUM SERPL-MCNC: 4.7 MMOL/L — SIGNIFICANT CHANGE UP (ref 3.5–5.3)
POTASSIUM SERPL-SCNC: 4.7 MMOL/L — SIGNIFICANT CHANGE UP (ref 3.5–5.3)
PROT SERPL-MCNC: 8.3 GM/DL — SIGNIFICANT CHANGE UP (ref 6–8.3)
PROT UR-MCNC: 30 MG/DL
RBC # BLD: 5.1 M/UL — SIGNIFICANT CHANGE UP (ref 4.2–5.8)
RBC # FLD: 17 % — HIGH (ref 10.3–14.5)
RBC CASTS # UR COMP ASSIST: SIGNIFICANT CHANGE UP /HPF (ref 0–4)
SODIUM SERPL-SCNC: 130 MMOL/L — LOW (ref 135–145)
SP GR SPEC: 1.01 — SIGNIFICANT CHANGE UP (ref 1.01–1.02)
SPECIMEN SOURCE: SIGNIFICANT CHANGE UP
UROBILINOGEN FLD QL: NEGATIVE — SIGNIFICANT CHANGE UP
WBC # BLD: 14.78 K/UL — HIGH (ref 3.8–10.5)
WBC # FLD AUTO: 14.78 K/UL — HIGH (ref 3.8–10.5)
WBC UR QL: ABNORMAL /HPF (ref 0–5)

## 2023-08-26 PROCEDURE — 99223 1ST HOSP IP/OBS HIGH 75: CPT

## 2023-08-26 PROCEDURE — 99222 1ST HOSP IP/OBS MODERATE 55: CPT

## 2023-08-26 PROCEDURE — 99233 SBSQ HOSP IP/OBS HIGH 50: CPT

## 2023-08-26 RX ORDER — ENOXAPARIN SODIUM 100 MG/ML
90 INJECTION SUBCUTANEOUS EVERY 12 HOURS
Refills: 0 | Status: DISCONTINUED | OUTPATIENT
Start: 2023-08-26 | End: 2023-08-30

## 2023-08-26 RX ORDER — SODIUM CHLORIDE 9 MG/ML
1000 INJECTION INTRAMUSCULAR; INTRAVENOUS; SUBCUTANEOUS
Refills: 0 | Status: DISCONTINUED | OUTPATIENT
Start: 2023-08-26 | End: 2023-08-27

## 2023-08-26 RX ADMIN — OXYCODONE HYDROCHLORIDE 10 MILLIGRAM(S): 5 TABLET ORAL at 19:02

## 2023-08-26 RX ADMIN — OXYCODONE HYDROCHLORIDE 10 MILLIGRAM(S): 5 TABLET ORAL at 00:30

## 2023-08-26 RX ADMIN — OXYCODONE HYDROCHLORIDE 10 MILLIGRAM(S): 5 TABLET ORAL at 18:32

## 2023-08-26 RX ADMIN — OXYCODONE HYDROCHLORIDE 10 MILLIGRAM(S): 5 TABLET ORAL at 08:12

## 2023-08-26 RX ADMIN — OXYCODONE HYDROCHLORIDE 10 MILLIGRAM(S): 5 TABLET ORAL at 15:05

## 2023-08-26 RX ADMIN — OXYCODONE HYDROCHLORIDE 10 MILLIGRAM(S): 5 TABLET ORAL at 14:35

## 2023-08-26 RX ADMIN — ENOXAPARIN SODIUM 90 MILLIGRAM(S): 100 INJECTION SUBCUTANEOUS at 23:00

## 2023-08-26 RX ADMIN — ENOXAPARIN SODIUM 90 MILLIGRAM(S): 100 INJECTION SUBCUTANEOUS at 10:56

## 2023-08-26 RX ADMIN — OXYCODONE HYDROCHLORIDE 10 MILLIGRAM(S): 5 TABLET ORAL at 08:42

## 2023-08-26 RX ADMIN — SODIUM CHLORIDE 75 MILLILITER(S): 9 INJECTION INTRAMUSCULAR; INTRAVENOUS; SUBCUTANEOUS at 14:36

## 2023-08-26 NOTE — CONSULT NOTE ADULT - ASSESSMENT
A/P:  53 y/o M with PMH Lung CA, colon cancer with metatastes to the liver  on chemotherapy,  h/o End ileostomy and revision 04/2023 and subtotal colectomy for perforated cecal mass 2022    abdominal pain  ileostomy output <1000 cc    wbc 21.55  LA 3.5>2.2  CEA 16(aug 2, 2023)  WARREN Cr 1.56  s/p 2 liter bolus in ED    P:  cont IV fluid hydration  monitor electrolytes and replete  monitor ileostomy output  pain control  zofran prn n/v  cont Lov 90 q12  f/u c.diff cultures  UA  rest of management as per primary team     Plan d/w Dr. Todd

## 2023-08-26 NOTE — CONSULT NOTE ADULT - ASSESSMENT
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 Likely from right femoral access. Right groin is soft, non tender    P: - no true neck for possible injection   - repeat ultrasound on 8/28  - if pseudoaneurysm grows in size will revisit surgical options  - Vascular surgery will follow    Plan discussed with Dr Cross

## 2023-08-26 NOTE — CONSULT NOTE ADULT - SUBJECTIVE AND OBJECTIVE BOX
HPI:    51 y/o M with a PMHx of Stage IV RS colon adenocarcinoma with metastasis to the liver and lung diagnosed in 05/2022, underwent exploratory laparotomy, total colectomy, and end ileostomy on 05/19/2022 and revision of ileostomy 04/2023, DVT/PE s/p IVC filter placement returned to the  for further evaluation and management ongoing abdominal pain. The patient stated he has been having generalized abdominal pain constantly for the past several days unable to tolerate PO (both solids and liquids), was evaluated twice in ED for his symptoms, reported his "workup" was negative. The patient denied any associated fevers, chills, or recent sick contacts. He admitted to running out of Oxycodone 40mg x3 days ago which he has been using daily to manage his pain. He follows with PM&R Dr Donis & Primary Onc Dr Serrano. (25 Aug 2023 18:33)      08/26/2023:      PAST MEDICAL & SURGICAL HISTORY:    Cancer, colon with liver & mets    S/P ileostomy    Metastasis to liver & lung    Pulmonary embolism / DVT    S/p IVC    LLE ext iliac artery occlusion     S/P colon resection    H/O ileostomy        MEDICATIONS  (STANDING):    enoxaparin Injectable 90 milliGRAM(s) SubCutaneous every 12 hours  fentaNYL   Patch  25 MICROgram(s)/Hr 1 Patch Transdermal every 72 hours      MEDICATIONS  (PRN):    acetaminophen     Tablet .. 650 milliGRAM(s) Oral every 6 hours PRN Temp greater or equal to 38C (100.4F), Mild Pain (1 - 3)  melatonin 3 milliGRAM(s) Oral at bedtime PRN Insomnia  ondansetron Injectable 4 milliGRAM(s) IV Push every 8 hours PRN Nausea and/or Vomiting  oxyCODONE    IR 10 milliGRAM(s) Oral every 6 hours PRN Severe Pain (7 - 10)      ALLERGIES:    No Known Drug Allergies      FAMILY HISTORY:    dementia (Mother)      REVIEW OF SYSTEMS:    Constitutional, Eyes, ENT, Cardiovascular, Respiratory, Gastrointestinal, Genitourinary, Musculoskeletal, Integumentary, Neurological, Psychiatric, Endocrine, Heme/Lymph and Allergic/Immunologic review of systems are otherwise negative except as noted in HPI.       VITALS:    T(C): 36.4 (25 Aug 2023 22:20), Max: 36.9 (25 Aug 2023 15:58)  T(F): 97.5 (25 Aug 2023 22:20), Max: 98.4 (25 Aug 2023 15:58)  HR: 103 (25 Aug 2023 22:20) (101 - 120)  BP: 114/60 (25 Aug 2023 22:20) (113/78 - 136/90)  BP(mean): 100 (25 Aug 2023 19:38) (100 - 100)  RR: 18 (25 Aug 2023 22:20) (18 - 22)  SpO2: 97% (25 Aug 2023 22:20) (95% - 99%)    Parameters below as of 25 Aug 2023 22:20  Patient On (Oxygen Delivery Method): room air        PHYSICAL:    Constitutional:  Eyes: no conjunctival infection, anicteric.   ENT: pharynx is unremarkable  Neck: supple without JVD   Pulmonary: clear to auscultation bilaterally  Cardiac: RRR   Vascular: no calf tenderness, venous stasis changes, varices.   Abdomen: normoactive bowel sounds, soft and nontender, no hepatosplenomegaly or masses appreciated.   Lymphatic: no peripheral adenopathy appreciated.   Musculoskeletal: full range of motion and no deformities appreciated.   Skin: normal appearance, no rash/erythema.   Neurology:  Psychiatric:       LABS:    CBC Full  -  ( 26 Aug 2023 06:19 )  WBC Count : 14.78 K/uL  RBC Count : 5.10 M/uL  Hemoglobin : 14.5 g/dL  Hematocrit : 44.2 %  Platelet Count - Automated : 171 K/uL  Mean Cell Volume : 86.7 fl  Mean Cell Hemoglobin : 28.4 pg  Mean Cell Hemoglobin Concentration : 32.8 gm/dL  Auto Neutrophil # : 10.33 K/uL  Auto Lymphocyte # : 2.33 K/uL  Auto Monocyte # : 1.66 K/uL  Auto Eosinophil # : 0.29 K/uL  Auto Basophil # : 0.06 K/uL  Auto Neutrophil % : 69.9 %  Auto Lymphocyte % : 15.8 %  Auto Monocyte % : 11.2 %  Auto Eosinophil % : 2.0 %  Auto Basophil % : 0.4 %    08-26    130<L>  |  102  |  25<H>  ----------------------------<  112<H>  4.7   |  25  |  1.20    Ca    8.9      26 Aug 2023 06:19    TPro  8.3  /  Alb  3.9  /  TBili  0.5  /  DBili  x   /  AST  36  /  ALT  101<H>  /  AlkPhos  218<H>  08-26      Urinalysis Basic - ( 26 Aug 2023 06:19 )    Color: x / Appearance: x / SG: x / pH: x  Gluc: 112 mg/dL / Ketone: x  / Bili: x / Urobili: x   Blood: x / Protein: x / Nitrite: x   Leuk Esterase: x / RBC: x / WBC x   Sq Epi: x / Non Sq Epi: x / Bacteria: x        RADIOLOGY & ADDITIONAL STUDIES:      CT Abdomen and Pelvis w/ IV Cont (08.25.23 @ 15:06)    IMPRESSION:  Both of contrast between the right common femoral artery and vein which   is enhancing similar to the artery. A pseudoaneurysm cannot be excluded.   This was present on the prior study. Additionally, I cannot assess the   patency of the right common femoral vein. Right lower extremity   ultrasound of the inguinal region is recommended.    Grossly stable liver metastases. Postsurgical changes.        US Duplex Arterial Lower Ext Ltd, Right (08.25.23 @ 19:39)    IMPRESSION:    3.6 x 1.7 x 2.4 cm right common femoral artery pseudoaneurysm is largely   thrombosed but with a small residual patent lumen measuring 1.4 x 0.8 x   1.4 cm.  Adjacent right common femoral vein is noted to be patent and free of   thrombus.           HPI:    51 y/o M with a PMHx of Stage IV RS colon adenocarcinoma with metastasis to the liver and lung diagnosed in 05/2022, underwent exploratory laparotomy, total colectomy, and end ileostomy on 05/19/2022 and revision of ileostomy 04/2023, DVT/PE s/p IVC filter placement returned to the  for further evaluation and management ongoing abdominal pain. The patient stated he has been having generalized abdominal pain constantly for the past several days unable to tolerate PO (both solids and liquids), was evaluated twice in ED for his symptoms, reported his "workup" was negative. The patient denied any associated fevers, chills, or recent sick contacts. He admitted to running out of Oxycodone 40mg x3 days ago which he has been using daily to manage his pain. He follows with PM&R Dr Donis & Primary Onc Dr Serrano. (25 Aug 2023 18:33)      08/26/2023: Seen at bedside, no acute distress, upset about recurring hospitalizations and abdominal discomfort, requested help with placement at independent or assisted living facility       PAST MEDICAL & SURGICAL HISTORY:    Cancer, colon with liver & mets    S/P ileostomy    Metastasis to liver & lung    Pulmonary embolism / DVT    S/p IVC    LLE ext iliac artery occlusion     S/P colon resection    H/O ileostomy        MEDICATIONS  (STANDING):    enoxaparin Injectable 90 milliGRAM(s) SubCutaneous every 12 hours  fentaNYL   Patch  25 MICROgram(s)/Hr 1 Patch Transdermal every 72 hours      MEDICATIONS  (PRN):    acetaminophen     Tablet .. 650 milliGRAM(s) Oral every 6 hours PRN Temp greater or equal to 38C (100.4F), Mild Pain (1 - 3)  melatonin 3 milliGRAM(s) Oral at bedtime PRN Insomnia  ondansetron Injectable 4 milliGRAM(s) IV Push every 8 hours PRN Nausea and/or Vomiting  oxyCODONE    IR 10 milliGRAM(s) Oral every 6 hours PRN Severe Pain (7 - 10)      ALLERGIES:    No Known Drug Allergies      FAMILY HISTORY:    dementia (Mother)      REVIEW OF SYSTEMS:    Constitutional, Eyes, ENT, Cardiovascular, Respiratory, Gastrointestinal, Genitourinary, Musculoskeletal, Integumentary, Neurological, Psychiatric, Endocrine, Heme/Lymph and Allergic/Immunologic review of systems are otherwise negative except as noted in HPI.       VITALS:    T(C): 36.4 (25 Aug 2023 22:20), Max: 36.9 (25 Aug 2023 15:58)  T(F): 97.5 (25 Aug 2023 22:20), Max: 98.4 (25 Aug 2023 15:58)  HR: 103 (25 Aug 2023 22:20) (101 - 120)  BP: 114/60 (25 Aug 2023 22:20) (113/78 - 136/90)  BP(mean): 100 (25 Aug 2023 19:38) (100 - 100)  RR: 18 (25 Aug 2023 22:20) (18 - 22)  SpO2: 97% (25 Aug 2023 22:20) (95% - 99%)    Parameters below as of 25 Aug 2023 22:20  Patient On (Oxygen Delivery Method): room air        PHYSICAL:    Constitutional:  Eyes: no conjunctival infection, anicteric.   ENT: pharynx is unremarkable  Neck: supple without JVD   Pulmonary: clear to auscultation bilaterally  Cardiac: RRR   Vascular: no calf tenderness, venous stasis changes, varices.   Abdomen: normoactive bowel sounds, soft and nontender, no hepatosplenomegaly or masses appreciated.   Lymphatic: no peripheral adenopathy appreciated.   Musculoskeletal: full range of motion and no deformities appreciated.   Skin: normal appearance, no rash/erythema.   Neurology:  Psychiatric:       LABS:    CBC Full  -  ( 26 Aug 2023 06:19 )  WBC Count : 14.78 K/uL  RBC Count : 5.10 M/uL  Hemoglobin : 14.5 g/dL  Hematocrit : 44.2 %  Platelet Count - Automated : 171 K/uL  Mean Cell Volume : 86.7 fl  Mean Cell Hemoglobin : 28.4 pg  Mean Cell Hemoglobin Concentration : 32.8 gm/dL  Auto Neutrophil # : 10.33 K/uL  Auto Lymphocyte # : 2.33 K/uL  Auto Monocyte # : 1.66 K/uL  Auto Eosinophil # : 0.29 K/uL  Auto Basophil # : 0.06 K/uL  Auto Neutrophil % : 69.9 %  Auto Lymphocyte % : 15.8 %  Auto Monocyte % : 11.2 %  Auto Eosinophil % : 2.0 %  Auto Basophil % : 0.4 %    08-26    130<L>  |  102  |  25<H>  ----------------------------<  112<H>  4.7   |  25  |  1.20    Ca    8.9      26 Aug 2023 06:19    TPro  8.3  /  Alb  3.9  /  TBili  0.5  /  DBili  x   /  AST  36  /  ALT  101<H>  /  AlkPhos  218<H>  08-26      Urinalysis Basic - ( 26 Aug 2023 06:19 )    Color: x / Appearance: x / SG: x / pH: x  Gluc: 112 mg/dL / Ketone: x  / Bili: x / Urobili: x   Blood: x / Protein: x / Nitrite: x   Leuk Esterase: x / RBC: x / WBC x   Sq Epi: x / Non Sq Epi: x / Bacteria: x        RADIOLOGY & ADDITIONAL STUDIES:      CT Abdomen and Pelvis w/ IV Cont (08.25.23 @ 15:06)    IMPRESSION:  Both of contrast between the right common femoral artery and vein which   is enhancing similar to the artery. A pseudoaneurysm cannot be excluded.   This was present on the prior study. Additionally, I cannot assess the   patency of the right common femoral vein. Right lower extremity   ultrasound of the inguinal region is recommended.    Grossly stable liver metastases. Postsurgical changes.        US Duplex Arterial Lower Ext Ltd, Right (08.25.23 @ 19:39)    IMPRESSION:    3.6 x 1.7 x 2.4 cm right common femoral artery pseudoaneurysm is largely   thrombosed but with a small residual patent lumen measuring 1.4 x 0.8 x   1.4 cm.  Adjacent right common femoral vein is noted to be patent and free of   thrombus.           HPI:    51 y/o M with a PMHx of Stage IV RS colon adenocarcinoma with metastasis to the liver and lung diagnosed in 05/2022, underwent exploratory laparotomy, total colectomy, and end ileostomy on 05/19/2022 and revision of ileostomy 04/2023, DVT/PE s/p IVC filter placement returned to the  for further evaluation and management ongoing abdominal pain. The patient stated he has been having generalized abdominal pain constantly for the past several days unable to tolerate PO (both solids and liquids), was evaluated twice in ED for his symptoms, reported his "workup" was negative. The patient denied any associated fevers, chills, or recent sick contacts. He admitted to running out of Oxycodone 40mg x3 days ago which he has been using daily to manage his pain. He follows with PM&R Dr Donis & Primary Onc Dr Serrano. (25 Aug 2023 18:33)      08/26/2023: Seen at bedside, no acute distress, upset about recurring hospitalizations and abdominal discomfort, requested help with placement at independent or assisted living facility       PAST MEDICAL & SURGICAL HISTORY:    Cancer, colon with liver & mets    S/P ileostomy    Metastasis to liver & lung    Pulmonary embolism / DVT    S/p IVC    LLE ext iliac artery occlusion     S/P colon resection    H/O ileostomy        MEDICATIONS  (STANDING):    enoxaparin Injectable 90 milliGRAM(s) SubCutaneous every 12 hours  fentaNYL   Patch  25 MICROgram(s)/Hr 1 Patch Transdermal every 72 hours      MEDICATIONS  (PRN):    acetaminophen     Tablet .. 650 milliGRAM(s) Oral every 6 hours PRN Temp greater or equal to 38C (100.4F), Mild Pain (1 - 3)  melatonin 3 milliGRAM(s) Oral at bedtime PRN Insomnia  ondansetron Injectable 4 milliGRAM(s) IV Push every 8 hours PRN Nausea and/or Vomiting  oxyCODONE    IR 10 milliGRAM(s) Oral every 6 hours PRN Severe Pain (7 - 10)      ALLERGIES:    No Known Drug Allergies      FAMILY HISTORY:    dementia (Mother)      REVIEW OF SYSTEMS:    Constitutional, Eyes, ENT, Cardiovascular, Respiratory, Gastrointestinal, Genitourinary, Musculoskeletal, Integumentary, Neurological, Psychiatric, Endocrine, Heme/Lymph and Allergic/Immunologic review of systems are otherwise negative except as noted in HPI.       VITALS:    T(C): 36.4 (25 Aug 2023 22:20), Max: 36.9 (25 Aug 2023 15:58)  T(F): 97.5 (25 Aug 2023 22:20), Max: 98.4 (25 Aug 2023 15:58)  HR: 103 (25 Aug 2023 22:20) (101 - 120)  BP: 114/60 (25 Aug 2023 22:20) (113/78 - 136/90)  BP(mean): 100 (25 Aug 2023 19:38) (100 - 100)  RR: 18 (25 Aug 2023 22:20) (18 - 22)  SpO2: 97% (25 Aug 2023 22:20) (95% - 99%)    Parameters below as of 25 Aug 2023 22:20  Patient On (Oxygen Delivery Method): room air        PHYSICAL:    Constitutional:  Eyes: no conjunctival infection, anicteric.   ENT: pharynx is unremarkable  Neck: supple without JVD   Pulmonary: clear to auscultation bilaterally  Cardiac: RRR   Vascular: no calf tenderness, venous stasis changes, varices.   Abdomen: normoactive bowel sounds, soft and nontender, no hepatosplenomegaly or masses appreciated.   Lymphatic: no peripheral adenopathy appreciated.   Musculoskeletal: full range of motion and no deformities appreciated.   Skin: normal appearance, no rash/erythema.   Neurology:  Psychiatric:       LABS:    CBC Full  -  ( 26 Aug 2023 06:19 )  WBC Count : 14.78 K/uL  RBC Count : 5.10 M/uL  Hemoglobin : 14.5 g/dL  Hematocrit : 44.2 %  Platelet Count - Automated : 171 K/uL  Mean Cell Volume : 86.7 fl  Mean Cell Hemoglobin : 28.4 pg  Mean Cell Hemoglobin Concentration : 32.8 gm/dL  Auto Neutrophil # : 10.33 K/uL  Auto Lymphocyte # : 2.33 K/uL  Auto Monocyte # : 1.66 K/uL  Auto Eosinophil # : 0.29 K/uL  Auto Basophil # : 0.06 K/uL  Auto Neutrophil % : 69.9 %  Auto Lymphocyte % : 15.8 %  Auto Monocyte % : 11.2 %  Auto Eosinophil % : 2.0 %  Auto Basophil % : 0.4 %    08-26    130<L>  |  102  |  25<H>  ----------------------------<  112<H>  4.7   |  25  |  1.20    Ca    8.9      26 Aug 2023 06:19    TPro  8.3  /  Alb  3.9  /  TBili  0.5  /  DBili  x   /  AST  36  /  ALT  101<H>  /  AlkPhos  218<H>  08-26      Urinalysis Basic - ( 26 Aug 2023 06:19 )    Color: x / Appearance: x / SG: x / pH: x  Gluc: 112 mg/dL / Ketone: x  / Bili: x / Urobili: x   Blood: x / Protein: x / Nitrite: x   Leuk Esterase: x / RBC: x / WBC x   Sq Epi: x / Non Sq Epi: x / Bacteria: x        RADIOLOGY & ADDITIONAL STUDIES:      CT Abdomen and Pelvis w/ IV Cont (08.25.23 @ 15:06)    IMPRESSION:  Both of contrast between the right common femoral artery and vein which   is enhancing similar to the artery. A pseudoaneurysm cannot be excluded.   This was present on the prior study. Additionally, I cannot assess the   patency of the right common femoral vein. Right lower extremity   ultrasound of the inguinal region is recommended.    Grossly stable liver metastases. Postsurgical changes.        US Duplex Arterial Lower Ext Ltd, Right (08.25.23 @ 19:39)    IMPRESSION:    3.6 x 1.7 x 2.4 cm right common femoral artery pseudoaneurysm is largely   thrombosed but with a small residual patent lumen measuring 1.4 x 0.8 x   1.4 cm.  Adjacent right common femoral vein is noted to be patent and free of   thrombus.    < from: CT Abdomen and Pelvis w/ IV Cont (08.25.23 @ 15:06) >    ACC: [id number] EXAM:  CT ABDOMEN AND PELVIS IC   ORDERED BY: EDNNISE DE LA CRUZ     PROCEDURE DATE:  08/25/2023          INTERPRETATION:  CLINICAL INFORMATION: Abdominal pain    COMPARISON: CT scan of the abdomen and pelvis from 8/24/2023    CONTRAST/COMPLICATIONS:  IV Contrast: Omnipaque 350  90 cc administered   10 cc discarded  Oral Contrast: NONE  Complications: None reported at time of study completion    PROCEDURE:  CT of the Abdomen and Pelvis was performed.  Sagittal and coronal reformats were performed.    FINDINGS:  LOWER CHEST: Within normal limits.    LIVER: Multiple calcified and noncalcified liver metastases which are   grossly stable. For example, lesion in segment 2 measuring 3.8 x 2.7 cm   on series 2 image 11 has not significantly changed.  BILE DUCTS: Normal caliber.  GALLBLADDER: Higher attenuation within the gallbladder may represent   vicarious excretion of contrast material from prior injection.  SPLEEN: Within normal limits.  PANCREAS: Within normal limits.  ADRENALS: Within normal limits.  KIDNEYS/URETERS: Within normal limits.    BLADDER: Within normal limits.  REPRODUCTIVE ORGANS: Grossly unremarkable.    BOWEL: Status post colectomy with creation of a rectal stump and   ileostomy exiting the right lower quadrant. This is stable in appearance.  PERITONEUM: No ascites. Nodular soft tissue thickening in the right lower   pelvis laterally extending towards the inguinal region which is grossly   stable in appearance.  VESSELS: Vascular calcifications. IVC filter. 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 Focal pooling of contrast seen   between the right common femoral artery and vein which is unchanged. This   is enhancing similar to the artery and a pseudoaneurysm cannot be   excluded. Limited evaluation of the right common femoral vein.  RETROPERITONEUM/LYMPH NODES: No lymphadenopathy.  ABDOMINAL WALL: Subcutaneous soft tissue nodule opacities in the left   anterior abdominal wall may be due to subcutaneous injections.   Postsurgical changes.  BONES: Mild degenerative changes.    IMPRESSION:  Both of contrast between the right common femoral artery and vein which   is enhancing similar to the artery. A pseudoaneurysm cannot be excluded.   This was present on the prior study. Additionally, I cannot assess the   patency of the right common femoral vein. Right lower extremity   ultrasound of the inguinal region is recommended.    Grossly stable liver metastases. Postsurgical changes.        < end of copied text >         HPI:    51 y/o M with a PMHx of Stage IV RS colon adenocarcinoma with metastasis to the liver and lung diagnosed in 05/2022, underwent exploratory laparotomy, total colectomy, and end ileostomy on 05/19/2022 and revision of ileostomy 04/2023, DVT/PE s/p IVC filter placement returned to the  for further evaluation and management ongoing abdominal pain. The patient stated he has been having generalized abdominal pain constantly for the past several days unable to tolerate PO (both solids and liquids), was evaluated twice in ED for his symptoms, reported his "workup" was negative. The patient denied any associated fevers, chills, or recent sick contacts. He admitted to running out of Oxycodone 40mg x3 days ago which he has been using daily to manage his pain. He follows with PM&R Dr Donis & Primary Onc Dr Serrano. (25 Aug 2023 18:33)      08/26/2023: Seen at bedside, no acute distress, upset about recurring hospitalizations and abdominal discomfort, requested help with placement at independent or assisted living facility       PAST MEDICAL & SURGICAL HISTORY:    Cancer, colon with liver & mets    S/P ileostomy    Metastasis to liver & lung    Pulmonary embolism / DVT    S/p IVC    LLE ext iliac artery occlusion     S/P colon resection    H/O ileostomy        MEDICATIONS  (STANDING):    enoxaparin Injectable 90 milliGRAM(s) SubCutaneous every 12 hours  fentaNYL   Patch  25 MICROgram(s)/Hr 1 Patch Transdermal every 72 hours      MEDICATIONS  (PRN):    acetaminophen     Tablet .. 650 milliGRAM(s) Oral every 6 hours PRN Temp greater or equal to 38C (100.4F), Mild Pain (1 - 3)  melatonin 3 milliGRAM(s) Oral at bedtime PRN Insomnia  ondansetron Injectable 4 milliGRAM(s) IV Push every 8 hours PRN Nausea and/or Vomiting  oxyCODONE    IR 10 milliGRAM(s) Oral every 6 hours PRN Severe Pain (7 - 10)      ALLERGIES:    No Known Drug Allergies      FAMILY HISTORY:    dementia (Mother)      REVIEW OF SYSTEMS:    Constitutional, Eyes, ENT, Cardiovascular, Respiratory, Gastrointestinal, Genitourinary, Musculoskeletal, Integumentary, Neurological, Psychiatric, Endocrine, Heme/Lymph and Allergic/Immunologic review of systems are otherwise negative except as noted in HPI.       VITALS:    T(C): 36.4 (25 Aug 2023 22:20), Max: 36.9 (25 Aug 2023 15:58)  T(F): 97.5 (25 Aug 2023 22:20), Max: 98.4 (25 Aug 2023 15:58)  HR: 103 (25 Aug 2023 22:20) (101 - 120)  BP: 114/60 (25 Aug 2023 22:20) (113/78 - 136/90)  BP(mean): 100 (25 Aug 2023 19:38) (100 - 100)  RR: 18 (25 Aug 2023 22:20) (18 - 22)  SpO2: 97% (25 Aug 2023 22:20) (95% - 99%)    Parameters below as of 25 Aug 2023 22:20  Patient On (Oxygen Delivery Method): room air        PHYSICAL:    Constitutional: no acute distress  Eyes: no conjunctival infection, anicteric.   ENT: pharynx is unremarkable  Neck: supple without JVD   Pulmonary: clear to auscultation bilaterally  Cardiac: RRR   Vascular: no calf tenderness, venous stasis changes, varices.   Abdomen: normoactive bowel sounds, soft and nontender ; ostomy draining greenish/brown  Lymphatic: no peripheral adenopathy appreciated.   Musculoskeletal: full range of motion and no deformities appreciated.   Skin: normal appearance, no rash/erythema.   Neurology: awake, alert, oriented ; no focal deficits  Psychiatric: affect/mood/insight appropriate       LABS:    CBC Full  -  ( 26 Aug 2023 06:19 )  WBC Count : 14.78 K/uL  RBC Count : 5.10 M/uL  Hemoglobin : 14.5 g/dL  Hematocrit : 44.2 %  Platelet Count - Automated : 171 K/uL  Mean Cell Volume : 86.7 fl  Mean Cell Hemoglobin : 28.4 pg  Mean Cell Hemoglobin Concentration : 32.8 gm/dL  Auto Neutrophil # : 10.33 K/uL  Auto Lymphocyte # : 2.33 K/uL  Auto Monocyte # : 1.66 K/uL  Auto Eosinophil # : 0.29 K/uL  Auto Basophil # : 0.06 K/uL  Auto Neutrophil % : 69.9 %  Auto Lymphocyte % : 15.8 %  Auto Monocyte % : 11.2 %  Auto Eosinophil % : 2.0 %  Auto Basophil % : 0.4 %    08-26    130<L>  |  102  |  25<H>  ----------------------------<  112<H>  4.7   |  25  |  1.20    Ca    8.9      26 Aug 2023 06:19    TPro  8.3  /  Alb  3.9  /  TBili  0.5  /  DBili  x   /  AST  36  /  ALT  101<H>  /  AlkPhos  218<H>  08-26      Urinalysis Basic - ( 26 Aug 2023 06:19 )    Color: x / Appearance: x / SG: x / pH: x  Gluc: 112 mg/dL / Ketone: x  / Bili: x / Urobili: x   Blood: x / Protein: x / Nitrite: x   Leuk Esterase: x / RBC: x / WBC x   Sq Epi: x / Non Sq Epi: x / Bacteria: x        RADIOLOGY & ADDITIONAL STUDIES:      CT Abdomen and Pelvis w/ IV Cont (08.25.23 @ 15:06)    IMPRESSION:  Both of contrast between the right common femoral artery and vein which   is enhancing similar to the artery. A pseudoaneurysm cannot be excluded.   This was present on the prior study. Additionally, I cannot assess the   patency of the right common femoral vein. Right lower extremity   ultrasound of the inguinal region is recommended.    Grossly stable liver metastases. Postsurgical changes.        US Duplex Arterial Lower Ext Ltd, Right (08.25.23 @ 19:39)    IMPRESSION:    3.6 x 1.7 x 2.4 cm right common femoral artery pseudoaneurysm is largely   thrombosed but with a small residual patent lumen measuring 1.4 x 0.8 x   1.4 cm.  Adjacent right common femoral vein is noted to be patent and free of   thrombus.    < from: CT Abdomen and Pelvis w/ IV Cont (08.25.23 @ 15:06) >    ACC: [id number] EXAM:  CT ABDOMEN AND PELVIS IC   ORDERED BY: DENNISE DE LA CRUZ     PROCEDURE DATE:  08/25/2023          INTERPRETATION:  CLINICAL INFORMATION: Abdominal pain    COMPARISON: CT scan of the abdomen and pelvis from 8/24/2023    CONTRAST/COMPLICATIONS:  IV Contrast: Omnipaque 350  90 cc administered   10 cc discarded  Oral Contrast: NONE  Complications: None reported at time of study completion    PROCEDURE:  CT of the Abdomen and Pelvis was performed.  Sagittal and coronal reformats were performed.    FINDINGS:  LOWER CHEST: Within normal limits.    LIVER: Multiple calcified and noncalcified liver metastases which are   grossly stable. For example, lesion in segment 2 measuring 3.8 x 2.7 cm   on series 2 image 11 has not significantly changed.  BILE DUCTS: Normal caliber.  GALLBLADDER: Higher attenuation within the gallbladder may represent   vicarious excretion of contrast material from prior injection.  SPLEEN: Within normal limits.  PANCREAS: Within normal limits.  ADRENALS: Within normal limits.  KIDNEYS/URETERS: Within normal limits.    BLADDER: Within normal limits.  REPRODUCTIVE ORGANS: Grossly unremarkable.    BOWEL: Status post colectomy with creation of a rectal stump and   ileostomy exiting the right lower quadrant. This is stable in appearance.  PERITONEUM: No ascites. Nodular soft tissue thickening in the right lower   pelvis laterally extending towards the inguinal region which is grossly   stable in appearance.  VESSELS: Vascular calcifications. IVC filter. 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 Focal pooling of contrast seen   between the right common femoral artery and vein which is unchanged. This   is enhancing similar to the artery and a pseudoaneurysm cannot be   excluded. Limited evaluation of the right common femoral vein.  RETROPERITONEUM/LYMPH NODES: No lymphadenopathy.  ABDOMINAL WALL: Subcutaneous soft tissue nodule opacities in the left   anterior abdominal wall may be due to subcutaneous injections.   Postsurgical changes.  BONES: Mild degenerative changes.    IMPRESSION:  Both of contrast between the right common femoral artery and vein which   is enhancing similar to the artery. A pseudoaneurysm cannot be excluded.   This was present on the prior study. Additionally, I cannot assess the   patency of the right common femoral vein. Right lower extremity   ultrasound of the inguinal region is recommended.    Grossly stable liver metastases. Postsurgical changes.        < end of copied text >

## 2023-08-26 NOTE — CONSULT NOTE ADULT - SUBJECTIVE AND OBJECTIVE BOX
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 The patient states he has been having generalized abdominal pain constantly for the past several days unable to tolerate PO (both solids and liquids), and was evaluated twice in the ED for his symptoms. The patient states his "workup" was reportedly negative and discharged. The patient denies any associated subjective fevers, chills, or recent sick contacts.  is his pain management MD and also reports using Fentanyl patches to manage his pain. Vital signs in triage => /91, /min, RR 22/min, TMax 97.9'F, SpO2 96%. Labs => WBC 21.55, Hgb/Hct 17/51.8, Na 128, BUN/Cr 29/1.56, TPro 10.4, , AST/ALT 52/109, LA 3.5 -> 2.2. CT ABD/Pel w/ IV Contrast => Both of contrast between the right common femoral artery and vein which is enhancing similar to the artery. A pseudoaneurysm cannot be excluded. US Venous Duplec B/L Lower Extremities => Nonocclusive thrombus involving the right common femoral vein, similar to prior. In the ED the patient was given Ondansetron 4mg IVP x 1, Morphine 4mg IVP x 1, and NS x 1L.       PMH: as above  PSH: as above  Meds: per reconciliation sheet, noted below  MEDICATIONS  (STANDING):  enoxaparin Injectable 90 milliGRAM(s) SubCutaneous every 12 hours  fentaNYL   Patch  25 MICROgram(s)/Hr 1 Patch Transdermal every 72 hours    Allergies    [This allergen will not trigger allergy alert] No Known Drug Allergies (Unknown)    Intolerances      Social: no smoking, no alcohol, no illegal drugs; no recent travel, no exposure to TB  FAMILY HISTORY:  FH: dementia (Mother)       no history of premature cardiovascular disease in first degree relatives    ROS: the patient denies fever, no chills, no HA, no dizziness, no sore throat, no blurry vision, no CP, no palpitations, no SOB, no cough, + abdominal pain, + diarrhea, no N/V, no dysuria, no leg pain, no claudication, no rash, no joint aches, no rectal pain or bleeding, no night sweats    All other systems reviewed and are negative    Vital Signs Last 24 Hrs  T(C): 36.6 (26 Aug 2023 09:23), Max: 36.9 (25 Aug 2023 15:58)  T(F): 97.9 (26 Aug 2023 09:23), Max: 98.4 (25 Aug 2023 15:58)  HR: 104 (26 Aug 2023 09:23) (101 - 120)  BP: 119/82 (26 Aug 2023 09:23) (113/78 - 136/90)  BP(mean): 100 (25 Aug 2023 19:38) (100 - 100)  RR: 18 (26 Aug 2023 09:23) (18 - 22)  SpO2: 100% (26 Aug 2023 09:23) (95% - 100%)    Parameters below as of 26 Aug 2023 09:23  Patient On (Oxygen Delivery Method): room air      Daily Height in cm: 182.88 (25 Aug 2023 12:29)    Daily     PE:  Constitutional: NAD   HEENT: NC/AT, EOMI, PERRLA, conjunctivae clear; ears and nose atraumatic; pharynx benign  Neck: supple; thyroid not palpable  Back: no tenderness  Respiratory: respiratory effort normal; clear to auscultation  Cardiovascular: S1S2 regular, no murmurs  Abdomen: soft, not tender, not distended, positive BS; liver and spleen WNL  Genitourinary: no suprapubic tenderness  Lymphatic: no LN palpable  Musculoskeletal: no muscle tenderness, no joint swelling or tenderness  Extremities: no pedal edema  Neurological/ Psychiatric: AxOx3, Judgement and insight normal;  moving all extremities  Skin: no rashes; no palpable lesions    Labs: all available labs reviewed                        14.5   14.78 )-----------( 171      ( 26 Aug 2023 06:19 )             44.2     08-26    130<L>  |  102  |  25<H>  ----------------------------<  112<H>  4.7   |  25  |  1.20    Ca    8.9      26 Aug 2023 06:19    TPro  8.3  /  Alb  3.9  /  TBili  0.5  /  DBili  x   /  AST  36  /  ALT  101<H>  /  AlkPhos  218<H>  08-26     LIVER FUNCTIONS - ( 26 Aug 2023 06:19 )  Alb: 3.9 g/dL / Pro: 8.3 gm/dL / ALK PHOS: 218 U/L / ALT: 101 U/L / AST: 36 U/L / GGT: x           Urinalysis Basic - ( 26 Aug 2023 06:19 )    Color: x / Appearance: x / SG: x / pH: x  Gluc: 112 mg/dL / Ketone: x  / Bili: x / Urobili: x   Blood: x / Protein: x / Nitrite: x   Leuk Esterase: x / RBC: x / WBC x   Sq Epi: x / Non Sq Epi: x / Bacteria: x          Radiology: all available radiological tests reviewed  < from: CT Abdomen and Pelvis w/ IV Cont (08.25.23 @ 15:06) >  ACC: [id number] EXAM:  CT ABDOMEN AND PELVIS IC   ORDERED BY: DENNISE DE LA CRUZ     PROCEDURE DATE:  08/25/2023          INTERPRETATION:  CLINICAL INFORMATION: Abdominal pain    COMPARISON: CT scan of the abdomen and pelvis from 8/24/2023    CONTRAST/COMPLICATIONS:  IV Contrast: Omnipaque 350  90 cc administered   10 cc discarded  Oral Contrast: NONE  Complications: None reported at time of study completion    PROCEDURE:  CT of the Abdomen and Pelvis was performed.  Sagittal and coronal reformats were performed.    FINDINGS:  LOWER CHEST: Within normal limits.    LIVER: Multiple calcified and noncalcified liver metastases which are   grossly stable. For example, lesion in segment 2 measuring 3.8 x 2.7 cm   on series 2 image 11 has not significantly changed.  BILE DUCTS: Normal caliber.  GALLBLADDER: Higher attenuation within the gallbladder may represent   vicarious excretion of contrast material from prior injection.  SPLEEN: Within normal limits.  PANCREAS: Within normal limits.  ADRENALS: Within normal limits.  KIDNEYS/URETERS: Within normal limits.    BLADDER: Within normal limits.  REPRODUCTIVE ORGANS: Grossly unremarkable.    BOWEL: Status post colectomy with creation of a rectal stump and   ileostomy exiting the right lower quadrant. This is stable in appearance.  PERITONEUM: No ascites. Nodular soft tissue thickening in the right lower   pelvis laterally extending towards the inguinal region which is grossly   stable in appearance.  VESSELS: Vascular calcifications. IVC filter. 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 Focal pooling of contrast seen   between the right common femoral artery and vein which is unchanged. This   is enhancing similar to the artery and a pseudoaneurysm cannot be   excluded. Limited evaluation of the right common femoral vein.  RETROPERITONEUM/LYMPH NODES: No lymphadenopathy.  ABDOMINAL WALL: Subcutaneous soft tissue nodule opacities in the left   anterior abdominal wall may be due to subcutaneous injections.   Postsurgical changes.  BONES: Mild degenerative changes.    IMPRESSION:  Both of contrast between the right common femoral artery and vein which   is enhancing similar to the artery. A pseudoaneurysm cannot be excluded.   This was present on the prior study. Additionally, I cannot assess the   patency of the right common femoral vein. Right lower extremity   ultrasound of the inguinal region is recommended.    Grossly stable liver metastases. Postsurgical changes.      < end of copied text >  < from: US Duplex Venous Lower Ext Complete, Bilateral (08.24.23 @ 22:04) >  ACC: [id number] EXAM:  US DPLX LWR EXT VEINS COMPL BI   ORDERED BY: TITO KEMP     PROCEDURE DATE:  08/24/2023          INTERPRETATION:  CLINICAL INFORMATION: Abdominal pain    COMPARISON: Right lower extremity venous duplex ultrasound 7/14/2023,   bilateral lower extremity venous duplex ultrasound 3/16/2023    TECHNIQUE: Duplex sonography of the BILATERAL LOWER extremity veins with   color and spectral Doppler, with and without compression.    FINDINGS:    RIGHT:  Nonocclusive thrombus within the common femoral vein.  No RIGHT calf vein thrombosis is detected.    LEFT:  Normal compressibility of the LEFT common femoral, femoral and popliteal   veins.  Doppler examination shows normal spontaneous and phasic flow.  No LEFT calf vein thrombosis is detected.    IMPRESSION:      Nonocclusive thrombus involving the right common femoral vein, similar to   prior.        < end of copied text >    Advanced directives addressed: full resuscitation

## 2023-08-26 NOTE — CONSULT NOTE ADULT - ASSESSMENT
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 The patient states he has been having generalized abdominal pain constantly for the past several days unable to tolerate PO (both solids and liquids), and was evaluated twice in the ED for his symptoms.The patient denies any associated subjective fevers, chills, or recent sick contacts.  is his pain management MD and also reports using Fentanyl patches to manage his pain. Vital signs in triage => /91, /min, RR 22/min, TMax 97.9'F, SpO2 96%. Labs => WBC 21.55, Hgb/Hct 17/51.8, Na 128, BUN/Cr 29/1.56, TPro 10.4, , AST/ALT 52/109, LA 3.5 -> 2.2. CT ABD/Pel w/ IV Contrast => Both of contrast between the right common femoral artery and vein which is enhancing similar to the artery. US Venous Duplec B/L Lower Extremities => Nonocclusive thrombus involving the right common femoral vein, similar to prior. In the ED the patient was given Ondansetron 4mg IVP x 1, Morphine 4mg IVP x 1, and NS x 1L.     1. Leukocytosis. RLE DVT. Diarrhea. Abdominal pain. Pyuria. Stage IV colon cancer. Immunocompromised host  - imaging reviewed   - fu c diff pcr  - f/u urine cx, blood cx  - wbc ct elevated d/t dvt reactive f/u cbc  - monitor off antibiotics for now  - supportive care  - pain control    2. other issues - care per medicine

## 2023-08-26 NOTE — CONSULT NOTE ADULT - ATTENDING COMMENTS
Patient seen and examined. He is well known to CRS service. Admitted for abdominal pain. Feels better this morning. Ileostomy functioning. CT scan without acute intraabdominal pathology to explain pain. His WBC was elevated and down trending to 14 this morning. No obvious infection. Given his creatinine and sodium level, may be a component of dehydration with ileostomy output. Ileostomy is healthy and functioning - prominent but not really prolapsed. He has been on imodium in the past to help control this and recommend resuming imodium 4 mg 4 times daily. Monitor ileostomy output. No acute colorectal surgery issues.

## 2023-08-26 NOTE — CONSULT NOTE ADULT - SUBJECTIVE AND OBJECTIVE BOX
53 y/o M with PMH Lung CA, colon cancer with metatastes to the liver  on chemotherapy, PE on eliquis, h/o End ileostomy and revision 04/2023 and subtotal colectomy for perforated cecal mass 2022 presented with abdominal pain and high output from the ileostomy. Pt was evaluated twice in last few days and workup was reportedly negative and discharged. Pt returns today with pain and vomiting x 1 episode. Pt reports pain is mostly around the ileostomy site. Vascular surgery consulted for Right common femoral pseudoaneurysm. Pt is well known to vascular service had LLE angiogram on 7/10/2023, where we were unable to traverse lesion.   A dup done now shows: 3.6 x 1.7 x 2.4 cm right common femoral artery pseudoaneurysm is largely  thrombosed but with a small residual patent lumen measuring 1.4 x 0.8 x 1.4 cm.    Vital Signs Last 24 Hrs  T(C): 36.6 (26 Aug 2023 09:23), Max: 36.9 (25 Aug 2023 15:58)  T(F): 97.9 (26 Aug 2023 09:23), Max: 98.4 (25 Aug 2023 15:58)  HR: 104 (26 Aug 2023 09:23) (101 - 120)  BP: 119/82 (26 Aug 2023 09:23) (113/78 - 136/90)  BP(mean): 100 (25 Aug 2023 19:38) (100 - 100)  RR: 18 (26 Aug 2023 09:23) (18 - 22)  SpO2: 100% (26 Aug 2023 09:23) (95% - 100%)    Parameters below as of 26 Aug 2023 09:23  Patient On (Oxygen Delivery Method): room air        Labs:                                14.5   14.78 )-----------( 171      ( 26 Aug 2023 06:19 )             44.2     CBC Full  -  ( 26 Aug 2023 06:19 )  WBC Count : 14.78 K/uL  RBC Count : 5.10 M/uL  Hemoglobin : 14.5 g/dL  Hematocrit : 44.2 %  Platelet Count - Automated : 171 K/uL  Mean Cell Volume : 86.7 fl  Mean Cell Hemoglobin : 28.4 pg  Mean Cell Hemoglobin Concentration : 32.8 gm/dL  Auto Neutrophil # : 10.33 K/uL  Auto Lymphocyte # : 2.33 K/uL  Auto Monocyte # : 1.66 K/uL  Auto Eosinophil # : 0.29 K/uL  Auto Basophil # : 0.06 K/uL  Auto Neutrophil % : 69.9 %  Auto Lymphocyte % : 15.8 %  Auto Monocyte % : 11.2 %  Auto Eosinophil % : 2.0 %  Auto Basophil % : 0.4 %    08-26    130<L>  |  102  |  25<H>  ----------------------------<  112<H>  4.7   |  25  |  1.20    Ca    8.9      26 Aug 2023 06:19    TPro  8.3  /  Alb  3.9  /  TBili  0.5  /  DBili  x   /  AST  36  /  ALT  101<H>  /  AlkPhos  218<H>  08-26    LIVER FUNCTIONS - ( 26 Aug 2023 06:19 )  Alb: 3.9 g/dL / Pro: 8.3 gm/dL / ALK PHOS: 218 U/L / ALT: 101 U/L / AST: 36 U/L / GGT: x             Meds:  acetaminophen     Tablet .. 650 milliGRAM(s) Oral every 6 hours PRN  enoxaparin Injectable 90 milliGRAM(s) SubCutaneous every 12 hours  fentaNYL   Patch  25 MICROgram(s)/Hr 1 Patch Transdermal every 72 hours  melatonin 3 milliGRAM(s) Oral at bedtime PRN  ondansetron Injectable 4 milliGRAM(s) IV Push every 8 hours PRN  oxyCODONE    IR 10 milliGRAM(s) Oral every 6 hours PRN    Physical Examination:  AO x3, NAD  GENERAL: No acute distress, well-developed  HEAD:  Atraumatic, Normocephalic  CHEST/LUNG: CTAB; No wheezes, rales, or rhonchi  HEART: Regular rate and rhythm; No murmurs, rubs, or gallops  ABDOMEN: Soft, mild tenderness RLQ around the ileostomy site,  non-distended, Right side ileostomy thick green stool  ext. no cyanosis or edema, R DP/PT pulse palpable, L DP dopp, non dop PT, Right groin femoral pulse palpable and soft, Left fem palpable   NEUROLOGY: responding appropriately, no focal deficits

## 2023-08-26 NOTE — CONSULT NOTE ADULT - SUBJECTIVE AND OBJECTIVE BOX
Surgery Consultation    51 yo M with [facility] Lung CA, colon cancer with metastases to the liver on chemotherapy, PE on s/p IVC filter 2023 and on Lovenox 90 q12, h/o End ileostomy and revision 2023 and subtotal colectomy for perforated cecal mass , s/p LLE angiogram 2023 unable to transverse across presents to the ED for abdominal pain. The patient states he has been having generalized abdominal pain constantly for the past several days. Pt was evaluated twice in last few days and workup was reportedly negative and discharged. Pt returns today with pain and vomiting x 1 episode. Pt reports pain is mostly around the ileostomy site. Denies fever or chills, n/v/d/c. Pt reports he is been emptying about 2 bags of colostomy <1000 cc/day.       PAST MEDICAL & SURGICAL HISTORY:  Cancer, colon  liver mets      S/P ileostomy      Metastasis to liver      Pulmonary embolism      S/P colon resection      H/O ileostomy        Allergies:  [This allergen will not trigger allergy alert] No Known Drug Allergies (Unknown)    Home Medications:  enoxaparin 100 mg/mL injectable solution: 90 milligram(s) subcutaneously 2 times a day  fentaNYL 25 mcg/hr transdermal film, extended release: 1 patch transdermally every 72 hours  loperamide 2 mg oral tablet, chewable: 2 tab(s) chewed 4 times a day ***with meals and at bedtime***  oxyCODONE 10 mg oral tablet: 1 tab(s) orally every 6 hours as needed for      Family History:  FAMILY HISTORY:  FH: dementia (Mother)        ROS:  Constitutional: Denies fever, fatigue or weight loss.  Skin: Denies rash.  Eyes: Denies recent vision problems or eye pain.  ENT: Denies congestion, ear pain, or sore throat.  Endocrine: Denies thyroid problems.  Cardiovascular: Denies chest pain or palpation.  Respiratory: Denies cough, shortness of breath, congestion, or wheezing.  Gastrointestinal: + RLQ abdominal pain, denies nausea, vomiting, or diarrhea.  Genitourinary: Denies dysuria.  Musculoskeletal: Denies joint swelling.  Neurologic: Denies headache.      Physical Examination:  AO x3, NAD  GENERAL: No acute distress, well-developed  HEAD:  Atraumatic, Normocephalic  CHEST/LUNG: CTAB; No wheezes, rales, or rhonchi  HEART: Regular rate and rhythm; No murmurs, rubs, or gallops  ABDOMEN: Soft, mild tenderness RLQ around the ileostomy site,  non-distended, Right side ileostomy thick green stool  ext. no cyanosis or edema, R DP/PT pulse palpable, L DP dopp, non dop PT, Right groin femoral pulse palpable, Left fem palpable   NEUROLOGY: responding appropriately, no focal deficits      Data:                        17.0   21.55 )-----------( 228      ( 25 Aug 2023 13:06 )             51.8         128<L>  |  97  |  29<H>  ----------------------------<  100<H>  4.7   |  22  |  1.56<H>    Ca    10.0      25 Aug 2023 13:06    TPro  10.4<H>  /  Alb  5.0  /  TBili  0.8  /  DBili  x   /  AST  52<H>  /  ALT  109<H>  /  AlkPhos  308<H>        LIVER FUNCTIONS - ( 25 Aug 2023 13:06 )  Alb: 5.0 g/dL / Pro: 10.4 gm/dL / ALK PHOS: 308 U/L / ALT: 109 U/L / AST: 52 U/L / GGT: x           Urinalysis Basic - ( 25 Aug 2023 23:42 )    Color: Yellow / Appearance: Clear / S.015 / pH: x  Gluc: x / Ketone: Trace  / Bili: Negative / Urobili: Negative   Blood: x / Protein: 30 mg/dL / Nitrite: Negative   Leuk Esterase: Trace / RBC: x / WBC x   Sq Epi: x / Non Sq Epi: x / Bacteria: x        Radiology:    < from: CT Abdomen and Pelvis w/ IV Cont (23 @ 15:06) >  Both of contrast between the right common femoral artery and vein which   is enhancing similar to the artery. A pseudoaneurysm cannot be excluded.   This was present on the prior study. Additionally, I cannot assess the   patency of the right common femoral vein. Right lower extremity   ultrasound of the inguinal region is recommended.    Grossly stable liver metastases. Postsurgical changes.    < end of copied text >  < from: CT Abdomen and Pelvis w/ IV Cont (23 @ 19:50) >  MPRESSION:  1. Status post colectomy with Gloria stump creation. Status post   right-sided ileostomy. No evidence for mechanical bowel obstruction. No   small bowel wall thickening.  2. Stable peritoneal thickening and nodularity within the right lower   pelvis measuring 2.5 x 1.5 cm. No ascites.  3. There is new distention of the right common femoral vein, with central   hypodensity, with intraluminal hypodensity also noted within the right   femoral vein as well as right iliac venous system, most consistent with   interval development of acute DVT. There is rounded hyperdensity noted   within the distended right common femoral vein, similar in appearance to   contrast within the adjacent femoral artery, indeterminate. Correlate   with lower extremity venous Doppler ultrasonography, with attention to   the region of the right common femoral vein.  4. Stable noncalcified and calcified hepatic metastasis identified,   measuring up to 3.8 cm.      < end of copied text >  < from: US Duplex Arterial Lower Ext Ltd, Right (23 @ 19:39) >  IMPRESSION:    3.6 x 1.7 x 2.4 cm right common femoral artery pseudoaneurysm is largely   thrombosed but with a small residual patent lumen measuring 1.4 x 0.8 x   1.4 cm.  Adjacent right common femoral vein is noted to be patent and free of   thrombus.      < end of copied text >

## 2023-08-26 NOTE — CONSULT NOTE ADULT - ATTENDING COMMENTS
pt with rigth CFA pseudo found on CT from 24th and today . likely from right femoral access from july . no true neck for possible injection . would repeat ultrasound on monday . right groin is benign . if pseudo grows in size could potentially require open repair

## 2023-08-26 NOTE — CONSULT NOTE ADULT - ASSESSMENT
53 y/o M with MH significant for Stage IV colon cancer with metastases to the liver and lung, currently on chemotherapy, s/p ileostomy and revision 04/2023 and subtotal colectomy for perforated cecal mass 2022, readmitted for recurrent/persistent abdominal pain, poor PO intake.      # Metastatic Colon CA with Liver & Lung Metastases, S/p Ileostomy     - Dx 05/2022 after going to ED for worsening abdominal pain  - 05/19/22 CT AP with apparent focal mural thickening at the cecum/proximal ascending colon with focal tumor invasion/extension from the cecum to the sigmoid colon. Multiple hypodense lesions with calcifications in both lobes of the liver with the largest measuring 5.8 x 6.0 cm in hepatic segment.  - 05/19/22: underwent exploratory laparotomy, total colectomy, end ileostomy, biopsy of liver, and biopsy of retroperitoneum.    Pathology:  Omentum negative for malignancy  Portion of terminal ileum, cecum with adherent sigmoid: Poorly differentiated infiltrating adenocarcinoma measuring 9.0 cm. Adenocarcinoma infiltrates through the bowel wall and through the visceral peritoneum and infiltrates the adherent sigmoid colon 3 of 17 lymph nodes are positive for metastatic carcinoma. Lymphovascular space & Perineural invasion is identified. The surgical margins negative.   Appendix with serosal tumor implants and acute inflammation. Peritonitis. Liver, biopsy: Metastatic adenocarcinoma.   Retroperitoneal biopsy: Adenocarcinoma with necrosis. No loss of nuclear expression of MMR proteins (MLH1, MSH2, MSH6, and PMS2). Staging pT4b pN1b pM1c.    - 06/22/2022: Started FOLFOXIRI with Bevacizumab; last dose of FOLFIRI and Bevacizumab received on 01/04/2023; S/p Oxaliplatin C10  - 09/01/22 CT CAP: Segmental left lower lobe pulmonary embolus and probable smaller pulmonary emboli within the right lung. Interval decrease in size of bilateral pulmonary nodules and hepatic metastases compared to 05/25/2022. No new sites of disease.  - 06/13/23 CT CAP: New, 8 mm right lower lobe nodule and enlarging pulmonary nodules, 7 mm left apical nodule previously measured 6 mm. Bilobar hepatic metastases, the majority of which are calcified and not significantly changed. A noncalcified lesion in the right hepatic lobe demonstrates mild interval growth, 1.7 x 1.4 cm previously 1.4 x 1.3 cm  - On chemo FOLFIRI, and Avastin, most recent dose 08/16/23  - No inpatient tx, continue supportive measures as necessary         # Leukocytosis    - WBC count remains elevated but has come down to approx 15K  - Intermittent spikes >100K over the past few months 2/2 known OnPro (Neulasta) use with chemo dosing  - CDiff / GI panel negative during previous admissions  - CT imaging during this admission and previous with no evidence of acute intra-abdominal infectious/inflammatory processes   - Continue to trend serial CBCs  - Monitor temp      # LLE Ext Iliac Artery Occlusion    - Dx during previous admission 07/2023  - Patient was taking DOAC (Eliquis) for prior VTE in setting of metastatic disease but developed arterial thrombosis  - 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    53 y/o M with MH significant for Stage IV colon cancer with metastases to the liver and lung, currently on chemotherapy, s/p ileostomy and revision 04/2023 and subtotal colectomy for perforated cecal mass 2022, readmitted for recurrent/persistent abdominal pain, poor PO intake.      # Metastatic Colon CA with Liver & Lung Metastases, S/p Ileostomy  - CT imaging during this admission and previous with no evidence of acute intra-abdominal infectious/inflammatory processes   - Dx 05/2022 after going to ED for worsening abdominal pain  - 05/19/22 CT AP with apparent focal mural thickening at the cecum/proximal ascending colon with focal tumor invasion/extension from the cecum to the sigmoid colon. Multiple hypodense lesions with calcifications in both lobes of the liver with the largest measuring 5.8 x 6.0 cm in hepatic segment.  - 05/19/22: underwent exploratory laparotomy, total colectomy, end ileostomy, biopsy of liver, and biopsy of retroperitoneum.    Pathology:  Omentum negative for malignancy  Portion of terminal ileum, cecum with adherent sigmoid: Poorly differentiated infiltrating adenocarcinoma measuring 9.0 cm. Adenocarcinoma infiltrates through the bowel wall and through the visceral peritoneum and infiltrates the adherent sigmoid colon 3 of 17 lymph nodes are positive for metastatic carcinoma. Lymphovascular space & Perineural invasion is identified. The surgical margins negative.   Appendix with serosal tumor implants and acute inflammation. Peritonitis. Liver, biopsy: Metastatic adenocarcinoma.   Retroperitoneal biopsy: Adenocarcinoma with necrosis. No loss of nuclear expression of MMR proteins (MLH1, MSH2, MSH6, and PMS2). Staging pT4b pN1b pM1c.    - 06/22/2022: Started FOLFOXIRI with Bevacizumab; last dose of FOLFIRI and Bevacizumab received on 01/04/2023; S/p Oxaliplatin C10  - 09/01/22 CT CAP: Segmental left lower lobe pulmonary embolus and probable smaller pulmonary emboli within the right lung. Interval decrease in size of bilateral pulmonary nodules and hepatic metastases compared to 05/25/2022. No new sites of disease.  - 06/13/23 CT CAP: New, 8 mm right lower lobe nodule and enlarging pulmonary nodules, 7 mm left apical nodule previously measured 6 mm. Bilobar hepatic metastases, the majority of which are calcified and not significantly changed. A noncalcified lesion in the right hepatic lobe demonstrates mild interval growth, 1.7 x 1.4 cm previously 1.4 x 1.3 cm  - On chemo FOLFIRI, and Avastin, most recent dose 08/16/23  - No inpatient tx, continue supportive measures as necessary         # Leukocytosis    - WBC count remains elevated but has come down to approx 15K  - Intermittent spikes >100K over the past few months 2/2 known OnPro (Neulasta) use with chemo dosing  - CDiff / GI panel negative during previous admissions  - Continue to trend serial CBCs  - Monitor temp      # LLE Ext Iliac Artery Occlusion    - Dx during previous admission 07/2023  - Patient was taking DOAC (Eliquis) for prior VTE in setting of metastatic disease but developed arterial thrombosis  - 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

## 2023-08-26 NOTE — PROGRESS NOTE ADULT - ASSESSMENT
51 y/o male with metastatic colon cancer on chemo, PE  admitted for:        1. Leukocytosis SIRS, 2/2 to norovirus infection  supportive care  Hold abx for now  blood culture NGTD   CT abd/pelvis - no infectious/inflammatory process   CTA; no PNA   Off abxs now as per ID     2. High output ileostomy 2/2 to norovirus: dehydration   -supportive care  -bulking agents  - no immodium    3. 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 Known Occlusion of left external iliac artery  - likely due to metastatic malignancy with hx of missed doses/non compliance  - s/p LLE angio in 7/2023 with Dr vasques  - on LMWH s/p ivc filter  - Vascular consult  - RLE DVT identified 7/2023  US -lower extremities-7/14/23 & 7/16/23- noted acute nonocclusive deep venous thrombosis in the right common femoral vein, above the knee only, appears unchanged from 07/14/2023.  No evidence of propagation.s/p IVC filter 7/17/23  ECHO:  bubble study neg for shunts       5. Metastatic colon cancer  to the liver and lung on chemotherapy  on palliative chemotherapy:  initially FOLFOXIRI / sunny , after 10 cycles changed to FOLFIRI/ Bevacizumab.  per palliative  cw fentanyl 25mh/h every 72 hrs, and oxycodone 10mg q4hrs prn        DVT ppx:  Lovenox  Code status: Full code      53 y/o male with metastatic colon cancer on chemo, PE  admitted for:        1. Leukocytosis SIRS, 2/2 to norovirus infection  supportive care  Hold abx for now  blood culture NGTD   CT abd/pelvis - no infectious/inflammatory process   CTA; no PNA   Off abxs now as per ID     2. High output ileostomy 2/2 to norovirus: dehydration   Hyponatremia  WARREN 2/2 to above  -supportive care, ivf  -bulking agents  - no immodium    3. 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 Known Occlusion of left external iliac artery  - likely due to metastatic malignancy with hx of missed doses/non compliance  - s/p LLE angio in 7/2023 with Dr vasques  - on LMWH s/p ivc filter  - Vascular consult  - RLE DVT identified 7/2023  US -lower extremities-7/14/23 & 7/16/23- noted acute nonocclusive deep venous thrombosis in the right common femoral vein, above the knee only, appears unchanged from 07/14/2023.  No evidence of propagation.s/p IVC filter 7/17/23  ECHO:  bubble study neg for shunts       5. Metastatic colon cancer  to the liver and lung on chemotherapy  on palliative chemotherapy:  initially FOLFOXIRI / sunny , after 10 cycles changed to FOLFIRI/ Bevacizumab.  per palliative  cw fentanyl 25mh/h every 72 hrs, and oxycodone 10mg q4hrs prn        DVT ppx:  Lovenox  Code status: Full code

## 2023-08-26 NOTE — PROGRESS NOTE ADULT - SUBJECTIVE AND OBJECTIVE BOX
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 The patient states he has been having generalized abdominal pain constantly for the past several days unable to tolerate PO (both solids and liquids), and was evaluated twice in the ED for his symptoms. The patient states his "workup" was reportedly negative and discharged. The patient denies any associated subjective fevers, chills, or recent sick contacts. The patient reports running out of Oxycodone 3 days ago and states is taking 40mg of Oxycodone daily to manage his pain.  is his pain management MD and also reports using Fentanyl patches to manage his pain.        INTERVAL HPI/ OVERNIGHT EVENTS: + norovirus    ICU Vital Signs Last 24 Hrs  T(C): 36.6 (26 Aug 2023 09:23), Max: 36.9 (25 Aug 2023 15:58)  T(F): 97.9 (26 Aug 2023 09:23), Max: 98.4 (25 Aug 2023 15:58)  HR: 104 (26 Aug 2023 09:23) (101 - 120)  BP: 119/82 (26 Aug 2023 09:23) (113/78 - 136/90)  BP(mean): 100 (25 Aug 2023 19:38) (100 - 100)  ABP: --  ABP(mean): --  RR: 18 (26 Aug 2023 09:23) (18 - 20)  SpO2: 100% (26 Aug 2023 09:23) (95% - 100%)    O2 Parameters below as of 26 Aug 2023 09:23  Patient On (Oxygen Delivery Method): room air          REVIEW OF SYSTEMS:  All other review of systems is negative unless indicated above.      PHYSICAL EXAM:  General: Well developed; in no acute distress  Eyes: EOMI; conjunctiva and sclera clear  Head: Normocephalic; atraumatic  ENMT: No nasal discharge; airway clear  Neck: Supple; non tender; no masses  Respiratory: No wheezes, rales or rhonchi  Cardiovascular: Regular rate and rhythm. S1 and S2 Normal;  Gastrointestinal: Soft non-tender non-distended; Normal bowel sounds, ileostomy  in place , stump pink.   Genitourinary: No  suprapubic  tenderness  Extremities: Normal range of motion, No edema, some R calf tenderness   Vascular: Peripheral DP pulses not  palpable, R groin  tenderness to palpation    Neurological: Alert and oriented x 3, non focal   Skin: Warm and dry. No acute rash  Musculoskeletal: Normal muscle tone, without deformities  Psychiatric: Cooperative and appropriate      LABS:                         12.3   17.14 )-----------( 239      ( 21 Jul 2023 07:11 )             38.2     < from: US Duplex Arterial Lower Ext Ltd, Right (08.25.23 @ 19:39) >  FINDINGS:/  IMPRESSION:    3.6 x 1.7 x 2.4 cm right common femoral artery pseudoaneurysm is largely   thrombosed but with a small residual patent lumen measuring 1.4 x 0.8 x   1.4 cm.  Adjacent right common femoral vein is noted to be patent and free of   thrombus.    --- End of Report ---    < end of copied text >    MEDICATIONS  (STANDING):  dextrose 5% + sodium chloride 0.45% with potassium chloride 20 mEq/L 1000 milliLiter(s) (125 mL/Hr) IV Continuous <Continuous>  enoxaparin Injectable 90 milliGRAM(s) SubCutaneous every 12 hours  fentaNYL   Patch  25 MICROgram(s)/Hr 1 Patch Transdermal every 72 hours  gabapentin 100 milliGRAM(s) Oral every 8 hours  lidocaine   4% Patch 1 Patch Transdermal daily  loperamide 4 milliGRAM(s) Oral three times a day  naloxone Injectable 0.4 milliGRAM(s) IV Push once  psyllium Powder 1 Packet(s) Oral every 12 hours  psyllium Powder      sodium chloride 0.9%. 1000 milliLiter(s) (150 mL/Hr) IV Continuous <Continuous>    MEDICATIONS  (PRN):  acetaminophen     Tablet .. 650 milliGRAM(s) Oral every 6 hours PRN Temp greater or equal to 38C (100.4F), Mild Pain (1 - 3)  aluminum hydroxide/magnesium hydroxide/simethicone Suspension 30 milliLiter(s) Oral every 4 hours PRN Dyspepsia  melatonin 3 milliGRAM(s) Oral at bedtime PRN Insomnia  ondansetron Injectable 4 milliGRAM(s) IV Push every 8 hours PRN Nausea and/or Vomiting  oxyCODONE    IR 10 milliGRAM(s) Oral every 4 hours PRN Moderate Pain (4 - 6)      < from: US Duplex Arterial Lower Ext Ltd, Right (08.25.23 @ 19:39) >    3.6 x 1.7 x 2.4 cm right common femoral artery pseudoaneurysm is largely   thrombosed but with a small residual patent lumen measuring 1.4 x 0.8 x   1.4 cm.  Adjacent right common femoral vein is noted to be patent and free of   thrombus.    < end of copied text >

## 2023-08-27 LAB
ANION GAP SERPL CALC-SCNC: 7 MMOL/L — SIGNIFICANT CHANGE UP (ref 5–17)
BUN SERPL-MCNC: 19 MG/DL — SIGNIFICANT CHANGE UP (ref 7–23)
CALCIUM SERPL-MCNC: 8.4 MG/DL — LOW (ref 8.5–10.1)
CHLORIDE SERPL-SCNC: 102 MMOL/L — SIGNIFICANT CHANGE UP (ref 96–108)
CO2 SERPL-SCNC: 22 MMOL/L — SIGNIFICANT CHANGE UP (ref 22–31)
CREAT SERPL-MCNC: 0.83 MG/DL — SIGNIFICANT CHANGE UP (ref 0.5–1.3)
EGFR: 107 ML/MIN/1.73M2 — SIGNIFICANT CHANGE UP
GLUCOSE SERPL-MCNC: 101 MG/DL — HIGH (ref 70–99)
HCT VFR BLD CALC: 40.6 % — SIGNIFICANT CHANGE UP (ref 39–50)
HGB BLD-MCNC: 13.2 G/DL — SIGNIFICANT CHANGE UP (ref 13–17)
MCHC RBC-ENTMCNC: 28.3 PG — SIGNIFICANT CHANGE UP (ref 27–34)
MCHC RBC-ENTMCNC: 32.5 GM/DL — SIGNIFICANT CHANGE UP (ref 32–36)
MCV RBC AUTO: 86.9 FL — SIGNIFICANT CHANGE UP (ref 80–100)
PLATELET # BLD AUTO: 134 K/UL — LOW (ref 150–400)
POTASSIUM SERPL-MCNC: 4.2 MMOL/L — SIGNIFICANT CHANGE UP (ref 3.5–5.3)
POTASSIUM SERPL-SCNC: 4.2 MMOL/L — SIGNIFICANT CHANGE UP (ref 3.5–5.3)
RBC # BLD: 4.67 M/UL — SIGNIFICANT CHANGE UP (ref 4.2–5.8)
RBC # FLD: 16.8 % — HIGH (ref 10.3–14.5)
SODIUM SERPL-SCNC: 131 MMOL/L — LOW (ref 135–145)
WBC # BLD: 20.33 K/UL — HIGH (ref 3.8–10.5)
WBC # FLD AUTO: 20.33 K/UL — HIGH (ref 3.8–10.5)

## 2023-08-27 PROCEDURE — 99232 SBSQ HOSP IP/OBS MODERATE 35: CPT

## 2023-08-27 PROCEDURE — 99233 SBSQ HOSP IP/OBS HIGH 50: CPT

## 2023-08-27 PROCEDURE — 99232 SBSQ HOSP IP/OBS MODERATE 35: CPT | Mod: GC

## 2023-08-27 RX ORDER — LOPERAMIDE HCL 2 MG
4 TABLET ORAL
Refills: 0 | Status: DISCONTINUED | OUTPATIENT
Start: 2023-08-27 | End: 2023-08-28

## 2023-08-27 RX ORDER — LOPERAMIDE HCL 2 MG
4 TABLET ORAL
Refills: 0 | Status: DISCONTINUED | OUTPATIENT
Start: 2023-08-27 | End: 2023-08-27

## 2023-08-27 RX ORDER — SODIUM CHLORIDE 9 MG/ML
1000 INJECTION INTRAMUSCULAR; INTRAVENOUS; SUBCUTANEOUS
Refills: 0 | Status: DISCONTINUED | OUTPATIENT
Start: 2023-08-27 | End: 2023-08-29

## 2023-08-27 RX ADMIN — OXYCODONE HYDROCHLORIDE 10 MILLIGRAM(S): 5 TABLET ORAL at 16:43

## 2023-08-27 RX ADMIN — SODIUM CHLORIDE 125 MILLILITER(S): 9 INJECTION INTRAMUSCULAR; INTRAVENOUS; SUBCUTANEOUS at 09:45

## 2023-08-27 RX ADMIN — SODIUM CHLORIDE 75 MILLILITER(S): 9 INJECTION INTRAMUSCULAR; INTRAVENOUS; SUBCUTANEOUS at 03:23

## 2023-08-27 RX ADMIN — ENOXAPARIN SODIUM 90 MILLIGRAM(S): 100 INJECTION SUBCUTANEOUS at 22:07

## 2023-08-27 RX ADMIN — SODIUM CHLORIDE 125 MILLILITER(S): 9 INJECTION INTRAMUSCULAR; INTRAVENOUS; SUBCUTANEOUS at 22:07

## 2023-08-27 RX ADMIN — OXYCODONE HYDROCHLORIDE 10 MILLIGRAM(S): 5 TABLET ORAL at 22:06

## 2023-08-27 RX ADMIN — OXYCODONE HYDROCHLORIDE 10 MILLIGRAM(S): 5 TABLET ORAL at 09:45

## 2023-08-27 RX ADMIN — OXYCODONE HYDROCHLORIDE 10 MILLIGRAM(S): 5 TABLET ORAL at 03:07

## 2023-08-27 RX ADMIN — OXYCODONE HYDROCHLORIDE 10 MILLIGRAM(S): 5 TABLET ORAL at 23:08

## 2023-08-27 RX ADMIN — OXYCODONE HYDROCHLORIDE 10 MILLIGRAM(S): 5 TABLET ORAL at 16:13

## 2023-08-27 RX ADMIN — Medication 4 MILLIGRAM(S): at 16:13

## 2023-08-27 RX ADMIN — OXYCODONE HYDROCHLORIDE 10 MILLIGRAM(S): 5 TABLET ORAL at 03:37

## 2023-08-27 RX ADMIN — Medication 4 MILLIGRAM(S): at 09:46

## 2023-08-27 RX ADMIN — Medication 4 MILLIGRAM(S): at 22:06

## 2023-08-27 RX ADMIN — ENOXAPARIN SODIUM 90 MILLIGRAM(S): 100 INJECTION SUBCUTANEOUS at 09:45

## 2023-08-27 RX ADMIN — OXYCODONE HYDROCHLORIDE 10 MILLIGRAM(S): 5 TABLET ORAL at 10:15

## 2023-08-27 NOTE — PROGRESS NOTE ADULT - SUBJECTIVE AND OBJECTIVE BOX
Pt seen and examined at bedside this AM. Pt admits to mild abdominal pain, improving since admission. Feels weak, like he "has no energy". Tolerated diet yesterday without n/v. Osotmy functioning.     Vital Signs Last 24 Hrs  T(C): 36.8 (26 Aug 2023 22:23), Max: 36.8 (26 Aug 2023 22:23)  T(F): 98.2 (26 Aug 2023 22:23), Max: 98.2 (26 Aug 2023 22:23)  HR: 89 (26 Aug 2023 22:23) (89 - 104)  BP: 118/77 (26 Aug 2023 22:23) (118/77 - 129/75)  BP(mean): --  RR: 17 (26 Aug 2023 22:23) (16 - 18)  SpO2: 99% (26 Aug 2023 22:23) (96% - 100%)    Parameters below as of 26 Aug 2023 22:23  Patient On (Oxygen Delivery Method): room air        Labs:                 14.5   14.78 )-----------( 171      ( 26 Aug 2023 06:19 )             44.2     CBC Full  -  ( 26 Aug 2023 06:19 )  WBC Count : 14.78 K/uL  RBC Count : 5.10 M/uL  Hemoglobin : 14.5 g/dL  Hematocrit : 44.2 %  Platelet Count - Automated : 171 K/uL  Mean Cell Volume : 86.7 fl  Mean Cell Hemoglobin : 28.4 pg  Mean Cell Hemoglobin Concentration : 32.8 gm/dL  Auto Neutrophil # : 10.33 K/uL  Auto Lymphocyte # : 2.33 K/uL  Auto Monocyte # : 1.66 K/uL  Auto Eosinophil # : 0.29 K/uL  Auto Basophil # : 0.06 K/uL  Auto Neutrophil % : 69.9 %  Auto Lymphocyte % : 15.8 %  Auto Monocyte % : 11.2 %  Auto Eosinophil % : 2.0 %  Auto Basophil % : 0.4 %    08-26    130<L>  |  102  |  25<H>  ----------------------------<  112<H>  4.7   |  25  |  1.20    Ca    8.9      26 Aug 2023 06:19    TPro  8.3  /  Alb  3.9  /  TBili  0.5  /  DBili  x   /  AST  36  /  ALT  101<H>  /  AlkPhos  218<H>  08-26    LIVER FUNCTIONS - ( 26 Aug 2023 06:19 )  Alb: 3.9 g/dL / Pro: 8.3 gm/dL / ALK PHOS: 218 U/L / ALT: 101 U/L / AST: 36 U/L / GGT: x                 Meds:  acetaminophen     Tablet .. 650 milliGRAM(s) Oral every 6 hours PRN  enoxaparin Injectable 90 milliGRAM(s) SubCutaneous every 12 hours  fentaNYL   Patch  25 MICROgram(s)/Hr 1 Patch Transdermal every 72 hours  melatonin 3 milliGRAM(s) Oral at bedtime PRN  ondansetron Injectable 4 milliGRAM(s) IV Push every 8 hours PRN  oxyCODONE    IR 10 milliGRAM(s) Oral every 6 hours PRN  sodium chloride 0.9%. 1000 milliLiter(s) IV Continuous <Continuous>      Physical Examination:  AO x3, NAD  GENERAL: No acute distress, well-developed  HEAD:  Atraumatic, Normocephalic  CHEST/LUNG: CTAB; No wheezes, rales, or rhonchi  HEART: Regular rate and rhythm; No murmurs, rubs, or gallops  ABDOMEN: Soft, mild tenderness RLQ around the ileostomy site,  non-distended, Right side ileostomy thick green stool. Osotmy prominent, pink.  ext. no cyanosis or edema, R DP/PT pulse palpable, L DP dopp, non dop PT, Right groin femoral pulse palpable, Left fem palpable   NEUROLOGY: responding appropriately, no focal deficits

## 2023-08-27 NOTE — PROGRESS NOTE ADULT - SUBJECTIVE AND OBJECTIVE BOX
Date of service: 08-27-23 @ 13:23    pt seen and examined   has diarrhea   norovirus positive    feels weak, has abd discomfort    ROS: no fever or chills; denies dizziness, no HA, no SOB or cough, no abdominal pain, no diarrhea or constipation; no dysuria, no urinary frequency, no legs pain, no rashes    MEDICATIONS  (STANDING):  enoxaparin Injectable 90 milliGRAM(s) SubCutaneous every 12 hours  fentaNYL   Patch  25 MICROgram(s)/Hr 1 Patch Transdermal every 72 hours  loperamide 4 milliGRAM(s) Oral four times a day  sodium chloride 0.9%. 1000 milliLiter(s) (125 mL/Hr) IV Continuous <Continuous>    Vital Signs Last 24 Hrs  T(C): 37.1 (27 Aug 2023 15:38), Max: 37.1 (27 Aug 2023 15:38)  T(F): 98.8 (27 Aug 2023 15:38), Max: 98.8 (27 Aug 2023 15:38)  HR: 82 (27 Aug 2023 15:38) (82 - 89)  BP: 116/74 (27 Aug 2023 15:38) (115/75 - 118/77)  BP(mean): --  RR: 17 (27 Aug 2023 15:38) (16 - 17)  SpO2: 100% (27 Aug 2023 15:38) (99% - 100%)    Parameters below as of 27 Aug 2023 15:38  Patient On (Oxygen Delivery Method): room air      PE:  Constitutional: NAD   HEENT: NC/AT, EOMI, PERRLA, conjunctivae clear; ears and nose atraumatic; pharynx benign  Neck: supple; thyroid not palpable  Back: no tenderness  Respiratory: respiratory effort normal; clear to auscultation  Cardiovascular: S1S2 regular, no murmurs  Abdomen: soft, not tender, not distended, positive BS; liver and spleen WNL  Genitourinary: no suprapubic tenderness  Lymphatic: no LN palpable  Musculoskeletal: no muscle tenderness, no joint swelling or tenderness  Extremities: no pedal edema  Neurological/ Psychiatric: AxOx3, Judgement and insight normal;  moving all extremities  Skin: no rashes; no palpable lesions    Labs: all available labs reviewed                        13.2   20.33 )-----------( 134      ( 27 Aug 2023 06:33 )             40.6     08-27    131<L>  |  102  |  19  ----------------------------<  101<H>  4.2   |  22  |  0.83    Ca    8.4<L>      27 Aug 2023 06:33    TPro  8.3  /  Alb  3.9  /  TBili  0.5  /  DBili  x   /  AST  36  /  ALT  101<H>  /  AlkPhos  218<H>  08-26        LIVER FUNCTIONS - ( 26 Aug 2023 06:19 )  Alb: 3.9 g/dL / Pro: 8.3 gm/dL / ALK PHOS: 218 U/L / ALT: 101 U/L / AST: 36 U/L / GGT: x           Urinalysis Basic - ( 26 Aug 2023 06:19 )    Color: x / Appearance: x / SG: x / pH: x  Gluc: 112 mg/dL / Ketone: x  / Bili: x / Urobili: x   Blood: x / Protein: x / Nitrite: x   Leuk Esterase: x / RBC: x / WBC x   Sq Epi: x / Non Sq Epi: x / Bacteria: x    Culture - Blood (08.25.23 @ 17:44)   Specimen Source: .Blood None  Culture Results:   No growth at 24 hours  Culture - Blood (08.25.23 @ 17:40)   Specimen Source: .Blood None  Culture Results:   No growth at 24 hours  Culture - Urine (08.24.23 @ 21:27)   Specimen Source: Clean Catch Clean Catch (Midstream)  Culture Results:   10,000 - 49,000 CFU/mL Streptococcus agalactiae (Group B) isolated   Group B streptococci are susceptible to ampicillin,   penicillin and cefazolin, but may be resistant to   erythromycin and clindamycin.   Recommendations for intrapartum prophylaxis for Group B   streptococci are penicillin or ampicillin.      Radiology: all available radiological tests reviewed  < from: CT Abdomen and Pelvis w/ IV Cont (08.25.23 @ 15:06) >  ACC: [id number] EXAM:  CT ABDOMEN AND PELVIS IC   ORDERED BY: DENNISE DE LA CRUZ     PROCEDURE DATE:  08/25/2023          INTERPRETATION:  CLINICAL INFORMATION: Abdominal pain    COMPARISON: CT scan of the abdomen and pelvis from 8/24/2023    CONTRAST/COMPLICATIONS:  IV Contrast: Omnipaque 350  90 cc administered   10 cc discarded  Oral Contrast: NONE  Complications: None reported at time of study completion    PROCEDURE:  CT of the Abdomen and Pelvis was performed.  Sagittal and coronal reformats were performed.    FINDINGS:  LOWER CHEST: Within normal limits.    LIVER: Multiple calcified and noncalcified liver metastases which are   grossly stable. For example, lesion in segment 2 measuring 3.8 x 2.7 cm   on series 2 image 11 has not significantly changed.  BILE DUCTS: Normal caliber.  GALLBLADDER: Higher attenuation within the gallbladder may represent   vicarious excretion of contrast material from prior injection.  SPLEEN: Within normal limits.  PANCREAS: Within normal limits.  ADRENALS: Within normal limits.  KIDNEYS/URETERS: Within normal limits.    BLADDER: Within normal limits.  REPRODUCTIVE ORGANS: Grossly unremarkable.    BOWEL: Status post colectomy with creation of a rectal stump and   ileostomy exiting the right lower quadrant. This is stable in appearance.  PERITONEUM: No ascites. Nodular soft tissue thickening in the right lower   pelvis laterally extending towards the inguinal region which is grossly   stable in appearance.  VESSELS: Vascular calcifications. IVC filter. 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 Focal pooling of contrast seen   between the right common femoral artery and vein which is unchanged. This   is enhancing similar to the artery and a pseudoaneurysm cannot be   excluded. Limited evaluation of the right common femoral vein.  RETROPERITONEUM/LYMPH NODES: No lymphadenopathy.  ABDOMINAL WALL: Subcutaneous soft tissue nodule opacities in the left   anterior abdominal wall may be due to subcutaneous injections.   Postsurgical changes.  BONES: Mild degenerative changes.    IMPRESSION:  Both of contrast between the right common femoral artery and vein which   is enhancing similar to the artery. A pseudoaneurysm cannot be excluded.   This was present on the prior study. Additionally, I cannot assess the   patency of the right common femoral vein. Right lower extremity   ultrasound of the inguinal region is recommended.    Grossly stable liver metastases. Postsurgical changes.      < end of copied text >  < from: US Duplex Venous Lower Ext Complete, Bilateral (08.24.23 @ 22:04) >  ACC: [id number] EXAM:  US DPLX LWR EXT VEINS COMPL BI   ORDERED BY: TITO KEMP     PROCEDURE DATE:  08/24/2023          INTERPRETATION:  CLINICAL INFORMATION: Abdominal pain    COMPARISON: Right lower extremity venous duplex ultrasound 7/14/2023,   bilateral lower extremity venous duplex ultrasound 3/16/2023    TECHNIQUE: Duplex sonography of the BILATERAL LOWER extremity veins with   color and spectral Doppler, with and without compression.    FINDINGS:    RIGHT:  Nonocclusive thrombus within the common femoral vein.  No RIGHT calf vein thrombosis is detected.    LEFT:  Normal compressibility of the LEFT common femoral, femoral and popliteal   veins.  Doppler examination shows normal spontaneous and phasic flow.  No LEFT calf vein thrombosis is detected.    IMPRESSION:      Nonocclusive thrombus involving the right common femoral vein, similar to   prior.        < end of copied text >    Advanced directives addressed: full resuscitation

## 2023-08-27 NOTE — PROGRESS NOTE ADULT - ATTENDING COMMENTS
Patient seen and examined. His abdominal pain is improved. He has rhinovirus as well. Ileostomy output recorded as a liter in the past 24 hours. No contraindication for imodium with rhinovirus. WBC is up to 20 again. No surgical intraabdominal source

## 2023-08-27 NOTE — PROGRESS NOTE ADULT - ASSESSMENT
53 y/o M with MH significant for Stage IV colon cancer with metastases to the liver and lung, currently on chemotherapy, s/p ileostomy and revision 04/2023 and subtotal colectomy for perforated cecal mass 2022, readmitted for recurrent/persistent abdominal pain, poor PO intake.      # Metastatic Colon CA with Liver & Lung Metastases, S/p Ileostomy    - CT imaging during this admission and previous with no evidence of acute intra-abdominal infectious/inflammatory processes   - Dx 05/2022 after going to ED for worsening abdominal pain  - 05/19/22 CT AP with apparent focal mural thickening at the cecum/proximal ascending colon with focal tumor invasion/extension from the cecum to the sigmoid colon. Multiple hypodense lesions with calcifications in both lobes of the liver with the largest measuring 5.8 x 6.0 cm in hepatic segment.  - 05/19/22: underwent exploratory laparotomy, total colectomy, end ileostomy, biopsy of liver, and biopsy of retroperitoneum.    Pathology:  Omentum negative for malignancy  Portion of terminal ileum, cecum with adherent sigmoid: Poorly differentiated infiltrating adenocarcinoma measuring 9.0 cm. Adenocarcinoma infiltrates through the bowel wall and through the visceral peritoneum and infiltrates the adherent sigmoid colon 3 of 17 lymph nodes are positive for metastatic carcinoma. Lymphovascular space & Perineural invasion is identified. The surgical margins negative.   Appendix with serosal tumor implants and acute inflammation. Peritonitis. Liver, biopsy: Metastatic adenocarcinoma.   Retroperitoneal biopsy: Adenocarcinoma with necrosis. No loss of nuclear expression of MMR proteins (MLH1, MSH2, MSH6, and PMS2). Staging pT4b pN1b pM1c.    - 06/22/2022: Started FOLFOXIRI with Bevacizumab; last dose of FOLFIRI and Bevacizumab received on 01/04/2023; S/p Oxaliplatin C10  - 09/01/22 CT CAP: Segmental left lower lobe pulmonary embolus and probable smaller pulmonary emboli within the right lung. Interval decrease in size of bilateral pulmonary nodules and hepatic metastases compared to 05/25/2022. No new sites of disease.  - 06/13/23 CT CAP: New, 8 mm right lower lobe nodule and enlarging pulmonary nodules, 7 mm left apical nodule previously measured 6 mm. Bilobar hepatic metastases, the majority of which are calcified and not significantly changed. A noncalcified lesion in the right hepatic lobe demonstrates mild interval growth, 1.7 x 1.4 cm previously 1.4 x 1.3 cm  - On chemo FOLFIRI, and Avastin, most recent dose 08/16/23  - No inpatient tx, continue supportive measures as necessary     - Patient expressed concerns about current living arrangements, inability to adequately care for himself, manage hydration/pain/etc ; inquired about possibility of placement at independent / assisted living ---> recommend SW / Case management / Palliative involvement       # Leukocytosis    - WBC count remains elevated , up to 20K  - Intermittent spikes >100K over the past few months 2/2 known OnPro (Neulasta) use with chemo dosing  - CDiff / GI panel negative during previous admissions  - Now with Norovirus ---> likely additional factor   - Continue to trend serial CBCs  - Monitor temp      # LLE Ext Iliac Artery Occlusion    - Dx during previous admission 07/2023  - Patient was taking DOAC (Eliquis) for prior VTE in setting of metastatic disease but developed arterial thrombosis  - 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; LAC/APLS labs negative   - Patient receiving Bevacizumab which also rarely can contribute to thrombotic risk   - LLE angiogram with Vascular Sx 07/10; unable to traverse lesion  - Transitioned to Lovenox 90 q 12h 07/13/23  - S/p IVC filter 07/17/23  - Vascular following      # Abdominal Pain    - Intermittent ongoing symptoms for the past few months, reason for previous admission 07/2023  - CDiff GI PCR negative at that time ---> now positive for Norovirus   - Colorectal following  - ID consulted  - Continue supportive measures including pain management as necessary

## 2023-08-27 NOTE — PROGRESS NOTE ADULT - SUBJECTIVE AND OBJECTIVE BOX
HPI:    51 y/o M with a PMHx of Stage IV RS colon adenocarcinoma with metastasis to the liver and lung diagnosed in 05/2022, underwent exploratory laparotomy, total colectomy, and end ileostomy on 05/19/2022 and revision of ileostomy 04/2023, DVT/PE s/p IVC filter placement returned to the  for further evaluation and management ongoing abdominal pain. The patient stated he has been having generalized abdominal pain constantly for the past several days unable to tolerate PO (both solids and liquids), was evaluated twice in ED for his symptoms, reported his "workup" was negative. The patient denied any associated fevers, chills, or recent sick contacts. He admitted to running out of Oxycodone 40mg x3 days ago which he has been using daily to manage his pain. He follows with PM&R Dr Donis & Primary Onc Dr Serrano. (25 Aug 2023 18:33)      08/26/2023: Seen at bedside, no acute distress, upset about recurring hospitalizations and abdominal discomfort, requested help with placement at independent or assisted living facility     08/27/2023:       PAST MEDICAL & SURGICAL HISTORY:    Cancer, colon with liver & mets    S/P ileostomy    Metastasis to liver & lung    Pulmonary embolism / DVT    S/p IVC    LLE ext iliac artery occlusion     S/P colon resection    H/O ileostomy        MEDICATIONS  (STANDING):    enoxaparin Injectable 90 milliGRAM(s) SubCutaneous every 12 hours  fentaNYL   Patch  25 MICROgram(s)/Hr 1 Patch Transdermal every 72 hours  loperamide 4 milliGRAM(s) Oral four times a day  sodium chloride 0.9%. 1000 milliLiter(s) (125 mL/Hr) IV Continuous <Continuous>      MEDICATIONS  (PRN):    acetaminophen     Tablet .. 650 milliGRAM(s) Oral every 6 hours PRN Temp greater or equal to 38C (100.4F), Mild Pain (1 - 3)  melatonin 3 milliGRAM(s) Oral at bedtime PRN Insomnia  ondansetron Injectable 4 milliGRAM(s) IV Push every 8 hours PRN Nausea and/or Vomiting  oxyCODONE    IR 10 milliGRAM(s) Oral every 6 hours PRN Severe Pain (7 - 10)        ALLERGIES:    No Known Drug Allergies      FAMILY HISTORY:    dementia (Mother)      REVIEW OF SYSTEMS:    Constitutional, Eyes, ENT, Cardiovascular, Respiratory, Gastrointestinal, Genitourinary, Musculoskeletal, Integumentary, Neurological, Psychiatric, Endocrine, Heme/Lymph and Allergic/Immunologic review of systems are otherwise negative except as noted in HPI.       VITALS:    ICU Vital Signs Last 24 Hrs  T(C): 36.6 (27 Aug 2023 09:13), Max: 36.8 (26 Aug 2023 22:23)  T(F): 97.8 (27 Aug 2023 09:13), Max: 98.2 (26 Aug 2023 22:23)  HR: 85 (27 Aug 2023 09:13) (85 - 96)  BP: 115/75 (27 Aug 2023 09:13) (115/75 - 129/75)  BP(mean): --  ABP: --  ABP(mean): --  RR: 16 (27 Aug 2023 09:13) (16 - 17)  SpO2: 100% (27 Aug 2023 09:13) (96% - 100%)    O2 Parameters below as of 27 Aug 2023 09:13  Patient On (Oxygen Delivery Method): room air        PHYSICAL:    Constitutional: no acute distress  Eyes: no conjunctival infection, anicteric.   ENT: pharynx is unremarkable  Neck: supple without JVD   Pulmonary: clear to auscultation bilaterally  Cardiac: RRR   Vascular: no calf tenderness, venous stasis changes, varices.   Abdomen: normoactive bowel sounds, soft and nontender; ostomy bag in place draining greenish/brown   Lymphatic: no peripheral adenopathy appreciated.   Musculoskeletal: full range of motion and no deformities appreciated.   Skin: normal appearance, no rash/erythema.   Neurology: awake, alert, oriented; no focal deficits   Psychiatric: affect/mood/insight appropriate       LABS:                          13.2   20.33 )-----------( 134      ( 27 Aug 2023 06:33 )             40.6     08-27    131<L>  |  102  |  19  ----------------------------<  101<H>  4.2   |  22  |  0.83    Ca    8.4<L>      27 Aug 2023 06:33    TPro  8.3  /  Alb  3.9  /  TBili  0.5  /  DBili  x   /  AST  36  /  ALT  101<H>  /  AlkPhos  218<H>  08-26          RADIOLOGY & ADDITIONAL STUDIES:      CT Abdomen and Pelvis w/ IV Cont (08.25.23 @ 15:06)    IMPRESSION:  Both of contrast between the right common femoral artery and vein which   is enhancing similar to the artery. A pseudoaneurysm cannot be excluded.   This was present on the prior study. Additionally, I cannot assess the   patency of the right common femoral vein. Right lower extremity   ultrasound of the inguinal region is recommended.    Grossly stable liver metastases. Postsurgical changes.        US Duplex Arterial Lower Ext Ltd, Right (08.25.23 @ 19:39)    IMPRESSION:    3.6 x 1.7 x 2.4 cm right common femoral artery pseudoaneurysm is largely   thrombosed but with a small residual patent lumen measuring 1.4 x 0.8 x   1.4 cm.  Adjacent right common femoral vein is noted to be patent and free of   thrombus.    < from: CT Abdomen and Pelvis w/ IV Cont (08.25.23 @ 15:06) >    ACC: [id number] EXAM:  CT ABDOMEN AND PELVIS IC   ORDERED BY: DENNISE DE LA CRUZ     PROCEDURE DATE:  08/25/2023          INTERPRETATION:  CLINICAL INFORMATION: Abdominal pain    COMPARISON: CT scan of the abdomen and pelvis from 8/24/2023    CONTRAST/COMPLICATIONS:  IV Contrast: Omnipaque 350  90 cc administered   10 cc discarded  Oral Contrast: NONE  Complications: None reported at time of study completion    PROCEDURE:  CT of the Abdomen and Pelvis was performed.  Sagittal and coronal reformats were performed.    FINDINGS:  LOWER CHEST: Within normal limits.    LIVER: Multiple calcified and noncalcified liver metastases which are   grossly stable. For example, lesion in segment 2 measuring 3.8 x 2.7 cm   on series 2 image 11 has not significantly changed.  BILE DUCTS: Normal caliber.  GALLBLADDER: Higher attenuation within the gallbladder may represent   vicarious excretion of contrast material from prior injection.  SPLEEN: Within normal limits.  PANCREAS: Within normal limits.  ADRENALS: Within normal limits.  KIDNEYS/URETERS: Within normal limits.    BLADDER: Within normal limits.  REPRODUCTIVE ORGANS: Grossly unremarkable.    BOWEL: Status post colectomy with creation of a rectal stump and   ileostomy exiting the right lower quadrant. This is stable in appearance.  PERITONEUM: No ascites. Nodular soft tissue thickening in the right lower   pelvis laterally extending towards the inguinal region which is grossly   stable in appearance.  VESSELS: Vascular calcifications. IVC filter. 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 Focal pooling of contrast seen   between the right common femoral artery and vein which is unchanged. This   is enhancing similar to the artery and a pseudoaneurysm cannot be   excluded. Limited evaluation of the right common femoral vein.  RETROPERITONEUM/LYMPH NODES: No lymphadenopathy.  ABDOMINAL WALL: Subcutaneous soft tissue nodule opacities in the left   anterior abdominal wall may be due to subcutaneous injections.   Postsurgical changes.  BONES: Mild degenerative changes.    IMPRESSION:  Both of contrast between the right common femoral artery and vein which   is enhancing similar to the artery. A pseudoaneurysm cannot be excluded.   This was present on the prior study. Additionally, I cannot assess the   patency of the right common femoral vein. Right lower extremity   ultrasound of the inguinal region is recommended.    Grossly stable liver metastases. Postsurgical changes.        < end of copied text >         HPI:    51 y/o M with a PMHx of Stage IV RS colon adenocarcinoma with metastasis to the liver and lung diagnosed in 05/2022, underwent exploratory laparotomy, total colectomy, and end ileostomy on 05/19/2022 and revision of ileostomy 04/2023, DVT/PE s/p IVC filter placement returned to the  for further evaluation and management ongoing abdominal pain. The patient stated he has been having generalized abdominal pain constantly for the past several days unable to tolerate PO (both solids and liquids), was evaluated twice in ED for his symptoms, reported his "workup" was negative. The patient denied any associated fevers, chills, or recent sick contacts. He admitted to running out of Oxycodone 40mg x3 days ago which he has been using daily to manage his pain. He follows with PM&R Dr Donis & Primary Onc Dr Serrano. (25 Aug 2023 18:33)      08/26/2023: Seen at bedside, no acute distress, upset about recurring hospitalizations and abdominal discomfort, requested help with placement at independent or assisted living facility     08/27/2023: Seen at bedside, no acute distress      PAST MEDICAL & SURGICAL HISTORY:    Cancer, colon with liver & mets    S/P ileostomy    Metastasis to liver & lung    Pulmonary embolism / DVT    S/p IVC    LLE ext iliac artery occlusion     S/P colon resection    H/O ileostomy        MEDICATIONS  (STANDING):    enoxaparin Injectable 90 milliGRAM(s) SubCutaneous every 12 hours  fentaNYL   Patch  25 MICROgram(s)/Hr 1 Patch Transdermal every 72 hours  loperamide 4 milliGRAM(s) Oral four times a day  sodium chloride 0.9%. 1000 milliLiter(s) (125 mL/Hr) IV Continuous <Continuous>      MEDICATIONS  (PRN):    acetaminophen     Tablet .. 650 milliGRAM(s) Oral every 6 hours PRN Temp greater or equal to 38C (100.4F), Mild Pain (1 - 3)  melatonin 3 milliGRAM(s) Oral at bedtime PRN Insomnia  ondansetron Injectable 4 milliGRAM(s) IV Push every 8 hours PRN Nausea and/or Vomiting  oxyCODONE    IR 10 milliGRAM(s) Oral every 6 hours PRN Severe Pain (7 - 10)        ALLERGIES:    No Known Drug Allergies      FAMILY HISTORY:    dementia (Mother)      REVIEW OF SYSTEMS:    Constitutional, Eyes, ENT, Cardiovascular, Respiratory, Gastrointestinal, Genitourinary, Musculoskeletal, Integumentary, Neurological, Psychiatric, Endocrine, Heme/Lymph and Allergic/Immunologic review of systems are otherwise negative except as noted in HPI.       VITALS:    ICU Vital Signs Last 24 Hrs  T(C): 36.6 (27 Aug 2023 09:13), Max: 36.8 (26 Aug 2023 22:23)  T(F): 97.8 (27 Aug 2023 09:13), Max: 98.2 (26 Aug 2023 22:23)  HR: 85 (27 Aug 2023 09:13) (85 - 96)  BP: 115/75 (27 Aug 2023 09:13) (115/75 - 129/75)  BP(mean): --  ABP: --  ABP(mean): --  RR: 16 (27 Aug 2023 09:13) (16 - 17)  SpO2: 100% (27 Aug 2023 09:13) (96% - 100%)    O2 Parameters below as of 27 Aug 2023 09:13  Patient On (Oxygen Delivery Method): room air        PHYSICAL:    Constitutional: no acute distress  Eyes: no conjunctival infection, anicteric.   ENT: pharynx is unremarkable  Neck: supple without JVD   Pulmonary: clear to auscultation bilaterally  Cardiac: RRR   Vascular: no calf tenderness, venous stasis changes, varices.   Abdomen: normoactive bowel sounds, soft and nontender; ostomy bag in place draining greenish/brown   Lymphatic: no peripheral adenopathy appreciated.   Musculoskeletal: full range of motion and no deformities appreciated.   Skin: normal appearance, no rash/erythema.   Neurology: awake, alert, oriented; no focal deficits   Psychiatric: affect/mood/insight appropriate       LABS:                          13.2   20.33 )-----------( 134      ( 27 Aug 2023 06:33 )             40.6     08-27    131<L>  |  102  |  19  ----------------------------<  101<H>  4.2   |  22  |  0.83    Ca    8.4<L>      27 Aug 2023 06:33    TPro  8.3  /  Alb  3.9  /  TBili  0.5  /  DBili  x   /  AST  36  /  ALT  101<H>  /  AlkPhos  218<H>  08-26          RADIOLOGY & ADDITIONAL STUDIES:            US Duplex Arterial Lower Ext Ltd, Right (08.25.23 @ 19:39)    IMPRESSION:    3.6 x 1.7 x 2.4 cm right common femoral artery pseudoaneurysm is largely   thrombosed but with a small residual patent lumen measuring 1.4 x 0.8 x   1.4 cm.  Adjacent right common femoral vein is noted to be patent and free of   thrombus.        CT Abdomen and Pelvis w/ IV Cont (08.25.23 @ 15:06)    ACC: [id number] EXAM:  CT ABDOMEN AND PELVIS IC   ORDERED BY: DENNISE DE LA CRUZ     PROCEDURE DATE:  08/25/2023          INTERPRETATION:  CLINICAL INFORMATION: Abdominal pain    COMPARISON: CT scan of the abdomen and pelvis from 8/24/2023    CONTRAST/COMPLICATIONS:  IV Contrast: Omnipaque 350  90 cc administered   10 cc discarded  Oral Contrast: NONE  Complications: None reported at time of study completion    PROCEDURE:  CT of the Abdomen and Pelvis was performed.  Sagittal and coronal reformats were performed.    FINDINGS:  LOWER CHEST: Within normal limits.    LIVER: Multiple calcified and noncalcified liver metastases which are   grossly stable. For example, lesion in segment 2 measuring 3.8 x 2.7 cm   on series 2 image 11 has not significantly changed.  BILE DUCTS: Normal caliber.  GALLBLADDER: Higher attenuation within the gallbladder may represent   vicarious excretion of contrast material from prior injection.  SPLEEN: Within normal limits.  PANCREAS: Within normal limits.  ADRENALS: Within normal limits.  KIDNEYS/URETERS: Within normal limits.    BLADDER: Within normal limits.  REPRODUCTIVE ORGANS: Grossly unremarkable.    BOWEL: Status post colectomy with creation of a rectal stump and   ileostomy exiting the right lower quadrant. This is stable in appearance.  PERITONEUM: No ascites. Nodular soft tissue thickening in the right lower   pelvis laterally extending towards the inguinal region which is grossly   stable in appearance.  VESSELS: Vascular calcifications. IVC filter. 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 Focal pooling of contrast seen   between the right common femoral artery and vein which is unchanged. This   is enhancing similar to the artery and a pseudoaneurysm cannot be   excluded. Limited evaluation of the right common femoral vein.  RETROPERITONEUM/LYMPH NODES: No lymphadenopathy.  ABDOMINAL WALL: Subcutaneous soft tissue nodule opacities in the left   anterior abdominal wall may be due to subcutaneous injections.   Postsurgical changes.  BONES: Mild degenerative changes.    IMPRESSION:  Both of contrast between the right common femoral artery and vein which   is enhancing similar to the artery. A pseudoaneurysm cannot be excluded.   This was present on the prior study. Additionally, I cannot assess the   patency of the right common femoral vein. Right lower extremity   ultrasound of the inguinal region is recommended.    Grossly stable liver metastases. Postsurgical changes.

## 2023-08-27 NOTE — PROGRESS NOTE ADULT - ASSESSMENT
A/P:  53 y/o M with PMH Lung CA, colon cancer with metatastes to the liver  on chemotherapy,  h/o End ileostomy and revision 04/2023 and subtotal colectomy for perforated cecal mass 2022 presented with abdominal pain. No evidence of intra abdominal pathology on CT. C diff negative. Found to have Right CFA pseudoaneurysm on CT and duplex. Likely from right femoral access. Right groin is soft, non tender    P: no true neck for possible injection   repeat Right groin ultrasound on 8/28  if pseudoaneurysm grows in size will revisit surgical options  cont IV fluid hydration  monitor electrolytes and replete  pain control  zofran prn n/v  cont Lov 90 q12  Vascular surgery will follow  rest of management as per primary team     Plan to be discussed with Dr Cross

## 2023-08-27 NOTE — PROGRESS NOTE ADULT - ASSESSMENT
51 y/o male with metastatic colon cancer on chemo, PE  admitted for:        1. Leukocytosis SIRS, 2/2 to norovirus infection  supportive care. Immodiun. ivf  Hold abx for now  blood culture NGTD   CT abd/pelvis - no infectious/inflammatory process   CTA; no PNA   Off abxs now as per ID     2. High output ileostomy 2/2 to norovirus: dehydration   Hyponatremia  WARREN 2/2 to above  -supportive care, ivf  -bulking agents    3. 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 Known Occlusion of left external iliac artery  - vascular recc appreciated. F/U am ultrasound and eval size and need for possible surgical intervention  - likely due to metastatic malignancy with hx of missed doses/non compliance  - s/p LLE angio in 7/2023 with Dr vasques  - on LMWH s/p ivc filter  - RLE DVT identified 7/2023  US -lower extremities-7/14/23 & 7/16/23- noted acute nonocclusive deep venous thrombosis in the right common femoral vein, above the knee only, appears unchanged from 07/14/2023.  No evidence of propagation.s/p IVC filter 7/17/23        5. Metastatic colon cancer  to the liver and lung on chemotherapy  on palliative chemotherapy:  initially FOLFOXIRI / sunny , after 10 cycles changed to FOLFIRI/ Bevacizumab.  per palliative  cw fentanyl 25mh/h every 72 hrs, and oxycodone 10mg q4hrs prn        DVT ppx:  Lovenox  Code status: Full code     Dispo: continue ivf, monitor colostomy o/p. Vascular f/u regarding thrombosed psuedoanericays,     [de-identified] : General Exam\par \par Well developed, well nourished\par No apparent distress\par Oriented to person, place, and time\par Mood: Normal\par Affect: Normal\par Balance and coordination: Normal\par Gait: Normal\par \par Left shoulder exam\par \par Inspection: No swelling, ecchymosis or gross deformity.\par Skin: No masses, No lesions\par Neck: Negative Spurlings, full ROM without pain\par Tenderness: No bicipital tenderness, no tenderness to the greater tuberosity/RTC insertion, no anterior shoulder/lesser tuberosity tenderness. No tenderness SC joint, clavicle, AC joint.\par ROM: 160/60/T6\par Impingement tests: Positive Sin\par AC Joint: no pain with cross arm testing\par Biceps: Negative speed\par Strength: 4/5 abduction, external rotation, and internal rotation\par Neuro: AIN, PIN, Ulnar nerve motor intact\par Sensation: Intact to light touch in radial, median, ulnar, and axillary nerve distributions\par Vasc: 2+ radial pulse\par \par Right shoulder exam\par \par Inspection: No swelling, ecchymosis or gross deformity.\par Skin: No masses, No lesions\par Neck: Negative Spurlings, full ROM without pain\par Tenderness: No bicipital tenderness, no tenderness to the greater tuberosity/RTC insertion, no anterior shoulder/lesser tuberosity tenderness. No tenderness SC joint, clavicle, AC joint.\par ROM: 160/60/T6\par Impingement tests: Positive Sin\par AC Joint: no pain with cross arm testing\par Biceps: Negative speed\par Strength: 4/5 abduction, 4/5 external rotation, and internal rotation\par Neuro: AIN, PIN, Ulnar nerve motor intact\par Sensation: Intact to light touch in radial, median, ulnar, and axillary nerve distributions\par Vasc: 2+ radial pulse\par  [de-identified] : \par . \par \par \par The following radiographs were ordered and read by me during this patients visit. I reviewed each radiograph in detail with the patient and discussed the findings as highlighted below. \par \par 3 views of both shoulders were obtained today. His rotator cuff arthropathy in the right shoulder with superior migration of the humeral head. Glenohumeral joint we'll maintain on the left side. There is no fracture\par

## 2023-08-27 NOTE — PROGRESS NOTE ADULT - SUBJECTIVE AND OBJECTIVE BOX
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 The patient states he has been having generalized abdominal pain constantly for the past several days unable to tolerate PO (both solids and liquids), and was evaluated twice in the ED for his symptoms. The patient states his "workup" was reportedly negative and discharged. The patient denies any associated subjective fevers, chills, or recent sick contacts. The patient reports running out of Oxycodone 3 days ago and states is taking 40mg of Oxycodone daily to manage his pain.  is his pain management MD and also reports using Fentanyl patches to manage his pain.        INTERVAL HPI/ OVERNIGHT EVENTS: + norovirus. ostomy o/p: 1050    ICU Vital Signs Last 24 Hrs  T(C): 36.6 (27 Aug 2023 09:13), Max: 36.8 (26 Aug 2023 22:23)  T(F): 97.8 (27 Aug 2023 09:13), Max: 98.2 (26 Aug 2023 22:23)  HR: 85 (27 Aug 2023 09:13) (85 - 96)  BP: 115/75 (27 Aug 2023 09:13) (115/75 - 129/75)  BP(mean): --  ABP: --  ABP(mean): --  RR: 16 (27 Aug 2023 09:13) (16 - 17)  SpO2: 100% (27 Aug 2023 09:13) (96% - 100%)    O2 Parameters below as of 27 Aug 2023 09:13  Patient On (Oxygen Delivery Method): room air                  REVIEW OF SYSTEMS:  All other review of systems is negative unless indicated above.      PHYSICAL EXAM:  General: Well developed; in no acute distress  Eyes: EOMI; conjunctiva and sclera clear  Head: Normocephalic; atraumatic  ENMT: No nasal discharge; airway clear  Neck: Supple; non tender; no masses  Respiratory: No wheezes, rales or rhonchi  Cardiovascular: Regular rate and rhythm. S1 and S2 Normal;  Gastrointestinal: Soft non-tender non-distended; Normal bowel sounds, ileostomy  in place , stump pink.   Genitourinary: No  suprapubic  tenderness  Extremities: Normal range of motion, No edema, some R calf tenderness   Vascular: Peripheral DP pulses not  palpable, R groin  tenderness to palpation    Neurological: Alert and oriented x 3, non focal   Skin: Warm and dry. No acute rash  Musculoskeletal: Normal muscle tone, without deformities  Psychiatric: Cooperative and appropriate      LABS:                         12.3   17.14 )-----------( 239      ( 21 Jul 2023 07:11 )             38.2     < from: US Duplex Arterial Lower Ext Ltd, Right (08.25.23 @ 19:39) >  FINDINGS:/  IMPRESSION:    3.6 x 1.7 x 2.4 cm right common femoral artery pseudoaneurysm is largely   thrombosed but with a small residual patent lumen measuring 1.4 x 0.8 x   1.4 cm.  Adjacent right common femoral vein is noted to be patent and free of   thrombus.    --- End of Report ---    < end of copied text >    MEDICATIONS  (STANDING):  dextrose 5% + sodium chloride 0.45% with potassium chloride 20 mEq/L 1000 milliLiter(s) (125 mL/Hr) IV Continuous <Continuous>  enoxaparin Injectable 90 milliGRAM(s) SubCutaneous every 12 hours  fentaNYL   Patch  25 MICROgram(s)/Hr 1 Patch Transdermal every 72 hours  gabapentin 100 milliGRAM(s) Oral every 8 hours  lidocaine   4% Patch 1 Patch Transdermal daily  loperamide 4 milliGRAM(s) Oral three times a day  naloxone Injectable 0.4 milliGRAM(s) IV Push once  psyllium Powder 1 Packet(s) Oral every 12 hours  psyllium Powder      sodium chloride 0.9%. 1000 milliLiter(s) (150 mL/Hr) IV Continuous <Continuous>    MEDICATIONS  (PRN):  acetaminophen     Tablet .. 650 milliGRAM(s) Oral every 6 hours PRN Temp greater or equal to 38C (100.4F), Mild Pain (1 - 3)  aluminum hydroxide/magnesium hydroxide/simethicone Suspension 30 milliLiter(s) Oral every 4 hours PRN Dyspepsia  melatonin 3 milliGRAM(s) Oral at bedtime PRN Insomnia  ondansetron Injectable 4 milliGRAM(s) IV Push every 8 hours PRN Nausea and/or Vomiting  oxyCODONE    IR 10 milliGRAM(s) Oral every 4 hours PRN Moderate Pain (4 - 6)      < from: US Duplex Arterial Lower Ext Ltd, Right (08.25.23 @ 19:39) >    3.6 x 1.7 x 2.4 cm right common femoral artery pseudoaneurysm is largely   thrombosed but with a small residual patent lumen measuring 1.4 x 0.8 x   1.4 cm.  Adjacent right common femoral vein is noted to be patent and free of   thrombus.    < end of copied text >

## 2023-08-27 NOTE — PROGRESS NOTE ADULT - ASSESSMENT
A/P:  51 y/o M with PMH Lung CA, colon cancer with metatastes to the liver  on chemotherapy,  h/o End ileostomy and revision 04/2023 and subtotal colectomy for perforated cecal mass 2022 presented with abdominal pain. No evidence of intra abdominal pathology on CT.   C diff negative.    P:  cont IV fluid hydration  monitor electrolytes and replete  monitor ileostomy output  resume imodium 4mg QID  pain control  zofran prn n/v  cont Lov 90 q12    rest of management as per primary team     Plan to be d/w Dr. Todd

## 2023-08-27 NOTE — PROGRESS NOTE ADULT - ASSESSMENT
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 The patient states he has been having generalized abdominal pain constantly for the past several days unable to tolerate PO (both solids and liquids), and was evaluated twice in the ED for his symptoms.The patient denies any associated subjective fevers, chills, or recent sick contacts.  is his pain management MD and also reports using Fentanyl patches to manage his pain. Vital signs in triage => /91, /min, RR 22/min, TMax 97.9'F, SpO2 96%. Labs => WBC 21.55, Hgb/Hct 17/51.8, Na 128, BUN/Cr 29/1.56, TPro 10.4, , AST/ALT 52/109, LA 3.5 -> 2.2. CT ABD/Pel w/ IV Contrast => Both of contrast between the right common femoral artery and vein which is enhancing similar to the artery. US Venous Duplec B/L Lower Extremities => Nonocclusive thrombus involving the right common femoral vein, similar to prior. In the ED the patient was given Ondansetron 4mg IVP x 1, Morphine 4mg IVP x 1, and NS x 1L.     1. Leukocytosis. RLE DVT. Diarrheal syndrome- Norovirus. Abdominal pain. Pyuria. Stage IV colon cancer. Immunocompromised host  - imaging reviewed   - gi pcr positive for norovirus   - blood cx no growth  - wbc ct elevated d/t dvt reactive f/u cbc  - monitor off antibiotics for now  - supportive care  - pain control    2. other issues - care per medicine

## 2023-08-27 NOTE — PROGRESS NOTE ADULT - SUBJECTIVE AND OBJECTIVE BOX
Pt seen and examined at bedside this AM. Pt admits to mild abdominal pain, improving since admission. Feels weak, like he "has no energy". Tolerated diet yesterday without n/v. Osotmy functioning. Denies any pain in b/l groins.     Vital Signs Last 24 Hrs  T(C): 36.8 (26 Aug 2023 22:23), Max: 36.8 (26 Aug 2023 22:23)  T(F): 98.2 (26 Aug 2023 22:23), Max: 98.2 (26 Aug 2023 22:23)  HR: 89 (26 Aug 2023 22:23) (89 - 104)  BP: 118/77 (26 Aug 2023 22:23) (118/77 - 129/75)  BP(mean): --  RR: 17 (26 Aug 2023 22:23) (16 - 18)  SpO2: 99% (26 Aug 2023 22:23) (96% - 100%)    Parameters below as of 26 Aug 2023 22:23  Patient On (Oxygen Delivery Method): room air        Labs:                 14.5   14.78 )-----------( 171      ( 26 Aug 2023 06:19 )             44.2     CBC Full  -  ( 26 Aug 2023 06:19 )  WBC Count : 14.78 K/uL  RBC Count : 5.10 M/uL  Hemoglobin : 14.5 g/dL  Hematocrit : 44.2 %  Platelet Count - Automated : 171 K/uL  Mean Cell Volume : 86.7 fl  Mean Cell Hemoglobin : 28.4 pg  Mean Cell Hemoglobin Concentration : 32.8 gm/dL  Auto Neutrophil # : 10.33 K/uL  Auto Lymphocyte # : 2.33 K/uL  Auto Monocyte # : 1.66 K/uL  Auto Eosinophil # : 0.29 K/uL  Auto Basophil # : 0.06 K/uL  Auto Neutrophil % : 69.9 %  Auto Lymphocyte % : 15.8 %  Auto Monocyte % : 11.2 %  Auto Eosinophil % : 2.0 %  Auto Basophil % : 0.4 %    08-26    130<L>  |  102  |  25<H>  ----------------------------<  112<H>  4.7   |  25  |  1.20    Ca    8.9      26 Aug 2023 06:19    TPro  8.3  /  Alb  3.9  /  TBili  0.5  /  DBili  x   /  AST  36  /  ALT  101<H>  /  AlkPhos  218<H>  08-26    LIVER FUNCTIONS - ( 26 Aug 2023 06:19 )  Alb: 3.9 g/dL / Pro: 8.3 gm/dL / ALK PHOS: 218 U/L / ALT: 101 U/L / AST: 36 U/L / GGT: x                 Meds:  acetaminophen     Tablet .. 650 milliGRAM(s) Oral every 6 hours PRN  enoxaparin Injectable 90 milliGRAM(s) SubCutaneous every 12 hours  fentaNYL   Patch  25 MICROgram(s)/Hr 1 Patch Transdermal every 72 hours  melatonin 3 milliGRAM(s) Oral at bedtime PRN  ondansetron Injectable 4 milliGRAM(s) IV Push every 8 hours PRN  oxyCODONE    IR 10 milliGRAM(s) Oral every 6 hours PRN  sodium chloride 0.9%. 1000 milliLiter(s) IV Continuous <Continuous>      Physical Examination:  AO x3, NAD  GENERAL: No acute distress, well-developed  HEAD:  Atraumatic, Normocephalic  CHEST/LUNG: CTAB; No wheezes, rales, or rhonchi  HEART: Regular rate and rhythm; No murmurs, rubs, or gallops  ABDOMEN: Soft, mild tenderness RLQ around the ileostomy site,  non-distended, Right side ileostomy thick green stool. Osotmy prominent, pink.  ext. no cyanosis or edema, R DP/PT pulse palpable, L DP dopp, non dop PT, Right groin femoral pulse palpable, Left fem palpable   NEUROLOGY: responding appropriately, no focal deficits

## 2023-08-28 LAB
ANION GAP SERPL CALC-SCNC: 5 MMOL/L — SIGNIFICANT CHANGE UP (ref 5–17)
BUN SERPL-MCNC: 13 MG/DL — SIGNIFICANT CHANGE UP (ref 7–23)
CALCIUM SERPL-MCNC: 8.4 MG/DL — LOW (ref 8.5–10.1)
CHLORIDE SERPL-SCNC: 107 MMOL/L — SIGNIFICANT CHANGE UP (ref 96–108)
CO2 SERPL-SCNC: 22 MMOL/L — SIGNIFICANT CHANGE UP (ref 22–31)
CREAT SERPL-MCNC: 0.74 MG/DL — SIGNIFICANT CHANGE UP (ref 0.5–1.3)
CULTURE RESULTS: SIGNIFICANT CHANGE UP
EGFR: 110 ML/MIN/1.73M2 — SIGNIFICANT CHANGE UP
GLUCOSE SERPL-MCNC: 97 MG/DL — SIGNIFICANT CHANGE UP (ref 70–99)
HCT VFR BLD CALC: 37.5 % — LOW (ref 39–50)
HGB BLD-MCNC: 12 G/DL — LOW (ref 13–17)
MCHC RBC-ENTMCNC: 28.4 PG — SIGNIFICANT CHANGE UP (ref 27–34)
MCHC RBC-ENTMCNC: 32 GM/DL — SIGNIFICANT CHANGE UP (ref 32–36)
MCV RBC AUTO: 88.7 FL — SIGNIFICANT CHANGE UP (ref 80–100)
PLATELET # BLD AUTO: 139 K/UL — LOW (ref 150–400)
POTASSIUM SERPL-MCNC: 4.2 MMOL/L — SIGNIFICANT CHANGE UP (ref 3.5–5.3)
POTASSIUM SERPL-SCNC: 4.2 MMOL/L — SIGNIFICANT CHANGE UP (ref 3.5–5.3)
RBC # BLD: 4.23 M/UL — SIGNIFICANT CHANGE UP (ref 4.2–5.8)
RBC # FLD: 16.9 % — HIGH (ref 10.3–14.5)
SODIUM SERPL-SCNC: 134 MMOL/L — LOW (ref 135–145)
SPECIMEN SOURCE: SIGNIFICANT CHANGE UP
WBC # BLD: 16.27 K/UL — HIGH (ref 3.8–10.5)
WBC # FLD AUTO: 16.27 K/UL — HIGH (ref 3.8–10.5)

## 2023-08-28 PROCEDURE — 99232 SBSQ HOSP IP/OBS MODERATE 35: CPT

## 2023-08-28 PROCEDURE — 99233 SBSQ HOSP IP/OBS HIGH 50: CPT

## 2023-08-28 PROCEDURE — 93926 LOWER EXTREMITY STUDY: CPT | Mod: 26,RT

## 2023-08-28 RX ORDER — LOPERAMIDE HCL 2 MG
4 TABLET ORAL
Refills: 0 | Status: DISCONTINUED | OUTPATIENT
Start: 2023-08-28 | End: 2023-08-30

## 2023-08-28 RX ADMIN — ENOXAPARIN SODIUM 90 MILLIGRAM(S): 100 INJECTION SUBCUTANEOUS at 22:17

## 2023-08-28 RX ADMIN — OXYCODONE HYDROCHLORIDE 10 MILLIGRAM(S): 5 TABLET ORAL at 03:58

## 2023-08-28 RX ADMIN — Medication 4 MILLIGRAM(S): at 12:40

## 2023-08-28 RX ADMIN — Medication 4 MILLIGRAM(S): at 06:12

## 2023-08-28 RX ADMIN — Medication 4 MILLIGRAM(S): at 23:20

## 2023-08-28 RX ADMIN — OXYCODONE HYDROCHLORIDE 10 MILLIGRAM(S): 5 TABLET ORAL at 23:50

## 2023-08-28 RX ADMIN — Medication 4 MILLIGRAM(S): at 17:22

## 2023-08-28 RX ADMIN — OXYCODONE HYDROCHLORIDE 10 MILLIGRAM(S): 5 TABLET ORAL at 23:20

## 2023-08-28 RX ADMIN — OXYCODONE HYDROCHLORIDE 10 MILLIGRAM(S): 5 TABLET ORAL at 17:46

## 2023-08-28 RX ADMIN — ENOXAPARIN SODIUM 90 MILLIGRAM(S): 100 INJECTION SUBCUTANEOUS at 10:17

## 2023-08-28 RX ADMIN — OXYCODONE HYDROCHLORIDE 10 MILLIGRAM(S): 5 TABLET ORAL at 10:17

## 2023-08-28 RX ADMIN — SODIUM CHLORIDE 125 MILLILITER(S): 9 INJECTION INTRAMUSCULAR; INTRAVENOUS; SUBCUTANEOUS at 06:03

## 2023-08-28 RX ADMIN — OXYCODONE HYDROCHLORIDE 10 MILLIGRAM(S): 5 TABLET ORAL at 17:16

## 2023-08-28 RX ADMIN — SODIUM CHLORIDE 125 MILLILITER(S): 9 INJECTION INTRAMUSCULAR; INTRAVENOUS; SUBCUTANEOUS at 17:22

## 2023-08-28 RX ADMIN — OXYCODONE HYDROCHLORIDE 10 MILLIGRAM(S): 5 TABLET ORAL at 05:00

## 2023-08-28 RX ADMIN — OXYCODONE HYDROCHLORIDE 10 MILLIGRAM(S): 5 TABLET ORAL at 10:46

## 2023-08-28 NOTE — PROGRESS NOTE ADULT - ASSESSMENT
53 y/o M with PMH Lung CA, colon cancer with metatastes to the liver  on chemotherapy,  h/o End ileostomy and revision 04/2023 and subtotal colectomy for perforated cecal mass 2022 presented with abdominal pain. No evidence of intra abdominal pathology on CT. C diff negative. Found to have Right CFA pseudoaneurysm on CT and duplex. Likely from right femoral access. Right groin is soft, non tender    Plan:   no true neck for possible injection   Pressure held on Right groin and will repeat ultrasound today  if pseudoaneurysm grows in size will revisit surgical options  cont IV fluid hydration  monitor electrolytes and replete  pain control  zofran prn n/v  cont Lov 90 q12  Vascular surgery will follow  rest of management as per primary team     Plan discussed with Dr. Taylor

## 2023-08-28 NOTE — PROGRESS NOTE ADULT - SUBJECTIVE AND OBJECTIVE BOX
HPI:    51 y/o M with a PMHx of Stage IV RS colon adenocarcinoma with metastasis to the liver and lung diagnosed in 05/2022, underwent exploratory laparotomy, total colectomy, and end ileostomy on 05/19/2022 and revision of ileostomy 04/2023, DVT/PE s/p IVC filter placement returned to the  for further evaluation and management ongoing abdominal pain. The patient stated he has been having generalized abdominal pain constantly for the past several days unable to tolerate PO (both solids and liquids), was evaluated twice in ED for his symptoms, reported his "workup" was negative. The patient denied any associated fevers, chills, or recent sick contacts. He admitted to running out of Oxycodone 40mg x3 days ago which he has been using daily to manage his pain. He follows with PM&R Dr Donis & Primary Onc Dr Serrano. (25 Aug 2023 18:33)      08/26/2023: Seen at bedside, no acute distress, upset about recurring hospitalizations and abdominal discomfort, requested help with placement at independent or assisted living facility     08/27/2023: Seen at bedside, no acute distress    8/28/2023- seen at bedside, ambulating in the room, c/o diffuse continued abdominal pain, with moderate ostomy output.      PAST MEDICAL & SURGICAL HISTORY:    Cancer, colon with liver & mets    S/P ileostomy    Metastasis to liver & lung    Pulmonary embolism / DVT    S/p IVC    LLE ext iliac artery occlusion     S/P colon resection    H/O ileostomy      MEDICATIONS  (STANDING):    enoxaparin Injectable 90 milliGRAM(s) SubCutaneous every 12 hours  fentaNYL   Patch  25 MICROgram(s)/Hr 1 Patch Transdermal every 72 hours  loperamide 4 milliGRAM(s) Oral four times a day  sodium chloride 0.9%. 1000 milliLiter(s) (125 mL/Hr) IV Continuous <Continuous>    MEDICATIONS  (PRN):    acetaminophen     Tablet .. 650 milliGRAM(s) Oral every 6 hours PRN Temp greater or equal to 38C (100.4F), Mild Pain (1 - 3)  melatonin 3 milliGRAM(s) Oral at bedtime PRN Insomnia  ondansetron Injectable 4 milliGRAM(s) IV Push every 8 hours PRN Nausea and/or Vomiting  oxyCODONE    IR 10 milliGRAM(s) Oral every 6 hours PRN Severe Pain (7 - 10)      ALLERGIES:    No Known Drug Allergies      FAMILY HISTORY:    dementia (Mother)      REVIEW OF SYSTEMS:    Constitutional, Eyes, ENT, Cardiovascular, Respiratory, Gastrointestinal, Genitourinary, Musculoskeletal, Integumentary, Neurological, Psychiatric, Endocrine, Heme/Lymph and Allergic/Immunologic review of systems are otherwise negative except as noted in HPI.       VITALS:      T(C): 36.7 (28 Aug 2023 08:28), Max: 37.4 (27 Aug 2023 21:23)  T(F): 98 (28 Aug 2023 08:28), Max: 99.4 (27 Aug 2023 21:23)  HR: 72 (28 Aug 2023 08:28) (72 - 82)  BP: 111/64 (28 Aug 2023 08:28) (111/64 - 116/74)  BP(mean): --  RR: 18 (28 Aug 2023 08:28) (17 - 18)  SpO2: 100% (28 Aug 2023 08:28) (100% - 100%)    Parameters below as of 28 Aug 2023 08:28  Patient On (Oxygen Delivery Method): room air      PHYSICAL:    Constitutional: no acute distress  Eyes: no conjunctival infection, anicteric.   ENT: pharynx is unremarkable  Neck: supple without JVD   Pulmonary: clear to auscultation bilaterally  Cardiac: RRR   Vascular: no calf tenderness, venous stasis changes, varices.   Abdomen: normoactive bowel sounds, soft and nontender; ostomy bag in place with moderate greenish/brown output  Lymphatic: no peripheral adenopathy appreciated.   Musculoskeletal: full range of motion and no deformities appreciated.   Skin: normal appearance, no rash/erythema.   Neurology: awake, alert, oriented; no focal deficits   Psychiatric: affect/mood/insight appropriate       LABS:                                     12.0   16.27 )-----------( 139      ( 28 Aug 2023 07:32 )             37.5     08-28    134<L>  |  107  |  13  ----------------------------<  97  4.2   |  22  |  0.74    Ca    8.4<L>      28 Aug 2023 07:32        RADIOLOGY & ADDITIONAL STUDIES:      US Duplex Arterial Lower Ext Ltd, Right (08.25.23 @ 19:39)    IMPRESSION:    3.6 x 1.7 x 2.4 cm right common femoral artery pseudoaneurysm is largely   thrombosed but with a small residual patent lumen measuring 1.4 x 0.8 x   1.4 cm.  Adjacent right common femoral vein is noted to be patent and free of   thrombus.        CT Abdomen and Pelvis w/ IV Cont (08.25.23 @ 15:06)    ACC: [id number] EXAM:  CT ABDOMEN AND PELVIS IC   ORDERED BY: DENNISE DE LA CRUZ     PROCEDURE DATE:  08/25/2023          INTERPRETATION:  CLINICAL INFORMATION: Abdominal pain    COMPARISON: CT scan of the abdomen and pelvis from 8/24/2023    CONTRAST/COMPLICATIONS:  IV Contrast: Omnipaque 350  90 cc administered   10 cc discarded  Oral Contrast: NONE  Complications: None reported at time of study completion    PROCEDURE:  CT of the Abdomen and Pelvis was performed.  Sagittal and coronal reformats were performed.    FINDINGS:  LOWER CHEST: Within normal limits.    LIVER: Multiple calcified and noncalcified liver metastases which are   grossly stable. For example, lesion in segment 2 measuring 3.8 x 2.7 cm   on series 2 image 11 has not significantly changed.  BILE DUCTS: Normal caliber.  GALLBLADDER: Higher attenuation within the gallbladder may represent   vicarious excretion of contrast material from prior injection.  SPLEEN: Within normal limits.  PANCREAS: Within normal limits.  ADRENALS: Within normal limits.  KIDNEYS/URETERS: Within normal limits.    BLADDER: Within normal limits.  REPRODUCTIVE ORGANS: Grossly unremarkable.    BOWEL: Status post colectomy with creation of a rectal stump and   ileostomy exiting the right lower quadrant. This is stable in appearance.  PERITONEUM: No ascites. Nodular soft tissue thickening in the right lower   pelvis laterally extending towards the inguinal region which is grossly   stable in appearance.  VESSELS: Vascular calcifications. IVC filter. 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 Focal pooling of contrast seen   between the right common femoral artery and vein which is unchanged. This   is enhancing similar to the artery and a pseudoaneurysm cannot be   excluded. Limited evaluation of the right common femoral vein.  RETROPERITONEUM/LYMPH NODES: No lymphadenopathy.  ABDOMINAL WALL: Subcutaneous soft tissue nodule opacities in the left   anterior abdominal wall may be due to subcutaneous injections.   Postsurgical changes.  BONES: Mild degenerative changes.    IMPRESSION:  Both of contrast between the right common femoral artery and vein which   is enhancing similar to the artery. A pseudoaneurysm cannot be excluded.   This was present on the prior study. Additionally, I cannot assess the   patency of the right common femoral vein. Right lower extremity   ultrasound of the inguinal region is recommended.    Grossly stable liver metastases. Postsurgical changes.

## 2023-08-28 NOTE — PROGRESS NOTE ADULT - ASSESSMENT
53 y/o male with metastatic colon cancer on chemo, PE  admitted for:        1. Leukocytosis SIRS, 2/2 to norovirus infection  supportive care. Immodiun. ivf  Hold abx for now  blood culture NGTD   CT abd/pelvis - no infectious/inflammatory process   CTA; no PNA   Off abxs now as per ID     2. High output ileostomy 2/2 to norovirus: dehydration   Hyponatremia  WARREN 2/2 to above  -supportive care, ivf  -bulking agents    3. 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 Known Occlusion of left external iliac artery  - vascular recc appreciated. F/U  ultrasound and eval size and need for possible surgical intervention  - likely due to metastatic malignancy with hx of missed doses/non compliance  - s/p LLE angio in 7/2023 with Dr vasques  - on LMWH s/p ivc filter  - RLE DVT identified 7/2023  US -lower extremities-7/14/23 & 7/16/23- noted acute nonocclusive deep venous thrombosis in the right common femoral vein, above the knee only, appears unchanged from 07/14/2023.  No evidence of propagation.s/p IVC filter 7/17/23        5. Metastatic colon cancer  to the liver and lung on chemotherapy  on palliative chemotherapy:  initially FOLFOXIRI / sunny , after 10 cycles changed to FOLFIRI/ Bevacizumab.  per palliative  cw fentanyl 25mh/h every 72 hrs, and oxycodone 10mg q4hrs prn        DVT ppx:  Lovenox  Code status: Full code     Dispo: continue ivf, monitor colostomy o/p. Vascular f/u regarding thrombosed psuedoanlivan,

## 2023-08-28 NOTE — PROGRESS NOTE ADULT - ASSESSMENT
51 y/o M with MH significant for Stage IV colon cancer with metastases to the liver and lung, currently on chemotherapy, s/p ileostomy and revision 04/2023 and subtotal colectomy for perforated cecal mass 2022, readmitted for recurrent/persistent abdominal pain, poor PO intake.      # Metastatic Colon CA with Liver & Lung Metastases, S/p Ileostomy    - CT imaging during this admission and previous with no evidence of acute intra-abdominal infectious/inflammatory processes   - Dx 05/2022 after going to ED for worsening abdominal pain  - 05/19/22 CT AP with apparent focal mural thickening at the cecum/proximal ascending colon with focal tumor invasion/extension from the cecum to the sigmoid colon. Multiple hypodense lesions with calcifications in both lobes of the liver with the largest measuring 5.8 x 6.0 cm in hepatic segment.  - 05/19/22: underwent exploratory laparotomy, total colectomy, end ileostomy, biopsy of liver, and biopsy of retroperitoneum.    Pathology:  Omentum negative for malignancy  Portion of terminal ileum, cecum with adherent sigmoid: Poorly differentiated infiltrating adenocarcinoma measuring 9.0 cm. Adenocarcinoma infiltrates through the bowel wall and through the visceral peritoneum and infiltrates the adherent sigmoid colon 3 of 17 lymph nodes are positive for metastatic carcinoma. Lymphovascular space & Perineural invasion is identified. The surgical margins negative.   Appendix with serosal tumor implants and acute inflammation. Peritonitis. Liver, biopsy: Metastatic adenocarcinoma.   Retroperitoneal biopsy: Adenocarcinoma with necrosis. No loss of nuclear expression of MMR proteins (MLH1, MSH2, MSH6, and PMS2). Staging pT4b pN1b pM1c.    - 06/22/2022: Started FOLFOXIRI with Bevacizumab; last dose of FOLFIRI and Bevacizumab received on 01/04/2023; S/p Oxaliplatin C10  - 09/01/22 CT CAP: Segmental left lower lobe pulmonary embolus and probable smaller pulmonary emboli within the right lung. Interval decrease in size of bilateral pulmonary nodules and hepatic metastases compared to 05/25/2022. No new sites of disease.  - 06/13/23 CT CAP: New, 8 mm right lower lobe nodule and enlarging pulmonary nodules, 7 mm left apical nodule previously measured 6 mm. Bilobar hepatic metastases, the majority of which are calcified and not significantly changed. A noncalcified lesion in the right hepatic lobe demonstrates mild interval growth, 1.7 x 1.4 cm previously 1.4 x 1.3 cm  - On chemo FOLFIRI, and Avastin, most recent dose 08/16/23  - No inpatient tx, continue supportive measures as necessary     - Patient expressed concerns about current living arrangements, inability to adequately care for himself, manage hydration/pain/etc ; ---> SW / Case management following and patient is aware of his contribution in application process.       # Leukocytosis    - WBC count remains elevated, today at 16.2 K/ul down from 20K  - Intermittent spikes >100K over the past few months 2/2 known OnPro (Neulasta) use with chemo dosing  - CDiff / GI panel negative during previous admissions  - Now GI PCR with Norovirus ---> likely additional factor   - Continue to trend serial CBCs  - Monitor temp      # LLE Ext Iliac Artery Occlusion    - Dx during previous admission 07/2023  - Patient was taking DOAC (Eliquis) for prior VTE in setting of metastatic disease but developed arterial thrombosis  - 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; LAC/APLS labs negative   - Patient receiving Bevacizumab which also rarely can contribute to thrombotic risk   - LLE angiogram with Vascular Sx 07/10; unable to traverse lesion  - Transitioned to Lovenox 90 q 12h 07/13/23  - S/p IVC filter 07/17/23  -US Duplex lower extremity- 8/25/23-->3.6 x 1.7 x 2.4 cm right common femoral artery pseudoaneurysm is largely thrombosed but with a small residual patent lumen measuring 1.4 x 0.8 x 1.4 cm. Adjacent right common femoral vein is noted to be patent and free of thrombus.  - Vascular following-->repeat ultrasound if pseudoaneurysm grows in size will revisit surgical options      # Abdominal Pain    - Intermittent ongoing symptoms for the past few months, reason for previous admission 07/2023  - CDiff GI PCR negative at that time ---> now GI PCR for Norovirus   - Colorectal following  - ID consulted  - Continue supportive measures including pain management as necessary

## 2023-08-28 NOTE — PROGRESS NOTE ADULT - ASSESSMENT
A/P:  53 y/o M with PMH Lung CA, colon cancer with metastases to the liver  on chemotherapy,  h/o End ileostomy and revision 04/2023 and subtotal colectomy for perforated cecal mass 2022 presented with abdominal pain. No evidence of intra abdominal pathology on CT.   C diff negative.    P:  cont IV fluid hydration  monitor electrolytes and replete  monitor ileostomy output  resume imodium 4mg QID  pain control  zofran prn n/v  cont Lov 90 q12  rest of management as per primary team     Will discuss plan with Colorectal surgery team

## 2023-08-28 NOTE — PROGRESS NOTE ADULT - SUBJECTIVE AND OBJECTIVE BOX
Patient seen and examined at bedside. Patient has no complaints and reports pain is under control. Patient is tolerating diet with good ostomy function. Nurse denies any acute events. Vitals reviewed and WNL.        Vitals:  T(C): 36.7 (08-28 @ 08:28), Max: 37.4 (08-27 @ 21:23)  HR: 72 (08-28 @ 08:28) (72 - 82)  BP: 111/64 (08-28 @ 08:28) (111/64 - 116/74)  RR: 18 (08-28 @ 08:28) (17 - 18)  SpO2: 100% (08-28 @ 08:28) (100% - 100%)    08-27 @ 07:01  -  08-28 @ 07:00  --------------------------------------------------------  IN:    Oral Fluid: 900 mL    sodium chloride 0.9%: 75 mL    sodium chloride 0.9%: 3125 mL  Total IN: 4100 mL    OUT:    Ileostomy (mL): 1800 mL    Voided (mL): 1900 mL  Total OUT: 3700 mL    Total NET: 400 mL      08-28 @ 07:01  -  08-28 @ 13:18  --------------------------------------------------------  IN:    Oral Fluid: 300 mL    sodium chloride 0.9%: 125 mL  Total IN: 425 mL    OUT:    Ileostomy (mL): 250 mL    Voided (mL): 425 mL  Total OUT: 675 mL    Total NET: -250 mL          Physical Examination:  AO x3, NAD  GENERAL: No acute distress, well-developed  HEAD:  Atraumatic, Normocephalic  CHEST/LUNG: CTAB; No wheezes, rales, or rhonchi  HEART: Regular rate and rhythm; No murmurs, rubs, or gallops  ABDOMEN: Soft, mild tenderness RLQ around the ileostomy site,  non-distended, Right side ileostomy thick green stool. Osotmy prominent, pink.  ext. no cyanosis or edema, R DP/PT pulse palpable, L DP dopp, non dop PT, Right groin femoral pulse palpable, Left fem palpable   NEUROLOGY: responding appropriately, no focal deficits        08-28 @ 07:32                    12.0  CBC: 16.27>)-------(<139                     37.5                 134 | 107 | 13    CMP:  ----------------------< 97               4.2 | 22 | 0.74                      Ca:8.4  Phos:-  Mg:-               -|      |-        LFTs:  ------|-|-----             -|      |-  08-27 @ 06:33                    13.2  CBC: 20.33>)-------(<134                     40.6                 131 | 102 | 19    CMP:  ----------------------< 101               4.2 | 22 | 0.83                      Ca:8.4  Phos:-  Mg:-               -|      |-        LFTs:  ------|-|-----             -|      |-      Culture - Urine (collected 08-25-23 @ 23:42)  Source: Clean Catch None  Final Report (08-28-23 @ 00:20):    10,000 - 49,000 CFU/mL Streptococcus agalactiae (Group B) isolated    Group B streptococci are susceptible to ampicillin,    penicillin and cefazolin, but may be resistant to    erythromycin and clindamycin.    Recommendations for intrapartum prophylaxis for Group B    streptococci are penicillin or ampicillin.    Culture - Blood (collected 08-25-23 @ 17:44)  Source: .Blood None  Preliminary Report (08-28-23 @ 01:01):    No growth at 48 Hours    Culture - Blood (collected 08-25-23 @ 17:40)  Source: .Blood None  Preliminary Report (08-28-23 @ 01:01):    No growth at 48 Hours    Culture - Urine (collected 08-24-23 @ 21:27)  Source: Clean Catch Clean Catch (Midstream)  Final Report (08-26-23 @ 19:32):    10,000 - 49,000 CFU/mL Streptococcus agalactiae (Group B) isolated    Group B streptococci are susceptible to ampicillin,    penicillin and cefazolin, but may be resistant to    erythromycin and clindamycin.    Recommendations for intrapartum prophylaxis for Group B    streptococci are penicillin or ampicillin.      Current Inpatient Medications:  acetaminophen     Tablet .. 650 milliGRAM(s) Oral every 6 hours PRN  enoxaparin Injectable 90 milliGRAM(s) SubCutaneous every 12 hours  fentaNYL   Patch  25 MICROgram(s)/Hr 1 Patch Transdermal every 72 hours  loperamide 4 milliGRAM(s) Oral four times a day  melatonin 3 milliGRAM(s) Oral at bedtime PRN  ondansetron Injectable 4 milliGRAM(s) IV Push every 8 hours PRN  oxyCODONE    IR 10 milliGRAM(s) Oral every 6 hours PRN  sodium chloride 0.9%. 1000 milliLiter(s) (125 mL/Hr) IV Continuous <Continuous>

## 2023-08-28 NOTE — PROGRESS NOTE ADULT - SUBJECTIVE AND OBJECTIVE BOX
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 The patient states he has been having generalized abdominal pain constantly for the past several days unable to tolerate PO (both solids and liquids), and was evaluated twice in the ED for his symptoms. The patient states his "workup" was reportedly negative and discharged. The patient denies any associated subjective fevers, chills, or recent sick contacts. The patient reports running out of Oxycodone 3 days ago and states is taking 40mg of Oxycodone daily to manage his pain.  is his pain management MD and also reports using Fentanyl patches to manage his pain.        INTERVAL HPI/ OVERNIGHT EVENTS: + norovirus. ostomy , still with abdominal pain intermittently, reports after adding fentanyl, feels lulú    REVIEW OF SYSTEMS:  All other review of systems is negative unless indicated above.      PHYSICAL EXAM:  General: Well developed; in no acute distress  Eyes: EOMI; conjunctiva and sclera clear  Head: Normocephalic; atraumatic  ENMT: No nasal discharge; airway clear  Neck: Supple; non tender; no masses  Respiratory: No wheezes, rales or rhonchi  Cardiovascular: Regular rate and rhythm. S1 and S2 Normal;  Gastrointestinal: Soft non-tender non-distended; Normal bowel sounds, ileostomy  in place , stump pink.   Genitourinary: No  suprapubic  tendernessExtremities: Normal range of motion, No edema, some R calf tenderness   Vascular: Peripheral DP pulses not  palpable, R groin  tenderness to palpation    Neurological: Alert and oriented x 3, non focal   Skin: Warm and dry. No acute rash  Musculoskeletal: Normal muscle tone, without deformities  Psychiatric: Cooperative and appropriate      PHYSICAL EXAM:    Daily     Daily     ICU Vital Signs Last 24 Hrs  T(C): 36.9 (28 Aug 2023 15:10), Max: 37.4 (27 Aug 2023 21:23)  T(F): 98.4 (28 Aug 2023 15:10), Max: 99.4 (27 Aug 2023 21:23)  HR: 74 (28 Aug 2023 15:10) (72 - 80)  BP: 118/66 (28 Aug 2023 15:10) (111/64 - 118/66)  BP(mean): --  ABP: --  ABP(mean): --  RR: 18 (28 Aug 2023 15:10) (18 - 18)  SpO2: 100% (28 Aug 2023 15:10) (100% - 100%)    O2 Parameters below as of 28 Aug 2023 15:10  Patient On (Oxygen Delivery Method): room air                            12.0   16.27 )-----------( 139      ( 28 Aug 2023 07:32 )             37.5       CBC Full  -  ( 28 Aug 2023 07:32 )  WBC Count : 16.27 K/uL  RBC Count : 4.23 M/uL  Hemoglobin : 12.0 g/dL  Hematocrit : 37.5 %  Platelet Count - Automated : 139 K/uL  Mean Cell Volume : 88.7 fl  Mean Cell Hemoglobin : 28.4 pg  Mean Cell Hemoglobin Concentration : 32.0 gm/dL  Auto Neutrophil # : x  Auto Lymphocyte # : x  Auto Monocyte # : x  Auto Eosinophil # : x  Auto Basophil # : x  Auto Neutrophil % : x  Auto Lymphocyte % : x  Auto Monocyte % : x  Auto Eosinophil % : x  Auto Basophil % : x      08-28    134<L>  |  107  |  13  ----------------------------<  97  4.2   |  22  |  0.74    Ca    8.4<L>      28 Aug 2023 07:32                      Urinalysis Basic - ( 28 Aug 2023 07:32 )    Color: x / Appearance: x / SG: x / pH: x  Gluc: 97 mg/dL / Ketone: x  / Bili: x / Urobili: x   Blood: x / Protein: x / Nitrite: x   Leuk Esterase: x / RBC: x / WBC x   Sq Epi: x / Non Sq Epi: x / Bacteria: x            MEDICATIONS  (STANDING):  enoxaparin Injectable 90 milliGRAM(s) SubCutaneous every 12 hours  fentaNYL   Patch  25 MICROgram(s)/Hr 1 Patch Transdermal every 72 hours  loperamide 4 milliGRAM(s) Oral four times a day  sodium chloride 0.9%. 1000 milliLiter(s) (125 mL/Hr) IV Continuous <Continuous>            < from: US Duplex Arterial Lower Ext Ltd, Right (08.25.23 @ 19:39) >  FINDINGS:/  IMPRESSION:    3.6 x 1.7 x 2.4 cm right common femoral artery pseudoaneurysm is largely   thrombosed but with a small residual patent lumen measuring 1.4 x 0.8 x   1.4 cm.  Adjacent right common femoral vein is noted to be patent and free of   thrombus.    --- End of Report ---    < end of copied text >< from: US Duplex Arterial Lower Ext Ltd, Right (08.25.23 @ 19:39) >    3.6 x 1.7 x 2.4 cm right common femoral artery pseudoaneurysm is largely   thrombosed but with a small residual patent lumen measuring 1.4 x 0.8 x   1.4 cm.  Adjacent right common femoral vein is noted to be patent and free of   thrombus.

## 2023-08-29 DIAGNOSIS — C18.9 MALIGNANT NEOPLASM OF COLON, UNSPECIFIED: ICD-10-CM

## 2023-08-29 LAB
ANION GAP SERPL CALC-SCNC: 4 MMOL/L — LOW (ref 5–17)
BASOPHILS # BLD AUTO: 0.04 K/UL — SIGNIFICANT CHANGE UP (ref 0–0.2)
BASOPHILS NFR BLD AUTO: 0.2 % — SIGNIFICANT CHANGE UP (ref 0–2)
BUN SERPL-MCNC: 12 MG/DL — SIGNIFICANT CHANGE UP (ref 7–23)
CALCIUM SERPL-MCNC: 8.2 MG/DL — LOW (ref 8.5–10.1)
CHLORIDE SERPL-SCNC: 109 MMOL/L — HIGH (ref 96–108)
CO2 SERPL-SCNC: 25 MMOL/L — SIGNIFICANT CHANGE UP (ref 22–31)
CREAT SERPL-MCNC: 0.66 MG/DL — SIGNIFICANT CHANGE UP (ref 0.5–1.3)
EGFR: 114 ML/MIN/1.73M2 — SIGNIFICANT CHANGE UP
EOSINOPHIL # BLD AUTO: 0.2 K/UL — SIGNIFICANT CHANGE UP (ref 0–0.5)
EOSINOPHIL NFR BLD AUTO: 1.2 % — SIGNIFICANT CHANGE UP (ref 0–6)
GLUCOSE SERPL-MCNC: 100 MG/DL — HIGH (ref 70–99)
HCT VFR BLD CALC: 34 % — LOW (ref 39–50)
HGB BLD-MCNC: 11 G/DL — LOW (ref 13–17)
IMM GRANULOCYTES NFR BLD AUTO: 1.6 % — HIGH (ref 0–0.9)
LYMPHOCYTES # BLD AUTO: 1.91 K/UL — SIGNIFICANT CHANGE UP (ref 1–3.3)
LYMPHOCYTES # BLD AUTO: 11.9 % — LOW (ref 13–44)
MAGNESIUM SERPL-MCNC: 1.8 MG/DL — SIGNIFICANT CHANGE UP (ref 1.6–2.6)
MCHC RBC-ENTMCNC: 28.9 PG — SIGNIFICANT CHANGE UP (ref 27–34)
MCHC RBC-ENTMCNC: 32.4 GM/DL — SIGNIFICANT CHANGE UP (ref 32–36)
MCV RBC AUTO: 89.5 FL — SIGNIFICANT CHANGE UP (ref 80–100)
MONOCYTES # BLD AUTO: 0.89 K/UL — SIGNIFICANT CHANGE UP (ref 0–0.9)
MONOCYTES NFR BLD AUTO: 5.6 % — SIGNIFICANT CHANGE UP (ref 2–14)
NEUTROPHILS # BLD AUTO: 12.72 K/UL — HIGH (ref 1.8–7.4)
NEUTROPHILS NFR BLD AUTO: 79.5 % — HIGH (ref 43–77)
PHOSPHATE SERPL-MCNC: 2.6 MG/DL — SIGNIFICANT CHANGE UP (ref 2.5–4.5)
PLATELET # BLD AUTO: 126 K/UL — LOW (ref 150–400)
POTASSIUM SERPL-MCNC: 3.9 MMOL/L — SIGNIFICANT CHANGE UP (ref 3.5–5.3)
POTASSIUM SERPL-SCNC: 3.9 MMOL/L — SIGNIFICANT CHANGE UP (ref 3.5–5.3)
RBC # BLD: 3.8 M/UL — LOW (ref 4.2–5.8)
RBC # FLD: 17 % — HIGH (ref 10.3–14.5)
SODIUM SERPL-SCNC: 138 MMOL/L — SIGNIFICANT CHANGE UP (ref 135–145)
WBC # BLD: 16.01 K/UL — HIGH (ref 3.8–10.5)
WBC # FLD AUTO: 16.01 K/UL — HIGH (ref 3.8–10.5)

## 2023-08-29 PROCEDURE — 93926 LOWER EXTREMITY STUDY: CPT | Mod: 26,RT

## 2023-08-29 PROCEDURE — 99232 SBSQ HOSP IP/OBS MODERATE 35: CPT

## 2023-08-29 PROCEDURE — 99497 ADVNCD CARE PLAN 30 MIN: CPT | Mod: 25

## 2023-08-29 PROCEDURE — 93971 EXTREMITY STUDY: CPT | Mod: 26,RT

## 2023-08-29 PROCEDURE — 99223 1ST HOSP IP/OBS HIGH 75: CPT | Mod: 25

## 2023-08-29 RX ORDER — OXYCODONE HYDROCHLORIDE 5 MG/1
10 TABLET ORAL EVERY 4 HOURS
Refills: 0 | Status: DISCONTINUED | OUTPATIENT
Start: 2023-08-29 | End: 2023-08-30

## 2023-08-29 RX ADMIN — OXYCODONE HYDROCHLORIDE 10 MILLIGRAM(S): 5 TABLET ORAL at 06:17

## 2023-08-29 RX ADMIN — OXYCODONE HYDROCHLORIDE 10 MILLIGRAM(S): 5 TABLET ORAL at 14:59

## 2023-08-29 RX ADMIN — OXYCODONE HYDROCHLORIDE 10 MILLIGRAM(S): 5 TABLET ORAL at 23:06

## 2023-08-29 RX ADMIN — Medication 4 MILLIGRAM(S): at 05:47

## 2023-08-29 RX ADMIN — Medication 4 MILLIGRAM(S): at 18:34

## 2023-08-29 RX ADMIN — OXYCODONE HYDROCHLORIDE 10 MILLIGRAM(S): 5 TABLET ORAL at 23:36

## 2023-08-29 RX ADMIN — OXYCODONE HYDROCHLORIDE 10 MILLIGRAM(S): 5 TABLET ORAL at 16:14

## 2023-08-29 RX ADMIN — ENOXAPARIN SODIUM 90 MILLIGRAM(S): 100 INJECTION SUBCUTANEOUS at 10:46

## 2023-08-29 RX ADMIN — SODIUM CHLORIDE 125 MILLILITER(S): 9 INJECTION INTRAMUSCULAR; INTRAVENOUS; SUBCUTANEOUS at 01:40

## 2023-08-29 RX ADMIN — OXYCODONE HYDROCHLORIDE 10 MILLIGRAM(S): 5 TABLET ORAL at 11:40

## 2023-08-29 RX ADMIN — OXYCODONE HYDROCHLORIDE 10 MILLIGRAM(S): 5 TABLET ORAL at 10:46

## 2023-08-29 RX ADMIN — OXYCODONE HYDROCHLORIDE 10 MILLIGRAM(S): 5 TABLET ORAL at 05:47

## 2023-08-29 RX ADMIN — ENOXAPARIN SODIUM 90 MILLIGRAM(S): 100 INJECTION SUBCUTANEOUS at 23:06

## 2023-08-29 RX ADMIN — OXYCODONE HYDROCHLORIDE 10 MILLIGRAM(S): 5 TABLET ORAL at 18:33

## 2023-08-29 NOTE — PROGRESS NOTE ADULT - ASSESSMENT
51 y/o M with PMH Lung CA, colon cancer with metastases to the liver on chemotherapy,  h/o end ileostomy and revision 04/2023 and subtotal colectomy for perforated cecal mass 2022, presented with right CFA pseudoaneurysm on CT and duplex. Likely from right femoral access. Repeat US showed: hematoma measures 3.9 cm, and the nonthrombosed lumen measures 0.9 cm.    P:  cont IV fluid hydration  monitor electrolytes and replete  pain control  zofran prn n/v  cont Lov 90 q12  rest of management as per primary team     Will discuss plan with Dr. Taylor. 51 y/o M with PMH Lung CA, colon cancer with metastases to the liver on chemotherapy,  h/o end ileostomy and revision 04/2023 and subtotal colectomy for perforated cecal mass 2022, presented with right CFA pseudoaneurysm on CT and duplex. Likely from right femoral access. Repeat US showed: hematoma measures 3.9 cm, and the nonthrombosed lumen measures 0.9 cm.    P:  cont IV fluid hydration  monitor electrolytes and replete  pain control  zofran prn n/v  cont Lov 90 q12  rest of management as per primary team     Will discuss plan with Dr. Taylor.      UPDATED NOTE AFTER REPEAT US  After reviewing images and repeat US with Dr Taylor, it was determined there is no need for surgical intervention at this time.  Pt can follow up with Dr Taylor in her offcie 2 weeks after discharge  Reconsult as needed

## 2023-08-29 NOTE — CONSULT NOTE ADULT - REASON FOR ADMISSION
Abdominal Pain, Vomiting
Abdominal Pain, Vomiting, Poor PO Intake
Abdominal Pain, Vomiting

## 2023-08-29 NOTE — PROGRESS NOTE ADULT - ASSESSMENT
8/29 IVF , if cleared by vascular will consider discharge in next 24hrs    51 y/o male with metastatic colon cancer on chemo, PE  admitted for:        1. Leukocytosis SIRS, 2/2 to norovirus infection  supportive care. Immodiun. ivf  Hold abx for now  blood culture NGTD   CT abd/pelvis - no infectious/inflammatory process   CTA; no PNA   Off abxs now as per ID     2. High output ileostomy 2/2 to norovirus: dehydration   Hyponatremia  WARREN 2/2 to above  -supportive care, ivf  -bulking agents    3. 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 Known Occlusion of left external iliac artery  - vascular recc appreciated. F/U  ultrasound and eval size and need for possible surgical intervention  - likely due to metastatic malignancy with hx of missed doses/non compliance  - s/p LLE angio in 7/2023 with Dr vasques  - on LMWH s/p ivc filter  - RLE DVT identified 7/2023  US -lower extremities-7/14/23 & 7/16/23- noted acute nonocclusive deep venous thrombosis in the right common femoral vein, above the knee only, appears unchanged from 07/14/2023.  No evidence of propagation.s/p IVC filter 7/17/23        5. Metastatic colon cancer  to the liver and lung on chemotherapy  on palliative chemotherapy:  initially FOLFOXIRI / sunny , after 10 cycles changed to FOLFIRI/ Bevacizumab.  per palliative  cw fentanyl 25mh/h every 72 hrs, and oxycodone 10mg q4hrs prn        DVT ppx:  Lovenox  Code status: Full code     Dispo: continue ivf, monitor colostomy o/p. Vascular f/u regarding thrombosed psuedoanuerys,

## 2023-08-29 NOTE — PROGRESS NOTE ADULT - TIME BILLING
As above  Ileostomy 925cc total.  Output within reason.  Positive norovirus.  Supportive care.  No acute CRS issues at present.   Will follow peripherally.

## 2023-08-29 NOTE — PROGRESS NOTE ADULT - SUBJECTIVE AND OBJECTIVE BOX
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 The patient states he has been having generalized abdominal pain constantly for the past several days unable to tolerate PO (both solids and liquids), and was evaluated twice in the ED for his symptoms. The patient states his "workup" was reportedly negative and discharged. The patient denies any associated subjective fevers, chills, or recent sick contacts. The patient reports running out of Oxycodone 3 days ago and states is taking 40mg of Oxycodone daily to manage his pain.  is his pain management MD and also reports using Fentanyl patches to manage his pain.        INTERVAL HPI/ OVERNIGHT EVENTS: + norovirus. ostomy , abd pain better controlled, on oxycodone and fentanyl, ileostomy output more formed greenish i color    REVIEW OF SYSTEMS:  All other review of systems is negative unless indicated above.      PHYSICAL EXAM:  General: Well developed; in no acute distress  Eyes: EOMI; conjunctiva and sclera clear  Head: Normocephalic; atraumatic  ENMT: No nasal discharge; airway clear  Neck: Supple; non tender; no masses  Respiratory: No wheezes, rales or rhonchi  Cardiovascular: Regular rate and rhythm. S1 and S2 Normal;  Gastrointestinal: Soft non-tender non-distended; Normal bowel sounds, ileostomy  in place , stump pink.   Genitourinary: No  suprapubic  tendernessExtremities: Normal range of motion, No edema, some R calf tenderness   Vascular: Peripheral DP pulses not  palpable, R groin  tenderness to palpation    Neurological: Alert and oriented x 3, non focal   Skin: Warm and dry. No acute rash  Musculoskeletal: Normal muscle tone, without deformities  Psychiatric: Cooperative and appropriate      PHYSICAL EXAM:    Daily     Daily     ICU Vital Signs Last 24 Hrs  T(C): 36.9 (29 Aug 2023 15:54), Max: 37 (29 Aug 2023 08:36)  T(F): 98.4 (29 Aug 2023 15:54), Max: 98.6 (29 Aug 2023 08:36)  HR: 80 (29 Aug 2023 15:54) (64 - 80)  BP: 114/66 (29 Aug 2023 15:54) (101/53 - 114/66)  BP(mean): --  ABP: --  ABP(mean): --  RR: 18 (29 Aug 2023 15:54) (18 - 18)  SpO2: 100% (29 Aug 2023 15:54) (100% - 100%)    O2 Parameters below as of 29 Aug 2023 15:54  Patient On (Oxygen Delivery Method): room air                                    11.0   16.01 )-----------( 126      ( 29 Aug 2023 07:11 )             34.0       CBC Full  -  ( 29 Aug 2023 07:11 )  WBC Count : 16.01 K/uL  RBC Count : 3.80 M/uL  Hemoglobin : 11.0 g/dL  Hematocrit : 34.0 %  Platelet Count - Automated : 126 K/uL  Mean Cell Volume : 89.5 fl  Mean Cell Hemoglobin : 28.9 pg  Mean Cell Hemoglobin Concentration : 32.4 gm/dL  Auto Neutrophil # : 12.72 K/uL  Auto Lymphocyte # : 1.91 K/uL  Auto Monocyte # : 0.89 K/uL  Auto Eosinophil # : 0.20 K/uL  Auto Basophil # : 0.04 K/uL  Auto Neutrophil % : 79.5 %  Auto Lymphocyte % : 11.9 %  Auto Monocyte % : 5.6 %  Auto Eosinophil % : 1.2 %  Auto Basophil % : 0.2 %      08-29    138  |  109<H>  |  12  ----------------------------<  100<H>  3.9   |  25  |  0.66    Ca    8.2<L>      29 Aug 2023 07:11  Phos  2.6     08-29  Mg     1.8     08-29                      Urinalysis Basic - ( 29 Aug 2023 07:11 )    Color: x / Appearance: x / SG: x / pH: x  Gluc: 100 mg/dL / Ketone: x  / Bili: x / Urobili: x   Blood: x / Protein: x / Nitrite: x   Leuk Esterase: x / RBC: x / WBC x   Sq Epi: x / Non Sq Epi: x / Bacteria: x            MEDICATIONS  (STANDING):  enoxaparin Injectable 90 milliGRAM(s) SubCutaneous every 12 hours  fentaNYL   Patch  25 MICROgram(s)/Hr 1 Patch Transdermal every 72 hours  loperamide 4 milliGRAM(s) Oral four times a day          < from:  Duplex Arterial Lower Ext Ltd, Right (08.25.23 @ 19:39) >  FINDINGS:/  IMPRESSION:    3.6 x 1.7 x 2.4 cm right common femoral artery pseudoaneurysm is largely   thrombosed but with a small residual patent lumen measuring 1.4 x 0.8 x   1.4 cm.  Adjacent right common femoral vein is noted to be patent and free of   thrombus.    --- End of Report ---    < end of copied text >< from:  Duplex Arterial Lower Ext Ltd, Right (08.25.23 @ 19:39) >    3.6 x 1.7 x 2.4 cm right common femoral artery pseudoaneurysm is largely   thrombosed but with a small residual patent lumen measuring 1.4 x 0.8 x   1.4 cm.  Adjacent right common femoral vein is noted to be patent and free of   thrombus.

## 2023-08-29 NOTE — CONSULT NOTE ADULT - CONSULT REASON
GOC
Metastatic Colon CA
Right common femoral pseudoaneurysm
abdominal pain
fever  rle dvt  leukocytosis  diarrhea
Yes

## 2023-08-29 NOTE — CONSULT NOTE ADULT - CONVERSATION DETAILS
I met with the patient and reviewed the role of palliative medicine patient remembers seeing Deirdre Grande on the previous admisison and was appreciative to have service on board again. The patient shared that since he has been home he has been pursuing treatment and has been able to care for himself for the most part.  After the last admission, he looked into correction and contacted three different ones but nothing came of it and he has been living at home still with his mom and his brother. His mom has had a cognitive decline and as per the patient has been difficult because his brother works a lot and he is trying to take care of himself now his mom is requiring more help also, which is why he was looking at alternative options of living.   The patient stated that he wants to fill out a new HCP naming his Sister Idania if he were unable to make his own decisions as he does not think his mother will be able to anymore at this time.  He stated he would speak to his sister and let her know that he put in place that she is his new HCP.   We also discussed the MOLST form which I encouraged him to speak further with his sister about as well. At this time We discuss at length the patient's underlying clinical diagnosis. We discussed resuscitation and the risks/benefits of CPR.  Risks include broken ribs, lung puncture, pain and discomfort.  At this time due to the patient's underlying irreversible diagnosis of metastatic colon cancer, I would not recommend CPR because it would not reverse the current medical condition. The patient states that he does not want to set any limits but will continue to think about it going forward.    Emotional support Provided will continue to follow.

## 2023-08-29 NOTE — CONSULT NOTE ADULT - SUBJECTIVE AND OBJECTIVE BOX
HPI: Pt is a 52y old Male with hx of       PAIN: ( )Yes   ( )No  Level:  Location:  Intensity:    /10  Quality:  Aggravating Factors:  Alleviating Factors:  Radiation:  Duration/Timing:  Impact on ADLs:    DYSPNEA: ( ) Yes  ( ) No  Level:    PAST MEDICAL & SURGICAL HISTORY:  Cancer, colon  liver mets      S/P ileostomy      Metastasis to liver      Pulmonary embolism      S/P colon resection      H/O ileostomy          SOCIAL HX:    Hx opiate tolerance ( )YES  ( )NO    Baseline ADLs  (Prior to Admission)  ( ) Independent   ( )Dependent    FAMILY HISTORY:  FH: dementia (Mother)        Review of Systems:    Anxiety-  Depression-  Physical Discomfort-  Dyspnea-  Constipation-  Diarrhea-  Nausea-  Vomiting-  Anorexia-  Weight Loss-   Cough-  Secretions-  Fatigue-  Weakness-  Delirium-    All other systems reviewed and negative  Unable to obtain/Limited due to:      PHYSICAL EXAM:    Vital Signs Last 24 Hrs  T(C): 37 (29 Aug 2023 08:36), Max: 37 (29 Aug 2023 08:36)  T(F): 98.6 (29 Aug 2023 08:36), Max: 98.6 (29 Aug 2023 08:36)  HR: 64 (29 Aug 2023 08:36) (64 - 74)  BP: 101/53 (29 Aug 2023 08:36) (101/53 - 118/66)  BP(mean): --  RR: 18 (29 Aug 2023 08:36) (18 - 18)  SpO2: 100% (29 Aug 2023 08:36) (100% - 100%)    Parameters below as of 29 Aug 2023 08:36  Patient On (Oxygen Delivery Method): room air      Daily     Daily     PPSV2:   %  FAST:    General:  Mental Status:  HEENT:  Lungs:  Cardiac:  GI:  :  Ext:  Neuro:      LABS:                        11.0   16.01 )-----------( 126      ( 29 Aug 2023 07:11 )             34.0     08-29    138  |  109<H>  |  12  ----------------------------<  100<H>  3.9   |  25  |  0.66    Ca    8.2<L>      29 Aug 2023 07:11  Phos  2.6     08-29  Mg     1.8     08-29        Albumin: Albumin: 3.9 g/dL (08-26 @ 06:19)      Allergies    [This allergen will not trigger allergy alert] No Known Drug Allergies (Unknown)    Intolerances      MEDICATIONS  (STANDING):  enoxaparin Injectable 90 milliGRAM(s) SubCutaneous every 12 hours  fentaNYL   Patch  25 MICROgram(s)/Hr 1 Patch Transdermal every 72 hours  loperamide 4 milliGRAM(s) Oral four times a day  sodium chloride 0.9%. 1000 milliLiter(s) (125 mL/Hr) IV Continuous <Continuous>    MEDICATIONS  (PRN):  acetaminophen     Tablet .. 650 milliGRAM(s) Oral every 6 hours PRN Temp greater or equal to 38C (100.4F), Mild Pain (1 - 3)  melatonin 3 milliGRAM(s) Oral at bedtime PRN Insomnia  ondansetron Injectable 4 milliGRAM(s) IV Push every 8 hours PRN Nausea and/or Vomiting  oxyCODONE    IR 10 milliGRAM(s) Oral every 4 hours PRN Severe Pain (7 - 10)      RADIOLOGY/ADDITIONAL STUDIES: HPI: Pt is a 52y old Male with hx of Stage IV RS colon adenocarcinoma with metastasis to the liver and lung diagnosed in May 2022, underwent exploratory laparotomy, total colectomy, and end ileostomy on 5/19/2022 and revision of ileostomy 4/2023, DVT, Pulmonary Embolism s/p IVC filter placement presents to the  for further evaluation and management ongoing abdominal pain. The patient states he has been having generalized abdominal pain constantly for the past several days unable to tolerate PO (both solids and liquids), and was evaluated twice in the ED for his symptoms. The patient states his "workup" was reportedly negative and discharged. The patient denies any associated subjective fevers, chills, or recent sick contacts. The patient reports running out of Oxycodone 3 days ago and states is taking 40mg of Oxycodone daily to manage his pain.  is his pain management MD and also reports using Fentanyl patches to manage his pain. Pallaitive medicine consulted to help establish GOC and advance care planning     8/29/2023 pt seen and examined with no family at bedside, patient sitting in bed states pain is managed at this time, that it never fully goes away but that it is       PAIN: ( )Yes   ( )No  Level:  Location:  Intensity:    /10  Quality:  Aggravating Factors:  Alleviating Factors:  Radiation:  Duration/Timing:  Impact on ADLs:    DYSPNEA: ( ) Yes  ( ) No  Level:    PAST MEDICAL & SURGICAL HISTORY:  Cancer, colon  liver mets      S/P ileostomy      Metastasis to liver      Pulmonary embolism      S/P colon resection      H/O ileostomy          SOCIAL HX:    Hx opiate tolerance ( )YES  ( )NO    Baseline ADLs  (Prior to Admission)  ( ) Independent   ( )Dependent    FAMILY HISTORY:  FH: dementia (Mother)        Review of Systems:    Anxiety-  Depression-  Physical Discomfort-  Dyspnea-  Constipation-  Diarrhea-  Nausea-  Vomiting-  Anorexia-  Weight Loss-   Cough-  Secretions-  Fatigue-  Weakness-  Delirium-    All other systems reviewed and negative  Unable to obtain/Limited due to:      PHYSICAL EXAM:    Vital Signs Last 24 Hrs  T(C): 37 (29 Aug 2023 08:36), Max: 37 (29 Aug 2023 08:36)  T(F): 98.6 (29 Aug 2023 08:36), Max: 98.6 (29 Aug 2023 08:36)  HR: 64 (29 Aug 2023 08:36) (64 - 74)  BP: 101/53 (29 Aug 2023 08:36) (101/53 - 118/66)  BP(mean): --  RR: 18 (29 Aug 2023 08:36) (18 - 18)  SpO2: 100% (29 Aug 2023 08:36) (100% - 100%)    Parameters below as of 29 Aug 2023 08:36  Patient On (Oxygen Delivery Method): room air      Daily     Daily     PPSV2:   %  FAST:    General:  Mental Status:  HEENT:  Lungs:  Cardiac:  GI:  :  Ext:  Neuro:      LABS:                        11.0   16.01 )-----------( 126      ( 29 Aug 2023 07:11 )             34.0     08-29    138  |  109<H>  |  12  ----------------------------<  100<H>  3.9   |  25  |  0.66    Ca    8.2<L>      29 Aug 2023 07:11  Phos  2.6     08-29  Mg     1.8     08-29        Albumin: Albumin: 3.9 g/dL (08-26 @ 06:19)      Allergies    [This allergen will not trigger allergy alert] No Known Drug Allergies (Unknown)    Intolerances      MEDICATIONS  (STANDING):  enoxaparin Injectable 90 milliGRAM(s) SubCutaneous every 12 hours  fentaNYL   Patch  25 MICROgram(s)/Hr 1 Patch Transdermal every 72 hours  loperamide 4 milliGRAM(s) Oral four times a day  sodium chloride 0.9%. 1000 milliLiter(s) (125 mL/Hr) IV Continuous <Continuous>    MEDICATIONS  (PRN):  acetaminophen     Tablet .. 650 milliGRAM(s) Oral every 6 hours PRN Temp greater or equal to 38C (100.4F), Mild Pain (1 - 3)  melatonin 3 milliGRAM(s) Oral at bedtime PRN Insomnia  ondansetron Injectable 4 milliGRAM(s) IV Push every 8 hours PRN Nausea and/or Vomiting  oxyCODONE    IR 10 milliGRAM(s) Oral every 4 hours PRN Severe Pain (7 - 10)      RADIOLOGY/ADDITIONAL STUDIES: HPI: Pt is a 52y old Male with hx of Stage IV RS colon adenocarcinoma with metastasis to the liver and lung diagnosed in May 2022, underwent exploratory laparotomy, total colectomy, and end ileostomy on 5/19/2022 and revision of ileostomy 4/2023, DVT, Pulmonary Embolism s/p IVC filter placement presents to the  for further evaluation and management ongoing abdominal pain. The patient states he has been having generalized abdominal pain constantly for the past several days unable to tolerate PO (both solids and liquids), and was evaluated twice in the ED for his symptoms. The patient states his "workup" was reportedly negative and discharged. The patient denies any associated subjective fevers, chills, or recent sick contacts. The patient reports running out of Oxycodone 3 days ago and states is taking 40mg of Oxycodone daily to manage his pain.  is his pain management MD and also reports using Fentanyl patches to manage his pain. Pallaitive medicine consulted to help establish GOC and advance care planning     8/29/2023 pt seen and examined with no family at bedside, patient sitting in bed states pain is managed at this time, that it never fully goes away but that it is acceptable at this time.      PAIN: ( X)Yes   ( )No  Level: Better controlled at this time, patient is acceptable of pain that he has at this time as he states it is better and manageable with medication changes   Location: RLQ abd  Intensity:  5- 8/10  Quality: sharp  Alleviating Factors: rest and opioids   Radiation: abd/groin  Impact on ADLs: mod-severe    DYSPNEA: ( ) Yes  (X ) No    PAST MEDICAL & SURGICAL HISTORY:  Cancer, colon liver mets  S/P ileostomy  Metastasis to liver  Pulmonary embolism  S/P colon resection  H/O ileostomy    SOCIAL HX: lives at home with his mother and brother     Hx opiate tolerance ( )YES  (x )NO    Baseline ADLs  (Prior to Admission)  (x ) Independent   ( )Dependent    FAMILY HISTORY:  FH: dementia (Mother)    Review of Systems:    Anxiety- mild-mod  Depression- yes  Physical Discomfort- moderate better controlled now   Dyspnea- denies  Constipation- no  Diarrhea- yes  Anorexia- mod  Cough- denies    All other systems reviewed and negative    PHYSICAL EXAM:    Vital Signs Last 24 Hrs  T(C): 37 (29 Aug 2023 08:36), Max: 37 (29 Aug 2023 08:36)  T(F): 98.6 (29 Aug 2023 08:36), Max: 98.6 (29 Aug 2023 08:36)  HR: 64 (29 Aug 2023 08:36) (64 - 74)  BP: 101/53 (29 Aug 2023 08:36) (101/53 - 118/66)  RR: 18 (29 Aug 2023 08:36) (18 - 18)  SpO2: 100% (29 Aug 2023 08:36) (100% - 100%)    Parameters below as of 29 Aug 2023 08:36  Patient On (Oxygen Delivery Method): room air    PPSV2: 50 %    General: Middle aged male in NAD  Mental Status: A&O X3  HEENT: oral mucosa moist  Lungs: clear to auscultation olena  Cardiac: S1S2+  GI: abd soft NT ND + BS/ostomy with appliance  : voids  Ext:  CARL strength   Neuro: no focal def      LABS:                        11.0   16.01 )-----------( 126      ( 29 Aug 2023 07:11 )             34.0     08-29    138  |  109<H>  |  12  ----------------------------<  100<H>  3.9   |  25  |  0.66    Ca    8.2<L>      29 Aug 2023 07:11  Phos  2.6     08-29  Mg     1.8     08-29        Albumin: Albumin: 3.9 g/dL (08-26 @ 06:19)      Allergies    [This allergen will not trigger allergy alert] No Known Drug Allergies (Unknown)    Intolerances      MEDICATIONS  (STANDING):  enoxaparin Injectable 90 milliGRAM(s) SubCutaneous every 12 hours  fentaNYL   Patch  25 MICROgram(s)/Hr 1 Patch Transdermal every 72 hours  loperamide 4 milliGRAM(s) Oral four times a day  sodium chloride 0.9%. 1000 milliLiter(s) (125 mL/Hr) IV Continuous <Continuous>    MEDICATIONS  (PRN):  acetaminophen     Tablet .. 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 Further   follow-up requested.    IMPRESSION: On this examination, the hematoma measures 3.9 cm, and the   nonthrombosed lumen measures 0.9 cm    ACC: [id number] EXAM:  CT ABDOMEN AND PELVIS IC   ORDERED BY: DENNISE DE LA CRUZ   PROCEDURE DATE:  08/25/2023    INTERPRETATION:  CLINICAL INFORMATION: Abdominal pain  COMPARISON: CT scan of the abdomen and pelvis from 8/24/2023  CONTRAST/COMPLICATIONS:  IV Contrast: Omnipaque 350  90 cc administered   10 cc discarded  Oral Contrast: NONE  Complications: None reported at time of study completion  PROCEDURE:  CT of the Abdomen and Pelvis was performed.  Sagittal and coronal reformats were performed.    FINDINGS:  LOWER CHEST: Within normal limits.  LIVER: Multiple calcified and noncalcified liver metastases which are   grossly stable. For example, lesion in segment 2 measuring 3.8 x 2.7 cm   on series 2 image 11 has not significantly changed.  BILE DUCTS: Normal caliber.  GALLBLADDER: Higher attenuation within the gallbladder may represent   vicarious excretion of contrast material from prior injection.  SPLEEN: Within normal limits.  PANCREAS: Within normal limits.  ADRENALS: Within normal limits.  KIDNEYS/URETERS: Within normal limits.  BLADDER: Within normal limits.  REPRODUCTIVE ORGANS: Grossly unremarkable.  BOWEL: Status post colectomy with creation of a rectal stump and   ileostomy exiting the right lower quadrant. This is stable in appearance.  PERITONEUM: No ascites. Nodular soft tissue thickening in the right lower   pelvis laterally extending towards the inguinal region which is grossly   stable in appearance.  VESSELS: Vascular calcifications. IVC filter. 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 Focal pooling of contrast seen   between the right common femoral artery and vein which is unchanged. This   is enhancing similar to the artery and a pseudoaneurysm cannot be   excluded. Limited evaluation of the right common femoral vein.  RETROPERITONEUM/LYMPH NODES: No lymphadenopathy.  ABDOMINAL WALL: Subcutaneous soft tissue nodule opacities in the left   anterior abdominal wall may be due to subcutaneous injections.   Postsurgical changes.  BONES: Mild degenerative changes.    IMPRESSION:  Both of contrast between the right common femoral artery and vein which   is enhancing similar to the artery. A pseudoaneurysm cannot be excluded.   This was present on the prior study. Additionally, I cannot assess the   patency of the right common femoral vein. Right lower extremity   ultrasound of the inguinal region is recommended.  Grossly stable liver metastases. Postsurgical changes.

## 2023-08-29 NOTE — PROGRESS NOTE ADULT - ASSESSMENT
53 y/o M with PMH Lung CA, colon cancer with metastases to the liver  on chemotherapy,  h/o end ileostomy and revision 04/2023 and subtotal colectomy for perforated cecal mass 2022 presented with abdominal pain. No evidence of intra abdominal pathology on CT.   C diff negative.    P:  cont IV fluid hydration  monitor electrolytes and replete  monitor ileostomy output  imodium 4mg QID  pain control  zofran prn n/v  cont Lov 90 q12  rest of management as per primary team     Will discuss plan with colorectal surgery team 53 y/o M with PMH Lung CA, colon cancer with metastases to the liver  on chemotherapy, h/o end ileostomy and revision 04/2023 and subtotal colectomy for perforated cecal mass 2022 presented with abdominal pain. No evidence of intra abdominal pathology on CT.   C diff negative. Norovirus (+)    P:  cont IV fluid hydration  monitor electrolytes and replete  monitor ileostomy output  imodium 4mg QID  pain control  zofran prn n/v  cont Lov 90 q12  rest of management as per primary team     Will discuss plan with colorectal surgery team

## 2023-08-29 NOTE — PROGRESS NOTE ADULT - SUBJECTIVE AND OBJECTIVE BOX
Patient seen and examined at bedside. Patient has no complaints and reports pain is under control. Patient is tolerating diet with good ostomy function.    Physical Examination:  AO x3, NAD  GENERAL: No acute distress, well-developed  HEAD:  Atraumatic, Normocephalic  CHEST/LUNG: normal effort, equal chest rise  HEART: Regular rate and rhythm; No murmurs, rubs, or gallops  ABDOMEN: Soft, mild tenderness RLQ around the ileostomy site,  non-distended, ileostomy thick green stool. Ostomy prominent, pink.  ext. no cyanosis or edema, R DP/PT pulse palpable, L DP dopp, non dop PT, Right groin femoral pulse palpable, Left fem palpable   NEUROLOGY: responding appropriately, no focal deficits

## 2023-08-29 NOTE — CONSULT NOTE ADULT - ASSESSMENT
Pt is a 52y old Male with hx of Stage IV RS colon adenocarcinoma with metastasis to the liver and lung diagnosed in May 2022, underwent exploratory laparotomy, total colectomy, and end ileostomy on 5/19/2022 and revision of ileostomy 4/2023, DVT, Pulmonary Embolism s/p IVC filter placement presents to the  for further evaluation and management ongoing abdominal pain. The patient states he has been having generalized abdominal pain constantly for the past several days unable to tolerate PO (both solids and liquids), and was evaluated twice in the ED for his symptoms.    1) Metastatic Colon cancer   - s/p ileostomy   - mets to liver and lung   - oncology consult appreciated     2) 3. 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 Known Occlusion of left external iliac artery  - vascular recommendations appreciated   - c/w LMWH     Process of Care  --Reviewed dx/treatment problems and alignment with Goals of Care    Physical Aspects of Care  --Pain  c/w current management  fentanyl patch 25mcg   oxycodone 10mg q4hrs PRN     --Bowel Regimen  denies constipation  risk for constipation d/t immobility  daily dulcolax    --Dyspnea  No SOB at this time  comfortable and in NAD    --Nausea Vomiting  denies    --Weakness  PT as tolerated     Psychological and Psychiatric Aspects of Care:   --Greif/Bereavment: emotional support provided  --Hx of psychiatric dx: none  -Pastoral Care Available PRN     Social Aspects of Care  -SW involved     Cultural Aspects  -Primary Language: English    Goals of Care:     We discussed Palliative Care team being a supportive team when a patient has ongoing illnesses.  We also discussed that it is not an end of life care service, but can help navigate symptoms and emotional support througout their hospital stay here.    Ethical and Legal Aspects:   NA    Capacity- pt does have capacity at this time     HCP/Surrogate: patient filled out new HCP reflecting his sister Idania as HCP as his mother has had a cognitive decline and he does not feel she would be able to make decisions at this time     Code Status- Full code - pt confirmed   MOLST-   Dispo Plan-    Discussed With: Dr. Tan coordinated with attending and SW and RN     Time Spent: 90 minutes including the care, coordination and counseling of this patient, 50% of which was spent coordinating and counseling.

## 2023-08-30 ENCOUNTER — TRANSCRIPTION ENCOUNTER (OUTPATIENT)
Age: 53
End: 2023-08-30

## 2023-08-30 ENCOUNTER — APPOINTMENT (OUTPATIENT)
Dept: INFUSION THERAPY | Facility: CLINIC | Age: 53
End: 2023-08-30

## 2023-08-30 VITALS
HEART RATE: 77 BPM | DIASTOLIC BLOOD PRESSURE: 71 MMHG | SYSTOLIC BLOOD PRESSURE: 118 MMHG | TEMPERATURE: 98 F | RESPIRATION RATE: 18 BRPM | OXYGEN SATURATION: 100 %

## 2023-08-30 LAB
ANION GAP SERPL CALC-SCNC: 3 MMOL/L — LOW (ref 5–17)
BASOPHILS # BLD AUTO: 0.05 K/UL — SIGNIFICANT CHANGE UP (ref 0–0.2)
BASOPHILS NFR BLD AUTO: 0.3 % — SIGNIFICANT CHANGE UP (ref 0–2)
BUN SERPL-MCNC: 11 MG/DL — SIGNIFICANT CHANGE UP (ref 7–23)
CALCIUM SERPL-MCNC: 8.7 MG/DL — SIGNIFICANT CHANGE UP (ref 8.5–10.1)
CHLORIDE SERPL-SCNC: 108 MMOL/L — SIGNIFICANT CHANGE UP (ref 96–108)
CO2 SERPL-SCNC: 28 MMOL/L — SIGNIFICANT CHANGE UP (ref 22–31)
CREAT SERPL-MCNC: 0.77 MG/DL — SIGNIFICANT CHANGE UP (ref 0.5–1.3)
EGFR: 109 ML/MIN/1.73M2 — SIGNIFICANT CHANGE UP
EOSINOPHIL # BLD AUTO: 0.31 K/UL — SIGNIFICANT CHANGE UP (ref 0–0.5)
EOSINOPHIL NFR BLD AUTO: 2.1 % — SIGNIFICANT CHANGE UP (ref 0–6)
GLUCOSE SERPL-MCNC: 100 MG/DL — HIGH (ref 70–99)
HCT VFR BLD CALC: 35.7 % — LOW (ref 39–50)
HGB BLD-MCNC: 11.6 G/DL — LOW (ref 13–17)
IMM GRANULOCYTES NFR BLD AUTO: 1.6 % — HIGH (ref 0–0.9)
LYMPHOCYTES # BLD AUTO: 13.6 % — SIGNIFICANT CHANGE UP (ref 13–44)
LYMPHOCYTES # BLD AUTO: 2 K/UL — SIGNIFICANT CHANGE UP (ref 1–3.3)
MCHC RBC-ENTMCNC: 28.9 PG — SIGNIFICANT CHANGE UP (ref 27–34)
MCHC RBC-ENTMCNC: 32.5 GM/DL — SIGNIFICANT CHANGE UP (ref 32–36)
MCV RBC AUTO: 89 FL — SIGNIFICANT CHANGE UP (ref 80–100)
MONOCYTES # BLD AUTO: 0.81 K/UL — SIGNIFICANT CHANGE UP (ref 0–0.9)
MONOCYTES NFR BLD AUTO: 5.5 % — SIGNIFICANT CHANGE UP (ref 2–14)
NEUTROPHILS # BLD AUTO: 11.25 K/UL — HIGH (ref 1.8–7.4)
NEUTROPHILS NFR BLD AUTO: 76.9 % — SIGNIFICANT CHANGE UP (ref 43–77)
PLATELET # BLD AUTO: 143 K/UL — LOW (ref 150–400)
POTASSIUM SERPL-MCNC: 4.1 MMOL/L — SIGNIFICANT CHANGE UP (ref 3.5–5.3)
POTASSIUM SERPL-SCNC: 4.1 MMOL/L — SIGNIFICANT CHANGE UP (ref 3.5–5.3)
RBC # BLD: 4.01 M/UL — LOW (ref 4.2–5.8)
RBC # FLD: 16.9 % — HIGH (ref 10.3–14.5)
SODIUM SERPL-SCNC: 139 MMOL/L — SIGNIFICANT CHANGE UP (ref 135–145)
WBC # BLD: 14.66 K/UL — HIGH (ref 3.8–10.5)
WBC # FLD AUTO: 14.66 K/UL — HIGH (ref 3.8–10.5)

## 2023-08-30 PROCEDURE — 99232 SBSQ HOSP IP/OBS MODERATE 35: CPT

## 2023-08-30 PROCEDURE — 99239 HOSP IP/OBS DSCHRG MGMT >30: CPT

## 2023-08-30 RX ORDER — LOPERAMIDE HCL 2 MG
2 TABLET ORAL
Refills: 0 | DISCHARGE

## 2023-08-30 RX ORDER — LOPERAMIDE HCL 2 MG
2 TABLET ORAL
Qty: 240 | Refills: 0 | DISCHARGE
Start: 2023-08-30 | End: 2023-09-28

## 2023-08-30 RX ORDER — LOPERAMIDE HCL 2 MG
2 TABLET ORAL
Qty: 240 | Refills: 0
Start: 2023-08-30 | End: 2023-09-28

## 2023-08-30 RX ADMIN — OXYCODONE HYDROCHLORIDE 10 MILLIGRAM(S): 5 TABLET ORAL at 04:28

## 2023-08-30 RX ADMIN — OXYCODONE HYDROCHLORIDE 10 MILLIGRAM(S): 5 TABLET ORAL at 08:51

## 2023-08-30 RX ADMIN — OXYCODONE HYDROCHLORIDE 10 MILLIGRAM(S): 5 TABLET ORAL at 13:59

## 2023-08-30 RX ADMIN — OXYCODONE HYDROCHLORIDE 10 MILLIGRAM(S): 5 TABLET ORAL at 03:58

## 2023-08-30 RX ADMIN — OXYCODONE HYDROCHLORIDE 10 MILLIGRAM(S): 5 TABLET ORAL at 09:51

## 2023-08-30 RX ADMIN — ENOXAPARIN SODIUM 90 MILLIGRAM(S): 100 INJECTION SUBCUTANEOUS at 10:24

## 2023-08-30 NOTE — DISCHARGE NOTE PROVIDER - CARE PROVIDERS DIRECT ADDRESSES
,DirectAddress_Unknown,[email or web address],DirectAddress_Unknown
Patient/Caregiver provided printed discharge information.

## 2023-08-30 NOTE — PROGRESS NOTE ADULT - REASON FOR ADMISSION
Abdominal Pain, Vomiting

## 2023-08-30 NOTE — PROGRESS NOTE ADULT - SUBJECTIVE AND OBJECTIVE BOX
HPI: Pt is a 52y old Male with hx of Stage IV RS colon adenocarcinoma with metastasis to the liver and lung diagnosed in May 2022, underwent exploratory laparotomy, total colectomy, and end ileostomy on 5/19/2022 and revision of ileostomy 4/2023, DVT, Pulmonary Embolism s/p IVC filter placement presents to the  for further evaluation and management ongoing abdominal pain. The patient states he has been having generalized abdominal pain constantly for the past several days unable to tolerate PO (both solids and liquids), and was evaluated twice in the ED for his symptoms. The patient states his "workup" was reportedly negative and discharged. The patient denies any associated subjective fevers, chills, or recent sick contacts. The patient reports running out of Oxycodone 3 days ago and states is taking 40mg of Oxycodone daily to manage his pain.  is his pain management MD and also reports using Fentanyl patches to manage his pain. Pallaitive medicine consulted to help establish GOC and advance care planning     8/29/2023 pt seen and examined with no family at bedside, patient sitting in bed states pain is managed at this time, that it never fully goes away but that it is acceptable at this time.    8/30/23 Seen and examined at bedside. States his pain is well controlled with current regiment. Follows with pain management out pt however is unchanged since last hospitalization.    PAIN: ( X)Yes   ( )No  Level: Better controlled at this time, patient is acceptable of pain that he has at this time as he states it is better and manageable with medication changes   Location: RLQ abd  Intensity:  5- 8/10  Quality: sharp  Alleviating Factors: rest and opioids   Radiation: abd/groin  Impact on ADLs: mod-severe    DYSPNEA: ( ) Yes  (X ) No    PAST MEDICAL & SURGICAL HISTORY:  Cancer, colon liver mets  S/P ileostomy  Metastasis to liver  Pulmonary embolism  S/P colon resection  H/O ileostomy    SOCIAL HX: lives at home with his mother and brother     Hx opiate tolerance ( )YES  (x )NO    Baseline ADLs  (Prior to Admission)  (x ) Independent   ( )Dependent    FAMILY HISTORY:  FH: dementia (Mother)    Review of Systems:    Anxiety- mild-mod  Depression- yes  Physical Discomfort- moderate better controlled now   Dyspnea- denies  Constipation- no  Diarrhea- yes  Anorexia- mod  Cough- denies    All other systems reviewed and negative    PHYSICAL EXAM:  ICU Vital Signs Last 24 Hrs  T(C): 36.7 (30 Aug 2023 08:46), Max: 36.9 (29 Aug 2023 15:54)  T(F): 98 (30 Aug 2023 08:46), Max: 98.4 (29 Aug 2023 15:54)  HR: 77 (30 Aug 2023 08:46) (77 - 82)  BP: 118/71 (30 Aug 2023 08:46) (108/56 - 118/71)  RR: 18 (30 Aug 2023 08:46) (18 - 18)  SpO2: 100% (30 Aug 2023 08:46) (100% - 100%)    O2 Parameters below as of 30 Aug 2023 08:46  Patient On (Oxygen Delivery Method): room air    PPSV2: 50 %  General: Middle aged male in NAD  Mental Status: A&O X3  HEENT: oral mucosa moist  Lungs: clear to auscultation olena  Cardiac: S1S2+  GI: abd soft NT ND + BS/ostomy with appliance  : voids  Ext:  CARL strength   Neuro: no focal def      LABS:                                 11.6   14.66 )-----------( 143      ( 30 Aug 2023 06:23 )             35.7            08-30    139  |  108  |  11  ----------------------------<  100<H>  4.1   |  28  |  0.77    Ca    8.7      30 Aug 2023 06:23  Phos  2.6     08-29  Mg     1.8     08-29    Allergies    [This allergen will not trigger allergy alert] No Known Drug Allergies (Unknown)    Intolerances    MEDICATIONS  (STANDING):  enoxaparin Injectable 90 milliGRAM(s) SubCutaneous every 12 hours  fentaNYL   Patch  25 MICROgram(s)/Hr 1 Patch Transdermal every 72 hours  loperamide 4 milliGRAM(s) Oral four times a day    MEDICATIONS  (PRN):  acetaminophen     Tablet .. 650 milliGRAM(s) Oral every 6 hours PRN Temp greater or equal to 38C (100.4F), Mild Pain (1 - 3)  melatonin 3 milliGRAM(s) Oral at bedtime PRN Insomnia  ondansetron Injectable 4 milliGRAM(s) IV Push every 8 hours PRN Nausea and/or Vomiting  oxyCODONE    IR 10 milliGRAM(s) Oral every 4 hours PRN Severe Pain (7 - 10)      RADIOLOGY/ADDITIONAL STUDIES:

## 2023-08-30 NOTE — PROGRESS NOTE ADULT - ASSESSMENT
Pt is a 52y old Male with hx of Stage IV RS colon adenocarcinoma with metastasis to the liver and lung diagnosed in May 2022, underwent exploratory laparotomy, total colectomy, and end ileostomy on 5/19/2022 and revision of ileostomy 4/2023, DVT, Pulmonary Embolism s/p IVC filter placement presents to the  for further evaluation and management ongoing abdominal pain. The patient states he has been having generalized abdominal pain constantly for the past several days unable to tolerate PO (both solids and liquids), and was evaluated twice in the ED for his symptoms.    1) Metastatic Colon cancer   - s/p ileostomy   - mets to liver and lung   - oncology consult appreciated     2) 3. 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 Known Occlusion of left external iliac artery  - vascular recommendations appreciated   - c/w LMWH     Process of Care  --Reviewed dx/treatment problems and alignment with Goals of Care    Physical Aspects of Care  --Pain  c/w current management  fentanyl patch 25mcg   oxycodone 10mg q4hrs PRN     --Bowel Regimen  denies constipation  risk for constipation d/t immobility  daily dulcolax    --Dyspnea  No SOB at this time  comfortable and in NAD    --Nausea Vomiting  denies    --Weakness  PT as tolerated     Psychological and Psychiatric Aspects of Care:   --Greif/Bereavment: emotional support provided  --Hx of psychiatric dx: none  -Pastoral Care Available PRN     Social Aspects of Care  -SW involved     Cultural Aspects  -Primary Language: English    Goals of Care:     We discussed Palliative Care team being a supportive team when a patient has ongoing illnesses.  We also discussed that it is not an end of life care service, but can help navigate symptoms and emotional support throughout their hospital stay here.    Ethical and Legal Aspects:   NA    Capacity- pt does have capacity at this time     HCP/ Surrogate: patient filled out new HCP reflecting his sister Idania as HCP as his mother has had a cognitive decline and he does not feel she would be able to make decisions at this time     Code Status- Full code - pt confirmed   MOLST-   Dispo Plan-    Discussed With: Dr. Tan coordinated with attending and SW and RN     Time Spent: 30 minutes including the care, coordination and counseling of this patient, 50% of which was spent coordinating and counseling.

## 2023-08-30 NOTE — DISCHARGE NOTE NURSING/CASE MANAGEMENT/SOCIAL WORK - NSTRANSFERBELONGINGSDISPO_GEN_A_NUR
You are being discharged home today.    Will discharge on Namenda and Aricept for memory/ confusion issues.  Finish Cefdinir and Azithromycin as prescribed for pneumonia.    Mandatory follow up with PCP next week.    Follow recommendations given by behavioral health.    Recommend arranging outpatient therapy after discharge.    Use Walker when ambulating.    Return to the hospital as needed.  
with patient

## 2023-08-30 NOTE — DISCHARGE NOTE PROVIDER - NSDCFUADDAPPT_GEN_ALL_CORE_FT
[facility]  Thursday September 7th 5:20 pm  [address], [place]  [phone]    Please arrive by 5pm for your appointment.

## 2023-08-30 NOTE — DISCHARGE NOTE NURSING/CASE MANAGEMENT/SOCIAL WORK - NSDCFUADDAPPT_GEN_ALL_CORE_FT
[facility]th[facility]  Thursday September 7th 5:20 pm  [address], [place]  [phone]    Please arrive by 5pm for your appointment.

## 2023-08-30 NOTE — DISCHARGE NOTE NURSING/CASE MANAGEMENT/SOCIAL WORK - PATIENT PORTAL LINK FT
You can access the FollowMyHealth Patient Portal offered by [facility] by registering at the following website: [email or web address]. By joining [employer]’s FollowMyHealth portal, you will also be able to view your health information using other applications (apps) compatible with our system.

## 2023-08-30 NOTE — CHART NOTE - NSCHARTNOTEFT_GEN_A_CORE
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 (25 Aug 2023 18:33)      PERTINENT PMH REVIEWED:  [ X ] YES [ ] NO           Primary Contact: patient filled out new HCP reflecting his sister Idania as HCP as his mother has had a cognitive decline and he does not feel she would be able to make decisions at this time     Mental Status: [ X ] Alert  [ X ] Oriented [  ] Confused [  ] Lethargic [  ]  Concerns of Depression [  ]- not identified   Anxiety [   ]- not identified   Baseline ADLs (prior to admission):  Independent [ ] moderately [ X ] fully   Dependent   [ ] moderately [ ] fully    Anticipated Grief: Patient [ X ] Family [  ]    Caregiver [place] Assessed: Yes [ X ] No [  ]    Sikh: Unknown     Spiritual Concerns: Not identified     Goals of Care: Comfort [  ] Rehabilitation [  ] Curative [  ] Life Prolonging [ X ]    Previous Services: None    ADVANCE DIRECTIVES:  Full Code    Anticipated D/C Plan: return home and continue to follow with Oncology Dr. Serrano                     Summary:     This SW met with pt. at bedside to follow up and provide support. Pt. has been residing home with his mother and brother. He shared that his Mother has dementia and it has been a difficult living situation. He has been trying to look for ASL/Adult Home however, has been unsuccessful.  Feelings explored and support provided.  Pt. has been receiving chemotherapy and Pt his Oncologist is Dr. Serrano. Pt. is not ready to set any limits at this time. Plan is for pt. to return home today and follow up with Oncology. Emotional support provided. Our team will continue to follow.

## 2023-08-30 NOTE — DISCHARGE NOTE PROVIDER - NSDCMRMEDTOKEN_GEN_ALL_CORE_FT
enoxaparin 100 mg/mL injectable solution: 90 milligram(s) subcutaneously 2 times a day  fentaNYL 25 mcg/hr transdermal film, extended release: 1 patch transdermally every 72 hours  loperamide 2 mg oral tablet, chewable: 2 tab(s) chewed 4 times a day ***with meals and at bedtime***  oxyCODONE 10 mg oral tablet: 1 tab(s) orally every 6 hours as needed for   enoxaparin 100 mg/mL injectable solution: 90 milligram(s) subcutaneously 2 times a day  fentaNYL 25 mcg/hr transdermal film, extended release: 1 patch transdermally every 72 hours  loperamide 2 mg oral capsule: 2 cap(s) orally 4 times a day  oxyCODONE 10 mg oral tablet: 1 tab(s) orally every 6 hours as needed for

## 2023-08-30 NOTE — DISCHARGE NOTE PROVIDER - CARE PROVIDER_API CALL
Lia Taylor  Vascular Surgery  [address]  [place] [address]  Phone: [phone]  Fax: [phone]  Follow Up Time:     Wanda Serrano  Medical Oncology  [address], [place] [address]  Phone: [phone]  Fax: [phone]  Follow Up Time:     primary doctor,   Phone: (   )    -  Fax: (   )    -  Follow Up Time:

## 2023-08-30 NOTE — PROGRESS NOTE ADULT - PROVIDER SPECIALTY LIST ADULT
Colorectal Surgery
Hospitalist
Vascular Surgery
Vascular Surgery
Hospitalist
Colorectal Surgery
Colorectal Surgery
Heme/Onc
Infectious Disease
Vascular Surgery
Heme/Onc
Palliative Care

## 2023-08-30 NOTE — DISCHARGE NOTE PROVIDER - NSDCFUSCHEDAPPT_GEN_ALL_CORE_FT
Palla, Venugopal R  [facility] Physician [facility]  CARDIOLOGY [address]  Scheduled Appointment: 10/09/2023

## 2023-08-30 NOTE — DISCHARGE NOTE PROVIDER - NSDCCPCAREPLAN_GEN_ALL_CORE_FT
PRINCIPAL DISCHARGE DIAGNOSIS  Diagnosis: Abdominal pain  Assessment and Plan of Treatment: you were found to have norovirus gastroenetritis now resolved   please follow up with your oncologist as outpatient as soon possoble or as scheduled  follow up with vascular surgery in 2 weeks for further evaluation and monitoring of Right common femoral artery Pseudoaneurysm   follow up with your primary doctor i 1 week

## 2023-08-30 NOTE — DISCHARGE NOTE PROVIDER - PROVIDER TOKENS
PROVIDER:[TOKEN:[[id number]]],PROVIDER:[TOKEN:[[id number]]],FREE:[LAST:[primary doctor],PHONE:[(   )    -],FAX:[(   )    -]]

## 2023-08-30 NOTE — DISCHARGE NOTE PROVIDER - HOSPITAL COURSE
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 The patient states he has been having generalized abdominal pain constantly for the past several days unable to tolerate PO (both solids and liquids), and was evaluated twice in the ED for his symptoms. The patient states his "workup" was reportedly negative and discharged. The patient denies any associated subjective fevers, chills, or recent sick contacts. The patient reports running out of Oxycodone 3 days ago and states is taking 40mg of Oxycodone daily to manage his pain.  is his pain management MD and also reports using Fentanyl patches to manage his pain.  53 y/o male with metastatic colon cancer on chemo, PE  admitted for:        1. Leukocytosis SIRS, 2/2 to norovirus infection  supportive care. Immodiun. ivf  Hold abx for now  blood culture NGTD   CT abd/pelvis - no infectious/inflammatory process   CTA; no PNA   Off abxs now as per ID     2. High output ileostomy 2/2 to norovirus: dehydration   Hyponatremia  WARREN 2/2 to above  -supportive care, ivf  -bulking agents    3. 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 Known Occlusion of left external iliac artery  - vascular recc appreciated. F/U  ultrasound and eval size and need for possible surgical intervention  - likely due to metastatic malignancy with hx of missed doses/non compliance  - s/p LLE angio in 7/2023 with Dr vasques  - on LMWH s/p ivc filter  - RLE DVT identified 7/2023  US -lower extremities-7/14/23 & 7/16/23- noted acute nonocclusive deep venous thrombosis in the right common femoral vein, above the knee only, appears unchanged from 07/14/2023.  No evidence of propagation.s/p IVC filter 7/17/23        5. Metastatic colon cancer  to the liver and lung on chemotherapy  on palliative chemotherapy:  initially FOLFOXIRI / sunny , after 10 cycles changed to FOLFIRI/ Bevacizumab.  per palliative  cw fentanyl 25mh/h every 72 hrs, and oxycodone 10mg q4hrs prn        DVT ppx:  Lovenox  Code status: Full code     Dispo: continue ivf, monitor colostomy o/p. Vascular f/u regarding thrombosed psuedoanuerys,  + norovirus. ostomy , abd pain better controlled, on oxycodone and fentanyl, ileostomy output more formed greenish i color  PHYSICAL EXAM:  General: Well developed; in no acute distress  Eyes: EOMI; conjunctiva and sclera clear  Head: Normocephalic; atraumatic  ENMT: No nasal discharge; airway clear  Neck: Supple; non tender; no masses  Respiratory: No wheezes, rales or rhonchi  Cardiovascular: Regular rate and rhythm. S1 and S2 Normal;  Gastrointestinal: Soft non-tender non-distended; Normal bowel sounds, ileostomy  in place , stump pink.   Genitourinary: No  suprapubic  tendernessExtremities: Normal range of motion, No edema, some R calf tenderness   Vascular: Peripheral DP pulses not  palpable, R groin  tenderness to palpation    Neurological: Alert and oriented x 3, non focal   Skin: Warm and dry. No acute rash 52 year old male PMHx significant for Stage IV RS colon adenocarcinoma with metastasis to the liver and lung diagnosed in May 2022, underwent exploratory laparotomy, total colectomy, and end ileostomy on 5/19/2022 and revision of ileostomy 4/2023, DVT, Pulmonary Embolism s/p IVC filter placement presents to the  for further evaluation and management ongoing abdominal pain. The patient states he has been having generalized abdominal pain constantly for the past several days unable to tolerate PO (both solids and liquids), and was evaluated twice in the ED for his symptoms. The patient states his "workup" was reportedly negative and discharged. The patient denies any associated subjective fevers, chills, or recent sick contacts. The patient reports running out of Oxycodone 3 days ago and states is taking 40mg of Oxycodone daily to manage his pain.  is his pain management MD and also reports using Fentanyl patches to manage his pain.  51 y/o male with metastatic colon cancer on chemo, PE  admitted for:     Leukocytosis SIRS, 2/2 to norovirus infection  supportive care. Immodiun. ivf  C diff negative. Norovirus (+)  - cleared by colrectal for discharge  Blood culture NGTD   CT abd/pelvis - no infectious/inflammatory process   CTA; no PNA   Off abxs now as per ID   Ucx [id number] strep agalactiae - no urinary symptoms- dw with ID Dr bedolla - unlikely UTI -no indication for abx per ID    2. High output ileostomy 2/2 to norovirus: dehydration   Hyponatremia resolved   WARREN 2/2 to above  -supportive care, s/p ivf  -bulking agents       # right CFA pseudoaneurysm on CT and duplex. Likely from right femoral access.   Repeat US showed: hematoma measures 3.9 cm, and the nonthrombosed lumen measures 0.9 cm.  Per  Dr Vasques, it was determined there is no need for surgical intervention at this time.  Pt can follow up with Dr Vasques in her offcie 2 weeks after discharge- cleared by [facility] for discharge   s/p LLE angio in 7/2023 with Dr vasques  - on LMWH s/p ivc filter  - RLE DVT identified 7/2023  US -lower extremities-7/14/23 & 7/16/23- noted acute nonocclusive deep venous thrombosis in the right common femoral vein, above the knee only, appears unchanged from 07/14/2023.  No evidence of propagation.s/p IVC filter 7/17/23    #Metastatic colon cancer  to the liver and lung on chemotherapy  on palliative chemotherapy:  initially FOLFOXIRI / sunny , after 10 cycles changed to FOLFIRI/ Bevacizumab.  per palliative  cw fentanyl 25mh/h every 72 hrs, and oxycodone 10mg q6hrs prn- f/u with outpatient pain management    On chemo FOLFIRI, and Avastin, most recent dose 08/16/23- f/u onc after discharge    DVT ppx:  Lovenox  Code status: Full code       8/30+ norovirus. ostomy , chronic abd pain  controlled, on oxycodone and fentanyl, ileostomy output more formed greenish i color, afebrile , ambulating, denies dysuria , tolerating po  PHYSICAL EXAM:  General: Well developed; in no acute distress  Eyes: EOMI; conjunctiva and sclera clear  Head: Normocephalic; atraumatic  ENMT: No nasal discharge; airway clear  Neck: Supple; non tender; no masses  Respiratory: No wheezes, rales or rhonchi  Cardiovascular: Regular rate and rhythm. S1 and S2 Normal;  Gastrointestinal: Soft non-tender non-distended; Normal bowel sounds, ileostomy  in place , stump pink.   Genitourinary: No  suprapubic  tenderness  Extremities: Normal range of motion, No edema, some R calf tenderness   Vascular: Peripheral DP pulses not  palpable, R groin  tenderness to palpation    Neurological: Alert and oriented x 3, non focal       discharge time 47mins

## 2023-08-31 NOTE — ED ADULT NURSE NOTE - HOW OFTEN DO YOU HAVE A DRINK CONTAINING ALCOHOL?
What Type Of Note Output Would You Prefer (Optional)?: Standard Output How Severe Are Your Spot(S)?: severe Have Your Spot(S) Been Treated In The Past?: has not been treated Hpi Title: Evaluation of Skin Lesions Additional History: Patient has a growth on right shoulder. Present  3 weeks. No treatment.\\n\\n\\nPatient also has crusty growths on forehead. Present x months. No treatment. Never

## 2023-09-01 ENCOUNTER — APPOINTMENT (OUTPATIENT)
Dept: HEMATOLOGY ONCOLOGY | Facility: CLINIC | Age: 53
End: 2023-09-01

## 2023-09-01 ENCOUNTER — APPOINTMENT (OUTPATIENT)
Dept: INFUSION THERAPY | Facility: CLINIC | Age: 53
End: 2023-09-01

## 2023-09-05 DIAGNOSIS — E86.0 DEHYDRATION: ICD-10-CM

## 2023-09-05 DIAGNOSIS — I72.4 ANEURYSM OF ARTERY OF LOWER EXTREMITY: ICD-10-CM

## 2023-09-05 DIAGNOSIS — I82.411 ACUTE EMBOLISM AND THROMBOSIS OF RIGHT FEMORAL VEIN: ICD-10-CM

## 2023-09-05 DIAGNOSIS — Z93.2 ILEOSTOMY STATUS: ICD-10-CM

## 2023-09-05 DIAGNOSIS — C78.7 SECONDARY MALIGNANT NEOPLASM OF LIVER AND INTRAHEPATIC BILE DUCT: ICD-10-CM

## 2023-09-05 DIAGNOSIS — Z79.899 OTHER LONG TERM (CURRENT) DRUG THERAPY: ICD-10-CM

## 2023-09-05 DIAGNOSIS — C78.00 SECONDARY MALIGNANT NEOPLASM OF UNSPECIFIED LUNG: ICD-10-CM

## 2023-09-05 DIAGNOSIS — E87.1 HYPO-OSMOLALITY AND HYPONATREMIA: ICD-10-CM

## 2023-09-05 DIAGNOSIS — Z91.148 PATIENT'S OTHER NONCOMPLIANCE WITH MEDICATION REGIMEN FOR OTHER REASON: ICD-10-CM

## 2023-09-05 DIAGNOSIS — A08.11 ACUTE GASTROENTEROPATHY DUE TO NORWALK AGENT: ICD-10-CM

## 2023-09-05 DIAGNOSIS — N17.9 ACUTE KIDNEY FAILURE, UNSPECIFIED: ICD-10-CM

## 2023-09-05 DIAGNOSIS — Z82.0 FAMILY HISTORY OF EPILEPSY AND OTHER DISEASES OF THE NERVOUS SYSTEM: ICD-10-CM

## 2023-09-05 DIAGNOSIS — I74.5 EMBOLISM AND THROMBOSIS OF ILIAC ARTERY: ICD-10-CM

## 2023-09-07 ENCOUNTER — OUTPATIENT (OUTPATIENT)
Dept: OUTPATIENT SERVICES | Facility: HOSPITAL | Age: 53
LOS: 1 days | Discharge: HOME | End: 2023-09-07

## 2023-09-07 DIAGNOSIS — Z90.49 ACQUIRED ABSENCE OF OTHER SPECIFIED PARTS OF DIGESTIVE TRACT: Chronic | ICD-10-CM

## 2023-09-07 DIAGNOSIS — Z98.890 OTHER SPECIFIED POSTPROCEDURAL STATES: Chronic | ICD-10-CM

## 2023-09-07 DIAGNOSIS — C18.9 MALIGNANT NEOPLASM OF COLON, UNSPECIFIED: ICD-10-CM

## 2023-09-11 ENCOUNTER — RESULT REVIEW (OUTPATIENT)
Age: 53
End: 2023-09-11

## 2023-09-11 ENCOUNTER — RESULT CHARGE (OUTPATIENT)
Age: 53
End: 2023-09-11

## 2023-09-11 ENCOUNTER — APPOINTMENT (OUTPATIENT)
Dept: INFUSION THERAPY | Facility: CLINIC | Age: 53
End: 2023-09-11

## 2023-09-11 LAB
ALBUMIN SERPL ELPH-MCNC: 4.6 G/DL — SIGNIFICANT CHANGE UP (ref 3.3–5)
ALP SERPL-CCNC: 157 U/L — HIGH (ref 40–120)
ALT FLD-CCNC: 37 U/L — SIGNIFICANT CHANGE UP (ref 10–45)
ANION GAP SERPL CALC-SCNC: 15 MMOL/L — SIGNIFICANT CHANGE UP (ref 5–17)
AST SERPL-CCNC: 26 U/L — SIGNIFICANT CHANGE UP (ref 10–40)
BASOPHILS # BLD AUTO: 0.02 K/UL — SIGNIFICANT CHANGE UP (ref 0–0.2)
BASOPHILS NFR BLD AUTO: 0.4 % — SIGNIFICANT CHANGE UP (ref 0–2)
BILIRUB SERPL-MCNC: 0.3 MG/DL — SIGNIFICANT CHANGE UP (ref 0.2–1.2)
BILIRUB UR QL STRIP: ABNORMAL
BUN SERPL-MCNC: 10 MG/DL — SIGNIFICANT CHANGE UP (ref 7–23)
CALCIUM SERPL-MCNC: 9.4 MG/DL — SIGNIFICANT CHANGE UP (ref 8.4–10.5)
CHLORIDE SERPL-SCNC: 115 MMOL/L — HIGH (ref 96–108)
CO2 SERPL-SCNC: 22 MMOL/L — SIGNIFICANT CHANGE UP (ref 22–31)
CREAT SERPL-MCNC: 0.72 MG/DL — SIGNIFICANT CHANGE UP (ref 0.5–1.3)
EGFR: 111 ML/MIN/1.73M2 — SIGNIFICANT CHANGE UP
EOSINOPHIL # BLD AUTO: 0.04 K/UL — SIGNIFICANT CHANGE UP (ref 0–0.5)
EOSINOPHIL NFR BLD AUTO: 0.8 % — SIGNIFICANT CHANGE UP (ref 0–6)
GLUCOSE SERPL-MCNC: 107 MG/DL — HIGH (ref 70–99)
GLUCOSE UR-MCNC: NEGATIVE
HCG UR QL: 0.2 EU/DL
HCT VFR BLD CALC: 39.5 % — SIGNIFICANT CHANGE UP (ref 39–50)
HGB BLD-MCNC: 12.6 G/DL — LOW (ref 13–17)
HGB UR QL STRIP.AUTO: NEGATIVE
IMM GRANULOCYTES NFR BLD AUTO: 0.4 % — SIGNIFICANT CHANGE UP (ref 0–0.9)
KETONES UR-MCNC: NEGATIVE
LEUKOCYTE ESTERASE UR QL STRIP: NEGATIVE
LYMPHOCYTES # BLD AUTO: 0.95 K/UL — LOW (ref 1–3.3)
LYMPHOCYTES # BLD AUTO: 19.3 % — SIGNIFICANT CHANGE UP (ref 13–44)
MAGNESIUM SERPL-MCNC: 1.9 MG/DL — SIGNIFICANT CHANGE UP (ref 1.6–2.6)
MCHC RBC-ENTMCNC: 28.4 PG — SIGNIFICANT CHANGE UP (ref 27–34)
MCHC RBC-ENTMCNC: 31.9 GM/DL — LOW (ref 32–36)
MCV RBC AUTO: 89.2 FL — SIGNIFICANT CHANGE UP (ref 80–100)
MONOCYTES # BLD AUTO: 0.99 K/UL — HIGH (ref 0–0.9)
MONOCYTES NFR BLD AUTO: 20.1 % — HIGH (ref 2–14)
NEUTROPHILS # BLD AUTO: 2.91 K/UL — SIGNIFICANT CHANGE UP (ref 1.8–7.4)
NEUTROPHILS NFR BLD AUTO: 59 % — SIGNIFICANT CHANGE UP (ref 43–77)
NITRITE UR QL STRIP: NEGATIVE
NRBC # BLD: 0 /100 WBCS — SIGNIFICANT CHANGE UP (ref 0–0)
PH UR STRIP: 5.5
PLATELET # BLD AUTO: 148 K/UL — LOW (ref 150–400)
POTASSIUM SERPL-MCNC: 5 MMOL/L — SIGNIFICANT CHANGE UP (ref 3.5–5.3)
POTASSIUM SERPL-SCNC: 5 MMOL/L — SIGNIFICANT CHANGE UP (ref 3.5–5.3)
PROT SERPL-MCNC: 7.2 G/DL — SIGNIFICANT CHANGE UP (ref 6–8.3)
PROT UR STRIP-MCNC: ABNORMAL
RBC # BLD: 4.43 M/UL — SIGNIFICANT CHANGE UP (ref 4.2–5.8)
RBC # FLD: 16.7 % — HIGH (ref 10.3–14.5)
SODIUM SERPL-SCNC: 152 MMOL/L — HIGH (ref 135–145)
SP GR UR STRIP: 1.02
WBC # BLD: 4.93 K/UL — SIGNIFICANT CHANGE UP (ref 3.8–10.5)
WBC # FLD AUTO: 4.93 K/UL — SIGNIFICANT CHANGE UP (ref 3.8–10.5)

## 2023-09-12 DIAGNOSIS — Z51.11 ENCOUNTER FOR ANTINEOPLASTIC CHEMOTHERAPY: ICD-10-CM

## 2023-09-12 DIAGNOSIS — R11.2 NAUSEA WITH VOMITING, UNSPECIFIED: ICD-10-CM

## 2023-09-13 ENCOUNTER — NON-APPOINTMENT (OUTPATIENT)
Age: 53
End: 2023-09-13

## 2023-09-13 ENCOUNTER — APPOINTMENT (OUTPATIENT)
Dept: INFUSION THERAPY | Facility: CLINIC | Age: 53
End: 2023-09-13

## 2023-09-14 DIAGNOSIS — Z51.89 ENCOUNTER FOR OTHER SPECIFIED AFTERCARE: ICD-10-CM

## 2023-09-18 ENCOUNTER — APPOINTMENT (OUTPATIENT)
Dept: VASCULAR SURGERY | Facility: CLINIC | Age: 53
End: 2023-09-18
Payer: MEDICAID

## 2023-09-18 VITALS
OXYGEN SATURATION: 98 % | HEART RATE: 102 BPM | BODY MASS INDEX: 25.03 KG/M2 | HEIGHT: 74 IN | SYSTOLIC BLOOD PRESSURE: 117 MMHG | WEIGHT: 195 LBS | DIASTOLIC BLOOD PRESSURE: 85 MMHG

## 2023-09-18 DIAGNOSIS — I72.4 ANEURYSM OF ARTERY OF LOWER EXTREMITY: ICD-10-CM

## 2023-09-18 PROCEDURE — 99213 OFFICE O/P EST LOW 20 MIN: CPT

## 2023-09-18 PROCEDURE — 93926 LOWER EXTREMITY STUDY: CPT

## 2023-09-23 ENCOUNTER — EMERGENCY (EMERGENCY)
Facility: HOSPITAL | Age: 53
LOS: 0 days | Discharge: HOME | End: 2023-09-23
Attending: EMERGENCY MEDICINE
Payer: MEDICAID

## 2023-09-23 VITALS
WEIGHT: 190.04 LBS | TEMPERATURE: 98 F | HEART RATE: 115 BPM | HEIGHT: 74 IN | DIASTOLIC BLOOD PRESSURE: 73 MMHG | SYSTOLIC BLOOD PRESSURE: 101 MMHG | RESPIRATION RATE: 16 BRPM | OXYGEN SATURATION: 100 %

## 2023-09-23 VITALS
SYSTOLIC BLOOD PRESSURE: 112 MMHG | OXYGEN SATURATION: 99 % | TEMPERATURE: 98 F | RESPIRATION RATE: 16 BRPM | HEART RATE: 98 BPM | DIASTOLIC BLOOD PRESSURE: 75 MMHG

## 2023-09-23 DIAGNOSIS — C78.7 SECONDARY MALIGNANT NEOPLASM OF LIVER AND INTRAHEPATIC BILE DUCT: ICD-10-CM

## 2023-09-23 DIAGNOSIS — Z90.49 ACQUIRED ABSENCE OF OTHER SPECIFIED PARTS OF DIGESTIVE TRACT: Chronic | ICD-10-CM

## 2023-09-23 DIAGNOSIS — Z86.711 PERSONAL HISTORY OF PULMONARY EMBOLISM: ICD-10-CM

## 2023-09-23 DIAGNOSIS — Z91.09 OTHER ALLERGY STATUS, OTHER THAN TO DRUGS AND BIOLOGICAL SUBSTANCES: ICD-10-CM

## 2023-09-23 DIAGNOSIS — F10.90 ALCOHOL USE, UNSPECIFIED, UNCOMPLICATED: ICD-10-CM

## 2023-09-23 DIAGNOSIS — Z93.2 ILEOSTOMY STATUS: ICD-10-CM

## 2023-09-23 DIAGNOSIS — F12.90 CANNABIS USE, UNSPECIFIED, UNCOMPLICATED: ICD-10-CM

## 2023-09-23 DIAGNOSIS — R10.9 UNSPECIFIED ABDOMINAL PAIN: ICD-10-CM

## 2023-09-23 DIAGNOSIS — D72.829 ELEVATED WHITE BLOOD CELL COUNT, UNSPECIFIED: ICD-10-CM

## 2023-09-23 DIAGNOSIS — E86.0 DEHYDRATION: ICD-10-CM

## 2023-09-23 DIAGNOSIS — Z59.00 HOMELESSNESS UNSPECIFIED: ICD-10-CM

## 2023-09-23 DIAGNOSIS — Z85.038 PERSONAL HISTORY OF OTHER MALIGNANT NEOPLASM OF LARGE INTESTINE: ICD-10-CM

## 2023-09-23 DIAGNOSIS — Z98.890 OTHER SPECIFIED POSTPROCEDURAL STATES: Chronic | ICD-10-CM

## 2023-09-23 DIAGNOSIS — Z90.49 ACQUIRED ABSENCE OF OTHER SPECIFIED PARTS OF DIGESTIVE TRACT: ICD-10-CM

## 2023-09-23 LAB
ALBUMIN SERPL ELPH-MCNC: 4.4 G/DL — SIGNIFICANT CHANGE UP (ref 3.3–5)
ALP SERPL-CCNC: 214 U/L — HIGH (ref 40–120)
ALT FLD-CCNC: 31 U/L — SIGNIFICANT CHANGE UP (ref 12–78)
ANION GAP SERPL CALC-SCNC: 5 MMOL/L — SIGNIFICANT CHANGE UP (ref 5–17)
ANISOCYTOSIS BLD QL: SLIGHT — SIGNIFICANT CHANGE UP
AST SERPL-CCNC: 18 U/L — SIGNIFICANT CHANGE UP (ref 15–37)
BASOPHILS # BLD AUTO: 0 K/UL — SIGNIFICANT CHANGE UP (ref 0–0.2)
BASOPHILS NFR BLD AUTO: 0 % — SIGNIFICANT CHANGE UP (ref 0–2)
BILIRUB SERPL-MCNC: 0.5 MG/DL — SIGNIFICANT CHANGE UP (ref 0.2–1.2)
BUN SERPL-MCNC: 24 MG/DL — HIGH (ref 7–23)
CALCIUM SERPL-MCNC: 9.6 MG/DL — SIGNIFICANT CHANGE UP (ref 8.5–10.1)
CHLORIDE SERPL-SCNC: 101 MMOL/L — SIGNIFICANT CHANGE UP (ref 96–108)
CO2 SERPL-SCNC: 26 MMOL/L — SIGNIFICANT CHANGE UP (ref 22–31)
CREAT SERPL-MCNC: 1.41 MG/DL — HIGH (ref 0.5–1.3)
EGFR: 61 ML/MIN/1.73M2 — SIGNIFICANT CHANGE UP
EOSINOPHIL # BLD AUTO: 0.17 K/UL — SIGNIFICANT CHANGE UP (ref 0–0.5)
EOSINOPHIL NFR BLD AUTO: 1 % — SIGNIFICANT CHANGE UP (ref 0–6)
GLUCOSE SERPL-MCNC: 113 MG/DL — HIGH (ref 70–99)
HCT VFR BLD CALC: 48.8 % — SIGNIFICANT CHANGE UP (ref 39–50)
HGB BLD-MCNC: 16.2 G/DL — SIGNIFICANT CHANGE UP (ref 13–17)
LYMPHOCYTES # BLD AUTO: 14 % — SIGNIFICANT CHANGE UP (ref 13–44)
LYMPHOCYTES # BLD AUTO: 2.42 K/UL — SIGNIFICANT CHANGE UP (ref 1–3.3)
MANUAL SMEAR VERIFICATION: SIGNIFICANT CHANGE UP
MCHC RBC-ENTMCNC: 28.7 PG — SIGNIFICANT CHANGE UP (ref 27–34)
MCHC RBC-ENTMCNC: 33.2 GM/DL — SIGNIFICANT CHANGE UP (ref 32–36)
MCV RBC AUTO: 86.5 FL — SIGNIFICANT CHANGE UP (ref 80–100)
MONOCYTES # BLD AUTO: 1.56 K/UL — HIGH (ref 0–0.9)
MONOCYTES NFR BLD AUTO: 9 % — SIGNIFICANT CHANGE UP (ref 2–14)
NEUTROPHILS # BLD AUTO: 13.15 K/UL — HIGH (ref 1.8–7.4)
NEUTROPHILS NFR BLD AUTO: 76 % — SIGNIFICANT CHANGE UP (ref 43–77)
NRBC # BLD: 0 /100 — SIGNIFICANT CHANGE UP (ref 0–0)
NRBC # BLD: SIGNIFICANT CHANGE UP /100 WBCS (ref 0–0)
PLAT MORPH BLD: NORMAL — SIGNIFICANT CHANGE UP
PLATELET # BLD AUTO: 144 K/UL — LOW (ref 150–400)
POTASSIUM SERPL-MCNC: 4.4 MMOL/L — SIGNIFICANT CHANGE UP (ref 3.5–5.3)
POTASSIUM SERPL-SCNC: 4.4 MMOL/L — SIGNIFICANT CHANGE UP (ref 3.5–5.3)
PROT SERPL-MCNC: 9.1 GM/DL — HIGH (ref 6–8.3)
RBC # BLD: 5.64 M/UL — SIGNIFICANT CHANGE UP (ref 4.2–5.8)
RBC # FLD: 15.9 % — HIGH (ref 10.3–14.5)
RBC BLD AUTO: ABNORMAL
SODIUM SERPL-SCNC: 132 MMOL/L — LOW (ref 135–145)
WBC # BLD: 17.3 K/UL — HIGH (ref 3.8–10.5)
WBC # FLD AUTO: 17.3 K/UL — HIGH (ref 3.8–10.5)

## 2023-09-23 PROCEDURE — 99284 EMERGENCY DEPT VISIT MOD MDM: CPT | Mod: 25

## 2023-09-23 PROCEDURE — 96375 TX/PRO/DX INJ NEW DRUG ADDON: CPT

## 2023-09-23 PROCEDURE — 80053 COMPREHEN METABOLIC PANEL: CPT

## 2023-09-23 PROCEDURE — 71045 X-RAY EXAM CHEST 1 VIEW: CPT

## 2023-09-23 PROCEDURE — 74177 CT ABD & PELVIS W/CONTRAST: CPT | Mod: 26,MA

## 2023-09-23 PROCEDURE — 36415 COLL VENOUS BLD VENIPUNCTURE: CPT

## 2023-09-23 PROCEDURE — 96376 TX/PRO/DX INJ SAME DRUG ADON: CPT

## 2023-09-23 PROCEDURE — 99285 EMERGENCY DEPT VISIT HI MDM: CPT

## 2023-09-23 PROCEDURE — 74177 CT ABD & PELVIS W/CONTRAST: CPT | Mod: MA

## 2023-09-23 PROCEDURE — 96374 THER/PROPH/DIAG INJ IV PUSH: CPT | Mod: XU

## 2023-09-23 PROCEDURE — 71045 X-RAY EXAM CHEST 1 VIEW: CPT | Mod: 26

## 2023-09-23 PROCEDURE — 85025 COMPLETE CBC W/AUTO DIFF WBC: CPT

## 2023-09-23 RX ORDER — ONDANSETRON 8 MG/1
4 TABLET, FILM COATED ORAL ONCE
Refills: 0 | Status: COMPLETED | OUTPATIENT
Start: 2023-09-23 | End: 2023-09-23

## 2023-09-23 RX ORDER — MORPHINE SULFATE 50 MG/1
4 CAPSULE, EXTENDED RELEASE ORAL ONCE
Refills: 0 | Status: DISCONTINUED | OUTPATIENT
Start: 2023-09-23 | End: 2023-09-23

## 2023-09-23 RX ORDER — SODIUM CHLORIDE 9 MG/ML
1000 INJECTION INTRAMUSCULAR; INTRAVENOUS; SUBCUTANEOUS ONCE
Refills: 0 | Status: COMPLETED | OUTPATIENT
Start: 2023-09-23 | End: 2023-09-23

## 2023-09-23 RX ADMIN — MORPHINE SULFATE 4 MILLIGRAM(S): 50 CAPSULE, EXTENDED RELEASE ORAL at 15:52

## 2023-09-23 RX ADMIN — SODIUM CHLORIDE 1000 MILLILITER(S): 9 INJECTION INTRAMUSCULAR; INTRAVENOUS; SUBCUTANEOUS at 15:52

## 2023-09-23 RX ADMIN — ONDANSETRON 4 MILLIGRAM(S): 8 TABLET, FILM COATED ORAL at 15:52

## 2023-09-23 RX ADMIN — MORPHINE SULFATE 4 MILLIGRAM(S): 50 CAPSULE, EXTENDED RELEASE ORAL at 17:14

## 2023-09-23 SDOH — ECONOMIC STABILITY - HOUSING INSECURITY: HOMELESSNESS UNSPECIFIED: Z59.00

## 2023-09-23 NOTE — ED PROVIDER NOTE - PHYSICAL EXAMINATION
Constitutional: Thing, Chronically ill appearing, AOx3  Eyes: PERRL EOMI  Head: Normocephalic atraumatic  Mouth: MMM  Cardiac: regular rate and rhythm  Resp: Lungs CTAB  GI: Abd s/nd/nt, Ostomy in place, mildly erythematous but no surrounding erythema.   Neuro: CN2-12 grossly intact, CARL x 4  Skin: No visible rashes

## 2023-09-23 NOTE — ED PROVIDER NOTE - OBJECTIVE STATEMENT
51 y/o male w/ a PMHx of colon CA w/ mets to liver (currently on chemo), PE, colon resection, and ileostomy presents to the ED via EMS c/o abd pain. Denies fevers or chills. Pt notes he wears a fentanyl patch for pain however is not currently wearing one. Last chemo x1.5 week ago, last surgery in April. Pt states he always has some abd pain however since yesterday that pain has been worse. No other complaints at this time.

## 2023-09-23 NOTE — ED ADULT TRIAGE NOTE - CHIEF COMPLAINT QUOTE
BIBEMS for abdominal pain, hx colon CA undergoing chemotherapy last tx Monday. has colostomy. denies fevers. pt wears fentanyl patch for pain, reports he does not currently have one on at this time.

## 2023-09-23 NOTE — CONSULT NOTE ADULT - SUBJECTIVE AND OBJECTIVE BOX
HPI:  53 yo M with PMH Lung CA, colon cancer with metastases to the liver on chemotherapy, PE on s/p IVC filter 07/2023 and on Lovenox 90 q12, h/o End ileostomy and revision 04/2023 and subtotal colectomy for perforated cecal mass 2022, s/p LLE angiogram 07/2023 unable to transverse across presents w/ abd pain. Patient states that he always had generalized abd pain and also notices that he still has prolapse of ileostomy. Pain is more aggravated when ileostomy ileostomy prolapses. Denies n/v. Patient states that he changes his ostomy bag about 30 times a day due to leak. Patient states that he drank alcohol and smoked yesterday out of being depressed after many years of abstinence. Denies fever/chills, shortness of breath, chest pain. VS reviewed    PAST MEDICAL & SURGICAL HISTORY:  Cancer, colon  liver mets      S/P ileostomy      Metastasis to liver      Pulmonary embolism      S/P colon resection      H/O ileostomy          MEDICATIONS  (STANDING):    MEDICATIONS  (PRN):      Allergies    [This allergen will not trigger allergy alert] No Known Drug Allergies (Unknown)    Intolerances        SOCIAL HISTORY:    FAMILY HISTORY:  FH: dementia (Mother)            Physical Exam:  General: NAD, resting comfortably  HEENT: NC/AT, EOMI, normal hearing, no oral lesions, no LAD, neck supple  Pulmonary: normal resp effort, CTA-B  Cardiovascular: NSR, no murmurs  Abdominal: soft, nondistended, nontender. ileostomy pink/patent without signs of prolapse, and has well formed stool  Extremities: no erythema at groins, no palpable aneurysm. WWP, normal strength, no clubbing/cyanosis/edema  Neuro: A/O x 3, CNs II-XII grossly intact, normal sensation, no focal deficits  Pulses: palpable distal pulses    Vital Signs Last 24 Hrs  T(C): 36.6 (23 Sep 2023 15:14), Max: 36.6 (23 Sep 2023 15:14)  T(F): 97.8 (23 Sep 2023 15:14), Max: 97.8 (23 Sep 2023 15:14)  HR: 115 (23 Sep 2023 15:14) (115 - 115)  BP: 101/73 (23 Sep 2023 15:14) (101/73 - 101/73)  BP(mean): --  RR: 16 (23 Sep 2023 15:14) (16 - 16)  SpO2: 100% (23 Sep 2023 15:14) (100% - 100%)    Parameters below as of 23 Sep 2023 15:14  Patient On (Oxygen Delivery Method): room air        I&O's Summary          LABS:                        16.2   17.30 )-----------( 144      ( 23 Sep 2023 15:46 )             48.8     09-23    132<L>  |  101  |  24<H>  ----------------------------<  113<H>  4.4   |  26  |  1.41<H>    Ca    9.6      23 Sep 2023 15:46    TPro  9.1<H>  /  Alb  4.4  /  TBili  0.5  /  DBili  x   /  AST  18  /  ALT  31  /  AlkPhos  214<H>  09-23      Urinalysis Basic - ( 23 Sep 2023 15:46 )    Color: x / Appearance: x / SG: x / pH: x  Gluc: 113 mg/dL / Ketone: x  / Bili: x / Urobili: x   Blood: x / Protein: x / Nitrite: x   Leuk Esterase: x / RBC: x / WBC x   Sq Epi: x / Non Sq Epi: x / Bacteria: x      CAPILLARY BLOOD GLUCOSE        LIVER FUNCTIONS - ( 23 Sep 2023 15:46 )  Alb: 4.4 g/dL / Pro: 9.1 gm/dL / ALK PHOS: 214 U/L / ALT: 31 U/L / AST: 18 U/L / GGT: x             Cultures:      RADIOLOGY & ADDITIONAL STUDIES:      Plan:

## 2023-09-23 NOTE — ED PROVIDER NOTE - NSFOLLOWUPINSTRUCTIONS_ED_ALL_ED_FT
Follow up with your surgeon and oncologist.    Continue usual medicines.     Avoid alcohol.    Return to the ER for worsening, any concerns.

## 2023-09-23 NOTE — ED PROVIDER NOTE - PROGRESS NOTE DETAILS
d/w surg resident for colorectal consult. MD SEVERINO appreciate surg consult. pt admits to etoh binge yesterday and smoking marijuana, also on chemo. suspect all of these things contributed to his elev wbc and dehydraiton. no acute surgical issue. recommend cxr, ua. cont ivfs. if ok, can dc home. will f/u outpt. MD SEVERINO abd remains benign. pt ate in ED.  CXR neg. dc home. MD SEVERINO pt requesting SW for homelessness. MD SEVERINO pt now states he has a home and will take a medicaid cab there. MD SEVERINO

## 2023-09-23 NOTE — ED PROVIDER NOTE - PATIENT PORTAL LINK FT
You can access the FollowMyHealth Patient Portal offered by [facility] by registering at the following website: [email or web address]. By joining [employer]’s FollowMyHealth portal, you will also be able to view your health information using other applications (apps) compatible with our system. You can access the FollowMyHealth Patient Portal offered by [facility] by registering at the following website: [email or web address]. By joining [employer]’s FollowMyHealth portal, you will also be able to view your health information using other applications (apps) compatible with our system.

## 2023-09-23 NOTE — CONSULT NOTE ADULT - ASSESSMENT
51yo M presents w/ abd pain  upon exam, no significant abd pain. ileostomy intact, pink/patent, no sign of infection, ileostomy with well formed stool  cannot assess high ileostomy output, but based on well formed stool, unlikely high output  CT negative for any pathology  leukocytosis/Cr elevation possibly from dehydration due to alcohol consumption? chemo? high ileostomy output?    Recommendation:  - no surgical intervention needed. recommend UA and CXR to rule out any source of leukocytosis  - recommend hydration and repeat labs. if normalized, then patient can be discharged from colorectal surgery standpoint  - if labs still abnormal, recommend medical admission and colorectal surgery will follow for possible high ileostomy output  - if discharged, please have patient follow up with Dr. Mckeon in 2 weeks    Plan discussed with colorectal surgery attending, Dr. Mckeon

## 2023-09-23 NOTE — ED ADULT NURSE NOTE - NSFALLUNIVINTERV_ED_ALL_ED
Bed/Stretcher in lowest position, wheels locked, appropriate side rails in place/Call bell, personal items and telephone in reach/Instruct patient to call for assistance before getting out of bed/chair/stretcher/Non-slip footwear applied when patient is off stretcher/[place] to call system/Physically safe environment - no spills, clutter or unnecessary equipment/Purposeful proactive rounding/Room/bathroom lighting operational, light cord in reach

## 2023-09-25 ENCOUNTER — APPOINTMENT (OUTPATIENT)
Dept: INFUSION THERAPY | Facility: CLINIC | Age: 53
End: 2023-09-25
Payer: MEDICAID

## 2023-09-25 ENCOUNTER — INPATIENT (INPATIENT)
Facility: HOSPITAL | Age: 53
LOS: 5 days | Discharge: HOME | DRG: 861 | End: 2023-10-01
Attending: FAMILY MEDICINE | Admitting: FAMILY MEDICINE
Payer: MEDICAID

## 2023-09-25 ENCOUNTER — APPOINTMENT (OUTPATIENT)
Dept: HEMATOLOGY ONCOLOGY | Facility: CLINIC | Age: 53
End: 2023-09-25
Payer: MEDICAID

## 2023-09-25 VITALS
WEIGHT: 190.04 LBS | HEART RATE: 102 BPM | RESPIRATION RATE: 18 BRPM | HEIGHT: 74 IN | SYSTOLIC BLOOD PRESSURE: 128 MMHG | DIASTOLIC BLOOD PRESSURE: 93 MMHG | OXYGEN SATURATION: 100 % | TEMPERATURE: 98 F

## 2023-09-25 DIAGNOSIS — Z90.49 ACQUIRED ABSENCE OF OTHER SPECIFIED PARTS OF DIGESTIVE TRACT: Chronic | ICD-10-CM

## 2023-09-25 DIAGNOSIS — R10.9 UNSPECIFIED ABDOMINAL PAIN: ICD-10-CM

## 2023-09-25 DIAGNOSIS — Z98.890 OTHER SPECIFIED POSTPROCEDURAL STATES: Chronic | ICD-10-CM

## 2023-09-25 LAB
ALBUMIN SERPL ELPH-MCNC: 4.2 G/DL — SIGNIFICANT CHANGE UP (ref 3.3–5)
ALP SERPL-CCNC: 210 U/L — HIGH (ref 40–120)
ALT FLD-CCNC: 49 U/L — SIGNIFICANT CHANGE UP (ref 12–78)
AMPHET UR-MCNC: NEGATIVE — SIGNIFICANT CHANGE UP
ANION GAP SERPL CALC-SCNC: 4 MMOL/L — LOW (ref 5–17)
APPEARANCE UR: CLEAR — SIGNIFICANT CHANGE UP
AST SERPL-CCNC: 32 U/L — SIGNIFICANT CHANGE UP (ref 15–37)
BACTERIA # UR AUTO: ABNORMAL
BARBITURATES UR SCN-MCNC: NEGATIVE — SIGNIFICANT CHANGE UP
BASOPHILS # BLD AUTO: 0.07 K/UL — SIGNIFICANT CHANGE UP (ref 0–0.2)
BASOPHILS NFR BLD AUTO: 0.4 % — SIGNIFICANT CHANGE UP (ref 0–2)
BENZODIAZ UR-MCNC: POSITIVE — SIGNIFICANT CHANGE UP
BILIRUB SERPL-MCNC: 0.3 MG/DL — SIGNIFICANT CHANGE UP (ref 0.2–1.2)
BILIRUB UR-MCNC: NEGATIVE — SIGNIFICANT CHANGE UP
BUN SERPL-MCNC: 15 MG/DL — SIGNIFICANT CHANGE UP (ref 7–23)
CALCIUM SERPL-MCNC: 9.7 MG/DL — SIGNIFICANT CHANGE UP (ref 8.5–10.1)
CHLORIDE SERPL-SCNC: 100 MMOL/L — SIGNIFICANT CHANGE UP (ref 96–108)
CO2 SERPL-SCNC: 29 MMOL/L — SIGNIFICANT CHANGE UP (ref 22–31)
COCAINE METAB.OTHER UR-MCNC: POSITIVE — SIGNIFICANT CHANGE UP
COLOR SPEC: YELLOW — SIGNIFICANT CHANGE UP
CREAT SERPL-MCNC: 0.96 MG/DL — SIGNIFICANT CHANGE UP (ref 0.5–1.3)
DIFF PNL FLD: NEGATIVE — SIGNIFICANT CHANGE UP
EGFR: 96 ML/MIN/1.73M2 — SIGNIFICANT CHANGE UP
EOSINOPHIL # BLD AUTO: 0.32 K/UL — SIGNIFICANT CHANGE UP (ref 0–0.5)
EOSINOPHIL NFR BLD AUTO: 1.9 % — SIGNIFICANT CHANGE UP (ref 0–6)
EPI CELLS # UR: SIGNIFICANT CHANGE UP
GLUCOSE SERPL-MCNC: 104 MG/DL — HIGH (ref 70–99)
GLUCOSE UR QL: NEGATIVE — SIGNIFICANT CHANGE UP
HCT VFR BLD CALC: 46.4 % — SIGNIFICANT CHANGE UP (ref 39–50)
HGB BLD-MCNC: 15.2 G/DL — SIGNIFICANT CHANGE UP (ref 13–17)
IMM GRANULOCYTES NFR BLD AUTO: 0.8 % — SIGNIFICANT CHANGE UP (ref 0–0.9)
KETONES UR-MCNC: NEGATIVE — SIGNIFICANT CHANGE UP
LEUKOCYTE ESTERASE UR-ACNC: ABNORMAL
LIDOCAIN IGE QN: 53 U/L — SIGNIFICANT CHANGE UP (ref 13–75)
LYMPHOCYTES # BLD AUTO: 13.6 % — SIGNIFICANT CHANGE UP (ref 13–44)
LYMPHOCYTES # BLD AUTO: 2.33 K/UL — SIGNIFICANT CHANGE UP (ref 1–3.3)
MCHC RBC-ENTMCNC: 28.6 PG — SIGNIFICANT CHANGE UP (ref 27–34)
MCHC RBC-ENTMCNC: 32.8 GM/DL — SIGNIFICANT CHANGE UP (ref 32–36)
MCV RBC AUTO: 87.2 FL — SIGNIFICANT CHANGE UP (ref 80–100)
METHADONE UR-MCNC: NEGATIVE — SIGNIFICANT CHANGE UP
MONOCYTES # BLD AUTO: 0.85 K/UL — SIGNIFICANT CHANGE UP (ref 0–0.9)
MONOCYTES NFR BLD AUTO: 5 % — SIGNIFICANT CHANGE UP (ref 2–14)
NEUTROPHILS # BLD AUTO: 13.41 K/UL — HIGH (ref 1.8–7.4)
NEUTROPHILS NFR BLD AUTO: 78.3 % — HIGH (ref 43–77)
NITRITE UR-MCNC: NEGATIVE — SIGNIFICANT CHANGE UP
OPIATES UR-MCNC: POSITIVE — SIGNIFICANT CHANGE UP
PCP SPEC-MCNC: SIGNIFICANT CHANGE UP
PCP UR-MCNC: NEGATIVE — SIGNIFICANT CHANGE UP
PH UR: 6 — SIGNIFICANT CHANGE UP (ref 5–8)
PLATELET # BLD AUTO: 130 K/UL — LOW (ref 150–400)
POTASSIUM SERPL-MCNC: 5.3 MMOL/L — SIGNIFICANT CHANGE UP (ref 3.5–5.3)
POTASSIUM SERPL-SCNC: 5.3 MMOL/L — SIGNIFICANT CHANGE UP (ref 3.5–5.3)
PROT SERPL-MCNC: 8.9 GM/DL — HIGH (ref 6–8.3)
PROT UR-MCNC: NEGATIVE — SIGNIFICANT CHANGE UP
RBC # BLD: 5.32 M/UL — SIGNIFICANT CHANGE UP (ref 4.2–5.8)
RBC # FLD: 15.4 % — HIGH (ref 10.3–14.5)
RBC CASTS # UR COMP ASSIST: ABNORMAL /HPF (ref 0–4)
SODIUM SERPL-SCNC: 133 MMOL/L — LOW (ref 135–145)
SP GR SPEC: 1.01 — SIGNIFICANT CHANGE UP (ref 1.01–1.02)
THC UR QL: NEGATIVE — SIGNIFICANT CHANGE UP
UROBILINOGEN FLD QL: NEGATIVE — SIGNIFICANT CHANGE UP
WBC # BLD: 17.11 K/UL — HIGH (ref 3.8–10.5)
WBC # FLD AUTO: 17.11 K/UL — HIGH (ref 3.8–10.5)
WBC UR QL: ABNORMAL /HPF (ref 0–5)

## 2023-09-25 PROCEDURE — 84443 ASSAY THYROID STIM HORMONE: CPT

## 2023-09-25 PROCEDURE — 83605 ASSAY OF LACTIC ACID: CPT

## 2023-09-25 PROCEDURE — 83550 IRON BINDING TEST: CPT

## 2023-09-25 PROCEDURE — ZZZZZ: CPT

## 2023-09-25 PROCEDURE — 83735 ASSAY OF MAGNESIUM: CPT

## 2023-09-25 PROCEDURE — 85610 PROTHROMBIN TIME: CPT

## 2023-09-25 PROCEDURE — 86140 C-REACTIVE PROTEIN: CPT

## 2023-09-25 PROCEDURE — 82746 ASSAY OF FOLIC ACID SERUM: CPT

## 2023-09-25 PROCEDURE — 99222 1ST HOSP IP/OBS MODERATE 55: CPT

## 2023-09-25 PROCEDURE — 99285 EMERGENCY DEPT VISIT HI MDM: CPT

## 2023-09-25 PROCEDURE — 80053 COMPREHEN METABOLIC PANEL: CPT

## 2023-09-25 PROCEDURE — 90791 PSYCH DIAGNOSTIC EVALUATION: CPT

## 2023-09-25 PROCEDURE — 87086 URINE CULTURE/COLONY COUNT: CPT

## 2023-09-25 PROCEDURE — 71046 X-RAY EXAM CHEST 2 VIEWS: CPT | Mod: 26

## 2023-09-25 PROCEDURE — 81001 URINALYSIS AUTO W/SCOPE: CPT

## 2023-09-25 PROCEDURE — 80307 DRUG TEST PRSMV CHEM ANLYZR: CPT

## 2023-09-25 PROCEDURE — 84100 ASSAY OF PHOSPHORUS: CPT

## 2023-09-25 PROCEDURE — 80048 BASIC METABOLIC PNL TOTAL CA: CPT

## 2023-09-25 PROCEDURE — 85025 COMPLETE CBC W/AUTO DIFF WBC: CPT

## 2023-09-25 PROCEDURE — 36415 COLL VENOUS BLD VENIPUNCTURE: CPT

## 2023-09-25 PROCEDURE — 83540 ASSAY OF IRON: CPT

## 2023-09-25 PROCEDURE — 82607 VITAMIN B-12: CPT

## 2023-09-25 PROCEDURE — 87040 BLOOD CULTURE FOR BACTERIA: CPT

## 2023-09-25 PROCEDURE — 82306 VITAMIN D 25 HYDROXY: CPT

## 2023-09-25 PROCEDURE — 85027 COMPLETE CBC AUTOMATED: CPT

## 2023-09-25 PROCEDURE — 71046 X-RAY EXAM CHEST 2 VIEWS: CPT

## 2023-09-25 RX ORDER — SODIUM CHLORIDE 9 MG/ML
1000 INJECTION, SOLUTION INTRAVENOUS
Refills: 0 | Status: DISCONTINUED | OUTPATIENT
Start: 2023-09-25 | End: 2023-09-27

## 2023-09-25 RX ORDER — ONDANSETRON 8 MG/1
4 TABLET, FILM COATED ORAL EVERY 6 HOURS
Refills: 0 | Status: DISCONTINUED | OUTPATIENT
Start: 2023-09-25 | End: 2023-10-01

## 2023-09-25 RX ORDER — OXYCODONE HYDROCHLORIDE 5 MG/1
10 TABLET ORAL EVERY 6 HOURS
Refills: 0 | Status: DISCONTINUED | OUTPATIENT
Start: 2023-09-25 | End: 2023-10-01

## 2023-09-25 RX ORDER — ACETAMINOPHEN 500 MG
650 TABLET ORAL EVERY 6 HOURS
Refills: 0 | Status: DISCONTINUED | OUTPATIENT
Start: 2023-09-25 | End: 2023-10-01

## 2023-09-25 RX ORDER — MORPHINE SULFATE 50 MG/1
4 CAPSULE, EXTENDED RELEASE ORAL EVERY 6 HOURS
Refills: 0 | Status: DISCONTINUED | OUTPATIENT
Start: 2023-09-25 | End: 2023-09-29

## 2023-09-25 RX ORDER — LANOLIN ALCOHOL/MO/W.PET/CERES
3 CREAM (GRAM) TOPICAL AT BEDTIME
Refills: 0 | Status: DISCONTINUED | OUTPATIENT
Start: 2023-09-25 | End: 2023-10-01

## 2023-09-25 RX ORDER — ESCITALOPRAM OXALATE 10 MG/1
5 TABLET, FILM COATED ORAL DAILY
Refills: 0 | Status: DISCONTINUED | OUTPATIENT
Start: 2023-09-25 | End: 2023-10-01

## 2023-09-25 RX ORDER — ENOXAPARIN SODIUM 100 MG/ML
90 INJECTION SUBCUTANEOUS EVERY 12 HOURS
Refills: 0 | Status: DISCONTINUED | OUTPATIENT
Start: 2023-09-25 | End: 2023-10-01

## 2023-09-25 RX ORDER — SODIUM CHLORIDE 9 MG/ML
1000 INJECTION INTRAMUSCULAR; INTRAVENOUS; SUBCUTANEOUS ONCE
Refills: 0 | Status: COMPLETED | OUTPATIENT
Start: 2023-09-25 | End: 2023-09-25

## 2023-09-25 RX ORDER — MORPHINE SULFATE 50 MG/1
4 CAPSULE, EXTENDED RELEASE ORAL ONCE
Refills: 0 | Status: DISCONTINUED | OUTPATIENT
Start: 2023-09-25 | End: 2023-09-25

## 2023-09-25 RX ORDER — LOPERAMIDE HCL 2 MG
2 TABLET ORAL
Refills: 0 | Status: DISCONTINUED | OUTPATIENT
Start: 2023-09-25 | End: 2023-10-01

## 2023-09-25 RX ORDER — INFLUENZA VIRUS VACCINE 15; 15; 15; 15 UG/.5ML; UG/.5ML; UG/.5ML; UG/.5ML
0.5 SUSPENSION INTRAMUSCULAR ONCE
Refills: 0 | Status: COMPLETED | OUTPATIENT
Start: 2023-09-25 | End: 2023-09-25

## 2023-09-25 RX ADMIN — OXYCODONE HYDROCHLORIDE 10 MILLIGRAM(S): 5 TABLET ORAL at 18:24

## 2023-09-25 RX ADMIN — ENOXAPARIN SODIUM 90 MILLIGRAM(S): 100 INJECTION SUBCUTANEOUS at 21:46

## 2023-09-25 RX ADMIN — SODIUM CHLORIDE 100 MILLILITER(S): 9 INJECTION, SOLUTION INTRAVENOUS at 18:25

## 2023-09-25 RX ADMIN — MORPHINE SULFATE 4 MILLIGRAM(S): 50 CAPSULE, EXTENDED RELEASE ORAL at 15:36

## 2023-09-25 RX ADMIN — MORPHINE SULFATE 4 MILLIGRAM(S): 50 CAPSULE, EXTENDED RELEASE ORAL at 13:57

## 2023-09-25 RX ADMIN — SODIUM CHLORIDE 1000 MILLILITER(S): 9 INJECTION INTRAMUSCULAR; INTRAVENOUS; SUBCUTANEOUS at 13:58

## 2023-09-25 RX ADMIN — OXYCODONE HYDROCHLORIDE 10 MILLIGRAM(S): 5 TABLET ORAL at 19:36

## 2023-09-25 NOTE — ED ADULT NURSE REASSESSMENT NOTE - NS ED NURSE REASSESS COMMENT FT1
Received pt from prior RN, pt is a&0x3, airway patent, does not show any s/s of respiratory distress, breathing is unlabored, color is culturally appropriate,  vs are WNL, pt reports 10/10 pain in abd, Morphine administered for pain
pt remains aox3, medicated recently with morphine with fair effect. Resp even, unlabored. Pt with ostomy, performs self care- ostomy bag draining and emptied by pt.  Pt tolerated lunch. Awaiting transport. VSS. Pt brought own fentanyl patches- sent to pharmacy.

## 2023-09-25 NOTE — ED ADULT NURSE NOTE - NSFALLUNIVINTERV_ED_ALL_ED
Bed/Stretcher in lowest position, wheels locked, appropriate side rails in place/Call bell, personal items and telephone in reach/Instruct patient to call for assistance before getting out of bed/chair/stretcher/Non-slip footwear applied when patient is off stretcher/[place] to call system/Physically safe environment - no spills, clutter or unnecessary equipment/Purposeful proactive rounding/Room/bathroom lighting operational, light cord in reach

## 2023-09-25 NOTE — ED PROVIDER NOTE - PROGRESS NOTE DETAILS
Sal Weaver for attending Dr. Osman   Surgical consult called. Asked for pain meds, hydration, and pain control. per surgery admit for intractable abd pain, per dr braden pts oncologist pt is depressed and not taking other meds, needs psych c/s

## 2023-09-25 NOTE — ED ADULT TRIAGE NOTE - RESPIRATORY RATE (BREATHS/MIN)
Initial SW/CM Assessment/Plan of Care Note     Baseline Assessment  60 year old admitted 9/5/2023 as Observation with a diagnosis of Right side numbness.   Prior to admission patient was living  Alone and residing at House . . Patient’s Primary Care Provider is Iron Connell DO.     Progress Note  CM reviewed chart. CM met with patient to explain role and discuss dc needs.Patient lives alone in a house.Admitted for Right side numbness. Plan to dc home today.No dc needs.CM to follow.    Plan  Patient/Family Discharge Goal: Home   Is patient/family goal achievable: Yes    SW/CM - Recommendations for Discharge: Home   PT - Recommendations for Discharge:      Last Filed Values     None        OT - Recommendations for Discharge:      Last Filed Values     None        SLP - Recommendations for Discharge:    Last Filed Values     None          Barriers to Discharge  Identified Barriers to Discharge/Transition Planning: none    Anticipate patient will not need post-hospital services. Necessary services are available.  Anticipate patient can return to the environment from which patient entered the hospital.   Anticipate patient can provide self-care at discharge.    Refer to SW/CM Flowsheet for objective data.     Medical History  Past Medical History:   Diagnosis Date   • No known problems        Prior to Admission Status  Functional Status  Ambulation: Independent/Self  Bathing: Independent/Self  Dressing: Independent/Self  Toileting: Independent/Self  Meal Preparation: Independent/Self    Agency/Support  Type of Services Prior to Hospitalization: None  Support Systems: Spouse   Home Devices/Equipment: None   Mobility Assist Devices: None  Sensory Support Devices: None      Current Status  PT Ambulation Tips:    PT Transfer Tips:     OT Bathing Tips:    OT Dressing Tips:    OT Toileting Tips:    OT Feeding Tips:    SLP Swallow/Feeding Tips:    SLP Comm/Cog Tips:    Current Mental Status:    Stressors:       Insurance  Primary: ALL SAVERS  Secondary: N/A    Disposition Recommendations:  SW/CM recommendation for discharge: Home          18

## 2023-09-25 NOTE — H&P ADULT - NSHPPHYSICALEXAM_GEN_ALL_CORE
Constitutional: AAOx 3 no acute distress  Eyes: PERRLA EOMI  Head: Normocephalic   Cardiac: regular rate Rhythm , no murmurs appreciated  Resp: Lungs CTAB no wheezing appreciated  GI: Abd s/nt/nd, no rebound or guarding. with colostomy  Neuro: awake, alert, moving all extremities  Skin: No rashes

## 2023-09-25 NOTE — ED PROVIDER NOTE - PHYSICAL EXAMINATION
Constitutional: middle aged male, sitting in bed, in no distress  Eyes: PERRLA  Head: Normocephalic   Mouth: MMM  Cardiac: regular rate   Resp: Lungs CTAB  GI: Abd s/nt/nd, no rebound or guarding. with colostomy  Neuro: awake, alert, moving all extremities  Skin: No rashes

## 2023-09-25 NOTE — H&P ADULT - HISTORY OF PRESENT ILLNESS
[age] year old male with PMHx of colon ca with mets to liver, s/p ileostomy, chronic DVTs presented to the ED with abdominal pain that has been ongoing since diagnosis in may 2022. recently in ED a few days ago for same. Patient reports that he ran out of his oxycodone and has been unable to get a refill.  denies any complications with ostomy bag. Appears to be draining appropriately. . He reports 10/10 pain, all over, negative CVA tenderness. He reports difficult home situation. Lives with his mother with dementia and brother who is rarely around. He reports a poor diet.        52 year old male with PMHx of doris ca, colon ca with mets to liver, s/p ileostomy, PE , DVTs  presented to the ED with abdominal pain that has been ongoing since diagnosis in may 2022. recently in [facility] a few days ago for same. Patient reports that he ran out of his oxycodone and has been unable to get a refill. Patient follows with pain management outpatient and is also on fentanyl patches.   denies any complications with ostomy bag, mild prolapse present. Appears to be draining appropriately. . He reports 10/10  abdominal pain, all over, negative CVA tenderness. He reports difficult home situation. Lives with his mother with dementia and brother who is rarely around. He reports a poor diet.  CT abdomen Patient denies any fevers chills, nausea vomiting, chest pain palpitations lightheadedness or dizziness at the time of my examination but does report those all those symptoms do sporadically occur. He reports he smoked cigarettes and drank wine yesterday after a few years of abstinence due to depression and pain. Patient seen by psych and colorectal in the ED. Patient also found to have leukocytosis, unclear source. Patient to be admitted to the hospitalist service for abdominal pain and leukocytosis

## 2023-09-25 NOTE — CONSULT NOTE ADULT - ASSESSMENT
51yo M presents w/ abd pain  upon exam, no significant abd pain. ileostomy intact, pink/patent, no sign of infection, ileostomy with well formed stool  CT done yesterday negative for any pathology     Recommendation:  - no surgical intervention indicated at this time  - recommend UA and/or medical evaluation due to leukocytosis  - recommend pain management  - if discharged, please have patient follow up with Dr. Mckeon in 2 weeks    Plan discussed with colorectal surgery attending, Dr. Mckeon

## 2023-09-25 NOTE — PATIENT PROFILE ADULT - VISION (WITH CORRECTIVE LENSES IF THE PATIENT USUALLY WEARS THEM):
has reading glasses with him/Normal vision: sees adequately in most situations; can see medication labels, newsprint

## 2023-09-25 NOTE — H&P ADULT - ASSESSMENT
52 year old male with PMHx of doris ca, colon ca with mets to liver, s/p ileostomy, PE , DVTs  presented to the ED with abdominal pain that has been ongoing since diagnosis in may 2022. Patient to be admitted to the hospitalist service for abdominal pain and leukocytosis   52 year old male with PMHx of doris ca, colon ca with mets to liver, s/p ileostomy, PE , DVTs  presented to the ED with abdominal pain that has been ongoing since diagnosis in may 2022. Patient to be admitted to the hospitalist service for abdominal pain and leukocytosis      Abdominal Pain  Colorectal surgery consulted, recommendations appreciated  No acute surgical intervention indicated at this time   CTA 2/23 showed  No bowel obstruction or inflammation. Status post total colectomy with   rectal stump and right lower quadrant end ileostomy.  Unchanged calcified and noncalcified liver metastases.      Leukocytosis  Obtain UA/UC tox screen  Obtain Blood cultures  Monitor CBC  Repeat CXR  Repeat lactic acid     Depression  Psych consulted recommendation appreciated  Recommend Lexapro 5mg daily, patient agreeable   patient denies suicidal ideations     Hyponatremia  Continue IV fluids  Continue to monitor BMP      Social  Poor home situation  Consult case management     Code status Full Code  Dvt ppx on Lovenox    52 year old male with PMHx of doris ca, colon ca with mets to liver, s/p ileostomy, PE , DVTs  presented to the ED with abdominal pain that has been ongoing since diagnosis in may 2022. Patient to be admitted to the hospitalist service for abdominal pain and leukocytosis      Abdominal Pain  Colorectal surgery consulted, recommendations appreciated  No acute surgical intervention indicated at this time   CTA 2/23 showed  No bowel obstruction or inflammation. Status post total colectomy with   rectal stump and right lower quadrant end ileostomy.  Unchanged calcified and noncalcified liver metastases.      Leukocytosis  Obtain UA/UC tox screen  Obtain Blood cultures  Monitor CBC  Repeat CXR  Repeat lactic acid   Will hold off on abx at this time    Depression  Psych consulted recommendation appreciated  Recommend Lexapro 5mg daily, patient agreeable   patient denies suicidal ideations     Hyponatremia  Continue IV fluids  Continue to monitor BMP      Social  Poor home situation  Consult case management     Code status Full Code  Dvt ppx on Lovenox

## 2023-09-25 NOTE — PATIENT PROFILE ADULT - FALL HARM RISK - RISK INTERVENTIONS

## 2023-09-25 NOTE — PATIENT PROFILE ADULT - FUNCTIONAL ASSESSMENT - BASIC MOBILITY 6.
3-calculated by average/Not able to assess (calculate score using [facility] averaging method)

## 2023-09-25 NOTE — PATIENT PROFILE ADULT - FUNCTIONAL ASSESSMENT - BASIC MOBILITY 2.
4 = No assist / stand by assistance Abbe Flap (Upper To Lower Lip) Text: The defect of the lower lip was assessed and measured.  Given the location and size of the defect, an Abbe flap was deemed most appropriate.  Using a sterile surgical marker, an appropriate Abbe flap was measured and drawn on the upper lip. Local anesthesia was then infiltrated.  A scalpel was then used to incise the upper lip through and through the skin, vermilion, muscle and mucosa, leaving the flap pedicled on the opposite side.  The flap was then rotated and transferred to the lower lip defect.  The flap was then sutured into place with a three layer technique, closing the orbicularis oris muscle layer with subcutaneous buried sutures, followed by a mucosal layer and an epidermal layer.

## 2023-09-25 NOTE — ED ADULT TRIAGE NOTE - CHIEF COMPLAINT QUOTE
BIBA c/o severe abdominal pain, has a history of cancer is currently on chemo, missed today due to pain. Patient has ostomy bag. Has a 25mcg Fentanyl patch on currently. Denies Nausea currently

## 2023-09-25 NOTE — CONSULT NOTE ADULT - SUBJECTIVE AND OBJECTIVE BOX
HPI:  53 yo M with PMH Lung CA, colon cancer with metastases to the liver on chemotherapy, PE on s/p IVC filter 07/2023 and on Lovenox 90 q12, h/o End ileostomy and revision 04/2023 and subtotal colectomy for perforated cecal mass 2022, s/p LLE angiogram 07/2023 unable to transverse across presents w/ abd pain. Patient states that he always had generalized abd pain and also notices that he still has prolapse of ileostomy. Pain is more aggravated when ileostomy prolapses. Denies n/v. Patient states that he changes his ostomy bag about 30 times a day due to leak. Patient states that he drank alcohol and smoked yesterday out of being depressed after many years of abstinence. Pt states he sees a pain specialist and gets pain medication regularly due to his chronic pain but has not been able to get his oxycodone for the last five days. Denies fever/chills, shortness of breath, chest pain. VS reviewed      PAST MEDICAL & SURGICAL HISTORY:  Cancer, colon  liver mets      S/P ileostomy      Metastasis to liver      Pulmonary embolism      S/P colon resection      H/O ileostomy          MEDICATIONS  (STANDING):    MEDICATIONS  (PRN):      Allergies    [This allergen will not trigger allergy alert] No Known Drug Allergies (Unknown)    Intolerances        SOCIAL HISTORY:    FAMILY HISTORY:  FH: dementia (Mother)          Physical Exam:  General: NAD, resting comfortably  HEENT: NC/AT, EOMI, normal hearing, no oral lesions, no LAD, neck supple  Pulmonary: normal resp effort, CTA-B  Cardiovascular: NSR, no murmurs  Abdominal: soft, nondistended, nontender. ileostomy pink/patent without signs of prolapse, and has well formed stool  Extremities: no erythema at groins, no palpable aneurysm. WWP, normal strength, no clubbing/cyanosis/edema  Neuro: A/O x 3, CNs II-XII grossly intact, normal sensation, no focal deficits  Pulses: palpable distal pulses    Vital Signs Last 24 Hrs  T(C): 36.9 (25 Sep 2023 13:15), Max: 36.9 (25 Sep 2023 13:15)  T(F): 98.4 (25 Sep 2023 13:15), Max: 98.4 (25 Sep 2023 13:15)  HR: 102 (25 Sep 2023 13:15) (102 - 102)  BP: 128/93 (25 Sep 2023 13:15) (128/93 - 128/93)  BP(mean): --  RR: 18 (25 Sep 2023 13:15) (18 - 18)  SpO2: 100% (25 Sep 2023 13:15) (100% - 100%)    Parameters below as of 25 Sep 2023 13:15  Patient On (Oxygen Delivery Method): room air                            15.2   17.11 )-----------( 130      ( 25 Sep 2023 13:26 )             46.4     09-25    133<L>  |  100  |  15  ----------------------------<  104<H>  5.3   |  29  |  0.96    Ca    9.7      25 Sep 2023 13:26    TPro  8.9<H>  /  Alb  4.2  /  TBili  0.3  /  DBili  x   /  AST  32  /  ALT  49  /  AlkPhos  210<H>  09-25    I&O's Summary

## 2023-09-25 NOTE — BH CONSULTATION LIAISON ASSESSMENT NOTE - SUMMARY
Patient is a 52 year old male, single, no dependents, domiciled with 81 y/o mother and brother in private residence, unemployed-on disability was previously a . No PPHx. No SHIIP. No inpatient hospitalizations. No NSSIB. PMHx of colon cancer with mets to the liver and lung on chemotherapy, patient presented with abdominal pain. Psychiatry consulted for depression.    Patient is calm and cooperative; linear, organized. Reports that he is not suicidal, does not want to die, but is just "existing" has no intent or plan to kill himself because his "body is doing that for him." He reports excessive frustration with his life circumstances, not being able to work, not being  or have a family, and "walking around with a shit bag" (has colostomy.) He also reports his mother as his number one stressor as she is 81 y/o with dementia, drives her car around and gets lost all the time, he worries she will get into an accident and kill someone or herself, as well as her chronic alcohol use, he feels constantly stressed by her and think she needs to reside in an assisted living. He reports a good relationship with his brother, but doesn't see him much because he works and comes home and goes to sleep. Patient reports he epifanio with stressors by walking around his neighborhood and sitting in the park all day, stating, "I go to the park to escape during the day, they have 24 hour public bathrooms where I can empty this bag. When I go home if my sister doesn't put my mother in assisted living I am going to pack up my stuff and live in my car by the park, it's not like I haven't done it before." Discussed with patient starting antidepressant medications, he declined at present and reported the only medication adjustment he needed Morphine, stating, "No I am not suicidal, I don't want any of that medication. I need the morphine, I'm still waiting, I haven't been given any and a room phone please." Reports good sleep and appetite. Denies depressed mood or anhedonia, hopelessness or suicidal ideation. Denies manic / psychotic symptoms. Patient has no presence of thought disorder. No delusions elicited. Tolerating medications with no side effects; none observed.   Plan:    1) Would recommend low dose SSRI, Lexapro 5 mg if patient agrees  2) Psychiatry signing off Patient is a 52 year old male, single, no dependents, domiciled with 81 y/o mother and brother in private residence, unemployed-on disability was previously a . No PPHx. No SHIIP. No inpatient hospitalizations. No NSSIB. PMHx of colon cancer with mets to the liver and lung on chemotherapy, patient presented with abdominal pain. Psychiatry consulted for depression.    Patient is calm and cooperative; linear, organized. Reports that he is not suicidal, does not want to die, but is just "existing" has no intent or plan to kill himself because his "body is doing that for him." He reports excessive frustration with his life circumstances, not being able to work, not being  or have a family, and "walking around with a shit bag" (has colostomy.) He also reports his mother as his number one stressor as she is 81 y/o with dementia, drives her car around and gets lost all the time, he worries she will get into an accident and kill someone or herself, as well as her chronic alcohol use, he feels constantly stressed by her and think she needs to reside in an assisted living. He reports a good relationship with his brother, but doesn't see him much because he works and comes home and goes to sleep. Patient reports he epifanio with stressors by walking around his neighborhood and sitting in the park all day, stating, "I go to the park to escape during the day, they have 24 hour public bathrooms where I can empty this bag. When I go home if my sister doesn't put my mother in assisted living I am going to pack up my stuff and live in my car by the park, it's not like I haven't done it before." Discussed with patient starting antidepressant medications, he declined at present and reported the only medication adjustment he needed Morphine, stating, "No I am not suicidal, I don't want any of that medication. I need the morphine, I'm still waiting, I haven't been given any and a room phone please." Reports good sleep and appetite. Denies depressed mood or anhedonia, hopelessness or suicidal ideation. Denies manic / psychotic symptoms. Patient has no presence of thought disorder. No delusions elicited. Tolerating medications with no side effects; none observed.   Plan:    1) Would recommend low dose SSRI, Lexapro 5 mg if patient agrees

## 2023-09-25 NOTE — ED PROVIDER NOTE - OBJECTIVE STATEMENT
53 yo male with PMHx PE, mets to the liver, s/p ileostomy, and colon CA presents to the ED BIBEMS c/o abdominal pain. Pt reporting pain has been constant since he was dx with CA. +vomiting intermittent. Pt lives at home with his mom who he reports has severe dementia. Pt with an ostomy bag in place. Reports his abdominal pain is worse than it was 2 days ago the last time he was in [facility].

## 2023-09-25 NOTE — ED PROVIDER NOTE - CLINICAL SUMMARY MEDICAL DECISION MAKING FREE TEXT BOX
51 yo male with chronic abdominal pain that he's had for months-years. Pt here 2 days ago, and had a ct scan done. Pt wears a fentanyl patch and takes oxycodone at home. Will obtain blood work, UA, hydration, pain meds, and obtain surgical consult. Doubt pt needs a ct scan as pt was here 2 days ago and had a ct scan with contrast done.

## 2023-09-26 ENCOUNTER — TRANSCRIPTION ENCOUNTER (OUTPATIENT)
Age: 53
End: 2023-09-26

## 2023-09-26 LAB
ANION GAP SERPL CALC-SCNC: 5 MMOL/L — SIGNIFICANT CHANGE UP (ref 5–17)
BUN SERPL-MCNC: 14 MG/DL — SIGNIFICANT CHANGE UP (ref 7–23)
CALCIUM SERPL-MCNC: 8.6 MG/DL — SIGNIFICANT CHANGE UP (ref 8.5–10.1)
CHLORIDE SERPL-SCNC: 108 MMOL/L — SIGNIFICANT CHANGE UP (ref 96–108)
CO2 SERPL-SCNC: 24 MMOL/L — SIGNIFICANT CHANGE UP (ref 22–31)
CREAT SERPL-MCNC: 0.88 MG/DL — SIGNIFICANT CHANGE UP (ref 0.5–1.3)
EGFR: 105 ML/MIN/1.73M2 — SIGNIFICANT CHANGE UP
GLUCOSE SERPL-MCNC: 105 MG/DL — HIGH (ref 70–99)
HCT VFR BLD CALC: 38.2 % — LOW (ref 39–50)
HGB BLD-MCNC: 12.4 G/DL — LOW (ref 13–17)
INR BLD: 0.93 RATIO — SIGNIFICANT CHANGE UP (ref 0.85–1.18)
LACTATE SERPL-SCNC: 0.9 MMOL/L — SIGNIFICANT CHANGE UP (ref 0.7–2)
MCHC RBC-ENTMCNC: 28.5 PG — SIGNIFICANT CHANGE UP (ref 27–34)
MCHC RBC-ENTMCNC: 32.5 GM/DL — SIGNIFICANT CHANGE UP (ref 32–36)
MCV RBC AUTO: 87.8 FL — SIGNIFICANT CHANGE UP (ref 80–100)
PLATELET # BLD AUTO: 100 K/UL — LOW (ref 150–400)
POTASSIUM SERPL-MCNC: 4.6 MMOL/L — SIGNIFICANT CHANGE UP (ref 3.5–5.3)
POTASSIUM SERPL-SCNC: 4.6 MMOL/L — SIGNIFICANT CHANGE UP (ref 3.5–5.3)
PROTHROM AB SERPL-ACNC: 10.5 SEC — SIGNIFICANT CHANGE UP (ref 9.5–13)
RBC # BLD: 4.35 M/UL — SIGNIFICANT CHANGE UP (ref 4.2–5.8)
RBC # FLD: 15.2 % — HIGH (ref 10.3–14.5)
SODIUM SERPL-SCNC: 137 MMOL/L — SIGNIFICANT CHANGE UP (ref 135–145)
WBC # BLD: 11.06 K/UL — HIGH (ref 3.8–10.5)
WBC # FLD AUTO: 11.06 K/UL — HIGH (ref 3.8–10.5)

## 2023-09-26 PROCEDURE — 99232 SBSQ HOSP IP/OBS MODERATE 35: CPT

## 2023-09-26 PROCEDURE — 90792 PSYCH DIAG EVAL W/MED SRVCS: CPT

## 2023-09-26 RX ADMIN — OXYCODONE HYDROCHLORIDE 10 MILLIGRAM(S): 5 TABLET ORAL at 07:05

## 2023-09-26 RX ADMIN — OXYCODONE HYDROCHLORIDE 10 MILLIGRAM(S): 5 TABLET ORAL at 23:05

## 2023-09-26 RX ADMIN — ENOXAPARIN SODIUM 90 MILLIGRAM(S): 100 INJECTION SUBCUTANEOUS at 23:06

## 2023-09-26 RX ADMIN — ESCITALOPRAM OXALATE 5 MILLIGRAM(S): 10 TABLET, FILM COATED ORAL at 11:26

## 2023-09-26 RX ADMIN — OXYCODONE HYDROCHLORIDE 10 MILLIGRAM(S): 5 TABLET ORAL at 15:03

## 2023-09-26 RX ADMIN — ENOXAPARIN SODIUM 90 MILLIGRAM(S): 100 INJECTION SUBCUTANEOUS at 11:25

## 2023-09-26 RX ADMIN — OXYCODONE HYDROCHLORIDE 10 MILLIGRAM(S): 5 TABLET ORAL at 23:35

## 2023-09-26 RX ADMIN — OXYCODONE HYDROCHLORIDE 10 MILLIGRAM(S): 5 TABLET ORAL at 06:35

## 2023-09-26 NOTE — BH CONSULTATION LIAISON ASSESSMENT NOTE - CURRENT MEDICATION
MEDICATIONS  (STANDING):  enoxaparin Injectable 90 milliGRAM(s) SubCutaneous every 12 hours  escitalopram 5 milliGRAM(s) Oral daily  fentaNYL   Patch  25 MICROgram(s)/Hr 1 Patch Transdermal every 72 hours  influenza   Vaccine 0.5 milliLiter(s) IntraMuscular once  lactated ringers. 1000 milliLiter(s) (100 mL/Hr) IV Continuous <Continuous>    MEDICATIONS  (PRN):  acetaminophen     Tablet .. 650 milliGRAM(s) Oral every 6 hours PRN Temp greater or equal to 38C (100.4F), Mild Pain (1 - 3)  aluminum hydroxide/magnesium hydroxide/simethicone Suspension 30 milliLiter(s) Oral every 4 hours PRN Dyspepsia  loperamide 2 milliGRAM(s) Oral four times a day PRN for diarrhea  melatonin 3 milliGRAM(s) Oral at bedtime PRN Insomnia  morphine  - Injectable 4 milliGRAM(s) IV Push every 6 hours PRN Moderate Pain (4 - 6)  ondansetron Injectable 4 milliGRAM(s) IV Push every 6 hours PRN Nausea and/or Vomiting  oxyCODONE    IR 10 milliGRAM(s) Oral every 6 hours PRN Severe Pain (7 - 10)  
MEDICATIONS  (STANDING):    MEDICATIONS  (PRN):

## 2023-09-26 NOTE — BH CONSULTATION LIAISON ASSESSMENT NOTE - HPI (INCLUDE ILLNESS QUALITY, SEVERITY, DURATION, TIMING, CONTEXT, MODIFYING FACTORS, ASSOCIATED SIGNS AND SYMPTOMS)
Patient is a 52 year old male, single, no dependents, domiciled with 81 y/o mother and brother in private residence, unemployed-on disability was previously a . No PPHx. No SHIIP. No inpatient hospitalizations. No NSSIB. PMHx of colon cancer with mets to the liver and lung on chemotherapy, patient presented with abdominal pain. Psychiatry consulted for depression.    Patient is calm and cooperative; linear, organized. Reports that he is not suicidal, does not want to die, but is just "existing" has no intent or plan to kill himself because his "body is doing that for him." He reports excessive frustration with his life circumstances, not being able to work, not being  or have a family, and "walking around with a shit bag" (has colostomy.) He also reports his mother as his number one stressor as she is 81 y/o with dementia, drives her car around and gets lost all the time, he worries she will get into an accident and kill someone or herself, as well as her chronic alcohol use, he feels constantly stressed by her and think she needs to reside in an assisted living. He reports a good relationship with his brother, but doesn't see him much because he works and comes home and goes to sleep. Patient reports he epifanio with stressors by walking around his neighborhood and sitting in the park all day, stating, "I go to the park to escape during the day, they have 24 hour public bathrooms where I can empty this bag. When I go home if my sister doesn't put my mother in assisted living I am going to pack up my stuff and live in my car by the park, it's not like I haven't done it before." Discussed with patient starting antidepressant medications, he declined at present and reported the only medication adjustment he needed Morphine, stating, "No I am not suicidal, I don't want any of that medication. I need the morphine, I'm still waiting, I haven't been given any and a room phone please." Reports good sleep and appetite. Denies depressed mood or anhedonia, hopelessness or suicidal ideation. Denies manic / psychotic symptoms. Patient has no presence of thought disorder. No delusions elicited. Tolerating medications with no side effects; none observed. 
Patient is a 52 year old male, single, no dependents, domiciled with 79 y/o mother and brother in private residence, unemployed-on disability was previously a . No PPHx. No SHIIP. No inpatient hospitalizations. No NSSIB. PMHx of colon cancer with mets to the liver and lung on chemotherapy, patient presented with abdominal pain. Psychiatry consulted for depression.    Patient is calm and cooperative; linear, organized. Reports that he is not suicidal, does not want to die, but is just "existing" has no intent or plan to kill himself because his "body is doing that for him." He reports excessive frustration with his life circumstances, not being able to work, not being  or have a family, and "walking around with a shit bag" (has colostomy.) He also reports his mother as his number one stressor as she is 79 y/o with dementia, drives her car around and gets lost all the time, he worries she will get into an accident and kill someone or herself, as well as her chronic alcohol use, he feels constantly stressed by her and think she needs to reside in an assisted living. He reports a good relationship with his brother, but doesn't see him much because he works and comes home and goes to sleep. Patient reports he epifanio with stressors by walking around his neighborhood and sitting in the park all day, stating, "I go to the park to escape during the day, they have 24 hour public bathrooms where I can empty this bag. When I go home if my sister doesn't put my mother in assisted living I am going to pack up my stuff and live in my car by the park, it's not like I haven't done it before." Discussed with patient starting antidepressant medications, he declined at present and reported the only medication adjustment he needed Morphine, stating, "No I am not suicidal, I don't want any of that medication. I need the morphine, I'm still waiting, I haven't been given any and a room phone please." Reports good sleep and appetite. Denies depressed mood or anhedonia, hopelessness or suicidal ideation. Denies manic / psychotic symptoms. Patient has no presence of thought disorder. No delusions elicited. Tolerating medications with no side effects; none observed.     Patient consulted for depression, reconsulted 9/26/23: Patient agreed to start Lexapro 5 mg, continues to report pain, requesting additional pain medication. Patient is calm and cooperative, pleasant; linear, organized. He can be sarcastic, provocative and manipulative at times r/t secondary gains, ie: pain control, medical admission in light of being unsatisfied with housing. Reports okay sleep and appetite since last night on admission. Denies depressed mood or anhedonia, hopelessness or suicidal ideation. Denies manic / psychotic symptoms.  Patient has no presence of thought disorder. No delusions elicited. Tolerating medications with no side effects; none observed.

## 2023-09-26 NOTE — BH CONSULTATION LIAISON ASSESSMENT NOTE - NSBHATTESTBILLING_PSY_A_CORE
[id number] diagnostic evaluation with medical services
[id number] diagnostic evaluation without medical services

## 2023-09-26 NOTE — DISCHARGE NOTE PROVIDER - NSDCMRMEDTOKEN_GEN_ALL_CORE_FT
enoxaparin 100 mg/mL injectable solution: 90 milligram(s) subcutaneously 2 times a day  fentaNYL 25 mcg/hr transdermal film, extended release: 1 patch transdermally every 72 hours  loperamide 2 mg oral capsule: 2 cap(s) orally 4 times a day as needed for  diarrhea  oxyCODONE 10 mg oral tablet: 1 tab(s) orally every 6 hours as needed for   acetaminophen 325 mg oral tablet: 2 tab(s) orally every 6 hours As needed Temp greater or equal to 38C (100.4F), Mild Pain (1 - 3)  enoxaparin 100 mg/mL injectable solution: 90 milligram(s) subcutaneously 2 times a day  fentaNYL 25 mcg/hr transdermal film, extended release: 1 patch transdermally every 72 hours  Lexapro 5 mg oral tablet: 1 tab(s) orally once a day  loperamide 2 mg oral capsule: 2 cap(s) orally 4 times a day as needed for  diarrhea  oxyCODONE 10 mg oral tablet: 1 tab(s) orally every 6 hours as needed for

## 2023-09-26 NOTE — BH CONSULTATION LIAISON ASSESSMENT NOTE - RISK ASSESSMENT
LOW RISK     ACUTE RISK FACTORS: Acute medical condition     CHRONIC RISK FACTORS: Familial stressors, financial instability    PROTECTIVE FACTORS: No suicide attempts, no violence history, medication compliance, no access to guns, no global insomnia, no substance abuse, supportive family, willingness to seek help, no suicidal ideation or homicidal ideation, hopefulness for future. 
LOW RISK     ACUTE RISK FACTORS: Acute medical condition     CHRONIC RISK FACTORS: Familial stressors, financial instability    PROTECTIVE FACTORS: No suicide attempts, no violence history, medication compliance, no access to guns, no global insomnia, no substance abuse, supportive family, willingness to seek help, no suicidal ideation or homicidal ideation, hopefulness for future.

## 2023-09-26 NOTE — BH CONSULTATION LIAISON ASSESSMENT NOTE - NSBHATTESTAPPBILLTIME_PSY_A_CORE
I attest my time as MATEO is greater than 50% of the total combined time spent on qualifying patient care activities. I have reviewed and verified the documentation.
I attest my time as MATEO is greater than 50% of the total combined time spent on qualifying patient care activities. I have reviewed and verified the documentation.

## 2023-09-26 NOTE — DISCHARGE NOTE PROVIDER - HOSPITAL COURSE
CC: 52 year old male with PMHx of doris ca, colon ca with mets to liver, s/p ileostomy, PE , DVTs  presented to the ED with abdominal pain that has been ongoing since diagnosis in may 2022. recently in [facility] a few days ago for same. Patient reports that he ran out of his oxycodone and has been unable to get a refill. Patient follows with pain management outpatient and is also on fentanyl patches.   denies any complications with ostomy bag, mild prolapse present. Appears to be draining appropriately. . He reports 10/10  abdominal pain, all over, negative CVA tenderness. He reports difficult home situation. Lives with his mother with dementia and brother who is rarely around. He reports a poor diet.  CT abdomen Patient denies any fevers chills, nausea vomiting, chest pain palpitations lightheadedness or dizziness at the time of my examination but does report those all those symptoms do sporadically occur. He reports he smoked cigarettes and drank wine yesterday after a few years of abstinence due to depression and pain. Patient seen by psych and colorectal in the ED. Patient also found to have leukocytosis, unclear source.   Patient to be admitted to the hospitalist service for abdominal pain and leukocytosis    HOSPITAL COURSE:     Abdominal Pain  Colorectal surgery consulted, recommendations appreciated  No acute surgical intervention indicated at this time   CTA 2/23 showed  No bowel obstruction or inflammation. Status post total colectomy with   rectal stump and right lower quadrant end ileostomy.  Unchanged calcified and noncalcified liver metastases.    Leukocytosis  Obtain UA/UC tox screen  Obtain Blood cultures  Monitor CBC  Repeat CXR  Repeat lactic acid   Will hold off on abx at this time    Depression  Psych consulted recommendation appreciated  Recommend Lexapro 5mg daily, patient agreeable   patient denies suicidal ideations     Hyponatremia  Continue IV fluids  Continue to monitor BMP    Social  Poor home situation  Consult case management     Code status Full Code  Dvt ppx on Lovenox     DISPO  DC Home   discussed with Psychiatry - pt cleared to be dced home   pt states he will only leave on 9/30 when he gets his pain meds via his pain physician   patient advised that he is stable for discharge    CC: 52 year old male with PMHx of doris ca, colon ca with mets to liver, s/p ileostomy, PE , DVTs  presented to the ED with abdominal pain that has been ongoing since diagnosis in may 2022. recently in [facility] a few days ago for same. Patient reports that he ran out of his oxycodone and has been unable to get a refill. Patient follows with pain management outpatient and is also on fentanyl patches.   denies any complications with ostomy bag, mild prolapse present. Appears to be draining appropriately. . He reports 10/10  abdominal pain, all over, negative CVA tenderness. He reports difficult home situation. Lives with his mother with dementia and brother who is rarely around. He reports a poor diet.  CT abdomen Patient denies any fevers chills, nausea vomiting, chest pain palpitations lightheadedness or dizziness at the time of my examination but does report those all those symptoms do sporadically occur. He reports he smoked cigarettes and drank wine yesterday after a few years of abstinence due to depression and pain. Patient seen by psych and colorectal in the ED. Patient also found to have leukocytosis, unclear source.   Patient to be admitted to the hospitalist service for abdominal pain and leukocytosis    HOSPITAL COURSE:     Abdominal Pain  Colorectal surgery consulted, recommendations appreciated  No acute surgical intervention indicated at this time, follow-up as OP   CTA 2/23 showed  No bowel obstruction or inflammation. Status post total colectomy with   rectal stump and right lower quadrant end ileostomy.  Unchanged calcified and noncalcified liver metastases.    Leukocytosis  Obtain UA/UC tox screen  Obtain Blood cultures  Monitor CBC  Repeat CXR  Repeat lactic acid   Will hold off on abx at this time    Depression  Psych consulted recommendation appreciated  Recommend Lexapro 5mg daily, patient agreeable   patient denies suicidal ideations     Hyponatremia  Continue IV fluids  Continue to monitor BMP    Social  Poor home situation  Consult case management     Code status Full Code  Dvt ppx on Lovenox     DISPO  DC Home   discussed with Psychiatry - pt cleared to be dced home   pt states he will only leave on 9/30 when he gets his pain meds via his pain physician   patient advised that he is stable for discharge     OTHER DETAILS:   9/27 - no cp palps sob abdo pain, is OOB     PHYSICAL EXAM:  T(F): 98.4 (27 Sep 2023 15:12), Max: 98.4 (27 Sep 2023 15:12)  HR: 78 (27 Sep 2023 15:12) (73 - 88)  BP: 146/58 (27 Sep 2023 15:12) (114/55 - 146/58)  RR: 18 (27 Sep 2023 15:12) (18 - 18)  SpO2: 99% (27 Sep 2023 15:12) (97% - 99%)  Parameters below as of 27 Sep 2023 15:12  Patient On (Oxygen Delivery Method): room air  GENERAL: NAD, able to lie flat in bed  HEAD:  Atraumatic, Normocephalic  EYES: EOMI, PERRLA, normal sclera  ENT: Moist mucous membranes  NECK: Supple, No JVD, no nuchal rigidity  CHEST/LUNG: Clear to auscultation bilaterally; No rales, rhonchi, wheezing, or rubs. Unlabored respirations  HEART: Regular rate and rhythm; No murmurs, rubs, or gallops  ABDOMEN: Bowel sounds present; stoma bag with normal stool  EXTREMITIES:  no pitting bilaterally  NERVOUS SYSTEM:  Alert & Oriented X3, speech clear. No focal motor or sensory deficits  MSK: FROM all 4 extremities, full and equal strength  SKIN: No rashes or lesions    LABS: All Labs Reviewed:                    12.4   11.06 )-----------( 100      ( 26 Sep 2023 06:19 )             38.2  137  |  108  |  14  ----------------------------<  105<H>  4.6   |  24  |  0.88  Ca    8.6      26 Sep 2023 06:19  PT/INR - ( 26 Sep 2023 06:19 )   PT: 10.5 sec;   INR: 0.93 ratio      RADIOLOGY/EKG:  < from: Xray Chest 2 Views PA/Lat (09.25.23 @ 19:17) >  Heart/Vascular: The heart size, mediastinum, hilum and aorta are within   normal limits for projection.  Pulmonary: Midline trachea. There is no focal infiltrate, congestion or   effusion. Bones: There is no fracture.  Lines and catheter: Tip of right Port-A-Cath in SVC without pneumothorax.    time spent on discharge - 50 mins   pt advised that he is stable for discharge, has no acute medical needs, has no SI or HI  he plans to appeal   [facility], all q answered

## 2023-09-26 NOTE — CONSULT NOTE ADULT - SUBJECTIVE AND OBJECTIVE BOX
HPI:  52 year old male with MHx colon ca with mets to liver, s/p ileostomy, PE , DVTs  presented to the ED with abdominal pain that has been ongoing since diagnosis in may 2022. recently in [facility] a few days ago for same.     Patient reports that he ran out of his oxycodone and has been unable to get a refill. Patient follows with pain management outpatient and is also on fentanyl patches. He reports difficult home situation, lives with his mother with dementia and brother who is rarely around.     PAST MEDICAL & SURGICAL HISTORY:    Cancer, colon  liver mets  S/P ileostomy  Metastasis to liver  Pulmonary embolism  S/P colon resection  H/O ileostomy    FAMILY HISTORY:    dementia (Mother)    MEDICATIONS  (STANDING):    enoxaparin Injectable 90 milliGRAM(s) SubCutaneous every 12 hours  escitalopram 5 milliGRAM(s) Oral daily  Fentanyl Patch  25 MICROgram(s)/Hr 1 Patch Transdermal every 72 hours  influenza   Vaccine 0.5 milliLiter(s) IntraMuscular once  lactated ringers. 1000 milliLiter(s) (100 mL/Hr) IV Continuous <Continuous>    MEDICATIONS  (PRN):    acetaminophen Tablet .. 650 milliGRAM(s) Oral every 6 hours PRN Temp greater or equal to 38C (100.4F), Mild Pain (1 - 3)  aluminum hydroxide/magnesium hydroxide/simethicone Suspension 30 milliLiter(s) Oral every 4 hours PRN Dyspepsia  loperamide 2 milliGRAM(s) Oral four times a day PRN for diarrhea  melatonin 3 milliGRAM(s) Oral at bedtime PRN Insomnia  morphine  - Injectable 4 milliGRAM(s) IV Push every 6 hours PRN Moderate Pain (4 - 6)  ondansetron Injectable 4 milliGRAM(s) IV Push every 6 hours PRN Nausea and/or Vomiting  oxyCODONE IR 10 milliGRAM(s) Oral every 6 hours PRN Severe Pain (7 - 10)      ALLERGIES:    No Known Drug Allergies     REVIEW OF SYSTEM:    Constitutional, Eyes, ENT, Cardiovascular, Respiratory, Gastrointestinal, Genitourinary, Musculoskeletal, Integumentary, Neurological, Psychiatric, Endocrine, Heme/Lymph and Allergic/Immunologic review of systems are otherwise negative except as noted in HPI.     VITAL SIGNS LAST 24 HRS:    T(C): 36.6 (26 Sep 2023 08:45), Max: 36.9 (25 Sep 2023 13:15)  T(F): 97.9 (26 Sep 2023 08:45), Max: 98.4 (25 Sep 2023 13:15)  HR: 83 (26 Sep 2023 08:45) (79 - 102)  BP: 100/70 (26 Sep 2023 08:45) (100/70 - 128/93)  BP(mean): 85 (25 Sep 2023 15:39) (85 - 85)  RR: 17 (26 Sep 2023 08:45) (17 - 18)  SpO2: 97% (26 Sep 2023 08:45) (97% - 100%)    Parameters below as of 26 Sep 2023 08:45  Patient On (Oxygen Delivery Method): room air    PHYSICAL EXAM:    CONSTITUTIONAL : NAD, appear to be of stated age , well groomed   NERVOUS SYSTEM:  Alert & Oriented X 3, no focal deficits.   HEAD:  Atraumatic, Normocephalic  EYES: EOMI, PERRLA, conjunctiva and sclera clear  HEENT: Moist mucous membranes, Supple neck , No JVD  CHEST: Clear to auscultation bilaterally; No rales, no rhonchi, no wheezing  HEART: Regular rate and rhythm; No murmurs, no rubs or gallops  ABDOMEN: Soft, Non-tender, Non-distended; Bowel sounds present, no guarding , no peritoneal irritation   GENITOURINARY- Voiding, no suprapubic tenderness  EXTREMITIES:  2+ Peripheral Pulses, No clubbing, cyanosis, no edema  MUSCULOSKELETAL:- No muscle tenderness, Muscle tone normal, No joint tenderness, no Joint swelling,  Joint ROM –normal   SKIN-no rash, no lesion    LABS:                          12.4   11.06 )-----------( 100      ( 26 Sep 2023 06:19 )             38.2       09-26    137  |  108  |  14  ----------------------------<  105<H>  4.6   |  24  |  0.88    Ca    8.6      26 Sep 2023 06:19    TPro  8.9<H>  /  Alb  4.2  /  TBili  0.3  /  DBili  x   /  AST  32  /  ALT  49  /  AlkPhos  210<H>  09-25    PT/INR - ( 26 Sep 2023 06:19 )   PT: 10.5 sec;   INR: 0.93 ratio      Urinalysis Basic - ( 26 Sep 2023 06:19 )    Color: x / Appearance: x / SG: x / pH: x  Gluc: 105 mg/dL / Ketone: x  / Bili: x / Urobili: x   Blood: x / Protein: x / Nitrite: x   Leuk Esterase: x / RBC: x / WBC x   Sq Epi: x / Non Sq Epi: x / Bacteria: x    RADIOLOGY & ADDITIONAL STUDIES:    EXAM:  CT ABDOMEN AND PELVIS IC    PROCEDURE DATE:  09/23/2023      INTERPRETATION:  CLINICAL INFORMATION: Abdominal pain. History of   metastatic colon cancer on chemotherapy.    COMPARISON: CT abdomen/pelvis 8/25/2023    CONTRAST/COMPLICATIONS:  IV Contrast: Omnipaque 350  90 cc administered   10 cc discarded  Oral Contrast: NONE  Complications: None reported at time of study completion    PROCEDURE:  CT of the Abdomen and Pelvis was performed.  Sagittal and coronal reformats were performed.    FINDINGS:  LOWER CHEST: Within normal limits.    LIVER: Multiple calcified and noncalcified liver metastases are not   significantly changed. No new intrahepatic lesions.  BILE DUCTS: Normal caliber.  GALLBLADDER: Within normal limits.  SPLEEN: Within normal limits.  PANCREAS: Within normal limits.  ADRENALS: Within normal limits.  KIDNEYS/URETERS: Multifocal cortical scarring left kidney. No   hydronephrosis or obstructive renal calculi.    BLADDER: Minimally distended.  REPRODUCTIVE ORGANS: Prostate within normal limits.    BOWEL: No bowel obstruction. Status post total colectomy with rectal   stump and right lower quadrant end ileostomy.  PERITONEUM: No ascites.  VESSELS: Atherosclerotic changes.  RETROPERITONEUM/LYMPH NODES: No lymphadenopathy.  ABDOMINAL WALL: Postsurgical changes. Waxing and waning subcutaneous soft   tissue nodules along the anterior abdominal wall could be related to   injection sites.  BONES: Degenerative changes.    IMPRESSION:  No bowel obstruction or inflammation. Status post total colectomy with   rectal stump and right lower quadrant end ileostomy.    Unchanged calcified and noncalcified liver metastases.       HPI:  52 year old male with MHx colon ca with mets to liver, s/p ileostomy, PE , DVTs  presented to the ED with abdominal pain that has been ongoing since diagnosis in may 2022. recently in [facility] a few days ago for same.     Patient reports that he ran out of his oxycodone and has been unable to get a refill. Patient follows with pain management outpatient and is also on fentanyl patches. He reports difficult home situation, lives with his mother with dementia and brother who is rarely around.     9/26/2023- Seen at bedside, sitting up, eating breakfast. c/o continues abdominal pain, denies N/V, adequate ostomy output. States is depressed due to ongoing difficult condition at home, not having enough support for self or mother who is demented and alcoholic, did take cocaine and smoked marijuana in last few days. Also reports prescribed Oxycodone is not enough. Currently is prescribed 2 tabs breakfast, and two after lunch. He is now asking for 2 tabs before dinner.     PAST MEDICAL & SURGICAL HISTORY:    Cancer, colon  liver mets  S/P ileostomy  Metastasis to liver  Pulmonary embolism  S/P colon resection  H/O ileostomy    FAMILY HISTORY:    dementia (Mother)    MEDICATIONS  (STANDING):    enoxaparin Injectable 90 milliGRAM(s) SubCutaneous every 12 hours  escitalopram 5 milliGRAM(s) Oral daily  Fentanyl Patch  25 MICROgram(s)/Hr 1 Patch Transdermal every 72 hours  influenza   Vaccine 0.5 milliLiter(s) IntraMuscular once  lactated ringers. 1000 milliLiter(s) (100 mL/Hr) IV Continuous <Continuous>    MEDICATIONS  (PRN):    acetaminophen Tablet .. 650 milliGRAM(s) Oral every 6 hours PRN Temp greater or equal to 38C (100.4F), Mild Pain (1 - 3)  aluminum hydroxide/magnesium hydroxide/simethicone Suspension 30 milliLiter(s) Oral every 4 hours PRN Dyspepsia  loperamide 2 milliGRAM(s) Oral four times a day PRN for diarrhea  melatonin 3 milliGRAM(s) Oral at bedtime PRN Insomnia  morphine  - Injectable 4 milliGRAM(s) IV Push every 6 hours PRN Moderate Pain (4 - 6)  ondansetron Injectable 4 milliGRAM(s) IV Push every 6 hours PRN Nausea and/or Vomiting  oxyCODONE IR 10 milliGRAM(s) Oral every 6 hours PRN Severe Pain (7 - 10)      ALLERGIES:    No Known Drug Allergies     REVIEW OF SYSTEM:    Constitutional, Eyes, ENT, Cardiovascular, Respiratory, Gastrointestinal, Genitourinary, Musculoskeletal, Integumentary, Neurological, Psychiatric, Endocrine, Heme/Lymph and Allergic/Immunologic review of systems are otherwise negative except as noted in HPI.     VITAL SIGNS LAST 24 HRS:    T(C): 36.6 (26 Sep 2023 08:45), Max: 36.9 (25 Sep 2023 13:15)  T(F): 97.9 (26 Sep 2023 08:45), Max: 98.4 (25 Sep 2023 13:15)  HR: 83 (26 Sep 2023 08:45) (79 - 102)  BP: 100/70 (26 Sep 2023 08:45) (100/70 - 128/93)  BP(mean): 85 (25 Sep 2023 15:39) (85 - 85)  RR: 17 (26 Sep 2023 08:45) (17 - 18)  SpO2: 97% (26 Sep 2023 08:45) (97% - 100%)    Parameters below as of 26 Sep 2023 08:45  Patient On (Oxygen Delivery Method): room air    PHYSICAL EXAM:    CONSTITUTIONAL : NAD, appear to be of stated age , well groomed   NERVOUS SYSTEM:  Alert & Oriented X 3, no focal deficits.   HEAD:  Atraumatic, Normocephalic  EYES: EOMI, PERRLA, conjunctiva and sclera clear  HEENT: Moist mucous membranes, Supple neck , No JVD  CHEST: Clear to auscultation bilaterally; No rales, no rhonchi, no wheezing  HEART: Regular rate and rhythm; No murmurs, no rubs or gallops  ABDOMEN: Soft, Non-tender, Non-distended; Bowel sounds present, no guarding , no peritoneal irritation   GENITOURINARY- Voiding, no suprapubic tenderness  EXTREMITIES:  2+ Peripheral Pulses, No clubbing, cyanosis, no edema  MUSCULOSKELETAL:- No muscle tenderness, Muscle tone normal, No joint tenderness, no Joint swelling,  Joint ROM –normal   SKIN-no rash, no lesion    LABS:                          12.4   11.06 )-----------( 100      ( 26 Sep 2023 06:19 )             38.2       09-26    137  |  108  |  14  ----------------------------<  105<H>  4.6   |  24  |  0.88    Ca    8.6      26 Sep 2023 06:19    TPro  8.9<H>  /  Alb  4.2  /  TBili  0.3  /  DBili  x   /  AST  32  /  ALT  49  /  AlkPhos  210<H>  09-25    PT/INR - ( 26 Sep 2023 06:19 )   PT: 10.5 sec;   INR: 0.93 ratio      Urinalysis Basic - ( 26 Sep 2023 06:19 )    Color: x / Appearance: x / SG: x / pH: x  Gluc: 105 mg/dL / Ketone: x  / Bili: x / Urobili: x   Blood: x / Protein: x / Nitrite: x   Leuk Esterase: x / RBC: x / WBC x   Sq Epi: x / Non Sq Epi: x / Bacteria: x    RADIOLOGY & ADDITIONAL STUDIES:    EXAM:  CT ABDOMEN AND PELVIS IC    PROCEDURE DATE:  09/23/2023      INTERPRETATION:  CLINICAL INFORMATION: Abdominal pain. History of   metastatic colon cancer on chemotherapy.    COMPARISON: CT abdomen/pelvis 8/25/2023    CONTRAST/COMPLICATIONS:  IV Contrast: Omnipaque 350  90 cc administered   10 cc discarded  Oral Contrast: NONE  Complications: None reported at time of study completion    PROCEDURE:  CT of the Abdomen and Pelvis was performed.  Sagittal and coronal reformats were performed.    FINDINGS:  LOWER CHEST: Within normal limits.    LIVER: Multiple calcified and noncalcified liver metastases are not   significantly changed. No new intrahepatic lesions.  BILE DUCTS: Normal caliber.  GALLBLADDER: Within normal limits.  SPLEEN: Within normal limits.  PANCREAS: Within normal limits.  ADRENALS: Within normal limits.  KIDNEYS/URETERS: Multifocal cortical scarring left kidney. No   hydronephrosis or obstructive renal calculi.    BLADDER: Minimally distended.  REPRODUCTIVE ORGANS: Prostate within normal limits.    BOWEL: No bowel obstruction. Status post total colectomy with rectal   stump and right lower quadrant end ileostomy.  PERITONEUM: No ascites.  VESSELS: Atherosclerotic changes.  RETROPERITONEUM/LYMPH NODES: No lymphadenopathy.  ABDOMINAL WALL: Postsurgical changes. Waxing and waning subcutaneous soft   tissue nodules along the anterior abdominal wall could be related to   injection sites.  BONES: Degenerative changes.    IMPRESSION:  No bowel obstruction or inflammation. Status post total colectomy with   rectal stump and right lower quadrant end ileostomy.    Unchanged calcified and noncalcified liver metastases.       HPI:  52 year old male with MHx colon ca with mets to liver, s/p ileostomy, PE , DVTs  presented to the ED with abdominal pain that has been ongoing since diagnosis in may 2022. Recently in [facility] a few days ago for same.     Patient reports that he ran out of his oxycodone and has been unable to get a refill. Patient follows with pain management outpatient and is also on fentanyl patches. He reports difficult home situation, lives with his mother with dementia and brother who is rarely around.     9/26/2023- Seen at bedside, sitting up, eating breakfast. c/o continues abdominal pain, denies N/V, adequate ostomy output. States is depressed due to ongoing difficult condition at home, not having enough support for self or mother who is demented and alcoholic, did take cocaine and smoked marijuana in last few days. Also reports prescribed Oxycodone is not enough. Currently is prescribed 2 tabs breakfast, and two after lunch. He is now asking for 2 tabs before dinner.     PAST MEDICAL & SURGICAL HISTORY:    Cancer, colon  liver mets  S/P ileostomy  Metastasis to liver  Pulmonary embolism  S/P colon resection  H/O ileostomy    FAMILY HISTORY:    dementia (Mother)    MEDICATIONS  (STANDING):    enoxaparin Injectable 90 milliGRAM(s) SubCutaneous every 12 hours  escitalopram 5 milliGRAM(s) Oral daily  Fentanyl Patch  25 MICROgram(s)/Hr 1 Patch Transdermal every 72 hours  influenza   Vaccine 0.5 milliLiter(s) IntraMuscular once  lactated ringers. 1000 milliLiter(s) (100 mL/Hr) IV Continuous <Continuous>    MEDICATIONS  (PRN):    acetaminophen Tablet .. 650 milliGRAM(s) Oral every 6 hours PRN Temp greater or equal to 38C (100.4F), Mild Pain (1 - 3)  aluminum hydroxide/magnesium hydroxide/simethicone Suspension 30 milliLiter(s) Oral every 4 hours PRN Dyspepsia  loperamide 2 milliGRAM(s) Oral four times a day PRN for diarrhea  melatonin 3 milliGRAM(s) Oral at bedtime PRN Insomnia  morphine  - Injectable 4 milliGRAM(s) IV Push every 6 hours PRN Moderate Pain (4 - 6)  ondansetron Injectable 4 milliGRAM(s) IV Push every 6 hours PRN Nausea and/or Vomiting  oxyCODONE IR 10 milliGRAM(s) Oral every 6 hours PRN Severe Pain (7 - 10)      ALLERGIES:    No Known Drug Allergies     REVIEW OF SYSTEM:    Constitutional, Eyes, ENT, Cardiovascular, Respiratory, Gastrointestinal, Genitourinary, Musculoskeletal, Integumentary, Neurological, Psychiatric, Endocrine, Heme/Lymph and Allergic/Immunologic review of systems are otherwise negative except as noted in HPI.     VITAL SIGNS LAST 24 HRS:    T(C): 36.6 (26 Sep 2023 08:45), Max: 36.9 (25 Sep 2023 13:15)  T(F): 97.9 (26 Sep 2023 08:45), Max: 98.4 (25 Sep 2023 13:15)  HR: 83 (26 Sep 2023 08:45) (79 - 102)  BP: 100/70 (26 Sep 2023 08:45) (100/70 - 128/93)  BP(mean): 85 (25 Sep 2023 15:39) (85 - 85)  RR: 17 (26 Sep 2023 08:45) (17 - 18)  SpO2: 97% (26 Sep 2023 08:45) (97% - 100%)    Parameters below as of 26 Sep 2023 08:45  Patient On (Oxygen Delivery Method): room air    PHYSICAL EXAM:    CONSTITUTIONAL : NAD, appear to be of stated age , well groomed   NERVOUS SYSTEM:  Alert & Oriented X 3, no focal deficits.   HEAD:  Atraumatic, Normocephalic  EYES: EOMI, PERRLA, conjunctiva and sclera clear  HEENT: Moist mucous membranes, Supple neck , No JVD  CHEST: Clear to auscultation bilaterally; No rales, no rhonchi, no wheezing  HEART: Regular rate and rhythm; No murmurs, no rubs or gallops  ABDOMEN: Soft, Non-tender, Non-distended; Bowel sounds present, no guarding , no peritoneal irritation   GENITOURINARY- Voiding, no suprapubic tenderness  EXTREMITIES:  2+ Peripheral Pulses, No clubbing, cyanosis, no edema  MUSCULOSKELETAL:- No muscle tenderness, Muscle tone normal, No joint tenderness, no Joint swelling,  Joint ROM –normal   SKIN-no rash, no lesion    LABS:                          12.4   11.06 )-----------( 100      ( 26 Sep 2023 06:19 )             38.2       09-26    137  |  108  |  14  ----------------------------<  105<H>  4.6   |  24  |  0.88    Ca    8.6      26 Sep 2023 06:19    TPro  8.9<H>  /  Alb  4.2  /  TBili  0.3  /  DBili  x   /  AST  32  /  ALT  49  /  AlkPhos  210<H>  09-25    PT/INR - ( 26 Sep 2023 06:19 )   PT: 10.5 sec;   INR: 0.93 ratio      Urinalysis Basic - ( 26 Sep 2023 06:19 )    Color: x / Appearance: x / SG: x / pH: x  Gluc: 105 mg/dL / Ketone: x  / Bili: x / Urobili: x   Blood: x / Protein: x / Nitrite: x   Leuk Esterase: x / RBC: x / WBC x   Sq Epi: x / Non Sq Epi: x / Bacteria: x    RADIOLOGY & ADDITIONAL STUDIES:    EXAM:  CT ABDOMEN AND PELVIS IC    PROCEDURE DATE:  09/23/2023      INTERPRETATION:  CLINICAL INFORMATION: Abdominal pain. History of   metastatic colon cancer on chemotherapy.    COMPARISON: CT abdomen/pelvis 8/25/2023    CONTRAST/COMPLICATIONS:  IV Contrast: Omnipaque 350  90 cc administered   10 cc discarded  Oral Contrast: NONE  Complications: None reported at time of study completion    PROCEDURE:  CT of the Abdomen and Pelvis was performed.  Sagittal and coronal reformats were performed.    FINDINGS:  LOWER CHEST: Within normal limits.    LIVER: Multiple calcified and noncalcified liver metastases are not   significantly changed. No new intrahepatic lesions.  BILE DUCTS: Normal caliber.  GALLBLADDER: Within normal limits.  SPLEEN: Within normal limits.  PANCREAS: Within normal limits.  ADRENALS: Within normal limits.  KIDNEYS/URETERS: Multifocal cortical scarring left kidney. No   hydronephrosis or obstructive renal calculi.    BLADDER: Minimally distended.  REPRODUCTIVE ORGANS: Prostate within normal limits.    BOWEL: No bowel obstruction. Status post total colectomy with rectal   stump and right lower quadrant end ileostomy.  PERITONEUM: No ascites.  VESSELS: Atherosclerotic changes.  RETROPERITONEUM/LYMPH NODES: No lymphadenopathy.  ABDOMINAL WALL: Postsurgical changes. Waxing and waning subcutaneous soft   tissue nodules along the anterior abdominal wall could be related to   injection sites.  BONES: Degenerative changes.    IMPRESSION:  No bowel obstruction or inflammation. Status post total colectomy with   rectal stump and right lower quadrant end ileostomy.    Unchanged calcified and noncalcified liver metastases.

## 2023-09-26 NOTE — BH CONSULTATION LIAISON ASSESSMENT NOTE - NSBHCHARTREVIEWLAB_PSY_A_CORE FT
09-25    133<L>  |  100  |  15  ----------------------------<  104<H>  5.3   |  29  |  0.96    Ca    9.7      25 Sep 2023 13:26    TPro  8.9<H>  /  Alb  4.2  /  TBili  0.3  /  DBili  x   /  AST  32  /  ALT  49  /  AlkPhos  210<H>  09-25                          15.2   17.11 )-----------( 130      ( 25 Sep 2023 13:26 )             46.4   
09-25    133<L>  |  100  |  15  ----------------------------<  104<H>  5.3   |  29  |  0.96    Ca    9.7      25 Sep 2023 13:26    TPro  8.9<H>  /  Alb  4.2  /  TBili  0.3  /  DBili  x   /  AST  32  /  ALT  49  /  AlkPhos  210<H>  09-25                          15.2   17.11 )-----------( 130      ( 25 Sep 2023 13:26 )             46.4

## 2023-09-26 NOTE — BH CONSULTATION LIAISON ASSESSMENT NOTE - NSBHCHARTREVIEWVS_PSY_A_CORE FT
Vital Signs Last 24 Hrs  T(C): 36.9 (25 Sep 2023 13:15), Max: 36.9 (25 Sep 2023 13:15)  T(F): 98.4 (25 Sep 2023 13:15), Max: 98.4 (25 Sep 2023 13:15)  HR: 102 (25 Sep 2023 13:15) (102 - 102)  BP: 128/93 (25 Sep 2023 13:15) (128/93 - 128/93)  BP(mean): --  RR: 18 (25 Sep 2023 13:15) (18 - 18)  SpO2: 100% (25 Sep 2023 13:15) (100% - 100%)    Parameters below as of 25 Sep 2023 13:15  Patient On (Oxygen Delivery Method): room air    
Vital Signs Last 24 Hrs  T(C): 36.6 (26 Sep 2023 08:45), Max: 36.9 (25 Sep 2023 15:39)  T(F): 97.9 (26 Sep 2023 08:45), Max: 98.4 (25 Sep 2023 15:39)  HR: 83 (26 Sep 2023 08:45) (79 - 93)  BP: 100/70 (26 Sep 2023 08:45) (100/70 - 125/79)  BP(mean): 85 (25 Sep 2023 15:39) (85 - 85)  RR: 17 (26 Sep 2023 08:45) (17 - 18)  SpO2: 97% (26 Sep 2023 08:45) (97% - 100%)    Parameters below as of 26 Sep 2023 08:45  Patient On (Oxygen Delivery Method): room air

## 2023-09-26 NOTE — CONSULT NOTE ADULT - ASSESSMENT
51 y/o M with MH significant for Stage IV colon cancer with metastases to the liver and lung, currently on chemotherapy, s/p ileostomy and revision 04/2023 and subtotal colectomy for perforated cecal mass 2022, readmitted for recurrent/persistent abdominal pain, poor PO intake.      # Metastatic Colon CA to Liver & Lung, S/p Ileostomy    - CT imaging during this admission and previous with no evidence of acute intra-abdominal infectious/inflammatory processes   - Dx 05/2022 after going to ED for worsening abdominal pain  - 05/19/22 CT AP with apparent focal mural thickening at the cecum/proximal ascending colon with focal tumor invasion/extension from the cecum to the sigmoid colon. Multiple hypodense lesions with calcifications in both lobes of the liver with the largest measuring 5.8 x 6.0 cm in hepatic segment.  - 05/19/22: underwent exploratory laparotomy, total colectomy, end ileostomy, biopsy of liver, and biopsy of retroperitoneum.    Pathology:  Omentum negative for malignancy  Portion of terminal ileum, cecum with adherent sigmoid: Poorly differentiated infiltrating adenocarcinoma measuring 9.0 cm. Adenocarcinoma infiltrates through the bowel wall and through the visceral peritoneum and infiltrates the adherent sigmoid colon 3 of 17 lymph nodes are positive for metastatic carcinoma. Lymphovascular space & Perineural invasion is identified. The surgical margins negative.   Appendix with serosal tumor implants and acute inflammation. Peritonitis. Liver, biopsy: Metastatic adenocarcinoma.   Retroperitoneal biopsy: Adenocarcinoma with necrosis. No loss of nuclear expression of MMR proteins (MLH1, MSH2, MSH6, and PMS2). Staging pT4b pN1b pM1c.    - 06/22/2022: Started FOLFOXIRI with Bevacizumab; last dose of FOLFIRI and Bevacizumab received on 01/04/2023; S/p Oxaliplatin C10  - 09/01/22 CT CAP: Segmental left lower lobe pulmonary embolus and probable smaller pulmonary emboli within the right lung. Interval decrease in size of bilateral pulmonary nodules and hepatic metastases compared to 05/25/2022. No new sites of disease.  - 06/13/23 CT CAP: New, 8 mm right lower lobe nodule and enlarging pulmonary nodules, 7 mm left apical nodule previously measured 6 mm. Bilobar hepatic metastases, the majority of which are calcified and not significantly changed. A noncalcified lesion in the right hepatic lobe demonstrates mild interval growth, 1.7 x 1.4 cm previously 1.4 x 1.3 cm  - On chemo FOLFIRI, and Avastin, most recent dose 09/11/23.     -Per outpatient notes-was due for C28 of FOLFOXIRI / Zirabev-9/25/23, missed the appointment and called stating was unaware of scheduled didn't know he was scheduled, but was calling to request refills for Oxycodone, Lovenox, lidocaine patch and Fentanyl. As per [place]  he wasn't due for refill until Sept 30th for refills. He also reported he missed at least 2 days of Lovenox injections. During the call he mentioned he is unsure he would like to continue chemotherapy. He feels he is too "sick" from cancer and chemo and his ostomy to continue chemotherapy.   -Primary Oncologist, Dr. Serrano to discuss further.     - With past admission notes reporting binge drinking, smoking cigarettes and marijuana and now requesting refills for pain meds before scheduled time, sent Urine for toxicology- which is positive for Cocaine and THC, Benzos and Opiates (On Fentany)    - No inpatient tx, continue supportive measures as necessary     - Patient expresses ongoing concerns about current living arrangements, inability to adequately care for himself, manage hydration/pain/etc ; ---> SW / Case management involved last visit and patient is aware of his contribution in application process.       # Leukocytosis    - WBC count remains elevated, today at 11.06 K/ul   - Intermittent spikes >100K over the past few months 2/2 known On Pro (Neulasta) use with chemo dosing  - C- Diff / GI panel negative during previous admissions   - Continue to trend serial CBCs  - Monitor temp      # LLE Ext Iliac Artery Occlusion    - Dx during previous admission 07/2023  - Patient was taking DOAC (Eliquis) for prior VTE in setting of metastatic disease but developed arterial thrombosis  - 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; LAC/APLS labs negative   - Patient receiving Bevacizumab which also rarely can contribute to thrombotic risk   - LLE angiogram with Vascular Sx 07/10; unable to traverse lesion  - Transitioned to Lovenox 90 q 12h 07/13/23- recent reported missing doses.  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 Adjacent right common femoral vein is noted to be patent and free of thrombus.    # Abdominal Pain    - Intermittent ongoing symptoms for the past few months, reason for previous admission 07/2023  - Colorectal Sx following--> No intervention at this time, follow up with Dr. Mckeon in 2 weeks  - Adequate ileostomy output, ileostomy intact, pink/patent, no sign of infection, ileostomy with well formed stool  -CT-abd-9/23/23-->No bowel obstruction or inflammation. Status post total colectomy with rectal stump and right lower quadrant end ileostomy.  - Continue supportive measures including pain management as necessary    51 y/o M with MH significant for Stage IV colon cancer with metastases to the liver and lung, currently on chemotherapy, s/p ileostomy and revision 04/2023 and subtotal colectomy for perforated cecal mass 2022, readmitted for recurrent/persistent abdominal pain, poor PO intake.      # Metastatic Colon CA to Liver & Lung, S/p Ileostomy    - CT imaging during this admission and previous with no evidence of acute intra-abdominal infectious/inflammatory processes   - Dx 05/2022 after going to ED for worsening abdominal pain  - 05/19/22 CT AP with apparent focal mural thickening at the cecum/proximal ascending colon with focal tumor invasion/extension from the cecum to the sigmoid colon. Multiple hypodense lesions with calcifications in both lobes of the liver with the largest measuring 5.8 x 6.0 cm in hepatic segment.  - 05/19/22: underwent exploratory laparotomy, total colectomy, end ileostomy, biopsy of liver, and biopsy of retroperitoneum.    Pathology:  Omentum negative for malignancy  Portion of terminal ileum, cecum with adherent sigmoid: Poorly differentiated infiltrating adenocarcinoma measuring 9.0 cm. Adenocarcinoma infiltrates through the bowel wall and through the visceral peritoneum and infiltrates the adherent sigmoid colon 3 of 17 lymph nodes are positive for metastatic carcinoma. Lymphovascular space & Perineural invasion is identified. The surgical margins negative.   Appendix with serosal tumor implants and acute inflammation. Peritonitis. Liver, biopsy: Metastatic adenocarcinoma.   Retroperitoneal biopsy: Adenocarcinoma with necrosis. No loss of nuclear expression of MMR proteins (MLH1, MSH2, MSH6, and PMS2). Staging pT4b pN1b pM1c.    - 06/22/2022: Started FOLFOXIRI with Bevacizumab; last dose of FOLFIRI and Bevacizumab received on 01/04/2023; S/p Oxaliplatin C10  - 09/01/22 CT CAP: Segmental left lower lobe pulmonary embolus and probable smaller pulmonary emboli within the right lung. Interval decrease in size of bilateral pulmonary nodules and hepatic metastases compared to 05/25/2022. No new sites of disease.  - 06/13/23 CT CAP: New, 8 mm right lower lobe nodule and enlarging pulmonary nodules, 7 mm left apical nodule previously measured 6 mm. Bilobar hepatic metastases, the majority of which are calcified and not significantly changed. A noncalcified lesion in the right hepatic lobe demonstrates mild interval growth, 1.7 x 1.4 cm previously 1.4 x 1.3 cm  - On chemo FOLFIRI, and Avastin, most recent dose 09/11/23.     -Per outpatient notes-was due for C28 of FOLFOXIRI / Zirabev-9/25/23, missed the appointment and called stating was unaware of scheduled didn't know he was scheduled, but was calling to request refills for Oxycodone, Lovenox, lidocaine patch and Fentanyl. [place]'t [place] for refill until Sept 30th for refills. He also reported he missed at least 2 days of Lovenox injections. During the call he mentioned he is unsure he would like to continue chemotherapy. He feels he is too "sick" from cancer and chemo and his ostomy to continue chemotherapy.   -Per conversation today at bedside, states- prescribed Oxycodone is not enough. Currently is prescribed 2 tabs breakfast, and two after lunch. He is now asking for 2 tabs before dinner.   -Primary Oncologist, Dr. Serrano to discuss further.     -With past admission notes reporting binge drinking, smoking cigarettes and marijuana and now requesting refills for pain meds before scheduled time, sent Urine for toxicology- which is positive for Cocaine and THC, Benzodiazapine and Opiates (On Fentanyl).    - No inpatient tx, continue supportive measures as necessary     -Patient expresses ongoing concerns about current living arrangements, inability to adequately care for himself, manage hydration/pain/etc ; ---> SW / Case management involved last visit and patient is aware of his contribution in application process.     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 Adjacent right common femoral vein is noted to be patent and free of thrombus.    # Abdominal Pain    - Intermittent ongoing symptoms for the past few months, reason for previous admission 07/2023  - Colorectal Sx following--> No intervention at this time, follow up with Dr. Mckeon in 2 weeks  - Adequate ileostomy output, ileostomy intact, pink/patent, no sign of infection, ileostomy with well formed stool  -CT-abd-9/23/23-->No bowel obstruction or inflammation. Status post total colectomy with rectal stump and right lower quadrant end ileostomy.  - Continue supportive measures including pain management as necessary   - Palliative consult    51 y/o M with MH significant for Stage IV colon cancer with metastases to the liver and lung, currently on chemotherapy, s/p ileostomy and revision 04/2023 and subtotal colectomy for perforated cecal mass 2022, readmitted for recurrent/persistent abdominal pain, poor PO intake, found to have urine + for cocaine.      # Metastatic Colon CA to Liver & Lung, S/p Ileostomy    - CT imaging during this admission and previous with no evidence of acute intra-abdominal infectious/inflammatory processes   - Dx 05/2022 after going to ED for worsening abdominal pain  - 05/19/22 CT AP with apparent focal mural thickening at the cecum/proximal ascending colon with focal tumor invasion/extension from the cecum to the sigmoid colon. Multiple hypodense lesions with calcifications in both lobes of the liver with the largest measuring 5.8 x 6.0 cm in hepatic segment.  - 05/19/22: underwent exploratory laparotomy, total colectomy, end ileostomy, biopsy of liver, and biopsy of retroperitoneum.    Pathology:  Omentum negative for malignancy  Portion of terminal ileum, cecum with adherent sigmoid: Poorly differentiated infiltrating adenocarcinoma measuring 9.0 cm. Adenocarcinoma infiltrates through the bowel wall and through the visceral peritoneum and infiltrates the adherent sigmoid colon 3 of 17 lymph nodes are positive for metastatic carcinoma. Lymphovascular space & Perineural invasion is identified. The surgical margins negative.   Appendix with serosal tumor implants and acute inflammation. Peritonitis. Liver, biopsy: Metastatic adenocarcinoma.   Retroperitoneal biopsy: Adenocarcinoma with necrosis. No loss of nuclear expression of MMR proteins (MLH1, MSH2, MSH6, and PMS2). Staging pT4b pN1b pM1c.    - 06/22/2022: Started FOLFOXIRI with Bevacizumab; last dose of FOLFIRI and Bevacizumab received on 01/04/2023; S/p Oxaliplatin C10  - 09/01/22 CT CAP: Segmental left lower lobe pulmonary embolus and probable smaller pulmonary emboli within the right lung. Interval decrease in size of bilateral pulmonary nodules and hepatic metastases compared to 05/25/2022. No new sites of disease.  - 06/13/23 CT CAP: New, 8 mm right lower lobe nodule and enlarging pulmonary nodules, 7 mm left apical nodule previously measured 6 mm. Bilobar hepatic metastases, the majority of which are calcified and not significantly changed. A noncalcified lesion in the right hepatic lobe demonstrates mild interval growth, 1.7 x 1.4 cm previously 1.4 x 1.3 cm  - On chemo FOLFIRI, and Avastin, most recent dose 09/11/23.     -Per outpatient notes-was due for C28 of FOLFOXIRI / Zirabev-9/25/23, missed the appointment and called stating was unaware of scheduled didn't know he was scheduled, but was calling to request refills for Oxycodone, Lovenox, lidocaine patch and Fentanyl. [place] wasn't due for refill until Sept 30th for refills. He also reported he missed at least 2 days of Lovenox injections. During the call he mentioned he is unsure he would like to continue chemotherapy. He feels he is too "sick" from cancer and chemo and his ostomy to continue chemotherapy.   -Per conversation today at bedside, states- prescribed Oxycodone is not enough. Currently is prescribed 2 tabs breakfast, and two after lunch. He is now asking for 2 tabs before dinner.   -Primary Oncologist, Dr. Serrano to discuss further.     -With past admission notes reporting binge drinking, smoking cigarettes and marijuana and now requesting refills for pain meds before scheduled time, sent Urine for toxicology- which is positive for Cocaine and THC, Benzodiazapine and Opiates (On Fentanyl).    - No inpatient tx, continue supportive measures as necessary     -Patient expresses ongoing concerns about current living arrangements, inability to adequately care for himself, manage hydration/pain/etc ; ---> SW / Case management involved last visit and patient is aware of his contribution in application process.     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 Adjacent right common femoral vein is noted to be patent and free of thrombus.    # Abdominal Pain    - Intermittent ongoing symptoms for the past few months, reason for previous admission 07/2023  - Colorectal Sx following--> No intervention at this time, follow up with Dr. Mckeon in 2 weeks  - Adequate ileostomy output, ileostomy intact, pink/patent, no sign of infection, ileostomy with well formed stool  -CT-abd-9/23/23-->No bowel obstruction or inflammation. Status post total colectomy with rectal stump and right lower quadrant end ileostomy.  - Continue supportive measures including pain management as necessary   - Palliative consult    53 y/o M with MH significant for Stage IV colon cancer with metastases to the liver and lung, currently on chemotherapy, s/p ileostomy and revision 04/2023 and subtotal colectomy for perforated cecal mass 2022, readmitted for recurrent/persistent abdominal pain, poor PO intake, found to have urine + for cocaine.      # Metastatic Colon CA to Liver & Lung, S/p Ileostomy    - CT imaging during this admission and previous with no evidence of acute intra-abdominal infectious/inflammatory processes   - Dx 05/2022 after going to ED for worsening abdominal pain  - 05/19/22 CT AP with apparent focal mural thickening at the cecum/proximal ascending colon with focal tumor invasion/extension from the cecum to the sigmoid colon. Multiple hypodense lesions with calcifications in both lobes of the liver with the largest measuring 5.8 x 6.0 cm in hepatic segment.  - 05/19/22: underwent exploratory laparotomy, total colectomy, end ileostomy, biopsy of liver, and biopsy of retroperitoneum.  -Path: Portion of terminal ileum, cecum with adherent sigmoid: Poorly differentiated infiltrating adenocarcinoma measuring 9.0 cm. Adenocarcinoma infiltrates through the bowel wall and through the visceral peritoneum and infiltrates the adherent sigmoid colon 3 of 17 lymph nodes are positive for metastatic carcinoma. Lymphovascular space & Perineural invasion is identified. The surgical margins negative.   Appendix with serosal tumor implants and acute inflammation. Peritonitis. Liver, biopsy: Metastatic adenocarcinoma.   Retroperitoneal biopsy: Adenocarcinoma with necrosis. No loss of nuclear expression of MMR proteins (MLH1, MSH2, MSH6, and PMS2). Staging [id number].    -06/22/2022: Started FOLFOXIRI with Bevacizumab--> FOLFIRI/sunny with most recent dose on 9/11/23  -06/13/23 CT CAP: New 8 mm right lower lobe nodule and enlarging pulmonary nodules, 7 mm left apical nodule previously measured 6 mm. Bilobar hepatic metastases, the majority of which are calcified and not significantly changed. A noncalcified lesion in the right hepatic lobe demonstrates mild interval growth, 1.7 x 1.4 cm previously 1.4 x 1.3 cm  -due for FOLFIRI/sunny 9/25/23, missed the appointment; called instead to request refills for Oxycodone, Lovenox, lidocaine patch and Fentanyl prior to due date 9/30/23.   -self reported non compliance with Lovenox injections and considering stopping chemotherapy s/t SE's.  -pt reports Oxycodone dose insufficient  -past admission notes report binge drinking, marijuana and now requesting oxycodone refills early.    -Urine for toxicology- positive for Cocaine and THC, Benzodiazapine and Opiates (On Fentanyl).    -No inpatient chemotherapy planned  -continue supportive care  -Patient expresses ongoing concerns about current living arrangements, inability to adequately care for himself, manage hydration/pain/etc  -SW / Case management involved last visit; patient aware of his required contribution in application process.     # Leukocytosis    - WBC  11.06 K/ul   - Intermittent spikes >100K over the past few months 2/2 known Onpro  - C- Diff / GI panel negative during previous admissions       # LLE Ext Iliac Artery Occlusion    - Dx during previous admission 07/2023  - Patient was taking DOAC (Eliquis) for prior VTE in setting of metastatic disease but developed arterial thrombosis  - 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; LAC/APLS labs negative   - Patient receiving Bevacizumab which also rarely can contribute to thrombotic risk   - LLE angiogram with Vascular Sx 07/10; unable to traverse lesion  - Transitioned to Lovenox 90 q 12h 07/13/23- recent reported missing doses.  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 Adjacent right common femoral vein is noted to be patent and free of thrombus.    # Abdominal Pain    - Intermittent ongoing symptoms for the past few months, reason for previous admission 07/2023  - Colorectal Sx following--> No intervention at this time, follow up with Dr. Mckeon in 2 weeks  - Adequate ileostomy output, ileostomy intact, pink/patent, no sign of infection, ileostomy with well formed stool  -CT-abd-9/23/23-->No bowel obstruction or inflammation. Status post total colectomy with rectal stump and right lower quadrant end ileostomy.  - Continue supportive measures including pain management as necessary   - Palliative consult

## 2023-09-26 NOTE — BH CONSULTATION LIAISON ASSESSMENT NOTE - SUMMARY
Patient is a 52 year old male, single, no dependents, domiciled with 81 y/o mother and brother in private residence, unemployed-on disability was previously a . No PPHx. No SHIIP. No inpatient hospitalizations. No NSSIB. PMHx of colon cancer with mets to the liver and lung on chemotherapy, patient presented with abdominal pain. Psychiatry consulted for depression.    Patient is calm and cooperative; linear, organized. Reports that he is not suicidal, does not want to die, but is just "existing" has no intent or plan to kill himself because his "body is doing that for him." He reports excessive frustration with his life circumstances, not being able to work, not being  or have a family, and "walking around with a shit bag" (has colostomy.) He also reports his mother as his number one stressor as she is 81 y/o with dementia, drives her car around and gets lost all the time, he worries she will get into an accident and kill someone or herself, as well as her chronic alcohol use, he feels constantly stressed by her and think she needs to reside in an assisted living. He reports a good relationship with his brother, but doesn't see him much because he works and comes home and goes to sleep. Patient reports he epifanio with stressors by walking around his neighborhood and sitting in the park all day, stating, "I go to the park to escape during the day, they have 24 hour public bathrooms where I can empty this bag. When I go home if my sister doesn't put my mother in assisted living I am going to pack up my stuff and live in my car by the park, it's not like I haven't done it before." Discussed with patient starting antidepressant medications, he declined at present and reported the only medication adjustment he needed Morphine, stating, "No I am not suicidal, I don't want any of that medication. I need the morphine, I'm still waiting, I haven't been given any and a room phone please." Reports good sleep and appetite. Denies depressed mood or anhedonia, hopelessness or suicidal ideation. Denies manic / psychotic symptoms. Patient has no presence of thought disorder. No delusions elicited. Tolerating medications with no side effects; none observed.     Patient consulted for depression, reconsulted 9/26/23: Patient agreed to start Lexapro 5 mg, continues to report pain, requesting additional pain medication. Patient is calm and cooperative, pleasant; linear, organized. He can be sarcastic, provocative and manipulative at times r/t secondary gains, ie: pain control, medical admission in light of being unsatisfied with housing. Reports okay sleep and appetite since last night on admission. Denies depressed mood or anhedonia, hopelessness or suicidal ideation. Denies manic / psychotic symptoms.  Patient has no presence of thought disorder. No delusions elicited. Tolerating medications with no side effects; none observed. Patient is not presenting as an imminent risk for harm to self, and does not meet criteria for involuntary in-patient hospitalization. Patient to follow-up with out-patient provider in the near future.

## 2023-09-26 NOTE — BH CONSULTATION LIAISON ASSESSMENT NOTE - PATIENT'S CHIEF COMPLAINT
"No I am not suicidal, I don't want any of that medication. I need the morphine, I'm still waiting, I haven't been given any and a room phone please."
"No I am not suicidal, I don't want any of that medication. I need the morphine, I'm still waiting, I haven't been given any and a room phone please."

## 2023-09-26 NOTE — PROGRESS NOTE ADULT - SUBJECTIVE AND OBJECTIVE BOX
CC: 52 year old male with PMHx of doris ca, colon ca with mets to liver, s/p ileostomy, PE , DVTs  presented to the ED with abdominal pain that has been ongoing since diagnosis in may 2022. recently in [facility] a few days ago for same. Patient reports that he ran out of his oxycodone and has been unable to get a refill. Patient follows with pain management outpatient and is also on fentanyl patches.   denies any complications with ostomy bag, mild prolapse present. Appears to be draining appropriately. . He reports 10/10  abdominal pain, all over, negative CVA tenderness. He reports difficult home situation. Lives with his mother with dementia and brother who is rarely around. He reports a poor diet.  CT abdomen Patient denies any fevers chills, nausea vomiting, chest pain palpitations lightheadedness or dizziness at the time of my examination but does report those all those symptoms do sporadically occur. He reports he smoked cigarettes and drank wine yesterday after a few years of abstinence due to depression and pain. Patient seen by psych and colorectal in the ED. Patient also found to have leukocytosis, unclear source. Patient to be admitted to the hospitalist service for abdominal pain and leukocytosis    9/26 - no cp palps sob abdo pain, is ambulating without any trouble     Vital Signs Last 24 Hrs  T(C): 36.3 (26 Sep 2023 16:20), Max: 36.6 (26 Sep 2023 08:45)  T(F): 97.3 (26 Sep 2023 16:20), Max: 97.9 (26 Sep 2023 08:45)  HR: 88 (26 Sep 2023 16:20) (82 - 88)  BP: 121/72 (26 Sep 2023 16:20) (100/70 - 121/72)  RR: 18 (26 Sep 2023 16:20) (17 - 18)  SpO2: 100% (26 Sep 2023 16:20) (97% - 100%)  Parameters below as of 26 Sep 2023 16:20  Patient On (Oxygen Delivery Method): room air  Constitutional: NAD, awake and alert  HEENT: PERR, EOMI  Neck: Soft and supple,  No JVD  Respiratory: Breath sounds are clear bilaterally, No wheezing, rales or rhonchi  Cardiovascular: S1 and S2, regular rate and rhythm, no Murmurs  Gastrointestinal: Bowel Sounds present, soft, nontender, ileostomy with stool   Extremities: No peripheral edema  Vascular: 2+ peripheral pulses  Neurological: A/O x 3, no focal deficits  Musculoskeletal: 5/5 strength b/l upper and lower extremities  Skin: No rashes    MEDICATIONS:  MEDICATIONS  (STANDING):  enoxaparin Injectable 90 milliGRAM(s) SubCutaneous every 12 hours  escitalopram 5 milliGRAM(s) Oral daily  fentaNYL   Patch  25 MICROgram(s)/Hr 1 Patch Transdermal every 72 hours  influenza   Vaccine 0.5 milliLiter(s) IntraMuscular once  lactated ringers. 1000 milliLiter(s) (100 mL/Hr) IV Continuous <Continuous>      LABS: All Labs Reviewed:                     12.4   11.06 )-----------( 100      ( 26 Sep 2023 06:19 )             38.2   137  |  108  |  14  ----------------------------<  105<H>  4.6   |  24  |  0.88  Ca    8.6      26 Sep 2023 06:19  TPro  8.9<H>  /  Alb  4.2  /  TBili  0.3  /  DBili  x   /  AST  32  /  ALT  49  /  AlkPhos  210<H>  09-25  PT/INR - ( 26 Sep 2023 06:19 )   PT: 10.5 sec;   INR: 0.93 ratio      RADIOLOGY/EKG:  < from: Xray Chest 2 Views PA/Lat (09.25.23 @ 19:17) >  Heart/Vascular: The heart size, mediastinum, hilum and aorta are within   normal limits for projection.  Pulmonary: Midline trachea. There is no focal infiltrate, congestion or   effusion.  Bones: There is no fracture.  Lines and catheter: Tip of right Port-A-Cath in SVC without pneumothorax.

## 2023-09-26 NOTE — BH CONSULTATION LIAISON ASSESSMENT NOTE - NSICDXPASTMEDICALHX_GEN_ALL_CORE_FT
PAST MEDICAL HISTORY:  Cancer, colon liver mets    Metastasis to liver     Pulmonary embolism     S/P ileostomy     
PAST MEDICAL HISTORY:  Cancer, colon liver mets    Metastasis to liver     Pulmonary embolism     S/P ileostomy

## 2023-09-26 NOTE — PROGRESS NOTE ADULT - ASSESSMENT
52 year old male with PMHx of doris ca, colon ca with mets to liver, s/p ileostomy, PE , DVTs  presented to the ED with abdominal pain that has been ongoing since diagnosis in may 2022. Patient to be admitted to the hospitalist service for abdominal pain and leukocytosis    Abdominal Pain  Colorectal surgery consulted, recommendations appreciated  No acute surgical intervention indicated at this time   CTA 2/23 showed  No bowel obstruction or inflammation. Status post total colectomy with   rectal stump and right lower quadrant end ileostomy.  Unchanged calcified and noncalcified liver metastases.    Leukocytosis  Obtain UA/UC tox screen  Obtain Blood cultures  Monitor CBC  Repeat CXR  Repeat lactic acid   Will hold off on abx at this time    Depression  Psych consulted recommendation appreciated  Recommend Lexapro 5mg daily, patient agreeable   patient denies suicidal ideations     Hyponatremia  Continue IV fluids  Continue to monitor BMP    Social  Poor home situation  Consult case management     Code status Full Code  Dvt ppx on Lovenox     DISPO  DC Home   discussed with Psychiatry - pt cleared to be dced home   pt states he will only leave on 9/30 when he gets his pain meds via his pain physician   patient advised that he is stable for discharge

## 2023-09-26 NOTE — BH CONSULTATION LIAISON ASSESSMENT NOTE - NSICDXPASTSURGICALHX_GEN_ALL_CORE_FT
PAST SURGICAL HISTORY:  H/O ileostomy     S/P colon resection     
PAST SURGICAL HISTORY:  H/O ileostomy     S/P colon resection

## 2023-09-26 NOTE — BH CONSULTATION LIAISON ASSESSMENT NOTE - NSSUICPROTFACT_PSY_ALL_CORE
Responsibility to children, family, or others/Identifies reasons for living/Supportive social network of family or friends/Positive therapeutic relationships
Responsibility to children, family, or others/Identifies reasons for living/Supportive social network of family or friends/Positive therapeutic relationships

## 2023-09-27 ENCOUNTER — TRANSCRIPTION ENCOUNTER (OUTPATIENT)
Age: 53
End: 2023-09-27

## 2023-09-27 ENCOUNTER — APPOINTMENT (OUTPATIENT)
Dept: INFUSION THERAPY | Facility: CLINIC | Age: 53
End: 2023-09-27

## 2023-09-27 LAB
CULTURE RESULTS: SIGNIFICANT CHANGE UP
SPECIMEN SOURCE: SIGNIFICANT CHANGE UP

## 2023-09-27 PROCEDURE — 99232 SBSQ HOSP IP/OBS MODERATE 35: CPT

## 2023-09-27 RX ORDER — ACETAMINOPHEN 500 MG
2 TABLET ORAL
Qty: 0 | Refills: 0 | DISCHARGE
Start: 2023-09-27

## 2023-09-27 RX ORDER — ESCITALOPRAM OXALATE 10 MG/1
1 TABLET, FILM COATED ORAL
Qty: 30 | Refills: 0
Start: 2023-09-27 | End: 2023-10-26

## 2023-09-27 RX ADMIN — SODIUM CHLORIDE 100 MILLILITER(S): 9 INJECTION, SOLUTION INTRAVENOUS at 05:18

## 2023-09-27 RX ADMIN — OXYCODONE HYDROCHLORIDE 10 MILLIGRAM(S): 5 TABLET ORAL at 13:03

## 2023-09-27 RX ADMIN — OXYCODONE HYDROCHLORIDE 10 MILLIGRAM(S): 5 TABLET ORAL at 13:35

## 2023-09-27 RX ADMIN — OXYCODONE HYDROCHLORIDE 10 MILLIGRAM(S): 5 TABLET ORAL at 06:58

## 2023-09-27 RX ADMIN — ENOXAPARIN SODIUM 90 MILLIGRAM(S): 100 INJECTION SUBCUTANEOUS at 10:45

## 2023-09-27 RX ADMIN — OXYCODONE HYDROCHLORIDE 10 MILLIGRAM(S): 5 TABLET ORAL at 07:55

## 2023-09-27 RX ADMIN — OXYCODONE HYDROCHLORIDE 10 MILLIGRAM(S): 5 TABLET ORAL at 21:01

## 2023-09-27 RX ADMIN — OXYCODONE HYDROCHLORIDE 10 MILLIGRAM(S): 5 TABLET ORAL at 20:31

## 2023-09-27 RX ADMIN — ENOXAPARIN SODIUM 90 MILLIGRAM(S): 100 INJECTION SUBCUTANEOUS at 23:21

## 2023-09-27 RX ADMIN — ESCITALOPRAM OXALATE 5 MILLIGRAM(S): 10 TABLET, FILM COATED ORAL at 12:42

## 2023-09-27 NOTE — DISCHARGE NOTE NURSING/CASE MANAGEMENT/SOCIAL WORK - PATIENT PORTAL LINK FT
You can access the FollowMyHealth Patient Portal offered by [facility] by registering at the following website: [email or web address]. By joining [facility]’s FollowMyHealth portal, you will also be able to view your health information using other applications (apps) compatible with our system.

## 2023-09-28 ENCOUNTER — NON-APPOINTMENT (OUTPATIENT)
Age: 53
End: 2023-09-28

## 2023-09-28 DIAGNOSIS — R10.9 UNSPECIFIED ABDOMINAL PAIN: ICD-10-CM

## 2023-09-28 PROCEDURE — 99232 SBSQ HOSP IP/OBS MODERATE 35: CPT

## 2023-09-28 RX ADMIN — OXYCODONE HYDROCHLORIDE 10 MILLIGRAM(S): 5 TABLET ORAL at 11:12

## 2023-09-28 RX ADMIN — OXYCODONE HYDROCHLORIDE 10 MILLIGRAM(S): 5 TABLET ORAL at 11:42

## 2023-09-28 RX ADMIN — OXYCODONE HYDROCHLORIDE 10 MILLIGRAM(S): 5 TABLET ORAL at 22:46

## 2023-09-28 RX ADMIN — ENOXAPARIN SODIUM 90 MILLIGRAM(S): 100 INJECTION SUBCUTANEOUS at 11:16

## 2023-09-28 RX ADMIN — OXYCODONE HYDROCHLORIDE 10 MILLIGRAM(S): 5 TABLET ORAL at 23:45

## 2023-09-28 RX ADMIN — ESCITALOPRAM OXALATE 5 MILLIGRAM(S): 10 TABLET, FILM COATED ORAL at 11:16

## 2023-09-28 RX ADMIN — ENOXAPARIN SODIUM 90 MILLIGRAM(S): 100 INJECTION SUBCUTANEOUS at 22:46

## 2023-09-28 NOTE — PROGRESS NOTE ADULT - SUBJECTIVE AND OBJECTIVE BOX
HPI:    52 year old male with MHx colon ca with mets to liver, s/p ileostomy, PE , DVTs  presented to the ED with abdominal pain that has been ongoing since diagnosis in may 2022. Recently in [facility] a few days ago for same.     Patient reports that he ran out of his oxycodone and has been unable to get a refill. Patient follows with pain management outpatient and is also on fentanyl patches. He reports difficult home situation, lives with his mother with dementia and brother who is rarely around.     9/26/2023- Seen at bedside, sitting up, eating breakfast. c/o continues abdominal pain, denies N/V, adequate ostomy output. States is depressed due to ongoing difficult condition at home, not having enough support for self or mother who is demented and alcoholic, did take cocaine and smoked marijuana in last few days. Also reports prescribed Oxycodone is not enough. Currently is prescribed 2 tabs breakfast, and two after lunch. He is now asking for 2 tabs before dinner.     09/28/2023: Seen at bedside, no acute distress but overall upset with his current level of care regarding pain control with current medication regimen. He expressed thoughts of no longer continuing 'all of this' referring to his cancer directed regimen        PAST MEDICAL & SURGICAL HISTORY:    Cancer, colon  liver mets  S/P ileostomy  Metastasis to liver  Pulmonary embolism  S/P colon resection  H/O ileostomy      FAMILY HISTORY:    dementia (Mother)      MEDICATIONS  (STANDING):    enoxaparin Injectable 90 milliGRAM(s) SubCutaneous every 12 hours  escitalopram 5 milliGRAM(s) Oral daily  fentaNYL   Patch  25 MICROgram(s)/Hr 1 Patch Transdermal every 72 hours  influenza   Vaccine 0.5 milliLiter(s) IntraMuscular once      MEDICATIONS  (PRN):    acetaminophen     Tablet .. 650 milliGRAM(s) Oral every 6 hours PRN Temp greater or equal to 38C (100.4F), Mild Pain (1 - 3)  aluminum hydroxide/magnesium hydroxide/simethicone Suspension 30 milliLiter(s) Oral every 4 hours PRN Dyspepsia  loperamide 2 milliGRAM(s) Oral four times a day PRN for diarrhea  melatonin 3 milliGRAM(s) Oral at bedtime PRN Insomnia  morphine  - Injectable 4 milliGRAM(s) IV Push every 6 hours PRN Moderate Pain (4 - 6)  ondansetron Injectable 4 milliGRAM(s) IV Push every 6 hours PRN Nausea and/or Vomiting  oxyCODONE    IR 10 milliGRAM(s) Oral every 6 hours PRN Severe Pain (7 - 10)        ALLERGIES:    No Known Drug Allergies       REVIEW OF SYSTEMS:    Constitutional, Eyes, ENT, Cardiovascular, Respiratory, Gastrointestinal, Genitourinary, Musculoskeletal, Integumentary, Neurological, Psychiatric, Endocrine, Heme/Lymph and Allergic/Immunologic review of systems are otherwise negative except as noted in HPI.       VITALS:    T(C): 36.6 (28 Sep 2023 09:34), Max: 36.9 (27 Sep 2023 15:12)  T(F): 97.9 (28 Sep 2023 09:34), Max: 98.4 (27 Sep 2023 15:12)  HR: 92 (28 Sep 2023 09:34) (78 - 92)  BP: 109/56 (28 Sep 2023 09:34) (109/56 - 146/58)  BP(mean): --  ABP: --  ABP(mean): --  RR: 18 (28 Sep 2023 09:34) (18 - 18)  SpO2: 95% (28 Sep 2023 09:34) (95% - 99%)    O2 Parameters below as of 28 Sep 2023 09:34  Patient On (Oxygen Delivery Method): room air      PHYSICAL EXAM:    CONSTITUTIONAL : NAD, appear to be of stated age , well groomed   NERVOUS SYSTEM:  Alert & Oriented X 3, no focal deficits.   HEAD:  Atraumatic, Normocephalic  EYES: EOMI, PERRLA, conjunctiva and sclera clear  HEENT: Moist mucous membranes, Supple neck , No JVD  CHEST: Clear to auscultation bilaterally; No rales, no rhonchi, no wheezing  HEART: Regular rate and rhythm; No murmurs, no rubs or gallops  ABDOMEN: Soft, Non-tender, Non-distended; Ostomy bag in place  GENITOURINARY: Voiding, no suprapubic tenderness  EXTREMITIES: 2+ Peripheral Pulses, No clubbing, cyanosis, no edema  MUSCULOSKELETAL: No muscle tenderness, Muscle tone normal, No joint tenderness, no Joint swelling,  Joint ROM –normal   SKIN: no rash, no lesion      LABS:                         RADIOLOGY & ADDITIONAL STUDIES:      EXAM:  CT ABDOMEN AND PELVIS IC    PROCEDURE DATE:  09/23/2023      INTERPRETATION:  CLINICAL INFORMATION: Abdominal pain. History of   metastatic colon cancer on chemotherapy.    COMPARISON: CT abdomen/pelvis 8/25/2023    CONTRAST/COMPLICATIONS:  IV Contrast: Omnipaque 350  90 cc administered   10 cc discarded  Oral Contrast: NONE  Complications: None reported at time of study completion    PROCEDURE:  CT of the Abdomen and Pelvis was performed.  Sagittal and coronal reformats were performed.    FINDINGS:  LOWER CHEST: Within normal limits.    LIVER: Multiple calcified and noncalcified liver metastases are not   significantly changed. No new intrahepatic lesions.  BILE DUCTS: Normal caliber.  GALLBLADDER: Within normal limits.  SPLEEN: Within normal limits.  PANCREAS: Within normal limits.  ADRENALS: Within normal limits.  KIDNEYS/URETERS: Multifocal cortical scarring left kidney. No   hydronephrosis or obstructive renal calculi.    BLADDER: Minimally distended.  REPRODUCTIVE ORGANS: Prostate within normal limits.    BOWEL: No bowel obstruction. Status post total colectomy with rectal   stump and right lower quadrant end ileostomy.  PERITONEUM: No ascites.  VESSELS: Atherosclerotic changes.  RETROPERITONEUM/LYMPH NODES: No lymphadenopathy.  ABDOMINAL WALL: Postsurgical changes. Waxing and waning subcutaneous soft   tissue nodules along the anterior abdominal wall could be related to   injection sites.  BONES: Degenerative changes.    IMPRESSION:  No bowel obstruction or inflammation. Status post total colectomy with   rectal stump and right lower quadrant end ileostomy.    Unchanged calcified and noncalcified liver metastases.

## 2023-09-28 NOTE — CONSULT NOTE ADULT - SUBJECTIVE AND OBJECTIVE BOX
HPI: Pt is a 52y old Male with a PMHx of colon ca with mets to liver, s/p ileostomy, PE , DVTs  presented to the ED with abdominal pain that has been ongoing since diagnosis in may 2022. Recently in [facility] a few days ago for same. Patient reports that he ran out of his oxycodone and has been unable to get a refill. Patient follows with pain management outpatient and is also on fentanyl patches. He reports difficult home situation, lives with his mother with dementia and brother who is rarely around.     9/28/23 Seen at bedside. Cont to C/O chronic abd pain which is relieved with Oxycodone 20 mg PO prior to adm although he has difficulty obtaining script. States he is depressed due to ongoing difficult condition at home, not having enough support for self or mother who is demented and alcoholic.  Also reports prescribed Oxycodone is not enough. Currently is prescribed 2 tabs breakfast, and two after lunch. He is now asking for 2 tabs before dinner.     PAIN: (X )Yes   ( )No  Level: mod-severe  Location: abd  Intensity:   8 /10  Alleviating Factors: Oxycodone  Impact on ADLs: severe    DYSPNEA: ( ) Yes  ( X) No  PAST MEDICAL & SURGICAL HISTORY:  Cancer, colon  liver mets  S/P ileostomy  Metastasis to liver  Pulmonary embolism  S/P colon resection  H/O ileostomy    SOCIAL HX:  Lives with mother( dementia and alcohol abuse as per pt)  Brother   Hx opiate tolerance (X )YES  ( )NO    Baseline ADLs  (Prior to Admission)  (X ) Independent   ( )Dependent    FAMILY HISTORY:  FH: dementia (Mother)        Review of Systems:    Anxiety-mod  Depression-yes  Physical Discomfort-mod-severe  Dyspnea-denies  Constipation-no  Diarrhea-ileostomy  Anorexia-mod  Fatigue-severe  Weakness-severe    All other systems reviewed and negative    PHYSICAL EXAM:    Vital Signs Last 24 Hrs  T(C): 36.6 (28 Sep 2023 09:34), Max: 36.9 (27 Sep 2023 15:12)  T(F): 97.9 (28 Sep 2023 09:34), Max: 98.4 (27 Sep 2023 15:12)  HR: 92 (28 Sep 2023 09:34) (78 - 92)  BP: 109/56 (28 Sep 2023 09:34) (109/56 - 146/58)  RR: 18 (28 Sep 2023 09:34) (18 - 18)  SpO2: 95% (28 Sep 2023 09:34) (95% - 99%)    PPSV2:  50 %    General: Middle aged male in bed in NAD  Mental Status: A&OX3  HEENT: oral mucosa dry  Lungs: clear to auscultation olena  Cardiac: S1S2+  GI: abd soft + ileos with prolapse with appliance liquid drainage  : voids  Ext: CARL=strength  Neuro: no focal def      LABS:    Albumin: Albumin: 4.2 g/dL (09-25 @ 13:26)      Allergies    No Known Drug Allergies (Unknown)    Intolerances      MEDICATIONS  (STANDING):  enoxaparin Injectable 90 milliGRAM(s) SubCutaneous every 12 hours  escitalopram 5 milliGRAM(s) Oral daily  fentaNYL   Patch  25 MICROgram(s)/Hr 1 Patch Transdermal every 72 hours  influenza   Vaccine 0.5 milliLiter(s) IntraMuscular once    MEDICATIONS  (PRN):  acetaminophen     Tablet .. 650 milliGRAM(s) Oral every 6 hours PRN Temp greater or equal to 38C (100.4F), Mild Pain (1 - 3)  aluminum hydroxide/magnesium hydroxide/simethicone Suspension 30 milliLiter(s) Oral every 4 hours PRN Dyspepsia  loperamide 2 milliGRAM(s) Oral four times a day PRN for diarrhea  melatonin 3 milliGRAM(s) Oral at bedtime PRN Insomnia  morphine  - Injectable 4 milliGRAM(s) IV Push every 6 hours PRN Moderate Pain (4 - 6)  ondansetron Injectable 4 milliGRAM(s) IV Push every 6 hours PRN Nausea and/or Vomiting  oxyCODONE    IR 10 milliGRAM(s) Oral every 6 hours PRN Severe Pain (7 - 10)      RADIOLOGY/ADDITIONAL STUDIES:  < from: Xray Chest 2 Views PA/Lat (09.25.23 @ 19:17) >    ACC: [id number] EXAM:  XR CHEST PA LAT 2V   ORDERED BY: CARMELLA ROWLEY     PROCEDURE DATE:  09/25/2023          INTERPRETATION:  INDICATION: Leukocytosis and fever    PA and lateral chest    COMPARISON: 9/23/2023    FINDINGS:  Heart/Vascular: The heart size, mediastinum, hilum and aorta are within   normal limits for projection.  Pulmonary: Midline trachea. There is no focal infiltrate, congestion or   effusion.  Bones: There is no fracture.  Lines and catheter: Tip of right Port-A-Cath in SVC without pneumothorax.    Impression:    No acute pulmonary disease.    --- End of Report ---  < from: Xray Chest 1 View- PORTABLE-Urgent (Xray Chest 1 View- PORTABLE-Urgent .) (09.23.23 @ 18:39) >    ACC: [id number] EXAM:  XR CHEST PORTABLE URGENT 1V   ORDERED BY: DARRIN BARBA     PROCEDURE DATE:  09/23/2023          INTERPRETATION:  INDICATIONS: 52-year-old male with abdominal pain    Prior examination for comparison: 7/9/2023    Technique: AP view of chest    Findings:    Right-sided port with tip at the cavoatrial junction.  The lungs are clear. There are no pleural effusions. The   cardiomediastinal silhouette is normal. Bones are grossly normal.    IMPRESSION: No evidence of acute cardiopulmonary disease.    --- End of Report ---  < from: CT Abdomen and Pelvis w/ IV Cont (09.23.23 @ 16:20) >    ACC: [id number] EXAM:  CT ABDOMEN AND PELVIS IC   ORDERED BY: DARRIN BARBA     PROCEDURE DATE:  09/23/2023          INTERPRETATION:  CLINICAL INFORMATION: Abdominal pain. History of   metastatic colon cancer on chemotherapy.    COMPARISON: CT abdomen/pelvis 8/25/2023    CONTRAST/COMPLICATIONS:  IV Contrast: Omnipaque 350  90 cc administered   10 cc discarded  Oral Contrast: NONE  Complications: None reported at time of study completion    PROCEDURE:  CT of the Abdomen and Pelvis was performed.  Sagittal and coronal reformats were performed.    FINDINGS:  LOWER CHEST: Within normal limits.    LIVER: Multiple calcified and noncalcified liver metastases are not   significantly changed. No new intrahepatic lesions.  BILE DUCTS: Normal caliber.  GALLBLADDER: Within normal limits.  SPLEEN: Within normal limits.  PANCREAS: Within normal limits.  ADRENALS: Within normal limits.  KIDNEYS/URETERS: Multifocal cortical scarring left kidney. No   hydronephrosis or obstructive renal calculi.    BLADDER: Minimally distended.  REPRODUCTIVE ORGANS: Prostate within normal limits.    BOWEL: No bowel obstruction. Status post total colectomy with rectal   stump and right lower quadrant end ileostomy.  PERITONEUM: No ascites.  VESSELS: Atherosclerotic changes.  RETROPERITONEUM/LYMPH NODES: No lymphadenopathy.  ABDOMINAL WALL: Postsurgical changes. Waxing and waning subcutaneous soft   tissue nodules along the anterior abdominal wall could be related to   injection sites.  BONES: Degenerative changes.    IMPRESSION:  No bowel obstruction or inflammation. Status post total colectomy with   rectal stump and right lower quadrant end ileostomy.    Unchanged calcified and noncalcified liver metastases.

## 2023-09-28 NOTE — CONSULT NOTE ADULT - PARTICIPANTS
[General Appearance - Alert] : alert [General Appearance - In No Acute Distress] : in no acute distress [General Appearance - Well-Appearing] : healthy appearing Patient [Oriented To Time, Place, And Person] : oriented to person, place, and time [Affect] : the affect was normal [Mood] : the mood was normal [Motor Strength] : muscle strength was normal in all four extremities [Paresis Pronator Drift Left-Sided] : no pronator drift on the left [Paresis Pronator Drift Right-Sided] : no pronator drift on the right [Abnormal Walk] : normal gait [Sclera] : the sclera and conjunctiva were normal [] : no respiratory distress

## 2023-09-28 NOTE — PROGRESS NOTE ADULT - ASSESSMENT
51 y/o M with MH significant for Stage IV colon cancer with metastases to the liver and lung, currently on chemotherapy, s/p ileostomy and revision 04/2023 and subtotal colectomy for perforated cecal mass 2022, readmitted for recurrent/persistent abdominal pain, poor PO intake, found to have urine + for cocaine.      # Metastatic Colon CA to Liver & Lung, S/p Ileostomy    - CT imaging during this admission and previous with no evidence of acute intra-abdominal infectious/inflammatory processes   - Dx 05/2022 after going to ED for worsening abdominal pain  - 05/19/22 CT AP with apparent focal mural thickening at the cecum/proximal ascending colon with focal tumor invasion/extension from the cecum to the sigmoid colon. Multiple hypodense lesions with calcifications in both lobes of the liver with the largest measuring 5.8 x 6.0 cm in hepatic segment.  - 05/19/22: underwent exploratory laparotomy, total colectomy, end ileostomy, biopsy of liver, and biopsy of retroperitoneum.  - Path: Portion of terminal ileum, cecum with adherent sigmoid ----> Poorly differentiated infiltrating adenocarcinoma measuring 9.0 cm. Adenocarcinoma infiltrates through the bowel wall and through the visceral peritoneum and infiltrates the adherent sigmoid colon 3 of 17 lymph nodes are positive for metastatic carcinoma. Lymphovascular space & Perineural invasion is identified. The surgical margins negative.   - Appendix with serosal tumor implants and acute inflammation. Peritonitis. Liver, biopsy ---> Metastatic adenocarcinoma.   - Retroperitoneal biopsy ----> Adenocarcinoma with necrosis. No loss of nuclear expression of MMR proteins (MLH1, MSH2, MSH6, and PMS2). Staging [id number].    - 06/22/2022: Started FOLFIRI/sunny with most recent dose on 09/11/23 ---> was due 09/25/23, missed the appointment; called instead to request refills for Oxycodone, Lovenox, lidocaine patch and Fentanyl prior to due date 09/30/23    - Self reported non compliance with Lovenox injections and considering stopping chemotherapy / cancer directed therapy   - Patient expresses ongoing concerns about current living arrangements, inability to adequately care for himself, manage hydration/pain/etc ; SW / Case management involved last visit  - No plan for inpatient cancer directed therapy at this time  - Continue supportive measures as necessary      # Leukocytosis    - WBC count down to 11K 09/26   - Intermittent spikes >100K over the past few months 2/2 known Onpro  - CDiff / GI panel negative during previous admissions   - Continue to trend serial CBCs      # LLE Ext Iliac Artery Occlusion    - Dx during previous admission 07/2023  - Patient was taking DOAC (Eliquis) for prior VTE in setting of metastatic disease but developed arterial thrombosis  - 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; LAC/APLS labs negative   - Patient receiving Bevacizumab which also rarely can contribute to thrombotic risk   - LLE angiogram with Vascular Sx 07/10; unable to traverse lesion  - Transitioned to Lovenox 90 q 12h 07/13/23- recent reported missing doses.  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 Adjacent right common femoral vein is noted to be patent and free of thrombus.      # Ongoing Recurrent Abdominal Pain    - Intermittent ongoing symptoms for the past few months, reason for previous admission 07/2023  - Colorectal Sx following ---> No intervention at this time, follow up with Dr Mckeon   - Adequate ileostomy output, ileostomy intact, pink/patent, no sign of infection, ileostomy with well formed stool  - Most recent CT imaging 09/23 revealed No bowel obstruction or inflammation. Status post total colectomy with rectal stump and right lower quadrant end ileostomy.    - He had admitted that the current narcotic regimen is inefficient for managing his symptoms  - Past admission notes report binge drinking, marijuana and now requesting oxycodone refills early; In addition, this admission revealed urine toxicology positive for Cocaine, THC, Benzodiazapine and Opiates   - Palliative consulted  - Ethics consulted  - Psych following   53 y/o M with MH significant for Stage IV colon cancer with metastases to the liver and lung, currently on chemotherapy, s/p ileostomy and revision 04/2023 and subtotal colectomy for perforated cecal mass 2022, readmitted for recurrent/persistent abdominal pain, poor PO intake, found to have urine + for cocaine.      # Metastatic Colon CA to Liver & Lung, S/p Ileostomy    - CT imaging during this admission and previous with no evidence of acute intra-abdominal infectious/inflammatory processes   - Dx 05/2022 after going to ED for worsening abdominal pain  - 05/19/22 CT AP with apparent focal mural thickening at the cecum/proximal ascending colon with focal tumor invasion/extension from the cecum to the sigmoid colon. Multiple hypodense lesions with calcifications in both lobes of the liver with the largest measuring 5.8 x 6.0 cm in hepatic segment.  - 05/19/22: underwent exploratory laparotomy, total colectomy, end ileostomy, biopsy of liver, and biopsy of retroperitoneum.  - Path: Portion of terminal ileum, cecum with adherent sigmoid ----> Poorly differentiated infiltrating adenocarcinoma measuring 9.0 cm. Adenocarcinoma infiltrates through the bowel wall and through the visceral peritoneum and infiltrates the adherent sigmoid colon 3 of 17 lymph nodes are positive for metastatic carcinoma. Lymphovascular space & Perineural invasion is identified. The surgical margins negative.   - Appendix with serosal tumor implants and acute inflammation. Peritonitis. Liver, biopsy ---> Metastatic adenocarcinoma.   - Retroperitoneal biopsy ----> Adenocarcinoma with necrosis. No loss of nuclear expression of MMR proteins (MLH1, MSH2, MSH6, and PMS2). Staging [id number].    - 06/22/2022: Started FOLFIRI/sunny with most recent dose on 09/11/23 ---> was due 09/25/23, missed the appointment; called instead to request refills for Oxycodone, Lovenox, lidocaine patch and Fentanyl prior to due date 09/30/23    - Self reported non compliance with Lovenox injections and considering stopping chemotherapy / cancer directed therapy   - Patient expresses ongoing concerns about current living arrangements, inability to adequately care for himself, manage hydration/pain/etc ; SW / Case management involved last visit  - No plan for inpatient cancer directed therapy at this time  - Continue supportive measures as necessary      # Leukocytosis    - WBC count down to 11K 09/26   - Intermittent spikes >100K over the past few months 2/2 known Onpro  - CDiff / GI panel negative during previous admissions   - Continue to trend serial CBCs      # LLE Ext Iliac Artery Occlusion    - Dx during previous admission 07/2023  - Patient was taking DOAC (Eliquis) for prior VTE in setting of metastatic disease but developed arterial thrombosis  - 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; LAC/APLS labs negative   - Patient receiving Bevacizumab which also rarely can contribute to thrombotic risk   - LLE angiogram with Vascular Sx 07/10; unable to traverse lesion  - Transitioned to Lovenox 90 q 12h 07/13/23- recent reported missing doses.  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 Adjacent right common femoral vein is noted to be patent and free of thrombus.      # Ongoing Recurrent Abdominal Pain    - Intermittent ongoing symptoms for the past few months, reason for previous admission 07/2023  - Colorectal Sx following ---> No intervention at this time, follow up with Dr Mckeon   - Adequate ileostomy output, ileostomy intact, pink/patent, no sign of infection, ileostomy with well formed stool  - Most recent CT imaging 09/23 revealed No bowel obstruction or inflammation. Status post total colectomy with rectal stump and right lower quadrant end ileostomy.    - He had admitted that the current narcotic regimen is inefficient for managing his symptoms  - Past admission notes report binge drinking, marijuana and now requesting oxycodone refills early; In addition, this admission revealed urine toxicology positive for Cocaine, THC, Benzodiazapine and Opiates   - Palliative consulted  - Ethics consulted, given patient ongoing and escalating requests for oxycodone, in setting of above drug abuse.  - Psych following

## 2023-09-28 NOTE — CONSULT NOTE ADULT - CONVERSATION DETAILS
The role of Palliative medicine was reviewed as we have spoke on previous adm. He is currently depressed and frustrated with his life situation including residing with his mother who has dementia and drinks alcohol regularly. He does not have enough income to live independently. he has been in chronic pain and had has been isolated as he is too weak to exercise  or get out. We discussed the MOLST and he does not want to set limits at this time. Is planning to follow up with Pain management and Oncology and will decide if he wants to pursue further treatment. Will follow

## 2023-09-28 NOTE — PROGRESS NOTE ADULT - SUBJECTIVE AND OBJECTIVE BOX
CC: 52 year old male with PMHx of doris ca, colon ca with mets to liver, s/p ileostomy, PE , DVTs  presented to the ED with abdominal pain that has been ongoing since diagnosis in may 2022. recently in [facility] a few days ago for same. Patient reports that he ran out of his oxycodone and has been unable to get a refill. Patient follows with pain management outpatient and is also on fentanyl patches.   denies any complications with ostomy bag, mild prolapse present. Appears to be draining appropriately. . He reports 10/10  abdominal pain, all over, negative CVA tenderness. He reports difficult home situation. Lives with his mother with dementia and brother who is rarely around. He reports a poor diet.  CT abdomen Patient denies any fevers chills, nausea vomiting, chest pain palpitations lightheadedness or dizziness at the time of my examination but does report those all those symptoms do sporadically occur. He reports he smoked cigarettes and drank wine yesterday after a few years of abstinence due to depression and pain. Patient seen by psych and colorectal in the ED. Patient also found to have leukocytosis, unclear source. Patient to be admitted to the hospitalist service for abdominal pain and leukocytosis    9/26 - no cp palps sob abdo pain, is ambulating without any trouble     Vital Signs Last 24 Hrs  T(C): 36.3 (26 Sep 2023 16:20), Max: 36.6 (26 Sep 2023 08:45)  T(F): 97.3 (26 Sep 2023 16:20), Max: 97.9 (26 Sep 2023 08:45)  HR: 88 (26 Sep 2023 16:20) (82 - 88)  BP: 121/72 (26 Sep 2023 16:20) (100/70 - 121/72)  RR: 18 (26 Sep 2023 16:20) (17 - 18)  SpO2: 100% (26 Sep 2023 16:20) (97% - 100%)  Parameters below as of 26 Sep 2023 16:20  Patient On (Oxygen Delivery Method): room air  Constitutional: NAD, awake and alert  HEENT: PERR, EOMI  Neck: Soft and supple,  No JVD  Respiratory: Breath sounds are clear bilaterally, No wheezing, rales or rhonchi  Cardiovascular: S1 and S2, regular rate and rhythm, no Murmurs  Gastrointestinal: Bowel Sounds present, soft, nontender, ileostomy with stool   Extremities: No peripheral edema  Vascular: 2+ peripheral pulses  Neurological: A/O x 3, no focal deficits  Musculoskeletal: 5/5 strength b/l upper and lower extremities  Skin: No rashes    MEDICATIONS:  MEDICATIONS  (STANDING):  enoxaparin Injectable 90 milliGRAM(s) SubCutaneous every 12 hours  escitalopram 5 milliGRAM(s) Oral daily  fentaNYL   Patch  25 MICROgram(s)/Hr 1 Patch Transdermal every 72 hours  influenza   Vaccine 0.5 milliLiter(s) IntraMuscular once  lactated ringers. 1000 milliLiter(s) (100 mL/Hr) IV Continuous <Continuous>      LABS: All Labs Reviewed:                     12.4   11.06 )-----------( 100      ( 26 Sep 2023 06:19 )             38.2   137  |  108  |  14  ----------------------------<  105<H>  4.6   |  24  |  0.88  Ca    8.6      26 Sep 2023 06:19  TPro  8.9<H>  /  Alb  4.2  /  TBili  0.3  /  DBili  x   /  AST  32  /  ALT  49  /  AlkPhos  210<H>  09-25  PT/INR - ( 26 Sep 2023 06:19 )   PT: 10.5 sec;   INR: 0.93 ratio      RADIOLOGY/EKG:  < from: Xray Chest 2 Views PA/Lat (09.25.23 @ 19:17) >  Heart/Vascular: The heart size, mediastinum, hilum and aorta are within   normal limits for projection.  Pulmonary: Midline trachea. There is no focal infiltrate, congestion or   effusion.  Bones: There is no fracture.  Lines and catheter: Tip of right Port-A-Cath in SVC without pneumothorax.           CC: uncontrolled abdominal pain        52 year old male with PMHx of doris ca, colon ca with mets to liver, s/p ileostomy, PE , DVTs  presented to the ED on 9/25/23  with abdominal pain that has been ongoing since diagnosis in may 2022. recently in [facility] a few days ago for same. Patient reports that he ran out of his oxycodone and has been unable to get a refill. Patient follows with pain management outpatient and is also on fentanyl patches.   denies any complications with ostomy bag, mild prolapse present. Appears to be draining appropriately. . He reports 10/10  abdominal pain, all over, negative CVA tenderness. He reports difficult home situation. Lives with his mother with dementia and brother who is rarely around. He reports a poor diet.  CT abdomen Patient denies any fevers chills, nausea vomiting, chest pain palpitations lightheadedness or dizziness at the time of my examination but does report those all those symptoms do sporadically occur. He reports he smoked cigarettes and drank wine yesterday after a few years of abstinence due to depression and pain. Patient seen by psych and colorectal in the ED. Patient also found to have leukocytosis, unclear source. Patient to be admitted to the hospitalist service for abdominal pain and leukocytosis     9/28 - pt reports uncontrolled abdominal pain 10/10 , after oxycodone 10 mg pain goes down to 8/10 , requesting higher dose of oxy 20 mg tid for pain control, admits using cocaine, smoking , and using ETOH , denies cp , dyspnea, nausea, vomiting, plan discussed     Review of system- Rest of the review of system are negative except mentioned in HPI    Vital sings reviewed for last 24 h  T(C): 36.9 (09-28-23 @ 21:43), Max: 36.9 (09-28-23 @ 21:43)  T(F): 98.4 (09-28-23 @ 21:43), Max: 98.4 (09-28-23 @ 21:43)  HR: 97 (09-28-23 @ 21:43) (92 - 97)  BP: 108/54 (09-28-23 @ 21:43) (108/54 - 112/58)  RR: 18 (09-28-23 @ 21:43) (18 - 18)  SpO2: 97% (09-28-23 @ 21:43) (95% - 97%)  Wt(kg): --  Daily     Daily   CAPILLARY BLOOD GLUCOSE      Physical exam :   Constitutional: NAD, awake and alert  HEENT: PERR, EOMI  Neck: Soft and supple,  No JVD  Respiratory: Breath sounds are clear bilaterally, No wheezing, rales or rhonchi  Cardiovascular: S1 and S2, regular rate and rhythm, no Murmurs  Gastrointestinal: Bowel Sounds present, soft, nontender, ileostomy with stool and protruding colon with erythema and erosions   Extremities: No peripheral edema  Vascular: 2+ peripheral pulses  Neurological: A/O x 3, no focal deficits  Musculoskeletal: 5/5 strength b/l upper and lower extremities  Skin: No rashes    All labs radiology and other studies reviewed and interpreted :     Culture - Urine (09.25.23 @ 21:51)    Specimen Source: Clean Catch None   Culture Results:   <10,000 CFU/mL Normal Urogenital Lizbeth    Culture - Blood (09.25.23 @ 18:16)    Specimen Source: .Blood None   Culture Results:   No growth at 48 Hours           Xray Chest 2 Views PA/Lat (09.25.23 @ 19:17) >  Heart/Vascular: The heart size, mediastinum, hilum and aorta are within   normal limits for projection.  Pulmonary: Midline trachea. There is no focal infiltrate, congestion or   effusion.  Bones: There is no fracture.  Lines and catheter: Tip of right Port-A-Cath in SVC without pneumothorax.      MEDICATIONS  (STANDING):  enoxaparin Injectable 90 milliGRAM(s) SubCutaneous every 12 hours  escitalopram 5 milliGRAM(s) Oral daily  fentaNYL   Patch  25 MICROgram(s)/Hr 1 Patch Transdermal every 72 hours  influenza   Vaccine 0.5 milliLiter(s) IntraMuscular once    MEDICATIONS  (PRN):  acetaminophen     Tablet .. 650 milliGRAM(s) Oral every 6 hours PRN Temp greater or equal to 38C (100.4F), Mild Pain (1 - 3)  aluminum hydroxide/magnesium hydroxide/simethicone Suspension 30 milliLiter(s) Oral every 4 hours PRN Dyspepsia  loperamide 2 milliGRAM(s) Oral four times a day PRN for diarrhea  melatonin 3 milliGRAM(s) Oral at bedtime PRN Insomnia  morphine  - Injectable 4 milliGRAM(s) IV Push every 6 hours PRN Moderate Pain (4 - 6)  ondansetron Injectable 4 milliGRAM(s) IV Push every 6 hours PRN Nausea and/or Vomiting  oxyCODONE    IR 10 milliGRAM(s) Oral every 6 hours PRN Severe Pain (7 - 10)

## 2023-09-28 NOTE — PROGRESS NOTE ADULT - ASSESSMENT
52 year old male with PMHx of doris ca, colon ca with mets to liver, s/p ileostomy, PE , DVTs  presented to the ED with abdominal pain that has been ongoing since diagnosis in may 2022. Patient to be admitted to the hospitalist service for abdominal pain and leukocytosis    Abdominal Pain  Colorectal surgery consulted, recommendations appreciated  No acute surgical intervention indicated at this time   CTA 2/23 showed  No bowel obstruction or inflammation. Status post total colectomy with   rectal stump and right lower quadrant end ileostomy.  Unchanged calcified and noncalcified liver metastases.    Leukocytosis  Obtain UA/UC tox screen  Obtain Blood cultures  Monitor CBC  Repeat CXR  Repeat lactic acid   Will hold off on abx at this time    Depression  Psych consulted recommendation appreciated  Recommend Lexapro 5mg daily, patient agreeable   patient denies suicidal ideations     Hyponatremia  Continue IV fluids  Continue to monitor BMP    Social  Poor home situation  Consult case management     Code status Full Code  Dvt ppx on Lovenox     DISPO  DC Home   discussed with Psychiatry - pt cleared to be dced home   pt states he will only leave on 9/30 when he gets his pain meds via his pain physician   patient advised that he is stable for discharge    52 year old male with PMHx of doris ca, colon ca with mets to liver, s/p ileostomy, PE , DVTs  presented to the ED on 9/25/23  with abdominal pain that has been ongoing since diagnosis in may 2022. recently in [facility] a few days ago for same. Patient reports that he ran out of his oxycodone and has been unable to get a refill. Patient follows with pain management outpatient and is also on fentanyl patches.         Abdominal Pain due to colon cancer   - Colorectal surgery consulted, recommendations appreciated, No acute surgical intervention indicated at this time   - CTA 2/23 showed - No bowel obstruction or inflammation. Status post total colectomy with  rectal stump and right lower quadrant end ileostomy.  Unchanged calcified and noncalcified liver metastases.  - onc consult appretiated  - pain management - oxycodone 10 q6h  - palliative team consulted  - ethics    Leukocytosis , noted   -  UA/UC tox screen + cocaine, Benzo , + opiates   -  Blood cultures and urine cx neg , CXR - no acute pathlogy  - Monitor CBC    - Will hold off on abx at this time    Depression  - Psych consulted recommendation appreciated  - Recommend Lexapro 5mg daily, patient agreeable  - patient denies suicidal ideations     Hyponatremia  Continue IV fluids  Continue to monitor BMP    Social  Poor home situation  Consult case management     Code status Full Code  Dvt ppx on Lovenox     DISPO  DC Home   discussed with Psychiatry - pt cleared to be dced home   pt states he will only leave on 9/30 when he gets his pain meds via his pain physician

## 2023-09-28 NOTE — CONSULT NOTE ADULT - ASSESSMENT
HPI: Pt is a 52y old Male with a PMHx of colon ca with mets to liver, s/p ileostomy, PE , DVTs  presented to the ED with abdominal pain that has been ongoing since diagnosis in may 2022. Recently in [facility] a few days ago for same. Patient reports that he ran out of his oxycodone and has been unable to get a refill. Patient follows with pain management outpatient and is also on fentanyl patches. He reports difficult home situation, lives with his mother with dementia and brother who is rarely around.     9/28/23 Seen at bedside. Cont to C/O chronic abd pain which is relieved with Oxycodone 20 mg PO prior to adm although he has difficulty obtaining script. States he is depressed due to ongoing difficult condition at home, not having enough support for self or mother who is demented and alcoholic.  Also reports prescribed Oxycodone is not enough. Currently is prescribed 2 tabs breakfast, and two after lunch. He is now asking for 2 tabs before dinner.     Assessment and Plan:    1) Abdominal Pain  -Colorectal surgery consult noted  -No acute surgical intervention indicated at this time   -CTA 2/23 =No bowel obstruction or inflammation. Status post total colectomy with   rectal stump and right lower quadrant end ileostomy.  Unchanged calcified and noncalcified liver metastases.  -Cont Oxycodone 10 mg PO Q6H PRN severe pain  -Cont Fentanyl 25 mcg transderm Q72 HRS  -Follow pain management [facility] Oct 3rd appointment       2) Metastatic Colon CA to Liver & Lung, S/p Ileostomy  - CT imaging during this admission and previous with no evidence of acute intra-abdominal infectious/inflammatory processes   - Dx 05/2022 after going to ED for worsening abdominal pain  -06/22/2022: Started FOLFOXIRI with Bevacizumab--> FOLFIRI/sunny with most recent dose on 9/11/23  -06/13/23 CT CAP: New 8 mm right lower lobe nodule and enlarging pulmonary nodules, 7 mm left apical nodule previously measured 6 mm. Bilobar hepatic metastases, the majority of which are calcified and not significantly changed. A noncalcified lesion in the right hepatic lobe demonstrates mild interval growth, 1.7 x 1.4 cm previously 1.4 x 1.3 cm  -No inpatient chemotherapy planned  -continue supportive care   Leukocytosis    3) LLE Ext Iliac Artery Occlusion  - Dx  07/2023  - Patient was taking DOAC (Eliquis) for prior VTE in setting of metastatic disease but developed arterial thrombosis  - Transitioned to Lovenox 90 q 12h 07/13/23- recent reported missing doses.  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 Adjacent right common femoral vein is noted to be patent and free of thrombus.    4) Debility  -Metastatic cancer  -Chronic pain  -Poor social support  -Depression    5) Advanced Directives  -Pt with capacity  -GOC done  -No limits set    Time Spent: 75 minutes including the care, coordination and counseling of this patient, 50% of which was spent coordinating and counseling.

## 2023-09-29 LAB
24R-OH-CALCIDIOL SERPL-MCNC: 12.1 NG/ML — LOW (ref 30–80)
ALBUMIN SERPL ELPH-MCNC: 3.1 G/DL — LOW (ref 3.3–5)
ALP SERPL-CCNC: 143 U/L — HIGH (ref 40–120)
ALT FLD-CCNC: 45 U/L — SIGNIFICANT CHANGE UP (ref 12–78)
ANION GAP SERPL CALC-SCNC: 4 MMOL/L — LOW (ref 5–17)
AST SERPL-CCNC: 18 U/L — SIGNIFICANT CHANGE UP (ref 15–37)
BASOPHILS # BLD AUTO: 0.04 K/UL — SIGNIFICANT CHANGE UP (ref 0–0.2)
BASOPHILS NFR BLD AUTO: 0.4 % — SIGNIFICANT CHANGE UP (ref 0–2)
BILIRUB SERPL-MCNC: 0.1 MG/DL — LOW (ref 0.2–1.2)
BUN SERPL-MCNC: 15 MG/DL — SIGNIFICANT CHANGE UP (ref 7–23)
CALCIUM SERPL-MCNC: 8.8 MG/DL — SIGNIFICANT CHANGE UP (ref 8.5–10.1)
CHLORIDE SERPL-SCNC: 108 MMOL/L — SIGNIFICANT CHANGE UP (ref 96–108)
CO2 SERPL-SCNC: 25 MMOL/L — SIGNIFICANT CHANGE UP (ref 22–31)
CREAT SERPL-MCNC: 0.81 MG/DL — SIGNIFICANT CHANGE UP (ref 0.5–1.3)
CRP SERPL-MCNC: <3 MG/L — SIGNIFICANT CHANGE UP
EGFR: 107 ML/MIN/1.73M2 — SIGNIFICANT CHANGE UP
EOSINOPHIL # BLD AUTO: 0.41 K/UL — SIGNIFICANT CHANGE UP (ref 0–0.5)
EOSINOPHIL NFR BLD AUTO: 3.9 % — SIGNIFICANT CHANGE UP (ref 0–6)
FOLATE SERPL-MCNC: 16.8 NG/ML — SIGNIFICANT CHANGE UP
GLUCOSE SERPL-MCNC: 101 MG/DL — HIGH (ref 70–99)
HCT VFR BLD CALC: 39.3 % — SIGNIFICANT CHANGE UP (ref 39–50)
HGB BLD-MCNC: 12.6 G/DL — LOW (ref 13–17)
IMM GRANULOCYTES NFR BLD AUTO: 0.8 % — SIGNIFICANT CHANGE UP (ref 0–0.9)
IRON SATN MFR SERPL: 15 % — LOW (ref 16–55)
IRON SATN MFR SERPL: 52 UG/DL — SIGNIFICANT CHANGE UP (ref 45–165)
LYMPHOCYTES # BLD AUTO: 1.81 K/UL — SIGNIFICANT CHANGE UP (ref 1–3.3)
LYMPHOCYTES # BLD AUTO: 17.1 % — SIGNIFICANT CHANGE UP (ref 13–44)
MAGNESIUM SERPL-MCNC: 2.1 MG/DL — SIGNIFICANT CHANGE UP (ref 1.6–2.6)
MCHC RBC-ENTMCNC: 28.9 PG — SIGNIFICANT CHANGE UP (ref 27–34)
MCHC RBC-ENTMCNC: 32.1 GM/DL — SIGNIFICANT CHANGE UP (ref 32–36)
MCV RBC AUTO: 90.1 FL — SIGNIFICANT CHANGE UP (ref 80–100)
MONOCYTES # BLD AUTO: 0.54 K/UL — SIGNIFICANT CHANGE UP (ref 0–0.9)
MONOCYTES NFR BLD AUTO: 5.1 % — SIGNIFICANT CHANGE UP (ref 2–14)
NEUTROPHILS # BLD AUTO: 7.71 K/UL — HIGH (ref 1.8–7.4)
NEUTROPHILS NFR BLD AUTO: 72.7 % — SIGNIFICANT CHANGE UP (ref 43–77)
PHOSPHATE SERPL-MCNC: 3.5 MG/DL — SIGNIFICANT CHANGE UP (ref 2.5–4.5)
PLATELET # BLD AUTO: 169 K/UL — SIGNIFICANT CHANGE UP (ref 150–400)
POTASSIUM SERPL-MCNC: 3.7 MMOL/L — SIGNIFICANT CHANGE UP (ref 3.5–5.3)
POTASSIUM SERPL-SCNC: 3.7 MMOL/L — SIGNIFICANT CHANGE UP (ref 3.5–5.3)
PROT SERPL-MCNC: 7 GM/DL — SIGNIFICANT CHANGE UP (ref 6–8.3)
RBC # BLD: 4.36 M/UL — SIGNIFICANT CHANGE UP (ref 4.2–5.8)
RBC # FLD: 15.5 % — HIGH (ref 10.3–14.5)
SODIUM SERPL-SCNC: 137 MMOL/L — SIGNIFICANT CHANGE UP (ref 135–145)
TIBC SERPL-MCNC: 343 UG/DL — SIGNIFICANT CHANGE UP (ref 220–430)
TSH SERPL-MCNC: 1.01 UU/ML — SIGNIFICANT CHANGE UP (ref 0.34–4.82)
UIBC SERPL-MCNC: 290 UG/DL — SIGNIFICANT CHANGE UP (ref 110–370)
VIT B12 SERPL-MCNC: >2000 PG/ML — HIGH (ref 232–1245)
WBC # BLD: 10.6 K/UL — HIGH (ref 3.8–10.5)
WBC # FLD AUTO: 10.6 K/UL — HIGH (ref 3.8–10.5)

## 2023-09-29 PROCEDURE — 99232 SBSQ HOSP IP/OBS MODERATE 35: CPT

## 2023-09-29 RX ORDER — ERGOCALCIFEROL 1.25 MG/1
50000 CAPSULE ORAL
Refills: 0 | Status: DISCONTINUED | OUTPATIENT
Start: 2023-09-29 | End: 2023-10-01

## 2023-09-29 RX ORDER — FENTANYL CITRATE 50 UG/ML
1 INJECTION INTRAVENOUS
Refills: 0 | Status: DISCONTINUED | OUTPATIENT
Start: 2023-09-29 | End: 2023-10-01

## 2023-09-29 RX ORDER — OXYCODONE HYDROCHLORIDE 5 MG/1
15 TABLET ORAL AT BEDTIME
Refills: 0 | Status: DISCONTINUED | OUTPATIENT
Start: 2023-09-29 | End: 2023-10-01

## 2023-09-29 RX ORDER — ACETAMINOPHEN 500 MG
1000 TABLET ORAL ONCE
Refills: 0 | Status: COMPLETED | OUTPATIENT
Start: 2023-09-29 | End: 2023-09-29

## 2023-09-29 RX ADMIN — ENOXAPARIN SODIUM 90 MILLIGRAM(S): 100 INJECTION SUBCUTANEOUS at 09:44

## 2023-09-29 RX ADMIN — OXYCODONE HYDROCHLORIDE 10 MILLIGRAM(S): 5 TABLET ORAL at 17:45

## 2023-09-29 RX ADMIN — OXYCODONE HYDROCHLORIDE 10 MILLIGRAM(S): 5 TABLET ORAL at 09:13

## 2023-09-29 RX ADMIN — FENTANYL CITRATE 1 PATCH: 50 INJECTION INTRAVENOUS at 17:43

## 2023-09-29 RX ADMIN — FENTANYL CITRATE 1 PATCH: 50 INJECTION INTRAVENOUS at 16:31

## 2023-09-29 RX ADMIN — OXYCODONE HYDROCHLORIDE 10 MILLIGRAM(S): 5 TABLET ORAL at 08:14

## 2023-09-29 RX ADMIN — ESCITALOPRAM OXALATE 5 MILLIGRAM(S): 10 TABLET, FILM COATED ORAL at 09:46

## 2023-09-29 RX ADMIN — Medication 1000 MILLIGRAM(S): at 21:15

## 2023-09-29 RX ADMIN — OXYCODONE HYDROCHLORIDE 15 MILLIGRAM(S): 5 TABLET ORAL at 23:32

## 2023-09-29 RX ADMIN — OXYCODONE HYDROCHLORIDE 15 MILLIGRAM(S): 5 TABLET ORAL at 23:02

## 2023-09-29 RX ADMIN — ENOXAPARIN SODIUM 90 MILLIGRAM(S): 100 INJECTION SUBCUTANEOUS at 23:03

## 2023-09-29 RX ADMIN — Medication 400 MILLIGRAM(S): at 20:45

## 2023-09-29 RX ADMIN — OXYCODONE HYDROCHLORIDE 10 MILLIGRAM(S): 5 TABLET ORAL at 16:59

## 2023-09-29 NOTE — PROGRESS NOTE ADULT - SUBJECTIVE AND OBJECTIVE BOX
HPI: Pt is a 52y old Male with a PMHx of colon ca with mets to liver, s/p ileostomy, PE , DVTs  presented to the ED with abdominal pain that has been ongoing since diagnosis in may 2022. Recently in [facility] a few days ago for same. Patient reports that he ran out of his oxycodone and has been unable to get a refill. Patient follows with pain management outpatient and is also on fentanyl patches. He reports difficult home situation, lives with his mother with dementia and brother who is rarely around.     9/28/23 Seen at bedside. Cont to C/O chronic abd pain which is relieved with Oxycodone 20 mg PO prior to adm although he has difficulty obtaining script. States he is depressed due to ongoing difficult condition at home, not having enough support for self or mother who is demented and alcoholic.  Also reports prescribed Oxycodone is not enough. Currently is prescribed 2 tabs breakfast, and two after lunch. He is now asking for 2 tabs before dinner.   9/29/23 Seen and examined at bedside. Cont to C/O abd pain as well as pain at ostomy stoma ( prolapse) site  PAIN: (X )Yes   ( )No  Level: mod-severe  Location: abd  Intensity:   8 /10  Alleviating Factors: Oxycodone  Impact on ADLs: severe    DYSPNEA: ( ) Yes  ( X) No  PAST MEDICAL & SURGICAL HISTORY:  Cancer, colon  liver mets  S/P ileostomy  Metastasis to liver  Pulmonary embolism  S/P colon resection  H/O ileostomy    SOCIAL HX:  Lives with mother( dementia and alcohol abuse as per pt)  Brother   Hx opiate tolerance (X )YES  ( )NO    Baseline ADLs  (Prior to Admission)  (X ) Independent   ( )Dependent    FAMILY HISTORY:  FH: dementia (Mother)    Review of Systems:    Anxiety-mod  Depression-yes  Physical Discomfort-mod-severe  Dyspnea-denies  Constipation-no  Diarrhea-ileostomy  Anorexia-mod  Fatigue-severe  Weakness-severe    All other systems reviewed and negative    PHYSICAL EXAM:  ICU Vital Signs Last 24 Hrs  T(C): 36.8 (29 Sep 2023 08:43), Max: 36.9 (28 Sep 2023 21:43)  T(F): 98.2 (29 Sep 2023 08:43), Max: 98.4 (28 Sep 2023 21:43)  HR: 68 (29 Sep 2023 08:43) (68 - 97)  BP: 118/79 (29 Sep 2023 08:43) (108/54 - 118/79)  RR: 18 (29 Sep 2023 08:43) (18 - 18)  SpO2: 97% (29 Sep 2023 08:43) (96% - 97%)    O2 Parameters below as of 29 Sep 2023 08:43  Patient On (Oxygen Delivery Method): room air    PPSV2:  50 %    General: Middle aged male in bed in NAD  Mental Status: A&OX3  HEENT: oral mucosa dry  Lungs: clear to auscultation olena  Cardiac: S1S2+  GI: abd soft + ileos with prolapse with appliance liquid drainage  : voids  Ext: ACRL=strength  Neuro: no focal def      LABS:    Albumin: Albumin: 4.2 g/dL (09-25 @ 13:26)      Allergies    No Known Drug Allergies (Unknown)    Intolerances    MEDICATIONS  (STANDING):  enoxaparin Injectable 90 milliGRAM(s) SubCutaneous every 12 hours  escitalopram 5 milliGRAM(s) Oral daily  fentaNYL   Patch  25 MICROgram(s)/Hr 1 Patch Transdermal every 72 hours  influenza   Vaccine 0.5 milliLiter(s) IntraMuscular once    MEDICATIONS  (PRN):  acetaminophen     Tablet .. 650 milliGRAM(s) Oral every 6 hours PRN Temp greater or equal to 38C (100.4F), Mild Pain (1 - 3)  aluminum hydroxide/magnesium hydroxide/simethicone Suspension 30 milliLiter(s) Oral every 4 hours PRN Dyspepsia  loperamide 2 milliGRAM(s) Oral four times a day PRN for diarrhea  melatonin 3 milliGRAM(s) Oral at bedtime PRN Insomnia  morphine  - Injectable 4 milliGRAM(s) IV Push every 6 hours PRN Moderate Pain (4 - 6)  ondansetron Injectable 4 milliGRAM(s) IV Push every 6 hours PRN Nausea and/or Vomiting  oxyCODONE    IR 10 milliGRAM(s) Oral every 6 hours PRN Severe Pain (7 - 10)    RADIOLOGY/ADDITIONAL STUDIES:

## 2023-09-29 NOTE — PROGRESS NOTE ADULT - ASSESSMENT
HPI: Pt is a 52y old Male with a PMHx of colon ca with mets to liver, s/p ileostomy, PE , DVTs  presented to the ED with abdominal pain that has been ongoing since diagnosis in may 2022. Recently in [facility] a few days ago for same. Patient reports that he ran out of his oxycodone and has been unable to get a refill. Patient follows with pain management outpatient and is also on fentanyl patches. He reports difficult home situation, lives with his mother with dementia and brother who is rarely around.     9/28/23 Seen at bedside. Cont to C/O chronic abd pain which is relieved with Oxycodone 20 mg PO prior to adm although he has difficulty obtaining script. States he is depressed due to ongoing difficult condition at home, not having enough support for self or mother who is demented and alcoholic.  Also reports prescribed Oxycodone is not enough. Currently is prescribed 2 tabs breakfast, and two after lunch. He is now asking for 2 tabs before dinner.     Assessment and Plan:    1) Abdominal Pain  -Colorectal surgery consult noted  -No acute surgical intervention indicated at this time   -CTA 2/23 =No bowel obstruction or inflammation. Status post total colectomy with   rectal stump and right lower quadrant end ileostomy.  Unchanged calcified and noncalcified liver metastases.  -Cont Oxycodone 10 mg PO Q6H PRN severe pain  -Cont Fentanyl 25 mcg transderm Q72 HRS  -Follow pain management [facility] Oct 3rd appointment       2) Metastatic Colon CA to Liver & Lung, S/p Ileostomy  - CT imaging during this admission and previous with no evidence of acute intra-abdominal infectious/inflammatory processes   - Dx 05/2022 after going to ED for worsening abdominal pain  -06/22/2022: Started FOLFOXIRI with Bevacizumab--> FOLFIRI/sunny with most recent dose on 9/11/23  -06/13/23 CT CAP: New 8 mm right lower lobe nodule and enlarging pulmonary nodules, 7 mm left apical nodule previously measured 6 mm. Bilobar hepatic metastases, the majority of which are calcified and not significantly changed. A noncalcified lesion in the right hepatic lobe demonstrates mild interval growth, 1.7 x 1.4 cm previously 1.4 x 1.3 cm  -No inpatient chemotherapy planned  -continue supportive care   Leukocytosis    3) LLE Ext Iliac Artery Occlusion  - Dx  07/2023  - Patient was taking DOAC (Eliquis) for prior VTE in setting of metastatic disease but developed arterial thrombosis  - Transitioned to Lovenox 90 q 12h 07/13/23- recent reported missing doses.  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 Adjacent right common femoral vein is noted to be patent and free of thrombus.    4) Debility  -Metastatic cancer  -Chronic pain  -Poor social support  -Depression    5) Advanced Directives  -Pt with capacity  -GOC done  -No limits set    Time Spent: 45 minutes including the care, coordination and counseling of this patient, 50% of which was spent coordinating and counseling.

## 2023-09-29 NOTE — PROGRESS NOTE ADULT - ASSESSMENT
52 year old male with PMHx of doris ca, colon ca with mets to liver, s/p ileostomy, PE , DVTs  presented to the ED on 9/25/23  with abdominal pain that has been ongoing since diagnosis in may 2022. recently in [facility] a few days ago for same. Patient reports that he ran out of his oxycodone and has been unable to get a refill. Patient follows with pain management outpatient and is also on fentanyl patches.         Abdominal Pain due to colon cancer   - Colorectal surgery consulted, recommendations appreciated, No acute surgical intervention indicated at this time   - CTA 2/23 showed - No bowel obstruction or inflammation. Status post total colectomy with  rectal stump and right lower quadrant end ileostomy.  Unchanged calcified and noncalcified liver metastases.  - onc consult appretiated  - pain management - oxycodone 10 q6h , oxycodone 15 at bedtime prn  - 9/29 - increase fentanyl patch from 25--> 37 mcg  - palliative team consulted  - ethics    Leukocytosis , noted   -  UA/UC tox screen + cocaine, Benzo , + opiates   -  Blood cultures and urine cx neg , CXR - no acute pathlogy  - Monitor CBC    - Will hold off on abx at this time    Depression  - Psych consulted recommendation appreciated  - Recommend Lexapro 5mg daily, patient agreeable  - patient denies suicidal ideations     Hyponatremia  Continue IV fluids  Continue to monitor BMP    Social  Poor home situation  Consult case management     Code status Full Code  Dvt ppx on Lovenox     DISPO  DC Home   discussed with Psychiatry - pt cleared to be dced home   pt states he will only leave on 9/30 when he gets his pain meds via his pain physician        52 year old male with PMHx of doris ca, colon ca with mets to liver, s/p ileostomy, PE , DVTs  presented to the ED on 9/25/23  with abdominal pain that has been ongoing since diagnosis in may 2022. recently in [facility] a few days ago for same. Patient reports that he ran out of his oxycodone and has been unable to get a refill. Patient follows with pain management outpatient and is also on fentanyl patches.         Abdominal Pain due to colon cancer   - Colorectal surgery consulted, recommendations appreciated, No acute surgical intervention indicated at this time   - CTA 2/23 showed - No bowel obstruction or inflammation. Status post total colectomy with  rectal stump and right lower quadrant end ileostomy.  Unchanged calcified and noncalcified liver metastases.  - onc consult appretiated  - pain management - oxycodone 10 q6h , oxycodone 15 at bedtime prn  - 9/29 - increase fentanyl patch from 25--> 37 mcg  - palliative team consulted  - ethics    Leukocytosis , noted   -  UA/UC tox screen + cocaine, Benzo , + opiates   -  Blood cultures and urine cx neg , CXR - no acute pathlogy  - Monitor CBC    - Will hold off on abx at this time    Depression  - Psych consulted recommendation appreciated  - Recommend Lexapro 5mg daily, patient agreeable  - patient denies suicidal ideations     Hyponatremia, resolved   s/p  IV fluids  Continue to monitor BMP    Vitamin D deficiency - replace    Social  Poor home situation  Consult case management     Code status Full Code  Dvt ppx on Lovenox     DISPO  DC Home   discussed with Psychiatry - pt cleared to be dced home   pt states he will only leave on 9/30 when he gets his pain meds via his pain physician

## 2023-09-29 NOTE — PROGRESS NOTE ADULT - CONVERSATION DETAILS
The role of Palliative has been reviewed as we have been seeing him. I discussed the MOLST and he stated that he does not want to set any limits at this time. He would like a trial of CPR and intubation.

## 2023-09-29 NOTE — PROGRESS NOTE ADULT - SUBJECTIVE AND OBJECTIVE BOX
CC: uncontrolled abdominal pain        52 year old male with PMHx of doris ca, colon ca with mets to liver, s/p ileostomy, PE , DVTs  presented to the ED on 9/25/23  with abdominal pain that has been ongoing since diagnosis in may 2022. recently in [facility] a few days ago for same. Patient reports that he ran out of his oxycodone and has been unable to get a refill. Patient follows with pain management outpatient and is also on fentanyl patches.   denies any complications with ostomy bag, mild prolapse present. Appears to be draining appropriately. . He reports 10/10  abdominal pain, all over, negative CVA tenderness. He reports difficult home situation. Lives with his mother with dementia and brother who is rarely around. He reports a poor diet.  CT abdomen Patient denies any fevers chills, nausea vomiting, chest pain palpitations lightheadedness or dizziness at the time of my examination but does report those all those symptoms do sporadically occur. He reports he smoked cigarettes and drank wine yesterday after a few years of abstinence due to depression and pain. Patient seen by psych and colorectal in the ED. Patient also found to have leukocytosis, unclear source. Patient to be admitted to the hospitalist service for abdominal pain and leukocytosis     9/28 - pt reports uncontrolled abdominal pain 10/10 , after oxycodone 10 mg pain goes down to 8/10 , requesting higher dose of oxy 20 mg tid for pain control, admits using cocaine, smoking , and using ETOH , denies cp , dyspnea, nausea, vomiting, plan discussed   9/29 - pain uncontrolled, denies cp, dyspnea, abdominal pain 10/10 after 10 of oxycodone 8/10, can not sleep well overnight, requesting higher doses of oxycodone    Review of system- Rest of the review of system are negative except mentioned in HPI    Vital sings reviewed for last 24 h  T(C): 36.5 (09-29-23 @ 16:27), Max: 36.9 (09-28-23 @ 21:43)  T(F): 97.7 (09-29-23 @ 16:27), Max: 98.4 (09-28-23 @ 21:43)  HR: 76 (09-29-23 @ 16:27) (68 - 97)  BP: 111/66 (09-29-23 @ 16:27) (108/54 - 118/79)  RR: 18 (09-29-23 @ 16:27) (18 - 18)  SpO2: 99% (09-29-23 @ 16:27) (97% - 99%)  Wt(kg): --  Daily     Daily   CAPILLARY BLOOD GLUCOSE          Physical exam :   Constitutional: NAD, awake and alert  HEENT: PERR, EOMI  Neck: Soft and supple,  No JVD  Respiratory: Breath sounds are clear bilaterally, No wheezing, rales or rhonchi  Cardiovascular: S1 and S2, regular rate and rhythm, no Murmurs  Gastrointestinal: Bowel Sounds present, soft, nontender, ileostomy with stool and protruding colon with erythema and erosions   Extremities: No peripheral edema  Vascular: 2+ peripheral pulses  Neurological: A/O x 3, no focal deficits  Musculoskeletal: 5/5 strength b/l upper and lower extremities  Skin: No rashes    All labs radiology and other studies reviewed and interpreted :     Culture - Urine (09.25.23 @ 21:51)    Specimen Source: Clean Catch None   Culture Results:   <10,000 CFU/mL Normal Urogenital Lizbeth    Culture - Blood (09.25.23 @ 18:16)    Specimen Source: .Blood None   Culture Results:   No growth at 48 Hours           Xray Chest 2 Views PA/Lat (09.25.23 @ 19:17) >  Heart/Vascular: The heart size, mediastinum, hilum and aorta are within   normal limits for projection.  Pulmonary: Midline trachea. There is no focal infiltrate, congestion or   effusion.  Bones: There is no fracture.  Lines and catheter: Tip of right Port-A-Cath in SVC without pneumothorax.      MEDICATIONS  (STANDING):  enoxaparin Injectable 90 milliGRAM(s) SubCutaneous every 12 hours  escitalopram 5 milliGRAM(s) Oral daily  fentaNYL   Patch  25 MICROgram(s)/Hr 1 Patch Transdermal every 72 hours  influenza   Vaccine 0.5 milliLiter(s) IntraMuscular once    MEDICATIONS  (PRN):  acetaminophen     Tablet .. 650 milliGRAM(s) Oral every 6 hours PRN Temp greater or equal to 38C (100.4F), Mild Pain (1 - 3)  aluminum hydroxide/magnesium hydroxide/simethicone Suspension 30 milliLiter(s) Oral every 4 hours PRN Dyspepsia  loperamide 2 milliGRAM(s) Oral four times a day PRN for diarrhea  melatonin 3 milliGRAM(s) Oral at bedtime PRN Insomnia  morphine  - Injectable 4 milliGRAM(s) IV Push every 6 hours PRN Moderate Pain (4 - 6)  ondansetron Injectable 4 milliGRAM(s) IV Push every 6 hours PRN Nausea and/or Vomiting  oxyCODONE    IR 10 milliGRAM(s) Oral every 6 hours PRN Severe Pain (7 - 10)

## 2023-09-30 PROCEDURE — 99232 SBSQ HOSP IP/OBS MODERATE 35: CPT

## 2023-09-30 RX ADMIN — FENTANYL CITRATE 1 PATCH: 50 INJECTION INTRAVENOUS at 04:49

## 2023-09-30 RX ADMIN — ESCITALOPRAM OXALATE 5 MILLIGRAM(S): 10 TABLET, FILM COATED ORAL at 09:09

## 2023-09-30 RX ADMIN — ENOXAPARIN SODIUM 90 MILLIGRAM(S): 100 INJECTION SUBCUTANEOUS at 09:08

## 2023-09-30 RX ADMIN — FENTANYL CITRATE 1 PATCH: 50 INJECTION INTRAVENOUS at 18:09

## 2023-09-30 RX ADMIN — OXYCODONE HYDROCHLORIDE 15 MILLIGRAM(S): 5 TABLET ORAL at 21:47

## 2023-09-30 RX ADMIN — OXYCODONE HYDROCHLORIDE 10 MILLIGRAM(S): 5 TABLET ORAL at 07:19

## 2023-09-30 RX ADMIN — OXYCODONE HYDROCHLORIDE 10 MILLIGRAM(S): 5 TABLET ORAL at 06:49

## 2023-09-30 RX ADMIN — OXYCODONE HYDROCHLORIDE 15 MILLIGRAM(S): 5 TABLET ORAL at 21:17

## 2023-09-30 RX ADMIN — ENOXAPARIN SODIUM 90 MILLIGRAM(S): 100 INJECTION SUBCUTANEOUS at 21:18

## 2023-09-30 RX ADMIN — OXYCODONE HYDROCHLORIDE 10 MILLIGRAM(S): 5 TABLET ORAL at 11:55

## 2023-09-30 RX ADMIN — Medication 2 MILLIGRAM(S): at 21:17

## 2023-09-30 RX ADMIN — ERGOCALCIFEROL 50000 UNIT(S): 1.25 CAPSULE ORAL at 09:09

## 2023-09-30 RX ADMIN — OXYCODONE HYDROCHLORIDE 10 MILLIGRAM(S): 5 TABLET ORAL at 12:55

## 2023-09-30 NOTE — PROGRESS NOTE ADULT - SUBJECTIVE AND OBJECTIVE BOX
CC: uncontrolled abdominal pain        52 year old male with PMHx of doris ca, colon ca with mets to liver, s/p ileostomy, PE , DVTs  presented to the ED on 9/25/23  with abdominal pain that has been ongoing since diagnosis in may 2022. recently in [facility] a few days ago for same. Patient reports that he ran out of his oxycodone and has been unable to get a refill. Patient follows with pain management outpatient and is also on fentanyl patches.   denies any complications with ostomy bag, mild prolapse present. Appears to be draining appropriately. . He reports 10/10  abdominal pain, all over, negative CVA tenderness. He reports difficult home situation. Lives with his mother with dementia and brother who is rarely around. He reports a poor diet.  CT abdomen Patient denies any fevers chills, nausea vomiting, chest pain palpitations lightheadedness or dizziness at the time of my examination but does report those all those symptoms do sporadically occur. He reports he smoked cigarettes and drank wine yesterday after a few years of abstinence due to depression and pain. Patient seen by psych and colorectal in the ED. Patient also found to have leukocytosis, unclear source. Patient to be admitted to the hospitalist service for abdominal pain and leukocytosis     9/28 - pt reports uncontrolled abdominal pain 10/10 , after oxycodone 10 mg pain goes down to 8/10 , requesting higher dose of oxy 20 mg tid for pain control, admits using cocaine, smoking , and using ETOH , denies cp , dyspnea, nausea, vomiting, plan discussed   9/29 - pain uncontrolled, denies cp, dyspnea, abdominal pain 10/10 after 10 of oxycodone 8/10, can not sleep well overnight, requesting higher doses of oxycodone  9/30 - no events, better sleep overnight, denies cp, dyspnea,. + abdominal pain 10/10, 7/10 after oxy 15 mg     Review of system- Rest of the review of system are negative except mentioned in HPI    Vital sings reviewed for last 24 h  T(C): 36.4 (09-30-23 @ 09:09), Max: 36.9 (09-29-23 @ 21:06)  T(F): 97.6 (09-30-23 @ 09:09), Max: 98.4 (09-29-23 @ 21:06)  HR: 75 (09-30-23 @ 09:09) (70 - 76)  BP: 111/65 (09-30-23 @ 09:09) (101/66 - 111/66)  RR: 18 (09-30-23 @ 09:09) (18 - 18)  SpO2: 100% (09-30-23 @ 09:09) (99% - 100%)  Wt(kg): --  Daily     Daily   CAPILLARY BLOOD GLUCOSE            Physical exam :   Constitutional: NAD, awake and alert  HEENT: PERR, EOMI  Neck: Soft and supple,  No JVD  Respiratory: Breath sounds are clear bilaterally, No wheezing, rales or rhonchi  Cardiovascular: S1 and S2, regular rate and rhythm, no Murmurs  Gastrointestinal: Bowel Sounds present, soft, nontender, ileostomy with stool and protruding colon with erythema and erosions   Extremities: No peripheral edema  Vascular: 2+ peripheral pulses  Neurological: A/O x 3, no focal deficits  Musculoskeletal: 5/5 strength b/l upper and lower extremities  Skin: No rashes    All labs radiology and other studies reviewed and interpreted :     Culture - Urine (09.25.23 @ 21:51)    Specimen Source: Clean Catch None   Culture Results:   <10,000 CFU/mL Normal Urogenital Lizbeth    Culture - Blood (09.25.23 @ 18:16)    Specimen Source: .Blood None   Culture Results:   No growth at 48 Hours           Xray Chest 2 Views PA/Lat (09.25.23 @ 19:17) >  Heart/Vascular: The heart size, mediastinum, hilum and aorta are within   normal limits for projection.  Pulmonary: Midline trachea. There is no focal infiltrate, congestion or   effusion.  Bones: There is no fracture.  Lines and catheter: Tip of right Port-A-Cath in SVC without pneumothorax.      MEDICATIONS  (STANDING):  enoxaparin Injectable 90 milliGRAM(s) SubCutaneous every 12 hours  escitalopram 5 milliGRAM(s) Oral daily  fentaNYL   Patch  25 MICROgram(s)/Hr 1 Patch Transdermal every 72 hours  influenza   Vaccine 0.5 milliLiter(s) IntraMuscular once    MEDICATIONS  (PRN):  acetaminophen     Tablet .. 650 milliGRAM(s) Oral every 6 hours PRN Temp greater or equal to 38C (100.4F), Mild Pain (1 - 3)  aluminum hydroxide/magnesium hydroxide/simethicone Suspension 30 milliLiter(s) Oral every 4 hours PRN Dyspepsia  loperamide 2 milliGRAM(s) Oral four times a day PRN for diarrhea  melatonin 3 milliGRAM(s) Oral at bedtime PRN Insomnia  morphine  - Injectable 4 milliGRAM(s) IV Push every 6 hours PRN Moderate Pain (4 - 6)  ondansetron Injectable 4 milliGRAM(s) IV Push every 6 hours PRN Nausea and/or Vomiting  oxyCODONE    IR 10 milliGRAM(s) Oral every 6 hours PRN Severe Pain (7 - 10)

## 2023-09-30 NOTE — PROGRESS NOTE ADULT - ASSESSMENT
52 year old male with PMHx of doris ca, colon ca with mets to liver, s/p ileostomy, PE , DVTs  presented to the ED on 9/25/23  with abdominal pain that has been ongoing since diagnosis in may 2022. recently in [facility] a few days ago for same. Patient reports that he ran out of his oxycodone and has been unable to get a refill. Patient follows with pain management outpatient and is also on fentanyl patches.         Abdominal Pain due to colon cancer   - Colorectal surgery consulted, recommendations appreciated, No acute surgical intervention indicated at this time   - CTA 2/23 showed - No bowel obstruction or inflammation. Status post total colectomy with  rectal stump and right lower quadrant end ileostomy.  Unchanged calcified and noncalcified liver metastases.  - onc consult appretiated  - pain management - oxycodone 10 q6h , oxycodone 15 at bedtime prn  - 9/29 - increase fentanyl patch from 25--> 37 mcg  - palliative team consulted  - ethics    Leukocytosis , noted   -  UA/UC tox screen + cocaine, Benzo , + opiates   -  Blood cultures and urine cx neg , CXR - no acute pathlogy  - Monitor CBC    - Will hold off on abx at this time    Depression  - Psych consulted recommendation appreciated  - Recommend Lexapro 5mg daily, patient agreeable  - patient denies suicidal ideations     Hyponatremia, resolved   s/p  IV fluids  Continue to monitor BMP    Vitamin D deficiency - replace    Social  Poor home situation  Consult case management     Code status Full Code  Dvt ppx on Lovenox     DISPO  DC Home   discussed with Psychiatry - pt cleared to be dced home   pt states he will only leave on 9/30 when he gets his pain meds via his pain physician

## 2023-09-30 NOTE — CONSULT NOTE ADULT - SUBJECTIVE AND OBJECTIVE BOX
Consult requested by: DOMINGUEZ Servin 		Role: Physician Assistant		Service: Hematology/Oncology     Attending: SUSAN Serrano MD (Hematology/Oncology) 	Other MD: CHIDI Wetzel MD (Medicine Attending)     Consultant: Nikia Flores MA (Ethics Fellow) Judy Lewis MD, MS, MPH, [facility]  Contact #s: [phone] (Fellow Cell) [phone] (Office)     Consult purpose: To assist in the ethical dilemma posed by a 52-year-old male admitted to the hospital for abdominal pain and leukocytosis with a history of metastatic colon cancer regarding treatment options.      Clinical summary:  This is a 52-year-old male with Stage IV colon cancer with metastases to the liver and lung (currently on chemotherapy), s/p ileostomy and revision 4/2023 and subtotal colectomy for perforated cecal mass in 2022, who was roe into to [facility] Emergency Department (ED) by EMS on 9/25/2023 with abdominal pain that is worse than when he was in the [facility] ED 2 days prior. In the ED the patient was found to have leukocytosis. Colorectal Surgery saw the patient in the ED and noted the patient’s CT from prior ED visit on 9/23/2023, was negative for any pathology, and that the patient’s ileostomy was intact, pink, patent, with no sign of infection. Behavioral Health was also consulted while the patient was in the Emergency Department because the medical team was concerned the patient is depressed, and they recommended a low dose selective serotonin reuptake inhibitor (SSRI), which the patient declined, and they noted no psychiatric contraindications to discharge. The patient was subsequently admitted to the medicine unit on 9/25/2023 for management of his abdominal pain and suspected infection due to leukocytosis. On the first day of admission, there was a urine toxicology screen positive for cocaine, THC, Benzodiazepines, and Opiates (with prescribed fentanyl patches). The following day, the patient was seen by a hematology/oncology consultant who noted the patient reported cocaine and marijuana use “recently” and that the patient expressed concerns for his current living situation. On the same day, social work noted the patient reported his trigger for seeking drugs was feeling depressed, so social work suggested another Behavioral Health consult. When Behavioral Health saw the patient again, he was willing to take low-dose SSRI, they noted the patient did not demonstrate suicidality, and recommended starting low-dose SSRI and following up with an outpatient provider. Case management noted on 9/27/2023 that the patient was medically ready for discharge, but the patient stated he would not leave until 9/30/2023 when his home oxycodone would be refilled, so the  reviewed the patient’s right to appeal the discharge, which the patient understood and stated he would complete the appeal process. On 9/28/2023 Palliative Care saw the patient and noted that the patient is currently prescribed two tabs of oxycodone at breakfast and two after lunch and now the patient is requesting for two tabs at dinner, they recommended the patient continue with their current pain management regime and follow up with pain management at his scheduled appointment on 10/3/2023. Colorectal Surgery, Behavioral Health, Hematology/Oncology and Palliative Care on consult. Ethics consulted to discuss the request for more pain medication for a patient the metastatic cancer and cancer related pain in the setting of a positive urine toxicology report during admission.     			     Prognosis Estimate (survival in hrs, days, wks, mos, yrs): Fair   Patient Decision-Making Capacity: Has Capacity – per Behavioral Health and Palliative Care   Patient Aware of: Diagnosis: Yes 	Prognosis: Yes     Name of medical decision-maker should patient lack capacity (relationship): Kerrie Palladino (sister)   Role: Health Care Agent 	Contact #(s): [phone]   Other Decision-Maker (I.e., HCA or Surrogate) Aware of: Diagnosis: Unknown   Prognosis: Unknown   Other Stakeholders: Mother, brother    Evidence of Patient’s Preference of Life Sustaining Treatment (Written or Oral): Patient expressed to the team that he does not want to set limits on his care at this time (documented in Palliative Care Note 9/28/2023)   Resuscitation status: DNR: No	DNI: No       Discussions:    Discussion with BRO Flores MA (Ethics Fellow) and DOMINGUEZ NIX (Hematology/Oncology) 9/28/2023 [id number]: Ethics consult initiated. Case discussed in detail. DOMINGUEZ Servin expressed concerns about the patient requesting more pain medication and his positive urine toxicology during this admission.      Discussion with BRO Flores MA (Ethics Fellow) and NING Grande NP (Palliative Care) 9/28/2023: Case discussed in detail. NING Grande discussed how at this time the patient is more downtrodden than he had been on prior admissions, and that the patient is now reporting feeling depressed because of his social situation. Social complexities discussed. NING Grande stated that she was present with the patient when he was called to make the appointment with pain management on 10/3/2023 and that the patient was in agreement with going to the appointment. NING Grande also explained that the patient had not had any changes in his pain management regimen in a long time, so there may need to be an adjustment at this time because the patient is now reporting more pain.      Discussion with BRO Flores MA (Ethics Fellow) and the patient 9/28/2023: The fellow introduced herself and the role of ethics to the patient. The patient explained his medical history to the fellow and that he has had this abdominal pain for some time, and it is getting more difficult for him to manage. The patient briefly discussed his social situation. When asked about the positive drug screen during this admission the patient stated that he was frustrated with himself for doing it. He stated he understands that he needs to see pain management to further discuss his home medication. He stated that he has a car and is able to drive himself to the appointment if necessary, but he has asked social work at the hospital to help him with transportation to appointments. Initially when asked about discharge the patient said he was staying in the hospital until his follow up appointment with pain management, but then stated that he hasn’t really been thinking about it because he was resting, but maybe tomorrow he will think about it and talk to the team.        Bioethics analysis: The central ethical issues in this case are the provider’s duty to beneficence and nonmaleficence.       Beneficence calls on clinicians to act in a way that will benefit their patients, including but not limited to promoting a “good quality” of life, while preserving the patient’s sense of dignity and personhood.(i) Similarly, nonmaleficence requires clinicians to “do no harm” to patients by weighing the risks and benefits of proposed treatments, and avoiding treatments whose risks outweigh the benefits. (i) In this case, the team is focused on relieving the patient’s suffering. This leads to the dilemma of being able to optimize the relief of cancer related pain, while addressing concerns that arise from the positive urine toxicology screening.      For patients who have cancer related pain there is a significant impact on the quality of their life. Patients with chronic pain have reported its impact on various aspects of their life including their professional life, personal (within their family), social, the inability to care for themselves, poor sleep habits and mood. Given the widespread impact of chronic pain there is, understandably, an impact on the patient’s perception of their quality of life. This often leads to depression. Some even reported that with depression they experienced increased pain. (ii) This relationship between physical pain and depression is an example of how not only multiple conditions can happen concurrently, but that they can play off of and feed into one another as well.      64% of patients living with cancer reported pain. (iii) Patients with cancer related pain report an impact on their quality of life, similar to that of patients with chronic pain. Current literature supports the treatment of patients with care related pain with various methods. Treatment options for patients with cancer related pain include non-opioid management (e.g. acetaminophen, nonsteroidal anti-inflammatories (NSAIDs), acupuncture) and opioid management (e.g. opioid titration and opioid rotation). (iii) Long-term opioid management of cancer pain has many complexities, like the potential for addiction and the stigma attached to it. This concern has only grown since the national opioid epidemic. (iv) Therefore, research supports pain management specialists being part of the interdisciplinary team caring for patients with cancer related pain. (iii, iv)      Pain management specialists are equipped to manage the complex components of opioid use, such as understanding the differences between tolerance, pseudo-addiction and substance use disorder. Tolerance is when a drug that is prescribed long-term loses drug effect requiring more of the drug to achieve the same effect. (iii) Pseudo-addiction is when a patient appears distressed and demonstrates medication-seeking behavior because their current treatment is inadequate, but these behaviors resolve once the pain is adequately treated. (iii) Substance use disorder is a psychiatric diagnosis categorized by the DSM 5 with “selected criteria include taking an opioid in larger amounts than prescribed or for longer than prescribed, a desire to decrease use but unable to do so, craving use, and a decrease in social or occupational activities due to opioid use”. (iii) Being able to recognize the difference between tolerance, pseudo-addiction and substance use disorder in patients with cancer related pain can be the difference between under and adequate pain control. If oncologists and pain management specialists work together to help patients with cancer related pain, there is a higher likelihood of patients receiving adequate pain control.  With improved pain control, patients may report an improved quality of life.    Consult requested by: DOMINGUEZ Servin 		Role: Physician Assistant		Service: Hematology/Oncology     Attending: SUSAN Serrano MD (Hematology/Oncology) 	Other MD: CHIDI Wetzel MD (Medicine Attending)     Consultant: Nikia Flores MA (Ethics Fellow) Judy Lewis MD, MS, MPH, [facility]  Contact #s: [phone] (Fellow Cell) [phone] (Office)     Consult purpose: To assist in the ethical dilemma posed by a 52-year-old male admitted to the hospital for abdominal pain and leukocytosis with a history of metastatic colon cancer regarding treatment options.      Clinical summary:  This is a 52-year-old male with Stage IV colon cancer with metastases to the liver and lung (currently on chemotherapy), s/p ileostomy and revision 4/2023 and subtotal colectomy for perforated cecal mass in 2022, who was brought into [facility] Emergency Department (ED) by EMS on 9/25/2023 with abdominal pain that is worse than when he was in the [facility] ED 2 days prior. In the ED the patient was found to have leukocytosis. Colorectal Surgery saw the patient in the ED and noted the patient’s CT from prior ED visit on 9/23/2023, was negative for any pathology, and that the patient’s ileostomy was intact, pink, patent, with no sign of infection. Behavioral Health was also consulted while the patient was in the Emergency Department because the medical team was concerned the patient is depressed, and they recommended a low dose selective serotonin reuptake inhibitor (SSRI), which the patient declined, and they noted no psychiatric contraindications to discharge. The patient was subsequently admitted to the medicine unit on 9/25/2023 for management of his abdominal pain and suspected infection due to leukocytosis. On the first day of admission, there was a urine toxicology screen positive for cocaine, THC, Benzodiazepines, and Opiates (with prescribed fentanyl patches). The following day, the patient was seen by a hematology/oncology consultant who noted the patient reported cocaine and marijuana use “recently” and that the patient expressed concerns for his current living situation. On the same day, social work noted the patient reported his trigger for seeking drugs was feeling depressed, so social work suggested another Behavioral Health consult. When Behavioral Health saw the patient again, he was willing to take low-dose SSRI, they noted the patient did not demonstrate suicidality, and recommended starting low-dose SSRI and following up with an outpatient provider. It is noted that patient was reluctant to be discharged before his pain prescription could be refilled. On 9/28/2023 Palliative Care saw the patient and noted that the patient is currently prescribed two tabs of oxycodone at breakfast and two after lunch and now the patient is requesting for two tabs at dinner, they recommended the patient continue with the current pain management regime and follow up with pain management at his scheduled appointment on 10/3/2023. Colorectal Surgery, Behavioral Health, Hematology/Oncology and Palliative Care on consult. Ethics consulted to discuss the request for more pain medication for a patient the metastatic cancer and cancer related pain in the setting of a positive urine toxicology report during admission.     			   Prognosis Estimate (survival in hrs, days, wks, mos, yrs): guarded, patient has metastatic colon cancer   Patient Decision-Making Capacity: Has Capacity – per Behavioral Health and Palliative Care   Patient Aware of: Diagnosis: Yes 	Prognosis: Yes     Name of medical decision-maker should patient lack capacity (relationship): Kerrie Palladino (sister)   Role: Health Care Agent 	Contact #(s): [phone]   Other Decision-Maker (I.e., HCA or Surrogate) Aware of: Diagnosis: Unknown   Prognosis: Unknown   Other Stakeholders: Mother, brother    Evidence of Patient’s Preference of Life Sustaining Treatment (Written or Oral): Patient expressed to the team that he does not want to set limits on his care at this time (documented in Palliative Care Note 9/28/2023)   Resuscitation status: DNR: No	DNI: No       Discussions:    Discussion with BRO Flores MA (Ethics Fellow) and DOMINGUEZ NIX (Hematology/Oncology) 9/28/2023 [id number]: Ethics consult initiated. Case discussed in detail. DOMINGUEZ Servin expressed concerns about the patient requesting more pain medication and his positive urine toxicology during this admission.      Discussion with BRO Flores MA (Ethics Fellow) and NING Grande NP (Palliative Care) 9/28/2023: Case discussed in detail. NING Grande discussed how at this time the patient is more downtrodden than he had been on prior admissions, and that the patient is now reporting feeling depressed because of his social situation. Social complexities discussed. NING Grande stated that she was present with the patient when he was called to make the appointment with pain management on 10/3/2023 and that the patient was in agreement with going to the appointment. NING Grande also explained that the patient had not had any changes in his pain management regimen in a long time, so there may need to be an adjustment at this time because the patient is now reporting more pain.      Discussion with BRO Flores MA (Ethics Fellow) and the patient 9/28/2023: The fellow introduced herself and the role of ethics to the patient. The patient explained his medical history to the fellow and that he has had this abdominal pain for some time, and it is getting more difficult for him to manage. The patient briefly discussed his social situation. When asked about the positive drug screen during this admission the patient stated that he was frustrated with himself for doing it. He stated he understands that he needs to see pain management to further discuss his home medication. He stated that he has a car and is able to drive himself to the appointment if necessary, but he has asked social work at the hospital to help him with transportation to appointments. Initially when asked about discharge the patient said he was staying in the hospital until his follow up appointment with pain management, but then stated that he hasn’t really been thinking about it because he was resting, but maybe tomorrow he will think about it and talk to the team.        Bioethics analysis: The central ethical issues in this case are the provider’s duty to beneficence and nonmaleficence.       Beneficence calls on clinicians to act in a way that will benefit their patients, including but not limited to promoting a “good quality” of life, while preserving the patient’s sense of dignity and personhood.(i) Similarly, nonmaleficence requires clinicians to “do no harm” to patients by weighing the risks and benefits of proposed treatments, and avoiding treatments whose risks outweigh the benefits. (i) In this case, the team is focused on relieving the patient’s suffering. This leads to the dilemma of being able to optimize the relief of cancer related pain, while addressing concerns that arise from the positive urine toxicology screening.      For patients who have cancer related pain there is a significant impact on the quality of their life. Patients with chronic pain have reported its impact on various aspects of their life including their professional life, personal (within their family), social, the inability to care for themselves, poor sleep habits and mood. Given the widespread impact of chronic pain there is, understandably, an impact on the patient’s perception of their quality of life. This often leads to depression. Some even reported that with depression they experienced increased pain. (ii) This relationship between physical pain and depression is an example of how not only multiple conditions can happen concurrently, but that they can play off of and feed into one another as well.      64% of patients living with cancer reported pain. (iii) Patients with cancer related pain report an impact on their quality of life, similar to that of patients with chronic pain. Current literature supports the treatment of patients with care related pain with various methods. Treatment options for patients with cancer related pain include non-opioid management (e.g. acetaminophen, nonsteroidal anti-inflammatories (NSAIDs), acupuncture) and opioid management (e.g. opioid titration and opioid rotation). (iii) Long-term opioid management of cancer pain has many complexities, like the potential for addiction and the stigma attached to it. This concern has only grown since the national opioid epidemic. (iv) Therefore, research supports pain management specialists being part of the interdisciplinary team caring for patients with cancer related pain. (iii, iv)      Pain management specialists are equipped to manage the complex components of opioid use, such as understanding the differences between tolerance, pseudo-addiction and substance use disorder. Tolerance is when a drug that is prescribed long-term loses drug effect requiring more of the drug to achieve the same effect. (iii) Pseudo-addiction is when a patient appears distressed and demonstrates medication-seeking behavior because their current treatment is inadequate, but these behaviors resolve once the pain is adequately treated. (iii) Substance use disorder is a psychiatric diagnosis categorized by the DSM 5 with “selected criteria include taking an opioid in larger amounts than prescribed or for longer than prescribed, a desire to decrease use but unable to do so, craving use, and a decrease in social or occupational activities due to opioid use”. (iii) Being able to recognize the difference between tolerance, pseudo-addiction and substance use disorder in patients with cancer related pain can be the difference between under and adequate pain control. If oncologists and pain management specialists work together to help patients with cancer related pain, there is a higher likelihood of patients receiving adequate pain control.  With improved pain control, patients may report an improved quality of life.

## 2023-09-30 NOTE — CONSULT NOTE ADULT - ASSESSMENT
Recommendation: In this case, Mr. Palladino has been referred to a pain management specialist and he has an appointment on October 3, 2023. Given the anticipated gap between Mr. Palladino’s hospital admission and his appointment with the pain management specialist, it may be ethically appropriate to prescribe pain medication based on the current regimen to help the patient manage his pain until his scheduled appointment with pain management.      Thank you for this challenging case. Ethics commends the clinical team for the continued care and support for Mr. Matthew Palladino. Please do not hesitate to call us if any questions. Ethics Service will remain available for further conversation about the patient’s current condition and decision-points that may occur.        Case discussed with Medical Ethics Attending Judy Lewis MD, MS, MPH, [facility] ( of Medical Ethics) & Jone Frey MD FAAP, Ethics Attending and Chair of the Ethics Committee.        More than 50% of the time of this consultation was spent in coordination of Care of Patient       References   (i) Louie TL & Gerald JF. Principles of Biomedical Ethics. [place]: [employer]; 2019   (ii) Abdiel MA, Tutu GA, Closs SJ. Impact of Chronic Pain on Patients’ Quality of Life: A Comparative Mixed-Methods Study. Journal of Patient Experience. 2019;6(2):133-141. doi:10.1177/0743172950461662   (iii) VIVI Alexander. and NAYELI Kearns. (2018), Optimal pain management for patients with cancer in the modern era. [place]: A Cancer Journal for Clinicians, 68: 182-196. https://doi.org/10.3322/caac.98956   (iv) Rudy Ramirez. Pain Management in the Cancer Survivor. Seminars in Oncology Nursing. 2019;35(3):284-290. doi:https://doi.org/10.1016/j.soncn.2019.04.010  Recommendation: In this case, Mr. Palladino has been referred to a pain management specialist and he has an appointment on October 3, 2023. Given the anticipated gap between Mr. Palladino’s hospital admission and his appointment with the pain management specialist, it may be ethically appropriate to prescribe pain medication based on the current regimen to help the patient manage his pain until his scheduled appointment with pain management.      Thank you for this challenging case. Ethics commends the clinical team for the continued care and support for Mr. Matthew Palladino. Please do not hesitate to call us if any questions. Ethics Service will remain available for further conversation about the patient’s current condition and decision-points that may occur.        Case discussed with Medical Ethics Attendings Judy Lewis MD, MS, MPH, [facility] ( of Medical Ethics) & Jone Frey MD FAAP, (Chair of the Ethics Committee).       More than 50% of the time of this consultation was spent in coordination of Care of Patient       References   (i) Louie TL & Gerald JF. Principles of Biomedical Ethics. [place]: [employer]; 2019   (ii) Abdiel MA, Tutu GA, Closs SJ. Impact of Chronic Pain on Patients’ Quality of Life: A Comparative Mixed-Methods Study. Journal of Patient Experience. 2019;6(2):133-141. doi:10.1177/3674312487067388   (iii) VIVI Alexander. and NAYELI Kearns. (2018), Optimal pain management for patients with cancer in the modern era. [place]: A Cancer Journal for Clinicians, 68: 182-196. https://doi.org/10.3322/caac.92159   (iv) Rudy Ramirez. Pain Management in the Cancer Survivor. Seminars in Oncology Nursing. 2019;35(3):284-290. doi:https://doi.org/[email or web address]/j.soncn.[email or web address]

## 2023-09-30 NOTE — CONSULT NOTE ADULT - CONSULT REASON
Colon CA
abdominal pain
To assist in the ethical dilemma posed by a 52-year-old male admitted to the hospital for abdominal pain and leukocytosis with a history of metastatic colon cancer regarding treatment options.
GOC

## 2023-10-01 ENCOUNTER — TRANSCRIPTION ENCOUNTER (OUTPATIENT)
Age: 53
End: 2023-10-01

## 2023-10-01 VITALS
DIASTOLIC BLOOD PRESSURE: 70 MMHG | OXYGEN SATURATION: 100 % | SYSTOLIC BLOOD PRESSURE: 108 MMHG | TEMPERATURE: 98 F | HEART RATE: 67 BPM | RESPIRATION RATE: 18 BRPM

## 2023-10-01 PROCEDURE — 99232 SBSQ HOSP IP/OBS MODERATE 35: CPT

## 2023-10-01 RX ORDER — FENTANYL CITRATE 50 UG/ML
1 INJECTION INTRAVENOUS
Qty: 1 | Refills: 0
Start: 2023-10-01

## 2023-10-01 RX ORDER — FENTANYL CITRATE 50 UG/ML
1 INJECTION INTRAVENOUS
Refills: 0 | Status: DISCONTINUED | OUTPATIENT
Start: 2023-10-01 | End: 2023-10-01

## 2023-10-01 RX ORDER — FENTANYL CITRATE 50 UG/ML
1 INJECTION INTRAVENOUS
Refills: 0 | DISCHARGE

## 2023-10-01 RX ORDER — NALOXONE HYDROCHLORIDE 4 MG/.1ML
4 SPRAY NASAL
Qty: 1 | Refills: 0
Start: 2023-10-01 | End: 2023-10-01

## 2023-10-01 RX ORDER — OXYCODONE HYDROCHLORIDE 5 MG/1
1 TABLET ORAL
Qty: 5 | Refills: 0
Start: 2023-10-01 | End: 2023-10-05

## 2023-10-01 RX ORDER — CHOLECALCIFEROL (VITAMIN D3) 125 MCG
1 CAPSULE ORAL
Qty: 30 | Refills: 0
Start: 2023-10-01 | End: 2023-10-30

## 2023-10-01 RX ADMIN — FENTANYL CITRATE 1 PATCH: 50 INJECTION INTRAVENOUS at 16:03

## 2023-10-01 RX ADMIN — OXYCODONE HYDROCHLORIDE 10 MILLIGRAM(S): 5 TABLET ORAL at 08:09

## 2023-10-01 RX ADMIN — FENTANYL CITRATE 1 PATCH: 50 INJECTION INTRAVENOUS at 16:07

## 2023-10-01 RX ADMIN — Medication 2 MILLIGRAM(S): at 09:43

## 2023-10-01 RX ADMIN — FENTANYL CITRATE 1 PATCH: 50 INJECTION INTRAVENOUS at 08:09

## 2023-10-01 RX ADMIN — OXYCODONE HYDROCHLORIDE 10 MILLIGRAM(S): 5 TABLET ORAL at 14:38

## 2023-10-01 RX ADMIN — ENOXAPARIN SODIUM 90 MILLIGRAM(S): 100 INJECTION SUBCUTANEOUS at 09:17

## 2023-10-01 RX ADMIN — ESCITALOPRAM OXALATE 5 MILLIGRAM(S): 10 TABLET, FILM COATED ORAL at 09:16

## 2023-10-01 NOTE — DISCHARGE NOTE PROVIDER - NSDCMRMEDTOKEN_GEN_ALL_CORE_FT
acetaminophen 325 mg oral tablet: 2 tab(s) orally every 6 hours As needed Temp greater or equal to 38C (100.4F), Mild Pain (1 - 3)  cholecalciferol 50 mcg (2000 intl units) oral tablet: 1 tab(s) orally once a day  enoxaparin 100 mg/mL injectable solution: 90 milligram(s) subcutaneously 2 times a day  fentaNYL 37.5 mcg/hr transdermal film, extended release: 1 patch transdermal every 72 hours do not use 25 mcg patch MDD: 37.5  Lexapro 5 mg oral tablet: 1 tab(s) orally once a day  loperamide 2 mg oral capsule: 2 cap(s) orally 4 times a day as needed for  diarrhea  Narcan 4 mg/0.1 mL nasal spray: 4 milligram(s) intranasally every 3 minutes spray in nostril  in case of opioids overdose , you can repeat in 3 minutes in alternate nostril and seek emergency medical assistance as soon as possible  oxyCODONE 10 mg oral tablet: 1 tab(s) orally 3 times a day as needed for  breakthrough pain  oxyCODONE 15 mg oral tablet: 1 tab(s) orally once a day (at bedtime) patient take 10 mg three times a day MDD: 45

## 2023-10-01 NOTE — DISCHARGE NOTE PROVIDER - CARE PROVIDER_API CALL
Blaire Erickson  Family Medicine  [address], [address]  [place] [address]  Phone: [phone]  Fax: [phone]  Follow Up Time: 1 week    Wanda Serrano  Medical Oncology  [address], [place] [address]  Phone: [phone]  Fax: [phone]  Follow Up Time: 1 week    Radha Mckeon  Colon/Rectal Surgery  [address], [place] [address]  Phone: [phone]  Fax: [phone]  Follow Up Time: 2 weeks    Manuel Rosalse  Pain Medicine  [address], [place] [address]  Phone: [phone]  Fax: [phone]  Follow Up Time: 1 week  
Blaire Erickson  Family Medicine  [address], [address]  [place] [address]  Phone: [phone]  Fax: [phone]  Follow Up Time: 1 week    Wanda Serrano  Medical Oncology  [address], [place] [address]  Phone: [phone]  Fax: [phone]  Follow Up Time: 1 week

## 2023-10-01 NOTE — DISCHARGE NOTE PROVIDER - NSDCCPCAREPLAN_GEN_ALL_CORE_FT
PRINCIPAL DISCHARGE DIAGNOSIS  Diagnosis: Abdominal pain  Assessment and Plan of Treatment: CT unremarkable, follow-up with PCP   please abstain from using recreational drugs    
PRINCIPAL DISCHARGE DIAGNOSIS  Diagnosis: Colon cancer  Assessment and Plan of Treatment: follow up with Dr. Burleson oncologist for further monitoring within 1 week and colo-rectal surgeon Dr. Mckeon for monitoring  continue routine colostomy care        SECONDARY DISCHARGE DIAGNOSES  Diagnosis: Pain in the abdomen  Assessment and Plan of Treatment: take fenthanyl patch 37.5 mg every 3 days   for breakthrough pain oxycodone 10 mg three times a day and 15 mg at bedtime for breakthrough pain   follow up with pain specialist within 1 week for further monitoring    Diagnosis: Vitamin D deficiency  Assessment and Plan of Treatment: take vitamin D daily

## 2023-10-01 NOTE — DISCHARGE NOTE PROVIDER - DISCHARGE DIET
Patient is a 80y old  Female who presents with a chief complaint of Acute on Chronic Anemia 2/2 Suspected GI bleed (03 Mar 2023 07:17)      INTERVAL HPI/OVERNIGHT EVENTS:  None    FAMILY HISTORY:  FH: kidney disease (Father)      T(C): 36.8 (03-03-23 @ 09:15), Max: 36.8 (03-03-23 @ 09:15)  HR: 75 (03-03-23 @ 09:15) (75 - 82)  BP: 112/69 (03-03-23 @ 09:15) (112/50 - 150/65)  RR: 18 (03-03-23 @ 09:15) (17 - 18)  SpO2: 96% (03-03-23 @ 09:15) (95% - 99%)  Wt(kg): --Vital Signs Last 24 Hrs  T(C): 36.8 (03 Mar 2023 09:15), Max: 36.8 (03 Mar 2023 09:15)  T(F): 98.2 (03 Mar 2023 09:15), Max: 98.2 (03 Mar 2023 09:15)  HR: 75 (03 Mar 2023 09:15) (75 - 82)  BP: 112/69 (03 Mar 2023 09:15) (112/50 - 150/65)  BP(mean): --  RR: 18 (03 Mar 2023 09:15) (17 - 18)  SpO2: 96% (03 Mar 2023 09:15) (95% - 99%)    Parameters below as of 03 Mar 2023 09:15  Patient On (Oxygen Delivery Method): room air        PHYSICAL EXAM:  GENERAL: NAD, well-groomed, well-developed  HEAD:  Atraumatic, Normocephalic  EYES: EOMI, PERRLA, conjunctiva and sclera clear  ENMT: No tonsillar erythema, exudates, or enlargement; Moist mucous membranes, Good dentition, No lesions  NECK: Supple, No JVD, Normal thyroid  NERVOUS SYSTEM:  Alert & Oriented X3, Good concentration; Motor Strength 5/5 B/L upper and lower extremities; DTRs 2+ intact and symmetric  CHEST/LUNG: Clear to percussion bilaterally; No rales, rhonchi, wheezing, or rubs  HEART: Regular rate and rhythm; No murmurs, rubs, or gallops  ABDOMEN: Soft, Nontender, Nondistended; Bowel sounds present  EXTREMITIES:  2+ Peripheral Pulses, No clubbing, cyanosis, or edema  LYMPH: No lymphadenopathy noted  SKIN: Erythema and swelling on RLE from cellulitis       acetaminophen     Tablet .. 650 milliGRAM(s) Oral every 6 hours PRN  melatonin 5 milliGRAM(s) Oral at bedtime PRN  pantoprazole Infusion 8 mG/Hr IV Continuous <Continuous>            
Regular Diet - No restrictions
Regular Diet - No restrictions

## 2023-10-01 NOTE — DISCHARGE NOTE PROVIDER - PROVIDER TOKENS
PROVIDER:[TOKEN:[[id number]],FOLLOWUP:[1 week]],PROVIDER:[TOKEN:[[id number]],FOLLOWUP:[1 week]],PROVIDER:[TOKEN:[[id number]],FOLLOWUP:[2 weeks]],PROVIDER:[TOKEN:[[id number]],FOLLOWUP:[1 week]]
PROVIDER:[TOKEN:[[id number]],FOLLOWUP:[1 week]],PROVIDER:[TOKEN:[[id number]],FOLLOWUP:[1 week]]

## 2023-10-01 NOTE — DISCHARGE NOTE PROVIDER - NSDCFUSCHEDAPPT_GEN_ALL_CORE_FT
Palla, Venugopal R  [facility]  CARDIOLOGY [address]  Scheduled Appointment: 10/09/2023    [facility]  [address] [facility]  Scheduled Appointment: 10/09/2023    [facility]  [address]  Scheduled Appointment: 10/11/2023    [facility]  [address]  Scheduled Appointment: 10/23/2023    [facility]  [address]  Scheduled Appointment: 10/25/2023    Lia Taylor  [facility]  VASCULAR [address]  Scheduled Appointment: 12/18/2023    
Palla, Venugopal R  [facility]  CARDIOLOGY [address]  Scheduled Appointment: 10/09/2023    [facility]  [facility]  Scheduled Appointment: 10/09/2023    [facility]  [facility]  Scheduled Appointment: 10/11/2023    [facility]  [facility]  Scheduled Appointment: 10/23/2023    [facility]  [facility] [facility]  Scheduled Appointment: 10/25/2023    Lia Taylor  [facility]  VASCULAR [address]  Scheduled Appointment: 12/18/2023

## 2023-10-01 NOTE — DISCHARGE NOTE PROVIDER - HOSPITAL COURSE
52 year old male with PMHx of doris ca, colon ca with mets to liver, s/p ileostomy, PE , DVTs  presented to the ED on 9/25/23  with abdominal pain that has been ongoing since diagnosis in may 2022. recently in HHED a few days ago for same. Patient reports that he ran out of his oxycodone and has been unable to get a refill. Patient follows with pain management outpatient and is also on fentanyl patches.   denies any complications with ostomy bag, mild prolapse present. Appears to be draining appropriately. . He reports 10/10  abdominal pain, all over, negative CVA tenderness. He reports difficult home situation. Lives with his mother with dementia and brother who is rarely around. He reports a poor diet.  CT abdomen Patient denies any fevers chills, nausea vomiting, chest pain palpitations lightheadedness or dizziness at the time of my examination but does report those all those symptoms do sporadically occur. He reports he smoked cigarettes and drank wine yesterday after a few years of abstinence due to depression and pain. Patient seen by psych and colorectal in the ED. Patient also found to have leukocytosis, unclear source. Patient to be admitted to the hospitalist service for abdominal pain and leukocytosis     9/28 - pt reports uncontrolled abdominal pain 10/10 , after oxycodone 10 mg pain goes down to 8/10 , requesting higher dose of oxy 20 mg tid for pain control, admits using cocaine, smoking , and using ETOH , denies cp , dyspnea, nausea, vomiting, plan discussed   9/29 - pain uncontrolled, denies cp, dyspnea, abdominal pain 10/10 after 10 of oxycodone 8/10, can not sleep well overnight, requesting higher doses of oxycodone  9/30 - no events, better sleep overnight, denies cp, dyspnea, + abdominal pain 10/10, 7/10 after oxy 15 mg   10/1 - no events , pain better controlled with current regiment , afebrile, plan discussed   Review of system- Rest of the review of system are negative except mentioned in HPI  Vital sings reviewed for last 24 h  T(C): 36.7 (10-01-23 @ 08:07), Max: 36.8 (09-30-23 @ 21:24)  T(F): 98.1 (10-01-23 @ 08:07), Max: 98.2 (09-30-23 @ 21:24)  HR: 65 (10-01-23 @ 08:07) (65 - 74)  BP: 102/69 (10-01-23 @ 08:07) (98/55 - 113/66)  RR: 17 (10-01-23 @ 08:07) (17 - 18)  SpO2: 100% (10-01-23 @ 08:07) (98% - 100%)  Physical exam :   Constitutional: NAD, awake and alert  HEENT: PERR, EOMI  Neck: Soft and supple,  No JVD  Respiratory: Breath sounds are clear bilaterally, No wheezing, rales or rhonchi  Cardiovascular: S1 and S2, regular rate and rhythm, no Murmurs  Gastrointestinal: Bowel Sounds present, soft, nontender, ileostomy with stool and protruding colon with erythema and erosions   Extremities: No peripheral edema  Vascular: 2+ peripheral pulses  Neurological: A/O x 3, no focal deficits  Musculoskeletal: 5/5 strength b/l upper and lower extremities  Skin: No rashes    All labs radiology and other studies reviewed and interpreted :   Abdominal Pain due to colon cancer   - Colorectal surgery consulted, recommendations appreciated, No acute surgical intervention indicated at this time   - CTA 2/23 showed - No bowel obstruction or inflammation. Status post total colectomy with  rectal stump and right lower quadrant end ileostomy.  Unchanged calcified and noncalcified liver metastases.  - onc consult appretiated  - pain management - oxycodone 10 q6h , oxycodone 15 at bedtime prn  - 9/29 - increase fentanyl patch from 25--> 37 mcg  - palliative team consulted  - ethics    Leukocytosis , noted   -  UA/UC tox screen + cocaine, Benzo , + opiates   -  Blood cultures and urine cx neg , CXR - no acute pathlogy  - Monitor CBC    - Will hold off on abx at this time    Depression  - Psych consulted recommendation appreciated  - Recommend Lexapro 5mg daily, patient agreeable  - patient denies suicidal ideations     Hyponatremia, resolved   - s/p  IV fluids  - Continue to monitor BMP    Vitamin D deficiency - replace    Social, Poor home situation, Consult case management     Code status Full Code  Dvt ppx on Lovenox     DISPO  DC Home   discussed with Psychiatry - pt cleared to be dced home   Final diagnosis, treatment plan, and follow-up recommendations were discussed and explained to the patient.   The patient was given an opportunity to ask questions concerning the diagnosis and treatment plan.   The patient acknowledged understanding of the diagnosis, treatment, and follow-up recommendations.   The patient was advised to seek urgent care upon discharge if worsening symptoms develop prior to scheduled follow-up.   Time spent on discharge included time with the patient, and also coordinating discharge care as outlined below.  Discharge note faxed to PCP with my contact information to call me back   PCP Dr. Erickson  Total time spent: 50 min

## 2023-10-07 ENCOUNTER — INPATIENT (INPATIENT)
Facility: HOSPITAL | Age: 53
LOS: 2 days | Discharge: HOME | DRG: 240 | End: 2023-10-10
Attending: INTERNAL MEDICINE | Admitting: STUDENT IN AN ORGANIZED HEALTH CARE EDUCATION/TRAINING PROGRAM
Payer: MEDICAID

## 2023-10-07 VITALS
SYSTOLIC BLOOD PRESSURE: 118 MMHG | HEART RATE: 89 BPM | HEIGHT: 74 IN | TEMPERATURE: 99 F | RESPIRATION RATE: 20 BRPM | OXYGEN SATURATION: 95 % | DIASTOLIC BLOOD PRESSURE: 85 MMHG | WEIGHT: 184.97 LBS

## 2023-10-07 DIAGNOSIS — Z98.890 OTHER SPECIFIED POSTPROCEDURAL STATES: Chronic | ICD-10-CM

## 2023-10-07 DIAGNOSIS — R10.819 ABDOMINAL TENDERNESS, UNSPECIFIED SITE: ICD-10-CM

## 2023-10-07 DIAGNOSIS — Z90.49 ACQUIRED ABSENCE OF OTHER SPECIFIED PARTS OF DIGESTIVE TRACT: Chronic | ICD-10-CM

## 2023-10-07 LAB
ADD ON TEST-SPECIMEN IN LAB: SIGNIFICANT CHANGE UP
ALBUMIN SERPL ELPH-MCNC: 3.4 G/DL — SIGNIFICANT CHANGE UP (ref 3.3–5)
ALP SERPL-CCNC: 123 U/L — HIGH (ref 40–120)
ALT FLD-CCNC: 32 U/L — SIGNIFICANT CHANGE UP (ref 12–78)
ANION GAP SERPL CALC-SCNC: 5 MMOL/L — SIGNIFICANT CHANGE UP (ref 5–17)
AST SERPL-CCNC: 15 U/L — SIGNIFICANT CHANGE UP (ref 15–37)
BASOPHILS # BLD AUTO: 0.04 K/UL — SIGNIFICANT CHANGE UP (ref 0–0.2)
BASOPHILS NFR BLD AUTO: 0.5 % — SIGNIFICANT CHANGE UP (ref 0–2)
BILIRUB SERPL-MCNC: 0.4 MG/DL — SIGNIFICANT CHANGE UP (ref 0.2–1.2)
BLD GP AB SCN SERPL QL: SIGNIFICANT CHANGE UP
BUN SERPL-MCNC: 7 MG/DL — SIGNIFICANT CHANGE UP (ref 7–23)
CALCIUM SERPL-MCNC: 9.1 MG/DL — SIGNIFICANT CHANGE UP (ref 8.5–10.1)
CHLORIDE SERPL-SCNC: 105 MMOL/L — SIGNIFICANT CHANGE UP (ref 96–108)
CO2 SERPL-SCNC: 29 MMOL/L — SIGNIFICANT CHANGE UP (ref 22–31)
CREAT SERPL-MCNC: 0.8 MG/DL — SIGNIFICANT CHANGE UP (ref 0.5–1.3)
EGFR: 108 ML/MIN/1.73M2 — SIGNIFICANT CHANGE UP
EOSINOPHIL # BLD AUTO: 0.17 K/UL — SIGNIFICANT CHANGE UP (ref 0–0.5)
EOSINOPHIL NFR BLD AUTO: 2 % — SIGNIFICANT CHANGE UP (ref 0–6)
GLUCOSE SERPL-MCNC: 99 MG/DL — SIGNIFICANT CHANGE UP (ref 70–99)
HCT VFR BLD CALC: 38.7 % — LOW (ref 39–50)
HGB BLD-MCNC: 12.4 G/DL — LOW (ref 13–17)
IMM GRANULOCYTES NFR BLD AUTO: 0.2 % — SIGNIFICANT CHANGE UP (ref 0–0.9)
LACTATE SERPL-SCNC: 0.9 MMOL/L — SIGNIFICANT CHANGE UP (ref 0.7–2)
LIDOCAIN IGE QN: 17 U/L — SIGNIFICANT CHANGE UP (ref 13–75)
LYMPHOCYTES # BLD AUTO: 1.54 K/UL — SIGNIFICANT CHANGE UP (ref 1–3.3)
LYMPHOCYTES # BLD AUTO: 18.1 % — SIGNIFICANT CHANGE UP (ref 13–44)
MCHC RBC-ENTMCNC: 28.4 PG — SIGNIFICANT CHANGE UP (ref 27–34)
MCHC RBC-ENTMCNC: 32 GM/DL — SIGNIFICANT CHANGE UP (ref 32–36)
MCV RBC AUTO: 88.8 FL — SIGNIFICANT CHANGE UP (ref 80–100)
MONOCYTES # BLD AUTO: 1.05 K/UL — HIGH (ref 0–0.9)
MONOCYTES NFR BLD AUTO: 12.3 % — SIGNIFICANT CHANGE UP (ref 2–14)
NEUTROPHILS # BLD AUTO: 5.71 K/UL — SIGNIFICANT CHANGE UP (ref 1.8–7.4)
NEUTROPHILS NFR BLD AUTO: 66.9 % — SIGNIFICANT CHANGE UP (ref 43–77)
NT-PROBNP SERPL-SCNC: 63 PG/ML — SIGNIFICANT CHANGE UP (ref 0–125)
PLATELET # BLD AUTO: 159 K/UL — SIGNIFICANT CHANGE UP (ref 150–400)
POTASSIUM SERPL-MCNC: 3.5 MMOL/L — SIGNIFICANT CHANGE UP (ref 3.5–5.3)
POTASSIUM SERPL-SCNC: 3.5 MMOL/L — SIGNIFICANT CHANGE UP (ref 3.5–5.3)
PROT SERPL-MCNC: 7.6 GM/DL — SIGNIFICANT CHANGE UP (ref 6–8.3)
RBC # BLD: 4.36 M/UL — SIGNIFICANT CHANGE UP (ref 4.2–5.8)
RBC # FLD: 15.5 % — HIGH (ref 10.3–14.5)
SODIUM SERPL-SCNC: 139 MMOL/L — SIGNIFICANT CHANGE UP (ref 135–145)
TROPONIN I, HIGH SENSITIVITY RESULT: 4.6 NG/L — SIGNIFICANT CHANGE UP
WBC # BLD: 8.53 K/UL — SIGNIFICANT CHANGE UP (ref 3.8–10.5)
WBC # FLD AUTO: 8.53 K/UL — SIGNIFICANT CHANGE UP (ref 3.8–10.5)

## 2023-10-07 PROCEDURE — 99222 1ST HOSP IP/OBS MODERATE 55: CPT

## 2023-10-07 PROCEDURE — 81003 URINALYSIS AUTO W/O SCOPE: CPT

## 2023-10-07 PROCEDURE — 71275 CT ANGIOGRAPHY CHEST: CPT | Mod: 26,MA

## 2023-10-07 PROCEDURE — 93005 ELECTROCARDIOGRAM TRACING: CPT

## 2023-10-07 PROCEDURE — 93926 LOWER EXTREMITY STUDY: CPT | Mod: RT

## 2023-10-07 PROCEDURE — 80053 COMPREHEN METABOLIC PANEL: CPT

## 2023-10-07 PROCEDURE — 74177 CT ABD & PELVIS W/CONTRAST: CPT | Mod: 26,MA

## 2023-10-07 PROCEDURE — 87086 URINE CULTURE/COLONY COUNT: CPT

## 2023-10-07 PROCEDURE — 85025 COMPLETE CBC W/AUTO DIFF WBC: CPT

## 2023-10-07 PROCEDURE — 99285 EMERGENCY DEPT VISIT HI MDM: CPT

## 2023-10-07 PROCEDURE — 36415 COLL VENOUS BLD VENIPUNCTURE: CPT

## 2023-10-07 RX ORDER — FAMOTIDINE 10 MG/ML
20 INJECTION INTRAVENOUS ONCE
Refills: 0 | Status: COMPLETED | OUTPATIENT
Start: 2023-10-07 | End: 2023-10-07

## 2023-10-07 RX ORDER — OXYCODONE HYDROCHLORIDE 5 MG/1
10 TABLET ORAL THREE TIMES A DAY
Refills: 0 | Status: DISCONTINUED | OUTPATIENT
Start: 2023-10-07 | End: 2023-10-08

## 2023-10-07 RX ORDER — ONDANSETRON 8 MG/1
4 TABLET, FILM COATED ORAL ONCE
Refills: 0 | Status: COMPLETED | OUTPATIENT
Start: 2023-10-07 | End: 2023-10-07

## 2023-10-07 RX ORDER — CHOLECALCIFEROL (VITAMIN D3) 125 MCG
2000 CAPSULE ORAL DAILY
Refills: 0 | Status: DISCONTINUED | OUTPATIENT
Start: 2023-10-07 | End: 2023-10-10

## 2023-10-07 RX ORDER — SODIUM CHLORIDE 9 MG/ML
1000 INJECTION INTRAMUSCULAR; INTRAVENOUS; SUBCUTANEOUS ONCE
Refills: 0 | Status: COMPLETED | OUTPATIENT
Start: 2023-10-07 | End: 2023-10-07

## 2023-10-07 RX ORDER — ESCITALOPRAM OXALATE 10 MG/1
5 TABLET, FILM COATED ORAL DAILY
Refills: 0 | Status: DISCONTINUED | OUTPATIENT
Start: 2023-10-07 | End: 2023-10-10

## 2023-10-07 RX ORDER — LOPERAMIDE HCL 2 MG
2 TABLET ORAL
Refills: 0 | Status: DISCONTINUED | OUTPATIENT
Start: 2023-10-07 | End: 2023-10-10

## 2023-10-07 RX ORDER — INFLUENZA VIRUS VACCINE 15; 15; 15; 15 UG/.5ML; UG/.5ML; UG/.5ML; UG/.5ML
0.5 SUSPENSION INTRAMUSCULAR ONCE
Refills: 0 | Status: DISCONTINUED | OUTPATIENT
Start: 2023-10-07 | End: 2023-10-10

## 2023-10-07 RX ORDER — NALOXONE HYDROCHLORIDE 4 MG/.1ML
4 SPRAY NASAL ONCE
Refills: 0 | Status: DISCONTINUED | OUTPATIENT
Start: 2023-10-07 | End: 2023-10-10

## 2023-10-07 RX ORDER — ENOXAPARIN SODIUM 100 MG/ML
90 INJECTION SUBCUTANEOUS EVERY 12 HOURS
Refills: 0 | Status: DISCONTINUED | OUTPATIENT
Start: 2023-10-09 | End: 2023-10-09

## 2023-10-07 RX ORDER — MORPHINE SULFATE 50 MG/1
4 CAPSULE, EXTENDED RELEASE ORAL ONCE
Refills: 0 | Status: DISCONTINUED | OUTPATIENT
Start: 2023-10-07 | End: 2023-10-07

## 2023-10-07 RX ORDER — HYDROMORPHONE HYDROCHLORIDE 2 MG/ML
1 INJECTION INTRAMUSCULAR; INTRAVENOUS; SUBCUTANEOUS ONCE
Refills: 0 | Status: DISCONTINUED | OUTPATIENT
Start: 2023-10-07 | End: 2023-10-07

## 2023-10-07 RX ORDER — OXYCODONE HYDROCHLORIDE 5 MG/1
15 TABLET ORAL AT BEDTIME
Refills: 0 | Status: DISCONTINUED | OUTPATIENT
Start: 2023-10-07 | End: 2023-10-08

## 2023-10-07 RX ADMIN — HYDROMORPHONE HYDROCHLORIDE 1 MILLIGRAM(S): 2 INJECTION INTRAMUSCULAR; INTRAVENOUS; SUBCUTANEOUS at 19:19

## 2023-10-07 RX ADMIN — ONDANSETRON 4 MILLIGRAM(S): 8 TABLET, FILM COATED ORAL at 19:02

## 2023-10-07 RX ADMIN — SODIUM CHLORIDE 1000 MILLILITER(S): 9 INJECTION INTRAMUSCULAR; INTRAVENOUS; SUBCUTANEOUS at 19:02

## 2023-10-07 RX ADMIN — MORPHINE SULFATE 4 MILLIGRAM(S): 50 CAPSULE, EXTENDED RELEASE ORAL at 22:05

## 2023-10-07 RX ADMIN — FAMOTIDINE 20 MILLIGRAM(S): 10 INJECTION INTRAVENOUS at 19:02

## 2023-10-07 NOTE — ED PROVIDER NOTE - CLINICAL SUMMARY MEDICAL DECISION MAKING FREE TEXT BOX
51 y/o male (+) partial colon resection with ileostomy, colon cancer mets to liver and lung HH inpatient hospitalization 9/23-10/1 regarding severe intractable abdominal pain with colorectal and heme/onc and palliative care consults BIBA from home for worsening of abdominal pain since yesterday. (+) tenderness around ileostomy area, at sit no signs of infection (+) functional. Plan EKG, CT A/P, CT A chest (+SOB), lab including lipase, lactate, troponin, Zofran, pepcid, Dilaudid, observe, reassess.    Labs unremarkable, urine and CT still pending. (+) symptomatic response to IV pain medication. 53 y/o male (+) partial colon resection with ileostomy, colon cancer mets to liver and lung HH inpatient hospitalization 9/23-10/1 regarding severe intractable abdominal pain with colorectal and heme/onc and palliative care consults BIBA from home for worsening of abdominal pain since yesterday. (+) tenderness around ileostomy area, at sit no signs of infection (+) functional. Plan EKG, CT A/P, CT A chest (+SOB), lab including lipase, lactate, troponin, Zofran, pepcid, Dilaudid, observe, reassess.    Labs unremarkable, urine and CT still pending. (+) partial symptomatic response to IV pain medication.    See Progress Note for case progression, reassessment, labs & CT studies' review, & disposition. 51 y/o male (+) partial colon resection with ileostomy, colon cancer mets to liver and lung,  inpatient hospitalization 9/23-10/1 regarding severe intractable abdominal pain with colorectal and heme/onc and palliative care consults BIBA from home for worsening of abdominal pain since yesterday. (+) tenderness around ileostomy area, no signs of infection & ileostomy + functional.   Plan: EKG, CT A/P, CTA chest (+SOB), labs including lipase, lactate, troponin, IV Zofran/Pepcid/Dilaudid; observe, reassess.    Labs unremarkable, urine and CT still pending. (+) partial symptomatic response to IV pain medication.    See Progress Note for case progression, reassessment, labs & CT studies' review, & disposition.

## 2023-10-07 NOTE — ED ADULT TRIAGE NOTE - MODE OF ARRIVAL
Department of Anesthesiology  Postprocedure Note    Patient: Terri Hernandez  MRN: [id number]  YOB: 1977  Date of evaluation: 2/16/2021  Time:  12:01 PM     Procedure Summary     Date: 02/16/21 Room / Location: [address] [place] [address] 03 / [address] [facility]    Anesthesia Start: 4586 Anesthesia Stop: 8476    Procedure: [address] [place] MESH **DO NOT CHANGE TIME** (N/A Abdomen) Diagnosis: (UMBILICAL HERNIA)    Surgeons: Fei Roman MD Responsible Provider: Martina Lorenzo MD    Anesthesia Type: general ASA Status: 3          Anesthesia Type: No value filed. Kamilah Phase I: Kamilah Score: 10    Kamilah Phase II: Kamilah Score: 10    Last vitals: Reviewed and per EMR flowsheets.        Anesthesia Post Evaluation    Patient location during evaluation: PACU  Patient participation: complete - patient participated  Level of consciousness: awake and alert  Pain score: 2  Airway patency: patent  Nausea & Vomiting: no nausea and no vomiting  Complications: no  Cardiovascular status: hemodynamically stable  Respiratory status: acceptable  Hydration status: euvolemic [facility] Amb[facility]

## 2023-10-07 NOTE — ED PROVIDER NOTE - CONSTITUTIONAL, MLM
normal... White male adult, alert, no respiratory discomfort, ill appearing, (+) symptomatic relief of abdominal with IV medications White male adult, alert, no respiratory discomfort, ill appearing, (+) symptomatic relief of abdominal s/p IV medications

## 2023-10-07 NOTE — ED ADULT NURSE NOTE - OBJECTIVE STATEMENT
Pt arrives to ED complaining of abdominal pain. Pt with history of colon, liver cancer. Pt with colostomy in lower abdomen. Pt reports increased output from colostomy. Pt complains of decreased PO intake. Pt currently on chemo. Last chemo two weeks ago. alert and oriented x 4. ambulatory.

## 2023-10-07 NOTE — ED PROVIDER NOTE - OBJECTIVE STATEMENT
53 y/o male with PMHx of PE, colon, lung, liver cancer on chemotherapy presents to the ED BIBEMS c/o severe, sharp, intermittent abdominal pain since yesterday. Last chemotherapy treatment 2 weeks ago. Has been taking oxycotin for pain with improvement. States the IV medications have improved his symptoms. Endorses decreased PO intake, general malaise, increased output from ileostomy. NKDA. 51 y/o male with PMHx of PE, colon, lung, liver cancer on chemotherapy presents to the ED BIBEMS c/o severe, sharp, intermittent abdominal pain since yesterday. Last chemotherapy treatment 2 weeks ago. Has been taking Oxycontin for pain with partial but overall inadequate improvement. States the IV medications administered in ED have improved his symptoms. Endorses decreased PO intake, general malaise, increased output from ileostomy. NKDA.

## 2023-10-07 NOTE — ED PROVIDER NOTE - ATTENDING CONTRIBUTION TO CARE
Dr. Lee: I have personally performed a face to face bedside history and physical examination of this patient. I have discussed the history, examination, review of systems, assessment and plan of management with the resident. I have reviewed the electronic medical record and amended it to reflect my history, review of systems, physical exam, assessment and plan.

## 2023-10-07 NOTE — H&P ADULT - REASON FOR ADMISSION
Abdominal Pain SUICIDE/SELF-INJURIOUS BEHAVIOR SUICIDE/SELF-INJURIOUS BEHAVIOR SUICIDE/SELF-INJURIOUS BEHAVIOR SUICIDE/SELF-INJURIOUS BEHAVIOR SUICIDE/SELF-INJURIOUS BEHAVIOR SUICIDE/SELF-INJURIOUS BEHAVIOR SUICIDE/SELF-INJURIOUS BEHAVIOR SUICIDE/SELF-INJURIOUS BEHAVIOR

## 2023-10-07 NOTE — ED PROVIDER NOTE - CARE PLAN
1 Principal Discharge DX:	Abdominal tenderness   Principal Discharge DX:	Abdominal tenderness  Secondary Diagnosis:	Colon cancer

## 2023-10-07 NOTE — ED ADULT NURSE NOTE - SUICIDE SCREENING QUESTION 3
Physical Therapy  Visit Type: initial evaluation  Precautions:  Medical precautions:  fall risk; standard precautions.      Patient admit to ED from [facility] with headache, tachy, fever, recent colostomy surgery-->12  CT: CT with partial SBO  10/25: PT eval &tx; Activity up as adina    Spoke with director Arielle regarding PLOF from [facility]: Onesimo was a 2 person assist for all ADL's with short stay. Prior that admission he lived in a group home/dorm     Therapist wearing procedural mask and faceshield. Patient wearing procedural mask   For entire duration of session.  Lines:     Basic: IV  Lower Extremity:    Left:  weight bearing: as tolerated.    Right:  weight bearing: as tolerated.  Safety Measures: bed alarm and chair alarm      SUBJECTIVE                                                                                                            Patient agreed to participate in therapy this date.  Patient / Family Goal: maximize function and return home      OBJECTIVE                                                                                                                Unable to assess  Bed Mobility:      Supine to sit: not attempted due to safety concerns  Training completed:      Education details: patient safety  Transfers:      Sit to stand: not attempted due to safety concerns        Interventions                                                                                                       Supine    Lower Extremity: Bilateral: heel slides, hip abduction/adduction, SLR and hip IR/External Rotation in hooklying, PROM, 10 reps,   Skilled input: Tactile instruction/cues and facilitation  Verbal Consent: Writer verbally educated and received verbal consent for hand placement, positioning of patient, and techniques to be performed today from patient         ASSESSMENT                                                                                                                 Impairments: range of motion, strength, balance deficits, safety awareness, pain, activity tolerance, coordination and cognition  Functional Limitations: bed mobility, ambulation and stair climbing     Discharge Recommendations   Recommendation for Discharge: PT IL: Patient needs daily, skilled therapy for 1-3 hours a day by at least two disciplines               PT Identified Barriers to Discharge: patient nonverbal, no family present during eval, patient with complex history, spasticity, extensor tone and fragile limbs       Therapy Diagnosis:  Other Abnormalities of Gait and Mobility    Skilled therapy is required to address these limitations in attempt to maximize the patient's independence.  Predicted patient presentation: Moderate (evolving) - Patient comorbidities and complexities, as defined above, may have varying impact on steady progress for prescribed plan of care.    End of Session:   Location: in bed  Safety measures: call light within reach, alarm system in place/re-engaged and lines intact  Handoff to: nurse and nurse assistant            PLAN                                                                                                                            Suggestions for next session as indicated: Frequency Comments: patient at baseline 2 person depnt; d/c PT 10.25        A minimum of 8 minutes per session x 1 week.    Interventions: energy conservation, gait training, strengthening, safety education, patient/family training, bed mobility, balance and body mechanics     Patient adina PT supine PROM to all extremities; extensor tone noted B LE> UE. Patient with wrist and elbows flexed but adian PROM to limbs. Patient with ankle air boots on while supine in bed, limited knee flexion and hip flexion. Patient's manual wheelchair in room with tilt in space feature. Patient needing two-person assist for ADL's at previous NH. Patient kept eyes closed/sleeping during ROM. Per chart, patient  nonverbal. No further skilled PT needs at this time. Patient to benefit from contd 24/7 assistance for safety, use of Paul lift with nsg transfers.  Agreement to plan and goals: patient unable to agree with goals and treatment plan      Documented in the chart in the following areas: Prior Level of Function. Assessment.         No

## 2023-10-07 NOTE — ED PROVIDER NOTE - GASTROINTESTINAL, MLM
bowel sounds hypoactive, normal pitch, mild tenderness lower abdomen surrounding ileostomy site, ileostomy with brownish liquid output, small amount bowel sounds hypoactive, normal pitch, mild tenderness lower abdomen surrounding ileostomy site w/o any associated erythema nor swelling to suggest infection, + ileostomy with brownish liquid output, small amount

## 2023-10-07 NOTE — ED ADULT TRIAGE NOTE - CHIEF COMPLAINT QUOTE
pt BIBEMS c.o severe abdominal pain, decreased PO intake. pt with hx colon, lung and liver cancer, on chemo, last chemo 2 weeks ago. pt reports similar symptoms in past. pt also endorses general malaise. denies chest pain, SOB.

## 2023-10-07 NOTE — H&P ADULT - ASSESSMENT
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  - US Art RLE requested; if acute changes vascular to be consulted   - CRCx consult   - Hem/Onc consult for ongoing management of lung and colon cancer w/metastasis    #Social  - patient has poor home situation; brother not actively present and mother has worsening dementia   - SW/CM consulted for potential placement services  - of note on last admission patient admitted to cocaine and EtOH abuse 2/2 to depression from ongoing diagnosis and depression  - will c/w Lexapro 5mg QD     #DVT PPx  - c/w Lovenox 90mg BID 52 year old male with PMHx of doris ca, colon ca with mets to liver, s/p ileostomy, PE , DVTs  presented to the ED with abdominal pain that has been ongoing since diagnosis in may 2022 being admitted for:    #Abdominal Pain  - c/w Oxycontine 20mg BID  - c/w Fentanyl patch 37.5 mcg q72 hours  - c/w IV Morphine 2mg [id number] PRN Breakthrough pain   - CT Angio Abd shows 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 Serrano     #Social  - patient has poor home situation; brother not actively present and mother has worsening dementia   - SW/CM consulted for potential placement services  - Palliative care consult for pain regimen optimization and to assess need for placement   - of note on last admission patient admitted to cocaine and EtOH abuse 2/2 to depression from ongoing diagnosis and depression but denies current use  - will c/w Lexapro 5mg QD     #DVT PPx  - c/w Lovenox 90mg BID

## 2023-10-07 NOTE — H&P ADULT - ATTENDING COMMENTS
52 year old male with PMHx of doris ca, colon ca with mets to liver, s/p ileostomy, PE , DVTs admitted for pain control.  -patient with chronic pain from malignancy which was not relieved with home regimen today, at home patient on Oxycontin 20mg bid and fentanyl patch 37.5 q72 hours. Will cont home regimen with IV morphine prn for breakthrough pain which patient states has improved his symptoms. Heme/onc and palliative consults to assist with pain control.   -CT ab showed no acute pathology but redemonstrated femoral pseudoaneurysm, vascular has seen patient in past, plan to obtain duplex arterial US if change can consider vascular follow up consult  -patient also requesting social work consult for placement into assisted living as he currently lives with his mother and he is unable to care fo himself and she cannot care for him.  -remaining chronic issues as above

## 2023-10-07 NOTE — H&P ADULT - HISTORY OF PRESENT ILLNESS
52 year old male with PMHx of doris ca, colon ca with mets to liver, s/p ileostomy, PE , DVTs  presented to the ED with abdominal pain that has been ongoing since diagnosis in may 2022. Patient was recently admitted from 09/25-10/01 for similar presentation. Patient lives at home with his mother who has advanced dementia and states he is no longer able to care for her and is requesting SW support. On last admission patient was discharged with  52 year old male with PMHx of doris ca, colon ca with mets to liver, s/p ileostomy, PE , DVTs  presented to the ED with abdominal pain that has been ongoing since diagnosis in may 2022. Patient was recently admitted from 09/25-10/01 for similar presentation. Patient lives at home with his mother who has advanced dementia and states he is no longer able to care for her and is requesting SW support. On last admission patient was discharged with Oxycodone 10 q6hrs and 15mg QHS.  52 year old male with PMHx of doris ca, colon ca with mets to liver, s/p ileostomy, PE , DVTs  presented to the ED with abdominal pain that has been ongoing since diagnosis in may 2022. Patient was recently admitted from 09/25-10/01 for similar presentation. Patient lives at home with his mother who has advanced dementia and states he is no longer able to care for her and is requesting SW support. On last admission patient was discharged with Oxycodone 10 q6hrs and 15mg QHS but he states his outpatient provider changed it to OxyContin 20mg BID with Fentanyl patch 37.5mcg q72 hour.

## 2023-10-07 NOTE — H&P ADULT - NSHPSOCIALHISTORY_GEN_ALL_CORE
Patient lives at home with his mother who has advanced dementia and his brother who is not present often

## 2023-10-07 NOTE — PATIENT PROFILE ADULT - FALL HARM RISK - HARM RISK INTERVENTIONS

## 2023-10-07 NOTE — H&P ADULT - NSHPPHYSICALEXAM_GEN_ALL_CORE
CONSTITUTIONAL: White male adult, alert, no respiratory discomfort, ill appearing, (+) symptomatic relief of abdominal with IV medications  ENMT: Oropharynx clear, MM slightly dry  EYES: PERRLA EOMI, no scleral icterus  CARDIAC: regular rate and rhythm, normal radial pulse  RESPIRATORY: lungs clear, respirations normal  GASTROINTESTINAL: bowel sounds hypoactive, normal pitch, mild tenderness lower abdomen surrounding ileostomy site, ileostomy with brownish liquid output, small amount  GENITOURINARY:  deferred, no CVAT  MUSCULOSKELETAL: MAEx4, no focal swelling or tenderness  NEUROLOGICAL: Alert and oriented, no focal deficits, no motor or sensory deficits.  SKIN: No tactile warmth.

## 2023-10-07 NOTE — PATIENT PROFILE ADULT - NSPROGENOTHERPROVIDER_GEN_A_NUR
Daughter and  at bedside, POC discussed. Sedation stopped at 1030 per . Will continue to monitor. Outpatient chemo

## 2023-10-07 NOTE — ED ADULT NURSE NOTE - NSFALLUNIVINTERV_ED_ALL_ED
Bed/Stretcher in lowest position, wheels locked, appropriate side rails in place/Call bell, personal items and telephone in reach/Instruct patient to call for assistance before getting out of bed/chair/stretcher/Non-slip footwear applied when patient is off stretcher/Sebastian to call system/Physically safe environment - no spills, clutter or unnecessary equipment/Purposeful proactive rounding/Room/bathroom lighting operational, light cord in reach

## 2023-10-07 NOTE — ED PROVIDER NOTE - PROGRESS NOTE DETAILS
Raoul Babcock MD (PGY-3) CT imaging and serology are unremarkable.  The patient was reevaluated, continues to have significant pain despite Dilaudid that was given here.  Requesting admission both for pain control as well as  management for transfer to assisted living facility.  Endorsed by medicine team.

## 2023-10-07 NOTE — H&P ADULT - SOCIAL HISTORY: ALCOHOL USE
- denies; had a binge drinking episode prior to last admission after 2 years abstinence but has not had since

## 2023-10-08 LAB
ALBUMIN SERPL ELPH-MCNC: 3.5 G/DL — SIGNIFICANT CHANGE UP (ref 3.3–5)
ALP SERPL-CCNC: 123 U/L — HIGH (ref 40–120)
ALT FLD-CCNC: 35 U/L — SIGNIFICANT CHANGE UP (ref 12–78)
ANION GAP SERPL CALC-SCNC: 5 MMOL/L — SIGNIFICANT CHANGE UP (ref 5–17)
APPEARANCE UR: CLEAR — SIGNIFICANT CHANGE UP
AST SERPL-CCNC: 19 U/L — SIGNIFICANT CHANGE UP (ref 15–37)
BASOPHILS # BLD AUTO: 0.03 K/UL — SIGNIFICANT CHANGE UP (ref 0–0.2)
BASOPHILS NFR BLD AUTO: 0.4 % — SIGNIFICANT CHANGE UP (ref 0–2)
BILIRUB SERPL-MCNC: 0.5 MG/DL — SIGNIFICANT CHANGE UP (ref 0.2–1.2)
BILIRUB UR-MCNC: NEGATIVE — SIGNIFICANT CHANGE UP
BUN SERPL-MCNC: 8 MG/DL — SIGNIFICANT CHANGE UP (ref 7–23)
CALCIUM SERPL-MCNC: 9.3 MG/DL — SIGNIFICANT CHANGE UP (ref 8.5–10.1)
CHLORIDE SERPL-SCNC: 103 MMOL/L — SIGNIFICANT CHANGE UP (ref 96–108)
CO2 SERPL-SCNC: 30 MMOL/L — SIGNIFICANT CHANGE UP (ref 22–31)
COLOR SPEC: YELLOW — SIGNIFICANT CHANGE UP
CREAT SERPL-MCNC: 0.79 MG/DL — SIGNIFICANT CHANGE UP (ref 0.5–1.3)
DIFF PNL FLD: NEGATIVE — SIGNIFICANT CHANGE UP
EGFR: 108 ML/MIN/1.73M2 — SIGNIFICANT CHANGE UP
EOSINOPHIL # BLD AUTO: 0.15 K/UL — SIGNIFICANT CHANGE UP (ref 0–0.5)
EOSINOPHIL NFR BLD AUTO: 2 % — SIGNIFICANT CHANGE UP (ref 0–6)
GLUCOSE SERPL-MCNC: 107 MG/DL — HIGH (ref 70–99)
GLUCOSE UR QL: NEGATIVE MG/DL — SIGNIFICANT CHANGE UP
HCT VFR BLD CALC: 41 % — SIGNIFICANT CHANGE UP (ref 39–50)
HGB BLD-MCNC: 13 G/DL — SIGNIFICANT CHANGE UP (ref 13–17)
IMM GRANULOCYTES NFR BLD AUTO: 0.4 % — SIGNIFICANT CHANGE UP (ref 0–0.9)
KETONES UR-MCNC: NEGATIVE MG/DL — SIGNIFICANT CHANGE UP
LEUKOCYTE ESTERASE UR-ACNC: NEGATIVE — SIGNIFICANT CHANGE UP
LYMPHOCYTES # BLD AUTO: 0.89 K/UL — LOW (ref 1–3.3)
LYMPHOCYTES # BLD AUTO: 11.8 % — LOW (ref 13–44)
MCHC RBC-ENTMCNC: 28.4 PG — SIGNIFICANT CHANGE UP (ref 27–34)
MCHC RBC-ENTMCNC: 31.7 GM/DL — LOW (ref 32–36)
MCV RBC AUTO: 89.5 FL — SIGNIFICANT CHANGE UP (ref 80–100)
MONOCYTES # BLD AUTO: 0.65 K/UL — SIGNIFICANT CHANGE UP (ref 0–0.9)
MONOCYTES NFR BLD AUTO: 8.6 % — SIGNIFICANT CHANGE UP (ref 2–14)
NEUTROPHILS # BLD AUTO: 5.81 K/UL — SIGNIFICANT CHANGE UP (ref 1.8–7.4)
NEUTROPHILS NFR BLD AUTO: 76.8 % — SIGNIFICANT CHANGE UP (ref 43–77)
NITRITE UR-MCNC: NEGATIVE — SIGNIFICANT CHANGE UP
PH UR: 5.5 — SIGNIFICANT CHANGE UP (ref 5–8)
PLATELET # BLD AUTO: 192 K/UL — SIGNIFICANT CHANGE UP (ref 150–400)
POTASSIUM SERPL-MCNC: 3.7 MMOL/L — SIGNIFICANT CHANGE UP (ref 3.5–5.3)
POTASSIUM SERPL-SCNC: 3.7 MMOL/L — SIGNIFICANT CHANGE UP (ref 3.5–5.3)
PROT SERPL-MCNC: 7.9 GM/DL — SIGNIFICANT CHANGE UP (ref 6–8.3)
PROT UR-MCNC: SIGNIFICANT CHANGE UP MG/DL
RBC # BLD: 4.58 M/UL — SIGNIFICANT CHANGE UP (ref 4.2–5.8)
RBC # FLD: 15.4 % — HIGH (ref 10.3–14.5)
SODIUM SERPL-SCNC: 138 MMOL/L — SIGNIFICANT CHANGE UP (ref 135–145)
SP GR SPEC: >1.03 — HIGH (ref 1–1.03)
UROBILINOGEN FLD QL: 0.2 MG/DL — SIGNIFICANT CHANGE UP (ref 0.2–1)
WBC # BLD: 7.56 K/UL — SIGNIFICANT CHANGE UP (ref 3.8–10.5)
WBC # FLD AUTO: 7.56 K/UL — SIGNIFICANT CHANGE UP (ref 3.8–10.5)

## 2023-10-08 PROCEDURE — 93926 LOWER EXTREMITY STUDY: CPT | Mod: 26,RT

## 2023-10-08 PROCEDURE — 93010 ELECTROCARDIOGRAM REPORT: CPT

## 2023-10-08 PROCEDURE — 99232 SBSQ HOSP IP/OBS MODERATE 35: CPT

## 2023-10-08 RX ORDER — OXYCODONE HYDROCHLORIDE 5 MG/1
20 TABLET ORAL EVERY 12 HOURS
Refills: 0 | Status: DISCONTINUED | OUTPATIENT
Start: 2023-10-08 | End: 2023-10-10

## 2023-10-08 RX ORDER — MORPHINE SULFATE 50 MG/1
4 CAPSULE, EXTENDED RELEASE ORAL EVERY 6 HOURS
Refills: 0 | Status: DISCONTINUED | OUTPATIENT
Start: 2023-10-08 | End: 2023-10-10

## 2023-10-08 RX ORDER — OXYCODONE HYDROCHLORIDE 5 MG/1
1 TABLET ORAL
Refills: 0 | DISCHARGE

## 2023-10-08 RX ORDER — ONDANSETRON 8 MG/1
4 TABLET, FILM COATED ORAL EVERY 8 HOURS
Refills: 0 | Status: DISCONTINUED | OUTPATIENT
Start: 2023-10-08 | End: 2023-10-10

## 2023-10-08 RX ORDER — ACETAMINOPHEN 500 MG
650 TABLET ORAL EVERY 6 HOURS
Refills: 0 | Status: DISCONTINUED | OUTPATIENT
Start: 2023-10-08 | End: 2023-10-10

## 2023-10-08 RX ORDER — OXYCODONE HYDROCHLORIDE 5 MG/1
20 TABLET ORAL
Refills: 0 | Status: DISCONTINUED | OUTPATIENT
Start: 2023-10-08 | End: 2023-10-08

## 2023-10-08 RX ORDER — SENNA PLUS 8.6 MG/1
1 TABLET ORAL
Refills: 0 | Status: DISCONTINUED | OUTPATIENT
Start: 2023-10-08 | End: 2023-10-10

## 2023-10-08 RX ORDER — ALPRAZOLAM 0.25 MG
0.5 TABLET ORAL
Refills: 0 | Status: DISCONTINUED | OUTPATIENT
Start: 2023-10-08 | End: 2023-10-10

## 2023-10-08 RX ORDER — OXYCODONE HYDROCHLORIDE 5 MG/1
1 TABLET ORAL
Qty: 0 | Refills: 0 | DISCHARGE

## 2023-10-08 RX ORDER — ENOXAPARIN SODIUM 100 MG/ML
90 INJECTION SUBCUTANEOUS ONCE
Refills: 0 | Status: COMPLETED | OUTPATIENT
Start: 2023-10-08 | End: 2023-10-08

## 2023-10-08 RX ORDER — FENTANYL CITRATE 50 UG/ML
1 INJECTION INTRAVENOUS
Refills: 0 | Status: DISCONTINUED | OUTPATIENT
Start: 2023-10-08 | End: 2023-10-09

## 2023-10-08 RX ORDER — MORPHINE SULFATE 50 MG/1
2 CAPSULE, EXTENDED RELEASE ORAL EVERY 4 HOURS
Refills: 0 | Status: DISCONTINUED | OUTPATIENT
Start: 2023-10-08 | End: 2023-10-08

## 2023-10-08 RX ORDER — LANOLIN ALCOHOL/MO/W.PET/CERES
3 CREAM (GRAM) TOPICAL AT BEDTIME
Refills: 0 | Status: DISCONTINUED | OUTPATIENT
Start: 2023-10-08 | End: 2023-10-10

## 2023-10-08 RX ADMIN — MORPHINE SULFATE 4 MILLIGRAM(S): 50 CAPSULE, EXTENDED RELEASE ORAL at 22:13

## 2023-10-08 RX ADMIN — ENOXAPARIN SODIUM 90 MILLIGRAM(S): 100 INJECTION SUBCUTANEOUS at 08:37

## 2023-10-08 RX ADMIN — Medication 2 MILLIGRAM(S): at 22:44

## 2023-10-08 RX ADMIN — MORPHINE SULFATE 4 MILLIGRAM(S): 50 CAPSULE, EXTENDED RELEASE ORAL at 21:43

## 2023-10-08 RX ADMIN — MORPHINE SULFATE 2 MILLIGRAM(S): 50 CAPSULE, EXTENDED RELEASE ORAL at 08:37

## 2023-10-08 RX ADMIN — OXYCODONE HYDROCHLORIDE 20 MILLIGRAM(S): 5 TABLET ORAL at 23:14

## 2023-10-08 RX ADMIN — MORPHINE SULFATE 2 MILLIGRAM(S): 50 CAPSULE, EXTENDED RELEASE ORAL at 05:20

## 2023-10-08 RX ADMIN — OXYCODONE HYDROCHLORIDE 20 MILLIGRAM(S): 5 TABLET ORAL at 10:07

## 2023-10-08 RX ADMIN — MORPHINE SULFATE 4 MILLIGRAM(S): 50 CAPSULE, EXTENDED RELEASE ORAL at 15:56

## 2023-10-08 RX ADMIN — Medication 2000 UNIT(S): at 09:36

## 2023-10-08 RX ADMIN — MORPHINE SULFATE 2 MILLIGRAM(S): 50 CAPSULE, EXTENDED RELEASE ORAL at 04:24

## 2023-10-08 RX ADMIN — OXYCODONE HYDROCHLORIDE 20 MILLIGRAM(S): 5 TABLET ORAL at 22:44

## 2023-10-08 RX ADMIN — OXYCODONE HYDROCHLORIDE 20 MILLIGRAM(S): 5 TABLET ORAL at 09:37

## 2023-10-08 RX ADMIN — ESCITALOPRAM OXALATE 5 MILLIGRAM(S): 10 TABLET, FILM COATED ORAL at 09:37

## 2023-10-08 RX ADMIN — MORPHINE SULFATE 4 MILLIGRAM(S): 50 CAPSULE, EXTENDED RELEASE ORAL at 15:41

## 2023-10-08 NOTE — PROVIDER CONTACT NOTE (OTHER) - SITUATION
Dr Erickson gets notify automatically once the patient is admitted to the hospital
pt. c/o of sever pain in his abdomen.

## 2023-10-08 NOTE — CONSULT NOTE ADULT - SUBJECTIVE AND OBJECTIVE BOX
HPI:    53 y/o M with PMHx of metastatic colon CA undergoing continued tx with Primary Onc Dr Serrano s/p ileostomy, PE/DVTs, arterial occlusion presented to the ED with recurrent abdominal pain that has been ongoing since dx 05/2022. Patient was recently admitted from 09/25 - 10/01 for similar presentation, currently lives at home with his mother who has advanced dementia and states he is no longer able to care for her and is requesting SW support. On last admission patient was discharged with Oxycodone 10mg q6hrs and 15mg QHS but according to him, he followed up with new PM&R provider outpatient who changed to OxyContin 20mg BID with Fentanyl patch 37.5mcg q72 hour.    10/08/2023:      PAST MEDICAL & SURGICAL HISTORY:    Cancer, colon with liver/lung mets    S/P ileostomy    Metastasis to liver    Pulmonary embolism    S/P colon resection    H/O ileostomy        MEDICATIONS  (STANDING):    cholecalciferol 2000 Unit(s) Oral daily  enoxaparin Injectable 90 milliGRAM(s) SubCutaneous every 12 hours  escitalopram 5 milliGRAM(s) Oral daily  fentaNYL   Patch  12 MICROgram(s)/Hr 1 Patch Transdermal every 72 hours  fentaNYL   Patch  25 MICROgram(s)/Hr 1 Patch Transdermal every 72 hours  influenza   Vaccine 0.5 milliLiter(s) IntraMuscular once  oxyCODONE  ER Tablet 20 milliGRAM(s) Oral every 12 hours  senna 1 Tablet(s) Oral two times a day      MEDICATIONS  (PRN):    acetaminophen     Tablet .. 650 milliGRAM(s) Oral every 6 hours PRN Temp greater or equal to 38C (100.4F), Mild Pain (1 - 3)  aluminum hydroxide/magnesium hydroxide/simethicone Suspension 30 milliLiter(s) Oral every 4 hours PRN Dyspepsia  loperamide 2 milliGRAM(s) Oral four times a day PRN for diarrhea  melatonin 3 milliGRAM(s) Oral at bedtime PRN Insomnia  naloxone 1 mG/mL Injection for Intranasal Use 4 milliGRAM(s) IntraNasal once PRN opioid overdose  ondansetron Injectable 4 milliGRAM(s) IV Push every 8 hours PRN Nausea and/or Vomiting      ALLERGIES:    No Known Drug Allergies (Unknown)        FAMILY HISTORY:    dementia (Mother)        REVIEW OF SYSTEMS:    Constitutional, Eyes, ENT, Cardiovascular, Respiratory, Gastrointestinal, Genitourinary, Musculoskeletal, Integumentary, Neurological, Psychiatric, Endocrine, Heme/Lymph and Allergic/Immunologic review of systems are otherwise negative except as noted in HPI.       VITALS:    T(C): 36.7 (08 Oct 2023 07:53), Max: 37 (07 Oct 2023 17:45)  T(F): 98 (08 Oct 2023 07:53), Max: 98.6 (07 Oct 2023 17:45)  HR: 64 (08 Oct 2023 07:53) (60 - 89)  BP: 123/69 (08 Oct 2023 07:53) (118/85 - 123/69)  BP(mean): --  RR: 18 (07 Oct 2023 22:47) (17 - 20)  SpO2: 99% (08 Oct 2023 07:53) (95% - 100%)    Parameters below as of 08 Oct 2023 07:53  Patient On (Oxygen Delivery Method): room air        PHYSICAL:    Constitutional:   Eyes: no conjunctival infection, anicteric.   ENT: pharynx is unremarkable   Neck: supple without JVD  Pulmonary: clear to auscultation bilaterally  Cardiac: RRR  Vascular: no calf tenderness, venous stasis changes, varices.   Abdomen: normoactive bowel sounds, soft and nontender, no hepatosplenomegaly or masses appreciated; ostomy in place   Lymphatic: no peripheral adenopathy appreciated.   Musculoskeletal: full range of motion and no deformities appreciated.   Skin: normal appearance, no rash/erythema.   Neurology:      LABS:    CBC Full  -  ( 08 Oct 2023 08:35 )  WBC Count : 7.56 K/uL  RBC Count : 4.58 M/uL  Hemoglobin : 13.0 g/dL  Hematocrit : 41.0 %  Platelet Count - Automated : 192 K/uL  Mean Cell Volume : 89.5 fl  Mean Cell Hemoglobin : 28.4 pg  Mean Cell Hemoglobin Concentration : 31.7 gm/dL  Auto Neutrophil # : 5.81 K/uL  Auto Lymphocyte # : 0.89 K/uL  Auto Monocyte # : 0.65 K/uL  Auto Eosinophil # : 0.15 K/uL  Auto Basophil # : 0.03 K/uL  Auto Neutrophil % : 76.8 %  Auto Lymphocyte % : 11.8 %  Auto Monocyte % : 8.6 %  Auto Eosinophil % : 2.0 %  Auto Basophil % : 0.4 %    10-07    139  |  105  |  7   ----------------------------<  99  3.5   |  29  |  0.80    Ca    9.1      07 Oct 2023 18:37    TPro  7.6  /  Alb  3.4  /  TBili  0.4  /  DBili  x   /  AST  15  /  ALT  32  /  AlkPhos  123<H>  10-07      Urinalysis Basic - ( 08 Oct 2023 05:45 )    Color: Yellow / Appearance: Clear / SG: >1.030 / pH: x  Gluc: x / Ketone: Negative mg/dL  / Bili: Negative / Urobili: 0.2 mg/dL   Blood: x / Protein: Trace mg/dL / Nitrite: Negative   Leuk Esterase: Negative / RBC: x / WBC x   Sq Epi: x / Non Sq Epi: x / Bacteria: x        RADIOLOGY & ADDITIONAL STUDIES:        CT Angio Chest PE Protocol w/ IV Cont (10.07.23 @ 20:26)    FINDINGS:    CHEST:    LUNGS AND LARGE AIRWAYS: Patent central airways. Mild interval increased   size of pulmonary nodules. Stable 2 mm nodule (3-39). Representative   nodules demonstrating change include:  Right upper lobe: 5 mm nodule (3-26) previously 3 mm, 4 mm nodule (3-33)   previously 1 mm  Right lower lobe: 14 mm nodule (3-44) previously 8 mm, 8mm nodule (3-40)   previously 5 mm, 5 and 4 mm nodules (3-64) previously 4 and 3 mm, new 6   mm nodule (3-57)  Left upper lobe: 4 mm nodule (3-32) previously 2 mm relatively stable 7   mm apical nodule. No evidence of pneumonia.  PLEURA: No pleural effusion.  VESSELS: Adequate opacification of the pulmonary vasculature. No filling   defect present to suggest acute pulmonary embolism.  HEART: Heart size is normal. No pericardial effusion.  MEDIASTINUM AND RAMONA: No lymphadenopathy.  CHEST WALL AND LOWER NECK: Right chest wall [id number] catheter.    ABDOMEN AND PELVIS:    LIVER: Multiple calcified and noncalcified liver metastatic lesions are   not significantly changed.  BILE DUCTS: Normal caliber.  GALLBLADDER: Within normal limits.  SPLEEN: Within normal limits.  PANCREAS: Within normal limits.  ADRENALS: Within normal limits.  KIDNEYS/URETERS: Left renal parenchymal scarring and mild perinephric   stranding similar to the prior exam. No hydronephrosis.    BLADDER: Partially distended which limits evaluation of the wall   thickness.  REPRODUCTIVE ORGANS: Prostate gland within normal limits.    BOWEL: Subtotal colectomy with rectal stump and right lower quadrant   ileostomy, unchanged. No bowel obstruction. No significant bowel wall   thickening.  PERITONEUM: No ascites.  VESSELS: 2.7 cm hypodense collection adjacent to the right common femoral   artery is redemonstrated, however, appears to have a cluster of contrast   within a concerning for recurrent pseudoaneurysm. The abdominal aorta is   normal in caliber. The mesenteric vessels appear patent. Retroaortic left   renal vein. Filter within the IVC  RETROPERITONEUM: No lymphadenopathy.  ABDOMINAL WALL: Within normal limits.  BONES: No acute osseous finding.    IMPRESSION:  CTPA: No pulmonary embolism. Mild interval increased size of bilateral   pulmonary nodules with new right lower lobe 6 mm nodule.    CT abdomen/pelvis: Similar intraperitoneal findings compared to   9/23/2023. No bowel obstruction or wall thickening.    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 This can be confirmed   with ultrasound.          US Duplex Arterial Lower Ext Ltd, Right (10.08.23 @ 00:15)    FINDINGS: There is persistent pseudoaneurysm off of the right common   femoral artery. Thrombosed portion measures   3.3 x 2.1 x 1.8 cm. Nonthrombosed portion measures   1.2x 0.8 cm with the neck diameter of   0.2 cm.    IMPRESSION:    Pseudoaneurysm at the right groin as described.       HPI:    51 y/o M with PMHx of metastatic colon CA undergoing continued tx with Primary Onc Dr Serrano s/p ileostomy, PE/DVTs, arterial occlusion presented to the ED with recurrent abdominal pain that has been ongoing since dx 05/2022. Patient was recently admitted from 09/25 - 10/01 for similar presentation, currently lives at home with his mother who has advanced dementia and states he is no longer able to care for her and is requesting SW support. On last admission patient was discharged with Oxycodone 10mg q6hrs and 15mg QHS but according to him, he followed up with new PM&R provider outpatient who changed to OxyContin 20mg BID with Fentanyl patch 37.5mcg q72 hour.    10/08/2023: Seen at bedside, no acute distress, but upset about recurrent hospitalizations       PAST MEDICAL & SURGICAL HISTORY:    Cancer, colon with liver/lung mets    S/P ileostomy    Metastasis to liver    Pulmonary embolism    S/P colon resection    H/O ileostomy        MEDICATIONS  (STANDING):    cholecalciferol 2000 Unit(s) Oral daily  enoxaparin Injectable 90 milliGRAM(s) SubCutaneous every 12 hours  escitalopram 5 milliGRAM(s) Oral daily  fentaNYL   Patch  12 MICROgram(s)/Hr 1 Patch Transdermal every 72 hours  fentaNYL   Patch  25 MICROgram(s)/Hr 1 Patch Transdermal every 72 hours  influenza   Vaccine 0.5 milliLiter(s) IntraMuscular once  oxyCODONE  ER Tablet 20 milliGRAM(s) Oral every 12 hours  senna 1 Tablet(s) Oral two times a day      MEDICATIONS  (PRN):    acetaminophen     Tablet .. 650 milliGRAM(s) Oral every 6 hours PRN Temp greater or equal to 38C (100.4F), Mild Pain (1 - 3)  aluminum hydroxide/magnesium hydroxide/simethicone Suspension 30 milliLiter(s) Oral every 4 hours PRN Dyspepsia  loperamide 2 milliGRAM(s) Oral four times a day PRN for diarrhea  melatonin 3 milliGRAM(s) Oral at bedtime PRN Insomnia  naloxone 1 mG/mL Injection for Intranasal Use 4 milliGRAM(s) IntraNasal once PRN opioid overdose  ondansetron Injectable 4 milliGRAM(s) IV Push every 8 hours PRN Nausea and/or Vomiting      ALLERGIES:    No Known Drug Allergies (Unknown)        FAMILY HISTORY:    dementia (Mother)        REVIEW OF SYSTEMS:    Constitutional, Eyes, ENT, Cardiovascular, Respiratory, Gastrointestinal, Genitourinary, Musculoskeletal, Integumentary, Neurological, Psychiatric, Endocrine, Heme/Lymph and Allergic/Immunologic review of systems are otherwise negative except as noted in HPI.       VITALS:    T(C): 36.7 (08 Oct 2023 07:53), Max: 37 (07 Oct 2023 17:45)  T(F): 98 (08 Oct 2023 07:53), Max: 98.6 (07 Oct 2023 17:45)  HR: 64 (08 Oct 2023 07:53) (60 - 89)  BP: 123/69 (08 Oct 2023 07:53) (118/85 - 123/69)  BP(mean): --  RR: 18 (07 Oct 2023 22:47) (17 - 20)  SpO2: 99% (08 Oct 2023 07:53) (95% - 100%)    Parameters below as of 08 Oct 2023 07:53  Patient On (Oxygen Delivery Method): room air        PHYSICAL:    Constitutional: no acute distress  Eyes: no conjunctival infection, anicteric.   ENT: pharynx is unremarkable   Neck: supple without JVD  Pulmonary: clear to auscultation bilaterally  Cardiac: RRR  Vascular: no calf tenderness, venous stasis changes, varices.   Abdomen: normoactive bowel sounds, soft and nontender, no hepatosplenomegaly or masses appreciated; ostomy in place   Lymphatic: no peripheral adenopathy appreciated.   Musculoskeletal: full range of motion and no deformities appreciated.   Skin: normal appearance, no rash/erythema.   Neurology: awake, alert, oriented ; no obvious focal deficits       LABS:    CBC Full  -  ( 08 Oct 2023 08:35 )  WBC Count : 7.56 K/uL  RBC Count : 4.58 M/uL  Hemoglobin : 13.0 g/dL  Hematocrit : 41.0 %  Platelet Count - Automated : 192 K/uL  Mean Cell Volume : 89.5 fl  Mean Cell Hemoglobin : 28.4 pg  Mean Cell Hemoglobin Concentration : 31.7 gm/dL  Auto Neutrophil # : 5.81 K/uL  Auto Lymphocyte # : 0.89 K/uL  Auto Monocyte # : 0.65 K/uL  Auto Eosinophil # : 0.15 K/uL  Auto Basophil # : 0.03 K/uL  Auto Neutrophil % : 76.8 %  Auto Lymphocyte % : 11.8 %  Auto Monocyte % : 8.6 %  Auto Eosinophil % : 2.0 %  Auto Basophil % : 0.4 %    10-07    139  |  105  |  7   ----------------------------<  99  3.5   |  29  |  0.80    Ca    9.1      07 Oct 2023 18:37    TPro  7.6  /  Alb  3.4  /  TBili  0.4  /  DBili  x   /  AST  15  /  ALT  32  /  AlkPhos  123<H>  10-07      Urinalysis Basic - ( 08 Oct 2023 05:45 )    Color: Yellow / Appearance: Clear / SG: >1.030 / pH: x  Gluc: x / Ketone: Negative mg/dL  / Bili: Negative / Urobili: 0.2 mg/dL   Blood: x / Protein: Trace mg/dL / Nitrite: Negative   Leuk Esterase: Negative / RBC: x / WBC x   Sq Epi: x / Non Sq Epi: x / Bacteria: x        RADIOLOGY & ADDITIONAL STUDIES:        CT Angio Chest PE Protocol w/ IV Cont (10.07.23 @ 20:26)    FINDINGS:    CHEST:    LUNGS AND LARGE AIRWAYS: Patent central airways. Mild interval increased   size of pulmonary nodules. Stable 2 mm nodule (3-39). Representative   nodules demonstrating change include:  Right upper lobe: 5 mm nodule (3-26) previously 3 mm, 4 mm nodule (3-33)   previously 1 mm  Right lower lobe: 14 mm nodule (3-44) previously 8 mm, 8mm nodule (3-40)   previously 5 mm, 5 and 4 mm nodules (3-64) previously 4 and 3 mm, new 6   mm nodule (3-57)  Left upper lobe: 4 mm nodule (3-32) previously 2 mm relatively stable 7   mm apical nodule. No evidence of pneumonia.  PLEURA: No pleural effusion.  VESSELS: Adequate opacification of the pulmonary vasculature. No filling   defect present to suggest acute pulmonary embolism.  HEART: Heart size is normal. No pericardial effusion.  MEDIASTINUM AND RAMONA: No lymphadenopathy.  CHEST WALL AND LOWER NECK: Right chest wall [id number] catheter.    ABDOMEN AND PELVIS:    LIVER: Multiple calcified and noncalcified liver metastatic lesions are   not significantly changed.  BILE DUCTS: Normal caliber.  GALLBLADDER: Within normal limits.  SPLEEN: Within normal limits.  PANCREAS: Within normal limits.  ADRENALS: Within normal limits.  KIDNEYS/URETERS: Left renal parenchymal scarring and mild perinephric   stranding similar to the prior exam. No hydronephrosis.    BLADDER: Partially distended which limits evaluation of the wall   thickness.  REPRODUCTIVE ORGANS: Prostate gland within normal limits.    BOWEL: Subtotal colectomy with rectal stump and right lower quadrant   ileostomy, unchanged. No bowel obstruction. No significant bowel wall   thickening.  PERITONEUM: No ascites.  VESSELS: 2.7 cm hypodense collection adjacent to the right common femoral   artery is redemonstrated, however, appears to have a cluster of contrast   within a concerning for recurrent pseudoaneurysm. The abdominal aorta is   normal in caliber. The mesenteric vessels appear patent. Retroaortic left   renal vein. Filter within the IVC  RETROPERITONEUM: No lymphadenopathy.  ABDOMINAL WALL: Within normal limits.  BONES: No acute osseous finding.    IMPRESSION:  CTPA: No pulmonary embolism. Mild interval increased size of bilateral   pulmonary nodules with new right lower lobe 6 mm nodule.    CT abdomen/pelvis: Similar intraperitoneal findings compared to   9/23/2023. No bowel obstruction or wall thickening.    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 This can be confirmed   with ultrasound.          US Duplex Arterial Lower Ext Ltd, Right (10.08.23 @ 00:15)    FINDINGS: There is persistent pseudoaneurysm off of the right common   femoral artery. Thrombosed portion measures   3.3 x 2.1 x 1.8 cm. Nonthrombosed portion measures   1.2x 0.8 cm with the neck diameter of   0.2 cm.    IMPRESSION:    Pseudoaneurysm at the right groin as described.

## 2023-10-09 ENCOUNTER — APPOINTMENT (OUTPATIENT)
Dept: INFUSION THERAPY | Facility: CLINIC | Age: 53
End: 2023-10-09

## 2023-10-09 ENCOUNTER — APPOINTMENT (OUTPATIENT)
Dept: CARDIOLOGY | Facility: CLINIC | Age: 53
End: 2023-10-09

## 2023-10-09 DIAGNOSIS — R10.9 UNSPECIFIED ABDOMINAL PAIN: ICD-10-CM

## 2023-10-09 PROCEDURE — 99232 SBSQ HOSP IP/OBS MODERATE 35: CPT

## 2023-10-09 PROCEDURE — 99223 1ST HOSP IP/OBS HIGH 75: CPT

## 2023-10-09 RX ORDER — ENOXAPARIN SODIUM 100 MG/ML
90 INJECTION SUBCUTANEOUS EVERY 12 HOURS
Refills: 0 | Status: DISCONTINUED | OUTPATIENT
Start: 2023-10-09 | End: 2023-10-10

## 2023-10-09 RX ADMIN — MORPHINE SULFATE 4 MILLIGRAM(S): 50 CAPSULE, EXTENDED RELEASE ORAL at 07:35

## 2023-10-09 RX ADMIN — MORPHINE SULFATE 4 MILLIGRAM(S): 50 CAPSULE, EXTENDED RELEASE ORAL at 13:16

## 2023-10-09 RX ADMIN — ESCITALOPRAM OXALATE 5 MILLIGRAM(S): 10 TABLET, FILM COATED ORAL at 09:57

## 2023-10-09 RX ADMIN — Medication 2 MILLIGRAM(S): at 07:01

## 2023-10-09 RX ADMIN — MORPHINE SULFATE 4 MILLIGRAM(S): 50 CAPSULE, EXTENDED RELEASE ORAL at 07:05

## 2023-10-09 RX ADMIN — Medication 2000 UNIT(S): at 09:57

## 2023-10-09 RX ADMIN — ENOXAPARIN SODIUM 90 MILLIGRAM(S): 100 INJECTION SUBCUTANEOUS at 04:41

## 2023-10-09 RX ADMIN — OXYCODONE HYDROCHLORIDE 20 MILLIGRAM(S): 5 TABLET ORAL at 21:18

## 2023-10-09 RX ADMIN — OXYCODONE HYDROCHLORIDE 20 MILLIGRAM(S): 5 TABLET ORAL at 09:57

## 2023-10-09 RX ADMIN — ENOXAPARIN SODIUM 90 MILLIGRAM(S): 100 INJECTION SUBCUTANEOUS at 17:09

## 2023-10-09 RX ADMIN — MORPHINE SULFATE 4 MILLIGRAM(S): 50 CAPSULE, EXTENDED RELEASE ORAL at 22:22

## 2023-10-09 RX ADMIN — OXYCODONE HYDROCHLORIDE 20 MILLIGRAM(S): 5 TABLET ORAL at 20:48

## 2023-10-09 RX ADMIN — MORPHINE SULFATE 4 MILLIGRAM(S): 50 CAPSULE, EXTENDED RELEASE ORAL at 21:52

## 2023-10-09 NOTE — CONSULT NOTE ADULT - SUBJECTIVE AND OBJECTIVE BOX
HPI: Pt is a 52y old Male with a PMHx of metastatic colon CA undergoing continued tx with Primary Onc Dr Serrano s/p ileostomy, PE/DVTs, arterial occlusion presented to the ED with recurrent abdominal pain that has been ongoing since dx 05/2022. Patient was recently admitted from 09/25 - 10/01 for similar presentation, currently lives at home with his mother who has advanced dementia and states he is no longer able to care for her and is requesting SW support. On last admission patient was discharged with Oxycodone 10mg q6hrs and 15mg QHS but according to him, he followed up with new PM&R provider outpatient who changed to OxyContin 20mg BID with Fentanyl patch 37.5mcg q72 hour. We have been following this pt and are asked to see in consultation.  10/9/23 Seen and examined at bedside. States pain is currently controlled with Oxycontin 20 mg PO Q12H. He stated that his new pain management Dr has discontinued Fentanyl transderm. Denies dyspnea    PAIN: (X )Yes   ( )No  Level: Mod-severe  Location: abd/ostomy  Intensity:  5-10 /10  Quality: sharp  Alleviating Factors: Oxycontin  Radiation: generalized  Impact on ADLs: mod    DYSPNEA: ( ) Yes  (X ) No      PAST MEDICAL & SURGICAL HISTORY:  Cancer, colon  liver mets  S/P ileostomy  Metastasis to liver  Pulmonary embolism  S/P colon resection  H/O ileostomy    SOCIAL HX:  Lives with mother and brother    Hx opiate tolerance (X )YES  ( )NO    Baseline ADLs  (Prior to Admission)  (X ) Independent   ( )Dependent    FAMILY HISTORY:  FH: dementia (Mother)    Review of Systems:    Anxiety-mild  Depression-yes  Physical Discomfort-mod-severe  Dyspnea-denies  Constipation-no  Diarrhea-ileostomy  Anorexia-mod  Weight Loss- yes  Fatigue-mod    All other systems reviewed and negative      PHYSICAL EXAM:    Vital Signs Last 24 Hrs  T(C): 36.4 (09 Oct 2023 08:40), Max: 37.1 (08 Oct 2023 21:20)  T(F): 97.5 (09 Oct 2023 08:40), Max: 98.7 (08 Oct 2023 21:20)  HR: 55 (09 Oct 2023 08:40) (55 - 62)  BP: 110/64 (09 Oct 2023 08:40) (102/62 - 114/64)  BP(mean): 70 (09 Oct 2023 07:04) (70 - 76)  RR: 18 (09 Oct 2023 08:40) (17 - 18)  SpO2: 100% (09 Oct 2023 08:40) (98% - 100%)  Parameters below as of 09 Oct 2023 08:40  Patient On (Oxygen Delivery Method): room air      PPSV2: 40-50 %    General: Middle aged male in bed in NAD  Mental Status: A&O X3  HEENT: oral mucosa dry  Lungs: clear to auscultation olena  Cardiac: S1S2+  GI: abd soft NT + ileos with appliance  : voids  Ext: CARL on bed  Neuro: no focal def      LABS:                        13.0   7.56  )-----------( 192      ( 08 Oct 2023 08:35 )             41.0     10-08    138  |  103  |  8   ----------------------------<  107<H>  3.7   |  30  |  0.79    Ca    9.3      08 Oct 2023 08:35    TPro  7.9  /  Alb  3.5  /  TBili  0.5  /  DBili  x   /  AST  19  /  ALT  35  /  AlkPhos  123<H>  10-08      Allergies    [This allergen will not trigger allergy alert] No Known Drug Allergies (Unknown)    Intolerances      MEDICATIONS  (STANDING):  cholecalciferol 2000 Unit(s) Oral daily  enoxaparin Injectable 90 milliGRAM(s) SubCutaneous every 12 hours  escitalopram 5 milliGRAM(s) Oral daily  influenza   Vaccine 0.5 milliLiter(s) IntraMuscular once  oxyCODONE  ER Tablet 20 milliGRAM(s) Oral every 12 hours  senna 1 Tablet(s) Oral two times a day    MEDICATIONS  (PRN):  acetaminophen     Tablet .. 650 milliGRAM(s) Oral every 6 hours PRN Temp greater or equal to 38C (100.4F), Mild Pain (1 - 3)  ALPRAZolam 0.5 milliGRAM(s) Oral two times a day PRN anxiety  aluminum hydroxide/magnesium hydroxide/simethicone Suspension 30 milliLiter(s) Oral every 4 hours PRN Dyspepsia  loperamide 2 milliGRAM(s) Oral four times a day PRN for diarrhea  melatonin 3 milliGRAM(s) Oral at bedtime PRN Insomnia  morphine  - Injectable 4 milliGRAM(s) IV Push every 6 hours PRN breakthrough pain  naloxone 1 mG/mL Injection for Intranasal Use 4 milliGRAM(s) IntraNasal once PRN opioid overdose  ondansetron Injectable 4 milliGRAM(s) IV Push every 8 hours PRN Nausea and/or Vomiting      RADIOLOGY/ADDITIONAL STUDIES:  < from: US Duplex Arterial Lower Ext Ltd, Right (10.08.23 @ 00:15) >    ACC: [id number] EXAM:  US DPLX LWR EXT ARTS LTD RT   ORDERED BY:   CHADD MONAE     PROCEDURE DATE:  10/08/2023          INTERPRETATION:  CLINICAL INDICATION: right groin pseudoaneurysm    TECHNIQUE: Grayscale, color Doppler and spectral Doppler of the right   groin was performed.    COMPARISON: 10/7/2023. 8/29/2023.    FINDINGS: There is persistent pseudoaneurysm off of the right common   femoral artery. Thrombosed portion measures   3.3 x 2.1 x 1.8 cm. Nonthrombosed portion measures   1.2x 0.8 cm with the neck diameter of   0.2 cm.    IMPRESSION:    Pseudoaneurysm at the right groin as described.      < end of copied text >  < from: CT Abdomen and Pelvis w/ IV Cont (10.07.23 @ 20:27) >    ACC: [id number] EXAM:  CT ABDOMEN AND PELVIS IC   ORDERED BY: ELVIN MANCUSO     ACC: [id number] [facility]   ORDERED BY: ELVIN MANCUSO     PROCEDURE DATE:  10/07/2023          INTERPRETATION:  CLINICAL INFORMATION: Shortness of breath. Known   metastatic cancer. Diffuse abdominal pain with nausea and vomiting.    COMPARISON: CT chest, abdomen pelvis 6/13/2023 CTA abdomen pelvis   7/10/2023 and CT abdomen and pelvis 9/23/2023    CONTRAST/COMPLICATIONS:  IV Contrast: Omnipaque 350 (accession [id number]), IV contrast documented   in unlinked concurrent exam (accession [id number])  90 cc administered   10   cc discarded  Oral Contrast: NONE  Complications: None reported at time of study completion    PROCEDURE:  CT Angiography of the Chest was performed followed by portal venous phase   imaging of the Abdomen and Pelvis.  Sagittal and coronal reformats were performed as well as 3D (MIP)   reconstructions.    FINDINGS:    CHEST:    LUNGS AND LARGE AIRWAYS: Patent central airways. Mild interval increased   size of pulmonary nodules. Stable 2 mm nodule (3-39). Representative   nodules demonstrating change include:  Right upper lobe: 5 mm nodule (3-26) previously 3 mm, 4 mm nodule (3-33)   previously 1 mm  Right lower lobe: 14 mm nodule (3-44) previously 8 mm, 8mm nodule (3-40)   previously 5 mm, 5 and 4 mm nodules (3-64) previously 4 and 3 mm, new 6   mm nodule (3-57)  Left upper lobe: 4 mm nodule (3-32) previously 2 mm relatively stable 7   mm apical nodule. No evidence of pneumonia.  PLEURA: No pleural effusion.  VESSELS: Adequate opacification of the pulmonary vasculature. No filling   defect present to suggest acute pulmonary embolism.  HEART: Heart size is normal. No pericardial effusion.  MEDIASTINUM AND RAMONA: No lymphadenopathy.  CHEST WALL AND LOWER NECK: Right chest wall [id number] catheter.    ABDOMEN AND PELVIS:    LIVER: Multiple calcified and noncalcified liver metastatic lesions are   not significantly changed.  BILE DUCTS: Normal caliber.  GALLBLADDER: Within normal limits.  SPLEEN: Within normal limits.  PANCREAS: Within normal limits.  ADRENALS: Within normal limits.  KIDNEYS/URETERS: Left renal parenchymal scarring and mild perinephric   stranding similar to the prior exam. No hydronephrosis.    BLADDER: Partially distended which limits evaluation of the wall   thickness.  REPRODUCTIVE ORGANS: Prostate gland within normal limits.    BOWEL: Subtotal colectomy with rectal stump and right lower quadrant   ileostomy, unchanged. No bowel obstruction. No significant bowel wall   thickening.  PERITONEUM: No ascites.  VESSELS: 2.7 cm hypodense collection adjacent to the right common femoral   artery is redemonstrated, however, appears to have a cluster of contrast   within a concerning for recurrent pseudoaneurysm. The abdominal aorta is   normal in caliber. The mesenteric vessels appear patent. Retroaortic left   renal vein. Filter within the IVC  RETROPERITONEUM: No lymphadenopathy.  ABDOMINAL WALL: Within normal limits.  BONES: No acute osseous finding.    IMPRESSION:  CTPA: No pulmonary embolism. Mild interval increased size of bilateral   pulmonary nodules with new right lower lobe 6 mm nodule.    CT abdomen/pelvis: Similar intraperitoneal findings compared to   9/23/2023. No bowel obstruction or wall thickening.    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 This can be confirmed   with ultrasound.    < end of copied text >  < from: CT Angio Chest PE Protocol w/ IV Cont (10.07.23 @ 20:26) >    ACC: [id number] EXAM:  CT ABDOMEN AND PELVIS IC   ORDERED BY: ELVIN MANCUSO     ACC: [id number] EXAM:  CT ANGIO CHEST PULM ART WAWIC   ORDERED BY: ELVIN MANCUSO     PROCEDURE DATE:  10/07/2023          INTERPRETATION:  CLINICAL INFORMATION: Shortness of breath. Known   metastatic cancer. Diffuse abdominal pain with nausea and vomiting.    COMPARISON: CT chest, abdomen pelvis 6/13/2023 CTA abdomen pelvis   7/10/2023 and CT abdomen and pelvis 9/23/2023    CONTRAST/COMPLICATIONS:  IV Contrast: Omnipaque 350 (accession [id number]), IV contrast documented   in unlinked concurrent exam (accession [id number])  90 cc administered   10   cc discarded  Oral Contrast: NONE  Complications: None reported at time of study completion    PROCEDURE:  CT Angiography of the Chest was performed followed by portal venous phase   imaging of the Abdomen and Pelvis.  Sagittal and coronal reformats were performed as well as 3D (MIP)   reconstructions.    FINDINGS:    CHEST:    LUNGS AND LARGE AIRWAYS: Patent central airways. Mild interval increased   size of pulmonary nodules. Stable 2 mm nodule (3-39). Representative   nodules demonstrating change include:  Right upper lobe: 5 mm nodule (3-26) previously 3 mm, 4 mm nodule (3-33)   previously 1 mm  Right lower lobe: 14 mm nodule (3-44) previously 8 mm, 8mm nodule (3-40)   previously 5 mm, 5 and 4 mm nodules (3-64) previously 4 and 3 mm, new 6   mm nodule (3-57)  Left upper lobe: 4 mm nodule (3-32) previously 2 mm relatively stable 7   mm apical nodule. No evidence of pneumonia.  PLEURA: No pleural effusion.  VESSELS: Adequate opacification of the pulmonary vasculature. No filling   defect present to suggest acute pulmonary embolism.  HEART: Heart size is normal. No pericardial effusion.  MEDIASTINUM AND RAMONA: No lymphadenopathy.  CHEST WALL AND LOWER NECK: Right chest wall Uuptzx-e-Rzsc catheter.    ABDOMEN AND PELVIS:    LIVER: Multiple calcified and noncalcified liver metastatic lesions are   not significantly changed.  BILE DUCTS: Normal caliber.  GALLBLADDER: Within normal limits.  SPLEEN: Within normal limits.  PANCREAS: Within normal limits.  ADRENALS: Within normal limits.  KIDNEYS/URETERS: Left renal parenchymal scarring and mild perinephric   stranding similar to the prior exam. No hydronephrosis.    BLADDER: Partially distended which limits evaluation of the wall   thickness.  REPRODUCTIVE ORGANS: Prostate gland within normal limits.    BOWEL: Subtotal colectomy with rectal stump and right lower quadrant   ileostomy, unchanged. No bowel obstruction. No significant bowel wall   thickening.  PERITONEUM: No ascites.  VESSELS: 2.7 cm hypodense collection adjacent to the right common femoral   artery is redemonstrated, however, appears to have a cluster of contrast   within a concerning for recurrent pseudoaneurysm. The abdominal aorta is   normal in caliber. The mesenteric vessels appear patent. Retroaortic left   renal vein. Filter within the IVC  RETROPERITONEUM: No lymphadenopathy.  ABDOMINAL WALL: Within normal limits.  BONES: No acute osseous finding.    IMPRESSION:  CTPA: No pulmonary embolism. Mild interval increased size of bilateral   pulmonary nodules with new right lower lobe 6 mm nodule.    CT abdomen/pelvis: Similar intraperitoneal findings compared to   9/23/2023. No bowel obstruction or wall thickening.    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 This can be confirmed   with ultrasound.    --- End of Report ---

## 2023-10-09 NOTE — SBIRT NOTE ADULT - NSSBIRTOFTENALCOHOL_GEN_A_CORE
Pt reports he had one "episode" of binge ETOH use last month after 2 year sobriety, reports he has not had a drink since then./Never

## 2023-10-09 NOTE — CONSULT NOTE ADULT - CONVERSATION DETAILS
The role of Palliative medicine was reviewed as we have been following the pt on previous adm. He returns to the hospital R/T stressors at home where he resides with his elderly mother who suffers from dementia. He cont to have increased abd/stoma pain. He recently went to a new pain management Dr and has been satisfied with the changes made in his pain regiment. He wants to cont to pursue his chemo although he is missing his appointment today. Will cont to follow

## 2023-10-09 NOTE — PROGRESS NOTE ADULT - ASSESSMENT
53 y/o M with MH significant for Stage IV colon cancer with metastases to the liver and lung, currently on chemotherapy, s/p ileostomy and revision 04/2023 and subtotal colectomy for perforated cecal mass 2022, multiple readmissions for recurrent/persistent abdominal pain, poor PO intake      # Metastatic Colon CA to Liver & Lung, S/p Ileostomy    - Dx 05/2022 after going to ED for worsening abdominal pain  - 05/19/22 CT AP with apparent focal mural thickening at the cecum/proximal ascending colon with focal tumor invasion/extension from the cecum to the sigmoid colon. Multiple hypodense lesions with calcifications in both lobes of the liver with the largest measuring 5.8 x 6.0 cm in hepatic segment.  - 05/19/22: underwent exploratory laparotomy, total colectomy, end ileostomy, biopsy of liver, and biopsy of retroperitoneum.  - Path: Portion of terminal ileum, cecum with adherent sigmoid ----> Poorly differentiated infiltrating adenocarcinoma measuring 9.0 cm. Adenocarcinoma infiltrates through the bowel wall and through the visceral peritoneum and infiltrates the adherent sigmoid colon 3 of 17 lymph nodes are positive for metastatic carcinoma. Lymphovascular space & Perineural invasion is identified. The surgical margins negative.   - Appendix with serosal tumor implants and acute inflammation. Peritonitis. Liver, biopsy ---> Metastatic adenocarcinoma.   - Retroperitoneal biopsy ----> Adenocarcinoma with necrosis. No loss of nuclear expression of MMR proteins (MLH1, MSH2, MSH6, and PMS2). Staging [id number].  - 06/22/2022: Started FOLFIRI/sunny with most recent dose on 09/11/23 ---> was due 09/25/23, missed the appointment; called instead to request refills for Oxycodone, Lovenox, lidocaine patch and Fentanyl prior to due date 09/30/23    - During most recent hospitalization ---> self reported non compliance with Lovenox injections and considering stopping chemotherapy / cancer directed therapy   - He continues to express ongoing concerns about current living arrangements, inability to adequately care for himself, manage hydration/pain/etc ; SW / Case management consulted  - Palliative consulted     - No plan for inpatient cancer directed therapy at this time ---> will again discuss his desire to continue systemic txs  - Main inpatient goal should be pain management and addressing social issues if possible   - Continue supportive measures as necessary      # Known LLE Ext Iliac Artery Occlusion & RLE Pseudoaneurysm     - Dx during previous admission 07/2023  - Patient was taking DOAC (Eliquis) for prior VTE in setting of metastatic disease but developed arterial thrombosis  - 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; LAC/APLS labs negative   - Patient receiving Bevacizumab which also rarely can contribute to thrombotic risk   - LLE angiogram with Vascular Sx 07/10; unable to traverse lesion  - Transitioned to Lovenox 90 q 12h 07/13/23 ----> has hx of non compliance   - S/p IVC filter 07/17/23  - Has been evaluated by Vascular during previous admissions ---> consulted again      # Ongoing Recurrent Abdominal Pain    - Intermittent ongoing symptoms for the past few months, reason for previous admission 07/2023  - Colorectal Sx has evaluated during previous admissions  - Most recent CT imaging 10/07 revealed no pulmonary embolism. Mild interval increased size of bilateral pulmonary nodules with new right lower lobe 6 mm nodule. Similar intraperitoneal findings compared to previous imaging. No bowel obstruction or wall thickening.    - He has had multiple pain medication regimen changes with little to no improvement ---> was supposed to follow up with new outpatient PM & R provider 10/03  - Palliative has followed with previous admissions --->consulted again    - Recent admissions note binge drinking, marijuana, requesting oxycodone refills early; urine toxicology positive for Cocaine, THC, Benzodiazapine and Opiates during  - Ethical issues arose during previous admission given ongoing intractable cancer related symptoms/pain with suboptimal tx leading to suspected narcotic/illicit drug abuse   - Ethics evaluated patient, see note from previous admission    - Inpatient goal is pain management and determining additional GOC regarding cancer directed therapy

## 2023-10-09 NOTE — CONSULT NOTE ADULT - ASSESSMENT
51 y/o M with MH significant for Stage IV colon cancer with metastases to the liver and lung, currently on chemotherapy, s/p ileostomy and revision 04/2023 and subtotal colectomy for perforated cecal mass 2022, multiple readmissions for recurrent/persistent abdominal pain, poor PO intake      # Metastatic Colon CA to Liver & Lung, S/p Ileostomy    - Dx 05/2022 after going to ED for worsening abdominal pain  - 05/19/22 CT AP with apparent focal mural thickening at the cecum/proximal ascending colon with focal tumor invasion/extension from the cecum to the sigmoid colon. Multiple hypodense lesions with calcifications in both lobes of the liver with the largest measuring 5.8 x 6.0 cm in hepatic segment.  - 05/19/22: underwent exploratory laparotomy, total colectomy, end ileostomy, biopsy of liver, and biopsy of retroperitoneum.  - Path: Portion of terminal ileum, cecum with adherent sigmoid ----> Poorly differentiated infiltrating adenocarcinoma measuring 9.0 cm. Adenocarcinoma infiltrates through the bowel wall and through the visceral peritoneum and infiltrates the adherent sigmoid colon 3 of 17 lymph nodes are positive for metastatic carcinoma. Lymphovascular space & Perineural invasion is identified. The surgical margins negative.   - Appendix with serosal tumor implants and acute inflammation. Peritonitis. Liver, biopsy ---> Metastatic adenocarcinoma.   - Retroperitoneal biopsy ----> Adenocarcinoma with necrosis. No loss of nuclear expression of MMR proteins (MLH1, MSH2, MSH6, and PMS2). Staging [id number].  - 06/22/2022: Started FOLFIRI/sunny with most recent dose on 09/11/23 ---> was due 09/25/23, missed the appointment; called instead to request refills for Oxycodone, Lovenox, lidocaine patch and Fentanyl prior to due date 09/30/23    - During most recent hospitalization ---> self reported non compliance with Lovenox injections and considering stopping chemotherapy / cancer directed therapy   - He continues to express ongoing concerns about current living arrangements, inability to adequately care for himself, manage hydration/pain/etc ; SW / Case management consulted  - Palliative consulted     - No plan for inpatient cancer directed therapy at this time ---> will again discuss his desire to continue systemic txs  - Main inpatient goal should be pain management and addressing social issues if possible   - Continue supportive measures as necessary      # Known LLE Ext Iliac Artery Occlusion & RLE Pseudoaneurysm     - Dx during previous admission 07/2023  - Patient was taking DOAC (Eliquis) for prior VTE in setting of metastatic disease but developed arterial thrombosis  - 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; LAC/APLS labs negative   - Patient receiving Bevacizumab which also rarely can contribute to thrombotic risk   - LLE angiogram with Vascular Sx 07/10; unable to traverse lesion  - Transitioned to Lovenox 90 q 12h 07/13/23 ----> has hx of non compliance   - S/p IVC filter 07/17/23  - Has been evaluated by Vascular during previous admissions ---> consulted again      # Ongoing Recurrent Abdominal Pain    - Intermittent ongoing symptoms for the past few months, reason for previous admission 07/2023  - Colorectal Sx has evaluated during previous admissions  - Most recent CT imaging 10/07 revealed no pulmonary embolism. Mild interval increased size of bilateral pulmonary nodules with new right lower lobe 6 mm nodule. Similar intraperitoneal findings compared to previous imaging. No bowel obstruction or wall thickening.    - He has had multiple pain medication regimen changes with little to no improvement ---> was supposed to follow up with new outpatient PM&R provider 10/03  - Palliative has followed with previous admissions ---> consulted again    - Recent admissions note binge drinking, marijuana, requesting oxycodone refills early; urine toxicology positive for Cocaine, THC, Benzodiazapine and Opiates during  - Ethical issues arose during previous admission given ongoing intractable cancer related symptoms/pain with suboptimal tx leading to suspected narcotic/illicit drug abuse   - Ethics evaluated patient, see note from previous admission    - Inpatient goal is pain management and determining additional GOC regarding cancer directed therapy    
    HPI: Pt is a 52y old Male with a PMHx of metastatic colon CA undergoing continued tx with Primary Onc Dr Serrano s/p ileostomy, PE/DVTs, arterial occlusion presented to the ED with recurrent abdominal pain that has been ongoing since dx 05/2022. Patient was recently admitted from 09/25 - 10/01 for similar presentation, currently lives at home with his mother who has advanced dementia and states he is no longer able to care for her and is requesting SW support. On last admission patient was discharged with Oxycodone 10mg q6hrs and 15mg QHS but according to him, he followed up with new PM&R provider outpatient who changed to OxyContin 20mg BID with Fentanyl patch 37.5mcg q72 hour. We have been following this pt and are asked to see in consultation.  10/9/23 Seen and examined at bedside. States pain is currently controlled with Oxycontin 20 mg PO Q12H. He stated that his new pain management Dr has discontinued Fentanyl transderm. Denies dyspnea    Assessment and Plan:    1) Pain  -Abd R/T cancer  -Cont Oxycontin 20 mg PO Q12H  -Cont Morphine 4 mg IVP Q6H PRN severe  -Supportive care    2) Metastatic Colon cancer  -Currently on treatment with Dr Serrano  -Imaging reviewed  -Mets to lung/liver    3) Known LLE Ext Iliac Artery Occlusion & RLE Pseudoaneurysm   - Dx during previous admission 07/2023  - Patient was taking DOAC (Eliquis) for prior VTE in setting of metastatic disease but developed arterial thrombosis  - S/p IVC filter 07/17/23  - Has been evaluated by Vascular during previous admissions ---> consulted again    4) Advanced Directives  -Pt with capacity  -Sister Kerry Palladino  -[facility] done  -Cont cancer treatment  -No limits set    Time Spent: 75 minutes including the care, coordination and counseling of this patient, 50% of which was spent coordinating and counseling.

## 2023-10-09 NOTE — SBIRT NOTE ADULT - NSSBIRTDRGUSEDOTHTHAN_GEN_A_CORE
Patient utilizes marijuana for pain control, denies all other illicit use at this time. Pt endorses one "episode" of cocaine use last month (same time as ETOH use), denies any additional use./No

## 2023-10-09 NOTE — CHART NOTE - NSCHARTNOTEFT_GEN_A_CORE
Notified by RN, last dose of Lovenox was given at 8 am. No doses received overnight. Will give STAT dose now and c/w Q12H.

## 2023-10-09 NOTE — CHART NOTE - NSCHARTNOTEFT_GEN_A_CORE
HPI:  52 year old male with PMHx of doris ca, colon ca with mets to liver, s/p ileostomy, PE , DVTs  presented to the ED with abdominal pain that has been ongoing since diagnosis in may 2022. Patient was recently admitted from 09/25-10/01 for similar presentation.  (07 Oct 2023 23:08)    PERTINENT PMH REVIEWED:  [ X ] YES [ ] NO           Primary Contact: HCP sister Idania     Mental Status: [ X ] Alert  [ X ] Oriented [  ] Confused [  ] Lethargic [  ]  Concerns of Depression [  ]- situational, shared being frustrated with his current situation and having to come back and forth to the hospital    Anxiety [   ]- not identified   Baseline ADLs (prior to admission):  Independent [ ] moderately [ X ] fully   Dependent   [ ] moderately [ ] fully    Anticipated Grief: Patient [ X ] Family [  ]    Caregiver McCaskill Assessed: Yes [ X ] No [  ]    Mormonism: Unknown     Spiritual Concerns: Not identified     Goals of Care: Comfort [  ] Rehabilitation [  ] Curative [  ] Life Prolonging [ X ]    Previous Services: None    ADVANCE DIRECTIVES:  Full Code    Anticipated D/C Plan: return home and continue to follow with Oncology Dr. Serrano                     Summary:     This SW met with pt. at bedside to follow up and provide support. Pt. known to our team from previous admissions. Pt. has been residing home with his mother and brother. He shared his living situation with his  Mother who has dementia and how it has been a difficult living situation. He has been trying to look for ASL/Adult Home however, has been unsuccessful.  Pt. discussed that he missed his last chemo appointment due to begin in the hospital and his next one was scheduled for today which he will likely miss. He discussed the frustrations with being behind on treatments as well as the cycle of continuing to come back and forth to the hospital. Feelings explored and support provided.  Pt. is not ready to set any limits at this time. Plan is for pt. to return home and follow up with Oncology. Emotional support provided. Our team will continue to follow.

## 2023-10-09 NOTE — PROGRESS NOTE ADULT - SUBJECTIVE AND OBJECTIVE BOX
CC: Abdominal Pain  History of Present Illness:   52 year old male with PMHx of doris ca, colon ca with mets to liver, s/p ileostomy, PE , DVTs  presented to the ED with abdominal pain that has been ongoing since diagnosis in may 2022. Patient was recently admitted from 09/25-10/01 for similar presentation. Patient lives at home with his mother who has advanced dementia and states he is no longer able to care for her and is requesting SW support. On last admission patient was discharged with Oxycodone 10 q6hrs and 15mg QHS but he states his outpatient provider changed it to OxyContin 20mg BID with Fentanyl patch 37.5mcg q72 hour.     S:  10/8: Pt seen ambulating on 5E floors.  Pt very emotional, states he has a lot going on.  He feels very anxious being so high up on 5E, anxious to have his room changed.  Pt overwhelmed by his medical problems.  10/9: Calm today, still feeling overwhelmed with his home situation.  States his mother is calling nonstop at hospital.  States she has dementia and is a drinker.  Pt also frustrated concerning his changes made to his pain regimen.  Currently appears comfortable.  Unhappy that he is sharing a room with 3 other patients.    REVIEW OF SYSTEMS: All other review of systems is negative unless indicated above.    Vital Signs Last 24 Hrs  T(C): 36.4 (09 Oct 2023 08:40), Max: 37.1 (08 Oct 2023 21:20)  T(F): 97.5 (09 Oct 2023 08:40), Max: 98.7 (08 Oct 2023 21:20)  HR: 55 (09 Oct 2023 08:40) (55 - 62)  BP: 110/64 (09 Oct 2023 08:40) (102/62 - 114/64)  BP(mean): 70 (09 Oct 2023 07:04) (70 - 76)  RR: 18 (09 Oct 2023 08:40) (17 - 18)  SpO2: 100% (09 Oct 2023 08:40) (98% - 100%)    Parameters below as of 09 Oct 2023 08:40  Patient On (Oxygen Delivery Method): room air        PHYSICAL EXAM:    Constitutional: NAD, awake and alert  HEENT: PERR, EOMI, Normal Hearing, MMM  Neck: Soft and supple  Respiratory: Breath sounds are clear bilaterally, No wheezing, rales or rhonchi  Cardiovascular: S1 and S2, regular rate and rhythm, no Murmurs, gallops or rubs  Gastrointestinal: Bowel Sounds present, soft, nontender, nondistended, ostomy in place,   Extremities: No peripheral edema  Neurological: A/O x 3, no focal deficits in my limited exam      MEDICATIONS  (STANDING):  cholecalciferol 2000 Unit(s) Oral daily  enoxaparin Injectable 90 milliGRAM(s) SubCutaneous every 12 hours  escitalopram 5 milliGRAM(s) Oral daily  influenza   Vaccine 0.5 milliLiter(s) IntraMuscular once  oxyCODONE  ER Tablet 20 milliGRAM(s) Oral every 12 hours  senna 1 Tablet(s) Oral two times a day    MEDICATIONS  (PRN):  acetaminophen     Tablet .. 650 milliGRAM(s) Oral every 6 hours PRN Temp greater or equal to 38C (100.4F), Mild Pain (1 - 3)  ALPRAZolam 0.5 milliGRAM(s) Oral two times a day PRN anxiety  aluminum hydroxide/magnesium hydroxide/simethicone Suspension 30 milliLiter(s) Oral every 4 hours PRN Dyspepsia  loperamide 2 milliGRAM(s) Oral four times a day PRN for diarrhea  melatonin 3 milliGRAM(s) Oral at bedtime PRN Insomnia  morphine  - Injectable 4 milliGRAM(s) IV Push every 6 hours PRN breakthrough pain  naloxone 1 mG/mL Injection for Intranasal Use 4 milliGRAM(s) IntraNasal once PRN opioid overdose  ondansetron Injectable 4 milliGRAM(s) IV Push every 8 hours PRN Nausea and/or Vomiting                                13.0   7.56  )-----------( 192      ( 08 Oct 2023 08:35 )             41.0     10-08    138  |  103  |  8   ----------------------------<  107<H>  3.7   |  30  |  0.79    Ca    9.3      08 Oct 2023 08:35    TPro  7.9  /  Alb  3.5  /  TBili  0.5  /  DBili  x   /  AST  19  /  ALT  35  /  AlkPhos  123<H>  10-08    CAPILLARY BLOOD GLUCOSE        LIVER FUNCTIONS - ( 08 Oct 2023 08:35 )  Alb: 3.5 g/dL / Pro: 7.9 gm/dL / ALK PHOS: 123 U/L / ALT: 35 U/L / AST: 19 U/L / GGT: x             Urinalysis Basic - ( 08 Oct 2023 08:35 )    Color: x / Appearance: x / SG: x / pH: x  Gluc: 107 mg/dL / Ketone: x  / Bili: x / Urobili: x   Blood: x / Protein: x / Nitrite: x   Leuk Esterase: x / RBC: x / WBC x   Sq Epi: x / Non Sq Epi: x / Bacteria: x        Assessment/Plan:  52 year old male with PMHx of doris ca, colon ca with mets to liver, s/p ileostomy, PE , DVTs  presented to the ED with abdominal pain that has been ongoing since diagnosis in may 2022 being admitted for:    #Abdominal Pain  - c/w Oxycontine 20mg BID  - c/w Fentanyl patch 37.5 mcg q72 hours  - c/w IV Morphine 2mg [id number] PRN Breakthrough pain   - CT Angio Abd shows 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 Serrano, oncology following   - palliative following for pain management and support     #Social  - patient has poor home situation; brother not actively present and mother has worsening dementia   - SW/CM consulted for potential placement services  - Palliative care consult for pain regimen optimization and to assess need for placement   - of note on last admission patient admitted to cocaine and EtOH abuse 2/2 to depression from ongoing diagnosis and depression but denies current use  - will c/w Lexapro 5mg QD     #DVT PPx  - c/w Lovenox 90mg BID              
Reason for Admission: Abdominal Pain  History of Present Illness:   52 year old male with PMHx of doris ca, colon ca with mets to liver, s/p ileostomy, PE , DVTs  presented to the ED with abdominal pain that has been ongoing since diagnosis in may 2022. Patient was recently admitted from 09/25-10/01 for similar presentation. Patient lives at home with his mother who has advanced dementia and states he is no longer able to care for her and is requesting SW support. On last admission patient was discharged with Oxycodone 10 q6hrs and 15mg QHS but he states his outpatient provider changed it to OxyContin 20mg BID with Fentanyl patch 37.5mcg q72 hour.     S:  10/8: Pt seen ambulating on 5E floors.  Pt very emotional, states he has a lot going on.  He feels very anxious being so high up on 5E, anxious to have his room changed.  Pt overwhelmed by his medical problems.    REVIEW OF SYSTEMS: All other review of systems is negative unless indicated above.    Vital Signs Last 24 Hrs  T(C): 36.7 (08 Oct 2023 11:25), Max: 37 (07 Oct 2023 17:45)  T(F): 98.1 (08 Oct 2023 11:25), Max: 98.6 (07 Oct 2023 17:45)  HR: 59 (08 Oct 2023 11:25) (59 - 89)  BP: 114/66 (08 Oct 2023 11:25) (114/66 - 123/69)  BP(mean): --  RR: 18 (08 Oct 2023 11:25) (17 - 20)  SpO2: 100% (08 Oct 2023 11:25) (95% - 100%)    Parameters below as of 08 Oct 2023 11:25  Patient On (Oxygen Delivery Method): room air      PHYSICAL EXAM:    Constitutional: NAD, awake and alert  HEENT: PERR, EOMI, Normal Hearing, MMM  Neck: Soft and supple  Respiratory: Breath sounds are clear bilaterally, No wheezing, rales or rhonchi  Cardiovascular: S1 and S2, regular rate and rhythm, no Murmurs, gallops or rubs  Gastrointestinal: Bowel Sounds present, soft, nontender, nondistended, ostomy in place,   Extremities: No peripheral edema  Neurological: A/O x 3, no focal deficits in my limited exam    MEDICATIONS  (STANDING):  cholecalciferol 2000 Unit(s) Oral daily  enoxaparin Injectable 90 milliGRAM(s) SubCutaneous every 12 hours  escitalopram 5 milliGRAM(s) Oral daily  fentaNYL   Patch  12 MICROgram(s)/Hr 1 Patch Transdermal every 72 hours  fentaNYL   Patch  25 MICROgram(s)/Hr 1 Patch Transdermal every 72 hours  influenza   Vaccine 0.5 milliLiter(s) IntraMuscular once  oxyCODONE  ER Tablet 20 milliGRAM(s) Oral every 12 hours  senna 1 Tablet(s) Oral two times a day    MEDICATIONS  (PRN):  acetaminophen     Tablet .. 650 milliGRAM(s) Oral every 6 hours PRN Temp greater or equal to 38C (100.4F), Mild Pain (1 - 3)  aluminum hydroxide/magnesium hydroxide/simethicone Suspension 30 milliLiter(s) Oral every 4 hours PRN Dyspepsia  loperamide 2 milliGRAM(s) Oral four times a day PRN for diarrhea  melatonin 3 milliGRAM(s) Oral at bedtime PRN Insomnia  naloxone 1 mG/mL Injection for Intranasal Use 4 milliGRAM(s) IntraNasal once PRN opioid overdose  ondansetron Injectable 4 milliGRAM(s) IV Push every 8 hours PRN Nausea and/or Vomiting                                13.0   7.56  )-----------( 192      ( 08 Oct 2023 08:35 )             41.0     10-08    138  |  103  |  8   ----------------------------<  107<H>  3.7   |  30  |  0.79    Ca    9.3      08 Oct 2023 08:35    TPro  7.9  /  Alb  3.5  /  TBili  0.5  /  DBili  x   /  AST  19  /  ALT  35  /  AlkPhos  123<H>  10-08    CAPILLARY BLOOD GLUCOSE        LIVER FUNCTIONS - ( 08 Oct 2023 08:35 )  Alb: 3.5 g/dL / Pro: 7.9 gm/dL / ALK PHOS: 123 U/L / ALT: 35 U/L / AST: 19 U/L / GGT: x             Urinalysis Basic - ( 08 Oct 2023 08:35 )    Color: x / Appearance: x / SG: x / pH: x  Gluc: 107 mg/dL / Ketone: x  / Bili: x / Urobili: x   Blood: x / Protein: x / Nitrite: x   Leuk Esterase: x / RBC: x / WBC x   Sq Epi: x / Non Sq Epi: x / Bacteria: x          Assessment/Plan:  52 year old male with PMHx of doris ca, colon ca with mets to liver, s/p ileostomy, PE , DVTs  presented to the ED with abdominal pain that has been ongoing since diagnosis in may 2022 being admitted for:    #Abdominal Pain  - c/w Oxycontine 20mg BID  - c/w Fentanyl patch 37.5 mcg q72 hours  - c/w IV Morphine 2mg [id number] PRN Breakthrough pain   - CT Angio Abd shows 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 Serrano, oncology following   - palliative eval for pain management and support     #Social  - patient has poor home situation; brother not actively present and mother has worsening dementia   - SW/CM consulted for potential placement services  - Palliative care consult for pain regimen optimization and to assess need for placement   - of note on last admission patient admitted to cocaine and EtOH abuse 2/2 to depression from ongoing diagnosis and depression but denies current use  - will c/w Lexapro 5mg QD     #DVT PPx  - c/w Lovenox 90mg BID          
HPI:    51 y/o M with PMHx of metastatic colon CA undergoing continued tx with Primary Onc Dr Serrano s/p ileostomy, PE/DVTs, arterial occlusion presented to the ED with recurrent abdominal pain that has been ongoing since dx 05/2022. Patient was recently admitted from 09/25 - 10/01 for similar presentation, currently lives at home with his mother who has advanced dementia and states he is no longer able to care for her and is requesting SW support. On last admission patient was discharged with Oxycodone 10mg q6hrs and 15mg QHS but according to him, he followed up with new PM&R provider outpatient who changed to OxyContin 20mg BID with Fentanyl patch 37.5mcg q72 hour.    10/08/2023: Seen at bedside, no acute distress, but upset about recurrent hospitalizations     10/9/2023- Seen at bedside, OOB to chair, in no acute distress.    PAST MEDICAL & SURGICAL HISTORY:    Cancer, colon with liver/lung mets    S/P ileostomy    Metastasis to liver    Pulmonary embolism    S/P colon resection    H/O ileostomy        MEDICATIONS  (STANDING):    cholecalciferol 2000 Unit(s) Oral daily  enoxaparin Injectable 90 milliGRAM(s) SubCutaneous every 12 hours  escitalopram 5 milliGRAM(s) Oral daily  influenza   Vaccine 0.5 milliLiter(s) IntraMuscular once  oxyCODONE  ER Tablet 20 milliGRAM(s) Oral every 12 hours  senna 1 Tablet(s) Oral two times a day    MEDICATIONS  (PRN):    acetaminophen     Tablet .. 650 milliGRAM(s) Oral every 6 hours PRN Temp greater or equal to 38C (100.4F), Mild Pain (1 - 3)  ALPRAZolam 0.5 milliGRAM(s) Oral two times a day PRN anxiety  aluminum hydroxide/magnesium hydroxide/simethicone Suspension 30 milliLiter(s) Oral every 4 hours PRN Dyspepsia  loperamide 2 milliGRAM(s) Oral four times a day PRN for diarrhea  melatonin 3 milliGRAM(s) Oral at bedtime PRN Insomnia  morphine  - Injectable 4 milliGRAM(s) IV Push every 6 hours PRN breakthrough pain  naloxone 1 mG/mL Injection for Intranasal Use 4 milliGRAM(s) IntraNasal once PRN opioid overdose  ondansetron Injectable 4 milliGRAM(s) IV Push every 8 hours PRN Nausea and/or Vomiting      ALLERGIES:    No Known Drug Allergies (Unknown)      FAMILY HISTORY:    dementia (Mother)      REVIEW OF SYSTEMS:    Constitutional, Eyes, ENT, Cardiovascular, Respiratory, Gastrointestinal, Genitourinary, Musculoskeletal, Integumentary, Neurological, Psychiatric, Endocrine, Heme/Lymph and Allergic/Immunologic review of systems are otherwise negative except as noted in HPI.       VITALS SIGNS:    T(C): 36.4 (09 Oct 2023 08:40), Max: 37.1 (08 Oct 2023 21:20)  T(F): 97.5 (09 Oct 2023 08:40), Max: 98.7 (08 Oct 2023 21:20)  HR: 55 (09 Oct 2023 08:40) (55 - 62)  BP: 110/64 (09 Oct 2023 08:40) (102/62 - 114/64)  BP(mean): 70 (09 Oct 2023 07:04) (70 - 76)  RR: 18 (09 Oct 2023 08:40) (17 - 18)  SpO2: 100% (09 Oct 2023 08:40) (98% - 100%)    Parameters below as of 09 Oct 2023 08:40  Patient On (Oxygen Delivery Method): room air      PHYSICAL EXAM:    Constitutional: no acute distress  Eyes: no conjunctival infection, anicteric.   ENT: pharynx is unremarkable   Neck: supple without JVD  Pulmonary: clear to auscultation bilaterally  Cardiac: RRR  Vascular: no calf tenderness, venous stasis changes, varices.   Abdomen: normoactive bowel sounds, soft and nontender, no hepatosplenomegaly or masses appreciated; ostomy in place.  Lymphatic: no peripheral adenopathy appreciated.   Musculoskeletal: full range of motion and no deformities appreciated.   Skin: normal appearance, no rash/erythema.   Neurology: awake, alert, oriented ; no obvious focal deficits       LABS:    CBC Full  -  ( 08 Oct 2023 08:35 )  WBC Count : 7.56 K/uL  RBC Count : 4.58 M/uL  Hemoglobin : 13.0 g/dL  Hematocrit : 41.0 %  Platelet Count - Automated : 192 K/uL  Mean Cell Volume : 89.5 fl  Mean Cell Hemoglobin : 28.4 pg  Mean Cell Hemoglobin Concentration : 31.7 gm/dL  Auto Neutrophil # : 5.81 K/uL  Auto Lymphocyte # : 0.89 K/uL  Auto Monocyte # : 0.65 K/uL  Auto Eosinophil # : 0.15 K/uL  Auto Basophil # : 0.03 K/uL  Auto Neutrophil % : 76.8 %  Auto Lymphocyte % : 11.8 %  Auto Monocyte % : 8.6 %  Auto Eosinophil % : 2.0 %  Auto Basophil % : 0.4 %    10-07    139  |  105  |  7   ----------------------------<  99  3.5   |  29  |  0.80    Ca    9.1      07 Oct 2023 18:37    TPro  7.6  /  Alb  3.4  /  TBili  0.4  /  DBili  x   /  AST  15  /  ALT  32  /  AlkPhos  123<H>  10-07      Urinalysis Basic - ( 08 Oct 2023 05:45 )    Color: Yellow / Appearance: Clear / SG: >1.030 / pH: x  Gluc: x / Ketone: Negative mg/dL  / Bili: Negative / Urobili: 0.2 mg/dL   Blood: x / Protein: Trace mg/dL / Nitrite: Negative   Leuk Esterase: Negative / RBC: x / WBC x   Sq Epi: x / Non Sq Epi: x / Bacteria: x        RADIOLOGY & ADDITIONAL STUDIES:        CT Angio Chest PE Protocol w/ IV Cont (10.07.23 @ 20:26)    FINDINGS:    CHEST:    LUNGS AND LARGE AIRWAYS: Patent central airways. Mild interval increased   size of pulmonary nodules. Stable 2 mm nodule (3-39). Representative   nodules demonstrating change include:  Right upper lobe: 5 mm nodule (3-26) previously 3 mm, 4 mm nodule (3-33)   previously 1 mm  Right lower lobe: 14 mm nodule (3-44) previously 8 mm, 8mm nodule (3-40)   previously 5 mm, 5 and 4 mm nodules (3-64) previously 4 and 3 mm, new 6   mm nodule (3-57)  Left upper lobe: 4 mm nodule (3-32) previously 2 mm relatively stable 7   mm apical nodule. No evidence of pneumonia.  PLEURA: No pleural effusion.  VESSELS: Adequate opacification of the pulmonary vasculature. No filling   defect present to suggest acute pulmonary embolism.  HEART: Heart size is normal. No pericardial effusion.  MEDIASTINUM AND RAMONA: No lymphadenopathy.  CHEST WALL AND LOWER NECK: Right chest wall Helqmp-g-Ccoe catheter.    ABDOMEN AND PELVIS:    LIVER: Multiple calcified and noncalcified liver metastatic lesions are   not significantly changed.  BILE DUCTS: Normal caliber.  GALLBLADDER: Within normal limits.  SPLEEN: Within normal limits.  PANCREAS: Within normal limits.  ADRENALS: Within normal limits.  KIDNEYS/URETERS: Left renal parenchymal scarring and mild perinephric   stranding similar to the prior exam. No hydronephrosis.    BLADDER: Partially distended which limits evaluation of the wall   thickness.  REPRODUCTIVE ORGANS: Prostate gland within normal limits.    BOWEL: Subtotal colectomy with rectal stump and right lower quadrant   ileostomy, unchanged. No bowel obstruction. No significant bowel wall   thickening.  PERITONEUM: No ascites.  VESSELS: 2.7 cm hypodense collection adjacent to the right common femoral   artery is re demonstrated, however, appears to have a cluster of contrast   within a concerning for recurrent pseudoaneurysm. The abdominal aorta is   normal in caliber. The mesenteric vessels appear patent. Retroaortic left   renal vein. Filter within the IVC  RETROPERITONEUM: No lymphadenopathy.  ABDOMINAL WALL: Within normal limits.  BONES: No acute osseous finding.    IMPRESSION:  CTPA: No pulmonary embolism. Mild interval increased size of bilateral   pulmonary nodules with new right lower lobe 6 mm nodule.    CT abdomen/pelvis: Similar intraperitoneal findings compared to   9/23/2023. No bowel obstruction or wall thickening.    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 This can be confirmed   with ultrasound.          US Duplex Arterial Lower Ext Ltd, Right (10.08.23 @ 00:15)    FINDINGS: There is persistent pseudoaneurysm off of the right common   femoral artery. Thrombosed portion measures   3.3 x 2.1 x 1.8 cm. Non thrombosed portion measures   1.2x 0.8 cm with the neck diameter of   0.2 cm.    IMPRESSION:    Pseudoaneurysm at the right groin as described.

## 2023-10-10 ENCOUNTER — TRANSCRIPTION ENCOUNTER (OUTPATIENT)
Age: 53
End: 2023-10-10

## 2023-10-10 VITALS
TEMPERATURE: 98 F | HEART RATE: 57 BPM | SYSTOLIC BLOOD PRESSURE: 113 MMHG | DIASTOLIC BLOOD PRESSURE: 63 MMHG | RESPIRATION RATE: 18 BRPM | OXYGEN SATURATION: 98 %

## 2023-10-10 LAB
CULTURE RESULTS: SIGNIFICANT CHANGE UP
SPECIMEN SOURCE: SIGNIFICANT CHANGE UP

## 2023-10-10 PROCEDURE — 99239 HOSP IP/OBS DSCHRG MGMT >30: CPT

## 2023-10-10 RX ADMIN — ESCITALOPRAM OXALATE 5 MILLIGRAM(S): 10 TABLET, FILM COATED ORAL at 09:28

## 2023-10-10 RX ADMIN — MORPHINE SULFATE 4 MILLIGRAM(S): 50 CAPSULE, EXTENDED RELEASE ORAL at 04:57

## 2023-10-10 RX ADMIN — Medication 2000 UNIT(S): at 09:29

## 2023-10-10 RX ADMIN — ENOXAPARIN SODIUM 90 MILLIGRAM(S): 100 INJECTION SUBCUTANEOUS at 06:23

## 2023-10-10 RX ADMIN — OXYCODONE HYDROCHLORIDE 20 MILLIGRAM(S): 5 TABLET ORAL at 09:28

## 2023-10-10 RX ADMIN — OXYCODONE HYDROCHLORIDE 20 MILLIGRAM(S): 5 TABLET ORAL at 10:15

## 2023-10-10 RX ADMIN — MORPHINE SULFATE 4 MILLIGRAM(S): 50 CAPSULE, EXTENDED RELEASE ORAL at 11:45

## 2023-10-10 RX ADMIN — MORPHINE SULFATE 4 MILLIGRAM(S): 50 CAPSULE, EXTENDED RELEASE ORAL at 11:32

## 2023-10-10 RX ADMIN — MORPHINE SULFATE 4 MILLIGRAM(S): 50 CAPSULE, EXTENDED RELEASE ORAL at 04:27

## 2023-10-10 NOTE — DISCHARGE NOTE NURSING/CASE MANAGEMENT/SOCIAL WORK - NSDCPEFALRISK_GEN_ALL_CORE
For information on Fall & Injury Prevention, visit: [email or web address] OR  [email or web address] OR  https://www.cdc.gov/steadi/patient.html

## 2023-10-10 NOTE — DISCHARGE NOTE PROVIDER - NSDCHOSPICE_GEN_A_CORE
Ceftriaxone Injection  What is this medicine?  CEFTRIAXONE (sef try AX one) is a cephalosporin antibiotic. It treats some infections caused by bacteria. It will not work for colds, the flu, or other viruses.  This medicine may be used for other purposes; ask your health care provider or pharmacist if you have questions.  COMMON BRAND NAME(S): Ceftrisol Plus, Rocephin  What should I tell my health care provider before I take this medicine?  They need to know if you have any of these conditions:  · any chronic illness  · bowel disease, like colitis  · both kidney and liver disease  · high bilirubin level in  patients  · an unusual or allergic reaction to ceftriaxone, other cephalosporin or penicillin antibiotics, foods, dyes, or preservatives  · pregnant or trying to get pregnant  · breast-feeding  How should I use this medicine?  This drug is injected into a muscle or a vein. It is usually given by a health care provider in a hospital or clinic setting.  If you get this drug at home, you will be taught how to prepare and give it. Use exactly as directed. Take it as directed on the prescription label at the same time every day. Keep taking it unless your health care provider tells you to stop.  It is important that you put your used needles and syringes in a special sharps container. Do not put them in a trash can. If you do not have a sharps container, call your pharmacist or health care provider to get one.  Talk to your health care provider about the use of this drug in children. While it may be prescribed for children as young as newborns for selected conditions, precautions do apply.  Overdosage: If you think you have taken too much of this medicine contact a poison control center or emergency room at once.  NOTE: This medicine is only for you. Do not share this medicine with others.  What if I miss a dose?  It is important not to miss your dose. Call your health care provider if you are unable to keep an  appointment. If you give yourself this drug at home and you miss a dose, take it as soon as you can. If it is almost time for your next dose, take only that dose. Do not take double or extra doses.  What may interact with this medicine?  Do not take this medicine with any of the following medications:  · intravenous calcium  This medicine may also interact with the following medications:  · birth control pills  This list may not describe all possible interactions. Give your health care provider a list of all the medicines, herbs, non-prescription drugs, or dietary supplements you use. Also tell them if you smoke, drink alcohol, or use illegal drugs. Some items may interact with your medicine.  What should I watch for while using this medicine?  Tell your doctor or health care provider if your symptoms do not improve or if they get worse.  This medicine may cause serious skin reactions. They can happen weeks to months after starting the medicine. Contact your health care provider right away if you notice fevers or flu-like symptoms with a rash. The rash may be red or purple and then turn into blisters or peeling of the skin. Or, you might notice a red rash with swelling of the face, lips or lymph nodes in your neck or under your arms.  Do not treat diarrhea with over the counter products. Contact your doctor if you have diarrhea that lasts more than 2 days or if it is severe and watery.  If you are being treated for a sexually transmitted disease, avoid sexual contact until you have finished your treatment. Having sex can infect your sexual partner.  Calcium may bind to this medicine and cause lung or kidney problems. Avoid calcium products while taking this medicine and for 48 hours after taking the last dose of this medicine.  What side effects may I notice from receiving this medicine?  Side effects that you should report to your doctor or health care professional as soon as possible:  · allergic reactions like  skin rash, itching or hives, swelling of the face, lips, or tongue  · breathing problems  · fever, chills  · irregular heartbeat  · pain when passing urine  · redness, blistering, peeling, or loosening of the skin, including inside the mouth  · seizures  · stomach pain, cramps  · unusual bleeding, bruising  · unusually weak or tired  Side effects that usually do not require medical attention (report to your doctor or health care professional if they continue or are bothersome):  · diarrhea  · dizzy, drowsy  · headache  · nausea, vomiting  · pain, swelling, irritation where injected  · stomach upset  · sweating  This list may not describe all possible side effects. Call your doctor for medical advice about side effects. You may report side effects to FDA at 7-129-ARG-6733.  Where should I keep my medicine?  Keep out of the reach of children and pets.  You will be instructed on how to store this drug. Protect from light. Throw away any unused drug after the expiration date.  NOTE: This sheet is a summary. It may not cover all possible information. If you have questions about this medicine, talk to your doctor, pharmacist, or health care provider.  © 2020 [employer]vier/Gold Standard (2020-07-23 18:29:21)     No

## 2023-10-10 NOTE — DISCHARGE NOTE PROVIDER - NSDCMRMEDTOKEN_GEN_ALL_CORE_FT
cholecalciferol 50 mcg (2000 intl units) oral tablet: 1 tab(s) orally once a day  enoxaparin 100 mg/mL injectable solution: 90 milligram(s) subcutaneously 2 times a day  Lexapro 5 mg oral tablet: 1 tab(s) orally once a day  loperamide 2 mg oral capsule: 2 cap(s) orally 4 times a day as needed for  diarrhea  OxyCONTIN 20 mg oral tablet, extended release: 1 tab(s) orally 2 times a day

## 2023-10-10 NOTE — DISCHARGE NOTE PROVIDER - NSDCCPCAREPLAN_GEN_ALL_CORE_FT
PRINCIPAL DISCHARGE DIAGNOSIS  Diagnosis: Abdominal tenderness  Assessment and Plan of Treatment: Chronic pain due to metastatic disease  -pain management, follow up with your pain doctor  -follow up with oncologist for further options

## 2023-10-10 NOTE — DISCHARGE NOTE PROVIDER - HOSPITAL COURSE
History of Present Illness:   52 year old male with PMHx of doris ca, colon ca with mets to liver, s/p ileostomy, PE , DVTs  presented to the ED with abdominal pain that has been ongoing since diagnosis in may 2022. Patient was recently admitted from 09/25-10/01 for similar presentation. Patient lives at home with his mother who has advanced dementia and states he is no longer able to care for her and is requesting SW support. On last admission patient was discharged with Oxycodone 10 q6hrs and 15mg QHS but he states his outpatient provider changed it to OxyContin 20mg BID with Fentanyl patch 37.5mcg q72 hour.     Hospital course:  Pt presents with acute on chronic pain due to his underlying metastatic disease.  Pt follows closely with an outpatient pain physician, currently on oxycontin, transitioned from fentanyl patch.  Pt did receive several doses of IV morphine while admited.  He is at his baseline at this time.  He was evaluated by oncology, plan at this time is to follow up in office for further management.  Pt was seen by palliative care team for social support.  Pt is HD stable and can be discharged home at this time.    REVIEW OF SYSTEMS: All other review of systems is negative unless indicated above.    Vital Signs Last 24 Hrs  T(C): 36.6 (10 Oct 2023 08:10), Max: 37.1 (09 Oct 2023 21:53)  T(F): 97.9 (10 Oct 2023 08:10), Max: 98.7 (09 Oct 2023 21:53)  HR: 57 (10 Oct 2023 08:10) (57 - 72)  BP: 113/63 (10 Oct 2023 08:10) (106/64 - 113/63)  BP(mean): --  RR: 18 (10 Oct 2023 08:10) (18 - 18)  SpO2: 98% (10 Oct 2023 08:10) (98% - 100%)    Parameters below as of 10 Oct 2023 08:10  Patient On (Oxygen Delivery Method): room air    PHYSICAL EXAM:    Constitutional: NAD, awake and alert  HEENT: PERR, EOMI, Normal Hearing, MMM  Neck: Soft and supple  Respiratory: Breath sounds are clear bilaterally, No wheezing, rales or rhonchi  Cardiovascular: S1 and S2, regular rate and rhythm, no Murmurs, gallops or rubs  Gastrointestinal: Bowel Sounds present, soft, nontender, nondistended, ostomy in place,   Extremities: No peripheral edema  Neurological: A/O x 3, no focal deficits in my limited exam    med/labs: Reviewed and interpreted     Assessment/Plan:  52 year old male with PMHx of doris ca, colon ca with mets to liver, s/p ileostomy, PE , DVTs  presented to the ED with abdominal pain that has been ongoing since diagnosis in may 2022 being admitted for:    #Abdominal Pain due to colon cancer with metastatic disease  - c/w Oxycontin 20mg BID  - hold fentanyl patch while on oxycontin  - received IV morphine for breakthrough.  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  No acute intervention at this time.  - Hem/Onc consult appreciated, no inpatient intervention, follow up in office.  - palliative following for pain management and support     #Social  - patient has poor home situation; brother not actively present and mother has worsening dementia   - will c/w Lexapro 5mg QD     #DVT PPx  - c/w Lovenox 90mg BID    d/c home with outpatient follow up.  Pt has oncology and pain management follow up.    Attending Statement: 40 minutes spent on total encounter and discharge planning.

## 2023-10-10 NOTE — DISCHARGE NOTE NURSING/CASE MANAGEMENT/SOCIAL WORK - NSTRANSFERBELONGINGSRESP_GEN_A_NUR
Spoke to pt on 11/24, she will call back to schedule Mirena placement ([employer]) with Yojana Valdes, she was driving. yes

## 2023-10-10 NOTE — DISCHARGE NOTE PROVIDER - NSDCFUSCHEDAPPT_GEN_ALL_CORE_FT
[facility]  [address]  Scheduled Appointment: 10/23/2023    [facility]  [address]  Scheduled Appointment: 10/25/2023    Lia Taylor  [facility]  VASCULAR [address]  Scheduled Appointment: 12/18/2023

## 2023-10-11 ENCOUNTER — APPOINTMENT (OUTPATIENT)
Dept: INFUSION THERAPY | Facility: CLINIC | Age: 53
End: 2023-10-11

## 2023-10-11 DIAGNOSIS — F32.A DEPRESSION, UNSPECIFIED: ICD-10-CM

## 2023-10-11 DIAGNOSIS — Z92.21 PERSONAL HISTORY OF ANTINEOPLASTIC CHEMOTHERAPY: ICD-10-CM

## 2023-10-11 DIAGNOSIS — E55.9 VITAMIN D DEFICIENCY, UNSPECIFIED: ICD-10-CM

## 2023-10-11 DIAGNOSIS — E87.1 HYPO-OSMOLALITY AND HYPONATREMIA: ICD-10-CM

## 2023-10-11 DIAGNOSIS — F43.20 ADJUSTMENT DISORDER, UNSPECIFIED: ICD-10-CM

## 2023-10-11 DIAGNOSIS — Z86.711 PERSONAL HISTORY OF PULMONARY EMBOLISM: ICD-10-CM

## 2023-10-11 DIAGNOSIS — G89.3 NEOPLASM RELATED PAIN (ACUTE) (CHRONIC): ICD-10-CM

## 2023-10-11 DIAGNOSIS — D72.829 ELEVATED WHITE BLOOD CELL COUNT, UNSPECIFIED: ICD-10-CM

## 2023-10-11 DIAGNOSIS — C78.7 SECONDARY MALIGNANT NEOPLASM OF LIVER AND INTRAHEPATIC BILE DUCT: ICD-10-CM

## 2023-10-11 DIAGNOSIS — Z93.3 COLOSTOMY STATUS: ICD-10-CM

## 2023-10-11 DIAGNOSIS — Z90.49 ACQUIRED ABSENCE OF OTHER SPECIFIED PARTS OF DIGESTIVE TRACT: ICD-10-CM

## 2023-10-11 DIAGNOSIS — F17.210 NICOTINE DEPENDENCE, CIGARETTES, UNCOMPLICATED: ICD-10-CM

## 2023-10-11 DIAGNOSIS — F03.90 UNSPECIFIED DEMENTIA WITHOUT BEHAVIORAL DISTURBANCE: ICD-10-CM

## 2023-10-11 DIAGNOSIS — Z86.718 PERSONAL HISTORY OF OTHER VENOUS THROMBOSIS AND EMBOLISM: ICD-10-CM

## 2023-10-11 DIAGNOSIS — C78.00 SECONDARY MALIGNANT NEOPLASM OF UNSPECIFIED LUNG: ICD-10-CM

## 2023-10-11 DIAGNOSIS — Z95.828 PRESENCE OF OTHER VASCULAR IMPLANTS AND GRAFTS: ICD-10-CM

## 2023-10-11 DIAGNOSIS — D68.69 OTHER THROMBOPHILIA: ICD-10-CM

## 2023-10-11 DIAGNOSIS — C18.9 MALIGNANT NEOPLASM OF COLON, UNSPECIFIED: ICD-10-CM

## 2023-10-16 DIAGNOSIS — Z90.49 ACQUIRED ABSENCE OF OTHER SPECIFIED PARTS OF DIGESTIVE TRACT: ICD-10-CM

## 2023-10-16 DIAGNOSIS — C18.9 MALIGNANT NEOPLASM OF COLON, UNSPECIFIED: ICD-10-CM

## 2023-10-16 DIAGNOSIS — R10.9 UNSPECIFIED ABDOMINAL PAIN: ICD-10-CM

## 2023-10-16 DIAGNOSIS — C78.7 SECONDARY MALIGNANT NEOPLASM OF LIVER AND INTRAHEPATIC BILE DUCT: ICD-10-CM

## 2023-10-16 DIAGNOSIS — C78.00 SECONDARY MALIGNANT NEOPLASM OF UNSPECIFIED LUNG: ICD-10-CM

## 2023-10-16 DIAGNOSIS — G47.00 INSOMNIA, UNSPECIFIED: ICD-10-CM

## 2023-10-16 DIAGNOSIS — F17.210 NICOTINE DEPENDENCE, CIGARETTES, UNCOMPLICATED: ICD-10-CM

## 2023-10-16 DIAGNOSIS — Z86.718 PERSONAL HISTORY OF OTHER VENOUS THROMBOSIS AND EMBOLISM: ICD-10-CM

## 2023-10-16 DIAGNOSIS — Z92.21 PERSONAL HISTORY OF ANTINEOPLASTIC CHEMOTHERAPY: ICD-10-CM

## 2023-10-16 DIAGNOSIS — G89.3 NEOPLASM RELATED PAIN (ACUTE) (CHRONIC): ICD-10-CM

## 2023-10-16 DIAGNOSIS — Z86.711 PERSONAL HISTORY OF PULMONARY EMBOLISM: ICD-10-CM

## 2023-10-16 DIAGNOSIS — Z93.2 ILEOSTOMY STATUS: ICD-10-CM

## 2023-10-23 ENCOUNTER — TRANSCRIPTION ENCOUNTER (OUTPATIENT)
Age: 53
End: 2023-10-23

## 2023-10-25 ENCOUNTER — RESULT REVIEW (OUTPATIENT)
Age: 53
End: 2023-10-25

## 2023-10-25 ENCOUNTER — APPOINTMENT (OUTPATIENT)
Dept: INFUSION THERAPY | Facility: CLINIC | Age: 53
End: 2023-10-25

## 2023-10-25 ENCOUNTER — RESULT CHARGE (OUTPATIENT)
Age: 53
End: 2023-10-25

## 2023-10-25 LAB
BASOPHILS # BLD AUTO: 0.05 K/UL — SIGNIFICANT CHANGE UP (ref 0–0.2)
BASOPHILS # BLD AUTO: 0.05 K/UL — SIGNIFICANT CHANGE UP (ref 0–0.2)
BASOPHILS NFR BLD AUTO: 0.6 % — SIGNIFICANT CHANGE UP (ref 0–2)
BASOPHILS NFR BLD AUTO: 0.6 % — SIGNIFICANT CHANGE UP (ref 0–2)
EOSINOPHIL # BLD AUTO: 0.45 K/UL — SIGNIFICANT CHANGE UP (ref 0–0.5)
EOSINOPHIL # BLD AUTO: 0.45 K/UL — SIGNIFICANT CHANGE UP (ref 0–0.5)
EOSINOPHIL NFR BLD AUTO: 5.6 % — SIGNIFICANT CHANGE UP (ref 0–6)
EOSINOPHIL NFR BLD AUTO: 5.6 % — SIGNIFICANT CHANGE UP (ref 0–6)
HCT VFR BLD CALC: 42.4 % — SIGNIFICANT CHANGE UP (ref 39–50)
HCT VFR BLD CALC: 42.4 % — SIGNIFICANT CHANGE UP (ref 39–50)
HGB BLD-MCNC: 13.8 G/DL — SIGNIFICANT CHANGE UP (ref 13–17)
HGB BLD-MCNC: 13.8 G/DL — SIGNIFICANT CHANGE UP (ref 13–17)
IMM GRANULOCYTES NFR BLD AUTO: 0.2 % — SIGNIFICANT CHANGE UP (ref 0–0.9)
IMM GRANULOCYTES NFR BLD AUTO: 0.2 % — SIGNIFICANT CHANGE UP (ref 0–0.9)
LYMPHOCYTES # BLD AUTO: 1.63 K/UL — SIGNIFICANT CHANGE UP (ref 1–3.3)
LYMPHOCYTES # BLD AUTO: 1.63 K/UL — SIGNIFICANT CHANGE UP (ref 1–3.3)
LYMPHOCYTES # BLD AUTO: 20.3 % — SIGNIFICANT CHANGE UP (ref 13–44)
LYMPHOCYTES # BLD AUTO: 20.3 % — SIGNIFICANT CHANGE UP (ref 13–44)
MCHC RBC-ENTMCNC: 28 PG — SIGNIFICANT CHANGE UP (ref 27–34)
MCHC RBC-ENTMCNC: 28 PG — SIGNIFICANT CHANGE UP (ref 27–34)
MCHC RBC-ENTMCNC: 32.5 GM/DL — SIGNIFICANT CHANGE UP (ref 32–36)
MCHC RBC-ENTMCNC: 32.5 GM/DL — SIGNIFICANT CHANGE UP (ref 32–36)
MCV RBC AUTO: 86.2 FL — SIGNIFICANT CHANGE UP (ref 80–100)
MCV RBC AUTO: 86.2 FL — SIGNIFICANT CHANGE UP (ref 80–100)
MONOCYTES # BLD AUTO: 0.7 K/UL — SIGNIFICANT CHANGE UP (ref 0–0.9)
MONOCYTES # BLD AUTO: 0.7 K/UL — SIGNIFICANT CHANGE UP (ref 0–0.9)
MONOCYTES NFR BLD AUTO: 8.7 % — SIGNIFICANT CHANGE UP (ref 2–14)
MONOCYTES NFR BLD AUTO: 8.7 % — SIGNIFICANT CHANGE UP (ref 2–14)
NEUTROPHILS # BLD AUTO: 5.18 K/UL — SIGNIFICANT CHANGE UP (ref 1.8–7.4)
NEUTROPHILS # BLD AUTO: 5.18 K/UL — SIGNIFICANT CHANGE UP (ref 1.8–7.4)
NEUTROPHILS NFR BLD AUTO: 64.6 % — SIGNIFICANT CHANGE UP (ref 43–77)
NEUTROPHILS NFR BLD AUTO: 64.6 % — SIGNIFICANT CHANGE UP (ref 43–77)
NRBC # BLD: 0 /100 WBCS — SIGNIFICANT CHANGE UP (ref 0–0)
NRBC # BLD: 0 /100 WBCS — SIGNIFICANT CHANGE UP (ref 0–0)
PLATELET # BLD AUTO: 165 K/UL — SIGNIFICANT CHANGE UP (ref 150–400)
PLATELET # BLD AUTO: 165 K/UL — SIGNIFICANT CHANGE UP (ref 150–400)
RBC # BLD: 4.92 M/UL — SIGNIFICANT CHANGE UP (ref 4.2–5.8)
RBC # BLD: 4.92 M/UL — SIGNIFICANT CHANGE UP (ref 4.2–5.8)
RBC # FLD: 14.7 % — HIGH (ref 10.3–14.5)
RBC # FLD: 14.7 % — HIGH (ref 10.3–14.5)
WBC # BLD: 8.03 K/UL — SIGNIFICANT CHANGE UP (ref 3.8–10.5)
WBC # BLD: 8.03 K/UL — SIGNIFICANT CHANGE UP (ref 3.8–10.5)
WBC # FLD AUTO: 8.03 K/UL — SIGNIFICANT CHANGE UP (ref 3.8–10.5)
WBC # FLD AUTO: 8.03 K/UL — SIGNIFICANT CHANGE UP (ref 3.8–10.5)

## 2023-10-26 LAB
ALBUMIN SERPL ELPH-MCNC: 4.5 G/DL — SIGNIFICANT CHANGE UP (ref 3.3–5)
ALBUMIN SERPL ELPH-MCNC: 4.5 G/DL — SIGNIFICANT CHANGE UP (ref 3.3–5)
ALP SERPL-CCNC: 116 U/L — SIGNIFICANT CHANGE UP (ref 40–120)
ALP SERPL-CCNC: 116 U/L — SIGNIFICANT CHANGE UP (ref 40–120)
ALT FLD-CCNC: 31 U/L — SIGNIFICANT CHANGE UP (ref 10–45)
ALT FLD-CCNC: 31 U/L — SIGNIFICANT CHANGE UP (ref 10–45)
ANION GAP SERPL CALC-SCNC: 14 MMOL/L — SIGNIFICANT CHANGE UP (ref 5–17)
ANION GAP SERPL CALC-SCNC: 14 MMOL/L — SIGNIFICANT CHANGE UP (ref 5–17)
AST SERPL-CCNC: 23 U/L — SIGNIFICANT CHANGE UP (ref 10–40)
AST SERPL-CCNC: 23 U/L — SIGNIFICANT CHANGE UP (ref 10–40)
BILIRUB SERPL-MCNC: 0.2 MG/DL — SIGNIFICANT CHANGE UP (ref 0.2–1.2)
BILIRUB SERPL-MCNC: 0.2 MG/DL — SIGNIFICANT CHANGE UP (ref 0.2–1.2)
BUN SERPL-MCNC: 9 MG/DL — SIGNIFICANT CHANGE UP (ref 7–23)
BUN SERPL-MCNC: 9 MG/DL — SIGNIFICANT CHANGE UP (ref 7–23)
CALCIUM SERPL-MCNC: 9.8 MG/DL — SIGNIFICANT CHANGE UP (ref 8.4–10.5)
CALCIUM SERPL-MCNC: 9.8 MG/DL — SIGNIFICANT CHANGE UP (ref 8.4–10.5)
CEA SERPL-MCNC: 28.6 NG/ML — HIGH (ref 0–3.8)
CEA SERPL-MCNC: 28.6 NG/ML — HIGH (ref 0–3.8)
CHLORIDE SERPL-SCNC: 103 MMOL/L — SIGNIFICANT CHANGE UP (ref 96–108)
CHLORIDE SERPL-SCNC: 103 MMOL/L — SIGNIFICANT CHANGE UP (ref 96–108)
CO2 SERPL-SCNC: 20 MMOL/L — LOW (ref 22–31)
CO2 SERPL-SCNC: 20 MMOL/L — LOW (ref 22–31)
CREAT SERPL-MCNC: 0.73 MG/DL — SIGNIFICANT CHANGE UP (ref 0.5–1.3)
CREAT SERPL-MCNC: 0.73 MG/DL — SIGNIFICANT CHANGE UP (ref 0.5–1.3)
EGFR: 111 ML/MIN/1.73M2 — SIGNIFICANT CHANGE UP
EGFR: 111 ML/MIN/1.73M2 — SIGNIFICANT CHANGE UP
GLUCOSE SERPL-MCNC: 93 MG/DL — SIGNIFICANT CHANGE UP (ref 70–99)
GLUCOSE SERPL-MCNC: 93 MG/DL — SIGNIFICANT CHANGE UP (ref 70–99)
MAGNESIUM SERPL-MCNC: 1.8 MG/DL — SIGNIFICANT CHANGE UP (ref 1.6–2.6)
MAGNESIUM SERPL-MCNC: 1.8 MG/DL — SIGNIFICANT CHANGE UP (ref 1.6–2.6)
POTASSIUM SERPL-MCNC: 4.7 MMOL/L — SIGNIFICANT CHANGE UP (ref 3.5–5.3)
POTASSIUM SERPL-MCNC: 4.7 MMOL/L — SIGNIFICANT CHANGE UP (ref 3.5–5.3)
POTASSIUM SERPL-SCNC: 4.7 MMOL/L — SIGNIFICANT CHANGE UP (ref 3.5–5.3)
POTASSIUM SERPL-SCNC: 4.7 MMOL/L — SIGNIFICANT CHANGE UP (ref 3.5–5.3)
PROT SERPL-MCNC: 7.7 G/DL — SIGNIFICANT CHANGE UP (ref 6–8.3)
PROT SERPL-MCNC: 7.7 G/DL — SIGNIFICANT CHANGE UP (ref 6–8.3)
SODIUM SERPL-SCNC: 137 MMOL/L — SIGNIFICANT CHANGE UP (ref 135–145)
SODIUM SERPL-SCNC: 137 MMOL/L — SIGNIFICANT CHANGE UP (ref 135–145)

## 2023-10-27 ENCOUNTER — APPOINTMENT (OUTPATIENT)
Dept: HEMATOLOGY ONCOLOGY | Facility: CLINIC | Age: 53
End: 2023-10-27
Payer: MEDICAID

## 2023-10-27 ENCOUNTER — APPOINTMENT (OUTPATIENT)
Dept: INFUSION THERAPY | Facility: CLINIC | Age: 53
End: 2023-10-27
Payer: MEDICAID

## 2023-10-27 DIAGNOSIS — G89.29 OTHER CHRONIC PAIN: ICD-10-CM

## 2023-10-27 DIAGNOSIS — I26.99 OTHER PULMONARY EMBOLISM WITHOUT ACUTE COR PULMONALE: ICD-10-CM

## 2023-10-27 PROCEDURE — 99215 OFFICE O/P EST HI 40 MIN: CPT

## 2023-11-06 ENCOUNTER — OUTPATIENT (OUTPATIENT)
Dept: OUTPATIENT SERVICES | Facility: HOSPITAL | Age: 53
LOS: 1 days | Discharge: HOME | End: 2023-11-06

## 2023-11-06 DIAGNOSIS — C18.9 MALIGNANT NEOPLASM OF COLON, UNSPECIFIED: ICD-10-CM

## 2023-11-06 DIAGNOSIS — Z98.890 OTHER SPECIFIED POSTPROCEDURAL STATES: Chronic | ICD-10-CM

## 2023-11-06 DIAGNOSIS — Z90.49 ACQUIRED ABSENCE OF OTHER SPECIFIED PARTS OF DIGESTIVE TRACT: Chronic | ICD-10-CM

## 2023-11-08 ENCOUNTER — RESULT REVIEW (OUTPATIENT)
Age: 53
End: 2023-11-08

## 2023-11-08 ENCOUNTER — APPOINTMENT (OUTPATIENT)
Dept: INFUSION THERAPY | Facility: CLINIC | Age: 53
End: 2023-11-08
Payer: MEDICAID

## 2023-11-08 ENCOUNTER — APPOINTMENT (OUTPATIENT)
Dept: HEMATOLOGY ONCOLOGY | Facility: CLINIC | Age: 53
End: 2023-11-08
Payer: MEDICAID

## 2023-11-08 DIAGNOSIS — G89.29 OTHER CHRONIC PAIN: ICD-10-CM

## 2023-11-08 LAB
ALBUMIN SERPL ELPH-MCNC: 4.8 G/DL — SIGNIFICANT CHANGE UP (ref 3.3–5)
ALBUMIN SERPL ELPH-MCNC: 4.8 G/DL — SIGNIFICANT CHANGE UP (ref 3.3–5)
ALP SERPL-CCNC: 206 U/L — HIGH (ref 40–120)
ALP SERPL-CCNC: 206 U/L — HIGH (ref 40–120)
ALT FLD-CCNC: 64 U/L — HIGH (ref 10–45)
ALT FLD-CCNC: 64 U/L — HIGH (ref 10–45)
ANION GAP SERPL CALC-SCNC: 13 MMOL/L — SIGNIFICANT CHANGE UP (ref 5–17)
ANION GAP SERPL CALC-SCNC: 13 MMOL/L — SIGNIFICANT CHANGE UP (ref 5–17)
AST SERPL-CCNC: 25 U/L — SIGNIFICANT CHANGE UP (ref 10–40)
AST SERPL-CCNC: 25 U/L — SIGNIFICANT CHANGE UP (ref 10–40)
BASOPHILS # BLD AUTO: 0.05 K/UL — SIGNIFICANT CHANGE UP (ref 0–0.2)
BASOPHILS # BLD AUTO: 0.05 K/UL — SIGNIFICANT CHANGE UP (ref 0–0.2)
BASOPHILS NFR BLD AUTO: 0.4 % — SIGNIFICANT CHANGE UP (ref 0–2)
BASOPHILS NFR BLD AUTO: 0.4 % — SIGNIFICANT CHANGE UP (ref 0–2)
BILIRUB SERPL-MCNC: 0.2 MG/DL — SIGNIFICANT CHANGE UP (ref 0.2–1.2)
BILIRUB SERPL-MCNC: 0.2 MG/DL — SIGNIFICANT CHANGE UP (ref 0.2–1.2)
BUN SERPL-MCNC: 9 MG/DL — SIGNIFICANT CHANGE UP (ref 7–23)
BUN SERPL-MCNC: 9 MG/DL — SIGNIFICANT CHANGE UP (ref 7–23)
CALCIUM SERPL-MCNC: 9.7 MG/DL — SIGNIFICANT CHANGE UP (ref 8.4–10.5)
CALCIUM SERPL-MCNC: 9.7 MG/DL — SIGNIFICANT CHANGE UP (ref 8.4–10.5)
CHLORIDE SERPL-SCNC: 98 MMOL/L — SIGNIFICANT CHANGE UP (ref 96–108)
CHLORIDE SERPL-SCNC: 98 MMOL/L — SIGNIFICANT CHANGE UP (ref 96–108)
CO2 SERPL-SCNC: 24 MMOL/L — SIGNIFICANT CHANGE UP (ref 22–31)
CO2 SERPL-SCNC: 24 MMOL/L — SIGNIFICANT CHANGE UP (ref 22–31)
CREAT SERPL-MCNC: 0.83 MG/DL — SIGNIFICANT CHANGE UP (ref 0.5–1.3)
CREAT SERPL-MCNC: 0.83 MG/DL — SIGNIFICANT CHANGE UP (ref 0.5–1.3)
EGFR: 107 ML/MIN/1.73M2 — SIGNIFICANT CHANGE UP
EGFR: 107 ML/MIN/1.73M2 — SIGNIFICANT CHANGE UP
EOSINOPHIL # BLD AUTO: 0.28 K/UL — SIGNIFICANT CHANGE UP (ref 0–0.5)
EOSINOPHIL # BLD AUTO: 0.28 K/UL — SIGNIFICANT CHANGE UP (ref 0–0.5)
EOSINOPHIL NFR BLD AUTO: 2 % — SIGNIFICANT CHANGE UP (ref 0–6)
EOSINOPHIL NFR BLD AUTO: 2 % — SIGNIFICANT CHANGE UP (ref 0–6)
GLUCOSE SERPL-MCNC: 135 MG/DL — HIGH (ref 70–99)
GLUCOSE SERPL-MCNC: 135 MG/DL — HIGH (ref 70–99)
HCT VFR BLD CALC: 45.3 % — SIGNIFICANT CHANGE UP (ref 39–50)
HCT VFR BLD CALC: 45.3 % — SIGNIFICANT CHANGE UP (ref 39–50)
HGB BLD-MCNC: 14.6 G/DL — SIGNIFICANT CHANGE UP (ref 13–17)
HGB BLD-MCNC: 14.6 G/DL — SIGNIFICANT CHANGE UP (ref 13–17)
IMM GRANULOCYTES NFR BLD AUTO: 1.1 % — HIGH (ref 0–0.9)
IMM GRANULOCYTES NFR BLD AUTO: 1.1 % — HIGH (ref 0–0.9)
LYMPHOCYTES # BLD AUTO: 1.75 K/UL — SIGNIFICANT CHANGE UP (ref 1–3.3)
LYMPHOCYTES # BLD AUTO: 1.75 K/UL — SIGNIFICANT CHANGE UP (ref 1–3.3)
LYMPHOCYTES # BLD AUTO: 12.4 % — LOW (ref 13–44)
LYMPHOCYTES # BLD AUTO: 12.4 % — LOW (ref 13–44)
MAGNESIUM SERPL-MCNC: 1.8 MG/DL — SIGNIFICANT CHANGE UP (ref 1.6–2.6)
MAGNESIUM SERPL-MCNC: 1.8 MG/DL — SIGNIFICANT CHANGE UP (ref 1.6–2.6)
MCHC RBC-ENTMCNC: 27.2 PG — SIGNIFICANT CHANGE UP (ref 27–34)
MCHC RBC-ENTMCNC: 27.2 PG — SIGNIFICANT CHANGE UP (ref 27–34)
MCHC RBC-ENTMCNC: 32.2 GM/DL — SIGNIFICANT CHANGE UP (ref 32–36)
MCHC RBC-ENTMCNC: 32.2 GM/DL — SIGNIFICANT CHANGE UP (ref 32–36)
MCV RBC AUTO: 84.4 FL — SIGNIFICANT CHANGE UP (ref 80–100)
MCV RBC AUTO: 84.4 FL — SIGNIFICANT CHANGE UP (ref 80–100)
MONOCYTES # BLD AUTO: 0.71 K/UL — SIGNIFICANT CHANGE UP (ref 0–0.9)
MONOCYTES # BLD AUTO: 0.71 K/UL — SIGNIFICANT CHANGE UP (ref 0–0.9)
MONOCYTES NFR BLD AUTO: 5 % — SIGNIFICANT CHANGE UP (ref 2–14)
MONOCYTES NFR BLD AUTO: 5 % — SIGNIFICANT CHANGE UP (ref 2–14)
NEUTROPHILS # BLD AUTO: 11.18 K/UL — HIGH (ref 1.8–7.4)
NEUTROPHILS # BLD AUTO: 11.18 K/UL — HIGH (ref 1.8–7.4)
NEUTROPHILS NFR BLD AUTO: 79.1 % — HIGH (ref 43–77)
NEUTROPHILS NFR BLD AUTO: 79.1 % — HIGH (ref 43–77)
NRBC # BLD: 0 /100 WBCS — SIGNIFICANT CHANGE UP (ref 0–0)
NRBC # BLD: 0 /100 WBCS — SIGNIFICANT CHANGE UP (ref 0–0)
PLATELET # BLD AUTO: 151 K/UL — SIGNIFICANT CHANGE UP (ref 150–400)
PLATELET # BLD AUTO: 151 K/UL — SIGNIFICANT CHANGE UP (ref 150–400)
POTASSIUM SERPL-MCNC: 4.4 MMOL/L — SIGNIFICANT CHANGE UP (ref 3.5–5.3)
POTASSIUM SERPL-MCNC: 4.4 MMOL/L — SIGNIFICANT CHANGE UP (ref 3.5–5.3)
POTASSIUM SERPL-SCNC: 4.4 MMOL/L — SIGNIFICANT CHANGE UP (ref 3.5–5.3)
POTASSIUM SERPL-SCNC: 4.4 MMOL/L — SIGNIFICANT CHANGE UP (ref 3.5–5.3)
PROT SERPL-MCNC: 7.9 G/DL — SIGNIFICANT CHANGE UP (ref 6–8.3)
PROT SERPL-MCNC: 7.9 G/DL — SIGNIFICANT CHANGE UP (ref 6–8.3)
RBC # BLD: 5.37 M/UL — SIGNIFICANT CHANGE UP (ref 4.2–5.8)
RBC # BLD: 5.37 M/UL — SIGNIFICANT CHANGE UP (ref 4.2–5.8)
RBC # FLD: 15.6 % — HIGH (ref 10.3–14.5)
RBC # FLD: 15.6 % — HIGH (ref 10.3–14.5)
SODIUM SERPL-SCNC: 135 MMOL/L — SIGNIFICANT CHANGE UP (ref 135–145)
SODIUM SERPL-SCNC: 135 MMOL/L — SIGNIFICANT CHANGE UP (ref 135–145)
WBC # BLD: 14.13 K/UL — HIGH (ref 3.8–10.5)
WBC # BLD: 14.13 K/UL — HIGH (ref 3.8–10.5)
WBC # FLD AUTO: 14.13 K/UL — HIGH (ref 3.8–10.5)
WBC # FLD AUTO: 14.13 K/UL — HIGH (ref 3.8–10.5)

## 2023-11-08 PROCEDURE — 99214 OFFICE O/P EST MOD 30 MIN: CPT

## 2023-11-09 ENCOUNTER — EMERGENCY (EMERGENCY)
Facility: HOSPITAL | Age: 53
LOS: 0 days | Discharge: HOME | End: 2023-11-09
Attending: EMERGENCY MEDICINE
Payer: MEDICAID

## 2023-11-09 VITALS
TEMPERATURE: 98 F | HEART RATE: 82 BPM | OXYGEN SATURATION: 100 % | DIASTOLIC BLOOD PRESSURE: 88 MMHG | RESPIRATION RATE: 18 BRPM | HEIGHT: 74 IN | WEIGHT: 190.04 LBS | SYSTOLIC BLOOD PRESSURE: 142 MMHG

## 2023-11-09 VITALS
HEART RATE: 71 BPM | OXYGEN SATURATION: 99 % | SYSTOLIC BLOOD PRESSURE: 125 MMHG | RESPIRATION RATE: 19 BRPM | DIASTOLIC BLOOD PRESSURE: 79 MMHG

## 2023-11-09 DIAGNOSIS — Z93.2 ILEOSTOMY STATUS: ICD-10-CM

## 2023-11-09 DIAGNOSIS — Z85.038 PERSONAL HISTORY OF OTHER MALIGNANT NEOPLASM OF LARGE INTESTINE: ICD-10-CM

## 2023-11-09 DIAGNOSIS — Z86.718 PERSONAL HISTORY OF OTHER VENOUS THROMBOSIS AND EMBOLISM: ICD-10-CM

## 2023-11-09 DIAGNOSIS — Z85.05 PERSONAL HISTORY OF MALIGNANT NEOPLASM OF LIVER: ICD-10-CM

## 2023-11-09 DIAGNOSIS — Z98.890 OTHER SPECIFIED POSTPROCEDURAL STATES: Chronic | ICD-10-CM

## 2023-11-09 DIAGNOSIS — Z51.11 ENCOUNTER FOR ANTINEOPLASTIC CHEMOTHERAPY: ICD-10-CM

## 2023-11-09 DIAGNOSIS — G89.29 OTHER CHRONIC PAIN: ICD-10-CM

## 2023-11-09 DIAGNOSIS — R10.9 UNSPECIFIED ABDOMINAL PAIN: ICD-10-CM

## 2023-11-09 DIAGNOSIS — Z86.711 PERSONAL HISTORY OF PULMONARY EMBOLISM: ICD-10-CM

## 2023-11-09 DIAGNOSIS — R11.2 NAUSEA WITH VOMITING, UNSPECIFIED: ICD-10-CM

## 2023-11-09 DIAGNOSIS — D72.829 ELEVATED WHITE BLOOD CELL COUNT, UNSPECIFIED: ICD-10-CM

## 2023-11-09 DIAGNOSIS — Z90.49 ACQUIRED ABSENCE OF OTHER SPECIFIED PARTS OF DIGESTIVE TRACT: Chronic | ICD-10-CM

## 2023-11-09 LAB
ALBUMIN SERPL ELPH-MCNC: 3.5 G/DL — SIGNIFICANT CHANGE UP (ref 3.3–5)
ALBUMIN SERPL ELPH-MCNC: 3.5 G/DL — SIGNIFICANT CHANGE UP (ref 3.3–5)
ALP SERPL-CCNC: 139 U/L — HIGH (ref 40–120)
ALP SERPL-CCNC: 139 U/L — HIGH (ref 40–120)
ALT FLD-CCNC: 54 U/L — SIGNIFICANT CHANGE UP (ref 12–78)
ALT FLD-CCNC: 54 U/L — SIGNIFICANT CHANGE UP (ref 12–78)
ANION GAP SERPL CALC-SCNC: 7 MMOL/L — SIGNIFICANT CHANGE UP (ref 5–17)
ANION GAP SERPL CALC-SCNC: 7 MMOL/L — SIGNIFICANT CHANGE UP (ref 5–17)
AST SERPL-CCNC: 16 U/L — SIGNIFICANT CHANGE UP (ref 15–37)
AST SERPL-CCNC: 16 U/L — SIGNIFICANT CHANGE UP (ref 15–37)
BASOPHILS # BLD AUTO: 0.05 K/UL — SIGNIFICANT CHANGE UP (ref 0–0.2)
BASOPHILS # BLD AUTO: 0.05 K/UL — SIGNIFICANT CHANGE UP (ref 0–0.2)
BASOPHILS NFR BLD AUTO: 0.3 % — SIGNIFICANT CHANGE UP (ref 0–2)
BASOPHILS NFR BLD AUTO: 0.3 % — SIGNIFICANT CHANGE UP (ref 0–2)
BILIRUB SERPL-MCNC: 0.1 MG/DL — LOW (ref 0.2–1.2)
BILIRUB SERPL-MCNC: 0.1 MG/DL — LOW (ref 0.2–1.2)
BUN SERPL-MCNC: 17 MG/DL — SIGNIFICANT CHANGE UP (ref 7–23)
BUN SERPL-MCNC: 17 MG/DL — SIGNIFICANT CHANGE UP (ref 7–23)
CALCIUM SERPL-MCNC: 8.9 MG/DL — SIGNIFICANT CHANGE UP (ref 8.5–10.1)
CALCIUM SERPL-MCNC: 8.9 MG/DL — SIGNIFICANT CHANGE UP (ref 8.5–10.1)
CHLORIDE SERPL-SCNC: 105 MMOL/L — SIGNIFICANT CHANGE UP (ref 96–108)
CHLORIDE SERPL-SCNC: 105 MMOL/L — SIGNIFICANT CHANGE UP (ref 96–108)
CO2 SERPL-SCNC: 27 MMOL/L — SIGNIFICANT CHANGE UP (ref 22–31)
CO2 SERPL-SCNC: 27 MMOL/L — SIGNIFICANT CHANGE UP (ref 22–31)
CREAT SERPL-MCNC: 1.07 MG/DL — SIGNIFICANT CHANGE UP (ref 0.5–1.3)
CREAT SERPL-MCNC: 1.07 MG/DL — SIGNIFICANT CHANGE UP (ref 0.5–1.3)
EGFR: 85 ML/MIN/1.73M2 — SIGNIFICANT CHANGE UP
EGFR: 85 ML/MIN/1.73M2 — SIGNIFICANT CHANGE UP
EOSINOPHIL # BLD AUTO: 0.12 K/UL — SIGNIFICANT CHANGE UP (ref 0–0.5)
EOSINOPHIL # BLD AUTO: 0.12 K/UL — SIGNIFICANT CHANGE UP (ref 0–0.5)
EOSINOPHIL NFR BLD AUTO: 0.8 % — SIGNIFICANT CHANGE UP (ref 0–6)
EOSINOPHIL NFR BLD AUTO: 0.8 % — SIGNIFICANT CHANGE UP (ref 0–6)
GLUCOSE SERPL-MCNC: 106 MG/DL — HIGH (ref 70–99)
GLUCOSE SERPL-MCNC: 106 MG/DL — HIGH (ref 70–99)
HCT VFR BLD CALC: 41.3 % — SIGNIFICANT CHANGE UP (ref 39–50)
HCT VFR BLD CALC: 41.3 % — SIGNIFICANT CHANGE UP (ref 39–50)
HGB BLD-MCNC: 13.3 G/DL — SIGNIFICANT CHANGE UP (ref 13–17)
HGB BLD-MCNC: 13.3 G/DL — SIGNIFICANT CHANGE UP (ref 13–17)
IMM GRANULOCYTES NFR BLD AUTO: 0.5 % — SIGNIFICANT CHANGE UP (ref 0–0.9)
IMM GRANULOCYTES NFR BLD AUTO: 0.5 % — SIGNIFICANT CHANGE UP (ref 0–0.9)
LYMPHOCYTES # BLD AUTO: 16.1 % — SIGNIFICANT CHANGE UP (ref 13–44)
LYMPHOCYTES # BLD AUTO: 16.1 % — SIGNIFICANT CHANGE UP (ref 13–44)
LYMPHOCYTES # BLD AUTO: 2.36 K/UL — SIGNIFICANT CHANGE UP (ref 1–3.3)
LYMPHOCYTES # BLD AUTO: 2.36 K/UL — SIGNIFICANT CHANGE UP (ref 1–3.3)
MCHC RBC-ENTMCNC: 27.6 PG — SIGNIFICANT CHANGE UP (ref 27–34)
MCHC RBC-ENTMCNC: 27.6 PG — SIGNIFICANT CHANGE UP (ref 27–34)
MCHC RBC-ENTMCNC: 32.2 GM/DL — SIGNIFICANT CHANGE UP (ref 32–36)
MCHC RBC-ENTMCNC: 32.2 GM/DL — SIGNIFICANT CHANGE UP (ref 32–36)
MCV RBC AUTO: 85.7 FL — SIGNIFICANT CHANGE UP (ref 80–100)
MCV RBC AUTO: 85.7 FL — SIGNIFICANT CHANGE UP (ref 80–100)
MONOCYTES # BLD AUTO: 0.56 K/UL — SIGNIFICANT CHANGE UP (ref 0–0.9)
MONOCYTES # BLD AUTO: 0.56 K/UL — SIGNIFICANT CHANGE UP (ref 0–0.9)
MONOCYTES NFR BLD AUTO: 3.8 % — SIGNIFICANT CHANGE UP (ref 2–14)
MONOCYTES NFR BLD AUTO: 3.8 % — SIGNIFICANT CHANGE UP (ref 2–14)
NEUTROPHILS # BLD AUTO: 11.52 K/UL — HIGH (ref 1.8–7.4)
NEUTROPHILS # BLD AUTO: 11.52 K/UL — HIGH (ref 1.8–7.4)
NEUTROPHILS NFR BLD AUTO: 78.5 % — HIGH (ref 43–77)
NEUTROPHILS NFR BLD AUTO: 78.5 % — HIGH (ref 43–77)
PLATELET # BLD AUTO: 115 K/UL — LOW (ref 150–400)
PLATELET # BLD AUTO: 115 K/UL — LOW (ref 150–400)
POTASSIUM SERPL-MCNC: 3.8 MMOL/L — SIGNIFICANT CHANGE UP (ref 3.5–5.3)
POTASSIUM SERPL-MCNC: 3.8 MMOL/L — SIGNIFICANT CHANGE UP (ref 3.5–5.3)
POTASSIUM SERPL-SCNC: 3.8 MMOL/L — SIGNIFICANT CHANGE UP (ref 3.5–5.3)
POTASSIUM SERPL-SCNC: 3.8 MMOL/L — SIGNIFICANT CHANGE UP (ref 3.5–5.3)
PROT SERPL-MCNC: 7.5 GM/DL — SIGNIFICANT CHANGE UP (ref 6–8.3)
PROT SERPL-MCNC: 7.5 GM/DL — SIGNIFICANT CHANGE UP (ref 6–8.3)
RBC # BLD: 4.82 M/UL — SIGNIFICANT CHANGE UP (ref 4.2–5.8)
RBC # BLD: 4.82 M/UL — SIGNIFICANT CHANGE UP (ref 4.2–5.8)
RBC # FLD: 15.3 % — HIGH (ref 10.3–14.5)
RBC # FLD: 15.3 % — HIGH (ref 10.3–14.5)
SODIUM SERPL-SCNC: 139 MMOL/L — SIGNIFICANT CHANGE UP (ref 135–145)
SODIUM SERPL-SCNC: 139 MMOL/L — SIGNIFICANT CHANGE UP (ref 135–145)
WBC # BLD: 14.69 K/UL — HIGH (ref 3.8–10.5)
WBC # BLD: 14.69 K/UL — HIGH (ref 3.8–10.5)
WBC # FLD AUTO: 14.69 K/UL — HIGH (ref 3.8–10.5)
WBC # FLD AUTO: 14.69 K/UL — HIGH (ref 3.8–10.5)

## 2023-11-09 PROCEDURE — 99284 EMERGENCY DEPT VISIT MOD MDM: CPT

## 2023-11-09 PROCEDURE — 80053 COMPREHEN METABOLIC PANEL: CPT

## 2023-11-09 PROCEDURE — 99284 EMERGENCY DEPT VISIT MOD MDM: CPT | Mod: 25

## 2023-11-09 PROCEDURE — 96361 HYDRATE IV INFUSION ADD-ON: CPT

## 2023-11-09 PROCEDURE — 36415 COLL VENOUS BLD VENIPUNCTURE: CPT

## 2023-11-09 PROCEDURE — 96374 THER/PROPH/DIAG INJ IV PUSH: CPT

## 2023-11-09 PROCEDURE — 85025 COMPLETE CBC W/AUTO DIFF WBC: CPT

## 2023-11-09 RX ORDER — OXYCODONE HYDROCHLORIDE 5 MG/1
1 TABLET ORAL
Qty: 12 | Refills: 0
Start: 2023-11-09

## 2023-11-09 RX ORDER — MORPHINE SULFATE 50 MG/1
4 CAPSULE, EXTENDED RELEASE ORAL ONCE
Refills: 0 | Status: DISCONTINUED | OUTPATIENT
Start: 2023-11-09 | End: 2023-11-09

## 2023-11-09 RX ORDER — OXYCODONE HYDROCHLORIDE 5 MG/1
1 TABLET ORAL
Qty: 12 | Refills: 0 | DISCHARGE
Start: 2023-11-09

## 2023-11-09 RX ORDER — SODIUM CHLORIDE 9 MG/ML
1000 INJECTION INTRAMUSCULAR; INTRAVENOUS; SUBCUTANEOUS ONCE
Refills: 0 | Status: COMPLETED | OUTPATIENT
Start: 2023-11-09 | End: 2023-11-09

## 2023-11-09 RX ADMIN — SODIUM CHLORIDE 1000 MILLILITER(S): 9 INJECTION INTRAMUSCULAR; INTRAVENOUS; SUBCUTANEOUS at 19:09

## 2023-11-09 RX ADMIN — MORPHINE SULFATE 4 MILLIGRAM(S): 50 CAPSULE, EXTENDED RELEASE ORAL at 19:09

## 2023-11-09 RX ADMIN — SODIUM CHLORIDE 1000 MILLILITER(S): 9 INJECTION INTRAMUSCULAR; INTRAVENOUS; SUBCUTANEOUS at 20:45

## 2023-11-09 RX ADMIN — MORPHINE SULFATE 4 MILLIGRAM(S): 50 CAPSULE, EXTENDED RELEASE ORAL at 19:10

## 2023-11-09 RX ADMIN — SODIUM CHLORIDE 1000 MILLILITER(S): 9 INJECTION INTRAMUSCULAR; INTRAVENOUS; SUBCUTANEOUS at 21:13

## 2023-11-09 NOTE — ED PROVIDER NOTE - PATIENT PORTAL LINK FT
You can access the FollowMyHealth Patient Portal offered by [facility] by registering at the following website: [email or web address]. By joining [employer]’s FollowMyHealth portal, you will also be able to view your health information using other applications (apps) compatible with our system.

## 2023-11-09 NOTE — ED PROVIDER NOTE - CARE PROVIDER_API CALL
Wanda Serrano  Medical Oncology  [address], [place] [address]  Phone: [phone]  Fax: [phone]  Follow Up Time: 1-3 Days

## 2023-11-09 NOTE — CONSULT NOTE ADULT - ASSESSMENT
53 yo male with metastatic colon CA s/p ileostomy revision for ileostomy prolapse Apr '23  Lytes WNL  His ostomy is reducible with adequate output   There is no acute surgical intervention  indicated at this time

## 2023-11-09 NOTE — ED ADULT NURSE NOTE - OBJECTIVE STATEMENT
52y male, A&Ox4, ambulatory w/cane from home. Presenting to ED w/ severe abdominal pain. PMH liver, stomach, colon cancer receiving chemotherapy at the [facility]. Pt has ileostomy bag w/ protruding intestine. Pt reports being unable to refill oxy prescription since Monday so has been unable to manage his pain.  Endorses moderate intermittent nausea & lightheadedness r/t pain. Denies fevers/chills, HA, SOB, difficulty breathing, chest pain, urinary symptoms. Respirations are even and unlabored, in NAD.

## 2023-11-09 NOTE — ED PROVIDER NOTE - PHYSICAL EXAMINATION
Constitutional: NAD   Eyes: PERRLA  Head: Normocephalic   Mouth: MMM  Cardiac: regular rate   Resp: Lungs CTAB  GI: Abd s/nt/nd, no rebound or guarding. + protruding colon into ileostomy bag   Neuro: awake, alert, moving all extremities  Skin: No rashes

## 2023-11-09 NOTE — ED PROVIDER NOTE - CCCP TRG CHIEF CMPLNT
Quality 431: Preventive Care And Screening: Unhealthy Alcohol Use - Screening: Patient screened for unhealthy alcohol use using a single question and scores less than 2 times per year Quality 226: Preventive Care And Screening: Tobacco Use: Screening And Cessation Intervention: Patient screened for tobacco use and is an ex/non-smoker Detail Level: Detailed abdominal pain

## 2023-11-09 NOTE — ED ADULT TRIAGE NOTE - CHIEF COMPLAINT QUOTE
HX stomach colon and liver CA. Patient states he was supposed to get a new prescription for Oxycodone. States he is supposed to be taking 4-5 tabs of Oxycodone per day. States pain management would not refill his prescription today. States his body is very "messed up" from not having his Oxycodone. Complaining of severe abdominal pain. VS stable at triage, no distress noted.

## 2023-11-09 NOTE — ED PROVIDER NOTE - PROGRESS NOTE DETAILS
Sal Holley for Dr. Osman: Per surgery pt can be discharged. Labs reviewed. Pt had leukocytosis yesterday as well, likely from chemo he received yesterday. No concern for infection or intraabdominal complication, will discharge home with pain medications to follow up with pain management.

## 2023-11-09 NOTE — ED PROVIDER NOTE - CLINICAL SUMMARY MEDICAL DECISION MAKING FREE TEXT BOX
53 y/o male with chronic abdominal pain here for pain meds. Pt's colon is protruding into the ileostomy bag. Will obtain surgical consult, medicate, and reassess closely.

## 2023-11-09 NOTE — ED PROVIDER NOTE - OBJECTIVE STATEMENT
51 y/o male with PMHx of colon, liver, and stomach cancer s/p colon resection, ileostomy last chemotherapy treatment yesterday and DVT currently receiving lovanox injections BIBEMS to the ED c/o severe abdominal pain. Pt states he takes 4-5 tablets of oxycodone 10mg daily for pain management. Since Monday this week he has been unable to obtain a refill of his oxycodone prescription due to issues with pharmacy and the PMD. Since then his pain has been unmanaged and worsening so presented to the ED for pain medication. Pt also endorsing protruding colon in ileostomy, states it has tendency to slip out of its position and has had multiple clippings however today colon is more out than normal.   Oncologist: Dr. Serrano

## 2023-11-09 NOTE — ED ADULT NURSE NOTE - NSFALLHARMRISKINTERV_ED_ALL_ED
Assistance OOB with selected safe patient handling equipment if applicable/Communicate risk of Fall with Harm to all staff, patient, and family/Monitor gait and stability/Provide patient with walking aids/Provide visual cue: red socks, yellow wristband, yellow gown, etc/Reinforce activity limits and safety measures with patient and family/Bed in lowest position, wheels locked, appropriate side rails in place/Call bell, personal items and telephone in reach/Instruct patient to call for assistance before getting out of bed/chair/stretcher/Non-slip footwear applied when patient is off stretcher/[place] to call system/Physically safe environment - no spills, clutter or unnecessary equipment/Purposeful Proactive Rounding/Room/bathroom lighting operational, light cord in reach

## 2023-11-09 NOTE — CONSULT NOTE ADULT - SUBJECTIVE AND OBJECTIVE BOX
HPI:    51 y/o male with PMHx of colon, liver, and stomach cancer s/p colon resection, ileostomy last chemotherapy treatment yesterday and DVT currently receiving lovanox injections BIBEMS to the ED c/o severe abdominal pain. Pt states he takes 4-5 tablets of oxycodone 10mg daily for pain management. Since Monday this week he has been unable to obtain a refill of his oxycodone prescription due to issues with pharmacy and the PMD. Since then his pain has been unmanaged and worsening so presented to the ED for pain medication. Pt also endorsing protruding colon in ileostomy, states it has tendency to slip out of its position but is reducible. His ostomy is functioning and approx he reports an output of 1L. He takes imodium sometimes when his ostomy is hyperfunctioning.     Oncologist: Dr. Serrano      PAST MEDICAL & SURGICAL HISTORY:  Cancer, colon  liver mets      S/P ileostomy      Metastasis to liver      Pulmonary embolism      S/P colon resection      H/O ileostomy          MEDICATIONS  (STANDING):    MEDICATIONS  (PRN):      Allergies    [This allergen will not trigger allergy alert] No Known Drug Allergies (Unknown)    Intolerances        SOCIAL HISTORY:    FAMILY HISTORY:  FH: dementia (Mother)            Physical Exam:  General: NAD, resting comfortably  HEENT: NC/AT, EOMI, normal hearing, no oral lesions, no LAD, neck supple  Pulmonary: normal resp effort, CTA-B  Cardiovascular: NSR, no murmurs  Abdominal: soft, ND/NT, no organomegaly  Extremities: WWP, normal strength, no clubbing/cyanosis/edema  Neuro: A/O x 3, CNs II-XII grossly intact, normal sensation, no focal deficits  Pulses: palpable distal pulses    Vital Signs Last 24 Hrs  T(C): 36.8 (09 Nov 2023 17:44), Max: 36.8 (09 Nov 2023 17:44)  T(F): 98.3 (09 Nov 2023 17:44), Max: 98.3 (09 Nov 2023 17:44)  HR: 82 (09 Nov 2023 17:44) (82 - 82)  BP: 142/88 (09 Nov 2023 17:44) (142/88 - 142/88)  BP(mean): --  RR: 18 (09 Nov 2023 17:44) (18 - 18)  SpO2: 100% (09 Nov 2023 17:44) (100% - 100%)    Parameters below as of 09 Nov 2023 17:44  Patient On (Oxygen Delivery Method): room air        I&O's Summary          LABS:                        13.3   14.69 )-----------( 115      ( 09 Nov 2023 18:42 )             41.3     11-09    139  |  105  |  17  ----------------------------<  106<H>  3.8   |  27  |  1.07    Ca    8.9      09 Nov 2023 18:42  Mg     1.8     11-08    TPro  7.5  /  Alb  3.5  /  TBili  0.1<L>  /  DBili  x   /  AST  16  /  ALT  54  /  AlkPhos  139<H>  11-09      Urinalysis Basic - ( 09 Nov 2023 18:42 )    Color: x / Appearance: x / SG: x / pH: x  Gluc: 106 mg/dL / Ketone: x  / Bili: x / Urobili: x   Blood: x / Protein: x / Nitrite: x   Leuk Esterase: x / RBC: x / WBC x   Sq Epi: x / Non Sq Epi: x / Bacteria: x      CAPILLARY BLOOD GLUCOSE        LIVER FUNCTIONS - ( 09 Nov 2023 18:42 )  Alb: 3.5 g/dL / Pro: 7.5 gm/dL / ALK PHOS: 139 U/L / ALT: 54 U/L / AST: 16 U/L / GGT: x             Cultures:      RADIOLOGY & ADDITIONAL STUDIES:      Plan:

## 2023-11-09 NOTE — ED CLERICAL - NS ED CARE COORDINATION INFORMATION
This patient is eligible for (or currently enrolled in) an outpatient care management program available through [facility]. This program can coordinate outpatient follow up and assist the patient in accessing a variety of outpatient resources.  If discharged from the ED, the patient will be contacted to see if any additional resources are needed.                                                                                    Please call the Nurse Clinical Call Center at [phone] with any questions or for assistance in discharge planning.

## 2023-11-09 NOTE — ED ADULT NURSE NOTE - CAS DISCH CONDITION
[facility]  Name:   Name of Facility:   Callback Number:  [phone]  Best Availability: anytime   Can a Detailed Message be left? Yes   Fax Number:  Additional Information:  The patient came to the clinic wants Dr Janelle Vasques send a new prescription for the medication methylprednisolone. Did you confirm the message with the caller? Yes   Patient has been advised of 24-48 hour call back policy?
Pt is going on vacation and would like to have a medrol dose cely on hand in case of a flare.  Message sent to provider
Stable

## 2023-11-10 ENCOUNTER — APPOINTMENT (OUTPATIENT)
Dept: INFUSION THERAPY | Facility: CLINIC | Age: 53
End: 2023-11-10

## 2023-11-10 PROBLEM — G89.29 OTHER CHRONIC PAIN: Status: ACTIVE | Noted: 2022-11-23

## 2023-11-13 DIAGNOSIS — G89.29 OTHER CHRONIC PAIN: ICD-10-CM

## 2023-11-13 DIAGNOSIS — I26.99 OTHER PULMONARY EMBOLISM WITHOUT ACUTE COR PULMONALE: ICD-10-CM

## 2023-11-13 DIAGNOSIS — Z93.2 ILEOSTOMY STATUS: ICD-10-CM

## 2023-11-13 DIAGNOSIS — Z51.89 ENCOUNTER FOR OTHER SPECIFIED AFTERCARE: ICD-10-CM

## 2023-11-21 ENCOUNTER — NON-APPOINTMENT (OUTPATIENT)
Age: 53
End: 2023-11-21

## 2023-11-22 ENCOUNTER — RESULT REVIEW (OUTPATIENT)
Age: 53
End: 2023-11-22

## 2023-11-22 ENCOUNTER — APPOINTMENT (OUTPATIENT)
Dept: INFUSION THERAPY | Facility: CLINIC | Age: 53
End: 2023-11-22

## 2023-11-22 VITALS
HEIGHT: 74 IN | HEART RATE: 120 BPM | RESPIRATION RATE: 18 BRPM | DIASTOLIC BLOOD PRESSURE: 77 MMHG | TEMPERATURE: 97.9 F | BODY MASS INDEX: 24.9 KG/M2 | OXYGEN SATURATION: 98 % | SYSTOLIC BLOOD PRESSURE: 111 MMHG | WEIGHT: 194 LBS

## 2023-11-22 LAB
BASOPHILS # BLD AUTO: 0.07 K/UL — SIGNIFICANT CHANGE UP (ref 0–0.2)
BASOPHILS # BLD AUTO: 0.07 K/UL — SIGNIFICANT CHANGE UP (ref 0–0.2)
BASOPHILS NFR BLD AUTO: 0.4 % — SIGNIFICANT CHANGE UP (ref 0–2)
BASOPHILS NFR BLD AUTO: 0.4 % — SIGNIFICANT CHANGE UP (ref 0–2)
EOSINOPHIL # BLD AUTO: 0.24 K/UL — SIGNIFICANT CHANGE UP (ref 0–0.5)
EOSINOPHIL # BLD AUTO: 0.24 K/UL — SIGNIFICANT CHANGE UP (ref 0–0.5)
EOSINOPHIL NFR BLD AUTO: 1.5 % — SIGNIFICANT CHANGE UP (ref 0–6)
EOSINOPHIL NFR BLD AUTO: 1.5 % — SIGNIFICANT CHANGE UP (ref 0–6)
HCT VFR BLD CALC: 42.6 % — SIGNIFICANT CHANGE UP (ref 39–50)
HCT VFR BLD CALC: 42.6 % — SIGNIFICANT CHANGE UP (ref 39–50)
HGB BLD-MCNC: 13.7 G/DL — SIGNIFICANT CHANGE UP (ref 13–17)
HGB BLD-MCNC: 13.7 G/DL — SIGNIFICANT CHANGE UP (ref 13–17)
IMM GRANULOCYTES NFR BLD AUTO: 1.3 % — HIGH (ref 0–0.9)
IMM GRANULOCYTES NFR BLD AUTO: 1.3 % — HIGH (ref 0–0.9)
LYMPHOCYTES # BLD AUTO: 16.1 % — SIGNIFICANT CHANGE UP (ref 13–44)
LYMPHOCYTES # BLD AUTO: 16.1 % — SIGNIFICANT CHANGE UP (ref 13–44)
LYMPHOCYTES # BLD AUTO: 2.52 K/UL — SIGNIFICANT CHANGE UP (ref 1–3.3)
LYMPHOCYTES # BLD AUTO: 2.52 K/UL — SIGNIFICANT CHANGE UP (ref 1–3.3)
MCHC RBC-ENTMCNC: 27.3 PG — SIGNIFICANT CHANGE UP (ref 27–34)
MCHC RBC-ENTMCNC: 27.3 PG — SIGNIFICANT CHANGE UP (ref 27–34)
MCHC RBC-ENTMCNC: 32.2 GM/DL — SIGNIFICANT CHANGE UP (ref 32–36)
MCHC RBC-ENTMCNC: 32.2 GM/DL — SIGNIFICANT CHANGE UP (ref 32–36)
MCV RBC AUTO: 85 FL — SIGNIFICANT CHANGE UP (ref 80–100)
MCV RBC AUTO: 85 FL — SIGNIFICANT CHANGE UP (ref 80–100)
MONOCYTES # BLD AUTO: 0.96 K/UL — HIGH (ref 0–0.9)
MONOCYTES # BLD AUTO: 0.96 K/UL — HIGH (ref 0–0.9)
MONOCYTES NFR BLD AUTO: 6.1 % — SIGNIFICANT CHANGE UP (ref 2–14)
MONOCYTES NFR BLD AUTO: 6.1 % — SIGNIFICANT CHANGE UP (ref 2–14)
NEUTROPHILS # BLD AUTO: 11.64 K/UL — HIGH (ref 1.8–7.4)
NEUTROPHILS # BLD AUTO: 11.64 K/UL — HIGH (ref 1.8–7.4)
NEUTROPHILS NFR BLD AUTO: 74.6 % — SIGNIFICANT CHANGE UP (ref 43–77)
NEUTROPHILS NFR BLD AUTO: 74.6 % — SIGNIFICANT CHANGE UP (ref 43–77)
NRBC # BLD: 0 /100 WBCS — SIGNIFICANT CHANGE UP (ref 0–0)
NRBC # BLD: 0 /100 WBCS — SIGNIFICANT CHANGE UP (ref 0–0)
PLATELET # BLD AUTO: 199 K/UL — SIGNIFICANT CHANGE UP (ref 150–400)
PLATELET # BLD AUTO: 199 K/UL — SIGNIFICANT CHANGE UP (ref 150–400)
RBC # BLD: 5.01 M/UL — SIGNIFICANT CHANGE UP (ref 4.2–5.8)
RBC # BLD: 5.01 M/UL — SIGNIFICANT CHANGE UP (ref 4.2–5.8)
RBC # FLD: 15.6 % — HIGH (ref 10.3–14.5)
RBC # FLD: 15.6 % — HIGH (ref 10.3–14.5)
WBC # BLD: 15.64 K/UL — HIGH (ref 3.8–10.5)
WBC # BLD: 15.64 K/UL — HIGH (ref 3.8–10.5)
WBC # FLD AUTO: 15.64 K/UL — HIGH (ref 3.8–10.5)
WBC # FLD AUTO: 15.64 K/UL — HIGH (ref 3.8–10.5)

## 2023-11-23 LAB
ALBUMIN SERPL ELPH-MCNC: 4.5 G/DL — SIGNIFICANT CHANGE UP (ref 3.3–5)
ALBUMIN SERPL ELPH-MCNC: 4.5 G/DL — SIGNIFICANT CHANGE UP (ref 3.3–5)
ALP SERPL-CCNC: 163 U/L — HIGH (ref 40–120)
ALP SERPL-CCNC: 163 U/L — HIGH (ref 40–120)
ALT FLD-CCNC: 16 U/L — SIGNIFICANT CHANGE UP (ref 10–45)
ALT FLD-CCNC: 16 U/L — SIGNIFICANT CHANGE UP (ref 10–45)
ANION GAP SERPL CALC-SCNC: 13 MMOL/L — SIGNIFICANT CHANGE UP (ref 5–17)
ANION GAP SERPL CALC-SCNC: 13 MMOL/L — SIGNIFICANT CHANGE UP (ref 5–17)
AST SERPL-CCNC: 15 U/L — SIGNIFICANT CHANGE UP (ref 10–40)
AST SERPL-CCNC: 15 U/L — SIGNIFICANT CHANGE UP (ref 10–40)
BILIRUB SERPL-MCNC: <0.2 MG/DL — SIGNIFICANT CHANGE UP (ref 0.2–1.2)
BILIRUB SERPL-MCNC: <0.2 MG/DL — SIGNIFICANT CHANGE UP (ref 0.2–1.2)
BUN SERPL-MCNC: 9 MG/DL — SIGNIFICANT CHANGE UP (ref 7–23)
BUN SERPL-MCNC: 9 MG/DL — SIGNIFICANT CHANGE UP (ref 7–23)
CALCIUM SERPL-MCNC: 9.7 MG/DL — SIGNIFICANT CHANGE UP (ref 8.4–10.5)
CALCIUM SERPL-MCNC: 9.7 MG/DL — SIGNIFICANT CHANGE UP (ref 8.4–10.5)
CHLORIDE SERPL-SCNC: 102 MMOL/L — SIGNIFICANT CHANGE UP (ref 96–108)
CHLORIDE SERPL-SCNC: 102 MMOL/L — SIGNIFICANT CHANGE UP (ref 96–108)
CO2 SERPL-SCNC: 25 MMOL/L — SIGNIFICANT CHANGE UP (ref 22–31)
CO2 SERPL-SCNC: 25 MMOL/L — SIGNIFICANT CHANGE UP (ref 22–31)
CREAT SERPL-MCNC: 0.78 MG/DL — SIGNIFICANT CHANGE UP (ref 0.5–1.3)
CREAT SERPL-MCNC: 0.78 MG/DL — SIGNIFICANT CHANGE UP (ref 0.5–1.3)
EGFR: 109 ML/MIN/1.73M2 — SIGNIFICANT CHANGE UP
EGFR: 109 ML/MIN/1.73M2 — SIGNIFICANT CHANGE UP
GLUCOSE SERPL-MCNC: 83 MG/DL — SIGNIFICANT CHANGE UP (ref 70–99)
GLUCOSE SERPL-MCNC: 83 MG/DL — SIGNIFICANT CHANGE UP (ref 70–99)
MAGNESIUM SERPL-MCNC: 2.1 MG/DL — SIGNIFICANT CHANGE UP (ref 1.6–2.6)
MAGNESIUM SERPL-MCNC: 2.1 MG/DL — SIGNIFICANT CHANGE UP (ref 1.6–2.6)
POTASSIUM SERPL-MCNC: 4.4 MMOL/L — SIGNIFICANT CHANGE UP (ref 3.5–5.3)
POTASSIUM SERPL-MCNC: 4.4 MMOL/L — SIGNIFICANT CHANGE UP (ref 3.5–5.3)
POTASSIUM SERPL-SCNC: 4.4 MMOL/L — SIGNIFICANT CHANGE UP (ref 3.5–5.3)
POTASSIUM SERPL-SCNC: 4.4 MMOL/L — SIGNIFICANT CHANGE UP (ref 3.5–5.3)
PROT SERPL-MCNC: 7.5 G/DL — SIGNIFICANT CHANGE UP (ref 6–8.3)
PROT SERPL-MCNC: 7.5 G/DL — SIGNIFICANT CHANGE UP (ref 6–8.3)
SODIUM SERPL-SCNC: 140 MMOL/L — SIGNIFICANT CHANGE UP (ref 135–145)
SODIUM SERPL-SCNC: 140 MMOL/L — SIGNIFICANT CHANGE UP (ref 135–145)

## 2023-11-24 ENCOUNTER — APPOINTMENT (OUTPATIENT)
Dept: INFUSION THERAPY | Facility: CLINIC | Age: 53
End: 2023-11-24

## 2023-11-29 ENCOUNTER — NON-APPOINTMENT (OUTPATIENT)
Age: 53
End: 2023-11-29

## 2023-12-06 ENCOUNTER — RESULT REVIEW (OUTPATIENT)
Age: 53
End: 2023-12-06

## 2023-12-06 ENCOUNTER — RESULT CHARGE (OUTPATIENT)
Age: 53
End: 2023-12-06

## 2023-12-06 ENCOUNTER — APPOINTMENT (OUTPATIENT)
Dept: INFUSION THERAPY | Facility: CLINIC | Age: 53
End: 2023-12-06

## 2023-12-06 VITALS
HEIGHT: 74 IN | DIASTOLIC BLOOD PRESSURE: 87 MMHG | TEMPERATURE: 98.5 F | RESPIRATION RATE: 18 BRPM | SYSTOLIC BLOOD PRESSURE: 131 MMHG | OXYGEN SATURATION: 99 % | WEIGHT: 198.56 LBS | HEART RATE: 99 BPM | BODY MASS INDEX: 25.48 KG/M2

## 2023-12-06 LAB
BASOPHILS # BLD AUTO: 0.08 K/UL — SIGNIFICANT CHANGE UP (ref 0–0.2)
BASOPHILS # BLD AUTO: 0.08 K/UL — SIGNIFICANT CHANGE UP (ref 0–0.2)
BASOPHILS NFR BLD AUTO: 0.4 % — SIGNIFICANT CHANGE UP (ref 0–2)
BASOPHILS NFR BLD AUTO: 0.4 % — SIGNIFICANT CHANGE UP (ref 0–2)
EOSINOPHIL # BLD AUTO: 0.46 K/UL — SIGNIFICANT CHANGE UP (ref 0–0.5)
EOSINOPHIL # BLD AUTO: 0.46 K/UL — SIGNIFICANT CHANGE UP (ref 0–0.5)
EOSINOPHIL NFR BLD AUTO: 2.4 % — SIGNIFICANT CHANGE UP (ref 0–6)
EOSINOPHIL NFR BLD AUTO: 2.4 % — SIGNIFICANT CHANGE UP (ref 0–6)
HCT VFR BLD CALC: 42 % — SIGNIFICANT CHANGE UP (ref 39–50)
HCT VFR BLD CALC: 42 % — SIGNIFICANT CHANGE UP (ref 39–50)
HGB BLD-MCNC: 13.4 G/DL — SIGNIFICANT CHANGE UP (ref 13–17)
HGB BLD-MCNC: 13.4 G/DL — SIGNIFICANT CHANGE UP (ref 13–17)
IMM GRANULOCYTES NFR BLD AUTO: 1.8 % — HIGH (ref 0–0.9)
IMM GRANULOCYTES NFR BLD AUTO: 1.8 % — HIGH (ref 0–0.9)
LYMPHOCYTES # BLD AUTO: 13.3 % — SIGNIFICANT CHANGE UP (ref 13–44)
LYMPHOCYTES # BLD AUTO: 13.3 % — SIGNIFICANT CHANGE UP (ref 13–44)
LYMPHOCYTES # BLD AUTO: 2.51 K/UL — SIGNIFICANT CHANGE UP (ref 1–3.3)
LYMPHOCYTES # BLD AUTO: 2.51 K/UL — SIGNIFICANT CHANGE UP (ref 1–3.3)
MCHC RBC-ENTMCNC: 27.5 PG — SIGNIFICANT CHANGE UP (ref 27–34)
MCHC RBC-ENTMCNC: 27.5 PG — SIGNIFICANT CHANGE UP (ref 27–34)
MCHC RBC-ENTMCNC: 31.9 GM/DL — LOW (ref 32–36)
MCHC RBC-ENTMCNC: 31.9 GM/DL — LOW (ref 32–36)
MCV RBC AUTO: 86.1 FL — SIGNIFICANT CHANGE UP (ref 80–100)
MCV RBC AUTO: 86.1 FL — SIGNIFICANT CHANGE UP (ref 80–100)
MONOCYTES # BLD AUTO: 1.08 K/UL — HIGH (ref 0–0.9)
MONOCYTES # BLD AUTO: 1.08 K/UL — HIGH (ref 0–0.9)
MONOCYTES NFR BLD AUTO: 5.7 % — SIGNIFICANT CHANGE UP (ref 2–14)
MONOCYTES NFR BLD AUTO: 5.7 % — SIGNIFICANT CHANGE UP (ref 2–14)
NEUTROPHILS # BLD AUTO: 14.37 K/UL — HIGH (ref 1.8–7.4)
NEUTROPHILS # BLD AUTO: 14.37 K/UL — HIGH (ref 1.8–7.4)
NEUTROPHILS NFR BLD AUTO: 76.4 % — SIGNIFICANT CHANGE UP (ref 43–77)
NEUTROPHILS NFR BLD AUTO: 76.4 % — SIGNIFICANT CHANGE UP (ref 43–77)
NRBC # BLD: 0 /100 WBCS — SIGNIFICANT CHANGE UP (ref 0–0)
NRBC # BLD: 0 /100 WBCS — SIGNIFICANT CHANGE UP (ref 0–0)
PLATELET # BLD AUTO: 188 K/UL — SIGNIFICANT CHANGE UP (ref 150–400)
PLATELET # BLD AUTO: 188 K/UL — SIGNIFICANT CHANGE UP (ref 150–400)
RBC # BLD: 4.88 M/UL — SIGNIFICANT CHANGE UP (ref 4.2–5.8)
RBC # BLD: 4.88 M/UL — SIGNIFICANT CHANGE UP (ref 4.2–5.8)
RBC # FLD: 16.6 % — HIGH (ref 10.3–14.5)
RBC # FLD: 16.6 % — HIGH (ref 10.3–14.5)
WBC # BLD: 18.84 K/UL — HIGH (ref 3.8–10.5)
WBC # BLD: 18.84 K/UL — HIGH (ref 3.8–10.5)
WBC # FLD AUTO: 18.84 K/UL — HIGH (ref 3.8–10.5)
WBC # FLD AUTO: 18.84 K/UL — HIGH (ref 3.8–10.5)

## 2023-12-07 LAB
ALBUMIN SERPL ELPH-MCNC: 4.3 G/DL — SIGNIFICANT CHANGE UP (ref 3.3–5)
ALBUMIN SERPL ELPH-MCNC: 4.3 G/DL — SIGNIFICANT CHANGE UP (ref 3.3–5)
ALBUMIN SERPL ELPH-MCNC: 4.5 G/DL — SIGNIFICANT CHANGE UP (ref 3.3–5)
ALBUMIN SERPL ELPH-MCNC: 4.5 G/DL — SIGNIFICANT CHANGE UP (ref 3.3–5)
ALP SERPL-CCNC: 177 U/L — HIGH (ref 40–120)
ALP SERPL-CCNC: 177 U/L — HIGH (ref 40–120)
ALP SERPL-CCNC: 178 U/L — HIGH (ref 40–120)
ALP SERPL-CCNC: 178 U/L — HIGH (ref 40–120)
ALT FLD-CCNC: 19 U/L — SIGNIFICANT CHANGE UP (ref 10–45)
ANION GAP SERPL CALC-SCNC: 15 MMOL/L — SIGNIFICANT CHANGE UP (ref 5–17)
ANION GAP SERPL CALC-SCNC: 15 MMOL/L — SIGNIFICANT CHANGE UP (ref 5–17)
ANION GAP SERPL CALC-SCNC: 16 MMOL/L — SIGNIFICANT CHANGE UP (ref 5–17)
ANION GAP SERPL CALC-SCNC: 16 MMOL/L — SIGNIFICANT CHANGE UP (ref 5–17)
AST SERPL-CCNC: 21 U/L — SIGNIFICANT CHANGE UP (ref 10–40)
AST SERPL-CCNC: 21 U/L — SIGNIFICANT CHANGE UP (ref 10–40)
AST SERPL-CCNC: 22 U/L — SIGNIFICANT CHANGE UP (ref 10–40)
AST SERPL-CCNC: 22 U/L — SIGNIFICANT CHANGE UP (ref 10–40)
BILIRUB SERPL-MCNC: 0.2 MG/DL — SIGNIFICANT CHANGE UP (ref 0.2–1.2)
BILIRUB SERPL-MCNC: 0.2 MG/DL — SIGNIFICANT CHANGE UP (ref 0.2–1.2)
BILIRUB SERPL-MCNC: <0.2 MG/DL — SIGNIFICANT CHANGE UP (ref 0.2–1.2)
BILIRUB SERPL-MCNC: <0.2 MG/DL — SIGNIFICANT CHANGE UP (ref 0.2–1.2)
BUN SERPL-MCNC: 7 MG/DL — SIGNIFICANT CHANGE UP (ref 7–23)
CALCIUM SERPL-MCNC: 9.2 MG/DL — SIGNIFICANT CHANGE UP (ref 8.4–10.5)
CALCIUM SERPL-MCNC: 9.2 MG/DL — SIGNIFICANT CHANGE UP (ref 8.4–10.5)
CALCIUM SERPL-MCNC: 9.3 MG/DL — SIGNIFICANT CHANGE UP (ref 8.4–10.5)
CALCIUM SERPL-MCNC: 9.3 MG/DL — SIGNIFICANT CHANGE UP (ref 8.4–10.5)
CHLORIDE SERPL-SCNC: 99 MMOL/L — SIGNIFICANT CHANGE UP (ref 96–108)
CO2 SERPL-SCNC: 20 MMOL/L — LOW (ref 22–31)
CREAT SERPL-MCNC: 0.77 MG/DL — SIGNIFICANT CHANGE UP (ref 0.5–1.3)
CREAT SERPL-MCNC: 0.77 MG/DL — SIGNIFICANT CHANGE UP (ref 0.5–1.3)
CREAT SERPL-MCNC: 0.8 MG/DL — SIGNIFICANT CHANGE UP (ref 0.5–1.3)
CREAT SERPL-MCNC: 0.8 MG/DL — SIGNIFICANT CHANGE UP (ref 0.5–1.3)
EGFR: 107 ML/MIN/1.73M2 — SIGNIFICANT CHANGE UP
EGFR: 107 ML/MIN/1.73M2 — SIGNIFICANT CHANGE UP
EGFR: 108 ML/MIN/1.73M2 — SIGNIFICANT CHANGE UP
EGFR: 108 ML/MIN/1.73M2 — SIGNIFICANT CHANGE UP
GLUCOSE SERPL-MCNC: 62 MG/DL — LOW (ref 70–99)
GLUCOSE SERPL-MCNC: 62 MG/DL — LOW (ref 70–99)
GLUCOSE SERPL-MCNC: 63 MG/DL — LOW (ref 70–99)
GLUCOSE SERPL-MCNC: 63 MG/DL — LOW (ref 70–99)
MAGNESIUM SERPL-MCNC: 2 MG/DL — SIGNIFICANT CHANGE UP (ref 1.6–2.6)
MAGNESIUM SERPL-MCNC: 2 MG/DL — SIGNIFICANT CHANGE UP (ref 1.6–2.6)
POTASSIUM SERPL-MCNC: 5 MMOL/L — SIGNIFICANT CHANGE UP (ref 3.5–5.3)
POTASSIUM SERPL-SCNC: 5 MMOL/L — SIGNIFICANT CHANGE UP (ref 3.5–5.3)
PROT SERPL-MCNC: 7.4 G/DL — SIGNIFICANT CHANGE UP (ref 6–8.3)
PROT SERPL-MCNC: 7.4 G/DL — SIGNIFICANT CHANGE UP (ref 6–8.3)
PROT SERPL-MCNC: 7.6 G/DL — SIGNIFICANT CHANGE UP (ref 6–8.3)
PROT SERPL-MCNC: 7.6 G/DL — SIGNIFICANT CHANGE UP (ref 6–8.3)
SODIUM SERPL-SCNC: 134 MMOL/L — LOW (ref 135–145)
SODIUM SERPL-SCNC: 134 MMOL/L — LOW (ref 135–145)
SODIUM SERPL-SCNC: 135 MMOL/L — SIGNIFICANT CHANGE UP (ref 135–145)
SODIUM SERPL-SCNC: 135 MMOL/L — SIGNIFICANT CHANGE UP (ref 135–145)

## 2023-12-08 ENCOUNTER — APPOINTMENT (OUTPATIENT)
Dept: INFUSION THERAPY | Facility: CLINIC | Age: 53
End: 2023-12-08
Payer: MEDICAID

## 2023-12-08 ENCOUNTER — APPOINTMENT (OUTPATIENT)
Dept: HEMATOLOGY ONCOLOGY | Facility: CLINIC | Age: 53
End: 2023-12-08
Payer: MEDICAID

## 2023-12-08 PROCEDURE — 99213 OFFICE O/P EST LOW 20 MIN: CPT

## 2023-12-12 DIAGNOSIS — G89.3 NEOPLASM RELATED PAIN (ACUTE) (CHRONIC): ICD-10-CM

## 2023-12-12 LAB
24R-OH-CALCIDIOL SERPL-MCNC: 18.7 NG/ML — LOW (ref 30–80)
24R-OH-CALCIDIOL SERPL-MCNC: 18.7 NG/ML — LOW (ref 30–80)

## 2023-12-15 ENCOUNTER — INPATIENT (INPATIENT)
Facility: HOSPITAL | Age: 53
LOS: 4 days | Discharge: HOME | DRG: 861 | End: 2023-12-20
Attending: FAMILY MEDICINE | Admitting: STUDENT IN AN ORGANIZED HEALTH CARE EDUCATION/TRAINING PROGRAM
Payer: MEDICAID

## 2023-12-15 ENCOUNTER — NON-APPOINTMENT (OUTPATIENT)
Age: 53
End: 2023-12-15

## 2023-12-15 VITALS
HEART RATE: 100 BPM | OXYGEN SATURATION: 100 % | HEIGHT: 74 IN | WEIGHT: 190.04 LBS | SYSTOLIC BLOOD PRESSURE: 131 MMHG | RESPIRATION RATE: 18 BRPM | DIASTOLIC BLOOD PRESSURE: 90 MMHG | TEMPERATURE: 98 F

## 2023-12-15 DIAGNOSIS — R10.9 UNSPECIFIED ABDOMINAL PAIN: ICD-10-CM

## 2023-12-15 DIAGNOSIS — Z76.89 PERSONS ENCOUNTERING HEALTH SERVICES IN OTHER SPECIFIED CIRCUMSTANCES: ICD-10-CM

## 2023-12-15 DIAGNOSIS — Z98.890 OTHER SPECIFIED POSTPROCEDURAL STATES: Chronic | ICD-10-CM

## 2023-12-15 DIAGNOSIS — Z90.49 ACQUIRED ABSENCE OF OTHER SPECIFIED PARTS OF DIGESTIVE TRACT: Chronic | ICD-10-CM

## 2023-12-15 DIAGNOSIS — D72.829 ELEVATED WHITE BLOOD CELL COUNT, UNSPECIFIED: ICD-10-CM

## 2023-12-15 LAB
ADD ON TEST-SPECIMEN IN LAB: SIGNIFICANT CHANGE UP
ADD ON TEST-SPECIMEN IN LAB: SIGNIFICANT CHANGE UP
ALBUMIN SERPL ELPH-MCNC: 4.2 G/DL — SIGNIFICANT CHANGE UP (ref 3.3–5)
ALBUMIN SERPL ELPH-MCNC: 4.2 G/DL — SIGNIFICANT CHANGE UP (ref 3.3–5)
ALP SERPL-CCNC: 213 U/L — HIGH (ref 40–120)
ALP SERPL-CCNC: 213 U/L — HIGH (ref 40–120)
ALT FLD-CCNC: 51 U/L — SIGNIFICANT CHANGE UP (ref 12–78)
ALT FLD-CCNC: 51 U/L — SIGNIFICANT CHANGE UP (ref 12–78)
ANION GAP SERPL CALC-SCNC: 8 MMOL/L — SIGNIFICANT CHANGE UP (ref 5–17)
ANION GAP SERPL CALC-SCNC: 8 MMOL/L — SIGNIFICANT CHANGE UP (ref 5–17)
APTT BLD: 44.2 SEC — HIGH (ref 24.5–35.6)
APTT BLD: 44.2 SEC — HIGH (ref 24.5–35.6)
AST SERPL-CCNC: 24 U/L — SIGNIFICANT CHANGE UP (ref 15–37)
AST SERPL-CCNC: 24 U/L — SIGNIFICANT CHANGE UP (ref 15–37)
BASOPHILS # BLD AUTO: 0 K/UL — SIGNIFICANT CHANGE UP (ref 0–0.2)
BASOPHILS # BLD AUTO: 0 K/UL — SIGNIFICANT CHANGE UP (ref 0–0.2)
BASOPHILS NFR BLD AUTO: 0 % — SIGNIFICANT CHANGE UP (ref 0–2)
BASOPHILS NFR BLD AUTO: 0 % — SIGNIFICANT CHANGE UP (ref 0–2)
BILIRUB SERPL-MCNC: 0.3 MG/DL — SIGNIFICANT CHANGE UP (ref 0.2–1.2)
BILIRUB SERPL-MCNC: 0.3 MG/DL — SIGNIFICANT CHANGE UP (ref 0.2–1.2)
BUN SERPL-MCNC: 13 MG/DL — SIGNIFICANT CHANGE UP (ref 7–23)
BUN SERPL-MCNC: 13 MG/DL — SIGNIFICANT CHANGE UP (ref 7–23)
CALCIUM SERPL-MCNC: 9.3 MG/DL — SIGNIFICANT CHANGE UP (ref 8.5–10.1)
CALCIUM SERPL-MCNC: 9.3 MG/DL — SIGNIFICANT CHANGE UP (ref 8.5–10.1)
CHLORIDE SERPL-SCNC: 99 MMOL/L — SIGNIFICANT CHANGE UP (ref 96–108)
CHLORIDE SERPL-SCNC: 99 MMOL/L — SIGNIFICANT CHANGE UP (ref 96–108)
CO2 SERPL-SCNC: 26 MMOL/L — SIGNIFICANT CHANGE UP (ref 22–31)
CO2 SERPL-SCNC: 26 MMOL/L — SIGNIFICANT CHANGE UP (ref 22–31)
CREAT SERPL-MCNC: 1.07 MG/DL — SIGNIFICANT CHANGE UP (ref 0.5–1.3)
CREAT SERPL-MCNC: 1.07 MG/DL — SIGNIFICANT CHANGE UP (ref 0.5–1.3)
DACRYOCYTES BLD QL SMEAR: SLIGHT — SIGNIFICANT CHANGE UP
DACRYOCYTES BLD QL SMEAR: SLIGHT — SIGNIFICANT CHANGE UP
EGFR: 84 ML/MIN/1.73M2 — SIGNIFICANT CHANGE UP
EGFR: 84 ML/MIN/1.73M2 — SIGNIFICANT CHANGE UP
EOSINOPHIL # BLD AUTO: 0.44 K/UL — SIGNIFICANT CHANGE UP (ref 0–0.5)
EOSINOPHIL # BLD AUTO: 0.44 K/UL — SIGNIFICANT CHANGE UP (ref 0–0.5)
EOSINOPHIL NFR BLD AUTO: 2 % — SIGNIFICANT CHANGE UP (ref 0–6)
EOSINOPHIL NFR BLD AUTO: 2 % — SIGNIFICANT CHANGE UP (ref 0–6)
GLUCOSE SERPL-MCNC: 103 MG/DL — HIGH (ref 70–99)
GLUCOSE SERPL-MCNC: 103 MG/DL — HIGH (ref 70–99)
HCT VFR BLD CALC: 44.3 % — SIGNIFICANT CHANGE UP (ref 39–50)
HCT VFR BLD CALC: 44.3 % — SIGNIFICANT CHANGE UP (ref 39–50)
HGB BLD-MCNC: 14.5 G/DL — SIGNIFICANT CHANGE UP (ref 13–17)
HGB BLD-MCNC: 14.5 G/DL — SIGNIFICANT CHANGE UP (ref 13–17)
HYPOCHROMIA BLD QL: SLIGHT — SIGNIFICANT CHANGE UP
HYPOCHROMIA BLD QL: SLIGHT — SIGNIFICANT CHANGE UP
INR BLD: 1 RATIO — SIGNIFICANT CHANGE UP (ref 0.85–1.18)
INR BLD: 1 RATIO — SIGNIFICANT CHANGE UP (ref 0.85–1.18)
LACTATE SERPL-SCNC: 1.2 MMOL/L — SIGNIFICANT CHANGE UP (ref 0.7–2)
LACTATE SERPL-SCNC: 1.2 MMOL/L — SIGNIFICANT CHANGE UP (ref 0.7–2)
LIDOCAIN IGE QN: 18 U/L — SIGNIFICANT CHANGE UP (ref 13–75)
LIDOCAIN IGE QN: 18 U/L — SIGNIFICANT CHANGE UP (ref 13–75)
LYMPHOCYTES # BLD AUTO: 1.98 K/UL — SIGNIFICANT CHANGE UP (ref 1–3.3)
LYMPHOCYTES # BLD AUTO: 1.98 K/UL — SIGNIFICANT CHANGE UP (ref 1–3.3)
LYMPHOCYTES # BLD AUTO: 9 % — LOW (ref 13–44)
LYMPHOCYTES # BLD AUTO: 9 % — LOW (ref 13–44)
MAGNESIUM SERPL-MCNC: 2.2 MG/DL — SIGNIFICANT CHANGE UP (ref 1.6–2.6)
MAGNESIUM SERPL-MCNC: 2.2 MG/DL — SIGNIFICANT CHANGE UP (ref 1.6–2.6)
MANUAL SMEAR VERIFICATION: SIGNIFICANT CHANGE UP
MANUAL SMEAR VERIFICATION: SIGNIFICANT CHANGE UP
MCHC RBC-ENTMCNC: 28 PG — SIGNIFICANT CHANGE UP (ref 27–34)
MCHC RBC-ENTMCNC: 28 PG — SIGNIFICANT CHANGE UP (ref 27–34)
MCHC RBC-ENTMCNC: 32.7 GM/DL — SIGNIFICANT CHANGE UP (ref 32–36)
MCHC RBC-ENTMCNC: 32.7 GM/DL — SIGNIFICANT CHANGE UP (ref 32–36)
MCV RBC AUTO: 85.5 FL — SIGNIFICANT CHANGE UP (ref 80–100)
MCV RBC AUTO: 85.5 FL — SIGNIFICANT CHANGE UP (ref 80–100)
MONOCYTES # BLD AUTO: 1.76 K/UL — HIGH (ref 0–0.9)
MONOCYTES # BLD AUTO: 1.76 K/UL — HIGH (ref 0–0.9)
MONOCYTES NFR BLD AUTO: 8 % — SIGNIFICANT CHANGE UP (ref 2–14)
MONOCYTES NFR BLD AUTO: 8 % — SIGNIFICANT CHANGE UP (ref 2–14)
NEUTROPHILS # BLD AUTO: 17.85 K/UL — HIGH (ref 1.8–7.4)
NEUTROPHILS # BLD AUTO: 17.85 K/UL — HIGH (ref 1.8–7.4)
NEUTROPHILS NFR BLD AUTO: 81 % — HIGH (ref 43–77)
NEUTROPHILS NFR BLD AUTO: 81 % — HIGH (ref 43–77)
NRBC # BLD: 0 /100 — SIGNIFICANT CHANGE UP (ref 0–0)
NRBC # BLD: 0 /100 — SIGNIFICANT CHANGE UP (ref 0–0)
NRBC # BLD: SIGNIFICANT CHANGE UP /100 WBCS (ref 0–0)
NRBC # BLD: SIGNIFICANT CHANGE UP /100 WBCS (ref 0–0)
NT-PROBNP SERPL-SCNC: 33 PG/ML — SIGNIFICANT CHANGE UP (ref 0–125)
NT-PROBNP SERPL-SCNC: 33 PG/ML — SIGNIFICANT CHANGE UP (ref 0–125)
OVALOCYTES BLD QL SMEAR: SLIGHT — SIGNIFICANT CHANGE UP
OVALOCYTES BLD QL SMEAR: SLIGHT — SIGNIFICANT CHANGE UP
PLAT MORPH BLD: NORMAL — SIGNIFICANT CHANGE UP
PLAT MORPH BLD: NORMAL — SIGNIFICANT CHANGE UP
PLATELET # BLD AUTO: 223 K/UL — SIGNIFICANT CHANGE UP (ref 150–400)
PLATELET # BLD AUTO: 223 K/UL — SIGNIFICANT CHANGE UP (ref 150–400)
POTASSIUM SERPL-MCNC: 3.9 MMOL/L — SIGNIFICANT CHANGE UP (ref 3.5–5.3)
POTASSIUM SERPL-MCNC: 3.9 MMOL/L — SIGNIFICANT CHANGE UP (ref 3.5–5.3)
POTASSIUM SERPL-SCNC: 3.9 MMOL/L — SIGNIFICANT CHANGE UP (ref 3.5–5.3)
POTASSIUM SERPL-SCNC: 3.9 MMOL/L — SIGNIFICANT CHANGE UP (ref 3.5–5.3)
PROT SERPL-MCNC: 8.6 GM/DL — HIGH (ref 6–8.3)
PROT SERPL-MCNC: 8.6 GM/DL — HIGH (ref 6–8.3)
PROTHROM AB SERPL-ACNC: 11.3 SEC — SIGNIFICANT CHANGE UP (ref 9.5–13)
PROTHROM AB SERPL-ACNC: 11.3 SEC — SIGNIFICANT CHANGE UP (ref 9.5–13)
RBC # BLD: 5.18 M/UL — SIGNIFICANT CHANGE UP (ref 4.2–5.8)
RBC # BLD: 5.18 M/UL — SIGNIFICANT CHANGE UP (ref 4.2–5.8)
RBC # FLD: 17.5 % — HIGH (ref 10.3–14.5)
RBC # FLD: 17.5 % — HIGH (ref 10.3–14.5)
RBC BLD AUTO: ABNORMAL
RBC BLD AUTO: ABNORMAL
SODIUM SERPL-SCNC: 133 MMOL/L — LOW (ref 135–145)
SODIUM SERPL-SCNC: 133 MMOL/L — LOW (ref 135–145)
TROPONIN I, HIGH SENSITIVITY RESULT: 3.45 NG/L — SIGNIFICANT CHANGE UP
TROPONIN I, HIGH SENSITIVITY RESULT: 3.45 NG/L — SIGNIFICANT CHANGE UP
WBC # BLD: 22.04 K/UL — HIGH (ref 3.8–10.5)
WBC # BLD: 22.04 K/UL — HIGH (ref 3.8–10.5)
WBC # FLD AUTO: 22.04 K/UL — HIGH (ref 3.8–10.5)
WBC # FLD AUTO: 22.04 K/UL — HIGH (ref 3.8–10.5)

## 2023-12-15 PROCEDURE — 71045 X-RAY EXAM CHEST 1 VIEW: CPT | Mod: 26

## 2023-12-15 PROCEDURE — 84100 ASSAY OF PHOSPHORUS: CPT

## 2023-12-15 PROCEDURE — 74177 CT ABD & PELVIS W/CONTRAST: CPT | Mod: 26,MA

## 2023-12-15 PROCEDURE — 99223 1ST HOSP IP/OBS HIGH 75: CPT

## 2023-12-15 PROCEDURE — 99222 1ST HOSP IP/OBS MODERATE 55: CPT

## 2023-12-15 PROCEDURE — 85027 COMPLETE CBC AUTOMATED: CPT

## 2023-12-15 PROCEDURE — 36415 COLL VENOUS BLD VENIPUNCTURE: CPT

## 2023-12-15 PROCEDURE — 0225U NFCT DS DNA&RNA 21 SARSCOV2: CPT

## 2023-12-15 PROCEDURE — 83735 ASSAY OF MAGNESIUM: CPT

## 2023-12-15 PROCEDURE — 97161 PT EVAL LOW COMPLEX 20 MIN: CPT | Mod: GP

## 2023-12-15 PROCEDURE — 99285 EMERGENCY DEPT VISIT HI MDM: CPT

## 2023-12-15 PROCEDURE — 93010 ELECTROCARDIOGRAM REPORT: CPT

## 2023-12-15 PROCEDURE — 80048 BASIC METABOLIC PNL TOTAL CA: CPT

## 2023-12-15 PROCEDURE — 71275 CT ANGIOGRAPHY CHEST: CPT | Mod: 26,MA

## 2023-12-15 PROCEDURE — 81003 URINALYSIS AUTO W/O SCOPE: CPT

## 2023-12-15 PROCEDURE — 80307 DRUG TEST PRSMV CHEM ANLYZR: CPT

## 2023-12-15 RX ORDER — ENOXAPARIN SODIUM 100 MG/ML
90 INJECTION SUBCUTANEOUS
Refills: 0 | DISCHARGE

## 2023-12-15 RX ORDER — CHOLECALCIFEROL (VITAMIN D3) 125 MCG
2000 CAPSULE ORAL DAILY
Refills: 0 | Status: DISCONTINUED | OUTPATIENT
Start: 2023-12-15 | End: 2023-12-20

## 2023-12-15 RX ORDER — MORPHINE SULFATE 50 MG/1
2 CAPSULE, EXTENDED RELEASE ORAL EVERY 4 HOURS
Refills: 0 | Status: DISCONTINUED | OUTPATIENT
Start: 2023-12-15 | End: 2023-12-20

## 2023-12-15 RX ORDER — SODIUM CHLORIDE 9 MG/ML
2000 INJECTION INTRAMUSCULAR; INTRAVENOUS; SUBCUTANEOUS ONCE
Refills: 0 | Status: COMPLETED | OUTPATIENT
Start: 2023-12-15 | End: 2023-12-15

## 2023-12-15 RX ORDER — SENNA PLUS 8.6 MG/1
1 TABLET ORAL AT BEDTIME
Refills: 0 | Status: DISCONTINUED | OUTPATIENT
Start: 2023-12-15 | End: 2023-12-20

## 2023-12-15 RX ORDER — LANOLIN ALCOHOL/MO/W.PET/CERES
3 CREAM (GRAM) TOPICAL AT BEDTIME
Refills: 0 | Status: DISCONTINUED | OUTPATIENT
Start: 2023-12-15 | End: 2023-12-20

## 2023-12-15 RX ORDER — OXYCODONE HYDROCHLORIDE 5 MG/1
10 TABLET ORAL EVERY 4 HOURS
Refills: 0 | Status: DISCONTINUED | OUTPATIENT
Start: 2023-12-15 | End: 2023-12-20

## 2023-12-15 RX ORDER — OXYCODONE HYDROCHLORIDE 5 MG/1
15 TABLET ORAL EVERY 4 HOURS
Refills: 0 | Status: DISCONTINUED | OUTPATIENT
Start: 2023-12-15 | End: 2023-12-17

## 2023-12-15 RX ORDER — ACETAMINOPHEN 500 MG
650 TABLET ORAL EVERY 6 HOURS
Refills: 0 | Status: DISCONTINUED | OUTPATIENT
Start: 2023-12-15 | End: 2023-12-20

## 2023-12-15 RX ORDER — ENOXAPARIN SODIUM 100 MG/ML
90 INJECTION SUBCUTANEOUS EVERY 12 HOURS
Refills: 0 | Status: DISCONTINUED | OUTPATIENT
Start: 2023-12-15 | End: 2023-12-20

## 2023-12-15 RX ORDER — VANCOMYCIN HCL 1 G
1000 VIAL (EA) INTRAVENOUS ONCE
Refills: 0 | Status: COMPLETED | OUTPATIENT
Start: 2023-12-15 | End: 2023-12-15

## 2023-12-15 RX ORDER — MORPHINE SULFATE 50 MG/1
4 CAPSULE, EXTENDED RELEASE ORAL ONCE
Refills: 0 | Status: DISCONTINUED | OUTPATIENT
Start: 2023-12-15 | End: 2023-12-15

## 2023-12-15 RX ORDER — ESCITALOPRAM OXALATE 10 MG/1
5 TABLET, FILM COATED ORAL DAILY
Refills: 0 | Status: DISCONTINUED | OUTPATIENT
Start: 2023-12-15 | End: 2023-12-18

## 2023-12-15 RX ORDER — CEFEPIME 1 G/1
1000 INJECTION, POWDER, FOR SOLUTION INTRAMUSCULAR; INTRAVENOUS ONCE
Refills: 0 | Status: DISCONTINUED | OUTPATIENT
Start: 2023-12-15 | End: 2023-12-15

## 2023-12-15 RX ORDER — PSYLLIUM SEED (WITH DEXTROSE)
1 POWDER (GRAM) ORAL ONCE
Refills: 0 | Status: COMPLETED | OUTPATIENT
Start: 2023-12-15 | End: 2023-12-15

## 2023-12-15 RX ORDER — OXYCODONE HYDROCHLORIDE 5 MG/1
1 TABLET ORAL
Refills: 0 | DISCHARGE

## 2023-12-15 RX ORDER — CEFEPIME 1 G/1
1000 INJECTION, POWDER, FOR SOLUTION INTRAMUSCULAR; INTRAVENOUS ONCE
Refills: 0 | Status: COMPLETED | OUTPATIENT
Start: 2023-12-15 | End: 2023-12-15

## 2023-12-15 RX ORDER — ENOXAPARIN SODIUM 100 MG/ML
140 INJECTION SUBCUTANEOUS
Refills: 0 | DISCHARGE

## 2023-12-15 RX ORDER — INFLUENZA VIRUS VACCINE 15; 15; 15; 15 UG/.5ML; UG/.5ML; UG/.5ML; UG/.5ML
0.5 SUSPENSION INTRAMUSCULAR ONCE
Refills: 0 | Status: DISCONTINUED | OUTPATIENT
Start: 2023-12-15 | End: 2023-12-20

## 2023-12-15 RX ADMIN — OXYCODONE HYDROCHLORIDE 15 MILLIGRAM(S): 5 TABLET ORAL at 20:25

## 2023-12-15 RX ADMIN — MORPHINE SULFATE 4 MILLIGRAM(S): 50 CAPSULE, EXTENDED RELEASE ORAL at 17:01

## 2023-12-15 RX ADMIN — SODIUM CHLORIDE 2000 MILLILITER(S): 9 INJECTION INTRAMUSCULAR; INTRAVENOUS; SUBCUTANEOUS at 16:49

## 2023-12-15 RX ADMIN — Medication 250 MILLIGRAM(S): at 16:59

## 2023-12-15 RX ADMIN — MORPHINE SULFATE 2 MILLIGRAM(S): 50 CAPSULE, EXTENDED RELEASE ORAL at 22:20

## 2023-12-15 RX ADMIN — Medication 1 PACKET(S): at 23:29

## 2023-12-15 RX ADMIN — MORPHINE SULFATE 2 MILLIGRAM(S): 50 CAPSULE, EXTENDED RELEASE ORAL at 22:45

## 2023-12-15 RX ADMIN — OXYCODONE HYDROCHLORIDE 15 MILLIGRAM(S): 5 TABLET ORAL at 21:25

## 2023-12-15 RX ADMIN — CEFEPIME 1000 MILLIGRAM(S): 1 INJECTION, POWDER, FOR SOLUTION INTRAMUSCULAR; INTRAVENOUS at 16:49

## 2023-12-15 NOTE — ED PROVIDER NOTE - CONSTITUTIONAL, MLM
normal... Well appearing, awake, alert, oriented to person, place, time/situation and in no apparent distress. Chronically ill appearing, awake, alert, oriented to person, place, time/situation and in no apparent distress.

## 2023-12-15 NOTE — PATIENT PROFILE ADULT - FALL HARM RISK - HARM RISK INTERVENTIONS
Assistance with ambulation/Assistance OOB with selected safe patient handling equipment/Communicate Risk of Fall with Harm to all staff/Discuss with provider need for PT consult/Monitor gait and stability/Provide patient with walking aids - walker, cane, crutches/Reinforce activity limits and safety measures with patient and family/Tailored Fall Risk Interventions/Visual Cue: Yellow wristband and red socks/Bed in lowest position, wheels locked, appropriate side rails in place/Call bell, personal items and telephone in reach/Instruct patient to call for assistance before getting out of bed or chair/Non-slip footwear when patient is out of bed/[place] to call system/Physically safe environment - no spills, clutter or unnecessary equipment/Purposeful Proactive Rounding/Room/bathroom lighting operational, light cord in reach Assistance with ambulation/Assistance OOB with selected safe patient handling equipment/Communicate Risk of Fall with Harm to all staff/Discuss with provider need for PT consult/Monitor gait and stability/Provide patient with walking aids - walker, cane, crutches/Reinforce activity limits and safety measures with patient and family/Tailored Fall Risk Interventions/Visual Cue: Yellow wristband and red socks/Bed in lowest position, wheels locked, appropriate side rails in place/Call bell, personal items and telephone in reach/Instruct patient to call for assistance before getting out of bed or chair/Non-slip footwear when patient is out of bed/[place] to call system/Physically safe environment - no spills, clutter or unnecessary equipment/Purposeful Proactive Rounding/Room/bathroom lighting operational, light cord in reach

## 2023-12-15 NOTE — CONSULT NOTE ADULT - SUBJECTIVE AND OBJECTIVE BOX
HPI: Pt is a 53y old Male with hx of metastatic colon CA undergoing continued tx with Primary Onc Dr Serrano s/p ileostomy, PE/DVTs, arterial occlusion presented to the ED with worsening abdominal pain, increased weakness and worsening SOB/LOCKETT. He is well known to our service, has been receiving cancer directed therapy as scheduled and follows up with PM&R for pain management. He openly admitted that he currently lives at home alone and cannot take care of himself anymore. Patient tearful that he has been more more often alone since his mom has moved to Nursing home, and his brother is frequently working. Palliative medicine consulted to help establish GOC and advance care planning     12/15/2023 pt seen and examined with no family at bedside, patient tearful when discussing that he has been alone and difficult to care for himself lately. Patient states that he has been taking oxycodone 10 mg at home and that it has been as helpful lately will increase to 15 mg and see how patient does if pain is better managed.         PAIN: (X )Yes   ( )No  Level: Mod-severe  Location: abd/ostomy  Intensity:  5-10 /10  Quality: sharp  Alleviating Factors: Oxycontin  Radiation: generalized  Impact on ADLs: mod    DYSPNEA: ( ) Yes  (X ) No    PAST MEDICAL & SURGICAL HISTORY:  Cancer, colon liver mets  S/P ileostomy  Metastasis to liver  Pulmonary embolism  S/P colon resection  H/O ileostomy    SOCIAL HX: lives at home alone     Hx opiate tolerance (x )YES  ( )NO    Baseline ADLs  (Prior to Admission)  (x ) Independent   ( )Dependent  with difficulty as per patient     FAMILY HISTORY:  FH: dementia (Mother)    Review of Systems:    Anxiety-mild  Depression-yes  Physical Discomfort-mod-severe  Dyspnea-denies  Constipation-no  Diarrhea-ileostomy  Anorexia-mod  Weight Loss- yes  Fatigue-mod    All other systems reviewed and negative    PHYSICAL EXAM:    Vital Signs Last 24 Hrs  T(C): 36.6 (15 Dec 2023 15:08), Max: 36.6 (15 Dec 2023 15:08)  T(F): 97.9 (15 Dec 2023 15:08), Max: 97.9 (15 Dec 2023 15:08)  HR: 100 (15 Dec 2023 15:08) (100 - 100)  BP: 131/90 (15 Dec 2023 15:08) (131/90 - 131/90)  RR: 18 (15 Dec 2023 15:08) (18 - 18)  SpO2: 100% (15 Dec 2023 15:08) (100% - 100%)    Parameters below as of 15 Dec 2023 15:08  Patient On (Oxygen Delivery Method): room air    Daily Height in cm: 187.96 (15 Dec 2023 15:08)      PPSV2: 40-50 %    General: Middle aged male in bed in NAD  Mental Status: A&O X3  HEENT: oral mucosa dry  Lungs: clear to auscultation olena  Cardiac: S1S2+  GI: abd soft NT + ileos with appliance  : voids  Ext: CARL on bed  Neuro: no focal def      LABS:                        14.5   22.04 )-----------( 223      ( 15 Dec 2023 15:18 )             44.3     12-15    133<L>  |  99  |  13  ----------------------------<  103<H>  3.9   |  26  |  1.07    Ca    9.3      15 Dec 2023 15:18  Mg     2.2     12-15    TPro  8.6<H>  /  Alb  4.2  /  TBili  0.3  /  DBili  x   /  AST  24  /  ALT  51  /  AlkPhos  213<H>  12-15    PT/INR - ( 15 Dec 2023 15:18 )   PT: 11.3 sec;   INR: 1.00 ratio         PTT - ( 15 Dec 2023 15:18 )  PTT:44.2 sec  Albumin: Albumin: 4.2 g/dL (12-15 @ 15:18)      Allergies    [This allergen will not trigger allergy alert] No Known Drug Allergies (Unknown)    Intolerances      MEDICATIONS  (STANDING):    MEDICATIONS  (PRN):  morphine  - Injectable 2 milliGRAM(s) IV Push every 4 hours PRN breakthrough  oxyCODONE    IR 15 milliGRAM(s) Oral every 4 hours PRN Severe Pain (7 - 10)  oxyCODONE    IR 10 milliGRAM(s) Oral every 4 hours PRN Moderate Pain (4 - 6)      RADIOLOGY/ADDITIONAL STUDIES:

## 2023-12-15 NOTE — ED ADULT NURSE REASSESSMENT NOTE - NS ED NURSE REASSESS COMMENT FT1
received patient from Ricardo Schmidt pt awake alert cooperative to care, pt performing self colostomy care.

## 2023-12-15 NOTE — ED ADULT NURSE NOTE - NSFALLUNIVINTERV_ED_ALL_ED
Bed/Stretcher in lowest position, wheels locked, appropriate side rails in place/Call bell, personal items and telephone in reach/Instruct patient to call for assistance before getting out of bed/chair/stretcher/Non-slip footwear applied when patient is off stretcher/[place] to call system/Physically safe environment - no spills, clutter or unnecessary equipment/Purposeful proactive rounding/Room/bathroom lighting operational, light cord in reach Bed/Stretcher in lowest position, wheels locked, appropriate side rails in place/Call bell, personal items and telephone in reach/Instruct patient to call for assistance before getting out of bed/chair/stretcher/Non-slip footwear applied when patient is off stretcher/[place] to call system/Physically safe environment - no spills, clutter or unnecessary equipment/Purposeful proactive rounding/Room/bathroom lighting operational, light cord in reach

## 2023-12-15 NOTE — H&P ADULT - NSHPPHYSICALEXAM_GEN_ALL_CORE
Constitutional: ill / disheveled appearing  Eyes: no conjunctival infection, anicteric  ENT: pharynx is unremarkable  Neck: supple without JVD  Pulmonary: clear to auscultation bilaterally  Cardiac: RRR  Vascular: no calf tenderness, venous stasis changes  Abdomen: normoactive bowel sounds, soft and nontender, no hepatosplenomegaly or masses appreciated; ostomy in place  Lymphatic: no peripheral adenopathy appreciated  Musculoskeletal: full range of motion and no deformities appreciated  Skin: normal appearance, no rash  Neurology: awake, alert, oriented ; no focal deficits

## 2023-12-15 NOTE — ED PROVIDER NOTE - CLINICAL SUMMARY MEDICAL DECISION MAKING FREE TEXT BOX
Pt with stage 4 colon cancer here with multiple medical complaints. Plan: EKG, chest XR, CT, b/l duplex, reeval. Pt with stage 4 colon cancer here with multiple medical complaints. Plan: EKG, chest XR, CT, b/l duplex, reeval.    Christy DO: seen by heme; seen by palliative; patient on lovenox for hx of DVT already; patient with leukocytosis- antibiotics ordered; endorsed to Dr. Vallecillo for admission.

## 2023-12-15 NOTE — CONSULT NOTE ADULT - ASSESSMENT
Pt is a 53y old Male with hx of metastatic colon CA undergoing continued tx with Primary Onc Dr Serrano s/p ileostomy, PE/DVTs, arterial occlusion presented to the ED with worsening abdominal pain, increased weakness and worsening SOB/LOCKETT. He is well known to our service, has been receiving cancer directed therapy as scheduled and follows up with PM&R for pain management. He openly admitted that he currently lives at home alone and cannot take care of himself anymore. Patient tearful that he has been more more often alone since his mom has moved to Nursing home, and his brother is frequently working. Palliative medicine consulted to help establish GOC and advance care planning     ISTOP Reference #: [id number]    1) Pain  -Abd R/T cancer  -  as per [facility] has not filled Oxycontin 20mg since beginning of october - it appears patient is not longer on long acting will see usage over the weekend and restart if need   - increased oxycodone to 15mg for severe pain q4hrs PRN   - oxycodone 10mg for moderate pain q4hrs PRN   - morphine 2mg IV q4hrs PRN for breakthrough pain   - Supportive care    2) Metastatic Colon cancer  -Currently on treatment with Dr Serrano  -Mets to lung/liver  -oncology consult appreciated     3) 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    Pt is a 53y old Male with hx of metastatic colon CA undergoing continued tx with Primary Onc Dr Serrano s/p ileostomy, PE/DVTs, arterial occlusion presented to the ED with worsening abdominal pain, increased weakness and worsening SOB/LOCKETT. He is well known to our service, has been receiving cancer directed therapy as scheduled and follows up with PM&R for pain management. He openly admitted that he currently lives at home alone and cannot take care of himself anymore. Patient tearful that he has been more more often alone since his mom has moved to Nursing home, and his brother is frequently working. Palliative medicine consulted to help establish GOC and advance care planning     ISTOP Reference #: [id number]    1) Pain  -Abd R/T cancer  -  as per [facility] has not filled Oxycontin 20mg since beginning of october - it appears patient is not longer on long acting will see usage over the weekend and restart if need   - increased oxycodone to 15mg for severe pain q4hrs PRN   - oxycodone 10mg for moderate pain q4hrs PRN   - morphine 2mg IV q4hrs PRN for breakthrough pain   - Supportive care    2) Metastatic Colon cancer  -Currently on treatment with Dr Serrano  -Mets to lung/liver  -oncology consult appreciated     3) 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

## 2023-12-15 NOTE — H&P ADULT - ASSESSMENT
52 y/o M with MH significant for Stage IV colon cancer with metastases to the liver and lung, currently on chemotherapy, s/p ileostomy and revision 04/2023 and subtotal colectomy for perforated cecal mass 2022, multiple readmissions for recurrent/persistent abdominal pain, poor PO intake    #Abdominal Pain  -Patient with multiple admissions for persistent abdominal pain and poor PO intake. Pain secondary to metastatic colon cancer to liver and lung. s/p ileostomy.   -Pain management consulted, recommendations appreciated.  -Continue with morphine and oxy.      #Social Work  - patient has poor home situation;    - SW/CM consulted for potential placement services  - Palliative care consult for pain regimen optimization and to assess need for placement   - of note on previous admission patient admitted to cocaine and EtOH abuse 2/2 to depression from ongoing diagnosis and depression but denies current use  - will c/w Lexapro 5mg QD    #Leukocytosis   -WBC 22.4.       #DVT PPx  - c/w Lovenox 90mg BID 54 y/o M with MH significant for Stage IV colon cancer with metastases to the liver and lung, currently on chemotherapy, s/p ileostomy and revision 04/2023 and subtotal colectomy for perforated cecal mass 2022, multiple readmissions for recurrent/persistent abdominal pain, poor PO intake    #Abdominal Pain  -Patient with multiple admissions for persistent abdominal pain and poor PO intake. Pain secondary to metastatic colon cancer to liver and lung. s/p ileostomy.   -Pain management consulted, recommendations appreciated.  -Continue with morphine and oxy.      #Social Work  - patient has poor home situation;    - SW/CM consulted for potential placement services  - Palliative care consult for pain regimen optimization and to assess need for placement   - of note on previous admission patient admitted to cocaine and EtOH abuse 2/2 to depression from ongoing diagnosis and depression but denies current use  - will c/w Lexapro 5mg QD    #Leukocytosis   -WBC 22.4.       #DVT PPx  - c/w Lovenox 90mg BID 54 y/o M with MH significant for Stage IV colon cancer with metastases to the liver and lung, currently on chemotherapy, s/p ileostomy and revision 04/2023 and subtotal colectomy for perforated cecal mass 2022, multiple readmissions for recurrent/persistent abdominal pain, poor PO intake    #Abdominal Pain  -Patient with multiple admissions for persistent abdominal pain and poor PO intake. Pain secondary to metastatic colon cancer to liver and lung. s/p ileostomy.   -Pain management consulted, recommendations appreciated. Continue with Oxy 15mg q4 PRN for severe pain, oxy 10mg q4 PRN moderate pain and morphine 2mg q4 PRN breakthrough pain. Bowel regiment given opiates.   - Retroperitoneal biopsy ----> Adenocarcinoma with necrosis. No loss of nuclear expression of MMR proteins (MLH1, MSH2, MSH6, and PMS2). Staging [id number].  - 06/22/2022: Started FOLFIRI/sunny with most recent dosing 12/06/23  - Continues to follow up with Primary Onc Dr Serrano for cancer directed therapy  - Evaluated by heme/onc. 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 IVC filter present.  - Now on Lovenox. Per weight and previous admission, patient on Lovenox 90mg BID. Patient states that he takes 140mg BID and took 100mg today. Will continue 90mg BID until dose is confirmed. Questionable compliance in the past.       #Social Work  - patient has poor home situation;    - SW/CM consulted for potential placement services  - Palliative care consult for pain regimen optimization and to assess need for placement   - of note on previous admission patient admitted to cocaine and EtOH abuse 2/2 to depression from ongoing diagnosis and depression but denies current use. Check urine drug screen,.   - will c/w Lexapro 5mg QD    #Leukocytosis   -WBC 22.4 which has been uptrending since Nov. Last chemo 12/6/23.   -Afebrile and otherwise no evidence of sepsis. Lactate within normal limits   -s/p Vanco and Cefepime in the ED. CTPE/AP with no evidence of infectious etiology  -Check UA. Follow blood and urine cultures .  -If patient spikes fever, will start broad spectrum antibiotics and obtain ID evaluation  -Follow up heme/onc recommendations.     #Vitamin D deficiency- Vit D 18.7 from 12/6/23. Continue with PO supplementation.       #DVT PPx  - c/w Lovenox 90mg BID 52 y/o M with MH significant for Stage IV colon cancer with metastases to the liver and lung, currently on chemotherapy, s/p ileostomy and revision 04/2023 and subtotal colectomy for perforated cecal mass 2022, multiple readmissions for recurrent/persistent abdominal pain, poor PO intake    #Abdominal Pain  -Patient with multiple admissions for persistent abdominal pain and poor PO intake. Pain secondary to metastatic colon cancer to liver and lung. s/p ileostomy.   -Pain management consulted, recommendations appreciated. Continue with Oxy 15mg q4 PRN for severe pain, oxy 10mg q4 PRN moderate pain and morphine 2mg q4 PRN breakthrough pain. Bowel regiment given opiates.   - Retroperitoneal biopsy ----> Adenocarcinoma with necrosis. No loss of nuclear expression of MMR proteins (MLH1, MSH2, MSH6, and PMS2). Staging [id number].  - 06/22/2022: Started FOLFIRI/sunny with most recent dosing 12/06/23  - Continues to follow up with Primary Onc Dr Serrano for cancer directed therapy  - Evaluated by heme/onc. 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 IVC filter present.  - Now on Lovenox. Per weight and previous admission, patient on Lovenox 90mg BID. Patient states that he takes 140mg BID and took 100mg today. Will continue 90mg BID until dose is confirmed. Questionable compliance in the past.       #Social Work  - patient has poor home situation;    - SW/CM consulted for potential placement services  - Palliative care consult for pain regimen optimization and to assess need for placement   - of note on previous admission patient admitted to cocaine and EtOH abuse 2/2 to depression from ongoing diagnosis and depression but denies current use. Check urine drug screen,.   - will c/w Lexapro 5mg QD    #Leukocytosis   -WBC 22.4 which has been uptrending since Nov. Last chemo 12/6/23.   -Afebrile and otherwise no evidence of sepsis. Lactate within normal limits   -s/p Vanco and Cefepime in the ED. CTPE/AP with no evidence of infectious etiology  -Check UA. Follow blood and urine cultures .  -If patient spikes fever, will start broad spectrum antibiotics and obtain ID evaluation  -Follow up heme/onc recommendations.     #Vitamin D deficiency- Vit D 18.7 from 12/6/23. Continue with PO supplementation.       #DVT PPx  - c/w Lovenox 90mg BID

## 2023-12-15 NOTE — CONSULT NOTE ADULT - ASSESSMENT
52 y/o M with MH significant for Stage IV colon cancer with metastases to the liver and lung, currently on chemotherapy, s/p ileostomy and revision 04/2023 and subtotal colectomy for perforated cecal mass 2022, multiple readmissions for recurrent/persistent abdominal pain, poor PO intake      # Metastatic Colon CA to Liver & Lung, S/p Ileostomy    - Dx 05/2022 after going to ED for worsening abdominal pain  - 05/19/22 CT AP with apparent focal mural thickening at the cecum/proximal ascending colon with focal tumor invasion/extension from the cecum to the sigmoid colon. Multiple hypodense lesions with calcifications in both lobes of the liver with the largest measuring 5.8 x 6.0 cm in hepatic segment.  - 05/19/22: underwent exploratory laparotomy, total colectomy, end ileostomy, biopsy of liver, and biopsy of retroperitoneum.  - Path: Portion of terminal ileum, cecum with adherent sigmoid ----> Poorly differentiated infiltrating adenocarcinoma measuring 9.0 cm. Adenocarcinoma infiltrates through the bowel wall and through the visceral peritoneum and infiltrates the adherent sigmoid colon 3 of 17 lymph nodes are positive for metastatic carcinoma. Lymphovascular space & Perineural invasion is identified. The surgical margins negative.   - Appendix with serosal tumor implants and acute inflammation. Peritonitis. Liver, biopsy ---> Metastatic adenocarcinoma.   - Retroperitoneal biopsy ----> Adenocarcinoma with necrosis. No loss of nuclear expression of MMR proteins (MLH1, MSH2, MSH6, and PMS2). Staging [id number].  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     - Dx during previous admission 07/2023  - Patient was taking DOAC (Eliquis) for prior VTE in setting of metastatic disease but developed arterial thrombosis  - 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; LAC/APLS labs negative   - Patient receiving Bevacizumab which also rarely can contribute to thrombotic risk   - LLE angiogram with Vascular Sx 07/10; unable to traverse lesion  - Transitioned to Lovenox 90 q 12h 07/13/23 ----> has hx of non compliance   - S/p IVC filter 07/17/23  - Has been evaluated by Vascular during previous admissions      # Ongoing Recurrent Abdominal Pain    - Ongoing symptoms over the past few weeks since being seen in the ED 11/2023  - Pending CT CAP imaging in the ED  - He has had multiple pain medication regimen changes with little to no improvement ---> follows with PM&R in outpatient setting  - Palliative consulted     - Recent admissions noted binge drinking, marijuana, requesting oxycodone refills early; urine toxicology positive for Cocaine, THC, Benzodiazapine and Opiates during  - Ethical issues arose during prior admission given ongoing intractable cancer related symptoms/pain with suboptimal tx leading to suspected narcotic/illicit drug abuse   - Ethics evaluated patient, see note from prior admission    - Inpatient goal is pain management and determining additional GOC regarding cancer directed therapy    - Continue necessary supportive measures unless otherwise determined by GOC        Case discussed with covering attending Dr Starks and outpatient Heme/Onc ACP Christine Au  52 y/o M with MH significant for Stage IV colon cancer with metastases to the liver and lung, currently on chemotherapy, s/p ileostomy and revision 04/2023 and subtotal colectomy for perforated cecal mass 2022, multiple readmissions for recurrent/persistent abdominal pain, poor PO intake      # Metastatic Colon CA to Liver & Lung, S/p Ileostomy    - Dx 05/2022 after going to ED for worsening abdominal pain  - 05/19/22 CT AP with apparent focal mural thickening at the cecum/proximal ascending colon with focal tumor invasion/extension from the cecum to the sigmoid colon. Multiple hypodense lesions with calcifications in both lobes of the liver with the largest measuring 5.8 x 6.0 cm in hepatic segment.  - 05/19/22: underwent exploratory laparotomy, total colectomy, end ileostomy, biopsy of liver, and biopsy of retroperitoneum.  - Path: Portion of terminal ileum, cecum with adherent sigmoid ----> Poorly differentiated infiltrating adenocarcinoma measuring 9.0 cm. Adenocarcinoma infiltrates through the bowel wall and through the visceral peritoneum and infiltrates the adherent sigmoid colon 3 of 17 lymph nodes are positive for metastatic carcinoma. Lymphovascular space & Perineural invasion is identified. The surgical margins negative.   - Appendix with serosal tumor implants and acute inflammation. Peritonitis. Liver, biopsy ---> Metastatic adenocarcinoma.   - Retroperitoneal biopsy ----> Adenocarcinoma with necrosis. No loss of nuclear expression of MMR proteins (MLH1, MSH2, MSH6, and PMS2). Staging [id number].  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     - Dx during previous admission 07/2023  - Patient was taking DOAC (Eliquis) for prior VTE in setting of metastatic disease but developed arterial thrombosis  - 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; LAC/APLS labs negative   - Patient receiving Bevacizumab which also rarely can contribute to thrombotic risk   - LLE angiogram with Vascular Sx 07/10; unable to traverse lesion  - Transitioned to Lovenox 90 q 12h 07/13/23 ----> has hx of non compliance   - S/p IVC filter 07/17/23  - Has been evaluated by Vascular during previous admissions      # Ongoing Recurrent Abdominal Pain    - Ongoing symptoms over the past few weeks since being seen in the ED 11/2023  - Pending CT CAP imaging in the ED  - He has had multiple pain medication regimen changes with little to no improvement ---> follows with PM&R in outpatient setting  - Palliative consulted     - Recent admissions noted binge drinking, marijuana, requesting oxycodone refills early; urine toxicology positive for Cocaine, THC, Benzodiazapine and Opiates during  - Ethical issues arose during prior admission given ongoing intractable cancer related symptoms/pain with suboptimal tx leading to suspected narcotic/illicit drug abuse   - Ethics evaluated patient, see note from prior admission    - Inpatient goal is pain management and determining additional GOC regarding cancer directed therapy    - Continue necessary supportive measures unless otherwise determined by GOC        Case discussed with covering attending Dr Starks and outpatient Heme/Onc ACP Christine Au

## 2023-12-15 NOTE — H&P ADULT - NSHPLABSRESULTS_GEN_ALL_CORE
12-15    133<L>  |  99  |  13  ----------------------------<  103<H>  3.9   |  26  |  1.07    Ca    9.3      15 Dec 2023 15:18  Mg     2.2     12-15    TPro  8.6<H>  /  Alb  4.2  /  TBili  0.3  /  DBili  x   /  AST  24  /  ALT  51  /  AlkPhos  213<H>  12-15                          14.5   22.04 )-----------( 223      ( 15 Dec 2023 15:18 )             44.3     Imaging:  CT PE/ CT A/P- No pulmonary embolus. Static metastatic lung nodules.    Filling defect within the left femoral vein suspicious for DVT. IVC   filter present.    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 Again noted,   the left common iliac artery, left external and left internal iliac   arteries are obliterated. Distal reconstitution of the left external   iliac artery.    Stable metastatic lesions within the liver.

## 2023-12-15 NOTE — H&P ADULT - HISTORY OF PRESENT ILLNESS
54 yo M with pmhx of metastatic adenocarcinoma s/p ileostomy, PE/DVT s/p IVC filter (on Lovenox 90mg BID) presented to the ED for severe abdominal pain. Reports that he has been taking 4-5 tabs of oxy 10mg daily for pain management and since Monday he has been unable to refill his medications. Patient was recently admitted in Oct 2023 for ongoing abdominal pain since May 2022 and social issues and was evaluated by social work and heme/onc.      ED Vitals: Afebrile, /90,   ED Course: Vanco, Cefepime, Morphine     PAST MEDICAL & SURGICAL HISTORY:  Cancer, colon  liver mets      S/P ileostomy      Metastasis to liver      Pulmonary embolism      S/P colon resection      H/O ileostomy      Social History:  FAMILY HISTORY:  FH: dementia (Mother)    Home Medications:  enoxaparin 150 mg/mL injectable solution: 140 milligram(s) subcutaneously once a day (15 Dec 2023 21:07)  oxyCODONE 10 mg oral tablet: 1 tab(s) orally every 6 hours as needed for  moderate pain *** take with 5 mg total 15 mg tab*** MDD: 4 (15 Dec 2023 21:08)  oxyCODONE 5 mg oral tablet: 1 tab(s) orally every 6 hours *** Take with 10mg tab total 15 mg *** (15 Dec 2023 21:08)   52 yo M with pmhx of metastatic adenocarcinoma s/p ileostomy, PE/DVT s/p IVC filter (on Lovenox 90mg BID) presented to the ED for severe abdominal pain. Reports that he has been taking 4-5 tabs of oxy 10mg daily for pain management and since Monday he has been unable to refill his medications. Patient was recently admitted in Oct 2023 for ongoing abdominal pain since May 2022 and social issues and was evaluated by social work and heme/onc.      ED Vitals: Afebrile, /90,   ED Course: Vanco, Cefepime, Morphine     PAST MEDICAL & SURGICAL HISTORY:  Cancer, colon  liver mets      S/P ileostomy      Metastasis to liver      Pulmonary embolism      S/P colon resection      H/O ileostomy      Social History:  FAMILY HISTORY:  FH: dementia (Mother)    Home Medications:  enoxaparin 150 mg/mL injectable solution: 140 milligram(s) subcutaneously once a day (15 Dec 2023 21:07)  oxyCODONE 10 mg oral tablet: 1 tab(s) orally every 6 hours as needed for  moderate pain *** take with 5 mg total 15 mg tab*** MDD: 4 (15 Dec 2023 21:08)  oxyCODONE 5 mg oral tablet: 1 tab(s) orally every 6 hours *** Take with 10mg tab total 15 mg *** (15 Dec 2023 21:08)

## 2023-12-15 NOTE — ED ADULT TRIAGE NOTE - CHIEF COMPLAINT QUOTE
BIBEMS for abdominal pain. currently undergoing treatment for liver, lung, colon CA. has right chest wall port. endorsing mild nausea at this time.

## 2023-12-15 NOTE — CONSULT NOTE ADULT - SUBJECTIVE AND OBJECTIVE BOX
HPI:    52 y/o M with PMHx of metastatic colon CA undergoing continued tx with Primary Onc Dr Serrano s/p ileostomy, PE/DVTs, arterial occlusion presented to the ED with worsening abdominal pain, increased weakness and worsening SOB/LOCKETT. He is well known to our service, has been receiving cancer directed therapy as scheduled and follows up with PM&R for pain management. He openly admitted that he currently lives at home alone and cannot take care of himself anymore.    12/15/23: Seen in the ED , no acute distress, but overall ill appearing      PAST MEDICAL & SURGICAL HISTORY:    Cancer, colon; liver mets    S/P ileostomy    Metastasis to liver    Pulmonary embolism    S/P colon resection    H/O ileostomy        MEDICATIONS  (STANDING):    cefepime   IVPB 1000 milliGRAM(s) IV Intermittent once  sodium chloride 0.9% Bolus 2000 milliLiter(s) IV Bolus once  vancomycin  IVPB. 1000 milliGRAM(s) IV Intermittent once      ALLERGIES:    [This allergen will not trigger allergy alert] No Known Drug Allergies (Unknown)        FAMILY HISTORY:    FH: dementia (Mother)      REVIEW OF SYSTEMS:    Constitutional, Eyes, ENT, Cardiovascular, Respiratory, Gastrointestinal, Genitourinary, Musculoskeletal, Integumentary, Neurological, Psychiatric, Endocrine, Heme/Lymph and Allergic/Immunologic review of systems are otherwise negative except as noted in HPI.       VITALS:    T(C): 36.6 (15 Dec 2023 15:08), Max: 36.6 (15 Dec 2023 15:08)  T(F): 97.9 (15 Dec 2023 15:08), Max: 97.9 (15 Dec 2023 15:08)  HR: 100 (15 Dec 2023 15:08) (100 - 100)  BP: 131/90 (15 Dec 2023 15:08) (131/90 - 131/90)  BP(mean): --  RR: 18 (15 Dec 2023 15:08) (18 - 18)  SpO2: 100% (15 Dec 2023 15:08) (100% - 100%)    Parameters below as of 15 Dec 2023 15:08  Patient On (Oxygen Delivery Method): room air        PHYSICAL:    Constitutional: ill / disheveled appearing  Eyes: no conjunctival infection, anicteric  ENT: pharynx is unremarkable  Neck: supple without JVD  Pulmonary: clear to auscultation bilaterally  Cardiac: RRR  Vascular: no calf tenderness, venous stasis changes  Abdomen: normoactive bowel sounds, soft and nontender, no hepatosplenomegaly or masses appreciated; ostomy in place  Lymphatic: no peripheral adenopathy appreciated  Musculoskeletal: full range of motion and no deformities appreciated  Skin: normal appearance, no rash  Neurology: awake, alert, oriented ; no focal deficits       LABS:    CBC Full  -  ( 15 Dec 2023 15:18 )  WBC Count : 22.04 K/uL  RBC Count : 5.18 M/uL  Hemoglobin : 14.5 g/dL  Hematocrit : 44.3 %  Platelet Count - Automated : 223 K/uL  Mean Cell Volume : 85.5 fl  Mean Cell Hemoglobin : 28.0 pg  Mean Cell Hemoglobin Concentration : 32.7 gm/dL  Auto Neutrophil # : x  Auto Lymphocyte # : x  Auto Monocyte # : x  Auto Eosinophil # : x  Auto Basophil # : x  Auto Neutrophil % : x  Auto Lymphocyte % : x  Auto Monocyte % : x  Auto Eosinophil % : x  Auto Basophil % : x          PT/INR - ( 15 Dec 2023 15:18 )   PT: 11.3 sec;   INR: 1.00 ratio         PTT - ( 15 Dec 2023 15:18 )  PTT:44.2 sec      RADIOLOGY & ADDITIONAL STUDIES:

## 2023-12-15 NOTE — ED PROVIDER NOTE - OBJECTIVE STATEMENT
52 y/o male with a PMHx of colon cancer with mets to liver and lung s/p colon resection, PE, ileostomy presents to the ED c/o diffuse abd pain and nausea x days. Pt also reports mild CP, body aches and nonproductive cough. Former smoker. No EtOH use. No drug use. No other complaints at this time. 54 y/o male with a PMHx of colon cancer with mets to liver and lung s/p colon resection, PE, ileostomy presents to the ED c/o diffuse abd pain and nausea x days. Pt also reports mild CP, body aches and nonproductive cough. Former smoker. No EtOH use. No drug use. No other complaints at this time.

## 2023-12-15 NOTE — H&P ADULT - PATIENT'S PREFERRED PRONOUN
Consultation - Cardiology Team One  Ace March 82 y o  female MRN: [id number]  Unit/Bed#: -01 Encounter: [id number]    Inpatient consult to Cardiology  Consult performed by: Gage Bunch ordered by: Miles Prajapati        Chief Complaint   Patient presents with    Chest Pain       Pt c/o chest pain upon taking deep breath and trouble breathing which started yesterday         Physician Requesting Consult: Jessi Valadez DO  Reason for Consult / Principal Problem:  New atrial fibrillation    History of Present Illness   HPI: Barry Pradhan is a 82y o  year old female who has a history of  Hypertension and hyperlipidemia; no known coronary artery disease  No history of an arrhythmia  She has not had a prior cardiac evaluation     She presents with shortness of breath and pleuritic chest pain for 1 day  Her initial temperature was 95 5°  O2 sats have been 95 to 100%  Her initial EKG December 10, showed normal sinus rhythm at 66 beats per minute  However, repeat December 10th at 8:00 p m  showed atrial fibrillation with rapid ventricular response rate 125 beats per minute, after admission  She was given 1 dose of IV metoprolol with conversion to sinus rhythm  She denied any palpitations, or presyncope  In fact she did not realize her heart rate was that fast    Serial troponins were unremarkable  Her D-dimer was elevated at greater than 2400  A CT scan of the chest with IV contrast showed no evidence of pulmonary embolism  However, there was a small to moderate-sized pericardial effusion noted, and a trace left pleural effusion  In addition, there was a patchy opacity in the right upper lobe and patchy consolidation at the left lung base which may represent atelectasis or infiltrate, per the radiologist   She was started on antibiotics for community-acquired pneumonia  She now tells me she feels good    An echocardiogram disclosed normal LV function with grade 1 diastolic dysfunction  There was no regional wall motion abnormalities  The right ventricle was normal  The atria were normal in size  By echocardiogram, a trace pericardial effusion was noted  There was no evidence of hemodynamic compromise  As an outpatient she has been on Zestril 20 mg daily  She had a slight ZECHARIAH on admission  The   ACE-I was held  Her creatinine has normalized  Social history:  She resides in her own home with her   She tries to remain active  She does need to go up to greater than to 20 steps to get into her house regularly    Past medical history:  She tells me she had 1 fall in the last couple of months  She slipped on the slippery floor in her bedroom  There was no reported injury  She denies any recurrence  Review of Systems   Constitution: Negative for chills, fever and weakness  Cardiovascular: Negative for chest pain, claudication, cyanosis, dyspnea on exertion, irregular heartbeat, leg swelling, near-syncope, orthopnea, palpitations, paroxysmal nocturnal dyspnea and syncope  See history of present illness   Respiratory: Negative for cough and shortness of breath  Musculoskeletal: Positive for falls  Gastrointestinal: Negative for anorexia, nausea and vomiting  Neurological: Negative for dizziness, focal weakness and headaches  Historical Information   Past Medical History:   Diagnosis Date    Hyperlipidemia     Hypertension     Macular degeneration      Past Surgical History:   Procedure Laterality Date    HYSTERECTOMY       History   Alcohol Use    Yes     History   Drug Use No     History   Smoking Status    Never Smoker   Smokeless Tobacco    Never Used     Family History: History reviewed  No pertinent family history      Meds/Allergies   all current active meds have been reviewed, current meds:   Current Facility-Administered Medications   Medication Dose Route Frequency    acetaminophen (TYLENOL) tablet 975 mg  975 mg Oral Q8H [address]    aspirin chewable tablet 81 mg  81 mg Oral Daily    cefTRIAXone (ROCEPHIN) IVPB (premix) 1,000 mg  1,000 mg Intravenous Q24H    And    azithromycin (ZITHROMAX) tablet 250 mg  250 mg Oral Q24H    bimatoprost (LUMIGAN) 0 01 % ophthalmic solution 1 drop  1 drop Right Eye HS    calcium carbonate (TUMS) chewable tablet 1,000 mg  1,000 mg Oral Daily PRN    docusate sodium (COLACE) capsule 100 mg  100 mg Oral BID    fish oil capsule 1,000 mg  1,000 mg Oral Daily    heparin (porcine) subcutaneous injection 5,000 Units  5,000 Units Subcutaneous Q8H [address]    metoprolol (LOPRESSOR) injection 5 mg  5 mg Intravenous Q6H PRN    multivitamin-minerals (CENTRUM) tablet 1 tablet  1 tablet Oral Daily    ondansetron (ZOFRAN) injection 4 mg  4 mg Intravenous Q6H PRN    and PTA meds:   Prior to Admission Medications   Prescriptions Last Dose Informant Patient Reported? Taking? Multiple Vitamins-Minerals (ICAPS MV PO) 12/9/2017 at Unknown time  Yes Yes   Sig: Take by mouth   bimatoprost (LUMIGAN) 0 01 % ophthalmic drops   Yes Yes   Sig: Administer 1 drop to the right eye daily at bedtime   lisinopril (ZESTRIL) 20 mg tablet 12/9/2017 at Unknown time  Yes Yes   Sig: Take 20 mg by mouth daily   omega-3-acid ethyl esters (LOVAZA) 1 g capsule 12/9/2017 at Unknown time  Yes Yes   Sig: Take 2 g by mouth 2 (two) times a day      Facility-Administered Medications: None          No Known Allergies    Objective   Vitals: Blood pressure (P) 147/63, pulse (P) 74, temperature (!) (P) 96 5 °F (35 8 °C), temperature source (P) Tympanic, resp  rate (P) 18, height 5' 1" (1 549 m), weight 59 kg (130 lb), SpO2 (P) 99 %, not currently breastfeeding ,     Body mass index is 24 56 kg/m²  ,     Systolic (15LHE), LGT:147 , Min:126 , RCF:177     Diastolic (52UYM), RJJ:06, Min:61, Max:71        Intake/Output Summary (Last 24 hours) at 12/11/17 0912  Last data filed at 12/10/17 1224   Gross per 24 hour   Intake              440 ml   Output                0 ml   Net 440 ml     Weight (last 2 days)     Date/Time   Weight    12/09/17 2136  59 (130)    12/09/17 1549  59 (130)            Invasive Devices     Peripheral Intravenous Line            Peripheral IV 12/09/17 Left Antecubital 1 day                  Physical Exam   Constitutional: She is oriented to person, place, and time  HENT:   Head: Normocephalic and atraumatic  Eyes: Conjunctivae and EOM are normal    Conjuntiva about the right eye appears injected   Neck: Normal range of motion  Neck supple  Cardiovascular: Normal rate, regular rhythm and intact distal pulses  Murmur heard  Pulmonary/Chest: Effort normal and breath sounds normal    Abdominal: Soft  Bowel sounds are normal    Musculoskeletal: Normal range of motion  Neurological: She is alert and oriented to person, place, and time  Skin: Skin is warm and dry  Psychiatric: She has a normal mood and affect  Nursing note and vitals reviewed          LABORATORY RESULTS:    Results from last 7 days  Lab Units 12/11/17  0547 12/10/17  0043 12/09/17 2153   TROPONIN I ng/mL <0 02 <0 02 <0 02     CBC with diff:   Results from last 7 days  Lab Units 12/11/17  0547 12/10/17  0500 12/09/17  2153 12/09/17  1620   WBC Thousand/uL 7 58 8 84  --  16 04*   HEMOGLOBIN g/dL 12 1 11 6  --  14 6   HEMATOCRIT % 35 5 34 1*  --  42 3   MCV fL 100* 100*  --  100*   PLATELETS Thousands/uL 248 227 274 283   MCH pg 34 2 34 0  --  34 4*   MCHC g/dL 34 1 34 0  --  34 5   RDW % 13 4 13 3  --  13 0   MPV fL 9 4 9 0 9 1 9 1   NRBC AUTO /100 WBCs 0 0  --  0       CMP:  Results from last 7 days  Lab Units 12/11/17  0547 12/10/17  0500 12/09/17  1620   SODIUM mmol/L 144 142 142   POTASSIUM mmol/L 4 0 4 0 4 2   CHLORIDE mmol/L 114* 112* 108   CO2 mmol/L 24 24 25   ANION GAP mmol/L 6 6 9   BUN mg/dL 18 22 22   CREATININE mg/dL 0 88 0 94 1 14   GLUCOSE RANDOM mg/dL 86 89 115   CALCIUM mg/dL 8 8 8 1* 9 3   EGFR ml/min/1 73sq m 58 53 42       BMP:  Results from last 7 days  Lab Units 17  0547 12/10/17  0500 17  1620   SODIUM mmol/L 144 142 142   POTASSIUM mmol/L 4 0 4 0 4 2   CHLORIDE mmol/L 114* 112* 108   CO2 mmol/L 24 24 25   BUN mg/dL 18 22 22   CREATININE mg/dL 0 88 0 94 1 14   GLUCOSE RANDOM mg/dL 86 89 115   CALCIUM mg/dL 8 8 8 1* 9 3     Lipid Profile:   Lab Results   Component Value Date    CHOL 275 (H) 2016    CHOL 273 2015    CHOL 278 2015     Lab Results   Component Value Date    HDL 94 (H) 2016    HDL 82 2015     2015     Lab Results   Component Value Date    LDLCALC 145 (H) 2016    LDLCALC 159 (H) 2015    LDLCALC 148 (H) 2015     Lab Results   Component Value Date    TRIG 179 (H) 2016    TRIG 162 2015    TRIG 133 2015         Cardiac testing:   Results for orders placed during the hospital encounter of 17   Echo complete with contrast if indicated    Alexia Eller [address]  [phone]    Transthoracic Echocardiogram  2D, M-mode, Doppler, and Color Doppler    Study date:  10-Dec-2017    Patient: Miko Briones  MR number: [id number]  Account number: [de-identified]  : 07-Mar-1924  Age: 82 years  Gender: Female  Status: Inpatient  Location: Bedside  Height: 61 in  Weight: 130 lb  BP: 102/ 52 mmHg    Indications: Chest pain  Shortness of breath Evaluate suspected pericardial effusion  Diagnoses: R07 1 - Chest pain on breathing    Sonographer:  MOUNA Dueñas  Primary Physician:  Alyse Yuen MD  Referring Physician:  Paige Amaral PA-C  Group:  [id number]  Interpreting Physician:  Jose Alfredo Crews MD    SUMMARY    LEFT VENTRICLE:  Systolic function was normal  Ejection fraction was estimated to be 60 %  There were no regional wall motion abnormalities  Doppler parameters were consistent with abnormal left ventricular relaxation (grade 1 diastolic dysfunction)      TRICUSPID VALVE:  There was mild regurgitation  PERICARDIUM:  A trace pericardial effusion was identified  There was no evidence of hemodynamic compromise  HISTORY: PRIOR HISTORY: Risk factors: hypertension  PROCEDURE: The procedure was performed at the bedside  This was a routine study  The transthoracic approach was used  The study included complete 2D imaging, M-mode, complete spectral Doppler, and color Doppler  The heart rate was 71 bpm,  at the start of the study  Image quality was adequate  LEFT VENTRICLE: Size was normal  Systolic function was normal  Ejection fraction was estimated to be 60 %  There were no regional wall motion abnormalities  Wall thickness was normal  DOPPLER: There was an increased relative contribution  of atrial contraction to ventricular filling  Doppler parameters were consistent with abnormal left ventricular relaxation (grade 1 diastolic dysfunction)  RIGHT VENTRICLE: The size was normal  Systolic function was normal  Wall thickness was normal     LEFT ATRIUM: Size was normal     RIGHT ATRIUM: Size was normal     MITRAL VALVE: Valve structure was normal  There was normal leaflet separation  DOPPLER: The transmitral velocity was within the normal range  There was no evidence for stenosis  There was trace regurgitation  AORTIC VALVE: The valve was trileaflet  Leaflets exhibited normal thickness and normal cuspal separation  DOPPLER: Transaortic velocity was within the normal range  There was no evidence for stenosis  There was no significant  regurgitation  TRICUSPID VALVE: The valve structure was normal  There was normal leaflet separation  DOPPLER: The transtricuspid velocity was within the normal range  There was no evidence for stenosis  There was mild regurgitation  Pulmonary artery  systolic pressure was within the normal range  Estimated peak PA pressure was 25 mmHg      PULMONIC VALVE: Leaflets exhibited normal thickness, no calcification, and normal cuspal separation  DOPPLER: The transpulmonic velocity was within the normal range  There was trace regurgitation  PERICARDIUM: A trace pericardial effusion was identified  There was no evidence of hemodynamic compromise  The pericardium was normal in appearance  AORTA: The root exhibited normal size  SYSTEMIC VEINS: IVC: The inferior vena cava was mildly dilated  Respirophasic changes were normal     SYSTEM MEASUREMENT TABLES    2D  %FS: 31 21 %  Ao Diam: 3 13 cm  EDV(Teich): 92 72 ml  EF(Teich): 59 12 %  ESV(Teich): 37 91 ml  IVSd: 0 87 cm  LA Area: 15 32 cm2  LA Diam: 3 6 cm  LVEDV MOD A4C: 37 34 ml  LVEF MOD A4C: 67 22 %  LVESV MOD A4C: 12 24 ml  LVIDd: 4 51 cm  LVIDs: 3 1 cm  LVLd A4C: 5 88 cm  LVLs A4C: 5 39 cm  LVPWd: 0 85 cm  RVIDd: 2 76 cm  SV MOD A4C: 25 1 ml  SV(Teich): 54 81 ml    CW  TR Vmax: 2 21 m/s  TR maxP 62 mmHg    MM  TAPSE: 2 02 cm    PW  AVC: 355 51 ms  E': 0 08 m/s  E/E': 9 57  MV A Toby: 0 76 m/s  MV Dec [place]: 4 7 m/s2  MV DecT: 160 41 ms  MV E Toby: 0 75 m/s  MV E/A Ratio: 0 99  MV PHT: 46 52 ms  MVA By PHT: 4 73 cm2    [facility] Commission Accredited Echocardiography Laboratory    Prepared and electronically signed by    Lefty Roy MD  Signed 10-Dec-2017 22:54:11         Imaging: I have personally reviewed pertinent reports  and I have personally reviewed pertinent films in PACS  Xr Chest 2 Views  Result Date: 12/10/2017  Narrative: CHEST INDICATION:  Mid chest pain  COMPARISON:  Chest radiographs 2017 VIEWS:  Frontal and lateral projections IMAGES:  2 FINDINGS:     Heart shadow appears unremarkable  Atherosclerotic vascular calcifications are noted  Lungs are hyperinflated  Bilateral apical pleural thickening  Small left pleural effusion  Visualized osseous structures appear within normal limits for the patient's age  Impression: Small left pleural effusion, new from the prior study  No active pulmonary disease   Workstation performed: [id number] Cta Ed Chest Pe Study  Result Date: 12/9/2017  Narrative: CTA - CHEST WITH IV CONTRAST - PULMONARY ANGIOGRAM INDICATION: Chest pain  Elevated d-dimer  COMPARISON: Chest x-ray from 12/9/2017 TECHNIQUE: CTA examination of the chest was performed using angiographic technique according to a protocol specifically tailored to evaluate for pulmonary embolism  Reformatted images were created in axial, sagittal, and coronal planes  In addition, coronal 3D MIP postprocessing was performed on the acquisition scanner  Radiation dose length product (DLP) for this visit:  303 56 mGy-cm   This examination, like all CT scans performed in the [facility], was performed utilizing techniques to minimize radiation dose exposure, including the use of iterative  reconstruction and automated exposure control  IV Contrast:  70 mL of iohexol (OMNIPAQUE)      FINDINGS: PULMONARY ARTERIAL TREE:  No pulmonary embolus is seen  LUNGS:  Multiple bilateral pulmonary nodules more numerous in the upper lobes  These are mostly 2 to 3 mm in size, see for example images 10 to 19   1 cm pulmonary nodule in the left upper lobe medially, image 17 series 3   4 mm nodule in the left lower  lobe laterally, image 30  Mild opacification of the bronchioles in the right lower lobe  Mild patchy opacity in the right upper lobe posteriorly may be related to atelectasis or infiltrate  Dependent atelectatic change bilaterally  Mild patchy consolidation in the left lower lobe  PLEURA:  Trace left pleural effusion  HEART/AORTA:  Small to moderate-sized pericardial effusion  Heart is moderately enlarged  Scattered coronary artery calcifications  Thoracic aorta is normal caliber  MEDIASTINUM AND JULIO:  Calcified lymph node in the right peribronchial region  CHEST WALL AND LOWER NECK:       Normal  VISUALIZED STRUCTURES IN THE UPPER ABDOMEN:  Partially visualized are small stones in the gallbladder   OSSEOUS STRUCTURES:  No acute fracture or destructive osseous lesion  Vertebral body hemangioma identified at the T9 and T11 levels  Impression:   1  No evidence of pulmonary embolism  2   Small to moderate-sized pericardial effusion  Trace left pleural effusion  3  Mild patchy opacity in the right upper lobe and mild patchy consolidation at the left lung base which may represent atelectasis or infiltrate  Mild opacification of the bronchioles in the right lower lobe  4   Multiple pulmonary nodules more numerous in the upper lobes  The largest measures up to 1 cm  Based on current Fleischner Society 2017 Guidelines on incidental pulmonary nodule, either PET/CT scan evaluation, tissue sampling or short term interval followup non-contrast CT followup (initially in 3 months) may be considered appropriate  Workstation performed: [id number]       Telemetry reviewed personally:  Sinus rhythm  Brief rapid atrial fibrillation  Now in sinus rhythm    Assessment  Principal Problem:    Sepsis ([facility] )  Active Problems:    CAP (community acquired pneumonia)    Pleuritic chest pain    Pericardial effusion    ZECHARIAH (acute kidney injury) ([facility] )    Hypertension      Assessment  1  Paroxysmal atrial fibrillation with a rapid ventricular response converting to NSR spontaneously after 1 IV dose of metoprolol  This is the only documented episode, in the setting of pneumonia  She was asymptomatic  She has not felt palpitations  2  Chest pain-serial troponins unremarkable  3  Shortness of breath-this may be multifactorial   She is being treated for community acquired pneumonia at the right upper lung  Rapid AFib was likely contributing  Her chest x-ray is not suggestive of heart failure  4  Hypertension  /66  Her outpatient dose of lisinopril has been held  5  Hyperlipidemia  On only fish oil as an outpatient which dose does nothing to treat the LDL  Her LDL is 145 to 160 range , non     Given her age, will defer conversation for treatment to her PCP       Plan  She was recently started on aspirin given the paroxysmal atrial fibrillation  I would continue that  Check a TSH if not already performed  Started metoprolol tartrate 12 5 mg p o  q 12 hours, given her advanced age and blood pressure  I would continue to hold her outpatient ACE inhibitor at this time  Continue to monitor on telemetry  She may benefit from an event monitor once discharged  Thank you for allowing us to participate in this patient's care  This pt will follow up with Dr Almaz Nieto once discharged  Counseling / Coordination of Care  Total floor / unit time spent today 45 minutes  Greater than 50% of total time was spent with the patient and / or family counseling and / or coordination of care  A description of the counseling / coordination of care: Review of history, current assessment, development of a plan  Code Status: Level 1 - Full Code    ** Please Note: Dragon 360 Dictation voice to text software may have been used in the creation of this document   ** Him/He

## 2023-12-15 NOTE — ED PROVIDER NOTE - GASTROINTESTINAL, MLM
Abdomen soft, non-tender, no guarding. Abdomen soft, no guarding. Diffuse abd tenderness. Ostomy in place.

## 2023-12-15 NOTE — ED ADULT NURSE NOTE - OBJECTIVE STATEMENT
BIBEMS for abdominal pain. currently undergoing treatment for liver, lung, colon CA. has right chest wall port. endorsing mild nausea at this time. no other complaints at this time. pt is axo4 no known drug allergies.

## 2023-12-16 LAB
AMPHET UR-MCNC: NEGATIVE — SIGNIFICANT CHANGE UP
AMPHET UR-MCNC: NEGATIVE — SIGNIFICANT CHANGE UP
ANION GAP SERPL CALC-SCNC: 6 MMOL/L — SIGNIFICANT CHANGE UP (ref 5–17)
ANION GAP SERPL CALC-SCNC: 6 MMOL/L — SIGNIFICANT CHANGE UP (ref 5–17)
APPEARANCE UR: CLEAR — SIGNIFICANT CHANGE UP
APPEARANCE UR: CLEAR — SIGNIFICANT CHANGE UP
BARBITURATES UR SCN-MCNC: NEGATIVE — SIGNIFICANT CHANGE UP
BARBITURATES UR SCN-MCNC: NEGATIVE — SIGNIFICANT CHANGE UP
BENZODIAZ UR-MCNC: POSITIVE — SIGNIFICANT CHANGE UP
BENZODIAZ UR-MCNC: POSITIVE — SIGNIFICANT CHANGE UP
BILIRUB UR-MCNC: NEGATIVE — SIGNIFICANT CHANGE UP
BILIRUB UR-MCNC: NEGATIVE — SIGNIFICANT CHANGE UP
BUN SERPL-MCNC: 13 MG/DL — SIGNIFICANT CHANGE UP (ref 7–23)
BUN SERPL-MCNC: 13 MG/DL — SIGNIFICANT CHANGE UP (ref 7–23)
CALCIUM SERPL-MCNC: 8.3 MG/DL — LOW (ref 8.5–10.1)
CALCIUM SERPL-MCNC: 8.3 MG/DL — LOW (ref 8.5–10.1)
CHLORIDE SERPL-SCNC: 106 MMOL/L — SIGNIFICANT CHANGE UP (ref 96–108)
CHLORIDE SERPL-SCNC: 106 MMOL/L — SIGNIFICANT CHANGE UP (ref 96–108)
CO2 SERPL-SCNC: 23 MMOL/L — SIGNIFICANT CHANGE UP (ref 22–31)
CO2 SERPL-SCNC: 23 MMOL/L — SIGNIFICANT CHANGE UP (ref 22–31)
COCAINE METAB.OTHER UR-MCNC: NEGATIVE — SIGNIFICANT CHANGE UP
COCAINE METAB.OTHER UR-MCNC: NEGATIVE — SIGNIFICANT CHANGE UP
COLOR SPEC: YELLOW — SIGNIFICANT CHANGE UP
COLOR SPEC: YELLOW — SIGNIFICANT CHANGE UP
CREAT SERPL-MCNC: 0.8 MG/DL — SIGNIFICANT CHANGE UP (ref 0.5–1.3)
CREAT SERPL-MCNC: 0.8 MG/DL — SIGNIFICANT CHANGE UP (ref 0.5–1.3)
DIFF PNL FLD: NEGATIVE — SIGNIFICANT CHANGE UP
DIFF PNL FLD: NEGATIVE — SIGNIFICANT CHANGE UP
EGFR: 107 ML/MIN/1.73M2 — SIGNIFICANT CHANGE UP
EGFR: 107 ML/MIN/1.73M2 — SIGNIFICANT CHANGE UP
GLUCOSE SERPL-MCNC: 103 MG/DL — HIGH (ref 70–99)
GLUCOSE SERPL-MCNC: 103 MG/DL — HIGH (ref 70–99)
GLUCOSE UR QL: NEGATIVE MG/DL — SIGNIFICANT CHANGE UP
GLUCOSE UR QL: NEGATIVE MG/DL — SIGNIFICANT CHANGE UP
HCT VFR BLD CALC: 39.2 % — SIGNIFICANT CHANGE UP (ref 39–50)
HCT VFR BLD CALC: 39.2 % — SIGNIFICANT CHANGE UP (ref 39–50)
HGB BLD-MCNC: 12.6 G/DL — LOW (ref 13–17)
HGB BLD-MCNC: 12.6 G/DL — LOW (ref 13–17)
KETONES UR-MCNC: NEGATIVE MG/DL — SIGNIFICANT CHANGE UP
KETONES UR-MCNC: NEGATIVE MG/DL — SIGNIFICANT CHANGE UP
LEUKOCYTE ESTERASE UR-ACNC: NEGATIVE — SIGNIFICANT CHANGE UP
LEUKOCYTE ESTERASE UR-ACNC: NEGATIVE — SIGNIFICANT CHANGE UP
MAGNESIUM SERPL-MCNC: 2.2 MG/DL — SIGNIFICANT CHANGE UP (ref 1.6–2.6)
MAGNESIUM SERPL-MCNC: 2.2 MG/DL — SIGNIFICANT CHANGE UP (ref 1.6–2.6)
MCHC RBC-ENTMCNC: 28.1 PG — SIGNIFICANT CHANGE UP (ref 27–34)
MCHC RBC-ENTMCNC: 28.1 PG — SIGNIFICANT CHANGE UP (ref 27–34)
MCHC RBC-ENTMCNC: 32.1 GM/DL — SIGNIFICANT CHANGE UP (ref 32–36)
MCHC RBC-ENTMCNC: 32.1 GM/DL — SIGNIFICANT CHANGE UP (ref 32–36)
MCV RBC AUTO: 87.3 FL — SIGNIFICANT CHANGE UP (ref 80–100)
MCV RBC AUTO: 87.3 FL — SIGNIFICANT CHANGE UP (ref 80–100)
METHADONE UR-MCNC: NEGATIVE — SIGNIFICANT CHANGE UP
METHADONE UR-MCNC: NEGATIVE — SIGNIFICANT CHANGE UP
NITRITE UR-MCNC: NEGATIVE — SIGNIFICANT CHANGE UP
NITRITE UR-MCNC: NEGATIVE — SIGNIFICANT CHANGE UP
OPIATES UR-MCNC: POSITIVE — SIGNIFICANT CHANGE UP
OPIATES UR-MCNC: POSITIVE — SIGNIFICANT CHANGE UP
PCP SPEC-MCNC: SIGNIFICANT CHANGE UP
PCP SPEC-MCNC: SIGNIFICANT CHANGE UP
PCP UR-MCNC: NEGATIVE — SIGNIFICANT CHANGE UP
PCP UR-MCNC: NEGATIVE — SIGNIFICANT CHANGE UP
PH UR: 5.5 — SIGNIFICANT CHANGE UP (ref 5–8)
PH UR: 5.5 — SIGNIFICANT CHANGE UP (ref 5–8)
PHOSPHATE SERPL-MCNC: 3.6 MG/DL — SIGNIFICANT CHANGE UP (ref 2.5–4.5)
PHOSPHATE SERPL-MCNC: 3.6 MG/DL — SIGNIFICANT CHANGE UP (ref 2.5–4.5)
PLATELET # BLD AUTO: 198 K/UL — SIGNIFICANT CHANGE UP (ref 150–400)
PLATELET # BLD AUTO: 198 K/UL — SIGNIFICANT CHANGE UP (ref 150–400)
POTASSIUM SERPL-MCNC: 4.2 MMOL/L — SIGNIFICANT CHANGE UP (ref 3.5–5.3)
POTASSIUM SERPL-MCNC: 4.2 MMOL/L — SIGNIFICANT CHANGE UP (ref 3.5–5.3)
POTASSIUM SERPL-SCNC: 4.2 MMOL/L — SIGNIFICANT CHANGE UP (ref 3.5–5.3)
POTASSIUM SERPL-SCNC: 4.2 MMOL/L — SIGNIFICANT CHANGE UP (ref 3.5–5.3)
PROT UR-MCNC: SIGNIFICANT CHANGE UP MG/DL
PROT UR-MCNC: SIGNIFICANT CHANGE UP MG/DL
RBC # BLD: 4.49 M/UL — SIGNIFICANT CHANGE UP (ref 4.2–5.8)
RBC # BLD: 4.49 M/UL — SIGNIFICANT CHANGE UP (ref 4.2–5.8)
RBC # FLD: 17.3 % — HIGH (ref 10.3–14.5)
RBC # FLD: 17.3 % — HIGH (ref 10.3–14.5)
SODIUM SERPL-SCNC: 135 MMOL/L — SIGNIFICANT CHANGE UP (ref 135–145)
SODIUM SERPL-SCNC: 135 MMOL/L — SIGNIFICANT CHANGE UP (ref 135–145)
SP GR SPEC: >1.03 — HIGH (ref 1–1.03)
SP GR SPEC: >1.03 — HIGH (ref 1–1.03)
THC UR QL: POSITIVE — SIGNIFICANT CHANGE UP
THC UR QL: POSITIVE — SIGNIFICANT CHANGE UP
UROBILINOGEN FLD QL: 0.2 MG/DL — SIGNIFICANT CHANGE UP (ref 0.2–1)
UROBILINOGEN FLD QL: 0.2 MG/DL — SIGNIFICANT CHANGE UP (ref 0.2–1)
WBC # BLD: 20.59 K/UL — HIGH (ref 3.8–10.5)
WBC # BLD: 20.59 K/UL — HIGH (ref 3.8–10.5)
WBC # FLD AUTO: 20.59 K/UL — HIGH (ref 3.8–10.5)
WBC # FLD AUTO: 20.59 K/UL — HIGH (ref 3.8–10.5)

## 2023-12-16 PROCEDURE — 99232 SBSQ HOSP IP/OBS MODERATE 35: CPT

## 2023-12-16 RX ORDER — THIAMINE MONONITRATE (VIT B1) 100 MG
100 TABLET ORAL DAILY
Refills: 0 | Status: CANCELLED | OUTPATIENT
Start: 2023-12-16 | End: 2023-12-20

## 2023-12-16 RX ORDER — MULTIVIT-MIN/FERROUS GLUCONATE 9 MG/15 ML
1 LIQUID (ML) ORAL DAILY
Refills: 0 | Status: CANCELLED | OUTPATIENT
Start: 2023-12-16 | End: 2023-12-20

## 2023-12-16 RX ADMIN — Medication 2000 UNIT(S): at 09:36

## 2023-12-16 RX ADMIN — OXYCODONE HYDROCHLORIDE 15 MILLIGRAM(S): 5 TABLET ORAL at 08:29

## 2023-12-16 RX ADMIN — MORPHINE SULFATE 2 MILLIGRAM(S): 50 CAPSULE, EXTENDED RELEASE ORAL at 19:56

## 2023-12-16 RX ADMIN — OXYCODONE HYDROCHLORIDE 15 MILLIGRAM(S): 5 TABLET ORAL at 01:20

## 2023-12-16 RX ADMIN — OXYCODONE HYDROCHLORIDE 15 MILLIGRAM(S): 5 TABLET ORAL at 17:06

## 2023-12-16 RX ADMIN — OXYCODONE HYDROCHLORIDE 15 MILLIGRAM(S): 5 TABLET ORAL at 00:24

## 2023-12-16 RX ADMIN — ESCITALOPRAM OXALATE 5 MILLIGRAM(S): 10 TABLET, FILM COATED ORAL at 09:35

## 2023-12-16 RX ADMIN — OXYCODONE HYDROCHLORIDE 15 MILLIGRAM(S): 5 TABLET ORAL at 08:59

## 2023-12-16 RX ADMIN — OXYCODONE HYDROCHLORIDE 15 MILLIGRAM(S): 5 TABLET ORAL at 05:20

## 2023-12-16 RX ADMIN — OXYCODONE HYDROCHLORIDE 15 MILLIGRAM(S): 5 TABLET ORAL at 22:30

## 2023-12-16 RX ADMIN — OXYCODONE HYDROCHLORIDE 15 MILLIGRAM(S): 5 TABLET ORAL at 04:22

## 2023-12-16 RX ADMIN — OXYCODONE HYDROCHLORIDE 15 MILLIGRAM(S): 5 TABLET ORAL at 13:01

## 2023-12-16 RX ADMIN — ENOXAPARIN SODIUM 90 MILLIGRAM(S): 100 INJECTION SUBCUTANEOUS at 09:54

## 2023-12-16 RX ADMIN — ENOXAPARIN SODIUM 90 MILLIGRAM(S): 100 INJECTION SUBCUTANEOUS at 21:28

## 2023-12-16 RX ADMIN — MORPHINE SULFATE 2 MILLIGRAM(S): 50 CAPSULE, EXTENDED RELEASE ORAL at 20:30

## 2023-12-16 RX ADMIN — OXYCODONE HYDROCHLORIDE 15 MILLIGRAM(S): 5 TABLET ORAL at 21:28

## 2023-12-16 RX ADMIN — MORPHINE SULFATE 2 MILLIGRAM(S): 50 CAPSULE, EXTENDED RELEASE ORAL at 11:37

## 2023-12-16 NOTE — DIETITIAN INITIAL EVALUATION ADULT - ETIOLOGY
r/t decreased ability to meet increased nutrient needs 2/2 metastatic colon CA, depression, food insecurity

## 2023-12-16 NOTE — DIETITIAN INITIAL EVALUATION ADULT - OTHER INFO
54 yo M with pmhx of metastatic adenocarcinoma s/p ileostomy, PE/DVT s/p IVC filter (on Lovenox 90mg BID) presented to the ED for severe abdominal pain. Reports that he has been taking 4-5 tabs of oxy 10mg daily for pain management and since Monday he has been unable to refill his medications. Patient was recently admitted in Oct 2023 for ongoing abdominal pain since May 2022 and social issues and was evaluated by social work and heme/onc.  Admitted w/ abd pain.     Pt known to nutr services - dx'd w/ PCM 5/20/22, continues to meet criteria. Pt very depressed at time of RD visit, endorses abd pain at present. Reports consuming omelette for bkfst but has to "force" himself to eat. Unable to obtain bed scale wt 2/2 pt in chair; wt hx as per nrsing flowsheets from past adm: 168.8# (1/15/23), 175.2# (3/15/23), 196.4# (7/10/23), 185.4# (9/25/23), 192.9# (10/7/23), 193.7# (12/15/23) - wt gain of 24.9#, 14.7% x 1 yr.    52 yo M with pmhx of metastatic adenocarcinoma s/p ileostomy, PE/DVT s/p IVC filter (on Lovenox 90mg BID) presented to the ED for severe abdominal pain. Reports that he has been taking 4-5 tabs of oxy 10mg daily for pain management and since Monday he has been unable to refill his medications. Patient was recently admitted in Oct 2023 for ongoing abdominal pain since May 2022 and social issues and was evaluated by social work and heme/onc.  Admitted w/ abd pain.     Pt known to nutr services - dx'd w/ PCM 5/20/22, likely still meets criteria. Pt very depressed at time of RD visit, endorses abd pain at present. Reports consuming omelette for bkfst but has to "force" himself to eat, reports pain meds help. Unable to obtain bed scale wt 2/2 pt in chair; wt hx as per nrsing flowsheets from past adm: 168.8# (1/15/23), 175.2# (3/15/23), 196.4# (7/10/23), 185.4# (9/25/23), 192.9# (10/7/23), 193.7# (12/15/23) - wt gain of 24.9#, 14.7% x 1 yr. Unable to perform NFPE at time of RD visit 2/2 pt in public lounge - will obtain NFPE AMF. Unable to dx pt w/ PCM at this time however likely meets criteria. RD observed very liquid stool from ileostomy, pt endorses frequent liquid stool however low output. Recommend trial Bantrol BID to help promote more formed stools. Recommend [employer] BID to optimize nutritional needs (provides 325 kcal, 16 g protein/ shake) - pt receptive. Pt reports food insecurity - Strongly recommend social work consult to confirm qualification for [facility] Program. See below for further recommendations.    54 yo M with pmhx of metastatic adenocarcinoma s/p ileostomy, PE/DVT s/p IVC filter (on Lovenox 90mg BID) presented to the ED for severe abdominal pain. Reports that he has been taking 4-5 tabs of oxy 10mg daily for pain management and since Monday he has been unable to refill his medications. Patient was recently admitted in Oct 2023 for ongoing abdominal pain since May 2022 and social issues and was evaluated by social work and heme/onc.  Admitted w/ abd pain.     Pt known to nutr services - dx'd w/ PCM 5/20/22, likely still meets criteria. Pt very depressed at time of RD visit, endorses abd pain at present. Reports consuming omelette for bkfst but has to "force" himself to eat, reports pain meds help. Unable to obtain bed scale wt 2/2 pt in chair; wt hx as per nrsing flowsheets from past adm: 168.8# (1/15/23), 175.2# (3/15/23), 196.4# (7/10/23), 185.4# (9/25/23), 192.9# (10/7/23), 193.7# (12/15/23) - wt gain of 24.9#, 14.7% x 1 yr. Unable to perform NFPE at time of RD visit 2/2 pt in public lounge - will obtain NFPE AMF. Unable to dx pt w/ PCM at this time however likely meets criteria. RD observed very liquid stool from ileostomy, pt endorses frequent liquid stool however low output. Recommend trial Bantrol BID to help promote more formed stools. Recommend [email or web address] BID to optimize nutritional needs (provides 325 kcal, 16 g protein/ shake) - pt receptive. Pt reports food insecurity - Strongly recommend social work consult to confirm qualification for [facility] Program. See below for further recommendations.

## 2023-12-16 NOTE — DIETITIAN INITIAL EVALUATION ADULT - REASON
[facility]

## 2023-12-16 NOTE — DIETITIAN INITIAL EVALUATION ADULT - ORAL NUTRITION SUPPLEMENTS
FUJIAN HAIYUAN BID to optimize nutritional needs (provides 325 kcal, 16 g protein/ shake)   Add 2 packets Banatrol 2/2 persistent liquid stool (provides 40 kcal, 0 g protein/ packet)  [employer] BID to optimize nutritional needs (provides 325 kcal, 16 g protein/ shake)   Add 2 packets Banatrol 2/2 persistent liquid stool (provides 40 kcal, 0 g protein/ packet)

## 2023-12-16 NOTE — DIETITIAN INITIAL EVALUATION ADULT - FACTORS AFF FOOD INTAKE
ileostomy - liquid stools/change in sense of smell or taste/difficulty with food procurement/preparation/persistent lack of appetite/persistent nausea

## 2023-12-16 NOTE — DIETITIAN INITIAL EVALUATION ADULT - ADD RECOMMEND
1. C/w Regular diet to optimize nutrient and caloric intake  2. Recommend Nydia Qiro BID to optimize nutritional needs (provides 325 kcal, 16 g protein/ shake)   3. Recommend trial 2 packets Banatrol 2/2 persistent liquid stool (provides 40 kcal, 0 g protein/ packet)   4. Consider adding appetite stimulant such as Remeron or Marinol 2/2 chronically poor appetite/ PO intake   5. Consider adding thiamine 100 mg daily 2/2 poor PO intake/ malnutrition   6. Recommend MVI w/ minerals daily to ensure 100% RDA met   7. Encourage protein-rich foods, maximize food preferences   8. Please obtain weekly weights   9. Strongly recommend social work consult to confirm qualification for FA Program   10. Confirm goals of care regarding nutrition support   RD will continue to monitor PO intake, labs, hydration, and wt prn.  1. C/w Regular diet to optimize nutrient and caloric intake  2. Recommend Nydia Okairos BID to optimize nutritional needs (provides 325 kcal, 16 g protein/ shake)   3. Recommend trial 2 packets Banatrol 2/2 persistent liquid stool (provides 40 kcal, 0 g protein/ packet)   4. Consider adding appetite stimulant such as Remeron or Marinol 2/2 chronically poor appetite/ PO intake   5. Consider adding thiamine 100 mg daily 2/2 poor PO intake/ malnutrition   6. Recommend MVI w/ minerals daily to ensure 100% RDA met   7. Encourage protein-rich foods, maximize food preferences   8. Please obtain weekly weights   9. Strongly recommend social work consult to confirm qualification for FA Program   10. Confirm goals of care regarding nutrition support   RD will continue to monitor PO intake, labs, hydration, and wt prn.

## 2023-12-16 NOTE — DIETITIAN INITIAL EVALUATION ADULT - NSFNSGIIOFT_GEN_A_CORE
12-15-23 @ 07:01  -  12-16-23 @ 07:00  --------------------------------------------------------  OUT:    Ileostomy (mL): 125 mL  Total OUT: 125 mL    Total NET: -125 mL      12-16-23 @ 07:01  -  12-16-23 @ 12:02  --------------------------------------------------------  OUT:    Ileostomy (mL): 450 mL  Total OUT: 450 mL    Total NET: -450 mL

## 2023-12-16 NOTE — PHYSICAL THERAPY INITIAL EVALUATION ADULT - NSPTDISCHREC_GEN_A_CORE
Pt amb 100' QC independently, slow and steady on feet, c/o abd pain,  pt does not require acute care PT, DC from PT, Pt may need SW/CM assistance for placement and financial aide as mentioned earlier/No skilled PT needs

## 2023-12-16 NOTE — PHYSICAL THERAPY INITIAL EVALUATION ADULT - DIAGNOSIS, PT EVAL
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 s/p ileostomy.

## 2023-12-16 NOTE — PHYSICAL THERAPY INITIAL EVALUATION ADULT - PERTINENT HX OF CURRENT PROBLEM, REHAB EVAL
52 yo M with pmhx of metastatic adenocarcinoma s/p ileostomy, PE/DVT s/p IVC filter (on Lovenox 90mg BID) presented to the ED for severe abdominal pain. Reports that he has been taking 4-5 tabs of oxy 10mg daily for pain management and since Monday he has been unable to refill his medications. Patient was recently admitted in Oct 2023 for ongoing abdominal pain since May 2022 and social issues and was evaluated by social work and heme/onc.    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

## 2023-12-16 NOTE — DIETITIAN INITIAL EVALUATION ADULT - PERTINENT MEDS FT
MEDICATIONS  (STANDING):  cholecalciferol 2000 Unit(s) Oral daily  enoxaparin Injectable 90 milliGRAM(s) SubCutaneous every 12 hours  escitalopram 5 milliGRAM(s) Oral daily  influenza   Vaccine 0.5 milliLiter(s) IntraMuscular once    MEDICATIONS  (PRN):  acetaminophen     Tablet .. 650 milliGRAM(s) Oral every 6 hours PRN Temp greater or equal to 38C (100.4F), Mild Pain (1 - 3)  aluminum hydroxide/magnesium hydroxide/simethicone Suspension 30 milliLiter(s) Oral every 4 hours PRN Dyspepsia  melatonin 3 milliGRAM(s) Oral at bedtime PRN Insomnia  morphine  - Injectable 2 milliGRAM(s) IV Push every 4 hours PRN breakthrough  oxyCODONE    IR 15 milliGRAM(s) Oral every 4 hours PRN Severe Pain (7 - 10)  oxyCODONE    IR 10 milliGRAM(s) Oral every 4 hours PRN Moderate Pain (4 - 6)  senna 1 Tablet(s) Oral at bedtime PRN Constipation

## 2023-12-16 NOTE — DIETITIAN INITIAL EVALUATION ADULT - PERTINENT LABORATORY DATA
12-16    135  |  106  |  13  ----------------------------<  103<H>  4.2   |  23  |  0.80    Ca    8.3<L>      16 Dec 2023 07:23  Phos  3.6     12-16  Mg     2.2     12-16    TPro  8.6<H>  /  Alb  4.2  /  TBili  0.3  /  DBili  x   /  AST  24  /  ALT  51  /  AlkPhos  213<H>  12-15   12-16    135  |  106  |  13  ----------------------------<  103<H>  4.2   |  23  |  0.80    Ca    8.3<L>      16 Dec 2023 07:23  Phos  3.6     12-16  Mg     2.2     12-16    TPro  8.6<H>  /  Alb  4.2  /  TBili  0.3  /  DBili  x   /  AST  24  /  ALT  51  /  AlkPhos  213<H>  12-15    Vitamin D, 25-Hydroxy: 18.7 ng/mL (12-06-23 @ 12:44)  Vitamin B12, Serum: >2000 pg/mL (09-29-23 @ 07:00)  Vitamin D, 25-Hydroxy: 12.1 ng/mL (09-29-23 @ 07:00)  Folate, Serum: 16.8 ng/mL (09-29-23 @ 07:00)  Iron Total: 52 ug/dL (09-29-23 @ 07:00)

## 2023-12-16 NOTE — PHYSICAL THERAPY INITIAL EVALUATION ADULT - PATIENT/FAMILY/SIGNIFICANT OTHER GOALS STATEMENT, PT EVAL
Pt needs placement, Adult home or [facility] or [facility], aid for food stamps, financial aid, uses cane for amb, no PT needs

## 2023-12-16 NOTE — PHYSICAL THERAPY INITIAL EVALUATION ADULT - GENERAL OBSERVATIONS, REHAB EVAL
Pt was found sitting in lounge, pt is sad and upset as has no social and financial support at home, as he mentions "mom went to [facility] taking away her SS benefits, and brother not at home for 6 days, hard for pt to pay for his rent and food

## 2023-12-16 NOTE — PHYSICAL THERAPY INITIAL EVALUATION ADULT - FOLLOWS COMMANDS/ANSWERS QUESTIONS, REHAB EVAL
Quality 226: Preventive Care And Screening: Tobacco Use: Screening And Cessation Intervention: Patient screened for tobacco use and is an ex/non-smoker
Detail Level: Detailed
Quality 130: Documentation Of Current Medications In The Medical Record: Current Medications Documented
Quality 431: Preventive Care And Screening: Unhealthy Alcohol Use - Screening: Patient screened for unhealthy alcohol use using a single question and scores less than 2 times per year
100% of the time

## 2023-12-16 NOTE — DIETITIAN INITIAL EVALUATION ADULT - ORAL INTAKE PTA/DIET HISTORY
Pt lives at home with brother, however brother works 6 days/week. Pt reports having very small amounts of food throughout the day - "piece of fruit or slice of pizza". Reports being unable to procure/prepare food himself. Endorses abd pain, poor PO for several weeks. Pt w/ ileostomy - endorses very liquid stools, takes metamucil. Endorses taste changes, nausea 2/2 chemo.

## 2023-12-16 NOTE — PROGRESS NOTE ADULT - ASSESSMENT
{\rtf1\oowaun16390\ansi\tmwxhtd3933\ftnbj\uc1\deff0  {\fonttbl{\f0 \fnil Segoe UI;}{\f1 \fnil \fcharset0 Segoe UI;}{\f2 \fnil Times New Sudhir;}}  {\colortbl ;\jwt207\zygzq169\ndgh284 ;\red0\green0\blue0 ;\red0\green0\ajpb420 ;\red0\green0\blue0 ;}  {\stylesheet{\f0\fs20 Normal;}{\cs1 Default Paragraph Font;}{\cs2\f0\fs16 Line Number;}{\cs3\f2\fs24\ul\cf3 Hyperlink;}}  {\*\revtbl{Unknown;}}  \sbvubc78677\lhbmgj86744\yofgs6960\mbllc0475\xjpua9791\udfnc1003\khvzjiz703\ihfufxc500\nogrowautofit\yyithl663\formshade\nofeaturethrottle1\dntblnsbdb\fet4\aendnotes\aftnnrlc\pgbrdrhead\pgbrdrfoot  \sectd\nnccmk10340\agaytx79639\guttersxn0\jnaofpgb8855\uuedtdcj5516\wsfhoxdr8574\orsbdrlt4532\eiczrjo262\nnazdcm910\sbkpage\pgncont\pgndec  \plain\plain\f0\fs24\ql\plain\f0\fs24\plain\f0\fs20\cohb2282\hich\f0\dbch\f0\loch\f0\fs20 54 yo M with pmhx of metastatic adenocarcinoma s/p ileostomy, PE/DVT s/p IVC filter (on Lovenox 90mg BID) presented to the ED for severe abdominal pain. Reports   that he has been taking 4-5 tabs of oxy 10mg daily for pain management and since Monday he has been unable to refill his medications. Patient was recently admitted in Oct 2023 for ongoing abdominal pain since May 2022 and social issues and was evaluated   by social work and heme/onc.  \par  \par  #Abdominal Pain\par  -Patient with multiple admissions for persistent abdominal pain and poor PO intake. Pain secondary to metastatic colon cancer to liver and lung. s/p ileostomy. \par  -Pain management consulted, recommendations appreciated. Continue with Oxy 15mg q4 PRN for severe pain, oxy 10mg q4 PRN moderate pain and morphine 2mg q4 PRN breakthrough pain. Bowel regiment given opiates. \par  - Retroperitoneal biopsy ----> Adenocarcinoma with necrosis. No loss of nuclear expression of MMR proteins (MLH1, MSH2, MSH6, and PMS2). 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 \par  - IVC filter present.\par  -  Will continue 90mg BID  \par  \par  #Social Work\par  - patient has poor home situation;  \par  - SW/CM consulted for potential placement services\par  - Palliative care consult for pain regimen optimization and to assess need for placement \par  - of note on previous admission patient admitted to cocaine and EtOH abuse 2/2 to depression from ongoing diagnosis and depression but denies current use. Check urine drug screen,. \par  - will c/w Lexapro 5mg QD\par  \par  #Leukocytosis \par  -WBC 22.4 which has been uptrending since Nov. Last chemo 12/6/23. \par  -Afebrile and otherwise no evidence of sepsis. Lactate within normal limits \par  -s/p Vanco and Cefepime in the ED. CTPE/AP with no evidence of infectious etiology\par  -Check UA. Follow blood and urine cultures .\par  -If patient spikes fever, will start broad spectrum antibiotics and obtain ID evaluation\par  -Follow up heme/onc recommendations. \par  \par  #DVT PPx\par  - c/w Lovenox 90mg BID\par  \par  \plain\f1\fs20\vjjj2507\hich\f1\dbch\f1\loch\f1\cf2\fs20\strike\plain\f1\fs20\yjdm1926\hich\f1\dbch\f1\loch\f1\cf2\fs20\plain\f0\fs20\rhar7631\hich\f0\dbch\f0\loch\f0\fs20\par  }   {\rtf1\cqnfzc57791\ansi\oikygzn6329\ftnbj\uc1\deff0  {\fonttbl{\f0 \fnil Segoe UI;}{\f1 \fnil \fcharset0 Segoe UI;}{\f2 \fnil Times New Sudhir;}}  {\colortbl ;\fkh052\hzzcm702\tvud601 ;\red0\green0\blue0 ;\red0\green0\jnlf074 ;\red0\green0\blue0 ;}  {\stylesheet{\f0\fs20 Normal;}{\cs1 Default Paragraph Font;}{\cs2\f0\fs16 Line Number;}{\cs3\f2\fs24\ul\cf3 Hyperlink;}}  {\*\revtbl{Unknown;}}  \snzrpl07358\behsex31547\unqyf0167\didhk6875\kcznu1661\jtygw4836\zkmicmc864\kjhzntn416\nogrowautofit\puazjt866\formshade\nofeaturethrottle1\dntblnsbdb\fet4\aendnotes\aftnnrlc\pgbrdrhead\pgbrdrfoot  \sectd\aleogc80396\fadxyg00521\guttersxn0\kqbtkayi6776\mztyswnm4837\szmdmovq1757\kkotvjym0384\zhjqrdr908\jnqzxub369\sbkpage\pgncont\pgndec  \plain\plain\f0\fs24\ql\plain\f0\fs24\plain\f0\fs20\mtlq1846\hich\f0\dbch\f0\loch\f0\fs20 54 yo M with pmhx of metastatic adenocarcinoma s/p ileostomy, PE/DVT s/p IVC filter (on Lovenox 90mg BID) presented to the ED for severe abdominal pain. Reports   that he has been taking 4-5 tabs of oxy 10mg daily for pain management and since Monday he has been unable to refill his medications. Patient was recently admitted in Oct 2023 for ongoing abdominal pain since May 2022 and social issues and was evaluated   by social work and heme/onc.  \par  \par  #Abdominal Pain\par  -Patient with multiple admissions for persistent abdominal pain and poor PO intake. Pain secondary to metastatic colon cancer to liver and lung. s/p ileostomy. \par  -Pain management consulted, recommendations appreciated. Continue with Oxy 15mg q4 PRN for severe pain, oxy 10mg q4 PRN moderate pain and morphine 2mg q4 PRN breakthrough pain. Bowel regiment given opiates. \par  - Retroperitoneal biopsy ----> Adenocarcinoma with necrosis. No loss of nuclear expression of MMR proteins (MLH1, MSH2, MSH6, and PMS2). 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 \par  - IVC filter present.\par  -  Will continue 90mg BID  \par  \par  #Social Work\par  - patient has poor home situation;  \par  - SW/CM consulted for potential placement services\par  - Palliative care consult for pain regimen optimization and to assess need for placement \par  - of note on previous admission patient admitted to cocaine and EtOH abuse 2/2 to depression from ongoing diagnosis and depression but denies current use. Check urine drug screen,. \par  - will c/w Lexapro 5mg QD\par  \par  #Leukocytosis \par  -WBC 22.4 which has been uptrending since Nov. Last chemo 12/6/23. \par  -Afebrile and otherwise no evidence of sepsis. Lactate within normal limits \par  -s/p Vanco and Cefepime in the ED. CTPE/AP with no evidence of infectious etiology\par  -Check UA. Follow blood and urine cultures .\par  -If patient spikes fever, will start broad spectrum antibiotics and obtain ID evaluation\par  -Follow up heme/onc recommendations. \par  \par  #DVT PPx\par  - c/w Lovenox 90mg BID\par  \par  \plain\f1\fs20\evuh4608\hich\f1\dbch\f1\loch\f1\cf2\fs20\strike\plain\f1\fs20\tfvj2953\hich\f1\dbch\f1\loch\f1\cf2\fs20\plain\f0\fs20\kedi5508\hich\f0\dbch\f0\loch\f0\fs20\par  }

## 2023-12-17 LAB
ANION GAP SERPL CALC-SCNC: 4 MMOL/L — LOW (ref 5–17)
ANION GAP SERPL CALC-SCNC: 4 MMOL/L — LOW (ref 5–17)
BUN SERPL-MCNC: 14 MG/DL — SIGNIFICANT CHANGE UP (ref 7–23)
BUN SERPL-MCNC: 14 MG/DL — SIGNIFICANT CHANGE UP (ref 7–23)
CALCIUM SERPL-MCNC: 8.8 MG/DL — SIGNIFICANT CHANGE UP (ref 8.5–10.1)
CALCIUM SERPL-MCNC: 8.8 MG/DL — SIGNIFICANT CHANGE UP (ref 8.5–10.1)
CHLORIDE SERPL-SCNC: 108 MMOL/L — SIGNIFICANT CHANGE UP (ref 96–108)
CHLORIDE SERPL-SCNC: 108 MMOL/L — SIGNIFICANT CHANGE UP (ref 96–108)
CO2 SERPL-SCNC: 26 MMOL/L — SIGNIFICANT CHANGE UP (ref 22–31)
CO2 SERPL-SCNC: 26 MMOL/L — SIGNIFICANT CHANGE UP (ref 22–31)
CREAT SERPL-MCNC: 0.73 MG/DL — SIGNIFICANT CHANGE UP (ref 0.5–1.3)
CREAT SERPL-MCNC: 0.73 MG/DL — SIGNIFICANT CHANGE UP (ref 0.5–1.3)
EGFR: 110 ML/MIN/1.73M2 — SIGNIFICANT CHANGE UP
EGFR: 110 ML/MIN/1.73M2 — SIGNIFICANT CHANGE UP
GLUCOSE SERPL-MCNC: 115 MG/DL — HIGH (ref 70–99)
GLUCOSE SERPL-MCNC: 115 MG/DL — HIGH (ref 70–99)
HCT VFR BLD CALC: 40.4 % — SIGNIFICANT CHANGE UP (ref 39–50)
HCT VFR BLD CALC: 40.4 % — SIGNIFICANT CHANGE UP (ref 39–50)
HGB BLD-MCNC: 12.9 G/DL — LOW (ref 13–17)
HGB BLD-MCNC: 12.9 G/DL — LOW (ref 13–17)
MCHC RBC-ENTMCNC: 27.8 PG — SIGNIFICANT CHANGE UP (ref 27–34)
MCHC RBC-ENTMCNC: 27.8 PG — SIGNIFICANT CHANGE UP (ref 27–34)
MCHC RBC-ENTMCNC: 31.9 GM/DL — LOW (ref 32–36)
MCHC RBC-ENTMCNC: 31.9 GM/DL — LOW (ref 32–36)
MCV RBC AUTO: 87.1 FL — SIGNIFICANT CHANGE UP (ref 80–100)
MCV RBC AUTO: 87.1 FL — SIGNIFICANT CHANGE UP (ref 80–100)
PLATELET # BLD AUTO: 193 K/UL — SIGNIFICANT CHANGE UP (ref 150–400)
PLATELET # BLD AUTO: 193 K/UL — SIGNIFICANT CHANGE UP (ref 150–400)
POTASSIUM SERPL-MCNC: 4.3 MMOL/L — SIGNIFICANT CHANGE UP (ref 3.5–5.3)
POTASSIUM SERPL-MCNC: 4.3 MMOL/L — SIGNIFICANT CHANGE UP (ref 3.5–5.3)
POTASSIUM SERPL-SCNC: 4.3 MMOL/L — SIGNIFICANT CHANGE UP (ref 3.5–5.3)
POTASSIUM SERPL-SCNC: 4.3 MMOL/L — SIGNIFICANT CHANGE UP (ref 3.5–5.3)
RBC # BLD: 4.64 M/UL — SIGNIFICANT CHANGE UP (ref 4.2–5.8)
RBC # BLD: 4.64 M/UL — SIGNIFICANT CHANGE UP (ref 4.2–5.8)
RBC # FLD: 17.5 % — HIGH (ref 10.3–14.5)
RBC # FLD: 17.5 % — HIGH (ref 10.3–14.5)
SODIUM SERPL-SCNC: 138 MMOL/L — SIGNIFICANT CHANGE UP (ref 135–145)
SODIUM SERPL-SCNC: 138 MMOL/L — SIGNIFICANT CHANGE UP (ref 135–145)
WBC # BLD: 18.67 K/UL — HIGH (ref 3.8–10.5)
WBC # BLD: 18.67 K/UL — HIGH (ref 3.8–10.5)
WBC # FLD AUTO: 18.67 K/UL — HIGH (ref 3.8–10.5)
WBC # FLD AUTO: 18.67 K/UL — HIGH (ref 3.8–10.5)

## 2023-12-17 PROCEDURE — 99232 SBSQ HOSP IP/OBS MODERATE 35: CPT

## 2023-12-17 RX ORDER — OXYCODONE HYDROCHLORIDE 5 MG/1
20 TABLET ORAL EVERY 4 HOURS
Refills: 0 | Status: DISCONTINUED | OUTPATIENT
Start: 2023-12-17 | End: 2023-12-20

## 2023-12-17 RX ADMIN — MORPHINE SULFATE 2 MILLIGRAM(S): 50 CAPSULE, EXTENDED RELEASE ORAL at 23:30

## 2023-12-17 RX ADMIN — ENOXAPARIN SODIUM 90 MILLIGRAM(S): 100 INJECTION SUBCUTANEOUS at 10:48

## 2023-12-17 RX ADMIN — MORPHINE SULFATE 2 MILLIGRAM(S): 50 CAPSULE, EXTENDED RELEASE ORAL at 18:09

## 2023-12-17 RX ADMIN — MORPHINE SULFATE 2 MILLIGRAM(S): 50 CAPSULE, EXTENDED RELEASE ORAL at 13:06

## 2023-12-17 RX ADMIN — Medication 3 MILLIGRAM(S): at 02:16

## 2023-12-17 RX ADMIN — ESCITALOPRAM OXALATE 5 MILLIGRAM(S): 10 TABLET, FILM COATED ORAL at 10:47

## 2023-12-17 RX ADMIN — OXYCODONE HYDROCHLORIDE 15 MILLIGRAM(S): 5 TABLET ORAL at 03:44

## 2023-12-17 RX ADMIN — OXYCODONE HYDROCHLORIDE 20 MILLIGRAM(S): 5 TABLET ORAL at 15:45

## 2023-12-17 RX ADMIN — MORPHINE SULFATE 2 MILLIGRAM(S): 50 CAPSULE, EXTENDED RELEASE ORAL at 08:44

## 2023-12-17 RX ADMIN — MORPHINE SULFATE 2 MILLIGRAM(S): 50 CAPSULE, EXTENDED RELEASE ORAL at 22:24

## 2023-12-17 RX ADMIN — MORPHINE SULFATE 2 MILLIGRAM(S): 50 CAPSULE, EXTENDED RELEASE ORAL at 08:59

## 2023-12-17 RX ADMIN — OXYCODONE HYDROCHLORIDE 15 MILLIGRAM(S): 5 TABLET ORAL at 02:15

## 2023-12-17 RX ADMIN — OXYCODONE HYDROCHLORIDE 20 MILLIGRAM(S): 5 TABLET ORAL at 10:47

## 2023-12-17 RX ADMIN — OXYCODONE HYDROCHLORIDE 15 MILLIGRAM(S): 5 TABLET ORAL at 07:02

## 2023-12-17 RX ADMIN — OXYCODONE HYDROCHLORIDE 20 MILLIGRAM(S): 5 TABLET ORAL at 20:50

## 2023-12-17 RX ADMIN — OXYCODONE HYDROCHLORIDE 20 MILLIGRAM(S): 5 TABLET ORAL at 19:25

## 2023-12-17 RX ADMIN — OXYCODONE HYDROCHLORIDE 15 MILLIGRAM(S): 5 TABLET ORAL at 06:16

## 2023-12-17 RX ADMIN — OXYCODONE HYDROCHLORIDE 20 MILLIGRAM(S): 5 TABLET ORAL at 11:45

## 2023-12-17 RX ADMIN — OXYCODONE HYDROCHLORIDE 20 MILLIGRAM(S): 5 TABLET ORAL at 14:50

## 2023-12-17 RX ADMIN — MORPHINE SULFATE 2 MILLIGRAM(S): 50 CAPSULE, EXTENDED RELEASE ORAL at 17:54

## 2023-12-17 RX ADMIN — Medication 2000 UNIT(S): at 10:47

## 2023-12-17 RX ADMIN — MORPHINE SULFATE 2 MILLIGRAM(S): 50 CAPSULE, EXTENDED RELEASE ORAL at 12:51

## 2023-12-17 RX ADMIN — ENOXAPARIN SODIUM 90 MILLIGRAM(S): 100 INJECTION SUBCUTANEOUS at 21:24

## 2023-12-17 NOTE — PROGRESS NOTE ADULT - ASSESSMENT
52 yo M with pmhx of metastatic adenocarcinoma s/p ileostomy, PE/DVT s/p IVC filter (on Lovenox 90mg BID) presented to the ED for severe abdominal pain. Reports that he has been taking 4-5 tabs of oxy 10mg daily for pain management and since Monday he has been unable to refill his medications. Patient was recently admitted in Oct 2023 for ongoing abdominal pain since May 2022 and social issues and was evaluated by social work and heme/onc.      #Abdominal Pain  -Patient with multiple admissions for persistent abdominal pain and poor PO intake. Pain secondary to metastatic colon cancer to liver and lung. s/p ileostomy.   -Pain management consulted, recommendations appreciated. Continue with Oxy 15mg q4 PRN for severe pain, oxy 10mg q4 PRN moderate pain and morphine 2mg q4 PRN breakthrough pain. Bowel regiment given opiates.   - Retroperitoneal biopsy ----> Adenocarcinoma with necrosis. No loss of nuclear expression of MMR proteins (MLH1, MSH2, MSH6, and PMS2). Staging [id number].  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   - IVC filter present.  -  Will continue 90mg BID      #Social Work  - patient has poor home situation;    - SW/CM consulted for potential placement services  - Palliative care consult for pain regimen optimization and to assess need for placement   - of note on previous admission patient admitted to cocaine and EtOH abuse 2/2 to depression from ongoing diagnosis and depression but denies current use. Check urine drug screen,.   - will c/w Lexapro 5mg QD    #Leukocytosis   -WBC 22.4 which has been uptrending since Nov. Last chemo 12/6/23.   -Afebrile and otherwise no evidence of sepsis. Lactate within normal limits   -s/p Vanco and Cefepime in the ED. CTPE/AP with no evidence of infectious etiology  -Check UA. Follow blood and urine cultures .  -If patient spikes fever, will start broad spectrum antibiotics and obtain ID evaluation  -WBC lower 18      #DVT PPx  - c/w Lovenox 90mg BID   52 yo M with pmhx of metastatic adenocarcinoma s/p ileostomy, PE/DVT s/p IVC filter (on Lovenox 90mg BID) presented to the ED for severe abdominal pain. Reports that he has been taking 4-5 tabs of oxy 10mg daily for pain management and since Monday he has been unable to refill his medications. Patient was recently admitted in Oct 2023 for ongoing abdominal pain since May 2022 and social issues and was evaluated by social work and heme/onc.      #Abdominal Pain  -Patient with multiple admissions for persistent abdominal pain and poor PO intake. Pain secondary to metastatic colon cancer to liver and lung. s/p ileostomy.   -Pain management consulted, recommendations appreciated. Continue with Oxy 15mg q4 PRN for severe pain, oxy 10mg q4 PRN moderate pain and morphine 2mg q4 PRN breakthrough pain. Bowel regiment given opiates.   - Retroperitoneal biopsy ----> Adenocarcinoma with necrosis. No loss of nuclear expression of MMR proteins (MLH1, MSH2, MSH6, and PMS2). Staging [id number].  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   - IVC filter present.  -  Will continue 90mg BID      #Social Work  - patient has poor home situation;    - SW/CM consulted for potential placement services  - Palliative care consult for pain regimen optimization and to assess need for placement   - of note on previous admission patient admitted to cocaine and EtOH abuse 2/2 to depression from ongoing diagnosis and depression but denies current use. Check urine drug screen,.   - will c/w Lexapro 5mg QD    #Leukocytosis   -WBC 22.4 which has been uptrending since Nov. Last chemo 12/6/23.   -Afebrile and otherwise no evidence of sepsis. Lactate within normal limits   -s/p Vanco and Cefepime in the ED. CTPE/AP with no evidence of infectious etiology  -Check UA. Follow blood and urine cultures .  -If patient spikes fever, will start broad spectrum antibiotics and obtain ID evaluation  -WBC lower 18      #DVT PPx  - c/w Lovenox 90mg BID   54 yo M with pmhx of metastatic adenocarcinoma s/p ileostomy, PE/DVT s/p IVC filter (on Lovenox 90mg BID) presented to the ED for severe abdominal pain. Reports that he has been taking 4-5 tabs of oxy 10mg daily for pain management and since Monday he has been unable to refill his medications. Patient was recently admitted in Oct 2023 for ongoing abdominal pain since May 2022 and social issues and was evaluated by social work and heme/onc.      #Abdominal Pain  -Patient with multiple admissions for persistent abdominal pain and poor PO intake. Pain secondary to metastatic colon cancer to liver and lung. s/p ileostomy.   -Pain management consulted, recommendations appreciated. Continue with Oxy 15mg q4 PRN for severe pain, oxy 10mg q4 PRN moderate pain and morphine 2mg q4 PRN breakthrough pain. Bowel regiment given opiates.   - Retroperitoneal biopsy ----> Adenocarcinoma with necrosis. No loss of nuclear expression of MMR proteins (MLH1, MSH2, MSH6, and PMS2). Staging [id number].  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   - IVC filter present.  -  Will continue 90mg BID      #Social Work  - patient has poor home situation;    - SW/CM consulted for potential placement services  - Palliative care consult for pain regimen optimization and to assess need for placement   - of note on previous admission patient admitted to cocaine and EtOH abuse 2/2 to depression from ongoing diagnosis and depression but denies current use. Check urine drug screen,.   - will c/w Lexapro 5mg QD    #Leukocytosis   -WBC 22.4 which has been uptrending since Nov. Last chemo 12/6/23.   -Afebrile and otherwise no evidence of sepsis. Lactate within normal limits   -s/p Vanco and Cefepime in the ED. CTPE/AP with no evidence of infectious etiology  -Check UA. Follow blood and urine cultures .  -If patient spikes fever, will start broad spectrum antibiotics and obtain ID evaluation  -WBC lower 18      #DVT PPx  - c/w Lovenox 90mg BIDa   54 yo M with pmhx of metastatic adenocarcinoma s/p ileostomy, PE/DVT s/p IVC filter (on Lovenox 90mg BID) presented to the ED for severe abdominal pain. Reports that he has been taking 4-5 tabs of oxy 10mg daily for pain management and since Monday he has been unable to refill his medications. Patient was recently admitted in Oct 2023 for ongoing abdominal pain since May 2022 and social issues and was evaluated by social work and heme/onc.      #Abdominal Pain  -Patient with multiple admissions for persistent abdominal pain and poor PO intake. Pain secondary to metastatic colon cancer to liver and lung. s/p ileostomy.   -Pain management consulted, recommendations appreciated. Continue with Oxy 15mg q4 PRN for severe pain, oxy 10mg q4 PRN moderate pain and morphine 2mg q4 PRN breakthrough pain. Bowel regiment given opiates.   - Retroperitoneal biopsy ----> Adenocarcinoma with necrosis. No loss of nuclear expression of MMR proteins (MLH1, MSH2, MSH6, and PMS2). Staging [id number].  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   - IVC filter present.  -  Will continue 90mg BID      #Social Work  - patient has poor home situation;    - SW/CM consulted for potential placement services  - Palliative care consult for pain regimen optimization and to assess need for placement   - of note on previous admission patient admitted to cocaine and EtOH abuse 2/2 to depression from ongoing diagnosis and depression but denies current use. Check urine drug screen,.   - will c/w Lexapro 5mg QD    #Leukocytosis   -WBC 22.4 which has been uptrending since Nov. Last chemo 12/6/23.   -Afebrile and otherwise no evidence of sepsis. Lactate within normal limits   -s/p Vanco and Cefepime in the ED. CTPE/AP with no evidence of infectious etiology  -Check UA. Follow blood and urine cultures .  -If patient spikes fever, will start broad spectrum antibiotics and obtain ID evaluation  -WBC lower 18      #DVT PPx  - c/w Lovenox 90mg BIDa

## 2023-12-18 ENCOUNTER — APPOINTMENT (OUTPATIENT)
Dept: VASCULAR SURGERY | Facility: CLINIC | Age: 53
End: 2023-12-18

## 2023-12-18 LAB
ANION GAP SERPL CALC-SCNC: 3 MMOL/L — LOW (ref 5–17)
ANION GAP SERPL CALC-SCNC: 3 MMOL/L — LOW (ref 5–17)
BUN SERPL-MCNC: 13 MG/DL — SIGNIFICANT CHANGE UP (ref 7–23)
BUN SERPL-MCNC: 13 MG/DL — SIGNIFICANT CHANGE UP (ref 7–23)
CALCIUM SERPL-MCNC: 8.7 MG/DL — SIGNIFICANT CHANGE UP (ref 8.5–10.1)
CALCIUM SERPL-MCNC: 8.7 MG/DL — SIGNIFICANT CHANGE UP (ref 8.5–10.1)
CHLORIDE SERPL-SCNC: 103 MMOL/L — SIGNIFICANT CHANGE UP (ref 96–108)
CHLORIDE SERPL-SCNC: 103 MMOL/L — SIGNIFICANT CHANGE UP (ref 96–108)
CO2 SERPL-SCNC: 27 MMOL/L — SIGNIFICANT CHANGE UP (ref 22–31)
CO2 SERPL-SCNC: 27 MMOL/L — SIGNIFICANT CHANGE UP (ref 22–31)
CREAT SERPL-MCNC: 0.87 MG/DL — SIGNIFICANT CHANGE UP (ref 0.5–1.3)
CREAT SERPL-MCNC: 0.87 MG/DL — SIGNIFICANT CHANGE UP (ref 0.5–1.3)
EGFR: 104 ML/MIN/1.73M2 — SIGNIFICANT CHANGE UP
EGFR: 104 ML/MIN/1.73M2 — SIGNIFICANT CHANGE UP
FLUAV H3 RNA SPEC QL NAA+PROBE: DETECTED
FLUAV H3 RNA SPEC QL NAA+PROBE: DETECTED
GLUCOSE SERPL-MCNC: 105 MG/DL — HIGH (ref 70–99)
GLUCOSE SERPL-MCNC: 105 MG/DL — HIGH (ref 70–99)
HCT VFR BLD CALC: 41.3 % — SIGNIFICANT CHANGE UP (ref 39–50)
HCT VFR BLD CALC: 41.3 % — SIGNIFICANT CHANGE UP (ref 39–50)
HGB BLD-MCNC: 13.2 G/DL — SIGNIFICANT CHANGE UP (ref 13–17)
HGB BLD-MCNC: 13.2 G/DL — SIGNIFICANT CHANGE UP (ref 13–17)
MCHC RBC-ENTMCNC: 27.9 PG — SIGNIFICANT CHANGE UP (ref 27–34)
MCHC RBC-ENTMCNC: 27.9 PG — SIGNIFICANT CHANGE UP (ref 27–34)
MCHC RBC-ENTMCNC: 32 GM/DL — SIGNIFICANT CHANGE UP (ref 32–36)
MCHC RBC-ENTMCNC: 32 GM/DL — SIGNIFICANT CHANGE UP (ref 32–36)
MCV RBC AUTO: 87.3 FL — SIGNIFICANT CHANGE UP (ref 80–100)
MCV RBC AUTO: 87.3 FL — SIGNIFICANT CHANGE UP (ref 80–100)
PLATELET # BLD AUTO: 186 K/UL — SIGNIFICANT CHANGE UP (ref 150–400)
PLATELET # BLD AUTO: 186 K/UL — SIGNIFICANT CHANGE UP (ref 150–400)
POTASSIUM SERPL-MCNC: 4.1 MMOL/L — SIGNIFICANT CHANGE UP (ref 3.5–5.3)
POTASSIUM SERPL-MCNC: 4.1 MMOL/L — SIGNIFICANT CHANGE UP (ref 3.5–5.3)
POTASSIUM SERPL-SCNC: 4.1 MMOL/L — SIGNIFICANT CHANGE UP (ref 3.5–5.3)
POTASSIUM SERPL-SCNC: 4.1 MMOL/L — SIGNIFICANT CHANGE UP (ref 3.5–5.3)
RAPID RVP RESULT: DETECTED
RAPID RVP RESULT: DETECTED
RBC # BLD: 4.73 M/UL — SIGNIFICANT CHANGE UP (ref 4.2–5.8)
RBC # BLD: 4.73 M/UL — SIGNIFICANT CHANGE UP (ref 4.2–5.8)
RBC # FLD: 17.4 % — HIGH (ref 10.3–14.5)
RBC # FLD: 17.4 % — HIGH (ref 10.3–14.5)
SARS-COV-2 RNA SPEC QL NAA+PROBE: SIGNIFICANT CHANGE UP
SARS-COV-2 RNA SPEC QL NAA+PROBE: SIGNIFICANT CHANGE UP
SODIUM SERPL-SCNC: 133 MMOL/L — LOW (ref 135–145)
SODIUM SERPL-SCNC: 133 MMOL/L — LOW (ref 135–145)
WBC # BLD: 17.76 K/UL — HIGH (ref 3.8–10.5)
WBC # BLD: 17.76 K/UL — HIGH (ref 3.8–10.5)
WBC # FLD AUTO: 17.76 K/UL — HIGH (ref 3.8–10.5)
WBC # FLD AUTO: 17.76 K/UL — HIGH (ref 3.8–10.5)

## 2023-12-18 PROCEDURE — 90792 PSYCH DIAG EVAL W/MED SRVCS: CPT

## 2023-12-18 PROCEDURE — 99232 SBSQ HOSP IP/OBS MODERATE 35: CPT

## 2023-12-18 PROCEDURE — 99222 1ST HOSP IP/OBS MODERATE 55: CPT

## 2023-12-18 RX ORDER — ESCITALOPRAM OXALATE 10 MG/1
10 TABLET, FILM COATED ORAL DAILY
Refills: 0 | Status: DISCONTINUED | OUTPATIENT
Start: 2023-12-19 | End: 2023-12-20

## 2023-12-18 RX ADMIN — MORPHINE SULFATE 2 MILLIGRAM(S): 50 CAPSULE, EXTENDED RELEASE ORAL at 14:14

## 2023-12-18 RX ADMIN — MORPHINE SULFATE 2 MILLIGRAM(S): 50 CAPSULE, EXTENDED RELEASE ORAL at 21:09

## 2023-12-18 RX ADMIN — Medication 650 MILLIGRAM(S): at 20:59

## 2023-12-18 RX ADMIN — MORPHINE SULFATE 2 MILLIGRAM(S): 50 CAPSULE, EXTENDED RELEASE ORAL at 11:49

## 2023-12-18 RX ADMIN — Medication 75 MILLIGRAM(S): at 13:15

## 2023-12-18 RX ADMIN — OXYCODONE HYDROCHLORIDE 20 MILLIGRAM(S): 5 TABLET ORAL at 01:11

## 2023-12-18 RX ADMIN — MORPHINE SULFATE 2 MILLIGRAM(S): 50 CAPSULE, EXTENDED RELEASE ORAL at 06:35

## 2023-12-18 RX ADMIN — Medication 2000 UNIT(S): at 08:41

## 2023-12-18 RX ADMIN — OXYCODONE HYDROCHLORIDE 20 MILLIGRAM(S): 5 TABLET ORAL at 23:23

## 2023-12-18 RX ADMIN — Medication 650 MILLIGRAM(S): at 21:29

## 2023-12-18 RX ADMIN — OXYCODONE HYDROCHLORIDE 20 MILLIGRAM(S): 5 TABLET ORAL at 00:18

## 2023-12-18 RX ADMIN — OXYCODONE HYDROCHLORIDE 20 MILLIGRAM(S): 5 TABLET ORAL at 14:14

## 2023-12-18 RX ADMIN — OXYCODONE HYDROCHLORIDE 20 MILLIGRAM(S): 5 TABLET ORAL at 04:40

## 2023-12-18 RX ADMIN — OXYCODONE HYDROCHLORIDE 20 MILLIGRAM(S): 5 TABLET ORAL at 14:16

## 2023-12-18 RX ADMIN — ENOXAPARIN SODIUM 90 MILLIGRAM(S): 100 INJECTION SUBCUTANEOUS at 20:59

## 2023-12-18 RX ADMIN — OXYCODONE HYDROCHLORIDE 20 MILLIGRAM(S): 5 TABLET ORAL at 23:53

## 2023-12-18 RX ADMIN — OXYCODONE HYDROCHLORIDE 20 MILLIGRAM(S): 5 TABLET ORAL at 08:41

## 2023-12-18 RX ADMIN — ENOXAPARIN SODIUM 90 MILLIGRAM(S): 100 INJECTION SUBCUTANEOUS at 08:41

## 2023-12-18 RX ADMIN — OXYCODONE HYDROCHLORIDE 20 MILLIGRAM(S): 5 TABLET ORAL at 18:26

## 2023-12-18 RX ADMIN — ESCITALOPRAM OXALATE 5 MILLIGRAM(S): 10 TABLET, FILM COATED ORAL at 08:41

## 2023-12-18 RX ADMIN — MORPHINE SULFATE 2 MILLIGRAM(S): 50 CAPSULE, EXTENDED RELEASE ORAL at 21:39

## 2023-12-18 RX ADMIN — OXYCODONE HYDROCHLORIDE 20 MILLIGRAM(S): 5 TABLET ORAL at 06:12

## 2023-12-18 RX ADMIN — MORPHINE SULFATE 2 MILLIGRAM(S): 50 CAPSULE, EXTENDED RELEASE ORAL at 06:18

## 2023-12-18 NOTE — BH CONSULTATION LIAISON ASSESSMENT NOTE - SUMMARY
Patient is a 52 year old male, single, no dependents, domiciled with 81 y/o mother and brother in private residence, unemployed-on disability was previously a . No PPHx. No SHIIP. No inpatient hospitalizations. No NSSIB. PMHx of colon cancer with mets to the liver and lung on chemotherapy, patient presented with abdominal pain. Psychiatry consulted for depression.    Patient is calm and cooperative; linear, organized. Reports that he is not suicidal, does not want to die, but is just "existing" has no intent or plan to kill himself because his "body is doing that for him." He reports excessive frustration with his life circumstances, not being able to work, not being  or have a family, and "walking around with a shit bag" (has colostomy.)   Plan:    1) Increase Lexapro from 5 mg to 10 mg   Patient is a 52 year old male, single, no dependents, domiciled with 79 y/o mother and brother in private residence, unemployed-on disability was previously a . No PPHx. No SHIIP. No inpatient hospitalizations. No NSSIB. PMHx of colon cancer with mets to the liver and lung on chemotherapy, patient presented with abdominal pain. Psychiatry consulted for depression.    Patient is calm and cooperative; linear, organized. Reports that he is not suicidal, does not want to die, but is just "existing" has no intent or plan to kill himself because his "body is doing that for him." He reports excessive frustration with his life circumstances, not being able to work, not being  or have a family, and "walking around with a shit bag" (has colostomy.)   Plan:    1) Increase Lexapro from 5 mg to 10 mg

## 2023-12-18 NOTE — BH CONSULTATION LIAISON ASSESSMENT NOTE - NSBHCHARTREVIEWVS_PSY_A_CORE FT
Vital Signs Last 24 Hrs  T(C): 37.2 (18 Dec 2023 08:18), Max: 37.2 (18 Dec 2023 08:18)  T(F): 98.9 (18 Dec 2023 08:18), Max: 98.9 (18 Dec 2023 08:18)  HR: 106 (18 Dec 2023 08:18) (73 - 110)  BP: 106/74 (18 Dec 2023 08:18) (106/74 - 137/74)  BP(mean): 73 (17 Dec 2023 21:55) (73 - 73)  RR: 18 (18 Dec 2023 08:18) (18 - 20)  SpO2: 99% (18 Dec 2023 08:18) (97% - 99%)    Parameters below as of 18 Dec 2023 08:18  Patient On (Oxygen Delivery Method): room air

## 2023-12-18 NOTE — BH CONSULTATION LIAISON ASSESSMENT NOTE - NSBHCHARTREVIEWLAB_PSY_A_CORE FT
CBC Full  -  ( 18 Dec 2023 07:12 )  WBC Count : 17.76 K/uL  RBC Count : 4.73 M/uL  Hemoglobin : 13.2 g/dL  Hematocrit : 41.3 %  Platelet Count - Automated : 186 K/uL  Mean Cell Volume : 87.3 fl  Mean Cell Hemoglobin : 27.9 pg  Mean Cell Hemoglobin Concentration : 32.0 gm/dL  Auto Neutrophil # : x  Auto Lymphocyte # : x  Auto Monocyte # : x  Auto Eosinophil # : x  Auto Basophil # : x  Auto Neutrophil % : x  Auto Lymphocyte % : x  Auto Monocyte % : x  Auto Eosinophil % : x  Auto Basophil % : x    12-18    133<L>  |  103  |  13  ----------------------------<  105<H>  4.1   |  27  |  0.87    Ca    8.7      18 Dec 2023 07:12

## 2023-12-18 NOTE — BH CONSULTATION LIAISON ASSESSMENT NOTE - NSCOMMENTSUICRISKFACT_PSY_ALL_CORE
Nutrition consult for difficulties chewing and swallowing.  Pt is a 80M admitted with new onset seizure. Pt is pending speech and swallow evaluation to assess for dysphagia.  Pt currently on pureed diet in interim, however, Pt with minimal PO intake at this time.  No reports of GI distress (nausea/vomiting/diarrhea/constipation.)
Patient does not appear to be at risk for suicidal behavior at this time.

## 2023-12-18 NOTE — BH CONSULTATION LIAISON ASSESSMENT NOTE - HPI (INCLUDE ILLNESS QUALITY, SEVERITY, DURATION, TIMING, CONTEXT, MODIFYING FACTORS, ASSOCIATED SIGNS AND SYMPTOMS)
Patient is a 52 year old male, single, no dependents, lives alone at home now that mother is in a SNF and brother is in private residence, unemployed-on disability was previously a . No PPHx. No SHIIP. No inpatient hospitalizations. No NSSIB. PMHx of colon cancer with mets to the liver and lung on chemotherapy, patient presented on admission with abdominal pain, he has an ileostomy. Patient consulted by psychiatry for depression.     Patient is calm and cooperative; linear, organized. Reports that he is not suicidal, does not want to die, but is just "existing" has no intent or plan to kill himself because his "body is doing that for him." He reports he now has the flu, he came into the hospital to get worse.  He verbalizes acceptance around the circumstances of his physical illness, stating there is nothing anyone can do.   During past interviews, he has expressed excessive frustration with his life circumstances, not being able to work, not being  or have a family, and "walking around with a shit bag" (has colostomy.) He reports a good relationship with his brother, but doesn't see him much because he works and comes home and goes to sleep.  Reports good sleep and appetite. Denies depressed mood or anhedonia, hopelessness or suicidal ideation. Denies manic / psychotic symptoms. Patient has no presence of thought disorder. No delusions elicited. Tolerating medications with no side effects; none observed.

## 2023-12-18 NOTE — BH CONSULTATION LIAISON ASSESSMENT NOTE - NSBHATTESTBILLING_PSY_A_CORE
[id number] diagnostic evaluation with medical services [id number] diagnostic evaluation with medical services

## 2023-12-18 NOTE — PROGRESS NOTE ADULT - ASSESSMENT
52 yo M with pmhx of metastatic adenocarcinoma s/p ileostomy, PE/DVT s/p IVC filter (on Lovenox 90mg BID) presented to the ED for severe abdominal pain. Reports that he has been taking 4-5 tabs of oxy 10mg daily for pain management and since Monday he has been unable to refill his medications. Patient was recently admitted in Oct 2023 for ongoing abdominal pain since May 2022 and social issues and was evaluated by social work and heme/onc.      #Abdominal Pain  -Patient with multiple admissions for persistent abdominal pain and poor PO intake. Pain secondary to metastatic colon cancer to liver and lung. s/p ileostomy.   -Pain management consulted, recommendations appreciated. Continue with Oxy 15mg q4 PRN for severe pain, oxy 10mg q4 PRN moderate pain and morphine 2mg q4 PRN breakthrough pain. Bowel regiment given opiates.   - Retroperitoneal biopsy ----> Adenocarcinoma with necrosis. No loss of nuclear expression of MMR proteins (MLH1, MSH2, MSH6, and PMS2). Staging [id number].  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   - IVC filter present.  -  Will continue 90mg BID      #Social Work  - patient has poor home situation;    - SW/CM consulted for potential placement services  - Palliative care consult for pain regimen optimization and to assess need for placement   - of note on previous admission patient admitted to cocaine and EtOH abuse 2/2 to depression from ongoing diagnosis and depression but denies current use. Check urine drug screen,.   - will c/w Lexapro 5mg QD    #Leukocytosis likely secondary to influenza  -WBC 22.4 which has been uptrending since Nov  -  Last chemo 12/6/23.   -Afebrile and otherwise no evidence of sepsis. Lactate within normal limits   -s/p Vanco and Cefepime in the ED.   - CTPE/AP with no evidence of infectious etiology  -WBC lower 18    - + Flu 12/18  - Start tamiflu    #DVT PPx  - c/w Lovenox 90mg BID 54 yo M with pmhx of metastatic adenocarcinoma s/p ileostomy, PE/DVT s/p IVC filter (on Lovenox 90mg BID) presented to the ED for severe abdominal pain. Reports that he has been taking 4-5 tabs of oxy 10mg daily for pain management and since Monday he has been unable to refill his medications. Patient was recently admitted in Oct 2023 for ongoing abdominal pain since May 2022 and social issues and was evaluated by social work and heme/onc.      #Abdominal Pain  -Patient with multiple admissions for persistent abdominal pain and poor PO intake. Pain secondary to metastatic colon cancer to liver and lung. s/p ileostomy.   -Pain management consulted, recommendations appreciated. Continue with Oxy 15mg q4 PRN for severe pain, oxy 10mg q4 PRN moderate pain and morphine 2mg q4 PRN breakthrough pain. Bowel regiment given opiates.   - Retroperitoneal biopsy ----> Adenocarcinoma with necrosis. No loss of nuclear expression of MMR proteins (MLH1, MSH2, MSH6, and PMS2). Staging [id number].  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   - IVC filter present.  -  Will continue 90mg BID      #Social Work  - patient has poor home situation;    - SW/CM consulted for potential placement services  - Palliative care consult for pain regimen optimization and to assess need for placement   - of note on previous admission patient admitted to cocaine and EtOH abuse 2/2 to depression from ongoing diagnosis and depression but denies current use. Check urine drug screen,.   - will c/w Lexapro 5mg QD    #Leukocytosis likely secondary to influenza  -WBC 22.4 which has been uptrending since Nov  -  Last chemo 12/6/23.   -Afebrile and otherwise no evidence of sepsis. Lactate within normal limits   -s/p Vanco and Cefepime in the ED.   - CTPE/AP with no evidence of infectious etiology  -WBC lower 18    - + Flu 12/18  - Start tamiflu    #DVT PPx  - c/w Lovenox 90mg BID

## 2023-12-18 NOTE — BH CONSULTATION LIAISON ASSESSMENT NOTE - CURRENT MEDICATION
MEDICATIONS  (STANDING):  cholecalciferol 2000 Unit(s) Oral daily  enoxaparin Injectable 90 milliGRAM(s) SubCutaneous every 12 hours  influenza   Vaccine 0.5 milliLiter(s) IntraMuscular once  oseltamivir 75 milliGRAM(s) Oral two times a day    MEDICATIONS  (PRN):  acetaminophen     Tablet .. 650 milliGRAM(s) Oral every 6 hours PRN Temp greater or equal to 38C (100.4F), Mild Pain (1 - 3)  aluminum hydroxide/magnesium hydroxide/simethicone Suspension 30 milliLiter(s) Oral every 4 hours PRN Dyspepsia  melatonin 3 milliGRAM(s) Oral at bedtime PRN Insomnia  morphine  - Injectable 2 milliGRAM(s) IV Push every 4 hours PRN breakthrough  oxyCODONE    IR 10 milliGRAM(s) Oral every 4 hours PRN Moderate Pain (4 - 6)  oxyCODONE    IR 20 milliGRAM(s) Oral every 4 hours PRN Severe Pain (7 - 10)  senna 1 Tablet(s) Oral at bedtime PRN Constipation

## 2023-12-18 NOTE — CHART NOTE - NSCHARTNOTEFT_GEN_A_CORE
HPI:  52 yo M with pmhx of metastatic adenocarcinoma s/p ileostomy, PE/DVT s/p IVC filter (on Lovenox 90mg BID) presented to the ED for severe abdominal pain. Reports that he has been taking 4-5 tabs of oxy 10mg daily for pain management and since Monday he has been unable to refill his medications. Patient was recently admitted in Oct 2023 for ongoing abdominal pain since May 2022 and social issues and was evaluated by social work and heme/onc.    (15 Dec 2023 21:08)    PERTINENT PMH REVIEWED:  [ X ] YES [ ] NO           Primary Contact: HCP sister Idania     Mental Status: [ X ] Alert  [ X ] Oriented [  ] Confused [  ] Lethargic [  ]  Concerns of Depression [  ]- currently expressing feeling very depressed and a burden   Anxiety [   ]- not identified   Baseline ADLs (prior to admission):  Independent [ ] moderately [ X ] fully   Dependent   [ ] moderately [ ] fully    Anticipated Grief: Patient [ X ] Family [  ]    Caregiver [place] Assessed: Yes [ X ] No [  ]    Congregation: Unknown     Spiritual Concerns: Not identified     Goals of Care: Comfort [  ] Rehabilitation [  ] Curative [  ] Life Prolonging [ X ]    Previous Services: None    ADVANCE DIRECTIVES:  Full Code    Anticipated D/C Plan: to be determined                    Summary:     This SW met with pt. at bedside to follow up and provide support. Pt. known to our team from previous admissions. Pt. has been residing home with his brother. Pt. s hared his mother is now in a a SNF and his brother is working a lot therefore, he is home alone mostly. He discussed how he has been becoming weaker and sicker and its made it more difficult for him to get to appointments and take care of things he needs to. Pt. shared feeling like he is a burden. Feelings explored and support provided. This SW inquired if pt. had any thoughts of harming himself to which he replied sometimes. When asked if he has intensions of acting on those thoughts he said not right now but he has thought of it. This SW spoke with RN Manager Sahara and Psych will be called to see pt. Plan to be further determined. Our team will continue to follow. HPI:  52 yo M with pmhx of metastatic adenocarcinoma s/p ileostomy, PE/DVT s/p IVC filter (on Lovenox 90mg BID) presented to the ED for severe abdominal pain. Reports that he has been taking 4-5 tabs of oxy 10mg daily for pain management and since Monday he has been unable to refill his medications. Patient was recently admitted in Oct 2023 for ongoing abdominal pain since May 2022 and social issues and was evaluated by social work and heme/onc.    (15 Dec 2023 21:08)    PERTINENT PMH REVIEWED:  [ X ] YES [ ] NO           Primary Contact: HCP sister Idania     Mental Status: [ X ] Alert  [ X ] Oriented [  ] Confused [  ] Lethargic [  ]  Concerns of Depression [  ]- currently expressing feeling very depressed and a burden   Anxiety [   ]- not identified   Baseline ADLs (prior to admission):  Independent [ ] moderately [ X ] fully   Dependent   [ ] moderately [ ] fully    Anticipated Grief: Patient [ X ] Family [  ]    Caregiver [place] Assessed: Yes [ X ] No [  ]    Quaker: Unknown     Spiritual Concerns: Not identified     Goals of Care: Comfort [  ] Rehabilitation [  ] Curative [  ] Life Prolonging [ X ]    Previous Services: None    ADVANCE DIRECTIVES:  Full Code    Anticipated D/C Plan: to be determined                    Summary:     This SW met with pt. at bedside to follow up and provide support. Pt. known to our team from previous admissions. Pt. has been residing home with his brother. Pt. s hared his mother is now in a a SNF and his brother is working a lot therefore, he is home alone mostly. He discussed how he has been becoming weaker and sicker and its made it more difficult for him to get to appointments and take care of things he needs to. Pt. shared feeling like he is a burden. Feelings explored and support provided. This SW inquired if pt. had any thoughts of harming himself to which he replied sometimes. When asked if he has intensions of acting on those thoughts he said not right now but he has thought of it. This SW spoke with RN Manager Sahara and Psych will be called to see pt. Plan to be further determined. Our team will continue to follow.

## 2023-12-18 NOTE — HISTORY OF PRESENT ILLNESS
[FreeTextEntry1] : 51yo male initially seen in the hospital for left iliac artery thrombosis with left lower extremity pain and foot numbness that was subacute; the lesion was attempted to be treated but unable to traverse (b/l CFA access). The patient also had a right CFV acute DVT and PE started on LVX. He at some point had right groin pain found to have a right CFA small (<2cm) pseudoaneurysm that was not amenable to thrombin injection.  Currently he denies right groin pain and denies leg swelling. He is overall doing well without new concerns and with persistent LEFT foot numbness and pain.  51yo male with metastatic colon adenoCa with left iliac artery occlusion and with new right CFA pseudoaneurysm; on duplex today has not worsened from last duplex in hispital -would continue anticoagulation even in setting of pseudoaneurysm given his high risk for thrombosis venous and arterial -will treat conservatively with repeat duplex in 3 month to asses for growth follow up in 3 months.

## 2023-12-18 NOTE — BH CONSULTATION LIAISON ASSESSMENT NOTE - NSACTIVEVENT_PSY_ALL_CORE
Triggering events leading to humiliation, shame, and/or despair (e.g., Loss of relationship, financial or health status) (real or anticipated)/Current or pending social isolation/Chronic pain or other acute medical condition/Inadequate social supports/Perceived burden on family or others

## 2023-12-18 NOTE — BH CONSULTATION LIAISON ASSESSMENT NOTE - RISK ASSESSMENT
LOW RISK     ACUTE RISK FACTORS: Acute medical condition     CHRONIC RISK FACTORS: Familial stressors, financial instability    PROTECTIVE FACTORS: No suicide attempts, no violence history, medication compliance, no access to guns, no global insomnia, supportive family, willingness to seek help, no suicidal ideation or homicidal ideation, hopefulness for future.

## 2023-12-18 NOTE — BH CONSULTATION LIAISON ASSESSMENT NOTE - NSBHCHARTREVIEWINVESTIGATE_PSY_A_CORE FT
Ventricular Rate 98 BPM    Atrial Rate 98 BPM    P-R Interval 162 ms    QRS Duration 76 ms    Q-T Interval 332 ms    QTC Calculation(Bazett) 423 ms    P Axis 62 degrees    R Axis 87 degrees    T Axis 66 degrees    Diagnosis Line Normal sinus rhythm  Possible Left atrial enlargement  Cannot rule out Anterior infarct (cited on or before 17-JUN-2022)  Abnormal ECG  When compared with ECG of 08-OCT-2023 07:33,  Vent. rate has increased BY  35 BPM  Questionable change in initial forces of Septal leads  Confirmed by MD KINJAL, CAROL ([id number] on 12/16/2023 9:04:53 PM   Ventricular Rate 98 BPM    Atrial Rate 98 BPM    P-R Interval 162 ms    QRS Duration 76 ms    Q-T Interval 332 ms    QTC Calculation(Bazett) 423 ms    P Axis 62 degrees    R Axis 87 degrees    T Axis 66 degrees    Diagnosis Line Normal sinus rhythm  Possible Left atrial enlargement  Cannot rule out Anterior infarct (cited on or before 17-JUN-2022)  Abnormal ECG  When compared with ECG of 08-OCT-2023 07:33,  Vent. rate has increased BY  35 BPM  Questionable change in initial forces of Septal leads  Confirmed by MD KINJAL, CAROL ([id number] on 12/16/2023 9:04:53 PM

## 2023-12-19 LAB
ANION GAP SERPL CALC-SCNC: 5 MMOL/L — SIGNIFICANT CHANGE UP (ref 5–17)
ANION GAP SERPL CALC-SCNC: 5 MMOL/L — SIGNIFICANT CHANGE UP (ref 5–17)
BUN SERPL-MCNC: 9 MG/DL — SIGNIFICANT CHANGE UP (ref 7–23)
BUN SERPL-MCNC: 9 MG/DL — SIGNIFICANT CHANGE UP (ref 7–23)
CALCIUM SERPL-MCNC: 8.6 MG/DL — SIGNIFICANT CHANGE UP (ref 8.5–10.1)
CALCIUM SERPL-MCNC: 8.6 MG/DL — SIGNIFICANT CHANGE UP (ref 8.5–10.1)
CHLORIDE SERPL-SCNC: 101 MMOL/L — SIGNIFICANT CHANGE UP (ref 96–108)
CHLORIDE SERPL-SCNC: 101 MMOL/L — SIGNIFICANT CHANGE UP (ref 96–108)
CO2 SERPL-SCNC: 26 MMOL/L — SIGNIFICANT CHANGE UP (ref 22–31)
CO2 SERPL-SCNC: 26 MMOL/L — SIGNIFICANT CHANGE UP (ref 22–31)
CREAT SERPL-MCNC: 0.84 MG/DL — SIGNIFICANT CHANGE UP (ref 0.5–1.3)
CREAT SERPL-MCNC: 0.84 MG/DL — SIGNIFICANT CHANGE UP (ref 0.5–1.3)
EGFR: 106 ML/MIN/1.73M2 — SIGNIFICANT CHANGE UP
EGFR: 106 ML/MIN/1.73M2 — SIGNIFICANT CHANGE UP
GLUCOSE SERPL-MCNC: 103 MG/DL — HIGH (ref 70–99)
GLUCOSE SERPL-MCNC: 103 MG/DL — HIGH (ref 70–99)
HCT VFR BLD CALC: 39.8 % — SIGNIFICANT CHANGE UP (ref 39–50)
HCT VFR BLD CALC: 39.8 % — SIGNIFICANT CHANGE UP (ref 39–50)
HGB BLD-MCNC: 13.1 G/DL — SIGNIFICANT CHANGE UP (ref 13–17)
HGB BLD-MCNC: 13.1 G/DL — SIGNIFICANT CHANGE UP (ref 13–17)
MCHC RBC-ENTMCNC: 28.3 PG — SIGNIFICANT CHANGE UP (ref 27–34)
MCHC RBC-ENTMCNC: 28.3 PG — SIGNIFICANT CHANGE UP (ref 27–34)
MCHC RBC-ENTMCNC: 32.9 GM/DL — SIGNIFICANT CHANGE UP (ref 32–36)
MCHC RBC-ENTMCNC: 32.9 GM/DL — SIGNIFICANT CHANGE UP (ref 32–36)
MCV RBC AUTO: 86 FL — SIGNIFICANT CHANGE UP (ref 80–100)
MCV RBC AUTO: 86 FL — SIGNIFICANT CHANGE UP (ref 80–100)
PLATELET # BLD AUTO: 162 K/UL — SIGNIFICANT CHANGE UP (ref 150–400)
PLATELET # BLD AUTO: 162 K/UL — SIGNIFICANT CHANGE UP (ref 150–400)
POTASSIUM SERPL-MCNC: 3.5 MMOL/L — SIGNIFICANT CHANGE UP (ref 3.5–5.3)
POTASSIUM SERPL-MCNC: 3.5 MMOL/L — SIGNIFICANT CHANGE UP (ref 3.5–5.3)
POTASSIUM SERPL-SCNC: 3.5 MMOL/L — SIGNIFICANT CHANGE UP (ref 3.5–5.3)
POTASSIUM SERPL-SCNC: 3.5 MMOL/L — SIGNIFICANT CHANGE UP (ref 3.5–5.3)
RBC # BLD: 4.63 M/UL — SIGNIFICANT CHANGE UP (ref 4.2–5.8)
RBC # BLD: 4.63 M/UL — SIGNIFICANT CHANGE UP (ref 4.2–5.8)
RBC # FLD: 17.7 % — HIGH (ref 10.3–14.5)
RBC # FLD: 17.7 % — HIGH (ref 10.3–14.5)
SODIUM SERPL-SCNC: 132 MMOL/L — LOW (ref 135–145)
SODIUM SERPL-SCNC: 132 MMOL/L — LOW (ref 135–145)
WBC # BLD: 11.55 K/UL — HIGH (ref 3.8–10.5)
WBC # BLD: 11.55 K/UL — HIGH (ref 3.8–10.5)
WBC # FLD AUTO: 11.55 K/UL — HIGH (ref 3.8–10.5)
WBC # FLD AUTO: 11.55 K/UL — HIGH (ref 3.8–10.5)

## 2023-12-19 PROCEDURE — 99232 SBSQ HOSP IP/OBS MODERATE 35: CPT

## 2023-12-19 RX ADMIN — OXYCODONE HYDROCHLORIDE 20 MILLIGRAM(S): 5 TABLET ORAL at 11:39

## 2023-12-19 RX ADMIN — ENOXAPARIN SODIUM 90 MILLIGRAM(S): 100 INJECTION SUBCUTANEOUS at 10:47

## 2023-12-19 RX ADMIN — Medication 75 MILLIGRAM(S): at 03:14

## 2023-12-19 RX ADMIN — OXYCODONE HYDROCHLORIDE 20 MILLIGRAM(S): 5 TABLET ORAL at 20:16

## 2023-12-19 RX ADMIN — MORPHINE SULFATE 2 MILLIGRAM(S): 50 CAPSULE, EXTENDED RELEASE ORAL at 18:59

## 2023-12-19 RX ADMIN — ESCITALOPRAM OXALATE 10 MILLIGRAM(S): 10 TABLET, FILM COATED ORAL at 10:47

## 2023-12-19 RX ADMIN — ENOXAPARIN SODIUM 90 MILLIGRAM(S): 100 INJECTION SUBCUTANEOUS at 21:27

## 2023-12-19 RX ADMIN — Medication 75 MILLIGRAM(S): at 21:27

## 2023-12-19 RX ADMIN — OXYCODONE HYDROCHLORIDE 20 MILLIGRAM(S): 5 TABLET ORAL at 12:09

## 2023-12-19 RX ADMIN — MORPHINE SULFATE 2 MILLIGRAM(S): 50 CAPSULE, EXTENDED RELEASE ORAL at 13:45

## 2023-12-19 RX ADMIN — OXYCODONE HYDROCHLORIDE 20 MILLIGRAM(S): 5 TABLET ORAL at 03:43

## 2023-12-19 RX ADMIN — MORPHINE SULFATE 2 MILLIGRAM(S): 50 CAPSULE, EXTENDED RELEASE ORAL at 14:15

## 2023-12-19 RX ADMIN — Medication 2000 UNIT(S): at 10:47

## 2023-12-19 RX ADMIN — OXYCODONE HYDROCHLORIDE 20 MILLIGRAM(S): 5 TABLET ORAL at 07:39

## 2023-12-19 RX ADMIN — OXYCODONE HYDROCHLORIDE 20 MILLIGRAM(S): 5 TABLET ORAL at 16:43

## 2023-12-19 RX ADMIN — OXYCODONE HYDROCHLORIDE 20 MILLIGRAM(S): 5 TABLET ORAL at 16:13

## 2023-12-19 RX ADMIN — OXYCODONE HYDROCHLORIDE 20 MILLIGRAM(S): 5 TABLET ORAL at 08:09

## 2023-12-19 RX ADMIN — MORPHINE SULFATE 2 MILLIGRAM(S): 50 CAPSULE, EXTENDED RELEASE ORAL at 19:45

## 2023-12-19 RX ADMIN — OXYCODONE HYDROCHLORIDE 20 MILLIGRAM(S): 5 TABLET ORAL at 03:13

## 2023-12-19 RX ADMIN — OXYCODONE HYDROCHLORIDE 20 MILLIGRAM(S): 5 TABLET ORAL at 20:46

## 2023-12-19 NOTE — PROGRESS NOTE ADULT - ASSESSMENT
52 yo M with pmhx of metastatic adenocarcinoma s/p ileostomy, PE/DVT s/p IVC filter (on Lovenox 90mg BID) presented to the ED for severe abdominal pain. Reports that he has been taking 4-5 tabs of oxy 10mg daily for pain management and since Monday he has been unable to refill his medications. Patient was recently admitted in Oct 2023 for ongoing abdominal pain since May 2022 and social issues and was evaluated by social work and heme/onc.      #Abdominal Pain  -Patient with multiple admissions for persistent abdominal pain and poor PO intake. Pain secondary to metastatic colon cancer to liver and lung. s/p ileostomy.   -Pain management consulted, recommendations appreciated. Continue with Oxy 15mg q4 PRN for severe pain, oxy 10mg q4 PRN moderate pain and morphine 2mg q4 PRN breakthrough pain. Bowel regiment given opiates.   - Retroperitoneal biopsy ----> Adenocarcinoma with necrosis. No loss of nuclear expression of MMR proteins (MLH1, MSH2, MSH6, and PMS2). Staging [id number].  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   - IVC filter present.  -  Will continue 90mg BID      #Social Work  - patient has poor home situation;    - SW/CM consulted for potential placement services  - Palliative care consult for pain regimen optimization and to assess need for placement   - of note on previous admission patient admitted to cocaine and EtOH abuse 2/2 to depression from ongoing diagnosis and depression but denies current use. Check urine drug screen,.   - will c/w Lexapro 5mg QD    #Leukocytosis likely secondary to influenza  -WBC 22.4 which has been uptrending since Nov  -  Last chemo 12/6/23.   -Afebrile and otherwise no evidence of sepsis. Lactate within normal limits   -s/p Vanco and Cefepime in the ED.   - CTPE/AP with no evidence of infectious etiology  -WBC lower 18    - + Flu 12/18  - continue tamiflu    #DVT PPx  - c/w Lovenox 90mg BID 52 yo M with pmhx of metastatic adenocarcinoma s/p ileostomy, PE/DVT s/p IVC filter (on Lovenox 90mg BID) presented to the ED for severe abdominal pain. Reports that he has been taking 4-5 tabs of oxy 10mg daily for pain management and since Monday he has been unable to refill his medications. Patient was recently admitted in Oct 2023 for ongoing abdominal pain since May 2022 and social issues and was evaluated by social work and heme/onc.      #Abdominal Pain  -Patient with multiple admissions for persistent abdominal pain and poor PO intake. Pain secondary to metastatic colon cancer to liver and lung. s/p ileostomy.   -Pain management consulted, recommendations appreciated. Continue with Oxy 15mg q4 PRN for severe pain, oxy 10mg q4 PRN moderate pain and morphine 2mg q4 PRN breakthrough pain. Bowel regiment given opiates.   - Retroperitoneal biopsy ----> Adenocarcinoma with necrosis. No loss of nuclear expression of MMR proteins (MLH1, MSH2, MSH6, and PMS2). Staging [id number].  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   - IVC filter present.  -  Will continue 90mg BID      #Social Work  - patient has poor home situation;    - SW/CM consulted for potential placement services  - Palliative care consult for pain regimen optimization and to assess need for placement   - of note on previous admission patient admitted to cocaine and EtOH abuse 2/2 to depression from ongoing diagnosis and depression but denies current use. Check urine drug screen,.   - will c/w Lexapro 5mg QD    #Leukocytosis likely secondary to influenza  -WBC 22.4 which has been uptrending since Nov  -  Last chemo 12/6/23.   -Afebrile and otherwise no evidence of sepsis. Lactate within normal limits   -s/p Vanco and Cefepime in the ED.   - CTPE/AP with no evidence of infectious etiology  -WBC lower 18    - + Flu 12/18  - continue tamiflu    #DVT PPx  - c/w Lovenox 90mg BID

## 2023-12-19 NOTE — PROGRESS NOTE ADULT - SUBJECTIVE AND OBJECTIVE BOX
HOSPITALIST ATTENDING PROGRESS NOTE    Chart and meds reviewed.  Patient seen and examined.    CC: Abd pain    Subjective: Woke up with congestion and cough. Pain a little better in abdomen. Tolerating po.    All other systems reviewed and found to be negative with the exception of what has been described above.    MEDICATIONS  (STANDING):  cholecalciferol 2000 Unit(s) Oral daily  enoxaparin Injectable 90 milliGRAM(s) SubCutaneous every 12 hours  escitalopram 5 milliGRAM(s) Oral daily  influenza   Vaccine 0.5 milliLiter(s) IntraMuscular once  oseltamivir 75 milliGRAM(s) Oral two times a day    MEDICATIONS  (PRN):  acetaminophen     Tablet .. 650 milliGRAM(s) Oral every 6 hours PRN Temp greater or equal to 38C (100.4F), Mild Pain (1 - 3)  aluminum hydroxide/magnesium hydroxide/simethicone Suspension 30 milliLiter(s) Oral every 4 hours PRN Dyspepsia  melatonin 3 milliGRAM(s) Oral at bedtime PRN Insomnia  morphine  - Injectable 2 milliGRAM(s) IV Push every 4 hours PRN breakthrough  oxyCODONE    IR 10 milliGRAM(s) Oral every 4 hours PRN Moderate Pain (4 - 6)  oxyCODONE    IR 20 milliGRAM(s) Oral every 4 hours PRN Severe Pain (7 - 10)  senna 1 Tablet(s) Oral at bedtime PRN Constipation      Vital Signs Last 24 Hrs  T(C): 37.2 (18 Dec 2023 08:18), Max: 37.2 (18 Dec 2023 08:18)  T(F): 98.9 (18 Dec 2023 08:18), Max: 98.9 (18 Dec 2023 08:18)  HR: 106 (18 Dec 2023 08:18) (73 - 110)  BP: 106/74 (18 Dec 2023 08:18) (106/74 - 137/74)  BP(mean): 73 (17 Dec 2023 21:55) (73 - 73)  RR: 18 (18 Dec 2023 08:18) (18 - 20)  SpO2: 99% (18 Dec 2023 08:18) (97% - 99%)    GEN: NAD  HEENT:  pupils equal and reactive, EOMI, no oropharyngeal lesions, erythema, exudates, oral thrush  NECK:   supple, no carotid bruits, no palpable lymph nodes, no thyromegaly  CV:  +S1, +S2, regular, no murmurs or rubs  RESP:   lungs clear to auscultation bilaterally, no wheezing, rales, rhonchi, good air entry bilaterally  GI:  abdomen soft, non-tender, non-distended, normal BS, no bruits, no abdominal masses, no palpable masses + Ostomy  EXT:  no clubbing, no cyanosis, no edema, no calf pain, swelling or erythema  NEURO:  AAOX3, no focal neurological deficits, follows all commands, able to move extremities spontaneously  SKIN:  no ulcers, lesions or rashes    LABS:                          13.2   17.76 )-----------( 186      ( 18 Dec 2023 07:12 )             41.3     12-18    133<L>  |  103  |  13  ----------------------------<  105<H>  4.1   |  27  |  0.87    Ca    8.7      18 Dec 2023 07:12      Urinalysis Basic - ( 18 Dec 2023 07:12 )  Color: x / Appearance: x / SG: x / pH: x  Gluc: 105 mg/dL / Ketone: x  / Bili: x / Urobili: x   Blood: x / Protein: x / Nitrite: x   Leuk Esterase: x / RBC: x / WBC x   Sq Epi: x / Non Sq Epi: x / Bacteria: x      + Flu     CT Abdomen and Pelvis w/ IV Cont (12.15.23 @ 16:37) >  IMPRESSION:  No pulmonary embolus. Static metastatic lung nodules.    Filling defect within the left femoral vein suspicious for DVT. IVC   filter present.    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 Again noted,   the left common iliac artery, left external and left internal iliac   arteries are obliterated. Distal reconstitution of the left external   iliac artery.    Stable metastatic lesions within the liver.      
HOSPITALIST ATTENDING PROGRESS NOTE    Chart and meds reviewed.  Patient seen and examined.    CC: Abd pain    Subjective: Still with abdominal pain 6/10. No fever. Tolerating po.     All other systems reviewed and found to be negative with the exception of what has been described above.    MEDICATIONS  (STANDING):  cholecalciferol 2000 Unit(s) Oral daily  enoxaparin Injectable 90 milliGRAM(s) SubCutaneous every 12 hours  escitalopram 5 milliGRAM(s) Oral daily  influenza   Vaccine 0.5 milliLiter(s) IntraMuscular once    MEDICATIONS  (PRN):  acetaminophen     Tablet .. 650 milliGRAM(s) Oral every 6 hours PRN Temp greater or equal to 38C (100.4F), Mild Pain (1 - 3)  aluminum hydroxide/magnesium hydroxide/simethicone Suspension 30 milliLiter(s) Oral every 4 hours PRN Dyspepsia  melatonin 3 milliGRAM(s) Oral at bedtime PRN Insomnia  morphine  - Injectable 2 milliGRAM(s) IV Push every 4 hours PRN breakthrough  oxyCODONE    IR 15 milliGRAM(s) Oral every 4 hours PRN Severe Pain (7 - 10)  oxyCODONE    IR 10 milliGRAM(s) Oral every 4 hours PRN Moderate Pain (4 - 6)  senna 1 Tablet(s) Oral at bedtime PRN Constipation    Vital Signs Last 24 Hrs  T(C): 36.3 (16 Dec 2023 08:58), Max: 36.8 (15 Dec 2023 20:13)  T(F): 97.4 (16 Dec 2023 08:58), Max: 98.2 (15 Dec 2023 20:13)  HR: 82 (16 Dec 2023 08:58) (68 - 100)  BP: 123/77 (16 Dec 2023 08:58) (113/63 - 131/90)  BP(mean): 104 (15 Dec 2023 18:26) (104 - 104)  RR: 18 (16 Dec 2023 08:58) (18 - 20)  SpO2: 100% (16 Dec 2023 08:58) (98% - 100%)    GEN: NAD  HEENT:  pupils equal and reactive, EOMI, no oropharyngeal lesions, erythema, exudates, oral thrush  NECK:   supple, no carotid bruits, no palpable lymph nodes, no thyromegaly  CV:  +S1, +S2, regular, no murmurs or rubs  RESP:   lungs clear to auscultation bilaterally, no wheezing, rales, rhonchi, good air entry bilaterally  GI:  abdomen soft, non-tender, non-distended, normal BS, no bruits, no abdominal masses, no palpable masses  EXT:  no clubbing, no cyanosis, no edema, no calf pain, swelling or erythema  NEURO:  AAOX3, no focal neurological deficits, follows all commands, able to move extremities spontaneously  SKIN:  no ulcers, lesions or rashes    LABS:                          12.6   20.59 )-----------( 198      ( 16 Dec 2023 07:23 )             39.2     12-16    135  |  106  |  13  ----------------------------<  103<H>  4.2   |  23  |  0.80    Ca    8.3<L>      16 Dec 2023 07:23  Phos  3.6     12-16  Mg     2.2     12-16    TPro  8.6<H>  /  Alb  4.2  /  TBili  0.3  /  DBili  x   /  AST  24  /  ALT  51  /  AlkPhos  213<H>  12-15        LIVER FUNCTIONS - ( 15 Dec 2023 15:18 )  Alb: 4.2 g/dL / Pro: 8.6 gm/dL / ALK PHOS: 213 U/L / ALT: 51 U/L / AST: 24 U/L / GGT: x           PT/INR - ( 15 Dec 2023 15:18 )   PT: 11.3 sec;   INR: 1.00 ratio    PTT - ( 15 Dec 2023 15:18 )  PTT:44.2 sec    Urinalysis Basic - ( 16 Dec 2023 07:23 )  Color: x / Appearance: x / SG: x / pH: x  Gluc: 103 mg/dL / Ketone: x  / Bili: x / Urobili: x   Blood: x / Protein: x / Nitrite: x   Leuk Esterase: x / RBC: x / WBC x   Sq Epi: x / Non Sq Epi: x / Bacteria: x     CT Abdomen and Pelvis w/ IV Cont (12.15.23 @ 16:37) >  IMPRESSION:  No pulmonary embolus. Static metastatic lung nodules.    Filling defect within the left femoral vein suspicious for DVT. IVC   filter present.    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 Again noted,   the left common iliac artery, left external and left internal iliac   arteries are obliterated. Distal reconstitution of the left external   iliac artery.    Stable metastatic lesions within the liver.    
HOSPITALIST ATTENDING PROGRESS NOTE    Chart and meds reviewed.  Patient seen and examined.    CC: Abd pain    Subjective: Still with abdominal pain, no fever. Congested with Cough from Flu    All other systems reviewed and found to be negative with the exception of what has been described above.    MEDICATIONS  (STANDING):  cholecalciferol 2000 Unit(s) Oral daily  enoxaparin Injectable 90 milliGRAM(s) SubCutaneous every 12 hours  escitalopram 10 milliGRAM(s) Oral daily  influenza   Vaccine 0.5 milliLiter(s) IntraMuscular once  oseltamivir 75 milliGRAM(s) Oral two times a day    MEDICATIONS  (PRN):  acetaminophen     Tablet .. 650 milliGRAM(s) Oral every 6 hours PRN Temp greater or equal to 38C (100.4F), Mild Pain (1 - 3)  aluminum hydroxide/magnesium hydroxide/simethicone Suspension 30 milliLiter(s) Oral every 4 hours PRN Dyspepsia  melatonin 3 milliGRAM(s) Oral at bedtime PRN Insomnia  morphine  - Injectable 2 milliGRAM(s) IV Push every 4 hours PRN breakthrough  oxyCODONE    IR 10 milliGRAM(s) Oral every 4 hours PRN Moderate Pain (4 - 6)  oxyCODONE    IR 20 milliGRAM(s) Oral every 4 hours PRN Severe Pain (7 - 10)  senna 1 Tablet(s) Oral at bedtime PRN Constipation    Vital Signs Last 24 Hrs  T(C): 36.7 (19 Dec 2023 08:26), Max: 37.9 (18 Dec 2023 20:55)  T(F): 98.1 (19 Dec 2023 08:26), Max: 100.3 (18 Dec 2023 20:55)  HR: 90 (19 Dec 2023 08:26) (90 - 100)  BP: 121/75 (19 Dec 2023 08:26) (112/74 - 121/75)  RR: 18 (19 Dec 2023 08:26) (18 - 18)  SpO2: 98% (19 Dec 2023 08:26) (98% - 99%)    GEN: NAD  HEENT:  pupils equal and reactive, EOMI, no oropharyngeal lesions, erythema, exudates, oral thrush  NECK:   supple, no carotid bruits, no palpable lymph nodes, no thyromegaly  CV:  +S1, +S2, regular, no murmurs or rubs  RESP:  scattered ronchi  GI:  abdomen soft, non-tender, non-distended, normal BS, no bruits, no abdominal masses, no palpable masses + stoma  EXT:  no clubbing, no cyanosis, no edema, no calf pain, swelling or erythema  NEURO:  AAOX3, no focal neurological deficits, follows all commands, able to move extremities spontaneously  SKIN:  no ulcers, lesions or rashes    LABS:                          13.1   11.55 )-----------( 162      ( 19 Dec 2023 07:12 )             39.8     12-19    132<L>  |  101  |  9   ----------------------------<  103<H>  3.5   |  26  |  0.84    Ca    8.6      19 Dec 2023 07:12      Urinalysis Basic - ( 19 Dec 2023 07:12 )  Color: x / Appearance: x / SG: x / pH: x  Gluc: 103 mg/dL / Ketone: x  / Bili: x / Urobili: x   Blood: x / Protein: x / Nitrite: x   Leuk Esterase: x / RBC: x / WBC x   Sq Epi: x / Non Sq Epi: x / Bacteria: x    < from: CT Abdomen and Pelvis w/ IV Cont (12.15.23 @ 16:37) >  IMPRESSION:  No pulmonary embolus. Static metastatic lung nodules.    Filling defect within the left femoral vein suspicious for DVT. IVC   filter present.    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 Again noted,   the left common iliac artery, left external and left internal iliac   arteries are obliterated. Distal reconstitution of the left external   iliac artery.    Stable metastatic lesions within the liver.    --- End of Report ---    < end of copied text >  
HOSPITALIST ATTENDING PROGRESS NOTE    Chart and meds reviewed.  Patient seen and examined.    CC: Abd pain    Subjective: Still in pain 6/10 with oxy 15. Tolerating po.     All other systems reviewed and found to be negative with the exception of what has been described above.    MEDICATIONS  (STANDING):  cholecalciferol 2000 Unit(s) Oral daily  enoxaparin Injectable 90 milliGRAM(s) SubCutaneous every 12 hours  escitalopram 5 milliGRAM(s) Oral daily  influenza   Vaccine 0.5 milliLiter(s) IntraMuscular once    MEDICATIONS  (PRN):  acetaminophen     Tablet .. 650 milliGRAM(s) Oral every 6 hours PRN Temp greater or equal to 38C (100.4F), Mild Pain (1 - 3)  aluminum hydroxide/magnesium hydroxide/simethicone Suspension 30 milliLiter(s) Oral every 4 hours PRN Dyspepsia  melatonin 3 milliGRAM(s) Oral at bedtime PRN Insomnia  morphine  - Injectable 2 milliGRAM(s) IV Push every 4 hours PRN breakthrough  oxyCODONE    IR 10 milliGRAM(s) Oral every 4 hours PRN Moderate Pain (4 - 6)  oxyCODONE    IR 20 milliGRAM(s) Oral every 4 hours PRN Severe Pain (7 - 10)  senna 1 Tablet(s) Oral at bedtime PRN Constipation    ICU Vital Signs Last 24 Hrs  T(C): 36.7 (17 Dec 2023 10:23), Max: 36.7 (16 Dec 2023 23:23)  T(F): 98.1 (17 Dec 2023 10:23), Max: 98.1 (16 Dec 2023 23:23)  HR: 93 (17 Dec 2023 10:23) (61 - 93)  BP: 120/76 (17 Dec 2023 10:23) (106/69 - 120/76)  BP(mean): 76 (16 Dec 2023 16:50) (76 - 76)  RR: 18 (17 Dec 2023 10:23) (18 - 18)  SpO2: 99% (17 Dec 2023 10:23) (98% - 100%)    GEN: NAD  HEENT:  pupils equal and reactive, EOMI, no oropharyngeal lesions, erythema, exudates, oral thrush  NECK:   supple, no carotid bruits, no palpable lymph nodes, no thyromegaly  CV:  +S1, +S2, regular, no murmurs or rubs  RESP:   lungs clear to auscultation bilaterally, no wheezing, rales, rhonchi, good air entry bilaterally  GI:  abdomen soft, non-tender, non-distended, normal BS, no bruits, no abdominal masses, no palpable masses + Stoma  EXT:  no clubbing, no cyanosis, no edema, no calf pain, swelling or erythema  NEURO:  AAOX3, no focal neurological deficits, follows all commands, able to move extremities spontaneously  SKIN:  no ulcers, lesions or rashes    LABS:                          12.9   18.67 )-----------( 193      ( 17 Dec 2023 07:10 )             40.4     12-17    138  |  108  |  14  ----------------------------<  115<H>  4.3   |  26  |  0.73    Ca    8.8      17 Dec 2023 07:10  Phos  3.6     12-16  Mg     2.2     12-16    TPro  8.6<H>  /  Alb  4.2  /  TBili  0.3  /  DBili  x   /  AST  24  /  ALT  51  /  AlkPhos  213<H>  12-15        LIVER FUNCTIONS - ( 15 Dec 2023 15:18 )  Alb: 4.2 g/dL / Pro: 8.6 gm/dL / ALK PHOS: 213 U/L / ALT: 51 U/L / AST: 24 U/L / GGT: x           PT/INR - ( 15 Dec 2023 15:18 )   PT: 11.3 sec;   INR: 1.00 ratio    PTT - ( 15 Dec 2023 15:18 )  PTT:44.2 sec    Urinalysis Basic - ( 17 Dec 2023 07:10 )  Color: x / Appearance: x / SG: x / pH: x  Gluc: 115 mg/dL / Ketone: x  / Bili: x / Urobili: x   Blood: x / Protein: x / Nitrite: x   Leuk Esterase: x / RBC: x / WBC x   Sq Epi: x / Non Sq Epi: x / Bacteria: x      : Xray Chest 1 View- PORTABLE-Urgent (Xray Chest 1 View- PORTABLE-Urgent .) (12.15.23 @ 18:13) >  IMPRESSION: No acute finding or change.  -     CT Abdomen and Pelvis w/ IV Cont (12.15.23 @ 16:37) >  IMPRESSION:  No pulmonary embolus. Static metastatic lung nodules.    Filling defect within the left femoral vein suspicious for DVT. IVC   filter present.    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 Again noted,   the left common iliac artery, left external and left internal iliac   arteries are obliterated. Distal reconstitution of the left external   iliac artery.    Stable metastatic lesions within the liver.        < end of copied text >

## 2023-12-20 ENCOUNTER — APPOINTMENT (OUTPATIENT)
Dept: INFUSION THERAPY | Facility: CLINIC | Age: 53
End: 2023-12-20

## 2023-12-20 ENCOUNTER — TRANSCRIPTION ENCOUNTER (OUTPATIENT)
Age: 53
End: 2023-12-20

## 2023-12-20 VITALS
OXYGEN SATURATION: 100 % | RESPIRATION RATE: 18 BRPM | DIASTOLIC BLOOD PRESSURE: 81 MMHG | TEMPERATURE: 98 F | HEART RATE: 84 BPM | SYSTOLIC BLOOD PRESSURE: 127 MMHG

## 2023-12-20 LAB
CULTURE RESULTS: SIGNIFICANT CHANGE UP
SPECIMEN SOURCE: SIGNIFICANT CHANGE UP

## 2023-12-20 PROCEDURE — 99239 HOSP IP/OBS DSCHRG MGMT >30: CPT

## 2023-12-20 RX ORDER — OXYCODONE HYDROCHLORIDE 5 MG/1
1 TABLET ORAL
Qty: 60 | Refills: 0
Start: 2023-12-20 | End: 2023-12-29

## 2023-12-20 RX ORDER — OXYCODONE HYDROCHLORIDE 5 MG/1
1 TABLET ORAL
Refills: 0 | DISCHARGE

## 2023-12-20 RX ORDER — ESCITALOPRAM OXALATE 10 MG/1
1 TABLET, FILM COATED ORAL
Qty: 30 | Refills: 0
Start: 2023-12-20 | End: 2024-01-18

## 2023-12-20 RX ORDER — ACETAMINOPHEN 500 MG
2 TABLET ORAL
Qty: 0 | Refills: 0 | DISCHARGE
Start: 2023-12-20

## 2023-12-20 RX ADMIN — OXYCODONE HYDROCHLORIDE 20 MILLIGRAM(S): 5 TABLET ORAL at 00:51

## 2023-12-20 RX ADMIN — OXYCODONE HYDROCHLORIDE 20 MILLIGRAM(S): 5 TABLET ORAL at 09:15

## 2023-12-20 RX ADMIN — MORPHINE SULFATE 2 MILLIGRAM(S): 50 CAPSULE, EXTENDED RELEASE ORAL at 08:00

## 2023-12-20 RX ADMIN — ESCITALOPRAM OXALATE 10 MILLIGRAM(S): 10 TABLET, FILM COATED ORAL at 09:14

## 2023-12-20 RX ADMIN — MORPHINE SULFATE 2 MILLIGRAM(S): 50 CAPSULE, EXTENDED RELEASE ORAL at 02:15

## 2023-12-20 RX ADMIN — OXYCODONE HYDROCHLORIDE 20 MILLIGRAM(S): 5 TABLET ORAL at 05:33

## 2023-12-20 RX ADMIN — MORPHINE SULFATE 2 MILLIGRAM(S): 50 CAPSULE, EXTENDED RELEASE ORAL at 02:00

## 2023-12-20 RX ADMIN — Medication 2000 UNIT(S): at 09:14

## 2023-12-20 RX ADMIN — MORPHINE SULFATE 2 MILLIGRAM(S): 50 CAPSULE, EXTENDED RELEASE ORAL at 07:48

## 2023-12-20 RX ADMIN — OXYCODONE HYDROCHLORIDE 20 MILLIGRAM(S): 5 TABLET ORAL at 00:21

## 2023-12-20 RX ADMIN — Medication 75 MILLIGRAM(S): at 09:14

## 2023-12-20 RX ADMIN — OXYCODONE HYDROCHLORIDE 20 MILLIGRAM(S): 5 TABLET ORAL at 05:03

## 2023-12-20 RX ADMIN — OXYCODONE HYDROCHLORIDE 20 MILLIGRAM(S): 5 TABLET ORAL at 10:00

## 2023-12-20 RX ADMIN — ENOXAPARIN SODIUM 90 MILLIGRAM(S): 100 INJECTION SUBCUTANEOUS at 09:15

## 2023-12-20 NOTE — DISCHARGE NOTE PROVIDER - NSDCMRMEDTOKEN_GEN_ALL_CORE_FT
acetaminophen 325 mg oral tablet: 2 tab(s) orally every 6 hours As needed Temp greater or equal to 38C (100.4F), Mild Pain (1 - 3)  cholecalciferol 50 mcg (2000 intl units) oral tablet: 1 tab(s) orally once a day  enoxaparin 150 mg/mL injectable solution: 140 milligram(s) subcutaneously once a day  escitalopram 10 mg oral tablet: 1 tab(s) orally once a day  oxyCODONE 20 mg oral tablet: 1 tab(s) orally every 4 hours as needed for Severe Pain (7 - 10) MDD: 6 tabs  Tamiflu 75 mg oral capsule: 1 cap(s) orally 2 times a day

## 2023-12-20 NOTE — DISCHARGE NOTE PROVIDER - NSDCCPCAREPLAN_GEN_ALL_CORE_FT
PRINCIPAL DISCHARGE DIAGNOSIS  Diagnosis: Abdominal pain  Assessment and Plan of Treatment: You were seen for abdominal pain, your pain is stable on 20 mg of oxycodone. Please continue medications as prescribed. Please also fu with Dr. Peraza for ostomy correction      SECONDARY DISCHARGE DIAGNOSES  Diagnosis: Colon cancer  Assessment and Plan of Treatment:

## 2023-12-20 NOTE — DISCHARGE NOTE PROVIDER - NSDCCAREPROVSEEN_GEN_ALL_CORE_FT
Lester, Mackenzie Vallecillo, Emily Hall, Chantel Diaz, Marisabel Pollard, Shauna Hernandez, Britany Servin, Juan J

## 2023-12-20 NOTE — DISCHARGE NOTE NURSING/CASE MANAGEMENT/SOCIAL WORK - PATIENT PORTAL LINK FT
You can access the FollowMyHealth Patient Portal offered by [facility] by registering at the following website: [email or web address]. By joining [facility]’s FollowMyHealth portal, you will also be able to view your health information using other applications (apps) compatible with our system. You can access the FollowMyHealth Patient Portal offered by [facility] by registering at the following website: [email or web address]. By joining [facility]’s FollowMyHealth portal, you will also be able to view your health information using other applications (apps) compatible with our system.

## 2023-12-20 NOTE — DISCHARGE NOTE NURSING/CASE MANAGEMENT/SOCIAL WORK - NSDCPEFALRISK_GEN_ALL_CORE
For information on Fall & Injury Prevention, visit: [email or web address] OR  [email or web address] OR  https://www.cdc.gov/steadi/patient.html For information on Fall & Injury Prevention, visit: [email or web address] OR  [email or web address] OR  https://www.cdc.gov/steadi/patient.html

## 2023-12-20 NOTE — DISCHARGE NOTE PROVIDER - CARE PROVIDER_API CALL
Ernesto Maynard  Colon/Rectal Surgery  [address], [place] [address]  Phone: [phone]  Fax: [phone]  Follow Up Time:     Wanda Serrano  Medical Oncology  [address], [place] [address]  Phone: [phone]  Fax: [phone]  Follow Up Time:    Ernesto Maynard  Colon/Rectal Surgery  [address], [place] [address]  Phone: [phone]  Fax: [phone]  Follow Up Time:     Wanda Serrano  Medical Oncology  [address], [place] [address]  Phone: [phone]  Fax: [phone]  Follow Up Time:

## 2023-12-20 NOTE — DISCHARGE NOTE PROVIDER - NSDCHHNEEDSERVICE_GEN_ALL_CORE
Detail Level: Simple Detail Level: Generalized Detail Level: Detailed Ostomy care and management/Rehabilitation services/Teaching and training Detail Level: Zone

## 2023-12-20 NOTE — DISCHARGE NOTE PROVIDER - NSDCFUSCHEDAPPT_GEN_ALL_CORE_FT
[facility]  [facility] [address]  Scheduled Appointment: 01/02/2024    Christine Au  [facility]  [place] [facility]  Scheduled Appointment: 01/02/2024    [facility]  [place] [facility]  Scheduled Appointment: 01/03/2024    [facility]  [place] [facility]  Scheduled Appointment: 01/05/2024    Wanda Serrano  [facility]  [place] [facility]  Scheduled Appointment: 01/05/2024     [facility]  [facility]  Scheduled Appointment: 01/02/2024    Chrisitne Au  [facility]  [place] [facility]  Scheduled Appointment: 01/02/2024    [facility]  [place] [facility]  Scheduled Appointment: 01/03/2024    [facility]  [place] [facility]  Scheduled Appointment: 01/05/2024    Wanda Serrano  [facility]  [place] [facility]  Scheduled Appointment: 01/05/2024

## 2023-12-20 NOTE — DISCHARGE NOTE PROVIDER - HOSPITAL COURSE
52 yo M with pmhx of metastatic adenocarcinoma s/p ileostomy, PE/DVT s/p IVC filter (on Lovenox 90mg BID) presented to the ED for severe abdominal pain. Reports that he has been taking 4-5 tabs of oxy 10mg daily for pain management and since Monday he has been unable to refill his medications. Patient was recently admitted in Oct 2023 for ongoing abdominal pain since May 2022 and social issues and was evaluated by social work and heme/onc.      #Abdominal Pain  -Patient with multiple admissions for persistent abdominal pain and poor PO intake. Pain secondary to metastatic colon cancer to liver and lung. s/p ileostomy.   -Pain management consulted, recommendations appreciated. Continue with Oxy 15mg q4 PRN for severe pain, oxy 10mg q4 PRN moderate pain and morphine 2mg q4 PRN breakthrough pain. Bowel regiment given opiates.   - Retroperitoneal biopsy ----> Adenocarcinoma with necrosis. No loss of nuclear expression of MMR proteins (MLH1, MSH2, MSH6, and PMS2). Staging [id number].  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   - IVC filter present.  -  Will continue 90mg BID      #Social Work  - patient has poor home situation;    - SW/CM consulted for potential placement services  - Palliative care consult for pain regimen optimization and to assess need for placement   - of note on previous admission patient admitted to cocaine and EtOH abuse 2/2 to depression from ongoing diagnosis and depression but denies current use. Check urine drug screen,.   - will c/w Lexapro 5mg QD    #Leukocytosis likely secondary to influenza  -WBC 22.4 which has been uptrending since Nov  -  Last chemo 12/6/23.   -Afebrile and otherwise no evidence of sepsis. Lactate within normal limits   -s/p Vanco and Cefepime in the ED.   - CTPE/AP with no evidence of infectious etiology  -WBC lower 18    - + Flu 12/18  - continue tamiflu    #DVT PPx  - c/w Lovenox 90mg BID   52 yo M with pmhx of metastatic adenocarcinoma s/p ileostomy, PE/DVT s/p IVC filter (on Lovenox 90mg BID) presented to the ED for severe abdominal pain. Reports that he has been taking 4-5 tabs of oxy 10mg daily for pain management and since Monday he has been unable to refill his medications. Patient was recently admitted in Oct 2023 for ongoing abdominal pain since May 2022 and social issues and was evaluated by social work and heme/onc.      #Abdominal Pain  -Patient with multiple admissions for persistent abdominal pain and poor PO intake. Pain secondary to metastatic colon cancer to liver and lung. s/p ileostomy.   -Pain management consulted, recommendations appreciated. Continue with Oxy 15mg q4 PRN for severe pain, oxy 10mg q4 PRN moderate pain and morphine 2mg q4 PRN breakthrough pain. Bowel regiment given opiates.   - Retroperitoneal biopsy ----> Adenocarcinoma with necrosis. No loss of nuclear expression of MMR proteins (MLH1, MSH2, MSH6, and PMS2). Staging [id number].  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   - IVC filter present.  -  Will continue 90mg BID      #Social Work  - patient has poor home situation;    - SW/CM consulted for potential placement services  - Palliative care consult for pain regimen optimization and to assess need for placement   - of note on previous admission patient admitted to cocaine and EtOH abuse 2/2 to depression from ongoing diagnosis and depression but denies current use. Check urine drug screen,.   - will c/w Lexapro 5mg QD    #Leukocytosis likely secondary to influenza  -WBC 22.4 which has been uptrending since Nov  -  Last chemo 12/6/23.   -Afebrile and otherwise no evidence of sepsis. Lactate within normal limits   -s/p Vanco and Cefepime in the ED.   - CTPE/AP with no evidence of infectious etiology  -WBC lower 18    - + Flu 12/18  - continue tamiflu    #DVT PPx  - c/w Lovenox 90mg BID

## 2023-12-20 NOTE — CHART NOTE - NSCHARTNOTEFT_GEN_A_CORE
To whom it may concern,    Patient has been under my care for worsening abdominal pain   secondary to metastatic colon cancer. He is doing well on oxy 20 q4 hours  and with oncology recommend increasing medications for quality of life and to improve  ADLs.      Thank you    Marisabel Diaz MD

## 2023-12-20 NOTE — DISCHARGE NOTE PROVIDER - CARE PROVIDERS DIRECT ADDRESSES
,[email or web address]'s [email or web address]

## 2023-12-20 NOTE — DISCHARGE NOTE PROVIDER - PROVIDER TOKENS
PROVIDER:[TOKEN:[[id number]]],PROVIDER:[TOKEN:[[id number]]] PROVIDER:[TOKEN:[[id number]]],PROVIDER:[TOKEN:[[id number]]]

## 2023-12-21 ENCOUNTER — TRANSCRIPTION ENCOUNTER (OUTPATIENT)
Age: 53
End: 2023-12-21

## 2023-12-22 ENCOUNTER — APPOINTMENT (OUTPATIENT)
Dept: INFUSION THERAPY | Facility: CLINIC | Age: 53
End: 2023-12-22

## 2023-12-22 RX ORDER — ERGOCALCIFEROL 1.25 MG/1
1.25 MG CAPSULE, LIQUID FILLED ORAL
Qty: 8 | Refills: 2 | Status: ACTIVE | COMMUNITY
Start: 2023-12-22 | End: 1900-01-01

## 2023-12-22 NOTE — ED ADULT TRIAGE NOTE - PRO INTERPRETER NEED 2
Nurses Note -- 4 Eyes      12/22/2023   2:46 PM      Skin assessed during: Admit      [] No Altered Skin Integrity Present    []Prevention Measures Documented      [] Yes- Altered Skin Integrity Present or Discovered   [] LDA Added if Not in Epic (Describe Wound)   [] New Altered Skin Integrity was Present on Admit and Documented in LDA   [] Wound Image Taken    Wound Care Consulted? Yes    Attending Nurse:  Noemi Conner RN/Staff Member:   betito valiente            English

## 2023-12-27 DIAGNOSIS — C78.7 SECONDARY MALIGNANT NEOPLASM OF LIVER AND INTRAHEPATIC BILE DUCT: ICD-10-CM

## 2023-12-27 DIAGNOSIS — J11.1 INFLUENZA DUE TO UNIDENTIFIED INFLUENZA VIRUS WITH OTHER RESPIRATORY MANIFESTATIONS: ICD-10-CM

## 2023-12-27 DIAGNOSIS — Z87.891 PERSONAL HISTORY OF NICOTINE DEPENDENCE: ICD-10-CM

## 2023-12-27 DIAGNOSIS — C18.9 MALIGNANT NEOPLASM OF COLON, UNSPECIFIED: ICD-10-CM

## 2023-12-27 DIAGNOSIS — Z93.2 ILEOSTOMY STATUS: ICD-10-CM

## 2023-12-27 DIAGNOSIS — C78.00 SECONDARY MALIGNANT NEOPLASM OF UNSPECIFIED LUNG: ICD-10-CM

## 2023-12-27 DIAGNOSIS — F32.A DEPRESSION, UNSPECIFIED: ICD-10-CM

## 2023-12-27 DIAGNOSIS — Z86.718 PERSONAL HISTORY OF OTHER VENOUS THROMBOSIS AND EMBOLISM: ICD-10-CM

## 2023-12-27 DIAGNOSIS — G89.3 NEOPLASM RELATED PAIN (ACUTE) (CHRONIC): ICD-10-CM

## 2023-12-27 DIAGNOSIS — E55.9 VITAMIN D DEFICIENCY, UNSPECIFIED: ICD-10-CM

## 2023-12-27 DIAGNOSIS — R10.9 UNSPECIFIED ABDOMINAL PAIN: ICD-10-CM

## 2023-12-27 DIAGNOSIS — Z79.01 LONG TERM (CURRENT) USE OF ANTICOAGULANTS: ICD-10-CM

## 2024-01-02 ENCOUNTER — OUTPATIENT (OUTPATIENT)
Dept: OUTPATIENT SERVICES | Facility: HOSPITAL | Age: 54
LOS: 1 days | End: 2024-01-02
Payer: MEDICAID

## 2024-01-02 ENCOUNTER — APPOINTMENT (OUTPATIENT)
Dept: CT IMAGING | Facility: CLINIC | Age: 54
End: 2024-01-02
Payer: MEDICAID

## 2024-01-02 ENCOUNTER — APPOINTMENT (OUTPATIENT)
Dept: HEMATOLOGY ONCOLOGY | Facility: CLINIC | Age: 54
End: 2024-01-02

## 2024-01-02 DIAGNOSIS — Z00.8 ENCOUNTER FOR OTHER GENERAL EXAMINATION: ICD-10-CM

## 2024-01-02 DIAGNOSIS — C18.9 MALIGNANT NEOPLASM OF COLON, UNSPECIFIED: ICD-10-CM

## 2024-01-02 DIAGNOSIS — Z98.890 OTHER SPECIFIED POSTPROCEDURAL STATES: Chronic | ICD-10-CM

## 2024-01-02 DIAGNOSIS — Z90.49 ACQUIRED ABSENCE OF OTHER SPECIFIED PARTS OF DIGESTIVE TRACT: Chronic | ICD-10-CM

## 2024-01-02 PROCEDURE — 74177 CT ABD & PELVIS W/CONTRAST: CPT | Mod: 26

## 2024-01-02 PROCEDURE — 71260 CT THORAX DX C+: CPT

## 2024-01-02 PROCEDURE — 74177 CT ABD & PELVIS W/CONTRAST: CPT

## 2024-01-02 PROCEDURE — 71260 CT THORAX DX C+: CPT | Mod: 26

## 2024-01-03 ENCOUNTER — NON-APPOINTMENT (OUTPATIENT)
Age: 54
End: 2024-01-03

## 2024-01-03 ENCOUNTER — RESULT REVIEW (OUTPATIENT)
Age: 54
End: 2024-01-03

## 2024-01-03 ENCOUNTER — APPOINTMENT (OUTPATIENT)
Dept: INFUSION THERAPY | Facility: CLINIC | Age: 54
End: 2024-01-03

## 2024-01-03 VITALS
TEMPERATURE: 97.1 F | DIASTOLIC BLOOD PRESSURE: 86 MMHG | OXYGEN SATURATION: 97 % | HEART RATE: 144 BPM | WEIGHT: 190 LBS | RESPIRATION RATE: 19 BRPM | HEIGHT: 74 IN | SYSTOLIC BLOOD PRESSURE: 137 MMHG | BODY MASS INDEX: 24.38 KG/M2

## 2024-01-03 LAB
ALBUMIN SERPL ELPH-MCNC: 4.7 G/DL — SIGNIFICANT CHANGE UP (ref 3.3–5)
ALBUMIN SERPL ELPH-MCNC: 4.7 G/DL — SIGNIFICANT CHANGE UP (ref 3.3–5)
ALP SERPL-CCNC: 92 U/L — SIGNIFICANT CHANGE UP (ref 40–120)
ALP SERPL-CCNC: 92 U/L — SIGNIFICANT CHANGE UP (ref 40–120)
ALT FLD-CCNC: 31 U/L — SIGNIFICANT CHANGE UP (ref 10–45)
ALT FLD-CCNC: 31 U/L — SIGNIFICANT CHANGE UP (ref 10–45)
ANION GAP SERPL CALC-SCNC: 16 MMOL/L — SIGNIFICANT CHANGE UP (ref 5–17)
ANION GAP SERPL CALC-SCNC: 16 MMOL/L — SIGNIFICANT CHANGE UP (ref 5–17)
AST SERPL-CCNC: 22 U/L — SIGNIFICANT CHANGE UP (ref 10–40)
AST SERPL-CCNC: 22 U/L — SIGNIFICANT CHANGE UP (ref 10–40)
BASOPHILS # BLD AUTO: 0.04 K/UL — SIGNIFICANT CHANGE UP (ref 0–0.2)
BASOPHILS # BLD AUTO: 0.04 K/UL — SIGNIFICANT CHANGE UP (ref 0–0.2)
BASOPHILS NFR BLD AUTO: 0.4 % — SIGNIFICANT CHANGE UP (ref 0–2)
BASOPHILS NFR BLD AUTO: 0.4 % — SIGNIFICANT CHANGE UP (ref 0–2)
BILIRUB SERPL-MCNC: 0.5 MG/DL — SIGNIFICANT CHANGE UP (ref 0.2–1.2)
BILIRUB SERPL-MCNC: 0.5 MG/DL — SIGNIFICANT CHANGE UP (ref 0.2–1.2)
BUN SERPL-MCNC: 10 MG/DL — SIGNIFICANT CHANGE UP (ref 7–23)
BUN SERPL-MCNC: 10 MG/DL — SIGNIFICANT CHANGE UP (ref 7–23)
CALCIUM SERPL-MCNC: 9.9 MG/DL — SIGNIFICANT CHANGE UP (ref 8.4–10.5)
CALCIUM SERPL-MCNC: 9.9 MG/DL — SIGNIFICANT CHANGE UP (ref 8.4–10.5)
CEA SERPL-MCNC: 42.9 NG/ML — HIGH (ref 0–3.8)
CEA SERPL-MCNC: 42.9 NG/ML — HIGH (ref 0–3.8)
CHLORIDE SERPL-SCNC: 99 MMOL/L — SIGNIFICANT CHANGE UP (ref 96–108)
CHLORIDE SERPL-SCNC: 99 MMOL/L — SIGNIFICANT CHANGE UP (ref 96–108)
CO2 SERPL-SCNC: 21 MMOL/L — LOW (ref 22–31)
CO2 SERPL-SCNC: 21 MMOL/L — LOW (ref 22–31)
CREAT SERPL-MCNC: 0.74 MG/DL — SIGNIFICANT CHANGE UP (ref 0.5–1.3)
CREAT SERPL-MCNC: 0.74 MG/DL — SIGNIFICANT CHANGE UP (ref 0.5–1.3)
EGFR: 110 ML/MIN/1.73M2 — SIGNIFICANT CHANGE UP
EGFR: 110 ML/MIN/1.73M2 — SIGNIFICANT CHANGE UP
EOSINOPHIL # BLD AUTO: 0.18 K/UL — SIGNIFICANT CHANGE UP (ref 0–0.5)
EOSINOPHIL # BLD AUTO: 0.18 K/UL — SIGNIFICANT CHANGE UP (ref 0–0.5)
EOSINOPHIL NFR BLD AUTO: 1.8 % — SIGNIFICANT CHANGE UP (ref 0–6)
EOSINOPHIL NFR BLD AUTO: 1.8 % — SIGNIFICANT CHANGE UP (ref 0–6)
GLUCOSE SERPL-MCNC: 127 MG/DL — HIGH (ref 70–99)
GLUCOSE SERPL-MCNC: 127 MG/DL — HIGH (ref 70–99)
HCT VFR BLD CALC: 45.4 % — SIGNIFICANT CHANGE UP (ref 39–50)
HCT VFR BLD CALC: 45.4 % — SIGNIFICANT CHANGE UP (ref 39–50)
HGB BLD-MCNC: 14.8 G/DL — SIGNIFICANT CHANGE UP (ref 13–17)
HGB BLD-MCNC: 14.8 G/DL — SIGNIFICANT CHANGE UP (ref 13–17)
IMM GRANULOCYTES NFR BLD AUTO: 0.3 % — SIGNIFICANT CHANGE UP (ref 0–0.9)
IMM GRANULOCYTES NFR BLD AUTO: 0.3 % — SIGNIFICANT CHANGE UP (ref 0–0.9)
LYMPHOCYTES # BLD AUTO: 1.38 K/UL — SIGNIFICANT CHANGE UP (ref 1–3.3)
LYMPHOCYTES # BLD AUTO: 1.38 K/UL — SIGNIFICANT CHANGE UP (ref 1–3.3)
LYMPHOCYTES # BLD AUTO: 13.7 % — SIGNIFICANT CHANGE UP (ref 13–44)
LYMPHOCYTES # BLD AUTO: 13.7 % — SIGNIFICANT CHANGE UP (ref 13–44)
MAGNESIUM SERPL-MCNC: 2.1 MG/DL — SIGNIFICANT CHANGE UP (ref 1.6–2.6)
MAGNESIUM SERPL-MCNC: 2.1 MG/DL — SIGNIFICANT CHANGE UP (ref 1.6–2.6)
MCHC RBC-ENTMCNC: 27.6 PG — SIGNIFICANT CHANGE UP (ref 27–34)
MCHC RBC-ENTMCNC: 27.6 PG — SIGNIFICANT CHANGE UP (ref 27–34)
MCHC RBC-ENTMCNC: 32.6 GM/DL — SIGNIFICANT CHANGE UP (ref 32–36)
MCHC RBC-ENTMCNC: 32.6 GM/DL — SIGNIFICANT CHANGE UP (ref 32–36)
MCV RBC AUTO: 84.5 FL — SIGNIFICANT CHANGE UP (ref 80–100)
MCV RBC AUTO: 84.5 FL — SIGNIFICANT CHANGE UP (ref 80–100)
MONOCYTES # BLD AUTO: 0.91 K/UL — HIGH (ref 0–0.9)
MONOCYTES # BLD AUTO: 0.91 K/UL — HIGH (ref 0–0.9)
MONOCYTES NFR BLD AUTO: 9 % — SIGNIFICANT CHANGE UP (ref 2–14)
MONOCYTES NFR BLD AUTO: 9 % — SIGNIFICANT CHANGE UP (ref 2–14)
NEUTROPHILS # BLD AUTO: 7.56 K/UL — HIGH (ref 1.8–7.4)
NEUTROPHILS # BLD AUTO: 7.56 K/UL — HIGH (ref 1.8–7.4)
NEUTROPHILS NFR BLD AUTO: 74.8 % — SIGNIFICANT CHANGE UP (ref 43–77)
NEUTROPHILS NFR BLD AUTO: 74.8 % — SIGNIFICANT CHANGE UP (ref 43–77)
NRBC # BLD: 0 /100 WBCS — SIGNIFICANT CHANGE UP (ref 0–0)
NRBC # BLD: 0 /100 WBCS — SIGNIFICANT CHANGE UP (ref 0–0)
PLATELET # BLD AUTO: 225 K/UL — SIGNIFICANT CHANGE UP (ref 150–400)
PLATELET # BLD AUTO: 225 K/UL — SIGNIFICANT CHANGE UP (ref 150–400)
POTASSIUM SERPL-MCNC: 4.6 MMOL/L — SIGNIFICANT CHANGE UP (ref 3.5–5.3)
POTASSIUM SERPL-MCNC: 4.6 MMOL/L — SIGNIFICANT CHANGE UP (ref 3.5–5.3)
POTASSIUM SERPL-SCNC: 4.6 MMOL/L — SIGNIFICANT CHANGE UP (ref 3.5–5.3)
POTASSIUM SERPL-SCNC: 4.6 MMOL/L — SIGNIFICANT CHANGE UP (ref 3.5–5.3)
PROT SERPL-MCNC: 7.7 G/DL — SIGNIFICANT CHANGE UP (ref 6–8.3)
PROT SERPL-MCNC: 7.7 G/DL — SIGNIFICANT CHANGE UP (ref 6–8.3)
RBC # BLD: 5.37 M/UL — SIGNIFICANT CHANGE UP (ref 4.2–5.8)
RBC # BLD: 5.37 M/UL — SIGNIFICANT CHANGE UP (ref 4.2–5.8)
RBC # FLD: 17.6 % — HIGH (ref 10.3–14.5)
RBC # FLD: 17.6 % — HIGH (ref 10.3–14.5)
SODIUM SERPL-SCNC: 137 MMOL/L — SIGNIFICANT CHANGE UP (ref 135–145)
SODIUM SERPL-SCNC: 137 MMOL/L — SIGNIFICANT CHANGE UP (ref 135–145)
WBC # BLD: 10.1 K/UL — SIGNIFICANT CHANGE UP (ref 3.8–10.5)
WBC # BLD: 10.1 K/UL — SIGNIFICANT CHANGE UP (ref 3.8–10.5)
WBC # FLD AUTO: 10.1 K/UL — SIGNIFICANT CHANGE UP (ref 3.8–10.5)
WBC # FLD AUTO: 10.1 K/UL — SIGNIFICANT CHANGE UP (ref 3.8–10.5)

## 2024-01-05 ENCOUNTER — APPOINTMENT (OUTPATIENT)
Dept: HEMATOLOGY ONCOLOGY | Facility: CLINIC | Age: 54
End: 2024-01-05
Payer: MEDICAID

## 2024-01-05 ENCOUNTER — APPOINTMENT (OUTPATIENT)
Dept: INFUSION THERAPY | Facility: CLINIC | Age: 54
End: 2024-01-05
Payer: MEDICAID

## 2024-01-05 DIAGNOSIS — G89.3 NEOPLASM RELATED PAIN (ACUTE) (CHRONIC): ICD-10-CM

## 2024-01-05 PROCEDURE — ZZZZZ: CPT

## 2024-01-05 NOTE — ASSESSMENT
[FreeTextEntry1] : The biological and clinical distinctions between right-sided and left-sided CRC, and their effect on outcomes, have been recognized for more than 50 years, and recently assimilated into clinical practice and trial design. Presently, tumor subsite influences how we treat patients with metastatic wild-type LUCIAN CRC in the first-line, second-line, and refractory settings. For metastatic wild-type LUCIAN CRC in the first-line, based on ASCO 2022 ANJANA 5 data, recommendation for unresectable right sided CRC is for a triplet regimen with bevacizumab, which resulted in an increase in RR, PFS and R0/1 resections, albeit with increased toxicity. Data for OS is still immature at the time of the presentation. I have therefore recommended FOLFOXIRI with bevacizumab. Bevacizumab is a monoclonal antibody targeting VEGF, vascular endothelial growth factor. -Will send somatic genomic testing to evaluate for alterations of KRAS, APC, BRAF, ERBB2/HER2, and other genes, some of which may be potentially actionable targets for therapy. There are currently tissue-agnostic approvals for any advanced cancer with a high tumor mutational burden or DNA mismatch repair deficiency (checkpoint inhibitor immunotherapy), or NTRK fusions (TRK inhibitors). -patient should see genetics for additional germline testing to evaluate for the presence of Chiang syndrome. Germline testing may also reveal potential treatment targets (eg, deficient mismatch repair as seen in Chiang syndrome). -For patients whose tumors are dMMR or that have high levels of TMB (=10 mutations/megabase), another option for chemotherapy-refractory disease is treatment with an immune checkpoint inhibitor that targets the programmed death receptor 1 (PD-1; ie, pembrolizumab). -Cancers with deficient mismatch repair (dMMR) are particularly sensitive to immune-based therapies. Objective, in some instances complete, and durable responses to immune checkpoint inhibitors targeting the programmed cell death receptor 1 (PD-1; eg, pembrolizumab) have been reported in a variety of patients with dMMR cancers, including small bowel adenocarcinomas. The characteristic genetic signature of tumors with dMMR is a high number of DNA replication errors and high levels of DNA MSI. In the [place], pembrolizumab is approved for treatment of a variety of advanced solid tumors that are MSI-H or dMMR, that have progressed following prior treatment, and for which there are no satisfactory alternative treatment options, the first such approval of a tissue-agnostic anticancer treatment. Efficacy in dMMR cancers was shown in the phase II KEYNOTE-158 study. There were eight objective responses (42 percent), three of which were complete, and the duration of response ranged from 4.3 to 13.3+ months. Tumors with a high tumor mutational burden (TMB) also appear to be sensitive to immune checkpoint inhibitors. Pembrolizumab was approved for the treatment of adult and pediatric patients with unresectable or metastatic solid tumors, that are tissue TMB-high (=10 mut/mB) and who have progressed following prior therapy and who have no satisfactory alternative treatment options. [With Patient/Caregiver] : With Patient/Caregiver [AdvancecareDate] : 1/5/23

## 2024-01-05 NOTE — HISTORY OF PRESENT ILLNESS
[Home] : at home, [unfilled] , at the time of the visit. [Medical Office: ([facility])___] : at the medical office located in  [Verbal consent obtained from patient] : the patient, [unfilled] [de-identified] : The patient was diagnosed with colon cancer in May 2022 at the age of 51. He presented to  ER 5/19/22 with worsening abdominal pain for 4 days. He reports abdominal pain has been ongoing for 4 years. On 5/19/22 he had a CT A/P which showed apparent focal mural thickening at the cecum/proximal ascending colon may represent colon cancer. The sigmoid colon appears to be tethered to the cecum and it is focally thick-walled; focal tumor invasion/extension from the cecum to the sigmoid colon cannot be excluded. Dilatation of the small bowel with an apparent transition point in the right lower quadrant abdomen, suggestive of small bowel obstruction. Apparent mild mural thickening of the a dilated right lower quadrant small bowel loop with adjacent mesenteric edema for which associated small bowel ischemia cannot be excluded. Multiple hypodense lesions with calcifications in both lobes of the liver with the largest measuring 5.8 x 6.0 cm in hepatic segment 8. These are suspicious for metastases. Mild ascites. Also on 5/19/22 he underwent exploratory laparotomy, total colectomy, end ileostomy, biopsy of liver, and biopsy of retroperitoneum. Pathology showed Omentum, excision: Negative for malignancy. Acutely inflamed exudate consistent with peritonitis. Portion of terminal ileum, cecum with adherent sigmoid, total colectomy: Poorly differentiated infiltrating adenocarcinoma measuring 9.0 cm. Adenocarcinoma infiltrates through the bowel wall and through the visceral peritoneum and infiltrates the adherent sigmoid colon 3 of 17 lymph nodes are positive for metastatic carcinoma. Lymphovascular space invasion is identified. Perineural invasion is identified. The surgical margins are negative. Appendix with serosal tumor implants and acute inflammation. Peritonitis. Liver, biopsy: Metastatic adenocarcinoma. Retroperitoneal biopsy: Adenocarcinoma with necrosis. No loss of nuclear expression of MMR proteins (MLH1, MSH2, MSH6, and PMS2). Staging pT4b pN1b pM1c. On 5/25/22 he had a repeat CT A/P which showed status post total colectomy and ileostomy. Dilated small bowel loops in the left abdomen with associated wall thickening. Both may be postoperative, follow-up is recommended. Moderate amount of ascites new from the prior study. Small amount of free intraperitoneal air likely with postoperative state. Minimal left pleural effusion. New multifocal right lower lobe infiltrate. Multiple liver metastases some of which are calcified again appreciated. [de-identified] : Medical Hx: denies \par  Surgical Hx: denies \par  Family Hx: denies family history of cancer \par  Social Hx: he smoked THC and cigarettes daily for 30 years, quit May 19th, 2022; ETOH had 6 pack a day, quit 4 years ago; was a  (off the [employer]) no longer working; lives with mom and brother.   [de-identified] : He was seen at  ER on Saturday, 9/9/23, he was BIBEMS for abdominal pain, and sent home.  Patient was admitted again from 09/25 - 10/01 and 10/7 - 10/10, SAV both times. In the hospital notes, it is noted that he was binge drinking, smoking cigarettes, cocaine and marijuana prior to ER visit.  Today he is C32D3 of FOLFOXIRI / Zirabev, planned ongoing. Oxali stopped after cycle 10.  He continues to be compliant with his lovenox injection, daily. He continues with abd pain, working with [facility], today he reports he's a 6. His stoma looks is pink and functioning, still prolapsed about 5". His neuropathy is constant in his toes, "they feel numb all the time". He reports insomnia / fatigue due to having to empty out his ostomy bag often throughout the overnight. He has tried to stop eating around 7pm to decrease ostomy output overnight, helping slightly. He is working with  for his living situation, his mother now lives in a nursing home and he continues to live with his brother. This is a stressor of his, he will continue to work with .

## 2024-01-05 NOTE — REVIEW OF SYSTEMS
[Fatigue] : fatigue [Recent Change In Weight] : ~T recent weight change [Abdominal Pain] : abdominal pain [Negative] : Allergic/Immunologic [Chest Pain] : no chest pain [Palpitations] : no palpitations [Shortness Of Breath] : no shortness of breath [Constipation] : no constipation [Diarrhea: Grade 0] : Diarrhea: Grade 0 [Joint Pain] : no joint pain [Skin Rash] : no skin rash [FreeTextEntry2] : wt gain noted; generalized pain stated  [FreeTextEntry7] : ileotomy in place, functioning normally, prolapsed [de-identified] : ostomy

## 2024-01-05 NOTE — PHYSICAL EXAM
[Restricted in physically strenuous activity but ambulatory and able to carry out work of a light or sedentary nature] : Status 1- Restricted in physically strenuous activity but ambulatory and able to carry out work of a light or sedentary nature, e.g., light house work, office work [Normal] : affect appropriate [de-identified] : wt gain [de-identified] : protruding 5" ostomy, pink stoma, loose stool in bag as appropriate  [de-identified] : R chest wall port accessed, dressing C/D/I

## 2024-01-05 NOTE — RESULTS/DATA
[FreeTextEntry1] : 1/2/24 CT C/A/P: Stable interval examination since October 2023 with multifocal bilateral pulmonary and hepatic metastases. Pulmonary reference lesions provided above.  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 Thrombosed portion measures 3.3 x 2.1 x 1.8 cm. Nonthrombosed portion measures 1.2 x 0.8 cm with the neck diameter of 0.2 cm.  10/7/23 CT Angio/A/P: No pulmonary embolism. Mild interval increased size of bilateral pulmonary nodules with new right lower lobe 6 mm nodule. CT abdomen/pelvis: Similar intraperitoneal findings compared to 9/23/2023. No bowel obstruction or wall thickening. 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 This can be confirmed with ultrasound.  9/23/23 CT A/P: No bowel obstruction or inflammation. Status post total colectomy with rectal stump and right lower quadrant end ileostomy. Unchanged calcified and noncalcified liver metastases.  8/25/23 CT A/P: Both of contrast between the right common femoral artery and vein which is enhancing similar to the artery. A pseudoaneurysm cannot be excluded. This was present on the prior study. Additionally, I cannot assess the patency of the right common femoral vein. Right lower extremity ultrasound of the inguinal region is recommended. Grossly stable liver metastases. Postsurgical changes.  8/24/23 CT A/P: Status post colectomy with Gloria stump creation. Status post right-sided ileostomy. No evidence for mechanical bowel obstruction. No small bowel wall thickening. Stable peritoneal thickening and nodularity within the right lower pelvis measuring 2.5 x 1.5 cm. No ascites. There is new distention of the right common femoral vein, with central hypodensity, with intraluminal hypodensity also noted within the right femoral vein as well as right iliac venous system, most consistent with interval development of acute DVT. There is rounded hyperdensity noted within the distended right common femoral vein, similar in appearance to contrast within the adjacent femoral artery, indeterminate. Correlate with lower extremity venous Doppler ultrasonography, with attention to the region of the right common femoral vein. Stable noncalcified and calcified hepatic metastasis identified, measuring up to 3.8 cm.  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 Two-vessel runoff in the left calf. High grade stenosis at the origin of the left internal iliac artery. Three-vessel arterial runoff of the right lower extremity. Atherosclerotic disease in the abdomen as detailed above. Otherwise unchanged examination compared to most recent prior imaging 7/9/2023. 3D image post processing was helpful in evaluating the vascular anatomy, supporting the findings stated above.  7/9/23 CT A/P: No significant change when compared to prior study 6/13/2023. No acute findings to suggest inflammatory or infectious process in the abdomen or pelvis, no bowel obstruction. Unchanged hepatic metastasis and peritoneal thickening with serosal implant in the right lower pelvis and postsurgical changes from colectomy and right abdominal ileostomy. No morphologic abnormality at the ileostomy site.  6/13/23 CT C/A/P: New, 8 mm right lower lobe nodule and enlarging pulmonary nodules, 7 mm left apical nodule previously measured 6 mm. Bilobar hepatic metastases, the majority of which are calcified and not significantly changed. A noncalcified lesion in the right hepatic lobe demonstrates mild interval growth, 1.7 x 1.4 cm previously 1.4 x 1.3 cm.  9/1/22 CT C/A/P: Segmental left lower lobe pulmonary embolus and probable smaller pulmonary emboli within the right lung. Interval decrease in size of bilateral pulmonary nodules and hepatic metastases compared to 5/25/2022. No new sites of disease.  5/25/22 CT A/P:  Status post total colectomy and ileostomy. Dilated small bowel loops in the left abdomen with associated wall thickening. Both may be postoperative, follow-up is recommended. Moderate amount of ascites new from the prior study. Small amount of free intraperitoneal air likely with postoperative state. Minimal left pleural effusion. New multifocal right lower lobe infiltrate. Multiple liver metastases some of which are calcified again appreciated.  5/19/22 Path: 1. Omentum, excision: Negative for malignancy. Acutely inflamed exudate consistent with peritonitis 2. Portion of terminal ileum, cecum with adherent sigmoid, total colectomy: Poorly differentiated infiltrating adenocarcinoma measuring 9.0 cm. Adenocarcinoma infiltrates through the bowel wall and through the visceral peritoneum and infiltrates the adherent sigmoid colon 3 of 17 lymph nodes are positive for metastatic carcinoma. Lymphovascular space invasion is identified. Perineural invasion is identified. The surgical margins are negative. Appendix with serosal tumor implants and acute inflammation Peritonitis 3. Liver, biopsy: Metastatic adenocarcinoma 4. Retroperitoneal biopsy: Adenocarcinoma with necrosis No loss of nuclear expression of MMR proteins (MLH1, MSH2, MSH6, and PMS2).   These results show low probability of microsatellite instability-high (MSI-H). Synoptic Summary COLON AND RECTUM: Resection, Including Transanal Disk Excision of Rectal Neoplasms Procedure: Total abdominal colectomy Macroscopic Evaluation of Mesorectum: Not applicable Tumor Site: Cecum Histologic Type:  Adenocarcinoma Histologic Grade: G3, poorly differentiated Tumor Size: Greatest dimension (Centimeters)  9.0 cm Multiple Primary Sites: Not applicable Tumor Extent: Directly invades or adheres to adjacent structure(s) Sigmoid colon Macroscopic Tumor Perforation: Present Lymphovascular Invasion: Present Perineural Invasion: Present Treatment Effect: No known presurgical therapy Margin Status for Invasive Carcinoma: All margins negative for invasive carcinoma Distance from Invasive Carcinoma to Radial (Circumferential) Margin: 5.0 cm Margin Status for Non-Invasive Tumor: Not applicable Regional Lymph Node Status: Tumor present in regional lymph node(s) Number of Lymph Nodes with Tumor    3 Number of Lymph Nodes Examined: 17 Tumor Deposits: Not identified Distant Site(s) Involved: Liver PATHOLOGIC STAGE CLASSIFICATION TNM Descriptors: Not applicable pT Category: pT4b pN Category: pN1b pM Category: pM1c  5/19/22 CT A/P: Apparent focal mural thickening at the cecum/proximal ascending colon may represent colon cancer. The sigmoid colon appears to be tethered to the cecum and it is focally thick-walled; focal tumor invasion/extension from the cecum to the sigmoid colon cannot be excluded. Dilatation of the small bowel with an apparent transition point in the right lower quadrant abdomen, suggestive of small bowel obstruction. Apparent mild mural thickening of the a dilated right lower quadrant small bowel loop with adjacent mesenteric edema for which associated small bowel ischemia cannot be excluded. Multiple hypodense lesions with calcifications in both lobes of the liver with the largest measuring 5.8 x 6.0 cm in hepatic segment 8. These are suspicious for metastases. Mild ascites.

## 2024-01-08 ENCOUNTER — NON-APPOINTMENT (OUTPATIENT)
Age: 54
End: 2024-01-08

## 2024-01-16 ENCOUNTER — OUTPATIENT (OUTPATIENT)
Dept: OUTPATIENT SERVICES | Facility: HOSPITAL | Age: 54
LOS: 1 days | Discharge: HOME | End: 2024-01-16
Payer: MEDICAID

## 2024-01-16 DIAGNOSIS — C18.9 MALIGNANT NEOPLASM OF COLON, UNSPECIFIED: ICD-10-CM

## 2024-01-16 DIAGNOSIS — Z90.49 ACQUIRED ABSENCE OF OTHER SPECIFIED PARTS OF DIGESTIVE TRACT: Chronic | ICD-10-CM

## 2024-01-16 DIAGNOSIS — Z98.890 OTHER SPECIFIED POSTPROCEDURAL STATES: Chronic | ICD-10-CM

## 2024-01-16 RX ORDER — LOPERAMIDE HYDROCHLORIDE 2 MG/1
2 CAPSULE ORAL
Qty: 240 | Refills: 6 | Status: ACTIVE | COMMUNITY
Start: 2023-12-14 | End: 1900-01-01

## 2024-01-17 ENCOUNTER — RESULT REVIEW (OUTPATIENT)
Age: 54
End: 2024-01-17

## 2024-01-17 ENCOUNTER — APPOINTMENT (OUTPATIENT)
Dept: INFUSION THERAPY | Facility: CLINIC | Age: 54
End: 2024-01-17

## 2024-01-17 ENCOUNTER — RESULT CHARGE (OUTPATIENT)
Age: 54
End: 2024-01-17

## 2024-01-17 LAB
ALBUMIN SERPL ELPH-MCNC: 5.1 G/DL — HIGH (ref 3.3–5)
ALP SERPL-CCNC: 159 U/L — HIGH (ref 40–120)
ALT FLD-CCNC: 24 U/L — SIGNIFICANT CHANGE UP (ref 10–45)
ANION GAP SERPL CALC-SCNC: 13 MMOL/L — SIGNIFICANT CHANGE UP (ref 5–17)
AST SERPL-CCNC: 21 U/L — SIGNIFICANT CHANGE UP (ref 10–40)
BASOPHILS # BLD AUTO: 0.08 K/UL — SIGNIFICANT CHANGE UP (ref 0–0.2)
BASOPHILS NFR BLD AUTO: 0.5 % — SIGNIFICANT CHANGE UP (ref 0–2)
BILIRUB SERPL-MCNC: 0.2 MG/DL — SIGNIFICANT CHANGE UP (ref 0.2–1.2)
BUN SERPL-MCNC: 8 MG/DL — SIGNIFICANT CHANGE UP (ref 7–23)
CALCIUM SERPL-MCNC: 9.6 MG/DL — SIGNIFICANT CHANGE UP (ref 8.4–10.5)
CHLORIDE SERPL-SCNC: 100 MMOL/L — SIGNIFICANT CHANGE UP (ref 96–108)
CO2 SERPL-SCNC: 23 MMOL/L — SIGNIFICANT CHANGE UP (ref 22–31)
CREAT SERPL-MCNC: 0.76 MG/DL — SIGNIFICANT CHANGE UP (ref 0.5–1.3)
EGFR: 109 ML/MIN/1.73M2 — SIGNIFICANT CHANGE UP
EOSINOPHIL # BLD AUTO: 0.29 K/UL — SIGNIFICANT CHANGE UP (ref 0–0.5)
EOSINOPHIL NFR BLD AUTO: 1.6 % — SIGNIFICANT CHANGE UP (ref 0–6)
GLUCOSE SERPL-MCNC: 102 MG/DL — HIGH (ref 70–99)
HCT VFR BLD CALC: 46.6 % — SIGNIFICANT CHANGE UP (ref 39–50)
HGB BLD-MCNC: 15.1 G/DL — SIGNIFICANT CHANGE UP (ref 13–17)
IMM GRANULOCYTES NFR BLD AUTO: 0.7 % — SIGNIFICANT CHANGE UP (ref 0–0.9)
LYMPHOCYTES # BLD AUTO: 1.4 K/UL — SIGNIFICANT CHANGE UP (ref 1–3.3)
LYMPHOCYTES # BLD AUTO: 8 % — LOW (ref 13–44)
MAGNESIUM SERPL-MCNC: 2.1 MG/DL — SIGNIFICANT CHANGE UP (ref 1.6–2.6)
MCHC RBC-ENTMCNC: 27.7 PG — SIGNIFICANT CHANGE UP (ref 27–34)
MCHC RBC-ENTMCNC: 32.4 GM/DL — SIGNIFICANT CHANGE UP (ref 32–36)
MCV RBC AUTO: 85.5 FL — SIGNIFICANT CHANGE UP (ref 80–100)
MONOCYTES # BLD AUTO: 0.8 K/UL — SIGNIFICANT CHANGE UP (ref 0–0.9)
MONOCYTES NFR BLD AUTO: 4.5 % — SIGNIFICANT CHANGE UP (ref 2–14)
NEUTROPHILS # BLD AUTO: 14.9 K/UL — HIGH (ref 1.8–7.4)
NEUTROPHILS NFR BLD AUTO: 84.7 % — HIGH (ref 43–77)
NRBC # BLD: 0 /100 WBCS — SIGNIFICANT CHANGE UP (ref 0–0)
PLATELET # BLD AUTO: 131 K/UL — LOW (ref 150–400)
POTASSIUM SERPL-MCNC: 4.5 MMOL/L — SIGNIFICANT CHANGE UP (ref 3.5–5.3)
POTASSIUM SERPL-SCNC: 4.5 MMOL/L — SIGNIFICANT CHANGE UP (ref 3.5–5.3)
PROT SERPL-MCNC: 7.9 G/DL — SIGNIFICANT CHANGE UP (ref 6–8.3)
RBC # BLD: 5.45 M/UL — SIGNIFICANT CHANGE UP (ref 4.2–5.8)
RBC # FLD: 18.6 % — HIGH (ref 10.3–14.5)
SODIUM SERPL-SCNC: 136 MMOL/L — SIGNIFICANT CHANGE UP (ref 135–145)
WBC # BLD: 17.59 K/UL — HIGH (ref 3.8–10.5)
WBC # FLD AUTO: 17.59 K/UL — HIGH (ref 3.8–10.5)

## 2024-01-18 DIAGNOSIS — Z51.11 ENCOUNTER FOR ANTINEOPLASTIC CHEMOTHERAPY: ICD-10-CM

## 2024-01-18 DIAGNOSIS — R11.2 NAUSEA WITH VOMITING, UNSPECIFIED: ICD-10-CM

## 2024-01-19 ENCOUNTER — APPOINTMENT (OUTPATIENT)
Dept: INFUSION THERAPY | Facility: CLINIC | Age: 54
End: 2024-01-19

## 2024-01-22 DIAGNOSIS — Z51.89 ENCOUNTER FOR OTHER SPECIFIED AFTERCARE: ICD-10-CM

## 2024-01-31 ENCOUNTER — APPOINTMENT (OUTPATIENT)
Dept: INFUSION THERAPY | Facility: CLINIC | Age: 54
End: 2024-01-31

## 2024-01-31 ENCOUNTER — NON-APPOINTMENT (OUTPATIENT)
Age: 54
End: 2024-01-31

## 2024-01-31 ENCOUNTER — RESULT REVIEW (OUTPATIENT)
Age: 54
End: 2024-01-31

## 2024-01-31 VITALS
HEART RATE: 98 BPM | BODY MASS INDEX: 24 KG/M2 | SYSTOLIC BLOOD PRESSURE: 120 MMHG | WEIGHT: 187 LBS | OXYGEN SATURATION: 97 % | HEIGHT: 74 IN | TEMPERATURE: 97.7 F | DIASTOLIC BLOOD PRESSURE: 80 MMHG

## 2024-01-31 LAB
ALBUMIN SERPL ELPH-MCNC: 4.6 G/DL — SIGNIFICANT CHANGE UP (ref 3.3–5)
ALP SERPL-CCNC: 158 U/L — HIGH (ref 40–120)
ALT FLD-CCNC: 18 U/L — SIGNIFICANT CHANGE UP (ref 10–45)
ANION GAP SERPL CALC-SCNC: 15 MMOL/L — SIGNIFICANT CHANGE UP (ref 5–17)
AST SERPL-CCNC: 18 U/L — SIGNIFICANT CHANGE UP (ref 10–40)
BASOPHILS # BLD AUTO: 0.05 K/UL — SIGNIFICANT CHANGE UP (ref 0–0.2)
BASOPHILS NFR BLD AUTO: 0.3 % — SIGNIFICANT CHANGE UP (ref 0–2)
BILIRUB SERPL-MCNC: 0.2 MG/DL — SIGNIFICANT CHANGE UP (ref 0.2–1.2)
BUN SERPL-MCNC: 12 MG/DL — SIGNIFICANT CHANGE UP (ref 7–23)
CALCIUM SERPL-MCNC: 9.5 MG/DL — SIGNIFICANT CHANGE UP (ref 8.4–10.5)
CHLORIDE SERPL-SCNC: 101 MMOL/L — SIGNIFICANT CHANGE UP (ref 96–108)
CO2 SERPL-SCNC: 19 MMOL/L — LOW (ref 22–31)
CREAT SERPL-MCNC: 0.68 MG/DL — SIGNIFICANT CHANGE UP (ref 0.5–1.3)
EGFR: 113 ML/MIN/1.73M2 — SIGNIFICANT CHANGE UP
EOSINOPHIL # BLD AUTO: 0.1 K/UL — SIGNIFICANT CHANGE UP (ref 0–0.5)
EOSINOPHIL NFR BLD AUTO: 0.6 % — SIGNIFICANT CHANGE UP (ref 0–6)
GLUCOSE SERPL-MCNC: 92 MG/DL — SIGNIFICANT CHANGE UP (ref 70–99)
HCT VFR BLD CALC: 45.5 % — SIGNIFICANT CHANGE UP (ref 39–50)
HGB BLD-MCNC: 14.9 G/DL — SIGNIFICANT CHANGE UP (ref 13–17)
IMM GRANULOCYTES NFR BLD AUTO: 0.8 % — SIGNIFICANT CHANGE UP (ref 0–0.9)
LYMPHOCYTES # BLD AUTO: 1.47 K/UL — SIGNIFICANT CHANGE UP (ref 1–3.3)
LYMPHOCYTES # BLD AUTO: 9.2 % — LOW (ref 13–44)
MAGNESIUM SERPL-MCNC: 1.9 MG/DL — SIGNIFICANT CHANGE UP (ref 1.6–2.6)
MCHC RBC-ENTMCNC: 28.1 PG — SIGNIFICANT CHANGE UP (ref 27–34)
MCHC RBC-ENTMCNC: 32.7 GM/DL — SIGNIFICANT CHANGE UP (ref 32–36)
MCV RBC AUTO: 85.8 FL — SIGNIFICANT CHANGE UP (ref 80–100)
MONOCYTES # BLD AUTO: 0.77 K/UL — SIGNIFICANT CHANGE UP (ref 0–0.9)
MONOCYTES NFR BLD AUTO: 4.8 % — SIGNIFICANT CHANGE UP (ref 2–14)
NEUTROPHILS # BLD AUTO: 13.45 K/UL — HIGH (ref 1.8–7.4)
NEUTROPHILS NFR BLD AUTO: 84.3 % — HIGH (ref 43–77)
NRBC # BLD: 0 /100 WBCS — SIGNIFICANT CHANGE UP (ref 0–0)
PLATELET # BLD AUTO: 188 K/UL — SIGNIFICANT CHANGE UP (ref 150–400)
POTASSIUM SERPL-MCNC: 4.2 MMOL/L — SIGNIFICANT CHANGE UP (ref 3.5–5.3)
POTASSIUM SERPL-SCNC: 4.2 MMOL/L — SIGNIFICANT CHANGE UP (ref 3.5–5.3)
PROT SERPL-MCNC: 7.8 G/DL — SIGNIFICANT CHANGE UP (ref 6–8.3)
RBC # BLD: 5.3 M/UL — SIGNIFICANT CHANGE UP (ref 4.2–5.8)
RBC # FLD: 17.9 % — HIGH (ref 10.3–14.5)
SODIUM SERPL-SCNC: 135 MMOL/L — SIGNIFICANT CHANGE UP (ref 135–145)
WBC # BLD: 15.97 K/UL — HIGH (ref 3.8–10.5)
WBC # FLD AUTO: 15.97 K/UL — HIGH (ref 3.8–10.5)

## 2024-02-02 ENCOUNTER — APPOINTMENT (OUTPATIENT)
Dept: INFUSION THERAPY | Facility: CLINIC | Age: 54
End: 2024-02-02

## 2024-02-02 VITALS — TEMPERATURE: 98.7 F

## 2024-02-14 ENCOUNTER — RESULT REVIEW (OUTPATIENT)
Age: 54
End: 2024-02-14

## 2024-02-14 ENCOUNTER — APPOINTMENT (OUTPATIENT)
Dept: HEMATOLOGY ONCOLOGY | Facility: CLINIC | Age: 54
End: 2024-02-14
Payer: MEDICAID

## 2024-02-14 ENCOUNTER — APPOINTMENT (OUTPATIENT)
Dept: INFUSION THERAPY | Facility: CLINIC | Age: 54
End: 2024-02-14
Payer: MEDICAID

## 2024-02-14 VITALS
DIASTOLIC BLOOD PRESSURE: 80 MMHG | SYSTOLIC BLOOD PRESSURE: 118 MMHG | HEIGHT: 74 IN | RESPIRATION RATE: 18 BRPM | BODY MASS INDEX: 24.45 KG/M2 | TEMPERATURE: 98.6 F | OXYGEN SATURATION: 98 % | WEIGHT: 190.5 LBS | HEART RATE: 79 BPM

## 2024-02-14 LAB
ALBUMIN SERPL ELPH-MCNC: 4.7 G/DL — SIGNIFICANT CHANGE UP (ref 3.3–5)
ALP SERPL-CCNC: 146 U/L — HIGH (ref 40–120)
ALT FLD-CCNC: 24 U/L — SIGNIFICANT CHANGE UP (ref 10–45)
ANION GAP SERPL CALC-SCNC: 11 MMOL/L — SIGNIFICANT CHANGE UP (ref 5–17)
AST SERPL-CCNC: 16 U/L — SIGNIFICANT CHANGE UP (ref 10–40)
BASOPHILS # BLD AUTO: 0.04 K/UL — SIGNIFICANT CHANGE UP (ref 0–0.2)
BASOPHILS NFR BLD AUTO: 0.3 % — SIGNIFICANT CHANGE UP (ref 0–2)
BILIRUB SERPL-MCNC: 0.2 MG/DL — SIGNIFICANT CHANGE UP (ref 0.2–1.2)
BUN SERPL-MCNC: 12 MG/DL — SIGNIFICANT CHANGE UP (ref 7–23)
CALCIUM SERPL-MCNC: 9.4 MG/DL — SIGNIFICANT CHANGE UP (ref 8.4–10.5)
CHLORIDE SERPL-SCNC: 103 MMOL/L — SIGNIFICANT CHANGE UP (ref 96–108)
CO2 SERPL-SCNC: 22 MMOL/L — SIGNIFICANT CHANGE UP (ref 22–31)
CREAT SERPL-MCNC: 0.74 MG/DL — SIGNIFICANT CHANGE UP (ref 0.5–1.3)
EGFR: 110 ML/MIN/1.73M2 — SIGNIFICANT CHANGE UP
EOSINOPHIL # BLD AUTO: 0.18 K/UL — SIGNIFICANT CHANGE UP (ref 0–0.5)
EOSINOPHIL NFR BLD AUTO: 1.4 % — SIGNIFICANT CHANGE UP (ref 0–6)
GLUCOSE SERPL-MCNC: 105 MG/DL — HIGH (ref 70–99)
HCT VFR BLD CALC: 44.3 % — SIGNIFICANT CHANGE UP (ref 39–50)
HGB BLD-MCNC: 14.2 G/DL — SIGNIFICANT CHANGE UP (ref 13–17)
IMM GRANULOCYTES NFR BLD AUTO: 1.1 % — HIGH (ref 0–0.9)
LYMPHOCYTES # BLD AUTO: 1.69 K/UL — SIGNIFICANT CHANGE UP (ref 1–3.3)
LYMPHOCYTES # BLD AUTO: 13.4 % — SIGNIFICANT CHANGE UP (ref 13–44)
MAGNESIUM SERPL-MCNC: 2 MG/DL — SIGNIFICANT CHANGE UP (ref 1.6–2.6)
MCHC RBC-ENTMCNC: 28.2 PG — SIGNIFICANT CHANGE UP (ref 27–34)
MCHC RBC-ENTMCNC: 32.1 GM/DL — SIGNIFICANT CHANGE UP (ref 32–36)
MCV RBC AUTO: 87.9 FL — SIGNIFICANT CHANGE UP (ref 80–100)
MONOCYTES # BLD AUTO: 0.75 K/UL — SIGNIFICANT CHANGE UP (ref 0–0.9)
MONOCYTES NFR BLD AUTO: 5.9 % — SIGNIFICANT CHANGE UP (ref 2–14)
NEUTROPHILS # BLD AUTO: 9.85 K/UL — HIGH (ref 1.8–7.4)
NEUTROPHILS NFR BLD AUTO: 77.9 % — HIGH (ref 43–77)
NRBC # BLD: 0 /100 WBCS — SIGNIFICANT CHANGE UP (ref 0–0)
PLATELET # BLD AUTO: 174 K/UL — SIGNIFICANT CHANGE UP (ref 150–400)
POTASSIUM SERPL-MCNC: 4.7 MMOL/L — SIGNIFICANT CHANGE UP (ref 3.5–5.3)
POTASSIUM SERPL-SCNC: 4.7 MMOL/L — SIGNIFICANT CHANGE UP (ref 3.5–5.3)
PROT SERPL-MCNC: 7.4 G/DL — SIGNIFICANT CHANGE UP (ref 6–8.3)
RBC # BLD: 5.04 M/UL — SIGNIFICANT CHANGE UP (ref 4.2–5.8)
RBC # FLD: 17.9 % — HIGH (ref 10.3–14.5)
SODIUM SERPL-SCNC: 136 MMOL/L — SIGNIFICANT CHANGE UP (ref 135–145)
WBC # BLD: 12.65 K/UL — HIGH (ref 3.8–10.5)
WBC # FLD AUTO: 12.65 K/UL — HIGH (ref 3.8–10.5)

## 2024-02-14 PROCEDURE — 99214 OFFICE O/P EST MOD 30 MIN: CPT

## 2024-02-14 NOTE — END OF VISIT
SUSHMA    As Per Dr Farris May he doesn't think that the tremors last night were related to the cyproheptadine or the Amoxicillin  Mom stated that she stopped the Cyproheptadine because she read that the Cyproheptadine can cause tremors and Dorethia Fossa had tremor episode last night  Mom informed that it was ok to stopped to Cyproheptadine and to continue the Amoxicillin  Mom denied Dorethia Fossa having fever or chills  [Time Spent: ___ minutes] : I have spent [unfilled] minutes of time on the encounter.

## 2024-02-16 ENCOUNTER — APPOINTMENT (OUTPATIENT)
Dept: INFUSION THERAPY | Facility: CLINIC | Age: 54
End: 2024-02-16

## 2024-02-21 NOTE — PHYSICAL EXAM
[Restricted in physically strenuous activity but ambulatory and able to carry out work of a light or sedentary nature] : Status 1- Restricted in physically strenuous activity but ambulatory and able to carry out work of a light or sedentary nature, e.g., light house work, office work [Normal] : affect appropriate [de-identified] : wt stable [de-identified] : protruding 5" ostomy, pink stoma, loose stool in bag as appropriate  [de-identified] : R chest wall port accessed, dressing C/D/I

## 2024-02-21 NOTE — REVIEW OF SYSTEMS
[Fatigue] : fatigue [Recent Change In Weight] : ~T recent weight change [Abdominal Pain] : abdominal pain [Diarrhea: Grade 0] : Diarrhea: Grade 0 [Negative] : Allergic/Immunologic [Chest Pain] : no chest pain [Palpitations] : no palpitations [Shortness Of Breath] : no shortness of breath [Constipation] : no constipation [Joint Pain] : no joint pain [Skin Rash] : no skin rash [FreeTextEntry2] : wt stable; generalized pain stated  [FreeTextEntry7] : ileotomy in place, functioning normally, prolapsed [de-identified] : ostomy

## 2024-02-21 NOTE — HISTORY OF PRESENT ILLNESS
[de-identified] : The patient was diagnosed with colon cancer in May 2022 at the age of 51. He presented to  ER 5/19/22 with worsening abdominal pain for 4 days. He reports abdominal pain has been ongoing for 4 years. On 5/19/22 he had a CT A/P which showed apparent focal mural thickening at the cecum/proximal ascending colon may represent colon cancer. The sigmoid colon appears to be tethered to the cecum and it is focally thick-walled; focal tumor invasion/extension from the cecum to the sigmoid colon cannot be excluded. Dilatation of the small bowel with an apparent transition point in the right lower quadrant abdomen, suggestive of small bowel obstruction. Apparent mild mural thickening of the a dilated right lower quadrant small bowel loop with adjacent mesenteric edema for which associated small bowel ischemia cannot be excluded. Multiple hypodense lesions with calcifications in both lobes of the liver with the largest measuring 5.8 x 6.0 cm in hepatic segment 8. These are suspicious for metastases. Mild ascites. Also on 5/19/22 he underwent exploratory laparotomy, total colectomy, end ileostomy, biopsy of liver, and biopsy of retroperitoneum. Pathology showed Omentum, excision: Negative for malignancy. Acutely inflamed exudate consistent with peritonitis. Portion of terminal ileum, cecum with adherent sigmoid, total colectomy: Poorly differentiated infiltrating adenocarcinoma measuring 9.0 cm. Adenocarcinoma infiltrates through the bowel wall and through the visceral peritoneum and infiltrates the adherent sigmoid colon 3 of 17 lymph nodes are positive for metastatic carcinoma. Lymphovascular space invasion is identified. Perineural invasion is identified. The surgical margins are negative. Appendix with serosal tumor implants and acute inflammation. Peritonitis. Liver, biopsy: Metastatic adenocarcinoma. Retroperitoneal biopsy: Adenocarcinoma with necrosis. No loss of nuclear expression of MMR proteins (MLH1, MSH2, MSH6, and PMS2). Staging pT4b pN1b pM1c. On 5/25/22 he had a repeat CT A/P which showed status post total colectomy and ileostomy. Dilated small bowel loops in the left abdomen with associated wall thickening. Both may be postoperative, follow-up is recommended. Moderate amount of ascites new from the prior study. Small amount of free intraperitoneal air likely with postoperative state. Minimal left pleural effusion. New multifocal right lower lobe infiltrate. Multiple liver metastases some of which are calcified again appreciated. [de-identified] : Medical Hx: denies \par  Surgical Hx: denies \par  Family Hx: denies family history of cancer \par  Social Hx: he smoked THC and cigarettes daily for 30 years, quit May 19th, 2022; ETOH had 6 pack a day, quit 4 years ago; was a  (off the [employer]) no longer working; lives with mom and brother.   [de-identified] : He was seen at  ER on Saturday, 9/9/23, he was BIBEMS for abdominal pain, and sent home.  Patient was admitted again from 09/25 - 10/01 and 10/7 - 10/10, BIBEMS both times. In the hospital notes, it is noted that he was binge drinking, smoking cigarettes, cocaine and marijuana prior to ER visit.  Today he is C35D1 of FOLFOXIRI / Zirabev, planned ongoing. Oxali stopped after cycle 10.  He continues to be compliant with his lovenox injection, daily. He continues with abd pain, working with [facility], has a follow up tomorrow. His stoma looks is pink and functioning, still prolapsed about 5". He feels he's up most of the night emptying his bag. His neuropathy is constant in his left toes, "they feel numb all the time". He has tried to stop eating around 7pm to decrease ostomy output overnight, helping slightly. He is working with  for his living situation, his mother now lives in a nursing home and he continues to live with his brother / sister. This is a stressor of his, he will continue to work with SW. His wt is stable overall.

## 2024-02-21 NOTE — ASSESSMENT
[With Patient/Caregiver] : With Patient/Caregiver [FreeTextEntry1] : The biological and clinical distinctions between right-sided and left-sided CRC, and their effect on outcomes, have been recognized for more than 50 years, and recently assimilated into clinical practice and trial design. Presently, tumor subsite influences how we treat patients with metastatic wild-type LUCINA CRC in the first-line, second-line, and refractory settings. For metastatic wild-type LUCIAN CRC in the first-line, based on ASCO 2022 ANJANA 5 data, recommendation for unresectable right sided CRC is for a triplet regimen with bevacizumab, which resulted in an increase in RR, PFS and R0/1 resections, albeit with increased toxicity. Data for OS is still immature at the time of the presentation. I have therefore recommended FOLFOXIRI with bevacizumab. Bevacizumab is a monoclonal antibody targeting VEGF, vascular endothelial growth factor. -Will send somatic genomic testing to evaluate for alterations of KRAS, APC, BRAF, ERBB2/HER2, and other genes, some of which may be potentially actionable targets for therapy. There are currently tissue-agnostic approvals for any advanced cancer with a high tumor mutational burden or DNA mismatch repair deficiency (checkpoint inhibitor immunotherapy), or NTRK fusions (TRK inhibitors). -patient should see genetics for additional germline testing to evaluate for the presence of Chiang syndrome. Germline testing may also reveal potential treatment targets (eg, deficient mismatch repair as seen in Chiang syndrome). -For patients whose tumors are dMMR or that have high levels of TMB (=10 mutations/megabase), another option for chemotherapy-refractory disease is treatment with an immune checkpoint inhibitor that targets the programmed death receptor 1 (PD-1; ie, pembrolizumab). -Cancers with deficient mismatch repair (dMMR) are particularly sensitive to immune-based therapies. Objective, in some instances complete, and durable responses to immune checkpoint inhibitors targeting the programmed cell death receptor 1 (PD-1; eg, pembrolizumab) have been reported in a variety of patients with dMMR cancers, including small bowel adenocarcinomas. The characteristic genetic signature of tumors with dMMR is a high number of DNA replication errors and high levels of DNA MSI. In the [place], pembrolizumab is approved for treatment of a variety of advanced solid tumors that are MSI-H or dMMR, that have progressed following prior treatment, and for which there are no satisfactory alternative treatment options, the first such approval of a tissue-agnostic anticancer treatment. Efficacy in dMMR cancers was shown in the phase II KEYNOTE-158 study. There were eight objective responses (42 percent), three of which were complete, and the duration of response ranged from 4.3 to 13.3+ months. Tumors with a high tumor mutational burden (TMB) also appear to be sensitive to immune checkpoint inhibitors. Pembrolizumab was approved for the treatment of adult and pediatric patients with unresectable or metastatic solid tumors, that are tissue TMB-high (=10 mut/mB) and who have progressed following prior therapy and who have no satisfactory alternative treatment options. [AdvancecareDate] : 1/5/23

## 2024-02-22 DIAGNOSIS — I26.99 OTHER PULMONARY EMBOLISM WITHOUT ACUTE COR PULMONALE: ICD-10-CM

## 2024-02-22 DIAGNOSIS — Z93.2 ILEOSTOMY STATUS: ICD-10-CM

## 2024-02-28 ENCOUNTER — RESULT REVIEW (OUTPATIENT)
Age: 54
End: 2024-02-28

## 2024-02-28 ENCOUNTER — APPOINTMENT (OUTPATIENT)
Dept: INFUSION THERAPY | Facility: CLINIC | Age: 54
End: 2024-02-28

## 2024-02-28 ENCOUNTER — RESULT CHARGE (OUTPATIENT)
Age: 54
End: 2024-02-28

## 2024-02-28 VITALS
RESPIRATION RATE: 17 BRPM | TEMPERATURE: 97.9 F | HEART RATE: 69 BPM | DIASTOLIC BLOOD PRESSURE: 82 MMHG | OXYGEN SATURATION: 98 % | HEIGHT: 74 IN | WEIGHT: 195.13 LBS | SYSTOLIC BLOOD PRESSURE: 130 MMHG | BODY MASS INDEX: 25.04 KG/M2

## 2024-02-28 LAB
ALBUMIN SERPL ELPH-MCNC: 4.5 G/DL — SIGNIFICANT CHANGE UP (ref 3.3–5)
ALP SERPL-CCNC: 143 U/L — HIGH (ref 40–120)
ALT FLD-CCNC: 22 U/L — SIGNIFICANT CHANGE UP (ref 10–45)
ANION GAP SERPL CALC-SCNC: 10 MMOL/L — SIGNIFICANT CHANGE UP (ref 5–17)
AST SERPL-CCNC: 17 U/L — SIGNIFICANT CHANGE UP (ref 10–40)
BASOPHILS # BLD AUTO: 0.06 K/UL — SIGNIFICANT CHANGE UP (ref 0–0.2)
BASOPHILS NFR BLD AUTO: 0.4 % — SIGNIFICANT CHANGE UP (ref 0–2)
BILIRUB SERPL-MCNC: 0.2 MG/DL — SIGNIFICANT CHANGE UP (ref 0.2–1.2)
BUN SERPL-MCNC: 9 MG/DL — SIGNIFICANT CHANGE UP (ref 7–23)
CALCIUM SERPL-MCNC: 9.3 MG/DL — SIGNIFICANT CHANGE UP (ref 8.4–10.5)
CEA SERPL-MCNC: 68.2 NG/ML — HIGH (ref 0–3.8)
CHLORIDE SERPL-SCNC: 102 MMOL/L — SIGNIFICANT CHANGE UP (ref 96–108)
CO2 SERPL-SCNC: 23 MMOL/L — SIGNIFICANT CHANGE UP (ref 22–31)
CREAT SERPL-MCNC: 0.75 MG/DL — SIGNIFICANT CHANGE UP (ref 0.5–1.3)
EGFR: 109 ML/MIN/1.73M2 — SIGNIFICANT CHANGE UP
EOSINOPHIL # BLD AUTO: 0.22 K/UL — SIGNIFICANT CHANGE UP (ref 0–0.5)
EOSINOPHIL NFR BLD AUTO: 1.5 % — SIGNIFICANT CHANGE UP (ref 0–6)
GLUCOSE SERPL-MCNC: 97 MG/DL — SIGNIFICANT CHANGE UP (ref 70–99)
HCT VFR BLD CALC: 42.8 % — SIGNIFICANT CHANGE UP (ref 39–50)
HGB BLD-MCNC: 13.9 G/DL — SIGNIFICANT CHANGE UP (ref 13–17)
IMM GRANULOCYTES NFR BLD AUTO: 1.1 % — HIGH (ref 0–0.9)
LYMPHOCYTES # BLD AUTO: 1.7 K/UL — SIGNIFICANT CHANGE UP (ref 1–3.3)
LYMPHOCYTES # BLD AUTO: 11.8 % — LOW (ref 13–44)
MAGNESIUM SERPL-MCNC: 2 MG/DL — SIGNIFICANT CHANGE UP (ref 1.6–2.6)
MCHC RBC-ENTMCNC: 28.9 PG — SIGNIFICANT CHANGE UP (ref 27–34)
MCHC RBC-ENTMCNC: 32.5 GM/DL — SIGNIFICANT CHANGE UP (ref 32–36)
MCV RBC AUTO: 89 FL — SIGNIFICANT CHANGE UP (ref 80–100)
MONOCYTES # BLD AUTO: 0.95 K/UL — HIGH (ref 0–0.9)
MONOCYTES NFR BLD AUTO: 6.6 % — SIGNIFICANT CHANGE UP (ref 2–14)
NEUTROPHILS # BLD AUTO: 11.36 K/UL — HIGH (ref 1.8–7.4)
NEUTROPHILS NFR BLD AUTO: 78.6 % — HIGH (ref 43–77)
NRBC # BLD: 0 /100 WBCS — SIGNIFICANT CHANGE UP (ref 0–0)
PLATELET # BLD AUTO: 167 K/UL — SIGNIFICANT CHANGE UP (ref 150–400)
POTASSIUM SERPL-MCNC: 4.6 MMOL/L — SIGNIFICANT CHANGE UP (ref 3.5–5.3)
POTASSIUM SERPL-SCNC: 4.6 MMOL/L — SIGNIFICANT CHANGE UP (ref 3.5–5.3)
PROT SERPL-MCNC: 7.3 G/DL — SIGNIFICANT CHANGE UP (ref 6–8.3)
RBC # BLD: 4.81 M/UL — SIGNIFICANT CHANGE UP (ref 4.2–5.8)
RBC # FLD: 17.2 % — HIGH (ref 10.3–14.5)
SODIUM SERPL-SCNC: 135 MMOL/L — SIGNIFICANT CHANGE UP (ref 135–145)
WBC # BLD: 14.45 K/UL — HIGH (ref 3.8–10.5)
WBC # FLD AUTO: 14.45 K/UL — HIGH (ref 3.8–10.5)

## 2024-03-01 ENCOUNTER — APPOINTMENT (OUTPATIENT)
Dept: INFUSION THERAPY | Facility: CLINIC | Age: 54
End: 2024-03-01

## 2024-03-13 ENCOUNTER — RESULT REVIEW (OUTPATIENT)
Age: 54
End: 2024-03-13

## 2024-03-13 ENCOUNTER — APPOINTMENT (OUTPATIENT)
Dept: INFUSION THERAPY | Facility: CLINIC | Age: 54
End: 2024-03-13
Payer: MEDICAID

## 2024-03-13 ENCOUNTER — APPOINTMENT (OUTPATIENT)
Dept: HEMATOLOGY ONCOLOGY | Facility: CLINIC | Age: 54
End: 2024-03-13
Payer: MEDICAID

## 2024-03-13 VITALS
DIASTOLIC BLOOD PRESSURE: 71 MMHG | SYSTOLIC BLOOD PRESSURE: 102 MMHG | WEIGHT: 197.06 LBS | OXYGEN SATURATION: 99 % | RESPIRATION RATE: 17 BRPM | TEMPERATURE: 97.9 F | HEART RATE: 109 BPM | HEIGHT: 74 IN | BODY MASS INDEX: 25.29 KG/M2

## 2024-03-13 LAB
ALBUMIN SERPL ELPH-MCNC: 4.4 G/DL — SIGNIFICANT CHANGE UP (ref 3.3–5)
ALP SERPL-CCNC: 87 U/L — SIGNIFICANT CHANGE UP (ref 40–120)
ALT FLD-CCNC: 18 U/L — SIGNIFICANT CHANGE UP (ref 10–45)
ANION GAP SERPL CALC-SCNC: 12 MMOL/L — SIGNIFICANT CHANGE UP (ref 5–17)
AST SERPL-CCNC: 16 U/L — SIGNIFICANT CHANGE UP (ref 10–40)
BASOPHILS # BLD AUTO: 0.03 K/UL — SIGNIFICANT CHANGE UP (ref 0–0.2)
BASOPHILS NFR BLD AUTO: 0.3 % — SIGNIFICANT CHANGE UP (ref 0–2)
BILIRUB SERPL-MCNC: 0.2 MG/DL — SIGNIFICANT CHANGE UP (ref 0.2–1.2)
BUN SERPL-MCNC: 13 MG/DL — SIGNIFICANT CHANGE UP (ref 7–23)
CALCIUM SERPL-MCNC: 9.2 MG/DL — SIGNIFICANT CHANGE UP (ref 8.4–10.5)
CHLORIDE SERPL-SCNC: 103 MMOL/L — SIGNIFICANT CHANGE UP (ref 96–108)
CO2 SERPL-SCNC: 23 MMOL/L — SIGNIFICANT CHANGE UP (ref 22–31)
CREAT SERPL-MCNC: 0.82 MG/DL — SIGNIFICANT CHANGE UP (ref 0.5–1.3)
EGFR: 106 ML/MIN/1.73M2 — SIGNIFICANT CHANGE UP
EOSINOPHIL # BLD AUTO: 0.18 K/UL — SIGNIFICANT CHANGE UP (ref 0–0.5)
EOSINOPHIL NFR BLD AUTO: 1.7 % — SIGNIFICANT CHANGE UP (ref 0–6)
GLUCOSE SERPL-MCNC: 101 MG/DL — HIGH (ref 70–99)
HCT VFR BLD CALC: 42.2 % — SIGNIFICANT CHANGE UP (ref 39–50)
HGB BLD-MCNC: 13.9 G/DL — SIGNIFICANT CHANGE UP (ref 13–17)
IMM GRANULOCYTES NFR BLD AUTO: 0.4 % — SIGNIFICANT CHANGE UP (ref 0–0.9)
LYMPHOCYTES # BLD AUTO: 1.3 K/UL — SIGNIFICANT CHANGE UP (ref 1–3.3)
LYMPHOCYTES # BLD AUTO: 12.3 % — LOW (ref 13–44)
MAGNESIUM SERPL-MCNC: 1.9 MG/DL — SIGNIFICANT CHANGE UP (ref 1.6–2.6)
MCHC RBC-ENTMCNC: 29.3 PG — SIGNIFICANT CHANGE UP (ref 27–34)
MCHC RBC-ENTMCNC: 32.9 GM/DL — SIGNIFICANT CHANGE UP (ref 32–36)
MCV RBC AUTO: 88.8 FL — SIGNIFICANT CHANGE UP (ref 80–100)
MONOCYTES # BLD AUTO: 0.71 K/UL — SIGNIFICANT CHANGE UP (ref 0–0.9)
MONOCYTES NFR BLD AUTO: 6.7 % — SIGNIFICANT CHANGE UP (ref 2–14)
NEUTROPHILS # BLD AUTO: 8.31 K/UL — HIGH (ref 1.8–7.4)
NEUTROPHILS NFR BLD AUTO: 78.6 % — HIGH (ref 43–77)
NRBC # BLD: 0 /100 WBCS — SIGNIFICANT CHANGE UP (ref 0–0)
PLATELET # BLD AUTO: 156 K/UL — SIGNIFICANT CHANGE UP (ref 150–400)
POTASSIUM SERPL-MCNC: 4.6 MMOL/L — SIGNIFICANT CHANGE UP (ref 3.5–5.3)
POTASSIUM SERPL-SCNC: 4.6 MMOL/L — SIGNIFICANT CHANGE UP (ref 3.5–5.3)
PROT SERPL-MCNC: 7.5 G/DL — SIGNIFICANT CHANGE UP (ref 6–8.3)
RBC # BLD: 4.75 M/UL — SIGNIFICANT CHANGE UP (ref 4.2–5.8)
RBC # FLD: 16.4 % — HIGH (ref 10.3–14.5)
SODIUM SERPL-SCNC: 138 MMOL/L — SIGNIFICANT CHANGE UP (ref 135–145)
WBC # BLD: 10.57 K/UL — HIGH (ref 3.8–10.5)
WBC # FLD AUTO: 10.57 K/UL — HIGH (ref 3.8–10.5)

## 2024-03-13 PROCEDURE — 99214 OFFICE O/P EST MOD 30 MIN: CPT

## 2024-03-13 PROCEDURE — G2211 COMPLEX E/M VISIT ADD ON: CPT | Mod: NC,1L

## 2024-03-13 NOTE — ASSESSMENT
[FreeTextEntry1] : The biological and clinical distinctions between right-sided and left-sided CRC, and their effect on outcomes, have been recognized for more than 50 years, and recently assimilated into clinical practice and trial design. Presently, tumor subsite influences how we treat patients with metastatic wild-type LUCIAN CRC in the first-line, second-line, and refractory settings. For metastatic wild-type LUCIAN CRC in the first-line, based on ASCO 2022 ANJANA 5 data, recommendation for unresectable right sided CRC is for a triplet regimen with bevacizumab, which resulted in an increase in RR, PFS and R0/1 resections, albeit with increased toxicity. Data for OS is still immature at the time of the presentation. I have therefore recommended FOLFOXIRI with bevacizumab. Bevacizumab is a monoclonal antibody targeting VEGF, vascular endothelial growth factor. -Will send somatic genomic testing to evaluate for alterations of KRAS, APC, BRAF, ERBB2/HER2, and other genes, some of which may be potentially actionable targets for therapy. There are currently tissue-agnostic approvals for any advanced cancer with a high tumor mutational burden or DNA mismatch repair deficiency (checkpoint inhibitor immunotherapy), or NTRK fusions (TRK inhibitors). -patient should see genetics for additional germline testing to evaluate for the presence of Chiang syndrome. Germline testing may also reveal potential treatment targets (eg, deficient mismatch repair as seen in Chiang syndrome). -For patients whose tumors are dMMR or that have high levels of TMB (=10 mutations/megabase), another option for chemotherapy-refractory disease is treatment with an immune checkpoint inhibitor that targets the programmed death receptor 1 (PD-1; ie, pembrolizumab). -Cancers with deficient mismatch repair (dMMR) are particularly sensitive to immune-based therapies. Objective, in some instances complete, and durable responses to immune checkpoint inhibitors targeting the programmed cell death receptor 1 (PD-1; eg, pembrolizumab) have been reported in a variety of patients with dMMR cancers, including small bowel adenocarcinomas. The characteristic genetic signature of tumors with dMMR is a high number of DNA replication errors and high levels of DNA MSI. In the [place], pembrolizumab is approved for treatment of a variety of advanced solid tumors that are MSI-H or dMMR, that have progressed following prior treatment, and for which there are no satisfactory alternative treatment options, the first such approval of a tissue-agnostic anticancer treatment. Efficacy in dMMR cancers was shown in the phase II KEYNOTE-158 study. There were eight objective responses (42 percent), three of which were complete, and the duration of response ranged from 4.3 to 13.3+ months. Tumors with a high tumor mutational burden (TMB) also appear to be sensitive to immune checkpoint inhibitors. Pembrolizumab was approved for the treatment of adult and pediatric patients with unresectable or metastatic solid tumors, that are tissue TMB-high (=10 mut/mB) and who have progressed following prior therapy and who have no satisfactory alternative treatment options. [AdvancecareDate] : 1/5/23 [With Patient/Caregiver] : With Patient/Caregiver

## 2024-03-13 NOTE — PHYSICAL EXAM
[Restricted in physically strenuous activity but ambulatory and able to carry out work of a light or sedentary nature] : Status 1- Restricted in physically strenuous activity but ambulatory and able to carry out work of a light or sedentary nature, e.g., light house work, office work [Normal] : affect appropriate [de-identified] : wt stable [de-identified] : protruding 5" ostomy, pink stoma, loose stool in bag as appropriate  [de-identified] : R chest wall port accessed, dressing C/D/I

## 2024-03-13 NOTE — HISTORY OF PRESENT ILLNESS
[de-identified] : Medical Hx: denies \par  Surgical Hx: denies \par  Family Hx: denies family history of cancer \par  Social Hx: he smoked THC and cigarettes daily for 30 years, quit May 19th, 2022; ETOH had 6 pack a day, quit 4 years ago; was a  (off the [employer]) no longer working; lives with mom and brother.   [de-identified] : The patient was diagnosed with colon cancer in May 2022 at the age of 51. He presented to  ER 5/19/22 with worsening abdominal pain for 4 days. He reports abdominal pain has been ongoing for 4 years. On 5/19/22 he had a CT A/P which showed apparent focal mural thickening at the cecum/proximal ascending colon may represent colon cancer. The sigmoid colon appears to be tethered to the cecum and it is focally thick-walled; focal tumor invasion/extension from the cecum to the sigmoid colon cannot be excluded. Dilatation of the small bowel with an apparent transition point in the right lower quadrant abdomen, suggestive of small bowel obstruction. Apparent mild mural thickening of the a dilated right lower quadrant small bowel loop with adjacent mesenteric edema for which associated small bowel ischemia cannot be excluded. Multiple hypodense lesions with calcifications in both lobes of the liver with the largest measuring 5.8 x 6.0 cm in hepatic segment 8. These are suspicious for metastases. Mild ascites. Also on 5/19/22 he underwent exploratory laparotomy, total colectomy, end ileostomy, biopsy of liver, and biopsy of retroperitoneum. Pathology showed Omentum, excision: Negative for malignancy. Acutely inflamed exudate consistent with peritonitis. Portion of terminal ileum, cecum with adherent sigmoid, total colectomy: Poorly differentiated infiltrating adenocarcinoma measuring 9.0 cm. Adenocarcinoma infiltrates through the bowel wall and through the visceral peritoneum and infiltrates the adherent sigmoid colon 3 of 17 lymph nodes are positive for metastatic carcinoma. Lymphovascular space invasion is identified. Perineural invasion is identified. The surgical margins are negative. Appendix with serosal tumor implants and acute inflammation. Peritonitis. Liver, biopsy: Metastatic adenocarcinoma. Retroperitoneal biopsy: Adenocarcinoma with necrosis. No loss of nuclear expression of MMR proteins (MLH1, MSH2, MSH6, and PMS2). Staging pT4b pN1b pM1c. On 5/25/22 he had a repeat CT A/P which showed status post total colectomy and ileostomy. Dilated small bowel loops in the left abdomen with associated wall thickening. Both may be postoperative, follow-up is recommended. Moderate amount of ascites new from the prior study. Small amount of free intraperitoneal air likely with postoperative state. Minimal left pleural effusion. New multifocal right lower lobe infiltrate. Multiple liver metastases some of which are calcified again appreciated. [de-identified] : He was seen at  ER on Saturday, 9/9/23, he was BIBEMS for abdominal pain, and sent home.  Patient was admitted again from 09/25 - 10/01 and 10/7 - 10/10, BIBEMS both times. In the hospital notes, it is noted that he was binge drinking, smoking cigarettes, cocaine and marijuana prior to ER visit.  Today he is C37D1 of FOLFOXIRI / Zirabev, planned ongoing. Oxali stopped after cycle 10.  He continues to be compliant with his lovenox injection, daily. He has abd pain, working with [facility], stable today. His stoma looks is pink and functioning, remains prolapsed about 5". He feels he's up most of the night emptying his bag. His neuropathy is constant in his left toes, "they feel numb all the time", no changes. He has tried to stop eating around 7pm to decrease ostomy output overnight, helping slightly. He is working with  for his living situation, his mother now lives in a nursing home and he continues to live with his sister. This remains a stressor of his, he will continue to work with . His wt is stable overall. He has a left palm wound, from using a wheel barrel, stated is started as a splinter. He reports is drains clear fluid, no drainage seen or redness seen today.

## 2024-03-13 NOTE — REVIEW OF SYSTEMS
[Fatigue] : fatigue [Recent Change In Weight] : ~T no recent weight change [Chest Pain] : no chest pain [Palpitations] : no palpitations [Shortness Of Breath] : no shortness of breath [Abdominal Pain] : abdominal pain [Constipation] : no constipation [Diarrhea: Grade 0] : Diarrhea: Grade 0 [Joint Pain] : no joint pain [Skin Rash] : no skin rash [Negative] : Allergic/Immunologic [FreeTextEntry2] : wt stable [FreeTextEntry7] : ileotomy in place, functioning normally, prolapsed; abd pain  [de-identified] : ostomy

## 2024-03-15 ENCOUNTER — APPOINTMENT (OUTPATIENT)
Dept: INFUSION THERAPY | Facility: CLINIC | Age: 54
End: 2024-03-15

## 2024-03-22 ENCOUNTER — NON-APPOINTMENT (OUTPATIENT)
Age: 54
End: 2024-03-22

## 2024-03-25 ENCOUNTER — OUTPATIENT (OUTPATIENT)
Dept: OUTPATIENT SERVICES | Facility: HOSPITAL | Age: 54
LOS: 1 days | Discharge: HOME | End: 2024-03-25

## 2024-03-25 DIAGNOSIS — Z93.2 ILEOSTOMY STATUS: ICD-10-CM

## 2024-03-25 DIAGNOSIS — Z90.49 ACQUIRED ABSENCE OF OTHER SPECIFIED PARTS OF DIGESTIVE TRACT: Chronic | ICD-10-CM

## 2024-03-25 DIAGNOSIS — C18.9 MALIGNANT NEOPLASM OF COLON, UNSPECIFIED: ICD-10-CM

## 2024-03-25 DIAGNOSIS — Z98.890 OTHER SPECIFIED POSTPROCEDURAL STATES: Chronic | ICD-10-CM

## 2024-03-27 ENCOUNTER — RESULT REVIEW (OUTPATIENT)
Age: 54
End: 2024-03-27

## 2024-03-27 ENCOUNTER — APPOINTMENT (OUTPATIENT)
Dept: INFUSION THERAPY | Facility: CLINIC | Age: 54
End: 2024-03-27

## 2024-03-27 VITALS
RESPIRATION RATE: 17 BRPM | HEIGHT: 74 IN | BODY MASS INDEX: 24.68 KG/M2 | HEART RATE: 104 BPM | DIASTOLIC BLOOD PRESSURE: 79 MMHG | OXYGEN SATURATION: 99 % | SYSTOLIC BLOOD PRESSURE: 120 MMHG | WEIGHT: 192.31 LBS | TEMPERATURE: 97.5 F

## 2024-03-27 LAB
ALBUMIN SERPL ELPH-MCNC: 4.7 G/DL — SIGNIFICANT CHANGE UP (ref 3.3–5)
ALP SERPL-CCNC: 159 U/L — HIGH (ref 40–120)
ALT FLD-CCNC: 21 U/L — SIGNIFICANT CHANGE UP (ref 10–45)
ANION GAP SERPL CALC-SCNC: 15 MMOL/L — SIGNIFICANT CHANGE UP (ref 5–17)
AST SERPL-CCNC: 19 U/L — SIGNIFICANT CHANGE UP (ref 10–40)
BASOPHILS # BLD AUTO: 0.05 K/UL — SIGNIFICANT CHANGE UP (ref 0–0.2)
BASOPHILS NFR BLD AUTO: 0.3 % — SIGNIFICANT CHANGE UP (ref 0–2)
BILIRUB SERPL-MCNC: 0.2 MG/DL — SIGNIFICANT CHANGE UP (ref 0.2–1.2)
BUN SERPL-MCNC: 10 MG/DL — SIGNIFICANT CHANGE UP (ref 7–23)
CALCIUM SERPL-MCNC: 9.6 MG/DL — SIGNIFICANT CHANGE UP (ref 8.4–10.5)
CHLORIDE SERPL-SCNC: 104 MMOL/L — SIGNIFICANT CHANGE UP (ref 96–108)
CO2 SERPL-SCNC: 20 MMOL/L — LOW (ref 22–31)
CREAT SERPL-MCNC: 0.81 MG/DL — SIGNIFICANT CHANGE UP (ref 0.5–1.3)
EGFR: 107 ML/MIN/1.73M2 — SIGNIFICANT CHANGE UP
EOSINOPHIL # BLD AUTO: 0.12 K/UL — SIGNIFICANT CHANGE UP (ref 0–0.5)
EOSINOPHIL NFR BLD AUTO: 0.7 % — SIGNIFICANT CHANGE UP (ref 0–6)
GLUCOSE SERPL-MCNC: 102 MG/DL — HIGH (ref 70–99)
HCT VFR BLD CALC: 44.9 % — SIGNIFICANT CHANGE UP (ref 39–50)
HGB BLD-MCNC: 14.8 G/DL — SIGNIFICANT CHANGE UP (ref 13–17)
IMM GRANULOCYTES NFR BLD AUTO: 0.8 % — SIGNIFICANT CHANGE UP (ref 0–0.9)
LYMPHOCYTES # BLD AUTO: 1.47 K/UL — SIGNIFICANT CHANGE UP (ref 1–3.3)
LYMPHOCYTES # BLD AUTO: 9.1 % — LOW (ref 13–44)
MAGNESIUM SERPL-MCNC: 2.1 MG/DL — SIGNIFICANT CHANGE UP (ref 1.6–2.6)
MCHC RBC-ENTMCNC: 29.3 PG — SIGNIFICANT CHANGE UP (ref 27–34)
MCHC RBC-ENTMCNC: 33 GM/DL — SIGNIFICANT CHANGE UP (ref 32–36)
MCV RBC AUTO: 88.9 FL — SIGNIFICANT CHANGE UP (ref 80–100)
MONOCYTES # BLD AUTO: 0.64 K/UL — SIGNIFICANT CHANGE UP (ref 0–0.9)
MONOCYTES NFR BLD AUTO: 4 % — SIGNIFICANT CHANGE UP (ref 2–14)
NEUTROPHILS # BLD AUTO: 13.67 K/UL — HIGH (ref 1.8–7.4)
NEUTROPHILS NFR BLD AUTO: 85.1 % — HIGH (ref 43–77)
NRBC # BLD: 0 /100 WBCS — SIGNIFICANT CHANGE UP (ref 0–0)
PLATELET # BLD AUTO: 170 K/UL — SIGNIFICANT CHANGE UP (ref 150–400)
POTASSIUM SERPL-MCNC: 4.7 MMOL/L — SIGNIFICANT CHANGE UP (ref 3.5–5.3)
POTASSIUM SERPL-SCNC: 4.7 MMOL/L — SIGNIFICANT CHANGE UP (ref 3.5–5.3)
PROT SERPL-MCNC: 8 G/DL — SIGNIFICANT CHANGE UP (ref 6–8.3)
RBC # BLD: 5.05 M/UL — SIGNIFICANT CHANGE UP (ref 4.2–5.8)
RBC # FLD: 16.7 % — HIGH (ref 10.3–14.5)
SODIUM SERPL-SCNC: 139 MMOL/L — SIGNIFICANT CHANGE UP (ref 135–145)
WBC # BLD: 16.08 K/UL — HIGH (ref 3.8–10.5)
WBC # FLD AUTO: 16.08 K/UL — HIGH (ref 3.8–10.5)

## 2024-03-28 DIAGNOSIS — R11.2 NAUSEA WITH VOMITING, UNSPECIFIED: ICD-10-CM

## 2024-03-28 DIAGNOSIS — Z51.11 ENCOUNTER FOR ANTINEOPLASTIC CHEMOTHERAPY: ICD-10-CM

## 2024-03-29 ENCOUNTER — APPOINTMENT (OUTPATIENT)
Dept: INFUSION THERAPY | Facility: CLINIC | Age: 54
End: 2024-03-29

## 2024-04-01 DIAGNOSIS — Z51.89 ENCOUNTER FOR OTHER SPECIFIED AFTERCARE: ICD-10-CM

## 2024-04-02 ENCOUNTER — OUTPATIENT (OUTPATIENT)
Dept: OUTPATIENT SERVICES | Facility: HOSPITAL | Age: 54
LOS: 1 days | End: 2024-04-02
Payer: MEDICAID

## 2024-04-02 ENCOUNTER — APPOINTMENT (OUTPATIENT)
Dept: CT IMAGING | Facility: CLINIC | Age: 54
End: 2024-04-02
Payer: MEDICAID

## 2024-04-02 DIAGNOSIS — C18.9 MALIGNANT NEOPLASM OF COLON, UNSPECIFIED: ICD-10-CM

## 2024-04-02 DIAGNOSIS — Z90.49 ACQUIRED ABSENCE OF OTHER SPECIFIED PARTS OF DIGESTIVE TRACT: Chronic | ICD-10-CM

## 2024-04-02 DIAGNOSIS — Z98.890 OTHER SPECIFIED POSTPROCEDURAL STATES: Chronic | ICD-10-CM

## 2024-04-02 PROCEDURE — 74177 CT ABD & PELVIS W/CONTRAST: CPT

## 2024-04-02 PROCEDURE — 74177 CT ABD & PELVIS W/CONTRAST: CPT | Mod: 26

## 2024-04-02 PROCEDURE — 71260 CT THORAX DX C+: CPT

## 2024-04-02 PROCEDURE — 71260 CT THORAX DX C+: CPT | Mod: 26

## 2024-04-10 ENCOUNTER — RESULT REVIEW (OUTPATIENT)
Age: 54
End: 2024-04-10

## 2024-04-10 ENCOUNTER — APPOINTMENT (OUTPATIENT)
Dept: INFUSION THERAPY | Facility: CLINIC | Age: 54
End: 2024-04-10

## 2024-04-10 ENCOUNTER — RESULT CHARGE (OUTPATIENT)
Age: 54
End: 2024-04-10

## 2024-04-10 VITALS
RESPIRATION RATE: 17 BRPM | HEIGHT: 74 IN | HEART RATE: 104 BPM | DIASTOLIC BLOOD PRESSURE: 66 MMHG | OXYGEN SATURATION: 99 % | BODY MASS INDEX: 24.64 KG/M2 | SYSTOLIC BLOOD PRESSURE: 96 MMHG | WEIGHT: 192 LBS | TEMPERATURE: 98.2 F

## 2024-04-10 LAB
ALBUMIN SERPL ELPH-MCNC: 4.3 G/DL — SIGNIFICANT CHANGE UP (ref 3.3–5)
ALP SERPL-CCNC: 153 U/L — HIGH (ref 40–120)
ALT FLD-CCNC: 16 U/L — SIGNIFICANT CHANGE UP (ref 10–45)
ANION GAP SERPL CALC-SCNC: 12 MMOL/L — SIGNIFICANT CHANGE UP (ref 5–17)
AST SERPL-CCNC: 14 U/L — SIGNIFICANT CHANGE UP (ref 10–40)
BASOPHILS # BLD AUTO: 0.06 K/UL — SIGNIFICANT CHANGE UP (ref 0–0.2)
BASOPHILS NFR BLD AUTO: 0.4 % — SIGNIFICANT CHANGE UP (ref 0–2)
BILIRUB SERPL-MCNC: <0.2 MG/DL — SIGNIFICANT CHANGE UP (ref 0.2–1.2)
BILIRUB UR QL STRIP: ABNORMAL
BUN SERPL-MCNC: 12 MG/DL — SIGNIFICANT CHANGE UP (ref 7–23)
CALCIUM SERPL-MCNC: 9.3 MG/DL — SIGNIFICANT CHANGE UP (ref 8.4–10.5)
CHLORIDE SERPL-SCNC: 103 MMOL/L — SIGNIFICANT CHANGE UP (ref 96–108)
CO2 SERPL-SCNC: 25 MMOL/L — SIGNIFICANT CHANGE UP (ref 22–31)
CREAT SERPL-MCNC: 0.8 MG/DL — SIGNIFICANT CHANGE UP (ref 0.5–1.3)
EGFR: 107 ML/MIN/1.73M2 — SIGNIFICANT CHANGE UP
EOSINOPHIL # BLD AUTO: 0.22 K/UL — SIGNIFICANT CHANGE UP (ref 0–0.5)
EOSINOPHIL NFR BLD AUTO: 1.4 % — SIGNIFICANT CHANGE UP (ref 0–6)
GLUCOSE SERPL-MCNC: 97 MG/DL — SIGNIFICANT CHANGE UP (ref 70–99)
GLUCOSE UR-MCNC: NEGATIVE
HCG UR QL: 0.2 EU/DL
HCT VFR BLD CALC: 42.2 % — SIGNIFICANT CHANGE UP (ref 39–50)
HGB BLD-MCNC: 14 G/DL — SIGNIFICANT CHANGE UP (ref 13–17)
HGB UR QL STRIP.AUTO: NEGATIVE
IMM GRANULOCYTES NFR BLD AUTO: 1 % — HIGH (ref 0–0.9)
KETONES UR-MCNC: NEGATIVE
LEUKOCYTE ESTERASE UR QL STRIP: NEGATIVE
LYMPHOCYTES # BLD AUTO: 1.81 K/UL — SIGNIFICANT CHANGE UP (ref 1–3.3)
LYMPHOCYTES # BLD AUTO: 11.8 % — LOW (ref 13–44)
MAGNESIUM SERPL-MCNC: 2 MG/DL — SIGNIFICANT CHANGE UP (ref 1.6–2.6)
MCHC RBC-ENTMCNC: 29.4 PG — SIGNIFICANT CHANGE UP (ref 27–34)
MCHC RBC-ENTMCNC: 33.2 GM/DL — SIGNIFICANT CHANGE UP (ref 32–36)
MCV RBC AUTO: 88.7 FL — SIGNIFICANT CHANGE UP (ref 80–100)
MONOCYTES # BLD AUTO: 0.9 K/UL — SIGNIFICANT CHANGE UP (ref 0–0.9)
MONOCYTES NFR BLD AUTO: 5.9 % — SIGNIFICANT CHANGE UP (ref 2–14)
NEUTROPHILS # BLD AUTO: 12.16 K/UL — HIGH (ref 1.8–7.4)
NEUTROPHILS NFR BLD AUTO: 79.5 % — HIGH (ref 43–77)
NITRITE UR QL STRIP: NEGATIVE
NRBC # BLD: 0 /100 WBCS — SIGNIFICANT CHANGE UP (ref 0–0)
PH UR STRIP: 5.5
PLATELET # BLD AUTO: 197 K/UL — SIGNIFICANT CHANGE UP (ref 150–400)
POTASSIUM SERPL-MCNC: 4.3 MMOL/L — SIGNIFICANT CHANGE UP (ref 3.5–5.3)
POTASSIUM SERPL-SCNC: 4.3 MMOL/L — SIGNIFICANT CHANGE UP (ref 3.5–5.3)
PROT SERPL-MCNC: 7.1 G/DL — SIGNIFICANT CHANGE UP (ref 6–8.3)
PROT UR STRIP-MCNC: ABNORMAL
RBC # BLD: 4.76 M/UL — SIGNIFICANT CHANGE UP (ref 4.2–5.8)
RBC # FLD: 16.5 % — HIGH (ref 10.3–14.5)
SODIUM SERPL-SCNC: 140 MMOL/L — SIGNIFICANT CHANGE UP (ref 135–145)
SP GR UR STRIP: >=1.03
WBC # BLD: 15.3 K/UL — HIGH (ref 3.8–10.5)
WBC # FLD AUTO: 15.3 K/UL — HIGH (ref 3.8–10.5)

## 2024-04-12 ENCOUNTER — APPOINTMENT (OUTPATIENT)
Dept: HEMATOLOGY ONCOLOGY | Facility: CLINIC | Age: 54
End: 2024-04-12
Payer: MEDICAID

## 2024-04-12 ENCOUNTER — APPOINTMENT (OUTPATIENT)
Dept: INFUSION THERAPY | Facility: CLINIC | Age: 54
End: 2024-04-12
Payer: MEDICAID

## 2024-04-12 PROCEDURE — G2211 COMPLEX E/M VISIT ADD ON: CPT | Mod: NC,1L

## 2024-04-12 PROCEDURE — ZZZZZ: CPT

## 2024-04-16 ENCOUNTER — NON-APPOINTMENT (OUTPATIENT)
Age: 54
End: 2024-04-16

## 2024-04-24 ENCOUNTER — APPOINTMENT (OUTPATIENT)
Dept: INFUSION THERAPY | Facility: CLINIC | Age: 54
End: 2024-04-24

## 2024-04-26 ENCOUNTER — APPOINTMENT (OUTPATIENT)
Dept: INFUSION THERAPY | Facility: CLINIC | Age: 54
End: 2024-04-26

## 2024-04-30 RX ORDER — SOTORASIB 320 MG/1
320 TABLET, COATED ORAL DAILY
Qty: 90 | Refills: 5 | Status: ACTIVE | COMMUNITY
Start: 2024-04-30 | End: 1900-01-01

## 2024-04-30 NOTE — HISTORY OF PRESENT ILLNESS
[Home] : at home, [unfilled] , at the time of the visit. [Medical Office: ([facility])___] : at the medical office located in  [Verbal consent obtained from patient] : the patient, [unfilled] [de-identified] : The patient was diagnosed with colon cancer in May 2022 at the age of 51. He presented to  ER 5/19/22 with worsening abdominal pain for 4 days. He reports abdominal pain has been ongoing for 4 years. On 5/19/22 he had a CT A/P which showed apparent focal mural thickening at the cecum/proximal ascending colon may represent colon cancer. The sigmoid colon appears to be tethered to the cecum and it is focally thick-walled; focal tumor invasion/extension from the cecum to the sigmoid colon cannot be excluded. Dilatation of the small bowel with an apparent transition point in the right lower quadrant abdomen, suggestive of small bowel obstruction. Apparent mild mural thickening of the a dilated right lower quadrant small bowel loop with adjacent mesenteric edema for which associated small bowel ischemia cannot be excluded. Multiple hypodense lesions with calcifications in both lobes of the liver with the largest measuring 5.8 x 6.0 cm in hepatic segment 8. These are suspicious for metastases. Mild ascites. Also on 5/19/22 he underwent exploratory laparotomy, total colectomy, end ileostomy, biopsy of liver, and biopsy of retroperitoneum. Pathology showed Omentum, excision: Negative for malignancy. Acutely inflamed exudate consistent with peritonitis. Portion of terminal ileum, cecum with adherent sigmoid, total colectomy: Poorly differentiated infiltrating adenocarcinoma measuring 9.0 cm. Adenocarcinoma infiltrates through the bowel wall and through the visceral peritoneum and infiltrates the adherent sigmoid colon 3 of 17 lymph nodes are positive for metastatic carcinoma. Lymphovascular space invasion is identified. Perineural invasion is identified. The surgical margins are negative. Appendix with serosal tumor implants and acute inflammation. Peritonitis. Liver, biopsy: Metastatic adenocarcinoma. Retroperitoneal biopsy: Adenocarcinoma with necrosis. No loss of nuclear expression of MMR proteins (MLH1, MSH2, MSH6, and PMS2). Staging pT4b pN1b pM1c. On 5/25/22 he had a repeat CT A/P which showed status post total colectomy and ileostomy. Dilated small bowel loops in the left abdomen with associated wall thickening. Both may be postoperative, follow-up is recommended. Moderate amount of ascites new from the prior study. Small amount of free intraperitoneal air likely with postoperative state. Minimal left pleural effusion. New multifocal right lower lobe infiltrate. Multiple liver metastases some of which are calcified again appreciated. [de-identified] : Medical Hx: denies \par  Surgical Hx: denies \par  Family Hx: denies family history of cancer \par  Social Hx: he smoked THC and cigarettes daily for 30 years, quit May 19th, 2022; ETOH had 6 pack a day, quit 4 years ago; was a  (off the [employer]) no longer working; lives with mom and brother.   [de-identified] : He was seen at  ER on Saturday, 9/9/23, he was BIBEMS for abdominal pain, and sent home.  Patient was admitted again from 09/25 - 10/01 and 10/7 - 10/10, BIBEMS both times. In the hospital notes, it is noted that he was binge drinking, smoking cigarettes, cocaine and marijuana prior to ER visit.  Today he is C37D1 of FOLFOXIRI / Zirabev, planned ongoing. Oxali stopped after cycle 10.  He continues to be compliant with his lovenox injection, daily. He has abd pain, working with [facility], stable today. His stoma looks is pink and functioning, remains prolapsed about 5". He feels he's up most of the night emptying his bag. His neuropathy is constant in his left toes, "they feel numb all the time", no changes. He has tried to stop eating around 7pm to decrease ostomy output overnight, helping slightly. He is working with  for his living situation, his mother now lives in a nursing home and he continues to live with his sister. This remains a stressor of his, he will continue to work with . His wt is stable overall. He has a left palm wound, from using a wheel barrel, stated is started as a splinter. He reports is drains clear fluid, no drainage seen or redness seen today.

## 2024-04-30 NOTE — REVIEW OF SYSTEMS
[Fatigue] : fatigue [Abdominal Pain] : abdominal pain [Diarrhea: Grade 0] : Diarrhea: Grade 0 [Negative] : Allergic/Immunologic [Recent Change In Weight] : ~T no recent weight change [Chest Pain] : no chest pain [Palpitations] : no palpitations [Shortness Of Breath] : no shortness of breath [Constipation] : no constipation [Joint Pain] : no joint pain [Skin Rash] : no skin rash [FreeTextEntry2] : wt stable [FreeTextEntry7] : ileotomy in place, functioning normally, prolapsed; abd pain  [de-identified] : ostomy

## 2024-04-30 NOTE — PHYSICAL EXAM
[Restricted in physically strenuous activity but ambulatory and able to carry out work of a light or sedentary nature] : Status 1- Restricted in physically strenuous activity but ambulatory and able to carry out work of a light or sedentary nature, e.g., light house work, office work [Normal] : affect appropriate [de-identified] : wt stable [de-identified] : protruding 5" ostomy, pink stoma, loose stool in bag as appropriate  [de-identified] : R chest wall port accessed, dressing C/D/I

## 2024-04-30 NOTE — RESULTS/DATA
[FreeTextEntry1] : 4/2/24 CT C/A/P: Increasing in size right hepatic lobe mass (3.8 x 2.7 cm previously measured 2.4 x 1.4 cm) and increasing in size right lower lobe nodule (1.7 x 1.2 cm  previously measuring 1.5 x 1.1 cm) as well as increasing in size right abdominal wall subcutaneous nodules. Findings are suspicious for progressing metastatic disease. Other pulmonary and hepatic nodules/masses are stable.  1/2/24 CT C/A/P: Stable interval examination since October 2023 with multifocal bilateral pulmonary and hepatic metastases. Pulmonary reference lesions provided above.  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 Thrombosed portion measures 3.3 x 2.1 x 1.8 cm. Nonthrombosed portion measures 1.2 x 0.8 cm with the neck diameter of 0.2 cm.  10/7/23 CT Angio/A/P: No pulmonary embolism. Mild interval increased size of bilateral pulmonary nodules with new right lower lobe 6 mm nodule. CT abdomen/pelvis: Similar intraperitoneal findings compared to 9/23/2023. No bowel obstruction or wall thickening. 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 This can be confirmed with ultrasound.  9/23/23 CT A/P: No bowel obstruction or inflammation. Status post total colectomy with rectal stump and right lower quadrant end ileostomy. Unchanged calcified and noncalcified liver metastases.  8/25/23 CT A/P: Both of contrast between the right common femoral artery and vein which is enhancing similar to the artery. A pseudoaneurysm cannot be excluded. This was present on the prior study. Additionally, I cannot assess the patency of the right common femoral vein. Right lower extremity ultrasound of the inguinal region is recommended. Grossly stable liver metastases. Postsurgical changes.  8/24/23 CT A/P: Status post colectomy with Gloria stump creation. Status post right-sided ileostomy. No evidence for mechanical bowel obstruction. No small bowel wall thickening. Stable peritoneal thickening and nodularity within the right lower pelvis measuring 2.5 x 1.5 cm. No ascites. There is new distention of the right common femoral vein, with central hypodensity, with intraluminal hypodensity also noted within the right femoral vein as well as right iliac venous system, most consistent with interval development of acute DVT. There is rounded hyperdensity noted within the distended right common femoral vein, similar in appearance to contrast within the adjacent femoral artery, indeterminate. Correlate with lower extremity venous Doppler ultrasonography, with attention to the region of the right common femoral vein. Stable noncalcified and calcified hepatic metastasis identified, measuring up to 3.8 cm.  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 Two-vessel runoff in the left calf. High grade stenosis at the origin of the left internal iliac artery. Three-vessel arterial runoff of the right lower extremity. Atherosclerotic disease in the abdomen as detailed above. Otherwise unchanged examination compared to most recent prior imaging 7/9/2023. 3D image post processing was helpful in evaluating the vascular anatomy, supporting the findings stated above.  7/9/23 CT A/P: No significant change when compared to prior study 6/13/2023. No acute findings to suggest inflammatory or infectious process in the abdomen or pelvis, no bowel obstruction. Unchanged hepatic metastasis and peritoneal thickening with serosal implant in the right lower pelvis and postsurgical changes from colectomy and right abdominal ileostomy. No morphologic abnormality at the ileostomy site.  6/13/23 CT C/A/P: New, 8 mm right lower lobe nodule and enlarging pulmonary nodules, 7 mm left apical nodule previously measured 6 mm. Bilobar hepatic metastases, the majority of which are calcified and not significantly changed. A noncalcified lesion in the right hepatic lobe demonstrates mild interval growth, 1.7 x 1.4 cm previously 1.4 x 1.3 cm.  9/1/22 CT C/A/P: Segmental left lower lobe pulmonary embolus and probable smaller pulmonary emboli within the right lung. Interval decrease in size of bilateral pulmonary nodules and hepatic metastases compared to 5/25/2022. No new sites of disease.  5/25/22 CT A/P:  Status post total colectomy and ileostomy. Dilated small bowel loops in the left abdomen with associated wall thickening. Both may be postoperative, follow-up is recommended. Moderate amount of ascites new from the prior study. Small amount of free intraperitoneal air likely with postoperative state. Minimal left pleural effusion. New multifocal right lower lobe infiltrate. Multiple liver metastases some of which are calcified again appreciated.  5/19/22 Path: 1. Omentum, excision: Negative for malignancy. Acutely inflamed exudate consistent with peritonitis 2. Portion of terminal ileum, cecum with adherent sigmoid, total colectomy: Poorly differentiated infiltrating adenocarcinoma measuring 9.0 cm. Adenocarcinoma infiltrates through the bowel wall and through the visceral peritoneum and infiltrates the adherent sigmoid colon 3 of 17 lymph nodes are positive for metastatic carcinoma. Lymphovascular space invasion is identified. Perineural invasion is identified. The surgical margins are negative. Appendix with serosal tumor implants and acute inflammation Peritonitis 3. Liver, biopsy: Metastatic adenocarcinoma 4. Retroperitoneal biopsy: Adenocarcinoma with necrosis No loss of nuclear expression of MMR proteins (MLH1, MSH2, MSH6, and PMS2).   These results show low probability of microsatellite instability-high (MSI-H). Synoptic Summary COLON AND RECTUM: Resection, Including Transanal Disk Excision of Rectal Neoplasms Procedure: Total abdominal colectomy Macroscopic Evaluation of Mesorectum: Not applicable Tumor Site: Cecum Histologic Type:  Adenocarcinoma Histologic Grade: G3, poorly differentiated Tumor Size: Greatest dimension (Centimeters)  9.0 cm Multiple Primary Sites: Not applicable Tumor Extent: Directly invades or adheres to adjacent structure(s) Sigmoid colon Macroscopic Tumor Perforation: Present Lymphovascular Invasion: Present Perineural Invasion: Present Treatment Effect: No known presurgical therapy Margin Status for Invasive Carcinoma: All margins negative for invasive carcinoma Distance from Invasive Carcinoma to Radial (Circumferential) Margin: 5.0 cm Margin Status for Non-Invasive Tumor: Not applicable Regional Lymph Node Status: Tumor present in regional lymph node(s) Number of Lymph Nodes with Tumor    3 Number of Lymph Nodes Examined: 17 Tumor Deposits: Not identified Distant Site(s) Involved: Liver PATHOLOGIC STAGE CLASSIFICATION TNM Descriptors: Not applicable pT Category: pT4b pN Category: pN1b pM Category: pM1c  5/19/22 CT A/P: Apparent focal mural thickening at the cecum/proximal ascending colon may represent colon cancer. The sigmoid colon appears to be tethered to the cecum and it is focally thick-walled; focal tumor invasion/extension from the cecum to the sigmoid colon cannot be excluded. Dilatation of the small bowel with an apparent transition point in the right lower quadrant abdomen, suggestive of small bowel obstruction. Apparent mild mural thickening of the a dilated right lower quadrant small bowel loop with adjacent mesenteric edema for which associated small bowel ischemia cannot be excluded. Multiple hypodense lesions with calcifications in both lobes of the liver with the largest measuring 5.8 x 6.0 cm in hepatic segment 8. These are suspicious for metastases. Mild ascites.

## 2024-04-30 NOTE — ASSESSMENT
[With Patient/Caregiver] : With Patient/Caregiver [FreeTextEntry1] : For patients with KRAS G12C mutant mCRC who decline or do not have access to clinical trials, can treat with either sotorasib plus panitumumab or adagrasib plus cetuximab, rather than single-agent treatment or other systemic agents.  In patients with treatment-refractory KRAS G12C mutant mCRC, the combination of sotorasib plus panitumumab improved PFS and was well-tolerated in a randomized phase III trial (CodeBreaK 300).   Objective responses were highest for sotorasib at 960 mg plus panitumumab (26 percent) compared with sotorasib at 240 mg plus panitumumab (6 percent) and trifluridine-tipiracil or regorafenib (0 percent). OS results are immature.  Grade 3 toxicity rates were lower for panitumumab plus sotorasib at 960 mg (36 percent) or 240 mg (30 percent) versus trifluridine-tipiricil or regorafenib (43 percent). The most frequent grade 3 toxicities for panitumumab plus either sotorasib at 960 mg or 240 mg included diarrhea (4 and 6 percent), nausea (2 and 4 percent), hypomagnesemia (6 and 8 percent), rash (6 and 2 percent), dermatitis acneiform (11 and 4 percent), and skin-related toxic effect (4 and 2 percent). For patients who are unable to tolerate the combination due to toxicity from the EGFR inhibitor, single-agent sotarasib or adagrasib is a reasonable alternative.  Patients with LUCIAN-mutated mCRC do not benefit from EGFR inhibitors. In particular, patients with mCRC that harbors a KRAS G12C mutation have poor treatment outcomes. Treatment-related resistance rapidly develops in these tumors, and studies suggest that the primary resistance mechanism is increased EGFR signaling.    Divarasib, sotorasib, and adagrasib are irreversible inhibitors that target KRAS G12C. In patients with KRAS G12C mutant mCRC, these drugs have been evaluated in combination with EGFR inhibitors (to reverse tumor resistance to these inhibitors) and as monotherapy. [AdvancecareDate] : 1/5/23

## 2024-05-02 ENCOUNTER — RESULT REVIEW (OUTPATIENT)
Age: 54
End: 2024-05-02

## 2024-05-02 ENCOUNTER — APPOINTMENT (OUTPATIENT)
Dept: INFUSION THERAPY | Facility: CLINIC | Age: 54
End: 2024-05-02

## 2024-05-02 ENCOUNTER — NON-APPOINTMENT (OUTPATIENT)
Age: 54
End: 2024-05-02

## 2024-05-02 ENCOUNTER — RESULT CHARGE (OUTPATIENT)
Age: 54
End: 2024-05-02

## 2024-05-02 ENCOUNTER — APPOINTMENT (OUTPATIENT)
Dept: HEMATOLOGY ONCOLOGY | Facility: CLINIC | Age: 54
End: 2024-05-02

## 2024-05-02 VITALS
WEIGHT: 183 LBS | BODY MASS INDEX: 23.49 KG/M2 | TEMPERATURE: 98.6 F | HEART RATE: 82 BPM | DIASTOLIC BLOOD PRESSURE: 82 MMHG | SYSTOLIC BLOOD PRESSURE: 117 MMHG | OXYGEN SATURATION: 99 % | HEIGHT: 74 IN

## 2024-05-02 LAB
ALBUMIN SERPL ELPH-MCNC: 4.7 G/DL — SIGNIFICANT CHANGE UP (ref 3.3–5)
ALP SERPL-CCNC: 151 U/L — HIGH (ref 40–120)
ALT FLD-CCNC: 40 U/L — SIGNIFICANT CHANGE UP (ref 10–45)
ANION GAP SERPL CALC-SCNC: 15 MMOL/L — SIGNIFICANT CHANGE UP (ref 5–17)
AST SERPL-CCNC: 28 U/L — SIGNIFICANT CHANGE UP (ref 10–40)
BASOPHILS # BLD AUTO: 0.05 K/UL — SIGNIFICANT CHANGE UP (ref 0–0.2)
BASOPHILS NFR BLD AUTO: 0.5 % — SIGNIFICANT CHANGE UP (ref 0–2)
BILIRUB SERPL-MCNC: 0.4 MG/DL — SIGNIFICANT CHANGE UP (ref 0.2–1.2)
BUN SERPL-MCNC: 12 MG/DL — SIGNIFICANT CHANGE UP (ref 7–23)
CALCIUM SERPL-MCNC: 9.3 MG/DL — SIGNIFICANT CHANGE UP (ref 8.4–10.5)
CEA SERPL-MCNC: 138 NG/ML — HIGH (ref 0–3.8)
CHLORIDE SERPL-SCNC: 102 MMOL/L — SIGNIFICANT CHANGE UP (ref 96–108)
CO2 SERPL-SCNC: 20 MMOL/L — LOW (ref 22–31)
CREAT SERPL-MCNC: 0.77 MG/DL — SIGNIFICANT CHANGE UP (ref 0.5–1.3)
EGFR: 108 ML/MIN/1.73M2 — SIGNIFICANT CHANGE UP
EOSINOPHIL # BLD AUTO: 0.11 K/UL — SIGNIFICANT CHANGE UP (ref 0–0.5)
EOSINOPHIL NFR BLD AUTO: 1.1 % — SIGNIFICANT CHANGE UP (ref 0–6)
GLUCOSE SERPL-MCNC: 94 MG/DL — SIGNIFICANT CHANGE UP (ref 70–99)
HCT VFR BLD CALC: 45.1 % — SIGNIFICANT CHANGE UP (ref 39–50)
HGB BLD-MCNC: 14.9 G/DL — SIGNIFICANT CHANGE UP (ref 13–17)
IMM GRANULOCYTES NFR BLD AUTO: 0.4 % — SIGNIFICANT CHANGE UP (ref 0–0.9)
LYMPHOCYTES # BLD AUTO: 1.49 K/UL — SIGNIFICANT CHANGE UP (ref 1–3.3)
LYMPHOCYTES # BLD AUTO: 15.3 % — SIGNIFICANT CHANGE UP (ref 13–44)
MAGNESIUM SERPL-MCNC: 2.2 MG/DL — SIGNIFICANT CHANGE UP (ref 1.6–2.6)
MCHC RBC-ENTMCNC: 29.2 PG — SIGNIFICANT CHANGE UP (ref 27–34)
MCHC RBC-ENTMCNC: 33 GM/DL — SIGNIFICANT CHANGE UP (ref 32–36)
MCV RBC AUTO: 88.3 FL — SIGNIFICANT CHANGE UP (ref 80–100)
MONOCYTES # BLD AUTO: 0.87 K/UL — SIGNIFICANT CHANGE UP (ref 0–0.9)
MONOCYTES NFR BLD AUTO: 9 % — SIGNIFICANT CHANGE UP (ref 2–14)
NEUTROPHILS # BLD AUTO: 7.16 K/UL — SIGNIFICANT CHANGE UP (ref 1.8–7.4)
NEUTROPHILS NFR BLD AUTO: 73.7 % — SIGNIFICANT CHANGE UP (ref 43–77)
NRBC # BLD: 0 /100 WBCS — SIGNIFICANT CHANGE UP (ref 0–0)
PLATELET # BLD AUTO: 205 K/UL — SIGNIFICANT CHANGE UP (ref 150–400)
POTASSIUM SERPL-MCNC: 4.7 MMOL/L — SIGNIFICANT CHANGE UP (ref 3.5–5.3)
POTASSIUM SERPL-SCNC: 4.7 MMOL/L — SIGNIFICANT CHANGE UP (ref 3.5–5.3)
PROT SERPL-MCNC: 7.8 G/DL — SIGNIFICANT CHANGE UP (ref 6–8.3)
RBC # BLD: 5.11 M/UL — SIGNIFICANT CHANGE UP (ref 4.2–5.8)
RBC # FLD: 15.9 % — HIGH (ref 10.3–14.5)
SODIUM SERPL-SCNC: 137 MMOL/L — SIGNIFICANT CHANGE UP (ref 135–145)
WBC # BLD: 9.72 K/UL — SIGNIFICANT CHANGE UP (ref 3.8–10.5)
WBC # FLD AUTO: 9.72 K/UL — SIGNIFICANT CHANGE UP (ref 3.8–10.5)

## 2024-05-02 PROCEDURE — 93010 ELECTROCARDIOGRAM REPORT: CPT

## 2024-05-08 ENCOUNTER — APPOINTMENT (OUTPATIENT)
Dept: INFUSION THERAPY | Facility: CLINIC | Age: 54
End: 2024-05-08

## 2024-05-08 ENCOUNTER — APPOINTMENT (OUTPATIENT)
Dept: HEMATOLOGY ONCOLOGY | Facility: CLINIC | Age: 54
End: 2024-05-08

## 2024-05-08 ENCOUNTER — NON-APPOINTMENT (OUTPATIENT)
Age: 54
End: 2024-05-08

## 2024-05-10 ENCOUNTER — APPOINTMENT (OUTPATIENT)
Dept: INFUSION THERAPY | Facility: CLINIC | Age: 54
End: 2024-05-10

## 2024-05-16 ENCOUNTER — APPOINTMENT (OUTPATIENT)
Dept: HEMATOLOGY ONCOLOGY | Facility: CLINIC | Age: 54
End: 2024-05-16

## 2024-05-17 ENCOUNTER — RESULT REVIEW (OUTPATIENT)
Age: 54
End: 2024-05-17

## 2024-05-17 ENCOUNTER — APPOINTMENT (OUTPATIENT)
Dept: INFUSION THERAPY | Facility: CLINIC | Age: 54
End: 2024-05-17

## 2024-05-17 ENCOUNTER — APPOINTMENT (OUTPATIENT)
Dept: INFUSION THERAPY | Facility: CLINIC | Age: 54
End: 2024-05-17
Payer: MEDICAID

## 2024-05-17 ENCOUNTER — APPOINTMENT (OUTPATIENT)
Dept: HEMATOLOGY ONCOLOGY | Facility: CLINIC | Age: 54
End: 2024-05-17
Payer: MEDICAID

## 2024-05-17 ENCOUNTER — APPOINTMENT (OUTPATIENT)
Dept: HEMATOLOGY ONCOLOGY | Facility: CLINIC | Age: 54
End: 2024-05-17

## 2024-05-17 VITALS
BODY MASS INDEX: 23.87 KG/M2 | TEMPERATURE: 98.6 F | HEIGHT: 74 IN | SYSTOLIC BLOOD PRESSURE: 119 MMHG | HEART RATE: 105 BPM | OXYGEN SATURATION: 97 % | WEIGHT: 186 LBS | DIASTOLIC BLOOD PRESSURE: 77 MMHG

## 2024-05-17 DIAGNOSIS — I26.99 OTHER PULMONARY EMBOLISM W/OUT ACUTE COR PULMONALE: ICD-10-CM

## 2024-05-17 DIAGNOSIS — C18.9 MALIGNANT NEOPLASM OF COLON, UNSPECIFIED: ICD-10-CM

## 2024-05-17 DIAGNOSIS — L70.8 OTHER ACNE: ICD-10-CM

## 2024-05-17 DIAGNOSIS — Z93.2 ILEOSTOMY STATUS: ICD-10-CM

## 2024-05-17 DIAGNOSIS — C78.7 MALIGNANT NEOPLASM OF COLON, UNSPECIFIED: ICD-10-CM

## 2024-05-17 LAB
BASOPHILS # BLD AUTO: 0.05 K/UL — SIGNIFICANT CHANGE UP (ref 0–0.2)
BASOPHILS NFR BLD AUTO: 0.6 % — SIGNIFICANT CHANGE UP (ref 0–2)
EOSINOPHIL # BLD AUTO: 0.34 K/UL — SIGNIFICANT CHANGE UP (ref 0–0.5)
EOSINOPHIL NFR BLD AUTO: 3.9 % — SIGNIFICANT CHANGE UP (ref 0–6)
HCT VFR BLD CALC: 39.7 % — SIGNIFICANT CHANGE UP (ref 39–50)
HGB BLD-MCNC: 13.2 G/DL — SIGNIFICANT CHANGE UP (ref 13–17)
IMM GRANULOCYTES NFR BLD AUTO: 0.2 % — SIGNIFICANT CHANGE UP (ref 0–0.9)
LYMPHOCYTES # BLD AUTO: 1.68 K/UL — SIGNIFICANT CHANGE UP (ref 1–3.3)
LYMPHOCYTES # BLD AUTO: 19.2 % — SIGNIFICANT CHANGE UP (ref 13–44)
MCHC RBC-ENTMCNC: 29.3 PG — SIGNIFICANT CHANGE UP (ref 27–34)
MCHC RBC-ENTMCNC: 33.2 GM/DL — SIGNIFICANT CHANGE UP (ref 32–36)
MCV RBC AUTO: 88.2 FL — SIGNIFICANT CHANGE UP (ref 80–100)
MONOCYTES # BLD AUTO: 0.77 K/UL — SIGNIFICANT CHANGE UP (ref 0–0.9)
MONOCYTES NFR BLD AUTO: 8.8 % — SIGNIFICANT CHANGE UP (ref 2–14)
NEUTROPHILS # BLD AUTO: 5.91 K/UL — SIGNIFICANT CHANGE UP (ref 1.8–7.4)
NEUTROPHILS NFR BLD AUTO: 67.3 % — SIGNIFICANT CHANGE UP (ref 43–77)
NRBC # BLD: 0 /100 WBCS — SIGNIFICANT CHANGE UP (ref 0–0)
PLATELET # BLD AUTO: 135 K/UL — LOW (ref 150–400)
RBC # BLD: 4.5 M/UL — SIGNIFICANT CHANGE UP (ref 4.2–5.8)
RBC # FLD: 14.6 % — HIGH (ref 10.3–14.5)
WBC # BLD: 8.77 K/UL — SIGNIFICANT CHANGE UP (ref 3.8–10.5)
WBC # FLD AUTO: 8.77 K/UL — SIGNIFICANT CHANGE UP (ref 3.8–10.5)

## 2024-05-17 PROCEDURE — G2211 COMPLEX E/M VISIT ADD ON: CPT | Mod: NC,1L

## 2024-05-17 RX ORDER — CLINDAMYCIN PHOSPHATE 1 G/10ML
1 GEL TOPICAL 3 TIMES DAILY
Qty: 3 | Refills: 6 | Status: ACTIVE | COMMUNITY
Start: 2024-05-17 | End: 1900-01-01

## 2024-05-17 RX ORDER — DOXYCYCLINE HYCLATE 100 MG/1
100 TABLET ORAL TWICE DAILY
Qty: 60 | Refills: 3 | Status: ACTIVE | COMMUNITY
Start: 2024-05-17 | End: 1900-01-01

## 2024-05-17 RX ORDER — ENOXAPARIN SODIUM 150 MG/ML
150 INJECTION, SOLUTION SUBCUTANEOUS
Qty: 30 | Refills: 6 | Status: ACTIVE | COMMUNITY
Start: 2023-08-16 | End: 1900-01-01

## 2024-05-18 LAB
ALBUMIN SERPL ELPH-MCNC: 4.3 G/DL — SIGNIFICANT CHANGE UP (ref 3.3–5)
ALP SERPL-CCNC: 86 U/L — SIGNIFICANT CHANGE UP (ref 40–120)
ALT FLD-CCNC: 17 U/L — SIGNIFICANT CHANGE UP (ref 10–45)
ANION GAP SERPL CALC-SCNC: 17 MMOL/L — SIGNIFICANT CHANGE UP (ref 5–17)
AST SERPL-CCNC: 13 U/L — SIGNIFICANT CHANGE UP (ref 10–40)
BILIRUB SERPL-MCNC: <0.2 MG/DL — SIGNIFICANT CHANGE UP (ref 0.2–1.2)
BUN SERPL-MCNC: 10 MG/DL — SIGNIFICANT CHANGE UP (ref 7–23)
CALCIUM SERPL-MCNC: 9.1 MG/DL — SIGNIFICANT CHANGE UP (ref 8.4–10.5)
CHLORIDE SERPL-SCNC: 100 MMOL/L — SIGNIFICANT CHANGE UP (ref 96–108)
CO2 SERPL-SCNC: 19 MMOL/L — LOW (ref 22–31)
CREAT SERPL-MCNC: 0.66 MG/DL — SIGNIFICANT CHANGE UP (ref 0.5–1.3)
EGFR: 114 ML/MIN/1.73M2 — SIGNIFICANT CHANGE UP
GLUCOSE SERPL-MCNC: 93 MG/DL — SIGNIFICANT CHANGE UP (ref 70–99)
MAGNESIUM SERPL-MCNC: 1.9 MG/DL — SIGNIFICANT CHANGE UP (ref 1.6–2.6)
POTASSIUM SERPL-MCNC: 4.1 MMOL/L — SIGNIFICANT CHANGE UP (ref 3.5–5.3)
POTASSIUM SERPL-SCNC: 4.1 MMOL/L — SIGNIFICANT CHANGE UP (ref 3.5–5.3)
PROT SERPL-MCNC: 7.2 G/DL — SIGNIFICANT CHANGE UP (ref 6–8.3)
SODIUM SERPL-SCNC: 136 MMOL/L — SIGNIFICANT CHANGE UP (ref 135–145)

## 2024-05-22 ENCOUNTER — APPOINTMENT (OUTPATIENT)
Dept: INFUSION THERAPY | Facility: CLINIC | Age: 54
End: 2024-05-22

## 2024-05-22 DIAGNOSIS — L70.8 OTHER ACNE: ICD-10-CM

## 2024-05-22 DIAGNOSIS — I26.99 OTHER PULMONARY EMBOLISM WITHOUT ACUTE COR PULMONALE: ICD-10-CM

## 2024-05-22 PROBLEM — Z93.2 ILEOSTOMY IN PLACE: Status: ACTIVE | Noted: 2022-06-06

## 2024-05-22 PROBLEM — C18.9 METASTATIC COLON CANCER TO LIVER: Status: ACTIVE | Noted: 2022-06-06

## 2024-05-22 NOTE — REVIEW OF SYSTEMS
[Fatigue] : fatigue [Abdominal Pain] : abdominal pain [Diarrhea: Grade 0] : Diarrhea: Grade 0 [Negative] : Allergic/Immunologic [Recent Change In Weight] : ~T no recent weight change [Chest Pain] : no chest pain [Palpitations] : no palpitations [Shortness Of Breath] : no shortness of breath [Constipation] : no constipation [Joint Pain] : no joint pain [Skin Rash] : no skin rash [FreeTextEntry2] : wt stable [FreeTextEntry7] : ileotomy in place, functioning normally, prolapsed; abd pain  [de-identified] : ostomy

## 2024-05-22 NOTE — HISTORY OF PRESENT ILLNESS
[de-identified] : The patient was diagnosed with colon cancer in May 2022 at the age of 51. He presented to  ER 5/19/22 with worsening abdominal pain for 4 days. He reports abdominal pain has been ongoing for 4 years. On 5/19/22 he had a CT A/P which showed apparent focal mural thickening at the cecum/proximal ascending colon may represent colon cancer. The sigmoid colon appears to be tethered to the cecum and it is focally thick-walled; focal tumor invasion/extension from the cecum to the sigmoid colon cannot be excluded. Dilatation of the small bowel with an apparent transition point in the right lower quadrant abdomen, suggestive of small bowel obstruction. Apparent mild mural thickening of the a dilated right lower quadrant small bowel loop with adjacent mesenteric edema for which associated small bowel ischemia cannot be excluded. Multiple hypodense lesions with calcifications in both lobes of the liver with the largest measuring 5.8 x 6.0 cm in hepatic segment 8. These are suspicious for metastases. Mild ascites. Also on 5/19/22 he underwent exploratory laparotomy, total colectomy, end ileostomy, biopsy of liver, and biopsy of retroperitoneum. Pathology showed Omentum, excision: Negative for malignancy. Acutely inflamed exudate consistent with peritonitis. Portion of terminal ileum, cecum with adherent sigmoid, total colectomy: Poorly differentiated infiltrating adenocarcinoma measuring 9.0 cm. Adenocarcinoma infiltrates through the bowel wall and through the visceral peritoneum and infiltrates the adherent sigmoid colon 3 of 17 lymph nodes are positive for metastatic carcinoma. Lymphovascular space invasion is identified. Perineural invasion is identified. The surgical margins are negative. Appendix with serosal tumor implants and acute inflammation. Peritonitis. Liver, biopsy: Metastatic adenocarcinoma. Retroperitoneal biopsy: Adenocarcinoma with necrosis. No loss of nuclear expression of MMR proteins (MLH1, MSH2, MSH6, and PMS2). Staging pT4b pN1b pM1c. On 5/25/22 he had a repeat CT A/P which showed status post total colectomy and ileostomy. Dilated small bowel loops in the left abdomen with associated wall thickening. Both may be postoperative, follow-up is recommended. Moderate amount of ascites new from the prior study. Small amount of free intraperitoneal air likely with postoperative state. Minimal left pleural effusion. New multifocal right lower lobe infiltrate. Multiple liver metastases some of which are calcified again appreciated. [de-identified] : Medical Hx: denies \par  Surgical Hx: denies \par  Family Hx: denies family history of cancer \par  Social Hx: he smoked THC and cigarettes daily for 30 years, quit May 19th, 2022; ETOH had 6 pack a day, quit 4 years ago; was a  (off the [employer]) no longer working; lives with mom and brother.   [de-identified] : He was seen at  ER on Saturday, 9/9/23, he was BIBEMS for abdominal pain, and sent home.  Patient was admitted again from 09/25 - 10/01 and 10/7 - 10/10, [facility] both times. In the hospital notes, it is noted that he was binge drinking, smoking cigarettes, cocaine and marijuana prior to ER visit.  He completed FOLFOXIRI / Zirabev, planned on 4/10/24. Oxali stopped after cycle 10.  Today he is C2D1 of Panitumumab of ongoing cycles planned. He is also on Sotorasib 960mg PO QD ongoing, he takes 3 tabs daily, no missed doses. A grade 2-3 EGFR rash on his face, clindamycin and doxycycline ordered. He continues with left foot numbness from prior treatment. Denies any other sxs or chemotherapy. Ostomy remains prolapsed and functioning.  He has abd pain, working with [facility], stable today. He continues to live with his sister, no that his mom was moved to a nursing home late last year. This remains a stressor of his, he will continue to work with . His wt is stable overall. He continues to be compliant with his lovenox injection, daily.

## 2024-05-22 NOTE — PHYSICAL EXAM
[Restricted in physically strenuous activity but ambulatory and able to carry out work of a light or sedentary nature] : Status 1- Restricted in physically strenuous activity but ambulatory and able to carry out work of a light or sedentary nature, e.g., light house work, office work [Normal] : affect appropriate [de-identified] : wt stable [de-identified] : protruding 5" ostomy, pink stoma, loose stool in bag as appropriate  [de-identified] : R chest wall port accessed, dressing C/D/I

## 2024-05-24 ENCOUNTER — APPOINTMENT (OUTPATIENT)
Dept: INFUSION THERAPY | Facility: CLINIC | Age: 54
End: 2024-05-24

## 2024-05-28 ENCOUNTER — EMERGENCY (EMERGENCY)
Facility: HOSPITAL | Age: 54
LOS: 0 days | Discharge: HOME | End: 2024-05-28
Attending: STUDENT IN AN ORGANIZED HEALTH CARE EDUCATION/TRAINING PROGRAM
Payer: MEDICAID

## 2024-05-28 VITALS
SYSTOLIC BLOOD PRESSURE: 103 MMHG | DIASTOLIC BLOOD PRESSURE: 74 MMHG | TEMPERATURE: 98 F | HEART RATE: 68 BPM | OXYGEN SATURATION: 100 % | RESPIRATION RATE: 13 BRPM

## 2024-05-28 VITALS — HEIGHT: 74 IN

## 2024-05-28 DIAGNOSIS — Z91.09 OTHER ALLERGY STATUS, OTHER THAN TO DRUGS AND BIOLOGICAL SUBSTANCES: ICD-10-CM

## 2024-05-28 DIAGNOSIS — R21 RASH AND OTHER NONSPECIFIC SKIN ERUPTION: ICD-10-CM

## 2024-05-28 DIAGNOSIS — K92.2 GASTROINTESTINAL HEMORRHAGE, UNSPECIFIED: ICD-10-CM

## 2024-05-28 DIAGNOSIS — Z98.890 OTHER SPECIFIED POSTPROCEDURAL STATES: Chronic | ICD-10-CM

## 2024-05-28 DIAGNOSIS — Z85.05 PERSONAL HISTORY OF MALIGNANT NEOPLASM OF LIVER: ICD-10-CM

## 2024-05-28 DIAGNOSIS — Z90.49 ACQUIRED ABSENCE OF OTHER SPECIFIED PARTS OF DIGESTIVE TRACT: Chronic | ICD-10-CM

## 2024-05-28 DIAGNOSIS — Z93.3 COLOSTOMY STATUS: ICD-10-CM

## 2024-05-28 DIAGNOSIS — Z90.49 ACQUIRED ABSENCE OF OTHER SPECIFIED PARTS OF DIGESTIVE TRACT: ICD-10-CM

## 2024-05-28 DIAGNOSIS — Z86.711 PERSONAL HISTORY OF PULMONARY EMBOLISM: ICD-10-CM

## 2024-05-28 DIAGNOSIS — Z85.038 PERSONAL HISTORY OF OTHER MALIGNANT NEOPLASM OF LARGE INTESTINE: ICD-10-CM

## 2024-05-28 LAB
ALBUMIN SERPL ELPH-MCNC: 3.8 G/DL — SIGNIFICANT CHANGE UP (ref 3.3–5)
ALP SERPL-CCNC: 76 U/L — SIGNIFICANT CHANGE UP (ref 40–120)
ALT FLD-CCNC: 29 U/L — SIGNIFICANT CHANGE UP (ref 12–78)
ANION GAP SERPL CALC-SCNC: 4 MMOL/L — LOW (ref 5–17)
APTT BLD: 35.1 SEC — SIGNIFICANT CHANGE UP (ref 24.5–35.6)
AST SERPL-CCNC: 19 U/L — SIGNIFICANT CHANGE UP (ref 15–37)
BASOPHILS # BLD AUTO: 0.04 K/UL — SIGNIFICANT CHANGE UP (ref 0–0.2)
BASOPHILS NFR BLD AUTO: 0.6 % — SIGNIFICANT CHANGE UP (ref 0–2)
BILIRUB SERPL-MCNC: 0.3 MG/DL — SIGNIFICANT CHANGE UP (ref 0.2–1.2)
BLD GP AB SCN SERPL QL: SIGNIFICANT CHANGE UP
BUN SERPL-MCNC: 11 MG/DL — SIGNIFICANT CHANGE UP (ref 7–23)
CALCIUM SERPL-MCNC: 9.7 MG/DL — SIGNIFICANT CHANGE UP (ref 8.5–10.1)
CHLORIDE SERPL-SCNC: 105 MMOL/L — SIGNIFICANT CHANGE UP (ref 96–108)
CO2 SERPL-SCNC: 27 MMOL/L — SIGNIFICANT CHANGE UP (ref 22–31)
CREAT SERPL-MCNC: 0.9 MG/DL — SIGNIFICANT CHANGE UP (ref 0.5–1.3)
EGFR: 103 ML/MIN/1.73M2 — SIGNIFICANT CHANGE UP
EOSINOPHIL # BLD AUTO: 0.21 K/UL — SIGNIFICANT CHANGE UP (ref 0–0.5)
EOSINOPHIL NFR BLD AUTO: 3.1 % — SIGNIFICANT CHANGE UP (ref 0–6)
GLUCOSE SERPL-MCNC: 120 MG/DL — HIGH (ref 70–99)
HCT VFR BLD CALC: 46.6 % — SIGNIFICANT CHANGE UP (ref 39–50)
HGB BLD-MCNC: 14.9 G/DL — SIGNIFICANT CHANGE UP (ref 13–17)
IMM GRANULOCYTES NFR BLD AUTO: 0.1 % — SIGNIFICANT CHANGE UP (ref 0–0.9)
INR BLD: 0.97 RATIO — SIGNIFICANT CHANGE UP (ref 0.85–1.18)
LACTATE SERPL-SCNC: 1 MMOL/L — SIGNIFICANT CHANGE UP (ref 0.7–2)
LIDOCAIN IGE QN: 57 U/L — SIGNIFICANT CHANGE UP (ref 13–75)
LYMPHOCYTES # BLD AUTO: 1.36 K/UL — SIGNIFICANT CHANGE UP (ref 1–3.3)
LYMPHOCYTES # BLD AUTO: 20.1 % — SIGNIFICANT CHANGE UP (ref 13–44)
MCHC RBC-ENTMCNC: 29.2 PG — SIGNIFICANT CHANGE UP (ref 27–34)
MCHC RBC-ENTMCNC: 32 GM/DL — SIGNIFICANT CHANGE UP (ref 32–36)
MCV RBC AUTO: 91.2 FL — SIGNIFICANT CHANGE UP (ref 80–100)
MONOCYTES # BLD AUTO: 0.61 K/UL — SIGNIFICANT CHANGE UP (ref 0–0.9)
MONOCYTES NFR BLD AUTO: 9 % — SIGNIFICANT CHANGE UP (ref 2–14)
NEUTROPHILS # BLD AUTO: 4.52 K/UL — SIGNIFICANT CHANGE UP (ref 1.8–7.4)
NEUTROPHILS NFR BLD AUTO: 67.1 % — SIGNIFICANT CHANGE UP (ref 43–77)
PLATELET # BLD AUTO: 151 K/UL — SIGNIFICANT CHANGE UP (ref 150–400)
POTASSIUM SERPL-MCNC: 4.1 MMOL/L — SIGNIFICANT CHANGE UP (ref 3.5–5.3)
POTASSIUM SERPL-SCNC: 4.1 MMOL/L — SIGNIFICANT CHANGE UP (ref 3.5–5.3)
PROT SERPL-MCNC: 8.5 GM/DL — HIGH (ref 6–8.3)
PROTHROM AB SERPL-ACNC: 11 SEC — SIGNIFICANT CHANGE UP (ref 9.5–13)
RBC # BLD: 5.11 M/UL — SIGNIFICANT CHANGE UP (ref 4.2–5.8)
RBC # FLD: 14.4 % — SIGNIFICANT CHANGE UP (ref 10.3–14.5)
SODIUM SERPL-SCNC: 136 MMOL/L — SIGNIFICANT CHANGE UP (ref 135–145)
WBC # BLD: 6.75 K/UL — SIGNIFICANT CHANGE UP (ref 3.8–10.5)
WBC # FLD AUTO: 6.75 K/UL — SIGNIFICANT CHANGE UP (ref 3.8–10.5)

## 2024-05-28 PROCEDURE — 83690 ASSAY OF LIPASE: CPT

## 2024-05-28 PROCEDURE — 83605 ASSAY OF LACTIC ACID: CPT

## 2024-05-28 PROCEDURE — 93005 ELECTROCARDIOGRAM TRACING: CPT

## 2024-05-28 PROCEDURE — 85610 PROTHROMBIN TIME: CPT

## 2024-05-28 PROCEDURE — 86901 BLOOD TYPING SEROLOGIC RH(D): CPT

## 2024-05-28 PROCEDURE — 86900 BLOOD TYPING SEROLOGIC ABO: CPT

## 2024-05-28 PROCEDURE — 80053 COMPREHEN METABOLIC PANEL: CPT

## 2024-05-28 PROCEDURE — 74177 CT ABD & PELVIS W/CONTRAST: CPT | Mod: MC

## 2024-05-28 PROCEDURE — 99285 EMERGENCY DEPT VISIT HI MDM: CPT

## 2024-05-28 PROCEDURE — 86850 RBC ANTIBODY SCREEN: CPT

## 2024-05-28 PROCEDURE — 85730 THROMBOPLASTIN TIME PARTIAL: CPT

## 2024-05-28 PROCEDURE — 96374 THER/PROPH/DIAG INJ IV PUSH: CPT | Mod: XU

## 2024-05-28 PROCEDURE — 85025 COMPLETE CBC W/AUTO DIFF WBC: CPT

## 2024-05-28 PROCEDURE — 93010 ELECTROCARDIOGRAM REPORT: CPT

## 2024-05-28 PROCEDURE — 99285 EMERGENCY DEPT VISIT HI MDM: CPT | Mod: 25

## 2024-05-28 PROCEDURE — 36415 COLL VENOUS BLD VENIPUNCTURE: CPT

## 2024-05-28 PROCEDURE — 74177 CT ABD & PELVIS W/CONTRAST: CPT | Mod: 26,MC

## 2024-05-28 RX ORDER — MORPHINE SULFATE 50 MG/1
8 CAPSULE, EXTENDED RELEASE ORAL ONCE
Refills: 0 | Status: DISCONTINUED | OUTPATIENT
Start: 2024-05-28 | End: 2024-05-28

## 2024-05-28 RX ORDER — SODIUM CHLORIDE 9 MG/ML
1000 INJECTION INTRAMUSCULAR; INTRAVENOUS; SUBCUTANEOUS ONCE
Refills: 0 | Status: COMPLETED | OUTPATIENT
Start: 2024-05-28 | End: 2024-05-28

## 2024-05-28 RX ADMIN — MORPHINE SULFATE 8 MILLIGRAM(S): 50 CAPSULE, EXTENDED RELEASE ORAL at 15:43

## 2024-05-28 RX ADMIN — SODIUM CHLORIDE 1000 MILLILITER(S): 9 INJECTION INTRAMUSCULAR; INTRAVENOUS; SUBCUTANEOUS at 15:43

## 2024-05-28 NOTE — ED ADULT NURSE NOTE - CHIEF COMPLAINT QUOTE
pt presents to ED due to reaction to new chemo pills blisters on face bleeding at stoma site with abscess sent by [facility] bleeding at ostomy site

## 2024-05-28 NOTE — CONSULT NOTE ADULT - ASSESSMENT
51 yo male with metastatic colon CA s/p ileostomy with revision in Apr '23 p/w reducible prolapsed stoma     His ostomy is reducible with adequate output   There is no acute surgical intervention  indicated at this time  Patient should call the office of Dr Mckeon tomorrow morning for f/u   Medical evaluation for skin rashes         Discussed with Attending

## 2024-05-28 NOTE — ED ADULT NURSE NOTE - OBJECTIVE STATEMENT
Pt comes to the ED complaining of bleeding from his stoma site intermittently over the past few days. Pt states that he has an abscess under his stoma from it coming in and out and rubbing on the aparatus. Pt states that he is on a new chemotherapy drug and that is why he has the painful lesions on his face.

## 2024-05-28 NOTE — ED PROVIDER NOTE - CLINICAL SUMMARY MEDICAL DECISION MAKING FREE TEXT BOX
53-year-old male presenting with bright red blood in his ostomy site, otherwise normal output.  Also notes protrusion of his ostomy approximately 8 inches, will retract with relaxation.  Abdomen is otherwise soft nontender.  Will evaluate with blood work, CT abdomen, pain control, reassess.

## 2024-05-28 NOTE — ED ADULT TRIAGE NOTE - CHIEF COMPLAINT QUOTE
pt presents to ED due to reaction to new chemo pills blisters on face bleeding at stoma site with abscess sent by [facility] bleeding at ostomy site

## 2024-05-28 NOTE — ED PROVIDER NOTE - PROGRESS NOTE DETAILS
Sal Simpson for attending Dr. Duarte: Spoke to surgery resident. Will see pt. CC:  Signout received from Dr. Duarte to f/u labs, CT report, Surgery consult.  labs, CT A/P unremarkable.  Erath-Rectal consult appreciated: no acute intervention indicated, advises DC home for office f/u Dr. Mckeon tomorrow AM.

## 2024-05-28 NOTE — ED PROVIDER NOTE - PATIENT PORTAL LINK FT
You can access the FollowMyHealth Patient Portal offered by [facility] by registering at the following website: [email or web address]. By joining [facility]’s FollowMyHealth portal, you will also be able to view your health information using other applications (apps) compatible with our system.

## 2024-05-28 NOTE — ED PROVIDER NOTE - OBJECTIVE STATEMENT
52 y/o male with a PMHx of colon cancer with mets to liver, s/p colon resection and ileostomy, PE presents to the ED c/o bleeding from stoma site with abscess and reaction to new chemotherapy meds. Pt reports painful blisters on his face from his chemotherapy pills. No other complaints at this time.

## 2024-05-28 NOTE — ED PROVIDER NOTE - PHYSICAL EXAMINATION
GENERAL: A&Ox4, non-toxic appearing, no acute distress  HEENT: NCAT, EOMI, oral mucosa moist, normal conjunctiva  RESP: CTAB, no respiratory distress, no wheezes/rhonchi/rales, speaking in full sentences  CV: RRR, no murmurs/rubs/gallops  ABDOMEN: soft, non-tender, non-distended, no guarding, no rebound tenderness, ostomy with approx 8 in. of protruding small bowel  MSK: no visible deformities  NEURO: no focal sensory or motor deficits, CN 2-12 grossly intact  SKIN: warm, normal color, well perfused, verrucous rash of anterior face  PSYCH: normal affect

## 2024-05-28 NOTE — CONSULT NOTE ADULT - SUBJECTIVE AND OBJECTIVE BOX
HPI:        PAST MEDICAL & SURGICAL HISTORY:  Cancer, colon  liver mets      S/P ileostomy      Metastasis to liver      Pulmonary embolism      S/P colon resection      H/O ileostomy          MEDICATIONS  (STANDING):    MEDICATIONS  (PRN):      Allergies    [This allergen will not trigger allergy alert] No Known Drug Allergies (Unknown)    Intolerances        SOCIAL HISTORY:    FAMILY HISTORY:  FH: dementia (Mother)            Physical Exam:  General: NAD, resting comfortably  HEENT: NC/AT, EOMI, normal hearing, no oral lesions, no LAD, neck supple  Pulmonary: normal resp effort, CTA-B  Cardiovascular: NSR, no murmurs  Abdominal: soft, ND/NT, no organomegaly  Extremities: WWP, normal strength, no clubbing/cyanosis/edema  Neuro: A/O x 3, CNs II-XII grossly intact, normal sensation, no focal deficits  Pulses: palpable distal pulses    Vital Signs Last 24 Hrs  T(C): 36.9 (28 May 2024 15:16), Max: 36.9 (28 May 2024 15:16)  T(F): 98.5 (28 May 2024 15:16), Max: 98.5 (28 May 2024 15:16)  HR: 76 (28 May 2024 15:16) (76 - 97)  BP: 118/85 (28 May 2024 15:16) (118/85 - 128/98)  BP(mean): 95 (28 May 2024 15:16) (95 - 108)  RR: 19 (28 May 2024 15:16) (18 - 19)  SpO2: 100% (28 May 2024 15:16) (100% - 100%)    Parameters below as of 28 May 2024 15:16  Patient On (Oxygen Delivery Method): room air        I&O's Summary          LABS:                        14.9   6.75  )-----------( 151      ( 28 May 2024 15:28 )             46.6               CAPILLARY BLOOD GLUCOSE            Cultures:      RADIOLOGY & ADDITIONAL STUDIES:      Plan:           HPI:  51 yo male with metastatic colon CA s/p ileostomy on chemotherapy, revision for ileostomy prolapse on 4/23 present to the ED with stoma prolapse. Patient report noticing this since the last surgery last 4/23. He endorses prolapsed bowel is reducible, associated with intermittent bleeding due to rubbing and abd pain. Stoma is functioning well. He also report of developing new onset of rashes on the face and body ever since  she was started on new chemo medication. He denies fever or chills       PAST MEDICAL & SURGICAL HISTORY:  Cancer, colon  liver mets      S/P ileostomy      Metastasis to liver      Pulmonary embolism      S/P colon resection      H/O ileostomy          MEDICATIONS  (STANDING):    MEDICATIONS  (PRN):      Allergies    [This allergen will not trigger allergy alert] No Known Drug Allergies (Unknown)    Intolerances        SOCIAL HISTORY:    FAMILY HISTORY:  FH: dementia (Mother)            Physical Exam:  General: NAD, resting comfortably  HEENT: NC/AT, EOMI, normal hearing, no oral lesions, no LAD, neck supple  Pulmonary: normal resp effort, CTA-B  Cardiovascular: NSR, no murmurs  Abdominal: soft, ND/NT, no organomegaly. Ileostomy with prolapsed bowel, reducible  Extremities: WWP, normal strength, no clubbing/cyanosis/edema  Neuro: A/O x 3, CNs II-XII grossly intact, normal sensation, no focal deficits  Pulses: palpable distal pulses    Vital Signs Last 24 Hrs  T(C): 36.9 (28 May 2024 15:16), Max: 36.9 (28 May 2024 15:16)  T(F): 98.5 (28 May 2024 15:16), Max: 98.5 (28 May 2024 15:16)  HR: 76 (28 May 2024 15:16) (76 - 97)  BP: 118/85 (28 May 2024 15:16) (118/85 - 128/98)  BP(mean): 95 (28 May 2024 15:16) (95 - 108)  RR: 19 (28 May 2024 15:16) (18 - 19)  SpO2: 100% (28 May 2024 15:16) (100% - 100%)    Parameters below as of 28 May 2024 15:16  Patient On (Oxygen Delivery Method): room air        I&O's Summary          LABS:                        14.9   6.75  )-----------( 151      ( 28 May 2024 15:28 )             46.6           RADIOLOGY & ADDITIONAL STUDIES:    IMPRESSION:  No acute findings to explain the patient's pain.    Liver metastases, unchanged since 4/2/2024.    Subcutaneous nodules, and a waxing and waning pattern since prior studies, question sites of subcutaneous injection and/or tumor implants. Correlate with clinical history.             HPI:  53 yo male with metastatic colon CA s/p ileostomy on chemotherapy, revision for ileostomy prolapse on 4/23 present to the ED with stoma prolapse. Patient report noticing this since the last surgery last 4/23. He endorses prolapsed bowel is reducible, associated with intermittent bleeding due to rubbing and abd pain. Stoma is functioning well. He also report of developing new onset of rashes on the face and body ever since  she was started on new chemo medication. He denies fever or chills       PAST MEDICAL & SURGICAL HISTORY:  Cancer, colon  liver mets      S/P ileostomy      Metastasis to liver      Pulmonary embolism      S/P colon resection      H/O ileostomy          MEDICATIONS  (STANDING):    MEDICATIONS  (PRN):      Allergies    [This allergen will not trigger allergy alert] No Known Drug Allergies (Unknown)    Intolerances        SOCIAL HISTORY:    FAMILY HISTORY:  FH: dementia (Mother)            Physical Exam:  General: NAD, resting comfortably  HEENT: NC/AT, EOMI, normal hearing, no oral lesions, no LAD, neck supple  Pulmonary: normal resp effort, CTA-B  Cardiovascular: NSR, no murmurs  Abdominal: soft, ND/NT, no organomegaly. Ileostomy with prolapsed bowel, reducible  Extremities: WWP, normal strength, no clubbing/cyanosis/edema  Neuro: A/O x 3, CNs II-XII grossly intact, normal sensation, no focal deficits  Pulses: palpable distal pulses    Vital Signs Last 24 Hrs  T(C): 36.9 (28 May 2024 15:16), Max: 36.9 (28 May 2024 15:16)  T(F): 98.5 (28 May 2024 15:16), Max: 98.5 (28 May 2024 15:16)  HR: 76 (28 May 2024 15:16) (76 - 97)  BP: 118/85 (28 May 2024 15:16) (118/85 - 128/98)  BP(mean): 95 (28 May 2024 15:16) (95 - 108)  RR: 19 (28 May 2024 15:16) (18 - 19)  SpO2: 100% (28 May 2024 15:16) (100% - 100%)    Parameters below as of 28 May 2024 15:16  Patient On (Oxygen Delivery Method): room air        I&O's Summary          LABS:                        14.9   6.75  )-----------( 151      ( 28 May 2024 15:28 )             46.6     05-28    136  |  105  |  11  ----------------------------<  120<H>  4.1   |  27  |  0.90    Ca    9.7      28 May 2024 15:28    TPro  8.5<H>  /  Alb  3.8  /  TBili  0.3  /  DBili  x   /  AST  19  /  ALT  29  /  AlkPhos  76  05-28            RADIOLOGY & ADDITIONAL STUDIES:    IMPRESSION:  No acute findings to explain the patient's pain.    Liver metastases, unchanged since 4/2/2024.    Subcutaneous nodules, and a waxing and waning pattern since prior studies, question sites of subcutaneous injection and/or tumor implants. Correlate with clinical history.

## 2024-05-29 ENCOUNTER — OUTPATIENT (OUTPATIENT)
Dept: OUTPATIENT SERVICES | Facility: HOSPITAL | Age: 54
LOS: 1 days | Discharge: HOME | End: 2024-05-29

## 2024-05-29 DIAGNOSIS — C18.9 MALIGNANT NEOPLASM OF COLON, UNSPECIFIED: ICD-10-CM

## 2024-05-29 DIAGNOSIS — Z93.2 ILEOSTOMY STATUS: ICD-10-CM

## 2024-05-29 DIAGNOSIS — Z90.49 ACQUIRED ABSENCE OF OTHER SPECIFIED PARTS OF DIGESTIVE TRACT: Chronic | ICD-10-CM

## 2024-05-30 ENCOUNTER — APPOINTMENT (OUTPATIENT)
Dept: INFUSION THERAPY | Facility: CLINIC | Age: 54
End: 2024-05-30

## 2024-05-30 ENCOUNTER — RESULT REVIEW (OUTPATIENT)
Age: 54
End: 2024-05-30

## 2024-05-30 LAB
ALBUMIN SERPL ELPH-MCNC: 4.8 G/DL — SIGNIFICANT CHANGE UP (ref 3.3–5)
ALP SERPL-CCNC: 79 U/L — SIGNIFICANT CHANGE UP (ref 40–120)
ALT FLD-CCNC: 33 U/L — SIGNIFICANT CHANGE UP (ref 10–45)
ANION GAP SERPL CALC-SCNC: 14 MMOL/L — SIGNIFICANT CHANGE UP (ref 5–17)
AST SERPL-CCNC: 27 U/L — SIGNIFICANT CHANGE UP (ref 10–40)
BASOPHILS # BLD AUTO: 0.06 K/UL — SIGNIFICANT CHANGE UP (ref 0–0.2)
BASOPHILS NFR BLD AUTO: 0.9 % — SIGNIFICANT CHANGE UP (ref 0–2)
BILIRUB SERPL-MCNC: 0.2 MG/DL — SIGNIFICANT CHANGE UP (ref 0.2–1.2)
BUN SERPL-MCNC: 13 MG/DL — SIGNIFICANT CHANGE UP (ref 7–23)
CALCIUM SERPL-MCNC: 9.5 MG/DL — SIGNIFICANT CHANGE UP (ref 8.4–10.5)
CHLORIDE SERPL-SCNC: 103 MMOL/L — SIGNIFICANT CHANGE UP (ref 96–108)
CO2 SERPL-SCNC: 21 MMOL/L — LOW (ref 22–31)
CREAT SERPL-MCNC: 0.7 MG/DL — SIGNIFICANT CHANGE UP (ref 0.5–1.3)
EGFR: 112 ML/MIN/1.73M2 — SIGNIFICANT CHANGE UP
EOSINOPHIL # BLD AUTO: 0.33 K/UL — SIGNIFICANT CHANGE UP (ref 0–0.5)
EOSINOPHIL NFR BLD AUTO: 5 % — SIGNIFICANT CHANGE UP (ref 0–6)
GLUCOSE SERPL-MCNC: 91 MG/DL — SIGNIFICANT CHANGE UP (ref 70–99)
HCT VFR BLD CALC: 46.4 % — SIGNIFICANT CHANGE UP (ref 39–50)
HGB BLD-MCNC: 14.8 G/DL — SIGNIFICANT CHANGE UP (ref 13–17)
IMM GRANULOCYTES NFR BLD AUTO: 0.2 % — SIGNIFICANT CHANGE UP (ref 0–0.9)
LYMPHOCYTES # BLD AUTO: 1.64 K/UL — SIGNIFICANT CHANGE UP (ref 1–3.3)
LYMPHOCYTES # BLD AUTO: 24.9 % — SIGNIFICANT CHANGE UP (ref 13–44)
MAGNESIUM SERPL-MCNC: 2 MG/DL — SIGNIFICANT CHANGE UP (ref 1.6–2.6)
MCHC RBC-ENTMCNC: 28.5 PG — SIGNIFICANT CHANGE UP (ref 27–34)
MCHC RBC-ENTMCNC: 31.9 GM/DL — LOW (ref 32–36)
MCV RBC AUTO: 89.2 FL — SIGNIFICANT CHANGE UP (ref 80–100)
MONOCYTES # BLD AUTO: 0.65 K/UL — SIGNIFICANT CHANGE UP (ref 0–0.9)
MONOCYTES NFR BLD AUTO: 9.9 % — SIGNIFICANT CHANGE UP (ref 2–14)
NEUTROPHILS # BLD AUTO: 3.9 K/UL — SIGNIFICANT CHANGE UP (ref 1.8–7.4)
NEUTROPHILS NFR BLD AUTO: 59.1 % — SIGNIFICANT CHANGE UP (ref 43–77)
NRBC # BLD: 0 /100 WBCS — SIGNIFICANT CHANGE UP (ref 0–0)
PLATELET # BLD AUTO: 127 K/UL — LOW (ref 150–400)
POTASSIUM SERPL-MCNC: 4 MMOL/L — SIGNIFICANT CHANGE UP (ref 3.5–5.3)
POTASSIUM SERPL-SCNC: 4 MMOL/L — SIGNIFICANT CHANGE UP (ref 3.5–5.3)
PROT SERPL-MCNC: 8 G/DL — SIGNIFICANT CHANGE UP (ref 6–8.3)
RBC # BLD: 5.2 M/UL — SIGNIFICANT CHANGE UP (ref 4.2–5.8)
RBC # FLD: 14.5 % — SIGNIFICANT CHANGE UP (ref 10.3–14.5)
SODIUM SERPL-SCNC: 138 MMOL/L — SIGNIFICANT CHANGE UP (ref 135–145)
WBC # BLD: 6.59 K/UL — SIGNIFICANT CHANGE UP (ref 3.8–10.5)
WBC # FLD AUTO: 6.59 K/UL — SIGNIFICANT CHANGE UP (ref 3.8–10.5)

## 2024-05-31 DIAGNOSIS — R11.2 NAUSEA WITH VOMITING, UNSPECIFIED: ICD-10-CM

## 2024-05-31 DIAGNOSIS — Z51.11 ENCOUNTER FOR ANTINEOPLASTIC CHEMOTHERAPY: ICD-10-CM

## 2024-06-03 ENCOUNTER — NON-APPOINTMENT (OUTPATIENT)
Age: 54
End: 2024-06-03

## 2024-06-05 ENCOUNTER — APPOINTMENT (OUTPATIENT)
Dept: INFUSION THERAPY | Facility: CLINIC | Age: 54
End: 2024-06-05

## 2024-06-07 ENCOUNTER — APPOINTMENT (OUTPATIENT)
Dept: INFUSION THERAPY | Facility: CLINIC | Age: 54
End: 2024-06-07

## 2024-06-07 ENCOUNTER — APPOINTMENT (OUTPATIENT)
Dept: COLORECTAL SURGERY | Facility: CLINIC | Age: 54
End: 2024-06-07

## 2024-06-12 ENCOUNTER — NON-APPOINTMENT (OUTPATIENT)
Age: 54
End: 2024-06-12

## 2024-06-13 ENCOUNTER — APPOINTMENT (OUTPATIENT)
Dept: HEMATOLOGY ONCOLOGY | Facility: CLINIC | Age: 54
End: 2024-06-13

## 2024-06-13 ENCOUNTER — APPOINTMENT (OUTPATIENT)
Dept: INFUSION THERAPY | Facility: CLINIC | Age: 54
End: 2024-06-13

## 2024-06-27 NOTE — ED ADULT TRIAGE NOTE - BSA (M2)
Speech-Language Pathology    SLP Adult Inpatient Speech-Language Pathology Clinical Swallow Evaluation    Patient Name: Jordon Waddell  MRN: [id number]  Today's Date: 6/27/2024   Time Calculation  Start Time: 0957  Stop Time: 1033  Time Calculation (min): 36 min    Assessment:  Suspected pharyngeal dysphagia s/p R thalamic ICH     Recommendations:  NPO w/ bedside reassessment 6/28; Modified Barium Swallow Study if warranted     Frequent, aggressive oral care as tolerated to improve infection control    OK for small amounts of ice chips (one at a time, 10x/hour) for oral comfort and to prevent swallow disuse atrophy, but only after aggressive oral care to avoid colonization of bacteria within the oral cavity.        Plan:  SLP Plan: Skilled SLP  SLP Frequency: 2x per week  Duration:  4 weeks  Discussed POC: Pt, family, medical team      Goals:  Pt will participate in ongoing dysphagia assessment including the 3 oz Swallow Protocol with no overt indicators of aspiration on 100% of attempts.    Date initiated: 6/28/24   Status: Goal initiated     Pt/family will indicate understanding of dysphagia education: risk for aspiration, recommendations, and POC with > 80% accuracy via teach back method.   Date initiated: 6/28/24   Status: Goal initiated         Subjective   RN cleared pt for evaluation.  Pt properly identified and evaluated in bedside chair.  Awake and alert, with no c/o pain.  Room air.  Wife and son present.     Pt admitted w/ L weakness and dysarthria- R thalamic ICH.      Objective     Cognition:  Command Following: WFL   Attention: WFL   Orientation: Oriented x4      Oral/Motor Assessment:  Oral Hygiene: WFL   Dentition: Present and adequate   Oral Motor: L asymmetry   Voice (Perceptually): WFL   Volitional Cough (Perceptually): WFL   Volitional Swallow: WFL            Consistencies Trialed:  Ice chips, sips of water.  Not appropriate for additional trials given apparent severity of swallowing deficits.          Clinical Observations:  Patient Positioning: Upright in Bed  Management of Oral Secretions: WFL   Was The 3 oz Swallow Protocol Completed: Yes      Clinical bedside swallow evaluation completed.  Pt presented with small, single ice chips x4.  Adequate bolus containment and manipulation.  No s/s of aspiration.  Pt also participated in trials of single sips of thin liquid via straw.  Functional oral phase.  S/s of aspiration (cough in 1/6 trials).  Pt then completed the 3 oz Swallow Protocol w/ s/s of aspiration (cough) after ~ 2oz.  PO trials ceased due to concern for pharyngeal dysphagia/aspiration.      Dysphagia Therapy:  Education provided to patient and family regarding NPO recommendations, allowance of ice chips/sips of water, importance of oral care, and overall dysphagia plan of care including possible MBSS tomorrow following bedside reassessment.  Understanding indicated via teach back method with > 90% accuracy.  Updated medical team.        06/27/24 at 4:05 PM - Nell Jasmine, SLP             2.13

## 2024-06-28 ENCOUNTER — NON-APPOINTMENT (OUTPATIENT)
Age: 54
End: 2024-06-28

## 2024-07-01 ENCOUNTER — NON-APPOINTMENT (OUTPATIENT)
Age: 54
End: 2024-07-01

## 2024-07-02 ENCOUNTER — APPOINTMENT (OUTPATIENT)
Dept: HEMATOLOGY ONCOLOGY | Facility: CLINIC | Age: 54
End: 2024-07-02

## 2024-07-05 ENCOUNTER — APPOINTMENT (OUTPATIENT)
Dept: HEMATOLOGY ONCOLOGY | Facility: CLINIC | Age: 54
End: 2024-07-05
Payer: MEDICAID

## 2024-07-05 ENCOUNTER — INPATIENT (INPATIENT)
Facility: HOSPITAL | Age: 54
LOS: 6 days | Discharge: HOME | DRG: 375 | End: 2024-07-12
Attending: STUDENT IN AN ORGANIZED HEALTH CARE EDUCATION/TRAINING PROGRAM | Admitting: STUDENT IN AN ORGANIZED HEALTH CARE EDUCATION/TRAINING PROGRAM
Payer: COMMERCIAL

## 2024-07-05 VITALS
TEMPERATURE: 99 F | WEIGHT: 179.9 LBS | OXYGEN SATURATION: 100 % | HEART RATE: 92 BPM | DIASTOLIC BLOOD PRESSURE: 85 MMHG | RESPIRATION RATE: 18 BRPM | HEIGHT: 74 IN | SYSTOLIC BLOOD PRESSURE: 122 MMHG

## 2024-07-05 DIAGNOSIS — I26.99 OTHER PULMONARY EMBOLISM W/OUT ACUTE COR PULMONALE: ICD-10-CM

## 2024-07-05 DIAGNOSIS — Z98.890 OTHER SPECIFIED POSTPROCEDURAL STATES: Chronic | ICD-10-CM

## 2024-07-05 DIAGNOSIS — L70.8 OTHER ACNE: ICD-10-CM

## 2024-07-05 DIAGNOSIS — C18.9 MALIGNANT NEOPLASM OF COLON, UNSPECIFIED: ICD-10-CM

## 2024-07-05 DIAGNOSIS — C78.7 MALIGNANT NEOPLASM OF COLON, UNSPECIFIED: ICD-10-CM

## 2024-07-05 DIAGNOSIS — Z90.49 ACQUIRED ABSENCE OF OTHER SPECIFIED PARTS OF DIGESTIVE TRACT: Chronic | ICD-10-CM

## 2024-07-05 DIAGNOSIS — Z93.2 ILEOSTOMY STATUS: ICD-10-CM

## 2024-07-05 LAB
ALBUMIN SERPL ELPH-MCNC: 3.9 G/DL — SIGNIFICANT CHANGE UP (ref 3.3–5.2)
ALP SERPL-CCNC: 192 U/L — HIGH (ref 40–120)
ALT FLD-CCNC: 33 U/L — SIGNIFICANT CHANGE UP
ANION GAP SERPL CALC-SCNC: 12 MMOL/L — SIGNIFICANT CHANGE UP (ref 5–17)
APTT BLD: 43.3 SEC — HIGH (ref 24.5–35.6)
AST SERPL-CCNC: 37 U/L — SIGNIFICANT CHANGE UP
BASOPHILS # BLD AUTO: 0.01 K/UL — SIGNIFICANT CHANGE UP (ref 0–0.2)
BASOPHILS NFR BLD AUTO: 0.2 % — SIGNIFICANT CHANGE UP (ref 0–2)
BILIRUB SERPL-MCNC: 0.4 MG/DL — SIGNIFICANT CHANGE UP (ref 0.4–2)
BUN SERPL-MCNC: 5.8 MG/DL — LOW (ref 8–20)
CALCIUM SERPL-MCNC: 9 MG/DL — SIGNIFICANT CHANGE UP (ref 8.4–10.5)
CHLORIDE SERPL-SCNC: 98 MMOL/L — SIGNIFICANT CHANGE UP (ref 96–108)
CO2 SERPL-SCNC: 26 MMOL/L — SIGNIFICANT CHANGE UP (ref 22–29)
CREAT SERPL-MCNC: 0.63 MG/DL — SIGNIFICANT CHANGE UP (ref 0.5–1.3)
EGFR: 115 ML/MIN/1.73M2 — SIGNIFICANT CHANGE UP
EOSINOPHIL # BLD AUTO: 0.1 K/UL — SIGNIFICANT CHANGE UP (ref 0–0.5)
EOSINOPHIL NFR BLD AUTO: 1.7 % — SIGNIFICANT CHANGE UP (ref 0–6)
GLUCOSE SERPL-MCNC: 104 MG/DL — HIGH (ref 70–99)
HCT VFR BLD CALC: 38.7 % — LOW (ref 39–50)
HGB BLD-MCNC: 12.3 G/DL — LOW (ref 13–17)
IMM GRANULOCYTES NFR BLD AUTO: 0.2 % — SIGNIFICANT CHANGE UP (ref 0–0.9)
INR BLD: 1.12 RATIO — SIGNIFICANT CHANGE UP (ref 0.85–1.18)
LIDOCAIN IGE QN: 14 U/L — LOW (ref 22–51)
LYMPHOCYTES # BLD AUTO: 1.44 K/UL — SIGNIFICANT CHANGE UP (ref 1–3.3)
LYMPHOCYTES # BLD AUTO: 24.6 % — SIGNIFICANT CHANGE UP (ref 13–44)
MCHC RBC-ENTMCNC: 28.1 PG — SIGNIFICANT CHANGE UP (ref 27–34)
MCHC RBC-ENTMCNC: 31.8 GM/DL — LOW (ref 32–36)
MCV RBC AUTO: 88.6 FL — SIGNIFICANT CHANGE UP (ref 80–100)
MONOCYTES # BLD AUTO: 0.63 K/UL — SIGNIFICANT CHANGE UP (ref 0–0.9)
MONOCYTES NFR BLD AUTO: 10.8 % — SIGNIFICANT CHANGE UP (ref 2–14)
NEUTROPHILS # BLD AUTO: 3.66 K/UL — SIGNIFICANT CHANGE UP (ref 1.8–7.4)
NEUTROPHILS NFR BLD AUTO: 62.5 % — SIGNIFICANT CHANGE UP (ref 43–77)
PLATELET # BLD AUTO: 156 K/UL — SIGNIFICANT CHANGE UP (ref 150–400)
POTASSIUM SERPL-MCNC: 4.9 MMOL/L — SIGNIFICANT CHANGE UP (ref 3.5–5.3)
POTASSIUM SERPL-SCNC: 4.9 MMOL/L — SIGNIFICANT CHANGE UP (ref 3.5–5.3)
PROT SERPL-MCNC: 7.1 G/DL — SIGNIFICANT CHANGE UP (ref 6.6–8.7)
PROTHROM AB SERPL-ACNC: 12.4 SEC — SIGNIFICANT CHANGE UP (ref 9.5–13)
RBC # BLD: 4.37 M/UL — SIGNIFICANT CHANGE UP (ref 4.2–5.8)
RBC # FLD: 14.2 % — SIGNIFICANT CHANGE UP (ref 10.3–14.5)
SODIUM SERPL-SCNC: 136 MMOL/L — SIGNIFICANT CHANGE UP (ref 135–145)
WBC # BLD: 5.85 K/UL — SIGNIFICANT CHANGE UP (ref 3.8–10.5)
WBC # FLD AUTO: 5.85 K/UL — SIGNIFICANT CHANGE UP (ref 3.8–10.5)

## 2024-07-05 PROCEDURE — G2211 COMPLEX E/M VISIT ADD ON: CPT | Mod: NC,95

## 2024-07-05 PROCEDURE — 99223 1ST HOSP IP/OBS HIGH 75: CPT

## 2024-07-05 PROCEDURE — 74177 CT ABD & PELVIS W/CONTRAST: CPT | Mod: 26,MC

## 2024-07-05 PROCEDURE — ZZZZZ: CPT

## 2024-07-05 RX ORDER — ESCITALOPRAM OXALATE 20 MG/1
10 TABLET, FILM COATED ORAL DAILY
Refills: 0 | Status: DISCONTINUED | OUTPATIENT
Start: 2024-07-05 | End: 2024-07-08

## 2024-07-05 RX ORDER — ACETAMINOPHEN 325 MG
650 TABLET ORAL EVERY 6 HOURS
Refills: 0 | Status: DISCONTINUED | OUTPATIENT
Start: 2024-07-05 | End: 2024-07-12

## 2024-07-05 RX ORDER — DOXYCYCLINE 100 MG/1
100 CAPSULE ORAL EVERY 12 HOURS
Refills: 0 | Status: DISCONTINUED | OUTPATIENT
Start: 2024-07-05 | End: 2024-07-12

## 2024-07-05 RX ORDER — OXYCODONE HYDROCHLORIDE 100 MG/5ML
10 SOLUTION ORAL EVERY 6 HOURS
Refills: 0 | Status: DISCONTINUED | OUTPATIENT
Start: 2024-07-05 | End: 2024-07-08

## 2024-07-05 RX ORDER — ENOXAPARIN SODIUM 100 MG/ML
80 INJECTION SUBCUTANEOUS EVERY 24 HOURS
Refills: 0 | Status: DISCONTINUED | OUTPATIENT
Start: 2024-07-05 | End: 2024-07-06

## 2024-07-05 RX ORDER — HYDROMORPHONE HCL 0.2 MG/ML
1 INJECTION, SOLUTION INTRAVENOUS ONCE
Refills: 0 | Status: DISCONTINUED | OUTPATIENT
Start: 2024-07-05 | End: 2024-07-05

## 2024-07-05 RX ORDER — SODIUM CHLORIDE 0.9 % (FLUSH) 0.9 %
1000 SYRINGE (ML) INJECTION ONCE
Refills: 0 | Status: COMPLETED | OUTPATIENT
Start: 2024-07-05 | End: 2024-07-05

## 2024-07-05 RX ORDER — OXYCODONE HYDROCHLORIDE 100 MG/5ML
20 SOLUTION ORAL
Refills: 0 | Status: DISCONTINUED | OUTPATIENT
Start: 2024-07-05 | End: 2024-07-05

## 2024-07-05 RX ORDER — OXYCODONE HYDROCHLORIDE 100 MG/5ML
10 SOLUTION ORAL EVERY 12 HOURS
Refills: 0 | Status: DISCONTINUED | OUTPATIENT
Start: 2024-07-05 | End: 2024-07-06

## 2024-07-05 RX ADMIN — OXYCODONE HYDROCHLORIDE 10 MILLIGRAM(S): 100 SOLUTION ORAL at 18:35

## 2024-07-05 RX ADMIN — OXYCODONE HYDROCHLORIDE 10 MILLIGRAM(S): 100 SOLUTION ORAL at 23:55

## 2024-07-05 RX ADMIN — ESCITALOPRAM OXALATE 10 MILLIGRAM(S): 20 TABLET, FILM COATED ORAL at 18:36

## 2024-07-05 RX ADMIN — DOXYCYCLINE 100 MILLIGRAM(S): 100 CAPSULE ORAL at 18:36

## 2024-07-05 RX ADMIN — HYDROMORPHONE HCL 1 MILLIGRAM(S): 0.2 INJECTION, SOLUTION INTRAVENOUS at 17:06

## 2024-07-05 RX ADMIN — Medication 650 MILLIGRAM(S): at 18:36

## 2024-07-05 RX ADMIN — HYDROMORPHONE HCL 1 MILLIGRAM(S): 0.2 INJECTION, SOLUTION INTRAVENOUS at 13:01

## 2024-07-05 RX ADMIN — HYDROMORPHONE HCL 1 MILLIGRAM(S): 0.2 INJECTION, SOLUTION INTRAVENOUS at 17:03

## 2024-07-05 RX ADMIN — Medication 1000 MILLILITER(S): at 12:50

## 2024-07-05 RX ADMIN — HYDROMORPHONE HCL 1 MILLIGRAM(S): 0.2 INJECTION, SOLUTION INTRAVENOUS at 12:51

## 2024-07-05 NOTE — ED CDU PROVIDER INITIAL DAY NOTE - OBJECTIVE STATEMENT
Pt is a 52 yo M here for generalized weakness and abd pain.  PMHx significant for Stage IV colon CA s/p colon resection with permanent ileostomy, PE/DVT, IVC filter.  followed by Randall from oncology. reporting abd pain/weakness/inability to care for himself pt with recent hospital visits, palliative/hospice reportedly contacted and pt refusing palliative care. requesting long term placement stating that he can not care for himself.

## 2024-07-05 NOTE — ED ADULT NURSE NOTE - OBJECTIVE STATEMENT
Patient presented to ED complaining of weakness and 10/10 abdominal pain. Patient AXOX4, RR even and unlabored, HX of colon CA with mets to the lungs and liver, had an ileostomy last month. Patient with a R CW port used for chemo has not yet received chemo. R CW port C/D/I. Abdominal pain 10/10. No acute distress noted. Patient on 2L N/C only for comfort.

## 2024-07-05 NOTE — PHYSICAL THERAPY INITIAL EVALUATION ADULT - ADDITIONAL COMMENTS
pt living with brother, unable to provide assistance[facility]

## 2024-07-05 NOTE — PROVIDER CONTACT NOTE (OTHER) - ASSESSMENT
PT eval orders received. Chart reviewed, contents noted. Please see note for full details. Pt tolerated evaluation poorly. Pt with difficulty attaining standing with flexed knees and heavy UE use on RW. Pt reports pain 6/10 pre session located at abdomen and pain 6/10 post session. Pt requesting intervention at this time, Rn notified. Pt left seated in bed after session with all lines intact, CBWR. RN made aware of pt status and participation in PT. PT will continue to follow.     Pt educated verbally on safe mobility and RW use, verbalizes understanding.    pt will perform sit to stand close supervision with RW.   pt will ambulate 25 ft CG with RW  pt will be independent in bed mobility.    DC rec: CLYDE

## 2024-07-05 NOTE — ED PROVIDER NOTE - CLINICAL SUMMARY MEDICAL DECISION MAKING FREE TEXT BOX
labs and imaging reviewed. CT with small paraostomy collection but this is known and was recently drained. no signs of infection on inspection.  Pt met with palliative. will put in obs for possible longterm placement.

## 2024-07-05 NOTE — ED ADULT NURSE NOTE - NSFALLHARMRISKINTERV_ED_ALL_ED

## 2024-07-05 NOTE — ED CDU PROVIDER INITIAL DAY NOTE - CLINICAL SUMMARY MEDICAL DECISION MAKING FREE TEXT BOX
52 yo male metastatic colon cancer with abd pain and difficulty caring for self at home, requesting long term placement, evaluated by PT with recs for CLYDE. SW aware   palliative consult placed  will continue previously prescribed medications

## 2024-07-05 NOTE — PHYSICAL THERAPY INITIAL EVALUATION ADULT - PERTINENT HX OF CURRENT PROBLEM, REHAB EVAL
Pt is a 54 yo M here for generalized weakness and abd pain.  PMHx significant for Stage IV colon CA s/p colon resection with permanent ileostomy.  Pt state sthat he has recently been in and out of [facility] with issues with his ileostomy.  Pt states that he went to his oncologist today and told them he is having trouble taking care of himself 2/2 the generalized weakness and they told him to come to the hospital. no n/v.

## 2024-07-05 NOTE — ED PROVIDER NOTE - OBJECTIVE STATEMENT
Pt is a 52 yo M here for generalized weakness and abd pain.  PMHx significant for Stage IV colon CA s/p colon resection with permanent ileostomy.  Pt state sthat he has recently been in and out of [facility] with issues with his ileostomy.  Pt states that he went to his oncologist today and told them he is having trouble taking care of himself 2/2 the generalized weakness and they told him to come to the hospital. no n/v.

## 2024-07-05 NOTE — ED ADULT TRIAGE NOTE - CHIEF COMPLAINT QUOTE
Pt BIBA from home, c/o poor PO intake and weakness, hx of colon cancer with mets to lung and liver, had abdominal surgery for ileostomy last month and has not received chemo treatment since, c/o 10/10 abdominal pain, received 10mg morphine IV and IVF by EMS, gets treatments at [facility] in [place]

## 2024-07-05 NOTE — ED CDU PROVIDER INITIAL DAY NOTE - PROGRESS NOTE DETAILS
Patient signed out by Mague NIX. Patient with hx stage IV colon cancer with mets. Pain control standing orders in place. Palliative consult ordered.

## 2024-07-05 NOTE — ED ADULT NURSE NOTE - CHIEF COMPLAINT QUOTE
Pt BIBA from home, c/o poor PO intake and weakness, hx of colon cancer with mets to lung and liver, had abdominal surgery for ileostomy last month and has not received chemo treatment since, c/o 10/10 abdominal pain, received 10mg morphine IV and IVF by EMS, gets treatments at [facility] in [place]

## 2024-07-05 NOTE — ED ADULT NURSE NOTE - NS ED NURSE REPORT GIVEN TO FT
Report given to [facility] RN Camila. Patient AA&Ox4, respirations even/unlabored, no apparent distress. Plan of care discussed with patient, all questions answered. Pt transported to [facility] 8L by RN without incident, in stable condition. VSS, recorded in EMR. Peripheral IV remains in place, clean, dry, intact. All personal belongings at bed side within pt reach.

## 2024-07-05 NOTE — ED PROVIDER NOTE - PHYSICAL EXAMINATION
Constitutional - well-developed.   Head - NCAT. Airway patent.   Eyes - PERRL.   CV - RRR. no murmur. no edema.   Pulm - CTAB.   Abd - soft, nt. no rebound. no guarding. ostomy patent.  Neuro - A&Ox3. strength 5/5 x4. sensation intact x4. normal gait.   Skin - No rash. .  MSK - normal ROM.

## 2024-07-06 DIAGNOSIS — R62.7 ADULT FAILURE TO THRIVE: ICD-10-CM

## 2024-07-06 PROCEDURE — 99222 1ST HOSP IP/OBS MODERATE 55: CPT

## 2024-07-06 PROCEDURE — 99223 1ST HOSP IP/OBS HIGH 75: CPT

## 2024-07-06 PROCEDURE — 99233 SBSQ HOSP IP/OBS HIGH 50: CPT

## 2024-07-06 RX ORDER — MAGNESIUM, ALUMINUM HYDROXIDE 400-400
30 TABLET,CHEWABLE ORAL EVERY 4 HOURS
Refills: 0 | Status: DISCONTINUED | OUTPATIENT
Start: 2024-07-06 | End: 2024-07-12

## 2024-07-06 RX ORDER — ONDANSETRON HYDROCHLORIDE 2 MG/ML
4 INJECTION INTRAMUSCULAR; INTRAVENOUS EVERY 8 HOURS
Refills: 0 | Status: DISCONTINUED | OUTPATIENT
Start: 2024-07-06 | End: 2024-07-12

## 2024-07-06 RX ORDER — POLYETHYLENE GLYCOL 3350 1 G/G
17 POWDER ORAL DAILY
Refills: 0 | Status: DISCONTINUED | OUTPATIENT
Start: 2024-07-06 | End: 2024-07-12

## 2024-07-06 RX ORDER — SENNOSIDES 8.6 MG
2 TABLET ORAL AT BEDTIME
Refills: 0 | Status: DISCONTINUED | OUTPATIENT
Start: 2024-07-06 | End: 2024-07-12

## 2024-07-06 RX ORDER — OXYCODONE HYDROCHLORIDE 100 MG/5ML
80 SOLUTION ORAL EVERY 12 HOURS
Refills: 0 | Status: DISCONTINUED | OUTPATIENT
Start: 2024-07-06 | End: 2024-07-08

## 2024-07-06 RX ORDER — CLINDAMYCIN PHOSPHATE 150 MG/ML
300 INJECTION, SOLUTION INTRAVENOUS
Refills: 0 | Status: COMPLETED | OUTPATIENT
Start: 2024-07-06 | End: 2024-07-11

## 2024-07-06 RX ORDER — MORPHINE SULFATE 100 MG/1
4 TABLET, EXTENDED RELEASE ORAL EVERY 4 HOURS
Refills: 0 | Status: DISCONTINUED | OUTPATIENT
Start: 2024-07-06 | End: 2024-07-06

## 2024-07-06 RX ORDER — ENOXAPARIN SODIUM 100 MG/ML
140 INJECTION SUBCUTANEOUS EVERY 24 HOURS
Refills: 0 | Status: DISCONTINUED | OUTPATIENT
Start: 2024-07-06 | End: 2024-07-12

## 2024-07-06 RX ADMIN — CLINDAMYCIN PHOSPHATE 300 MILLIGRAM(S): 150 INJECTION, SOLUTION INTRAVENOUS at 23:06

## 2024-07-06 RX ADMIN — MORPHINE SULFATE 4 MILLIGRAM(S): 100 TABLET, EXTENDED RELEASE ORAL at 15:00

## 2024-07-06 RX ADMIN — ENOXAPARIN SODIUM 150 MILLIGRAM(S): 100 INJECTION SUBCUTANEOUS at 18:25

## 2024-07-06 RX ADMIN — OXYCODONE HYDROCHLORIDE 10 MILLIGRAM(S): 100 SOLUTION ORAL at 23:17

## 2024-07-06 RX ADMIN — OXYCODONE HYDROCHLORIDE 80 MILLIGRAM(S): 100 SOLUTION ORAL at 19:47

## 2024-07-06 RX ADMIN — OXYCODONE HYDROCHLORIDE 10 MILLIGRAM(S): 100 SOLUTION ORAL at 11:20

## 2024-07-06 RX ADMIN — OXYCODONE HYDROCHLORIDE 10 MILLIGRAM(S): 100 SOLUTION ORAL at 05:51

## 2024-07-06 RX ADMIN — DOXYCYCLINE 100 MILLIGRAM(S): 100 CAPSULE ORAL at 05:52

## 2024-07-06 RX ADMIN — MORPHINE SULFATE 4 MILLIGRAM(S): 100 TABLET, EXTENDED RELEASE ORAL at 14:17

## 2024-07-06 RX ADMIN — OXYCODONE HYDROCHLORIDE 10 MILLIGRAM(S): 100 SOLUTION ORAL at 10:34

## 2024-07-06 RX ADMIN — DOXYCYCLINE 100 MILLIGRAM(S): 100 CAPSULE ORAL at 18:14

## 2024-07-06 RX ADMIN — CLINDAMYCIN PHOSPHATE 300 MILLIGRAM(S): 150 INJECTION, SOLUTION INTRAVENOUS at 18:14

## 2024-07-06 RX ADMIN — ESCITALOPRAM OXALATE 10 MILLIGRAM(S): 20 TABLET, FILM COATED ORAL at 11:52

## 2024-07-06 RX ADMIN — OXYCODONE HYDROCHLORIDE 10 MILLIGRAM(S): 100 SOLUTION ORAL at 22:47

## 2024-07-06 NOTE — H&P ADULT - ASSESSMENT
54yo male with hx of stage 4 colon adenocarcinoma with mets on chemo (last done in May but on hold as of June due to prior hospitalization) who presented to the ED from home for progressive generalized weakness for last 2 months. He was placed in observation unit for PT eval. PT recommending CLYDE however pt would like to go to long term living facility. Per SW, pt not accepted to any SNFs at this time. I spoke with pt, pt's sister Idania, and SW and decision made to admit to medicine for ongoing PT and CLYDE placement until better accommodations can be established.     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   -C2 was 5/17/24 of Panitumumab of ongoing cycles planned. He was also on Sotorasib  960mg PO QD ongoing, he takes 3 tabs daily   54yo male with hx of stage 4 colon adenocarcinoma with mets on chemo (last done in May but on hold as of June due to prior hospitalization) who presented to the ED from home for progressive generalized weakness for last 2 months. He was placed in observation unit for PT eval. PT recommending CLYDE however pt would like to go to long term living facility. Per SW, pt not accepted to any SNFs at this time. I spoke with pt, pt's sister Idania, and SW and decision made to admit to medicine for ongoing PT and CLYDE placement until better accommodations can be established.     Failure to thrive 2/2 progressive metastatic colon CA  - admit to medicine   - labs at baseline  - CT abd pelvis with evidence of progressive metastatic disease  - PT recommending CLYDE, SW initiating process  - PO intake encouraged, will supplement with ensure  - pain medications reviewed with patient and confirmed on ISTOP  - resume oxycodone ER 80mg q12hr  - oxycodone IR 10mg q6hr PRN  - fentanyl patch q72hr  - bowel regimen ordered  - reviewed oncology note from 7/5 in [facility]  - last chemo in May 2024, was then postponed due to recent admission in [place] for ileostomy revision  - chemo now further on hold for 4-6 weeks following completion of abx for recent ileostomy revision  - palliative care consult  - possible hospice care consult should pt be agreeable to stop chemo  - pt needs daily PT    Hx of occluded left iliac artery  - c/w therapeutic lovenox    Recent ileostomy revision at [place] last month  - c/w PO clindamycin and doxycycline  - f/u w/ colorectal surgery as outpatient    DVT ppx: therapeutic lovenox   52yo male with hx of stage 4 colon adenocarcinoma with mets on chemo (last done in May but on hold as of June due to prior hospitalization) who presented to the ED from home for progressive generalized weakness for last 2 months. He was placed in observation unit for PT eval. PT recommending CLYDE however pt would like to go to long term living facility. Per SW, pt not accepted to any SNFs at this time. I spoke with pt, pt's sister Idania, and SW and decision made to admit to medicine for ongoing PT and CLYDE placement until better accommodations can be established.     Failure to thrive 2/2 progressive metastatic colon CA  - admit to medicine   - labs at baseline  - CT abd pelvis with evidence of progressive metastatic disease  - PT recommending CLYDE, SW initiating process  - PO intake encouraged, will supplement with ensure  - pain medications reviewed with patient and confirmed on ISTOP  - resume oxycodone ER 80mg q12hr  - oxycodone IR 10mg q6hr PRN  - fentanyl patch q72hr  - bowel regimen ordered  - reviewed oncology note from 7/5 in [facility]  - last chemo in May 2024, was then postponed due to recent admission in [place] for ileostomy revision  - chemo now further on hold for 4-6 weeks following completion of abx for recent ileostomy revision  - GOC discussion initiated given advancing disease process and lack of improvement following chemo  - palliative care consult  - possible hospice care consult should pt be agreeable to stop chemo  - heme/onc consulted for assistance  - pt needs daily PT    Hx of occluded left iliac artery  - c/w therapeutic lovenox    Recent ileostomy revision at [place] last month  - c/w PO clindamycin and doxycycline  - f/u w/ colorectal surgery as outpatient    DVT ppx: therapeutic lovenox   52yo male with hx of stage 4 colon adenocarcinoma with mets on chemo (last done in May but on hold as of June due to prior hospitalization) who presented to the ED from home for progressive generalized weakness for last 2 months. He was placed in observation unit for PT eval. PT recommending CLYDE however pt would like to go to long term living facility. Per SW, pt not accepted to any SNFs at this time. I spoke with pt, pt's sister Idania, and SW and decision made to admit to medicine for ongoing PT and CLYDE placement until better accommodations can be determined.     Failure to thrive 2/2 progressive metastatic colon CA  - admit to medicine   - labs at baseline  - CT abd pelvis with evidence of progressive metastatic disease  - PT recommending CLYDE, SW initiating process  - PO intake encouraged, will supplement with ensure  - pain medications reviewed with patient and confirmed on ISTOP  - resume oxycodone ER 80mg q12hr  - oxycodone IR 10mg q6hr PRN  - fentanyl patch q72hr  - bowel regimen ordered  - reviewed oncology note from 7/5 in [facility]  - last chemo in May 2024, was then postponed due to recent admission in [place] for ileostomy revision  - chemo now further on hold for 4-6 weeks following completion of abx for recent ileostomy repair  - GOC discussion initiated given advancing disease process and lack of improvement following chemo  - palliative care consult  - possible hospice care consult should pt be agreeable to stop chemo  - heme/onc consulted for assistance  - pt needs daily PT    Hx of occluded left iliac artery  - c/w therapeutic lovenox    Recent ileostomy repair at [place] last month  - c/w PO clindamycin and doxycycline  - f/u w/ colorectal surgery as outpatient    DVT ppx: therapeutic lovenox

## 2024-07-06 NOTE — ED ADULT NURSE REASSESSMENT NOTE - NS ED NURSE REASSESS COMMENT FT1
Assumed care of patient at 07:24 from RN CURT Antony. Charting as noted. Patient AA&Ox4, resp even/unlabored, presents to ED c/o generalized weakness and abd pain. At time of assessment, patient denies pain/discomfort, denies CP/SOB. Patient updated on the plan of care, awaiting further management of care and palliative care consult. Stretcher locked in lowest position, IV site flushed w/ NS. No redness, swelling or pain noted to site. No signs of acute distress noted, safety maintained. Pt remains on CM in NSR, rate 73, SpO2 98% on room air.
Patient resting comfortably in bed locked in lowest position. No acute distress noted. Patient has no complaints at this time. Awaiting  and long-term facility placement.
Patient resting comfortably in bed locked in lowest position. No acute distress noted. Patient has no complaints at this time. Awaiting long-term facility placement.
Report given to [facility] RN Camila. Patient AA&Ox4, respirations even/unlabored, no apparent distress. Plan of care discussed with patient, all questions answered. Pt transported to [facility] 8L by RN without incident, in stable condition. VSS, recorded in EMR. Peripheral IV remains in place, clean, dry, intact. All personal belongings at bed side within pt reach.
pt transferred to A7R, VSs. RN ZC aware of transfer of care.
Assumed care from RN FRANKIE. Patient is A&Ox3. Patient resting comfortably in bed locked in lowest position. No acute distress noted. IV access intact. Ileostomy in place, intact and draining appropriately into bag. Patient self manages maintenance. Awaiting long-term care facility placement due to failure to thrive.

## 2024-07-06 NOTE — H&P ADULT - HISTORY OF PRESENT ILLNESS
52yo male with hx of stage 4 colon adenocarcinoma with mets on chemo (last done in May but on hold as of June due to prior hospitalization) who presented to the ED from home for progressive generalized weakness for last 2 months. He reports having diminished appetite for food and fluids d/t nausea and sometimes vomiting for a longer duration than 2 months  but has noticed weakness has gotten to a point where he cannot climb a flight of stairs in his sister's home or to functionally take care of himself. Reports he has become 100% reliable on his sister for care but she is unable to assist to that degree. He denies any recent fevers, chills, cough, chest pain, sob, diarrhea, constipation, dysuria, LE swelling. He does endorse abd pain and diffuse joint pains which he reports are his normal.     In the ED he was placed in observation unit for PT eval. PT recommending CLYDE however pt would like to go to long term living facility. Per SW, pt not accepted to any SNFs at this time. I spoke with pt and pt's sister Idania and decision made to admit to medicine for ongoing PT and CLYDE placement until better accommodations can be established. 52yo male with hx of stage 4 colon adenocarcinoma with mets on chemo (last done in May but on hold as of June due to prior hospitalization) who presented to the ED from home for progressive generalized weakness for last 2 months. He reports having diminished appetite for food and fluids d/t nausea and sometimes vomiting for a longer duration than 2 months  but has noticed weakness has gotten to a point where he cannot climb a flight of stairs in his sister's home or to functionally take care of himself. Reports he has become 100% reliable on his sister for care but she is unable to assist to that degree. He denies any recent fevers, chills, cough, chest pain, sob, diarrhea, constipation, dysuria, LE swelling. He does endorse abd pain and diffuse joint pains which he reports are his normal.     In the ED he was placed in observation unit for PT eval. PT recommending CLYDE however pt would like to go to long term living facility. Per SW, pt not accepted to any SNFs at this time. I spoke with pt and pt's sister Idania and decision made to admit to medicine for ongoing PT and CLYDE placement until better accommodations can be determined.

## 2024-07-06 NOTE — H&P ADULT - TIME BILLING
1 or 2 chart review, patient evaluation, documentation, coordination of care and discussion with nurses,  social work and patient's family. chart review, patient evaluation and personal history taking, GOC discussion, documentation, coordination of care and discussion with nurses,  social work and patient's family.

## 2024-07-06 NOTE — H&P ADULT - NSHPLABSRESULTS_GEN_ALL_CORE
12.3   5.85  )-----------( 156      ( 05 Jul 2024 12:44 )             38.7   07-05    136  |  98  |  5.8<L>  ----------------------------<  104<H>  4.9   |  26.0  |  0.63    Ca    9.0      05 Jul 2024 12:44    TPro  7.1  /  Alb  3.9  /  TBili  0.4  /  DBili  x   /  AST  37  /  ALT  33  /  AlkPhos  192<H>  07-05    _____________________________________________________________________     CT Abdomen and Pelvis w/ IV Cont (07.05.24 @ 14:07) >    FINDINGS:  LOWER CHEST: Dependent atelectasis in the posterior bilateral lower lobes.    LIVER: Bilobar metastasis are again seen with reference lesions including:  Calcified metastasis in segment 2 measuring 3.6 x 2.7 cm (series 2 image   21), not significantly changed  Calcified metastasis in segment 8 measuring 3.3 x 2.8 cm, not   significantly changed  Noncalcified metastasis in segment 7/8 measuring 4.4 x 3.4 cm, previously   4.2 x 2.7 cm when remeasured with similar technique, unchanged to   slightly increased in size.  A metastasis in the lateral segment of the left hepatic lobemeasuring   1.8 x 1.1 cm (series 2 image 36) appears more discrete/confluent on the   current study, likely increased in size.  BILE DUCTS: Normal caliber.  GALLBLADDER: Within normal limits.  SPLEEN: Within normal limits.  PANCREAS: Within normal limits.  ADRENALS: Within normal limits.  KIDNEYS/URETERS: No hydronephrosis. Subcentimeter low-attenuation lesion   in the left kidney, too small to characterize. Focal left cortical   scarring.    BLADDER: Unremarkable.  REPRODUCTIVE ORGANS: Prostate not enlarged.    BOWEL: Status post total subtotal colectomy with rectal stump.   Right-sided ileostomy. No bowel obstruction.  PERITONEUM/RETROPERITONEUM: Soft tissue in the right hemipelvis including   adjacent to the rectal stump and extending towards the right inguinal   region which has increased dating back to 6/13/2023 suspicious metastatic   disease.  VESSELS: Atherosclerotic changes. Infrarenal IVC filter. Retroaortic left   renal vein.  LYMPH NODES: Right iliac chain lymph nodes which have increased in size   dating back to 6/13/2023 including an internal iliac chain lymph node   measuring 1.1 x 0.8 cm (series 3 image 102).  ABDOMINAL WALL: Multiple subcentimeter nodules again seen in the anterior   abdominal wall including droplets of subcutaneous air in the left and   right anterior abdominal wall which can be from subcutaneous injections;   clinical correlation is recommended.  Collection of fluid with droplets of air surrounding a right lower   quadrant ostomy with a skin defect to the right of the osteotomy (series   3 image 79). Collection to the right of the ostomy measures 2.9 x 2.5 cm   and collection to the left of the osteotomy measures 1.5 x 1.4 cm (series   3 image 78).  BONES: Degenerative changes. Sclerotic lesion in the right lateral T11   rib, unchanged, likely represents bone island.    IMPRESSION:    Multiple bilobar noncalcified and calcified liver metastasis including an   interval increase in size of a noncalcified metastasis in the left   hepatic lobe since 5/28/2024 measuring 1.8 cm and a noncalcified   metastasis in the right hepatic lobe measuring 4.2 cm which is either   slightly increased in size or unchanged.    Soft tissue in the right hemipelvis which has increased since 6/13/2023   suspicious for metastatic disease.    Fluid collections adjacent to the right lower quadrant ileostomy with   droplets of air; clinical correlation is recommended for infection.    Iliac chain lymph nodes which have increased in size since 6/13/2023   including internal iliac chain lymph node measuring 1.1 x 0.8 cm;   metastatic disease cannot be excluded    Interval increase in size of subcutaneous nodules and droplets of air in   the anterior abdominal wall since 5/28/2024 which can be from   subcutaneous injections; clinical correlation is recommended.

## 2024-07-06 NOTE — H&P ADULT - NSHPROSALLOTHERNEGRD_GEN_ALL_CORE
DATE OF CONDITIONAL DISCHARGE:  2017 versus 2017.

 

CHIEF COMPLAINT:  Bilateral hip pain, left greater than right.

 

HISTORY OF PRESENT ILLNESS:  An 83-year-old female who has bilateral severe

osteoarthritis of her hips.  Just underwent left total hip replacement.  She

is still somewhat limited by her right hip and rehab is appropriate site

for her.

 

Pain is worse with weightbearing on the right.  She now notes that the left

is much more comfortable postop.  Her postoperative course has been

uneventful.

 

PAST MEDICAL HISTORY:  Remarkable for hypercholesterolemia, aortic aneurysm,

irregular heartbeat, sleep apnea, BiPAP use, osteoarthritis, history of skin

and breast cancer, hypertension, stress disorder.

 

PAST SURGICAL HISTORY:  Remarkable for total knee replacement in , breast

lumpectomy in  and partial meniscectomy in .

 

ALLERGIES:  LIST IS EXTENSIVE, INCLUDES ASPIRIN, KEFLEX, ERYTHROMYCIN,

FOSAMAX, NEXIUM, NYSTATIN AND TETRACYCLINE.  Reactions are not really clear. 

AUGMENTIN CAUSES NAUSEA.  She takes amoxicillin without issue.  She has been

tested.  She is nonreactive to titanium and nickel.

 

PREADMISSION MEDICATIONS:  Include Tylenol No. 3 p.r.n., azelastine 137 mcg

inhaler 2 sprays in each nostril b.i.d., Benadryl 25 mg t.i.d. p.r.n.,

calcium supplements, EpiPen for anaphylaxis, hydrochlorothiazide 12.5 mg

range , Tylenol p.r.n.  She will be discharged on Coumadin, keep INR 1.8-2.2.

 

FAMILY HISTORY:  Noncontributory.  Parents are .

 

SOCIAL HISTORY:  Reveals she is retired, .  No tobacco or alcohol use.

 

REVIEW OF SYSTEMS:  Reveals no chest pain, shortness of breath, fever,

chills, nausea, vomiting or headache.

 

ASSESSMENT:  Doing well status post left total hip replacement.

 

PLAN:  At this point in time is to discharge/transfer when bed available. 

Social service arrangements made.  This can occur as soon as today. 

Discharge on 4 mg Coumadin daily.

 

 

 

MTDD
All other review of systems negative, except as noted in HPI

## 2024-07-06 NOTE — CHART NOTE - NSCHARTNOTEFT_GEN_A_CORE
ZIGGY Note: ZIGGY reviewed chart - no accepted SNF facilities at this time. SW reached out to [facility] phone and requested they re-see pt as they would need to be independent for DSS shelter placement. ZIGGY will follow.
Palliative care social work note.    SW reviewed clinical records and aware patient has also been in and out of [facility] [place]. Patient follows with [facility] in [place] for treatments. Patient alert and oriented and reviewed stressors in community being bale to no longer manage own needs at home. Patient had been living with brother in [place] but brother moving to [facility] so patient went to live with sister for a few weeks but had been declining and frequently in hospital. Patient verbalized his desire to be placed in LT SNF not rehab. patient states he is needing assistance with ADL's and aware that facility can help assist in managing his care. SW educated patient regarding palliative care service and patient reports pain is not major issue at present and that patient has a lot of medications for symptoms that he has been utilizing in community. Patient indicates that oncologist advised that after 1 1/2 months from ileostomy he could resume chemotherapy treatments. Patient indicates his plan is to continue to seek treatments that were discussed for next 4-6 week cycle. patient has been undergoing treatments for 2 years. SW discussed his understanding of medical complexities and stage of cancer dx as noted stage 4 with mets to lung and liver. Patient has no interest in Hospice care. SW also reviewed complexities and barriers to placement in facilities regarding age, functional status, insurance, tx planning coordination and advised that although case management department can attempt to assist barriers may impact availability of choices and may result in placement farther away. SW discussed case with ER attending Dr Cifuentes as well as case management department.
SW notified by treatment team that pt may be a SNF placement, stating he can no longer care for self and wants to continue chemo, next treatment in 4 to 6 weeks. CCC and ZIGGY met w/ pt at bedside. Will send SNF to see if any bed offers arise, SW informed that if unable to find placement, only plan would be DSS emergency housing. SW circulated [facility]AV[facility] throughout [facility], and [place] for placement. ZIGGY will continue following for d/c planning.
Anxiety    Depression    Postpartum depression    Substance abuse

## 2024-07-06 NOTE — H&P ADULT - NSHPPHYSICALEXAM_GEN_ALL_CORE
T(C): 36.8 (07-06-24 @ 16:17), Max: 36.8 (07-05-24 @ 19:52)  HR: 62 (07-06-24 @ 16:17) (62 - 75)  BP: 118/78 (07-06-24 @ 16:17) (112/70 - 132/85)  RR: 18 (07-06-24 @ 16:17) (18 - 18)  SpO2: 99% (07-06-24 @ 16:17) (97% - 99%)    CONSTITUTIONAL: no apparent distress  EYES: PERRLA, EOMI, non-icteric  ENMT: Dry mucosa with moist membranes, no pharyngeal injection or exudates  RESP: No respiratory distress, clear to auscultation bilaterally, no wheezes or rales  CV: RRR, +S1S2, no peripheral edema  GI: Soft, NT, ND, ileostomy bag in place without surrounding erythema   SKIN: No rashes or ulcers noted  PSYCH: A+O x 3, mood and affect appropriate  NEURO: Cooperative, upper and lower motor function grossly intact bilaterally but diffusely weak throughout, sensation grossly intact throughout

## 2024-07-06 NOTE — CONSULT NOTE ADULT - SUBJECTIVE AND OBJECTIVE BOX
REASON FOR CONSULTATION:     HPI:  52yo male with hx of stage 4 colon adenocarcinoma with mets on chemo (last done in May but on hold as of June due to prior hospitalization) who presented to the ED from home for progressive generalized weakness for last 2 months. He reports having diminished appetite for food and fluids d/t nausea and sometimes vomiting for a longer duration than 2 months  but has noticed weakness has gotten to a point where he cannot climb a flight of stairs in his sister's home or to functionally take care of himself. Reports he has become 100% reliable on his sister for care but she is unable to assist to that degree. He denies any recent fevers, chills, cough, chest pain, sob, diarrhea, constipation, dysuria, LE swelling. He does endorse abd pain and diffuse joint pains which he reports are his normal.     In the ED he was placed in observation unit for PT eval. PT recommending [facility] however pt would like to go to long term living facility. Per SW, pt not accepted to any SNFs at this time. I spoke with pt and pt's sister Idania and decision made to admit to medicine for ongoing PT and CLYDE placement until better accommodations can be established. (06 Jul 2024 16:04)      REVIEW OF SYSTEMS:  Constitutional, Eyes, ENT, Cardiovascular, Respiratory, Gastrointestinal, Genitourinary, Musculoskeletal, Integumentary, Neurological, Psychiatric, Endocrine, Heme/Lymph, and Allergic/Immunologic review of systems are otherwise negative except as noted in the HPI.    PAST MEDICAL & SURGICAL HISTORY:  Cancer, colon  liver mets      S/P ileostomy      Metastasis to liver      Pulmonary embolism      S/P colon resection      H/O ileostomy          FAMILY HISTORY:  FH: dementia (Mother)        SOCIAL HISTORY:    Allergies    [This allergen will not trigger allergy alert] No Known Drug Allergies (Unknown)    Intolerances        MEDICATIONS  (STANDING):  clindamycin   Capsule 300 milliGRAM(s) Oral four times a day  doxycycline monohydrate Capsule 100 milliGRAM(s) Oral every 12 hours  enoxaparin Injectable 150 milliGRAM(s) SubCutaneous every 24 hours  escitalopram 10 milliGRAM(s) Oral daily  fentaNYL   Patch  25 MICROgram(s)/Hr 1 Patch Transdermal every 72 hours  oxyCODONE  ER Tablet 80 milliGRAM(s) Oral every 12 hours  polyethylene glycol 3350 17 Gram(s) Oral daily  senna 2 Tablet(s) Oral at bedtime    MEDICATIONS  (PRN):  acetaminophen     Tablet .. 650 milliGRAM(s) Oral every 6 hours PRN Temp greater or equal to 38C (100.4F), Moderate Pain (4 - 6)  aluminum hydroxide/magnesium hydroxide/simethicone Suspension 30 milliLiter(s) Oral every 4 hours PRN Dyspepsia  melatonin 3 milliGRAM(s) Oral at bedtime PRN Insomnia  ondansetron Injectable 4 milliGRAM(s) IV Push every 8 hours PRN Nausea and/or Vomiting  oxyCODONE    IR 10 milliGRAM(s) Oral every 6 hours PRN Severe Pain (7 - 10)      Vital Signs Last 24 Hrs  T(C): 36.9 (06 Jul 2024 18:27), Max: 36.9 (06 Jul 2024 18:27)  T(F): 98.4 (06 Jul 2024 18:27), Max: 98.4 (06 Jul 2024 18:27)  HR: 71 (06 Jul 2024 18:27) (62 - 75)  BP: 113/74 (06 Jul 2024 18:27) (112/70 - 132/85)  BP(mean): --  RR: 17 (06 Jul 2024 18:27) (17 - 18)  SpO2: 100% (06 Jul 2024 18:27) (97% - 100%)    Parameters below as of 06 Jul 2024 18:27  Patient On (Oxygen Delivery Method): room air        PHYSICAL EXAM:    GENERAL: NAD,  HEAD:  Atraumatic, Normocephalic  EYES: EOMI, PERRLA  NECK: Supple,   NERVOUS SYSTEM:  Alert & Oriented X3, Good concentration;  SKIN: No rashes or lesions      LABS:                        12.3   5.85  )-----------( 156      ( 05 Jul 2024 12:44 )             38.7     07-05    136  |  98  |  5.8<L>  ----------------------------<  104<H>  4.9   |  26.0  |  0.63    Ca    9.0      05 Jul 2024 12:44    TPro  7.1  /  Alb  3.9  /  TBili  0.4  /  DBili  x   /  AST  37  /  ALT  33  /  AlkPhos  192<H>  07-05    PT/INR - ( 05 Jul 2024 12:44 )   PT: 12.4 sec;   INR: 1.12 ratio         PTT - ( 05 Jul 2024 12:44 )  PTT:43.3 sec  Urinalysis Basic - ( 05 Jul 2024 12:44 )    Color: x / Appearance: x / SG: x / pH: x  Gluc: 104 mg/dL / Ketone: x  / Bili: x / Urobili: x   Blood: x / Protein: x / Nitrite: x   Leuk Esterase: x / RBC: x / WBC x   Sq Epi: x / Non Sq Epi: x / Bacteria: x          RADIOLOGY & ADDITIONAL STUDIES:  < from: CT Abdomen and Pelvis w/ IV Cont (07.05.24 @ 14:07) >  IMPRESSION:    Multiple bilobar noncalcified and calcified liver metastasis including an   interval increase in size of a noncalcified metastasis in the left   hepatic lobe since 5/28/2024 measuring 1.8 cm and a noncalcified   metastasis in the right hepatic lobe measuring 4.2 cm which is either   slightly increased in size or unchanged.    Soft tissue in the right hemipelvis which has increased since 6/13/2023   suspicious for metastatic disease.    Fluid collections adjacent to the right lower quadrant ileostomy with   droplets of air; clinical correlation is recommended for infection.    Iliac chain lymph nodes which have increased in size since 6/13/2023   including internal iliac chain lymph node measuring 1.1 x 0.8 cm;   metastatic disease cannot be excluded    Interval increase in size of subcutaneous nodules and droplets of air in   the anterior abdominal wall since 5/28/2024 which can be from   subcutaneous injections; clinical correlation is recommended.    < end of copied text >      PATHOLOGY:

## 2024-07-06 NOTE — PATIENT PROFILE ADULT - FALL HARM RISK - HARM RISK INTERVENTIONS
Assistance with ambulation/Assistance OOB with selected safe patient handling equipment/Communicate Risk of Fall with Harm to all staff/Discuss with provider need for PT consult/Monitor gait and stability/Provide patient with walking aids - walker, cane, crutches/Reinforce activity limits and safety measures with patient and family/Tailored Fall Risk Interventions/Toileting schedule using arm’s reach rule for commode and bathroom/Use of alarms - bed, chair and/or voice tab/Visual Cue: Yellow wristband and red socks/Bed in lowest position, wheels locked, appropriate side rails in place/Call bell, personal items and telephone in reach/Instruct patient to call for assistance before getting out of bed or chair/Non-slip footwear when patient is out of bed/[place] to call system/Physically safe environment - no spills, clutter or unnecessary equipment/Purposeful Proactive Rounding/Room/bathroom lighting operational, light cord in reach

## 2024-07-06 NOTE — CONSULT NOTE ADULT - ASSESSMENT
53 year old male with stage IV colon cancer, s/p multiple lines of chemotherapy, no treatment since 6/5/24   Admitted for placement.  He was not interested in having GOC conversation today  Palliative care evaluation  On Monday, will need to discuss with primary oncologist if patient still a candidate for further systemic therapy

## 2024-07-06 NOTE — PATIENT PROFILE ADULT - MEDICATION/VISITS - DETAILS
sometimes I skip going to the doctor if I don't have my sister to take me because I DONT have the money

## 2024-07-06 NOTE — ED CDU PROVIDER SUBSEQUENT DAY NOTE - ATTENDING APP SHARED VISIT CONTRIBUTION OF CARE
I, Natalio Saucedo MD, performed the initial face to face bedside interview with this patient regarding history of present illness, review of symptoms and relevant past medical, social and family history.  I completed an independent physical examination.  I was the initial provider who evaluated this patient. I have signed out the follow up of any pending tests (i.e. labs, radiological studies) to the ACP.  I have communicated the patient’s plan of care and disposition with the ACP.

## 2024-07-06 NOTE — ED CDU PROVIDER DISPOSITION NOTE - CLINICAL COURSE
52yo M PMHx metastatic colon cancer presented to ED with abd pain and difficulty caring for self at home, requesting long term placement, evaluated by PT with recs for CLYDE. Pt placed in obs awaiting placement. No accepting facilities thus far and pt not functionally independent therefor cannot go to DSS.  Labs grossly unremarkable, CT demonstrating worsening of metastatic disease and fluid collection adjacent to right lower ileostomy consistent with recent abscess I&D. patient re-evaluated by PT and still requires assistance. Pt admitted for pain control, continued medical management, SW following. heme/onc consulted.

## 2024-07-07 PROCEDURE — 99232 SBSQ HOSP IP/OBS MODERATE 35: CPT

## 2024-07-07 RX ADMIN — CLINDAMYCIN PHOSPHATE 300 MILLIGRAM(S): 150 INJECTION, SOLUTION INTRAVENOUS at 05:28

## 2024-07-07 RX ADMIN — DOXYCYCLINE 100 MILLIGRAM(S): 100 CAPSULE ORAL at 17:16

## 2024-07-07 RX ADMIN — OXYCODONE HYDROCHLORIDE 80 MILLIGRAM(S): 100 SOLUTION ORAL at 05:58

## 2024-07-07 RX ADMIN — OXYCODONE HYDROCHLORIDE 80 MILLIGRAM(S): 100 SOLUTION ORAL at 17:16

## 2024-07-07 RX ADMIN — OXYCODONE HYDROCHLORIDE 10 MILLIGRAM(S): 100 SOLUTION ORAL at 16:37

## 2024-07-07 RX ADMIN — ONDANSETRON HYDROCHLORIDE 4 MILLIGRAM(S): 2 INJECTION INTRAMUSCULAR; INTRAVENOUS at 09:24

## 2024-07-07 RX ADMIN — OXYCODONE HYDROCHLORIDE 10 MILLIGRAM(S): 100 SOLUTION ORAL at 09:24

## 2024-07-07 RX ADMIN — DOXYCYCLINE 100 MILLIGRAM(S): 100 CAPSULE ORAL at 05:28

## 2024-07-07 RX ADMIN — CLINDAMYCIN PHOSPHATE 300 MILLIGRAM(S): 150 INJECTION, SOLUTION INTRAVENOUS at 13:59

## 2024-07-07 RX ADMIN — OXYCODONE HYDROCHLORIDE 80 MILLIGRAM(S): 100 SOLUTION ORAL at 18:00

## 2024-07-07 RX ADMIN — OXYCODONE HYDROCHLORIDE 10 MILLIGRAM(S): 100 SOLUTION ORAL at 21:44

## 2024-07-07 RX ADMIN — OXYCODONE HYDROCHLORIDE 10 MILLIGRAM(S): 100 SOLUTION ORAL at 10:28

## 2024-07-07 RX ADMIN — ENOXAPARIN SODIUM 140 MILLIGRAM(S): 100 INJECTION SUBCUTANEOUS at 17:15

## 2024-07-07 RX ADMIN — OXYCODONE HYDROCHLORIDE 10 MILLIGRAM(S): 100 SOLUTION ORAL at 06:21

## 2024-07-07 RX ADMIN — OXYCODONE HYDROCHLORIDE 80 MILLIGRAM(S): 100 SOLUTION ORAL at 05:28

## 2024-07-07 RX ADMIN — ESCITALOPRAM OXALATE 10 MILLIGRAM(S): 20 TABLET, FILM COATED ORAL at 13:59

## 2024-07-07 RX ADMIN — CLINDAMYCIN PHOSPHATE 300 MILLIGRAM(S): 150 INJECTION, SOLUTION INTRAVENOUS at 17:16

## 2024-07-07 RX ADMIN — OXYCODONE HYDROCHLORIDE 10 MILLIGRAM(S): 100 SOLUTION ORAL at 22:14

## 2024-07-07 RX ADMIN — OXYCODONE HYDROCHLORIDE 10 MILLIGRAM(S): 100 SOLUTION ORAL at 15:37

## 2024-07-07 RX ADMIN — CLINDAMYCIN PHOSPHATE 300 MILLIGRAM(S): 150 INJECTION, SOLUTION INTRAVENOUS at 23:10

## 2024-07-07 NOTE — PROGRESS NOTE ADULT - ASSESSMENT
54yo male with hx of stage 4 colon adenocarcinoma with mets on chemo (last done in May but on hold as of June due to prior hospitalization) who presented to the ED from home for progressive generalized weakness for last 2 months. He was placed in observation unit for PT eval. PT recommending CLYDE however pt would like to go to long term living facility. Per [facility], pt not accepted to any SNFs at this time. I spoke with pt, pt's sister Idania, and SW and decision made to admit to medicine for ongoing PT and CLYDE placement until better accommodations can be determined.     Failure to thrive 2/2 progressive metastatic colon CA  - CT abd pelvis with evidence of progressive metastatic disease  - PT recommending CLY[facility]  - PO intake limited according to patient, will supplement with ensure  - pain medications reviewed with patient and confirmed on ISTOP  - oxycodone ER 80mg q12hr  - oxycodone IR 10mg q6hr PRN  - fentanyl patch q72hr  - bowel regimen ordered  - reviewed oncology note from 7/5 in [facility]  - last chemo in May 2024, was then postponed due to recent admission in [place] for ileostomy revision  - chemo now further on hold for 4-6 weeks following completion of abx for recent ileostomy repair  - GOC discussion initiated given advancing disease process and lack of improvement following chemo, pt would like to continue to think about things and discuss with palliative care team tomorrow  - palliative care consulted  - possible hospice care consult should pt be agreeable to stop chemo  - heme/onc consulted for assistance  - pt needs daily PT    Hx of occluded left iliac artery  - c/w therapeutic lovenox    Recent ileostomy repair at [place] last month  - c/w PO clindamycin and doxycycline  - f/u w/ colorectal surgery as outpatient    DVT ppx: therapeutic lovenox    Dispo: to [facility] pending acceptance and palliative care consult

## 2024-07-07 NOTE — PROGRESS NOTE ADULT - SUBJECTIVE AND OBJECTIVE BOX
[facility] Division of Medicine    SUBJECTIVE / OVERNIGHT EVENTS: No overnight events as per RN. Pt seen at the bedside. Endorses feeling well. Denies any new complaints. All other systems reviewed and are negative.    MEDICATIONS  (STANDING):  clindamycin   Capsule 300 milliGRAM(s) Oral four times a day  doxycycline monohydrate Capsule 100 milliGRAM(s) Oral every 12 hours  enoxaparin Injectable 150 milliGRAM(s) SubCutaneous every 24 hours  escitalopram 10 milliGRAM(s) Oral daily  fentaNYL   Patch  25 MICROgram(s)/Hr 1 Patch Transdermal every 72 hours  oxyCODONE  ER Tablet 80 milliGRAM(s) Oral every 12 hours  polyethylene glycol 3350 17 Gram(s) Oral daily  senna 2 Tablet(s) Oral at bedtime    MEDICATIONS  (PRN):  acetaminophen     Tablet .. 650 milliGRAM(s) Oral every 6 hours PRN Temp greater or equal to 38C (100.4F), Moderate Pain (4 - 6)  aluminum hydroxide/magnesium hydroxide/simethicone Suspension 30 milliLiter(s) Oral every 4 hours PRN Dyspepsia  melatonin 3 milliGRAM(s) Oral at bedtime PRN Insomnia  ondansetron Injectable 4 milliGRAM(s) IV Push every 8 hours PRN Nausea and/or Vomiting  oxyCODONE    IR 10 milliGRAM(s) Oral every 6 hours PRN Severe Pain (7 - 10)      I&O's Summary    06 Jul 2024 07:01  -  07 Jul 2024 07:00  --------------------------------------------------------  IN: 630 mL / OUT: 1000 mL / NET: -370 mL        acetaminophen     Tablet .. 650 milliGRAM(s) Oral every 6 hours PRN  aluminum hydroxide/magnesium hydroxide/simethicone Suspension 30 milliLiter(s) Oral every 4 hours PRN  clindamycin   Capsule 300 milliGRAM(s) Oral four times a day  doxycycline monohydrate Capsule 100 milliGRAM(s) Oral every 12 hours  enoxaparin Injectable 150 milliGRAM(s) SubCutaneous every 24 hours  escitalopram 10 milliGRAM(s) Oral daily  fentaNYL   Patch  25 MICROgram(s)/Hr 1 Patch Transdermal every 72 hours  melatonin 3 milliGRAM(s) Oral at bedtime PRN  ondansetron Injectable 4 milliGRAM(s) IV Push every 8 hours PRN  oxyCODONE    IR 10 milliGRAM(s) Oral every 6 hours PRN  oxyCODONE  ER Tablet 80 milliGRAM(s) Oral every 12 hours  polyethylene glycol 3350 17 Gram(s) Oral daily  senna 2 Tablet(s) Oral at bedtime      PHYSICAL EXAM:  Vital Signs Last 24 Hrs  T(C): 36.8 (07 Jul 2024 08:24), Max: 36.9 (06 Jul 2024 18:27)  T(F): 98.2 (07 Jul 2024 08:24), Max: 98.4 (06 Jul 2024 18:27)  HR: 94 (07 Jul 2024 08:24) (60 - 94)  BP: 129/72 (07 Jul 2024 08:24) (110/64 - 142/78)  BP(mean): --  RR: 18 (07 Jul 2024 08:24) (16 - 18)  SpO2: 97% (07 Jul 2024 08:24) (97% - 100%)    Parameters below as of 07 Jul 2024 08:24  Patient On (Oxygen Delivery Method): room air          CONSTITUTIONAL: no apparent distress  EYES: PERRLA  ENMT: Oral mucosa with moist membranes  RESP: No respiratory distress, clear to auscultation bilaterally, no wheezes or rales  CV: RRR, +S1S2, no peripheral edema  GI: Soft, NT, ND, no rebound, no guarding  MSK: Normal ROM without pain, normal muscle strength/tone  SKIN: No rashes or ulcers noted  PSYCH: A+O x 3, mood and affect appropriate  NEURO: Cooperative, upper and lower motor function grossly intact bilaterally, sensation grossly intact throughout      LABS:                        12.3   5.85  )-----------( 156      ( 05 Jul 2024 12:44 )             38.7     07-05    136  |  98  |  5.8<L>  ----------------------------<  104<H>  4.9   |  26.0  |  0.63    Ca    9.0      05 Jul 2024 12:44    TPro  7.1  /  Alb  3.9  /  TBili  0.4  /  DBili  x   /  AST  37  /  ALT  33  /  AlkPhos  192<H>  07-05    PT/INR - ( 05 Jul 2024 12:44 )   PT: 12.4 sec;   INR: 1.12 ratio         PTT - ( 05 Jul 2024 12:44 )  PTT:43.3 sec      Urinalysis Basic - ( 05 Jul 2024 12:44 )    Color: x / Appearance: x / SG: x / pH: x  Gluc: 104 mg/dL / Ketone: x  / Bili: x / Urobili: x   Blood: x / Protein: x / Nitrite: x   Leuk Esterase: x / RBC: x / WBC x   Sq Epi: x / Non Sq Epi: x / Bacteria: x        CAPILLARY BLOOD GLUCOSE          IMAGING:                                   [facility] Division of Medicine    SUBJECTIVE / OVERNIGHT EVENTS: No overnight events as per RN. Pt seen at the bedside. Endorses feeling weak but otherwise ok. Denies any new complaints. All other systems reviewed and are negative.    MEDICATIONS  (STANDING):  clindamycin   Capsule 300 milliGRAM(s) Oral four times a day  doxycycline monohydrate Capsule 100 milliGRAM(s) Oral every 12 hours  enoxaparin Injectable 150 milliGRAM(s) SubCutaneous every 24 hours  escitalopram 10 milliGRAM(s) Oral daily  fentaNYL   Patch  25 MICROgram(s)/Hr 1 Patch Transdermal every 72 hours  oxyCODONE  ER Tablet 80 milliGRAM(s) Oral every 12 hours  polyethylene glycol 3350 17 Gram(s) Oral daily  senna 2 Tablet(s) Oral at bedtime    MEDICATIONS  (PRN):  acetaminophen     Tablet .. 650 milliGRAM(s) Oral every 6 hours PRN Temp greater or equal to 38C (100.4F), Moderate Pain (4 - 6)  aluminum hydroxide/magnesium hydroxide/simethicone Suspension 30 milliLiter(s) Oral every 4 hours PRN Dyspepsia  melatonin 3 milliGRAM(s) Oral at bedtime PRN Insomnia  ondansetron Injectable 4 milliGRAM(s) IV Push every 8 hours PRN Nausea and/or Vomiting  oxyCODONE    IR 10 milliGRAM(s) Oral every 6 hours PRN Severe Pain (7 - 10)      I&O's Summary    06 Jul 2024 07:01  -  07 Jul 2024 07:00  --------------------------------------------------------  IN: 630 mL / OUT: 1000 mL / NET: -370 mL        acetaminophen     Tablet .. 650 milliGRAM(s) Oral every 6 hours PRN  aluminum hydroxide/magnesium hydroxide/simethicone Suspension 30 milliLiter(s) Oral every 4 hours PRN  clindamycin   Capsule 300 milliGRAM(s) Oral four times a day  doxycycline monohydrate Capsule 100 milliGRAM(s) Oral every 12 hours  enoxaparin Injectable 150 milliGRAM(s) SubCutaneous every 24 hours  escitalopram 10 milliGRAM(s) Oral daily  fentaNYL   Patch  25 MICROgram(s)/Hr 1 Patch Transdermal every 72 hours  melatonin 3 milliGRAM(s) Oral at bedtime PRN  ondansetron Injectable 4 milliGRAM(s) IV Push every 8 hours PRN  oxyCODONE    IR 10 milliGRAM(s) Oral every 6 hours PRN  oxyCODONE  ER Tablet 80 milliGRAM(s) Oral every 12 hours  polyethylene glycol 3350 17 Gram(s) Oral daily  senna 2 Tablet(s) Oral at bedtime      PHYSICAL EXAM:  Vital Signs Last 24 Hrs  T(C): 36.8 (07 Jul 2024 08:24), Max: 36.9 (06 Jul 2024 18:27)  T(F): 98.2 (07 Jul 2024 08:24), Max: 98.4 (06 Jul 2024 18:27)  HR: 94 (07 Jul 2024 08:24) (60 - 94)  BP: 129/72 (07 Jul 2024 08:24) (110/64 - 142/78)  BP(mean): --  RR: 18 (07 Jul 2024 08:24) (16 - 18)  SpO2: 97% (07 Jul 2024 08:24) (97% - 100%)    Parameters below as of 07 Jul 2024 08:24  Patient On (Oxygen Delivery Method): room air          CONSTITUTIONAL: no apparent distress  RESP: No respiratory distress, clear to auscultation bilaterally, no wheezes or rales  CV: RRR, +S1S2, no peripheral edema  GI: Soft, NT, ND  PSYCH: A+O x 3, mood and affect appropriate  NEURO: Cooperative, upper and lower motor function grossly intact bilaterally, sensation grossly intact throughout      LABS:                        12.3   5.85  )-----------( 156      ( 05 Jul 2024 12:44 )             38.7     07-05    136  |  98  |  5.8<L>  ----------------------------<  104<H>  4.9   |  26.0  |  0.63    Ca    9.0      05 Jul 2024 12:44    TPro  7.1  /  Alb  3.9  /  TBili  0.4  /  DBili  x   /  AST  37  /  ALT  33  /  AlkPhos  192<H>  07-05    PT/INR - ( 05 Jul 2024 12:44 )   PT: 12.4 sec;   INR: 1.12 ratio         PTT - ( 05 Jul 2024 12:44 )  PTT:43.3 sec      Urinalysis Basic - ( 05 Jul 2024 12:44 )    Color: x / Appearance: x / SG: x / pH: x  Gluc: 104 mg/dL / Ketone: x  / Bili: x / Urobili: x   Blood: x / Protein: x / Nitrite: x   Leuk Esterase: x / RBC: x / WBC x   Sq Epi: x / Non Sq Epi: x / Bacteria: x        CAPILLARY BLOOD GLUCOSE          IMAGING:

## 2024-07-08 PROCEDURE — 99222 1ST HOSP IP/OBS MODERATE 55: CPT

## 2024-07-08 PROCEDURE — 99232 SBSQ HOSP IP/OBS MODERATE 35: CPT

## 2024-07-08 RX ORDER — HYDROMORPHONE HCL 0.2 MG/ML
2 INJECTION, SOLUTION INTRAVENOUS EVERY 4 HOURS
Refills: 0 | Status: DISCONTINUED | OUTPATIENT
Start: 2024-07-08 | End: 2024-07-09

## 2024-07-08 RX ORDER — HYDROMORPHONE HCL 0.2 MG/ML
0.5 INJECTION, SOLUTION INTRAVENOUS
Refills: 0 | Status: DISCONTINUED | OUTPATIENT
Start: 2024-07-08 | End: 2024-07-09

## 2024-07-08 RX ORDER — HYDROMORPHONE HCL 0.2 MG/ML
0.5 INJECTION, SOLUTION INTRAVENOUS ONCE
Refills: 0 | Status: DISCONTINUED | OUTPATIENT
Start: 2024-07-08 | End: 2024-07-08

## 2024-07-08 RX ORDER — OXYCODONE HYDROCHLORIDE 100 MG/5ML
80 SOLUTION ORAL EVERY 12 HOURS
Refills: 0 | Status: DISCONTINUED | OUTPATIENT
Start: 2024-07-08 | End: 2024-07-11

## 2024-07-08 RX ORDER — HYDROMORPHONE HCL 0.2 MG/ML
2 INJECTION, SOLUTION INTRAVENOUS ONCE
Refills: 0 | Status: DISCONTINUED | OUTPATIENT
Start: 2024-07-08 | End: 2024-07-08

## 2024-07-08 RX ORDER — HYDROMORPHONE HCL 0.2 MG/ML
1 INJECTION, SOLUTION INTRAVENOUS ONCE
Refills: 0 | Status: DISCONTINUED | OUTPATIENT
Start: 2024-07-08 | End: 2024-07-08

## 2024-07-08 RX ADMIN — HYDROMORPHONE HCL 1 MILLIGRAM(S): 0.2 INJECTION, SOLUTION INTRAVENOUS at 21:10

## 2024-07-08 RX ADMIN — OXYCODONE HYDROCHLORIDE 10 MILLIGRAM(S): 100 SOLUTION ORAL at 14:57

## 2024-07-08 RX ADMIN — OXYCODONE HYDROCHLORIDE 80 MILLIGRAM(S): 100 SOLUTION ORAL at 06:00

## 2024-07-08 RX ADMIN — DOXYCYCLINE 100 MILLIGRAM(S): 100 CAPSULE ORAL at 18:10

## 2024-07-08 RX ADMIN — DOXYCYCLINE 100 MILLIGRAM(S): 100 CAPSULE ORAL at 05:30

## 2024-07-08 RX ADMIN — CLINDAMYCIN PHOSPHATE 300 MILLIGRAM(S): 150 INJECTION, SOLUTION INTRAVENOUS at 18:12

## 2024-07-08 RX ADMIN — HYDROMORPHONE HCL 1 MILLIGRAM(S): 0.2 INJECTION, SOLUTION INTRAVENOUS at 20:40

## 2024-07-08 RX ADMIN — OXYCODONE HYDROCHLORIDE 80 MILLIGRAM(S): 100 SOLUTION ORAL at 21:06

## 2024-07-08 RX ADMIN — ESCITALOPRAM OXALATE 10 MILLIGRAM(S): 20 TABLET, FILM COATED ORAL at 11:59

## 2024-07-08 RX ADMIN — HYDROMORPHONE HCL 2 MILLIGRAM(S): 0.2 INJECTION, SOLUTION INTRAVENOUS at 18:16

## 2024-07-08 RX ADMIN — OXYCODONE HYDROCHLORIDE 10 MILLIGRAM(S): 100 SOLUTION ORAL at 08:40

## 2024-07-08 RX ADMIN — HYDROMORPHONE HCL 2 MILLIGRAM(S): 0.2 INJECTION, SOLUTION INTRAVENOUS at 11:58

## 2024-07-08 RX ADMIN — HYDROMORPHONE HCL 0.5 MILLIGRAM(S): 0.2 INJECTION, SOLUTION INTRAVENOUS at 02:22

## 2024-07-08 RX ADMIN — HYDROMORPHONE HCL 0.5 MILLIGRAM(S): 0.2 INJECTION, SOLUTION INTRAVENOUS at 01:52

## 2024-07-08 RX ADMIN — CLINDAMYCIN PHOSPHATE 300 MILLIGRAM(S): 150 INJECTION, SOLUTION INTRAVENOUS at 11:58

## 2024-07-08 RX ADMIN — OXYCODONE HYDROCHLORIDE 10 MILLIGRAM(S): 100 SOLUTION ORAL at 07:47

## 2024-07-08 RX ADMIN — CLINDAMYCIN PHOSPHATE 300 MILLIGRAM(S): 150 INJECTION, SOLUTION INTRAVENOUS at 05:30

## 2024-07-08 RX ADMIN — OXYCODONE HYDROCHLORIDE 80 MILLIGRAM(S): 100 SOLUTION ORAL at 05:30

## 2024-07-08 RX ADMIN — ENOXAPARIN SODIUM 140 MILLIGRAM(S): 100 INJECTION SUBCUTANEOUS at 18:11

## 2024-07-08 RX ADMIN — OXYCODONE HYDROCHLORIDE 10 MILLIGRAM(S): 100 SOLUTION ORAL at 15:30

## 2024-07-08 RX ADMIN — OXYCODONE HYDROCHLORIDE 80 MILLIGRAM(S): 100 SOLUTION ORAL at 21:36

## 2024-07-08 NOTE — DIETITIAN INITIAL EVALUATION ADULT - ETIOLOGY
related to inability to meet sufficient protein-energy in setting of stage 4 colon cancer with mets on chemo, failure to thrive

## 2024-07-08 NOTE — DIETITIAN INITIAL EVALUATION ADULT - NS FNS DIET ORDER
Diet, Regular:   Supplement Feeding Modality:  Oral  Ensure Plus High Protein Cans or Servings Per Day:  2       Frequency:  Three Times a day (07-06-24 @ 17:30)

## 2024-07-08 NOTE — DIETITIAN NUTRITION RISK NOTIFICATION - TREATMENT: THE FOLLOWING DIET HAS BEEN RECOMMENDED
Diet, Regular:   Supplement Feeding Modality:  Oral  Ensure Plus High Protein Cans or Servings Per Day:  2       Frequency:  Three Times a day (07-06-24 @ 17:30) [Active]

## 2024-07-08 NOTE — DIETITIAN INITIAL EVALUATION ADULT - ORAL INTAKE PTA/DIET HISTORY
Nutrition assessment completed. Pt reports decreased appetite/po intake overall. States he is drinking some of Ensure supplement. Pt states his weight prior to admission fluctuates, did not go further in depth about weight history. Per EMR review, pts weight in 12/2023 was 193 lbs, current weight 179 lbs. Pt admitted with failure to thrive. RD to follow up as feasible.

## 2024-07-08 NOTE — DIETITIAN INITIAL EVALUATION ADULT - OTHER INFO
54yo male with hx of stage 4 colon adenocarcinoma with mets on chemo (last done in May but on hold as of June due to prior hospitalization) who presented to the ED from home for progressive generalized weakness for last 2 months. He was placed in observation unit for PT eval. PT recommending CL[facility]DE however pt would like to go to long term living facility. Per SW, pt not accepted to any SNFs at this time. I spoke with pt, pt's sister Idania, and SW and decision made to admit to medicine for ongoing PT and CLYDE placement until better accommodations can be determined.

## 2024-07-08 NOTE — PROGRESS NOTE ADULT - SUBJECTIVE AND OBJECTIVE BOX
[facility] Division of Medicine    SUBJECTIVE / OVERNIGHT EVENTS: No overnight events as per RN. Pt seen at the bedside. Endorses diffuse body pains same as when he was admitted. Otherise denies any new complaints. All other systems reviewed and are negative.    MEDICATIONS  (STANDING):  clindamycin   Capsule 300 milliGRAM(s) Oral four times a day  doxycycline monohydrate Capsule 100 milliGRAM(s) Oral every 12 hours  enoxaparin Injectable 140 milliGRAM(s) SubCutaneous every 24 hours  escitalopram 10 milliGRAM(s) Oral daily  fentaNYL   Patch  25 MICROgram(s)/Hr 1 Patch Transdermal every 72 hours  oxyCODONE  ER Tablet 80 milliGRAM(s) Oral every 12 hours  polyethylene glycol 3350 17 Gram(s) Oral daily  senna 2 Tablet(s) Oral at bedtime    MEDICATIONS  (PRN):  acetaminophen     Tablet .. 650 milliGRAM(s) Oral every 6 hours PRN Temp greater or equal to 38C (100.4F), Moderate Pain (4 - 6)  aluminum hydroxide/magnesium hydroxide/simethicone Suspension 30 milliLiter(s) Oral every 4 hours PRN Dyspepsia  melatonin 3 milliGRAM(s) Oral at bedtime PRN Insomnia  ondansetron Injectable 4 milliGRAM(s) IV Push every 8 hours PRN Nausea and/or Vomiting  oxyCODONE    IR 10 milliGRAM(s) Oral every 6 hours PRN Severe Pain (7 - 10)      I&O's Summary      acetaminophen     Tablet .. 650 milliGRAM(s) Oral every 6 hours PRN  aluminum hydroxide/magnesium hydroxide/simethicone Suspension 30 milliLiter(s) Oral every 4 hours PRN  clindamycin   Capsule 300 milliGRAM(s) Oral four times a day  doxycycline monohydrate Capsule 100 milliGRAM(s) Oral every 12 hours  enoxaparin Injectable 140 milliGRAM(s) SubCutaneous every 24 hours  escitalopram 10 milliGRAM(s) Oral daily  fentaNYL   Patch  25 MICROgram(s)/Hr 1 Patch Transdermal every 72 hours  melatonin 3 milliGRAM(s) Oral at bedtime PRN  ondansetron Injectable 4 milliGRAM(s) IV Push every 8 hours PRN  oxyCODONE    IR 10 milliGRAM(s) Oral every 6 hours PRN  oxyCODONE  ER Tablet 80 milliGRAM(s) Oral every 12 hours  polyethylene glycol 3350 17 Gram(s) Oral daily  senna 2 Tablet(s) Oral at bedtime      PHYSICAL EXAM:  Vital Signs Last 24 Hrs  T(C): 36.9 (08 Jul 2024 07:27), Max: 37.3 (08 Jul 2024 00:11)  T(F): 98.5 (08 Jul 2024 07:27), Max: 99.1 (08 Jul 2024 00:11)  HR: 71 (08 Jul 2024 07:27) (60 - 82)  BP: 124/80 (08 Jul 2024 07:27) (115/76 - 152/88)  BP(mean): --  RR: 18 (08 Jul 2024 07:27) (18 - 18)  SpO2: 97% (08 Jul 2024 07:27) (97% - 100%)    Parameters below as of 08 Jul 2024 07:27  Patient On (Oxygen Delivery Method): room air          CONSTITUTIONAL: no apparent distress  RESP: No respiratory distress, clear to auscultation bilaterally, no wheezes or rales  CV: RRR, +S1S2, no peripheral edema  GI: Soft, mildly tender throughout, ND, no rebound, no guarding  MSK: Normal ROM with mild subjective pains  PSYCH: A+O x 3, mood and affect appropriate  NEURO: Cooperative, upper and lower motor function grossly intact bilaterally, sensation grossly intact throughout      LABS:                    CAPILLARY BLOOD GLUCOSE          IMAGING:

## 2024-07-08 NOTE — CONSULT NOTE ADULT - ASSESSMENT
54 yo M with PMH of Stage -4-Colon Adenocarcinoma with Metastasis to Liver. PMH also includes PE, S/P Ileostomy, Abdominal pain. Last chemo treatment having had multiple   chemo treatments, was end of May'24. He suffers from chronic abdominal pain, and is Opioid Tolerant and Dependent. Present Medication regime is not helping much.      Stage IV Colon Cancer Metastasis to  Liver  Disease Progression despite chemotherapy oncologic treatment  Chemo treatments delayed due to recent hospitalizations  Chronic Abdominal Pain  We need Heme Onc to decide and inform Patient, whether he will benefit from ongoing chemotherapy treatments    Chronic Abdominal Pain  ISTOP reviewed  Current Analgesic Plan:  Oxycontin 80 mg Q12  Oxycodone 10mg Q6h prn for severe pain  Opioid tolerance/Dependence  Uses Oxycodone IR 20 mg PO Q6h prn for persistent pain  I would recommend switching OPIOId to Dilaudid-  DC Fentanyl 25 mcg/hr patch-not working  May be a good candidate for Methadone Q6h ATC for long acting pain relief  Acute Pain service consult was requested on 7/6-will recall    OIC Prevention  On Miralax and Senna-working well  Ileostomy working around the clock  Encourage water intake  OOB/with PT daily    Depression/Anxiety  Cancer Diagnosis  Stressfull waiting for his next chemotherapy treatment  Chronic Abdominal Pain (poorly managed on present Opioids)    GOC  Patient wants to move into a SNF where he can have assistance with his ADL's  Intends to continue Chemotherapy treatment "It makes me sick then I feel better making me stronger  HCP completed designating his sister Idania Dhaliwal . Document completed.   54 yo M with PMH of Stage -4-Colon Adenocarcinoma with Metastasis to Liver. PMH also includes PE, S/P Ileostomy, Abdominal pain. Last chemo treatment having had multiple   chemo treatments, was end of May'24. He suffers from chronic abdominal pain, and is Opioid Tolerant and Dependent. Present Medication regime is not helping much.      Stage IV Colon Cancer Metastasis to  Liver  Disease Progression despite chemotherapy oncologic treatment  Chemo treatments delayed due to recent hospitalizations  Chronic Abdominal Pain  We need Heme Onc to decide and inform Patient, whether he will benefit from ongoing chemotherapy treatments  He intends to continue chemotherapy after discharge to SNF    Chronic Abdominal Pain  I STOP reviewed  Current Analgesic Plan:  Oxycontin 80 mg Q12  Oxycodone 10mg Q6h prn for severe pain-not effective > to 20 mg (home dose)  Opioid tolerance/Dependence  Uses Oxycodone IR 20 mg PO Q6h prn for persistent pain  I would recommend switching OPIOId-  DC Fentanyl 25 mcg/hr patch-not working  May be a good candidate for Methadone Q6h ATC for long acting pain relief  Acute Pain service consult was requested on 7/6-will recall-left messages    OIC Prevention  On Miralax and Senna-working well  Ileostomy working around the clock  Encourage water intake  OOB/with PT daily    Depression/Anxiety  Cancer Diagnosis  Stressfull waiting for his next chemotherapy treatment  Chronic Abdominal Pain (poorly managed on present Opioids)  Will DC Lexapro-not effective   Start Cymbalta 30 mg PO HS x 4 days then give in AM titrate as needed    GOC-  Full code-not able to speak to DNR status  Patient wants to move into a SNF where he can have assistance with his ADL's  Intends to continue Chemotherapy treatment "It makes me sick then I feel better its  making me stronger"  HCP completed designating his sister Idania Dhaliwal . Document completed.

## 2024-07-08 NOTE — DIETITIAN INITIAL EVALUATION ADULT - PERTINENT MEDS FT
MEDICATIONS  (STANDING):  clindamycin   Capsule 300 milliGRAM(s) Oral four times a day  doxycycline monohydrate Capsule 100 milliGRAM(s) Oral every 12 hours  escitalopram 10 milliGRAM(s) Oral daily  oxyCODONE  ER Tablet 80 milliGRAM(s) Oral every 12 hours  polyethylene glycol 3350 17 Gram(s) Oral daily  senna 2 Tablet(s) Oral at bedtime    MEDICATIONS  (PRN):  aluminum hydroxide/magnesium hydroxide/simethicone Suspension 30 milliLiter(s) Oral every 4 hours PRN Dyspepsia  melatonin 3 milliGRAM(s) Oral at bedtime PRN Insomnia  ondansetron Injectable 4 milliGRAM(s) IV Push every 8 hours PRN Nausea and/or Vomiting  oxyCODONE    IR 10 milliGRAM(s) Oral every 6 hours PRN Severe Pain (7 - 10)

## 2024-07-08 NOTE — PROGRESS NOTE ADULT - ASSESSMENT
52yo male with hx of stage 4 colon adenocarcinoma with mets on chemo (last done in May but on hold as of June due to prior hospitalization) who presented to the ED from home for progressive generalized weakness for last 2 months. He was placed in observation unit for PT eval. PT recommending [facility] however pt would like to go to long term living facility. Per [facility], pt not accepted to any SNFs at this time. I spoke with pt, pt's sister Idania, and SW and decision made to admit to medicine for ongoing PT and [facility] placement until better accommodations can be determined.     Failure to thrive 2/2 progressive metastatic colon CA  - CT abd pelvis with evidence of progressive metastatic disease  - PT recommending [facility]  - PO intake limited according to patient, will supplement with ensure  - pain medications reviewed with patient on admission and confirmed on ISTOP  - oxycodone ER 80mg q12hr  - oxycodone IR 10mg q6hr PRN  - fentanyl patch q72hr  - bowel regimen ordered  - pt reports uncontrolled pain, pain management consulted   - oncology note from 7/5 in [facility] reviewed  - last chemo in May 2024, was then postponed due to recent admission in [place] for ileostomy revision  - chemo now further on hold for 4-6 weeks following completion of abx for recent ileostomy repair  - GOC discussion initiated given advancing disease process and lack of improvement following chemo, pt would like to continue to think about things and discuss with palliative care team   - palliative care consulted  - possible hospice care consult should pt be agreeable to stop chemo  - heme/onc consulted for assistance  - daily PT while admitted    Hx of occluded left iliac artery  - c/w therapeutic lovenox    Recent ileostomy repair at [place] last month  - c/w PO clindamycin and doxycycline  - f/u w/ colorectal surgery as outpatient    DVT ppx: therapeutic lovenox    Dispo: to [facility] pending acceptance and palliative care consult

## 2024-07-08 NOTE — CONSULT NOTE ADULT - NSCONSULTADDITIONALINFOA_GEN_ALL_CORE
Total Time Spent__60__ minutes  This includes chart review, patient assessment, discussion and collaboration with interdisciplinary team members, ACP planning    COUNSELING:  Face to face meeting to discuss Advanced Care Planning - Time Spent _15_____Minutes.      Thank you for the opportunity to assist with the care of this patient.   [facility] Palliative Medicine Consult Service [phone].

## 2024-07-08 NOTE — CONSULT NOTE ADULT - SUBJECTIVE AND OBJECTIVE BOX
52yo male with hx of stage 4 colon adenocarcinoma with mets on chemo (last done in May but on hold as of June due to prior hospitalization) who presented to the ED from home for progressive generalized weakness for last 2 months. He reports having diminished appetite for food and fluids d/t nausea and sometimes vomiting for a longer duration than 2 months  but has noticed weakness has gotten to a point where he cannot climb a flight of stairs in his sister's home or to functionally take care of himself. His main concern is treating his Pain,. He is on high doses of opioids and developed opioid tolerance and dependence. CC: Chronic abdominal pain  Best Pain intensity 8/10 worst 10/10. With having end of dose failure an hour or two after he has taken his analgesic pain meds. He is afebrile, no chills or SOB, weakness,  unable to ambulate with walker any distance.    HPI:  52yo male with hx of stage 4 colon adenocarcinoma with mets on chemo (last done in May but on hold as of June due to prior hospitalization) who presented to the ED from home for progressive generalized weakness for last 2 months. He reports having diminished appetite for food and fluids d/t nausea and sometimes vomiting for a longer duration than 2 months  but has noticed weakness has gotten to a point where he cannot climb a flight of stairs in his sister's home or to functionally take care of himself. Reports he has become 100% reliable on his sister for care but she is unable to assist to that degree. He denies any recent fevers, chills, cough, chest pain, sob, diarrhea, constipation, dysuria, LE swelling. He does endorse abd pain and diffuse joint pains which he reports are his normal.     In the ED he was placed in observation unit for PT eval. PT recommending [facility] however pt would like to go to long term living facility. Per SW, pt not accepted to any SNFs at this time. I spoke with pt and pt's sister Idania and decision made to admit to medicine for ongoing PT and [facility] placement until better accommodations can be determined. (06 Jul 2024 16:04)      PERTINENT PMH REVIEWED: Yes No    PAST MEDICAL & SURGICAL HISTORY:  Cancer, colon  liver mets      S/P ileostomy      Metastasis to liver      Pulmonary embolism      S/P colon resection      H/O ileostomy          SOCIAL HISTORY:                      Substance history:                    Admitted from:  home  SNF  [facility]                     Caodaism/spirituality:                    Cultural concerns:                      Surrogate/HCP/Guardian: Phone#:    FAMILY HISTORY:  FH: dementia (Mother)        Allergies    [This allergen will not trigger allergy alert] No Known Drug Allergies (Unknown)    Intolerances        ADVANCE DIRECTIVES/TREATMENT PREFERENCES:  Full code, all aggressive measures desired   DNR/DNI - MOLST, continue all other medical treatments  DNR/DNI - MOLST, comfort measures only     Baseline ADLs (prior to admission):  Independent/ Dependent      Karnofsky/Palliative Performance Status Version 2:  %  http://npcrc.org/files/news/[email or web address]_performance_scale_[email or web address]    Present Symptoms:     Dyspnea: Mild Moderate Severe  Nausea/Vomiting: Yes No  Anxiety:  Yes No  Depression: Yes No  Fatigue: Yes No  Loss of appetite: Yes No  Constipation:     Pain:             Character-            Duration-            Effect-            Factors-            Frequency-            Location-            Severity-    Pain AD Score:  [email or web address] (press ctrl + left click to view)    Review of Systems: Reviewed                     Negative:                     Positive:  Unable to obtain due to poor mentation   All others negative    MEDICATIONS  (STANDING):  clindamycin   Capsule 300 milliGRAM(s) Oral four times a day  doxycycline monohydrate Capsule 100 milliGRAM(s) Oral every 12 hours  enoxaparin Injectable 140 milliGRAM(s) SubCutaneous every 24 hours  escitalopram 10 milliGRAM(s) Oral daily  fentaNYL   Patch  25 MICROgram(s)/Hr 1 Patch Transdermal every 72 hours  oxyCODONE  ER Tablet 80 milliGRAM(s) Oral every 12 hours  polyethylene glycol 3350 17 Gram(s) Oral daily  senna 2 Tablet(s) Oral at bedtime    MEDICATIONS  (PRN):  acetaminophen     Tablet .. 650 milliGRAM(s) Oral every 6 hours PRN Temp greater or equal to 38C (100.4F), Moderate Pain (4 - 6)  aluminum hydroxide/magnesium hydroxide/simethicone Suspension 30 milliLiter(s) Oral every 4 hours PRN Dyspepsia  melatonin 3 milliGRAM(s) Oral at bedtime PRN Insomnia  ondansetron Injectable 4 milliGRAM(s) IV Push every 8 hours PRN Nausea and/or Vomiting  oxyCODONE    IR 10 milliGRAM(s) Oral every 6 hours PRN Severe Pain (7 - 10)      PHYSICAL EXAM:    Vital Signs Last 24 Hrs  T(C): 36.9 (08 Jul 2024 07:27), Max: 37.3 (08 Jul 2024 00:11)  T(F): 98.5 (08 Jul 2024 07:27), Max: 99.1 (08 Jul 2024 00:11)  HR: 71 (08 Jul 2024 07:27) (60 - 82)  BP: 124/80 (08 Jul 2024 07:27) (115/76 - 152/88)  BP(mean): --  RR: 18 (08 Jul 2024 07:27) (18 - 18)  SpO2: 97% (08 Jul 2024 07:27) (97% - 100%)    Parameters below as of 08 Jul 2024 07:27  Patient On (Oxygen Delivery Method): room air        General: alert  oriented x ____ lethargic agitated delirious                  cachexia  nonverbal  coma    HEENT: normal  dry mouth  ET tube/trach    Lungs: comfortable tachypnea/labored breathing  excessive secretions    CV: normal  tachycardia    GI: normal  distended  tender  no BS               PEG/NG/OG tube  constipation  last BM:     : normal  incontinent  oliguria/anuria  gamez    MSK: normal  weakness  edema             ambulatory  bedbound/wheelchair bound    Neuro: no focal deficits cognitive impairment dysphagia dysarthria hemiplegia     Skin: normal  pressure ulcers- Stage_____  no rash    LABS:              I&O's Summary      RADIOLOGY & ADDITIONAL STUDIES:     54yo male with hx of stage 4 colon adenocarcinoma with mets on chemo (last done in May but on hold as of June due to prior hospitalization) who presented to the ED from home for progressive generalized weakness for last 2 months. He reports having diminished appetite for food and fluids d/t nausea and sometimes vomiting for a longer duration than 2 months  but has noticed weakness has gotten to a point where he cannot climb a flight of stairs in his sister's home or to functionally take care of himself. His main concern is treating his Pain,. He is on high doses of opioids and developed opioid tolerance and dependence. CC: Chronic abdominal pain  Best Pain intensity 8/10 worst 10/10. With having end of dose failure an hour or two after he has taken his analgesic pain meds. He is afebrile, no chills or SOB, weakness, HA  unable to ambulate with walker any distance. Functional status worsening-metastatic cancer.    HPI:  54yo male with hx of stage 4 colon adenocarcinoma with mets on chemo (last done in May but on hold as of June due to prior hospitalization) who presented to the ED from home for progressive generalized weakness for last 2 months. He reports having diminished appetite for food and fluids d/t nausea and sometimes vomiting for a longer duration than 2 months  but has noticed weakness has gotten to a point where he cannot climb a flight of stairs in his sister's home or to functionally take care of himself. Reports he has become 100% reliable on his sister for care but she is unable to assist to that degree. He denies any recent fevers, chills, cough, chest pain, sob, diarrhea, constipation, dysuria, LE swelling. He does endorse abd pain and diffuse joint pains which he reports are his normal.     In the ED he was placed in observation unit for PT eval. PT recommending [facility] however pt would like to go to long term living facility. Per SW, pt not accepted to any SNFs at this time. I spoke with pt and pt's sister Idania and decision made to admit to medicine for ongoing PT and [facility] placement until better accommodations can be determined. (06 Jul 2024 16:04)      PERTINENT PMH REVIEWED: Yes No    PAST MEDICAL & SURGICAL HISTORY:  Cancer, colon  liver mets    S/P ileostomy    Metastasis to liver    Pulmonary embolism    S/P colon resection    H/O ileostomy    SOCIAL HISTORY:                      Substance history: Opiod Tolerant and Dependent                    Admitted from:  home                      Mandaeism/spirituality: CATH                    Cultural concerns: none                      HCP-Sister Idania Dhaliwal [phone] - Officila HCP completed with Maury today    FAMILY HISTORY:  FH: dementia (Mother)        Allergies    No Known Drug Allergies (Unknown)    Intolerances      ADVANCE DIRECTIVES/TREATMENT PREFERENCES:  Full code, all aggressive measures desired       Baseline ADLs (prior to admission):   Dependent      Karnofsky/Palliative Performance Status Version 40  %      Present Symptoms:     Dyspnea: none  Nausea/Vomiting:  No  Anxiety:  Yes   Depression: Yes   Fatigue: Yes insomnia  Loss of appetite: Yes   Constipation: no regular BM's    Pain: chronic abdominal pain            Character-sharp stabbing, burning, sword like            Duration-constant            Effect-            Factors-worse at night            Frequency-constantly            Location-abdominal            Severity-severe 8/10    Review of Systems: Reviewed                       All others negative    MEDICATIONS  (STANDING):  clindamycin   Capsule 300 milliGRAM(s) Oral four times a day  doxycycline monohydrate Capsule 100 milliGRAM(s) Oral every 12 hours  enoxaparin Injectable 140 milliGRAM(s) SubCutaneous every 24 hours  escitalopram 10 milliGRAM(s) Oral daily  fentaNYL   Patch  25 MICROgram(s)/Hr 1 Patch Transdermal every 72 hours  oxyCODONE  ER Tablet 80 milliGRAM(s) Oral every 12 hours  polyethylene glycol 3350 17 Gram(s) Oral daily  senna 2 Tablet(s) Oral at bedtime    MEDICATIONS  (PRN):  acetaminophen     Tablet .. 650 milliGRAM(s) Oral every 6 hours PRN Temp greater or equal to 38C (100.4F), Moderate Pain (4 - 6)  aluminum hydroxide/magnesium hydroxide/simethicone Suspension 30 milliLiter(s) Oral every 4 hours PRN Dyspepsia  melatonin 3 milliGRAM(s) Oral at bedtime PRN Insomnia  ondansetron Injectable 4 milliGRAM(s) IV Push every 8 hours PRN Nausea and/or Vomiting  oxyCODONE    IR 10 milliGRAM(s) Oral every 6 hours PRN Severe Pain (7 - 10)      PHYSICAL EXAM:    Vital Signs Last 24 Hrs  T(C): 36.9 (08 Jul 2024 07:27), Max: 37.3 (08 Jul 2024 00:11)  T(F): 98.5 (08 Jul 2024 07:27), Max: 99.1 (08 Jul 2024 00:11)  HR: 71 (08 Jul 2024 07:27) (60 - 82)  BP: 124/80 (08 Jul 2024 07:27) (115/76 - 152/88)  BP(mean): --  RR: 18 (08 Jul 2024 07:27) (18 - 18)  SpO2: 97% (08 Jul 2024 07:27) (97% - 100%)    Parameters below as of 08 Jul 2024 07:27  Patient On (Oxygen Delivery Method): room air    General: alert  oriented x _3___anxious  in pain                  verbal  ruminating having pain all the time and becoming very weak    HEENT: normal      Lungs: comfortable     CV: normal     GI:   distended  tender               ileostomy to drainage bag  constipation  last BM: constant    : normal     MSK:   weakness              ambulatory walker refusing to walk  bedbound/wheelchair bound    Neuro: no focal deficits     Skin:__  no rash    LABS:              I&O's Summary      RADIOLOGY & ADDITIONAL STUDIES:

## 2024-07-08 NOTE — DIETITIAN NUTRITION RISK NOTIFICATION - ADDITIONAL COMMENTS/DIETITIAN RECOMMENDATIONS
Continue diet as tolerated.   Continue Ensure Plus High Protein TID (350 kcal, 20g protein per serving).  Encourage po intake, monitor diet tolerance, and provide assistance at meals as needed. Obtain daily weights to monitor trends.

## 2024-07-09 PROCEDURE — 99232 SBSQ HOSP IP/OBS MODERATE 35: CPT

## 2024-07-09 PROCEDURE — 99231 SBSQ HOSP IP/OBS SF/LOW 25: CPT

## 2024-07-09 RX ORDER — HYDROMORPHONE HCL 0.2 MG/ML
6 INJECTION, SOLUTION INTRAVENOUS EVERY 4 HOURS
Refills: 0 | Status: DISCONTINUED | OUTPATIENT
Start: 2024-07-09 | End: 2024-07-09

## 2024-07-09 RX ORDER — HYDROMORPHONE HCL 0.2 MG/ML
1 INJECTION, SOLUTION INTRAVENOUS EVERY 4 HOURS
Refills: 0 | Status: DISCONTINUED | OUTPATIENT
Start: 2024-07-09 | End: 2024-07-10

## 2024-07-09 RX ORDER — HYDROMORPHONE HCL 0.2 MG/ML
1 INJECTION, SOLUTION INTRAVENOUS ONCE
Refills: 0 | Status: DISCONTINUED | OUTPATIENT
Start: 2024-07-09 | End: 2024-07-09

## 2024-07-09 RX ORDER — OXYCODONE HYDROCHLORIDE 100 MG/5ML
15 SOLUTION ORAL EVERY 4 HOURS
Refills: 0 | Status: DISCONTINUED | OUTPATIENT
Start: 2024-07-09 | End: 2024-07-10

## 2024-07-09 RX ADMIN — HYDROMORPHONE HCL 0.5 MILLIGRAM(S): 0.2 INJECTION, SOLUTION INTRAVENOUS at 03:25

## 2024-07-09 RX ADMIN — ENOXAPARIN SODIUM 140 MILLIGRAM(S): 100 INJECTION SUBCUTANEOUS at 18:03

## 2024-07-09 RX ADMIN — OXYCODONE HYDROCHLORIDE 80 MILLIGRAM(S): 100 SOLUTION ORAL at 18:04

## 2024-07-09 RX ADMIN — HYDROMORPHONE HCL 1 MILLIGRAM(S): 0.2 INJECTION, SOLUTION INTRAVENOUS at 04:57

## 2024-07-09 RX ADMIN — CLINDAMYCIN PHOSPHATE 300 MILLIGRAM(S): 150 INJECTION, SOLUTION INTRAVENOUS at 18:03

## 2024-07-09 RX ADMIN — HYDROMORPHONE HCL 1 MILLIGRAM(S): 0.2 INJECTION, SOLUTION INTRAVENOUS at 05:27

## 2024-07-09 RX ADMIN — HYDROMORPHONE HCL 2 MILLIGRAM(S): 0.2 INJECTION, SOLUTION INTRAVENOUS at 01:33

## 2024-07-09 RX ADMIN — OXYCODONE HYDROCHLORIDE 80 MILLIGRAM(S): 100 SOLUTION ORAL at 07:20

## 2024-07-09 RX ADMIN — HYDROMORPHONE HCL 0.5 MILLIGRAM(S): 0.2 INJECTION, SOLUTION INTRAVENOUS at 08:30

## 2024-07-09 RX ADMIN — HYDROMORPHONE HCL 6 MILLIGRAM(S): 0.2 INJECTION, SOLUTION INTRAVENOUS at 13:00

## 2024-07-09 RX ADMIN — DOXYCYCLINE 100 MILLIGRAM(S): 100 CAPSULE ORAL at 18:04

## 2024-07-09 RX ADMIN — HYDROMORPHONE HCL 0.5 MILLIGRAM(S): 0.2 INJECTION, SOLUTION INTRAVENOUS at 02:55

## 2024-07-09 RX ADMIN — OXYCODONE HYDROCHLORIDE 15 MILLIGRAM(S): 100 SOLUTION ORAL at 22:30

## 2024-07-09 RX ADMIN — OXYCODONE HYDROCHLORIDE 80 MILLIGRAM(S): 100 SOLUTION ORAL at 06:50

## 2024-07-09 RX ADMIN — CLINDAMYCIN PHOSPHATE 300 MILLIGRAM(S): 150 INJECTION, SOLUTION INTRAVENOUS at 11:20

## 2024-07-09 RX ADMIN — HYDROMORPHONE HCL 1 MILLIGRAM(S): 0.2 INJECTION, SOLUTION INTRAVENOUS at 16:16

## 2024-07-09 RX ADMIN — HYDROMORPHONE HCL 1 MILLIGRAM(S): 0.2 INJECTION, SOLUTION INTRAVENOUS at 21:34

## 2024-07-09 RX ADMIN — OXYCODONE HYDROCHLORIDE 15 MILLIGRAM(S): 100 SOLUTION ORAL at 19:33

## 2024-07-09 RX ADMIN — HYDROMORPHONE HCL 0.5 MILLIGRAM(S): 0.2 INJECTION, SOLUTION INTRAVENOUS at 11:22

## 2024-07-09 RX ADMIN — CLINDAMYCIN PHOSPHATE 300 MILLIGRAM(S): 150 INJECTION, SOLUTION INTRAVENOUS at 00:27

## 2024-07-09 RX ADMIN — HYDROMORPHONE HCL 1 MILLIGRAM(S): 0.2 INJECTION, SOLUTION INTRAVENOUS at 14:12

## 2024-07-09 RX ADMIN — HYDROMORPHONE HCL 2 MILLIGRAM(S): 0.2 INJECTION, SOLUTION INTRAVENOUS at 02:03

## 2024-07-09 RX ADMIN — CLINDAMYCIN PHOSPHATE 300 MILLIGRAM(S): 150 INJECTION, SOLUTION INTRAVENOUS at 06:50

## 2024-07-09 RX ADMIN — OXYCODONE HYDROCHLORIDE 15 MILLIGRAM(S): 100 SOLUTION ORAL at 16:42

## 2024-07-09 RX ADMIN — DOXYCYCLINE 100 MILLIGRAM(S): 100 CAPSULE ORAL at 06:50

## 2024-07-09 RX ADMIN — HYDROMORPHONE HCL 1 MILLIGRAM(S): 0.2 INJECTION, SOLUTION INTRAVENOUS at 20:34

## 2024-07-09 RX ADMIN — HYDROMORPHONE HCL 6 MILLIGRAM(S): 0.2 INJECTION, SOLUTION INTRAVENOUS at 11:27

## 2024-07-09 RX ADMIN — OXYCODONE HYDROCHLORIDE 15 MILLIGRAM(S): 100 SOLUTION ORAL at 21:30

## 2024-07-09 NOTE — PROGRESS NOTE ADULT - SUBJECTIVE AND OBJECTIVE BOX
OVERNIGHT EVENTS:  F/U Note  No new events overnight  Pain exacerbated with change in short acting opioid for persistent pain  Poor sleep pattern  Insomniac- Mixed Pain- Neuropathic pain worse during night  Pain meds adjusted  using equianalgesic chart  Mood low- I explained we changed his antidepressant, Cymbalta may be more effective  NO fever, chills, N/V/D CP Palpitations      Present Symptoms:     Dyspnea: none  Nausea/Vomiting:  No  Anxiety:  Yes   Depression: Yes   Fatigue: Yes   Loss of appetite: Yes   Constipation: ileostomy working    Pain:             Character-            Duration-            Effect-            Factors-            Frequency-            Location-            Severity-    Pain AD Score:  http://geriatrictoolkit.[email or web address]/[email or web address] (press ctrl + left click to view)    Review of Systems: Reviewed                     Negative:                     Positive:  Unable to obtain due to poor mentation   All others negative    MEDICATIONS  (STANDING):  clindamycin   Capsule 300 milliGRAM(s) Oral four times a day  doxycycline monohydrate Capsule 100 milliGRAM(s) Oral every 12 hours  enoxaparin Injectable 140 milliGRAM(s) SubCutaneous every 24 hours  fentaNYL   Patch  25 MICROgram(s)/Hr 1 Patch Transdermal every 72 hours  oxyCODONE  ER Tablet 80 milliGRAM(s) Oral every 12 hours  polyethylene glycol 3350 17 Gram(s) Oral daily  senna 2 Tablet(s) Oral at bedtime    MEDICATIONS  (PRN):  acetaminophen     Tablet .. 650 milliGRAM(s) Oral every 6 hours PRN Temp greater or equal to 38C (100.4F), Moderate Pain (4 - 6)  aluminum hydroxide/magnesium hydroxide/simethicone Suspension 30 milliLiter(s) Oral every 4 hours PRN Dyspepsia  HYDROmorphone   Tablet 6 milliGRAM(s) Oral every 4 hours PRN Severe Pain (7 - 10)  HYDROmorphone  Injectable 1 milliGRAM(s) IV Push every 4 hours PRN Severe Pain (7 - 10)  Breakthrough  melatonin 3 milliGRAM(s) Oral at bedtime PRN Insomnia  ondansetron Injectable 4 milliGRAM(s) IV Push every 8 hours PRN Nausea and/or Vomiting      PHYSICAL EXAM:    Vital Signs Last 24 Hrs  T(C): 36.7 (09 Jul 2024 07:37), Max: 36.9 (08 Jul 2024 16:30)  T(F): 98 (09 Jul 2024 07:37), Max: 98.4 (08 Jul 2024 16:30)  HR: 69 (09 Jul 2024 07:37) (67 - 84)  BP: 137/83 (09 Jul 2024 07:37) (109/69 - 137/83)  BP(mean): --  RR: 19 (09 Jul 2024 07:37) (18 - 19)  SpO2: 100% (09 Jul 2024 07:37) (99% - 100%)    Parameters below as of 09 Jul 2024 04:09  Patient On (Oxygen Delivery Method): room air        General: alert  oriented x ____ lethargic agitated                  cachexia  nonverbal  coma    Karnofsky:  30%    HEENT: normal  dry mouth      Lungs: comfortable     CV: normal  tachycardia    GI:  distended  tender  ileostomy tube                constipation  last BM: constant fecal drainage    : normal      MSK: normal  weakness  edema             ambulatory  bedbound/wheelchair bound    Skin: normal  pressure ulcers- Stage_____  no rash    LABS:                I&O's Summary      RADIOLOGY & ADDITIONAL STUDIES:    ADVANCE DIRECTIVES/TREATMENT PREFERENCES:  DNR YES NO  Completed on:                     MOLST  YES NO   Completed on:  Living Will  YES NO   Completed on: OVERNIGHT EVENTS:  F/U Note  No new events overnight  Pain exacerbated with change in short acting opioid for persistent pain  Poor sleep pattern. Looks very sleepy  Insomniac- Mixed Pain- Neuropathic pain worse during night  Pain meds adjusted  using equianalgesic chart  Mood low- I explained we changed his antidepressant, Cymbalta may be more effective  NO fever, chills, N/V/D CP Palpitations      Present Symptoms:     Dyspnea: none  Nausea/Vomiting:  No  Anxiety:  Yes   Depression: Yes   Fatigue: Yes   Loss of appetite: Yes   Constipation: ileostomy working    Pain: chronic lower abdominal pain            Character-aching sharp stabbing burning            Duration-constant            Effect-            Factors-none            Frequency-            Location-md to lower abdominal pain            Severity-severe 8/10    Review of Systems: Reviewed                      All others negative    MEDICATIONS  (STANDING):  clindamycin   Capsule 300 milliGRAM(s) Oral four times a day  doxycycline monohydrate Capsule 100 milliGRAM(s) Oral every 12 hours  enoxaparin Injectable 140 milliGRAM(s) SubCutaneous every 24 hours  fentaNYL   Patch  25 MICROgram(s)/Hr 1 Patch Transdermal every 72 hours  oxyCODONE  ER Tablet 80 milliGRAM(s) Oral every 12 hours  polyethylene glycol 3350 17 Gram(s) Oral daily  senna 2 Tablet(s) Oral at bedtime    MEDICATIONS  (PRN):  acetaminophen     Tablet .. 650 milliGRAM(s) Oral every 6 hours PRN Temp greater or equal to 38C (100.4F), Moderate Pain (4 - 6)  aluminum hydroxide/magnesium hydroxide/simethicone Suspension 30 milliLiter(s) Oral every 4 hours PRN Dyspepsia  HYDROmorphone   Tablet 6 milliGRAM(s) Oral every 4 hours PRN Severe Pain (7 - 10)  HYDROmorphone  Injectable 1 milliGRAM(s) IV Push every 4 hours PRN Severe Pain (7 - 10)  Breakthrough  melatonin 3 milliGRAM(s) Oral at bedtime PRN Insomnia  ondansetron Injectable 4 milliGRAM(s) IV Push every 8 hours PRN Nausea and/or Vomiting      PHYSICAL EXAM:    Vital Signs Last 24 Hrs  T(C): 36.7 (09 Jul 2024 07:37), Max: 36.9 (08 Jul 2024 16:30)  T(F): 98 (09 Jul 2024 07:37), Max: 98.4 (08 Jul 2024 16:30)  HR: 69 (09 Jul 2024 07:37) (67 - 84)  BP: 137/83 (09 Jul 2024 07:37) (109/69 - 137/83)  BP(mean): --  RR: 19 (09 Jul 2024 07:37) (18 - 19)  SpO2: 100% (09 Jul 2024 07:37) (99% - 100%)    Parameters below as of 09 Jul 2024 04:09  Patient On (Oxygen Delivery Method): room air      General:  oriented x _3___ lethargic  but verbal                 thin  verbal      Karnofsky:  30%    HEENT:  dry mouth      Lungs: comfortable     CV: normal      GI:  distended  tender  ileostomy tube                constipation  last BM: constant fecal drainage    : normal      MSK:  weakness               ambulatory with walker and asst to BR and back to bed  bedbound/wheelchair bound    Skin: _  no rash    LABS:    I&O's Summary      RADIOLOGY & ADDITIONAL STUDIES:    ADVANCE DIRECTIVES/TREATMENT PREFERENCES:  DNR  NO  Completed on:                     MOLST   NO   Completed on:  Living Will   NO   Completed on:

## 2024-07-09 NOTE — CONSULT NOTE ADULT - ASSESSMENT
54yo male with hx of stage 4 colon adenocarcinoma with mets on chemo (last done in May but on hold as of June due to prior hospitalization) who presented to the ED from home for progressive generalized weakness for last 2 months.    Med Recs:  dc dilaudid 6mg  start oxy 15mg q4h prn sev pain  cont dilaudid ivp 1mg q4h prn btp  cont oxy er at current dose for now  cont fp for now

## 2024-07-09 NOTE — CONSULT NOTE ADULT - CONSULT REASON
Assistance with GOC    Discussions re: Prognosis Conversations      Both initiated at time of admission, however Patient does not seem realistic
colon cancer
Pain Management

## 2024-07-09 NOTE — PROGRESS NOTE ADULT - ASSESSMENT
54yo male with hx of stage 4 colon adenocarcinoma with mets on chemo (last done in May but on hold as of June due to prior hospitalization) who presented to the ED from home for progressive generalized weakness for last 2 months. He was placed in observation unit for PT eval. PT recommending [facility] however pt would like to go to long term living facility. Per [facility], pt not accepted to any SNFs at this time. I spoke with pt, pt's sister Idania, and SW and decision made to admit to medicine for ongoing PT and [facility] placement until better accommodations can be determined.     Failure to thrive 2/2 progressive metastatic colon CA  - CT abd pelvis with evidence of progressive metastatic disease  - PT recommending [facility]  - PO intake limited according to patient, will supplement with ensure  - prior pain medications reviewed with patient on admission and confirmed on ISTOP  - pain currently poorly controlled   - c/w oxycodone ER 80mg q12hr  - oxycodone IR changed to PO dialudid 6mg q4hr PRN  - added dilaudid 1mg IVP q4hr PRN  - d/c fentanyl patch q72hr (pt reports not useful)  - pain management consulted  - bowel regimen ordered  - oncology note from 7/5 in [facility] reviewed  - last chemo in May 2024, was then postponed due to recent admission in [place] for ileostomy revision  - chemo now further on hold for 4-6 weeks following completion of abx for recent ileostomy repair  - GOC discussion initiated given advancing disease process and lack of improvement following chemo, pt would like to continue to think about things and discuss with palliative care team   - palliative care following  - daily PT while admitted    Hx of occluded left iliac artery  - c/w therapeutic lovenox    Recent ileostomy repair at [place] last month  - c/w PO clindamycin and doxycycline  - f/u w/ colorectal surgery as outpatient    DVT ppx: therapeutic lovenox    Dispo: to [facility] pending acceptance

## 2024-07-09 NOTE — PROGRESS NOTE ADULT - ASSESSMENT
Chronic Abdominal Pain  Complicated Pain, with Opioid tolerance complicating effective analgesic plan  Mixed Nociceptive, Neuropathic Pain  Changed dose of Dilaudid oral and IV routes  Patient had a very long  night  insomnia  Still waiting for Pain Mgmt Consult  Cymbalta 30 mg initiated, will treat anxiety, depression and pain      54 yo M with PMH of Stage -4-Colon Adenocarcinoma with Metastasis to Liver. PMH also includes PE, S/P Ileostomy, Abdominal pain. Last chemo treatment having had multiple   chemo treatments, was end of May'24. He suffers from chronic abdominal pain, and is Opioid Tolerant and Dependent. Present Medication regime is not helping much.      Stage IV Colon Cancer Metastasis to  Liver  Disease Progression despite chemotherapy oncologic treatment  Chemo treatments delayed due to recent hospitalizations  Chronic Abdominal Pain  We need Heme Onc to decide and inform Patient, whether he will benefit from ongoing chemotherapy treatments  He intends to continue chemotherapy after discharge to SNF when/if it is offered    Chronic Abdominal Pain  I STOP reviewed  Current Analgesic Plan:  Oxycontin 80 mg Q12  Opioid tolerance/Dependence    Oxycodone 10mg Q6h prn for severe pain-not effective > to 20 mg (home dose) DC'd last evening  Dilaudid 2mg PO Q4 or Dilaudid 0.5mg IVP ordered to replace    Uses Oxycodone IR 20 mg PO Q6h prn for persistent pain  I would recommend switching OPIOId-  DC Fentanyl 25 mcg/hr patch-not working  May be a good candidate for Methadone Q6h ATC for long acting pain relief  Acute Pain service consult was requested on 7/6-will recall-left messages    OIC Prevention  On Miralax and Senna-working well  Ileostomy working around the clock  Encourage water intake  OOB/with PT daily    Depression/Anxiety  Cancer Diagnosis  Stressfull waiting for his next chemotherapy treatment  Chronic Abdominal Pain (poorly managed on present Opioids)  Will DC Lexapro-not effective   Start Cymbalta 30 mg PO HS x 4 days then give in AM titrate as needed    GOC-    Full code-not able to speak to DNR status  I did discuss the role of Hospice, if Oncology does not think you will benefit for continuing treatment,   he is entitled to their support.  He is taking one step at a time  Patient wants to move into a SNF where he can have assistance with his ADL's  Intends to continue Chemotherapy treatment "It makes me sick then I feel better its  making me stronger"  HCP completed designating his sister Idania Dhaliwal             Chronic Abdominal Pain  Complicated Pain, with Opioid tolerance complicating effective analgesic plan  Mixed Nociceptive, Neuropathic Pain  Changed dose of Dilaudid oral and IV routes  Patient had a very long  night  insomnia  Still waiting for Pain Mgmt Consult  Cymbalta 30 mg initiated, will treat anxiety, depression and pain      54 yo M with PMH of Stage -4-Colon Adenocarcinoma with Metastasis to Liver. PMH also includes PE, S/P Ileostomy, Abdominal pain. Last chemo treatment having had multiple   chemo treatments, was end of May'24. He suffers from chronic abdominal pain, and is Opioid Tolerant and Dependent. Present Medication regime is not helping much.    Stage IV Colon Cancer Metastasis to  Liver  Disease Progression despite chemotherapy oncologic treatment  Chemo treatments delayed due to recent hospitalizations  Chronic Abdominal Pain  Need Heme Onc to decide and inform Patient, whether he will benefit from ongoing chemotherapy treatments  He intends to continue chemotherapy after discharge to SNF when/if it is offered    Chronic Abdominal Pain  Complicated Pain, with Opioid tolerance complicating effective analgesic plan  Mixed Nociceptive, Neuropathic Pain  Opioid tolerance/Dependence  Current Analgesic Plan:  Oxycontin 80 mg Q12  Oxycodone 10mg Q6h prn for severe pain-not effective > to 20 mg (home dose) DC'd last evening  Uses Oxycodone IR 20 mg PO Q6h prn for persistent pain  Short acting Opioid was switched to Dilaudid last night  Dilaudid 2mg PO Q4 and/or Dilaudid 0.5mg IVP ordered to replace   I Increased short acting oral dose: Dilaudid 6mg PO Q4prn severe pain, and Dilaudid 1mg Q4prn breakthrough pain only  Plan communicated with Dr. Carroll  Rec: DC Fentanyl 25 mcg/hr patch-not working  May be a good candidate for Methadone Q6h ATC for long acting pain relief  Acute Pain service consult was requested on 7/6-will recall-left messages  As of this note, I see he has been seen by Acute Pain Svc today    OIC Prevention  On Miralax and Senna-working well  Ileostomy working around the clock  Encourage water intake  OOB/with PT daily    Depression/Anxiety  Cancer Diagnosis  Stressfull waiting for his next chemotherapy treatment  Chronic Abdominal Pain (poorly managed on present Opioids)  DC'd Lexapro-not effective   Started Cymbalta 30 mg PO HS x 4 days then give in AM titrate as needed    GOC-    Full code-not able to speak to DNR status  if Oncology does not think you would  benefit to continuing treatment,   I did discuss the role of Hospice with him,   He is entitled to their support.  Only can handle one day at a time  Patient wants to move into a SNF where he can have assistance with his ADL's  Intends to continue Chemotherapy treatment "It makes me sick then I feel better its  making me stronger"  HCP completed designating his sister Idania Dhaliwal     Palliative Service signing off his case at this time. Pain Management Consult seen today and will manage during this hospitalization

## 2024-07-09 NOTE — CONSULT NOTE ADULT - SUBJECTIVE AND OBJECTIVE BOX
Patient is a 53y old  Male who presents with a chief complaint of Failure to thrive (09 Jul 2024 12:07)      HPI:  52yo male with hx of stage 4 colon adenocarcinoma with mets on chemo (last done in May but on hold as of June due to prior hospitalization) who presented to the ED from home for progressive generalized weakness for last 2 months. He reports having diminished appetite for food and fluids d/t nausea and sometimes vomiting for a longer duration than 2 months  but has noticed weakness has gotten to a point where he cannot climb a flight of stairs in his sister's home or to functionally take care of himself. Reports he has become 100% reliable on his sister for care but she is unable to assist to that degree. He denies any recent fevers, chills, cough, chest pain, sob, diarrhea, constipation, dysuria, LE swelling. He does endorse abd pain and diffuse joint pains which he reports are his normal.     In the ED he was placed in observation unit for PT eval. PT recommending [facility] however pt would like to go to long term living facility. Per SW, pt not accepted to any SNFs at this time. I spoke with pt and pt's sister Idania and decision made to admit to medicine for ongoing PT and [facility] placement until better accommodations can be determined. (06 Jul 2024 16:04)            Analgesic Dosing for past 24 hours reviewed as below:    fentaNYL   Patch  25 MICROgram(s)/Hr   1 Patch Transdermal (07-08-24 @ 18:15)    HYDROmorphone   Tablet   2 milliGRAM(s) Oral (07-08-24 @ 18:16)    HYDROmorphone   Tablet   2 milliGRAM(s) Oral (07-09-24 @ 01:33)    HYDROmorphone   Tablet   6 milliGRAM(s) Oral (07-09-24 @ 11:27)    HYDROmorphone  Injectable   1 milliGRAM(s) IV Push (07-09-24 @ 14:12)    HYDROmorphone  Injectable   0.5 milliGRAM(s) IV Push (07-09-24 @ 08:30)   0.5 milliGRAM(s) IV Push (07-09-24 @ 02:55)    HYDROmorphone  Injectable   1 milliGRAM(s) IV Push (07-09-24 @ 04:57)    HYDROmorphone  Injectable   1 milliGRAM(s) IV Push (07-08-24 @ 20:40)    oxyCODONE  ER Tablet   80 milliGRAM(s) Oral (07-09-24 @ 06:50)   80 milliGRAM(s) Oral (07-08-24 @ 21:06)          T(C): 36.7 (07-09-24 @ 07:37), Max: 36.9 (07-08-24 @ 16:30)  HR: 69 (07-09-24 @ 07:37) (67 - 84)  BP: 137/83 (07-09-24 @ 07:37) (109/69 - 137/83)  RR: 19 (07-09-24 @ 07:37) (18 - 19)  SpO2: 100% (07-09-24 @ 07:37) (99% - 100%)        acetaminophen     Tablet .. 650 milliGRAM(s) Oral every 6 hours PRN  aluminum hydroxide/magnesium hydroxide/simethicone Suspension 30 milliLiter(s) Oral every 4 hours PRN  clindamycin   Capsule 300 milliGRAM(s) Oral four times a day  doxycycline monohydrate Capsule 100 milliGRAM(s) Oral every 12 hours  enoxaparin Injectable 140 milliGRAM(s) SubCutaneous every 24 hours  fentaNYL   Patch  25 MICROgram(s)/Hr 1 Patch Transdermal every 72 hours  HYDROmorphone   Tablet 6 milliGRAM(s) Oral every 4 hours PRN  HYDROmorphone  Injectable 1 milliGRAM(s) IV Push every 4 hours PRN  melatonin 3 milliGRAM(s) Oral at bedtime PRN  ondansetron Injectable 4 milliGRAM(s) IV Push every 8 hours PRN  oxyCODONE  ER Tablet 80 milliGRAM(s) Oral every 12 hours  polyethylene glycol 3350 17 Gram(s) Oral daily  senna 2 Tablet(s) Oral at bedtime                  Pain Service   [phone]

## 2024-07-09 NOTE — PROGRESS NOTE ADULT - NSPROGADDITIONALINFOA_GEN_ALL_CORE
Total Time Spent__25__ minutes  This includes chart review, patient assessment, discussion and collaboration with interdisciplinary team members, ACP planning    COUNSELING:  Face to face meeting to discuss Advanced Care Planning - Time Spent ___5___Minutes.      Thank you for the opportunity to assist with the care of this patient.   [facility] Palliative Medicine Consult Service [phone].

## 2024-07-09 NOTE — PROGRESS NOTE ADULT - SUBJECTIVE AND OBJECTIVE BOX
[facility] Division of Medicine    SUBJECTIVE / OVERNIGHT EVENTS: No overnight events as per RN. Pt seen at the bedside. Endorses feeling persistent pain. Denies any new complaints. All other systems reviewed and are negative.    MEDICATIONS  (STANDING):  clindamycin   Capsule 300 milliGRAM(s) Oral four times a day  doxycycline monohydrate Capsule 100 milliGRAM(s) Oral every 12 hours  enoxaparin Injectable 140 milliGRAM(s) SubCutaneous every 24 hours  fentaNYL   Patch  25 MICROgram(s)/Hr 1 Patch Transdermal every 72 hours  oxyCODONE  ER Tablet 80 milliGRAM(s) Oral every 12 hours  polyethylene glycol 3350 17 Gram(s) Oral daily  senna 2 Tablet(s) Oral at bedtime    MEDICATIONS  (PRN):  acetaminophen     Tablet .. 650 milliGRAM(s) Oral every 6 hours PRN Temp greater or equal to 38C (100.4F), Moderate Pain (4 - 6)  aluminum hydroxide/magnesium hydroxide/simethicone Suspension 30 milliLiter(s) Oral every 4 hours PRN Dyspepsia  HYDROmorphone   Tablet 6 milliGRAM(s) Oral every 4 hours PRN Severe Pain (7 - 10)  HYDROmorphone  Injectable 1 milliGRAM(s) IV Push every 4 hours PRN Severe Pain (7 - 10)  Breakthrough  melatonin 3 milliGRAM(s) Oral at bedtime PRN Insomnia  ondansetron Injectable 4 milliGRAM(s) IV Push every 8 hours PRN Nausea and/or Vomiting      I&O's Summary      acetaminophen     Tablet .. 650 milliGRAM(s) Oral every 6 hours PRN  aluminum hydroxide/magnesium hydroxide/simethicone Suspension 30 milliLiter(s) Oral every 4 hours PRN  clindamycin   Capsule 300 milliGRAM(s) Oral four times a day  doxycycline monohydrate Capsule 100 milliGRAM(s) Oral every 12 hours  enoxaparin Injectable 140 milliGRAM(s) SubCutaneous every 24 hours  fentaNYL   Patch  25 MICROgram(s)/Hr 1 Patch Transdermal every 72 hours  HYDROmorphone   Tablet 6 milliGRAM(s) Oral every 4 hours PRN  HYDROmorphone  Injectable 1 milliGRAM(s) IV Push every 4 hours PRN  melatonin 3 milliGRAM(s) Oral at bedtime PRN  ondansetron Injectable 4 milliGRAM(s) IV Push every 8 hours PRN  oxyCODONE  ER Tablet 80 milliGRAM(s) Oral every 12 hours  polyethylene glycol 3350 17 Gram(s) Oral daily  senna 2 Tablet(s) Oral at bedtime      PHYSICAL EXAM:  Vital Signs Last 24 Hrs  T(C): 36.7 (09 Jul 2024 07:37), Max: 36.9 (08 Jul 2024 16:30)  T(F): 98 (09 Jul 2024 07:37), Max: 98.4 (08 Jul 2024 16:30)  HR: 69 (09 Jul 2024 07:37) (67 - 84)  BP: 137/83 (09 Jul 2024 07:37) (109/69 - 137/83)  BP(mean): --  RR: 19 (09 Jul 2024 07:37) (18 - 19)  SpO2: 100% (09 Jul 2024 07:37) (99% - 100%)    Parameters below as of 09 Jul 2024 04:09  Patient On (Oxygen Delivery Method): room air      CONSTITUTIONAL: no apparent distress  RESP: No respiratory distress, clear to auscultation bilaterally, no wheezes or rales  CV: RRR, +S1S2, no peripheral edema  GI: Soft, mildly tender throughout, ND, no rebound, no guarding  MSK: Normal ROM with mild subjective pains  PSYCH: A+O x 3, mood and affect appropriate      LABS:                    CAPILLARY BLOOD GLUCOSE          IMAGING:

## 2024-07-10 PROCEDURE — 99232 SBSQ HOSP IP/OBS MODERATE 35: CPT

## 2024-07-10 RX ORDER — HYDROMORPHONE HCL 0.2 MG/ML
1 INJECTION, SOLUTION INTRAVENOUS EVERY 4 HOURS
Refills: 0 | Status: DISCONTINUED | OUTPATIENT
Start: 2024-07-10 | End: 2024-07-11

## 2024-07-10 RX ORDER — OXYCODONE HYDROCHLORIDE 100 MG/5ML
15 SOLUTION ORAL EVERY 4 HOURS
Refills: 0 | Status: DISCONTINUED | OUTPATIENT
Start: 2024-07-10 | End: 2024-07-11

## 2024-07-10 RX ORDER — OXYCODONE HYDROCHLORIDE 100 MG/5ML
20 SOLUTION ORAL EVERY 4 HOURS
Refills: 0 | Status: DISCONTINUED | OUTPATIENT
Start: 2024-07-10 | End: 2024-07-11

## 2024-07-10 RX ADMIN — OXYCODONE HYDROCHLORIDE 20 MILLIGRAM(S): 100 SOLUTION ORAL at 00:07

## 2024-07-10 RX ADMIN — OXYCODONE HYDROCHLORIDE 15 MILLIGRAM(S): 100 SOLUTION ORAL at 05:35

## 2024-07-10 RX ADMIN — DOXYCYCLINE 100 MILLIGRAM(S): 100 CAPSULE ORAL at 05:35

## 2024-07-10 RX ADMIN — OXYCODONE HYDROCHLORIDE 80 MILLIGRAM(S): 100 SOLUTION ORAL at 09:51

## 2024-07-10 RX ADMIN — OXYCODONE HYDROCHLORIDE 80 MILLIGRAM(S): 100 SOLUTION ORAL at 20:32

## 2024-07-10 RX ADMIN — HYDROMORPHONE HCL 1 MILLIGRAM(S): 0.2 INJECTION, SOLUTION INTRAVENOUS at 11:40

## 2024-07-10 RX ADMIN — HYDROMORPHONE HCL 1 MILLIGRAM(S): 0.2 INJECTION, SOLUTION INTRAVENOUS at 12:15

## 2024-07-10 RX ADMIN — DOXYCYCLINE 100 MILLIGRAM(S): 100 CAPSULE ORAL at 18:42

## 2024-07-10 RX ADMIN — OXYCODONE HYDROCHLORIDE 15 MILLIGRAM(S): 100 SOLUTION ORAL at 15:37

## 2024-07-10 RX ADMIN — ENOXAPARIN SODIUM 140 MILLIGRAM(S): 100 INJECTION SUBCUTANEOUS at 18:41

## 2024-07-10 RX ADMIN — HYDROMORPHONE HCL 1 MILLIGRAM(S): 0.2 INJECTION, SOLUTION INTRAVENOUS at 21:21

## 2024-07-10 RX ADMIN — OXYCODONE HYDROCHLORIDE 20 MILLIGRAM(S): 100 SOLUTION ORAL at 23:07

## 2024-07-10 RX ADMIN — OXYCODONE HYDROCHLORIDE 80 MILLIGRAM(S): 100 SOLUTION ORAL at 09:45

## 2024-07-10 RX ADMIN — OXYCODONE HYDROCHLORIDE 80 MILLIGRAM(S): 100 SOLUTION ORAL at 19:32

## 2024-07-10 RX ADMIN — CLINDAMYCIN PHOSPHATE 300 MILLIGRAM(S): 150 INJECTION, SOLUTION INTRAVENOUS at 18:42

## 2024-07-10 RX ADMIN — CLINDAMYCIN PHOSPHATE 300 MILLIGRAM(S): 150 INJECTION, SOLUTION INTRAVENOUS at 00:43

## 2024-07-10 RX ADMIN — OXYCODONE HYDROCHLORIDE 80 MILLIGRAM(S): 100 SOLUTION ORAL at 05:38

## 2024-07-10 RX ADMIN — HYDROMORPHONE HCL 1 MILLIGRAM(S): 0.2 INJECTION, SOLUTION INTRAVENOUS at 00:43

## 2024-07-10 RX ADMIN — CLINDAMYCIN PHOSPHATE 300 MILLIGRAM(S): 150 INJECTION, SOLUTION INTRAVENOUS at 05:35

## 2024-07-10 RX ADMIN — CLINDAMYCIN PHOSPHATE 300 MILLIGRAM(S): 150 INJECTION, SOLUTION INTRAVENOUS at 12:18

## 2024-07-10 RX ADMIN — HYDROMORPHONE HCL 1 MILLIGRAM(S): 0.2 INJECTION, SOLUTION INTRAVENOUS at 01:43

## 2024-07-10 RX ADMIN — OXYCODONE HYDROCHLORIDE 15 MILLIGRAM(S): 100 SOLUTION ORAL at 06:35

## 2024-07-10 RX ADMIN — Medication 3 MILLIGRAM(S): at 21:21

## 2024-07-10 RX ADMIN — HYDROMORPHONE HCL 1 MILLIGRAM(S): 0.2 INJECTION, SOLUTION INTRAVENOUS at 22:21

## 2024-07-10 RX ADMIN — CLINDAMYCIN PHOSPHATE 300 MILLIGRAM(S): 150 INJECTION, SOLUTION INTRAVENOUS at 23:07

## 2024-07-10 NOTE — PROGRESS NOTE ADULT - ASSESSMENT
52yo male with hx of stage 4 colon adenocarcinoma with mets on chemo (last done in May but on hold as of June due to prior hospitalization) who presented to the ED from home for progressive generalized weakness for last 2 months.    Med Recs:    change oxy 15mg/20mg q4h prn mod/sev pain  cont dilaudid ivp 1mg q4h prn btp  cont oxy er at current dose for now  DC fent patch

## 2024-07-10 NOTE — PROGRESS NOTE ADULT - SUBJECTIVE AND OBJECTIVE BOX
Interval Hx:  Patient seen during rounds  Patient reports pain to be mod controlled on current medications  Patient denies sedation with medications     Analgesic Dosing for past 24 hours reviewed as below:    HYDROmorphone  Injectable   1 milliGRAM(s) IV Push (07-10-24 @ 11:40)   1 milliGRAM(s) IV Push (07-10-24 @ 00:43)   1 milliGRAM(s) IV Push (07-09-24 @ 20:34)   1 milliGRAM(s) IV Push (07-09-24 @ 14:12)    oxyCODONE    IR   15 milliGRAM(s) Oral (07-10-24 @ 05:35)   15 milliGRAM(s) Oral (07-09-24 @ 21:30)   15 milliGRAM(s) Oral (07-09-24 @ 16:42)    oxyCODONE  ER Tablet   80 milliGRAM(s) Oral (07-10-24 @ 09:45)   80 milliGRAM(s) Oral (07-09-24 @ 18:04)          T(C): 37.1 (07-10-24 @ 07:42), Max: 37.1 (07-10-24 @ 07:42)  HR: 71 (07-10-24 @ 07:42) (61 - 71)  BP: 106/69 (07-10-24 @ 07:42) (101/66 - 119/79)  RR: 17 (07-10-24 @ 07:42) (17 - 18)  SpO2: 98% (07-10-24 @ 07:42) (97% - 99%)      07-09-24 @ 07:01  -  07-10-24 @ 07:00  --------------------------------------------------------  IN: 0 mL / OUT: 600 mL / NET: -600 mL        acetaminophen     Tablet .. 650 milliGRAM(s) Oral every 6 hours PRN  aluminum hydroxide/magnesium hydroxide/simethicone Suspension 30 milliLiter(s) Oral every 4 hours PRN  clindamycin   Capsule 300 milliGRAM(s) Oral four times a day  doxycycline monohydrate Capsule 100 milliGRAM(s) Oral every 12 hours  enoxaparin Injectable 140 milliGRAM(s) SubCutaneous every 24 hours  fentaNYL   Patch  25 MICROgram(s)/Hr 1 Patch Transdermal every 72 hours  HYDROmorphone  Injectable 1 milliGRAM(s) IV Push every 4 hours PRN  melatonin 3 milliGRAM(s) Oral at bedtime PRN  ondansetron Injectable 4 milliGRAM(s) IV Push every 8 hours PRN  oxyCODONE    IR 15 milliGRAM(s) Oral every 4 hours PRN  oxyCODONE  ER Tablet 80 milliGRAM(s) Oral every 12 hours  polyethylene glycol 3350 17 Gram(s) Oral daily  senna 2 Tablet(s) Oral at bedtime                  Pain Service   [phone]

## 2024-07-10 NOTE — PROGRESS NOTE ADULT - SUBJECTIVE AND OBJECTIVE BOX
[facility] Division of Medicine    SUBJECTIVE / OVERNIGHT EVENTS: No overnight events as per RN. Pt seen at the bedside. Denies any new complaints. All other systems reviewed and are negative.    MEDICATIONS  (STANDING):  clindamycin   Capsule 300 milliGRAM(s) Oral four times a day  doxycycline monohydrate Capsule 100 milliGRAM(s) Oral every 12 hours  enoxaparin Injectable 140 milliGRAM(s) SubCutaneous every 24 hours  fentaNYL   Patch  25 MICROgram(s)/Hr 1 Patch Transdermal every 72 hours  oxyCODONE  ER Tablet 80 milliGRAM(s) Oral every 12 hours  polyethylene glycol 3350 17 Gram(s) Oral daily  senna 2 Tablet(s) Oral at bedtime    MEDICATIONS  (PRN):  acetaminophen     Tablet .. 650 milliGRAM(s) Oral every 6 hours PRN Temp greater or equal to 38C (100.4F), Moderate Pain (4 - 6)  aluminum hydroxide/magnesium hydroxide/simethicone Suspension 30 milliLiter(s) Oral every 4 hours PRN Dyspepsia  HYDROmorphone  Injectable 1 milliGRAM(s) IV Push every 4 hours PRN Severe Pain (7 - 10)  Breakthrough  melatonin 3 milliGRAM(s) Oral at bedtime PRN Insomnia  ondansetron Injectable 4 milliGRAM(s) IV Push every 8 hours PRN Nausea and/or Vomiting  oxyCODONE    IR 15 milliGRAM(s) Oral every 4 hours PRN Severe Pain (7 - 10)      I&O's Summary    09 Jul 2024 07:01  -  10 Jul 2024 07:00  --------------------------------------------------------  IN: 0 mL / OUT: 600 mL / NET: -600 mL        acetaminophen     Tablet .. 650 milliGRAM(s) Oral every 6 hours PRN  aluminum hydroxide/magnesium hydroxide/simethicone Suspension 30 milliLiter(s) Oral every 4 hours PRN  clindamycin   Capsule 300 milliGRAM(s) Oral four times a day  doxycycline monohydrate Capsule 100 milliGRAM(s) Oral every 12 hours  enoxaparin Injectable 140 milliGRAM(s) SubCutaneous every 24 hours  fentaNYL   Patch  25 MICROgram(s)/Hr 1 Patch Transdermal every 72 hours  HYDROmorphone  Injectable 1 milliGRAM(s) IV Push every 4 hours PRN  melatonin 3 milliGRAM(s) Oral at bedtime PRN  ondansetron Injectable 4 milliGRAM(s) IV Push every 8 hours PRN  oxyCODONE    IR 15 milliGRAM(s) Oral every 4 hours PRN  oxyCODONE  ER Tablet 80 milliGRAM(s) Oral every 12 hours  polyethylene glycol 3350 17 Gram(s) Oral daily  senna 2 Tablet(s) Oral at bedtime      PHYSICAL EXAM:  Vital Signs Last 24 Hrs  T(C): 37.1 (10 Jul 2024 07:42), Max: 37.1 (10 Jul 2024 07:42)  T(F): 98.7 (10 Jul 2024 07:42), Max: 98.7 (10 Jul 2024 07:42)  HR: 71 (10 Jul 2024 07:42) (61 - 71)  BP: 106/69 (10 Jul 2024 07:42) (101/66 - 119/79)  BP(mean): --  RR: 17 (10 Jul 2024 07:42) (17 - 18)  SpO2: 98% (10 Jul 2024 07:42) (97% - 99%)    Parameters below as of 10 Jul 2024 04:16  Patient On (Oxygen Delivery Method): room air      CONSTITUTIONAL: no apparent distress  RESP: No respiratory distress, clear to auscultation bilaterally, no wheezes or rales  CV: RRR, +S1S2, no peripheral edema  GI: Soft, mildly tender throughout, ND, no rebound, no guarding  MSK: Normal ROM with mild subjective pains  PSYCH: A+O x 3, mood and affect appropriate        LABS:                    CAPILLARY BLOOD GLUCOSE          IMAGING:

## 2024-07-10 NOTE — PROGRESS NOTE ADULT - ASSESSMENT
52yo male with hx of stage 4 colon adenocarcinoma with mets on chemo (last done in May but on hold as of June due to prior hospitalization) who presented to the ED from home for progressive generalized weakness for last 2 months. He was placed in observation unit for PT eval. PT recommending [facility] however pt would like to go to long term living facility. Per [facility], pt not accepted to any SNFs at this time. I spoke with pt, pt's sister Idania, and SW and decision made to admit to medicine for ongoing PT and [facility] placement until better accommodations can be determined.     Failure to thrive 2/2 progressive metastatic colon CA  - CT abd pelvis with evidence of progressive metastatic disease  - PT recommending [facility]  - PO intake limited according to patient, will supplement with ensure  - pain management consulted as pain previously not well controlled, now is doing better  - c/w oxycodone ER 80mg q12hr  - oxycodone 15mg IR  q4hr PRN  - added dilaudid 1mg IVP q4hr PRN  - c/w fentanyl patch q72hr  - bowel regimen ordered  - oncology note from 7/5 in [facility] reviewed  - last chemo in May 2024, was then postponed due to recent admission in [place] for ileostomy revision  - chemo now further on hold for 4-6 weeks following completion of abx for recent ileostomy repair  - GOC discussion initiated given advancing disease process and lack of improvement following chemo, pt would like to continue to think about things  - palliative care following  - daily PT while admitted    Hx of occluded left iliac artery  - c/w therapeutic lovenox    Recent ileostomy repair at [place] last month  - c/w PO clindamycin and doxycycline  - f/u w/ colorectal surgery as outpatient    DVT ppx: therapeutic lovenox    Dispo: to [facility] pending acceptance

## 2024-07-11 ENCOUNTER — TRANSCRIPTION ENCOUNTER (OUTPATIENT)
Age: 54
End: 2024-07-11

## 2024-07-11 PROCEDURE — 99232 SBSQ HOSP IP/OBS MODERATE 35: CPT

## 2024-07-11 RX ORDER — OXYCODONE HYDROCHLORIDE 100 MG/5ML
20 SOLUTION ORAL
Refills: 0 | Status: DISCONTINUED | OUTPATIENT
Start: 2024-07-11 | End: 2024-07-12

## 2024-07-11 RX ORDER — OXYCODONE HYDROCHLORIDE 100 MG/5ML
100 SOLUTION ORAL EVERY 12 HOURS
Refills: 0 | Status: DISCONTINUED | OUTPATIENT
Start: 2024-07-11 | End: 2024-07-12

## 2024-07-11 RX ORDER — OXYCODONE HYDROCHLORIDE 100 MG/5ML
15 SOLUTION ORAL
Refills: 0 | Status: DISCONTINUED | OUTPATIENT
Start: 2024-07-11 | End: 2024-07-12

## 2024-07-11 RX ADMIN — OXYCODONE HYDROCHLORIDE 20 MILLIGRAM(S): 100 SOLUTION ORAL at 18:01

## 2024-07-11 RX ADMIN — CLINDAMYCIN PHOSPHATE 300 MILLIGRAM(S): 150 INJECTION, SOLUTION INTRAVENOUS at 05:06

## 2024-07-11 RX ADMIN — HYDROMORPHONE HCL 1 MILLIGRAM(S): 0.2 INJECTION, SOLUTION INTRAVENOUS at 11:47

## 2024-07-11 RX ADMIN — OXYCODONE HYDROCHLORIDE 80 MILLIGRAM(S): 100 SOLUTION ORAL at 05:06

## 2024-07-11 RX ADMIN — ENOXAPARIN SODIUM 140 MILLIGRAM(S): 100 INJECTION SUBCUTANEOUS at 18:05

## 2024-07-11 RX ADMIN — OXYCODONE HYDROCHLORIDE 20 MILLIGRAM(S): 100 SOLUTION ORAL at 13:31

## 2024-07-11 RX ADMIN — HYDROMORPHONE HCL 1 MILLIGRAM(S): 0.2 INJECTION, SOLUTION INTRAVENOUS at 11:26

## 2024-07-11 RX ADMIN — DOXYCYCLINE 100 MILLIGRAM(S): 100 CAPSULE ORAL at 05:07

## 2024-07-11 RX ADMIN — OXYCODONE HYDROCHLORIDE 20 MILLIGRAM(S): 100 SOLUTION ORAL at 16:10

## 2024-07-11 RX ADMIN — OXYCODONE HYDROCHLORIDE 20 MILLIGRAM(S): 100 SOLUTION ORAL at 09:15

## 2024-07-11 RX ADMIN — CLINDAMYCIN PHOSPHATE 300 MILLIGRAM(S): 150 INJECTION, SOLUTION INTRAVENOUS at 12:04

## 2024-07-11 RX ADMIN — OXYCODONE HYDROCHLORIDE 20 MILLIGRAM(S): 100 SOLUTION ORAL at 13:25

## 2024-07-11 RX ADMIN — OXYCODONE HYDROCHLORIDE 80 MILLIGRAM(S): 100 SOLUTION ORAL at 06:06

## 2024-07-11 RX ADMIN — OXYCODONE HYDROCHLORIDE 20 MILLIGRAM(S): 100 SOLUTION ORAL at 22:09

## 2024-07-11 RX ADMIN — OXYCODONE HYDROCHLORIDE 100 MILLIGRAM(S): 100 SOLUTION ORAL at 18:06

## 2024-07-11 RX ADMIN — OXYCODONE HYDROCHLORIDE 20 MILLIGRAM(S): 100 SOLUTION ORAL at 11:05

## 2024-07-11 RX ADMIN — DOXYCYCLINE 100 MILLIGRAM(S): 100 CAPSULE ORAL at 18:06

## 2024-07-11 NOTE — DISCHARGE NOTE PROVIDER - ATTENDING DISCHARGE PHYSICAL EXAMINATION:
T(C): 36.6 (07-12-24 @ 08:07), Max: 37.2 (07-11-24 @ 20:23)  HR: 60 (07-12-24 @ 08:07) (60 - 98)  BP: 117/78 (07-12-24 @ 08:07) (101/62 - 117/78)  RR: 18 (07-12-24 @ 08:07) (18 - 18)  SpO2: 99% (07-12-24 @ 08:07) (98% - 100%)    CONSTITUTIONAL: no apparent distress  EYES: PERRLA, EOMI, non-icteric  ENMT: Oral mucosa with moist membranes, no pharyngeal injection or exudates  RESP: No respiratory distress, clear to auscultation bilaterally, no wheezes or rales  CV: RRR, +S1S2, no peripheral edema  GI: Soft, NT, ND, no rebound, no guarding  PSYCH: A+O x 3  NEURO: Cooperative, upper and lower motor function grossly intact bilaterally, sensation grossly intact throughout

## 2024-07-11 NOTE — PROGRESS NOTE ADULT - NUTRITIONAL ASSESSMENT
This patient has been assessed with a concern for Malnutrition and has been determined to have a diagnosis/diagnoses of Moderate protein-calorie malnutrition.    This patient is being managed with:   Diet Regular-  Supplement Feeding Modality:  Oral  Ensure Plus High Protein Cans or Servings Per Day:  2       Frequency:  Three Times a day  Entered: Jul 6 2024  5:30PM  

## 2024-07-11 NOTE — DISCHARGE NOTE PROVIDER - HOSPITAL COURSE
54yo male with hx of stage 4 colon adenocarcinoma with mets on chemo (last done in May but on hold as of June due to prior hospitalization) who presented to the ED from home for progressive generalized weakness for last 2 months. Pt admitted to [facility] for FFT 2/2 progressive metastatic colon CA. Heme/ Onc consulted and pt wishes to pursue chemo, will follow up with Oncologist outpatient in 1-3 days. Pt is now medically optimized for discharge to [facility] with appropriate outpatient follow up.    All electrolyte abnormalities were monitored carefully and repleted as necessary during this hospitalization. At the time of discharge patient was hemodynamically stable and amenable to all terms of discharge.

## 2024-07-11 NOTE — DISCHARGE NOTE PROVIDER - DISCHARGE SERVICE FOR PATIENT
or a green color to your vaginal bleeding.  If you have pain that cannot be relieved.  You have persistent burning with urination or frequency.   Call if you have concerns about your well-being.  You are unable to sleep, eat, or are having thoughts of harming yourself or your baby.   You have a red, warm, tender area in you calf.    
on the discharge service for the patient. I have reviewed and made amendments to the documentation where necessary.

## 2024-07-11 NOTE — PROGRESS NOTE ADULT - ASSESSMENT
52yo male with hx of stage 4 colon adenocarcinoma with mets on chemo (last done in May but on hold as of June due to prior hospitalization) who presented to the ED from home for progressive generalized weakness for last 2 months. He was placed in observation unit for PT eval. PT recommending [facility] however pt would like to go to long term living facility. Per [facility], pt not accepted to any SNFs at this time. I spoke with pt, pt's sister Idania, and SW and decision made to admit to medicine for ongoing PT and [facility] placement until better accommodations can be determined.     Failure to thrive 2/2 progressive metastatic colon CA  - CT abd pelvis with evidence of progressive metastatic disease  - PT recommending [facility]  - PO intake limited according to patient, will supplement with ensure  - pain management following  - inc oxycodone ER to 100mg q12hr  - change oxycodone 15mg IR to q3hr PRN  - d/c IV dilaudid  - d/c fentanyl patch  - bowel regimen ordered  - oncology note from 7/5 in [facility] reviewed  - last chemo in May 2024, was then postponed due to recent admission in [place] for ileostomy revision  - chemo now further on hold for 4-6 weeks following completion of abx for recent ileostomy repair  - C discussion initiated given advancing disease process and lack of improvement following chemo, pt would like to continue to think about things  - palliative care following  - daily PT while admitted    Hx of occluded left iliac artery  - c/w therapeutic lovenox    Recent ileostomy repair at [place] last month  - c/w PO clindamycin and doxycycline  - f/u w/ colorectal surgery as outpatient    DVT ppx: therapeutic lovenox    Dispo: to [facility] pending auth

## 2024-07-11 NOTE — DISCHARGE NOTE PROVIDER - CARE PROVIDER_API CALL
PCP,   Phone: (   )    -  Fax: (   )    -  Follow Up Time: 1 week    Domo Franks  [address] [address]  Phone: [phone]  Fax: [phone]  Follow Up Time: 1 week

## 2024-07-11 NOTE — DISCHARGE NOTE PROVIDER - NSDCCPCAREPLAN_GEN_ALL_CORE_FT
PRINCIPAL DISCHARGE DIAGNOSIS  Diagnosis: Adult failure to thrive  Assessment and Plan of Treatment: - CT abd pelvis with evidence of progressive metastatic disease  - Optimize nutrition   - Follow up with PCP and Heme/ Onc in 1 week      SECONDARY DISCHARGE DIAGNOSES  Diagnosis: Abdominal pain  Assessment and Plan of Treatment: - Pain control  - Follow up with PCP and Heme/ Onc in 1 week    Diagnosis: Colon cancer  Assessment and Plan of Treatment: - Pain control  - Follow up with PCP and Heme/ Onc in 1 week    Diagnosis: H/O ileostomy  Assessment and Plan of Treatment: - Follow up with colorectal outpatient in 1 week

## 2024-07-11 NOTE — PROGRESS NOTE ADULT - SUBJECTIVE AND OBJECTIVE BOX
[facility] Division of Medicine    SUBJECTIVE / OVERNIGHT EVENTS: No overnight events as per RN. Pt seen at the bedside. Endorses same complaints as day prior including pain, weakness, decreased appetite. Denies any new complaints. All other systems reviewed and are negative.    MEDICATIONS  (STANDING):  doxycycline monohydrate Capsule 100 milliGRAM(s) Oral every 12 hours  enoxaparin Injectable 140 milliGRAM(s) SubCutaneous every 24 hours  oxyCODONE  ER Tablet 100 milliGRAM(s) Oral every 12 hours  polyethylene glycol 3350 17 Gram(s) Oral daily  senna 2 Tablet(s) Oral at bedtime    MEDICATIONS  (PRN):  acetaminophen     Tablet .. 650 milliGRAM(s) Oral every 6 hours PRN Temp greater or equal to 38C (100.4F), Moderate Pain (4 - 6)  aluminum hydroxide/magnesium hydroxide/simethicone Suspension 30 milliLiter(s) Oral every 4 hours PRN Dyspepsia  melatonin 3 milliGRAM(s) Oral at bedtime PRN Insomnia  ondansetron Injectable 4 milliGRAM(s) IV Push every 8 hours PRN Nausea and/or Vomiting  oxyCODONE    IR 15 milliGRAM(s) Oral every 3 hours PRN Moderate Pain (4 - 6)  oxyCODONE    IR 20 milliGRAM(s) Oral every 3 hours PRN Severe Pain (7 - 10)      I&O's Summary    10 Jul 2024 07:01  -  11 Jul 2024 07:00  --------------------------------------------------------  IN: 0 mL / OUT: 300 mL / NET: -300 mL        acetaminophen     Tablet .. 650 milliGRAM(s) Oral every 6 hours PRN  aluminum hydroxide/magnesium hydroxide/simethicone Suspension 30 milliLiter(s) Oral every 4 hours PRN  doxycycline monohydrate Capsule 100 milliGRAM(s) Oral every 12 hours  enoxaparin Injectable 140 milliGRAM(s) SubCutaneous every 24 hours  melatonin 3 milliGRAM(s) Oral at bedtime PRN  ondansetron Injectable 4 milliGRAM(s) IV Push every 8 hours PRN  oxyCODONE    IR 15 milliGRAM(s) Oral every 3 hours PRN  oxyCODONE    IR 20 milliGRAM(s) Oral every 3 hours PRN  oxyCODONE  ER Tablet 100 milliGRAM(s) Oral every 12 hours  polyethylene glycol 3350 17 Gram(s) Oral daily  senna 2 Tablet(s) Oral at bedtime      PHYSICAL EXAM:  Vital Signs Last 24 Hrs  T(C): 36.7 (11 Jul 2024 07:49), Max: 37.1 (10 Jul 2024 20:49)  T(F): 98.1 (11 Jul 2024 07:49), Max: 98.7 (10 Jul 2024 20:49)  HR: 61 (11 Jul 2024 07:49) (61 - 74)  BP: 101/62 (11 Jul 2024 11:45) (95/60 - 116/76)  BP(mean): --  RR: 18 (11 Jul 2024 07:49) (18 - 18)  SpO2: 100% (11 Jul 2024 07:49) (97% - 100%)    Parameters below as of 11 Jul 2024 07:49  Patient On (Oxygen Delivery Method): room air        CONSTITUTIONAL: no apparent distress  RESP: No respiratory distress, clear to auscultation bilaterally, no wheezes or rales  CV: RRR, +S1S2, no peripheral edema  GI: Soft, mildly tender throughout, ND, no rebound, no guarding  MSK: Normal ROM with mild subjective pains  PSYCH: A+O x 3, mood and affect appropriate        LABS:                    CAPILLARY BLOOD GLUCOSE          IMAGING:

## 2024-07-11 NOTE — PROGRESS NOTE ADULT - ASSESSMENT
54yo male with hx of stage 4 colon adenocarcinoma with mets on chemo (last done in May but on hold as of June due to prior hospitalization) who presented to the ED from home for progressive generalized weakness for last 2 months.    Med Recs:    change oxy 15mg/20mg q3h prn mod/sev pain  DC dilaudid ivp 1mg q4h prn btp  change oxy er 100mg q12h standing HOLD for sedation

## 2024-07-11 NOTE — PROGRESS NOTE ADULT - SUBJECTIVE AND OBJECTIVE BOX
Interval Hx:  Patient seen during rounds  Patient reports pain to be controlled on current medications  Patient denies sedation with medications     Analgesic Dosing for past 24 hours reviewed as below:    HYDROmorphone  Injectable   1 milliGRAM(s) IV Push (07-11-24 @ 11:26)   1 milliGRAM(s) IV Push (07-10-24 @ 21:21)    melatonin   3 milliGRAM(s) Oral (07-10-24 @ 21:21)    oxyCODONE    IR   15 milliGRAM(s) Oral (07-10-24 @ 15:37)    oxyCODONE    IR   20 milliGRAM(s) Oral (07-11-24 @ 09:15)   20 milliGRAM(s) Oral (07-10-24 @ 23:07)    oxyCODONE  ER Tablet   80 milliGRAM(s) Oral (07-11-24 @ 05:06)   80 milliGRAM(s) Oral (07-10-24 @ 19:32)          T(C): 36.7 (07-11-24 @ 07:49), Max: 37.1 (07-10-24 @ 20:49)  HR: 61 (07-11-24 @ 07:49) (61 - 74)  BP: 101/62 (07-11-24 @ 11:45) (95/60 - 116/76)  RR: 18 (07-11-24 @ 07:49) (18 - 18)  SpO2: 100% (07-11-24 @ 07:49) (97% - 100%)      07-10-24 @ 07:01  -  07-11-24 @ 07:00  --------------------------------------------------------  IN: 0 mL / OUT: 300 mL / NET: -300 mL        acetaminophen     Tablet .. 650 milliGRAM(s) Oral every 6 hours PRN  aluminum hydroxide/magnesium hydroxide/simethicone Suspension 30 milliLiter(s) Oral every 4 hours PRN  clindamycin   Capsule 300 milliGRAM(s) Oral four times a day  doxycycline monohydrate Capsule 100 milliGRAM(s) Oral every 12 hours  enoxaparin Injectable 140 milliGRAM(s) SubCutaneous every 24 hours  HYDROmorphone  Injectable 1 milliGRAM(s) IV Push every 4 hours PRN  melatonin 3 milliGRAM(s) Oral at bedtime PRN  ondansetron Injectable 4 milliGRAM(s) IV Push every 8 hours PRN  oxyCODONE    IR 15 milliGRAM(s) Oral every 4 hours PRN  oxyCODONE    IR 20 milliGRAM(s) Oral every 4 hours PRN  oxyCODONE  ER Tablet 80 milliGRAM(s) Oral every 12 hours  polyethylene glycol 3350 17 Gram(s) Oral daily  senna 2 Tablet(s) Oral at bedtime                  Pain Service   [phone]

## 2024-07-11 NOTE — DISCHARGE NOTE PROVIDER - NSDCMRMEDTOKEN_GEN_ALL_CORE_FT
acetaminophen 325 mg oral tablet: 2 tab(s) orally every 6 hours As needed Temp greater or equal to 38C (100.4F), Mild Pain (1 - 3)  cholecalciferol 50 mcg (2000 intl units) oral tablet: 1 tab(s) orally once a day  enoxaparin 150 mg/mL injectable solution: 140 milligram(s) subcutaneously once a day  escitalopram 10 mg oral tablet: 1 tab(s) orally once a day  oxyCODONE 20 mg oral tablet: 1 tab(s) orally every 4 hours as needed for Severe Pain (7 - 10) MDD: 6 tabs  Tamiflu 75 mg oral capsule: 1 cap(s) orally 2 times a day   acetaminophen 325 mg oral tablet: 2 tab(s) orally every 6 hours As needed Temp greater or equal to 38C (100.4F), Moderate Pain (4 - 6)  cholecalciferol 50 mcg (2000 intl units) oral tablet: 1 tab(s) orally once a day  doxycycline monohydrate 50 mg oral capsule: 2 cap(s) orally every 12 hours  enoxaparin 150 mg/mL injectable solution: 140 milligram(s) subcutaneously once a day  escitalopram 10 mg oral tablet: 1 tab(s) orally once a day  oxyCODONE 15 mg oral tablet: 1 tab(s) orally every 3 hours As needed Moderate Pain (4 - 6)  oxyCODONE 20 mg oral tablet: 1 tab(s) orally every 3 hours As needed Severe Pain (7 - 10)  oxyCODONE 20 mg oral tablet, extended release: 5 tab(s) orally every 12 hours  polyethylene glycol 3350 oral powder for reconstitution: 17 gram(s) orally once a day  senna leaf extract oral tablet: 2 tab(s) orally once a day (at bedtime)

## 2024-07-11 NOTE — DISCHARGE NOTE PROVIDER - PROVIDER TOKENS
FREE:[LAST:[PCP],PHONE:[(   )    -],FAX:[(   )    -],FOLLOWUP:[1 week]],PROVIDER:[TOKEN:[[id number]],FOLLOWUP:[1 week]]

## 2024-07-11 NOTE — DISCHARGE NOTE PROVIDER - DETAILS OF MALNUTRITION DIAGNOSIS/DIAGNOSES
This patient has been assessed with a concern for Malnutrition and was treated during this hospitalization for the following Nutrition diagnosis/diagnoses:     -  07/08/2024: Moderate protein-calorie malnutrition

## 2024-07-11 NOTE — DISCHARGE NOTE PROVIDER - CARE PROVIDERS DIRECT ADDRESSES
,DirectAddress_Unknown,[email or web address]

## 2024-07-12 ENCOUNTER — TRANSCRIPTION ENCOUNTER (OUTPATIENT)
Age: 54
End: 2024-07-12

## 2024-07-12 VITALS
SYSTOLIC BLOOD PRESSURE: 122 MMHG | OXYGEN SATURATION: 95 % | DIASTOLIC BLOOD PRESSURE: 84 MMHG | HEART RATE: 85 BPM | TEMPERATURE: 99 F | RESPIRATION RATE: 18 BRPM

## 2024-07-12 PROCEDURE — 85610 PROTHROMBIN TIME: CPT

## 2024-07-12 PROCEDURE — 85025 COMPLETE CBC W/AUTO DIFF WBC: CPT

## 2024-07-12 PROCEDURE — 80053 COMPREHEN METABOLIC PANEL: CPT

## 2024-07-12 PROCEDURE — 74177 CT ABD & PELVIS W/CONTRAST: CPT | Mod: MC

## 2024-07-12 PROCEDURE — 36415 COLL VENOUS BLD VENIPUNCTURE: CPT

## 2024-07-12 PROCEDURE — 99239 HOSP IP/OBS DSCHRG MGMT >30: CPT

## 2024-07-12 PROCEDURE — 83690 ASSAY OF LIPASE: CPT

## 2024-07-12 PROCEDURE — 96376 TX/PRO/DX INJ SAME DRUG ADON: CPT

## 2024-07-12 PROCEDURE — G0378: CPT

## 2024-07-12 PROCEDURE — 96374 THER/PROPH/DIAG INJ IV PUSH: CPT

## 2024-07-12 PROCEDURE — 99285 EMERGENCY DEPT VISIT HI MDM: CPT | Mod: 25

## 2024-07-12 PROCEDURE — 85730 THROMBOPLASTIN TIME PARTIAL: CPT

## 2024-07-12 RX ORDER — SENNOSIDES 8.6 MG
2 TABLET ORAL
Qty: 0 | Refills: 0 | DISCHARGE
Start: 2024-07-12

## 2024-07-12 RX ORDER — ACETAMINOPHEN 325 MG
2 TABLET ORAL
Qty: 0 | Refills: 0 | DISCHARGE
Start: 2024-07-12

## 2024-07-12 RX ORDER — DOXYCYCLINE 100 MG/1
2 CAPSULE ORAL
Qty: 0 | Refills: 0 | DISCHARGE
Start: 2024-07-12

## 2024-07-12 RX ORDER — OXYCODONE HYDROCHLORIDE 100 MG/5ML
1 SOLUTION ORAL
Qty: 0 | Refills: 0 | DISCHARGE
Start: 2024-07-12

## 2024-07-12 RX ORDER — OXYCODONE HYDROCHLORIDE 100 MG/5ML
5 SOLUTION ORAL
Qty: 0 | Refills: 0 | DISCHARGE
Start: 2024-07-12

## 2024-07-12 RX ORDER — POLYETHYLENE GLYCOL 3350 1 G/G
17 POWDER ORAL
Qty: 0 | Refills: 0 | DISCHARGE
Start: 2024-07-12

## 2024-07-12 RX ADMIN — OXYCODONE HYDROCHLORIDE 20 MILLIGRAM(S): 100 SOLUTION ORAL at 01:58

## 2024-07-12 RX ADMIN — OXYCODONE HYDROCHLORIDE 20 MILLIGRAM(S): 100 SOLUTION ORAL at 09:33

## 2024-07-12 RX ADMIN — OXYCODONE HYDROCHLORIDE 20 MILLIGRAM(S): 100 SOLUTION ORAL at 05:21

## 2024-07-12 RX ADMIN — OXYCODONE HYDROCHLORIDE 100 MILLIGRAM(S): 100 SOLUTION ORAL at 07:21

## 2024-07-12 RX ADMIN — DOXYCYCLINE 100 MILLIGRAM(S): 100 CAPSULE ORAL at 05:21

## 2024-07-12 RX ADMIN — OXYCODONE HYDROCHLORIDE 20 MILLIGRAM(S): 100 SOLUTION ORAL at 12:34

## 2024-07-12 NOTE — PROGRESS NOTE ADULT - PROVIDER SPECIALTY LIST ADULT
Hospitalist
Pain Medicine
Hospitalist
Pain Medicine
Palliative Care
Hospitalist
Pain Medicine

## 2024-07-12 NOTE — PROGRESS NOTE ADULT - SUBJECTIVE AND OBJECTIVE BOX
Interval Hx:  Patient seen during rounds  Patient reports pain to be controlled on current medications  Patient denies sedation with medications     Analgesic Dosing for past 24 hours reviewed as below:    HYDROmorphone  Injectable   1 milliGRAM(s) IV Push (07-11-24 @ 11:26)    oxyCODONE    IR   20 milliGRAM(s) Oral (07-12-24 @ 09:33)   20 milliGRAM(s) Oral (07-12-24 @ 05:21)   20 milliGRAM(s) Oral (07-12-24 @ 01:58)   20 milliGRAM(s) Oral (07-11-24 @ 22:09)   20 milliGRAM(s) Oral (07-11-24 @ 16:10)    oxyCODONE    IR   20 milliGRAM(s) Oral (07-11-24 @ 13:25)    oxyCODONE  ER Tablet   100 milliGRAM(s) Oral (07-12-24 @ 07:21)   100 milliGRAM(s) Oral (07-11-24 @ 18:06)          T(C): 36.6 (07-12-24 @ 08:07), Max: 37.2 (07-11-24 @ 20:23)  HR: 60 (07-12-24 @ 08:07) (60 - 98)  BP: 117/78 (07-12-24 @ 08:07) (101/62 - 117/78)  RR: 18 (07-12-24 @ 08:07) (18 - 18)  SpO2: 99% (07-12-24 @ 08:07) (98% - 100%)        acetaminophen     Tablet .. 650 milliGRAM(s) Oral every 6 hours PRN  aluminum hydroxide/magnesium hydroxide/simethicone Suspension 30 milliLiter(s) Oral every 4 hours PRN  doxycycline monohydrate Capsule 100 milliGRAM(s) Oral every 12 hours  enoxaparin Injectable 140 milliGRAM(s) SubCutaneous every 24 hours  melatonin 3 milliGRAM(s) Oral at bedtime PRN  ondansetron Injectable 4 milliGRAM(s) IV Push every 8 hours PRN  oxyCODONE    IR 15 milliGRAM(s) Oral every 3 hours PRN  oxyCODONE    IR 20 milliGRAM(s) Oral every 3 hours PRN  oxyCODONE  ER Tablet 100 milliGRAM(s) Oral every 12 hours  polyethylene glycol 3350 17 Gram(s) Oral daily  senna 2 Tablet(s) Oral at bedtime                  Pain Service   [phone]

## 2024-07-12 NOTE — DISCHARGE NOTE NURSING/CASE MANAGEMENT/SOCIAL WORK - NSDCPEFALRISK_GEN_ALL_CORE
For information on Fall & Injury Prevention, visit: [email or web address] OR  [email or web address] OR  https://www.cdc.gov/steadi/patient.html

## 2024-07-12 NOTE — PROGRESS NOTE ADULT - ASSESSMENT
52yo male with hx of stage 4 colon adenocarcinoma with mets on chemo (last done in May but on hold as of June due to prior hospitalization) who presented to the ED from home for progressive generalized weakness for last 2 months.    Pain controlled  Pain service will sign off  Please reconsult as needed

## 2024-07-12 NOTE — DISCHARGE NOTE NURSING/CASE MANAGEMENT/SOCIAL WORK - PATIENT PORTAL LINK FT
You can access the FollowMyHealth Patient Portal offered by [facility] by registering at the following website: [email or web address]. By joining [employer]’s FollowMyHealth portal, you will also be able to view your health information using other applications (apps) compatible with our system.

## 2024-07-12 NOTE — PROGRESS NOTE ADULT - TIME BILLING
Pain Management - chart review, patient interview

## 2024-07-23 ENCOUNTER — OUTPATIENT (OUTPATIENT)
Dept: OUTPATIENT SERVICES | Facility: HOSPITAL | Age: 54
LOS: 1 days | Discharge: HOME | End: 2024-07-23

## 2024-07-23 DIAGNOSIS — C18.9 MALIGNANT NEOPLASM OF COLON, UNSPECIFIED: ICD-10-CM

## 2024-07-23 DIAGNOSIS — Z98.890 OTHER SPECIFIED POSTPROCEDURAL STATES: Chronic | ICD-10-CM

## 2024-07-23 DIAGNOSIS — Z90.49 ACQUIRED ABSENCE OF OTHER SPECIFIED PARTS OF DIGESTIVE TRACT: Chronic | ICD-10-CM

## 2024-07-30 ENCOUNTER — APPOINTMENT (OUTPATIENT)
Dept: HEMATOLOGY ONCOLOGY | Facility: CLINIC | Age: 54
End: 2024-07-30
Payer: MEDICAID

## 2024-07-30 VITALS
TEMPERATURE: 98.1 F | WEIGHT: 194 LBS | DIASTOLIC BLOOD PRESSURE: 83 MMHG | HEIGHT: 74 IN | OXYGEN SATURATION: 95 % | HEART RATE: 116 BPM | SYSTOLIC BLOOD PRESSURE: 126 MMHG | BODY MASS INDEX: 24.9 KG/M2

## 2024-07-30 PROCEDURE — 99215 OFFICE O/P EST HI 40 MIN: CPT

## 2024-07-30 NOTE — PHYSICAL EXAM
[Restricted in physically strenuous activity but ambulatory and able to carry out work of a light or sedentary nature] : Status 1- Restricted in physically strenuous activity but ambulatory and able to carry out work of a light or sedentary nature, e.g., light house work, office work [Normal] : affect appropriate [de-identified] : ostomy w brown stool

## 2024-07-30 NOTE — REVIEW OF SYSTEMS
[Fatigue] : fatigue [Recent Change In Weight] : ~T recent weight change [Chest Pain] : no chest pain [Palpitations] : no palpitations [Shortness Of Breath] : no shortness of breath [Abdominal Pain] : abdominal pain [Vomiting] : no vomiting [Constipation] : no constipation [Diarrhea: Grade 0] : Diarrhea: Grade 0 [Joint Pain] : no joint pain [Skin Rash] : no skin rash [Difficulty Walking] : difficulty walking [Negative] : Allergic/Immunologic [FreeTextEntry7] : ileotomy in place, functioning normally, prolapsed; abd pain  [de-identified] : ostomy

## 2024-07-30 NOTE — REASON FOR VISIT
[Follow-Up Visit] : a follow-up [Family Member] : family member [Verbal consent obtained from patient] : the patient, [unfilled]

## 2024-07-30 NOTE — HISTORY OF PRESENT ILLNESS
[de-identified] : The patient was diagnosed with colon cancer in May 2022 at the age of 51. He presented to  ER 5/19/22 with worsening abdominal pain for 4 days. He reports abdominal pain has been ongoing for 4 years. On 5/19/22 he had a CT A/P which showed apparent focal mural thickening at the cecum/proximal ascending colon may represent colon cancer. The sigmoid colon appears to be tethered to the cecum and it is focally thick-walled; focal tumor invasion/extension from the cecum to the sigmoid colon cannot be excluded. Dilatation of the small bowel with an apparent transition point in the right lower quadrant abdomen, suggestive of small bowel obstruction. Apparent mild mural thickening of the a dilated right lower quadrant small bowel loop with adjacent mesenteric edema for which associated small bowel ischemia cannot be excluded. Multiple hypodense lesions with calcifications in both lobes of the liver with the largest measuring 5.8 x 6.0 cm in hepatic segment 8. These are suspicious for metastases. Mild ascites. Also on 5/19/22 he underwent exploratory laparotomy, total colectomy, end ileostomy, biopsy of liver, and biopsy of retroperitoneum. Pathology showed Omentum, excision: Negative for malignancy. Acutely inflamed exudate consistent with peritonitis. Portion of terminal ileum, cecum with adherent sigmoid, total colectomy: Poorly differentiated infiltrating adenocarcinoma measuring 9.0 cm. Adenocarcinoma infiltrates through the bowel wall and through the visceral peritoneum and infiltrates the adherent sigmoid colon 3 of 17 lymph nodes are positive for metastatic carcinoma. Lymphovascular space invasion is identified. Perineural invasion is identified. The surgical margins are negative. Appendix with serosal tumor implants and acute inflammation. Peritonitis. Liver, biopsy: Metastatic adenocarcinoma. Retroperitoneal biopsy: Adenocarcinoma with necrosis. No loss of nuclear expression of MMR proteins (MLH1, MSH2, MSH6, and PMS2). Staging pT4b pN1b pM1c. On 5/25/22 he had a repeat CT A/P which showed status post total colectomy and ileostomy. Dilated small bowel loops in the left abdomen with associated wall thickening. Both may be postoperative, follow-up is recommended. Moderate amount of ascites new from the prior study. Small amount of free intraperitoneal air likely with postoperative state. Minimal left pleural effusion. New multifocal right lower lobe infiltrate. Multiple liver metastases some of which are calcified again appreciated. [de-identified] : Medical Hx: denies \par  Surgical Hx: denies \par  Family Hx: denies family history of cancer \par  Social Hx: he smoked THC and cigarettes daily for 30 years, quit May 19th, 2022; ETOH had 6 pack a day, quit 4 years ago; was a  (off the [employer]) no longer working; lives with mom and brother.   [de-identified] : Spoke with pt and his sister, Geoff, today. [place] the [place] [facility]5/24 and is s/p small bowel resection with creation of new ileostomy by Dr Magnus Palafox. He also was under suicide watch with his first admission.  His readmission last week due to weakness and "feeling sick and helpless".  Currently he reports he is unable to shower and can barely change his ostomy, currently 5 days old. He also reports that he is unable to walk more than a few steps.  He has been off all chemotherapy tx since June 5th  Per pt and sister [facility] [place] not set up nursing care at home. He reports he feels too sick to take his pain medications. He last ate on 7/4 in the morning a toast and gatorade. His notes urine is dark yellow in color.   He was originally discharged to [facility] after his first admission and left within a few hours due to being dirty, and financial concerns.  He completed FOLFOXIRI / Zirabev, planned on 4/10/24. Oxali stopped after cycle 10.  Today he is C2D1 of Panitumumab of ongoing cycles planned. He is also on Sotorasib 960mg PO QD ongoing, he takes 3 tabs daily, no missed doses. A grade 2-3 EGFR rash on his face, clindamycin and doxycycline ordered. He continues with left foot numbness from prior treatment. Denies any other sxs or chemotherapy. Ostomy remains prolapsed and functioning.  He has abd pain, working with [facility], stable today. He continues to live with his sister, no that his mom was moved to a nursing home late last year. This remains a stressor of his, he will continue to work with . His wt is stable overall. He continues to be compliant with his lovenox injection, daily.   He was seen at  ER on Saturday, 9/9/23, he was BIBEMS for abdominal pain, and sent home.  Patient was admitted again from 09/25/23 - 10/01/23 and 10/7/23 - 10/10/23, BIBEMS both times. In the hospital notes, it is noted that he was binge drinking, smoking cigarettes, cocaine and marijuana prior to ER visit  07/30/24: Mr Palladino returns for follow up and transfer of care to [facility] after moving to the area from [place].  He has not been on sotorasib and panitumimab since may due to a a planned procedure. His most recent imaging on 07/05/24 was essentially stable likely indicative of efficacy of his regimen which he was only on briefly. He feels ok with good appetite and he is able to ambulate though uses a granado occasionally.  He reports that the side effect that he experienced with the regimen was a facial rash.  I have instructed him to resume the sotarasib and we will arrange for panitumumab infusion as well.  Follow up in 3 weeks to assess tolerance

## 2024-07-30 NOTE — ASSESSMENT
[FreeTextEntry1] : For patients with KRAS G12C mutant mCRC who decline or do not have access to clinical trials, can treat with either sotorasib plus panitumumab or adagrasib plus cetuximab, rather than single-agent treatment or other systemic agents.  In patients with treatment-refractory KRAS G12C mutant mCRC, the combination of sotorasib plus panitumumab improved PFS and was well-tolerated in a randomized phase III trial (CodeBreaK 300).   Objective responses were highest for sotorasib at 960 mg plus panitumumab (26 percent) compared with sotorasib at 240 mg plus panitumumab (6 percent) and trifluridine-tipiracil or regorafenib (0 percent). OS results are immature.  Grade 3 toxicity rates were lower for panitumumab plus sotorasib at 960 mg (36 percent) or 240 mg (30 percent) versus trifluridine-tipiricil or regorafenib (43 percent). The most frequent grade 3 toxicities for panitumumab plus either sotorasib at 960 mg or 240 mg included diarrhea (4 and 6 percent), nausea (2 and 4 percent), hypomagnesemia (6 and 8 percent), rash (6 and 2 percent), dermatitis acneiform (11 and 4 percent), and skin-related toxic effect (4 and 2 percent). For patients who are unable to tolerate the combination due to toxicity from the EGFR inhibitor, single-agent sotarasib or adagrasib is a reasonable alternative.  Patients with LUCIAN-mutated mCRC do not benefit from EGFR inhibitors. In particular, patients with mCRC that harbors a KRAS G12C mutation have poor treatment outcomes. Treatment-related resistance rapidly develops in these tumors, and studies suggest that the primary resistance mechanism is increased EGFR signaling.    Divarasib, sotorasib, and adagrasib are irreversible inhibitors that target KRAS G12C. In patients with KRAS G12C mutant mCRC, these drugs have been evaluated in combination with EGFR inhibitors (to reverse tumor resistance to these inhibitors) and as monotherapy.   07/30/24: Mr Palladino returns for follow up and transfer of care to [facility] after moving to the area from [place].  He has not been on sotorasib and panitumimab since may due to a a planned procedure. His most recent imaging on 07/05/24 was essentially stable likely indicative of efficacy of his regimen which he was only on briefly. He feels ok with good appetite and he is able to ambulate though uses a granado occasionally.  He reports that the side effect that he experienced with the regimen was a facial rash.  I have instructed him to resume the sotarasib and we will arrange for panitumumab infusion as well.  Follow up in 3 weeks to assess tolerance  [With Patient/Caregiver] : With Patient/Caregiver [AdvancecareDate] : 1/5/23

## 2024-08-07 NOTE — DISCHARGE NOTE NURSING/CASE MANAGEMENT/SOCIAL WORK - NSDCVIVACCINE_GEN_ALL_CORE_FT
reports R knee pain persists at 4/10 upon arrival today.     Objective:  Modalities:  vaso - 15 min - 34 deg - low pressure   Precautions:  Exercises:  Exercise Reps/ Time Weight/ Level Comments Completed    Bike 5' Seat 7  x          Supine       SLR 2x10   x   Bridges  2x10   x   SAQ 2x10  Mild discomfort  x   Sidelying       Clamshells  2x10  Bilat  x   Hip ABD  2x10   x   Seated       LAQ 2x10 2# R x          Prone   2 pillows under hips     Hip ext- knee flexed  2x10 ea   x   Hip ext- knee extended  2x10 ea   x          Gym       Total Gym DL squats  3x10 L16  x   TKE 15x Blue   x   3 way hip    Next?    Side stepping with band   Next?    Other:      Treatment Charges: Mins Units   []  Modalities     [x]  Ther Exercise 35 2   []  Manual Therapy     []  Ther Activities     []  Neuro Re-ed     [x]  Vasocompression 10 1   [] Gait     []  Other     Total Billable time 45 3       Assessment: [x] Progressing toward goals. Able to progress with bridges, clamshells, and hip ABD for B hip strengthening. Also able to add Total Gym squats without symptom aggravation. Ended with vaso for pain and soreness management.     [] No change.     [] Other:  [x] Patient would continue to benefit from skilled physical therapy services in order to: increase right knee functional strength and decrease muscle tension         Goals  MET NOT MET ON-  GOING  Details   Date Addressed: [facility]       STG: To be met in 6 treatments            ? Pain: decrease resting pain to 0/10 to promote adequate rest between therapy sessions  []  []  []      2.  Patient will perform proper squat without need for cuing for correct form  []  []  []      3.  ? Function: decrease LEFS to at least 56/80 to show progression towards PLOF []  []  []     4.  Patient to be independent with home exercise program as demonstrated by performance with correct form without cues.  []  []  []     5.  Demonstrate Knowledge of fall prevention  []  []  []     6.   []  []  []       No Vaccines Administered.

## 2024-08-19 ENCOUNTER — RESULT REVIEW (OUTPATIENT)
Age: 54
End: 2024-08-19

## 2024-08-19 ENCOUNTER — APPOINTMENT (OUTPATIENT)
Age: 54
End: 2024-08-19

## 2024-08-19 LAB
BASOPHILS # BLD AUTO: 0.1 K/UL — SIGNIFICANT CHANGE UP (ref 0–0.2)
BASOPHILS NFR BLD AUTO: 1 % — SIGNIFICANT CHANGE UP (ref 0–2)
EOSINOPHIL # BLD AUTO: 0.1 K/UL — SIGNIFICANT CHANGE UP (ref 0–0.5)
EOSINOPHIL NFR BLD AUTO: 1.9 % — SIGNIFICANT CHANGE UP (ref 0–6)
HCT VFR BLD CALC: 40.9 % — SIGNIFICANT CHANGE UP (ref 39–50)
HGB BLD-MCNC: 13 G/DL — SIGNIFICANT CHANGE UP (ref 13–17)
LYMPHOCYTES # BLD AUTO: 1.4 K/UL — SIGNIFICANT CHANGE UP (ref 1–3.3)
LYMPHOCYTES # BLD AUTO: 20.2 % — SIGNIFICANT CHANGE UP (ref 13–44)
MCHC RBC-ENTMCNC: 28 PG — SIGNIFICANT CHANGE UP (ref 27–34)
MCHC RBC-ENTMCNC: 31.8 G/DL — LOW (ref 32–36)
MCV RBC AUTO: 87.9 FL — SIGNIFICANT CHANGE UP (ref 80–100)
MONOCYTES # BLD AUTO: 0.6 K/UL — SIGNIFICANT CHANGE UP (ref 0–0.9)
MONOCYTES NFR BLD AUTO: 9.1 % — SIGNIFICANT CHANGE UP (ref 2–14)
NEUTROPHILS # BLD AUTO: 4.7 K/UL — SIGNIFICANT CHANGE UP (ref 1.8–7.4)
NEUTROPHILS NFR BLD AUTO: 67.8 % — SIGNIFICANT CHANGE UP (ref 43–77)
PLATELET # BLD AUTO: 120 K/UL — LOW (ref 150–400)
RBC # BLD: 4.65 M/UL — SIGNIFICANT CHANGE UP (ref 4.2–5.8)
RBC # FLD: 14 % — SIGNIFICANT CHANGE UP (ref 10.3–14.5)
WBC # BLD: 7 K/UL — SIGNIFICANT CHANGE UP (ref 3.8–10.5)
WBC # FLD AUTO: 7 K/UL — SIGNIFICANT CHANGE UP (ref 3.8–10.5)

## 2024-08-20 DIAGNOSIS — Z51.11 ENCOUNTER FOR ANTINEOPLASTIC CHEMOTHERAPY: ICD-10-CM

## 2024-08-20 DIAGNOSIS — R11.2 NAUSEA WITH VOMITING, UNSPECIFIED: ICD-10-CM

## 2024-08-20 LAB
ALBUMIN SERPL ELPH-MCNC: 4.1 G/DL — SIGNIFICANT CHANGE UP (ref 3.3–5)
ALP SERPL-CCNC: 151 U/L — HIGH (ref 40–120)
ALT FLD-CCNC: 30 U/L — SIGNIFICANT CHANGE UP (ref 10–45)
ANION GAP SERPL CALC-SCNC: 15 MMOL/L — SIGNIFICANT CHANGE UP (ref 5–17)
AST SERPL-CCNC: 21 U/L — SIGNIFICANT CHANGE UP (ref 10–40)
BILIRUB SERPL-MCNC: <0.2 MG/DL — SIGNIFICANT CHANGE UP (ref 0.2–1.2)
BUN SERPL-MCNC: 13 MG/DL — SIGNIFICANT CHANGE UP (ref 7–23)
CALCIUM SERPL-MCNC: 9.2 MG/DL — SIGNIFICANT CHANGE UP (ref 8.4–10.5)
CHLORIDE SERPL-SCNC: 100 MMOL/L — SIGNIFICANT CHANGE UP (ref 96–108)
CO2 SERPL-SCNC: 24 MMOL/L — SIGNIFICANT CHANGE UP (ref 22–31)
CREAT SERPL-MCNC: 0.91 MG/DL — SIGNIFICANT CHANGE UP (ref 0.5–1.3)
EGFR: 102 ML/MIN/1.73M2 — SIGNIFICANT CHANGE UP
GLUCOSE SERPL-MCNC: 115 MG/DL — HIGH (ref 70–99)
MAGNESIUM SERPL-MCNC: 1.9 MG/DL — SIGNIFICANT CHANGE UP (ref 1.6–2.6)
POTASSIUM SERPL-MCNC: 4 MMOL/L — SIGNIFICANT CHANGE UP (ref 3.5–5.3)
POTASSIUM SERPL-SCNC: 4 MMOL/L — SIGNIFICANT CHANGE UP (ref 3.5–5.3)
PROT SERPL-MCNC: 7.1 G/DL — SIGNIFICANT CHANGE UP (ref 6–8.3)
SODIUM SERPL-SCNC: 138 MMOL/L — SIGNIFICANT CHANGE UP (ref 135–145)

## 2024-08-21 ENCOUNTER — NON-APPOINTMENT (OUTPATIENT)
Age: 54
End: 2024-08-21

## 2024-08-29 ENCOUNTER — RESULT REVIEW (OUTPATIENT)
Age: 54
End: 2024-08-29

## 2024-08-29 ENCOUNTER — APPOINTMENT (OUTPATIENT)
Dept: HEMATOLOGY ONCOLOGY | Facility: CLINIC | Age: 54
End: 2024-08-29
Payer: MEDICAID

## 2024-08-29 VITALS
BODY MASS INDEX: 25.65 KG/M2 | HEIGHT: 74 IN | SYSTOLIC BLOOD PRESSURE: 102 MMHG | HEART RATE: 100 BPM | WEIGHT: 199.85 LBS | DIASTOLIC BLOOD PRESSURE: 72 MMHG | OXYGEN SATURATION: 98 %

## 2024-08-29 LAB
ALBUMIN SERPL ELPH-MCNC: 4.2 G/DL
ALP BLD-CCNC: 127 U/L
ALT SERPL-CCNC: 25 U/L
ANION GAP SERPL CALC-SCNC: 12 MMOL/L
AST SERPL-CCNC: 15 U/L
BASOPHILS # BLD AUTO: 0.1 K/UL — SIGNIFICANT CHANGE UP (ref 0–0.2)
BASOPHILS NFR BLD AUTO: 0.8 % — SIGNIFICANT CHANGE UP (ref 0–2)
BILIRUB SERPL-MCNC: 0.2 MG/DL
BUN SERPL-MCNC: 8 MG/DL
CALCIUM SERPL-MCNC: 9.4 MG/DL
CHLORIDE SERPL-SCNC: 99 MMOL/L
CO2 SERPL-SCNC: 26 MMOL/L
CREAT SERPL-MCNC: 0.8 MG/DL
EGFR: 107 ML/MIN/1.73M2
EOSINOPHIL # BLD AUTO: 0.2 K/UL — SIGNIFICANT CHANGE UP (ref 0–0.5)
EOSINOPHIL NFR BLD AUTO: 3.1 % — SIGNIFICANT CHANGE UP (ref 0–6)
GLUCOSE SERPL-MCNC: 117 MG/DL
HCT VFR BLD CALC: 45.1 % — SIGNIFICANT CHANGE UP (ref 39–50)
HGB BLD-MCNC: 14.2 G/DL — SIGNIFICANT CHANGE UP (ref 13–17)
LYMPHOCYTES # BLD AUTO: 1.1 K/UL — SIGNIFICANT CHANGE UP (ref 1–3.3)
LYMPHOCYTES # BLD AUTO: 16.8 % — SIGNIFICANT CHANGE UP (ref 13–44)
MAGNESIUM SERPL-MCNC: 2.2 MG/DL
MCHC RBC-ENTMCNC: 27.4 PG — SIGNIFICANT CHANGE UP (ref 27–34)
MCHC RBC-ENTMCNC: 31.5 G/DL — LOW (ref 32–36)
MCV RBC AUTO: 87 FL — SIGNIFICANT CHANGE UP (ref 80–100)
MONOCYTES # BLD AUTO: 0.6 K/UL — SIGNIFICANT CHANGE UP (ref 0–0.9)
MONOCYTES NFR BLD AUTO: 8.5 % — SIGNIFICANT CHANGE UP (ref 2–14)
NEUTROPHILS # BLD AUTO: 4.7 K/UL — SIGNIFICANT CHANGE UP (ref 1.8–7.4)
NEUTROPHILS NFR BLD AUTO: 70.9 % — SIGNIFICANT CHANGE UP (ref 43–77)
PLATELET # BLD AUTO: 144 K/UL — LOW (ref 150–400)
POTASSIUM SERPL-SCNC: 4.5 MMOL/L
PROT SERPL-MCNC: 7.3 G/DL
RBC # BLD: 5.19 M/UL — SIGNIFICANT CHANGE UP (ref 4.2–5.8)
RBC # FLD: 14.6 % — HIGH (ref 10.3–14.5)
SODIUM SERPL-SCNC: 138 MMOL/L
WBC # BLD: 6.6 K/UL — SIGNIFICANT CHANGE UP (ref 3.8–10.5)
WBC # FLD AUTO: 6.6 K/UL — SIGNIFICANT CHANGE UP (ref 3.8–10.5)

## 2024-08-29 PROCEDURE — 99215 OFFICE O/P EST HI 40 MIN: CPT

## 2024-08-29 NOTE — HISTORY OF PRESENT ILLNESS
[de-identified] : The patient was diagnosed with colon cancer in May 2022 at the age of 51. He presented to  ER 5/19/22 with worsening abdominal pain for 4 days. He reports abdominal pain has been ongoing for 4 years. On 5/19/22 he had a CT A/P which showed apparent focal mural thickening at the cecum/proximal ascending colon may represent colon cancer. The sigmoid colon appears to be tethered to the cecum and it is focally thick-walled; focal tumor invasion/extension from the cecum to the sigmoid colon cannot be excluded. Dilatation of the small bowel with an apparent transition point in the right lower quadrant abdomen, suggestive of small bowel obstruction. Apparent mild mural thickening of the a dilated right lower quadrant small bowel loop with adjacent mesenteric edema for which associated small bowel ischemia cannot be excluded. Multiple hypodense lesions with calcifications in both lobes of the liver with the largest measuring 5.8 x 6.0 cm in hepatic segment 8. These are suspicious for metastases. Mild ascites. Also on 5/19/22 he underwent exploratory laparotomy, total colectomy, end ileostomy, biopsy of liver, and biopsy of retroperitoneum. Pathology showed Omentum, excision: Negative for malignancy. Acutely inflamed exudate consistent with peritonitis. Portion of terminal ileum, cecum with adherent sigmoid, total colectomy: Poorly differentiated infiltrating adenocarcinoma measuring 9.0 cm. Adenocarcinoma infiltrates through the bowel wall and through the visceral peritoneum and infiltrates the adherent sigmoid colon 3 of 17 lymph nodes are positive for metastatic carcinoma. Lymphovascular space invasion is identified. Perineural invasion is identified. The surgical margins are negative. Appendix with serosal tumor implants and acute inflammation. Peritonitis. Liver, biopsy: Metastatic adenocarcinoma. Retroperitoneal biopsy: Adenocarcinoma with necrosis. No loss of nuclear expression of MMR proteins (MLH1, MSH2, MSH6, and PMS2). Staging pT4b pN1b pM1c. On 5/25/22 he had a repeat CT A/P which showed status post total colectomy and ileostomy. Dilated small bowel loops in the left abdomen with associated wall thickening. Both may be postoperative, follow-up is recommended. Moderate amount of ascites new from the prior study. Small amount of free intraperitoneal air likely with postoperative state. Minimal left pleural effusion. New multifocal right lower lobe infiltrate. Multiple liver metastases some of which are calcified again appreciated. [de-identified] : Medical Hx: denies \par  Surgical Hx: denies \par  Family Hx: denies family history of cancer \par  Social Hx: he smoked THC and cigarettes daily for 30 years, quit May 19th, 2022; ETOH had 6 pack a day, quit 4 years ago; was a  (off the [employer]) no longer working; lives with mom and brother.   [de-identified] : Spoke with pt and his sister, Geoff, today. was discharged home for the second time from [facility] [place], was originally admitted 6/5/24 and is s/p small bowel resection with creation of new ileostomy by Dr Magnus Palafox. He also was under suicide watch with his first admission.  His readmission last week due to weakness and "feeling sick and helpless".  Currently he reports he is unable to shower and can barely change his ostomy, currently 5 days old. He also reports that he is unable to walk more than a few steps.  He has been off all chemotherapy tx since June 5th  Per pt and sister [facility] dx did not set up nursing care at home. He reports he feels too sick to take his pain medications. He last ate on 7/4 in the morning a toast and gatorade. His notes urine is dark yellow in color.   He was originally discharged to [facility] after his first admission and left within a few hours due to being dirty, and financial concerns.  He completed FOLFOXIRI / Zirabev, planned on 4/10/24. Oxali stopped after cycle 10.  Today he is C2D1 of Panitumumab of ongoing cycles planned. He is also on Sotorasib 960mg PO QD ongoing, he takes 3 tabs daily, no missed doses. A grade 2-3 EGFR rash on his face, clindamycin and doxycycline ordered. He continues with left foot numbness from prior treatment. Denies any other sxs or chemotherapy. Ostomy remains prolapsed and functioning.  He has abd pain, working with [facility], stable today. He continues to live with his sister, no that his mom was moved to a nursing home late last year. This remains a stressor of his, he will continue to work with . His wt is stable overall. He continues to be compliant with his lovenox injection, daily.   He was seen at  ER on Saturday, 9/9/23, he was BIBEMS for abdominal pain, and sent home.  Patient was admitted again from 09/25/23 - 10/01/23 and 10/7/23 - 10/10/23, BIBEMS both times. In the hospital notes, it is noted that he was binge drinking, smoking cigarettes, cocaine and marijuana prior to ER visit  07/30/24: Mr Palladino returns for follow up and transfer of care to [facility] after moving to the area from [place].  He has not been on sotorasib and panitumimab since may due to a a planned procedure. His most recent imaging on 07/05/24 was essentially stable likely indicative of efficacy of his regimen which he was only on briefly. He feels ok with good appetite and he is able to ambulate though uses a granado occasionally.  He reports that the side effect that he experienced with the regimen was a facial rash.  I have instructed him to resume the sotarasib and we will arrange for panitumumab infusion as well.  Follow up in 3 weeks to assess tolerance   08/29/24: Mr Palladino returns for follow up.  He has resumed palliative sotarasib/panitumumab(08/19/24), tolerating well, though developing some facial rash, currently mild.  No new complaints, eating well.  Continue treatment, follow up in 6 weeks

## 2024-08-29 NOTE — REVIEW OF SYSTEMS
[Fatigue] : fatigue [Recent Change In Weight] : ~T recent weight change [Chest Pain] : no chest pain [Palpitations] : no palpitations [Shortness Of Breath] : no shortness of breath [Abdominal Pain] : abdominal pain [Vomiting] : no vomiting [Constipation] : no constipation [Diarrhea: Grade 0] : Diarrhea: Grade 0 [Joint Pain] : no joint pain [Skin Rash] : no skin rash [Difficulty Walking] : difficulty walking [Negative] : Allergic/Immunologic [FreeTextEntry7] : ileotomy in place, functioning normally, prolapsed; abd pain  [de-identified] : ostomy

## 2024-08-29 NOTE — ASSESSMENT
[FreeTextEntry1] : For patients with KRAS G12C mutant mCRC who decline or do not have access to clinical trials, can treat with either sotorasib plus panitumumab or adagrasib plus cetuximab, rather than single-agent treatment or other systemic agents.  In patients with treatment-refractory KRAS G12C mutant mCRC, the combination of sotorasib plus panitumumab improved PFS and was well-tolerated in a randomized phase III trial (CodeBreaK 300).   Objective responses were highest for sotorasib at 960 mg plus panitumumab (26 percent) compared with sotorasib at 240 mg plus panitumumab (6 percent) and trifluridine-tipiracil or regorafenib (0 percent). OS results are immature.  Grade 3 toxicity rates were lower for panitumumab plus sotorasib at 960 mg (36 percent) or 240 mg (30 percent) versus trifluridine-tipiricil or regorafenib (43 percent). The most frequent grade 3 toxicities for panitumumab plus either sotorasib at 960 mg or 240 mg included diarrhea (4 and 6 percent), nausea (2 and 4 percent), hypomagnesemia (6 and 8 percent), rash (6 and 2 percent), dermatitis acneiform (11 and 4 percent), and skin-related toxic effect (4 and 2 percent). For patients who are unable to tolerate the combination due to toxicity from the EGFR inhibitor, single-agent sotarasib or adagrasib is a reasonable alternative.  Patients with LUCIAN-mutated mCRC do not benefit from EGFR inhibitors. In particular, patients with mCRC that harbors a KRAS G12C mutation have poor treatment outcomes. Treatment-related resistance rapidly develops in these tumors, and studies suggest that the primary resistance mechanism is increased EGFR signaling.    Divarasib, sotorasib, and adagrasib are irreversible inhibitors that target KRAS G12C. In patients with KRAS G12C mutant mCRC, these drugs have been evaluated in combination with EGFR inhibitors (to reverse tumor resistance to these inhibitors) and as monotherapy.   07/30/24: Mr Palladino returns for follow up and transfer of care to [facility] after moving to the area from [place].  He has not been on sotorasib and panitumimab since may due to a a planned procedure. His most recent imaging on 07/05/24 was essentially stable likely indicative of efficacy of his regimen which he was only on briefly. He feels ok with good appetite and he is able to ambulate though uses a granado occasionally.  He reports that the side effect that he experienced with the regimen was a facial rash.  I have instructed him to resume the sotarasib and we will arrange for panitumumab infusion as well.  Follow up in 3 weeks to assess tolerance   08/29/24: Mr Palladino returns for follow up.  He has resumed palliative sotarasib/panitumumab(08/19/24), tolerating well, though developing some facial rash, currently mild.  No new complaints, eating well.  Continue treatment, follow up in 6 weeks [With Patient/Caregiver] : With Patient/Caregiver [AdvancecareDate] : 1/5/23

## 2024-08-29 NOTE — PHYSICAL EXAM
[Restricted in physically strenuous activity but ambulatory and able to carry out work of a light or sedentary nature] : Status 1- Restricted in physically strenuous activity but ambulatory and able to carry out work of a light or sedentary nature, e.g., light house work, office work [Normal] : affect appropriate [de-identified] : ostomy w brown stool

## 2024-09-10 ENCOUNTER — RESULT REVIEW (OUTPATIENT)
Age: 54
End: 2024-09-10

## 2024-09-10 ENCOUNTER — APPOINTMENT (OUTPATIENT)
Age: 54
End: 2024-09-10

## 2024-09-10 LAB — MAGNESIUM SERPL-MCNC: 2.2 MG/DL — SIGNIFICANT CHANGE UP (ref 1.6–2.6)

## 2024-09-12 ENCOUNTER — APPOINTMENT (OUTPATIENT)
Age: 54
End: 2024-09-12

## 2024-09-17 NOTE — ED ADULT NURSE NOTE - NSFALLRISKFACTORS_ED_ALL_ED
Addended by: SIERRA HALL on: 9/17/2024 09:43 AM     Modules accepted: Orders    
No indicators present

## 2024-09-19 ENCOUNTER — OUTPATIENT (OUTPATIENT)
Dept: OUTPATIENT SERVICES | Facility: HOSPITAL | Age: 54
LOS: 1 days | Discharge: HOME | End: 2024-09-19

## 2024-09-19 DIAGNOSIS — C18.9 MALIGNANT NEOPLASM OF COLON, UNSPECIFIED: ICD-10-CM

## 2024-09-19 DIAGNOSIS — Z98.890 OTHER SPECIFIED POSTPROCEDURAL STATES: Chronic | ICD-10-CM

## 2024-09-19 DIAGNOSIS — Z90.49 ACQUIRED ABSENCE OF OTHER SPECIFIED PARTS OF DIGESTIVE TRACT: Chronic | ICD-10-CM

## 2024-09-24 ENCOUNTER — APPOINTMENT (OUTPATIENT)
Age: 54
End: 2024-09-24

## 2024-10-01 ENCOUNTER — APPOINTMENT (OUTPATIENT)
Dept: HEMATOLOGY ONCOLOGY | Facility: CLINIC | Age: 54
End: 2024-10-01
Payer: MEDICAID

## 2024-10-01 ENCOUNTER — RESULT REVIEW (OUTPATIENT)
Age: 54
End: 2024-10-01

## 2024-10-01 VITALS
HEART RATE: 90 BPM | OXYGEN SATURATION: 97 % | SYSTOLIC BLOOD PRESSURE: 119 MMHG | HEIGHT: 74 IN | DIASTOLIC BLOOD PRESSURE: 75 MMHG | WEIGHT: 209.22 LBS | BODY MASS INDEX: 26.85 KG/M2 | TEMPERATURE: 97.5 F

## 2024-10-01 DIAGNOSIS — C18.9 MALIGNANT NEOPLASM OF COLON, UNSPECIFIED: ICD-10-CM

## 2024-10-01 DIAGNOSIS — C78.7 MALIGNANT NEOPLASM OF COLON, UNSPECIFIED: ICD-10-CM

## 2024-10-01 LAB
ALBUMIN SERPL ELPH-MCNC: 4.2 G/DL
ALP BLD-CCNC: 132 U/L
ALT SERPL-CCNC: 31 U/L
ANION GAP SERPL CALC-SCNC: 14 MMOL/L
AST SERPL-CCNC: 19 U/L
BASOPHILS # BLD AUTO: 0.1 K/UL — SIGNIFICANT CHANGE UP (ref 0–0.2)
BASOPHILS NFR BLD AUTO: 0.7 % — SIGNIFICANT CHANGE UP (ref 0–2)
BILIRUB SERPL-MCNC: 0.2 MG/DL
BUN SERPL-MCNC: 10 MG/DL
CALCIUM SERPL-MCNC: 9.1 MG/DL
CEA SERPL-MCNC: 52.2 NG/ML
CHLORIDE SERPL-SCNC: 101 MMOL/L
CO2 SERPL-SCNC: 24 MMOL/L
CREAT SERPL-MCNC: 0.84 MG/DL
EGFR: 106 ML/MIN/1.73M2
EOSINOPHIL # BLD AUTO: 0.2 K/UL — SIGNIFICANT CHANGE UP (ref 0–0.5)
EOSINOPHIL NFR BLD AUTO: 2.2 % — SIGNIFICANT CHANGE UP (ref 0–6)
GLUCOSE SERPL-MCNC: 111 MG/DL
HCT VFR BLD CALC: 43.8 % — SIGNIFICANT CHANGE UP (ref 39–50)
HGB BLD-MCNC: 13.7 G/DL — SIGNIFICANT CHANGE UP (ref 13–17)
LYMPHOCYTES # BLD AUTO: 1.2 K/UL — SIGNIFICANT CHANGE UP (ref 1–3.3)
LYMPHOCYTES # BLD AUTO: 13.5 % — SIGNIFICANT CHANGE UP (ref 13–44)
MAGNESIUM SERPL-MCNC: 2 MG/DL
MCHC RBC-ENTMCNC: 28.1 PG — SIGNIFICANT CHANGE UP (ref 27–34)
MCHC RBC-ENTMCNC: 31.3 G/DL — LOW (ref 32–36)
MCV RBC AUTO: 89.8 FL — SIGNIFICANT CHANGE UP (ref 80–100)
MONOCYTES # BLD AUTO: 0.6 K/UL — SIGNIFICANT CHANGE UP (ref 0–0.9)
MONOCYTES NFR BLD AUTO: 6.6 % — SIGNIFICANT CHANGE UP (ref 2–14)
NEUTROPHILS # BLD AUTO: 6.9 K/UL — SIGNIFICANT CHANGE UP (ref 1.8–7.4)
NEUTROPHILS NFR BLD AUTO: 76.9 % — SIGNIFICANT CHANGE UP (ref 43–77)
PLATELET # BLD AUTO: 150 K/UL — SIGNIFICANT CHANGE UP (ref 150–400)
POTASSIUM SERPL-SCNC: 4.2 MMOL/L
PROT SERPL-MCNC: 7.2 G/DL
RBC # BLD: 4.87 M/UL — SIGNIFICANT CHANGE UP (ref 4.2–5.8)
RBC # FLD: 15.2 % — HIGH (ref 10.3–14.5)
SODIUM SERPL-SCNC: 138 MMOL/L
WBC # BLD: 9 K/UL — SIGNIFICANT CHANGE UP (ref 3.8–10.5)
WBC # FLD AUTO: 9 K/UL — SIGNIFICANT CHANGE UP (ref 3.8–10.5)

## 2024-10-01 PROCEDURE — 99214 OFFICE O/P EST MOD 30 MIN: CPT

## 2024-10-01 NOTE — PHYSICAL EXAM
[Restricted in physically strenuous activity but ambulatory and able to carry out work of a light or sedentary nature] : Status 1- Restricted in physically strenuous activity but ambulatory and able to carry out work of a light or sedentary nature, e.g., light house work, office work [Normal] : affect appropriate [de-identified] : ostomy w brown stool

## 2024-10-01 NOTE — HISTORY OF PRESENT ILLNESS
[de-identified] : The patient was diagnosed with colon cancer in May 2022 at the age of 51. He presented to  ER 5/19/22 with worsening abdominal pain for 4 days. He reports abdominal pain has been ongoing for 4 years. On 5/19/22 he had a CT A/P which showed apparent focal mural thickening at the cecum/proximal ascending colon may represent colon cancer. The sigmoid colon appears to be tethered to the cecum and it is focally thick-walled; focal tumor invasion/extension from the cecum to the sigmoid colon cannot be excluded. Dilatation of the small bowel with an apparent transition point in the right lower quadrant abdomen, suggestive of small bowel obstruction. Apparent mild mural thickening of the a dilated right lower quadrant small bowel loop with adjacent mesenteric edema for which associated small bowel ischemia cannot be excluded. Multiple hypodense lesions with calcifications in both lobes of the liver with the largest measuring 5.8 x 6.0 cm in hepatic segment 8. These are suspicious for metastases. Mild ascites. Also on 5/19/22 he underwent exploratory laparotomy, total colectomy, end ileostomy, biopsy of liver, and biopsy of retroperitoneum. Pathology showed Omentum, excision: Negative for malignancy. Acutely inflamed exudate consistent with peritonitis. Portion of terminal ileum, cecum with adherent sigmoid, total colectomy: Poorly differentiated infiltrating adenocarcinoma measuring 9.0 cm. Adenocarcinoma infiltrates through the bowel wall and through the visceral peritoneum and infiltrates the adherent sigmoid colon 3 of 17 lymph nodes are positive for metastatic carcinoma. Lymphovascular space invasion is identified. Perineural invasion is identified. The surgical margins are negative. Appendix with serosal tumor implants and acute inflammation. Peritonitis. Liver, biopsy: Metastatic adenocarcinoma. Retroperitoneal biopsy: Adenocarcinoma with necrosis. No loss of nuclear expression of MMR proteins (MLH1, MSH2, MSH6, and PMS2). Staging pT4b pN1b pM1c. On 5/25/22 he had a repeat CT A/P which showed status post total colectomy and ileostomy. Dilated small bowel loops in the left abdomen with associated wall thickening. Both may be postoperative, follow-up is recommended. Moderate amount of ascites new from the prior study. Small amount of free intraperitoneal air likely with postoperative state. Minimal left pleural effusion. New multifocal right lower lobe infiltrate. Multiple liver metastases some of which are calcified again appreciated. [de-identified] : Medical Hx: denies \par  Surgical Hx: denies \par  Family Hx: denies family history of cancer \par  Social Hx: he smoked THC and cigarettes daily for 30 years, quit May 19th, 2022; ETOH had 6 pack a day, quit 4 years ago; was a  (off the [employer]) no longer working; lives with mom and brother.   [de-identified] : Spoke with pt and his sister, Geoff, today. [place] [facility] and is s/p small bowel resection with creation of new ileostomy by Dr Magnus Palafox. He also was under suicide watch with his first admission.  His readmission last week due to weakness and "feeling sick and helpless".  Currently he reports he is unable to shower and can barely change his ostomy, currently 5 days old. He also reports that he is unable to walk more than a few steps.  He has been off all chemotherapy tx since June 5th  Per pt and sister [facility] [place] nursing care at home. He reports he feels too sick to take his pain medications. He last ate on 7/4 in the morning a toast and gatorade. His notes urine is dark yellow in color.   He was originally discharged to [facility] after his first admission and left within a few hours due to being dirty, and financial concerns.  He completed FOLFOXIRI / Zirabev, planned on 4/10/24. Oxali stopped after cycle 10.  Today he is C2D1 of Panitumumab of ongoing cycles planned. He is also on Sotorasib 960mg PO QD ongoing, he takes 3 tabs daily, no missed doses. A grade 2-3 EGFR rash on his face, clindamycin and doxycycline ordered. He continues with left foot numbness from prior treatment. Denies any other sxs or chemotherapy. Ostomy remains prolapsed and functioning.  He has abd pain, working with [facility], stable today. He continues to live with his sister, no that his mom was moved to a nursing home late last year. This remains a stressor of his, he will continue to work with . His wt is stable overall. He continues to be compliant with his lovenox injection, daily.   He was seen at  ER on Saturday, 9/9/23, he was BIBEMS for abdominal pain, and sent home.  Patient was admitted again from 09/25/23 - 10/01/23 and 10/7/23 - 10/10/23, BIBEMS both times. In the hospital notes, it is noted that he was binge drinking, smoking cigarettes, cocaine and marijuana prior to ER visit  07/30/24: Mr Palladino returns for follow up and transfer of care to [facility] after moving to the area from [place].  He has not been on sotorasib and panitumimab since may due to a a planned procedure. His most recent imaging on 07/05/24 was essentially stable likely indicative of efficacy of his regimen which he was only on briefly. He feels ok with good appetite and he is able to ambulate though uses a granado occasionally.  He reports that the side effect that he experienced with the regimen was a facial rash.  I have instructed him to resume the sotarasib and we will arrange for panitumumab infusion as well.  Follow up in 3 weeks to assess tolerance   08/29/24: Mr Palladino returns for follow up.  He has resumed palliative sotarasib/panitumumab(08/19/24), tolerating well, though developing some facial rash, currently mild.  No new complaints, eating well.  Continue treatment, follow up in 6 weeks  10/1/24 Patient presents for follow up, on Lumakras and Panitumumab  + Severe abdominal pain. + Musculoskeletal pain. + Acne rash improved. Appetite is good, gained weight. No N/V/D/C. No nail changes. No fevers.

## 2024-10-01 NOTE — REVIEW OF SYSTEMS
[Fatigue] : fatigue [Recent Change In Weight] : ~T recent weight change [Abdominal Pain] : abdominal pain [Diarrhea: Grade 0] : Diarrhea: Grade 0 [Difficulty Walking] : difficulty walking [Negative] : Allergic/Immunologic [Chest Pain] : no chest pain [Palpitations] : no palpitations [Shortness Of Breath] : no shortness of breath [Vomiting] : no vomiting [Constipation] : no constipation [Joint Pain] : no joint pain [Skin Rash] : no skin rash [FreeTextEntry7] : ileotomy in place, functioning normally, prolapsed; abd pain  [de-identified] : ostomy

## 2024-10-01 NOTE — ASSESSMENT
[Palliative] : Goals of care discussed with patient: Palliative [With Patient/Caregiver] : With Patient/Caregiver [Referred to Palliative/Pain management] : Referred to Palliative/Pain management [FreeTextEntry1] : For patients with KRAS G12C mutant mCRC who decline or do not have access to clinical trials, can treat with either sotorasib plus panitumumab or adagrasib plus cetuximab, rather than single-agent treatment or other systemic agents.  In patients with treatment-refractory KRAS G12C mutant mCRC, the combination of sotorasib plus panitumumab improved PFS and was well-tolerated in a randomized phase III trial (CodeBreaK 300).   Objective responses were highest for sotorasib at 960 mg plus panitumumab (26 percent) compared with sotorasib at 240 mg plus panitumumab (6 percent) and trifluridine-tipiracil or regorafenib (0 percent). OS results are immature.  Grade 3 toxicity rates were lower for panitumumab plus sotorasib at 960 mg (36 percent) or 240 mg (30 percent) versus trifluridine-tipiricil or regorafenib (43 percent). The most frequent grade 3 toxicities for panitumumab plus either sotorasib at 960 mg or 240 mg included diarrhea (4 and 6 percent), nausea (2 and 4 percent), hypomagnesemia (6 and 8 percent), rash (6 and 2 percent), dermatitis acneiform (11 and 4 percent), and skin-related toxic effect (4 and 2 percent). For patients who are unable to tolerate the combination due to toxicity from the EGFR inhibitor, single-agent sotarasib or adagrasib is a reasonable alternative.  Patients with LUCIAN-mutated mCRC do not benefit from EGFR inhibitors. In particular, patients with mCRC that harbors a KRAS G12C mutation have poor treatment outcomes. Treatment-related resistance rapidly develops in these tumors, and studies suggest that the primary resistance mechanism is increased EGFR signaling.    Divarasib, sotorasib, and adagrasib are irreversible inhibitors that target KRAS G12C. In patients with KRAS G12C mutant mCRC, these drugs have been evaluated in combination with EGFR inhibitors (to reverse tumor resistance to these inhibitors) and as monotherapy.   07/30/24: Mr Palladino returns for follow up and transfer of care to [facility] after moving to the area from [place].  He has not been on sotorasib and panitumimab since may due to a a planned procedure. His most recent imaging on 07/05/24 was essentially stable likely indicative of efficacy of his regimen which he was only on briefly. He feels ok with good appetite and he is able to ambulate though uses a granado occasionally.  He reports that the side effect that he experienced with the regimen was a facial rash.  I have instructed him to resume the sotarasib and we will arrange for panitumumab infusion as well.  Follow up in 3 weeks to assess tolerance   08/29/24: Mr Palladino returns for follow up.  He has resumed palliative sotarasib/panitumumab(08/19/24), tolerating well, though developing some facial rash, currently mild.  No new complaints, eating well.  Continue treatment, follow up in 6 weeks  10/1/24 Patient presents on Lumakras and Panitumumab for SIV colon cancer -tolerating treatment well, acne rash improved -c/o severe abdominal pain and "pain all over" requiring high dose pain medication -advised OK to increase OxyContin to Q8 as long as it's OK with his current pain management doctors -RTC Q4-6 weeks for MD/PA follow up and labs  [AdvancecareDate] : 1/5/23

## 2024-10-05 NOTE — ED PROVIDER NOTE - NSDCPRINTRESULTS_ED_ALL_ED
DC today. Discussed with her PCP Dr. Sarmiento. OP endo follow up    DC Time: 35 minutes Medically stable for DC. Discussed plan with endocrine, patient and . Patient requests all Lab, Cardiology, and Radiology Results on their Discharge Instructions

## 2024-10-08 ENCOUNTER — APPOINTMENT (OUTPATIENT)
Age: 54
End: 2024-10-08

## 2024-10-08 ENCOUNTER — RESULT REVIEW (OUTPATIENT)
Age: 54
End: 2024-10-08

## 2024-10-08 LAB
ALBUMIN SERPL ELPH-MCNC: 4.1 G/DL — SIGNIFICANT CHANGE UP (ref 3.3–5)
ALP SERPL-CCNC: 155 U/L — HIGH (ref 40–120)
ALT FLD-CCNC: 25 U/L — SIGNIFICANT CHANGE UP (ref 10–45)
ANION GAP SERPL CALC-SCNC: 12 MMOL/L — SIGNIFICANT CHANGE UP (ref 5–17)
AST SERPL-CCNC: 17 U/L — SIGNIFICANT CHANGE UP (ref 10–40)
BILIRUB SERPL-MCNC: 0.2 MG/DL — SIGNIFICANT CHANGE UP (ref 0.2–1.2)
BUN SERPL-MCNC: 13 MG/DL — SIGNIFICANT CHANGE UP (ref 7–23)
CALCIUM SERPL-MCNC: 9 MG/DL — SIGNIFICANT CHANGE UP (ref 8.4–10.5)
CHLORIDE SERPL-SCNC: 103 MMOL/L — SIGNIFICANT CHANGE UP (ref 96–108)
CO2 SERPL-SCNC: 22 MMOL/L — SIGNIFICANT CHANGE UP (ref 22–31)
CREAT SERPL-MCNC: 0.74 MG/DL — SIGNIFICANT CHANGE UP (ref 0.5–1.3)
EGFR: 110 ML/MIN/1.73M2 — SIGNIFICANT CHANGE UP
GLUCOSE SERPL-MCNC: 100 MG/DL — HIGH (ref 70–99)
MAGNESIUM SERPL-MCNC: 2 MG/DL — SIGNIFICANT CHANGE UP (ref 1.6–2.6)
POTASSIUM SERPL-MCNC: 4.4 MMOL/L — SIGNIFICANT CHANGE UP (ref 3.5–5.3)
POTASSIUM SERPL-SCNC: 4.4 MMOL/L — SIGNIFICANT CHANGE UP (ref 3.5–5.3)
PROT SERPL-MCNC: 7.5 G/DL — SIGNIFICANT CHANGE UP (ref 6–8.3)
SODIUM SERPL-SCNC: 138 MMOL/L — SIGNIFICANT CHANGE UP (ref 135–145)

## 2024-10-09 DIAGNOSIS — Z51.11 ENCOUNTER FOR ANTINEOPLASTIC CHEMOTHERAPY: ICD-10-CM

## 2024-10-09 DIAGNOSIS — R11.2 NAUSEA WITH VOMITING, UNSPECIFIED: ICD-10-CM

## 2024-10-22 ENCOUNTER — APPOINTMENT (OUTPATIENT)
Age: 54
End: 2024-10-22

## 2024-11-05 ENCOUNTER — RESULT REVIEW (OUTPATIENT)
Age: 54
End: 2024-11-05

## 2024-11-05 ENCOUNTER — APPOINTMENT (OUTPATIENT)
Dept: HEMATOLOGY ONCOLOGY | Facility: CLINIC | Age: 54
End: 2024-11-05

## 2024-11-05 ENCOUNTER — APPOINTMENT (OUTPATIENT)
Age: 54
End: 2024-11-05

## 2024-11-05 LAB
ALBUMIN SERPL ELPH-MCNC: 4.4 G/DL — SIGNIFICANT CHANGE UP (ref 3.3–5)
ALP SERPL-CCNC: 172 U/L — HIGH (ref 40–120)
ALT FLD-CCNC: 26 U/L — SIGNIFICANT CHANGE UP (ref 10–45)
ANION GAP SERPL CALC-SCNC: 13 MMOL/L — SIGNIFICANT CHANGE UP (ref 5–17)
AST SERPL-CCNC: 18 U/L — SIGNIFICANT CHANGE UP (ref 10–40)
BASOPHILS # BLD AUTO: 0.1 K/UL — SIGNIFICANT CHANGE UP (ref 0–0.2)
BASOPHILS NFR BLD AUTO: 1.3 % — SIGNIFICANT CHANGE UP (ref 0–2)
BILIRUB SERPL-MCNC: 0.3 MG/DL — SIGNIFICANT CHANGE UP (ref 0.2–1.2)
BUN SERPL-MCNC: 9 MG/DL — SIGNIFICANT CHANGE UP (ref 7–23)
CALCIUM SERPL-MCNC: 9.3 MG/DL — SIGNIFICANT CHANGE UP (ref 8.4–10.5)
CHLORIDE SERPL-SCNC: 102 MMOL/L — SIGNIFICANT CHANGE UP (ref 96–108)
CO2 SERPL-SCNC: 23 MMOL/L — SIGNIFICANT CHANGE UP (ref 22–31)
CREAT SERPL-MCNC: 0.76 MG/DL — SIGNIFICANT CHANGE UP (ref 0.5–1.3)
EGFR: 109 ML/MIN/1.73M2 — SIGNIFICANT CHANGE UP
EOSINOPHIL # BLD AUTO: 0.2 K/UL — SIGNIFICANT CHANGE UP (ref 0–0.5)
EOSINOPHIL NFR BLD AUTO: 3.4 % — SIGNIFICANT CHANGE UP (ref 0–6)
GLUCOSE SERPL-MCNC: 99 MG/DL — SIGNIFICANT CHANGE UP (ref 70–99)
HCT VFR BLD CALC: 42.4 % — SIGNIFICANT CHANGE UP (ref 39–50)
HGB BLD-MCNC: 13.2 G/DL — SIGNIFICANT CHANGE UP (ref 13–17)
LYMPHOCYTES # BLD AUTO: 1.1 K/UL — SIGNIFICANT CHANGE UP (ref 1–3.3)
LYMPHOCYTES # BLD AUTO: 15.5 % — SIGNIFICANT CHANGE UP (ref 13–44)
MAGNESIUM SERPL-MCNC: 2 MG/DL — SIGNIFICANT CHANGE UP (ref 1.6–2.6)
MCHC RBC-ENTMCNC: 27.8 PG — SIGNIFICANT CHANGE UP (ref 27–34)
MCHC RBC-ENTMCNC: 31.2 G/DL — LOW (ref 32–36)
MCV RBC AUTO: 89 FL — SIGNIFICANT CHANGE UP (ref 80–100)
MONOCYTES # BLD AUTO: 0.4 K/UL — SIGNIFICANT CHANGE UP (ref 0–0.9)
MONOCYTES NFR BLD AUTO: 6.1 % — SIGNIFICANT CHANGE UP (ref 2–14)
NEUTROPHILS # BLD AUTO: 5.1 K/UL — SIGNIFICANT CHANGE UP (ref 1.8–7.4)
NEUTROPHILS NFR BLD AUTO: 73.7 % — SIGNIFICANT CHANGE UP (ref 43–77)
PLATELET # BLD AUTO: 151 K/UL — SIGNIFICANT CHANGE UP (ref 150–400)
POTASSIUM SERPL-MCNC: 4.3 MMOL/L — SIGNIFICANT CHANGE UP (ref 3.5–5.3)
POTASSIUM SERPL-SCNC: 4.3 MMOL/L — SIGNIFICANT CHANGE UP (ref 3.5–5.3)
PROT SERPL-MCNC: 7.6 G/DL — SIGNIFICANT CHANGE UP (ref 6–8.3)
RBC # BLD: 4.77 M/UL — SIGNIFICANT CHANGE UP (ref 4.2–5.8)
RBC # FLD: 15.2 % — HIGH (ref 10.3–14.5)
SODIUM SERPL-SCNC: 139 MMOL/L — SIGNIFICANT CHANGE UP (ref 135–145)
WBC # BLD: 7 K/UL — SIGNIFICANT CHANGE UP (ref 3.8–10.5)
WBC # FLD AUTO: 7 K/UL — SIGNIFICANT CHANGE UP (ref 3.8–10.5)

## 2024-11-12 ENCOUNTER — OUTPATIENT (OUTPATIENT)
Dept: OUTPATIENT SERVICES | Facility: HOSPITAL | Age: 54
LOS: 1 days | Discharge: HOME | End: 2024-11-12

## 2024-11-12 DIAGNOSIS — C18.9 MALIGNANT NEOPLASM OF COLON, UNSPECIFIED: ICD-10-CM

## 2024-11-12 DIAGNOSIS — Z90.49 ACQUIRED ABSENCE OF OTHER SPECIFIED PARTS OF DIGESTIVE TRACT: Chronic | ICD-10-CM

## 2024-11-12 DIAGNOSIS — Z98.890 OTHER SPECIFIED POSTPROCEDURAL STATES: Chronic | ICD-10-CM

## 2024-11-19 ENCOUNTER — APPOINTMENT (OUTPATIENT)
Dept: HEMATOLOGY ONCOLOGY | Facility: CLINIC | Age: 54
End: 2024-11-19
Payer: MEDICAID

## 2024-11-19 ENCOUNTER — RESULT REVIEW (OUTPATIENT)
Age: 54
End: 2024-11-19

## 2024-11-19 ENCOUNTER — APPOINTMENT (OUTPATIENT)
Age: 54
End: 2024-11-19

## 2024-11-19 DIAGNOSIS — C78.7 MALIGNANT NEOPLASM OF COLON, UNSPECIFIED: ICD-10-CM

## 2024-11-19 DIAGNOSIS — C18.9 MALIGNANT NEOPLASM OF COLON, UNSPECIFIED: ICD-10-CM

## 2024-11-19 PROCEDURE — 99214 OFFICE O/P EST MOD 30 MIN: CPT

## 2024-11-20 DIAGNOSIS — Z51.11 ENCOUNTER FOR ANTINEOPLASTIC CHEMOTHERAPY: ICD-10-CM

## 2024-11-20 DIAGNOSIS — R11.2 NAUSEA WITH VOMITING, UNSPECIFIED: ICD-10-CM

## 2024-11-20 LAB
ALBUMIN SERPL ELPH-MCNC: 4.1 G/DL — SIGNIFICANT CHANGE UP (ref 3.3–5)
ALP SERPL-CCNC: 139 U/L — HIGH (ref 40–120)
ALT FLD-CCNC: 21 U/L — SIGNIFICANT CHANGE UP (ref 10–45)
ANION GAP SERPL CALC-SCNC: 15 MMOL/L — SIGNIFICANT CHANGE UP (ref 5–17)
AST SERPL-CCNC: 20 U/L — SIGNIFICANT CHANGE UP (ref 10–40)
BILIRUB SERPL-MCNC: 0.3 MG/DL — SIGNIFICANT CHANGE UP (ref 0.2–1.2)
BUN SERPL-MCNC: 9 MG/DL — SIGNIFICANT CHANGE UP (ref 7–23)
CALCIUM SERPL-MCNC: 9.3 MG/DL — SIGNIFICANT CHANGE UP (ref 8.4–10.5)
CHLORIDE SERPL-SCNC: 99 MMOL/L — SIGNIFICANT CHANGE UP (ref 96–108)
CO2 SERPL-SCNC: 23 MMOL/L — SIGNIFICANT CHANGE UP (ref 22–31)
CREAT SERPL-MCNC: 0.73 MG/DL — SIGNIFICANT CHANGE UP (ref 0.5–1.3)
EGFR: 110 ML/MIN/1.73M2 — SIGNIFICANT CHANGE UP
GLUCOSE SERPL-MCNC: 114 MG/DL — HIGH (ref 70–99)
MAGNESIUM SERPL-MCNC: 2 MG/DL — SIGNIFICANT CHANGE UP (ref 1.6–2.6)
POTASSIUM SERPL-MCNC: 3.8 MMOL/L — SIGNIFICANT CHANGE UP (ref 3.5–5.3)
POTASSIUM SERPL-SCNC: 3.8 MMOL/L — SIGNIFICANT CHANGE UP (ref 3.5–5.3)
PROT SERPL-MCNC: 7.8 G/DL — SIGNIFICANT CHANGE UP (ref 6–8.3)
SODIUM SERPL-SCNC: 136 MMOL/L — SIGNIFICANT CHANGE UP (ref 135–145)

## 2024-12-02 ENCOUNTER — APPOINTMENT (OUTPATIENT)
Age: 54
End: 2024-12-02

## 2024-12-03 ENCOUNTER — APPOINTMENT (OUTPATIENT)
Age: 54
End: 2024-12-03

## 2024-12-03 ENCOUNTER — APPOINTMENT (OUTPATIENT)
Dept: HEMATOLOGY ONCOLOGY | Facility: CLINIC | Age: 54
End: 2024-12-03

## 2024-12-11 ENCOUNTER — APPOINTMENT (OUTPATIENT)
Dept: CT IMAGING | Facility: CLINIC | Age: 54
End: 2024-12-11
Payer: MEDICAID

## 2024-12-11 ENCOUNTER — OUTPATIENT (OUTPATIENT)
Dept: OUTPATIENT SERVICES | Facility: HOSPITAL | Age: 54
LOS: 1 days | End: 2024-12-11
Payer: MEDICAID

## 2024-12-11 DIAGNOSIS — Z90.49 ACQUIRED ABSENCE OF OTHER SPECIFIED PARTS OF DIGESTIVE TRACT: Chronic | ICD-10-CM

## 2024-12-11 DIAGNOSIS — Z98.890 OTHER SPECIFIED POSTPROCEDURAL STATES: Chronic | ICD-10-CM

## 2024-12-11 DIAGNOSIS — C18.9 MALIGNANT NEOPLASM OF COLON, UNSPECIFIED: ICD-10-CM

## 2024-12-11 PROCEDURE — 71260 CT THORAX DX C+: CPT

## 2024-12-11 PROCEDURE — 74177 CT ABD & PELVIS W/CONTRAST: CPT

## 2024-12-11 PROCEDURE — 71260 CT THORAX DX C+: CPT | Mod: 26

## 2024-12-11 PROCEDURE — 74177 CT ABD & PELVIS W/CONTRAST: CPT | Mod: 26

## 2024-12-17 ENCOUNTER — APPOINTMENT (OUTPATIENT)
Age: 54
End: 2024-12-17

## 2024-12-23 ENCOUNTER — APPOINTMENT (OUTPATIENT)
Dept: HEMATOLOGY ONCOLOGY | Facility: CLINIC | Age: 54
End: 2024-12-23
Payer: MEDICAID

## 2024-12-23 ENCOUNTER — NON-APPOINTMENT (OUTPATIENT)
Age: 54
End: 2024-12-23

## 2024-12-23 ENCOUNTER — RESULT REVIEW (OUTPATIENT)
Age: 54
End: 2024-12-23

## 2024-12-23 VITALS
BODY MASS INDEX: 25.42 KG/M2 | SYSTOLIC BLOOD PRESSURE: 125 MMHG | OXYGEN SATURATION: 98 % | DIASTOLIC BLOOD PRESSURE: 82 MMHG | HEART RATE: 88 BPM | TEMPERATURE: 97.3 F | HEIGHT: 74 IN | WEIGHT: 198.08 LBS

## 2024-12-23 LAB
ALBUMIN SERPL ELPH-MCNC: 4.2 G/DL
ALP BLD-CCNC: 177 U/L
ALT SERPL-CCNC: 18 U/L
ANION GAP SERPL CALC-SCNC: 14 MMOL/L
AST SERPL-CCNC: 14 U/L
BASOPHILS # BLD AUTO: 0.1 K/UL — SIGNIFICANT CHANGE UP (ref 0–0.2)
BASOPHILS NFR BLD AUTO: 0.7 % — SIGNIFICANT CHANGE UP (ref 0–2)
BILIRUB SERPL-MCNC: 0.3 MG/DL
BUN SERPL-MCNC: 8 MG/DL
CALCIUM SERPL-MCNC: 9.3 MG/DL
CEA SERPL-MCNC: 142 NG/ML
CHLORIDE SERPL-SCNC: 101 MMOL/L
CO2 SERPL-SCNC: 25 MMOL/L
CREAT SERPL-MCNC: 0.73 MG/DL
EGFR: 109 ML/MIN/1.73M2
EOSINOPHIL # BLD AUTO: 0.2 K/UL — SIGNIFICANT CHANGE UP (ref 0–0.5)
EOSINOPHIL NFR BLD AUTO: 2.1 % — SIGNIFICANT CHANGE UP (ref 0–6)
GLUCOSE SERPL-MCNC: 111 MG/DL
HCT VFR BLD CALC: 44 % — SIGNIFICANT CHANGE UP (ref 39–50)
HGB BLD-MCNC: 13.4 G/DL — SIGNIFICANT CHANGE UP (ref 13–17)
LYMPHOCYTES # BLD AUTO: 0.8 K/UL — LOW (ref 1–3.3)
LYMPHOCYTES # BLD AUTO: 10.4 % — LOW (ref 13–44)
MAGNESIUM SERPL-MCNC: 2 MG/DL
MCHC RBC-ENTMCNC: 27.3 PG — SIGNIFICANT CHANGE UP (ref 27–34)
MCHC RBC-ENTMCNC: 30.5 G/DL — LOW (ref 32–36)
MCV RBC AUTO: 89.6 FL — SIGNIFICANT CHANGE UP (ref 80–100)
MONOCYTES # BLD AUTO: 0.7 K/UL — SIGNIFICANT CHANGE UP (ref 0–0.9)
MONOCYTES NFR BLD AUTO: 8.6 % — SIGNIFICANT CHANGE UP (ref 2–14)
NEUTROPHILS # BLD AUTO: 6 K/UL — SIGNIFICANT CHANGE UP (ref 1.8–7.4)
NEUTROPHILS NFR BLD AUTO: 78.1 % — HIGH (ref 43–77)
PLATELET # BLD AUTO: 193 K/UL — SIGNIFICANT CHANGE UP (ref 150–400)
POTASSIUM SERPL-SCNC: 4.2 MMOL/L
PROT SERPL-MCNC: 7.5 G/DL
RBC # BLD: 4.92 M/UL — SIGNIFICANT CHANGE UP (ref 4.2–5.8)
RBC # FLD: 14.7 % — HIGH (ref 10.3–14.5)
SODIUM SERPL-SCNC: 139 MMOL/L
WBC # BLD: 7.6 K/UL — SIGNIFICANT CHANGE UP (ref 3.8–10.5)
WBC # FLD AUTO: 7.6 K/UL — SIGNIFICANT CHANGE UP (ref 3.8–10.5)

## 2024-12-23 PROCEDURE — 99215 OFFICE O/P EST HI 40 MIN: CPT

## 2024-12-23 RX ORDER — ADAGRASIB 200 MG/1
200 TABLET, COATED ORAL TWICE DAILY
Qty: 360 | Refills: 2 | Status: ACTIVE | COMMUNITY
Start: 2024-12-23 | End: 1900-01-01

## 2024-12-30 NOTE — H&P ADULT - SOCIAL HISTORY
Would not feel comfortable changing to xanax - needs to keep this management through psychiatry.     Would suggest adding in protein shakes/smoothies/protein bars or small frequent meals if unable to tolerate regular meals.     OK to offer appointment with me for STD screening and follow up if she would like - I would advise STD screening at minimum. Continue care with therapist and psychiatry    Ade Cosby PA-C on 12/30/2024 at 5:15 PM     Yes

## 2024-12-31 ENCOUNTER — APPOINTMENT (OUTPATIENT)
Age: 54
End: 2024-12-31

## 2025-01-07 ENCOUNTER — RESULT REVIEW (OUTPATIENT)
Age: 55
End: 2025-01-07

## 2025-01-07 ENCOUNTER — APPOINTMENT (OUTPATIENT)
Age: 55
End: 2025-01-07

## 2025-01-07 LAB
BASOPHILS # BLD AUTO: 0.1 K/UL — SIGNIFICANT CHANGE UP (ref 0–0.2)
BASOPHILS NFR BLD AUTO: 1.1 % — SIGNIFICANT CHANGE UP (ref 0–2)
EOSINOPHIL # BLD AUTO: 0.1 K/UL — SIGNIFICANT CHANGE UP (ref 0–0.5)
EOSINOPHIL NFR BLD AUTO: 1.5 % — SIGNIFICANT CHANGE UP (ref 0–6)
HCT VFR BLD CALC: 38.6 % — LOW (ref 39–50)
HGB BLD-MCNC: 11.9 G/DL — LOW (ref 13–17)
LYMPHOCYTES # BLD AUTO: 0.6 K/UL — LOW (ref 1–3.3)
LYMPHOCYTES # BLD AUTO: 6 % — LOW (ref 13–44)
MCHC RBC-ENTMCNC: 27 PG — SIGNIFICANT CHANGE UP (ref 27–34)
MCHC RBC-ENTMCNC: 31 G/DL — LOW (ref 32–36)
MCV RBC AUTO: 87.2 FL — SIGNIFICANT CHANGE UP (ref 80–100)
MONOCYTES # BLD AUTO: 0.4 K/UL — SIGNIFICANT CHANGE UP (ref 0–0.9)
MONOCYTES NFR BLD AUTO: 4.8 % — SIGNIFICANT CHANGE UP (ref 2–14)
NEUTROPHILS # BLD AUTO: 8.1 K/UL — HIGH (ref 1.8–7.4)
NEUTROPHILS NFR BLD AUTO: 86.6 % — HIGH (ref 43–77)
PLATELET # BLD AUTO: 198 K/UL — SIGNIFICANT CHANGE UP (ref 150–400)
RBC # BLD: 4.42 M/UL — SIGNIFICANT CHANGE UP (ref 4.2–5.8)
RBC # FLD: 13.3 % — SIGNIFICANT CHANGE UP (ref 10.3–14.5)
WBC # BLD: 9.3 K/UL — SIGNIFICANT CHANGE UP (ref 3.8–10.5)
WBC # FLD AUTO: 9.3 K/UL — SIGNIFICANT CHANGE UP (ref 3.8–10.5)

## 2025-01-08 LAB
ALBUMIN SERPL ELPH-MCNC: 3.8 G/DL — SIGNIFICANT CHANGE UP (ref 3.3–5)
ALP SERPL-CCNC: 156 U/L — HIGH (ref 40–120)
ALT FLD-CCNC: 10 U/L — SIGNIFICANT CHANGE UP (ref 10–45)
ANION GAP SERPL CALC-SCNC: 13 MMOL/L — SIGNIFICANT CHANGE UP (ref 5–17)
AST SERPL-CCNC: 14 U/L — SIGNIFICANT CHANGE UP (ref 10–40)
BILIRUB SERPL-MCNC: 0.2 MG/DL — SIGNIFICANT CHANGE UP (ref 0.2–1.2)
BUN SERPL-MCNC: 8 MG/DL — SIGNIFICANT CHANGE UP (ref 7–23)
CALCIUM SERPL-MCNC: 9.5 MG/DL — SIGNIFICANT CHANGE UP (ref 8.4–10.5)
CHLORIDE SERPL-SCNC: 97 MMOL/L — SIGNIFICANT CHANGE UP (ref 96–108)
CO2 SERPL-SCNC: 25 MMOL/L — SIGNIFICANT CHANGE UP (ref 22–31)
CREAT SERPL-MCNC: 0.7 MG/DL — SIGNIFICANT CHANGE UP (ref 0.5–1.3)
EGFR: 111 ML/MIN/1.73M2 — SIGNIFICANT CHANGE UP
GLUCOSE SERPL-MCNC: 108 MG/DL — HIGH (ref 70–99)
MAGNESIUM SERPL-MCNC: 2.1 MG/DL — SIGNIFICANT CHANGE UP (ref 1.6–2.6)
POTASSIUM SERPL-MCNC: 4.6 MMOL/L — SIGNIFICANT CHANGE UP (ref 3.5–5.3)
POTASSIUM SERPL-SCNC: 4.6 MMOL/L — SIGNIFICANT CHANGE UP (ref 3.5–5.3)
PROT SERPL-MCNC: 7.7 G/DL — SIGNIFICANT CHANGE UP (ref 6–8.3)
SODIUM SERPL-SCNC: 135 MMOL/L — SIGNIFICANT CHANGE UP (ref 135–145)

## 2025-01-15 ENCOUNTER — OUTPATIENT (OUTPATIENT)
Dept: OUTPATIENT SERVICES | Facility: HOSPITAL | Age: 55
LOS: 1 days | Discharge: HOME | End: 2025-01-15

## 2025-01-15 DIAGNOSIS — Z90.49 ACQUIRED ABSENCE OF OTHER SPECIFIED PARTS OF DIGESTIVE TRACT: Chronic | ICD-10-CM

## 2025-01-15 DIAGNOSIS — C18.9 MALIGNANT NEOPLASM OF COLON, UNSPECIFIED: ICD-10-CM

## 2025-01-15 DIAGNOSIS — Z98.890 OTHER SPECIFIED POSTPROCEDURAL STATES: Chronic | ICD-10-CM

## 2025-01-22 ENCOUNTER — APPOINTMENT (OUTPATIENT)
Dept: HEMATOLOGY ONCOLOGY | Facility: CLINIC | Age: 55
End: 2025-01-22

## 2025-01-28 ENCOUNTER — APPOINTMENT (OUTPATIENT)
Dept: HEMATOLOGY ONCOLOGY | Facility: CLINIC | Age: 55
End: 2025-01-28

## 2025-01-31 ENCOUNTER — APPOINTMENT (OUTPATIENT)
Age: 55
End: 2025-01-31

## 2025-02-04 ENCOUNTER — APPOINTMENT (OUTPATIENT)
Age: 55
End: 2025-02-04

## 2025-02-04 ENCOUNTER — APPOINTMENT (OUTPATIENT)
Dept: HEMATOLOGY ONCOLOGY | Facility: CLINIC | Age: 55
End: 2025-02-04

## 2025-02-10 ENCOUNTER — RESULT REVIEW (OUTPATIENT)
Age: 55
End: 2025-02-10

## 2025-02-10 ENCOUNTER — APPOINTMENT (OUTPATIENT)
Age: 55
End: 2025-02-10

## 2025-02-10 LAB
ALBUMIN SERPL ELPH-MCNC: 3.6 G/DL — SIGNIFICANT CHANGE UP (ref 3.3–5)
ALP SERPL-CCNC: 256 U/L — HIGH (ref 40–120)
ALT FLD-CCNC: 13 U/L — SIGNIFICANT CHANGE UP (ref 10–45)
ANION GAP SERPL CALC-SCNC: 11 MMOL/L — SIGNIFICANT CHANGE UP (ref 5–17)
ANISOCYTOSIS BLD QL: SLIGHT — SIGNIFICANT CHANGE UP
AST SERPL-CCNC: 16 U/L — SIGNIFICANT CHANGE UP (ref 10–40)
BASOPHILS # BLD AUTO: 0.1 K/UL — SIGNIFICANT CHANGE UP (ref 0–0.2)
BILIRUB SERPL-MCNC: 0.4 MG/DL — SIGNIFICANT CHANGE UP (ref 0.2–1.2)
BUN SERPL-MCNC: 9 MG/DL — SIGNIFICANT CHANGE UP (ref 7–23)
CALCIUM SERPL-MCNC: 9.3 MG/DL — SIGNIFICANT CHANGE UP (ref 8.4–10.5)
CHLORIDE SERPL-SCNC: 94 MMOL/L — LOW (ref 96–108)
CO2 SERPL-SCNC: 24 MMOL/L — SIGNIFICANT CHANGE UP (ref 22–31)
CREAT SERPL-MCNC: 0.68 MG/DL — SIGNIFICANT CHANGE UP (ref 0.5–1.3)
EGFR: 112 ML/MIN/1.73M2 — SIGNIFICANT CHANGE UP
ELLIPTOCYTES BLD QL SMEAR: SLIGHT — SIGNIFICANT CHANGE UP
EOSINOPHIL # BLD AUTO: 0.1 K/UL — SIGNIFICANT CHANGE UP (ref 0–0.5)
EOSINOPHIL NFR BLD AUTO: 2 % — SIGNIFICANT CHANGE UP (ref 0–6)
GLUCOSE SERPL-MCNC: 125 MG/DL — HIGH (ref 70–99)
HCT VFR BLD CALC: 37.6 % — LOW (ref 39–50)
HGB BLD-MCNC: 11.8 G/DL — LOW (ref 13–17)
HYPOCHROMIA BLD QL: SLIGHT — SIGNIFICANT CHANGE UP
LG PLATELETS BLD QL AUTO: SLIGHT — SIGNIFICANT CHANGE UP
LYMPHOCYTES # BLD AUTO: 0.7 K/UL — LOW (ref 1–3.3)
LYMPHOCYTES # BLD AUTO: 7 % — LOW (ref 13–44)
MACROCYTES BLD QL: SLIGHT — SIGNIFICANT CHANGE UP
MAGNESIUM SERPL-MCNC: 2.2 MG/DL — SIGNIFICANT CHANGE UP (ref 1.6–2.6)
MCHC RBC-ENTMCNC: 26.8 PG — LOW (ref 27–34)
MCHC RBC-ENTMCNC: 31.6 G/DL — LOW (ref 32–36)
MCV RBC AUTO: 84.8 FL — SIGNIFICANT CHANGE UP (ref 80–100)
MICROCYTES BLD QL: SIGNIFICANT CHANGE UP
MONOCYTES # BLD AUTO: 0.8 K/UL — SIGNIFICANT CHANGE UP (ref 0–0.9)
MONOCYTES NFR BLD AUTO: 7 % — SIGNIFICANT CHANGE UP (ref 2–14)
NEUTROPHILS # BLD AUTO: 7.1 K/UL — SIGNIFICANT CHANGE UP (ref 1.8–7.4)
NEUTROPHILS NFR BLD AUTO: 84 % — HIGH (ref 43–77)
OVALOCYTES BLD QL SMEAR: SLIGHT — SIGNIFICANT CHANGE UP
PLAT MORPH BLD: NORMAL — SIGNIFICANT CHANGE UP
PLATELET # BLD AUTO: 218 K/UL — SIGNIFICANT CHANGE UP (ref 150–400)
POIKILOCYTOSIS BLD QL AUTO: SLIGHT — SIGNIFICANT CHANGE UP
POLYCHROMASIA BLD QL SMEAR: SLIGHT — SIGNIFICANT CHANGE UP
POTASSIUM SERPL-MCNC: 3.8 MMOL/L — SIGNIFICANT CHANGE UP (ref 3.5–5.3)
POTASSIUM SERPL-SCNC: 3.8 MMOL/L — SIGNIFICANT CHANGE UP (ref 3.5–5.3)
PROT SERPL-MCNC: 7.7 G/DL — SIGNIFICANT CHANGE UP (ref 6–8.3)
RBC # BLD: 4.43 M/UL — SIGNIFICANT CHANGE UP (ref 4.2–5.8)
RBC # FLD: 14.3 % — SIGNIFICANT CHANGE UP (ref 10.3–14.5)
RBC BLD AUTO: SIGNIFICANT CHANGE UP
SODIUM SERPL-SCNC: 129 MMOL/L — LOW (ref 135–145)
WBC # BLD: 8.4 K/UL — SIGNIFICANT CHANGE UP (ref 3.8–10.5)
WBC # FLD AUTO: 8.4 K/UL — SIGNIFICANT CHANGE UP (ref 3.8–10.5)

## 2025-02-11 DIAGNOSIS — Z51.11 ENCOUNTER FOR ANTINEOPLASTIC CHEMOTHERAPY: ICD-10-CM

## 2025-02-11 DIAGNOSIS — R11.2 NAUSEA WITH VOMITING, UNSPECIFIED: ICD-10-CM

## 2025-02-13 ENCOUNTER — APPOINTMENT (OUTPATIENT)
Dept: HEMATOLOGY ONCOLOGY | Facility: CLINIC | Age: 55
End: 2025-02-13

## 2025-02-16 NOTE — ED ADULT NURSE NOTE - CHIEF COMPLAINT QUOTE
show patient presents to ED with abdominal pain and dehydration, HX colon cancer, ostomy bag right abdomin. recently seen in  for same issue

## 2025-02-24 ENCOUNTER — APPOINTMENT (OUTPATIENT)
Dept: HEMATOLOGY ONCOLOGY | Facility: CLINIC | Age: 55
End: 2025-02-24
Payer: MEDICAID

## 2025-02-24 ENCOUNTER — RESULT REVIEW (OUTPATIENT)
Age: 55
End: 2025-02-24

## 2025-02-24 ENCOUNTER — APPOINTMENT (OUTPATIENT)
Age: 55
End: 2025-02-24

## 2025-02-24 LAB
ALBUMIN SERPL ELPH-MCNC: 4.3 G/DL
ALP BLD-CCNC: 211 U/L
ALT SERPL-CCNC: 10 U/L
ANION GAP SERPL CALC-SCNC: 13 MMOL/L
AST SERPL-CCNC: 16 U/L
BASOPHILS # BLD AUTO: 0.04 K/UL — SIGNIFICANT CHANGE UP (ref 0–0.2)
BASOPHILS NFR BLD AUTO: 0.5 % — SIGNIFICANT CHANGE UP (ref 0–2)
BILIRUB SERPL-MCNC: 0.4 MG/DL
BUN SERPL-MCNC: 8 MG/DL
CALCIUM SERPL-MCNC: 9.9 MG/DL
CEA SERPL-MCNC: 93.4 NG/ML
CHLORIDE SERPL-SCNC: 96 MMOL/L
CO2 SERPL-SCNC: 27 MMOL/L
CREAT SERPL-MCNC: 0.79 MG/DL
EGFR: 107 ML/MIN/1.73M2
EOSINOPHIL # BLD AUTO: 0.19 K/UL — SIGNIFICANT CHANGE UP (ref 0–0.5)
EOSINOPHIL NFR BLD AUTO: 2.2 % — SIGNIFICANT CHANGE UP (ref 0–6)
GLUCOSE SERPL-MCNC: 120 MG/DL
HCT VFR BLD CALC: 45.7 % — SIGNIFICANT CHANGE UP (ref 39–50)
HGB BLD-MCNC: 13.8 G/DL — SIGNIFICANT CHANGE UP (ref 13–17)
IMM GRANULOCYTES # BLD AUTO: 0.05 K/UL — SIGNIFICANT CHANGE UP (ref 0–0.07)
IMM GRANULOCYTES NFR BLD AUTO: 0.6 % — SIGNIFICANT CHANGE UP (ref 0–0.9)
LYMPHOCYTES # BLD AUTO: 0.94 K/UL — LOW (ref 1–3.3)
LYMPHOCYTES NFR BLD AUTO: 11.1 % — LOW (ref 13–44)
MAGNESIUM SERPL-MCNC: 2.2 MG/DL
MCHC RBC-ENTMCNC: 25.2 PG — LOW (ref 27–34)
MCHC RBC-ENTMCNC: 30.2 G/DL — LOW (ref 32–36)
MCV RBC AUTO: 83.4 FL — SIGNIFICANT CHANGE UP (ref 80–100)
MONOCYTES # BLD AUTO: 0.67 K/UL — SIGNIFICANT CHANGE UP (ref 0–0.9)
MONOCYTES NFR BLD AUTO: 7.9 % — SIGNIFICANT CHANGE UP (ref 2–14)
NEUTROPHILS # BLD AUTO: 6.58 K/UL — SIGNIFICANT CHANGE UP (ref 1.8–7.4)
NEUTROPHILS NFR BLD AUTO: 77.7 % — HIGH (ref 43–77)
NRBC # BLD AUTO: 0 K/UL — SIGNIFICANT CHANGE UP (ref 0–0)
NRBC # FLD: 0 K/UL — SIGNIFICANT CHANGE UP (ref 0–0)
NRBC BLD AUTO-RTO: 0 /100 WBCS — SIGNIFICANT CHANGE UP (ref 0–0)
PLATELET # BLD AUTO: 214 K/UL — SIGNIFICANT CHANGE UP (ref 150–400)
PMV BLD: 9.1 FL — SIGNIFICANT CHANGE UP (ref 7–13)
POTASSIUM SERPL-SCNC: 4.5 MMOL/L
PROT SERPL-MCNC: 7.9 G/DL
RBC # BLD: 5.48 M/UL — SIGNIFICANT CHANGE UP (ref 4.2–5.8)
RBC # FLD: 14.4 % — SIGNIFICANT CHANGE UP (ref 10.3–14.5)
SODIUM SERPL-SCNC: 137 MMOL/L
WBC # BLD: 8.47 K/UL — SIGNIFICANT CHANGE UP (ref 3.8–10.5)
WBC # FLD AUTO: 8.47 K/UL — SIGNIFICANT CHANGE UP (ref 3.8–10.5)

## 2025-02-24 PROCEDURE — 99215 OFFICE O/P EST HI 40 MIN: CPT

## 2025-02-25 LAB
ALBUMIN SERPL ELPH-MCNC: 3.9 G/DL — SIGNIFICANT CHANGE UP (ref 3.3–5)
ALP SERPL-CCNC: 190 U/L — HIGH (ref 40–120)
ALT FLD-CCNC: 12 U/L — SIGNIFICANT CHANGE UP (ref 10–45)
ANION GAP SERPL CALC-SCNC: 14 MMOL/L — SIGNIFICANT CHANGE UP (ref 5–17)
AST SERPL-CCNC: 21 U/L — SIGNIFICANT CHANGE UP (ref 10–40)
BILIRUB SERPL-MCNC: 0.4 MG/DL — SIGNIFICANT CHANGE UP (ref 0.2–1.2)
BUN SERPL-MCNC: 8 MG/DL — SIGNIFICANT CHANGE UP (ref 7–23)
CALCIUM SERPL-MCNC: 9.7 MG/DL — SIGNIFICANT CHANGE UP (ref 8.4–10.5)
CHLORIDE SERPL-SCNC: 98 MMOL/L — SIGNIFICANT CHANGE UP (ref 96–108)
CO2 SERPL-SCNC: 25 MMOL/L — SIGNIFICANT CHANGE UP (ref 22–31)
CREAT SERPL-MCNC: 0.7 MG/DL — SIGNIFICANT CHANGE UP (ref 0.5–1.3)
EGFR: 111 ML/MIN/1.73M2 — SIGNIFICANT CHANGE UP
GLUCOSE SERPL-MCNC: 98 MG/DL — SIGNIFICANT CHANGE UP (ref 70–99)
MAGNESIUM SERPL-MCNC: 2.2 MG/DL — SIGNIFICANT CHANGE UP (ref 1.6–2.6)
POTASSIUM SERPL-MCNC: 4.5 MMOL/L — SIGNIFICANT CHANGE UP (ref 3.5–5.3)
POTASSIUM SERPL-SCNC: 4.5 MMOL/L — SIGNIFICANT CHANGE UP (ref 3.5–5.3)
PROT SERPL-MCNC: 8.2 G/DL — SIGNIFICANT CHANGE UP (ref 6–8.3)
SODIUM SERPL-SCNC: 138 MMOL/L — SIGNIFICANT CHANGE UP (ref 135–145)

## 2025-03-10 DIAGNOSIS — C78.7 MALIGNANT NEOPLASM OF COLON, UNSPECIFIED: ICD-10-CM

## 2025-03-10 DIAGNOSIS — C18.9 MALIGNANT NEOPLASM OF COLON, UNSPECIFIED: ICD-10-CM

## 2025-03-11 ENCOUNTER — RESULT REVIEW (OUTPATIENT)
Age: 55
End: 2025-03-11

## 2025-03-11 ENCOUNTER — APPOINTMENT (OUTPATIENT)
Age: 55
End: 2025-03-11

## 2025-03-11 LAB
BASOPHILS # BLD AUTO: 0.03 K/UL — SIGNIFICANT CHANGE UP (ref 0–0.2)
BASOPHILS NFR BLD AUTO: 0.4 % — SIGNIFICANT CHANGE UP (ref 0–2)
EOSINOPHIL # BLD AUTO: 0.41 K/UL — SIGNIFICANT CHANGE UP (ref 0–0.5)
EOSINOPHIL NFR BLD AUTO: 5.5 % — SIGNIFICANT CHANGE UP (ref 0–6)
HCT VFR BLD CALC: 44.2 % — SIGNIFICANT CHANGE UP (ref 39–50)
HGB BLD-MCNC: 13.6 G/DL — SIGNIFICANT CHANGE UP (ref 13–17)
IMM GRANULOCYTES # BLD AUTO: 0.04 K/UL — SIGNIFICANT CHANGE UP (ref 0–0.07)
IMM GRANULOCYTES NFR BLD AUTO: 0.5 % — SIGNIFICANT CHANGE UP (ref 0–0.9)
LYMPHOCYTES # BLD AUTO: 1.02 K/UL — SIGNIFICANT CHANGE UP (ref 1–3.3)
LYMPHOCYTES NFR BLD AUTO: 13.6 % — SIGNIFICANT CHANGE UP (ref 13–44)
MCHC RBC-ENTMCNC: 25.3 PG — LOW (ref 27–34)
MCHC RBC-ENTMCNC: 30.8 G/DL — LOW (ref 32–36)
MCV RBC AUTO: 82.3 FL — SIGNIFICANT CHANGE UP (ref 80–100)
MONOCYTES # BLD AUTO: 0.6 K/UL — SIGNIFICANT CHANGE UP (ref 0–0.9)
MONOCYTES NFR BLD AUTO: 8 % — SIGNIFICANT CHANGE UP (ref 2–14)
NEUTROPHILS # BLD AUTO: 5.39 K/UL — SIGNIFICANT CHANGE UP (ref 1.8–7.4)
NEUTROPHILS NFR BLD AUTO: 72 % — SIGNIFICANT CHANGE UP (ref 43–77)
NRBC # BLD AUTO: 0 K/UL — SIGNIFICANT CHANGE UP (ref 0–0)
NRBC # FLD: 0 K/UL — SIGNIFICANT CHANGE UP (ref 0–0)
NRBC BLD AUTO-RTO: 0 /100 WBCS — SIGNIFICANT CHANGE UP (ref 0–0)
PLATELET # BLD AUTO: 153 K/UL — SIGNIFICANT CHANGE UP (ref 150–400)
PMV BLD: 8.3 FL — SIGNIFICANT CHANGE UP (ref 7–13)
RBC # BLD: 5.37 M/UL — SIGNIFICANT CHANGE UP (ref 4.2–5.8)
RBC # FLD: 15.3 % — HIGH (ref 10.3–14.5)
WBC # BLD: 7.49 K/UL — SIGNIFICANT CHANGE UP (ref 3.8–10.5)
WBC # FLD AUTO: 7.49 K/UL — SIGNIFICANT CHANGE UP (ref 3.8–10.5)

## 2025-03-12 ENCOUNTER — OUTPATIENT (OUTPATIENT)
Dept: OUTPATIENT SERVICES | Facility: HOSPITAL | Age: 55
LOS: 1 days | Discharge: HOME | End: 2025-03-12

## 2025-03-12 DIAGNOSIS — C18.9 MALIGNANT NEOPLASM OF COLON, UNSPECIFIED: ICD-10-CM

## 2025-03-12 DIAGNOSIS — Z90.49 ACQUIRED ABSENCE OF OTHER SPECIFIED PARTS OF DIGESTIVE TRACT: Chronic | ICD-10-CM

## 2025-03-12 DIAGNOSIS — Z98.890 OTHER SPECIFIED POSTPROCEDURAL STATES: Chronic | ICD-10-CM

## 2025-03-12 LAB
ALBUMIN SERPL ELPH-MCNC: 4.1 G/DL — SIGNIFICANT CHANGE UP (ref 3.3–5)
ALP SERPL-CCNC: 163 U/L — HIGH (ref 40–120)
ALT FLD-CCNC: 14 U/L — SIGNIFICANT CHANGE UP (ref 10–45)
ANION GAP SERPL CALC-SCNC: 11 MMOL/L — SIGNIFICANT CHANGE UP (ref 5–17)
AST SERPL-CCNC: 19 U/L — SIGNIFICANT CHANGE UP (ref 10–40)
BILIRUB SERPL-MCNC: 0.3 MG/DL — SIGNIFICANT CHANGE UP (ref 0.2–1.2)
BUN SERPL-MCNC: 9 MG/DL — SIGNIFICANT CHANGE UP (ref 7–23)
CALCIUM SERPL-MCNC: 9.2 MG/DL — SIGNIFICANT CHANGE UP (ref 8.4–10.5)
CHLORIDE SERPL-SCNC: 102 MMOL/L — SIGNIFICANT CHANGE UP (ref 96–108)
CO2 SERPL-SCNC: 25 MMOL/L — SIGNIFICANT CHANGE UP (ref 22–31)
CREAT SERPL-MCNC: 0.83 MG/DL — SIGNIFICANT CHANGE UP (ref 0.5–1.3)
EGFR: 105 ML/MIN/1.73M2 — SIGNIFICANT CHANGE UP
GLUCOSE SERPL-MCNC: 111 MG/DL — HIGH (ref 70–99)
MAGNESIUM SERPL-MCNC: 2.2 MG/DL — SIGNIFICANT CHANGE UP (ref 1.6–2.6)
POTASSIUM SERPL-MCNC: 4.2 MMOL/L — SIGNIFICANT CHANGE UP (ref 3.5–5.3)
POTASSIUM SERPL-SCNC: 4.2 MMOL/L — SIGNIFICANT CHANGE UP (ref 3.5–5.3)
PROT SERPL-MCNC: 7.3 G/DL — SIGNIFICANT CHANGE UP (ref 6–8.3)
SODIUM SERPL-SCNC: 137 MMOL/L — SIGNIFICANT CHANGE UP (ref 135–145)

## 2025-03-25 ENCOUNTER — RESULT REVIEW (OUTPATIENT)
Age: 55
End: 2025-03-25

## 2025-03-25 ENCOUNTER — APPOINTMENT (OUTPATIENT)
Age: 55
End: 2025-03-25

## 2025-03-25 ENCOUNTER — APPOINTMENT (OUTPATIENT)
Dept: HEMATOLOGY ONCOLOGY | Facility: CLINIC | Age: 55
End: 2025-03-25
Payer: MEDICAID

## 2025-03-25 DIAGNOSIS — G89.3 NEOPLASM RELATED PAIN (ACUTE) (CHRONIC): ICD-10-CM

## 2025-03-25 DIAGNOSIS — C18.9 MALIGNANT NEOPLASM OF COLON, UNSPECIFIED: ICD-10-CM

## 2025-03-25 DIAGNOSIS — C78.7 MALIGNANT NEOPLASM OF COLON, UNSPECIFIED: ICD-10-CM

## 2025-03-25 DIAGNOSIS — L70.8 OTHER ACNE: ICD-10-CM

## 2025-03-25 PROCEDURE — 99214 OFFICE O/P EST MOD 30 MIN: CPT

## 2025-03-26 DIAGNOSIS — Z51.11 ENCOUNTER FOR ANTINEOPLASTIC CHEMOTHERAPY: ICD-10-CM

## 2025-03-26 LAB
ALBUMIN SERPL ELPH-MCNC: 4.1 G/DL — SIGNIFICANT CHANGE UP (ref 3.3–5)
ALP SERPL-CCNC: 146 U/L — HIGH (ref 40–120)
ALT FLD-CCNC: 27 U/L — SIGNIFICANT CHANGE UP (ref 10–45)
ANION GAP SERPL CALC-SCNC: 12 MMOL/L — SIGNIFICANT CHANGE UP (ref 5–17)
AST SERPL-CCNC: 29 U/L — SIGNIFICANT CHANGE UP (ref 10–40)
BILIRUB SERPL-MCNC: 0.2 MG/DL — SIGNIFICANT CHANGE UP (ref 0.2–1.2)
BUN SERPL-MCNC: 11 MG/DL — SIGNIFICANT CHANGE UP (ref 7–23)
CALCIUM SERPL-MCNC: 9.3 MG/DL — SIGNIFICANT CHANGE UP (ref 8.4–10.5)
CHLORIDE SERPL-SCNC: 100 MMOL/L — SIGNIFICANT CHANGE UP (ref 96–108)
CO2 SERPL-SCNC: 24 MMOL/L — SIGNIFICANT CHANGE UP (ref 22–31)
CREAT SERPL-MCNC: 0.87 MG/DL — SIGNIFICANT CHANGE UP (ref 0.5–1.3)
EGFR: 104 ML/MIN/1.73M2 — SIGNIFICANT CHANGE UP
EGFR: 104 ML/MIN/1.73M2 — SIGNIFICANT CHANGE UP
GLUCOSE SERPL-MCNC: 111 MG/DL — HIGH (ref 70–99)
MAGNESIUM SERPL-MCNC: 2 MG/DL — SIGNIFICANT CHANGE UP (ref 1.6–2.6)
POTASSIUM SERPL-MCNC: 4.1 MMOL/L — SIGNIFICANT CHANGE UP (ref 3.5–5.3)
POTASSIUM SERPL-SCNC: 4.1 MMOL/L — SIGNIFICANT CHANGE UP (ref 3.5–5.3)
PROT SERPL-MCNC: 7.5 G/DL — SIGNIFICANT CHANGE UP (ref 6–8.3)
SODIUM SERPL-SCNC: 136 MMOL/L — SIGNIFICANT CHANGE UP (ref 135–145)

## 2025-04-01 NOTE — ED PROVIDER NOTE - NS ED SCRIBE STATEMENT
A few reminders from today:    Start fosamax once weekly    Continue exercise, calcium, vitamin D supplement  Recheck TSH in 1 month---nonfasting  Focus on low cholesterol diet  Recheck cholesterol in 3 months--fasting  Colonoscopy as scheduled    F/U in 1 year with Dr. Marie    Follow up with me if needed.   Please go to ER/urgent care if symptoms persist/worsen, especially if after hours.     Do not hesitate to get in touch with me should you have any further questions.     Thank you for trusting me with your care!  I wish you health and happiness.    -Meli Bond PA-C     Attending

## 2025-04-18 ENCOUNTER — APPOINTMENT (OUTPATIENT)
Age: 55
End: 2025-04-18

## 2025-04-24 ENCOUNTER — OUTPATIENT (OUTPATIENT)
Dept: OUTPATIENT SERVICES | Facility: HOSPITAL | Age: 55
LOS: 1 days | End: 2025-04-24
Payer: MEDICAID

## 2025-04-24 ENCOUNTER — APPOINTMENT (OUTPATIENT)
Dept: CT IMAGING | Facility: CLINIC | Age: 55
End: 2025-04-24

## 2025-04-24 DIAGNOSIS — Z00.8 ENCOUNTER FOR OTHER GENERAL EXAMINATION: ICD-10-CM

## 2025-04-24 DIAGNOSIS — Z98.890 OTHER SPECIFIED POSTPROCEDURAL STATES: Chronic | ICD-10-CM

## 2025-04-24 DIAGNOSIS — Z90.49 ACQUIRED ABSENCE OF OTHER SPECIFIED PARTS OF DIGESTIVE TRACT: Chronic | ICD-10-CM

## 2025-04-24 PROCEDURE — 71260 CT THORAX DX C+: CPT | Mod: 26

## 2025-04-24 PROCEDURE — 74177 CT ABD & PELVIS W/CONTRAST: CPT

## 2025-04-24 PROCEDURE — 74177 CT ABD & PELVIS W/CONTRAST: CPT | Mod: 26

## 2025-04-24 PROCEDURE — 71260 CT THORAX DX C+: CPT

## 2025-05-02 ENCOUNTER — APPOINTMENT (OUTPATIENT)
Age: 55
End: 2025-05-02

## 2025-05-02 NOTE — ASU PATIENT PROFILE, ADULT - TEACHING/LEARNING RELIGIOUS CONSIDERATIONS
[facility] Medicine Services  INPATIENT PROGRESS NOTE    Patient Name: Cheryl Taylor  Date of Admission: 4/28/2025  Today's Date: 05/02/25  Length of Stay: 4  Primary Care Physician: Den James DO    Subjective   Chief Complaint: fall R hip fracture     Fall     Leg Pain         Patient is a 75-year-old female with PMH of HTN, CKD who presents to the ER post fall. Patient reports that she was using her rollator and it rolled out from underneath her. Patient reports that she landed on her right leg. She is complaining of pain to her entire right leg, primarily to the right upper leg. Patient reports that she is unable to ambulate due to her pain in her right lower extremity. She is unable to raise her right lower extremity on exam. She denies any her head or losing consciousness. Denies pain elsewhere. She does have a small skin tear to her right elbow, but does have full range of motion of this.   In ED R hip fx, ortho contacted, wanted us to admit wbc 22 k, CXR -ve, will check UA no fever NPO after midnight, consult ortho, no abx for now , monitor wbc, identify reconcile restart home meds once reconciled, DVT prophylaxis after surgery per ortho     4/29  As before presented with fall right hip fracture found to have leukocytosis chest x-ray was unremarkable  UA positive.  But very poor sample will repeat UA clean-catch and start her on Rocephin  4/30   Postop day #1 status post right Bipolar [place] Hip Arthroplasty , white count is down repeat UA is showing UTI continue with IV antibiotics remains afebrile continue to trend H&H continue PT OT DVT prophylaxis per orthopedic surgery is on low-dose Eliquis continue current treatment also acute on chronic renal failure mildly improved  5/1  As before urine culture is showing E. coli pan sensitive continue PT OT DVT prophylaxis per orthopedic surgery white count is down creatinine better we will go ahead and Hep-Lock IV fluid  "restarted Toprol-XL , per  referral to [facility] rehab awaiting response  5/2  Before QUINN resolving urine culture showing E. coli pansensitive responding nicely to treatment we did Hep-Lock her IV fluid awaiting SNF continue PT OT  Review of Systems   All pertinent negatives and positives are as above. All other systems have been reviewed and are negative unless otherwise stated.     Objective    Temp:  [97.6 °F (36.4 °C)-98.8 °F (37.1 °C)] 98.1 °F (36.7 °C)  Heart Rate:  [] 90  Resp:  [16-18] 16  BP: (106-131)/(45-64) 106/47  Physical Exam  HENT:      Head: Normocephalic.      Nose: Nose normal.   Cardiovascular:      Rate and Rhythm: Normal rate.   Pulmonary:      Breath sounds: Wheezing present.   Abdominal:      General: Abdomen is flat.   Musculoskeletal:      Cervical back: Normal range of motion.      Comments: R hip rotated    Skin:     General: Skin is warm.      Capillary Refill: Capillary refill takes less than 2 seconds.   Neurological:      Mental Status: She is alert.       Results Review:  I have reviewed the labs, radiology results, and diagnostic studies.    Laboratory Data:   Results from last 7 days   Lab Units 05/01/25  0420 04/30/25  0336 04/29/25  0329   WBC 10*3/mm3 16.97* 20.66* 19.10*   HEMOGLOBIN g/dL 9.4* 10.4* 10.3*   HEMATOCRIT % 30.1* 34.8 33.0*   PLATELETS 10*3/mm3 322 379 381        Results from last 7 days   Lab Units 05/01/25  0420 04/30/25  0336 04/29/25  0329 04/28/25  1713   SODIUM mmol/L 134* 137 135* 136   POTASSIUM mmol/L 4.2 4.4 4.6 4.5   CHLORIDE mmol/L 102 101 100 102   CO2 mmol/L 20.0* 21.0* 23.0 18.0*   BUN mg/dL 18 20 21 20   CREATININE mg/dL 1.16* 1.30* 1.45* 1.38*   CALCIUM mg/dL 8.9 8.8 8.9 9.3   BILIRUBIN mg/dL 0.3  --   --  0.4   ALK PHOS U/L 146*  --   --  190*   ALT (SGPT) U/L 8  --   --  14   AST (SGOT) U/L 39*  --   --  18   GLUCOSE mg/dL 113* 89 96 115*       Culture Data:   No results found for: \"BLOODCX\", \"URINECX\", \"WOUNDCX\", \"MRSACX\", " "\"RESPCX\", \"STOOLCX\"    Radiology Data:   Imaging Results (Last 24 Hours)       ** No results found for the last 24 hours. **            I have reviewed the patient's current medications.     Assessment/Plan   Assessment  Active Hospital Problems    Diagnosis     **Closed fracture of neck of right femur        Treatment Plan  In ED R hip fx, ortho contacted, wanted us to admit wbc 22 k, CXR -ve, will check UA no fever NPO after midnight, consult ortho, no abx for now , monitor wbc, identify reconcile restart home meds once reconciled, DVT prophylaxis after surgery per ortho      Medical Decision Making  Number and Complexity of problems: 2 acute   Differential Diagnosis: as above      Conditions and Status        Condition is at treatment goal.     MDM Data  External documents reviewed: yes  Cardiac tracing (EKG, telemetry) interpretation: yes  Radiology interpretation: yes  Labs reviewed: yes  Any tests that were considered but not ordered: no     Decision rules/scores evaluated (example [id number], Wells, etc): no     Discussed with: ED     Care Planning  Shared decision making: Patient apprised of current labs, vitals, imaging and treatment plan.  They are agreeable with proceeding with plans as discussed.   Code status and discussions: full      Disposition  Social Determinants of Health that impact treatment or disposition: no  Estimated length of stay is TBD.      I confirmed that the patient's advanced care plan is present, code status is documented, and a surrogate decision maker is listed in the patient's medical record.         Electronically signed by Ifeanyi Hercules MD, 05/02/25, 10:01 CDT.  " none

## 2025-05-06 ENCOUNTER — RESULT REVIEW (OUTPATIENT)
Age: 55
End: 2025-05-06

## 2025-05-06 ENCOUNTER — APPOINTMENT (OUTPATIENT)
Age: 55
End: 2025-05-06

## 2025-05-06 ENCOUNTER — APPOINTMENT (OUTPATIENT)
Dept: HEMATOLOGY ONCOLOGY | Facility: CLINIC | Age: 55
End: 2025-05-06
Payer: MEDICAID

## 2025-05-06 DIAGNOSIS — C78.7 MALIGNANT NEOPLASM OF COLON, UNSPECIFIED: ICD-10-CM

## 2025-05-06 DIAGNOSIS — R22.32 LOCALIZED SWELLING, MASS AND LUMP, LEFT UPPER LIMB: ICD-10-CM

## 2025-05-06 DIAGNOSIS — M79.2 NEURALGIA AND NEURITIS, UNSPECIFIED: ICD-10-CM

## 2025-05-06 DIAGNOSIS — C18.2 MALIGNANT NEOPLASM OF ASCENDING COLON: ICD-10-CM

## 2025-05-06 DIAGNOSIS — C18.9 MALIGNANT NEOPLASM OF COLON, UNSPECIFIED: ICD-10-CM

## 2025-05-06 PROCEDURE — 99215 OFFICE O/P EST HI 40 MIN: CPT

## 2025-05-06 NOTE — ED CDU PROVIDER SUBSEQUENT DAY NOTE - HISTORY
Please bring all medicines, vitamins, and herbal supplements with you when you come to the office.    Prescriptions will not be filled unless you are compliant with your follow up appointments or have a follow up appointment scheduled as per instruction of your physician. Refills should be requested at the time of your visit.    No events overnight. Patient on observation for pain control. palliative consult pending. No calls from nursing overnight. VSS

## 2025-05-07 LAB
ALBUMIN SERPL ELPH-MCNC: 4.2 G/DL — SIGNIFICANT CHANGE UP (ref 3.3–5)
ALP SERPL-CCNC: 174 U/L — HIGH (ref 40–120)
ALT FLD-CCNC: 27 U/L — SIGNIFICANT CHANGE UP (ref 10–50)
ANION GAP SERPL CALC-SCNC: 13 MMOL/L — SIGNIFICANT CHANGE UP (ref 5–17)
AST SERPL-CCNC: 47 U/L — HIGH (ref 10–40)
BILIRUB SERPL-MCNC: 0.3 MG/DL — SIGNIFICANT CHANGE UP (ref 0.2–1.2)
BUN SERPL-MCNC: 12 MG/DL — SIGNIFICANT CHANGE UP (ref 7–23)
CALCIUM SERPL-MCNC: 9.5 MG/DL — SIGNIFICANT CHANGE UP (ref 8.4–10.5)
CHLORIDE SERPL-SCNC: 101 MMOL/L — SIGNIFICANT CHANGE UP (ref 96–108)
CO2 SERPL-SCNC: 23 MMOL/L — SIGNIFICANT CHANGE UP (ref 22–31)
CREAT SERPL-MCNC: 0.87 MG/DL — SIGNIFICANT CHANGE UP (ref 0.5–1.3)
EGFR: 104 ML/MIN/1.73M2 — SIGNIFICANT CHANGE UP
EGFR: 104 ML/MIN/1.73M2 — SIGNIFICANT CHANGE UP
GLUCOSE SERPL-MCNC: 133 MG/DL — HIGH (ref 70–99)
MAGNESIUM SERPL-MCNC: 2.1 MG/DL — SIGNIFICANT CHANGE UP (ref 1.6–2.6)
POTASSIUM SERPL-MCNC: 4.2 MMOL/L — SIGNIFICANT CHANGE UP (ref 3.5–5.3)
POTASSIUM SERPL-SCNC: 4.2 MMOL/L — SIGNIFICANT CHANGE UP (ref 3.5–5.3)
PROT SERPL-MCNC: 7.8 G/DL — SIGNIFICANT CHANGE UP (ref 6–8.3)
SODIUM SERPL-SCNC: 137 MMOL/L — SIGNIFICANT CHANGE UP (ref 135–145)

## 2025-05-12 DIAGNOSIS — R11.2 NAUSEA WITH VOMITING, UNSPECIFIED: ICD-10-CM

## 2025-05-13 ENCOUNTER — OUTPATIENT (OUTPATIENT)
Dept: OUTPATIENT SERVICES | Facility: HOSPITAL | Age: 55
LOS: 1 days | Discharge: HOME | End: 2025-05-13

## 2025-05-13 DIAGNOSIS — Z90.49 ACQUIRED ABSENCE OF OTHER SPECIFIED PARTS OF DIGESTIVE TRACT: Chronic | ICD-10-CM

## 2025-05-13 DIAGNOSIS — C18.9 MALIGNANT NEOPLASM OF COLON, UNSPECIFIED: ICD-10-CM

## 2025-05-13 DIAGNOSIS — Z98.890 OTHER SPECIFIED POSTPROCEDURAL STATES: Chronic | ICD-10-CM

## 2025-05-20 ENCOUNTER — RESULT REVIEW (OUTPATIENT)
Age: 55
End: 2025-05-20

## 2025-05-20 ENCOUNTER — APPOINTMENT (OUTPATIENT)
Age: 55
End: 2025-05-20

## 2025-05-20 LAB
ALBUMIN SERPL ELPH-MCNC: 4.4 G/DL — SIGNIFICANT CHANGE UP (ref 3.3–5)
ALP SERPL-CCNC: 161 U/L — HIGH (ref 40–120)
ALT FLD-CCNC: 28 U/L — SIGNIFICANT CHANGE UP (ref 10–50)
ANION GAP SERPL CALC-SCNC: 11 MMOL/L — SIGNIFICANT CHANGE UP (ref 5–17)
AST SERPL-CCNC: 44 U/L — HIGH (ref 10–40)
BASOPHILS # BLD AUTO: 0.03 K/UL — SIGNIFICANT CHANGE UP (ref 0–0.2)
BASOPHILS NFR BLD AUTO: 0.5 % — SIGNIFICANT CHANGE UP (ref 0–2)
BILIRUB SERPL-MCNC: 0.3 MG/DL — SIGNIFICANT CHANGE UP (ref 0.2–1.2)
BUN SERPL-MCNC: 13 MG/DL — SIGNIFICANT CHANGE UP (ref 7–23)
CALCIUM SERPL-MCNC: 9.5 MG/DL — SIGNIFICANT CHANGE UP (ref 8.4–10.5)
CHLORIDE SERPL-SCNC: 101 MMOL/L — SIGNIFICANT CHANGE UP (ref 96–108)
CO2 SERPL-SCNC: 24 MMOL/L — SIGNIFICANT CHANGE UP (ref 22–31)
CREAT SERPL-MCNC: 0.97 MG/DL — SIGNIFICANT CHANGE UP (ref 0.5–1.3)
EGFR: 94 ML/MIN/1.73M2 — SIGNIFICANT CHANGE UP
EGFR: 94 ML/MIN/1.73M2 — SIGNIFICANT CHANGE UP
EOSINOPHIL # BLD AUTO: 0.19 K/UL — SIGNIFICANT CHANGE UP (ref 0–0.5)
EOSINOPHIL NFR BLD AUTO: 3 % — SIGNIFICANT CHANGE UP (ref 0–6)
GLUCOSE SERPL-MCNC: 105 MG/DL — HIGH (ref 70–99)
HCT VFR BLD CALC: 43.9 % — SIGNIFICANT CHANGE UP (ref 39–50)
HGB BLD-MCNC: 13.5 G/DL — SIGNIFICANT CHANGE UP (ref 13–17)
IMM GRANULOCYTES # BLD AUTO: 0.04 K/UL — SIGNIFICANT CHANGE UP (ref 0–0.07)
IMM GRANULOCYTES NFR BLD AUTO: 0.6 % — SIGNIFICANT CHANGE UP (ref 0–0.9)
LYMPHOCYTES # BLD AUTO: 0.97 K/UL — LOW (ref 1–3.3)
LYMPHOCYTES NFR BLD AUTO: 15.4 % — SIGNIFICANT CHANGE UP (ref 13–44)
MAGNESIUM SERPL-MCNC: 2.2 MG/DL — SIGNIFICANT CHANGE UP (ref 1.6–2.6)
MCHC RBC-ENTMCNC: 25.5 PG — LOW (ref 27–34)
MCHC RBC-ENTMCNC: 30.8 G/DL — LOW (ref 32–36)
MCV RBC AUTO: 82.8 FL — SIGNIFICANT CHANGE UP (ref 80–100)
MONOCYTES # BLD AUTO: 0.59 K/UL — SIGNIFICANT CHANGE UP (ref 0–0.9)
MONOCYTES NFR BLD AUTO: 9.4 % — SIGNIFICANT CHANGE UP (ref 2–14)
NEUTROPHILS # BLD AUTO: 4.48 K/UL — SIGNIFICANT CHANGE UP (ref 1.8–7.4)
NEUTROPHILS NFR BLD AUTO: 71.1 % — SIGNIFICANT CHANGE UP (ref 43–77)
NRBC # BLD AUTO: 0 K/UL — SIGNIFICANT CHANGE UP (ref 0–0)
NRBC # FLD: 0 K/UL — SIGNIFICANT CHANGE UP (ref 0–0)
NRBC BLD AUTO-RTO: 0 /100 WBCS — SIGNIFICANT CHANGE UP (ref 0–0)
PLATELET # BLD AUTO: 152 K/UL — SIGNIFICANT CHANGE UP (ref 150–400)
PMV BLD: 8.3 FL — SIGNIFICANT CHANGE UP (ref 7–13)
POTASSIUM SERPL-MCNC: 4.4 MMOL/L — SIGNIFICANT CHANGE UP (ref 3.5–5.3)
POTASSIUM SERPL-SCNC: 4.4 MMOL/L — SIGNIFICANT CHANGE UP (ref 3.5–5.3)
PROT SERPL-MCNC: 8 G/DL — SIGNIFICANT CHANGE UP (ref 6–8.3)
RBC # BLD: 5.3 M/UL — SIGNIFICANT CHANGE UP (ref 4.2–5.8)
RBC # FLD: 15.9 % — HIGH (ref 10.3–14.5)
SODIUM SERPL-SCNC: 136 MMOL/L — SIGNIFICANT CHANGE UP (ref 135–145)
WBC # BLD: 6.3 K/UL — SIGNIFICANT CHANGE UP (ref 3.8–10.5)
WBC # FLD AUTO: 6.3 K/UL — SIGNIFICANT CHANGE UP (ref 3.8–10.5)

## 2025-05-21 DIAGNOSIS — Z51.11 ENCOUNTER FOR ANTINEOPLASTIC CHEMOTHERAPY: ICD-10-CM

## 2025-06-03 ENCOUNTER — APPOINTMENT (OUTPATIENT)
Age: 55
End: 2025-06-03

## 2025-06-03 ENCOUNTER — APPOINTMENT (OUTPATIENT)
Dept: HEMATOLOGY ONCOLOGY | Facility: CLINIC | Age: 55
End: 2025-06-03
Payer: MEDICAID

## 2025-06-03 ENCOUNTER — RESULT REVIEW (OUTPATIENT)
Age: 55
End: 2025-06-03

## 2025-06-03 DIAGNOSIS — C18.2 MALIGNANT NEOPLASM OF ASCENDING COLON: ICD-10-CM

## 2025-06-03 LAB
ALBUMIN SERPL ELPH-MCNC: 4.2 G/DL — SIGNIFICANT CHANGE UP (ref 3.3–5)
ALP SERPL-CCNC: 158 U/L — HIGH (ref 40–120)
ALT FLD-CCNC: 27 U/L — SIGNIFICANT CHANGE UP (ref 10–50)
ANION GAP SERPL CALC-SCNC: 17 MMOL/L — SIGNIFICANT CHANGE UP (ref 5–17)
AST SERPL-CCNC: 47 U/L — HIGH (ref 10–40)
BASOPHILS # BLD AUTO: 0.03 K/UL — SIGNIFICANT CHANGE UP (ref 0–0.2)
BASOPHILS NFR BLD AUTO: 0.4 % — SIGNIFICANT CHANGE UP (ref 0–2)
BILIRUB SERPL-MCNC: 0.3 MG/DL — SIGNIFICANT CHANGE UP (ref 0.2–1.2)
BUN SERPL-MCNC: 10 MG/DL — SIGNIFICANT CHANGE UP (ref 7–23)
CALCIUM SERPL-MCNC: 9.3 MG/DL — SIGNIFICANT CHANGE UP (ref 8.4–10.5)
CHLORIDE SERPL-SCNC: 97 MMOL/L — SIGNIFICANT CHANGE UP (ref 96–108)
CO2 SERPL-SCNC: 23 MMOL/L — SIGNIFICANT CHANGE UP (ref 22–31)
CREAT SERPL-MCNC: 0.88 MG/DL — SIGNIFICANT CHANGE UP (ref 0.5–1.3)
EGFR: 103 ML/MIN/1.73M2 — SIGNIFICANT CHANGE UP
EGFR: 103 ML/MIN/1.73M2 — SIGNIFICANT CHANGE UP
EOSINOPHIL # BLD AUTO: 0.15 K/UL — SIGNIFICANT CHANGE UP (ref 0–0.5)
EOSINOPHIL NFR BLD AUTO: 2.2 % — SIGNIFICANT CHANGE UP (ref 0–6)
GLUCOSE SERPL-MCNC: 119 MG/DL — HIGH (ref 70–99)
HCT VFR BLD CALC: 41.8 % — SIGNIFICANT CHANGE UP (ref 39–50)
HGB BLD-MCNC: 13.4 G/DL — SIGNIFICANT CHANGE UP (ref 13–17)
IMM GRANULOCYTES # BLD AUTO: 0.03 K/UL — SIGNIFICANT CHANGE UP (ref 0–0.07)
IMM GRANULOCYTES NFR BLD AUTO: 0.4 % — SIGNIFICANT CHANGE UP (ref 0–0.9)
LYMPHOCYTES # BLD AUTO: 1.11 K/UL — SIGNIFICANT CHANGE UP (ref 1–3.3)
LYMPHOCYTES NFR BLD AUTO: 16.3 % — SIGNIFICANT CHANGE UP (ref 13–44)
MAGNESIUM SERPL-MCNC: 1.9 MG/DL — SIGNIFICANT CHANGE UP (ref 1.6–2.6)
MCHC RBC-ENTMCNC: 26 PG — LOW (ref 27–34)
MCHC RBC-ENTMCNC: 32.1 G/DL — SIGNIFICANT CHANGE UP (ref 32–36)
MCV RBC AUTO: 81.2 FL — SIGNIFICANT CHANGE UP (ref 80–100)
MONOCYTES # BLD AUTO: 0.69 K/UL — SIGNIFICANT CHANGE UP (ref 0–0.9)
MONOCYTES NFR BLD AUTO: 10.1 % — SIGNIFICANT CHANGE UP (ref 2–14)
NEUTROPHILS # BLD AUTO: 4.79 K/UL — SIGNIFICANT CHANGE UP (ref 1.8–7.4)
NEUTROPHILS NFR BLD AUTO: 70.6 % — SIGNIFICANT CHANGE UP (ref 43–77)
NRBC # BLD AUTO: 0 K/UL — SIGNIFICANT CHANGE UP (ref 0–0)
NRBC # FLD: 0 K/UL — SIGNIFICANT CHANGE UP (ref 0–0)
NRBC BLD AUTO-RTO: 0 /100 WBCS — SIGNIFICANT CHANGE UP (ref 0–0)
PLATELET # BLD AUTO: 160 K/UL — SIGNIFICANT CHANGE UP (ref 150–400)
PMV BLD: 9.3 FL — SIGNIFICANT CHANGE UP (ref 7–13)
POTASSIUM SERPL-MCNC: 4.1 MMOL/L — SIGNIFICANT CHANGE UP (ref 3.5–5.3)
POTASSIUM SERPL-SCNC: 4.1 MMOL/L — SIGNIFICANT CHANGE UP (ref 3.5–5.3)
PROT SERPL-MCNC: 7.4 G/DL — SIGNIFICANT CHANGE UP (ref 6–8.3)
RBC # BLD: 5.15 M/UL — SIGNIFICANT CHANGE UP (ref 4.2–5.8)
RBC # FLD: 15.3 % — HIGH (ref 10.3–14.5)
SODIUM SERPL-SCNC: 137 MMOL/L — SIGNIFICANT CHANGE UP (ref 135–145)
WBC # BLD: 6.8 K/UL — SIGNIFICANT CHANGE UP (ref 3.8–10.5)
WBC # FLD AUTO: 6.8 K/UL — SIGNIFICANT CHANGE UP (ref 3.8–10.5)

## 2025-06-03 PROCEDURE — 99214 OFFICE O/P EST MOD 30 MIN: CPT

## 2025-06-13 ENCOUNTER — APPOINTMENT (OUTPATIENT)
Dept: CT IMAGING | Facility: CLINIC | Age: 55
End: 2025-06-13

## 2025-06-17 ENCOUNTER — RESULT REVIEW (OUTPATIENT)
Age: 55
End: 2025-06-17

## 2025-06-17 ENCOUNTER — APPOINTMENT (OUTPATIENT)
Dept: HEMATOLOGY ONCOLOGY | Facility: CLINIC | Age: 55
End: 2025-06-17
Payer: MEDICAID

## 2025-06-17 ENCOUNTER — APPOINTMENT (OUTPATIENT)
Age: 55
End: 2025-06-17

## 2025-06-17 LAB
ALBUMIN SERPL ELPH-MCNC: 4 G/DL — SIGNIFICANT CHANGE UP (ref 3.3–5)
ALP SERPL-CCNC: 146 U/L — HIGH (ref 40–120)
ALT FLD-CCNC: 28 U/L — SIGNIFICANT CHANGE UP (ref 10–50)
ANION GAP SERPL CALC-SCNC: 15 MMOL/L — SIGNIFICANT CHANGE UP (ref 5–17)
AST SERPL-CCNC: 54 U/L — HIGH (ref 10–40)
BASOPHILS # BLD AUTO: 0.03 K/UL — SIGNIFICANT CHANGE UP (ref 0–0.2)
BASOPHILS NFR BLD AUTO: 0.4 % — SIGNIFICANT CHANGE UP (ref 0–2)
BILIRUB SERPL-MCNC: 0.3 MG/DL — SIGNIFICANT CHANGE UP (ref 0.2–1.2)
BUN SERPL-MCNC: 11 MG/DL — SIGNIFICANT CHANGE UP (ref 7–23)
CALCIUM SERPL-MCNC: 9.4 MG/DL — SIGNIFICANT CHANGE UP (ref 8.4–10.5)
CHLORIDE SERPL-SCNC: 101 MMOL/L — SIGNIFICANT CHANGE UP (ref 96–108)
CO2 SERPL-SCNC: 19 MMOL/L — LOW (ref 22–31)
CREAT SERPL-MCNC: 0.78 MG/DL — SIGNIFICANT CHANGE UP (ref 0.5–1.3)
EGFR: 107 ML/MIN/1.73M2 — SIGNIFICANT CHANGE UP
EGFR: 107 ML/MIN/1.73M2 — SIGNIFICANT CHANGE UP
EOSINOPHIL # BLD AUTO: 0.16 K/UL — SIGNIFICANT CHANGE UP (ref 0–0.5)
EOSINOPHIL NFR BLD AUTO: 2.3 % — SIGNIFICANT CHANGE UP (ref 0–6)
GLUCOSE SERPL-MCNC: 99 MG/DL — SIGNIFICANT CHANGE UP (ref 70–99)
HCT VFR BLD CALC: 42.4 % — SIGNIFICANT CHANGE UP (ref 39–50)
HGB BLD-MCNC: 13.8 G/DL — SIGNIFICANT CHANGE UP (ref 13–17)
IMM GRANULOCYTES # BLD AUTO: 0.03 K/UL — SIGNIFICANT CHANGE UP (ref 0–0.07)
IMM GRANULOCYTES NFR BLD AUTO: 0.4 % — SIGNIFICANT CHANGE UP (ref 0–0.9)
LYMPHOCYTES # BLD AUTO: 1.01 K/UL — SIGNIFICANT CHANGE UP (ref 1–3.3)
LYMPHOCYTES NFR BLD AUTO: 14.5 % — SIGNIFICANT CHANGE UP (ref 13–44)
MAGNESIUM SERPL-MCNC: 1.9 MG/DL — SIGNIFICANT CHANGE UP (ref 1.6–2.6)
MCHC RBC-ENTMCNC: 25.9 PG — LOW (ref 27–34)
MCHC RBC-ENTMCNC: 32.5 G/DL — SIGNIFICANT CHANGE UP (ref 32–36)
MCV RBC AUTO: 79.7 FL — LOW (ref 80–100)
MONOCYTES # BLD AUTO: 0.69 K/UL — SIGNIFICANT CHANGE UP (ref 0–0.9)
MONOCYTES NFR BLD AUTO: 9.9 % — SIGNIFICANT CHANGE UP (ref 2–14)
NEUTROPHILS # BLD AUTO: 5.05 K/UL — SIGNIFICANT CHANGE UP (ref 1.8–7.4)
NEUTROPHILS NFR BLD AUTO: 72.5 % — SIGNIFICANT CHANGE UP (ref 43–77)
NRBC # BLD AUTO: 0 K/UL — SIGNIFICANT CHANGE UP (ref 0–0)
NRBC # FLD: 0 K/UL — SIGNIFICANT CHANGE UP (ref 0–0)
NRBC BLD AUTO-RTO: 0 /100 WBCS — SIGNIFICANT CHANGE UP (ref 0–0)
PLATELET # BLD AUTO: 174 K/UL — SIGNIFICANT CHANGE UP (ref 150–400)
PMV BLD: 8.7 FL — SIGNIFICANT CHANGE UP (ref 7–13)
POTASSIUM SERPL-MCNC: 4.4 MMOL/L — SIGNIFICANT CHANGE UP (ref 3.5–5.3)
POTASSIUM SERPL-SCNC: 4.4 MMOL/L — SIGNIFICANT CHANGE UP (ref 3.5–5.3)
PROT SERPL-MCNC: 7.9 G/DL — SIGNIFICANT CHANGE UP (ref 6–8.3)
RBC # BLD: 5.32 M/UL — SIGNIFICANT CHANGE UP (ref 4.2–5.8)
RBC # FLD: 15 % — HIGH (ref 10.3–14.5)
SODIUM SERPL-SCNC: 135 MMOL/L — SIGNIFICANT CHANGE UP (ref 135–145)
WBC # BLD: 6.97 K/UL — SIGNIFICANT CHANGE UP (ref 3.8–10.5)
WBC # FLD AUTO: 6.97 K/UL — SIGNIFICANT CHANGE UP (ref 3.8–10.5)

## 2025-06-17 PROCEDURE — 99214 OFFICE O/P EST MOD 30 MIN: CPT

## 2025-06-27 ENCOUNTER — OUTPATIENT (OUTPATIENT)
Dept: OUTPATIENT SERVICES | Facility: HOSPITAL | Age: 55
LOS: 1 days | End: 2025-06-27
Payer: MEDICAID

## 2025-06-27 ENCOUNTER — APPOINTMENT (OUTPATIENT)
Dept: CT IMAGING | Facility: CLINIC | Age: 55
End: 2025-06-27
Payer: MEDICAID

## 2025-06-27 DIAGNOSIS — Z98.890 OTHER SPECIFIED POSTPROCEDURAL STATES: Chronic | ICD-10-CM

## 2025-06-27 DIAGNOSIS — Z90.49 ACQUIRED ABSENCE OF OTHER SPECIFIED PARTS OF DIGESTIVE TRACT: Chronic | ICD-10-CM

## 2025-06-27 DIAGNOSIS — C18.2 MALIGNANT NEOPLASM OF ASCENDING COLON: ICD-10-CM

## 2025-06-27 PROCEDURE — 71260 CT THORAX DX C+: CPT

## 2025-06-27 PROCEDURE — 71260 CT THORAX DX C+: CPT | Mod: 26

## 2025-06-27 PROCEDURE — 74177 CT ABD & PELVIS W/CONTRAST: CPT | Mod: 26

## 2025-06-27 PROCEDURE — 74177 CT ABD & PELVIS W/CONTRAST: CPT

## 2025-07-01 ENCOUNTER — APPOINTMENT (OUTPATIENT)
Dept: HEMATOLOGY ONCOLOGY | Facility: CLINIC | Age: 55
End: 2025-07-01
Payer: MEDICAID

## 2025-07-01 ENCOUNTER — APPOINTMENT (OUTPATIENT)
Age: 55
End: 2025-07-01

## 2025-07-01 VITALS
OXYGEN SATURATION: 96 % | TEMPERATURE: 97.3 F | HEIGHT: 74 IN | HEART RATE: 89 BPM | BODY MASS INDEX: 27.66 KG/M2 | DIASTOLIC BLOOD PRESSURE: 73 MMHG | WEIGHT: 215.5 LBS | SYSTOLIC BLOOD PRESSURE: 109 MMHG

## 2025-07-01 PROCEDURE — 99215 OFFICE O/P EST HI 40 MIN: CPT

## 2025-07-01 PROCEDURE — G2211 COMPLEX E/M VISIT ADD ON: CPT | Mod: NC

## 2025-07-01 RX ORDER — TRIFLURIDINE AND TIPIRACIL 20; 8.19 MG/1; MG/1
20-8.19 TABLET, FILM COATED ORAL
Qty: 60 | Refills: 11 | Status: ACTIVE | COMMUNITY
Start: 2025-07-01 | End: 1900-01-01

## 2025-07-01 RX ORDER — TRIFLURIDINE AND TIPIRACIL 15; 6.14 MG/1; MG/1
15-6.14 TABLET, FILM COATED ORAL
Qty: 20 | Refills: 11 | Status: ACTIVE | COMMUNITY
Start: 2025-07-01 | End: 1900-01-01

## 2025-07-08 ENCOUNTER — OUTPATIENT (OUTPATIENT)
Dept: OUTPATIENT SERVICES | Facility: HOSPITAL | Age: 55
LOS: 1 days | Discharge: HOME | End: 2025-07-08

## 2025-07-08 DIAGNOSIS — Z90.49 ACQUIRED ABSENCE OF OTHER SPECIFIED PARTS OF DIGESTIVE TRACT: Chronic | ICD-10-CM

## 2025-07-08 DIAGNOSIS — Z98.890 OTHER SPECIFIED POSTPROCEDURAL STATES: Chronic | ICD-10-CM

## 2025-07-08 DIAGNOSIS — C18.9 MALIGNANT NEOPLASM OF COLON, UNSPECIFIED: ICD-10-CM

## 2025-07-11 ENCOUNTER — APPOINTMENT (OUTPATIENT)
Dept: CT IMAGING | Facility: CLINIC | Age: 55
End: 2025-07-11

## 2025-07-14 ENCOUNTER — NON-APPOINTMENT (OUTPATIENT)
Age: 55
End: 2025-07-14

## 2025-07-15 ENCOUNTER — RESULT REVIEW (OUTPATIENT)
Age: 55
End: 2025-07-15

## 2025-07-15 ENCOUNTER — APPOINTMENT (OUTPATIENT)
Age: 55
End: 2025-07-15

## 2025-07-15 LAB
BASOPHILS # BLD AUTO: 0.03 K/UL — SIGNIFICANT CHANGE UP (ref 0–0.2)
BASOPHILS NFR BLD AUTO: 0.3 % — SIGNIFICANT CHANGE UP (ref 0–2)
EOSINOPHIL # BLD AUTO: 0.09 K/UL — SIGNIFICANT CHANGE UP (ref 0–0.5)
EOSINOPHIL NFR BLD AUTO: 1 % — SIGNIFICANT CHANGE UP (ref 0–6)
HCT VFR BLD CALC: 40.7 % — SIGNIFICANT CHANGE UP (ref 39–50)
HGB BLD-MCNC: 13 G/DL — SIGNIFICANT CHANGE UP (ref 13–17)
IMM GRANULOCYTES # BLD AUTO: 0.04 K/UL — SIGNIFICANT CHANGE UP (ref 0–0.07)
IMM GRANULOCYTES NFR BLD AUTO: 0.5 % — SIGNIFICANT CHANGE UP (ref 0–0.9)
LYMPHOCYTES # BLD AUTO: 0.78 K/UL — LOW (ref 1–3.3)
LYMPHOCYTES NFR BLD AUTO: 8.8 % — LOW (ref 13–44)
MCHC RBC-ENTMCNC: 25.6 PG — LOW (ref 27–34)
MCHC RBC-ENTMCNC: 31.9 G/DL — LOW (ref 32–36)
MCV RBC AUTO: 80.3 FL — SIGNIFICANT CHANGE UP (ref 80–100)
MONOCYTES # BLD AUTO: 1.16 K/UL — HIGH (ref 0–0.9)
MONOCYTES NFR BLD AUTO: 13.1 % — SIGNIFICANT CHANGE UP (ref 2–14)
NEUTROPHILS # BLD AUTO: 6.74 K/UL — SIGNIFICANT CHANGE UP (ref 1.8–7.4)
NEUTROPHILS NFR BLD AUTO: 76.3 % — SIGNIFICANT CHANGE UP (ref 43–77)
NRBC # BLD AUTO: 0 K/UL — SIGNIFICANT CHANGE UP (ref 0–0)
NRBC # FLD: 0 K/UL — SIGNIFICANT CHANGE UP (ref 0–0)
NRBC BLD AUTO-RTO: 0 /100 WBCS — SIGNIFICANT CHANGE UP (ref 0–0)
PLATELET # BLD AUTO: 190 K/UL — SIGNIFICANT CHANGE UP (ref 150–400)
PMV BLD: 8.3 FL — SIGNIFICANT CHANGE UP (ref 7–13)
RBC # BLD: 5.07 M/UL — SIGNIFICANT CHANGE UP (ref 4.2–5.8)
RBC # FLD: 14.6 % — HIGH (ref 10.3–14.5)
WBC # BLD: 8.84 K/UL — SIGNIFICANT CHANGE UP (ref 3.8–10.5)
WBC # FLD AUTO: 8.84 K/UL — SIGNIFICANT CHANGE UP (ref 3.8–10.5)

## 2025-07-16 DIAGNOSIS — Z51.11 ENCOUNTER FOR ANTINEOPLASTIC CHEMOTHERAPY: ICD-10-CM

## 2025-07-16 DIAGNOSIS — R11.2 NAUSEA WITH VOMITING, UNSPECIFIED: ICD-10-CM

## 2025-07-16 LAB
ALBUMIN SERPL ELPH-MCNC: 3.8 G/DL — SIGNIFICANT CHANGE UP (ref 3.3–5)
ALP SERPL-CCNC: 277 U/L — HIGH (ref 40–120)
ALT FLD-CCNC: 77 U/L — HIGH (ref 10–50)
ANION GAP SERPL CALC-SCNC: 13 MMOL/L — SIGNIFICANT CHANGE UP (ref 5–17)
APPEARANCE UR: ABNORMAL
AST SERPL-CCNC: 202 U/L — HIGH (ref 10–40)
BILIRUB SERPL-MCNC: 0.9 MG/DL — SIGNIFICANT CHANGE UP (ref 0.2–1.2)
BILIRUB UR-MCNC: ABNORMAL
BUN SERPL-MCNC: 7 MG/DL — SIGNIFICANT CHANGE UP (ref 7–23)
CALCIUM SERPL-MCNC: 9.1 MG/DL — SIGNIFICANT CHANGE UP (ref 8.4–10.5)
CHLORIDE SERPL-SCNC: 94 MMOL/L — LOW (ref 96–108)
CO2 SERPL-SCNC: 24 MMOL/L — SIGNIFICANT CHANGE UP (ref 22–31)
COLOR SPEC: SIGNIFICANT CHANGE UP
CREAT SERPL-MCNC: 0.68 MG/DL — SIGNIFICANT CHANGE UP (ref 0.5–1.3)
DIFF PNL FLD: NEGATIVE — SIGNIFICANT CHANGE UP
EGFR: 112 ML/MIN/1.73M2 — SIGNIFICANT CHANGE UP
EGFR: 112 ML/MIN/1.73M2 — SIGNIFICANT CHANGE UP
GLUCOSE SERPL-MCNC: 89 MG/DL — SIGNIFICANT CHANGE UP (ref 70–99)
GLUCOSE UR QL: NEGATIVE MG/DL — SIGNIFICANT CHANGE UP
KETONES UR QL: NEGATIVE MG/DL — SIGNIFICANT CHANGE UP
LEUKOCYTE ESTERASE UR-ACNC: ABNORMAL
NITRITE UR-MCNC: NEGATIVE — SIGNIFICANT CHANGE UP
PH UR: 6 — SIGNIFICANT CHANGE UP (ref 5–8)
POTASSIUM SERPL-MCNC: 6.9 MMOL/L — CRITICAL HIGH (ref 3.5–5.3)
POTASSIUM SERPL-SCNC: 6.9 MMOL/L — CRITICAL HIGH (ref 3.5–5.3)
PROT SERPL-MCNC: 8.6 G/DL — HIGH (ref 6–8.3)
PROT UR-MCNC: 30 MG/DL
SODIUM SERPL-SCNC: 132 MMOL/L — LOW (ref 135–145)
SP GR SPEC: 1.02 — SIGNIFICANT CHANGE UP (ref 1–1.03)
UROBILINOGEN FLD QL: 0.2 MG/DL — SIGNIFICANT CHANGE UP (ref 0.2–1)

## 2025-07-17 NOTE — CHART NOTE - NSCHARTNOTEFT_GEN_A_CORE
HPI: As per medical record,   52 year old male with PMHx of doris ca, colon ca with mets to liver, s/p ileostomy, PE , DVTs  presented to the ED with abdominal pain that has been ongoing since diagnosis in may 2022. recently in HHED a few days ago for same. Patient reports that he ran out of his oxycodone and has been unable to get a refill. Patient follows with pain management outpatient and is also on fentanyl patches.   denies any complications with ostomy bag, mild prolapse present. Appears to be draining appropriately. . He reports 10/10  abdominal pain, all over, negative CVA tenderness. He reports difficult home situation. Lives with his mother with dementia and brother who is rarely around. He reports a poor diet.  CT abdomen Patient denies any fevers chills, nausea vomiting, chest pain palpitations lightheadedness or dizziness at the time of my examination but does report those all those symptoms do sporadically occur. He reports he smoked cigarettes and drank wine yesterday after a few years of abstinence due to depression and pain. Patient seen by psych and colorectal in the ED. Patient also found to have leukocytosis, unclear source. Patient to be admitted to the hospitalist service for abdominal pain and leukocytosis (25 Sep 2023 16:03)      PERTINENT PMH REVIEWED:  [ X ] YES [ ] NO           Primary Contact: sister/HCP Idania ([phone])    Mental Status: [ X ] Alert  [ X ] Oriented [  ] Confused [  ] Lethargic [  ]  Concerns of Depression [ x ] admits to feeling depressed - psych following   Anxiety [  x ] admits to have anxiety - psych following   Baseline ADLs (prior to admission):  Independent [ ] moderately [ X ] fully   Dependent   [ ] moderately [ ] fully    Anticipated Grief: Patient [ X ] Family [  ]    Caregiver [place] Assessed: Yes [ X ] No [  ]    Faith: Unknown     Spiritual Concerns: Not identified     Goals of Care: Comfort [  ] Rehabilitation [  ] Curative [  ] Life Prolonging [ X ]    Previous Services: None    ADVANCE DIRECTIVES:  Full Code    Anticipated D/C Plan: return home with appt outpatient with pain management                      Summary: SW met with patient to offer support. Palliative roles explained. Pt is known to team from previous admissions. Pt acknowledges meeting with Pall NP Deirdre this morning. Pt appears alert, oriented and able to make needs known. Pt expresses discomfort reporting that he isn't receiving the amount of pain medications he should be receiving. He also notes difficult situation at home with his mother who he states has dementia. Pt has a sister Idania and brother who works full time. He reports that plan is to return home with scheduled pain management appt. Pt offers no questions at this time. Emotional support provided. Our team will continue to follow. Detail Level: Detailed

## 2025-07-29 ENCOUNTER — APPOINTMENT (OUTPATIENT)
Age: 55
End: 2025-07-29

## 2025-07-29 ENCOUNTER — APPOINTMENT (OUTPATIENT)
Dept: HEMATOLOGY ONCOLOGY | Facility: CLINIC | Age: 55
End: 2025-07-29

## 2025-08-08 ENCOUNTER — APPOINTMENT (OUTPATIENT)
Age: 55
End: 2025-08-08

## 2025-08-12 ENCOUNTER — APPOINTMENT (OUTPATIENT)
Dept: HEMATOLOGY ONCOLOGY | Facility: CLINIC | Age: 55
End: 2025-08-12
Payer: MEDICAID

## 2025-08-12 ENCOUNTER — RESULT REVIEW (OUTPATIENT)
Age: 55
End: 2025-08-12

## 2025-08-12 ENCOUNTER — APPOINTMENT (OUTPATIENT)
Age: 55
End: 2025-08-12

## 2025-08-12 DIAGNOSIS — C78.7 MALIGNANT NEOPLASM OF COLON, UNSPECIFIED: ICD-10-CM

## 2025-08-12 DIAGNOSIS — C18.9 MALIGNANT NEOPLASM OF COLON, UNSPECIFIED: ICD-10-CM

## 2025-08-12 LAB
ALBUMIN SERPL ELPH-MCNC: 3.5 G/DL — SIGNIFICANT CHANGE UP (ref 3.3–5)
ALP SERPL-CCNC: 464 U/L — HIGH (ref 40–120)
ALT FLD-CCNC: 16 U/L — SIGNIFICANT CHANGE UP (ref 10–50)
ANION GAP SERPL CALC-SCNC: 17 MMOL/L — SIGNIFICANT CHANGE UP (ref 5–17)
AST SERPL-CCNC: 82 U/L — HIGH (ref 10–40)
BASOPHILS # BLD AUTO: 0.02 K/UL — SIGNIFICANT CHANGE UP (ref 0–0.2)
BASOPHILS # BLD AUTO: 0.02 K/UL — SIGNIFICANT CHANGE UP (ref 0–0.2)
BASOPHILS NFR BLD AUTO: 0.3 % — SIGNIFICANT CHANGE UP (ref 0–2)
BASOPHILS NFR BLD AUTO: 0.3 % — SIGNIFICANT CHANGE UP (ref 0–2)
BILIRUB SERPL-MCNC: 0.6 MG/DL — SIGNIFICANT CHANGE UP (ref 0.2–1.2)
BUN SERPL-MCNC: 8 MG/DL — SIGNIFICANT CHANGE UP (ref 7–23)
CALCIUM SERPL-MCNC: 8.9 MG/DL — SIGNIFICANT CHANGE UP (ref 8.4–10.5)
CHLORIDE SERPL-SCNC: 95 MMOL/L — LOW (ref 96–108)
CO2 SERPL-SCNC: 24 MMOL/L — SIGNIFICANT CHANGE UP (ref 22–31)
CREAT ?TM UR-MCNC: 215 MG/DL — SIGNIFICANT CHANGE UP
CREAT SERPL-MCNC: 0.65 MG/DL — SIGNIFICANT CHANGE UP (ref 0.5–1.3)
EGFR: 113 ML/MIN/1.73M2 — SIGNIFICANT CHANGE UP
EGFR: 113 ML/MIN/1.73M2 — SIGNIFICANT CHANGE UP
EOSINOPHIL # BLD AUTO: 0.07 K/UL — SIGNIFICANT CHANGE UP (ref 0–0.5)
EOSINOPHIL # BLD AUTO: 0.11 K/UL — SIGNIFICANT CHANGE UP (ref 0–0.5)
EOSINOPHIL NFR BLD AUTO: 1 % — SIGNIFICANT CHANGE UP (ref 0–6)
EOSINOPHIL NFR BLD AUTO: 1.6 % — SIGNIFICANT CHANGE UP (ref 0–6)
GLUCOSE SERPL-MCNC: 117 MG/DL — HIGH (ref 70–99)
HCT VFR BLD CALC: 35.4 % — LOW (ref 39–50)
HCT VFR BLD CALC: 39.6 % — SIGNIFICANT CHANGE UP (ref 39–50)
HGB BLD-MCNC: 11.4 G/DL — LOW (ref 13–17)
HGB BLD-MCNC: 12.1 G/DL — LOW (ref 13–17)
IMM GRANULOCYTES # BLD AUTO: 0.04 K/UL — SIGNIFICANT CHANGE UP (ref 0–0.07)
IMM GRANULOCYTES # BLD AUTO: 0.04 K/UL — SIGNIFICANT CHANGE UP (ref 0–0.07)
IMM GRANULOCYTES NFR BLD AUTO: 0.6 % — SIGNIFICANT CHANGE UP (ref 0–0.9)
IMM GRANULOCYTES NFR BLD AUTO: 0.6 % — SIGNIFICANT CHANGE UP (ref 0–0.9)
LYMPHOCYTES # BLD AUTO: 0.83 K/UL — LOW (ref 1–3.3)
LYMPHOCYTES # BLD AUTO: 0.99 K/UL — LOW (ref 1–3.3)
LYMPHOCYTES NFR BLD AUTO: 12 % — LOW (ref 13–44)
LYMPHOCYTES NFR BLD AUTO: 14.5 % — SIGNIFICANT CHANGE UP (ref 13–44)
MCHC RBC-ENTMCNC: 25.1 PG — LOW (ref 27–34)
MCHC RBC-ENTMCNC: 26.2 PG — LOW (ref 27–34)
MCHC RBC-ENTMCNC: 30.6 G/DL — LOW (ref 32–36)
MCHC RBC-ENTMCNC: 32.2 G/DL — SIGNIFICANT CHANGE UP (ref 32–36)
MCV RBC AUTO: 81.4 FL — SIGNIFICANT CHANGE UP (ref 80–100)
MCV RBC AUTO: 82.2 FL — SIGNIFICANT CHANGE UP (ref 80–100)
MONOCYTES # BLD AUTO: 1.02 K/UL — HIGH (ref 0–0.9)
MONOCYTES # BLD AUTO: 1.15 K/UL — HIGH (ref 0–0.9)
MONOCYTES NFR BLD AUTO: 15 % — HIGH (ref 2–14)
MONOCYTES NFR BLD AUTO: 16.6 % — HIGH (ref 2–14)
NEUTROPHILS # BLD AUTO: 4.68 K/UL — SIGNIFICANT CHANGE UP (ref 1.8–7.4)
NEUTROPHILS # BLD AUTO: 4.76 K/UL — SIGNIFICANT CHANGE UP (ref 1.8–7.4)
NEUTROPHILS NFR BLD AUTO: 68.6 % — SIGNIFICANT CHANGE UP (ref 43–77)
NEUTROPHILS NFR BLD AUTO: 68.9 % — SIGNIFICANT CHANGE UP (ref 43–77)
NRBC # BLD AUTO: 0 K/UL — SIGNIFICANT CHANGE UP (ref 0–0)
NRBC # BLD AUTO: 0 K/UL — SIGNIFICANT CHANGE UP (ref 0–0)
NRBC # FLD: 0 K/UL — SIGNIFICANT CHANGE UP (ref 0–0)
NRBC # FLD: 0 K/UL — SIGNIFICANT CHANGE UP (ref 0–0)
NRBC BLD AUTO-RTO: 0 /100 WBCS — SIGNIFICANT CHANGE UP (ref 0–0)
NRBC BLD AUTO-RTO: 0 /100 WBCS — SIGNIFICANT CHANGE UP (ref 0–0)
PLATELET # BLD AUTO: 195 K/UL — SIGNIFICANT CHANGE UP (ref 150–400)
PLATELET # BLD AUTO: 210 K/UL — SIGNIFICANT CHANGE UP (ref 150–400)
PMV BLD: 8.4 FL — SIGNIFICANT CHANGE UP (ref 7–13)
PMV BLD: 8.6 FL — SIGNIFICANT CHANGE UP (ref 7–13)
POTASSIUM SERPL-MCNC: 4.1 MMOL/L — SIGNIFICANT CHANGE UP (ref 3.5–5.3)
POTASSIUM SERPL-SCNC: 4.1 MMOL/L — SIGNIFICANT CHANGE UP (ref 3.5–5.3)
PROT ?TM UR-MCNC: 42 MG/DL — HIGH (ref 0–12)
PROT SERPL-MCNC: 7.1 G/DL — SIGNIFICANT CHANGE UP (ref 6–8.3)
PROT/CREAT UR-RTO: 0.2 RATIO — SIGNIFICANT CHANGE UP (ref 0–0.2)
RBC # BLD: 4.35 M/UL — SIGNIFICANT CHANGE UP (ref 4.2–5.8)
RBC # BLD: 4.82 M/UL — SIGNIFICANT CHANGE UP (ref 4.2–5.8)
RBC # FLD: 15.8 % — HIGH (ref 10.3–14.5)
RBC # FLD: 15.9 % — HIGH (ref 10.3–14.5)
SODIUM SERPL-SCNC: 135 MMOL/L — SIGNIFICANT CHANGE UP (ref 135–145)
WBC # BLD: 6.82 K/UL — SIGNIFICANT CHANGE UP (ref 3.8–10.5)
WBC # BLD: 6.91 K/UL — SIGNIFICANT CHANGE UP (ref 3.8–10.5)
WBC # FLD AUTO: 6.82 K/UL — SIGNIFICANT CHANGE UP (ref 3.8–10.5)
WBC # FLD AUTO: 6.91 K/UL — SIGNIFICANT CHANGE UP (ref 3.8–10.5)

## 2025-08-12 PROCEDURE — 99214 OFFICE O/P EST MOD 30 MIN: CPT

## 2025-09-05 ENCOUNTER — APPOINTMENT (OUTPATIENT)
Age: 55
End: 2025-09-05

## 2025-09-09 ENCOUNTER — APPOINTMENT (OUTPATIENT)
Age: 55
End: 2025-09-09

## 2025-09-09 ENCOUNTER — RESULT REVIEW (OUTPATIENT)
Age: 55
End: 2025-09-09

## 2025-09-09 ENCOUNTER — APPOINTMENT (OUTPATIENT)
Dept: HEMATOLOGY ONCOLOGY | Facility: CLINIC | Age: 55
End: 2025-09-09

## 2025-09-10 LAB — CEA SERPL-MCNC: 2192 NG/ML
